# Patient Record
Sex: MALE | Race: BLACK OR AFRICAN AMERICAN | NOT HISPANIC OR LATINO | Employment: FULL TIME | URBAN - METROPOLITAN AREA
[De-identification: names, ages, dates, MRNs, and addresses within clinical notes are randomized per-mention and may not be internally consistent; named-entity substitution may affect disease eponyms.]

---

## 2017-01-04 DIAGNOSIS — Z94.0 KIDNEY REPLACED BY TRANSPLANT: Primary | ICD-10-CM

## 2017-01-04 RX ORDER — PREDNISONE 5 MG/1
5 TABLET ORAL DAILY
Qty: 30 TABLET | Refills: 0 | Status: SHIPPED
Start: 2017-01-04 | End: 2017-02-15

## 2017-01-04 NOTE — TELEPHONE ENCOUNTER
Drug Name: prednisone 5mg  Last Fill Date:11/29/16  Quantity: 30    Danielle Alexis   [facility]  [phone]

## 2017-02-15 ENCOUNTER — OFFICE VISIT (OUTPATIENT)
Dept: FAMILY MEDICINE | Facility: CLINIC | Age: 42
End: 2017-02-15
Payer: COMMERCIAL

## 2017-02-15 ENCOUNTER — TELEPHONE (OUTPATIENT)
Dept: TRANSPLANT | Facility: CLINIC | Age: 42
End: 2017-02-15

## 2017-02-15 VITALS
OXYGEN SATURATION: 100 % | HEART RATE: 83 BPM | TEMPERATURE: 97.8 F | SYSTOLIC BLOOD PRESSURE: 142 MMHG | DIASTOLIC BLOOD PRESSURE: 88 MMHG

## 2017-02-15 DIAGNOSIS — Z94.0 KIDNEY REPLACED BY TRANSPLANT: Primary | ICD-10-CM

## 2017-02-15 DIAGNOSIS — Z94.0 KIDNEY REPLACED BY TRANSPLANT: ICD-10-CM

## 2017-02-15 DIAGNOSIS — M10.9 ACUTE GOUTY ARTHRITIS: Primary | ICD-10-CM

## 2017-02-15 DIAGNOSIS — Z48.298 AFTERCARE FOLLOWING ORGAN TRANSPLANT: ICD-10-CM

## 2017-02-15 DIAGNOSIS — Z79.899 ENCOUNTER FOR LONG-TERM CURRENT USE OF MEDICATION: ICD-10-CM

## 2017-02-15 LAB
ANION GAP SERPL CALCULATED.3IONS-SCNC: 8 MMOL/L (ref 3–14)
BUN SERPL-MCNC: 27 MG/DL (ref 7–30)
CALCIUM SERPL-MCNC: 9 MG/DL (ref 8.5–10.1)
CHLORIDE SERPL-SCNC: 114 MMOL/L (ref 94–109)
CO2 SERPL-SCNC: 19 MMOL/L (ref 20–32)
CREAT SERPL-MCNC: 3.72 MG/DL (ref 0.66–1.25)
ERYTHROCYTE [DISTWIDTH] IN BLOOD BY AUTOMATED COUNT: 15.1 % (ref 10–15)
GFR SERPL CREATININE-BSD FRML MDRD: 18 ML/MIN/1.7M2
GLUCOSE SERPL-MCNC: 117 MG/DL (ref 70–99)
HCT VFR BLD AUTO: 29.1 % (ref 40–53)
HGB BLD-MCNC: 9 G/DL (ref 13.3–17.7)
MCH RBC QN AUTO: 26.4 PG (ref 26.5–33)
MCHC RBC AUTO-ENTMCNC: 30.9 G/DL (ref 31.5–36.5)
MCV RBC AUTO: 85 FL (ref 78–100)
PLATELET # BLD AUTO: 180 10E9/L (ref 150–450)
POTASSIUM SERPL-SCNC: 4.8 MMOL/L (ref 3.4–5.3)
RBC # BLD AUTO: 3.41 10E12/L (ref 4.4–5.9)
SODIUM SERPL-SCNC: 141 MMOL/L (ref 133–144)
TACROLIMUS BLD-MCNC: 5 UG/L (ref 5–15)
TME LAST DOSE: NORMAL H
WBC # BLD AUTO: 9 10E9/L (ref 4–11)

## 2017-02-15 PROCEDURE — 99213 OFFICE O/P EST LOW 20 MIN: CPT | Performed by: FAMILY MEDICINE

## 2017-02-15 PROCEDURE — 80197 ASSAY OF TACROLIMUS: CPT | Performed by: INTERNAL MEDICINE

## 2017-02-15 PROCEDURE — 36415 COLL VENOUS BLD VENIPUNCTURE: CPT | Performed by: INTERNAL MEDICINE

## 2017-02-15 PROCEDURE — 85027 COMPLETE CBC AUTOMATED: CPT | Performed by: INTERNAL MEDICINE

## 2017-02-15 PROCEDURE — 80048 BASIC METABOLIC PNL TOTAL CA: CPT | Performed by: INTERNAL MEDICINE

## 2017-02-15 RX ORDER — OXYCODONE HYDROCHLORIDE 5 MG/1
5 TABLET ORAL EVERY 4 HOURS PRN
Qty: 10 TABLET | Refills: 0 | Status: SHIPPED | OUTPATIENT
Start: 2017-02-15 | End: 2017-03-14

## 2017-02-15 RX ORDER — PREDNISONE 20 MG/1
60 TABLET ORAL DAILY
Qty: 15 TABLET | Refills: 0 | Status: SHIPPED | OUTPATIENT
Start: 2017-02-15 | End: 2017-03-14

## 2017-02-15 RX ORDER — PREDNISONE 5 MG/1
5 TABLET ORAL DAILY
Qty: 30 TABLET | Refills: 11 | Status: SHIPPED | OUTPATIENT
Start: 2017-02-15 | End: 2018-02-22

## 2017-02-15 NOTE — MR AVS SNAPSHOT
After Visit Summary   2/15/2017    Rashad Ortiz    MRN: [id number]           Patient Information     Date Of Birth          1975        Visit Information        Provider Department      2/15/2017 9:00 AM Mariel Garcia MD [facility]        Today's Diagnoses     Acute gouty arthritis    -  1    Status post kidney transplant          Care Instructions    Based on your medical history and these are the current health maintenance or preventive care services that you are due for (some may have been done at this visit)  Health Maintenance Due   Topic Date Due     INFLUENZA VACCINE (SYSTEM ASSIGNED)  09/01/2016         At [facility], we strive to deliver an exceptional experience to you, every time we see you.    If you receive a survey in the mail, please send us back your thoughts. We really do value your feedback.    Your care team's suggested websites for health information:  Www.WeAre.Us : Up to date and easily searchable information on multiple topics.  Www.medlineplus.gov : medication info, interactive tutorials, watch real surgeries online  Www.familydoctor.org : good info from the Academy of Family Physicians  Www.cdc.gov : public health info, travel advisories, epidemics (H1N1)  Www.aap.org : children's health info, normal development, vaccinations  Www.health.[facility].[place].us : [place] dept of health, public health issues in [place], N1N1    How to contact your care team:   Kateryna Nieves/Zach [phone]         Pharmacy [phone]    Dr. Vu, Becky Wright PA-C, Dr. Parsons, Leanna Peguero APRN CNP, Karime Shin PA-C, Dr. Garcia, and ISABEL Brown CNP    Team RNs: Shyanne & Radha      Clinic hours  M-Th 7 am-7 pm   Fri 7 am-5 pm.   Urgent care M-F 11 am-9 pm,   Sat/Sun 9 am-5 pm.  Pharmacy M-Th 8 am-8 pm Fri 8 am-6 pm  Sat/Sun 9 am-5 pm.     All password changes, disabled accounts, or ID changes in [employer]  "will be done by our Access Services Department.    If you need help with your account or password, call: [phone]. Clinic staff no longer has the ability to change passwords.           Follow-ups after your visit        Follow-up notes from your care team     Return in about 3 days (around 2017), or if symptoms worsen or fail to improve.      Who to contact     If you have questions or need follow up information about today's clinic visit or your schedule please contact [facility] directly at [phone].  Normal or non-critical lab and imaging results will be communicated to you by Glooplehart, letter or phone within 4 business days after the clinic has received the results. If you do not hear from us within 7 days, please contact the clinic through [employer] or phone. If you have a critical or abnormal lab result, we will notify you by phone as soon as possible.  Submit refill requests through [employer] or call your pharmacy and they will forward the refill request to us. Please allow 3 business days for your refill to be completed.          Additional Information About Your Visit        MyChart Information     [employer] lets you send messages to your doctor, view your test results, renew your prescriptions, schedule appointments and more. To sign up, go to [email or web address] . Click on \"Log in\" on the left side of the screen, which will take you to the Welcome page. Then click on \"Sign up Now\" on the right side of the page.     You will be asked to enter the access code listed below, as well as some personal information. Please follow the directions to create your username and password.     Your access code is: [email or web address]  Expires: 2017  9:30 AM     Your access code will  in 90 days. If you need help or a new code, please call your [facility] or [phone].        Care EveryWhere ID     This is your Care EveryWhere ID. This could be used by other organizations to " access your [place] medical records  [id number]        Your Vitals Were     Pulse Temperature Pulse Oximetry             83 97.8  F (36.6  C) (Oral) 100%          Blood Pressure from Last 3 Encounters:   02/15/17 142/88   09/12/16 134/83   08/16/16 114/75    Weight from Last 3 Encounters:   09/12/16 182 lb 3.2 oz (82.6 kg)   08/16/16 171 lb (77.6 kg)   03/18/16 172 lb (78 kg)              Today, you had the following     No orders found for display         Today's Medication Changes          These changes are accurate as of: 2/15/17  9:30 AM.  If you have any questions, ask your nurse or doctor.               Start taking these medicines.        Dose/Directions    oxyCODONE 5 MG IR tablet   Commonly known as:  ROXICODONE   Used for:  Status post kidney transplant   Started by:  Mariel Garcia MD        Dose:  5 mg   Take 1 tablet (5 mg) by mouth every 4 hours as needed for breakthrough pain or moderate to severe pain   Quantity:  10 tablet   Refills:  0         These medicines have changed or have updated prescriptions.        Dose/Directions    * predniSONE 5 MG tablet   Commonly known as:  DELTASONE   This may have changed:  Another medication with the same name was changed. Make sure you understand how and when to take each.   Used for:  Kidney replaced by transplant   Changed by:  Francie Barraza PA-C        Dose:  5 mg   Take 1 tablet (5 mg) by mouth daily   Quantity:  30 tablet   Refills:  11       * predniSONE 5 MG tablet   Commonly known as:  DELTASONE   This may have changed:  Another medication with the same name was changed. Make sure you understand how and when to take each.   Used for:  Kidney replaced by transplant   Changed by:  Alexandria Fields MD        Dose:  5 mg   Take 1 tablet (5 mg) by mouth daily   Quantity:  30 tablet   Refills:  0       * predniSONE 20 MG tablet   Commonly known as:  DELTASONE   This may have changed:    - how much to take  - how to take  this  - when to take this  - additional instructions   Used for:  Acute gouty arthritis   Changed by:  Mariel Garcia MD        Dose:  60 mg   Take 3 tablets (60 mg) by mouth daily for 5 days   Quantity:  15 tablet   Refills:  0       * Notice:  This list has 3 medication(s) that are the same as other medications prescribed for you. Read the directions carefully, and ask your doctor or other care provider to review them with you.         Where to get your medicines      These medications were sent to [facility] - [place], [place] - [address]  [address], [facility] [address]     Phone:  [phone]     predniSONE 20 MG tablet         Some of these will need a paper prescription and others can be bought over the counter.  Ask your nurse if you have questions.     Bring a paper prescription for each of these medications     oxyCODONE 5 MG IR tablet                Primary Care Provider Office Phone # Fax #    Francie Barraza PA-C [phone]       [facility] [address]  [facility] [address]        Thank you!     Thank you for choosing [facility]  for your care. Our goal is always to provide you with excellent care. Hearing back from our patients is one way we can continue to improve our services. Please take a few minutes to complete the written survey that you may receive in the mail after your visit with us. Thank you!             Your Updated Medication List - Protect others around you: Learn how to safely use, store and throw away your medicines at www.disposemymeds.org.          This list is accurate as of: 2/15/17  9:30 AM.  Always use your most recent med list.                   Brand Name Dispense Instructions for use    carvedilol 25 MG tablet    COREG    180 tablet    TAKE ONE TABLET BY MOUTH TWICE A DAY       * furosemide 20 MG tablet    LASIX    180 tablet    TAKE ONE TABLET BY MOUTH TWICE A  DAY       * furosemide 20 MG tablet    LASIX    180 tablet    TAKE ONE TABLET BY MOUTH TWICE A DAY       * furosemide 20 MG tablet    LASIX    180 tablet    TAKE ONE TABLET BY MOUTH TWICE A DAY       losartan 100 MG tablet    COZAAR    90 tablet    Take 1 tablet (100 mg) by mouth daily       mycophenolate 250 MG capsule    CELLCEPT - GENERIC EQUIVALENT    240 capsule    Take 4 capsules (1,000 mg) by mouth 2 times daily       omeprazole 20 MG CR capsule    priLOSEC    90 capsule    Take 1 capsule (20 mg) by mouth daily       oxyCODONE 5 MG IR tablet    ROXICODONE    10 tablet    Take 1 tablet (5 mg) by mouth every 4 hours as needed for breakthrough pain or moderate to severe pain       * predniSONE 5 MG tablet    DELTASONE    30 tablet    Take 1 tablet (5 mg) by mouth daily       * predniSONE 5 MG tablet    DELTASONE    30 tablet    Take 1 tablet (5 mg) by mouth daily       * predniSONE 20 MG tablet    DELTASONE    15 tablet    Take 3 tablets (60 mg) by mouth daily for 5 days       sulfamethoxazole-trimethoprim 400-80 MG per tablet    BACTRIM/SEPTRA    45 tablet    Take 1 tablet by mouth every other day       tacrolimus 1 MG capsule    PROGRAF - GENERIC EQUIVALENT    180 capsule    Take 3 capsules (3 mg) by mouth 2 times daily       * Notice:  This list has 6 medication(s) that are the same as other medications prescribed for you. Read the directions carefully, and ask your doctor or other care provider to review them with you.

## 2017-02-15 NOTE — NURSING NOTE
"Chief Complaint   Patient presents with     Arthritis     right leg/foot       Initial BP (!) 151/96 (BP Location: Left arm, Patient Position: Chair, Cuff Size: Adult Regular)  Pulse 83  Temp 97.8  F (36.6  C) (Oral)  SpO2 100% Estimated body mass index is 27.7 kg/(m^2) as calculated from the following:    Height as of 9/12/16: 5' 8\" (1.727 m).    Weight as of 9/12/16: 182 lb 3.2 oz (82.6 kg).  Medication Reconciliation: complete     Was not able to stand to get Wt & HT    S. TAY Cramer        "

## 2017-02-15 NOTE — PROGRESS NOTES
SUBJECTIVE:                                                    Rashad Ortiz is a 41 year old male who presents to clinic today for the following health issues:      Gout/ single inflamed joint      Onset: 3 days    Description:   Location: foot - right  Joint Swelling: YES  Redness: YES  Pain: YES    Intensity: severe    Progression of Symptoms:  worsening    Accompanying Signs & Symptoms:  Fevers: no    History:   Trauma to the area: no   Previous history of gout: YES   Recent illness:  no     Precipitating factors:   Diet-rich in purine: no  Alcohol use: no   Diuretic use: YES    Alleviating factors:  n/a     Therapies Tried and outcome: none      Problem list and histories reviewed & adjusted, as indicated.  Additional history: as documented    Problem list, Medication list, Allergies, and Medical/Social/Surgical histories reviewed in EPIC and updated as appropriate.    ROS:  Constitutional, HEENT, cardiovascular, pulmonary, gi and gu systems are negative, except as otherwise noted.    OBJECTIVE:                                                    /88  Pulse 83  Temp 97.8  F (36.6  C) (Oral)  SpO2 100%  There is no height or weight on file to calculate BMI.  GENERAL: healthy, alert and no distress  NECK: no adenopathy, no asymmetry, masses, or scars and thyroid normal to palpation  RESP: lungs clear to auscultation - no rales, rhonchi or wheezes  CV: regular rate and rhythm, normal S1 S2, no S3 or S4, no murmur, click or rub, no peripheral edema and peripheral pulses strong  ABDOMEN: soft, nontender, no hepatosplenomegaly, no masses and bowel sounds normal  MS: severe tenderness to palpation of right foot, bilateral lower leg edema noted and stable per patient.    Diagnostic Test Results:  none      ASSESSMENT/PLAN:                                                    1. Acute gouty arthritis  Rx's as below and avoid trigger foods.  - predniSONE (DELTASONE) 20 MG tablet; Take 3 tablets (60 mg) by mouth  daily for 5 days  Dispense: 15 tablet; Refill: 0  - oxyCODONE (ROXICODONE) 5 MG IR tablet; Take 1 tablet (5 mg) by mouth every 4 hours as needed for breakthrough pain or moderate to severe pain  Dispense: 10 tablet; Refill: 0    The uses and side effects, including black box warnings as appropriate, were discussed in detail.  All patient questions were answered.  The patient was instructed to call immediately if any side effects developed.     FUTURE APPOINTMENTS:       - Follow-up visit in 3 days if not improved.    Mariel Mares MD  [facility]

## 2017-02-15 NOTE — PATIENT INSTRUCTIONS
Based on your medical history and these are the current health maintenance or preventive care services that you are due for (some may have been done at this visit)  Health Maintenance Due   Topic Date Due     INFLUENZA VACCINE (SYSTEM ASSIGNED)  09/01/2016         At [facility], we strive to deliver an exceptional experience to you, every time we see you.    If you receive a survey in the mail, please send us back your thoughts. We really do value your feedback.    Your care team's suggested websites for health information:  Www.[employer].org : Up to date and easily searchable information on multiple topics.  Www.medlineplus.gov : medication info, interactive tutorials, watch real surgeries online  Www.familydoctor.org : good info from the Academy of Family Physicians  Www.cdc.gov : public health info, travel advisories, epidemics (H1N1)  Www.aap.org : children's health info, normal development, vaccinations  Www.health.[facility].mn.us : [place] dept of health, public health issues in [place], N1N1    How to contact your care team:   Team Lizbeth/Spirit [phone]         Pharmacy [phone]    Dr. Vu, Becky Wright PA-C, Dr. Parsons, Leanna HALL CNP, Karime Shin PA-C, Dr. Garcia, and ISABEL Brown CNP    Team RNs: Shyanne & Radha      Clinic hours  M-Th 7 am-7 pm   Fri 7 am-5 pm.   Urgent care M-F 11 am-9 pm,   Sat/Sun 9 am-5 pm.  Pharmacy M-Th 8 am-8 pm Fri 8 am-6 pm  Sat/Sun 9 am-5 pm.     All password changes, disabled accounts, or ID changes in [employer] ([employer]MyHealth will be done by our Access Services Department.    If you need help with your account or password, call: [phone]. Clinic staff no longer has the ability to change passwords.

## 2017-02-16 ENCOUNTER — TELEPHONE (OUTPATIENT)
Dept: FAMILY MEDICINE | Facility: CLINIC | Age: 42
End: 2017-02-16

## 2017-02-16 NOTE — TELEPHONE ENCOUNTER
Call patient and get update re: insurance status, medications, preferred lab, pharmacy.    Called [phone] (M).  Does not accept incoming calls.    Left VM with sister's number. Requesting for another contact number for patient.

## 2017-02-16 NOTE — TELEPHONE ENCOUNTER
Reason for call: Other     Patient called regarding (reason for call): work note    Additional comments: pt needs a work note for yesterday and today 2/15 and 2/16  Please leave at the  and call when it is ready for     Phone Number Pt can be reached at: [phone]  Best Time: any  Can we leave a detailed message on this number? YES

## 2017-02-16 NOTE — TELEPHONE ENCOUNTER
Called patient and he states that he needs this today. I gave him [employer] Monahan's   Address and phone # to call when this will be ready there. Routing back to   provider to advise for [employer].  Amanda Branham MA/  For Teams Spirit and Lizbeth

## 2017-02-16 NOTE — TELEPHONE ENCOUNTER
Letter completed, please have signed or I can sign tomorrow.  I can sign and we can fax from [employer] today if needed.

## 2017-02-16 NOTE — LETTER
[address] [address]  Phone: [phone]    February 16, 2017        Rashad Ortiz  [address]  [facility] [address]          To whom it may concern:    RE: Rashad Ortiz    Patient was seen and treated today at our clinic and missed work 2/15/16 - 2/17/17.  Patient may return to work 2/18/17 with the following:  No working or lifting restrictions    Please contact me for questions or concerns.      Sincerely,        Mariel Mares MD

## 2017-02-17 NOTE — TELEPHONE ENCOUNTER
Received letter. Bringing to the  by 1:00 pm. Called  And left a voicemail message that the letter is at the [place]  Location for  anytime after 1:00 pm today. Also left address  To [place].  Amanda Branham MA/  For Teams Spirit and Lizbeth

## 2017-02-17 NOTE — TELEPHONE ENCOUNTER
..Reason for Call:   Checking status of note requested     Detailed comments: needs today    Phone Number Patient can be reached at: Home number on file [phone] (home)    Best Time: anytime    Can we leave a detailed message on this number? YES    Call taken on 2/17/2017 at 7:26 AM by Ting Bain

## 2017-02-21 NOTE — TELEPHONE ENCOUNTER
Retrieved letter and faxed to [employer], [phone], right fax confirmed at 1:13 pm today. Letter  To GAYATHRI Branham MA/  For Teams Spirit and Lizbeth

## 2017-02-21 NOTE — TELEPHONE ENCOUNTER
Please see previous note.    Patient needs letter faxed to employer Fax:  [phone], [place].    Please call patient at [phone] if there are any questions.  Ok to leave message.    Thank you,    Andria Fuentes

## 2017-02-22 ENCOUNTER — TELEPHONE (OUTPATIENT)
Dept: TRANSPLANT | Facility: CLINIC | Age: 42
End: 2017-02-22

## 2017-02-22 NOTE — TELEPHONE ENCOUNTER
ISSUE: creatinine = 3.72  His creatinine has been elevated.  Dr. Barrios recommended a biopsy last year but had insurance issues.  Have been trying to speak with patient.   Left a message today to call back.

## 2017-02-23 ENCOUNTER — TELEPHONE (OUTPATIENT)
Dept: TRANSPLANT | Facility: CLINIC | Age: 42
End: 2017-02-23

## 2017-02-23 DIAGNOSIS — Z94.0 KIDNEY TRANSPLANTED: Primary | ICD-10-CM

## 2017-02-24 NOTE — TELEPHONE ENCOUNTER
Message  Received: Today       Gee Barrios MD Ututalum, Teresa, RN                     Increased serum creatinine, although this is after changing antihypertensive medication and being started on high dose of losartan.  Recommend patient be seen in clinic and evaluated.  Would consider a kidney transplant biopsy.         Call patient and discuss plan to biopsy.    Spoke to patient about the biopsy.   Had already discussed it with him December last year but had insurance issues.  Plan to Biopsy on Tuesday 2/28/17.  --- LPN tasked.

## 2017-02-27 DIAGNOSIS — Z94.0 KIDNEY TRANSPLANTED: Primary | ICD-10-CM

## 2017-02-28 ENCOUNTER — RESULTS ONLY (OUTPATIENT)
Dept: OTHER | Facility: CLINIC | Age: 42
End: 2017-02-28

## 2017-02-28 ENCOUNTER — HOSPITAL ENCOUNTER (OUTPATIENT)
Facility: AMBULATORY SURGERY CENTER | Age: 42
End: 2017-02-28
Attending: INTERNAL MEDICINE

## 2017-02-28 VITALS
RESPIRATION RATE: 12 BRPM | HEIGHT: 68 IN | OXYGEN SATURATION: 100 % | TEMPERATURE: 98.4 F | HEART RATE: 66 BPM | SYSTOLIC BLOOD PRESSURE: 155 MMHG | WEIGHT: 170 LBS | DIASTOLIC BLOOD PRESSURE: 109 MMHG | BODY MASS INDEX: 25.76 KG/M2

## 2017-02-28 DIAGNOSIS — Z94.0 KIDNEY REPLACED BY TRANSPLANT: Primary | ICD-10-CM

## 2017-02-28 DIAGNOSIS — Z94.0 KIDNEY TRANSPLANTED: ICD-10-CM

## 2017-02-28 LAB
ABO + RH BLD: NORMAL
ABO + RH BLD: NORMAL
ALBUMIN UR-MCNC: NEGATIVE MG/DL
ANION GAP SERPL CALCULATED.3IONS-SCNC: 9 MMOL/L (ref 3–14)
APPEARANCE UR: CLEAR
BASOPHILS # BLD AUTO: 0 10E9/L (ref 0–0.2)
BASOPHILS NFR BLD AUTO: 0.1 %
BILIRUB UR QL STRIP: NEGATIVE
BLD GP AB SCN SERPL QL: NORMAL
BLOOD BANK CMNT PATIENT-IMP: NORMAL
BUN SERPL-MCNC: 55 MG/DL (ref 7–30)
CALCIUM SERPL-MCNC: 8.7 MG/DL (ref 8.5–10.1)
CHLORIDE SERPL-SCNC: 117 MMOL/L (ref 94–109)
CO2 SERPL-SCNC: 19 MMOL/L (ref 20–32)
COLOR UR AUTO: ABNORMAL
CREAT SERPL-MCNC: 3.61 MG/DL (ref 0.66–1.25)
CREAT UR-MCNC: 97 MG/DL
DIFFERENTIAL METHOD BLD: ABNORMAL
EOSINOPHIL # BLD AUTO: 0 10E9/L (ref 0–0.7)
EOSINOPHIL NFR BLD AUTO: 0.4 %
ERYTHROCYTE [DISTWIDTH] IN BLOOD BY AUTOMATED COUNT: 15.9 % (ref 10–15)
GFR SERPL CREATININE-BSD FRML MDRD: 19 ML/MIN/1.7M2
GLUCOSE SERPL-MCNC: 96 MG/DL (ref 70–99)
GLUCOSE UR STRIP-MCNC: NEGATIVE MG/DL
HCT VFR BLD AUTO: 30.4 % (ref 40–53)
HGB BLD-MCNC: 8.6 G/DL (ref 13.3–17.7)
HGB BLD-MCNC: 9 G/DL (ref 13.3–17.7)
HGB BLD-MCNC: 9.1 G/DL (ref 13.3–17.7)
HGB UR QL STRIP: NEGATIVE
IMM GRANULOCYTES # BLD: 0.1 10E9/L (ref 0–0.4)
IMM GRANULOCYTES NFR BLD: 0.8 %
INR PPP: 1.08 (ref 0.86–1.14)
KETONES UR STRIP-MCNC: NEGATIVE MG/DL
LEUKOCYTE ESTERASE UR QL STRIP: NEGATIVE
LYMPHOCYTES # BLD AUTO: 1 10E9/L (ref 0.8–5.3)
LYMPHOCYTES NFR BLD AUTO: 10.2 %
MCH RBC QN AUTO: 26 PG (ref 26.5–33)
MCHC RBC AUTO-ENTMCNC: 29.9 G/DL (ref 31.5–36.5)
MCV RBC AUTO: 87 FL (ref 78–100)
MICROALBUMIN UR-MCNC: 113 MG/L
MICROALBUMIN/CREAT UR: 116.38 MG/G CR (ref 0–17)
MONOCYTES # BLD AUTO: 0.9 10E9/L (ref 0–1.3)
MONOCYTES NFR BLD AUTO: 9.4 %
MUCOUS THREADS #/AREA URNS LPF: PRESENT /LPF
NEUTROPHILS # BLD AUTO: 7.4 10E9/L (ref 1.6–8.3)
NEUTROPHILS NFR BLD AUTO: 79.1 %
NITRATE UR QL: NEGATIVE
NRBC # BLD AUTO: 0 10*3/UL
NRBC BLD AUTO-RTO: 0 /100
PH UR STRIP: 6 PH (ref 5–7)
PLATELET # BLD AUTO: 193 10E9/L (ref 150–450)
POTASSIUM SERPL-SCNC: 4.7 MMOL/L (ref 3.4–5.3)
PROT UR-MCNC: 0.28 G/L
PROT/CREAT 24H UR: 0.29 G/G CR (ref 0–0.2)
RBC # BLD AUTO: 3.5 10E12/L (ref 4.4–5.9)
RBC #/AREA URNS AUTO: <1 /HPF (ref 0–2)
SODIUM SERPL-SCNC: 145 MMOL/L (ref 133–144)
SP GR UR STRIP: 1.01 (ref 1–1.03)
SPECIMEN EXP DATE BLD: NORMAL
TACROLIMUS BLD-MCNC: 4.5 UG/L (ref 5–15)
TME LAST DOSE: ABNORMAL H
URN SPEC COLLECT METH UR: ABNORMAL
UROBILINOGEN UR STRIP-MCNC: 0 MG/DL (ref 0–2)
WBC # BLD AUTO: 9.3 10E9/L (ref 4–11)
WBC #/AREA URNS AUTO: <1 /HPF (ref 0–2)

## 2017-02-28 RX ADMIN — Medication 12 ML: at 09:17

## 2017-02-28 NOTE — IP AVS SNAPSHOT
[facility]    [address] [place] [address]    Phone:  [phone]    Fax:  [phone]                                       After Visit Summary   2/28/2017    Rashad Ortiz    MRN: [id number]           After Visit Summary Signature Page     I have received my discharge instructions, and my questions have been answered. I have discussed any challenges I see with this plan with the nurse or doctor.    ..........................................................................................................................................  Patient/Patient Representative Signature      ..........................................................................................................................................  Patient Representative Print Name and Relationship to Patient    ..................................................               ................................................  Date                                            Time    ..........................................................................................................................................  Reviewed by Signature/Title    ...................................................              ..............................................  Date                                                            Time

## 2017-02-28 NOTE — PROCEDURES
Kidney Transplant Biopsy Procedure Note    Indication/Diagnosis:  Kidney transplant with elevated creatinine    Procedure:  The indications and risks of the kidney biopsy, including bleeding severe enough to require hospitalization, transfusion, surgery, or even loss of the kidney or death, were explained, understood and agreed.  Consent was signed.  The kidney, located in the right lower quadrant, was visualized under ultrasound and biopsy site marked.  Patient was prepped and draped in the usual sterile manner.  Biopsy site was anesthetized with 1% lidocaine.  Biopsy consisted of 4 passes with a 18 ga needle under direct ultrasound guidance were made and tissue was sent to pathology.  There were no immediate complications.  Pressure was held over biopsy site and patient will be observed for signs of bleeding or other complications.  Patient remained hemodynamically stable during and early post procedure.

## 2017-02-28 NOTE — IP AVS SNAPSHOT
MRN:[id number]                      After Visit Summary   2/28/2017    Rashad Ortiz    MRN: [id number]           Thank you!     Thank you for choosing [facility] for your care. Our goal is always to provide you with excellent care. Hearing back from our patients is one way we can continue to improve our services. Please take a few minutes to complete the written survey that you may receive in the mail after you visit with us. Thank you!        Patient Information     Date Of Birth          1975        About your hospital stay     You were admitted on:  February 28, 2017 [facility]    You were discharged on:  February 28, 2017       Who to Call     For medical emergencies, please call 911.  For non-urgent questions about your medical care, please call your primary care provider or clinic, [phone]  For questions related to your surgery, please call your surgery clinic        Attending Provider     Provider Gee Alfonso MD Nephrology       Primary Care Provider Office Phone # Fax #    Francie Barraza PA-C [phone]       [address] [address]        After Care Instructions     Discharge Instructions       ACTIVITY:  NO heavy lifting (weight greater than 10 pounds) for 1 week. NO strenuous activity for 24 hours; relax and take it easy.     DIET:  Resume your regular diet and drink plenty of fluids UNLESS you are fluid restricted.    DRAINAGE: There should be minimal drainage from the biopsy site. If bleeding soaks the dressing, you should lie down and apply pressure to the site for a minimum of 10 minutes.  If the bleeding persists, call the emergency phone numbers below.  Whether bleeding persists or not, you should report the bleeding to one of the numbers below.     DRESSING: Keep the dressing dry and in place for 24 hours to prevent the site  "from reopening and bleeding.    SEDATION: If you received sedation medications, DO NOT drive or operate heavy machinery, DO NOT drink alcoholic beverages, or DO NOT make important legal decisions.    NOTIFY PROVIDER for the following signs/symptoms:  Excessive bleeding or drainage.  Excessive swelling, redness, or tenderness at the site.  Fever above 101.5* F  Severe Pain.  Drainage that is green, yellow,  thick white or has a bad odor.  Passage of bloody urine or clots after you are discharged.    EMERGENCY CONTACT NUMBERS:  Emergency Department: [phone]  Transplant Center: [phone]  7:00 AM-6:30 PM                  Pending Results     Date and Time Order Name Status Description    2/28/2017 0940 Surgical pathology exam In process     2/28/2017 0915 SEND OUTS MISC TEST In process     2/28/2017 0731 TACROLIMUS LEVEL In process     2/28/2017 0731 BK VIRUS PCR QUANTITATIVE In process     2/28/2017 0731 CMV DNA QUANTIFICATION In process     2/28/2017 0731 PRA DONOR SPECIFIC ANTIBODY In process             Admission Information     Date & Time Provider Department Dept. Phone    2/28/2017 Gee Barrios MD [facility] [phone]      Your Vitals Were     Blood Pressure Pulse Temperature Respirations Height Weight    134/99 66 98.6  F (37  C) (Oral) 12 1.727 m (5' 8\") 77.1 kg (170 lb)    Pulse Oximetry BMI (Body Mass Index)                100% 25.85 kg/m2          [employer] Information     [employer] is an electronic gateway that provides easy, online access to your medical records. With Equ[employer], you can request a clinic appointment, read your test results, renew a prescription or communicate with your care team.     To sign up for Equ[employer] visit the website at [email or web address]   You will be asked to enter the access code listed below, as well as some personal information. Please follow the directions to create your username and password.     Your access code is: " [email or web address]  Expires: 2017  9:30 AM     Your access code will  in 90 days. If you need help or a new code, please contact your [facility] or call [phone] for assistance.        Care EveryWhere ID     This is your Care EveryWhere ID. This could be used by other organizations to access your [facility] medical records  [id number]           Review of your medicines      CONTINUE these medicines which have NOT CHANGED        Dose / Directions    carvedilol 25 MG tablet   Commonly known as:  COREG   Used for:  Essential hypertension, malignant        TAKE ONE TABLET BY MOUTH TWICE A DAY   Quantity:  180 tablet   Refills:  3       furosemide 20 MG tablet   Commonly known as:  LASIX   Used for:  Benign hypertensive kidney disease with chronic kidney disease, stage 1-4 or unspecified chronic kidney disease        TAKE ONE TABLET BY MOUTH TWICE A DAY   Quantity:  180 tablet   Refills:  0       losartan 100 MG tablet   Commonly known as:  COZAAR   Used for:  Renal hypertension, stage 1-4 or unspecified chronic kidney disease        Dose:  100 mg   Take 1 tablet (100 mg) by mouth daily   Quantity:  90 tablet   Refills:  3       mycophenolate 250 MG capsule   Commonly known as:  CELLCEPT - GENERIC EQUIVALENT   Used for:  Kidney transplanted        Dose:  1000 mg   Take 4 capsules (1,000 mg) by mouth 2 times daily   Quantity:  240 capsule   Refills:  11       omeprazole 20 MG CR capsule   Commonly known as:  priLOSEC   Used for:  Kidney replaced by transplant        Dose:  20 mg   Take 1 capsule (20 mg) by mouth daily   Quantity:  90 capsule   Refills:  1       oxyCODONE 5 MG IR tablet   Commonly known as:  ROXICODONE   Used for:  Acute gouty arthritis        Dose:  5 mg   Take 1 tablet (5 mg) by mouth every 4 hours as needed for breakthrough pain or moderate to severe pain   Quantity:  10 tablet   Refills:  0       * predniSONE 5 MG tablet   Commonly known as:  DELTASONE   Used for:   Kidney replaced by transplant        Dose:  5 mg   Take 1 tablet (5 mg) by mouth daily   Quantity:  30 tablet   Refills:  11       * predniSONE 5 MG tablet   Commonly known as:  DELTASONE   Used for:  Kidney replaced by transplant        Dose:  5 mg   Take 1 tablet (5 mg) by mouth daily   Quantity:  30 tablet   Refills:  11       sulfamethoxazole-trimethoprim 400-80 MG per tablet   Commonly known as:  BACTRIM/SEPTRA   Used for:  Kidney transplanted        Dose:  1 tablet   Take 1 tablet by mouth every other day   Quantity:  45 tablet   Refills:  3       tacrolimus 1 MG capsule   Commonly known as:  PROGRAF - GENERIC EQUIVALENT   Used for:  Kidney replaced by transplant        Dose:  3 mg   Take 3 capsules (3 mg) by mouth 2 times daily   Quantity:  180 capsule   Refills:  6       * Notice:  This list has 2 medication(s) that are the same as other medications prescribed for you. Read the directions carefully, and ask your doctor or other care provider to review them with you.             Protect others around you: Learn how to safely use, store and throw away your medicines at www.disposemymeds.org.             Medication List: This is a list of all your medications and when to take them. Check marks below indicate your daily home schedule. Keep this list as a reference.      Medications           Morning Afternoon Evening Bedtime As Needed    carvedilol 25 MG tablet   Commonly known as:  COREG   TAKE ONE TABLET BY MOUTH TWICE A DAY                                furosemide 20 MG tablet   Commonly known as:  LASIX   TAKE ONE TABLET BY MOUTH TWICE A DAY                                losartan 100 MG tablet   Commonly known as:  COZAAR   Take 1 tablet (100 mg) by mouth daily                                mycophenolate 250 MG capsule   Commonly known as:  CELLCEPT - GENERIC EQUIVALENT   Take 4 capsules (1,000 mg) by mouth 2 times daily                                omeprazole 20 MG CR capsule   Commonly known as:   priLOSEC   Take 1 capsule (20 mg) by mouth daily                                oxyCODONE 5 MG IR tablet   Commonly known as:  ROXICODONE   Take 1 tablet (5 mg) by mouth every 4 hours as needed for breakthrough pain or moderate to severe pain                                * predniSONE 5 MG tablet   Commonly known as:  DELTASONE   Take 1 tablet (5 mg) by mouth daily                                * predniSONE 5 MG tablet   Commonly known as:  DELTASONE   Take 1 tablet (5 mg) by mouth daily                                sulfamethoxazole-trimethoprim 400-80 MG per tablet   Commonly known as:  BACTRIM/SEPTRA   Take 1 tablet by mouth every other day                                tacrolimus 1 MG capsule   Commonly known as:  PROGRAF - GENERIC EQUIVALENT   Take 3 capsules (3 mg) by mouth 2 times daily                                * Notice:  This list has 2 medication(s) that are the same as other medications prescribed for you. Read the directions carefully, and ask your doctor or other care provider to review them with you.

## 2017-02-28 NOTE — PROGRESS NOTES
Bedrest continued until 1520 per Dr Barrios. Hgb re-check came back at 8.6. Urine is still pink/red with no clots, but lighter in color. Dr. Barrios notified. Pt okay to DC per Dr. Barrios pending he gets a hgb checked tomorrow. Pt agreed to do that. Denies lightheadedness, VSS. Pt educated to go to the ED if he experiences lightheadedness, increased blood/clots in urine, uncontrolled pain or bleeding. Pt DC to home.

## 2017-02-28 NOTE — OR NURSING
Pt used urinal while laying flat. Pt has gross hematuria with clots. Vitals stable, dime size amount of drainage on dressing, pt denies lightheadedness.  notified. Cont to monitor vitals, incision and urine and keep pt on bedrest till urine goes clear.

## 2017-03-01 ENCOUNTER — HOSPITAL ENCOUNTER (EMERGENCY)
Facility: CLINIC | Age: 42
Discharge: HOME | End: 2017-03-01
Attending: EMERGENCY MEDICINE | Admitting: EMERGENCY MEDICINE
Payer: COMMERCIAL

## 2017-03-01 ENCOUNTER — TELEPHONE (OUTPATIENT)
Dept: NURSING | Facility: CLINIC | Age: 42
End: 2017-03-01

## 2017-03-01 ENCOUNTER — TELEPHONE (OUTPATIENT)
Dept: TRANSPLANT | Facility: CLINIC | Age: 42
End: 2017-03-01

## 2017-03-01 VITALS
DIASTOLIC BLOOD PRESSURE: 105 MMHG | BODY MASS INDEX: 25.76 KG/M2 | RESPIRATION RATE: 14 BRPM | SYSTOLIC BLOOD PRESSURE: 133 MMHG | WEIGHT: 170 LBS | OXYGEN SATURATION: 100 % | HEIGHT: 68 IN | TEMPERATURE: 98.3 F | HEART RATE: 65 BPM

## 2017-03-01 DIAGNOSIS — N18.6 END STAGE RENAL DISEASE (H): ICD-10-CM

## 2017-03-01 DIAGNOSIS — R31.9 HEMATURIA: ICD-10-CM

## 2017-03-01 DIAGNOSIS — Z79.899 HIGH RISK MEDICATIONS (NOT ANTICOAGULANTS) LONG-TERM USE: ICD-10-CM

## 2017-03-01 DIAGNOSIS — I12.0 BENIGN HYPERTENSION WITH END-STAGE RENAL DISEASE (H): ICD-10-CM

## 2017-03-01 DIAGNOSIS — Z94.0 KIDNEY TRANSPLANTED: Primary | ICD-10-CM

## 2017-03-01 DIAGNOSIS — T86.10 COMPLICATIONS, KIDNEY TRANSPLANT: ICD-10-CM

## 2017-03-01 DIAGNOSIS — Z94.0 KIDNEY REPLACED BY TRANSPLANT: ICD-10-CM

## 2017-03-01 DIAGNOSIS — Z99.2 ENCOUNTER FOR EXTRACORPOREAL DIALYSIS (H): ICD-10-CM

## 2017-03-01 DIAGNOSIS — N18.6 BENIGN HYPERTENSION WITH END-STAGE RENAL DISEASE (H): ICD-10-CM

## 2017-03-01 LAB
ALBUMIN UR-MCNC: NEGATIVE MG/DL
ANION GAP SERPL CALCULATED.3IONS-SCNC: 8 MMOL/L (ref 3–14)
APPEARANCE UR: CLEAR
BASOPHILS # BLD AUTO: 0 10E9/L (ref 0–0.2)
BASOPHILS NFR BLD AUTO: 0.1 %
BILIRUB UR QL STRIP: NEGATIVE
BKV DNA # SPEC NAA+PROBE: NORMAL COPIES/ML
BKV DNA SPEC NAA+PROBE-LOG#: NORMAL LOG COPIES/ML
BUN SERPL-MCNC: 54 MG/DL (ref 7–30)
CALCIUM SERPL-MCNC: 8.1 MG/DL (ref 8.5–10.1)
CHLORIDE SERPL-SCNC: 110 MMOL/L (ref 94–109)
CMV DNA SPEC NAA+PROBE-ACNC: NORMAL [IU]/ML
CMV DNA SPEC NAA+PROBE-LOG#: NORMAL {LOG_IU}/ML
CO2 SERPL-SCNC: 18 MMOL/L (ref 20–32)
COLOR UR AUTO: ABNORMAL
CREAT SERPL-MCNC: 3.15 MG/DL (ref 0.66–1.25)
DIFFERENTIAL METHOD BLD: ABNORMAL
EOSINOPHIL # BLD AUTO: 0 10E9/L (ref 0–0.7)
EOSINOPHIL NFR BLD AUTO: 0.3 %
ERYTHROCYTE [DISTWIDTH] IN BLOOD BY AUTOMATED COUNT: 15.7 % (ref 10–15)
GFR SERPL CREATININE-BSD FRML MDRD: 22 ML/MIN/1.7M2
GLUCOSE SERPL-MCNC: 112 MG/DL (ref 70–99)
GLUCOSE UR STRIP-MCNC: NEGATIVE MG/DL
HCT VFR BLD AUTO: 28 % (ref 40–53)
HGB BLD-MCNC: 8.9 G/DL (ref 13.3–17.7)
HGB UR QL STRIP: ABNORMAL
IMM GRANULOCYTES # BLD: 0 10E9/L (ref 0–0.4)
IMM GRANULOCYTES NFR BLD: 0.4 %
INR PPP: 1.05 (ref 0.86–1.14)
KETONES UR STRIP-MCNC: NEGATIVE MG/DL
LEUKOCYTE ESTERASE UR QL STRIP: ABNORMAL
LYMPHOCYTES # BLD AUTO: 0.8 10E9/L (ref 0.8–5.3)
LYMPHOCYTES NFR BLD AUTO: 7.3 %
MCH RBC QN AUTO: 26.3 PG (ref 26.5–33)
MCHC RBC AUTO-ENTMCNC: 31.8 G/DL (ref 31.5–36.5)
MCV RBC AUTO: 83 FL (ref 78–100)
MONOCYTES # BLD AUTO: 0.7 10E9/L (ref 0–1.3)
MONOCYTES NFR BLD AUTO: 6.4 %
NEUTROPHILS # BLD AUTO: 9.6 10E9/L (ref 1.6–8.3)
NEUTROPHILS NFR BLD AUTO: 85.5 %
NITRATE UR QL: NEGATIVE
NRBC # BLD AUTO: 0 10*3/UL
NRBC BLD AUTO-RTO: 0 /100
PH UR STRIP: 5 PH (ref 5–7)
PLATELET # BLD AUTO: 178 10E9/L (ref 150–450)
POTASSIUM SERPL-SCNC: 5.1 MMOL/L (ref 3.4–5.3)
PRA DONOR SPECIFIC ABY: NORMAL
RBC # BLD AUTO: 3.39 10E12/L (ref 4.4–5.9)
RBC #/AREA URNS AUTO: >182 /HPF (ref 0–2)
SODIUM SERPL-SCNC: 136 MMOL/L (ref 133–144)
SP GR UR STRIP: 1.01 (ref 1–1.03)
SPECIMEN SOURCE: NORMAL
SPECIMEN SOURCE: NORMAL
URN SPEC COLLECT METH UR: ABNORMAL
UROBILINOGEN UR STRIP-MCNC: NORMAL MG/DL (ref 0–2)
WBC # BLD AUTO: 11.2 10E9/L (ref 4–11)
WBC #/AREA URNS AUTO: 40 /HPF (ref 0–2)

## 2017-03-01 PROCEDURE — 81001 URINALYSIS AUTO W/SCOPE: CPT | Performed by: EMERGENCY MEDICINE

## 2017-03-01 PROCEDURE — 36415 COLL VENOUS BLD VENIPUNCTURE: CPT | Performed by: EMERGENCY MEDICINE

## 2017-03-01 PROCEDURE — 80048 BASIC METABOLIC PNL TOTAL CA: CPT | Performed by: EMERGENCY MEDICINE

## 2017-03-01 PROCEDURE — 99284 EMERGENCY DEPT VISIT MOD MDM: CPT | Mod: Z6 | Performed by: EMERGENCY MEDICINE

## 2017-03-01 PROCEDURE — 99284 EMERGENCY DEPT VISIT MOD MDM: CPT | Performed by: EMERGENCY MEDICINE

## 2017-03-01 PROCEDURE — 87086 URINE CULTURE/COLONY COUNT: CPT | Performed by: EMERGENCY MEDICINE

## 2017-03-01 PROCEDURE — 51798 US URINE CAPACITY MEASURE: CPT | Performed by: EMERGENCY MEDICINE

## 2017-03-01 PROCEDURE — 85610 PROTHROMBIN TIME: CPT | Performed by: EMERGENCY MEDICINE

## 2017-03-01 PROCEDURE — 85025 COMPLETE CBC W/AUTO DIFF WBC: CPT | Performed by: EMERGENCY MEDICINE

## 2017-03-01 ASSESSMENT — ENCOUNTER SYMPTOMS
DIARRHEA: 0
DIAPHORESIS: 0
HEADACHES: 0
ARTHRALGIAS: 0
FLANK PAIN: 0
HEMATURIA: 1
PALPITATIONS: 0
SHORTNESS OF BREATH: 0
WEAKNESS: 0
COUGH: 0
DIFFICULTY URINATING: 0
CHEST TIGHTNESS: 0
CHILLS: 0
DYSURIA: 0
DIZZINESS: 0
BLOOD IN STOOL: 0
FEVER: 0
NAUSEA: 0
APPETITE CHANGE: 0
BACK PAIN: 0
CONSTITUTIONAL NEGATIVE: 1
ABDOMINAL PAIN: 0
FREQUENCY: 0
VOMITING: 0
MYALGIAS: 0
RHINORRHEA: 0
NECK PAIN: 0
LIGHT-HEADEDNESS: 0
BRUISES/BLEEDS EASILY: 0
FATIGUE: 0

## 2017-03-01 NOTE — TELEPHONE ENCOUNTER
Call Type: Triage Call    Presenting Problem: Pt discharged from hospital today after having  kidney biopsy and he is wondering about his discharge instructions.  He is having some blood in his urine.  Gave him instructions on how  to reach the on call urologist on call.  Triage Note:  Guideline Title: No Guideline - Advice Per Reference (Adult)  Recommended Disposition: Call Provider Immediately  Original Inclination: Would have called clinic  Override Disposition:  Intended Action: Follow advice given  Physician Contacted: No  CALL PROVIDER IMMEDIATELY ?  YES  SEE ED IMMEDIATELY ? NO  CALL POISON CENTER IMMEDIATELY ? NO  CALL LOCAL AGENCY IMMEDIATELY ? NO  ACTIVATE  ? NO  Physician Instructions:  Care Advice:

## 2017-03-01 NOTE — ED PROVIDER NOTES
History     Chief Complaint   Patient presents with     Hematuria     HPI  Rashad Ortiz is a 41 year old male with a history of hypertension and ESRD s/p kidney transplant who presents for evaluation of hematuria. The patient underwent biopsy of his transplanted kidney yesterday. He states that since then he has had hematuria with what appears to be blood clots. He states that he is able to urinate; no retention. No pain on urination. The patient also endorses mild pain at the biopsy site. He denies any fevers, dizziness, or lightheadedness. His blood pressure is noted to be 140/103 in triage here today, and the patient reports that his blood pressure has run around these numbers recently.    I have reviewed the Medications, Allergies, Past Medical and Surgical History, and Social History in the Epic system.    No current facility-administered medications for this encounter.      Current Outpatient Prescriptions   Medication     losartan (COZAAR) 100 MG tablet     mycophenolate (CELLCEPT - GENERIC EQUIVALENT) 250 MG capsule     carvedilol (COREG) 25 MG tablet     predniSONE (DELTASONE) 5 MG tablet     sulfamethoxazole-trimethoprim (BACTRIM,SEPTRA) 400-80 MG per tablet     tacrolimus (PROGRAF - GENERIC EQUIVALENT) 1 MG capsule     furosemide (LASIX) 20 MG tablet     omeprazole (PRILOSEC) 20 MG capsule     oxyCODONE (ROXICODONE) 5 MG IR tablet     predniSONE (DELTASONE) 5 MG tablet     Past Medical History   Diagnosis Date     Gastro-oesophageal reflux disease      Gout      Hypertension      Kidney dialysis      Renal disease      Steroid long-term use        Past Surgical History   Procedure Laterality Date     Transplant  01/13/2011     Kidney transpplant      Av fistula or graft arterial       [place] fistula arteriovenous upper extremity  12/20/2011     Procedure:LIGATE FISTULA ARTERIOVENOUS UPPER EXTREMITY; Excision of Right Forearm Arteriovenous Fistula.; Surgeon:LINDY AMAYA; Location:[facility] OR  "      Family History   Problem Relation Age of Onset     Hypertension Mother        Social History   Substance Use Topics     Smoking status: Former Smoker     Smokeless tobacco: Never Used      Comment: Patient states that he is an 'social'  smoker      Alcohol use 2.5 oz/week     5 Standard drinks or equivalent per week        Allergies   Allergen Reactions     Nkda [No Known Drug Allergies]        Review of Systems   Constitutional: Negative.  Negative for appetite change, chills, diaphoresis, fatigue and fever.   HENT: Negative for congestion, nosebleeds and rhinorrhea.    Respiratory: Negative for cough, chest tightness and shortness of breath.    Cardiovascular: Negative for chest pain, palpitations and leg swelling.   Gastrointestinal: Negative for abdominal pain, blood in stool, diarrhea, nausea and vomiting.   Genitourinary: Positive for hematuria (with clots s/p transplanted kidney biopsy). Negative for difficulty urinating, dysuria, flank pain and frequency.   Musculoskeletal: Negative for arthralgias, back pain, myalgias and neck pain.   Skin: Negative for rash.   Allergic/Immunologic: Positive for immunocompromised state.   Neurological: Negative for dizziness, syncope, weakness, light-headedness and headaches.   Hematological: Does not bruise/bleed easily.       Physical Exam   BP: (!) 140/103  Pulse: 74  Temp: 98.3  F (36.8  C)  Resp: 18  Height: 172.7 cm (5' 8\")  Weight: 77.1 kg (170 lb)  SpO2: 100 %  Physical Exam   Constitutional: He appears well-developed and well-nourished. No distress.   HENT:   Head: Normocephalic and atraumatic.   Mouth/Throat: Oropharynx is clear and moist.   Eyes: Conjunctivae and EOM are normal.   Neck: Normal range of motion. Neck supple.   Cardiovascular: Normal rate, regular rhythm, normal heart sounds and intact distal pulses.    Pulmonary/Chest: Effort normal and breath sounds normal. No respiratory distress.   Abdominal: Soft. Bowel sounds are normal. He exhibits no " distension. There is no tenderness. There is no CVA tenderness.   Musculoskeletal: He exhibits no edema or tenderness.   Neurological: He is alert.   Skin: Skin is warm and dry.   Biopsy site looks good. No bleeding, no significant tenderness. No erythema.    Psychiatric: He has a normal mood and affect. His behavior is normal.   Nursing note and vitals reviewed.      ED Course     ED Course     Procedures             Critical Care time:  none               Labs Ordered and Resulted from Time of ED Arrival Up to the Time of Departure from the ED   ROUTINE UA WITH MICROSCOPIC - Abnormal; Notable for the following:        Result Value    Blood Urine Large (*)     Leukocyte Esterase Urine Small (*)     WBC Urine 40 (*)     RBC Urine >182 (*)     All other components within normal limits   BASIC METABOLIC PANEL - Abnormal; Notable for the following:     Chloride 110 (*)     Carbon Dioxide 18 (*)     Glucose 112 (*)     Urea Nitrogen 54 (*)     Creatinine 3.15 (*)     GFR Estimate 22 (*)     GFR Estimate If Black 27 (*)     Calcium 8.1 (*)     All other components within normal limits   CBC WITH PLATELETS DIFFERENTIAL - Abnormal; Notable for the following:     WBC 11.2 (*)     RBC Count 3.39 (*)     Hemoglobin 8.9 (*)     Hematocrit 28.0 (*)     MCH 26.3 (*)     RDW 15.7 (*)     Absolute Neutrophil 9.6 (*)     All other components within normal limits   INR   BLADDER SCAN   URINE CULTURE AEROBIC BACTERIAL       Assessments & Plan (with Medical Decision Making)   41 year old male with hematuria following renal biopsy yesterday. Kidney transplant in 2011 for hypertension related CKD. Currently on Cellcept, Prograf, and prednisone. Mild pain at site of biopsy. No fever or chills. No urinary retention. Discussed with Dr. Barrios. Will have patient observe and follow up in clinic this week. Blood pressure elevated but patient states this is common for him. No evidence of hypertension emergency. Needs follow up on this as  well.    I have reviewed the nursing notes.    I have reviewed the findings, diagnosis, plan and need for follow up with the patient.    New Prescriptions    No medications on file       Final diagnoses:   Hematuria     I, Endy Cornejo, am serving as a trained medical scribe to document services personally performed by Maximo Chaudhary MD, based on the provider's statements to me.      Maximo CHASE MD, was physically present and have reviewed and verified the accuracy of this note documented by Endy Cornejo.    3/1/2017   [facility], [place], EMERGENCY DEPARTMENT       Maximo Chaudhary MD  03/01/17 0239

## 2017-03-01 NOTE — ED NOTES
Pt arrived in triage c/o blood clots in urine since kidney biopsy done yesterday. Hx of kidney transplant. Pain located in abdomen. A/o. /103.

## 2017-03-01 NOTE — DISCHARGE INSTRUCTIONS
Follow up in clinic.  Return if persistent symptoms, especially if increased bleeding or difficulty urinating or fever.

## 2017-03-01 NOTE — ED AVS SNAPSHOT
[facility], Emergency Department    [address] [address]    Phone:  [phone]                                       Rashad Ortiz   MRN: [id number]    Department:  [facility], Emergency Department   Date of Visit:  3/1/2017           Patient Information     Date Of Birth          1975        Your diagnoses for this visit were:     Hematuria        You were seen by Maximo Chaudhary MD.      Follow-up Information     Follow up with Gee Barrios MD.    Specialty:  Nephrology    Contact information:     [facility]  [address]  [facility] [id number] [phone]          Discharge Instructions       Follow up in clinic.  Return if persistent symptoms, especially if increased bleeding or difficulty urinating or fever.    24 Hour Appointment Hotline       To make an appointment at any [facility] clinic, call [phone] ([phone]). If you don't have a family doctor or clinic, we will help you find one. [facility] clinics are conveniently located to serve the needs of you and your family.             Review of your medicines      Our records show that you are taking the medicines listed below. If these are incorrect, please call your family doctor or clinic.        Dose / Directions Last dose taken    carvedilol 25 MG tablet   Commonly known as:  COREG   Quantity:  180 tablet        TAKE ONE TABLET BY MOUTH TWICE A DAY   Refills:  3        furosemide 20 MG tablet   Commonly known as:  LASIX   Quantity:  180 tablet        TAKE ONE TABLET BY MOUTH TWICE A DAY   Refills:  0        losartan 100 MG tablet   Commonly known as:  COZAAR   Dose:  100 mg   Quantity:  90 tablet        Take 1 tablet (100 mg) by mouth daily   Refills:  3        mycophenolate 250 MG capsule   Commonly known as:  CELLCEPT - GENERIC EQUIVALENT   Dose:  1000 mg   Quantity:  240 capsule        Take 4 capsules (1,000 mg) by mouth 2 times daily   Refills:  11        omeprazole 20 MG CR capsule    Commonly known as:  priLOSEC   Dose:  20 mg   Quantity:  90 capsule        Take 1 capsule (20 mg) by mouth daily   Refills:  1        oxyCODONE 5 MG IR tablet   Commonly known as:  ROXICODONE   Dose:  5 mg   Quantity:  10 tablet        Take 1 tablet (5 mg) by mouth every 4 hours as needed for breakthrough pain or moderate to severe pain   Refills:  0        * predniSONE 5 MG tablet   Commonly known as:  DELTASONE   Dose:  5 mg   Quantity:  30 tablet        Take 1 tablet (5 mg) by mouth daily   Refills:  11        * predniSONE 5 MG tablet   Commonly known as:  DELTASONE   Dose:  5 mg   Quantity:  30 tablet        Take 1 tablet (5 mg) by mouth daily   Refills:  11        sulfamethoxazole-trimethoprim 400-80 MG per tablet   Commonly known as:  BACTRIM/SEPTRA   Dose:  1 tablet   Quantity:  45 tablet        Take 1 tablet by mouth every other day   Refills:  3        tacrolimus 1 MG capsule   Commonly known as:  PROGRAF - GENERIC EQUIVALENT   Dose:  3 mg   Quantity:  180 capsule        Take 3 capsules (3 mg) by mouth 2 times daily   Refills:  6        * Notice:  This list has 2 medication(s) that are the same as other medications prescribed for you. Read the directions carefully, and ask your doctor or other care provider to review them with you.            Procedures and tests performed during your visit     Basic metabolic panel    Bladder scan    CBC with platelets differential    INR    Routine UA with microscopic    Urine Culture Aerobic Bacterial      Orders Needing Specimen Collection     None      Pending Results     Date and Time Order Name Status Description    3/1/2017 0114 Urine Culture Aerobic Bacterial Preliminary     2/28/2017 0940 Surgical pathology exam In process     2/28/2017 0915 SEND OUTS MISC TEST In process     2/28/2017 0731 BK VIRUS PCR QUANTITATIVE In process     2/28/2017 0731 CMV DNA QUANTIFICATION In process     2/28/2017 0731 PRA DONOR SPECIFIC ANTIBODY In process             Pending  "Culture Results     Date and Time Order Name Status Description    3/1/2017 0114 Urine Culture Aerobic Bacterial Preliminary     2017 0940 Surgical pathology exam In process             Thank you for choosing [facility]       Thank you for choosing [facility] for your care. Our goal is always to provide you with excellent care. Hearing back from our patients is one way we can continue to improve our services. Please take a few minutes to complete the written survey that you may receive in the mail after you visit with us. Thank you!        [employer] Information     [employer] lets you send messages to your doctor, view your test results, renew your prescriptions, schedule appointments and more. To sign up, go to [email or web address] . Click on \"Log in\" on the left side of the screen, which will take you to the Welcome page. Then click on \"Sign up Now\" on the right side of the page.     You will be asked to enter the access code listed below, as well as some personal information. Please follow the directions to create your username and password.     Your access code is: [email or web address]  Expires: 2017  9:30 AM     Your access code will  in 90 days. If you need help or a new code, please call your [facility] clinic or [phone].        Care EveryWhere ID     This is your Care EveryWhere ID. This could be used by other organizations to access your [facility] medical records  [id number]        After Visit Summary       This is your record. Keep this with you and show to your community pharmacist(s) and doctor(s) at your next visit.                  "

## 2017-03-01 NOTE — TELEPHONE ENCOUNTER
Message  Received: Today       Gee Barrios MD Ututalum, Teresa, RN                   Did kidney transplant biopsy yesterday.  Some hematuria following biopsy, but stable Hgb.  Called me late last night (I was on call) for continued gross hematuria.  I sent him in to ED.  In ED, his labs were stable, including stable Hgb.  Patient was doing fine so we sent him home.     I tried to reach him today, but only got voice mail.  Can you check in to see if he is doing okay and that his hematuria is resolving.  Should probably get repeat labs tomorrow.     His biopsy showed moderate chronic changes, but no acute rejection.  ED provider told patient the results, but you could remind him.  He just needs to take his medications.     Thanks.         Called patient. No answer. Left VM and recommended rechecking labs tomorrow 3/2/17.  Instructed to call transplant center and speak with transplant coordinator. Lab orders placed.

## 2017-03-01 NOTE — ED AVS SNAPSHOT
[facility], [place], Emergency Department    [address] [place] [id number]    Phone:  [phone]                                       Rashad Ortiz   MRN: [id number]    Department:  [facility], Emergency Department   Date of Visit:  3/1/2017           After Visit Summary Signature Page     I have received my discharge instructions, and my questions have been answered. I have discussed any challenges I see with this plan with the nurse or doctor.    ..........................................................................................................................................  Patient/Patient Representative Signature      ..........................................................................................................................................  Patient Representative Print Name and Relationship to Patient    ..................................................               ................................................  Date                                            Time    ..........................................................................................................................................  Reviewed by Signature/Title    ...................................................              ..............................................  Date                                                            Time

## 2017-03-01 NOTE — H&P
Nephrology Procedure H&P  03/01/2017     Assessment & Recommendations:   1. LDKT - baseline creatinine ~ 2s, which has significantly increased. Minimal proteinuria. Will plan on kidney transplant biopsy to evaluate for etiology of elevated creatinine.  Possible causes include, but not limited to, acute or chronic rejection. Will make no changes in immunosuppression.    Transplant History:  Transplant: 1/13/2011 (Kidney)        Donor Class:   Crossmatch at time of Transplant:    DSA at time of Transplant:  No  Present Maintenance Immunosuppression:  Tacrolimus, Mycophenolate mofetil and Prednisone  Baseline creatinine:  2s  Latest DSA lab date:  PRA:  Class I:   SA1 Comments   Date Value Ref Range Status   06/05/2015   Final    Test performed by modified procedure. Serum heat inactivated. No donor   specific antibody to 1/13/11 kidney donor. High-risk, mfi >3,000.   Mod-risk, mfi 500-3,000. Predictive PRA derived from local donor pool.          Class II:    SA2 Comments   Date Value Ref Range Status   06/05/2015   Final    Test performed by modified procedure. Serum heat inactivated. Donor   specific antibody to 1/13/11 kidney donor: DR11 mfi 682, DR14 mfi 913,   DR52 mfi 5093. High-risk, mfi >3,000. Mod-risk, mfi 500-3,000. Predictive   PRA derived from local donor pool.        Biopsy:  Yes  Rejection History:  Yes  Significant Complications: None  Transplant Coordinator: So Souza   Transplant Office Phone Number:  [phone]     2. Hypertension - fair control, near target of less than 140/90. No changes at this time depending on biopsy results.  3. Medical noncompliance - discussed importance of taking medications and getting as much time out of this kidney as possible.    Reason for Visit:  Mr. Ortiz is here for elevated creatinine and potential kidney transplant biopsy.    History of Present Illness:  Rashad Ortiz is a 41 year old male with ESKD from presumed HTN and is status post LDKT on  1/13/11.    Mr. Ortiz reports feeling good overall with some medical complaints.  He has ESKD secondary to presumed HTN versus unknown etiology, status post LDKT 1/13/11 with initial baseline Cr ~ 1.3-1.5.  His creatinine trended up in 2012 to 1.9-2.2 range and a kidney transplant biopsy 5/2012 was unremarkable.  Patient then had some noncompliance with his medications in 2015 and had increased creatinine to ~ 6.  Kidney transplant biopsy 6/2015 showed acute cellular-mediated rejection, Banff 1B with plasma cell rich infiltrate and also acute antibody-mediated rejection.  Biopsy also showed severe ATN with focal necrosis and severe interstitial fibrosis and tubular atrophy.  Patient was treated with Thymoglobulin, PLEX and IVIg.  His creatinine improved to as low as 2.3 following treatment, but he has had sparse labs since that time.  Patient also admits to missing his medications for about 6 weeks last fall because of financial and insurance issues.  Plan kidney transplant biopsy today to rule out rejection.    His energy level is okay, but it comes and goes without a significant recent change.  He is active and does get some exercise.  Denies any chest pain or shortness of breath with exertion.  Appetite is good with no nausea, vomiting or diarrhea.  No fever, sweats or chills.  Some leg swelling at times.    Pain Over Kidney Tx:  No Pain or Burning with Urination:  No  Gross Hematuria:  No  Taking NSAIDs:  No    Home BP: 140/80-90s.    Review of Systems:  A comprehensive review of systems was obtained and negative, except as noted in the History of Present Illness or Active Medical Problems.    Active Medical Problems:  Patient Active Problem List    Diagnosis     Antibody mediated rejection of kidney transplant     Creatinine elevation     GERD (gastroesophageal reflux disease)     CARDIOVASCULAR SCREENING; LDL GOAL LESS THAN 160     Gout     Kidney replaced by transplant     High risk medications (not  "anticoagulants) long-term use     Hypertension goal BP (blood pressure) < 140/90     Current Medications:  Current Outpatient Prescriptions   Medication Sig Dispense Refill     oxyCODONE (ROXICODONE) 5 MG IR tablet Take 1 tablet (5 mg) by mouth every 4 hours as needed for breakthrough pain or moderate to severe pain 10 tablet 0     predniSONE (DELTASONE) 5 MG tablet Take 1 tablet (5 mg) by mouth daily 30 tablet 11     losartan (COZAAR) 100 MG tablet Take 1 tablet (100 mg) by mouth daily 90 tablet 3     mycophenolate (CELLCEPT - GENERIC EQUIVALENT) 250 MG capsule Take 4 capsules (1,000 mg) by mouth 2 times daily 240 capsule 11     carvedilol (COREG) 25 MG tablet TAKE ONE TABLET BY MOUTH TWICE A  tablet 3     predniSONE (DELTASONE) 5 MG tablet Take 1 tablet (5 mg) by mouth daily 30 tablet 11     sulfamethoxazole-trimethoprim (BACTRIM,SEPTRA) 400-80 MG per tablet Take 1 tablet by mouth every other day 45 tablet 3     tacrolimus (PROGRAF - GENERIC EQUIVALENT) 1 MG capsule Take 3 capsules (3 mg) by mouth 2 times daily 180 capsule 6     furosemide (LASIX) 20 MG tablet TAKE ONE TABLET BY MOUTH TWICE A  tablet 0     omeprazole (PRILOSEC) 20 MG capsule Take 1 capsule (20 mg) by mouth daily 90 capsule 1     Vitals:  BP (!) 155/109  Pulse 66  Temp 98.4  F (36.9  C) (Temporal)  Resp 12  Ht 1.727 m (5' 8\")  Wt 77.1 kg (170 lb)  SpO2 100%  BMI 25.85 kg/m2    Physical Exam:   GENERAL APPEARANCE: alert and no distress  HENT: mouth without ulcers or lesions  PULM: lungs clear to auscultation, equal air movement  CV: regular rhythm, normal rate     - trace to 1+ LE edema bilaterally  GI: soft, nontender, bowel sounds are normal  MS: no evidence of inflammation in joints, no muscle tenderness  TX KIDNEY: nontender    Labs:   All labs reviewed by me  Recent Results (from the past 100 hour(s))   Basic metabolic panel    Collection Time: 02/28/17  7:53 AM   Result Value Ref Range    Sodium 145 (H) 133 - 144 mmol/L    " Potassium 4.7 3.4 - 5.3 mmol/L    Chloride 117 (H) 94 - 109 mmol/L    Carbon Dioxide 19 (L) 20 - 32 mmol/L    Anion Gap 9 3 - 14 mmol/L    Glucose 96 70 - 99 mg/dL    Urea Nitrogen 55 (H) 7 - 30 mg/dL    Creatinine 3.61 (H) 0.66 - 1.25 mg/dL    GFR Estimate 19 (L) >60 mL/min/1.7m2    GFR Estimate If Black 23 (L) >60 mL/min/1.7m2    Calcium 8.7 8.5 - 10.1 mg/dL   CBC with platelets differential    Collection Time: 02/28/17  7:53 AM   Result Value Ref Range    WBC 9.3 4.0 - 11.0 10e9/L    RBC Count 3.50 (L) 4.4 - 5.9 10e12/L    Hemoglobin 9.1 (L) 13.3 - 17.7 g/dL    Hematocrit 30.4 (L) 40.0 - 53.0 %    MCV 87 78 - 100 fl    MCH 26.0 (L) 26.5 - 33.0 pg    MCHC 29.9 (L) 31.5 - 36.5 g/dL    RDW 15.9 (H) 10.0 - 15.0 %    Platelet Count 193 150 - 450 10e9/L    Diff Method Automated Method     % Neutrophils 79.1 %    % Lymphocytes 10.2 %    % Monocytes 9.4 %    % Eosinophils 0.4 %    % Basophils 0.1 %    % Immature Granulocytes 0.8 %    Nucleated RBCs 0 0 /100    Absolute Neutrophil 7.4 1.6 - 8.3 10e9/L    Absolute Lymphocytes 1.0 0.8 - 5.3 10e9/L    Absolute Monocytes 0.9 0.0 - 1.3 10e9/L    Absolute Eosinophils 0.0 0.0 - 0.7 10e9/L    Absolute Basophils 0.0 0.0 - 0.2 10e9/L    Abs Immature Granulocytes 0.1 0 - 0.4 10e9/L    Absolute Nucleated RBC 0.0    INR    Collection Time: 02/28/17  7:53 AM   Result Value Ref Range    INR 1.08 0.86 - 1.14   PRA Donor Specific Antibody    Collection Time: 02/28/17  7:53 AM   Result Value Ref Range    PRA Donor Specific Kalee       Specimen received - Immunology report to follow upon completion.   CMV DNA quantification    Collection Time: 02/28/17  7:53 AM   Result Value Ref Range    CMV DNA Quantitation Specimen Plasma, EDTA anticoagulant     CMV Quant IU/mL  CMVND [IU]/mL     CMV DNA Not Detected   The AG[employer] AmpliPrep/AGUSTÍN TaqMan CMV Test is an FDA-approved in vitro nucleic   acid amplification test for the quantitation of cytomegalovirus DNA in human   plasma (EDTA plasma) using the  [employer] AmpliPrep Instrument for automated viral   nucleic acid extraction and the AG[employer] TaqMan Analyzer or AGUSTÍN TaqMan for   automated Real Time amplification and detection of the viral nucleic acid   target.   Titer results are reported in International Units/mL (IU/mL using 1st WHO   International standard for Human Cytomegalovirus for Nucleic Acid Amplification   based assays. The conversion factor between CMV DNA copis/mL (as defined by the   Roche AGUSTÍN TaqMan CMV test) and International Units is the CMV DNA   concentration in IU/mL x 1.1 copies/IU = CMV DNA in copies/mL.   This assay has received FDA approval for the testing of human plasma only. The   [facility] at the [facility], has validated the pe rformance characteristics of the Roche   CMV assay for plasma, bronchial alveolar lavage/wash and urine.      Log IU/mL of CMVQNT Not Calculated <2.1 [Log_IU]/mL   ABO/Rh type and screen    Collection Time: 02/28/17  7:54 AM   Result Value Ref Range    ABO A     RH(D)  Pos     Antibody Screen Neg     Test Valid Only At       [facility]    Specimen Expires 03/03/2017    BK virus PCR quantitative    Collection Time: 02/28/17  7:54 AM   Result Value Ref Range    BK Virus Specimen Plasma     BK Virus Result BK Virus DNA Not Detected BKNEG copies/mL    BK Virus Log  <2.7 Log copies/mL     Not Calculated   The Real-Time quantitative BK Virus assay was developed and its performance   characteristics determined by the Infectious Diseases Diagnostic Laboratory at   the [facility] in [place], [place]. The   primers and probes for each analyte are Analyte Specific Reagents (ASRs)   manufactured by [employer].   ASRs are used in many laboratory tests necessary for standard medical care and   generally do not require U.S. Food and Drug Administration approval. The FDA   has  determined that such clearance or approval is not necessary.   This test is used for clinical purposes. It should not be regarded as   investigational or for research. This laboratory is certified under the   Clinical Laboratory Improvement Amendments of 1988 (CLIA-88) as qualified to   perform high complexity clinical laboratory testing.     Tacrolimus level    Collection Time: 02/28/17  7:54 AM   Result Value Ref Range    Tacrolimus Last Dose 1830 2/27/17     Tacrolimus Level 4.5 (L) 5.0 - 15.0 ug/L   Routine UA with microscopic    Collection Time: 02/28/17  8:09 AM   Result Value Ref Range    Color Urine Straw     Appearance Urine Clear     Glucose Urine Negative NEG mg/dL    Bilirubin Urine Negative NEG    Ketones Urine Negative NEG mg/dL    Specific Gravity Urine 1.012 1.003 - 1.035    Blood Urine Negative NEG    pH Urine 6.0 5.0 - 7.0 pH    Protein Albumin Urine Negative NEG mg/dL    Urobilinogen mg/dL 0.0 0.0 - 2.0 mg/dL    Nitrite Urine Negative NEG    Leukocyte Esterase Urine Negative NEG    Source Midstream Urine     WBC Urine <1 0 - 2 /HPF    RBC Urine <1 0 - 2 /HPF    Mucous Urine Present (A) NEG /LPF   Albumin Random Urine Quantitative    Collection Time: 02/28/17  8:09 AM   Result Value Ref Range    Creatinine Urine 97 mg/dL    Albumin Urine mg/L 113 mg/L    Albumin Urine mg/g Cr 116.38 (H) 0 - 17 mg/g Cr   Protein  random urine    Collection Time: 02/28/17  8:09 AM   Result Value Ref Range    Protein Random Urine 0.28 g/L    Protein Total Urine g/gr Creatinine 0.29 (H) 0 - 0.2 g/g Cr   Surgical pathology exam    Collection Time: 02/28/17  9:15 AM   Result Value Ref Range    Copath Report       Patient Name: GISELLE GRAY  MR#: [id number]  Specimen #: [id number]  Collected: 2/28/2017  Received: 2/28/2017  Reported: 3/3/2017 14:40  Ordering Phy(s): CHARITY HARDY    For improved result formatting, select 'View Enhanced Report Format'  under Linked Documents section.    SPECIMEN(S):  Kidney  "biopsy, transplant with Immunofluorescence    FINAL DIAGNOSIS:  Kidney, allograft; percutaneous needle biopsy:  -Moderate chronic tubulo-interstitial changes  -No diagnostic evidence of acute rejection seen    Rejection summary:  -Banff component scores:  g0; t0; i0; v0; ptc0; cg0; mm0; ct2; ci2; cv1;  ah0.  -Acute T cell-mediated rejection:  Tubulointerstitial type:  Not seen  Vascular type:  Not seen  -Antibody-mediated rejection:  Not seen        Peritubular capillary C4d staining:  Negative (C4d0)  -Interstitial fibrosis and tubular atrophy (IFTA):  Moderate  -Arterial intimal fibrosis:  Mild  -Arteriolar hyaline:  Not seen    Other:  -Ischemic allograft injury:  Not seen  -Recur rent renal disease:  Not seen  -Polyomavirus cytopathic changes:  Not seen    COMMENT:  Note: Results of LM and IF findings were reported by Dr. Mireles to Dr. Barrios  at 6 pm on 2/28/2017.  I have personally reviewed all specimens and or slides, including the  listed special stains, and used them with my medical judgement to  determine the final diagnosis.    Electronically signed out by:    Krystin Mireles M.D., [facility]    CLINICAL HISTORY:  41 year old male status post LDKT on 1/13/2011 for presumed HTN vs  unknown etiology, complicated by ACR and AMR in 2015.  Serum creatinine  posttreatment is up to 2.5 mg/dL.  The patient has donor-specific  antibodies.    GROSS:  A:  Transplant kidney biopsy, immediate assessment:  \"Kidney tissue confirmed  Tissue submitted for light microscopy, immunofluorescence, and electron  microscopy.\" (Shreya VENEGAS [facility])    A:  The specimen is received in formalin with proper patient  identification, labeled \"Tx Kidney Bx\".  The specimen consists of two  pal e tan needle core biopsies, measuring 0.8 cm and 1.1 cm in length and  each 0.1 cm in diameter.  The specimen is wrapped and is entirely  submitted in cassette 1 for [facility] processing.    A separate specimen is received consisting of a single pale tan " core  biopsy measuring 0.8 cm in length and 0.1 cm in diameter which is  processed for immunofluorescence studies.    A separate specimen is received and processed for electron microscopic  studies.  Tissue is confirmed within the specimen container. (Dictated  by: Shreya Rodriguez 2/28/2017 10:04 AM)    MICROSCOPIC:  Light Microscopy:  Paraffin sections stained with H&E, PAS, Fink Silver and Trichrome  stains show two needle cores of renal cortico-medullary tissue with  attached capsule (mainly cortex).  On serial sections, 24 glomeruli are  present, six of which are globally sclerotic.  The remaining glomeruli  are normocellular.  Glomerular basement membrane is single-contoured on  PAS and silver stain.  No segmental sclerosis or crescents  are seen.  Peritubular capillaries are not prominent; there is no significant  margination of leukocytes or endothelial cell swelling.  There is no  evident tubulitis.  Focally tubular epithelial cells show attenuation of  cell cytoplasm.  There is moderate tubular atrophy and interstitial  fibrosis in the cortex and overall cortical tubular loss is estimated to  be at approximately 30% on the trichrome stain.  There some foci of mild  interstitial inflammation in areas of scarring, comprised mainly of  lymphocytes with admixed plasma cells.  Arteries are not present.  Arterioles are largely unremarkable.    Immunofluorescence:  Direct immunofluorescence studies are performed on frozen sections, with  appropriate controls, stained with fluoresceinated antisera to human  IgG, IgA, IgM, C1q, C3, albumin, fibrin and kappa and lambda  immunoglobulin light chains and are graded on a scale of 0-4+.  In  addition, indirect immunofluorescence for C4d and CD31 are performed  (with appropriate c ontrols).  Each frozen section consists of renal  cortico-medullary tissue with up to four glomeruli, one of which is  globally sclerotic.  Two arteries present, one arcuate artery, show  mild  intimal fibrosis and thickening.  By immunofluorescence, there is  diffuse, pseudolinear capillary wall staining for IgG (trace), albumin  (trace), and fibrin (1+).  There is trace, segmental, granular to  globular mesangial staining for C4d.  There is no detectable staining  for C4d in peritubular capillaries.  A control section stained for CD31  showed normal distribution of interstitial capillaries without  dilatation of lumens.  No tubular basement membrane staining is noted.  Remaining immune reactants are negative.    Diagnostic Electron Microscopy:  Microscopic examination of semi-thin  sections stained with  methylene blue-katherine II reveals renal cortex with attached capsule,  containing four glomeruli, none which are globally sclerotic.  The  glomeruli are normocellular without segme ntal lesions.  There is  significant tubular atrophy and interstitial fibrosis.    A total of 44 digital electron micrographic images were received from  [facility].  Electron micrographs reveal glomerular  basement membrane of normal trilaminar structure and mean thickness.  There are patchy effacement of podocyte foot processes (less than 50%).  No evident duplication of glomerular basement membrane is noted.  No  active immune complex deposits are seen in glomeruli or anywhere else.  Peritubular capillary basement membranes are 1-2-layered.    CPT Codes:  A: 41514-NNT2, RUSH, DANIELLE, SOH, 65480-FJFYQ, 16328-TSQF, 84962-BNBT,  39473-LBXH, 34339-IVBW, 25740-VIAE, 47220-ZIOI, 28934-TTRO, 26553-OQTO,  59867-FYEI, 08914-QYZU, 59884-EKRL, 52227-BICW, 29056-VPZK, SOH,  18009-SW, DANIELLE    TESTING LAB LOCATION:  [facility], [address]   [address]  [phone]    COLLECTION SITE:  Client: [facility]  Location: BHARGAV (B)     Hemoglobin    Collection Time: 02/28/17  1:06 PM   Result Value Ref  Range    Hemoglobin 9.0 (L) 13.3 - 17.7 g/dL   Hemoglobin    Collection Time: 02/28/17  3:20 PM   Result Value Ref Range    Hemoglobin 8.6 (L) 13.3 - 17.7 g/dL   Routine UA with microscopic    Collection Time: 03/01/17  1:18 AM   Result Value Ref Range    Color Urine Light Yellow     Appearance Urine Clear     Glucose Urine Negative NEG mg/dL    Bilirubin Urine Negative NEG    Ketones Urine Negative NEG mg/dL    Specific Gravity Urine 1.006 1.003 - 1.035    Blood Urine Large (A) NEG    pH Urine 5.0 5.0 - 7.0 pH    Protein Albumin Urine Negative NEG mg/dL    Urobilinogen mg/dL Normal 0.0 - 2.0 mg/dL    Nitrite Urine Negative NEG    Leukocyte Esterase Urine Small (A) NEG    Source Clean catch urine     WBC Urine 40 (H) 0 - 2 /HPF    RBC Urine >182 (H) 0 - 2 /HPF   Urine Culture Aerobic Bacterial    Collection Time: 03/01/17  1:18 AM   Result Value Ref Range    Specimen Description Midstream Urine     Special Requests Specimen received in preservative     Culture Micro No growth     Micro Report Status FINAL 03/02/2017    Basic metabolic panel    Collection Time: 03/01/17  1:35 AM   Result Value Ref Range    Sodium 136 133 - 144 mmol/L    Potassium 5.1 3.4 - 5.3 mmol/L    Chloride 110 (H) 94 - 109 mmol/L    Carbon Dioxide 18 (L) 20 - 32 mmol/L    Anion Gap 8 3 - 14 mmol/L    Glucose 112 (H) 70 - 99 mg/dL    Urea Nitrogen 54 (H) 7 - 30 mg/dL    Creatinine 3.15 (H) 0.66 - 1.25 mg/dL    GFR Estimate 22 (L) >60 mL/min/1.7m2    GFR Estimate If Black 27 (L) >60 mL/min/1.7m2    Calcium 8.1 (L) 8.5 - 10.1 mg/dL   CBC with platelets differential    Collection Time: 03/01/17  1:35 AM   Result Value Ref Range    WBC 11.2 (H) 4.0 - 11.0 10e9/L    RBC Count 3.39 (L) 4.4 - 5.9 10e12/L    Hemoglobin 8.9 (L) 13.3 - 17.7 g/dL    Hematocrit 28.0 (L) 40.0 - 53.0 %    MCV 83 78 - 100 fl    MCH 26.3 (L) 26.5 - 33.0 pg    MCHC 31.8 31.5 - 36.5 g/dL    RDW 15.7 (H) 10.0 - 15.0 %    Platelet Count 178 150 - 450 10e9/L    Diff Method Automated  Method     % Neutrophils 85.5 %    % Lymphocytes 7.3 %    % Monocytes 6.4 %    % Eosinophils 0.3 %    % Basophils 0.1 %    % Immature Granulocytes 0.4 %    Nucleated RBCs 0 0 /100    Absolute Neutrophil 9.6 (H) 1.6 - 8.3 10e9/L    Absolute Lymphocytes 0.8 0.8 - 5.3 10e9/L    Absolute Monocytes 0.7 0.0 - 1.3 10e9/L    Absolute Eosinophils 0.0 0.0 - 0.7 10e9/L    Absolute Basophils 0.0 0.0 - 0.2 10e9/L    Abs Immature Granulocytes 0.0 0 - 0.4 10e9/L    Absolute Nucleated RBC 0.0    INR    Collection Time: 03/01/17  1:35 AM   Result Value Ref Range    INR 1.05 0.86 - 1.14   CBC with platelets    Collection Time: 03/04/17 10:00 AM   Result Value Ref Range    WBC 5.5 4.0 - 11.0 10e9/L    RBC Count 3.34 (L) 4.4 - 5.9 10e12/L    Hemoglobin 8.8 (L) 13.3 - 17.7 g/dL    Hematocrit 28.1 (L) 40.0 - 53.0 %    MCV 84 78 - 100 fl    MCH 26.3 (L) 26.5 - 33.0 pg    MCHC 31.3 (L) 31.5 - 36.5 g/dL    RDW 16.1 (H) 10.0 - 15.0 %    Platelet Count 181 150 - 450 10e9/L        Gee Barrios MD

## 2017-03-02 LAB
BACTERIA SPEC CULT: NO GROWTH
Lab: NORMAL
MICRO REPORT STATUS: NORMAL
SPECIMEN SOURCE: NORMAL

## 2017-03-03 ENCOUNTER — TELEPHONE (OUTPATIENT)
Dept: TRANSPLANT | Facility: CLINIC | Age: 42
End: 2017-03-03

## 2017-03-03 LAB — COPATH REPORT: NORMAL

## 2017-03-03 NOTE — LETTER
March 8, 2017      Dear Rashad Ortiz,       We have recently tried to reach you via telephone.  Please call our office and update us with any new phone or address.    For the best outcome for your transplant and in order for us to refill your prescriptions, we encourage you to have a yearly clinic appointment with a physician and to have current labs. If you are unable to return to our transplant center there are several alternatives. Please call your coordinator to discuss them. .  To make an appointment with a physician here at [facility], please call:  The Transplant Office at [phone] or [phone]    The Transplant Staff at [facility]

## 2017-03-04 DIAGNOSIS — Z94.0 KIDNEY REPLACED BY TRANSPLANT: ICD-10-CM

## 2017-03-04 LAB
ERYTHROCYTE [DISTWIDTH] IN BLOOD BY AUTOMATED COUNT: 16.1 % (ref 10–15)
HCT VFR BLD AUTO: 28.1 % (ref 40–53)
HGB BLD-MCNC: 8.8 G/DL (ref 13.3–17.7)
MCH RBC QN AUTO: 26.3 PG (ref 26.5–33)
MCHC RBC AUTO-ENTMCNC: 31.3 G/DL (ref 31.5–36.5)
MCV RBC AUTO: 84 FL (ref 78–100)
PLATELET # BLD AUTO: 181 10E9/L (ref 150–450)
RBC # BLD AUTO: 3.34 10E12/L (ref 4.4–5.9)
WBC # BLD AUTO: 5.5 10E9/L (ref 4–11)

## 2017-03-04 PROCEDURE — 85027 COMPLETE CBC AUTOMATED: CPT | Performed by: INTERNAL MEDICINE

## 2017-03-04 PROCEDURE — 36415 COLL VENOUS BLD VENIPUNCTURE: CPT | Performed by: INTERNAL MEDICINE

## 2017-03-04 PROCEDURE — 80048 BASIC METABOLIC PNL TOTAL CA: CPT | Performed by: INTERNAL MEDICINE

## 2017-03-04 NOTE — TELEPHONE ENCOUNTER
Still did not call back.  LPN tasked:  Call patient and check if continues to have hematuria.  Recommend getting labs drawn. Labs ordered.

## 2017-03-06 ENCOUNTER — TELEPHONE (OUTPATIENT)
Dept: TRANSPLANT | Facility: CLINIC | Age: 42
End: 2017-03-06

## 2017-03-06 DIAGNOSIS — Z48.298 AFTERCARE FOLLOWING ORGAN TRANSPLANT: ICD-10-CM

## 2017-03-06 DIAGNOSIS — Z79.899 ENCOUNTER FOR LONG-TERM CURRENT USE OF MEDICATION: ICD-10-CM

## 2017-03-06 DIAGNOSIS — Z94.0 KIDNEY REPLACED BY TRANSPLANT: Primary | ICD-10-CM

## 2017-03-06 LAB
ANION GAP SERPL CALCULATED.3IONS-SCNC: 9 MMOL/L (ref 3–14)
BUN SERPL-MCNC: 37 MG/DL (ref 7–30)
CALCIUM SERPL-MCNC: 8.9 MG/DL (ref 8.5–10.1)
CHLORIDE SERPL-SCNC: 118 MMOL/L (ref 94–109)
CO2 SERPL-SCNC: 20 MMOL/L (ref 20–32)
CREAT SERPL-MCNC: 3.34 MG/DL (ref 0.66–1.25)
DONOR IDENTIFICATION: NORMAL
DSA COMMENTS: NORMAL
DSA PRESENT: NO
DSA TEST METHOD: NORMAL
GFR SERPL CREATININE-BSD FRML MDRD: 20 ML/MIN/1.7M2
GLUCOSE SERPL-MCNC: 85 MG/DL (ref 70–99)
ORGAN: NORMAL
POTASSIUM SERPL-SCNC: 4.8 MMOL/L (ref 3.4–5.3)
SA1 CELL: NORMAL
SA1 COMMENTS: NORMAL
SA1 HI RISK ABY: NORMAL
SA1 MOD RISK ABY: NORMAL
SA1 TEST METHOD: NORMAL
SA2 CELL: NORMAL
SA2 COMMENTS: NORMAL
SA2 HI RISK ABY UA: NORMAL
SA2 MOD RISK ABY: NORMAL
SA2 TEST METHOD: NORMAL
SODIUM SERPL-SCNC: 147 MMOL/L (ref 133–144)

## 2017-03-06 NOTE — TELEPHONE ENCOUNTER
"Call placed to patient: Writer received a message stating \" At the subscribers request this phone doesn't not accept incoming calls\". Will try back later  "

## 2017-03-07 NOTE — TELEPHONE ENCOUNTER
Call patient and ask how he is doing.  He had hematuria post biopsy and Dr. Barrios wanted to recheck labs.  If unable to contact with phone number in Epic please send a letter.

## 2017-03-07 NOTE — TELEPHONE ENCOUNTER
Attempted to call patients home phone, ph. Number is no longer in service.  Letter sent regarding f/u with transplant center.

## 2017-03-14 ENCOUNTER — OFFICE VISIT (OUTPATIENT)
Dept: FAMILY MEDICINE | Facility: CLINIC | Age: 42
End: 2017-03-14
Payer: COMMERCIAL

## 2017-03-14 VITALS
OXYGEN SATURATION: 100 % | SYSTOLIC BLOOD PRESSURE: 122 MMHG | HEART RATE: 59 BPM | WEIGHT: 174.3 LBS | BODY MASS INDEX: 26.42 KG/M2 | DIASTOLIC BLOOD PRESSURE: 86 MMHG | HEIGHT: 68 IN | TEMPERATURE: 98.3 F

## 2017-03-14 DIAGNOSIS — M10.9 ACUTE GOUTY ARTHRITIS: ICD-10-CM

## 2017-03-14 PROCEDURE — 99214 OFFICE O/P EST MOD 30 MIN: CPT | Performed by: FAMILY MEDICINE

## 2017-03-14 RX ORDER — PREDNISONE 20 MG/1
60 TABLET ORAL DAILY
Qty: 15 TABLET | Refills: 5 | Status: SHIPPED | OUTPATIENT
Start: 2017-03-14 | End: 2018-02-21

## 2017-03-14 RX ORDER — OXYCODONE HYDROCHLORIDE 5 MG/1
5 TABLET ORAL EVERY 4 HOURS PRN
Qty: 10 TABLET | Refills: 0 | Status: SHIPPED | OUTPATIENT
Start: 2017-03-14 | End: 2017-08-01

## 2017-03-14 NOTE — MR AVS SNAPSHOT
"              After Visit Summary   3/14/2017    Rashad Ortiz    MRN: [id number]           Patient Information     Date Of Birth          1975        Visit Information        Provider Department      3/14/2017 4:00 PM Ania Osullivan MD [facility]        Today's Diagnoses     Acute gouty arthritis           Follow-ups after your visit        Follow-up notes from your care team     Return if symptoms worsen or fail to improve.      Who to contact     If you have questions or need follow up information about today's clinic visit or your schedule please contact [facility] directly at [phone].  Normal or non-critical lab and imaging results will be communicated to you by MyChart, letter or phone within 4 business days after the clinic has received the results. If you do not hear from us within 7 days, please contact the clinic through [employer] or phone. If you have a critical or abnormal lab result, we will notify you by phone as soon as possible.  Submit refill requests through [employer] or call your pharmacy and they will forward the refill request to us. Please allow 3 business days for your refill to be completed.          Additional Information About Your Visit        MyChart Information     [employer] lets you send messages to your doctor, view your test results, renew your prescriptions, schedule appointments and more. To sign up, go to [email or web address] . Click on \"Log in\" on the left side of the screen, which will take you to the Welcome page. Then click on \"Sign up Now\" on the right side of the page.     You will be asked to enter the access code listed below, as well as some personal information. Please follow the directions to create your username and password.     Your access code is: [email or web address]  Expires: 2017 10:30 AM     Your access code will  in 90 days. If you need help or a new code, please call your [facility] or [phone].   " "     Care EveryWhere ID     This is your Care EveryWhere ID. This could be used by other organizations to access your [place] medical records  [id number]        Your Vitals Were     Pulse Temperature Height Pulse Oximetry BMI (Body Mass Index)       59 98.3  F (36.8  C) (Oral) 1.72 m (5' 7.72\") 100% 26.72 kg/m2        Blood Pressure from Last 3 Encounters:   03/14/17 122/86   03/01/17 (!) 133/105   02/28/17 (!) 155/109    Weight from Last 3 Encounters:   03/14/17 79.1 kg (174 lb 4.8 oz)   03/01/17 77.1 kg (170 lb)   02/28/17 77.1 kg (170 lb)              Today, you had the following     No orders found for display         Today's Medication Changes          These changes are accurate as of: 3/14/17  4:34 PM.  If you have any questions, ask your nurse or doctor.               These medicines have changed or have updated prescriptions.        Dose/Directions    * predniSONE 5 MG tablet   Commonly known as:  DELTASONE   This may have changed:  Another medication with the same name was changed. Make sure you understand how and when to take each.   Used for:  Kidney replaced by transplant   Changed by:  Gee Barrios MD        Dose:  5 mg   Take 1 tablet (5 mg) by mouth daily   Quantity:  30 tablet   Refills:  11       * predniSONE 20 MG tablet   Commonly known as:  DELTASONE   This may have changed:    - medication strength  - how much to take   Used for:  Acute gouty arthritis   Changed by:  Ania Osullivan MD        Dose:  60 mg   Take 3 tablets (60 mg) by mouth daily   Quantity:  15 tablet   Refills:  5       * Notice:  This list has 2 medication(s) that are the same as other medications prescribed for you. Read the directions carefully, and ask your doctor or other care provider to review them with you.         Where to get your medicines      These medications were sent to [id number] - [facility] [place] - [address] AT [facility] [address], [facility] " [place] [address]     Phone:  [phone]     predniSONE 20 MG tablet         Some of these will need a paper prescription and others can be bought over the counter.  Ask your nurse if you have questions.     Bring a paper prescription for each of these medications     oxyCODONE 5 MG IR tablet                Primary Care Provider Office Phone # Fax #    Francie Barraza PA-C [phone]       [facility] [address]  [facility] [address]        Thank you!     Thank you for choosing [facility]  for your care. Our goal is always to provide you with excellent care. Hearing back from our patients is one way we can continue to improve our services. Please take a few minutes to complete the written survey that you may receive in the mail after your visit with us. Thank you!             Your Updated Medication List - Protect others around you: Learn how to safely use, store and throw away your medicines at www.disposemymeds.org.          This list is accurate as of: 3/14/17  4:34 PM.  Always use your most recent med list.                   Brand Name Dispense Instructions for use    carvedilol 25 MG tablet    COREG    180 tablet    TAKE ONE TABLET BY MOUTH TWICE A DAY       furosemide 20 MG tablet    LASIX    180 tablet    TAKE ONE TABLET BY MOUTH TWICE A DAY       losartan 100 MG tablet    COZAAR    90 tablet    Take 1 tablet (100 mg) by mouth daily       mycophenolate 250 MG capsule    CELLCEPT - GENERIC EQUIVALENT    240 capsule    Take 4 capsules (1,000 mg) by mouth 2 times daily       omeprazole 20 MG CR capsule    priLOSEC    90 capsule    Take 1 capsule (20 mg) by mouth daily       oxyCODONE 5 MG IR tablet    ROXICODONE    10 tablet    Take 1 tablet (5 mg) by mouth every 4 hours as needed for breakthrough pain or moderate to severe pain       * predniSONE 5 MG tablet    DELTASONE    30 tablet    Take 1 tablet (5 mg) by mouth daily       * predniSONE 20 MG tablet     DELTASONE    15 tablet    Take 3 tablets (60 mg) by mouth daily       sulfamethoxazole-trimethoprim 400-80 MG per tablet    BACTRIM/SEPTRA    45 tablet    Take 1 tablet by mouth every other day       tacrolimus 1 MG capsule    PROGRAF - GENERIC EQUIVALENT    180 capsule    Take 3 capsules (3 mg) by mouth 2 times daily       * Notice:  This list has 2 medication(s) that are the same as other medications prescribed for you. Read the directions carefully, and ask your doctor or other care provider to review them with you.

## 2017-03-14 NOTE — NURSING NOTE
"Chief Complaint   Patient presents with     Arthritis       Initial BP (!) 130/94 (BP Location: Right arm, Patient Position: Chair, Cuff Size: Adult Regular)  Pulse 59  Temp 98.3  F (36.8  C) (Oral)  Ht 1.72 m (5' 7.72\")  Wt 79.1 kg (174 lb 4.8 oz)  SpO2 100%  BMI 26.72 kg/m2 Estimated body mass index is 26.72 kg/(m^2) as calculated from the following:    Height as of this encounter: 1.72 m (5' 7.72\").    Weight as of this encounter: 79.1 kg (174 lb 4.8 oz).  Medication Reconciliation: complete     JESUS Cramer MA      "

## 2017-03-14 NOTE — PROGRESS NOTES
"  SUBJECTIVE:                                                    Rashad Ortiz is a 41 year old male who presents to clinic today for the following health issues:      Gout/ single inflamed joint      Onset: 2 days    Description:   Location: foot - left and right  Joint Swelling: YES  Redness: no   Pain: YES    Intensity: moderate, severe    Progression of Symptoms:  worsening    Accompanying Signs & Symptoms:  Fevers: no    History:   Trauma to the area: no   Previous history of gout: YES   Recent illness:  no     Precipitating factors:   Diet-rich in purine: no  Alcohol use: YES- 1 beer  Diuretic use: no     Alleviating factors:  none     Therapies Tried and outcome: none    SUBJECTIVE:  Here today with a flareup of gout in his L>R foot. This is an issue that arises handful of times per year and likely is related to decreased renal function, status post renal transplant. Typically responds to prednisone fairly quickly. He is on a low-dose of daily prednisone and is used increased bursts for such flares. The last one was about a month ago. He did have 1 beer a couple of days ago that may have been the inciting factor. Otherwise he tries to keep an eye on his diet. No fevers or chills or other associated symptoms.    Review of systems otherwise negative.  Past medical, family, and social history reviewed and updated in chart.    OBJECTIVE:  /86 (BP Location: Right arm)  Pulse 59  Temp 98.3  F (36.8  C) (Oral)  Ht 1.72 m (5' 7.72\")  Wt 79.1 kg (174 lb 4.8 oz)  SpO2 100%  BMI 26.72 kg/m2  Alert, pleasant, upbeat, and in no apparent discomfort.  S1 and S2 normal, no murmurs, clicks, gallops or rubs. Regular rate and rhythm. Chest is clear; no wheezes or rales. No edema or JVD.  Left foot - lateral aspect warm, red, tender  Past labs reviewed with the patient.     ASSESSMENT / PLAN:  (M10.9) Acute gouty arthritis  Comment: We will treat with a burst of prednisone and pain relief if needed. I provided some " refills of the prednisone so that he may start on this as soon as he starts to feel a gout flare. This may help prevent it from setting and in the first place  Plan: predniSONE (DELTASONE) 20 MG tablet, oxyCODONE         (ROXICODONE) 5 MG IR tablet            Follow up as needed   SGilberto Osullivan MD    (Chart documentation completed in part with Dragon voice-recognition software.  Even though reviewed some grammatical, spelling, and word errors may remain.)

## 2017-03-17 DIAGNOSIS — I10 HYPERTENSION GOAL BP (BLOOD PRESSURE) < 140/90: Primary | ICD-10-CM

## 2017-03-17 RX ORDER — FUROSEMIDE 20 MG
TABLET ORAL
Qty: 180 TABLET | Refills: 0 | Status: SHIPPED | OUTPATIENT
Start: 2017-03-17 | End: 2017-08-01

## 2017-03-17 NOTE — TELEPHONE ENCOUNTER
Routing refill request to provider for review/approval because:  Labs out of range:  Creatinine  A break in medication    Fadumo Morrell RN

## 2017-03-17 NOTE — TELEPHONE ENCOUNTER
furosemide (LASIX) 20 MG tablet      Last Written Prescription Date: 12/24/15  Last Fill Quantity: 180, # refills: 0  Last Office Visit with G, P or [facility] prescribing provider: 03/14/17       Potassium   Date Value Ref Range Status   03/04/2017 4.8 3.4 - 5.3 mmol/L Final     Creatinine   Date Value Ref Range Status   03/04/2017 3.34 (H) 0.66 - 1.25 mg/dL Final     BP Readings from Last 3 Encounters:   03/14/17 122/86   03/01/17 (!) 133/105   02/28/17 (!) 155/109         Marina Wade  [facility]

## 2017-04-10 ENCOUNTER — OFFICE VISIT (OUTPATIENT)
Dept: FAMILY MEDICINE | Facility: CLINIC | Age: 42
End: 2017-04-10
Payer: COMMERCIAL

## 2017-04-10 VITALS
DIASTOLIC BLOOD PRESSURE: 78 MMHG | OXYGEN SATURATION: 100 % | RESPIRATION RATE: 16 BRPM | HEART RATE: 70 BPM | TEMPERATURE: 97.6 F | HEIGHT: 68 IN | WEIGHT: 159 LBS | BODY MASS INDEX: 24.1 KG/M2 | SYSTOLIC BLOOD PRESSURE: 129 MMHG

## 2017-04-10 DIAGNOSIS — Z94.0 KIDNEY REPLACED BY TRANSPLANT: ICD-10-CM

## 2017-04-10 DIAGNOSIS — R11.10 VOMITING AND DIARRHEA: ICD-10-CM

## 2017-04-10 DIAGNOSIS — R19.7 VOMITING AND DIARRHEA: Primary | ICD-10-CM

## 2017-04-10 DIAGNOSIS — R11.10 VOMITING AND DIARRHEA: Primary | ICD-10-CM

## 2017-04-10 DIAGNOSIS — R19.7 VOMITING AND DIARRHEA: ICD-10-CM

## 2017-04-10 LAB
ALBUMIN SERPL-MCNC: 3.5 G/DL (ref 3.4–5)
ALP SERPL-CCNC: 75 U/L (ref 40–150)
ALT SERPL W P-5'-P-CCNC: 17 U/L (ref 0–70)
AMYLASE SERPL-CCNC: 153 U/L (ref 30–110)
ANION GAP SERPL CALCULATED.3IONS-SCNC: 14 MMOL/L (ref 3–14)
AST SERPL W P-5'-P-CCNC: 9 U/L (ref 0–45)
BASOPHILS # BLD AUTO: 0 10E9/L (ref 0–0.2)
BASOPHILS NFR BLD AUTO: 0 %
BILIRUB SERPL-MCNC: 0.2 MG/DL (ref 0.2–1.3)
BUN SERPL-MCNC: 49 MG/DL (ref 7–30)
CALCIUM SERPL-MCNC: 8.3 MG/DL (ref 8.5–10.1)
CHLORIDE SERPL-SCNC: 118 MMOL/L (ref 94–109)
CO2 SERPL-SCNC: 11 MMOL/L (ref 20–32)
CREAT SERPL-MCNC: 4.39 MG/DL (ref 0.66–1.25)
DIFFERENTIAL METHOD BLD: ABNORMAL
EOSINOPHIL # BLD AUTO: 0.1 10E9/L (ref 0–0.7)
EOSINOPHIL NFR BLD AUTO: 1.2 %
ERYTHROCYTE [DISTWIDTH] IN BLOOD BY AUTOMATED COUNT: 15.4 % (ref 10–15)
GFR SERPL CREATININE-BSD FRML MDRD: 15 ML/MIN/1.7M2
GLUCOSE SERPL-MCNC: 111 MG/DL (ref 70–99)
HCT VFR BLD AUTO: 28.2 % (ref 40–53)
HGB BLD-MCNC: 9 G/DL (ref 13.3–17.7)
LIPASE SERPL-CCNC: 387 U/L (ref 73–393)
LYMPHOCYTES # BLD AUTO: 0.7 10E9/L (ref 0.8–5.3)
LYMPHOCYTES NFR BLD AUTO: 12.9 %
MCH RBC QN AUTO: 26.4 PG (ref 26.5–33)
MCHC RBC AUTO-ENTMCNC: 31.9 G/DL (ref 31.5–36.5)
MCV RBC AUTO: 83 FL (ref 78–100)
MONOCYTES # BLD AUTO: 0.5 10E9/L (ref 0–1.3)
MONOCYTES NFR BLD AUTO: 9 %
NEUTROPHILS # BLD AUTO: 4.4 10E9/L (ref 1.6–8.3)
NEUTROPHILS NFR BLD AUTO: 76.9 %
PLATELET # BLD AUTO: 175 10E9/L (ref 150–450)
POTASSIUM SERPL-SCNC: 4.1 MMOL/L (ref 3.4–5.3)
PROT SERPL-MCNC: 6.6 G/DL (ref 6.8–8.8)
RBC # BLD AUTO: 3.41 10E12/L (ref 4.4–5.9)
SODIUM SERPL-SCNC: 143 MMOL/L (ref 133–144)
TSH SERPL DL<=0.005 MIU/L-ACNC: 0.67 MU/L (ref 0.4–4)
WBC # BLD AUTO: 5.7 10E9/L (ref 4–11)

## 2017-04-10 PROCEDURE — 83690 ASSAY OF LIPASE: CPT | Performed by: PHYSICIAN ASSISTANT

## 2017-04-10 PROCEDURE — 89055 LEUKOCYTE ASSESSMENT FECAL: CPT | Performed by: PHYSICIAN ASSISTANT

## 2017-04-10 PROCEDURE — 87506 IADNA-DNA/RNA PROBE TQ 6-11: CPT | Performed by: PHYSICIAN ASSISTANT

## 2017-04-10 PROCEDURE — 87209 SMEAR COMPLEX STAIN: CPT | Performed by: PHYSICIAN ASSISTANT

## 2017-04-10 PROCEDURE — 99214 OFFICE O/P EST MOD 30 MIN: CPT | Performed by: PHYSICIAN ASSISTANT

## 2017-04-10 PROCEDURE — 36415 COLL VENOUS BLD VENIPUNCTURE: CPT | Performed by: PHYSICIAN ASSISTANT

## 2017-04-10 PROCEDURE — 80050 GENERAL HEALTH PANEL: CPT | Performed by: PHYSICIAN ASSISTANT

## 2017-04-10 PROCEDURE — 87493 C DIFF AMPLIFIED PROBE: CPT | Performed by: PHYSICIAN ASSISTANT

## 2017-04-10 PROCEDURE — 82150 ASSAY OF AMYLASE: CPT | Performed by: PHYSICIAN ASSISTANT

## 2017-04-10 PROCEDURE — 87177 OVA AND PARASITES SMEARS: CPT | Performed by: PHYSICIAN ASSISTANT

## 2017-04-10 ASSESSMENT — PAIN SCALES - GENERAL: PAINLEVEL: SEVERE PAIN (6)

## 2017-04-10 NOTE — PROGRESS NOTES
SUBJECTIVE:                                                    Rashad Ortiz is a 41 year old male who presents to clinic today for the following health issues:      Diarrhea     Onset: 3 wks     Description:   Consistency of stool: watery and runny  Blood in stool: no   Number of loose stools in past 24 hours:10    Progression of Symptoms:  same and constant    Accompanying Signs & Symptoms:  Fever: no   Nausea or vomiting; YES- 4/09/2017  Abdominal pain: YES  Episodes of constipation: no   Weight loss: YES- 15lb    History:   Ill contacts: no   Recent use of antibiotics: no    Recent travels: no          Recent medication-new or changes(Rx or OTC): no     Precipitating factors:   no    Alleviating factors:   Apple helped stomach        Therapies Tried and outcome:  None    His kids had stomach flu about 1 month ago, lasted a day or two.  He developed symptoms 2 weeks later.   No new foods.   No new meds and no recent med changes.     He continues to take Bactrim for PCP prophylaxis every other day.         Problem list and histories reviewed & adjusted, as indicated.  Additional history: as documented    Patient Active Problem List   Diagnosis     Kidney replaced by transplant     High risk medications (not anticoagulants) long-term use     Hypertension goal BP (blood pressure) < 140/90     GERD (gastroesophageal reflux disease)     CARDIOVASCULAR SCREENING; LDL GOAL LESS THAN 160     Gout     Creatinine elevation     Antibody mediated rejection of kidney transplant     Past Surgical History:   Procedure Laterality Date     AV FISTULA OR GRAFT ARTERIAL       LIGATE FISTULA ARTERIOVENOUS UPPER EXTREMITY  12/20/2011    Procedure:LIGATE FISTULA ARTERIOVENOUS UPPER EXTREMITY; Excision of Right Forearm Arteriovenous Fistula.; Surgeon:LINDY AMAYA; Location:[facility] OR     PERCUTANEOUS BIOPSY KIDNEY Right 2/28/2017    Procedure: PERCUTANEOUS BIOPSY KIDNEY;  Surgeon: Gee Barrios MD;  Location:  OR      TRANSPLANT  01/13/2011    Kidney transpplant        Social History   Substance Use Topics     Smoking status: Former Smoker     Smokeless tobacco: Never Used      Comment: Patient states that he is an 'social'  smoker      Alcohol use 2.5 oz/week     5 Standard drinks or equivalent per week     Family History   Problem Relation Age of Onset     Hypertension Mother          Current Outpatient Prescriptions   Medication Sig Dispense Refill     furosemide (LASIX) 20 MG tablet TAKE ONE TABLET BY MOUTH TWICE A  tablet 0     oxyCODONE (ROXICODONE) 5 MG IR tablet Take 1 tablet (5 mg) by mouth every 4 hours as needed for breakthrough pain or moderate to severe pain 10 tablet 0     predniSONE (DELTASONE) 5 MG tablet Take 1 tablet (5 mg) by mouth daily 30 tablet 11     losartan (COZAAR) 100 MG tablet Take 1 tablet (100 mg) by mouth daily 90 tablet 3     mycophenolate (CELLCEPT - GENERIC EQUIVALENT) 250 MG capsule Take 4 capsules (1,000 mg) by mouth 2 times daily 240 capsule 11     carvedilol (COREG) 25 MG tablet TAKE ONE TABLET BY MOUTH TWICE A  tablet 3     sulfamethoxazole-trimethoprim (BACTRIM,SEPTRA) 400-80 MG per tablet Take 1 tablet by mouth every other day 45 tablet 3     tacrolimus (PROGRAF - GENERIC EQUIVALENT) 1 MG capsule Take 3 capsules (3 mg) by mouth 2 times daily 180 capsule 6     furosemide (LASIX) 20 MG tablet TAKE ONE TABLET BY MOUTH TWICE A  tablet 0     omeprazole (PRILOSEC) 20 MG capsule Take 1 capsule (20 mg) by mouth daily 90 capsule 1     predniSONE (DELTASONE) 20 MG tablet Take 3 tablets (60 mg) by mouth daily 15 tablet 5     BP Readings from Last 3 Encounters:   04/10/17 129/78   03/14/17 122/86   03/01/17 (!) 133/105    Wt Readings from Last 3 Encounters:   04/10/17 159 lb (72.1 kg)   03/14/17 174 lb 4.8 oz (79.1 kg)   03/01/17 170 lb (77.1 kg)                    Reviewed and updated as needed this visit by clinical staff  Allergies  Meds  Problems       Reviewed and updated  "as needed this visit by Provider  Allergies  Meds  Problems         ROS:  Constitutional, HEENT, cardiovascular, pulmonary, GI, , musculoskeletal, neuro, skin, endocrine and psych systems are negative, except as otherwise noted.    OBJECTIVE:                                                    /78 (BP Location: Left arm, Patient Position: Chair, Cuff Size: Adult Regular)  Pulse 70  Temp 97.6  F (36.4  C) (Oral)  Resp 16  Ht 5' 7.75\" (1.721 m)  Wt 159 lb (72.1 kg)  SpO2 100%  BMI 24.35 kg/m2  Body mass index is 24.35 kg/(m^2).  GENERAL: healthy, alert and no distress  ABDOMEN: soft, mild generalized tenderness throughout, no hepatosplenomegaly, no masses and bowel sounds normal    Diagnostic Test Results:  none      ASSESSMENT/PLAN:                                                        1. Vomiting and diarrhea  Symptoms have persisted longer than expected if it were an infection or food poisoning, however he does also have a compromised immune system due to the kidney transplant meds.  He does take bactrim qod, therefore c diff is very possible, will check for that today.  Flagyl is processed through the liver so should be ok to take as long as levels are stable.  I am very concerned about dehydration given the amount of fluids he is losing for the past 3 weeks, will check labs to ensure things are stable and I recommend he schedule an appt with his urologist ASAP.    - Comprehensive metabolic panel  - CBC with platelets differential  - TSH with free T4 reflex  - Amylase  - Lipase  - Fecal leukocyte; Future  - Ova and Parasite Exam Routine; Future  - Enteric Bacteria and Virus Panel by [facility]WYER Stool; Future  - Clostridium difficile toxin B PCR; Future    2. Kidney replaced by transplant  F/u with urologist for a recheck.   - Comprehensive metabolic panel  - CBC with platelets differential  - TSH with free T4 reflex  - Amylase  - Lipase  - Fecal leukocyte; Future  - Ova and Parasite Exam Routine; " Future  - Enteric Bacteria and Virus Panel by [facility] Stool; Future  - Clostridium difficile toxin B PCR; Future    CONSULTATION/REFERRAL to urology.    Karime Shin PA-C  [facility]

## 2017-04-10 NOTE — MR AVS SNAPSHOT
After Visit Summary   4/10/2017    Rashad Ortiz    MRN: [id number]           Patient Information     Date Of Birth          1975        Visit Information        Provider Department      4/10/2017 11:00 AM Karime Shin PA-C [facility]        Today's Diagnoses     Vomiting and diarrhea    -  1    Kidney replaced by transplant          Care Instructions    Make sure you are drinking plenty of water to avoid dehydration.  You could try an Ensure shake for a meal replacement if you can't keep food down.    Please schedule an appt ASAP with your kidney specialist.           Follow-ups after your visit        Future tests that were ordered for you today     Open Future Orders        Priority Expected Expires Ordered    Enteric Bacteria and Virus Panel by [facility] Stool Routine  4/10/2018 4/10/2017    Clostridium difficile toxin B PCR Routine 4/11/2017 4/10/2018 4/10/2017    Fecal leukocyte Routine  7/9/2017 4/10/2017    Ova and Parasite Exam Routine Routine  4/10/2018 4/10/2017            Who to contact     If you have questions or need follow up information about today's clinic visit or your schedule please contact [facility] directly at [phone].  Normal or non-critical lab and imaging results will be communicated to you by MyChart, letter or phone within 4 business days after the clinic has received the results. If you do not hear from us within 7 days, please contact the clinic through MyChart or phone. If you have a critical or abnormal lab result, we will notify you by phone as soon as possible.  Submit refill requests through [employer] or call your pharmacy and they will forward the refill request to us. Please allow 3 business days for your refill to be completed.          Additional Information About Your Visit        MyChart Information     [employer] lets you send messages to your doctor, view your test results, renew your prescriptions, schedule  "appointments and more. To sign up, go to [email or web address] . Click on \"Log in\" on the left side of the screen, which will take you to the Welcome page. Then click on \"Sign up Now\" on the right side of the page.     You will be asked to enter the access code listed below, as well as some personal information. Please follow the directions to create your username and password.     Your access code is: [email or web address]  Expires: 2017 10:30 AM     Your access code will  in 90 days. If you need help or a new code, please call your [facility] or [phone].        Care EveryWhere ID     This is your Care EveryWhere ID. This could be used by other organizations to access your [place] medical records  [id number]        Your Vitals Were     Pulse Temperature Respirations Height Pulse Oximetry BMI (Body Mass Index)    70 97.6  F (36.4  C) (Oral) 16 5' 7.75\" (1.721 m) 100% 24.35 kg/m2       Blood Pressure from Last 3 Encounters:   04/10/17 129/78   17 122/86   17 (!) 133/105    Weight from Last 3 Encounters:   04/10/17 159 lb (72.1 kg)   17 174 lb 4.8 oz (79.1 kg)   17 170 lb (77.1 kg)              We Performed the Following     Amylase     CBC with platelets differential     Comprehensive metabolic panel     Lipase     TSH with free T4 reflex        Primary Care Provider Office Phone # Fax #    Francie Barraza PA-C [phone]       [facility] [address]  [facility] [address]        Thank you!     Thank you for choosing [facility]  for your care. Our goal is always to provide you with excellent care. Hearing back from our patients is one way we can continue to improve our services. Please take a few minutes to complete the written survey that you may receive in the mail after your visit with us. Thank you!             Your Updated Medication List - Protect others around you: Learn how to safely use, store and throw away " your medicines at www.disposemymeds.org.          This list is accurate as of: 4/10/17 11:28 AM.  Always use your most recent med list.                   Brand Name Dispense Instructions for use    carvedilol 25 MG tablet    COREG    180 tablet    TAKE ONE TABLET BY MOUTH TWICE A DAY       * furosemide 20 MG tablet    LASIX    180 tablet    TAKE ONE TABLET BY MOUTH TWICE A DAY       * furosemide 20 MG tablet    LASIX    180 tablet    TAKE ONE TABLET BY MOUTH TWICE A DAY       losartan 100 MG tablet    COZAAR    90 tablet    Take 1 tablet (100 mg) by mouth daily       mycophenolate 250 MG capsule    CELLCEPT - GENERIC EQUIVALENT    240 capsule    Take 4 capsules (1,000 mg) by mouth 2 times daily       omeprazole 20 MG CR capsule    priLOSEC    90 capsule    Take 1 capsule (20 mg) by mouth daily       oxyCODONE 5 MG IR tablet    ROXICODONE    10 tablet    Take 1 tablet (5 mg) by mouth every 4 hours as needed for breakthrough pain or moderate to severe pain       * predniSONE 5 MG tablet    DELTASONE    30 tablet    Take 1 tablet (5 mg) by mouth daily       * predniSONE 20 MG tablet    DELTASONE    15 tablet    Take 3 tablets (60 mg) by mouth daily       sulfamethoxazole-trimethoprim 400-80 MG per tablet    BACTRIM/SEPTRA    45 tablet    Take 1 tablet by mouth every other day       tacrolimus 1 MG capsule    PROGRAF - GENERIC EQUIVALENT    180 capsule    Take 3 capsules (3 mg) by mouth 2 times daily       * Notice:  This list has 4 medication(s) that are the same as other medications prescribed for you. Read the directions carefully, and ask your doctor or other care provider to review them with you.

## 2017-04-10 NOTE — PATIENT INSTRUCTIONS
Make sure you are drinking plenty of water to avoid dehydration.  You could try an Ensure shake for a meal replacement if you can't keep food down.    Please schedule an appt ASAP with your kidney specialist.

## 2017-04-10 NOTE — LETTER
[address] [address]  Phone: [phone]    April 10, 2017        Rashad GARCIAS Angel  [address]  [facility] [address]          To whom it may concern:    RE: Rashad Samin    Patient was seen and treated today at our clinic and missed work.    Please contact me for questions or concerns.      Sincerely,        Karime Shin PA-C

## 2017-04-10 NOTE — NURSING NOTE
"Chief Complaint   Patient presents with     Diarrhea     3 wks and vomoting 4/09/2017       Initial /78 (BP Location: Left arm, Patient Position: Chair, Cuff Size: Adult Regular)  Pulse 70  Temp 97.6  F (36.4  C) (Oral)  Resp 16  Ht 5' 7.75\" (1.721 m)  Wt 159 lb (72.1 kg)  SpO2 100%  BMI 24.35 kg/m2 Estimated body mass index is 24.35 kg/(m^2) as calculated from the following:    Height as of this encounter: 5' 7.75\" (1.721 m).    Weight as of this encounter: 159 lb (72.1 kg).  Medication Reconciliation: complete     Triny Magaña CMA      "

## 2017-04-11 ENCOUNTER — TELEPHONE (OUTPATIENT)
Dept: TRANSPLANT | Facility: CLINIC | Age: 42
End: 2017-04-11

## 2017-04-11 DIAGNOSIS — M10.9 ACUTE GOUTY ARTHRITIS: ICD-10-CM

## 2017-04-11 LAB
C DIFF TOX B STL QL: NORMAL
CAMPYLOBACTER GROUP BY NAT: NOT DETECTED
ENTERIC PATHOGEN COMMENT: NORMAL
MICRO REPORT STATUS: NORMAL
NOROVIRUS I AND II BY NAT: NOT DETECTED
ROTAVIRUS A BY NAT: NOT DETECTED
SALMONELLA SPECIES BY NAT: NOT DETECTED
SHIGA TOXIN 1 GENE BY NAT: NOT DETECTED
SHIGA TOXIN 2 GENE BY NAT: NOT DETECTED
SHIGELLA SP+EIEC IPAH STL QL NAA+PROBE: NOT DETECTED
SPECIMEN SOURCE: NORMAL
SPECIMEN SOURCE: NORMAL
VIBRIO GROUP BY NAT: NOT DETECTED
WBC STL QL MICRO: NORMAL
YERSINIA ENTEROCOLITICA BY NAT: NOT DETECTED

## 2017-04-11 NOTE — TELEPHONE ENCOUNTER
Per Rashad he was just seen by PCP yesterday re: diarrhea x 3 weeks now.  Stool exam x 4, in process. Reports nobody in the house having the same symptoms.  Instructed to have labs redrawn once diarrhea improved.  Currently on Cellcept 1000 mg bid.  Tacrolimus 3 mg BID (Goal: 4-6)  Will make Dr. Barrios aware.

## 2017-04-11 NOTE — TELEPHONE ENCOUNTER
ISSUE: creatinine = 4.39, above baseline (2.3 - 3.7)    PLAN:  Check if patient feeling sick?  Having diarrhea?  Dehydration?  Instruct to hydrate and recheck labs soon.

## 2017-04-12 DIAGNOSIS — Z94.0 KIDNEY REPLACED BY TRANSPLANT: Primary | ICD-10-CM

## 2017-04-12 LAB
MICRO REPORT STATUS: NORMAL
O+P STL MICRO: NORMAL
SPECIMEN SOURCE: NORMAL

## 2017-04-12 RX ORDER — TACROLIMUS 1 MG/1
3 CAPSULE ORAL 2 TIMES DAILY
Qty: 180 CAPSULE | Refills: 11 | Status: SHIPPED | OUTPATIENT
Start: 2017-04-12 | End: 2017-08-01

## 2017-04-12 NOTE — TELEPHONE ENCOUNTER
Drug: Tacrolimus  Last Fill Date: 2/20/2017  Quantity: 180    Jaja Hernandez CPhT  [facility]  Phone: [phone]

## 2017-04-13 DIAGNOSIS — Z79.899 ENCOUNTER FOR LONG-TERM (CURRENT) USE OF HIGH-RISK MEDICATION: ICD-10-CM

## 2017-04-13 DIAGNOSIS — T86.10 COMPLICATIONS, KIDNEY TRANSPLANT: ICD-10-CM

## 2017-04-13 DIAGNOSIS — Z94.0 KIDNEY TRANSPLANTED: ICD-10-CM

## 2017-04-13 DIAGNOSIS — D84.9 IMMUNOSUPPRESSED STATUS (H): ICD-10-CM

## 2017-04-13 DIAGNOSIS — Z48.298 AFTERCARE FOLLOWING ORGAN TRANSPLANT: Primary | ICD-10-CM

## 2017-04-13 RX ORDER — MYCOPHENOLATE MOFETIL 250 MG/1
750 CAPSULE ORAL 2 TIMES DAILY
Qty: 180 CAPSULE | Refills: 11 | Status: SHIPPED | OUTPATIENT
Start: 2017-04-13 | End: 2017-04-25

## 2017-04-13 RX ORDER — SODIUM BICARBONATE 650 MG/1
1300 TABLET ORAL 2 TIMES DAILY
Qty: 120 TABLET | Refills: 3 | Status: SHIPPED | OUTPATIENT
Start: 2017-04-13 | End: 2017-08-09

## 2017-04-13 NOTE — TELEPHONE ENCOUNTER
RE: diarrhea x 3 wks, on cellcept 1000 mg bid  Received: Yesterday       Gee Barrios MD Ututalum, Teresa, RN                   Okay to reduce it to 750 mg bid.  Would get SAWYER testing done to rule out viral causes (we have seen a lot of norovirus).  Also CMV and c. Diff.     With his diarrhea, he needs to be started on sodium bicarbonate 1300 mg bid.  Repeat labs on Monday.     Would consider change to Myfortic if infectious work up is negative.            Previous Messages       ----- Message -----      From: So Souza, RN      Sent: 4/11/2017   9:51 AM        To: Gee Barrios MD   Subject: diarrhea x 3 wks, on cellcept 1000 mg bid         Dr. Barrios,     Seen by PCP yesterday (4/10/17) re: diarrhea x 3 weeks now.   Stool exam pending. Reports nobody in the house having the same symptoms.   Currently on Cellcept 1000 mg bid.   Tacrolimus 3 mg BID (Goal: 4-6)   Pred 5 daily.     --- Decrease Cellcept to 750 bid?          PLAN:  Meds:  Start on Sodium bicarbonate 1300 BID.  Decrease mycophenolate to 750 mg BID.    Labs: --- Orders placed.  SAWYER and CMV in stool  Transplant labs on Monday 4/17/17 + mycophenolate level.  C.diff negative from 4/10/17.

## 2017-04-13 NOTE — TELEPHONE ENCOUNTER
Call placed to patient: Patient verbalize understanding to decrease mycophenolate to 750 mg twice a day and begin taking sodium bicarb 1300 mg twice a day also to have labs completed Monday for transplant labs and CMV and SAWYER on stool sample. Patient request rx for sodium bicarb be sent to [facility].

## 2017-04-17 ENCOUNTER — TELEPHONE (OUTPATIENT)
Dept: FAMILY MEDICINE | Facility: CLINIC | Age: 42
End: 2017-04-17

## 2017-04-17 NOTE — TELEPHONE ENCOUNTER
Reason for Call:  Other returning call    Detailed comments: plz try back prev message might be labs recall    Phone Number Patient can be reached at: Cell number on file:    Telephone Information:   Mobile [phone]       Best Time: any    Can we leave a detailed message on this number? YES    Call taken on 4/17/2017 at 9:04 AM by Reena Garcia

## 2017-04-17 NOTE — TELEPHONE ENCOUNTER
Notes Recorded by Karime Shin PA-C on 4/14/2017 at 2:52 PM  Please call Rashad,   His stool tests have all come back normal, I do not know what the cause of this diarrhea is, however it certainly is not normal to have this for 3 weeks.  I suggest he schedule a f/u visit with a gastroenterologist   Preferred Location: [facility]: [phone] and [facility] [phone]  Karime Shin PA-C      Patient notified of the message above.  Fadumo Morrell RN

## 2017-04-24 DIAGNOSIS — T86.10 COMPLICATIONS, KIDNEY TRANSPLANT: ICD-10-CM

## 2017-04-24 DIAGNOSIS — Z48.298 AFTERCARE FOLLOWING ORGAN TRANSPLANT: ICD-10-CM

## 2017-04-24 DIAGNOSIS — Z94.0 KIDNEY TRANSPLANTED: ICD-10-CM

## 2017-04-24 DIAGNOSIS — Z79.899 ENCOUNTER FOR LONG-TERM (CURRENT) USE OF HIGH-RISK MEDICATION: ICD-10-CM

## 2017-04-24 DIAGNOSIS — D84.9 IMMUNOSUPPRESSED STATUS (H): ICD-10-CM

## 2017-04-24 LAB
ANION GAP SERPL CALCULATED.3IONS-SCNC: 11 MMOL/L (ref 3–14)
BUN SERPL-MCNC: 46 MG/DL (ref 7–30)
CALCIUM SERPL-MCNC: 8.1 MG/DL (ref 8.5–10.1)
CAMPYLOBACTER GROUP BY NAT: NOT DETECTED
CHLORIDE SERPL-SCNC: 121 MMOL/L (ref 94–109)
CO2 SERPL-SCNC: 15 MMOL/L (ref 20–32)
CREAT SERPL-MCNC: 3.94 MG/DL (ref 0.66–1.25)
ENTERIC PATHOGEN COMMENT: ABNORMAL
ERYTHROCYTE [DISTWIDTH] IN BLOOD BY AUTOMATED COUNT: 15.8 % (ref 10–15)
GFR SERPL CREATININE-BSD FRML MDRD: 17 ML/MIN/1.7M2
GLUCOSE SERPL-MCNC: 116 MG/DL (ref 70–99)
HCT VFR BLD AUTO: 27.4 % (ref 40–53)
HGB BLD-MCNC: 8.8 G/DL (ref 13.3–17.7)
MCH RBC QN AUTO: 26.4 PG (ref 26.5–33)
MCHC RBC AUTO-ENTMCNC: 32.1 G/DL (ref 31.5–36.5)
MCV RBC AUTO: 82 FL (ref 78–100)
NOROVIRUS I AND II BY NAT: ABNORMAL
PLATELET # BLD AUTO: 185 10E9/L (ref 150–450)
POTASSIUM SERPL-SCNC: 3.7 MMOL/L (ref 3.4–5.3)
RBC # BLD AUTO: 3.33 10E12/L (ref 4.4–5.9)
ROTAVIRUS A BY NAT: NOT DETECTED
SALMONELLA SPECIES BY NAT: NOT DETECTED
SHIGA TOXIN 1 GENE BY NAT: NOT DETECTED
SHIGA TOXIN 2 GENE BY NAT: NOT DETECTED
SHIGELLA SP+EIEC IPAH STL QL NAA+PROBE: NOT DETECTED
SODIUM SERPL-SCNC: 147 MMOL/L (ref 133–144)
TACROLIMUS BLD-MCNC: 5.8 UG/L (ref 5–15)
TME LAST DOSE: 2200 H
VIBRIO GROUP BY NAT: NOT DETECTED
WBC # BLD AUTO: 6 10E9/L (ref 4–11)
YERSINIA ENTEROCOLITICA BY NAT: NOT DETECTED

## 2017-04-24 PROCEDURE — 36415 COLL VENOUS BLD VENIPUNCTURE: CPT | Performed by: INTERNAL MEDICINE

## 2017-04-24 PROCEDURE — 80048 BASIC METABOLIC PNL TOTAL CA: CPT | Performed by: INTERNAL MEDICINE

## 2017-04-24 PROCEDURE — 80180 DRUG SCRN QUAN MYCOPHENOLATE: CPT | Performed by: INTERNAL MEDICINE

## 2017-04-24 PROCEDURE — 85027 COMPLETE CBC AUTOMATED: CPT | Performed by: INTERNAL MEDICINE

## 2017-04-24 PROCEDURE — 80197 ASSAY OF TACROLIMUS: CPT | Performed by: INTERNAL MEDICINE

## 2017-04-24 PROCEDURE — 87506 IADNA-DNA/RNA PROBE TQ 6-11: CPT | Performed by: INTERNAL MEDICINE

## 2017-04-25 ENCOUNTER — TELEPHONE (OUTPATIENT)
Dept: TRANSPLANT | Facility: CLINIC | Age: 42
End: 2017-04-25

## 2017-04-25 DIAGNOSIS — Z94.0 KIDNEY TRANSPLANTED: Primary | ICD-10-CM

## 2017-04-25 DIAGNOSIS — Z94.0 KIDNEY TRANSPLANTED: ICD-10-CM

## 2017-04-25 DIAGNOSIS — Z48.298 AFTERCARE FOLLOWING ORGAN TRANSPLANT: ICD-10-CM

## 2017-04-25 DIAGNOSIS — D84.9 IMMUNOSUPPRESSED STATUS (H): ICD-10-CM

## 2017-04-25 DIAGNOSIS — T86.10 COMPLICATIONS, KIDNEY TRANSPLANT: ICD-10-CM

## 2017-04-25 LAB
CMV DNA SPEC NAA+PROBE-ACNC: NORMAL [IU]/ML
CMV DNA SPEC NAA+PROBE-LOG#: NORMAL {LOG_IU}/ML
SPECIMEN SOURCE: NORMAL

## 2017-04-25 RX ORDER — MYCOPHENOLATE MOFETIL 250 MG/1
500 CAPSULE ORAL 2 TIMES DAILY
Qty: 120 CAPSULE | Refills: 11 | Status: SHIPPED | OUTPATIENT
Start: 2017-04-25 | End: 2018-02-22

## 2017-04-25 RX ORDER — NITAZOXANIDE 500 MG/1
500 TABLET ORAL 2 TIMES DAILY WITH MEALS
Qty: 14 TABLET | Refills: 0 | Status: SHIPPED | OUTPATIENT
Start: 2017-04-25 | End: 2017-05-02

## 2017-04-25 NOTE — TELEPHONE ENCOUNTER
RE: norovirus just like you suspected.  Received: Today       Gee Barrios MD Ututalum, Teresa, RN                   Let's decrease mycophenolate mofetil further to 500 mg bid.  If his diarrhea has now persisted longer than 3-5 days, would treat with nitazoxanide 500 mg bid x 7 days.     Thanks.       PLAN:  Decrease mycophenolate to 500 mg bid. Per patient he decreased  Hit this week on his own hoping it would help with the diarrhea.  Reports he has been having diarrhea for about 6 weeks now.  Prescription for Nitazoxanide 500 mg BID x 7 days, sent to   [facility] [id number] [place] [address] - [address] AT [address] [phone] (Phone)  [phone] (Fax)

## 2017-04-26 ENCOUNTER — TELEPHONE (OUTPATIENT)
Dept: TRANSPLANT | Facility: CLINIC | Age: 42
End: 2017-04-26

## 2017-04-26 LAB
MYCOPHENOLATE SERPL LC/MS/MS-MCNC: 2.16 MG/L (ref 1–3.5)
MYCOPHENOLATE-G SERPL LC/MS/MS-MCNC: 172.3 MG/L (ref 30–95)
TME LAST DOSE: 2200 H

## 2017-04-26 NOTE — TELEPHONE ENCOUNTER
Prior Authorization Specialty Medication Request    Medication/Dose: nitazoxanide (ALINIA) 500 MG tablet, Take 1 tablet (500 mg) by mouth 2 times daily (with meals) for 7 days  Diagnosis and ICD: Norovirus K52.9  New/Renewal/Insurance Change PA: New    Important Lab Values:     Previously Tried and Failed Therapies:     Rationale:     Would you like to include any research articles?    If yes please include the hyperlink(s) below or fax @ [phone].    (Include Name and MRN)    If you received a fax notification from an outside Pharmacy;  Pharmacy Name:[facility]  Pharmacy #:[phone]  Pharmacy Fax:[phone]

## 2017-04-26 NOTE — LETTER
[facility] [place] [employer]  [address] [address]  [phone]  Dept: [phone]      4/26/2017    Re: Rashad Ortiz      TO WHOM IT MAY CONCERN:    Rashad Ortiz  was seen on ***.  Please excuse him  until *** due to {RW WORK EXCUSE:932794}.    Nurys Souza RN  [facility] [employer]

## 2017-04-26 NOTE — TELEPHONE ENCOUNTER
Please call Rashad forrest # [phone]  He went to work and has been Dx with the norovirus  And they are wanting to know if he should go home.    Waiting for Prior Auth for Nitazoxanide.  Wants a excuse from work letter. --- sent to N  Will need it faxed to   [facility]   Attn: Tegan  T: [phone]  F: [phone]

## 2017-04-26 NOTE — LETTER
PHYSICIAN ORDERS      DATE & TIME ISSUED: 2017 12:47 PM  PATIENT NAME: Rashad Ortiz   : 1975     [facility] MR#  [id number]     DIAGNOSIS:  Kidney Tranplant  ICD-10 CODE: Z94.0        Please excuse patient from work for the next 5-10 days due to illness    Any questions please call: [phone]    Please fax these results to [phone].    .

## 2017-04-26 NOTE — TELEPHONE ENCOUNTER
Work excuse letter has been faxed to patient employer. Patient verbalize understanding that he may return to work after 3 days of Abx use.

## 2017-04-26 NOTE — TELEPHONE ENCOUNTER
Please call Rashad forrest # [phone]  He went to work and has been Dx with the norovirus  And they are wanting to know if he should go home.

## 2017-04-28 DIAGNOSIS — Z94.0 KIDNEY TRANSPLANTED: ICD-10-CM

## 2017-04-28 DIAGNOSIS — D84.9 IMMUNOSUPPRESSED STATUS (H): ICD-10-CM

## 2017-04-28 DIAGNOSIS — Z48.298 AFTERCARE FOLLOWING ORGAN TRANSPLANT: ICD-10-CM

## 2017-04-28 DIAGNOSIS — T86.10 COMPLICATIONS, KIDNEY TRANSPLANT: ICD-10-CM

## 2017-04-28 RX ORDER — NITAZOXANIDE 500 MG/1
500 TABLET ORAL 2 TIMES DAILY WITH MEALS
Qty: 14 TABLET | Refills: 0 | Status: CANCELLED | OUTPATIENT
Start: 2017-04-28

## 2017-05-02 NOTE — TELEPHONE ENCOUNTER
[facility] Prior Authorization Team   Phone: [phone]  Fax: [phone]    Prior Authorization Not Needed per Insurance    Medication: ALINIA 500 MG tablet   Insurance Company:  [employer]  Expected CoPay:  unknown    Pharmacy Filling the Rx:  [facility]  Pharmacy Notified:  yes  Patient Notified:  yes

## 2017-05-03 ENCOUNTER — TELEPHONE (OUTPATIENT)
Dept: NURSING | Facility: CLINIC | Age: 42
End: 2017-05-03

## 2017-05-03 NOTE — TELEPHONE ENCOUNTER
"Call Type: Triage Call    Presenting Problem: Female caller: \"I want to know if nurses and  doctors have on-line FMLA forms to complete available?\" I explained  that FMLA forms need to be initiated through her employer and I\"m  certain her PCP's clinic would be more than happy to assist her with  completing them. If she has the forms already she could contact her  clinic and they could assist her in completing. They can send them  back to her via mail or [employer]. She responded \"Well, that's just  Financial Fairy [employer]...[employer] does it all on line!\" and she hung up on  me.  Triage Note:  Guideline Title: No Guideline - Advice Per Reference (Adult)  Recommended Disposition: Provide Home/Self Care  Original Inclination: Wanted to speak with a nurse  Override Disposition:  Intended Action: Follow Selfcare / Homecare  Physician Contacted: No  PROVIDE HOME/SELF CARE ?  YES  SEE ED IMMEDIATELY ? NO  CALL POISON CENTER IMMEDIATELY ? NO  CALL LOCAL AGENCY IMMEDIATELY ? NO  SEE DENTIST WITHIN 4 HOURS ? NO  SEE DENTIST WITHIN 24 HOURS ? NO  CALL LOCAL AGENCY WITHIN 24 HOURS ? NO  SEE DENTIST WITHIN 72 HOURS ? NO  ACTIVATE  ? NO  SEE PROVIDER WITHIN 4 HOURS ? NO  SEE PROVIDER WITHIN 24 HOURS ? NO  CALL PROVIDER WITHIN 24 HOURS ? NO  SEE PROVIDER WITHIN 72 HOURS ? NO  SEE PROVIDER WITHIN 2 WEEKS ? NO  CALL PROVIDER WITHIN 72 HOURS ? NO  SEE DENTIST WITHIN 2 WEEKS ? NO  CALL PROVIDER IMMEDIATELY ? NO  Physician Instructions:  Care Advice:  "

## 2017-05-11 ENCOUNTER — TELEPHONE (OUTPATIENT)
Dept: TRANSPLANT | Facility: CLINIC | Age: 42
End: 2017-05-11

## 2017-05-15 DIAGNOSIS — Z94.0 KIDNEY TRANSPLANTED: Primary | ICD-10-CM

## 2017-05-15 RX ORDER — SULFAMETHOXAZOLE AND TRIMETHOPRIM 400; 80 MG/1; MG/1
1 TABLET ORAL EVERY OTHER DAY
Qty: 45 TABLET | Refills: 3 | Status: SHIPPED | OUTPATIENT
Start: 2017-05-15 | End: 2018-06-05

## 2017-06-08 DIAGNOSIS — I10 HYPERTENSION GOAL BP (BLOOD PRESSURE) < 140/90: Primary | ICD-10-CM

## 2017-06-08 NOTE — TELEPHONE ENCOUNTER
furosemide (LASIX) 20 MG tablet      Last Written Prescription Date: 3/17/17  Last Fill Quantity: 180, # refills: 0  Last Office Visit with [facility] or [facility] prescribing provider: 4/10/17       Potassium   Date Value Ref Range Status   04/24/2017 3.7 3.4 - 5.3 mmol/L Final     Creatinine   Date Value Ref Range Status   04/24/2017 3.94 (H) 0.66 - 1.25 mg/dL Final     BP Readings from Last 3 Encounters:   04/10/17 129/78   03/14/17 122/86   03/01/17 (!) 133/105         Lindsey Martin  [facility]

## 2017-06-09 RX ORDER — FUROSEMIDE 20 MG
20 TABLET ORAL 2 TIMES DAILY
Qty: 180 TABLET | Refills: 0 | Status: SHIPPED | OUTPATIENT
Start: 2017-06-09 | End: 2018-02-04

## 2017-06-09 NOTE — TELEPHONE ENCOUNTER
Routing refill request to provider for review/approval because:  Labs out of range:  Creatinine  Fadumo Morrell RN

## 2017-06-22 DIAGNOSIS — I10 ESSENTIAL HYPERTENSION, MALIGNANT: ICD-10-CM

## 2017-06-22 NOTE — TELEPHONE ENCOUNTER
carvedilol (COREG) 25 MG tablet      Last Written Prescription Date: 05/20/16  Last Fill Quantity: 180, # refills: 3    Last Office Visit with [facility] or [facility] prescribing provider:  04/10/17   Future Office Visit:        BP Readings from Last 3 Encounters:   04/10/17 129/78   03/14/17 122/86   03/01/17 (!) 133/105         [facility]

## 2017-06-26 RX ORDER — CARVEDILOL 25 MG/1
TABLET ORAL
Qty: 180 TABLET | Refills: 3 | Status: SHIPPED | OUTPATIENT
Start: 2017-06-26 | End: 2017-08-01

## 2017-06-26 NOTE — TELEPHONE ENCOUNTER
Prescription approved per [facility] Refill Protocol.    Radha Damon RN, [facility]

## 2017-07-31 DIAGNOSIS — R31.9 HEMATURIA: ICD-10-CM

## 2017-07-31 DIAGNOSIS — Z94.0 KIDNEY TRANSPLANTED: ICD-10-CM

## 2017-07-31 DIAGNOSIS — T86.10 COMPLICATIONS, KIDNEY TRANSPLANT: ICD-10-CM

## 2017-07-31 LAB
ANION GAP SERPL CALCULATED.3IONS-SCNC: 9 MMOL/L (ref 3–14)
BUN SERPL-MCNC: 63 MG/DL (ref 7–30)
CALCIUM SERPL-MCNC: 8.8 MG/DL (ref 8.5–10.1)
CHLORIDE SERPL-SCNC: 122 MMOL/L (ref 94–109)
CO2 SERPL-SCNC: 14 MMOL/L (ref 20–32)
CREAT SERPL-MCNC: 4.07 MG/DL (ref 0.66–1.25)
ERYTHROCYTE [DISTWIDTH] IN BLOOD BY AUTOMATED COUNT: 16.2 % (ref 10–15)
GFR SERPL CREATININE-BSD FRML MDRD: 16 ML/MIN/1.7M2
GLUCOSE SERPL-MCNC: 101 MG/DL (ref 70–99)
HCT VFR BLD AUTO: 29 % (ref 40–53)
HGB BLD-MCNC: 9.3 G/DL (ref 13.3–17.7)
MCH RBC QN AUTO: 27.1 PG (ref 26.5–33)
MCHC RBC AUTO-ENTMCNC: 32.1 G/DL (ref 31.5–36.5)
MCV RBC AUTO: 85 FL (ref 78–100)
PLATELET # BLD AUTO: 159 10E9/L (ref 150–450)
POTASSIUM SERPL-SCNC: 4.6 MMOL/L (ref 3.4–5.3)
RBC # BLD AUTO: 3.43 10E12/L (ref 4.4–5.9)
SODIUM SERPL-SCNC: 145 MMOL/L (ref 133–144)
WBC # BLD AUTO: 5.5 10E9/L (ref 4–11)

## 2017-07-31 PROCEDURE — 85027 COMPLETE CBC AUTOMATED: CPT | Performed by: INTERNAL MEDICINE

## 2017-07-31 PROCEDURE — 80048 BASIC METABOLIC PNL TOTAL CA: CPT | Performed by: INTERNAL MEDICINE

## 2017-07-31 PROCEDURE — 36415 COLL VENOUS BLD VENIPUNCTURE: CPT | Performed by: INTERNAL MEDICINE

## 2017-07-31 PROCEDURE — 80197 ASSAY OF TACROLIMUS: CPT | Performed by: INTERNAL MEDICINE

## 2017-08-01 ENCOUNTER — OFFICE VISIT (OUTPATIENT)
Dept: FAMILY MEDICINE | Facility: CLINIC | Age: 42
End: 2017-08-01
Payer: COMMERCIAL

## 2017-08-01 ENCOUNTER — TELEPHONE (OUTPATIENT)
Dept: TRANSPLANT | Facility: CLINIC | Age: 42
End: 2017-08-01

## 2017-08-01 VITALS
TEMPERATURE: 97.4 F | SYSTOLIC BLOOD PRESSURE: 130 MMHG | WEIGHT: 159 LBS | HEIGHT: 68 IN | OXYGEN SATURATION: 100 % | DIASTOLIC BLOOD PRESSURE: 96 MMHG | BODY MASS INDEX: 24.1 KG/M2 | HEART RATE: 62 BPM

## 2017-08-01 DIAGNOSIS — Z94.0 KIDNEY TRANSPLANT RECIPIENT: ICD-10-CM

## 2017-08-01 DIAGNOSIS — L73.9 FOLLICULITIS: Primary | ICD-10-CM

## 2017-08-01 DIAGNOSIS — Z94.0 KIDNEY REPLACED BY TRANSPLANT: ICD-10-CM

## 2017-08-01 DIAGNOSIS — N18.4 CHRONIC KIDNEY DISEASE, STAGE 4, SEVERELY DECREASED GFR (H): Primary | ICD-10-CM

## 2017-08-01 DIAGNOSIS — E87.20 METABOLIC ACIDOSIS: ICD-10-CM

## 2017-08-01 DIAGNOSIS — I10 HYPERTENSION GOAL BP (BLOOD PRESSURE) < 140/90: ICD-10-CM

## 2017-08-01 LAB
TACROLIMUS BLD-MCNC: 6.1 UG/L (ref 5–15)
TME LAST DOSE: 2230 H

## 2017-08-01 PROCEDURE — 99214 OFFICE O/P EST MOD 30 MIN: CPT | Performed by: INTERNAL MEDICINE

## 2017-08-01 RX ORDER — CARVEDILOL 25 MG/1
25 TABLET ORAL 2 TIMES DAILY
Qty: 180 TABLET | Refills: 3 | Status: SHIPPED | OUTPATIENT
Start: 2017-08-01 | End: 2018-08-06

## 2017-08-01 RX ORDER — LACTOBACILLUS RHAMNOSUS GG 10B CELL
1 CAPSULE ORAL 2 TIMES DAILY
Qty: 30 CAPSULE | Refills: 0 | Status: SHIPPED | OUTPATIENT
Start: 2017-08-01 | End: 2017-09-25

## 2017-08-01 RX ORDER — TACROLIMUS 1 MG/1
CAPSULE ORAL
COMMUNITY
Start: 2017-07-17 | End: 2017-08-15

## 2017-08-01 RX ORDER — CEPHALEXIN 250 MG/1
1 TABLET ORAL 2 TIMES DAILY
Qty: 14 TABLET | Refills: 2 | Status: SHIPPED | OUTPATIENT
Start: 2017-08-01 | End: 2017-09-25

## 2017-08-01 NOTE — MR AVS SNAPSHOT
After Visit Summary   8/1/2017    Rashad Ortiz    MRN: [id number]           Patient Information     Date Of Birth          1975        Visit Information        Provider Department      8/1/2017 5:00 PM Juanito Castro MD [facility]        Today's Diagnoses     Folliculitis    -  1    Kidney replaced by transplant        Essential hypertension, malignant          Care Instructions    This summary includes the important diagnoses, test, medications and other important parts of your medical history.  Below are a few good we sites you can use to learn more about these.     Www.Actimize.org : Up to date and easily searchable information on multiple topics.  Www.Actimize.[employer]/Pharmacy/c_539084.asp : [facility] $4.99 medications  Www.[employer].gov : medication info, interactive tutorials, watch real surgeries online  Www.familydoctor.org : good info from the Academy of Family Physicians  Www.Guarnic.[employer] : good info from the [facility]  Www.cdc.gov : public health info, travel advisories, epidemics (H1N1)  Www.aap.org : children's health info, normal development, vaccinations  Www.health.[facility].[place].us : [place] dept of heatlh, public health issues in [place], N1N1    Based on your medical history and these are the current health maintenance or preventive care services that you are due for (some may have been done at this visit:)  There are no preventive care reminders to display for this patient.  =================================================================================  Normal Values   Blood pressure  <140/90 for most adults    <130/80 for some chronic diseases (ask your care team about yours)    BMI (body mass index)  18.5-25 kg/m2 (based on height and weight)     Thank you for visiting [facility]    Normal or non-critical lab and imaging results will be communicated to you by MyChart, letter or phone within 7 days.  If you do not hear from us  within 10 days, please call the clinic. If you have a critical or abnormal lab result, we will notify you by phone as soon as possible.     If you have any questions regarding your visit please contact:     Team Comfort:   Clinic Hours Telephone Number   Dr. Johann Barraza  [facility]y [facility]   7am-5pm  Monday - Friday [phone]  Goyo RODRIGUEZ   Pharmacy 8am-8pm Monday-Thursday      8am-6pm Friday  9am-5pm Saturday-Sunday [phone]   Urgent Care 11am-8pm Monday-Friday        9am-5pm Saturday-Sunday [phone]     After hours, weekend or if you need to make an appointment with your primary provider please call [phone].   After Hours nurse advise: call [facility]: [phone]    Medication Refills:  Call your pharmacy and they will forward the refill to us. Please allow 3 business days for your refills to be completed.    Use Scope 5 (secure email communication and access to your chart) to send your primary care provider a message or make an appointment. Ask someone on your Team how to sign up for Scope 5. To log on to GenieTown or for more information in [employer]pto please visit the website at [email or web address].  As of October 8, 2013, all password changes, disabled accounts, or ID changes in Scope 5/MyHealth will be done by our Access Services Department.   If you need help with your account or password, call: [phone]. Clinic staff no longer has the ability to change passwords.     Folliculitis  Folliculitis is an inflammation of a hair follicle. A hair follicle is the little pocket where a hair grows out of the skin. Bacteria normally live on the skin. But sometimes bacteria can get trapped in a follicle and cause infection. This causes a bumpy rash. The area over the follicles is red and raised. It may itch or be painful. The bumps may have fluid (pus) inside. The pus may leak and then form crusts. Sores can spread to other  areas of the body. Once it goes away, folliculitis can come back at any time. Severe cases may cause permanent hair loss and scarring.  Folliculitis can happen anywhere on the body where hair grows. It can be caused by rubbing from tight clothing. Ingrown hairs can cause it. Soaking in a hot tub or swimming pool that has bacteria in the water can cause it. It may also occur if a hair follicle is blocked by a bandage.  Sores often go away in a few days with no treatment. In some cases, medicine may be given. A small piece of skin or pus may be taken to find the type of bacteria causing the infection.  Home care  The healthcare provider may prescribe an antibiotic cream or ointment.  Oral antibiotics may also be prescribed. Or you may be told to use an over-the-counter antibiotic cream. Follow all instructions when using any of these medicines.  General care:    Apply warm, moist compresses to the sores for 20 minutes up to 3 times a day. You can make a compress by soaking a cloth in warm water. Squeeze out excess water.    Don t cut, poke, or squeeze the sores. This can be painful and spread infection.    Don t scratch the affected area. Scratching can delay healing.    Don t shave the areas affected by folliculitis.    If the sores leak fluid, cover the area with a nonstick gauze bandage. Use as little tape as possible. Carefully discard all soiled bandages.    Dress in loose cotton clothing.    Change sheets and blankets if they are soiled by pus. Wash all clothes, towels, sheets, and cloth diapers in soap and hot water. Do not share clothes, towels, or sheets with other family members.    Do not soak the sores in bath water. This can spread infection. Instead, keep the area clean by gently washing sores with soap and warm water.    Wash your hands or use antibacterial gels often to prevent spreading the bacteria.  Follow-up care  Follow up with your healthcare provider, or as advised.  When to seek medical  advice  Call your healthcare provider right away if any of these occur:    Fever of 100.4 F (38 C) or higher    Spreading of the rash    Rash does not get better with treatment    Redness or swelling that gets worse    Rash becomes more painful    Foul-smelling fluid leaking from the skin    Rash improves, but then comes back   Date Last Reviewed: 11/1/2016 2000-2017 [employer]. [address] [place] [address], [place] [address]. All rights reserved. This information is not intended as a substitute for professional medical care. Always follow your healthcare professional's instructions.        Understanding Folliculitis  Folliculitis is when hair follicles become inflamed. Follicles are the tiny holes from which hair grows out of your skin. This skin condition can occur any place on the body where hair grows. But it s often found on the neck, face, and scalp.  How to say it  [id number]   What causes folliculitis?  An infection or irritation can cause this skin condition. It may be from bacteria or a fungus. The condition can also happen from a wound or irritation of the skin. Shaving is a common cause. Some cases may come from taking certain medicines, such as those that treat acne.  Symptoms of folliculitis  This skin condition tends to develop quickly. It looks like little pimples on a base of a red, inflamed hair follicles. These bumps may ooze pus. They may also be:    Itchy    Painful    Red    Swollen  Treatment for folliculitis  Treatment depends on the cause of the inflammation. In some cases, this skin condition will go away on its own in a few days. But it may return. Treatment options include:    Warm compress. Putting a warm, wet washcloth on the inflamed skin may help.    Medicine. Many skin (topical) and oral medicines are available. Antibiotics are used for bacterial infections. Antifungal medicines are best for fungal infections.    Good hygiene. Keeping the skin clean can help. Use  a clean razor when shaving. Prevent ingrown hairs after shaving. This can reduce folliculitis in the beard area. Avoid any substances that bother your skin.  When to call your healthcare provider  Call your healthcare provider right away if you have any of these:    Fever of 100.4 F (38 C) or higher, or as directed    Pain that gets worse    Symptoms that don t get better, or get worse    New symptoms   Date Last Reviewed: 5/1/2016 2000-2017 [employer]. [address] [place] [address]. All rights reserved. This information is not intended as a substitute for professional medical care. Always follow your healthcare professional's instructions.                Follow-ups after your visit        Additional Services     DERMATOLOGY REFERRAL       Your provider has referred you to: [facility]: Dermatology Clinic [facility] [phone]   [email or web address]    Please be aware that coverage of these services is subject to the terms and limitations of your health insurance plan.  Call member services at your health plan with any benefit or coverage questions.      Please bring the following with you to your appointment:    (1) Any X-Rays, CTs or MRIs which have been performed.  Contact the facility where they were done to arrange for  prior to your scheduled appointment.    (2) List of current medications  (3) This referral request   (4) Any documents/labs given to you for this referral                  Future tests that were ordered for you today     Open Future Orders        Priority Expected Expires Ordered    Basic metabolic panel  (Ca, Cl, CO2, Creat, Gluc, K, Na, BUN) Routine  8/1/2018 8/1/2017    DERMATOLOGY REFERRAL Routine  8/1/2018 8/1/2017    Renal panel Routine 8/1/2017 8/15/2017 8/1/2017            Who to contact     If you have questions or need follow up information about today's clinic visit or your schedule please contact [facility]  "[facility] directly at [phone].  Normal or non-critical lab and imaging results will be communicated to you by Silver Pushhart, letter or phone within 4 business days after the clinic has received the results. If you do not hear from us within 7 days, please contact the clinic through Silver Pushhart or phone. If you have a critical or abnormal lab result, we will notify you by phone as soon as possible.  Submit refill requests through babbel or call your pharmacy and they will forward the refill request to us. Please allow 3 business days for your refill to be completed.          Additional Information About Your Visit        babbel Information     babbel lets you send messages to your doctor, view your test results, renew your prescriptions, schedule appointments and more. To sign up, go to [email or web address] . Click on \"Log in\" on the left side of the screen, which will take you to the Welcome page. Then click on \"Sign up Now\" on the right side of the page.     You will be asked to enter the access code listed below, as well as some personal information. Please follow the directions to create your username and password.     Your access code is: [email or web address]  Expires: 10/30/2017  5:20 PM     Your access code will  in 90 days. If you need help or a new code, please call your [facility] clinic or [phone].        Care EveryWhere ID     This is your Care EveryWhere ID. This could be used by other organizations to access your [facility] medical records  [id number]        Your Vitals Were     Pulse Temperature Height Pulse Oximetry BMI (Body Mass Index)       62 97.4  F (36.3  C) (Oral) 5' 7.75\" (1.721 m) 100% 24.35 kg/m2        Blood Pressure from Last 3 Encounters:   17 (!) 130/96   04/10/17 129/78   17 122/86    Weight from Last 3 Encounters:   17 159 lb (72.1 kg)   04/10/17 159 lb (72.1 kg)   17 174 lb 4.8 oz (79.1 kg)                 Today's Medication Changes          These changes " are accurate as of: 8/1/17  5:22 PM.  If you have any questions, ask your nurse or doctor.               Start taking these medicines.        Dose/Directions    cephalexin 250 MG Tabs   Used for:  Folliculitis   Started by:  Juanito Castro MD        Dose:  1 tablet   Take 1 tablet by mouth 2 times daily   Quantity:  14 tablet   Refills:  2       CULTUREE DIGESTIVE HEALTH Caps   Used for:  Folliculitis   Started by:  Juanito Castro MD        Dose:  1 capsule   Take 1 capsule by mouth 2 times daily Take 30 minutes after each dose of Cephalexin.   Quantity:  30 capsule   Refills:  0         These medicines have changed or have updated prescriptions.        Dose/Directions    carvedilol 25 MG tablet   Commonly known as:  COREG   This may have changed:  See the new instructions.   Used for:  Essential hypertension, malignant   Changed by:  Juanito Castro MD        Dose:  25 mg   Take 1 tablet (25 mg) by mouth 2 times daily   Quantity:  180 tablet   Refills:  3            Where to get your medicines      These medications were sent to [facility] - [place], [place] - [address]  [address], [facility] [address]     Phone:  [phone]     carvedilol 25 MG tablet    cephalexin 250 MG Tabs    CULTUREE DIGESTIVE HEALTH Caps                Primary Care Provider Office Phone # Fax #    Francie Barraza PA-C [phone]       [facility] [address]  [facility] [address]        Equal Access to Services     LUISANA GUTIERREZ AH: Hadii aad ku hadasho Sohumbertoali, waaxda luqadaha, qaybta kaalmada adeegyada, ronaldo donahue. So [facility] [phone].    ATENCIÓN: Si habla español, tiene a ward disposición servicios gratuitos de asistencia lingüística. Llame al [phone].    We comply with applicable federal civil rights laws and [place] laws. We do not discriminate on the basis of race, color, national origin, age,  disability sex, sexual orientation or gender identity.            Thank you!     Thank you for choosing [facility]  for your care. Our goal is always to provide you with excellent care. Hearing back from our patients is one way we can continue to improve our services. Please take a few minutes to complete the written survey that you may receive in the mail after your visit with us. Thank you!             Your Updated Medication List - Protect others around you: Learn how to safely use, store and throw away your medicines at www.disposemymeds.org.          This list is accurate as of: 8/1/17  5:22 PM.  Always use your most recent med list.                   Brand Name Dispense Instructions for use Diagnosis    carvedilol 25 MG tablet    COREG    180 tablet    Take 1 tablet (25 mg) by mouth 2 times daily    Essential hypertension, malignant       cephalexin 250 MG Tabs     14 tablet    Take 1 tablet by mouth 2 times daily    Folliculitis       CULTURELLE DIGESTIVE HEALTH Caps     30 capsule    Take 1 capsule by mouth 2 times daily Take 30 minutes after each dose of Cephalexin.    Folliculitis       furosemide 20 MG tablet    LASIX    180 tablet    Take 1 tablet (20 mg) by mouth 2 times daily Please follow up in clinic for next refill.    Hypertension goal BP (blood pressure) < 140/90       losartan 100 MG tablet    COZAAR    90 tablet    Take 1 tablet (100 mg) by mouth daily    Renal hypertension, stage 1-4 or unspecified chronic kidney disease       mycophenolate 250 MG capsule    CELLCEPT - GENERIC EQUIVALENT    120 capsule    Take 2 capsules (500 mg) by mouth 2 times daily    Kidney transplanted       omeprazole 20 MG CR capsule    priLOSEC    90 capsule    Take 1 capsule (20 mg) by mouth daily    Kidney replaced by transplant       * predniSONE 5 MG tablet    DELTASONE    30 tablet    Take 1 tablet (5 mg) by mouth daily    Kidney replaced by transplant       * predniSONE 20 MG tablet     DELTASONE    15 tablet    Take 3 tablets (60 mg) by mouth daily    Acute gouty arthritis       sodium bicarbonate 650 MG tablet     120 tablet    Take 2 tablets (1,300 mg) by mouth 2 times daily    Kidney transplanted, Complications, kidney transplant, Aftercare following organ transplant, Immunosuppressed status (H), Encounter for long-term (current) use of high-risk medication       sulfamethoxazole-trimethoprim 400-80 MG per tablet    BACTRIM/SEPTRA    45 tablet    Take 1 tablet by mouth every other day    Kidney transplanted       tacrolimus 1 MG capsule    PROGRAF - GENERIC EQUIVALENT          * Notice:  This list has 2 medication(s) that are the same as other medications prescribed for you. Read the directions carefully, and ask your doctor or other care provider to review them with you.

## 2017-08-01 NOTE — PATIENT INSTRUCTIONS
This summary includes the important diagnoses, test, medications and other important parts of your medical history.  Below are a few good we sites you can use to learn more about these.     [email or web address] : Up to date and easily searchable information on multiple topics.  [email or web address] : [facility] $4.99 medications  Www.medlineplus.gov : medication info, interactive tutorials, watch real surgeries online  Www.familydoctor.org : good info from the Academy of Family Physicians  Www.[facility].com : good info from the [facility]  Www.cdc.gov : public health info, travel advisories, epidemics (H1N1)  Www.aap.org : children's health info, normal development, vaccinations  Www.health.[facility].[place].us : [place] dept of heat, public health issues in [place], N1N1    Based on your medical history and these are the current health maintenance or preventive care services that you are due for (some may have been done at this visit:)  There are no preventive care reminders to display for this patient.  =================================================================================  Normal Values   Blood pressure  <140/90 for most adults    <130/80 for some chronic diseases (ask your care team about yours)    BMI (body mass index)  18.5-25 kg/m2 (based on height and weight)     Thank you for visiting [facility]    Normal or non-critical lab and imaging results will be communicated to you by MyChart, letter or phone within 7 days.  If you do not hear from us within 10 days, please call the clinic. If you have a critical or abnormal lab result, we will notify you by phone as soon as possible.     If you have any questions regarding your visit please contact:     Team Comfort:   Clinic Hours Telephone Number   Dr. Johann Wright   7am-5pm  Monday - Friday [phone]  [facility]   Pharmacy 8am-8pm  Monday-Thursday      8am-6pm Friday  9am-5pm Saturday-Sunday [phone]   Urgent Care 11am-8pm Monday-Friday        9am-5pm Saturday-Sunday [phone]     After hours, weekend or if you need to make an appointment with your primary provider please call [phone].   After Hours nurse advise: call [facility]: [phone]    Medication Refills:  Call your pharmacy and they will forward the refill to us. Please allow 3 business days for your refills to be completed.    Use Investview (secure email communication and access to your chart) to send your primary care provider a message or make an appointment. Ask someone on your Team how to sign up for Investview. To log on to [employer] Search or for more information in [employer] please visit the website at [email or web address].  As of October 8, 2013, all password changes, disabled accounts, or ID changes in Investview/MyHealth will be done by our Access Services Department.   If you need help with your account or password, call: [phone]. Clinic staff no longer has the ability to change passwords.     Folliculitis  Folliculitis is an inflammation of a hair follicle. A hair follicle is the little pocket where a hair grows out of the skin. Bacteria normally live on the skin. But sometimes bacteria can get trapped in a follicle and cause infection. This causes a bumpy rash. The area over the follicles is red and raised. It may itch or be painful. The bumps may have fluid (pus) inside. The pus may leak and then form crusts. Sores can spread to other areas of the body. Once it goes away, folliculitis can come back at any time. Severe cases may cause permanent hair loss and scarring.  Folliculitis can happen anywhere on the body where hair grows. It can be caused by rubbing from tight clothing. Ingrown hairs can cause it. Soaking in a hot tub or swimming pool that has bacteria in the water can cause it. It may also occur if a hair follicle is blocked by a  bandage.  Sores often go away in a few days with no treatment. In some cases, medicine may be given. A small piece of skin or pus may be taken to find the type of bacteria causing the infection.  Home care  The healthcare provider may prescribe an antibiotic cream or ointment.  Oral antibiotics may also be prescribed. Or you may be told to use an over-the-counter antibiotic cream. Follow all instructions when using any of these medicines.  General care:    Apply warm, moist compresses to the sores for 20 minutes up to 3 times a day. You can make a compress by soaking a cloth in warm water. Squeeze out excess water.    Don t cut, poke, or squeeze the sores. This can be painful and spread infection.    Don t scratch the affected area. Scratching can delay healing.    Don t shave the areas affected by folliculitis.    If the sores leak fluid, cover the area with a nonstick gauze bandage. Use as little tape as possible. Carefully discard all soiled bandages.    Dress in loose cotton clothing.    Change sheets and blankets if they are soiled by pus. Wash all clothes, towels, sheets, and cloth diapers in soap and hot water. Do not share clothes, towels, or sheets with other family members.    Do not soak the sores in bath water. This can spread infection. Instead, keep the area clean by gently washing sores with soap and warm water.    Wash your hands or use antibacterial gels often to prevent spreading the bacteria.  Follow-up care  Follow up with your healthcare provider, or as advised.  When to seek medical advice  Call your healthcare provider right away if any of these occur:    Fever of 100.4 F (38 C) or higher    Spreading of the rash    Rash does not get better with treatment    Redness or swelling that gets worse    Rash becomes more painful    Foul-smelling fluid leaking from the skin    Rash improves, but then comes back   Date Last Reviewed: 11/1/2016 2000-2017 [employer]. [address] [place] [address]  Road, [place] [address]. All rights reserved. This information is not intended as a substitute for professional medical care. Always follow your healthcare professional's instructions.        Understanding Folliculitis  Folliculitis is when hair follicles become inflamed. Follicles are the tiny holes from which hair grows out of your skin. This skin condition can occur any place on the body where hair grows. But it s often found on the neck, face, and scalp.  How to say it  [id number]   What causes folliculitis?  An infection or irritation can cause this skin condition. It may be from bacteria or a fungus. The condition can also happen from a wound or irritation of the skin. Shaving is a common cause. Some cases may come from taking certain medicines, such as those that treat acne.  Symptoms of folliculitis  This skin condition tends to develop quickly. It looks like little pimples on a base of a red, inflamed hair follicles. These bumps may ooze pus. They may also be:    Itchy    Painful    Red    Swollen  Treatment for folliculitis  Treatment depends on the cause of the inflammation. In some cases, this skin condition will go away on its own in a few days. But it may return. Treatment options include:    Warm compress. Putting a warm, wet washcloth on the inflamed skin may help.    Medicine. Many skin (topical) and oral medicines are available. Antibiotics are used for bacterial infections. Antifungal medicines are best for fungal infections.    Good hygiene. Keeping the skin clean can help. Use a clean razor when shaving. Prevent ingrown hairs after shaving. This can reduce folliculitis in the beard area. Avoid any substances that bother your skin.  When to call your healthcare provider  Call your healthcare provider right away if you have any of these:    Fever of 100.4 F (38 C) or higher, or as directed    Pain that gets worse    Symptoms that don t get better, or get worse    New symptoms   Date Last  Reviewed: 5/1/2016 2000-2017 [employer], [employer] [address] [place] [address], [place] [address]. All rights reserved. This information is not intended as a substitute for professional medical care. Always follow your healthcare professional's instructions.

## 2017-08-01 NOTE — TELEPHONE ENCOUNTER
ISSUE:  - CO2 14. Creatinine 4.07.  - patient stopped taking sodium bicarb in April    PLAN: per Dr. Barrios  - start taking sodium bicarb 1300 mg twice a day  - check renal panel in one week  - schedule first-available nephrology appointment    PHONE CALL:  - called patient with medication and lab instructions  - staff message sent to  for nephrology appointment

## 2017-08-01 NOTE — PROGRESS NOTES
SUBJECTIVE:                                                    Rashad Ortiz is a 42 year old male who presents to clinic today for the following health issues:    Rash      Duration/timing: one week    Description  Location:face  Itching: no  Pain: no  Discharge: no  Redness: no  Scaling: no    Severity:  mild    Accompanying signs and symptoms:            Fever/chills: no           Abdominal pain: no           Morning stiffness: no           Weakness: no    History (similar episodes/previous evaluation):             Trauma: no            Surgery: no            Allergic reaction:no            Skin cancer: no    Precipitating or alleviating factors:  New exposures:  no  Recent travel: no    Therapies tried and outcome:            Topical preparations: no           Antihistamines: no           Steroids:no           UV light treatment: no                        Problem list and histories reviewed & adjusted, as indicated.  Additional history: as documented    Patient Active Problem List   Diagnosis     Kidney replaced by transplant     High risk medications (not anticoagulants) long-term use     Hypertension goal BP (blood pressure) < 140/90     GERD (gastroesophageal reflux disease)     CARDIOVASCULAR SCREENING; LDL GOAL LESS THAN 160     Gout     Creatinine elevation     Antibody mediated rejection of kidney transplant     Past Surgical History:   Procedure Laterality Date     AV FISTULA OR GRAFT ARTERIAL       LIGATE FISTULA ARTERIOVENOUS UPPER EXTREMITY  12/20/2011    Procedure:LIGATE FISTULA ARTERIOVENOUS UPPER EXTREMITY; Excision of Right Forearm Arteriovenous Fistula.; Surgeon:LINDY AMAYA; Location:[facility] OR     PERCUTANEOUS BIOPSY KIDNEY Right 2/28/2017    Procedure: PERCUTANEOUS BIOPSY KIDNEY;  Surgeon: Gee Barrios MD;  Location: [facility] OR     TRANSPLANT  01/13/2011    Kidney transpplant        Social History   Substance Use Topics     Smoking status: Former Smoker     Smokeless tobacco: Never  Used      Comment: Patient states that he is an 'social'  smoker      Alcohol use 2.5 oz/week     5 Standard drinks or equivalent per week     Family History   Problem Relation Age of Onset     Hypertension Mother          Allergies   Allergen Reactions     Nkda [No Known Drug Allergies]      Recent Labs   Lab Test  08/04/17   1832  07/31/17   1128   04/10/17   1131   06/17/15   2334  06/17/15   2115   06/06/15   0552   03/16/15   0818   12/05/11   1046   07/08/11   0915   A1C   --    --    --    --    --    --    --    --   5.4   --    --    --    --    --    --    LDL   --    --    --    --    --    --    --    --    --    --   60   --   96   --   72   HDL   --    --    --    --    --    --    --    --    --    --   51   --   56   --   66   TRIG   --    --    --    --    --    --    --    --    --    --   218*   --   90   --   81   ALT   --    --    --   17   --   47  51   --    --    < >   --    < >  63   --   23   CR  3.58*  4.07*   < >  4.39*   < >  4.93*  5.02*   < >  5.41*   < >  1.85*   < >  1.33*   < >  1.21   GFRESTIMATED  19*  16*   < >  15*   < >  13*  13*   < >  12*   < >  41*   < >  61   < >  68   GFRESTBLACK  23*  20*   < >  18*   < >  16*  16*   < >  14*   < >  50*   < >  74   < >  83   POTASSIUM  4.3  4.6   < >  4.1   < >  5.8*  7.1*   < >  5.1   < >  3.9   < >  4.0   < >  4.0   TSH   --    --    --   0.67   --    --    --    --    --    --    --    --    --    --    --     < > = values in this interval not displayed.      BP Readings from Last 3 Encounters:   08/07/17 127/81   08/01/17 (!) 130/96   04/10/17 129/78    Wt Readings from Last 3 Encounters:   08/07/17 159 lb (72.1 kg)   08/01/17 159 lb (72.1 kg)   04/10/17 159 lb (72.1 kg)            ROS:  C: NEGATIVE for fever, chills, change in weight  I: NEGATIVE for worrisome rashes, moles or lesions  E: NEGATIVE for vision changes or irritation  E/M: NEGATIVE for ear, mouth and throat problems  R: NEGATIVE for significant cough or SOB  CV: NEGATIVE  "for chest pain, palpitations or peripheral edema  GI: NEGATIVE for nausea, abdominal pain, heartburn, or change in bowel habits  : NEGATIVE for frequency, dysuria, or hematuria  M: NEGATIVE for significant arthralgias or myalgia  N: NEGATIVE for weakness, dizziness or paresthesias  E: NEGATIVE for temperature intolerance, skin/hair changes  H: NEGATIVE for bleeding problems  P: NEGATIVE for changes in mood or affect      OBJECTIVE:     BP (!) 130/96  Pulse 62  Temp 97.4  F (36.3  C) (Oral)  Ht 5' 7.75\" (1.721 m)  Wt 159 lb (72.1 kg)  SpO2 100%  BMI 24.35 kg/m2  Body mass index is 24.35 kg/(m^2).  GENERAL: healthy, alert and no distress  EYES: Eyes grossly normal to inspection, PERRL and conjunctivae and sclerae normal  HENT: ear canals and TM's normal, nose and mouth without ulcers or lesions  NECK: no adenopathy, no asymmetry, masses, or scars and thyroid normal to palpation  RESP: lungs clear to auscultation - no rales, rhonchi or wheezes  CV: regular rate and rhythm, normal S1 S2, no S3 or S4, no murmur, click or rub, no peripheral edema and peripheral pulses strong  ABDOMEN: soft, nontender, no hepatosplenomegaly, no masses and bowel sounds normal  MS: no gross musculoskeletal defects noted, no edema  SKIN: no suspicious lesions or rashes  NEURO: Normal strength and tone, mentation intact and speech normal  PSYCH: mentation appears normal, affect normal/bright    Diagnostic Test Results:  Results for orders placed or performed in visit on 07/31/17   CBC with platelets   Result Value Ref Range    WBC 5.5 4.0 - 11.0 10e9/L    RBC Count 3.43 (L) 4.4 - 5.9 10e12/L    Hemoglobin 9.3 (L) 13.3 - 17.7 g/dL    Hematocrit 29.0 (L) 40.0 - 53.0 %    MCV 85 78 - 100 fl    MCH 27.1 26.5 - 33.0 pg    MCHC 32.1 31.5 - 36.5 g/dL    RDW 16.2 (H) 10.0 - 15.0 %    Platelet Count 159 150 - 450 10e9/L   Basic metabolic panel   Result Value Ref Range    Sodium 145 (H) 133 - 144 mmol/L    Potassium 4.6 3.4 - 5.3 mmol/L    " Chloride 122 (H) 94 - 109 mmol/L    Carbon Dioxide 14 (L) 20 - 32 mmol/L    Anion Gap 9 3 - 14 mmol/L    Glucose 101 (H) 70 - 99 mg/dL    Urea Nitrogen 63 (H) 7 - 30 mg/dL    Creatinine 4.07 (H) 0.66 - 1.25 mg/dL    GFR Estimate 16 (L) >60 mL/min/1.7m2    GFR Estimate If Black 20 (L) >60 mL/min/1.7m2    Calcium 8.8 8.5 - 10.1 mg/dL   Tacrolimus level   Result Value Ref Range    Tacrolimus Last Dose 2230     Tacrolimus Level 6.1 5.0 - 15.0 ug/L       ASSESSMENT/PLAN:             (L73.9) Folliculitis  (primary encounter diagnosis)  Comment:   Plan: cephalexin 250 MG TABS, DERMATOLOGY REFERRAL,         Lactobacillus-Inulin ([facility]) CAPS            (Z94.0) Kidney replaced by transplant  Comment:   Plan: Basic metabolic panel  (Ca, Cl, CO2, Creat,         Gluc, K, Na, BUN)            (I10) Hypertension goal BP (blood pressure) < 140/90  Comment:   Plan:     Follow-up visit if condition worsens.    Juanito Castro MD  [facility]

## 2017-08-01 NOTE — NURSING NOTE
"Chief Complaint   Patient presents with     STD     break out on face concern x March       Initial BP (!) 149/99 (BP Location: Left arm, Patient Position: Chair, Cuff Size: Adult Regular)  Pulse 62  Temp 97.4  F (36.3  C) (Oral)  Ht 5' 7.75\" (1.721 m)  Wt 159 lb (72.1 kg)  SpO2 100%  BMI 24.35 kg/m2 Estimated body mass index is 24.35 kg/(m^2) as calculated from the following:    Height as of this encounter: 5' 7.75\" (1.721 m).    Weight as of this encounter: 159 lb (72.1 kg).  Medication Reconciliation: complete     Geraldo Farris MA      "

## 2017-08-03 ENCOUNTER — TELEPHONE (OUTPATIENT)
Dept: NEPHROLOGY | Facility: CLINIC | Age: 42
End: 2017-08-03

## 2017-08-03 NOTE — TELEPHONE ENCOUNTER
Spoke with patient. Said he had an issue with a rash, but no other recent illness / infection / fevers. Denied questions or concerns for clinic at this time.    Flor Barrett RN

## 2017-08-04 DIAGNOSIS — D84.9 IMMUNOSUPPRESSED STATUS (H): ICD-10-CM

## 2017-08-04 DIAGNOSIS — N18.4 CHRONIC KIDNEY DISEASE, STAGE 4, SEVERELY DECREASED GFR (H): ICD-10-CM

## 2017-08-04 DIAGNOSIS — Z94.0 KIDNEY TRANSPLANTED: ICD-10-CM

## 2017-08-04 DIAGNOSIS — Z94.0 KIDNEY TRANSPLANT RECIPIENT: ICD-10-CM

## 2017-08-04 DIAGNOSIS — T86.10 COMPLICATIONS, KIDNEY TRANSPLANT: ICD-10-CM

## 2017-08-04 DIAGNOSIS — Z79.899 ENCOUNTER FOR LONG-TERM CURRENT USE OF MEDICATION: ICD-10-CM

## 2017-08-04 DIAGNOSIS — E87.20 METABOLIC ACIDOSIS: ICD-10-CM

## 2017-08-04 DIAGNOSIS — Z94.0 KIDNEY REPLACED BY TRANSPLANT: ICD-10-CM

## 2017-08-04 DIAGNOSIS — Z79.899 ENCOUNTER FOR LONG-TERM (CURRENT) USE OF HIGH-RISK MEDICATION: ICD-10-CM

## 2017-08-04 DIAGNOSIS — Z48.298 AFTERCARE FOLLOWING ORGAN TRANSPLANT: ICD-10-CM

## 2017-08-04 LAB
ERYTHROCYTE [DISTWIDTH] IN BLOOD BY AUTOMATED COUNT: 16 % (ref 10–15)
HCT VFR BLD AUTO: 30.4 % (ref 40–53)
HGB BLD-MCNC: 9.6 G/DL (ref 13.3–17.7)
MCH RBC QN AUTO: 26.9 PG (ref 26.5–33)
MCHC RBC AUTO-ENTMCNC: 31.6 G/DL (ref 31.5–36.5)
MCV RBC AUTO: 85 FL (ref 78–100)
PLATELET # BLD AUTO: 185 10E9/L (ref 150–450)
RBC # BLD AUTO: 3.57 10E12/L (ref 4.4–5.9)
WBC # BLD AUTO: 6.2 10E9/L (ref 4–11)

## 2017-08-04 PROCEDURE — 80069 RENAL FUNCTION PANEL: CPT | Performed by: INTERNAL MEDICINE

## 2017-08-04 PROCEDURE — 36415 COLL VENOUS BLD VENIPUNCTURE: CPT | Performed by: INTERNAL MEDICINE

## 2017-08-04 PROCEDURE — 84156 ASSAY OF PROTEIN URINE: CPT | Performed by: INTERNAL MEDICINE

## 2017-08-04 PROCEDURE — 85027 COMPLETE CBC AUTOMATED: CPT | Performed by: INTERNAL MEDICINE

## 2017-08-04 PROCEDURE — 80197 ASSAY OF TACROLIMUS: CPT | Performed by: INTERNAL MEDICINE

## 2017-08-05 LAB
CREAT UR-MCNC: 181 MG/DL
PROT UR-MCNC: 0.3 G/L
PROT/CREAT 24H UR: 0.17 G/G CR (ref 0–0.2)

## 2017-08-06 LAB
TACROLIMUS BLD-MCNC: 5.4 UG/L (ref 5–15)
TME LAST DOSE: 600 H

## 2017-08-07 ENCOUNTER — OFFICE VISIT (OUTPATIENT)
Dept: NEPHROLOGY | Facility: CLINIC | Age: 42
End: 2017-08-07
Attending: INTERNAL MEDICINE
Payer: COMMERCIAL

## 2017-08-07 VITALS
HEIGHT: 68 IN | OXYGEN SATURATION: 98 % | BODY MASS INDEX: 24.1 KG/M2 | SYSTOLIC BLOOD PRESSURE: 127 MMHG | HEART RATE: 62 BPM | DIASTOLIC BLOOD PRESSURE: 81 MMHG | TEMPERATURE: 98.8 F | WEIGHT: 159 LBS

## 2017-08-07 DIAGNOSIS — D84.9 IMMUNOSUPPRESSION (H): ICD-10-CM

## 2017-08-07 DIAGNOSIS — Z48.298 CARE AFTER ORGAN TRANSPLANT: ICD-10-CM

## 2017-08-07 DIAGNOSIS — N18.4 CHRONIC KIDNEY DISEASE, STAGE IV (SEVERE) (H): ICD-10-CM

## 2017-08-07 DIAGNOSIS — I10 ESSENTIAL HYPERTENSION: ICD-10-CM

## 2017-08-07 DIAGNOSIS — E87.20 ACIDOSIS: ICD-10-CM

## 2017-08-07 DIAGNOSIS — Z94.0 KIDNEY REPLACED BY TRANSPLANT: Primary | ICD-10-CM

## 2017-08-07 LAB
ALBUMIN SERPL-MCNC: 3.6 G/DL (ref 3.4–5)
ANION GAP SERPL CALCULATED.3IONS-SCNC: 11 MMOL/L (ref 3–14)
BUN SERPL-MCNC: 43 MG/DL (ref 7–30)
CALCIUM SERPL-MCNC: 8.6 MG/DL (ref 8.5–10.1)
CHLORIDE SERPL-SCNC: 113 MMOL/L (ref 94–109)
CO2 SERPL-SCNC: 16 MMOL/L (ref 20–32)
CREAT SERPL-MCNC: 3.58 MG/DL (ref 0.66–1.25)
GFR SERPL CREATININE-BSD FRML MDRD: 19 ML/MIN/1.7M2
GLUCOSE SERPL-MCNC: 158 MG/DL (ref 70–99)
PHOSPHATE SERPL-MCNC: 2.4 MG/DL (ref 2.5–4.5)
POTASSIUM SERPL-SCNC: 4.3 MMOL/L (ref 3.4–5.3)
SODIUM SERPL-SCNC: 140 MMOL/L (ref 133–144)

## 2017-08-07 PROCEDURE — 99212 OFFICE O/P EST SF 10 MIN: CPT | Mod: ZF

## 2017-08-07 ASSESSMENT — PAIN SCALES - GENERAL: PAINLEVEL: NO PAIN (0)

## 2017-08-07 NOTE — MR AVS SNAPSHOT
"              After Visit Summary   8/7/2017    Rashad Ortiz    MRN: [id number]           Patient Information     Date Of Birth          1975        Visit Information        Provider Department      8/7/2017 4:50 PM Stef Murillo MD [facility]         Follow-ups after your visit        Your next 10 appointments already scheduled     Nov 13, 2017  4:50 PM CST   (Arrive by 4:20 PM)   Return Kidney Transplant with Stef Murillo MD   [facility] ([facility])    [address] [place] [id number] [phone]              Who to contact     If you have questions or need follow up information about today's clinic visit or your schedule please contact [facility] directly at [phone].  Normal or non-critical lab and imaging results will be communicated to you by MyChart, letter or phone within 4 business days after the clinic has received the results. If you do not hear from us within 7 days, please contact the clinic through MyChart or phone. If you have a critical or abnormal lab result, we will notify you by phone as soon as possible.  Submit refill requests through ComCam or call your pharmacy and they will forward the refill request to us. Please allow 3 business days for your refill to be completed.          Additional Information About Your Visit        [employer] Information     ComCam lets you send messages to your doctor, view your test results, renew your prescriptions, schedule appointments and more. To sign up, go to [email or web address] . Click on \"Log in\" on the left side of the screen, which will take you to the Welcome page. Then click on \"Sign up Now\" on the right side of the page.     You will be asked to enter the access code listed below, as well as some personal information. Please follow the directions to create your username and password.     Your access code is: [email or web address]  Expires: 10/30/2017  " "5:20 PM     Your access code will  in 90 days. If you need help or a new code, please call your [facility] or [phone].        Care EveryWhere ID     This is your Care EveryWhere ID. This could be used by other organizations to access your [facility] medical records  [id number]        Your Vitals Were     Pulse Temperature Height Pulse Oximetry BMI (Body Mass Index)       62 98.8  F (37.1  C) (Oral) 1.721 m (5' 7.75\") 98% 24.35 kg/m2        Blood Pressure from Last 3 Encounters:   17 127/81   17 (!) 130/96   04/10/17 129/78    Weight from Last 3 Encounters:   17 72.1 kg (159 lb)   17 72.1 kg (159 lb)   04/10/17 72.1 kg (159 lb)              Today, you had the following     No orders found for display       Primary Care Provider Office Phone # Fax #    Francie Barraza PA-C [phone]       [facility] [address]  [place] [address]        Equal Access to Services     GLENN GUTIERREZ : Hadii aad ku hadasho Soomaali, waaxda luqadaha, qaybta kaalmada adeegyada, waxay sulyin haywilbern claudio spencer . So [facility] [phone].    ATENCIÓN: Si habla español, tiene a ward disposición servicios gratuitos de asistencia lingüística. Llame al [phone].    We comply with applicable federal civil rights laws and [place] laws. We do not discriminate on the basis of race, color, national origin, age, disability sex, sexual orientation or gender identity.            Thank you!     Thank you for choosing [facility]  for your care. Our goal is always to provide you with excellent care. Hearing back from our patients is one way we can continue to improve our services. Please take a few minutes to complete the written survey that you may receive in the mail after your visit with us. Thank you!             Your Updated Medication List - Protect others around you: Learn how to safely use, store and throw away your medicines at " www.disposemymeds.org.          This list is accurate as of: 8/7/17  5:24 PM.  Always use your most recent med list.                   Brand Name Dispense Instructions for use Diagnosis    carvedilol 25 MG tablet    COREG    180 tablet    Take 1 tablet (25 mg) by mouth 2 times daily    Essential hypertension, malignant       * cephalexin 250 MG Tabs     14 tablet    Take 1 tablet by mouth 2 times daily    Folliculitis       * cephalexin 250 MG capsule    KEFLEX          CULTUREE DIGESTIVE HEALTH Caps     30 capsule    Take 1 capsule by mouth 2 times daily Take 30 minutes after each dose of Cephalexin.    Folliculitis       furosemide 20 MG tablet    LASIX    180 tablet    Take 1 tablet (20 mg) by mouth 2 times daily Please follow up in clinic for next refill.    Hypertension goal BP (blood pressure) < 140/90       losartan 100 MG tablet    COZAAR    90 tablet    Take 1 tablet (100 mg) by mouth daily    Renal hypertension, stage 1-4 or unspecified chronic kidney disease       mycophenolate 250 MG capsule    CELLCEPT - GENERIC EQUIVALENT    120 capsule    Take 2 capsules (500 mg) by mouth 2 times daily    Kidney transplanted       omeprazole 20 MG CR capsule    priLOSEC    90 capsule    Take 1 capsule (20 mg) by mouth daily    Kidney replaced by transplant       * predniSONE 5 MG tablet    DELTASONE    30 tablet    Take 1 tablet (5 mg) by mouth daily    Kidney replaced by transplant       * predniSONE 20 MG tablet    DELTASONE    15 tablet    Take 3 tablets (60 mg) by mouth daily    Acute gouty arthritis       sodium bicarbonate 650 MG tablet     120 tablet    Take 2 tablets (1,300 mg) by mouth 2 times daily    Kidney transplanted, Complications, kidney transplant, Aftercare following organ transplant, Immunosuppressed status (H), Encounter for long-term (current) use of high-risk medication       sulfamethoxazole-trimethoprim 400-80 MG per tablet    BACTRIM/SEPTRA    45 tablet    Take 1 tablet by mouth every other  day    Kidney transplanted       tacrolimus 1 MG capsule    PROGRAF - GENERIC EQUIVALENT          * Notice:  This list has 4 medication(s) that are the same as other medications prescribed for you. Read the directions carefully, and ask your doctor or other care provider to review them with you.

## 2017-08-07 NOTE — LETTER
8/7/2017      RE: Rashad Ortiz  [address]  [place] [address]       Assessment and Plan:  1. 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 His creatinine baseline now is around 3.5-4.5 mg / dl after non-compliance resulting in mixed rejection treated with thymo / solumedrol / TPE / IVIG. His immunosuppression is currently with:    Tac 1 mg po bid  Mmf 500 mg po bid  pred 5 mg daily.    His eGFR is low enough that I would like to refer him for both re-transplant as he has now been compliant with labs and visits to accrue time not-yet-on-dialysis.  He likely will progress to ESRD over the next year and as such will need AVF creation as well. He will continue on his immunosuppression as below and continue with monthly labs.     2. Immunosuppression:    Tac 1 mg po bid  Mmf 500 mg po bid  pred 5 mg daily.    Last tac trough acceptable at 5.4 with goal 4-6.    3. HTN: BP at goal today. bp at goal on carvedilol / furosemide / losartan.     4. PCP prophylaxis: continue bactrim MWF.    5. Met acidosis: continue oral bicarbonate.     Assessment and plan was discussed with patient and he voiced his understanding and agreement.    Reason for Visit:  Mr. Ortiz is here for routine follow up and kidney transplant.    HPI:   Rashad Ortiz is a 42 year old male with ESKD from Hypertension and is status post LDKT on 1/13/2011.    Rashad has a kidney transplant course complicated by mixed ab and cellular rejection due to medication non-adherence in the past. Biopsy was performed last in February showing no active rejection.      He has now been adherent to medication, labs and visits and I would like to re-refer for transplant evaluation today.     He denies cp, sob, no n/v/d, no f/s/c.    Home BP: at goal.      ROS:   A comprehensive review of systems was obtained and negative, except as noted in the HPI or PMH.    Active Medical  Problems:  Patient Active Problem List   Diagnosis     Kidney replaced by transplant     High risk medications (not anticoagulants) long-term use     Hypertension goal BP (blood pressure) < 140/90     GERD (gastroesophageal reflux disease)     CARDIOVASCULAR SCREENING; LDL GOAL LESS THAN 160     Gout     Creatinine elevation     Antibody mediated rejection of kidney transplant       Personal Hx:  Social History     Social History     Marital status:      Spouse name: N/A     Number of children: N/A     Years of education: N/A     Occupational History     Not on file.     Social History Main Topics     Smoking status: Former Smoker     Smokeless tobacco: Never Used      Comment: Patient states that he is an 'social'  smoker      Alcohol use 2.5 oz/week     5 Standard drinks or equivalent per week     Drug use: No     Sexual activity: No     Other Topics Concern     Not on file     Social History Narrative       Allergies:  Allergies   Allergen Reactions     Nkda [No Known Drug Allergies]        Medications:  Prior to Admission medications    Medication Sig Start Date End Date Taking? Authorizing Provider   cephalexin (KEFLEX) 250 MG capsule  8/1/17  Yes Reported, Patient   tacrolimus (PROGRAF - GENERIC EQUIVALENT) 1 MG capsule  7/17/17  Yes Reported, Patient   cephalexin 250 MG TABS Take 1 tablet by mouth 2 times daily 8/1/17  Yes Juanito Castro MD   carvedilol (COREG) 25 MG tablet Take 1 tablet (25 mg) by mouth 2 times daily 8/1/17  Yes Juanito Castro MD   Lactobacillus-Inulin ([facility] DIGESTIVE [facility]) CAPS Take 1 capsule by mouth 2 times daily Take 30 minutes after each dose of Cephalexin. 8/1/17  Yes Juanito Castro MD   furosemide (LASIX) 20 MG tablet Take 1 tablet (20 mg) by mouth 2 times daily Please follow up in clinic for next refill. 6/9/17  Yes Srinivas Harrington MD   sulfamethoxazole-trimethoprim (BACTRIM/SEPTRA) 400-80 MG per tablet Take 1 tablet by mouth every other day 5/15/17   "Yes Gee Barrios MD   mycophenolate (CELLCEPT - GENERIC EQUIVALENT) 250 MG capsule Take 2 capsules (500 mg) by mouth 2 times daily 4/25/17  Yes Gee Barrios MD   sodium bicarbonate 650 MG tablet Take 2 tablets (1,300 mg) by mouth 2 times daily 4/13/17  Yes Gee Barrios MD   predniSONE (DELTASONE) 20 MG tablet Take 3 tablets (60 mg) by mouth daily 3/14/17  Yes Ania Osullivan MD   predniSONE (DELTASONE) 5 MG tablet Take 1 tablet (5 mg) by mouth daily 2/15/17  Yes Gee Barrios MD   losartan (COZAAR) 100 MG tablet Take 1 tablet (100 mg) by mouth daily 9/12/16  Yes Celestino Andrews MD   omeprazole (PRILOSEC) 20 MG capsule Take 1 capsule (20 mg) by mouth daily 6/10/14  Yes Lani Quick MD       Vitals:  /81  Pulse 62  Temp 98.8  F (37.1  C) (Oral)  Ht 1.721 m (5' 7.75\")  Wt 72.1 kg (159 lb)  SpO2 98%  BMI 24.35 kg/m2    Exam:   GENERAL APPEARANCE: alert and no distress  HENT: mouth without ulcers or lesions  LYMPHATICS: no cervical or supraclavicular nodes  RESP: lungs clear to auscultation - no rales, rhonchi or wheezes  CV: regular rhythm, normal rate, no rub, no murmur  EDEMA: no LE edema bilaterally  ABDOMEN: soft, nondistended, nontender, bowel sounds normal  MS: extremities normal - no gross deformities noted, no evidence of inflammation in joints, no muscle tenderness  SKIN: no rash    Results:   Reviewed with the patient.      Stef Murillo MD      "

## 2017-08-07 NOTE — PROGRESS NOTES
Assessment and Plan:  1. 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 His creatinine baseline now is around 3.5-4.5 mg / dl after non-compliance resulting in mixed rejection treated with thymo / solumedrol / TPE / IVIG. His immunosuppression is currently with:    Tac 1 mg po bid  Mmf 500 mg po bid  pred 5 mg daily.    His eGFR is low enough that I would like to refer him for both re-transplant as he has now been compliant with labs and visits to accrue time not-yet-on-dialysis.  He likely will progress to ESRD over the next year and as such will need AVF creation as well. He will continue on his immunosuppression as below and continue with monthly labs.     2. Immunosuppression:    Tac 1 mg po bid  Mmf 500 mg po bid  pred 5 mg daily.    Last tac trough acceptable at 5.4 with goal 4-6.    3. HTN: BP at goal today. bp at goal on carvedilol / furosemide / losartan.     4. PCP prophylaxis: continue bactrim MWF.    5. Met acidosis: continue oral bicarbonate.     Assessment and plan was discussed with patient and he voiced his understanding and agreement.    Reason for Visit:  Mr. Ortiz is here for routine follow up and kidney transplant.    HPI:   Rashad Ortiz is a 42 year old male with ESKD from Hypertension and is status post LDKT on 1/13/2011.    Rashad has a kidney transplant course complicated by mixed ab and cellular rejection due to medication non-adherence in the past. Biopsy was performed last in February showing no active rejection.      He has now been adherent to medication, labs and visits and I would like to re-refer for transplant evaluation today.     He denies cp, sob, no n/v/d, no f/s/c.    Home BP: at goal.      ROS:   A comprehensive review of systems was obtained and negative, except as noted in the HPI or PMH.    Active Medical Problems:  Patient Active Problem List   Diagnosis     Kidney replaced by transplant     High  risk medications (not anticoagulants) long-term use     Hypertension goal BP (blood pressure) < 140/90     GERD (gastroesophageal reflux disease)     CARDIOVASCULAR SCREENING; LDL GOAL LESS THAN 160     Gout     Creatinine elevation     Antibody mediated rejection of kidney transplant       Personal Hx:  Social History     Social History     Marital status:      Spouse name: N/A     Number of children: N/A     Years of education: N/A     Occupational History     Not on file.     Social History Main Topics     Smoking status: Former Smoker     Smokeless tobacco: Never Used      Comment: Patient states that he is an 'social'  smoker      Alcohol use 2.5 oz/week     5 Standard drinks or equivalent per week     Drug use: No     Sexual activity: No     Other Topics Concern     Not on file     Social History Narrative       Allergies:  Allergies   Allergen Reactions     Nkda [No Known Drug Allergies]        Medications:  Prior to Admission medications    Medication Sig Start Date End Date Taking? Authorizing Provider   cephalexin (KEFLEX) 250 MG capsule  8/1/17  Yes Reported, Patient   tacrolimus (PROGRAF - GENERIC EQUIVALENT) 1 MG capsule  7/17/17  Yes Reported, Patient   cephalexin 250 MG TABS Take 1 tablet by mouth 2 times daily 8/1/17  Yes Juanito Castro MD   carvedilol (COREG) 25 MG tablet Take 1 tablet (25 mg) by mouth 2 times daily 8/1/17  Yes Juanito Castro MD   Lactobacillus-Inulin ([facility] DIGESTIVE [facility]) CAPS Take 1 capsule by mouth 2 times daily Take 30 minutes after each dose of Cephalexin. 8/1/17  Yes Juanito Castro MD   furosemide (LASIX) 20 MG tablet Take 1 tablet (20 mg) by mouth 2 times daily Please follow up in clinic for next refill. 6/9/17  Yes Srinivas Harrington MD   sulfamethoxazole-trimethoprim (BACTRIM/SEPTRA) 400-80 MG per tablet Take 1 tablet by mouth every other day 5/15/17  Yes Gee Barrios MD   mycophenolate (CELLCEPT - GENERIC EQUIVALENT) 250 MG capsule  "Take 2 capsules (500 mg) by mouth 2 times daily 4/25/17  Yes Gee Barrios MD   sodium bicarbonate 650 MG tablet Take 2 tablets (1,300 mg) by mouth 2 times daily 4/13/17  Yes Gee Barrios MD   predniSONE (DELTASONE) 20 MG tablet Take 3 tablets (60 mg) by mouth daily 3/14/17  Yes Ania Osullivan MD   predniSONE (DELTASONE) 5 MG tablet Take 1 tablet (5 mg) by mouth daily 2/15/17  Yes Gee Barrios MD   losartan (COZAAR) 100 MG tablet Take 1 tablet (100 mg) by mouth daily 9/12/16  Yes Celestino Andrews MD   omeprazole (PRILOSEC) 20 MG capsule Take 1 capsule (20 mg) by mouth daily 6/10/14  Yes Lani Quick MD       Vitals:  /81  Pulse 62  Temp 98.8  F (37.1  C) (Oral)  Ht 1.721 m (5' 7.75\")  Wt 72.1 kg (159 lb)  SpO2 98%  BMI 24.35 kg/m2    Exam:   GENERAL APPEARANCE: alert and no distress  HENT: mouth without ulcers or lesions  LYMPHATICS: no cervical or supraclavicular nodes  RESP: lungs clear to auscultation - no rales, rhonchi or wheezes  CV: regular rhythm, normal rate, no rub, no murmur  EDEMA: no LE edema bilaterally  ABDOMEN: soft, nondistended, nontender, bowel sounds normal  MS: extremities normal - no gross deformities noted, no evidence of inflammation in joints, no muscle tenderness  SKIN: no rash    Results:   Reviewed with the patient.    "

## 2017-08-07 NOTE — NURSING NOTE
"Chief Complaint   Patient presents with     RECHECK     Kidney follow up       Initial /81  Pulse 62  Temp 98.8  F (37.1  C) (Oral)  Ht 1.721 m (5' 7.75\")  Wt 72.1 kg (159 lb)  SpO2 98%  BMI 24.35 kg/m2 Estimated body mass index is 24.35 kg/(m^2) as calculated from the following:    Height as of this encounter: 1.721 m (5' 7.75\").    Weight as of this encounter: 72.1 kg (159 lb).  Medication Reconciliation: complete   KARTHIK KING CMA      "

## 2017-08-09 DIAGNOSIS — T86.10 COMPLICATIONS, KIDNEY TRANSPLANT: ICD-10-CM

## 2017-08-09 DIAGNOSIS — Z48.298 AFTERCARE FOLLOWING ORGAN TRANSPLANT: ICD-10-CM

## 2017-08-09 DIAGNOSIS — Z79.899 ENCOUNTER FOR LONG-TERM (CURRENT) USE OF HIGH-RISK MEDICATION: ICD-10-CM

## 2017-08-09 DIAGNOSIS — D84.9 IMMUNOSUPPRESSED STATUS (H): ICD-10-CM

## 2017-08-09 DIAGNOSIS — Z94.0 KIDNEY TRANSPLANTED: ICD-10-CM

## 2017-08-09 RX ORDER — SODIUM BICARBONATE 650 MG/1
1300 TABLET ORAL 3 TIMES DAILY
Qty: 120 TABLET | Refills: 11 | Status: SHIPPED | OUTPATIENT
Start: 2017-08-09 | End: 2018-05-03

## 2017-08-09 RX ORDER — SODIUM BICARBONATE 650 MG/1
1300 TABLET ORAL 3 TIMES DAILY
Qty: 120 TABLET | Refills: 3 | Status: CANCELLED | OUTPATIENT
Start: 2017-08-09

## 2017-08-09 NOTE — PROGRESS NOTES
Call placed to patient: No answer. Detailed voice message left with Dr. Barrios recommendations. Rx sent

## 2017-08-14 ENCOUNTER — TELEPHONE (OUTPATIENT)
Dept: TRANSPLANT | Facility: CLINIC | Age: 42
End: 2017-08-14

## 2017-08-14 DIAGNOSIS — Z91.199 PERSONAL HISTORY OF NONCOMPLIANCE WITH MEDICAL TREATMENT, PRESENTING HAZARDS TO HEALTH: ICD-10-CM

## 2017-08-14 DIAGNOSIS — Z87.891 HISTORY OF TOBACCO USE: ICD-10-CM

## 2017-08-14 DIAGNOSIS — N18.4 CHRONIC RENAL FAILURE, STAGE 4 (SEVERE) (H): ICD-10-CM

## 2017-08-14 DIAGNOSIS — T86.12 KIDNEY TRANSPLANT FAILURE: ICD-10-CM

## 2017-08-14 DIAGNOSIS — Z76.82 ORGAN TRANSPLANT CANDIDATE: ICD-10-CM

## 2017-08-14 DIAGNOSIS — I10 ESSENTIAL HYPERTENSION: ICD-10-CM

## 2017-08-14 DIAGNOSIS — T86.91 TRANSPLANT FAILURE DUE TO REJECTION: ICD-10-CM

## 2017-08-14 NOTE — LETTER
08/17/17    Rashad Ortiz  [address]  [place] [address]          Dear Rashad,    Thank you for your interest in the Transplant Center at [facility], [facility]. We look forward to being a part your care team and assisting you through the transplant process.    As we discussed, your transplant coordinator is Donna Patel.  You may call your coordinator at any time with questions or concerns. Your first scheduled call will be on 09/05/2017 between 01:00pm-05:00pm.  If this needs to change, call [phone].    Please complete the following.    1. Sign up for:    [employer], your electronic medical record    [employer], the Transplant Center's website (see enclosed booklet)    You can use these tools to learn more about your transplant, communicate with your care team, and track your medical details    2. Fill out and return the enclosed forms    Authorization for Electronic Communication    Authorization to Discuss Protected Health Information    [employer] Release of Information       Best Wishes,      Solid Organ Transplant Intake  [facility], [facility]    cc: MD Francie Shaw,PCP

## 2017-08-14 NOTE — LETTER
September 8, 2017    Giselle Ortiz  [address]  [place] [facility] [address]      Dear Mr. Ortiz,    Attached is your Pre Kidney Evaluation schedule on September 25, 2017 and returning January 10, 2018. Please feel free to contact me at [phone], if you have any questions.      Sincerely,   Francie MOTT    CC:Pamela CUEVA                                                  [facility]  [address] [place]  [address]    EVALUATION SCHEDULE: KIDNEY TRANSPLANT SLOT 1 EVAL    Patient:   GISELLE ORTIZ   MR#:    [id number]  Coordinator:   Pamela CUEVA     [phone]  :   Francie MOTT     [phone]  Location:   Transplant Center Clinic 3A  Date:    September 25, 2017 & January 10, 2018    This is your evaluation schedule, please follow dates and times.  You   will receive reminder phone calls for other tests, but please follow this  schedule only!  If you have any questions about dates and times,  please call us on number listed above.  Thank you, Transplant Clinic.     NO FOOD or DRINK AFTER MIDNIGHT  (You may only have small amounts of water)    Day/Date:   Monday, September 25, 2017  Time Location Activity   6:30a.m. [facility]  Imaging and Lab Testing  1st floor Blood tests (fasting, PLEASE)   7:15a.m. BREAKFAST    7:30a.m. [facility]  Transplant Services  3rd floor; Clinic 3A Check in & go thru evaluation schedule for the day, get vitals & medication records   7:50 -   9:00a.m. [facility]  Transplant Services  3rd floor; Clinic 3A Pre-transplant class   9:00a.m. [facility]  Transplant Services  3rd floor; Clinic 3A Appointment with Dr. Gaspar,  Transplant Surgeon   9:45a.m. [facility]  Transplant Services  3rd floor; Clinic 3B Appointment with Dr. Cunha,   Transplant Nephrologist   10:30a.m. [place]  [facility]  Transplant Services  3rd floor; Clinic 3A; Consult Room Appointment with either Radha or Anya  Transplant    11:15a.m. [facility]  Transplant Services  3rd floor; Clinic 3A Meet with Pamela CUEVA,  Transplant Coordinator   11:30a.m. [facility]  Transplant Services  3rd floor; Clinic 3A; Consult Room Appointment with Grace Soliman  Registered Dietitian   12:00Noon-12:15p.m. [facility]  Transplant Services  3rd floor; Clinic 3A; Consult Room Research    12:15p.m.-  1:15p.m. LUNCH    1:30p.m. [facility]  Imaging and Lab Testing  1st floor 2nd ABO blood draw - confirmation of 1st draw   2:00p.m. [facility]  Imaging and Lab Testing  1st floor Chest X-ray    2:20p.m. [facility]  Imaging and Lab Testing  1st floor EKG   3:00p.m. [facility]   Cardiology/Heart Care Clinic  3rd floor; Clinic 3L Echocardiogram       Day/Date:   Wednesday, January 10, 2018  Time Location Activity   1:30p.m. [facility]  Pulmonary Function Lab  3rd floor Pulmonary Function Tests   3:00p.m. [facility]  Cardiology/Heart Care Clinic  3rd floor; Clinic 3L Appointment with Dr. Jimenez,   Cardiology       REMINDER: PLEASE CALL [phone] TO SCHEDULE:      DERMATOLOGY CONSULT

## 2017-08-15 DIAGNOSIS — Z94.0 KIDNEY TRANSPLANT RECIPIENT: Primary | ICD-10-CM

## 2017-08-15 DIAGNOSIS — Z79.60 LONG-TERM USE OF IMMUNOSUPPRESSANT MEDICATION: ICD-10-CM

## 2017-08-15 RX ORDER — TACROLIMUS 1 MG/1
2 CAPSULE ORAL 2 TIMES DAILY
Qty: 120 CAPSULE | Refills: 11 | Status: SHIPPED | OUTPATIENT
Start: 2017-08-15 | End: 2018-02-22

## 2017-08-15 NOTE — TELEPHONE ENCOUNTER
Patient states new dose is 2mg bid, please confirm dose and send new rx    Danielle Alexis   [facility]  [phone]

## 2017-08-17 NOTE — TELEPHONE ENCOUNTER
Intake Progress Note  Nurse Call: 09/05/2017  Save the Date: 09/25/2017    Organ:  kidney    Referral Came Via (Fax from/phone call from):  [facility]       Assigned Coordinator:  Donna Patel    Reported Diagnosis that caused the kidney failure ( not CKD)  High blood pressure    Best time patient can be reached:  afternoons    How would you like to be communicated with, through MyChart, phone, or mail? phone      Records:  [facility] requested from: all [facility]       Insurance information:  Current in epic      History of diabetes:  No    Do you have an endocrinologist?: no         On insulin or oral medication:  No                     History of a kidney biopsy:  Yes         If Yes,when and where:  04/2017 [facility]    Past Medical and Surgical History (updated in Epic medical / surgical):             History of  cancer personally:  No                          History of cardiac events:  No     History abdominal surgeries other than previous transplant: No                          History of previous transplant: yes             If Yes where and estimated date:  01/13/2011 [facility]             Listed or in eval at another transplant center?  No             History of hospitalization in last 12 months:  No                              History of blood transfusion:  No               Smoking history:  Yes     Current smoker:  no     Quit Date:  03/2017    On dialysis: No       If NYOD, estimated GFR:  20  Height:  5'8  Weight:  160  BMI:  24.3    Health Maintenance:  Colon:  never  PSA:  never  Dental: not up todate  Vaccines up todate  Special Needs (ie wheelchair, assistance, guardian, interpretor):  No     As for the next steps, as long as we are able to get financial approval and receive your medical records, you should be expecting a call from your Transplant Coordinator in about 2 weeks. Your Transplant Coordinator will go into more detail about the evaluation process, but we can put a hold on a tentative date for your transplant  evaluation now. Kidney (K/P) evaluations are scheduled for Monday, Wednesday, or Thursdays, and can start as early as 6:30 am and end as late as 4:30pm. Would one of these days work better for you? Great, I am going to put a hold on Day/Month for you. Again, this appointment day and time is subject to change and is dependent on financial approval from your insurance company and our receiving your medical records so we are able to meet your individual needs.    I also want to schedule your first call with the coordinator. (Offer a couple choices and schedule patient's preferred date and time.)      Inform patient on the need to arrange age appropriate cancer screening, vaccines up to date and dental clearance    Reviewed evaluation process and reminded patient to complete questionnaire, complete medical records release, and review packet prior to evaluation visit     Informed patient that coordinator will review their chart and insurance coverage and if no concerns they will receive a call from a  to schedule evaluation

## 2017-08-28 DIAGNOSIS — T86.12 FAILED KIDNEY TRANSPLANT: Primary | ICD-10-CM

## 2017-08-28 DIAGNOSIS — N18.4 CHRONIC KIDNEY DISEASE, STAGE 4, SEVERELY DECREASED GFR (H): ICD-10-CM

## 2017-08-28 DIAGNOSIS — Z45.2 ADJUSTMENT AND MANAGEMENT OF VASCULAR ACCESS DEVICE: ICD-10-CM

## 2017-09-01 NOTE — TELEPHONE ENCOUNTER
Contacted patient and introduced myself as their Transplant Coordinator, also introduced the role of the Transplant Coordinator in the transplant process.  Explained the purpose of this call including reviewing next steps and answering questions.    Confirmed Referring Provider, Dialysis Center, and Primary Care Physician. Notified patient of the importance of continued communication with referring providers and primary care physicians.    Reviewed components of transplant evaluation process including necessary appointments, tests, and procedures.    Answered questions for patient regarding evaluation, provided my name and contact information and requested they call with any additional questions.    Determined that patient would like additional information regarding transplant by:     Drop Down choices: Mail, Email, MyChart, Phone Call   Encourage MyChart   Notified patients that they will hear from a Transplant  to schedule evaluation.       Reviewed medical records in EPIC to date and interviewed patient. CKD with GFR of 18 on 4/10/2017 with labs done here. Underlying kidney disease is believed to be from uncontrolled HTN. Received a LDKT on 1/3/2011 here and has had antibody and cellular rejection confirmed by biopsy on 6/2015. Has been treated with thymoglobulin, PLEX and IVIG. Remains on prednisone 5 mg daily and may take 20 mg burst with gout flare. Also, has history of GERD, gout, and medical non compliance. During his first kidney transplant evaluation it was noted that he has pulmonary nodules and he was cleared by Dr Clemente in 2010. He is due to up date his dental. He has never followed with a Dermatologist. Has never received blood. Quit smoking 3/2017. States he only drinks etoh seldom now. He may have a potential living donor . BMI 24.3. He is aware his next contact will be from our  Francie and was given her phone number. He was also given my contact information.     We talked about  the 2 day evaluation process. First day he will arrive fasting and may brush his teeth that morning, take his meds and drink water. Once his labs are drawn he will be able to eat. Encouraged him to bring breakfast or money to purchase. We talked about the online group presentation of [facility] and he was encouraged to bring someone with him. We reviewed the list of providers he will see and their roles. We reviewed the overall evaluation and approval process. He has no further questions at this time.    Smart set orders placed in EQAL and routed to scheduling.

## 2017-09-11 ENCOUNTER — OFFICE VISIT (OUTPATIENT)
Dept: TRANSPLANT | Facility: CLINIC | Age: 42
End: 2017-09-11
Attending: SURGERY
Payer: COMMERCIAL

## 2017-09-11 DIAGNOSIS — N18.4 CHRONIC RENAL FAILURE, STAGE 4 (SEVERE) (H): Primary | ICD-10-CM

## 2017-09-11 NOTE — MR AVS SNAPSHOT
After Visit Summary   9/11/2017    Rashad Ortiz    MRN: [id number]           Patient Information     Date Of Birth          1975        Visit Information        Provider Department      9/11/2017 2:45 PM Julia Irwin MD [facility] Solid Organ Transplant        Today's Diagnoses     Chronic renal failure, stage 4 (severe) (H)    -  1       Follow-ups after your visit        Your next 10 appointments already scheduled     Nov 13, 2017  4:50 PM CST   (Arrive by 4:20 PM)   Return Kidney Transplant with Stef Murillo MD   [facility] Nephrology ([facility])    [address] [place] [address] [id number] [phone]            Td 10, 2018  1:30 PM CST   FULL PULMONARY FUNCTION with  PFL B   [facility] Pulmonary Function Testing ([facility])    [address] [place] [address] [id number] [phone]            Td 10, 2018  3:00 PM CST   (Arrive by 2:45 PM)   NEW PANCREAS/KIDNEY TRANSPLANT WORK-UP with Gelacio Jimenez MD   [facility] Heart Care ([facility])    [address] [place] [address] [phone] [phone]            Td 10, 2018  3:45 PM CST   (Arrive by 3:30 PM)   New Patient Visit with KAILA Kaufman MD   [facility] Dermatology ([facility])    [address] [place] [address] [phone]              Who to contact     If you have questions or need follow up information about today's clinic visit or your schedule please contact [facility] SOLID ORGAN TRANSPLANT directly at [phone].  Normal or non-critical lab and imaging results will be communicated to you by MyChart, letter or phone within 4 business days after the clinic has received the results. If you do not hear from us within 7 days, please contact the clinic through MyChart or phone. If you have a critical or abnormal lab result, we will  "notify you by phone as soon as possible.  Submit refill requests through [employer] or call your pharmacy and they will forward the refill request to us. Please allow 3 business days for your refill to be completed.          Additional Information About Your Visit        [employer] Information     [employer] lets you send messages to your doctor, view your test results, renew your prescriptions, schedule appointments and more. To sign up, go to [email or web address] . Click on \"Log in\" on the left side of the screen, which will take you to the Welcome page. Then click on \"Sign up Now\" on the right side of the page.     You will be asked to enter the access code listed below, as well as some personal information. Please follow the directions to create your username and password.     Your access code is: [email or web address]  Expires: 10/30/2017  5:20 PM     Your access code will  in 90 days. If you need help or a new code, please call your [place] clinic or [phone].        Care EveryWhere ID     This is your Care EveryWhere ID. This could be used by other organizations to access your [place] medical records  [id number]         Blood Pressure from Last 3 Encounters:   17 (!) 144/92   17 127/81   17 (!) 130/96    Weight from Last 3 Encounters:   17 71.6 kg (157 lb 12.8 oz)   17 72.1 kg (159 lb)   17 72.1 kg (159 lb)              Today, you had the following     No orders found for display       Primary Care Provider Office Phone # Fax #    Francie Barraza PA-C [phone]       [address]  [facility] [address]        Equal Access to Services     LUISANA GUTIERREZ : Pino Ramos, wadiannada luqadaha, qaybta kaalmada claudioyada, ronaldo donahue. So [facility] [phone].    ATENCIÓN: Si habla español, tiene a ward disposición servicios gratuitos de asistencia lingüística. Joshua al [phone].    We comply with applicable federal " civil rights laws and [place] laws. We do not discriminate on the basis of race, color, national origin, age, disability, sex, sexual orientation, or gender identity.            Thank you!     Thank you for choosing [facility] SOLID ORGAN TRANSPLANT  for your care. Our goal is always to provide you with excellent care. Hearing back from our patients is one way we can continue to improve our services. Please take a few minutes to complete the written survey that you may receive in the mail after your visit with us. Thank you!             Your Updated Medication List - Protect others around you: Learn how to safely use, store and throw away your medicines at www.disposemymeds.org.          This list is accurate as of: 9/11/17 11:59 PM.  Always use your most recent med list.                   Brand Name Dispense Instructions for use Diagnosis    carvedilol 25 MG tablet    COREG    180 tablet    Take 1 tablet (25 mg) by mouth 2 times daily        * cephalexin 250 MG Tabs     14 tablet    Take 1 tablet by mouth 2 times daily    Folliculitis       * cephalexin 250 MG capsule    KEFLEX          C[employer] Caps     30 capsule    Take 1 capsule by mouth 2 times daily Take 30 minutes after each dose of Cephalexin.    Folliculitis       furosemide 20 MG tablet    LASIX    180 tablet    Take 1 tablet (20 mg) by mouth 2 times daily Please follow up in clinic for next refill.    Hypertension goal BP (blood pressure) < 140/90       mycophenolate 250 MG capsule    GENERIC EQUIVALENT    120 capsule    Take 2 capsules (500 mg) by mouth 2 times daily    Kidney transplanted       omeprazole 20 MG CR capsule    priLOSEC    90 capsule    Take 1 capsule (20 mg) by mouth daily    Kidney replaced by transplant       * predniSONE 5 MG tablet    DELTASONE    30 tablet    Take 1 tablet (5 mg) by mouth daily    Kidney replaced by transplant       * predniSONE 20 MG tablet    DELTASONE    15 tablet    Take 3 tablets (60 mg) by  mouth daily    Acute gouty arthritis       sodium bicarbonate 650 MG tablet     120 tablet    Take 2 tablets (1,300 mg) by mouth 3 times daily    Kidney transplanted, Complications, kidney transplant, Aftercare following organ transplant, Immunosuppressed status (H), Encounter for long-term (current) use of high-risk medication       sulfamethoxazole-trimethoprim 400-80 MG per tablet    BACTRIM/SEPTRA    45 tablet    Take 1 tablet by mouth every other day    Kidney transplanted       tacrolimus 1 MG capsule    GENERIC EQUIVALENT    120 capsule    Take 2 capsules (2 mg) by mouth 2 times daily    Kidney transplant recipient, Long-term use of immunosuppressant medication       * Notice:  This list has 4 medication(s) that are the same as other medications prescribed for you. Read the directions carefully, and ask your doctor or other care provider to review them with you.

## 2017-09-11 NOTE — PROGRESS NOTES
Dialysis Access Service  Consult Note    Referred by Dr. Murillo for creation of permanent dialysis access.    HPI: Mr. Ortiz is being seen today for placement of permanent dialysis access due to Chronic renal failure from failing kidney transplant.  He is left handed. Mr. Ortiz is not dialyzing at (Not currently on dialysis).      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       Risk factors for vascular access are:     Hx of CVC    []    Comment:   Hx of PICC line         []    Comment:   Hx of Pacemaker    []    Comment:   History of failed access:  [x]      Thrombosed fistula ligated post-transplant     SVC syndrome   []       Heart Failure    []    EF:    Periph arterial disease  []      Prior Fracture/Surgery  []    Location:   DVT    []    Location:  Diabetes   []         Neuropathy   []    Comment:   Anticoagulation:   []    Agent:      Anticoagulation contraindication: []    Details:                   Pediatric    []                           Hx of transplant   [x]   Comment:    Renal transplant now with mixed rejection  Current immunosuppression [x]   Comment:                                  PHYSICAL EXAM:     healthy, alert and cooperative  EXTREMITY EXAM:   Vein Exam: Obvious suitable veins?    Left arm: YES   []           NO  [x]       Comment:    Right arm: YES   [x]           NO  []       Comment: palpable cephalic vein in antecubital fossa and above  Arterial Exam:   Radial   L: 3+ R: 3+   Ulnar   L: 3+;R: 3+  Patent Palmar arch  L:  YES   [x]  NO   []       R: YES   [x]   NO   []        Capillary refill:  L: <2sec, R:<2sec    Sensory exam:   Left hand: Normal   [x]       Abnormal   []     Comment:    Right hand: Normal   [x]       Abnormal   []     Comment:     Motor exam normal:   Left hand: Normal   [x]       Abnormal   []     Comment:     Right hand: Normal   [x]       Abnormal   []     Comment:                       Mapping Reviewed:  Target vein:  4.2mm right cephalic vein  Target artery brachial artery at the antecubital fossa    Assessment & Plan: Mr. Ortiz is a excellent candidate for placement of permanent dialysis access. I would recommend right cephalic vein to brachial artery AV fistula/ possible AV graft creation under General.  Patient however is not approaching need for dialysis and his graft function although declining is still present. He will contact us after discussion with his nephrologist regarding timing of his surgery.    The surgical risks and benefits were reviewed and questions were answered. We discussed the day of surgery plan, anesthesia, postop care, risk of infection, numbness, injury to surrounding structures, bleeding, thrombosis, steal syndrome, possible need for future angioplasty or surgical revision, as well as nonmaturation or need for site abandonment. This was contrasted with morbidity and mortality risk of long-term catheter based hemodialysis access. The patient does wish to proceed with surgery for permanent access creation in the near future. The patient was counselled to contact our nurse coordinator, ISABEL Branham CNS (Sum) at [phone] with any questions or concerns.  Thank you for the opportunity to participate in Mr. Ortiz's care.        Madelin Rick MD  Fellow,  Division of Transplantation  [id number]    I have reviewed history, examined patient and discussed plan with the fellow/resident/MARIA ESTHER.  I concur with the findings in this note.    Risks of the surgical procedure including but not  limited to the rare risk of mortality discussed in detail. Patient verbalized good understanding and had several pertinent questions which were answered satisfactorily.     Total time: 45 min  Counseling time: 25 min                   .

## 2017-09-11 NOTE — LETTER
9/11/2017       RE: Rashad Ortiz  [address]  [place] [address]     Dear Colleague,    Thank you for referring your patient, Rashad Ortiz, to the [facility] SOLID ORGAN TRANSPLANT at [facility]. Please see a copy of my visit note below.    Dialysis Access Service  Consult Note    Referred by Dr. Murillo for creation of permanent dialysis access.    HPI: Mr. Ortiz is being seen today for placement of permanent dialysis access due to Chronic renal failure from failing kidney transplant.  He is left handed. Mr. Ortiz is not dialyzing at (Not currently on dialysis).      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       Risk factors for vascular access are:     Hx of CVC    []    Comment:   Hx of PICC line         []    Comment:   Hx of Pacemaker    []    Comment:   History of failed access:  [x]      Thrombosed fistula ligated post-transplant     SVC syndrome   []       Heart Failure    []    EF:    Periph arterial disease  []      Prior Fracture/Surgery  []    Location:   DVT    []    Location:  Diabetes   []         Neuropathy   []    Comment:   Anticoagulation:   []    Agent:      Anticoagulation contraindication: []    Details:                   Pediatric    []                           Hx of transplant   [x]   Comment:    Renal transplant now with mixed rejection  Current immunosuppression [x]   Comment:                                  PHYSICAL EXAM:     healthy, alert and  cooperative  EXTREMITY EXAM:   Vein Exam: Obvious suitable veins?    Left arm: YES   []           NO  [x]       Comment:    Right arm: YES   [x]           NO  []       Comment: palpable cephalic vein in antecubital fossa and above  Arterial Exam:   Radial   L: 3+ R: 3+   Ulnar   L: 3+;R: 3+  Patent Palmar arch  L: YES   [x]  NO   []       R: YES   [x]   NO   []        Capillary refill:  L: <2sec, R:<2sec    Sensory exam:   Left hand: Normal   [x]       Abnormal   []     Comment:    Right hand: Normal   [x]       Abnormal   []     Comment:     Motor exam normal:   Left hand: Normal   [x]       Abnormal   []     Comment:     Right hand: Normal   [x]       Abnormal   []     Comment:                       Mapping Reviewed:  Target vein:  4.2mm right cephalic vein  Target artery brachial artery at the antecubital fossa    Assessment & Plan: Mr. Ortiz is a excellent candidate for placement of permanent dialysis access. I would recommend right cephalic vein to brachial artery AV fistula/ possible AV graft creation under General.  Patient however is not approaching need for dialysis and his graft function although declining is still present. He will contact us after discussion with his nephrologist regarding timing of his surgery.    The surgical risks and benefits were reviewed and questions were answered. We discussed the day of surgery plan, anesthesia, postop care, risk of infection, numbness, injury to surrounding structures, bleeding, thrombosis, steal syndrome, possible need for future angioplasty or surgical revision, as well as nonmaturation or need for site abandonment. This was contrasted with morbidity and mortality risk of long-term catheter based hemodialysis access. The patient does wish to proceed with surgery for permanent access creation in the near future. The patient was counselled to contact our nurse coordinator, ISABEL Branham (Sum), CNS at [phone] with any questions or concerns.  Thank you  for the opportunity to participate in Mr. Ortiz's care.        Madelin Rick MD  Fellow,  Division of Transplantation  [id number]    I have reviewed history, examined patient and discussed plan with the fellow/resident/MARIA ESTHER.  I concur with the findings in this note.    Risks of the surgical procedure including but not limited to the rare risk of mortality discussed in detail. Patient verbalized good understanding and had several pertinent questions which were answered satisfactorily.     Total time: 45 min  Counseling time: 25 min         Again, thank you for allowing me to participate in the care of your patient.      Sincerely,    Julia Irwin MD

## 2017-09-12 ENCOUNTER — TELEPHONE (OUTPATIENT)
Dept: TRANSPLANT | Facility: CLINIC | Age: 42
End: 2017-09-12

## 2017-09-12 NOTE — TELEPHONE ENCOUNTER
Rashad called me to ask if I could fill out his FMLA paperwork. I asked him why he needs FMLA. He said his employer wants him to fill it out preemptively before transplant. I asked him if he is unable to work now and he said no.I asked him to bring it to his evaluation on 9/25/2017 and I can take a look at it then.

## 2017-09-25 ENCOUNTER — RESULTS ONLY (OUTPATIENT)
Dept: OTHER | Facility: CLINIC | Age: 42
End: 2017-09-25

## 2017-09-25 ENCOUNTER — ALLIED HEALTH/NURSE VISIT (OUTPATIENT)
Dept: TRANSPLANT | Facility: CLINIC | Age: 42
End: 2017-09-25
Attending: INTERNAL MEDICINE
Payer: COMMERCIAL

## 2017-09-25 ENCOUNTER — OFFICE VISIT (OUTPATIENT)
Dept: TRANSPLANT | Facility: CLINIC | Age: 42
End: 2017-09-25
Attending: TRANSPLANT SURGERY
Payer: COMMERCIAL

## 2017-09-25 ENCOUNTER — RADIANT APPOINTMENT (OUTPATIENT)
Dept: CARDIOLOGY | Facility: CLINIC | Age: 42
End: 2017-09-25
Attending: PHYSICIAN ASSISTANT

## 2017-09-25 VITALS
SYSTOLIC BLOOD PRESSURE: 144 MMHG | BODY MASS INDEX: 24.17 KG/M2 | WEIGHT: 157.8 LBS | TEMPERATURE: 98.3 F | OXYGEN SATURATION: 99 % | DIASTOLIC BLOOD PRESSURE: 92 MMHG | HEART RATE: 71 BPM | RESPIRATION RATE: 16 BRPM

## 2017-09-25 DIAGNOSIS — T86.12 KIDNEY TRANSPLANT FAILURE: ICD-10-CM

## 2017-09-25 DIAGNOSIS — N18.9 CHRONIC KIDNEY DISEASE: Primary | ICD-10-CM

## 2017-09-25 DIAGNOSIS — Z76.82 ORGAN TRANSPLANT CANDIDATE: Primary | ICD-10-CM

## 2017-09-25 DIAGNOSIS — T86.12 FAILED KIDNEY TRANSPLANT: Primary | ICD-10-CM

## 2017-09-25 DIAGNOSIS — N18.4 CHRONIC RENAL FAILURE, STAGE 4 (SEVERE) (H): ICD-10-CM

## 2017-09-25 DIAGNOSIS — Z76.82 ORGAN TRANSPLANT CANDIDATE: ICD-10-CM

## 2017-09-25 DIAGNOSIS — Z87.891 HISTORY OF TOBACCO USE: ICD-10-CM

## 2017-09-25 DIAGNOSIS — I10 ESSENTIAL HYPERTENSION: ICD-10-CM

## 2017-09-25 DIAGNOSIS — T86.91 TRANSPLANT FAILURE DUE TO REJECTION: ICD-10-CM

## 2017-09-25 DIAGNOSIS — I10 HYPERTENSION GOAL BP (BLOOD PRESSURE) < 140/90: ICD-10-CM

## 2017-09-25 DIAGNOSIS — N18.4 CHRONIC KIDNEY DISEASE, STAGE 4, SEVERELY DECREASED GFR (H): ICD-10-CM

## 2017-09-25 DIAGNOSIS — Z91.199 PERSONAL HISTORY OF NONCOMPLIANCE WITH MEDICAL TREATMENT, PRESENTING HAZARDS TO HEALTH: ICD-10-CM

## 2017-09-25 DIAGNOSIS — R60.0 LOCALIZED EDEMA: Primary | ICD-10-CM

## 2017-09-25 DIAGNOSIS — T86.11 ANTIBODY MEDIATED REJECTION OF KIDNEY TRANSPLANT: ICD-10-CM

## 2017-09-25 LAB
ABO + RH BLD: NORMAL
ALBUMIN SERPL-MCNC: 3.3 G/DL (ref 3.4–5)
ALBUMIN UR-MCNC: NEGATIVE MG/DL
ALP SERPL-CCNC: 98 U/L (ref 40–150)
ALT SERPL W P-5'-P-CCNC: 17 U/L (ref 0–70)
ANION GAP SERPL CALCULATED.3IONS-SCNC: 8 MMOL/L (ref 3–14)
APPEARANCE UR: CLEAR
APTT PPP: 28 SEC (ref 22–37)
AST SERPL W P-5'-P-CCNC: 11 U/L (ref 0–45)
BASOPHILS # BLD AUTO: 0 10E9/L (ref 0–0.2)
BASOPHILS NFR BLD AUTO: 0.2 %
BILIRUB SERPL-MCNC: 0.4 MG/DL (ref 0.2–1.3)
BILIRUB UR QL STRIP: NEGATIVE
BLD GP AB SCN SERPL QL: NORMAL
BLD GP AB SCN TITR SERPL: NORMAL {TITER}
BLD GP AB SCN TITR SERPL: NORMAL {TITER}
BLOOD BANK CMNT PATIENT-IMP: NORMAL
BLOOD BANK CMNT PATIENT-IMP: NORMAL
BUN SERPL-MCNC: 59 MG/DL (ref 7–30)
CALCIUM SERPL-MCNC: 8.3 MG/DL (ref 8.5–10.1)
CARDIOLIPIN ANTIBODY IGG: <1.6 GPL-U/ML (ref 0–19.9)
CARDIOLIPIN ANTIBODY IGM: 4.1 MPL-U/ML (ref 0–19.9)
CHLORIDE SERPL-SCNC: 115 MMOL/L (ref 94–109)
CHOLEST SERPL-MCNC: 102 MG/DL
CMV IGG SERPL QL IA: >8 AI (ref 0–0.8)
CO2 SERPL-SCNC: 19 MMOL/L (ref 20–32)
COLOR UR AUTO: NORMAL
CREAT SERPL-MCNC: 4.48 MG/DL (ref 0.66–1.25)
DIFFERENTIAL METHOD BLD: ABNORMAL
EBV VCA IGG SER QL IA: >8 AI (ref 0–0.8)
EOSINOPHIL # BLD AUTO: 0.1 10E9/L (ref 0–0.7)
EOSINOPHIL NFR BLD AUTO: 2 %
ERYTHROCYTE [DISTWIDTH] IN BLOOD BY AUTOMATED COUNT: 14.9 % (ref 10–15)
GFR SERPL CREATININE-BSD FRML MDRD: 15 ML/MIN/1.7M2
GLUCOSE SERPL-MCNC: 95 MG/DL (ref 70–99)
GLUCOSE UR STRIP-MCNC: NEGATIVE MG/DL
HBV CORE AB SERPL QL IA: NONREACTIVE
HBV SURFACE AB SERPL IA-ACNC: 0.21 M[IU]/ML
HBV SURFACE AG SERPL QL IA: NONREACTIVE
HCT VFR BLD AUTO: 30.7 % (ref 40–53)
HCV AB SERPL QL IA: NONREACTIVE
HDLC SERPL-MCNC: 60 MG/DL
HGB BLD-MCNC: 9.6 G/DL (ref 13.3–17.7)
HGB UR QL STRIP: NEGATIVE
HIV 1+2 AB+HIV1 P24 AG SERPL QL IA: NONREACTIVE
HYALINE CASTS #/AREA URNS LPF: 1 /LPF (ref 0–2)
IMM GRANULOCYTES # BLD: 0 10E9/L (ref 0–0.4)
IMM GRANULOCYTES NFR BLD: 0.5 %
INR PPP: 1.1 (ref 0.86–1.14)
KETONES UR STRIP-MCNC: NEGATIVE MG/DL
LDLC SERPL CALC-MCNC: 27 MG/DL
LEUKOCYTE ESTERASE UR QL STRIP: NEGATIVE
LYMPHOCYTES # BLD AUTO: 0.9 10E9/L (ref 0.8–5.3)
LYMPHOCYTES NFR BLD AUTO: 22.4 %
MCH RBC QN AUTO: 26.4 PG (ref 26.5–33)
MCHC RBC AUTO-ENTMCNC: 31.3 G/DL (ref 31.5–36.5)
MCV RBC AUTO: 85 FL (ref 78–100)
MONOCYTES # BLD AUTO: 0.5 10E9/L (ref 0–1.3)
MONOCYTES NFR BLD AUTO: 11.9 %
NEUTROPHILS # BLD AUTO: 2.5 10E9/L (ref 1.6–8.3)
NEUTROPHILS NFR BLD AUTO: 63 %
NITRATE UR QL: NEGATIVE
NONHDLC SERPL-MCNC: 42 MG/DL
NRBC # BLD AUTO: 0 10*3/UL
NRBC BLD AUTO-RTO: 0 /100
PH UR STRIP: 6 PH (ref 5–7)
PHOSPHATE SERPL-MCNC: 3.8 MG/DL (ref 2.5–4.5)
PLATELET # BLD AUTO: 163 10E9/L (ref 150–450)
POTASSIUM SERPL-SCNC: 4.6 MMOL/L (ref 3.4–5.3)
PROT SERPL-MCNC: 6.6 G/DL (ref 6.8–8.8)
PSA SERPL-ACNC: 2.2 UG/L (ref 0–4)
RBC # BLD AUTO: 3.63 10E12/L (ref 4.4–5.9)
RBC #/AREA URNS AUTO: <1 /HPF (ref 0–2)
SODIUM SERPL-SCNC: 142 MMOL/L (ref 133–144)
SOURCE: NORMAL
SP GR UR STRIP: 1.01 (ref 1–1.03)
SPECIMEN EXP DATE BLD: NORMAL
SPECIMEN EXP DATE BLD: NORMAL
T PALLIDUM IGG+IGM SER QL: NEGATIVE
THROMBIN TIME: 16.9 SEC (ref 13–19)
TRIGL SERPL-MCNC: 75 MG/DL
UROBILINOGEN UR STRIP-MCNC: 0 MG/DL (ref 0–2)
VZV IGG SER QL IA: >8 AI (ref 0–0.8)
WBC # BLD AUTO: 4 10E9/L (ref 4–11)
WBC #/AREA URNS AUTO: <1 /HPF (ref 0–2)

## 2017-09-25 PROCEDURE — 86803 HEPATITIS C AB TEST: CPT | Performed by: PHYSICIAN ASSISTANT

## 2017-09-25 PROCEDURE — 86850 RBC ANTIBODY SCREEN: CPT | Performed by: PHYSICIAN ASSISTANT

## 2017-09-25 PROCEDURE — 86886 COOMBS TEST INDIRECT TITER: CPT | Performed by: PHYSICIAN ASSISTANT

## 2017-09-25 PROCEDURE — 87340 HEPATITIS B SURFACE AG IA: CPT | Performed by: PHYSICIAN ASSISTANT

## 2017-09-25 PROCEDURE — 85610 PROTHROMBIN TIME: CPT | Performed by: PHYSICIAN ASSISTANT

## 2017-09-25 PROCEDURE — 84100 ASSAY OF PHOSPHORUS: CPT | Performed by: PHYSICIAN ASSISTANT

## 2017-09-25 PROCEDURE — 85730 THROMBOPLASTIN TIME PARTIAL: CPT | Mod: 91 | Performed by: PHYSICIAN ASSISTANT

## 2017-09-25 PROCEDURE — 85670 THROMBIN TIME PLASMA: CPT | Performed by: PHYSICIAN ASSISTANT

## 2017-09-25 PROCEDURE — 86665 EPSTEIN-BARR CAPSID VCA: CPT | Performed by: PHYSICIAN ASSISTANT

## 2017-09-25 PROCEDURE — 86147 CARDIOLIPIN ANTIBODY EA IG: CPT | Performed by: PHYSICIAN ASSISTANT

## 2017-09-25 PROCEDURE — 86704 HEP B CORE ANTIBODY TOTAL: CPT | Performed by: PHYSICIAN ASSISTANT

## 2017-09-25 PROCEDURE — 86787 VARICELLA-ZOSTER ANTIBODY: CPT | Performed by: PHYSICIAN ASSISTANT

## 2017-09-25 PROCEDURE — 80053 COMPREHEN METABOLIC PANEL: CPT | Performed by: PHYSICIAN ASSISTANT

## 2017-09-25 PROCEDURE — 86905 BLOOD TYPING RBC ANTIGENS: CPT | Performed by: PHYSICIAN ASSISTANT

## 2017-09-25 PROCEDURE — 86780 TREPONEMA PALLIDUM: CPT | Performed by: PHYSICIAN ASSISTANT

## 2017-09-25 PROCEDURE — 86901 BLOOD TYPING SEROLOGIC RH(D): CPT | Performed by: PHYSICIAN ASSISTANT

## 2017-09-25 PROCEDURE — 87799 DETECT AGENT NOS DNA QUANT: CPT | Performed by: PHYSICIAN ASSISTANT

## 2017-09-25 PROCEDURE — 85730 THROMBOPLASTIN TIME PARTIAL: CPT | Performed by: PHYSICIAN ASSISTANT

## 2017-09-25 PROCEDURE — 80061 LIPID PANEL: CPT | Performed by: PHYSICIAN ASSISTANT

## 2017-09-25 PROCEDURE — G0103 PSA SCREENING: HCPCS | Performed by: PHYSICIAN ASSISTANT

## 2017-09-25 PROCEDURE — 85025 COMPLETE CBC W/AUTO DIFF WBC: CPT | Performed by: PHYSICIAN ASSISTANT

## 2017-09-25 PROCEDURE — 85613 RUSSELL VIPER VENOM DILUTED: CPT | Performed by: PHYSICIAN ASSISTANT

## 2017-09-25 PROCEDURE — 40000866 ZZHCL STATISTIC HIV 1/2 ANTIGEN/ANTIBODY PRETRANSPLANT ONLY: Performed by: PHYSICIAN ASSISTANT

## 2017-09-25 PROCEDURE — 86706 HEP B SURFACE ANTIBODY: CPT | Performed by: PHYSICIAN ASSISTANT

## 2017-09-25 PROCEDURE — 86644 CMV ANTIBODY: CPT | Performed by: PHYSICIAN ASSISTANT

## 2017-09-25 PROCEDURE — 86900 BLOOD TYPING SEROLOGIC ABO: CPT | Performed by: PHYSICIAN ASSISTANT

## 2017-09-25 PROCEDURE — 86480 TB TEST CELL IMMUN MEASURE: CPT | Performed by: PHYSICIAN ASSISTANT

## 2017-09-25 PROCEDURE — 81001 URINALYSIS AUTO W/SCOPE: CPT | Performed by: PHYSICIAN ASSISTANT

## 2017-09-25 NOTE — MR AVS SNAPSHOT
After Visit Summary   9/25/2017    Rashad Ortiz    MRN: [id number]           Patient Information     Date Of Birth          1975        Visit Information        Provider Department      9/25/2017 9:00 AM  PKE PATIENT 1 [facility] Solid Organ Transplant        Today's Diagnoses     Failed kidney transplant    -  1       Follow-ups after your visit        Your next 10 appointments already scheduled     Sep 25, 2017  3:00 PM CDT   Ech Complete with [id number]   [facility] Echo ([facility])    [address] [place] [id number] [phone]           1. Please bring or wear a comfortable two-piece outfit. 2. You may eat, drink and take your normal medicines. 3. For any questions that cannot be answered, please contact the ordering physician            Nov 13, 2017  4:50 PM CST   (Arrive by 4:20 PM)   Return Kidney Transplant with Stef Murillo MD   [facility] Nephrology ([facility])    [address] [place] [address] [id number] [phone]            Td 10, 2018  1:30 PM CST   FULL PULMONARY FUNCTION with  PFL B   [facility] Pulmonary Function Testing ([facility])    [address] [place] [id number] [phone]            Td 10, 2018  3:00 PM CST   (Arrive by 2:45 PM)   NEW PANCREAS/KIDNEY TRANSPLANT WORK-UP with Gelacio Jimenez MD   [facility] Heart Care ([facility])    [address] [place] [id number] [phone]              Who to contact     If you have questions or need follow up information about today's clinic visit or your schedule please contact [facility] SOLID ORGAN TRANSPLANT directly at [phone].  Normal or non-critical lab and imaging results will be communicated to you by MyChart, letter or phone within 4 business days after the clinic has received the results. If you do not  "hear from us within 7 days, please contact the clinic through [email or web address] or phone. If you have a critical or abnormal lab result, we will notify you by phone as soon as possible.  Submit refill requests through [email or web address] or call your pharmacy and they will forward the refill request to us. Please allow 3 business days for your refill to be completed.          Additional Information About Your Visit        [employer] Information     [email or web address] lets you send messages to your doctor, view your test results, renew your prescriptions, schedule appointments and more. To sign up, go to [email or web address] . Click on \"Log in\" on the left side of the screen, which will take you to the Welcome page. Then click on \"Sign up Now\" on the right side of the page.     You will be asked to enter the access code listed below, as well as some personal information. Please follow the directions to create your username and password.     Your access code is: [email or web address]  Expires: 10/30/2017  5:20 PM     Your access code will  in 90 days. If you need help or a new code, please call your [place] clinic or [phone].        Care EveryWhere ID     This is your Care EveryWhere ID. This could be used by other organizations to access your [place] medical records  [id number]         Blood Pressure from Last 3 Encounters:   17 (!) 144/92   17 127/81   17 (!) 130/96    Weight from Last 3 Encounters:   17 71.6 kg (157 lb 12.8 oz)   17 72.1 kg (159 lb)   17 72.1 kg (159 lb)              Today, you had the following     No orders found for display         Today's Medication Changes          These changes are accurate as of: 17  2:29 PM.  If you have any questions, ask your nurse or doctor.               Stop taking these medicines if you haven't already. Please contact your care team if you have questions.     cephalexin 250 MG capsule   Commonly known as:  KEFLEX           cephalexin 250 MG Tabs        "    [facility]                    Primary Care Provider Office Phone # Fax #    Francie Clementina Barraza PA-C [phone] [address]  [place] [facility] [address]        Equal Access to Services     LUISANA GUTIERREZ : Hadii aad ku haddanno Soomaali, waaxda luqadaha, qaybta kaalmada adeegyada, waxdavid idiin hayaan adeosvaldo aguilar laSilkedeanna donahue. So [facility] [phone].    ATENCIÓN: Si habla español, tiene a ward disposición servicios gratuitos de asistencia lingüística. Llame al [phone].    We comply with applicable federal civil rights laws and [place] laws. We do not discriminate on the basis of race, color, national origin, age, disability sex, sexual orientation or gender identity.            Thank you!     Thank you for choosing [facility] SOLID ORGAN TRANSPLANT  for your care. Our goal is always to provide you with excellent care. Hearing back from our patients is one way we can continue to improve our services. Please take a few minutes to complete the written survey that you may receive in the mail after your visit with us. Thank you!             Your Updated Medication List - Protect others around you: Learn how to safely use, store and throw away your medicines at www.disposemymeds.org.          This list is accurate as of: 9/25/17  2:29 PM.  Always use your most recent med list.                   Brand Name Dispense Instructions for use Diagnosis    carvedilol 25 MG tablet    COREG    180 tablet    Take 1 tablet (25 mg) by mouth 2 times daily        furosemide 20 MG tablet    LASIX    180 tablet    Take 1 tablet (20 mg) by mouth 2 times daily Please follow up in clinic for next refill.    Hypertension goal BP (blood pressure) < 140/90       losartan 100 MG tablet    COZAAR    90 tablet    Take 1 tablet (100 mg) by mouth daily    Renal hypertension, stage 1-4 or unspecified chronic kidney disease       mycophenolate 250 MG capsule    GENERIC EQUIVALENT    120 capsule    Take 2  capsules (500 mg) by mouth 2 times daily    Kidney transplanted       omeprazole 20 MG CR capsule    priLOSEC    90 capsule    Take 1 capsule (20 mg) by mouth daily    Kidney replaced by transplant       * predniSONE 5 MG tablet    DELTASONE    30 tablet    Take 1 tablet (5 mg) by mouth daily    Kidney replaced by transplant       * predniSONE 20 MG tablet    DELTASONE    15 tablet    Take 3 tablets (60 mg) by mouth daily    Acute gouty arthritis       sodium bicarbonate 650 MG tablet     120 tablet    Take 2 tablets (1,300 mg) by mouth 3 times daily    Kidney transplanted, Complications, kidney transplant, Aftercare following organ transplant, Immunosuppressed status (H), Encounter for long-term (current) use of high-risk medication       sulfamethoxazole-trimethoprim 400-80 MG per tablet    BACTRIM/SEPTRA    45 tablet    Take 1 tablet by mouth every other day    Kidney transplanted       tacrolimus 1 MG capsule    GENERIC EQUIVALENT    120 capsule    Take 2 capsules (2 mg) by mouth 2 times daily    Kidney transplant recipient, Long-term use of immunosuppressant medication       * Notice:  This list has 2 medication(s) that are the same as other medications prescribed for you. Read the directions carefully, and ask your doctor or other care provider to review them with you.

## 2017-09-25 NOTE — PROGRESS NOTES
RE: Rashad Ortiz    [facility]# [id number]        I saw your patient, Rashad Ortiz, in consultation in our pretransplant clinic.  He was at clinic to discuss care for his end-stage renal disease.  He was at clinic with his wife.  Prior to clinic, they attended our pretransplant class.       Of note, he has a transplant from his sister in , but he had some rejection beginning in 2016. We discussed this; he said that he changed jobs and he had problems paying for his medication. We discussed his plan for making sure this does not happen again. He says he now understands that there is help available if a similar situation were to occur.      We talked about the pros and cons of transplantation vs. dialysis.  We discussed the fact that it was important that he think about the pros and cons of each treatment option and make an active decision.  We also discussed the fact that the two were interconnected and he may need to go on dialysis before transplant (if he chose to have a transplant) and that if the transplant failed, he might need dialysis before another transplant.       We also discussed the fact that if he chose to have a transplant, he would need to decide between going on the wait list for a  donor transplant vs. having a living donor transplant.  We talked about the pros and cons of each option.  Although I didn't express an opinion regarding transplantation or dialysis, I suggested that if he chose to have a transplant, a living donor transplant would be preferable in that the surgery is the same, the immunosuppressive drugs and the risks are the same, but the transplant could be done sooner and the results are better.  I told him that the wait for  donor kidney was approximately five years for patients who are newly put on the waiting list.  In addition, we talked about the fact that the disadvantage of a living donor transplant was the risk to the donor.       Because he was  interested in living donation, we spent some time discussing donor risks, including the risks of mortality, morbidity, and long-term risks with a single kidney. I also provided him with our donor video to view and share at his leisure.      I attempted to answer any remaining questions.  I also told him that should he have any questions, he should feel free to contact us.  We would be glad to answer any questions either over the phone or at another clinic visit.  His transplant coordinator is Pamela Patel and may be reached at [phone].  Thank you for the opportunity to see him.     I spent 35 minutes with this patient.  Over 90% of that time was spent in counseling and coordination of care.             Yours truly,               Sumit Gaspar MD         Professor of Surgery         ([phone])    DEWE[place]/

## 2017-09-25 NOTE — LETTER
2017       RE: Rashad Ortiz  [address]  [place] [address]     Dear Colleague,    Thank you for referring your patient, Rashad Ortiz, to the [facility] SOLID ORGAN TRANSPLANT at [facility]. Please see a copy of my visit note below.    RE: Rashad Ortiz    [facility]# [id number]        I saw your patient, Rashad Ortiz, in consultation in our pretransplant clinic.  He was at clinic to discuss care for his end-stage renal disease.  He was at clinic with his wife.  Prior to clinic, they attended our pretransplant class.       Of note, he has a transplant from his sister in , but he had some rejection beginning in 2016. We discussed this; he said that he changed jobs and he had problems paying for his medication. We discussed his plan for making sure this does not happen again. He says he now understands that there is help available if a similar situation were to occur.      We talked about the pros and cons of transplantation vs. dialysis.  We discussed the fact that it was important that he think about the pros and cons of each treatment option and make an active decision.  We also discussed the fact that the two were interconnected and he may need to go on dialysis before transplant (if he chose to have a transplant) and that if the transplant failed, he might need dialysis before another transplant.       We also discussed the fact that if he chose to have a transplant, he would need to decide between going on the wait list for a  donor transplant vs. having a living donor transplant.  We talked about the pros and cons of each option.  Although I didn't express an opinion regarding transplantation or dialysis, I suggested that if he chose to have a transplant, a living donor transplant would be preferable in that the surgery is the same, the immunosuppressive drugs and the risks are the same, but the transplant could be done sooner and the  results are better.  I told him that the wait for  donor kidney was approximately five years for patients who are newly put on the waiting list.  In addition, we talked about the fact that the disadvantage of a living donor transplant was the risk to the donor.       Because he was interested in living donation, we spent some time discussing donor risks, including the risks of mortality, morbidity, and long-term risks with a single kidney. I also provided him with our donor video to view and share at his leisure.      I attempted to answer any remaining questions.  I also told him that should he have any questions, he should feel free to contact us.  We would be glad to answer any questions either over the phone or at another clinic visit.  His transplant coordinator is Pamela Patel and may be reached at [phone].  Thank you for the opportunity to see him.     I spent 35 minutes with this patient.  Over 90% of that time was spent in counseling and coordination of care.             Yours truly,               Sumit Gaspar MD         Professor of Surgery         ([phone])    DEWEY/

## 2017-09-25 NOTE — LETTER
9/25/2017       RE: Rashad Ortiz  [address]  [place] [address]     Dear Colleague,    Thank you for referring your patient, Rashad Ortiz, to the [facility] SOLID ORGAN TRANSPLANT at [facility]. Please see a copy of my visit note below.      Assessment and Plan:  1. Kidney transplant evaluation - patient is a good candidate overall. Benefits of a living donor transplant were discussed.  2. CKD from hypertension vs unknown etiology s/p LDKT (2011) - has had a slow decline of kidney function since 2012. History of non-compliance in 2015 related to insurance problems and was without medications for 6 weeks. He was treated at that time for acute cellular and antibody mediated rejection with Thymoglobulin, PLEX and IVIG. His kidney function recovered to a Cr 2.3, however, over the past few months his kidney function has worsened. Current serum creatinine is 4.48 mg/dl with an eGFR of 18 ml/min. Most recent biopsy in 2/2017 showed chronic changes. His immunosuppression includes tacrolimus 1 mg BID, Cellcept 500 BID, and prednisone 5 mg daily. His uremic symptoms are minimal. He does not have dialysis access and does not have any potential donors. When ready, he would likely benefit from a kidney transplant.   3. Hypertension - controlled. Encouraged patient to check blood pressures at home.   4. Cardiac Risk - no history of cardiac disease or events. Last ECHO today showed a normal ventricular function with an EF of 60-65%. Given cardiac risk factors, he will need a cardiac risk assessment.   5. Gout - not controlled. Will need to check uric acid when he is not having a  flare. If uric acid is > 6 mg/dl, Dr. Murillo plans to start allopurinol with a 5 day course of prednisone 20 mg daily.       6. Lower extremity edema (L>R) - since 2011. Lower extremity dopplers completed today. No evidence of DVT.   7. Current smoker - has smoked less than a pack per month since age  19. Discussed smoking cessation for overall health and to improve post operative outcomes. Will need PFTs.   8. Non compliance - would appreciate social work input. Will discuss compliance contract with transplant committee.   9. Health maintenance - due for a skin check. He also has had facial folliculitis for the past 4 months, treated twice with abx with no improvement. This should also be addressed at derm appt. In addition, he will need dental check.    Discussed the risks and benefits of a transplant, including the risk of surgery and immunosuppression medications.  Patients overall evaluation will be discussed in the Transplant Program's regular meeting with a final recommendation on the patients suitability for transplant to be made at that time.  Patient was seen in conjunction with Dr. Gee Barrios as part of a shared visit.    Patient was seen by myself, Dr. Gee Barrios, in conjunction with ISABEL Jacobson, CNP as part of a shared visit.    I personally reviewed past medical and surgical history, vital signs, medications and labs.  Present and past medical history, along with significant physical exam findings were all reviewed with MARIA ESTHER.    My felix findings:  Rashad Ortiz is a 42 year old year old male with ESKD from unknown etiology, s/p LDKT, who presents for kidney retransplant evaluation.  Patient reports feeling okay overall with some medical complaints.    Key management decisions made by me and discussed with MARIA ESTHER:  1. Kidney retransplant evaluation - patient is a good candidate overall. Benefits of a living donor transplant were discussed.  2. ESKD from unknown etiology, possibly HTN, s/p LDKT - patient has had slow decline in kidney transplant function and would benefit from another kidney transplant.  Preferably, a preemptive, living donor kidney transplant.  3. Cardiac risk - patient will require Cardiology evaluation prior to transplant.  4. Gout - not well controlled and patient  to follow up with PCP and primary nephrologist to manage.  5. Tobacco abuse - strongly encouraged patient to undergo smoking cessation.  6. Medical noncompliance - discussed importance of compliance with medications, labs and medical appointments.  Appreciate  input, but would likely recommend compliance contract.    Evaluation:  Rashad Ortiz was seen in consultation at the request of Dr. Sumit Gaspar for evaluation as a potential Kidney transplant recipient.    Reason for Visit:  Rashad Ortiz is a 42 year old male with CKD from Hypertension, who presents for Kidney transplant evaluation.    HPI:  Mr. Ortiz is a 42-year-old that presents today with CKD secondary to hypertension versus unknown etiology s/p LDKT (1/13/11). He has had a slow decline of kidney function since 2012 with some non compliance issues in 2015 (creatinine up to 6). He reports this was related to insurance issues and he went without his medication for 6 weeks. Kidney transplant biopsy (6/2015) showed acute cellular-mediated rejection, [place] 1B with plasma cell rich infiltrate and also acute antibody-mediated rejection. Biopsy also showed severe ATN with focal necrosis and severe interstitial fibrosis and tubular atrophy. Patient was treated with Thymoglobulin, PLEX and IVIg. His creatinine did improve to as low as 2.3, however, following treatment his lab follow-up was poor. He has another biopsy on 2/2017 that showed moderate chronic tubulo-interstitial changes and no evidence of acute rejection. His current serum creatinine is 4.48 mg/dl with an eGFR of 18 ml/min. His immunosuppression now includes tacrolimus 1 mg BID, Cellcept 500 BID, and prednisone 5 mg daily. His uremic symptoms are minimal. He does not have dialysis access and does not have any potential donors.     He has no history of cardiac disease or events. Last ECHO today showed a normal ventricular function with an EF of 60-65%. Other PMH includes GERD  (omeprazole) and gout since 2011, recently more poorly controlled. He usually has 1-2 flares per year, however, he has already had 3 flares this year. All resolved with one time high dose prednisone. Not on allopurinol. Overall, he is doing fairly well. He is working in customer support and goes for long walks (1+ miles) a few times per week without chest pain, shortness of breath or claudication symptoms. He is making large amounts of urine and denies dysuria, hematuria or trouble emptying his bladder or starting his stream.  As far as health maintenance, he is due for a skin check. He has had facial folliculitis since may, treated twice with abx with no improvement, that should also be addressed at this appt. Will also need dental check.                 Kidney Disease Hx:        Kidney Disease Dx: HTN        Biopsy Proven: Yes         On Dialysis: No       Primary Nephrologist: Dr. Murillo          Medical Hx:       h/o HTN: Yes         h/o DM:  No       h/o Protein in Urine: Yes        h/o Blood in Urine:  Yes        h/o Kidney Stones:  No       h/o UTI: No       h/o Chronic NSAID Use: No         Previous Transplant Hx:        Yes; LDKT 2011         Transplant Sensitization Hx:       Previous Tx: Yes       Blood Transfusion: Yes             Uremic Symptoms:       Fatigue: No; Nausea: No; Poor Appetite: No;         Cardiovascular Hx:       h/o Cardiac Issues: No       Exercise Tolerance: no chest pain or shortness of breath with exertion.         Health Maintenance:       Dermatology: Not up to date and Dental: Not up to date         Potential Donor(s): No    ROS:  A comprehensive review of systems was obtained and negative, except as noted in the HPI or PMH.    PMH:   Medical record was reviewed and PMH was discussed with patient and noted below.  Past Medical History:   Diagnosis Date     Gastro-oesophageal reflux disease      Gout      Hypertension      Medical non-compliance      Pulmonary nodules      Renal  disease      Steroid long-term use        PSH:   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     Personal or family history of bleeding or anesthesia problems: No    Family Hx:  Family History   Problem Relation Age of Onset     Hypertension Mother        Personal Hx:   Social History     Social History     Marital status:      Spouse name: N/A     Number of children: N/A     Years of education: N/A     Occupational History     Not on file.     Social History Main Topics     Smoking status: Former Smoker     Types: Cigarettes     Quit date: 03/2017     Smokeless tobacco: Never Used      Comment: Patient states that he is an 'social'  smoker      Alcohol use 2.5 oz/week     5 Standard drinks or equivalent per week     Drug use: No     Sexual activity: No     Other Topics Concern     Not on file     Social History Narrative       Allergies:  Allergies   Allergen Reactions     Nkda [No Known Drug Allergies]        Medications:  Prior to Admission medications    Medication Sig Start Date End Date Taking? Authorizing Provider   tacrolimus (GENERIC EQUIVALENT) 1 MG capsule Take 2 capsules (2 mg) by mouth 2 times daily 8/15/17   Gee Barrios MD   sodium bicarbonate 650 MG tablet Take 2 tablets (1,300 mg) by mouth 3 times daily 8/9/17   Gee Barrios MD   cephalexin (KEFLEX) 250 MG capsule  8/1/17   Reported, Patient   cephalexin 250 MG TABS Take 1 tablet by mouth 2 times daily 8/1/17   Juanito Castro MD   carvedilol (COREG) 25  MG tablet Take 1 tablet (25 mg) by mouth 2 times daily 8/1/17   Juanito Castro MD   Lactobacillus-Inulin ([facility]) CAPS Take 1 capsule by mouth 2 times daily Take 30 minutes after each dose of Cephalexin. 8/1/17   Juanito aCstro MD   furosemide (LASIX) 20 MG tablet Take 1 tablet (20 mg) by mouth 2 times daily Please follow up in clinic for next refill. 6/9/17   Srinivas Harrington MD   sulfamethoxazole-trimethoprim (BACTRIM/SEPTRA) 400-80 MG per tablet Take 1 tablet by mouth every other day 5/15/17   Gee Barrios MD   mycophenolate (CELLCEPT - GENERIC EQUIVALENT) 250 MG capsule Take 2 capsules (500 mg) by mouth 2 times daily 4/25/17   Gee Barrios MD   predniSONE (DELTASONE) 20 MG tablet Take 3 tablets (60 mg) by mouth daily 3/14/17   Ania Osullivan MD   predniSONE (DELTASONE) 5 MG tablet Take 1 tablet (5 mg) by mouth daily 2/15/17   Gee Barrios MD   losartan (COZAAR) 100 MG tablet Take 1 tablet (100 mg) by mouth daily 9/12/16   Celestino Andrews MD   omeprazole (PRILOSEC) 20 MG capsule Take 1 capsule (20 mg) by mouth daily 6/10/14   Lani Quick MD       Vitals:  There were no vitals taken for this visit.    Exam:  GENERAL APPEARANCE: alert and no distress  HENT: mouth without ulcers or lesions. Good dentition.   LYMPHATICS: no cervical or supraclavicular nodes  RESP: lungs clear to auscultation - no rales, rhonchi or wheezes  CV: regular rhythm, normal rate, no rub, no murmur. No carotid bruit bilaterally.   FEMORAL PULSES: +2 equal bilaterally   EDEMA: 4+ right side, 2+ left side   ABDOMEN: soft, nondistended, nontender, bowel sounds normal, itching over graft.   MS: extremities normal - no gross deformities noted, no evidence of inflammation in joints, no muscle tenderness  SKIN: no rash    Results:   Recent Results (from the past 336 hour(s))   INR [[id number]]    Collection Time: 09/25/17  7:20 AM   Result Value Ref Range    INR 1.10 0.86 -  1.14   Partial thromboplastin time [LAB56]    Collection Time: 09/25/17  7:20 AM   Result Value Ref Range    PTT 28 22 - 37 sec   CBC with platelets differential [NYZ252]    Collection Time: 09/25/17  7:20 AM   Result Value Ref Range    WBC 4.0 4.0 - 11.0 10e9/L    RBC Count 3.63 (L) 4.4 - 5.9 10e12/L    Hemoglobin 9.6 (L) 13.3 - 17.7 g/dL    Hematocrit 30.7 (L) 40.0 - 53.0 %    MCV 85 78 - 100 fl    MCH 26.4 (L) 26.5 - 33.0 pg    MCHC 31.3 (L) 31.5 - 36.5 g/dL    RDW 14.9 10.0 - 15.0 %    Platelet Count 163 150 - 450 10e9/L    Diff Method Automated Method     % Neutrophils 63.0 %    % Lymphocytes 22.4 %    % Monocytes 11.9 %    % Eosinophils 2.0 %    % Basophils 0.2 %    % Immature Granulocytes 0.5 %    Nucleated RBCs 0 0 /100    Absolute Neutrophil 2.5 1.6 - 8.3 10e9/L    Absolute Lymphocytes 0.9 0.8 - 5.3 10e9/L    Absolute Monocytes 0.5 0.0 - 1.3 10e9/L    Absolute Eosinophils 0.1 0.0 - 0.7 10e9/L    Absolute Basophils 0.0 0.0 - 0.2 10e9/L    Abs Immature Granulocytes 0.0 0 - 0.4 10e9/L    Absolute Nucleated RBC 0.0    Routine UA with microscopic [[id number]]    Collection Time: 09/25/17  7:29 AM   Result Value Ref Range    Color Urine Straw     Appearance Urine Clear     Glucose Urine Negative NEG^Negative mg/dL    Bilirubin Urine Negative NEG^Negative    Ketones Urine Negative NEG^Negative mg/dL    Specific Gravity Urine 1.010 1.003 - 1.035    Blood Urine Negative NEG^Negative    pH Urine 6.0 5.0 - 7.0 pH    Protein Albumin Urine Negative NEG^Negative mg/dL    Urobilinogen mg/dL 0.0 0.0 - 2.0 mg/dL    Nitrite Urine Negative NEG^Negative    Leukocyte Esterase Urine Negative NEG^Negative    Source Midstream Urine     WBC Urine <1 0 - 2 /HPF    RBC Urine <1 0 - 2 /HPF    Hyaline Casts 1 0 - 2 /LPF           Again, thank you for allowing me to participate in the care of your patient.      Sincerely,    Gee Barrios MD

## 2017-09-25 NOTE — MR AVS SNAPSHOT
After Visit Summary   9/25/2017    Rashad Ortiz    MRN: [id number]           Patient Information     Date Of Birth          1975        Visit Information        Provider Department      9/25/2017 9:45 AM UC PKE PATIENT 1 [facility] Solid Organ Transplant        Today's Diagnoses     Localized edema    -  1    Chronic kidney disease, stage 4, severely decreased GFR (H)        Hypertension goal BP (blood pressure) < 140/90        Antibody mediated rejection of kidney transplant           Follow-ups after your visit        Your next 10 appointments already scheduled     Nov 08, 2017   Procedure with Julia Irwin MD   [facility] [place], Same Day Surgery (--)    [address] [facility] [address]   [phone]            Nov 13, 2017  4:50 PM CST   (Arrive by 4:20 PM)   Return Kidney Transplant with Stef Murillo MD   [facility] Nephrology ([facility])    [address] [place] [id number]   [phone]            Nov 22, 2017  1:00 PM CST   (Arrive by 12:45 PM)   Post-Op with ISABEL Walker [facility] Solid Organ Transplant ([facility])    [address] [place] [address]   [phone]            Td 10, 2018  1:30 PM CST   FULL PULMONARY FUNCTION with  PFL B   [facility] Pulmonary Function Testing ([facility])    [address] [place] [address]   [phone]            Td 10, 2018  3:00 PM CST   (Arrive by 2:45 PM)   NEW PANCREAS/KIDNEY TRANSPLANT WORK-UP with Gelacio Jimenez MD   [facility] Heart Care ([facility])    [address] [place] [address]   [phone]            Td 10, 2018  3:45 PM CST   (Arrive by 3:30 PM)   New Patient Visit with KAILA Kaufman MD   [facility] Dermatology ([facility])    [address] [place] [address]  "[id number] [phone]              Who to contact     If you have questions or need follow up information about today's clinic visit or your schedule please contact [facility] SOLID ORGAN TRANSPLANT directly at [phone].  Normal or non-critical lab and imaging results will be communicated to you by SecureLinkhart, letter or phone within 4 business days after the clinic has received the results. If you do not hear from us within 7 days, please contact the clinic through SecureLinkhart or phone. If you have a critical or abnormal lab result, we will notify you by phone as soon as possible.  Submit refill requests through [employer] or call your pharmacy and they will forward the refill request to us. Please allow 3 business days for your refill to be completed.          Additional Information About Your Visit        [employer] [place] Information     [employer] lets you send messages to your doctor, view your test results, renew your prescriptions, schedule appointments and more. To sign up, go to [email or web address] . Click on \"Log in\" on the left side of the screen, which will take you to the Welcome page. Then click on \"Sign up Now\" on the right side of the page.     You will be asked to enter the access code listed below, as well as some personal information. Please follow the directions to create your username and password.     Your access code is: [email or web address]  Expires: 10/30/2017  5:20 PM     Your access code will  in 90 days. If you need help or a new code, please call your [place] clinic or [phone].        Care EveryWhere ID     This is your Care EveryWhere ID. This could be used by other organizations to access your [place] medical records  [id number]        Your Vitals Were     Pulse Temperature Respirations Pulse Oximetry BMI (Body Mass Index)       71 98.3  F (36.8  C) (Oral) 16 99% 24.17 kg/m2        Blood Pressure from Last 3 Encounters:   17 (!) 144/92   17 127/81   17 (!) 130/96    Weight " from Last 3 Encounters:   09/25/17 71.6 kg (157 lb 12.8 oz)   08/07/17 72.1 kg (159 lb)   08/01/17 72.1 kg (159 lb)                 Today's Medication Changes          These changes are accurate as of: 9/25/17 11:59 PM.  If you have any questions, ask your nurse or doctor.               Stop taking these medicines if you haven't already. Please contact your care team if you have questions.     cephalexin 250 MG capsule   Commonly known as:  KEFLEX           cephalexin 250 MG Tabs           [facility]                    Primary Care Provider Office Phone # Fax #    Francie Barraza PA-C [phone] [address] [place]  [place] [facility] [address]        Equal Access to Services     LUISANA GUTIERREZ : Pino fryeo Sojose, waaxda luqadaha, qaybta kaalmada adeegyada, ronaldo spencer . So [facility] [phone].    ATENCIÓN: Si habla español, tiene a ward disposición servicios gratuitos de asistencia lingüística. Llame al [phone].    We comply with applicable federal civil rights laws and [place] laws. We do not discriminate on the basis of race, color, national origin, age, disability, sex, sexual orientation, or gender identity.            Thank you!     [facility] you [facility]ANS[facility]  for your care. Our goal is always to provide you with excellent care. Hearing back from our patients is one way we can continue to improve our services. Please take a few minutes to complete the written survey that you may receive in the mail after your visit with us. Thank you!             Your Updated Medication List - Protect others around you: Learn how to safely use, store and throw away your medicines at www.disposemymeds.org.          This list is accurate as of: 9/25/17 11:59 PM.  Always use your most recent med list.                   Brand Name Dispense Instructions for use Diagnosis    carvedilol 25 MG tablet    COREG    180 tablet     Take 1 tablet (25 mg) by mouth 2 times daily        furosemide 20 MG tablet    LASIX    180 tablet    Take 1 tablet (20 mg) by mouth 2 times daily Please follow up in clinic for next refill.    Hypertension goal BP (blood pressure) < 140/90       mycophenolate 250 MG capsule    GENERIC EQUIVALENT    120 capsule    Take 2 capsules (500 mg) by mouth 2 times daily    Kidney transplanted       omeprazole 20 MG CR capsule    priLOSEC    90 capsule    Take 1 capsule (20 mg) by mouth daily    Kidney replaced by transplant       * predniSONE 5 MG tablet    DELTASONE    30 tablet    Take 1 tablet (5 mg) by mouth daily    Kidney replaced by transplant       * predniSONE 20 MG tablet    DELTASONE    15 tablet    Take 3 tablets (60 mg) by mouth daily    Acute gouty arthritis       sodium bicarbonate 650 MG tablet     120 tablet    Take 2 tablets (1,300 mg) by mouth 3 times daily    Kidney transplanted, Complications, kidney transplant, Aftercare following organ transplant, Immunosuppressed status (H), Encounter for long-term (current) use of high-risk medication       sulfamethoxazole-trimethoprim 400-80 MG per tablet    BACTRIM/SEPTRA    45 tablet    Take 1 tablet by mouth every other day    Kidney transplanted       tacrolimus 1 MG capsule    GENERIC EQUIVALENT    120 capsule    Take 2 capsules (2 mg) by mouth 2 times daily    Kidney transplant recipient, Long-term use of immunosuppressant medication       * Notice:  This list has 2 medication(s) that are the same as other medications prescribed for you. Read the directions carefully, and ask your doctor or other care provider to review them with you.

## 2017-09-25 NOTE — MR AVS SNAPSHOT
After Visit Summary   9/25/2017    Rashad Ortiz    MRN: [id number]           Patient Information     Date Of Birth          1975        Visit Information        Provider Department      9/25/2017 10:30 AM Anya Guo, MSRONY [facility] Solid Organ Transplant        Today's Diagnoses     Organ transplant candidate    -  1       Follow-ups after your visit        Your next 10 appointments already scheduled     Sep 25, 2017 12:00 PM CDT   US LOWER EXTREMITY VENOUS DUPLEX BILATERAL with UCUSV1   [facility] US ([facility])    [address] [place] [address] [phone]           Please bring a list of your medicines (including vitamins, minerals and over-the-counter drugs). Also, tell your doctor about any allergies you may have. Wear comfortable clothes and leave your valuables at home.  You do not need to do anything special to prepare for your exam.  Please call the Imaging Department at your exam site with any questions.            Sep 25, 2017  1:30 PM CDT   Lab with  LAB   [facility] Lab ([facility])    [address] [place] [address] [phone]            Sep 25, 2017  2:00 PM CDT   (Arrive by 1:45 PM)   XR CHEST 2 VIEWS with UCXR1   [facility] Xray ([facility])    [address] [place] [id number] [phone]           Please bring a list of your current medicines to your exam. (Include vitamins, minerals and over-thecounter medicines.) Leave your valuables at home.  Tell your doctor if there is a chance you may be pregnant.  You do not need to do anything special for this exam.            Sep 25, 2017  2:20 PM CDT   (Arrive by 2:05 PM)   ecg with  CV EKG   [facility] Xray ([facility])    [address] [place] [address] [phone]             Sep 25, 2017  3:00 PM CDT   Ech Complete with UCECHCR2   [facility] Echo ([facility])    [address] [phone] [phone]           1. Please bring or wear a comfortable two-piece outfit. 2. You may eat, drink and take your normal medicines. 3. For any questions that cannot be answered, please contact the ordering physician            Nov 13, 2017  4:50 PM CST   (Arrive by 4:20 PM)   Return Kidney Transplant with Stef Murillo MD   [facility] Nephrology ([facility])    [address] [place] [id number] [phone]            Td 10, 2018  1:30 PM CST   FULL PULMONARY FUNCTION with  PFL B   [facility] Pulmonary Function Testing ([facility])    [address] [place] [id number] [phone]            Td 10, 2018  3:00 PM CST   (Arrive by 2:45 PM)   NEW PANCREAS/KIDNEY TRANSPLANT WORK-UP with Gelacio Jimenez MD   [facility] Heart Care ([facility])    [address] [place] [id number] [phone]              Future tests that were ordered for you today     Open Future Orders        Priority Expected Expires Ordered    US Lower Extremity Venous Duplex Bilateral Routine  12/25/2017 9/25/2017            Who to contact     If you have questions or need follow up information about today's clinic visit or your schedule please contact [facility] SOLID ORGAN TRANSPLANT directly at [phone].  Normal or non-critical lab and imaging results will be communicated to you by MyChart, letter or phone within 4 business days after the clinic has received the results. If you do not hear from us within 7 days, please contact the clinic through [employer] or phone. If you have a critical or abnormal lab result, we will notify you by phone as soon as possible.  Submit refill requests through [employer] or call  "your pharmacy and they will forward the refill request to us. Please allow 3 business days for your refill to be completed.          Additional Information About Your Visit        MyChart Information     [employer] lets you send messages to your doctor, view your test results, renew your prescriptions, schedule appointments and more. To sign up, go to [email or web address] . Click on \"Log in\" on the left side of the screen, which will take you to the Welcome page. Then click on \"Sign up Now\" on the right side of the page.     You will be asked to enter the access code listed below, as well as some personal information. Please follow the directions to create your username and password.     Your access code is: [email or web address]  Expires: 10/30/2017  5:20 PM     Your access code will  in 90 days. If you need help or a new code, please call your [place] clinic or [phone].        Care EveryWhere ID     This is your Care EveryWhere ID. This could be used by other organizations to access your [facility] medical records  [id number]         Blood Pressure from Last 3 Encounters:   17 (!) 144/92   17 127/81   17 (!) 130/96    Weight from Last 3 Encounters:   17 71.6 kg (157 lb 12.8 oz)   17 72.1 kg (159 lb)   17 72.1 kg (159 lb)              Today, you had the following     No orders found for display       Primary Care Provider Office Phone # Fax #    Francie Barraza PA-C [phone]       [address] [place]  [facility] [address]        Equal Access to Services     [facility] : Hadii song michelle Sojose, waaxda luqadaha, qaybta kaalmada shukri, ronaldo spencer . So [facility] [phone].    ATENCIÓN: Si habla español, tiene a ward disposición servicios gratuitos de asistencia lingüística. Llame al [phone].    We comply with applicable federal civil rights laws and [place] laws. We do not discriminate on the basis of race, color, " national origin, age, disability sex, sexual orientation or gender identity.            Thank you!     Thank you for choosing [facility] SOLID ORGAN TRANSPLANT  for your care. Our goal is always to provide you with excellent care. Hearing back from our patients is one way we can continue to improve our services. Please take a few minutes to complete the written survey that you may receive in the mail after your visit with us. Thank you!             Your Updated Medication List - Protect others around you: Learn how to safely use, store and throw away your medicines at www.disposemymeds.org.          This list is accurate as of: 9/25/17 11:50 AM.  Always use your most recent med list.                   Brand Name Dispense Instructions for use Diagnosis    carvedilol 25 MG tablet    COREG    180 tablet    Take 1 tablet (25 mg) by mouth 2 times daily        * cephalexin 250 MG Tabs     14 tablet    Take 1 tablet by mouth 2 times daily    Folliculitis       * cephalexin 250 MG capsule    KEFLEX          [facility] Caps     30 capsule    Take 1 capsule by mouth 2 times daily Take 30 minutes after each dose of Cephalexin.    Folliculitis       furosemide 20 MG tablet    LASIX    180 tablet    Take 1 tablet (20 mg) by mouth 2 times daily Please follow up in clinic for next refill.    Hypertension goal BP (blood pressure) < 140/90       losartan 100 MG tablet    COZAAR    90 tablet    Take 1 tablet (100 mg) by mouth daily    Renal hypertension, stage 1-4 or unspecified chronic kidney disease       mycophenolate 250 MG capsule    GENERIC EQUIVALENT    120 capsule    Take 2 capsules (500 mg) by mouth 2 times daily    Kidney transplanted       omeprazole 20 MG CR capsule    priLOSEC    90 capsule    Take 1 capsule (20 mg) by mouth daily    Kidney replaced by transplant       * predniSONE 5 MG tablet    DELTASONE    30 tablet    Take 1 tablet (5 mg) by mouth daily    Kidney replaced by transplant       *  predniSONE 20 MG tablet    DELTASONE    15 tablet    Take 3 tablets (60 mg) by mouth daily    Acute gouty arthritis       sodium bicarbonate 650 MG tablet     120 tablet    Take 2 tablets (1,300 mg) by mouth 3 times daily    Kidney transplanted, Complications, kidney transplant, Aftercare following organ transplant, Immunosuppressed status (H), Encounter for long-term (current) use of high-risk medication       sulfamethoxazole-trimethoprim 400-80 MG per tablet    BACTRIM/SEPTRA    45 tablet    Take 1 tablet by mouth every other day    Kidney transplanted       tacrolimus 1 MG capsule    GENERIC EQUIVALENT    120 capsule    Take 2 capsules (2 mg) by mouth 2 times daily    Kidney transplant recipient, Long-term use of immunosuppressant medication       * Notice:  This list has 4 medication(s) that are the same as other medications prescribed for you. Read the directions carefully, and ask your doctor or other care provider to review them with you.

## 2017-09-25 NOTE — NURSING NOTE
Pt attended the Pre Kidney Transplant Patient Teaching Class today along with his wife. Viewed [facility] - both verbal and written instructions were provided. In addition to the MTP content, I also reviewed pictures of kidney recip/donor surgery, general components of the post kidney transplant routine, need for frequent appts at [facility] [place] [facility] discharge, option of live kidney donation, Paired Exchange, KDPI scoring system. Both pt and his wife were very engaged during the Class and asked a few good questions which I answered to the best of my ability.

## 2017-09-25 NOTE — PROGRESS NOTES
NUTRITION KIDNEY TRANSPLANT EVALUATION  Medical Nutrition Therapy    Weight and BMI:  Current BMI: 24.17  BMI is within criteria of <35 for kidney transplant    Additional Concerns: None       Visit Type: Intake Assessment    Rashad Ortiz referred by Dr. Gaspar for MNT related to kidney transplant evaluation    Patient accompanied by spouse    H/o previous txp: Pt s/p LDKT in 2011 - now failing transplant 2/2 chronic rejection    Nutrition Assessment:  Anthropometrics  Height: 68 in   BMI: 24.17       Weight Status:Normal BMI   Weight: 158 lbs            IBW (lb): 142 lbs  % IBW: 111%    Wt Hx:   Wt Readings from Last 6 Encounters:   09/25/17 71.6 kg (157 lb 12.8 oz)   08/07/17 72.1 kg (159 lb)   08/01/17 72.1 kg (159 lb)   04/10/17 72.1 kg (159 lb)   03/14/17 79.1 kg (174 lb 4.8 oz)   03/01/17 77.1 kg (170 lb)   12 lb wt loss in last 6 months (7.1%)           Recent Labs   Lab Test  09/25/17   0720  03/16/15   0818  12/05/11   1046   CHOL  102  155  170   HDL  60  51  56   LDL  27  60  96   TRIG  75  218*  90   CHOLHDLRATIO   --   3.0  3.0     Lab Results   Component Value Date    A1C 5.4 06/06/2015     Potassium   Date Value Ref Range Status   09/25/2017 4.6 3.4 - 5.3 mmol/L Final     Phosphorus: 3.8      Vitamins, Supplements, Pertinent Meds: Tacrolimus, Lasix, Cellcept, Prednisone  Herbal Medicines/Supplements: None    Dialysis Modality: Not on Dialysis at this time      Nutrition History  Pt-reported special diets/eating habits: Pt reports being on regular diet. Pt reports liking high sodium foods. Per wife, adds salt to boxed Macaroni and Cheese 2/2 pt feeling it tasting bland.  Per wife pt likes high sodium and high protein foods. Pt enjoys cooking. Wife and pt state his portion size for protein are big.   Dining out/food not made at home: not often, possibly twice a month take out pizza  Appetite: average    Recall:  B: Eggs or toast or cereal or granola bar or skips breakfast and just has coffee  L:  Central or Fast food (double cheese burger) with chips  D: Pasta or fried chicken or fish with rice or sausage (Brats) or meat and vegetables  Sn: Pt reports he is not a snacker  Beverages: Water (2 x 52 fl oz), OJ, 1-2 cups of coffee, 2% milk in cereal possibly every other day  ETOH (1 drink = 12 oz beer, 5 oz wine, 1.5 oz liquor): 1 serving of beer a day    Current adherence to recommended diet: Fair/    Physical Activity: 1 + mile walks few times a week      MALNUTRITION  % Intake:  <75% for > 7 days (non-severe malnutrition)  % Weight Loss:  Weight loss does not meet criteria for malnutrition - 7.1 % in 6 months  Subcutaneous Fat Loss:  Overall mild  Muscle Loss:  Overall mild (unable to assess very accurately since pt was dressed in layers  Fluid Accumulation/Edema:  Mild LE  Malnutrition Diagnosis: Non-Severe malnutrition in the context of chronic illness     Nutrition Prescription using CBW  Energy: 1800 - 2160 kcal/day  (25-30 kcal/kg for maintenance)     Protein: 43 - 58 gm of protein per day  (0.6 - 0.8 g/kg for CKD)           Fluid:  1 ml/kcal or per MD     Nutrition Diagnosis:  Excessive Na+/protein intake r/t food and nutrition related knowledge deficit AEB diet recall reveals high Na+ foods and pt and his wife report in this CKD stage 4 pt.    Food and nutrition related knowledge deficit r/t pre kidney transplant eval AEB pt verbalized needing a brief review of  pre/post transplant diet guidelines.    Nutrition Intervention:  Nutrition education provided:  Discussed sodium intake (low sodium foods and drinks, seasoning food without salt and tips for low sodium diet) and possibly cutting back portion sizes for protein by 1/2 (currently appears to consume 6-8 oz of protein at meals). Discussed the need for increased kcal and protein if pt ends up initiating dialysis due to worsening renal function.      Reviewed post txp diet guidelines in brief (will review in further detail post txp):   (1) Review of  proper food safety measures d/t immunosuppressant therapy post-op and increased risk for food-borne illness (2) Stressed importance of not taking any herbal/Chinese/alternative medicines or supplements post txp (d/t risk for rejection, unknown effects on the organs, potential interactions with immunosuppresants). (3) Med regimen and possible side effects    Patient Understanding: Pt verbalized understanding of education provided.  Expected Compliance: Fair  Follow-Up Plans: PRN     Nutrition Goals:  1. Limit Na+ <2000mg/day  2. ~ 40 - 60 gm protein/day (cut back protein portions by 1/2 until starting dialysis)    Provided pt with contact info.   Time spent with patient: 30 minutes.  Jeniffer Hubbard RD, LD

## 2017-09-25 NOTE — PROGRESS NOTES
Assessment and Plan:  1. Kidney transplant evaluation - patient is a good candidate overall. Benefits of a living donor transplant were discussed.  2. CKD from hypertension vs unknown etiology s/p LDKT (2011) - has had a slow decline of kidney function since 2012. History of non-compliance in 2015 related to insurance problems and was without medications for 6 weeks. He was treated at that time for acute cellular and antibody mediated rejection with Thymoglobulin, PLEX and IVIG. His kidney function recovered to a Cr 2.3, however, over the past few months his kidney function has worsened. Current serum creatinine is 4.48 mg/dl with an eGFR of 18 ml/min. Most recent biopsy in 2/2017 showed chronic changes. His immunosuppression includes tacrolimus 1 mg BID, Cellcept 500 BID, and prednisone 5 mg daily. His uremic symptoms are minimal. He does not have dialysis access and does not have any potential donors. When ready, he would likely benefit from a kidney transplant.   3. Hypertension - controlled. Encouraged patient to check blood pressures at home.   4. Cardiac Risk - no history of cardiac disease or events. Last ECHO today showed a normal ventricular function with an EF of 60-65%. Given cardiac risk factors, he will need a cardiac risk assessment.   5. Gout - not controlled. Will need to check uric acid when he is not having a  flare. If uric acid is > 6 mg/dl, Dr. Murillo plans to start allopurinol with a 5 day course of prednisone 20 mg daily.       6. Lower extremity edema (L>R) - since 2011. Lower extremity dopplers completed today. No evidence of DVT.   7. Current smoker - has smoked less than a pack per month since age 19. Discussed smoking cessation for overall health and to improve post operative outcomes. Will need PFTs.   8. Non compliance - would appreciate social work input. Will discuss compliance contract with transplant committee.   9. Health maintenance - due for a skin check. He also has had facial  folliculitis for the past 4 months, treated twice with abx with no improvement. This should also be addressed at derm appt. In addition, he will need dental check.    Discussed the risks and benefits of a transplant, including the risk of surgery and immunosuppression medications.  Patients overall evaluation will be discussed in the Transplant Program's regular meeting with a final recommendation on the patients suitability for transplant to be made at that time.  Patient was seen in conjunction with Dr. Gee Barrios as part of a shared visit.    Patient was seen by myself, Dr. Gee Barrios, in conjunction with ISABEL Jacobson, CNP as part of a shared visit.    I personally reviewed past medical and surgical history, vital signs, medications and labs.  Present and past medical history, along with significant physical exam findings were all reviewed with MARIA ESTHER.    My felix findings:  Rashad Ortiz is a 42 year old year old male with ESKD from unknown etiology, s/p LDKT, who presents for kidney retransplant evaluation.  Patient reports feeling okay overall with some medical complaints.    Key management decisions made by me and discussed with MARIA ESTHER:  1. Kidney retransplant evaluation - patient is a good candidate overall. Benefits of a living donor transplant were discussed.  2. ESKD from unknown etiology, possibly HTN, s/p LDKT - patient has had slow decline in kidney transplant function and would benefit from another kidney transplant.  Preferably, a preemptive, living donor kidney transplant.  3. Cardiac risk - patient will require Cardiology evaluation prior to transplant.  4. Gout - not well controlled and patient to follow up with PCP and primary nephrologist to manage.  5. Tobacco abuse - strongly encouraged patient to undergo smoking cessation.  6. Medical noncompliance - discussed importance of compliance with medications, labs and medical appointments.  Appreciate  input, but would  likely recommend compliance contract.    Evaluation:  Rashad Ortiz was seen in consultation at the request of Dr. Sumit Gaspar for evaluation as a potential Kidney transplant recipient.    Reason for Visit:  Rashad Ortiz is a 42 year old male with CKD from Hypertension, who presents for Kidney transplant evaluation.    HPI:  Mr. Ortiz is a 42-year-old that presents today with CKD secondary to hypertension versus unknown etiology s/p LDKT (1/13/11). He has had a slow decline of kidney function since 2012 with some non compliance issues in 2015 (creatinine up to 6). He reports this was related to insurance issues and he went without his medication for 6 weeks. Kidney transplant biopsy (6/2015) showed acute cellular-mediated rejection, [place] 1B with plasma cell rich infiltrate and also acute antibody-mediated rejection. Biopsy also showed severe ATN with focal necrosis and severe interstitial fibrosis and tubular atrophy. Patient was treated with Thymoglobulin, PLEX and IVIg. His creatinine did improve to as low as 2.3, however, following treatment his lab follow-up was poor. He has another biopsy on 2/2017 that showed moderate chronic tubulo-interstitial changes and no evidence of acute rejection. His current serum creatinine is 4.48 mg/dl with an eGFR of 18 ml/min. His immunosuppression now includes tacrolimus 1 mg BID, Cellcept 500 BID, and prednisone 5 mg daily. His uremic symptoms are minimal. He does not have dialysis access and does not have any potential donors.     He has no history of cardiac disease or events. Last ECHO today showed a normal ventricular function with an EF of 60-65%. Other PMH includes GERD (omeprazole) and gout since 2011, recently more poorly controlled. He usually has 1-2 flares per year, however, he has already had 3 flares this year. All resolved with one time high dose prednisone. Not on allopurinol. Overall, he is doing fairly well. He is working in customer support and  goes for long walks (1+ miles) a few times per week without chest pain, shortness of breath or claudication symptoms. He is making large amounts of urine and denies dysuria, hematuria or trouble emptying his bladder or starting his stream.  As far as health maintenance, he is due for a skin check. He has had facial folliculitis since may, treated twice with abx with no improvement, that should also be addressed at this appt. Will also need dental check.                 Kidney Disease Hx:        Kidney Disease Dx: HTN        Biopsy Proven: Yes         On Dialysis: No       Primary Nephrologist: Dr. Chidi Mcneil Hx:       h/o HTN: Yes         h/o DM:  No       h/o Protein in Urine: Yes        h/o Blood in Urine:  Yes        h/o Kidney Stones:  No       h/o UTI: No       h/o Chronic NSAID Use: No         Previous Transplant Hx:        Yes; LDKT 2011         Transplant Sensitization Hx:       Previous Tx: Yes       Blood Transfusion: Yes             Uremic Symptoms:       Fatigue: No; Nausea: No; Poor Appetite: No;         Cardiovascular Hx:       h/o Cardiac Issues: No       Exercise Tolerance: no chest pain or shortness of breath with exertion.         Health Maintenance:       Dermatology: Not up to date and Dental: Not up to date         Potential Donor(s): No    ROS:  A comprehensive review of systems was obtained and negative, except as noted in the HPI or PMH.    PMH:   Medical record was reviewed and PMH was discussed with patient and noted below.  Past Medical History:   Diagnosis Date     Gastro-oesophageal reflux disease      Gout      Hypertension      Medical non-compliance      Pulmonary nodules      Renal disease      Steroid long-term use        PSH:   Past Surgical History:   Procedure Laterality Date     AV FISTULA OR GRAFT ARTERIAL       LIGATE FISTULA ARTERIOVENOUS UPPER EXTREMITY  12/20/2011    Procedure:LIGATE FISTULA ARTERIOVENOUS UPPER EXTREMITY; Excision of Right Forearm Arteriovenous  Fistula.; Surgeon:LINDY AMAYA; Location:[facility] OR     PERCUTANEOUS BIOPSY KIDNEY Right 2/28/2017    Procedure: PERCUTANEOUS BIOPSY KIDNEY;  Surgeon: Gee Barrios MD;  Location: [facility] OR     TRANSPLANT  01/13/2011    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     Personal or family history of bleeding or anesthesia problems: No    Family Hx:  Family History   Problem Relation Age of Onset     Hypertension Mother        Personal Hx:   Social History     Social History     Marital status:      Spouse name: N/A     Number of children: N/A     Years of education: N/A     Occupational History     Not on file.     Social History Main Topics     Smoking status: Former Smoker     Types: Cigarettes     Quit date: 03/2017     Smokeless tobacco: Never Used      Comment: Patient states that he is an 'social'  smoker      Alcohol use 2.5 oz/week     5 Standard drinks or equivalent per week     Drug use: No     Sexual activity: No     Other Topics Concern     Not on file     Social History Narrative       Allergies:  Allergies   Allergen Reactions     Nkda [No Known Drug Allergies]        Medications:  Prior to Admission medications    Medication Sig Start Date End Date Taking? Authorizing Provider   tacrolimus (GENERIC EQUIVALENT) 1 MG capsule Take 2 capsules (2 mg) by mouth 2 times daily 8/15/17   Gee Barrios MD   sodium bicarbonate 650 MG tablet Take 2 tablets (1,300 mg) by mouth 3 times daily 8/9/17   Gee Barrios MD   cephalexin (KEFLEX) 250 MG capsule  8/1/17   Reported, Patient   cephalexin 250 MG TABS Take 1 tablet by mouth 2 times daily 8/1/17   Juanito Castro MD   carvedilol (COREG) 25 MG tablet Take 1 tablet (25 mg) by mouth 2 times daily 8/1/17   Juanito Castro MD   Lactobacillus-Inulin ([facility]) CAPS Take 1 capsule by mouth 2 times daily Take 30 minutes after each dose of Cephalexin. 8/1/17   Juanito Castro MD   furosemide (LASIX) 20 MG  tablet Take 1 tablet (20 mg) by mouth 2 times daily Please follow up in clinic for next refill. 6/9/17   Srinivas Harrington MD   sulfamethoxazole-trimethoprim (BACTRIM/SEPTRA) 400-80 MG per tablet Take 1 tablet by mouth every other day 5/15/17   Gee Barrios MD   mycophenolate (CELLCEPT - GENERIC EQUIVALENT) 250 MG capsule Take 2 capsules (500 mg) by mouth 2 times daily 4/25/17   Gee Barrios MD   predniSONE (DELTASONE) 20 MG tablet Take 3 tablets (60 mg) by mouth daily 3/14/17   Ania Osullivan MD   predniSONE (DELTASONE) 5 MG tablet Take 1 tablet (5 mg) by mouth daily 2/15/17   Gee Barrios MD   losartan (COZAAR) 100 MG tablet Take 1 tablet (100 mg) by mouth daily 9/12/16   Celestino Andrews MD   omeprazole (PRILOSEC) 20 MG capsule Take 1 capsule (20 mg) by mouth daily 6/10/14   Lani Quick MD       Vitals:  There were no vitals taken for this visit.    Exam:  GENERAL APPEARANCE: alert and no distress  HENT: mouth without ulcers or lesions. Good dentition.   LYMPHATICS: no cervical or supraclavicular nodes  RESP: lungs clear to auscultation - no rales, rhonchi or wheezes  CV: regular rhythm, normal rate, no rub, no murmur. No carotid bruit bilaterally.   FEMORAL PULSES: +2 equal bilaterally   EDEMA: 4+ right side, 2+ left side   ABDOMEN: soft, nondistended, nontender, bowel sounds normal, itching over graft.   MS: extremities normal - no gross deformities noted, no evidence of inflammation in joints, no muscle tenderness  SKIN: no rash    Results:   Recent Results (from the past 336 hour(s))   INR [UTT0490]    Collection Time: 09/25/17  7:20 AM   Result Value Ref Range    INR 1.10 0.86 - 1.14   Partial thromboplastin time [LAB56]    Collection Time: 09/25/17  7:20 AM   Result Value Ref Range    PTT 28 22 - 37 sec   CBC with platelets differential [[id number]]    Collection Time: 09/25/17  7:20 AM   Result Value Ref Range    WBC 4.0 4.0 - 11.0 10e9/L    RBC Count 3.63 (L) 4.4 -  5.9 10e12/L    Hemoglobin 9.6 (L) 13.3 - 17.7 g/dL    Hematocrit 30.7 (L) 40.0 - 53.0 %    MCV 85 78 - 100 fl    MCH 26.4 (L) 26.5 - 33.0 pg    MCHC 31.3 (L) 31.5 - 36.5 g/dL    RDW 14.9 10.0 - 15.0 %    Platelet Count 163 150 - 450 10e9/L    Diff Method Automated Method     % Neutrophils 63.0 %    % Lymphocytes 22.4 %    % Monocytes 11.9 %    % Eosinophils 2.0 %    % Basophils 0.2 %    % Immature Granulocytes 0.5 %    Nucleated RBCs 0 0 /100    Absolute Neutrophil 2.5 1.6 - 8.3 10e9/L    Absolute Lymphocytes 0.9 0.8 - 5.3 10e9/L    Absolute Monocytes 0.5 0.0 - 1.3 10e9/L    Absolute Eosinophils 0.1 0.0 - 0.7 10e9/L    Absolute Basophils 0.0 0.0 - 0.2 10e9/L    Abs Immature Granulocytes 0.0 0 - 0.4 10e9/L    Absolute Nucleated RBC 0.0    Routine UA with microscopic [[id number]]    Collection Time: 09/25/17  7:29 AM   Result Value Ref Range    Color Urine Straw     Appearance Urine Clear     Glucose Urine Negative NEG^Negative mg/dL    Bilirubin Urine Negative NEG^Negative    Ketones Urine Negative NEG^Negative mg/dL    Specific Gravity Urine 1.010 1.003 - 1.035    Blood Urine Negative NEG^Negative    pH Urine 6.0 5.0 - 7.0 pH    Protein Albumin Urine Negative NEG^Negative mg/dL    Urobilinogen mg/dL 0.0 0.0 - 2.0 mg/dL    Nitrite Urine Negative NEG^Negative    Leukocyte Esterase Urine Negative NEG^Negative    Source Midstream Urine     WBC Urine <1 0 - 2 /HPF    RBC Urine <1 0 - 2 /HPF    Hyaline Casts 1 0 - 2 /LPF

## 2017-09-25 NOTE — MR AVS SNAPSHOT
After Visit Summary   9/25/2017    Rashad Ortiz    MRN: [id number]           Patient Information     Date Of Birth          1975        Visit Information        Provider Department      9/25/2017 11:30 AM Reyna Soliman RD [facility] Solid Organ Transplant        Today's Diagnoses     Organ transplant candidate    -  1       Follow-ups after your visit        Your next 10 appointments already scheduled     Nov 13, 2017  4:50 PM CST   (Arrive by 4:20 PM)   Return Kidney Transplant with Stef Murillo MD   [facility] Nephrology ([facility])    [address] [place] [id number] [phone]            Td 10, 2018  1:30 PM CST   FULL PULMONARY FUNCTION with  PFL B   [facility] Pulmonary Function Testing ([facility])    [address] [place] [address] [id number] [phone]            Td 10, 2018  3:00 PM CST   (Arrive by 2:45 PM)   NEW PANCREAS/KIDNEY TRANSPLANT WORK-UP with Gelacio Jimenez MD   [facility] Heart Care ([facility])    [address] [place] [id number] [phone]              Who to contact     If you have questions or need follow up information about today's clinic visit or your schedule please contact [facility] SOLID ORGAN TRANSPLANT directly at [phone].  Normal or non-critical lab and imaging results will be communicated to you by MyChart, letter or phone within 4 business days after the clinic has received the results. If you do not hear from us within 7 days, please contact the clinic through MyChart or phone. If you have a critical or abnormal lab result, we will notify you by phone as soon as possible.  Submit refill requests through [employer] or call your pharmacy and they will forward the refill request to us. Please allow 3 business days for your refill to be completed.          Additional Information About Your  "Visit        [employer] Information     [employer] lets you send messages to your doctor, view your test results, renew your prescriptions, schedule appointments and more. To sign up, go to [email or web address] . Click on \"Log in\" on the left side of the screen, which will take you to the Welcome page. Then click on \"Sign up Now\" on the right side of the page.     You will be asked to enter the access code listed below, as well as some personal information. Please follow the directions to create your username and password.     Your access code is: [email or web address]  Expires: 10/30/2017  5:20 PM     Your access code will  in 90 days. If you need help or a new code, please call your [place] clinic or [phone].        Care EveryWhere ID     This is your Care EveryWhere ID. This could be used by other organizations to access your [place] medical records  [id number]         Blood Pressure from Last 3 Encounters:   17 (!) 144/92   17 127/81   17 (!) 130/96    Weight from Last 3 Encounters:   17 71.6 kg (157 lb 12.8 oz)   17 72.1 kg (159 lb)   17 72.1 kg (159 lb)              Today, you had the following     No orders found for display         Today's Medication Changes          These changes are accurate as of: 17  5:51 PM.  If you have any questions, ask your nurse or doctor.               Stop taking these medicines if you haven't already. Please contact your care team if you have questions.     cephalexin 250 MG capsule   Commonly known as:  KEFLEX           cephalexin 250 MG Tabs           [facility]                    Primary Care Provider Office Phone # Fax #    Francie Barraza PA-C [phone]       [address]  [place] [address]        Equal Access to Services     GLENN GUTIERREZ AH: Hadii aad ku hadasho Soomaali, waaxda luqadaha, qaybta kaalmada adeegyamalia, ronaldo donahue. So [facility] " [phone].    ATENCIÓN: Si archie saab, tiene a ward disposición servicios gratuitos de asistencia lingüística. Joshua maria [phone].    We comply with applicable federal civil rights laws and [place] laws. We do not discriminate on the basis of race, color, national origin, age, disability sex, sexual orientation or gender identity.            Thank you!     Thank you for choosing [facility] SOLID ORGAN TRANSPLANT  for your care. Our goal is always to provide you with excellent care. Hearing back from our patients is one way we can continue to improve our services. Please take a few minutes to complete the written survey that you may receive in the mail after your visit with us. Thank you!             Your Updated Medication List - Protect others around you: Learn how to safely use, store and throw away your medicines at www.disposemymeds.org.          This list is accurate as of: 9/25/17  5:51 PM.  Always use your most recent med list.                   Brand Name Dispense Instructions for use Diagnosis    carvedilol 25 MG tablet    COREG    180 tablet    Take 1 tablet (25 mg) by mouth 2 times daily        furosemide 20 MG tablet    LASIX    180 tablet    Take 1 tablet (20 mg) by mouth 2 times daily Please follow up in clinic for next refill.    Hypertension goal BP (blood pressure) < 140/90       losartan 100 MG tablet    COZAAR    90 tablet    Take 1 tablet (100 mg) by mouth daily    Renal hypertension, stage 1-4 or unspecified chronic kidney disease       mycophenolate 250 MG capsule    GENERIC EQUIVALENT    120 capsule    Take 2 capsules (500 mg) by mouth 2 times daily    Kidney transplanted       omeprazole 20 MG CR capsule    priLOSEC    90 capsule    Take 1 capsule (20 mg) by mouth daily    Kidney replaced by transplant       * predniSONE 5 MG tablet    DELTASONE    30 tablet    Take 1 tablet (5 mg) by mouth daily    Kidney replaced by transplant       * predniSONE 20 MG tablet    DELTASONE    15 tablet     Take 3 tablets (60 mg) by mouth daily    Acute gouty arthritis       sodium bicarbonate 650 MG tablet     120 tablet    Take 2 tablets (1,300 mg) by mouth 3 times daily    Kidney transplanted, Complications, kidney transplant, Aftercare following organ transplant, Immunosuppressed status (H), Encounter for long-term (current) use of high-risk medication       sulfamethoxazole-trimethoprim 400-80 MG per tablet    BACTRIM/SEPTRA    45 tablet    Take 1 tablet by mouth every other day    Kidney transplanted       tacrolimus 1 MG capsule    GENERIC EQUIVALENT    120 capsule    Take 2 capsules (2 mg) by mouth 2 times daily    Kidney transplant recipient, Long-term use of immunosuppressant medication       * Notice:  This list has 2 medication(s) that are the same as other medications prescribed for you. Read the directions carefully, and ask your doctor or other care provider to review them with you.

## 2017-09-25 NOTE — MR AVS SNAPSHOT
After Visit Summary   9/25/2017    Rashad Ortiz    MRN: [id number]           Patient Information     Date Of Birth          1975        Visit Information        Provider Department      9/25/2017 8:00 AM UC TRANSPLANT CLASS [facility] Solid Organ Transplant        Today's Diagnoses     Chronic kidney disease    -  1       Follow-ups after your visit        Your next 10 appointments already scheduled     Nov 13, 2017  4:50 PM CST   (Arrive by 4:20 PM)   Return Kidney Transplant with Stef Murillo MD   [facility] Nephrology ([facility])    [address] [place] [id number] [phone]            Td 10, 2018  1:30 PM CST   FULL PULMONARY FUNCTION with  PFL B   [facility] Pulmonary Function Testing ([facility])    [address] [place] [id number] [phone]            Td 10, 2018  3:00 PM CST   (Arrive by 2:45 PM)   NEW PANCREAS/KIDNEY TRANSPLANT WORK-UP with Gelacio Jimenez MD   [facility] Heart Care ([facility])    [address] [place] [address] [id number] [phone]            Td 10, 2018  3:45 PM CST   (Arrive by 3:30 PM)   New Patient Visit with KAILA Kaufman MD   [facility] Dermatology ([facility])    [address] [place] [id number] [phone]              Who to contact     If you have questions or need follow up information about today's clinic visit or your schedule please contact [facility] SOLID ORGAN TRANSPLANT directly at [phone].  Normal or non-critical lab and imaging results will be communicated to you by MyChart, letter or phone within 4 business days after the clinic has received the results. If you do not hear from us within 7 days, please contact the clinic through MyChart or phone. If you have a critical or abnormal lab result, we will notify you by phone as  "soon as possible.  Submit refill requests through Newstag or call your pharmacy and they will forward the refill request to us. Please allow 3 business days for your refill to be completed.          Additional Information About Your Visit        NexampharWebChalet Information     Newstag lets you send messages to your doctor, view your test results, renew your prescriptions, schedule appointments and more. To sign up, go to [email or web address] . Click on \"Log in\" on the left side of the screen, which will take you to the Welcome page. Then click on \"Sign up Now\" on the right side of the page.     You will be asked to enter the access code listed below, as well as some personal information. Please follow the directions to create your username and password.     Your access code is: [email or web address]  Expires: 10/30/2017  5:20 PM     Your access code will  in 90 days. If you need help or a new code, please call your [facility] clinic or [phone].        Care EveryWhere ID     This is your Care EveryWhere ID. This could be used by other organizations to access your [facility] medical records  [id number]         Blood Pressure from Last 3 Encounters:   17 (!) 144/92   17 127/81   17 (!) 130/96    Weight from Last 3 Encounters:   17 71.6 kg (157 lb 12.8 oz)   17 72.1 kg (159 lb)   17 72.1 kg (159 lb)              Today, you had the following     No orders found for display         Today's Medication Changes          These changes are accurate as of: 17 11:59 PM.  If you have any questions, ask your nurse or doctor.               Stop taking these medicines if you haven't already. Please contact your care team if you have questions.     cephalexin 250 MG capsule   Commonly known as:  KEFLEX           cephalexin 250 MG Tabs           [place] [facility]                    Primary Care Provider Office Phone # Fax #    Francie Barraza PA-C [phone]       " [address]  [place] [facility] [address]        Equal Access to Services     LONAGLENN MATT : Hadii song ku haddanno Sohumbertoali, waaxda luqadaha, qaybta kaalmada shukri, ronaldo galindovijayfernando spencer . So [facility] [phone].    ATENCIÓN: Si habla español, tiene a ward disposición servicios gratuitos de asistencia lingüística. Llame al [phone].    We comply with applicable federal civil rights laws and [place] laws. We do not discriminate on the basis of race, color, national origin, age, disability, sex, sexual orientation, or gender identity.            Thank you!     Thank you for choosing [facility] SOLID ORGAN TRANSPLANT  for your care. Our goal is always to provide you with excellent care. Hearing back from our patients is one way we can continue to improve our services. Please take a few minutes to complete the written survey that you may receive in the mail after your visit with us. Thank you!             Your Updated Medication List - Protect others around you: Learn how to safely use, store and throw away your medicines at www.disposemymeds.org.          This list is accurate as of: 9/25/17 11:59 PM.  Always use your most recent med list.                   Brand Name Dispense Instructions for use Diagnosis    carvedilol 25 MG tablet    COREG    180 tablet    Take 1 tablet (25 mg) by mouth 2 times daily        furosemide 20 MG tablet    LASIX    180 tablet    Take 1 tablet (20 mg) by mouth 2 times daily Please follow up in clinic for next refill.    Hypertension goal BP (blood pressure) < 140/90       mycophenolate 250 MG capsule    GENERIC EQUIVALENT    120 capsule    Take 2 capsules (500 mg) by mouth 2 times daily    Kidney transplanted       omeprazole 20 MG CR capsule    priLOSEC    90 capsule    Take 1 capsule (20 mg) by mouth daily    Kidney replaced by transplant       * predniSONE 5 MG tablet    DELTASONE    30 tablet    Take 1 tablet (5 mg) by mouth daily    Kidney replaced by transplant        * predniSONE 20 MG tablet    DELTASONE    15 tablet    Take 3 tablets (60 mg) by mouth daily    Acute gouty arthritis       sodium bicarbonate 650 MG tablet     120 tablet    Take 2 tablets (1,300 mg) by mouth 3 times daily    Kidney transplanted, Complications, kidney transplant, Aftercare following organ transplant, Immunosuppressed status (H), Encounter for long-term (current) use of high-risk medication       sulfamethoxazole-trimethoprim 400-80 MG per tablet    BACTRIM/SEPTRA    45 tablet    Take 1 tablet by mouth every other day    Kidney transplanted       tacrolimus 1 MG capsule    GENERIC EQUIVALENT    120 capsule    Take 2 capsules (2 mg) by mouth 2 times daily    Kidney transplant recipient, Long-term use of immunosuppressant medication       * Notice:  This list has 2 medication(s) that are the same as other medications prescribed for you. Read the directions carefully, and ask your doctor or other care provider to review them with you.

## 2017-09-25 NOTE — PROGRESS NOTES
Psychosocial Assessment  Patient Name/ Age: Rashad Ortiz 42 year old   Medical Record #: [id number]  Duration of Interview: 30 min  Process:   Face-to-Face Interview                (counseling < 50%)   Present at Appointment: Rashad and wife Raúl        : MIGUE Tejeda Date:  2017        Type of transplant: Kidney    Donor type: Rashad reported he does not have any potential donors at this time.      Cadaver   Prior Transplants:    Yes  Status of Transplant: Chronic rejection, not yet on dialysis       Current Living Situation    Location:   [address] [place] [address]  With Whom: lives with their family- three children and spouse       Family/ Social Support:    Rashad reported he has three children, Rashad (20), Richard (16) and Lala (4). Rashad reported he has a brother in [place] and a sister in [place]. Rashad reported his mother also lives in [place] and his father is .  available, helpful   Committed relationship: Rashad and Raúl have been  for a few months.     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 Rashad's spouse is also employed full time as a nanny.    Income   salary/wages and spouse's income   Insurance      At this time, patient can afford medication costs:  Yes - patient is currently using co-pay cards for his anti-rejection medications private insurance- [employer] through employer       Medical Status    Current mode of treatment for ESRD kidney transplant   Complications none       Behavioral    Tobacco Use Yes  Chemical Dependency No   Rashad reported he smokes 1-2 cigarettes most days of the week  "(not daily). Rashad reported he is planning on quitting.  Rashad reported he drinks one beer a day. Rashad reported he has a history of marijuana use but does not use it regularly. Rashad denied any other substance use and denied any history of chemical dependency treatment.   Psychiatric Impairment No  Rashad reported he sometimes becomes depressed about his health but over all feels he is doing well. Rashad denied any anxiety or other mental health concerns.     Reading ability Good  Education level: College Recent Legal History No      Coping Style/Strategies: Talking to wife, keeping active       Ability to Adhere to Complex Medical Regime: Yes     Adherence History: Rashad reported he follows his physician's recommendations, takes his medications as prescribed, and attends his appointments as scheduled. Rashad reported in 2015 he had insurance issues and was not able to take his medications due to not being able to afford them without insurance. Rashad reported he also was not able to get labs then but now stays \"on top of it all\". Rashad reported when he lost insurance initially that he was not aware of the programs/assistance he could get but now knows and would inform the transplant center right away if he has issues.         Education  _X_ Medicare  _X_ Rehabilitation  _X_ Donor issues  _X_ Community resources  _X_ Post discharge housing  _X_ Financial resources  _X_ Medical insurance options  _X_ Psych adjustment  _X_ Family adjustment  _X_ Health Care Directive Yes but wanted to update his current one so provided a blank copy   Psychosocial Risks of Transplant Reviewed and Discussed:  _X_ Increased stress related to emotional,            family, social, employment or financial           situation  _X_ Affect on work and/or disability benefits  _X_ Affect on future health and life           insurance  _X_ Transplant outcome expectations may           not be met  _X_ Mental Health Risks: anxiety,           " "depression, PTSD, guilt, grief and           chronic fatigue     Notable Items:   Rashad reported he follows his physician's recommendations, takes his medications as prescribed, and attends his appointments as scheduled. Rashad reported in 2015 he had insurance issues and was not able to take his medications due to not being able to afford them without insurance. Rashad reported he also was not able to get labs then but now stays \"on top of it all\". Rashad reported when he lost insurance initially that he was not aware of the programs/assistance he could get but now knows and would inform the transplant center right away if he has issues.       Final Evaluation/Assessment   Patient seemed to process information well. Appeared well informed, motivated and able to follow post transplant requirements. Behavior was appropriate during interview. Has adequate income and insurance coverage. Adequate social support. No major contraindications noted for transplant.  At this time patient appears to understand the risks and benefits of transplant.      Recommendation  Acceptable    Selection Criteria Met:  Plan for support Yes   Chemical Dependence Yes   Smoking Yes   Mental Health Yes   Adequate Finances Yes    Signature: MIGUE Tejeda   Title: Clinical      "

## 2017-09-26 ENCOUNTER — TELEPHONE (OUTPATIENT)
Dept: FAMILY MEDICINE | Facility: CLINIC | Age: 42
End: 2017-09-26

## 2017-09-26 LAB
HLA TYPING COMPLETE SOT RECIPIENT: NORMAL
INTERPRETATION ECG - MUSE: NORMAL
LA PPP-IMP: NEGATIVE
M TB TUBERC IFN-G BLD QL: NEGATIVE
M TB TUBERC IFN-G/MITOGEN IGNF BLD: 0 IU/ML
PRA SINGLE ANTIGEN IGG ANTIBODY: NORMAL

## 2017-09-26 NOTE — TELEPHONE ENCOUNTER
Panel Management Review      BP Readings from Last 1 Encounters:   09/25/17 (!) 144/92        Fail List measure: blood pressure      Patient is due/failing the following:   BP CHECK    Action needed:   Follow up if it is worsen.    Type of outreach:    Phone, spoke to patient.  He states that his blood pressure is good.    Questions for provider review:    None                                                                                                                                    Capri Haines      Chart routed to Care Team .

## 2017-09-27 LAB
BKV DNA # SPEC NAA+PROBE: NORMAL COPIES/ML
BKV DNA SPEC NAA+PROBE-LOG#: NORMAL LOG COPIES/ML
DPA1* NMDP: NORMAL
DPA1*: NORMAL
DPA1*LOCUS: NORMAL
DPB1* LOCUS NMDP: NORMAL
DPB1* NMDP: NORMAL
DPB1*: NORMAL
DPB1*LOCUS: NORMAL
DQA1*: NORMAL
DQA1*LOCUS NMDP: NORMAL
DQA1*LOCUS: NORMAL
DQB1* LOCUS NMDP: NORMAL
DQB1* LOCUS: NORMAL
DQB1* NMDP: NORMAL
DQB1*: NORMAL
DRB1* LOCUS NMDP: NORMAL
DRB1* LOCUS: NORMAL
DRB1*: NORMAL
DRB4* LOCUS NMDP: NORMAL
DRB4* LOCUS: NORMAL
DRB5* NMDP: NORMAL
DRB5*: NORMAL
DRSSO TEST METHOD: NORMAL
SPECIMEN SOURCE: NORMAL

## 2017-09-29 LAB — COPATH REPORT: NORMAL

## 2017-10-03 DIAGNOSIS — I12.9 RENAL HYPERTENSION, STAGE 1-4 OR UNSPECIFIED CHRONIC KIDNEY DISEASE: ICD-10-CM

## 2017-10-03 DIAGNOSIS — M10.9 ACUTE GOUTY ARTHRITIS: Primary | ICD-10-CM

## 2017-10-04 ENCOUNTER — DOCUMENTATION ONLY (OUTPATIENT)
Dept: TRANSPLANT | Facility: CLINIC | Age: 42
End: 2017-10-04

## 2017-10-04 ENCOUNTER — COMMITTEE REVIEW (OUTPATIENT)
Dept: TRANSPLANT | Facility: CLINIC | Age: 42
End: 2017-10-04

## 2017-10-04 ENCOUNTER — TELEPHONE (OUTPATIENT)
Dept: TRANSPLANT | Facility: CLINIC | Age: 42
End: 2017-10-04

## 2017-10-04 DIAGNOSIS — Z76.82 AWAITING ORGAN TRANSPLANT: Primary | ICD-10-CM

## 2017-10-04 RX ORDER — LOSARTAN POTASSIUM 100 MG/1
TABLET ORAL
Qty: 90 TABLET | Refills: 3 | Status: SHIPPED | OUTPATIENT
Start: 2017-10-04 | End: 2018-08-23

## 2017-10-04 NOTE — TELEPHONE ENCOUNTER
Last Office Visit with Nephrologist:  8/7/17.  Medication refilled per Nephrology Clinic protocol.     Flor Barrett RN

## 2017-10-04 NOTE — TELEPHONE ENCOUNTER
Called Rashad to let him know his case was discussed today at our Multidisciplinary Selection Committee and he was approved as a kidney transplant candidate pending the successful completion of his evaluation and a compliance contract. He was very happy to hear this. He is added to the [facility]  donor kidney wait list as INACTIVE today. He has a qualifying GFR of 18 on 2017. We are asking him for a 6 month compliance contract to attend all medical appointments, take all prescribed medications, and attend all dialysis sessions should he progress to dialysis. He is in agreement. He has an appointment to see cardiology and PFT's on 1/10/2018. He needs to see Dermatology and will try to schedule the same day as the cardiology visit. He will need a uric acid blood test when his gout is not flaring. He is aware the order in in EPIC and he can have that done at any point the gout is quiet. He can wait until January and have the blood test when he is here for other appointments. He will make a dental appointment and have any dental work done and call me when complete. He will need a pneumovax and Hepatitis B series and he plans on doing that in [place], I will also send him the [place] paperwork that Dr Murillo filled out for him. He may have accidentally taken his KDPI form with him from evaluation. He will call me tomorrow to let me know if he has it or I will mail him another one.Transplant letter and listing letter will be generated and routed to administrative staff for mailing.

## 2017-10-04 NOTE — COMMITTEE REVIEW
Abdominal Committee Review Note     Evaluation Date: 2017  Committee Review Date: 10/4/2017    Organ being evaluated for: Kidney    Transplant Phase: Evaluation  Transplant Status: Active    Transplant Coordinator: Donna French  Transplant Surgeon:       Referring Physician: Stef Murillo    Primary Diagnosis: Hypertensive Nephrosclerosis  Secondary Diagnosis:     Committee Review Members:  Dietitian, Registered Madina Simmons, RD   Nephrology Stef Murillo MD, Agustin Fisher, APRN CNP, Abran Cunha MD   Pharmacist Cici Abbott, [facility]    - Clinical Anya Guo, [facility], Elma Patrick, [facility]   Transplant Fadumo Cannon PA-C, Sarah Garcia LPN, Chelsy Zurita, JENNIFER, Suni Olivo, RN, Janice Ferro, JENNIFER, Janice Arthur, RN, Gee aBrrios MD, Donna Patel, JENNIFER, Benigno Mendez MD       Transplant Eligibility: Irreversible chronic kidney disease treated w/dialysis or expected need for dialysis    Committee Review Decision: Approved    Relative Contraindications: Other, compliance contract    Absolute Contraindications: None    Committee Chair Benigno Mendez MD verbally attested to the committee's decision.    Committee Discussion Details: Reviewed medical records and evaluation results to date. Committee has approved him as a kidney transplant candidate pending the successful completion of a compliance contract and the remainder of his evaluation.He will be added to the [facility]  donor kidney transplant list as inactive. He is not on dialysis but does have a qualifying GFR of 18 on 2017. History of medical non compliance and loss of previous graft. He needs to see cardiology and pft's on 1-. He needs a dermatology visit. He needs dental. He also needs a uric acid once his gout settles down to determine treatment. Patient will be called and transplant summary letter and listing letter will be sent.

## 2017-10-04 NOTE — PROGRESS NOTES
Patient was added to the [employer]  donor kidney transplant wait list as INACTIVE today 10/4/2017. He has a qualifying GFR of 18 on 2017.He will need to complete his evaluation and a compliance contract prior to active listing. Listing letter generated and routed to administrative staff for mailing.

## 2017-10-04 NOTE — LETTER
2017    Rashad Ortiz  [address]  [place] [address]   1975    Dear Rashad,    This letter is sent to confirm that you have nearly completed your transplant work-up and you are a candidate in the kidney transplant program at the [facility].  You were placed on the kidney inactive waitlist on 10/4/2017..  This means you will accumulate waiting time but not receive  donor calls.       Items we will need from you:      We have received approval from you insurance company for the transplant procedure.  It is critical that you notify us if there is any change in your insurance.  It is also important that you familiarize yourself with the details of your specific insurance policy.  Our patient  is available to assist you if you should have any questions regarding your coverage.      An ALA or PRA blood sample will need to be sent here every 3 months to match you with  donors or any potential living donors.  If you need this testing, special mailing boxes (called mailers) will be sent to you directly from the Outreach Department.  You should take the physician order form and the  to your home laboratory when it is time to for this testing to be done.  Additional mailers can be obtained by calling the Transplant Office and asking to speak to a kidney . I will let you know when you need to start having these labs drawn.      During this waiting period, we may request additional periodic laboratory tests with your primary physician.  It will be your responsibility to remind your physician to forward your results to the Transplant Office.      We need to be kept informed of any changes in your medical condition such as:    o changes in your medications,   o significant changes in your health  o significant infections (such as pneumonia or abscesses)  o blood transfusions  o any condition which  requires hospitalization  o any surgery  o If you start dialysis      Remember to complete any routine cancer screening tests required before your transplant.  This includes colonoscopy; prostrate screening for men, and mammogram and gynecologic testing for women, as well as dental work.  Your primary care clinic can assist you with arranging for these exams.  Remind your caregivers to forward copies of the records and final reports.    We want you to know that our program has physician and surgeon coverage 24 hours a day, 365 days a year. If this coverage changes or there are substantial program changes, you will be notified in writing by letter.     Attached is a letter from the [employer] ([employer]). It describes the services and information offered to patients by [employer] and the Organ Procurement and Transplantation Network.    We appreciate having had the opportunity to participate in your care.  If you have questions, please feel free to call the Transplant Office at [phone] or [phone].      Sincerely,       Kidney Transplant Program    Enclosures: Telephone Contact List, Travel Resources, [facility] Letter, Waitlist Information Update and While You Are Waiting  CC:   Francie Barraza PA-C

## 2017-10-04 NOTE — LETTER
2017    Rashad Ortiz  [address]  [place] [address]   1975    Dear Rashad,    It was a pleasure to see you here recently for consideration of a kidney transplant. Your pre-kidney transplant evaluation began on . Your evaluation results were discussed at our Multidisciplinary Selection Committee on . The Committee has approved you as a kidney transplant candidate pending the successful completion of the following items:    1. You have an appointment here on 1/10/2018 at 1:30 pm for PFT's (pulmonary function tests) to assess your breathing capacity and any restrictions.  2. You have an appointment here on 1/10/2017 at 3:00 pm to see Dr Gelacio Jimenez of cardiology to assess your heart function and response to the challenge of surgery.  3. You have an appointment here on 1/10/2018 at 3:45 pm to see Dr Kaufman of dermatology to do a skin assessment since you were on immunosuppression medications and to assess and treat your facial folliculitis.  4. We would like you to have a blood uric acid level drawn when you are not having a gout flare. The orders are in the computer. You can have it done on 1/10/2018 when you are here for your other appointments if your gout is quiet at that time.  5. Please make an appointment with your dentist and have any required work done. Please call me when complete.  6. Enclosed is the compliance contract we talked about, Please sign and return to me in the self addressed stamped envelope. This contract is for 6 months,  through 2018.  7. Please sign the KD[employer] form and return in the same envelope as the compliance contract. We discussed this in clinic and on the phone. Please call me if we need to go over this form again.  8. Your lab work for virology shows you are not immune to Hepatitis B. You may have received the immunizations previously but your immunity has faded. Please talk with your nephrologist  or primary care provider to start this immunization series. Please call me when you start.  9. You will also need a pneumococcal vaccination called pneumovax. Please talk with your primary care provider to get this immunization. Please call me when complete.   10. I am enclosing your completed FMLA paperwork. Let me know if there is anything else you need.    You have already been added onto the kidney transplant wait list on 10/4/2017 but are on INACTIVE status die to needing to successfully complete the above items. A separate letter regarding your listing has been mailed. You will be notified by our office as to when your status can be changed to ACTIVE. Your waiting time has started with the listing as INACTIVE.    Please have any potential living donors register now online with [employer] program to initiate their evaluation at [email or web address] or call [phone]. You will be notified by our office in the event of an approved living donor.     It is a pleasure working with you. If you have any questions please call me at [phone].      Sincerely,       Pamela Patel, RN, BSN Transplant Coordinator  Solid Organ Transplant [address]  [address] [place] [address]  Phone [phone]  Fax [phone]  [email or web address]    CC:Francie Barraza    Enclosures: compliance contract, FMLA forms, KDPI form

## 2017-10-05 ENCOUNTER — TELEPHONE (OUTPATIENT)
Dept: TRANSPLANT | Facility: CLINIC | Age: 42
End: 2017-10-05

## 2017-10-09 ENCOUNTER — TELEPHONE (OUTPATIENT)
Dept: TRANSPLANT | Facility: CLINIC | Age: 42
End: 2017-10-09

## 2017-10-09 NOTE — TELEPHONE ENCOUNTER
CASSIE AV fistula surgery with Dr. Irwin is scheduled on 11/8/2017  Writer spoke with the patient and he would like to discuss with Dr. Murillo at appt on 11/13 to decide when he is ready to have surgery. He declined fistula surgery at this time.  11/8 fistula surgery has been canceled. Dr. Murillo notified to re-schedule surgery.

## 2017-10-19 LAB
DONOR IDENTIFICATION: NORMAL
DSA COMMENTS: NORMAL
DSA PRESENT: NO
DSA TEST METHOD: NORMAL
ORGAN: NORMAL
SA1 CELL: NORMAL
SA1 COMMENTS: NORMAL
SA1 HI RISK ABY: NORMAL
SA1 MOD RISK ABY: NORMAL
SA1 TEST METHOD: NORMAL
SA2 CELL: NORMAL
SA2 COMMENTS: NORMAL
SA2 HI RISK ABY UA: NORMAL
SA2 MOD RISK ABY: NORMAL
SA2 TEST METHOD: NORMAL

## 2017-11-06 DIAGNOSIS — R79.89 ELEVATED SERUM CREATININE: ICD-10-CM

## 2017-11-06 DIAGNOSIS — Z94.0 KIDNEY REPLACED BY TRANSPLANT: Primary | ICD-10-CM

## 2017-11-10 ENCOUNTER — TELEPHONE (OUTPATIENT)
Dept: NEPHROLOGY | Facility: CLINIC | Age: 42
End: 2017-11-10

## 2017-11-10 NOTE — TELEPHONE ENCOUNTER
SPOKE WITH PT AND HE WILL BE GETTING labs done this weekend before the appointment and will be here for  APPOINTMENT  KARTHIK KING CMA

## 2017-11-21 DIAGNOSIS — I12.9 UNSPECIFIED HYPERTENSIVE KIDNEY DISEASE WITH CHRONIC KIDNEY DISEASE STAGE I THROUGH STAGE IV, OR UNSPECIFIED(403.90): ICD-10-CM

## 2017-11-22 RX ORDER — FUROSEMIDE 20 MG
TABLET ORAL
Qty: 60 TABLET | Refills: 0 | Status: SHIPPED | OUTPATIENT
Start: 2017-11-22 | End: 2018-02-04

## 2017-11-22 NOTE — TELEPHONE ENCOUNTER
Routing refill request to provider for review/approval because:  Labs not current:  Creatinine      Routing to provider to advise.  Shi Novak RN

## 2018-01-06 DIAGNOSIS — E55.9 HYPOVITAMINOSIS D: Primary | ICD-10-CM

## 2018-01-06 DIAGNOSIS — Z94.0 KIDNEY REPLACED BY TRANSPLANT: ICD-10-CM

## 2018-01-06 DIAGNOSIS — M10.9 ACUTE GOUTY ARTHRITIS: ICD-10-CM

## 2018-01-06 DIAGNOSIS — R79.89 ELEVATED SERUM CREATININE: ICD-10-CM

## 2018-01-06 DIAGNOSIS — Z79.899 ENCOUNTER FOR LONG-TERM CURRENT USE OF MEDICATION: ICD-10-CM

## 2018-01-06 DIAGNOSIS — Z48.298 AFTERCARE FOLLOWING ORGAN TRANSPLANT: ICD-10-CM

## 2018-01-06 LAB
ERYTHROCYTE [DISTWIDTH] IN BLOOD BY AUTOMATED COUNT: 15.3 % (ref 10–15)
HCT VFR BLD AUTO: 31.5 % (ref 40–53)
HGB BLD-MCNC: 10.1 G/DL (ref 13.3–17.7)
MCH RBC QN AUTO: 27.6 PG (ref 26.5–33)
MCHC RBC AUTO-ENTMCNC: 32.1 G/DL (ref 31.5–36.5)
MCV RBC AUTO: 86 FL (ref 78–100)
PLATELET # BLD AUTO: 155 10E9/L (ref 150–450)
PTH-INTACT SERPL-MCNC: 621 PG/ML (ref 12–72)
RBC # BLD AUTO: 3.66 10E12/L (ref 4.4–5.9)
WBC # BLD AUTO: 4.8 10E9/L (ref 4–11)

## 2018-01-06 PROCEDURE — 83540 ASSAY OF IRON: CPT | Performed by: INTERNAL MEDICINE

## 2018-01-06 PROCEDURE — 84550 ASSAY OF BLOOD/URIC ACID: CPT | Performed by: INTERNAL MEDICINE

## 2018-01-06 PROCEDURE — 82728 ASSAY OF FERRITIN: CPT | Performed by: INTERNAL MEDICINE

## 2018-01-06 PROCEDURE — 83970 ASSAY OF PARATHORMONE: CPT | Performed by: INTERNAL MEDICINE

## 2018-01-06 PROCEDURE — 82306 VITAMIN D 25 HYDROXY: CPT | Performed by: INTERNAL MEDICINE

## 2018-01-06 PROCEDURE — 36415 COLL VENOUS BLD VENIPUNCTURE: CPT | Performed by: INTERNAL MEDICINE

## 2018-01-06 PROCEDURE — 80069 RENAL FUNCTION PANEL: CPT | Performed by: INTERNAL MEDICINE

## 2018-01-06 PROCEDURE — 85027 COMPLETE CBC AUTOMATED: CPT | Performed by: INTERNAL MEDICINE

## 2018-01-06 PROCEDURE — 83550 IRON BINDING TEST: CPT | Performed by: INTERNAL MEDICINE

## 2018-01-06 PROCEDURE — 80197 ASSAY OF TACROLIMUS: CPT | Performed by: INTERNAL MEDICINE

## 2018-01-07 LAB
TACROLIMUS BLD-MCNC: 4.9 UG/L (ref 5–15)
TME LAST DOSE: 2100 H

## 2018-01-08 LAB
ALBUMIN SERPL-MCNC: 3.5 G/DL (ref 3.4–5)
ANION GAP SERPL CALCULATED.3IONS-SCNC: 9 MMOL/L (ref 3–14)
BUN SERPL-MCNC: 43 MG/DL (ref 7–30)
CALCIUM SERPL-MCNC: 8.9 MG/DL (ref 8.5–10.1)
CHLORIDE SERPL-SCNC: 113 MMOL/L (ref 94–109)
CO2 SERPL-SCNC: 22 MMOL/L (ref 20–32)
CREAT SERPL-MCNC: 3.94 MG/DL (ref 0.66–1.25)
FERRITIN SERPL-MCNC: 256 NG/ML (ref 26–388)
GFR SERPL CREATININE-BSD FRML MDRD: 17 ML/MIN/1.7M2
GLUCOSE SERPL-MCNC: 91 MG/DL (ref 70–99)
IRON SATN MFR SERPL: 28 % (ref 15–46)
IRON SERPL-MCNC: 62 UG/DL (ref 35–180)
PHOSPHATE SERPL-MCNC: 3 MG/DL (ref 2.5–4.5)
POTASSIUM SERPL-SCNC: 4.6 MMOL/L (ref 3.4–5.3)
SODIUM SERPL-SCNC: 144 MMOL/L (ref 133–144)
TIBC SERPL-MCNC: 219 UG/DL (ref 240–430)
URATE SERPL-MCNC: 10.6 MG/DL (ref 3.5–7.2)

## 2018-01-09 ENCOUNTER — PRE VISIT (OUTPATIENT)
Dept: CARDIOLOGY | Facility: CLINIC | Age: 43
End: 2018-01-09

## 2018-01-09 LAB — DEPRECATED CALCIDIOL+CALCIFEROL SERPL-MC: 12 UG/L (ref 20–75)

## 2018-01-10 ENCOUNTER — TELEPHONE (OUTPATIENT)
Dept: TRANSPLANT | Facility: CLINIC | Age: 43
End: 2018-01-10

## 2018-01-10 NOTE — TELEPHONE ENCOUNTER
Pt called lvm that he wouldn't be able to make today's appts on January 10, I have rescheduled these appts to Wed, April 11 (1st available for Derm appt).  I'll call patient yet this morning.

## 2018-01-12 ENCOUNTER — TELEPHONE (OUTPATIENT)
Dept: TRANSPLANT | Facility: CLINIC | Age: 43
End: 2018-01-12
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
## 2018-01-15 RX ORDER — CHOLECALCIFEROL (VITAMIN D3) 50 MCG
2000 TABLET ORAL DAILY
Qty: 90 TABLET | Refills: 3 | Status: SHIPPED | OUTPATIENT
Start: 2018-01-15 | End: 2018-02-04

## 2018-01-17 DIAGNOSIS — Z79.60 LONG-TERM USE OF IMMUNOSUPPRESSANT MEDICATION: ICD-10-CM

## 2018-01-17 DIAGNOSIS — Z94.0 KIDNEY TRANSPLANTED: ICD-10-CM

## 2018-01-17 DIAGNOSIS — Z94.0 KIDNEY TRANSPLANT RECIPIENT: ICD-10-CM

## 2018-01-17 DIAGNOSIS — I12.9 UNSPECIFIED HYPERTENSIVE KIDNEY DISEASE WITH CHRONIC KIDNEY DISEASE STAGE I THROUGH STAGE IV, OR UNSPECIFIED(403.90): ICD-10-CM

## 2018-01-17 RX ORDER — FUROSEMIDE 20 MG
20 TABLET ORAL 2 TIMES DAILY
Qty: 180 TABLET | Refills: 1 | Status: SHIPPED | OUTPATIENT
Start: 2018-01-17 | End: 2018-09-05

## 2018-01-17 NOTE — TELEPHONE ENCOUNTER
"Requested Prescriptions   Pending Prescriptions Disp Refills     furosemide (LASIX) 20 MG tablet [Pharmacy Med Name: FUROSEMIDE 20MG TABS]    Last Written Prescription Date:  12/22/17  Last Fill Quantity: 60,  # refills: 0   Last Office Visit with G, P or [facility] prescribing provider:  8/1/17   Future Office Visit:      60 tablet 0     Sig: TAKE ONE TABLET BY MOUTH TWICE A DAY. PLEASE FOLLOW UP IN CLINIC FOR FURTHER REFILLS    Diuretics (Including Combos) Protocol Failed    1/17/2018 12:39 PM       Failed - Blood pressure under 140/90    BP Readings from Last 3 Encounters:   09/25/17 (!) 144/92   08/07/17 127/81   08/01/17 (!) 130/96                Failed - Normal serum creatinine on file in past 12 months    Recent Labs   Lab Test  01/06/18   0947   CR  3.94*             Passed - Recent or future visit with authorizing provider's specialty    Patient had office visit in the last year or has a visit in the next 30 days with authorizing provider.  See \"Patient Info\" tab in inbasket, or \"Choose Columns\" in Meds & Orders section of the refill encounter.            Passed - Patient is age 18 or older       Passed - Normal serum potassium on file in past 12 months    Recent Labs   Lab Test  01/06/18   0947   POTASSIUM  4.6                   Passed - Normal serum sodium on file in past 12 months    Recent Labs   Lab Test  01/06/18   0947   NA  144                  Karlos Faarax  [facility]    "

## 2018-01-17 NOTE — TELEPHONE ENCOUNTER
RN unable to fill refill request per protocol.    Radha Damon RN, [facility]

## 2018-01-25 LAB
PROTOCOL CUTOFF: NORMAL
UNACCEPTABLE ANTIGEN: NORMAL
UNOS CPRA: 91

## 2018-02-04 ENCOUNTER — HOSPITAL ENCOUNTER (EMERGENCY)
Facility: CLINIC | Age: 43
Discharge: HOME | End: 2018-02-04
Attending: INTERNAL MEDICINE | Admitting: INTERNAL MEDICINE
Payer: COMMERCIAL

## 2018-02-04 ENCOUNTER — APPOINTMENT (OUTPATIENT)
Dept: CT IMAGING | Facility: CLINIC | Age: 43
End: 2018-02-04
Attending: INTERNAL MEDICINE
Payer: COMMERCIAL

## 2018-02-04 ENCOUNTER — APPOINTMENT (OUTPATIENT)
Dept: GENERAL RADIOLOGY | Facility: CLINIC | Age: 43
End: 2018-02-04
Attending: INTERNAL MEDICINE
Payer: COMMERCIAL

## 2018-02-04 VITALS
TEMPERATURE: 98.6 F | OXYGEN SATURATION: 98 % | SYSTOLIC BLOOD PRESSURE: 142 MMHG | RESPIRATION RATE: 18 BRPM | HEIGHT: 68 IN | BODY MASS INDEX: 25.03 KG/M2 | WEIGHT: 165.12 LBS | DIASTOLIC BLOOD PRESSURE: 96 MMHG | HEART RATE: 74 BPM
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
## 2018-02-04 DIAGNOSIS — S16.1XXA STRAIN OF NECK MUSCLE, INITIAL ENCOUNTER: ICD-10-CM

## 2018-02-04 DIAGNOSIS — Z94.0 KIDNEY REPLACED BY TRANSPLANT: ICD-10-CM

## 2018-02-04 LAB
ALBUMIN UR-MCNC: 30 MG/DL
ANION GAP SERPL CALCULATED.3IONS-SCNC: 9 MMOL/L (ref 3–14)
APPEARANCE UR: CLEAR
BASOPHILS # BLD AUTO: 0 10E9/L (ref 0–0.2)
BASOPHILS NFR BLD AUTO: 0.1 %
BILIRUB UR QL STRIP: NEGATIVE
BUN SERPL-MCNC: 58 MG/DL (ref 7–30)
CALCIUM SERPL-MCNC: 8.7 MG/DL (ref 8.5–10.1)
CHLORIDE SERPL-SCNC: 112 MMOL/L (ref 94–109)
CO2 SERPL-SCNC: 21 MMOL/L (ref 20–32)
COLOR UR AUTO: ABNORMAL
CREAT SERPL-MCNC: 3.74 MG/DL (ref 0.66–1.25)
DIFFERENTIAL METHOD BLD: ABNORMAL
EOSINOPHIL # BLD AUTO: 0.1 10E9/L (ref 0–0.7)
EOSINOPHIL NFR BLD AUTO: 0.8 %
ERYTHROCYTE [DISTWIDTH] IN BLOOD BY AUTOMATED COUNT: 15.4 % (ref 10–15)
GFR SERPL CREATININE-BSD FRML MDRD: 18 ML/MIN/1.7M2
GLUCOSE SERPL-MCNC: 102 MG/DL (ref 70–99)
GLUCOSE UR STRIP-MCNC: NEGATIVE MG/DL
HCT VFR BLD AUTO: 35.6 % (ref 40–53)
HGB BLD-MCNC: 11.2 G/DL (ref 13.3–17.7)
HGB UR QL STRIP: NEGATIVE
IMM GRANULOCYTES # BLD: 0.1 10E9/L (ref 0–0.4)
IMM GRANULOCYTES NFR BLD: 0.7 %
KETONES UR STRIP-MCNC: NEGATIVE MG/DL
LEUKOCYTE ESTERASE UR QL STRIP: NEGATIVE
LYMPHOCYTES # BLD AUTO: 0.7 10E9/L (ref 0.8–5.3)
LYMPHOCYTES NFR BLD AUTO: 7.6 %
MCH RBC QN AUTO: 26.3 PG (ref 26.5–33)
MCHC RBC AUTO-ENTMCNC: 31.5 G/DL (ref 31.5–36.5)
MCV RBC AUTO: 84 FL (ref 78–100)
MONOCYTES # BLD AUTO: 0.6 10E9/L (ref 0–1.3)
MONOCYTES NFR BLD AUTO: 6.6 %
NEUTROPHILS # BLD AUTO: 7.6 10E9/L (ref 1.6–8.3)
NEUTROPHILS NFR BLD AUTO: 84.2 %
NITRATE UR QL: NEGATIVE
NRBC # BLD AUTO: 0 10*3/UL
NRBC BLD AUTO-RTO: 0 /100
PH UR STRIP: 6.5 PH (ref 5–7)
PLATELET # BLD AUTO: 187 10E9/L (ref 150–450)
POTASSIUM SERPL-SCNC: 5.4 MMOL/L (ref 3.4–5.3)
RBC # BLD AUTO: 4.26 10E12/L (ref 4.4–5.9)
RBC #/AREA URNS AUTO: 0 /HPF (ref 0–2)
SODIUM SERPL-SCNC: 143 MMOL/L (ref 133–144)
SOURCE: ABNORMAL
SP GR UR STRIP: 1.01 (ref 1–1.03)
SPERM #/AREA URNS HPF: PRESENT /HPF
UROBILINOGEN UR STRIP-MCNC: NORMAL MG/DL (ref 0–2)
WBC # BLD AUTO: 9 10E9/L (ref 4–11)
WBC #/AREA URNS AUTO: 0 /HPF (ref 0–2)

## 2018-02-04 PROCEDURE — 99284 EMERGENCY DEPT VISIT MOD MDM: CPT | Mod: 25 | Performed by: INTERNAL MEDICINE

## 2018-02-04 PROCEDURE — 99283 EMERGENCY DEPT VISIT LOW MDM: CPT | Mod: Z6 | Performed by: INTERNAL MEDICINE

## 2018-02-04 PROCEDURE — 73030 X-RAY EXAM OF SHOULDER: CPT | Mod: LT

## 2018-02-04 PROCEDURE — 80048 BASIC METABOLIC PNL TOTAL CA: CPT | Performed by: INTERNAL MEDICINE

## 2018-02-04 PROCEDURE — 71046 X-RAY EXAM CHEST 2 VIEWS: CPT

## 2018-02-04 PROCEDURE — 72125 CT NECK SPINE W/O DYE: CPT

## 2018-02-04 PROCEDURE — 81001 URINALYSIS AUTO W/SCOPE: CPT | Performed by: INTERNAL MEDICINE

## 2018-02-04 PROCEDURE — 72128 CT CHEST SPINE W/O DYE: CPT

## 2018-02-04 PROCEDURE — 85025 COMPLETE CBC W/AUTO DIFF WBC: CPT | Performed by: INTERNAL MEDICINE

## 2018-02-04 PROCEDURE — 70450 CT HEAD/BRAIN W/O DYE: CPT

## 2018-02-04 RX ORDER — TRAMADOL HYDROCHLORIDE 50 MG/1
50 TABLET ORAL
Qty: 6 TABLET | Refills: 0 | Status: SHIPPED | OUTPATIENT
Start: 2018-02-04 | End: 2018-03-01

## 2018-02-04 RX ORDER — CYCLOBENZAPRINE HCL 5 MG
5 TABLET ORAL 3 TIMES DAILY PRN
Qty: 20 TABLET | Refills: 0 | Status: SHIPPED | OUTPATIENT
Start: 2018-02-04 | End: 2018-02-20

## 2018-02-04 ASSESSMENT — ENCOUNTER SYMPTOMS
BACK PAIN: 0
ABDOMINAL PAIN: 0
HEADACHES: 1
NECK PAIN: 1

## 2018-02-04 NOTE — ED AVS SNAPSHOT
[facility], [place], Emergency Department    [address] [place] [id number]    Phone:  [phone]                                       Rashad Ortiz   MRN: [id number]    Department:  [facility], Emergency Department   Date of Visit:  2/4/2018           After Visit Summary Signature Page     I have received my discharge instructions, and my questions have been answered. I have discussed any challenges I see with this plan with the nurse or doctor.    ..........................................................................................................................................  Patient/Patient Representative Signature      ..........................................................................................................................................  Patient Representative Print Name and Relationship to Patient    ..................................................               ................................................  Date                                            Time    ..........................................................................................................................................  Reviewed by Signature/Title    ...................................................              ..............................................  Date                                                            Time
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
## 2018-02-06 ENCOUNTER — TELEPHONE (OUTPATIENT)
Dept: NEPHROLOGY | Facility: CLINIC | Age: 43
End: 2018-02-06

## 2018-02-06 DIAGNOSIS — E55.9 VITAMIN D DEFICIENCY: Primary | ICD-10-CM

## 2018-02-06 NOTE — TELEPHONE ENCOUNTER
Per Dr. Murillo:    Can we start Mr Ortiz on cholecalciferol 2000 units daily?    Attempted to reach patient. Reached message that patient is unable to take calls at this time, no voicemail. Will try again at a later time.    Flor Barrett RN

## 2018-02-07 NOTE — TELEPHONE ENCOUNTER
Made second attempt to contact patient. Reached message he is unable to take calls. Will try again later.    Flor Barrett RN

## 2018-02-20 ENCOUNTER — OFFICE VISIT (OUTPATIENT)
Dept: NEPHROLOGY | Facility: CLINIC | Age: 43
End: 2018-02-20
Attending: INTERNAL MEDICINE
Payer: COMMERCIAL

## 2018-02-20 VITALS
HEIGHT: 68 IN | WEIGHT: 172.4 LBS | HEART RATE: 88 BPM | OXYGEN SATURATION: 98 % | BODY MASS INDEX: 26.13 KG/M2 | DIASTOLIC BLOOD PRESSURE: 99 MMHG | SYSTOLIC BLOOD PRESSURE: 179 MMHG

## 2018-02-20 DIAGNOSIS — D84.9 IMMUNOSUPPRESSION (H): ICD-10-CM

## 2018-02-20 DIAGNOSIS — Z94.0 STATUS POST KIDNEY TRANSPLANT: Primary | ICD-10-CM

## 2018-02-20 DIAGNOSIS — D64.89 ANEMIA DUE TO OTHER CAUSE, NOT CLASSIFIED: ICD-10-CM

## 2018-02-20 DIAGNOSIS — I10 BENIGN ESSENTIAL HYPERTENSION: ICD-10-CM

## 2018-02-20 DIAGNOSIS — N25.81 SECONDARY RENAL HYPERPARATHYROIDISM (H): ICD-10-CM

## 2018-02-20 DIAGNOSIS — E87.5 HYPERKALEMIA: ICD-10-CM

## 2018-02-20 DIAGNOSIS — E87.20 ACIDOSIS: ICD-10-CM

## 2018-02-20 PROCEDURE — G0463 HOSPITAL OUTPT CLINIC VISIT: HCPCS | Mod: ZF

## 2018-02-20 RX ORDER — AMLODIPINE BESYLATE 5 MG/1
5 TABLET ORAL DAILY
Qty: 90 TABLET | Refills: 3 | Status: ON HOLD | OUTPATIENT
Start: 2018-02-20 | End: 2018-10-28

## 2018-02-20 ASSESSMENT — PAIN SCALES - GENERAL: PAINLEVEL: SEVERE PAIN (7)

## 2018-02-20 NOTE — PROGRESS NOTES
Assessment and Plan:  1. LDKT - ESRD due to HTN. Currently on immunosuppression with:    Tac 2 mg po bid - last tac 4.9 with goal 3-5 ng / ml.  Mmf 500 mg po bid  pred 5 mg po daily    Creatinine last 3.7 mg / dl. He has been reasonably stable for about 1 year.    I would like the patient to increase the frequency of his lab checks to monthly due to his decreased GFR down to 18 mL/min.  If he consistently has a creatinine that is about 4-1/2 mg/dL I have recommended him have placement of dialysis access.    [place] his control of his blood pressure.  I stressed this with the patient repeatedly.      2. Immunosuppression:    3. HTN:  Carvedilol 25 mg po bid  Losartan 100 mg po daily  Start amlodipine 5 mg po daily.  Furosemide 20 mg po bid    Goal blood pressure for this patient is less than 140/90.  Ideal control would be 100-130/60-80.  If his blood pressure is not at goal within 7 days of initiation of amlodipine,  He can increase to 10 mg p.o. nightly.    4. Secondary hyperparathyroidism: PTH markedly elevated, with recent guidelines stating to avoid active vitamin d due to lack of efficacy on patient specific outcomes.  There is trend towards harm due to increased hypercalcemia.  Continue to monitor calcium and phosphorus with transplant labs.  As of now both are acceptable.    5. Met acidosis: sodium bicarbonate 1300 mg po bid.    6. Hyperkalemia: I recommended low potassium diet with goal less than 3000 mg per day.     7. Anemia: minimal. No need for LAWRENCE initiation at this time. Monitor for need with transplant labs monthly.     Assessment and plan was discussed with patient and he voiced his understanding and agreement.    Reason for Visit:  Mr. Ortiz is here for routine follow up and kidney transplant.    HPI:   Rashad Ortiz is a 42 year old male with ESKD from Hypertension and is status post LDKT in 2011.         Transplant Hx:       Tx: LDKT  Date: 2011       Present Maintenance IS: Tacrolimus,  Mycophenolate mofetil and Prednisone       Baseline Creatinine: 4 mg / dl       Recent DSA: No         Biopsy: Yes: last in February 2017:    Kidney, allograft; percutaneous needle biopsy:   -Moderate chronic tubulo-interstitial changes   -No diagnostic evidence of acute rejection seen     Mr Ortiz is here for f/u of his CKD IV s/p kidney tx for ESRD due to HTN. His baseline creatinine in the last year is around 4 mg / dl. eGFR is 18 ml / min. He has been relisted for transplant.    The patient has had remarkable stability of his kidney function over the span of the last year.  I did refer him for AV fistula creation back in October due to an increase in the creatinine to 4.4 mg/dL.  However he has had his creatinine decreased back down to 3.7 mg/dL on the most recent check at the beginning of this month.    We discussed the patient's not ideal blood pressure control.  Today he is 179/99.  We will add amlodipine today and have the patient check his blood pressures at home.  If he is not at goal we did discuss 1/5 agent with hydralazine due to his hyperkalemia preventing addition of spironolactone.    Today, the patient denies any chest pain or shortness of breath.  He has no nausea or vomiting.  He has no fever shakes or chills.  He has no constipation or diarrhea.  He has no uremic symptoms today.    Home BP: at goal.      ROS:   A comprehensive review of systems was obtained and negative, except as noted in the HPI or PMH.    Active Medical Problems:  Patient Active Problem List   Diagnosis     Kidney replaced by transplant     High risk medications (not anticoagulants) long-term use     Hypertension goal BP (blood pressure) < 140/90     GERD (gastroesophageal reflux disease)     CARDIOVASCULAR SCREENING; LDL GOAL LESS THAN 160     Gout     Creatinine elevation     Antibody mediated rejection of kidney transplant     Medical non-compliance     Chronic kidney disease, stage 4, severely decreased GFR (H)      Personal Hx:  Social History     Social History     Marital status:      Spouse name: N/A     Number of children: N/A     Years of education: N/A     Occupational History     Not on file.     Social History Main Topics     Smoking status: Former Smoker     Types: Cigarettes     Quit date: 03/2017     Smokeless tobacco: Never Used      Comment: Patient states that he is an 'social'  smoker      Alcohol use 2.5 oz/week     5 Standard drinks or equivalent per week      Comment: 1-2 drinks/wk     Drug use: No     Sexual activity: No     Other Topics Concern     Not on file     Social History Narrative     Allergies:  Allergies   Allergen Reactions     Nkda [No Known Drug Allergies]      Medications:  Prior to Admission medications    Medication Sig Start Date End Date Taking? Authorizing Provider   cholecalciferol (VITAMIN D3) 1000 UNIT tablet Take 2 tablets (2,000 Units) by mouth daily 2/8/18   Stef Murillo MD   traMADol (ULTRAM) 50 MG tablet Take 1 tablet (50 mg) by mouth nightly as needed for moderate pain 2/4/18   Susie Savage MD   cyclobenzaprine (FLEXERIL) 5 MG tablet Take 1 tablet (5 mg) by mouth 3 times daily as needed 2/4/18   Susie Savage MD   furosemide (LASIX) 20 MG tablet Take 1 tablet (20 mg) by mouth 2 times daily 1/17/18   Juanito Castro MD   losartan (COZAAR) 100 MG tablet TAKE 1 TABLET(100 MG) BY MOUTH DAILY 10/4/17   Stef Murillo MD   tacrolimus (GENERIC EQUIVALENT) 1 MG capsule Take 2 capsules (2 mg) by mouth 2 times daily 8/15/17   Gee Barrios MD   sodium bicarbonate 650 MG tablet Take 2 tablets (1,300 mg) by mouth 3 times daily 8/9/17   Gee Barrios MD   carvedilol (COREG) 25 MG tablet Take 1 tablet (25 mg) by mouth 2 times daily 8/1/17   Juanito Castro MD   sulfamethoxazole-trimethoprim (BACTRIM/SEPTRA) 400-80 MG per tablet Take 1 tablet by mouth every other day 5/15/17   Gee Barrios MD   mycophenolate (CELLCEPT - GENERIC  "EQUIVALENT) 250 MG capsule Take 2 capsules (500 mg) by mouth 2 times daily 4/25/17   Gee Barrios MD   predniSONE (DELTASONE) 20 MG tablet Take 3 tablets (60 mg) by mouth daily  Patient not taking: Reported on 9/25/2017 3/14/17   Ania Osullivan MD   predniSONE (DELTASONE) 5 MG tablet Take 1 tablet (5 mg) by mouth daily 2/15/17   Gee Barrios MD   omeprazole (PRILOSEC) 20 MG capsule Take 1 capsule (20 mg) by mouth daily 6/10/14   Lani Quick MD     Vitals:  BP (!) 179/99  Pulse 88  Ht 1.727 m (5' 8\")  Wt 78.2 kg (172 lb 6.4 oz)  SpO2 98%  BMI 26.21 kg/m2    Exam:   GENERAL APPEARANCE: alert and no distress  HENT: mouth without ulcers or lesions  LYMPHATICS: no cervical or supraclavicular nodes  RESP: lungs clear to auscultation - no rales, rhonchi or wheezes  CV: regular rhythm, normal rate, no rub, no murmur  EDEMA: no LE edema bilaterally  ABDOMEN: soft, nondistended, nontender, bowel sounds normal  MS: extremities normal - no gross deformities noted, no evidence of inflammation in joints, no muscle tenderness  SKIN: no rash    Results:   Labs reviewed with patient.     "

## 2018-02-20 NOTE — NURSING NOTE
"Chief Complaint   Patient presents with     RECHECK     Kidney follow up       Initial BP (!) 179/99  Pulse 88  Ht 1.727 m (5' 8\")  Wt 78.2 kg (172 lb 6.4 oz)  SpO2 98%  BMI 26.21 kg/m2 Estimated body mass index is 26.21 kg/(m^2) as calculated from the following:    Height as of this encounter: 1.727 m (5' 8\").    Weight as of this encounter: 78.2 kg (172 lb 6.4 oz).  Medication Reconciliation: complete   KARTHIK KING CMA      "

## 2018-02-20 NOTE — LETTER
2/20/2018      RE: Rashad Ortiz  [address] [facility] [address]       Assessment and Plan:  1. LDKT - ESRD due to HTN. Currently on immunosuppression with:    Tac 2 mg po bid - last tac 4.9 with goal 3-5 ng / ml.  Mmf 500 mg po bid  pred 5 mg po daily    Creatinine last 3.7 mg / dl. He has been reasonably stable for about 1 year.    I would like the patient to increase the frequency of his lab checks to monthly due to his decreased GFR down to 18 mL/min.  If he consistently has a creatinine that is about 4-1/2 mg/dL I have recommended him have placement of dialysis access.    [place] his control of his blood pressure.  I stressed this with the patient repeatedly.      2. Immunosuppression:    3. HTN:  Carvedilol 25 mg po bid  Losartan 100 mg po daily  Start amlodipine 5 mg po daily.  Furosemide 20 mg po bid    Goal blood pressure for this patient is less than 140/90.  Ideal control would be 100-130/60-80.  If his blood pressure is not at goal within 7 days of initiation of amlodipine,  He can increase to 10 mg p.o. nightly.    4. Secondary hyperparathyroidism: PTH markedly elevated, with recent guidelines stating to avoid active vitamin d due to lack of efficacy on patient specific outcomes.  There is trend towards harm due to increased hypercalcemia.  Continue to monitor calcium and phosphorus with transplant labs.  As of now both are acceptable.    5. Met acidosis: sodium bicarbonate 1300 mg po bid.    6. Hyperkalemia: I recommended low potassium diet with goal less than 3000 mg per day.     7. Anemia: minimal. No need for LAWRENCE initiation at this time. Monitor for need with transplant labs monthly.     Assessment and plan was discussed with patient and he voiced his understanding and agreement.    Reason for Visit:  Mr. Ortiz is here for routine follow up and kidney transplant.    HPI:   Rashad Ortiz is a 42 year old male with ESKD from Hypertension and is status post LDKT in 2011.          Transplant Hx:       Tx: LDKT  Date: 2011       Present Maintenance IS: Tacrolimus, Mycophenolate mofetil and Prednisone       Baseline Creatinine: 4 mg / dl       Recent DSA: No         Biopsy: Yes: last in February 2017:    Kidney, allograft; percutaneous needle biopsy:   -Moderate chronic tubulo-interstitial changes   -No diagnostic evidence of acute rejection seen     Mr Ortiz is here for f/u of his CKD IV s/p kidney tx for ESRD due to HTN. His baseline creatinine in the last year is around 4 mg / dl. eGFR is 18 ml / min. He has been relisted for transplant.    The patient has had remarkable stability of his kidney function over the span of the last year.  I did refer him for AV fistula creation back in October due to an increase in the creatinine to 4.4 mg/dL.  However he has had his creatinine decreased back down to 3.7 mg/dL on the most recent check at the beginning of this month.    We discussed the patient's not ideal blood pressure control.  Today he is 179/99.  We will add amlodipine today and have the patient check his blood pressures at home.  If he is not at goal we did discuss 1/5 agent with hydralazine due to his hyperkalemia preventing addition of spironolactone.    Today, the patient denies any chest pain or shortness of breath.  He has no nausea or vomiting.  He has no fever shakes or chills.  He has no constipation or diarrhea.  He has no uremic symptoms today.    Home BP: at goal.      ROS:   A comprehensive review of systems was obtained and negative, except as noted in the HPI or PMH.    Active Medical Problems:  Patient Active Problem List   Diagnosis     Kidney replaced by transplant     High risk medications (not anticoagulants) long-term use     Hypertension goal BP (blood pressure) < 140/90     GERD (gastroesophageal reflux disease)     CARDIOVASCULAR SCREENING; LDL GOAL LESS THAN 160     Gout     Creatinine elevation     Antibody mediated rejection of kidney transplant     Medical  non-compliance     Chronic kidney disease, stage 4, severely decreased GFR (H)     Personal Hx:  Social History     Social History     Marital status:      Spouse name: N/A     Number of children: N/A     Years of education: N/A     Occupational History     Not on file.     Social History Main Topics     Smoking status: Former Smoker     Types: Cigarettes     Quit date: 03/2017     Smokeless tobacco: Never Used      Comment: Patient states that he is an 'social'  smoker      Alcohol use 2.5 oz/week     5 Standard drinks or equivalent per week      Comment: 1-2 drinks/wk     Drug use: No     Sexual activity: No     Other Topics Concern     Not on file     Social History Narrative     Allergies:  Allergies   Allergen Reactions     Nkda [No Known Drug Allergies]      Medications:  Prior to Admission medications    Medication Sig Start Date End Date Taking? Authorizing Provider   cholecalciferol (VITAMIN D3) 1000 UNIT tablet Take 2 tablets (2,000 Units) by mouth daily 2/8/18   Stef Murillo MD   traMADol (ULTRAM) 50 MG tablet Take 1 tablet (50 mg) by mouth nightly as needed for moderate pain 2/4/18   Susie Savage MD   cyclobenzaprine (FLEXERIL) 5 MG tablet Take 1 tablet (5 mg) by mouth 3 times daily as needed 2/4/18   Susie Savage MD   furosemide (LASIX) 20 MG tablet Take 1 tablet (20 mg) by mouth 2 times daily 1/17/18   Juanito Castro MD   losartan (COZAAR) 100 MG tablet TAKE 1 TABLET(100 MG) BY MOUTH DAILY 10/4/17   Stef Murillo MD   tacrolimus (GENERIC EQUIVALENT) 1 MG capsule Take 2 capsules (2 mg) by mouth 2 times daily 8/15/17   Gee Barrios MD   sodium bicarbonate 650 MG tablet Take 2 tablets (1,300 mg) by mouth 3 times daily 8/9/17   Gee Barrios MD   carvedilol (COREG) 25 MG tablet Take 1 tablet (25 mg) by mouth 2 times daily 8/1/17   Juanito Castro MD   sulfamethoxazole-trimethoprim (BACTRIM/SEPTRA) 400-80 MG per tablet Take 1 tablet by mouth every  "other day 5/15/17   Gee Barrios MD   mycophenolate (CELLCEPT - GENERIC EQUIVALENT) 250 MG capsule Take 2 capsules (500 mg) by mouth 2 times daily 4/25/17   Gee Barrios MD   predniSONE (DELTASONE) 20 MG tablet Take 3 tablets (60 mg) by mouth daily  Patient not taking: Reported on 9/25/2017 3/14/17   Ania Osullivan MD   predniSONE (DELTASONE) 5 MG tablet Take 1 tablet (5 mg) by mouth daily 2/15/17   Gee Barrios MD   omeprazole (PRILOSEC) 20 MG capsule Take 1 capsule (20 mg) by mouth daily 6/10/14   Lani Quick MD     Vitals:  BP (!) 179/99  Pulse 88  Ht 1.727 m (5' 8\")  Wt 78.2 kg (172 lb 6.4 oz)  SpO2 98%  BMI 26.21 kg/m2    Exam:   GENERAL APPEARANCE: alert and no distress  HENT: mouth without ulcers or lesions  LYMPHATICS: no cervical or supraclavicular nodes  RESP: lungs clear to auscultation - no rales, rhonchi or wheezes  CV: regular rhythm, normal rate, no rub, no murmur  EDEMA: no LE edema bilaterally  ABDOMEN: soft, nondistended, nontender, bowel sounds normal  MS: extremities normal - no gross deformities noted, no evidence of inflammation in joints, no muscle tenderness  SKIN: no rash    Results:   Labs reviewed with patient.       Stef Murillo MD      "

## 2018-02-20 NOTE — MR AVS SNAPSHOT
After Visit Summary   2/20/2018    Rashad Ortiz    MRN: [id number]           Patient Information     Date Of Birth          1975        Visit Information        Provider Department      2/20/2018 4:50 PM Stef Murillo MD [facility]        Today's Diagnoses     Benign essential hypertension    -  1    Status post kidney transplant        Immunosuppression (H)        Hyperkalemia          Care Instructions    Goal blood pressure is 100-140/60-90.    START amlodipine 5 mg AT NIGHT>    If bp not at goal after 1 week, increase to 10 mg at night.     Call if any problems.    Follow up in 6 months.     Labs monthly.             Follow-ups after your visit        Follow-up notes from your care team     Return in about 6 months (around 8/20/2018).      Your next 10 appointments already scheduled     Apr 11, 2018  2:00 PM CDT   FULL PULMONARY FUNCTION with  PFL D   [facility] ([facility])    [address] [facility]  3rd [facility] [id number] [phone]            Apr 11, 2018  3:00 PM CDT   (Arrive by 2:45 PM)   NEW PANCREAS/KIDNEY TRANSPLANT WORK-UP with Gelacio Jimenez MD   [facility] ([facility])    [address] [id number] [phone]            Apr 11, 2018  4:00 PM CDT   (Arrive by 3:45 PM)   New Patient Visit with KAILA Kaufman MD   [facility] ([facility])    [address] [id number] [phone]              Who to contact     If you have questions or need follow up information about today's clinic visit or your schedule please contact [facility] directly at [phone].  Normal or non-critical lab and imaging results will be communicated to you by MyChart, letter or phone within 4 business days after the clinic has received the results. If you do not hear from  "us within 7 days, please contact the clinic through [employer] or phone. If you have a critical or abnormal lab result, we will notify you by phone as soon as possible.  Submit refill requests through [employer] or call your pharmacy and they will forward the refill request to us. Please allow 3 business days for your refill to be completed.          Additional Information About Your Visit        [employer] Information     [employer] lets you send messages to your doctor, view your test results, renew your prescriptions, schedule appointments and more. To sign up, go to [email or web address] . Click on \"Log in\" on the left side of the screen, which will take you to the Welcome page. Then click on \"Sign up Now\" on the right side of the page.     You will be asked to enter the access code listed below, as well as some personal information. Please follow the directions to create your username and password.     Your access code is: [email or web address]  Expires: 2018  6:30 AM     Your access code will  in 90 days. If you need help or a new code, please call your [place] clinic or [phone].        Care EveryWhere ID     This is your Care EveryWhere ID. This could be used by other organizations to access your [place] medical records  [id number]        Your Vitals Were     Pulse Height Pulse Oximetry BMI (Body Mass Index)          88 1.727 m (5' 8\") 98% 26.21 kg/m2         Blood Pressure from Last 3 Encounters:   18 (!) 179/99   18 (!) 142/96   17 (!) 144/92    Weight from Last 3 Encounters:   18 78.2 kg (172 lb 6.4 oz)   18 74.9 kg (165 lb 2 oz)   17 71.6 kg (157 lb 12.8 oz)              Today, you had the following     No orders found for display         Today's Medication Changes          These changes are accurate as of 18  5:02 PM.  If you have any questions, ask your nurse or doctor.               Start taking these medicines.        Dose/Directions    amLODIPine 5 MG tablet "   Commonly known as:  NORVASC   Used for:  Benign essential hypertension   Started by:  Stef Murillo MD        Dose:  5 mg   Take 1 tablet (5 mg) by mouth daily   Quantity:  90 tablet   Refills:  3         Stop taking these medicines if you haven't already. Please contact your care team if you have questions.     cholecalciferol 1000 UNIT tablet   Commonly known as:  vitamin D3   Stopped by:  Stef Murillo MD           cyclobenzaprine 5 MG tablet   Commonly known as:  FLEXERIL   Stopped by:  Stef Murillo MD                Where to get your medicines      These medications were sent to [facility] [address] [place], [place] - [address] AT [facility] [address], [facility] [address]     Phone:  [phone]     amLODIPine 5 MG tablet                Primary Care Provider Office Phone # Fax #    [facility] [phone] [address]  [facility] [address]        Equal Access to Services     [facility]: Hadii aad ku hadasho Soomaali, waaxda luqadaha, qaybta kaalmada adeegyada, waxay idiin hayaan claudio galindoarafernando spencer . So [facility] [phone].    ATENCIÓN: Si habla español, tiene a ward disposición servicios gratuitos de asistencia lingüística. Llame al [phone].    We comply with applicable federal civil rights laws and [place] laws. We do not discriminate on the basis of race, color, national origin, age, disability, sex, sexual orientation, or gender identity.            Thank you!     Thank you for choosing [facility]  for your care. Our goal is always to provide you with excellent care. Hearing back from our patients is one way we can continue to improve our services. Please take a few minutes to complete the written survey that you may receive in the mail after your visit with us. Thank you!             Your Updated Medication List - Protect others around you: Learn how to safely use, store and throw  away your medicines at www.disposemymeds.org.          This list is accurate as of 2/20/18  5:02 PM.  Always use your most recent med list.                   Brand Name Dispense Instructions for use Diagnosis    amLODIPine 5 MG tablet    NORVASC    90 tablet    Take 1 tablet (5 mg) by mouth daily    Benign essential hypertension       carvedilol 25 MG tablet    COREG    180 tablet    Take 1 tablet (25 mg) by mouth 2 times daily        furosemide 20 MG tablet    LASIX    180 tablet    Take 1 tablet (20 mg) by mouth 2 times daily    Unspecified hypertensive kidney disease with chronic kidney disease stage I through stage IV, or unspecified(403.90)       losartan 100 MG tablet    COZAAR    90 tablet    TAKE 1 TABLET(100 MG) BY MOUTH DAILY    Renal hypertension, stage 1-4 or unspecified chronic kidney disease       mycophenolate 250 MG capsule    GENERIC EQUIVALENT    120 capsule    Take 2 capsules (500 mg) by mouth 2 times daily    Kidney transplanted       omeprazole 20 MG CR capsule    priLOSEC    90 capsule    Take 1 capsule (20 mg) by mouth daily    Kidney replaced by transplant       * predniSONE 5 MG tablet    DELTASONE    30 tablet    Take 1 tablet (5 mg) by mouth daily    Kidney replaced by transplant       * predniSONE 20 MG tablet    DELTASONE    15 tablet    Take 3 tablets (60 mg) by mouth daily    Acute gouty arthritis       sodium bicarbonate 650 MG tablet     120 tablet    Take 2 tablets (1,300 mg) by mouth 3 times daily    Kidney transplanted, Complications, kidney transplant, Aftercare following organ transplant, Immunosuppressed status (H), Encounter for long-term (current) use of high-risk medication       sulfamethoxazole-trimethoprim 400-80 MG per tablet    BACTRIM/SEPTRA    45 tablet    Take 1 tablet by mouth every other day    Kidney transplanted       tacrolimus 1 MG capsule    GENERIC EQUIVALENT    120 capsule    Take 2 capsules (2 mg) by mouth 2 times daily    Kidney transplant recipient,  Long-term use of immunosuppressant medication       traMADol 50 MG tablet    ULTRAM    6 tablet    Take 1 tablet (50 mg) by mouth nightly as needed for moderate pain        * Notice:  This list has 2 medication(s) that are the same as other medications prescribed for you. Read the directions carefully, and ask your doctor or other care provider to review them with you.

## 2018-02-20 NOTE — PATIENT INSTRUCTIONS
Goal blood pressure is 100-140/60-90.    START amlodipine 5 mg AT NIGHT>    If bp not at goal after 1 week, increase to 10 mg at night.     Call if any problems.    Follow up in 6 months.     Labs monthly.

## 2018-02-22 DIAGNOSIS — Z94.0 KIDNEY REPLACED BY TRANSPLANT: ICD-10-CM

## 2018-02-23 ENCOUNTER — TELEPHONE (OUTPATIENT)
Dept: TRANSPLANT | Facility: CLINIC | Age: 43
End: 2018-02-23

## 2018-02-23 RX ORDER — TACROLIMUS 1 MG/1
2 CAPSULE ORAL 2 TIMES DAILY
Qty: 120 CAPSULE | Refills: 11 | Status: SHIPPED | OUTPATIENT
Start: 2018-02-23 | End: 2018-02-26

## 2018-02-23 RX ORDER — PREDNISONE 5 MG/1
TABLET ORAL
Qty: 30 TABLET | Refills: 11 | Status: SHIPPED | OUTPATIENT
Start: 2018-02-23 | End: 2018-03-05

## 2018-02-23 RX ORDER — MYCOPHENOLATE MOFETIL 250 MG/1
500 CAPSULE ORAL 2 TIMES DAILY
Qty: 120 CAPSULE | Refills: 11 | Status: SHIPPED | OUTPATIENT
Start: 2018-02-23 | End: 2018-02-26

## 2018-02-23 NOTE — TELEPHONE ENCOUNTER
Provider Call: Medication Refill  Pharmacy Name: [facility]  Pharmacy Location: [place]  Name of Medication: Cellcept and Prograf Dose:  They do not have dose or directions  When will the patient be out of this medication?   Callback needed? Yes- [phone]- option-1, then option 6 reference # [id number]  Return Call Needed  Same as documented in contacts section

## 2018-02-23 NOTE — TELEPHONE ENCOUNTER
Provider Call: Medication Refill  Pharmacy Name: [facility]  Name of Medication: Prograf and Cellcept  [facility] left a voicemail message on 2/22/18 at 5:07 pm needing a new script.

## 2018-02-26 DIAGNOSIS — Z79.60 LONG-TERM USE OF IMMUNOSUPPRESSANT MEDICATION: ICD-10-CM

## 2018-02-26 DIAGNOSIS — Z94.0 KIDNEY TRANSPLANTED: ICD-10-CM

## 2018-02-26 DIAGNOSIS — Z94.0 KIDNEY TRANSPLANT RECIPIENT: ICD-10-CM

## 2018-02-26 RX ORDER — MYCOPHENOLATE MOFETIL 250 MG/1
500 CAPSULE ORAL 2 TIMES DAILY
Qty: 120 CAPSULE | Refills: 11 | Status: SHIPPED | OUTPATIENT
Start: 2018-02-26 | End: 2018-10-25

## 2018-02-26 RX ORDER — TACROLIMUS 1 MG/1
2 CAPSULE ORAL 2 TIMES DAILY
Qty: 120 CAPSULE | Refills: 11 | Status: SHIPPED | OUTPATIENT
Start: 2018-02-26 | End: 2018-10-25

## 2018-03-01 ENCOUNTER — OFFICE VISIT (OUTPATIENT)
Dept: FAMILY MEDICINE | Facility: CLINIC | Age: 43
End: 2018-03-01
Payer: COMMERCIAL

## 2018-03-01 ENCOUNTER — DOCUMENTATION ONLY (OUTPATIENT)
Dept: LAB | Facility: CLINIC | Age: 43
End: 2018-03-01

## 2018-03-01 VITALS
OXYGEN SATURATION: 100 % | TEMPERATURE: 98.9 F | BODY MASS INDEX: 26.37 KG/M2 | HEART RATE: 88 BPM | SYSTOLIC BLOOD PRESSURE: 139 MMHG | WEIGHT: 174 LBS | HEIGHT: 68 IN | DIASTOLIC BLOOD PRESSURE: 85 MMHG

## 2018-03-01 DIAGNOSIS — M54.2 CERVICALGIA: Primary | ICD-10-CM

## 2018-03-01 PROCEDURE — 99213 OFFICE O/P EST LOW 20 MIN: CPT | Performed by: FAMILY MEDICINE

## 2018-03-01 RX ORDER — TRAMADOL HYDROCHLORIDE 50 MG/1
50 TABLET ORAL
Qty: 30 TABLET | Refills: 0 | Status: SHIPPED | OUTPATIENT
Start: 2018-03-01 | End: 2018-03-12

## 2018-03-01 ASSESSMENT — PAIN SCALES - GENERAL: PAINLEVEL: SEVERE PAIN (7)

## 2018-03-01 NOTE — PROGRESS NOTES
Patient was in today for a Lab visit at the [facility].  He states that he is to have monthly labs done, however the standing orders are for every 3 months.  Labs were last done on 1/6/2018.  Please review this patient chart, update standing orders, and advise patient of treatment plan.  Thank you.

## 2018-03-01 NOTE — PROGRESS NOTES
SUBJECTIVE:   Rashad Ortiz is a 42 year old male who presents to clinic today for the following health issues:      Headache  Onset: 2/4/18    Description:   Location: bilateral in the frontal area, bilateral in the temporal area, unilateral in the left occipital area   Character: throbbing pain, sharp  Frequency:  Once a day  Duration:  Few hours, some days lasts all day.    Intensity: moderate    Progression of Symptoms:  same    Accompanying Signs & Symptoms:  Stiff neck: YES  Neck or upper back pain: YES  Fever: no  Sinus pressure: no  Nausea or vomiting: no  Dizziness: no  Numbness: no  Weakness: no  Visual changes: no    History:   Head trauma: no  Family history of migraines: no  Previous tests for headaches: no  Neurologist evaluations: no  Able to do daily activities: yes  Wake with a headaches: no  Do headaches wake you up: no  Daily pain medication use: no  Work/school stressors/changes: no    Precipitating factors:   Does light make it worse: YES  Does sound make it worse: no    Alleviating factors:  Does sleep help: no     Therapies Tried and outcome: tramadol    Problem list and histories reviewed & adjusted, as indicated.  Additional history: as documented    Patient Active Problem List   Diagnosis     Kidney replaced by transplant     High risk medications (not anticoagulants) long-term use     Hypertension goal BP (blood pressure) < 140/90     GERD (gastroesophageal reflux disease)     CARDIOVASCULAR SCREENING; LDL GOAL LESS THAN 160     Gout     Creatinine elevation     Antibody mediated rejection of kidney transplant     Medical non-compliance     Chronic kidney disease, stage 4, severely decreased GFR (H)     Past Surgical History:   Procedure Laterality Date     AV FISTULA OR GRAFT ARTERIAL       LIGATE FISTULA ARTERIOVENOUS UPPER EXTREMITY  12/20/2011    Procedure:LIGATE FISTULA ARTERIOVENOUS UPPER EXTREMITY; Excision of Right Forearm Arteriovenous Fistula.; Surgeon:LINDY AMAYA;  "Location:[facility] OR     PERCUTANEOUS BIOPSY KIDNEY Right 2/28/2017    Procedure: PERCUTANEOUS BIOPSY KIDNEY;  Surgeon: Gee Barrios MD;  Location: [facility] OR     TRANSPLANT  01/13/2011    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       Social History   Substance Use Topics     Smoking status: Former Smoker     Types: Cigarettes     Quit date: 03/2017     Smokeless tobacco: Never Used      Comment: Patient states that he is an 'social'  smoker      Alcohol use 2.5 oz/week     5 Standard drinks or equivalent per week      Comment: 1-2 drinks/wk     Family History   Problem Relation Age of Onset     Hypertension Mother            Reviewed and updated as needed this visit by clinical staff  Tobacco  Allergies  Meds  Med Hx  Surg Hx  Fam Hx  Soc Hx      Reviewed and updated as needed this visit by Provider         ROS:  Constitutional, HEENT, cardiovascular, pulmonary, GI, , musculoskeletal, neuro, skin, endocrine and psych systems are negative, except as otherwise noted.    OBJECTIVE:     /85 (BP Location: Left arm, Patient Position: Chair, Cuff Size: Adult Large)  Pulse 88  Temp 98.9  F (37.2  C) (Oral)  Ht 5' 8\" (1.727 m)  Wt 174 lb (78.9 kg)  SpO2 100%  BMI 26.46 kg/m2  Body mass index is 26.46 kg/(m^2).  GENERAL: healthy, alert and no distress  NECK: no adenopathy, no asymmetry, masses, or scars and thyroid normal to palpation  RESP: lungs clear to auscultation - no rales, rhonchi or wheezes  CV: regular rate and rhythm, normal S1 S2, no S3 or S4, no murmur, click or rub, no peripheral edema and peripheral pulses strong  ABDOMEN: soft, nontender, no hepatosplenomegaly, no masses and bowel sounds normal  MS: left paraspinal cervical muscle tenderness  Diagnostic Test Results:  none     ASSESSMENT/PLAN:     1. Cervicalgia  Likely muscular strain. Tramadol refilled to use as needed. Referred to physical therapy for evaluation and treatment. RTC if no improvements.  - traMADol (ULTRAM) 50 MG " tablet; Take 1 tablet (50 mg) by mouth nightly as needed for moderate pain  Dispense: 30 tablet; Refill: 0  - [facility] RE[facility]ERR[facility]      Srinivas Harrington MD, MD  [facility]

## 2018-03-01 NOTE — MR AVS SNAPSHOT
After Visit Summary   3/1/2018    Rashad Ortiz    MRN: [id number]           Patient Information     Date Of Birth          1975        Visit Information        Provider Department      3/1/2018 4:40 PM Srinivas Harrington MD [facility]        Today's Diagnoses     Cervicalgia    -  1      Care Instructions    At [facility], we strive to deliver an exceptional experience to you, every time we see you.  If you receive a survey in the mail, please send us back your thoughts. We really do value your feedback.    Based on your medical history, these are the current health maintenance/preventive care services that you are due for (some may have been done at this visit.)  There are no preventive care reminders to display for this patient.      Suggested websites for health information:  Www.[employer]ON[employer] : Up to date and easily searchable information on multiple topics.  Www.SGN [employer]).gov : medication info, interactive tutorials, watch real surgeries online  Www.familydoctor.org : good info from the Academy of Family Physicians  Www.cdc.gov : public health info, travel advisories, epidemics (H1N1)  Www.aap.org : children's health info, normal development, vaccinations  Www.health.[facility].[place].us : [place] dept of health, public health issues in [place], N1N1    Your care team:                            Family Medicine Internal Medicine   MD Juanito Lynne MD Shantel Branch-Fleming, MD Katya Georgiev PA-C Nam Ho, MD Pediatrics   GRANT Kendall, MD Kelly Matias CNP, MD Deborah Mielke, MD Kim Thein, APRN CNP      Clinic hours: Monday - Thursday 7 am-7 pm; Fridays 7 am-5 pm.   Urgent care: Monday - Friday 11 am-9 pm; Saturday and Sunday 9 am-5 pm.  Pharmacy : Monday -Thursday 8 am-8 pm; Friday 8 am-6 pm; Saturday and Sunday 9 am-5 pm.     Clinic: [phone]   Pharmacy: [phone]            Follow-ups after your visit        Additional Services     [facility], HAND, AND CHIROPRACTIC REFERRAL       **This order will print in the [facility] Scheduling Office**    Physical Therapy, Hand Therapy and Chiropractic Care are available through:    *[facility]  *[facility]  *[facility]    Call one number to schedule at any of the above locations: [phone].    Your provider has referred you to: Physical Therapy at [facility] or [facility]    Indication/Reason for Referral: neck pain  Onset of Illness:   Therapy Orders: Evaluate and Treat  Special Programs: None  Special Request: None    Olivia Baker      Additional Comments for the Therapist or Chiropractor:     Please be aware that coverage of these services is subject to the terms and limitations of your health insurance plan.  Call member services at your health plan with any benefit or coverage questions.      Please bring the following to your appointment:    *Your personal calendar for scheduling future appointments  *Comfortable clothing                  Your next 10 appointments already scheduled     Apr 11, 2018  2:00 PM CDT   FULL PULMONARY FUNCTION with  PFL D   [facility] Pulmonary Function Testing ([facility])    [address] [place] [id number]   [phone]            Apr 11, 2018  3:00 PM CDT   (Arrive by 2:45 PM)   NEW PANCREAS/KIDNEY TRANSPLANT WORK-UP with Gelacio Jimenez MD   [facility] Heart Care ([facility])    [address] [place] [address]  [address] [place] [id number] [phone]            Apr 11, 2018  4:00 PM CDT   (Arrive by 3:45 PM)   New Patient Visit with KAILA Kaufman MD   [facility] Dermatology ([facility])    [address] [place] [id number] [phone]            Aug 20, 2018  4:50 PM CDT   (Arrive by 4:20 PM)   Return Kidney Transplant  "with Stef Murillo MD   [facility] ([facility])    [address]  [address]  [facility] [address] [phone]              Who to contact     If you have questions or need follow up information about today's clinic visit or your schedule please contact [facility] KEERTHI Delafield directly at [phone].  Normal or non-critical lab and imaging results will be communicated to you by MyChart, letter or phone within 4 business days after the clinic has received the results. If you do not hear from us within 7 days, please contact the clinic through MyChart or phone. If you have a critical or abnormal lab result, we will notify you by phone as soon as possible.  Submit refill requests through [employer] or call your pharmacy and they will forward the refill request to us. Please allow 3 business days for your refill to be completed.          Additional Information About Your Visit        [employer] Information     [employer] lets you send messages to your doctor, view your test results, renew your prescriptions, schedule appointments and more. To sign up, go to [email or web address] . Click on \"Log in\" on the left side of the screen, which will take you to the Welcome page. Then click on \"Sign up Now\" on the right side of the page.     You will be asked to enter the access code listed below, as well as some personal information. Please follow the directions to create your username and password.     Your access code is: [email or web address]  Expires: 2018  6:30 AM     Your access code will  in 90 days. If you need help or a new code, please call your [facility] or [phone].        Care EveryWhere ID     This is your Care EveryWhere ID. This could be used by other organizations to access your [facility] medical records  [id number]        Your Vitals Were     Pulse Temperature Height Pulse Oximetry BMI (Body Mass Index)       88 98.9  F (37.2  C) (Oral) 5' 8\" " (1.727 m) 100% 26.46 kg/m2        Blood Pressure from Last 3 Encounters:   03/01/18 139/85   02/20/18 (!) 179/99   02/04/18 (!) 142/96    Weight from Last 3 Encounters:   03/01/18 174 lb (78.9 kg)   02/20/18 172 lb 6.4 oz (78.2 kg)   02/04/18 165 lb 2 oz (74.9 kg)              We Performed the Following     [facility] HAND, AND CHIROPRACTIC REFERRAL          Where to get your medicines      Some of these will need a paper prescription and others can be bought over the counter.  Ask your nurse if you have questions.     Bring a paper prescription for each of these medications     traMADol 50 MG tablet          Primary Care Provider Office Phone # Fax #    [facility] [phone] [address]  [facility] [address]        Equal Access to Services     LUISANA GUTIERREZ : Hadii aad ku hadasho Sojose, waaxda luqadaha, qaybta kaalmada adeegyada, ronaldo spencer . So [facility] [phone].    ATENCIÓN: Si habla español, tiene a ward disposición servicios gratuitos de asistencia lingüística. Llame al [phone].    We comply with applicable federal civil rights laws and [place] laws. We do not discriminate on the basis of race, color, national origin, age, disability, sex, sexual orientation, or gender identity.            Thank you!     Thank you for choosing [facility]  for your care. Our goal is always to provide you with excellent care. Hearing back from our patients is one way we can continue to improve our services. Please take a few minutes to complete the written survey that you may receive in the mail after your visit with us. Thank you!             Your Updated Medication List - Protect others around you: Learn how to safely use, store and throw away your medicines at www.disposemymeds.org.          This list is accurate as of 3/1/18  5:03 PM.  Always use your most recent med list.                   Brand Name Dispense Instructions for  use Diagnosis    amLODIPine 5 MG tablet    NORVASC    90 tablet    Take 1 tablet (5 mg) by mouth daily    Benign essential hypertension       carvedilol 25 MG tablet    COREG    180 tablet    Take 1 tablet (25 mg) by mouth 2 times daily        furosemide 20 MG tablet    LASIX    180 tablet    Take 1 tablet (20 mg) by mouth 2 times daily    Unspecified hypertensive kidney disease with chronic kidney disease stage I through stage IV, or unspecified(403.90)       losartan 100 MG tablet    COZAAR    90 tablet    TAKE 1 TABLET(100 MG) BY MOUTH DAILY    Renal hypertension, stage 1-4 or unspecified chronic kidney disease       mycophenolate 250 MG capsule    GENERIC EQUIVALENT    120 capsule    Take 2 capsules (500 mg) by mouth 2 times daily    Kidney transplanted       omeprazole 20 MG CR capsule    priLOSEC    90 capsule    Take 1 capsule (20 mg) by mouth daily    Kidney replaced by transplant       predniSONE 5 MG tablet    DELTASONE    30 tablet    TAKE ONE TABLET BY MOUTH EVERY DAY    Kidney replaced by transplant       sodium bicarbonate 650 MG tablet     120 tablet    Take 2 tablets (1,300 mg) by mouth 3 times daily    Kidney transplanted, Complications, kidney transplant, Aftercare following organ transplant, Immunosuppressed status (H), Encounter for long-term (current) use of high-risk medication       sulfamethoxazole-trimethoprim 400-80 MG per tablet    BACTRIM/SEPTRA    45 tablet    Take 1 tablet by mouth every other day    Kidney transplanted       tacrolimus 1 MG capsule    GENERIC EQUIVALENT    120 capsule    Take 2 capsules (2 mg) by mouth 2 times daily    Kidney transplant recipient, Long-term use of immunosuppressant medication       traMADol 50 MG tablet    ULTRAM    30 tablet    Take 1 tablet (50 mg) by mouth nightly as needed for moderate pain    Cervicalgia

## 2018-03-05 DIAGNOSIS — Z94.0 KIDNEY REPLACED BY TRANSPLANT: ICD-10-CM

## 2018-03-05 RX ORDER — PREDNISONE 5 MG/1
5 TABLET ORAL DAILY
Qty: 30 TABLET | Refills: 11 | Status: ON HOLD | OUTPATIENT
Start: 2018-03-05 | End: 2018-10-28

## 2018-03-05 NOTE — TELEPHONE ENCOUNTER
Requested Prescriptions   Pending Prescriptions Disp Refills     predniSONE (DELTASONE) 5 MG tablet 30 tablet 11     Sig: Take 1 tablet (5 mg) by mouth daily    There is no refill protocol information for this order        predniSONE (DELTASONE) 5 MG tablet      Last Written Prescription Date:  2/23/18  Last Fill Quantity: 30,   # refills: 11  Last Office Visit: 3/1/18  Future Office visit:       Routing refill request to provider for review/approval because:  Drug not on the [facility] or [facility] refill protocol or controlled substance

## 2018-03-06 ENCOUNTER — TELEPHONE (OUTPATIENT)
Dept: TRANSPLANT | Facility: CLINIC | Age: 43
End: 2018-03-06

## 2018-03-06 DIAGNOSIS — Z94.0 KIDNEY REPLACED BY TRANSPLANT: Primary | ICD-10-CM

## 2018-03-06 NOTE — TELEPHONE ENCOUNTER
ISSUE:  Call from patient wondering about lab schedule.   Per Dr Murillo last clinic note, patient should get monthly labs done to monitor kidney function and need for dialysis.     LPN TASK:   Update lab orders for transplant labs monthly, also include phosphorus level monthly  Call patient with plan.

## 2018-03-06 NOTE — PROGRESS NOTES
Patient was in today for a Lab visit at the [facility].  He states that he is to have monthly labs done, however the standing orders are for every 3 months.  Labs were last done on 1/6/2018.  Please review this patient chart, update standing orders, and advise patient of treatment plan.  Thank you.      From Laura Hamilton CMA

## 2018-03-06 NOTE — TELEPHONE ENCOUNTER
Call placed to patient: No answer. Message left instructing patient to complete transplant labs monthly. Orders updated

## 2018-03-06 NOTE — PROGRESS NOTES
Per Dr Murillo last clinic note, patient should get monthly labs done to monitor kidney function and need for dialysis.     LPN TASK:   Update lab orders for transplant labs monthly, also include phosphorus level monthly  Call patient with plan.

## 2018-03-12 ENCOUNTER — TELEPHONE (OUTPATIENT)
Dept: FAMILY MEDICINE | Facility: CLINIC | Age: 43
End: 2018-03-12

## 2018-03-12 DIAGNOSIS — M10.9 ACUTE GOUT INVOLVING TOE OF LEFT FOOT, UNSPECIFIED CAUSE: Primary | ICD-10-CM

## 2018-03-12 DIAGNOSIS — M54.2 CERVICALGIA: ICD-10-CM

## 2018-03-12 RX ORDER — TRAMADOL HYDROCHLORIDE 50 MG/1
50 TABLET ORAL
Qty: 30 TABLET | Refills: 0 | Status: SHIPPED | OUTPATIENT
Start: 2018-03-12 | End: 2018-05-25

## 2018-03-12 RX ORDER — PREDNISONE 20 MG/1
TABLET ORAL
Qty: 20 TABLET | Refills: 0 | Status: SHIPPED | OUTPATIENT
Start: 2018-03-12 | End: 2018-04-08

## 2018-03-12 NOTE — TELEPHONE ENCOUNTER
Called and spoke with patient. Reporting having a gout attack in his great left toe. Has been building for over 1 week now. Pt reports that when get attack, he will typically wait to see if goes away on it's own as this sometimes happens, but issue is not resolving at this time after 1 week. Pt states he usually gets Prednisone 20 mg and Tramadol for pain. Pt has a few tramadol tablets left from his last OV on 3/1/18 with Dr. Harrington. Pt will use the rest of these and only takes until prednisone has taken effect. Pt does not think he will have enough left to last him through this pain and is asking if can have more.  Last OV was 3/1/18.    Routing to provider to review and advise.    Marilee Ibarra RN  [facility] Triage

## 2018-03-12 NOTE — TELEPHONE ENCOUNTER
Reason for Call:  Other prescription    Detailed comments: Gout flair-up 2nd week wants medications. Please send to [address] and [facility]. Wants Prednisone and something for pain (Tramadol was last medictaion).  Please call and advise.     Phone Number Patient can be reached at: [phone]    Best Time: any    Can we leave a detailed message on this number? YES    Call taken on 3/12/2018 at 11:38 AM by Reena Garcia

## 2018-03-12 NOTE — TELEPHONE ENCOUNTER
Written rx is signed by Dr. Tai and faxed to [facility] pharmacy, the pharmacy will contact patient when medications are ready for pickup.  Edgard Mclean,  [facility]

## 2018-03-12 NOTE — TELEPHONE ENCOUNTER
TC, please check printer for Rx and have covering provider sign for Dr. Harrington. Please notify pt of prescriptions when complete, thank you.    Marilee Ibarra RN  [facility] Triage

## 2018-03-12 NOTE — TELEPHONE ENCOUNTER
Please notify patient higher dose prednisone taper sent to pharmacy. Tramadol should be in printer. Please have someone sign rx for .    Srinivas Harrington MD

## 2018-03-17 DIAGNOSIS — Z94.0 KIDNEY REPLACED BY TRANSPLANT: ICD-10-CM

## 2018-03-17 DIAGNOSIS — Z79.899 ENCOUNTER FOR LONG-TERM CURRENT USE OF MEDICATION: ICD-10-CM

## 2018-03-17 DIAGNOSIS — Z48.298 AFTERCARE FOLLOWING ORGAN TRANSPLANT: ICD-10-CM

## 2018-03-17 LAB
CREAT UR-MCNC: 88 MG/DL
ERYTHROCYTE [DISTWIDTH] IN BLOOD BY AUTOMATED COUNT: 15.8 % (ref 10–15)
HCT VFR BLD AUTO: 34.1 % (ref 40–53)
HGB BLD-MCNC: 10.8 G/DL (ref 13.3–17.7)
MCH RBC QN AUTO: 26.9 PG (ref 26.5–33)
MCHC RBC AUTO-ENTMCNC: 31.7 G/DL (ref 31.5–36.5)
MCV RBC AUTO: 85 FL (ref 78–100)
PLATELET # BLD AUTO: 178 10E9/L (ref 150–450)
PROT UR-MCNC: 0.73 G/L
PROT/CREAT 24H UR: 0.82 G/G CR (ref 0–0.2)
RBC # BLD AUTO: 4.01 10E12/L (ref 4.4–5.9)
WBC # BLD AUTO: 6 10E9/L (ref 4–11)

## 2018-03-17 PROCEDURE — 84156 ASSAY OF PROTEIN URINE: CPT | Performed by: INTERNAL MEDICINE

## 2018-03-17 PROCEDURE — 85027 COMPLETE CBC AUTOMATED: CPT | Performed by: INTERNAL MEDICINE

## 2018-03-17 PROCEDURE — 80197 ASSAY OF TACROLIMUS: CPT | Performed by: INTERNAL MEDICINE

## 2018-03-17 PROCEDURE — 36415 COLL VENOUS BLD VENIPUNCTURE: CPT | Performed by: INTERNAL MEDICINE

## 2018-03-17 PROCEDURE — 80048 BASIC METABOLIC PNL TOTAL CA: CPT | Performed by: INTERNAL MEDICINE

## 2018-03-18 LAB
TACROLIMUS BLD-MCNC: 4.3 UG/L (ref 5–15)
TME LAST DOSE: ABNORMAL H

## 2018-03-19 LAB
ANION GAP SERPL CALCULATED.3IONS-SCNC: 9 MMOL/L (ref 3–14)
BUN SERPL-MCNC: 51 MG/DL (ref 7–30)
CALCIUM SERPL-MCNC: 9 MG/DL (ref 8.5–10.1)
CHLORIDE SERPL-SCNC: 115 MMOL/L (ref 94–109)
CO2 SERPL-SCNC: 21 MMOL/L (ref 20–32)
CREAT SERPL-MCNC: 4.09 MG/DL (ref 0.66–1.25)
GFR SERPL CREATININE-BSD FRML MDRD: 16 ML/MIN/1.7M2
GLUCOSE SERPL-MCNC: 93 MG/DL (ref 70–99)
POTASSIUM SERPL-SCNC: 5 MMOL/L (ref 3.4–5.3)
SODIUM SERPL-SCNC: 145 MMOL/L (ref 133–144)

## 2018-04-08 DIAGNOSIS — M10.9 ACUTE GOUT INVOLVING TOE OF LEFT FOOT, UNSPECIFIED CAUSE: ICD-10-CM

## 2018-04-08 NOTE — TELEPHONE ENCOUNTER
Requested Prescriptions   Pending Prescriptions Disp Refills     predniSONE (DELTASONE) 20 MG tablet [Pharmacy Med Name: PREDNISONE 20MG TABLETS] 20 tablet 0     Sig: TAKE 3 TABLETS BY MOUTH DAILY X3 DAYS, 2 TABLETS DAILY X3 DAYS, 1 TABLET DAILY X3 DAYS, 1/2 TABLET DAILY X3 DAYS    There is no refill protocol information for this order        Last Written Prescription Date:  3/12/18  Last Fill Quantity: 20,  # refills: 0   Last Office Visit with [facility], [facility] or [facility] prescribing provider:  3/1/2018     Future Office Visit:

## 2018-04-10 RX ORDER — PREDNISONE 20 MG/1
TABLET ORAL
Qty: 20 TABLET | Refills: 0 | Status: SHIPPED | OUTPATIENT
Start: 2018-04-10 | End: 2018-04-30

## 2018-04-23 DIAGNOSIS — Z94.0 KIDNEY REPLACED BY TRANSPLANT: ICD-10-CM

## 2018-04-23 PROCEDURE — 84100 ASSAY OF PHOSPHORUS: CPT | Performed by: INTERNAL MEDICINE

## 2018-04-23 PROCEDURE — 80197 ASSAY OF TACROLIMUS: CPT | Performed by: INTERNAL MEDICINE

## 2018-04-23 PROCEDURE — 85025 COMPLETE CBC W/AUTO DIFF WBC: CPT | Performed by: INTERNAL MEDICINE

## 2018-04-23 PROCEDURE — 36415 COLL VENOUS BLD VENIPUNCTURE: CPT | Performed by: INTERNAL MEDICINE

## 2018-04-23 PROCEDURE — 80048 BASIC METABOLIC PNL TOTAL CA: CPT | Performed by: INTERNAL MEDICINE

## 2018-04-24 ENCOUNTER — TELEPHONE (OUTPATIENT)
Dept: TRANSPLANT | Facility: CLINIC | Age: 43
End: 2018-04-24

## 2018-04-24 LAB
ANION GAP SERPL CALCULATED.3IONS-SCNC: 11 MMOL/L (ref 3–14)
ANISOCYTOSIS BLD QL SMEAR: SLIGHT
BUN SERPL-MCNC: 61 MG/DL (ref 7–30)
CALCIUM SERPL-MCNC: 8.3 MG/DL (ref 8.5–10.1)
CHLORIDE SERPL-SCNC: 116 MMOL/L (ref 94–109)
CO2 SERPL-SCNC: 17 MMOL/L (ref 20–32)
CREAT SERPL-MCNC: 3.44 MG/DL (ref 0.66–1.25)
DIFFERENTIAL METHOD BLD: ABNORMAL
EOSINOPHIL # BLD AUTO: 0.1 10E9/L (ref 0–0.7)
EOSINOPHIL NFR BLD AUTO: 1 %
ERYTHROCYTE [DISTWIDTH] IN BLOOD BY AUTOMATED COUNT: 16.8 % (ref 10–15)
GFR SERPL CREATININE-BSD FRML MDRD: 20 ML/MIN/1.7M2
GLUCOSE SERPL-MCNC: 104 MG/DL (ref 70–99)
HCT VFR BLD AUTO: 31.6 % (ref 40–53)
HGB BLD-MCNC: 10.2 G/DL (ref 13.3–17.7)
LYMPHOCYTES # BLD AUTO: 1.4 10E9/L (ref 0.8–5.3)
LYMPHOCYTES NFR BLD AUTO: 13 %
MCH RBC QN AUTO: 26.8 PG (ref 26.5–33)
MCHC RBC AUTO-ENTMCNC: 32.3 G/DL (ref 31.5–36.5)
MCV RBC AUTO: 83 FL (ref 78–100)
MONOCYTES # BLD AUTO: 1.1 10E9/L (ref 0–1.3)
MONOCYTES NFR BLD AUTO: 10 %
NEUTROPHILS # BLD AUTO: 8.3 10E9/L (ref 1.6–8.3)
NEUTROPHILS NFR BLD AUTO: 76 %
PHOSPHATE SERPL-MCNC: 3 MG/DL (ref 2.5–4.5)
PLATELET # BLD AUTO: 190 10E9/L (ref 150–450)
PLATELET # BLD EST: ABNORMAL 10*3/UL
POIKILOCYTOSIS BLD QL SMEAR: SLIGHT
POTASSIUM SERPL-SCNC: 4.3 MMOL/L (ref 3.4–5.3)
RBC # BLD AUTO: 3.8 10E12/L (ref 4.4–5.9)
SODIUM SERPL-SCNC: 144 MMOL/L (ref 133–144)
TACROLIMUS BLD-MCNC: 4.9 UG/L (ref 5–15)
TME LAST DOSE: 600 H
WBC # BLD AUTO: 10.9 10E9/L (ref 4–11)

## 2018-04-24 NOTE — TELEPHONE ENCOUNTER
Call placed to patient. No answer. Voice message left requesting patient return call to discuss current C02 level.

## 2018-04-24 NOTE — TELEPHONE ENCOUNTER
CO2 level low.   Patient currently prescribed sodium bicarbonate 1300mg three times daily.   Please confirm patient is taking this medication as prescribed. If accurate, notify [facility].    Please ensure patient is not having any of the following symptoms:  Nasuea, vomiting, itching, low urine output, or high blood pressure.

## 2018-04-25 NOTE — TELEPHONE ENCOUNTER
Call placed to patient: No answer. Voice message left requesting a return call to discuss C02 level.

## 2018-04-30 DIAGNOSIS — M10.9 ACUTE GOUT INVOLVING TOE OF LEFT FOOT, UNSPECIFIED CAUSE: ICD-10-CM

## 2018-04-30 NOTE — TELEPHONE ENCOUNTER
Routing refill request to provider for review/approval because:  Drug not on the [facility] refill protocol   Anjelica Patiño RN

## 2018-05-01 RX ORDER — PREDNISONE 20 MG/1
TABLET ORAL
Qty: 20 TABLET | Refills: 0 | Status: SHIPPED | OUTPATIENT
Start: 2018-05-01 | End: 2018-05-11

## 2018-05-03 DIAGNOSIS — T86.10 COMPLICATIONS, KIDNEY TRANSPLANT: ICD-10-CM

## 2018-05-03 DIAGNOSIS — Z79.899 ENCOUNTER FOR LONG-TERM (CURRENT) USE OF HIGH-RISK MEDICATION: ICD-10-CM

## 2018-05-03 DIAGNOSIS — Z94.0 KIDNEY TRANSPLANTED: ICD-10-CM

## 2018-05-03 DIAGNOSIS — Z48.298 AFTERCARE FOLLOWING ORGAN TRANSPLANT: ICD-10-CM

## 2018-05-03 DIAGNOSIS — D84.9 IMMUNOSUPPRESSED STATUS (H): ICD-10-CM

## 2018-05-03 RX ORDER — SODIUM BICARBONATE 650 MG/1
1300 TABLET ORAL 2 TIMES DAILY
Qty: 120 TABLET | Refills: 11 | Status: ON HOLD | OUTPATIENT
Start: 2018-05-03 | End: 2018-10-28

## 2018-05-03 RX ORDER — MYCOPHENOLATE MOFETIL 250 MG/1
500 CAPSULE ORAL 2 TIMES DAILY
Qty: 120 CAPSULE | Refills: 11 | Status: SHIPPED | OUTPATIENT
Start: 2018-05-03 | End: 2018-12-11

## 2018-05-11 DIAGNOSIS — M10.9 ACUTE GOUT INVOLVING TOE OF LEFT FOOT, UNSPECIFIED CAUSE: ICD-10-CM

## 2018-05-11 RX ORDER — PREDNISONE 20 MG/1
TABLET ORAL
Qty: 20 TABLET | Refills: 0 | Status: SHIPPED | OUTPATIENT
Start: 2018-05-11 | End: 2018-05-22

## 2018-05-11 NOTE — TELEPHONE ENCOUNTER
Requested Prescriptions   Pending Prescriptions Disp Refills     predniSONE (DELTASONE) 20 MG tablet [Pharmacy Med Name: PREDNISONE 20MG TABLETS]  Last Written Prescription Date:  05/01/18  Last Fill Quantity: 20,  # refills: 0   Last Office Visit with [facility], [facility] or [facility] prescribing provider:  03/01/18   Future Office Visit:    20 tablet 0     Sig: TAKE 3 TABLETS BY MOUTH DAILY X3 DAYS, 2 TABLETS DAILY X3 DAYS, 1 TABLET DAILY X3 DAYS, 1/2 TABLET DAILY X3 DAYS    There is no refill protocol information for this order

## 2018-05-22 DIAGNOSIS — M10.9 ACUTE GOUT INVOLVING TOE OF LEFT FOOT, UNSPECIFIED CAUSE: ICD-10-CM

## 2018-05-22 RX ORDER — PREDNISONE 20 MG/1
TABLET ORAL
Qty: 20 TABLET | Refills: 0 | Status: SHIPPED | OUTPATIENT
Start: 2018-05-22 | End: 2018-06-11

## 2018-05-22 NOTE — TELEPHONE ENCOUNTER
Requested Prescriptions   Pending Prescriptions Disp Refills     predniSONE (DELTASONE) 20 MG tablet [Pharmacy Med Name: PREDNISONE 20MG TABLETS] 20 tablet 0     Sig: TAKE 3 TABLETS BY MOUTH DAILY X3 DAYS, 2 TABLETS DAILY X3 DAYS, 1 TABLET DAILY X3 DAYS, 1/2 TABLET DAILY X3 DAYS    There is no refill protocol information for this order      Routing refill request to provider for review/approval because:  Drug not on the [facility] refill protocol   Anjelica Patiño RN

## 2018-05-25 ENCOUNTER — OFFICE VISIT (OUTPATIENT)
Dept: FAMILY MEDICINE | Facility: CLINIC | Age: 43
End: 2018-05-25
Payer: COMMERCIAL

## 2018-05-25 VITALS
WEIGHT: 184 LBS | BODY MASS INDEX: 27.98 KG/M2 | TEMPERATURE: 99 F | OXYGEN SATURATION: 99 % | SYSTOLIC BLOOD PRESSURE: 150 MMHG | DIASTOLIC BLOOD PRESSURE: 106 MMHG | HEART RATE: 90 BPM

## 2018-05-25 DIAGNOSIS — M54.2 CERVICALGIA: ICD-10-CM

## 2018-05-25 DIAGNOSIS — M79.605 BILATERAL LEG PAIN: Primary | ICD-10-CM

## 2018-05-25 DIAGNOSIS — M79.604 BILATERAL LEG PAIN: Primary | ICD-10-CM

## 2018-05-25 DIAGNOSIS — Z94.0 KIDNEY REPLACED BY TRANSPLANT: ICD-10-CM

## 2018-05-25 LAB
ANION GAP SERPL CALCULATED.3IONS-SCNC: 6 MMOL/L (ref 3–14)
BUN SERPL-MCNC: 51 MG/DL (ref 7–30)
CALCIUM SERPL-MCNC: 9.1 MG/DL (ref 8.5–10.1)
CHLORIDE SERPL-SCNC: 113 MMOL/L (ref 94–109)
CO2 SERPL-SCNC: 23 MMOL/L (ref 20–32)
CREAT SERPL-MCNC: 3.79 MG/DL (ref 0.66–1.25)
DIFFERENTIAL METHOD BLD: ABNORMAL
ERYTHROCYTE [DISTWIDTH] IN BLOOD BY AUTOMATED COUNT: 16.3 % (ref 10–15)
GFR SERPL CREATININE-BSD FRML MDRD: 18 ML/MIN/1.7M2
GLUCOSE SERPL-MCNC: 139 MG/DL (ref 70–99)
HCT VFR BLD AUTO: 33.7 % (ref 40–53)
HGB BLD-MCNC: 10.7 G/DL (ref 13.3–17.7)
LYMPHOCYTES # BLD AUTO: 0.6 10E9/L (ref 0.8–5.3)
LYMPHOCYTES NFR BLD AUTO: 5 %
MAGNESIUM SERPL-MCNC: 1.3 MG/DL (ref 1.6–2.3)
MCH RBC QN AUTO: 26.8 PG (ref 26.5–33)
MCHC RBC AUTO-ENTMCNC: 31.8 G/DL (ref 31.5–36.5)
MCV RBC AUTO: 85 FL (ref 78–100)
MONOCYTES # BLD AUTO: 0.2 10E9/L (ref 0–1.3)
MONOCYTES NFR BLD AUTO: 2 %
NEUTROPHILS # BLD AUTO: 10.5 10E9/L (ref 1.6–8.3)
NEUTROPHILS NFR BLD AUTO: 93 %
PLATELET # BLD AUTO: 184 10E9/L (ref 150–450)
PLATELET # BLD EST: ABNORMAL 10*3/UL
POTASSIUM SERPL-SCNC: 5.5 MMOL/L (ref 3.4–5.3)
RBC # BLD AUTO: 3.99 10E12/L (ref 4.4–5.9)
RBC MORPH BLD: NORMAL
SODIUM SERPL-SCNC: 142 MMOL/L (ref 133–144)
URATE SERPL-MCNC: 11.1 MG/DL (ref 3.5–7.2)
WBC # BLD AUTO: 11.3 10E9/L (ref 4–11)

## 2018-05-25 PROCEDURE — 84550 ASSAY OF BLOOD/URIC ACID: CPT | Performed by: NURSE PRACTITIONER

## 2018-05-25 PROCEDURE — 36415 COLL VENOUS BLD VENIPUNCTURE: CPT | Performed by: NURSE PRACTITIONER

## 2018-05-25 PROCEDURE — 83735 ASSAY OF MAGNESIUM: CPT | Performed by: NURSE PRACTITIONER

## 2018-05-25 PROCEDURE — 85025 COMPLETE CBC W/AUTO DIFF WBC: CPT | Performed by: NURSE PRACTITIONER

## 2018-05-25 PROCEDURE — 99214 OFFICE O/P EST MOD 30 MIN: CPT | Performed by: NURSE PRACTITIONER

## 2018-05-25 PROCEDURE — 80048 BASIC METABOLIC PNL TOTAL CA: CPT | Performed by: NURSE PRACTITIONER

## 2018-05-25 RX ORDER — HYDROCODONE BITARTRATE AND ACETAMINOPHEN 5; 325 MG/1; MG/1
1 TABLET ORAL EVERY 6 HOURS PRN
Qty: 10 TABLET | Refills: 0 | Status: SHIPPED | OUTPATIENT
Start: 2018-05-25 | End: 2018-05-29

## 2018-05-25 RX ORDER — TRAMADOL HYDROCHLORIDE 50 MG/1
50 TABLET ORAL
Qty: 30 TABLET | Refills: 0 | Status: SHIPPED | OUTPATIENT
Start: 2018-05-25 | End: 2018-09-10

## 2018-05-25 ASSESSMENT — PAIN SCALES - GENERAL: PAINLEVEL: WORST PAIN (10)

## 2018-05-25 NOTE — MR AVS SNAPSHOT
After Visit Summary   5/25/2018    Rashad Ortiz    MRN: [id number]           Patient Information     Date Of Birth          1975        Visit Information        Provider Department      5/25/2018 11:20 AM Donita Malcolm APRN CNP [facility]        Today's Diagnoses     Bilateral leg pain    -  1      Care Instructions    Labs pending  Continue prednisone  Start Acetaminophen/hydrocodone (Norco) 1 tab every 6 hours as needed for severe pain  If worsening this weekend, go to urgent care or emergency department  Follow up with primary care provider next week    At [facility], we strive to deliver an exceptional experience to you, every time we see you.  If you receive a survey in the mail, please send us back your thoughts. We really do value your feedback.    Based on your medical history, these are the current health maintenance/preventive care services that you are due for (some may have been done at this visit.)  There are no preventive care reminders to display for this patient.      Suggested websites for health information:  Www.[employer].org : Up to date and easily searchable information on multiple topics.  Www.medlineplus.gov : medication info, interactive tutorials, watch real surgeries online  Www.familydoctor.org : good info from the Academy of Family Physicians  Www.cdc.gov : public health info, travel advisories, epidemics (H1N1)  Www.aap.org : children's health info, normal development, vaccinations  Www.health.state.[place] : [place] dept of health, public health issues in [place], N1N1    Your care team:                            Family Medicine Internal Medicine   MD Juanito Lynne MD Shantel Branch-Fleming, MD Katya Georgiev PA-C Nam Ho, MD Pediatrics   GRANT Kendall CNP Amelia Massimini APRN CNP Shaista Malik, MD Bethany Templen, MD Deborah Mielke, MD Kim Thein, APRN CNP      Clinic hours:  "Monday - Thursday 7 am-7 pm; Fridays 7 am-5 pm.   Urgent care: Monday - Friday 11 am-9 pm; Saturday and Sunday 9 am-5 pm.  Pharmacy : Monday -Thursday 8 am-8 pm; Friday 8 am-6 pm; Saturday and Sunday 9 am-5 pm.     Clinic: [phone]   Pharmacy: [phone]            Follow-ups after your visit        Your next 10 appointments already scheduled     Aug 20, 2018  4:50 PM CDT   (Arrive by 4:20 PM)   Return Kidney Transplant with Uc Kidney/Pancreas Recipient   [facility] ([facility])    [address]  [address]  [facility] [address] [phone]              Who to contact     If you have questions or need follow up information about today's clinic visit or your schedule please contact [facility] directly at [phone].  Normal or non-critical lab and imaging results will be communicated to you by Selligyhart, letter or phone within 4 business days after the clinic has received the results. If you do not hear from us within 7 days, please contact the clinic through Gigyat or phone. If you have a critical or abnormal lab result, we will notify you by phone as soon as possible.  Submit refill requests through DashLuxe or call your pharmacy and they will forward the refill request to us. Please allow 3 business days for your refill to be completed.          Additional Information About Your Visit        DashLuxe Information     DashLuxe lets you send messages to your doctor, view your test results, renew your prescriptions, schedule appointments and more. To sign up, go to [email or web address] . Click on \"Log in\" on the left side of the screen, which will take you to the Welcome page. Then click on \"Sign up Now\" on the right side of the page.     You will be asked to enter the access code listed below, as well as some personal information. Please follow the directions to create your username and password.     Your access code is: " [email or web address]  Expires: 2018 11:52 AM     Your access code will  in 90 days. If you need help or a new code, please call your [place] clinic or [phone].        Care EveryWhere ID     This is your Care EveryWhere ID. This could be used by other organizations to access your [place] medical records  [id number]        Your Vitals Were     Pulse Temperature Pulse Oximetry BMI (Body Mass Index)          90 99  F (37.2  C) (Oral) 99% 27.98 kg/m2         Blood Pressure from Last 3 Encounters:   18 (!) 158/110   18 139/85   18 (!) 179/99    Weight from Last 3 Encounters:   18 184 lb (83.5 kg)   18 174 lb (78.9 kg)   18 172 lb 6.4 oz (78.2 kg)              We Performed the Following     Basic metabolic panel  (Ca, Cl, CO2, Creat, Gluc, K, Na, BUN)     CBC with platelets differential     Magnesium     Uric acid          Today's Medication Changes          These changes are accurate as of 18 11:52 AM.  If you have any questions, ask your nurse or doctor.               Start taking these medicines.        Dose/Directions    HYDROcodone-acetaminophen 5-325 MG per tablet   Commonly known as:  NORCO   Used for:  Bilateral leg pain   Started by:  Donita Malcolm APRN CNP        Dose:  1 tablet   Take 1 tablet by mouth every 6 hours as needed for severe pain   Quantity:  10 tablet   Refills:  0            Where to get your medicines      Some of these will need a paper prescription and others can be bought over the counter.  Ask your nurse if you have questions.     Bring a paper prescription for each of these medications     HYDROcodone-acetaminophen 5-325 MG per tablet               Information about OPIOIDS     PRESCRIPTION OPIOIDS: WHAT YOU NEED TO KNOW   You have a prescription for an opioid (narcotic) pain medicine. Opioids can cause addiction. If you have a history of chemical dependency of any type, you are at a higher risk of becoming addicted to opioids. Only  take this medicine after all other options have been tried. Take it for as short a time and as few doses as possible.     Do not:    Drive. If you drive while taking these medicines, you could be arrested for driving under the influence (DUI).    Operate heavy machinery    Do any other dangerous activities while taking these medicines.     Drink any alcohol while taking these medicines.      Take with any other medicines that contain acetaminophen. Read all labels carefully. Look for the word  acetaminophen  or  Tylenol.  Ask your pharmacist if you have questions or are unsure.    Store your pills in a secure place, locked if possible. We will not replace any lost or stolen medicine. If you don t finish your medicine, please throw away (dispose) as directed by your pharmacist. The [place] Pollution Control Agency has more information about safe disposal: https://www.pca.[facility].[place].us/living-green/managing-unwanted-medications    All opioids tend to cause constipation. Drink plenty of water and eat foods that have a lot of fiber, such as fruits, vegetables, prune juice, apple juice and high-fiber cereal. Take a laxative (Miralax, milk of magnesia, Colace, Senna) if you don t move your bowels at least every other day.          Primary Care Provider Office Phone # Fax #    [facility] [phone]       [address]  [facility] [address]        Equal Access to Services     LUISANA GUTIERREZ AH: Hadii song ku hadasho Soomaali, waaxda luqadaha, qaybta kaalmada adeegyada, ronaldo donahue. So [facility] [phone].    ATENCIÓN: Si habla español, tiene a ward disposición servicios gratuitos de asistencia lingüística. Llame al [phone].    We comply with applicable federal civil rights laws and [place] laws. We do not discriminate on the basis of race, color, national origin, age, disability, sex, sexual orientation, or gender identity.            Thank you!     Thank  you for choosing [facility]  for your care. Our goal is always to provide you with excellent care. Hearing back from our patients is one way we can continue to improve our services. Please take a few minutes to complete the written survey that you may receive in the mail after your visit with us. Thank you!             Your Updated Medication List - Protect others around you: Learn how to safely use, store and throw away your medicines at www.disposemymeds.org.          This list is accurate as of 5/25/18 11:52 AM.  Always use your most recent med list.                   Brand Name Dispense Instructions for use Diagnosis    amLODIPine 5 MG tablet    NORVASC    90 tablet    Take 1 tablet (5 mg) by mouth daily    Benign essential hypertension       carvedilol 25 MG tablet    COREG    180 tablet    Take 1 tablet (25 mg) by mouth 2 times daily        furosemide 20 MG tablet    LASIX    180 tablet    Take 1 tablet (20 mg) by mouth 2 times daily    Unspecified hypertensive kidney disease with chronic kidney disease stage I through stage IV, or unspecified(403.90)       HYDROcodone-acetaminophen 5-325 MG per tablet    NORCO    10 tablet    Take 1 tablet by mouth every 6 hours as needed for severe pain    Bilateral leg pain       losartan 100 MG tablet    COZAAR    90 tablet    TAKE 1 TABLET(100 MG) BY MOUTH DAILY    Renal hypertension, stage 1-4 or unspecified chronic kidney disease       * mycophenolate 250 MG capsule    GENERIC EQUIVALENT    120 capsule    Take 2 capsules (500 mg) by mouth 2 times daily    Kidney transplanted       * mycophenolate 250 MG capsule    GENERIC EQUIVALENT    120 capsule    Take 2 capsules (500 mg) by mouth 2 times daily    Kidney transplanted       omeprazole 20 MG CR capsule    priLOSEC    90 capsule    Take 1 capsule (20 mg) by mouth daily    Kidney replaced by transplant       * predniSONE 5 MG tablet    DELTASONE    30 tablet    Take 1 tablet (5 mg) by mouth daily     Kidney replaced by transplant       * predniSONE 20 MG tablet    DELTASONE    20 tablet    TAKE 3 TABLETS BY MOUTH DAILY X3 DAYS, 2 TABLETS DAILY X3 DAYS, 1 TABLET DAILY X3 DAYS, 1/2 TABLET DAILY X3 DAYS    Acute gout involving toe of left foot, unspecified cause       sodium bicarbonate 650 MG tablet     120 tablet    Take 2 tablets (1,300 mg) by mouth 2 times daily    Kidney transplanted, Complications, kidney transplant, Aftercare following organ transplant, Immunosuppressed status (H), Encounter for long-term (current) use of high-risk medication       sulfamethoxazole-trimethoprim 400-80 MG per tablet    BACTRIM/SEPTRA    45 tablet    Take 1 tablet by mouth every other day    Kidney transplanted       tacrolimus 1 MG capsule    GENERIC EQUIVALENT    120 capsule    Take 2 capsules (2 mg) by mouth 2 times daily    Kidney transplant recipient, Long-term use of immunosuppressant medication       traMADol 50 MG tablet    ULTRAM    30 tablet    Take 1 tablet (50 mg) by mouth nightly as needed for moderate pain    Cervicalgia       * Notice:  This list has 4 medication(s) that are the same as other medications prescribed for you. Read the directions carefully, and ask your doctor or other care provider to review them with you.

## 2018-05-25 NOTE — TELEPHONE ENCOUNTER
Disp Refills Start End      traMADol (ULTRAM) 50 MG tablet 30 tablet 0 3/12/2018     Sig - Route: Take 1 tablet (50 mg) by mouth nightly as needed for moderate pain - Oral    Class: Local Print    Order: [id number]      Routing refill request to provider for review/approval because:  Drug not on the [facility] refill protocol, RN unable to fill    Marilee Ibarra RN  [facility] Triage

## 2018-05-25 NOTE — TELEPHONE ENCOUNTER
..Reason for Call:  Medication or medication refill:    Do you use a [facility]?  Name of the pharmacy and phone number for the current request:  [address] - [phone]    Name of the medication requested: tramadol 50 mg    Other request: please call Patient as he is unable to come in for an appointment; BUT if he has to in order to get this refilled, please inform; originally prescribed by LO Barraza and possibly Dr Harrington, aware he is out of office; Please advise    Can we leave a detailed message on this number? YES    Phone number patient can be reached at: Home number on file [phone] (home)    Best Time: anytime    Call taken on 5/25/2018 at 7:50 AM by Ting Bain

## 2018-05-25 NOTE — PROGRESS NOTES
Please call patient and let them know their lab result was abnormal.  Uric acid level elevated indicating gout. Continue POC as we discussed. If worsening symptoms, go to emergency department as we discussed during visit.

## 2018-05-25 NOTE — PATIENT INSTRUCTIONS
Labs pending  Continue prednisone  Start Acetaminophen/hydrocodone (Norco) 1 tab every 6 hours as needed for severe pain  If worsening this weekend, go to urgent care or emergency department  Follow up with primary care provider next week    At [facility], we strive to deliver an exceptional experience to you, every time we see you.  If you receive a survey in the mail, please send us back your thoughts. We really do value your feedback.    Based on your medical history, these are the current health maintenance/preventive care services that you are due for (some may have been done at this visit.)  There are no preventive care reminders to display for this patient.      Suggested websites for health information:  Www.[employer] : Up to date and easily searchable information on multiple topics.  Www.medlineplus.gov : medication info, interactive tutorials, watch real surgeries online  Www.familydoctor.org : good info from the Academy of Family Physicians  Www.cdc.gov : public health info, travel advisories, epidemics (H1N1)  Www.aap.org : children's health info, normal development, vaccinations  Www.health.[facility].[place] : [place] dept of health, public health issues in [place], N1N1    Your care team:                            Family Medicine Internal Medicine   MD Juanito Lynne MD Shantel Branch-Fleming, MD Katya Georgiev PA-C Nam Ho, MD Pediatrics   GRANT Kendall, ALYSSA Peguero APRMD Kelly Woody CNP, MD Deborah Mielke, MD Kim Thein, APRN [facility]      Clinic hours: Monday - Thursday 7 am-7 pm; Fridays 7 am-5 pm.   Urgent care: Monday - Friday 11 am-9 pm; Saturday and Sunday 9 am-5 pm.  Pharmacy : Monday -Thursday 8 am-8 pm; Friday 8 am-6 pm; Saturday and Sunday 9 am-5 pm.     Clinic: [phone]   Pharmacy: [phone]

## 2018-05-25 NOTE — PROGRESS NOTES
"  SUBJECTIVE:   Rashad Ortiz is a 43 year old male who presents to clinic today for the following health issues:      Joint Pain    Onset: This morning    Description:   Location: left knee and right knee  Character: Sharp, Dull ache and Throbbing pain    Intensity: severe    Progression of Symptoms: same    Accompanying Signs & Symptoms:  Other symptoms: none    History:   Previous similar pain: YES      Precipitating factors:   Trauma or overuse: no     Alleviating factors:  Improved by: nothing    Therapies Tried and outcome: None        Both legs ache. Knees radiating through shins on both sides that started this morning. Pain is \"pulsating and radiating.\" he has had similar pain before in April and he took few prednisone and it was gone. He did not come in for it. Lasted few days. New rx for vit D 1000 mg PO BID otherwise nothing new. No rash. Had gout in foot couple days ago. Got meds but didn't start them until this morning. No swelling. Has had bilateral calf pain. Both LE always swollen from amlodipine. No chest pain or shortness of breath. Given prednisone for gout flare couple days ago. He started it this morning. Here for pain management. Requesting Acetaminophen/oxycodone (Percocet). Hx kidney transplant with creatinine mid 3s.    Problem list and histories reviewed & adjusted, as indicated.  Additional history: as documented    Patient Active Problem List   Diagnosis     Kidney replaced by transplant     High risk medications (not anticoagulants) long-term use     Hypertension goal BP (blood pressure) < 140/90     GERD (gastroesophageal reflux disease)     CARDIOVASCULAR SCREENING; LDL GOAL LESS THAN 160     Gout     Creatinine elevation     Antibody mediated rejection of kidney transplant     Medical non-compliance     Chronic kidney disease, stage 4, severely decreased GFR (H)     Past Surgical History:   Procedure Laterality Date     AV FISTULA OR GRAFT ARTERIAL       LIGATE FISTULA " ARTERIOVENOUS UPPER EXTREMITY  12/20/2011    Procedure:LIGATE FISTULA ARTERIOVENOUS UPPER EXTREMITY; Excision of Right Forearm Arteriovenous Fistula.; Surgeon:LINDY AMAYA; Location:[facility] OR     PERCUTANEOUS BIOPSY KIDNEY Right 2/28/2017    Procedure: PERCUTANEOUS BIOPSY KIDNEY;  Surgeon: Gee Barrios MD;  Location: [facility] OR     TRANSPLANT  01/13/2011    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       Social History   Substance Use Topics     Smoking status: Former Smoker     Types: Cigarettes     Quit date: 03/2017     Smokeless tobacco: Never Used      Comment: Patient states that he is an 'social'  smoker      Alcohol use 2.5 oz/week     5 Standard drinks or equivalent per week      Comment: 1-2 drinks/wk     Family History   Problem Relation Age of Onset     Hypertension Mother          Current Outpatient Prescriptions   Medication Sig Dispense Refill     amLODIPine (NORVASC) 5 MG tablet Take 1 tablet (5 mg) by mouth daily 90 tablet 3     carvedilol (COREG) 25 MG tablet Take 1 tablet (25 mg) by mouth 2 times daily 180 tablet 3     furosemide (LASIX) 20 MG tablet Take 1 tablet (20 mg) by mouth 2 times daily 180 tablet 1     losartan (COZAAR) 100 MG tablet TAKE 1 TABLET(100 MG) BY MOUTH DAILY 90 tablet 3     mycophenolate (GENERIC EQUIVALENT) 250 MG capsule Take 2 capsules (500 mg) by mouth 2 times daily 120 capsule 11     mycophenolate (GENERIC EQUIVALENT) 250 MG capsule Take 2 capsules (500 mg) by mouth 2 times daily 120 capsule 11     omeprazole (PRILOSEC) 20 MG capsule Take 1 capsule (20 mg) by mouth daily 90 capsule 1     predniSONE (DELTASONE) 20 MG tablet TAKE 3 TABLETS BY MOUTH DAILY X3 DAYS, 2 TABLETS DAILY X3 DAYS, 1 TABLET DAILY X3 DAYS, 1/2 TABLET DAILY X3 DAYS 20 tablet 0     predniSONE (DELTASONE) 5 MG tablet Take 1 tablet (5 mg) by mouth daily 30 tablet 11     sodium bicarbonate 650 MG tablet Take 2 tablets (1,300 mg) by mouth 2 times daily 120 tablet 11      sulfamethoxazole-trimethoprim (BACTRIM/SEPTRA) 400-80 MG per tablet Take 1 tablet by mouth every other day 45 tablet 3     tacrolimus (GENERIC EQUIVALENT) 1 MG capsule Take 2 capsules (2 mg) by mouth 2 times daily 120 capsule 11     traMADol (ULTRAM) 50 MG tablet Take 1 tablet (50 mg) by mouth nightly as needed for moderate pain 30 tablet 0     Allergies   Allergen Reactions     Nkda [No Known Drug Allergies]        Reviewed and updated as needed this visit by clinical staff  Tobacco  Allergies  Meds  Problems  Med Hx  Surg Hx  Fam Hx  Soc Hx        Reviewed and updated as needed this visit by Provider  Allergies  Meds  Problems         ROS:  Constitutional, HEENT, cardiovascular, pulmonary, GI, , musculoskeletal, neuro, skin, endocrine and psych systems are negative, except as otherwise noted.    OBJECTIVE:     BP (!) 158/10 (BP Location: Right arm, Patient Position: Chair, Cuff Size: Adult Large)  Pulse 90  Temp 99  F (37.2  C) (Oral)  Wt 184 lb (83.5 kg)  SpO2 99%  BMI 27.98 kg/m2  Body mass index is 27.98 kg/(m^2).  GENERAL: healthy, alert and no distress  RESP: lungs clear to auscultation - no rales, rhonchi or wheezes  CV: regular rate and rhythm, normal S1 S2, no S3 or S4, no murmur, click or rub, no peripheral edema and peripheral pulses strong  MS: no gross musculoskeletal defects noted. Swelling present from knees to feet bilaterally, L>R. Patient reports per his baseline. DP and PD 2+, normal. Gait somewhat altered by leg pain  SKIN: no suspicious lesions or rashes  PSYCH: mentation appears normal, affect normal/bright    Diagnostic Test Results:  No results found for this or any previous visit (from the past 24 hour(s)).  Pending    ASSESSMENT/PLAN:     1. Bilateral leg pain  Labs pending. Unclear etiology. Not likely DVT given bilateral pain that started at same time, same swelling as baseline and no change in skin color or temp. Continue prednisone.  - CBC with platelets  differential  - Basic metabolic panel  (Ca, Cl, CO2, Creat, Gluc, K, Na, BUN)  - Magnesium  - Uric acid  - HYDROcodone-acetaminophen (NORCO) 5-325 MG per tablet; Take 1 tablet by mouth every 6 hours as needed for severe pain  Dispense: 10 tablet; Refill: 0  If worsening or not improving, please seek re-eval in 1-2 days. Explained very low threshold to seek re-eval  If no improvement, would consider US next      See Patient Instructions    The benefits, risks and potential side effects were discussed in detail. Black box warnings discussed as relevant. All patient questions were answered. The patient was instructed to follow up immediately if any adverse reactions develop.    Patient verbalizes understanding and agrees with plan of care. Patient stable for discharge.      ISABEL Myers [facility]

## 2018-05-29 ENCOUNTER — TELEPHONE (OUTPATIENT)
Dept: FAMILY MEDICINE | Facility: CLINIC | Age: 43
End: 2018-05-29

## 2018-05-29 ENCOUNTER — TELEPHONE (OUTPATIENT)
Dept: TRANSPLANT | Facility: CLINIC | Age: 43
End: 2018-05-29

## 2018-05-29 DIAGNOSIS — M79.605 BILATERAL LEG PAIN: ICD-10-CM

## 2018-05-29 DIAGNOSIS — M79.604 BILATERAL LEG PAIN: ICD-10-CM

## 2018-05-29 RX ORDER — HYDROCODONE BITARTRATE AND ACETAMINOPHEN 5; 325 MG/1; MG/1
1 TABLET ORAL EVERY 6 HOURS PRN
Qty: 10 TABLET | Refills: 0 | Status: SHIPPED | OUTPATIENT
Start: 2018-05-29 | End: 2018-07-01

## 2018-05-29 NOTE — TELEPHONE ENCOUNTER
.Reason for Call:  Other prescription    Detailed comments: Patient called and would like for someone to take his script to the pharmacy so it can be filled. Please let patient know once it has been done and patient is picking it up today.     Phone Number Patient can be reached at: Home number on file [phone] (home)    Best Time: any    Can we leave a detailed message on this number? YES    Call taken on 5/29/2018 at 4:17 PM by Sasha Shukla

## 2018-05-29 NOTE — TELEPHONE ENCOUNTER
Will refill x1. If needs more, he should follow up with primary care provider. Please notify patient.

## 2018-05-29 NOTE — TELEPHONE ENCOUNTER
Patients Rx was handed to Pharmacy in [facility].     Terrie Adame, SMA

## 2018-05-29 NOTE — TELEPHONE ENCOUNTER
This writer attempted to contact Rashad on 05/29/18      Reason for call inform message below and left message.      If patient calls back:   Patient contacted by 1st floor [facility] Team (MA/TC). Inform patient that someone from the team will contact them, document that pt called and route to care team.         Yan Vasquez MA

## 2018-05-29 NOTE — TELEPHONE ENCOUNTER
Called patient and informed of results and he is requesting a renewal of the pain medication received at visit.    Radha Damon RN, [facility]

## 2018-06-04 ENCOUNTER — TELEPHONE (OUTPATIENT)
Dept: TRANSPLANT | Facility: CLINIC | Age: 43
End: 2018-06-04

## 2018-06-04 NOTE — TELEPHONE ENCOUNTER
Pt called asking to reschedule his NOS appts from April 11; he confirmed for Wed, Aug 22 for PFT's, SIh appt in [facility] & Derm appt, mailing new schedule to pt

## 2018-06-04 NOTE — TELEPHONE ENCOUNTER
Coordinator spoke with pt. Pt states he saw his dentist in April and had his uric acid level drawn on 5/25/18 (level was elevated). Pt transferred to our  to re-schedule the following items: derm, cards, and PFTs.

## 2018-06-05 DIAGNOSIS — Z94.0 KIDNEY TRANSPLANTED: Primary | ICD-10-CM

## 2018-06-05 RX ORDER — SULFAMETHOXAZOLE AND TRIMETHOPRIM 400; 80 MG/1; MG/1
1 TABLET ORAL EVERY OTHER DAY
Qty: 45 TABLET | Refills: 3 | Status: ON HOLD | OUTPATIENT
Start: 2018-06-05 | End: 2018-10-28

## 2018-06-10 DIAGNOSIS — M10.9 ACUTE GOUT INVOLVING TOE OF LEFT FOOT, UNSPECIFIED CAUSE: ICD-10-CM

## 2018-06-10 RX ORDER — PREDNISONE 20 MG/1
TABLET ORAL
Qty: 20 TABLET | Refills: 0 | Status: CANCELLED | OUTPATIENT
Start: 2018-06-10

## 2018-06-10 NOTE — TELEPHONE ENCOUNTER
Requested Prescriptions   Pending Prescriptions Disp Refills     predniSONE (DELTASONE) 20 MG tablet [Pharmacy Med Name: PREDNISONE 20MG TABLETS]    Last Written Prescription Date:  5/22/18  Last Fill Quantity: 20,  # refills: 0   Last Office Visit with [facility], [facility] or [facility] prescribing provider:  5/25/18   Future Office Visit:      20 tablet 0     Sig: TAKE 3 TABLETS BY MOUTH DAILY X3 DAYS, 2 TABLETS DAILY X3 DAYS, 1 TABLET DAILY X3 DAYS, 1/2 TABLET DAILY X3 DAYS    There is no refill protocol information for this order              Karlos Faarax  [facility]

## 2018-06-11 ENCOUNTER — OFFICE VISIT (OUTPATIENT)
Dept: URGENT CARE | Facility: URGENT CARE | Age: 43
End: 2018-06-11
Payer: COMMERCIAL

## 2018-06-11 VITALS
TEMPERATURE: 97.9 F | HEART RATE: 73 BPM | DIASTOLIC BLOOD PRESSURE: 97 MMHG | SYSTOLIC BLOOD PRESSURE: 151 MMHG | WEIGHT: 184.2 LBS | BODY MASS INDEX: 28.01 KG/M2 | OXYGEN SATURATION: 98 %

## 2018-06-11 DIAGNOSIS — Z20.2 EXPOSURE TO GENITAL HERPES: ICD-10-CM

## 2018-06-11 DIAGNOSIS — N48.9 LESION OF PENIS: Primary | ICD-10-CM

## 2018-06-11 DIAGNOSIS — M10.9 ACUTE GOUT INVOLVING TOE OF LEFT FOOT, UNSPECIFIED CAUSE: ICD-10-CM

## 2018-06-11 PROCEDURE — 87529 HSV DNA AMP PROBE: CPT | Performed by: PHYSICIAN ASSISTANT

## 2018-06-11 PROCEDURE — 99213 OFFICE O/P EST LOW 20 MIN: CPT | Performed by: PHYSICIAN ASSISTANT

## 2018-06-11 PROCEDURE — 87529 HSV DNA AMP PROBE: CPT | Mod: 59 | Performed by: PHYSICIAN ASSISTANT

## 2018-06-11 RX ORDER — VALACYCLOVIR HYDROCHLORIDE 1 G/1
1000 TABLET, FILM COATED ORAL 2 TIMES DAILY
Qty: 20 TABLET | Refills: 0 | Status: ON HOLD | OUTPATIENT
Start: 2018-06-11 | End: 2018-10-28

## 2018-06-11 RX ORDER — PREDNISONE 20 MG/1
TABLET ORAL
Qty: 20 TABLET | Refills: 0 | Status: SHIPPED | OUTPATIENT
Start: 2018-06-11 | End: 2018-07-01

## 2018-06-11 ASSESSMENT — ENCOUNTER SYMPTOMS
PALPITATIONS: 0
DYSURIA: 0
ABDOMINAL PAIN: 0
EYE REDNESS: 0
SORE THROAT: 0
CONSTITUTIONAL NEGATIVE: 1
MYALGIAS: 0
RHINORRHEA: 0
VOMITING: 0
FREQUENCY: 0
WEAKNESS: 0
GASTROINTESTINAL NEGATIVE: 1
DIZZINESS: 0
EYE ITCHING: 0
DIAPHORESIS: 0
EYES NEGATIVE: 1
CARDIOVASCULAR NEGATIVE: 1
NEUROLOGICAL NEGATIVE: 1
DIARRHEA: 0
LIGHT-HEADEDNESS: 0
HEADACHES: 0
EYE DISCHARGE: 0
CHEST TIGHTNESS: 0
HEMATURIA: 0
POLYDIPSIA: 0
WHEEZING: 0
SHORTNESS OF BREATH: 0
MUSCULOSKELETAL NEGATIVE: 1
RESPIRATORY NEGATIVE: 1
ADENOPATHY: 0
CHILLS: 0
ENDOCRINE NEGATIVE: 1
NAUSEA: 0
COUGH: 0
FEVER: 0

## 2018-06-11 NOTE — MR AVS SNAPSHOT
After Visit Summary   6/11/2018    Rashad Ortiz    MRN: [id number]           Patient Information     Date Of Birth          1975        Visit Information        Provider Department      6/11/2018 12:10 PM Carlos Mosqueda PA-C [facility]        Today's Diagnoses     Lesion of penis    -  1    Exposure to genital herpes        Acute gout involving toe of left foot, unspecified cause           Follow-ups after your visit        Additional Services     FAMILY PRACTICE REFERRAL       Your provider has referred you to: [facility]: Family Practice - [facility] locations [phone] [email or web address]    Please be aware that coverage of these services is subject to the terms and limitations of your health insurance plan.  Call member services at your health plan with any benefit or coverage questions.      Please bring the following with you to your appointment:    (1) Any X-Rays, CTs or MRIs which have been performed.  Contact the facility where they were done to arrange for  prior to your scheduled appointment.    (2) List of current medications   (3) This referral request   (4) Any documents/labs given to you for this referral                  Your next 10 appointments already scheduled     Aug 20, 2018  4:50 PM CDT   (Arrive by 4:20 PM)   Return Kidney Transplant with  Kidney/Pancreas Recipient   [facility] ([facility])    [address]  [address]  [place] [address] [phone]            Aug 22, 2018  2:00 PM CDT   FULL PULMONARY FUNCTION with  PFL B   [facility] Function [facility] ([facility])    [address]  3rd Floor  [place] [address] [phone]            Aug 22, 2018  3:00 PM CDT   (Arrive by 2:45 PM)   NEW PANCREAS/KIDNEY TRANSPLANT WORK-UP with Gelacio Jimenez MD   [facility] ([facility])    [address]  [address]  [place] [id number] [phone]            Aug 22, 2018  4:00 PM CDT   (Arrive by 3:45 PM)   New Patient Visit with KAILA Kaufman MD   [facility] ([facility])    [address]  3rd Floor  [place] [id number] [phone]              Who to contact     If you have questions or need follow up information about today's clinic visit or your schedule please contact [facility] directly at [phone].  Normal or non-critical lab and imaging results will be communicated to you by Augusthart, letter or phone within 4 business days after the clinic has received the results. If you do not hear from us within 7 days, please contact the clinic through Augusthart or phone. If you have a critical or abnormal lab result, we will notify you by phone as soon as possible.  Submit refill requests through Bumble Beez or call your pharmacy and they will forward the refill request to us. Please allow 3 business days for your refill to be completed.          Additional Information About Your Visit        MyChart Information     Bumble Beez lets you send messages to your doctor, view your test results, renew your prescriptions, schedule appointments and more. To sign up, go to [email or web address] . Click on \"Log in\" on the left side of the screen, which will take you to the Welcome page. Then click on \"Sign up Now\" on the right side of the page.     You will be asked to enter the access code listed below, as well as some personal information. Please follow the directions to create your username and password.     Your access code is: [email or web address]  Expires: 2018 11:52 AM     Your access code will  in 90 days. If you need help or a new code, please call your [facility] or [phone].        Care EveryWhere ID     This is your Care EveryWhere ID. This could be used by other organizations to access your " [facility] medical records  [id number]        Your Vitals Were     Pulse Temperature Pulse Oximetry BMI (Body Mass Index)          73 97.9  F (36.6  C) (Oral) 98% 28.01 kg/m2         Blood Pressure from Last 3 Encounters:   06/11/18 (!) 151/97   05/25/18 (!) 150/106   03/01/18 139/85    Weight from Last 3 Encounters:   06/11/18 184 lb 3.2 oz (83.6 kg)   05/25/18 184 lb (83.5 kg)   03/01/18 174 lb (78.9 kg)              We Performed the Following     FAMILY PRACTICE REFERRAL     HSV 1 and 2 DNA by PCR          Today's Medication Changes          These changes are accurate as of 6/11/18  1:00 PM.  If you have any questions, ask your nurse or doctor.               Start taking these medicines.        Dose/Directions    valACYclovir 1000 mg tablet   Commonly known as:  VALTREX   Used for:  Lesion of penis, Exposure to genital herpes   Started by:  Carlos Mosqueda PA-C        Dose:  1000 mg   Take 1 tablet (1,000 mg) by mouth 2 times daily   Quantity:  20 tablet   Refills:  0            Where to get your medicines      These medications were sent to [facility] - [place], [place] - [address] AT [facility] [address], [place] [address]     Phone:  [phone]     predniSONE 20 MG tablet    valACYclovir 1000 mg tablet                Primary Care Provider Office Phone # Fax #    [facility] [phone]       [address]  [place] [address]        Equal Access to Services     [facility]: Hadii aad ku hadasho Soomaali, waaxda luqadaha, qaybta kaalmada adeegyada, ronaldo donahue. So [facility] [phone].    ATENCIÓN: Si habla español, tiene a ward disposición servicios gratuitos de asistencia lingüística. Llame al [phone].    We comply with applicable federal civil rights laws and [place] laws. We do not discriminate on the basis of race, color, national origin, age, disability, sex, sexual  orientation, or gender identity.            Thank you!     Thank you for choosing [facility]  for your care. Our goal is always to provide you with excellent care. Hearing back from our patients is one way we can continue to improve our services. Please take a few minutes to complete the written survey that you may receive in the mail after your visit with us. Thank you!             Your Updated Medication List - Protect others around you: Learn how to safely use, store and throw away your medicines at www.disposemymeds.org.          This list is accurate as of 6/11/18  1:00 PM.  Always use your most recent med list.                   Brand Name Dispense Instructions for use Diagnosis    amLODIPine 5 MG tablet    NORVASC    90 tablet    Take 1 tablet (5 mg) by mouth daily    Benign essential hypertension       carvedilol 25 MG tablet    COREG    180 tablet    Take 1 tablet (25 mg) by mouth 2 times daily        furosemide 20 MG tablet    LASIX    180 tablet    Take 1 tablet (20 mg) by mouth 2 times daily    Unspecified hypertensive kidney disease with chronic kidney disease stage I through stage IV, or unspecified(403.90)       HYDROcodone-acetaminophen 5-325 MG per tablet    NORCO    10 tablet    Take 1 tablet by mouth every 6 hours as needed for severe pain    Bilateral leg pain       losartan 100 MG tablet    COZAAR    90 tablet    TAKE 1 TABLET(100 MG) BY MOUTH DAILY    Renal hypertension, stage 1-4 or unspecified chronic kidney disease       * mycophenolate 250 MG capsule    GENERIC EQUIVALENT    120 capsule    Take 2 capsules (500 mg) by mouth 2 times daily    Kidney transplanted       * mycophenolate 250 MG capsule    GENERIC EQUIVALENT    120 capsule    Take 2 capsules (500 mg) by mouth 2 times daily    Kidney transplanted       omeprazole 20 MG CR capsule    priLOSEC    90 capsule    Take 1 capsule (20 mg) by mouth daily    Kidney replaced by transplant       * predniSONE 5 MG tablet     DELTASONE    30 tablet    Take 1 tablet (5 mg) by mouth daily    Kidney replaced by transplant       * predniSONE 20 MG tablet    DELTASONE    20 tablet    TAKE 3 TABLETS BY MOUTH DAILY X3 DAYS, 2 TABLETS DAILY X3 DAYS, 1 TABLET DAILY X3 DAYS, 1/2 TABLET DAILY X3 DAYS    Acute gout involving toe of left foot, unspecified cause       sodium bicarbonate 650 MG tablet     120 tablet    Take 2 tablets (1,300 mg) by mouth 2 times daily    Kidney transplanted, Complications, kidney transplant, Aftercare following organ transplant, Immunosuppressed status (H), Encounter for long-term (current) use of high-risk medication       sulfamethoxazole-trimethoprim 400-80 MG per tablet    BACTRIM/SEPTRA    45 tablet    Take 1 tablet by mouth every other day    Kidney transplanted       tacrolimus 1 MG capsule    GENERIC EQUIVALENT    120 capsule    Take 2 capsules (2 mg) by mouth 2 times daily    Kidney transplant recipient, Long-term use of immunosuppressant medication       traMADol 50 MG tablet    ULTRAM    30 tablet    Take 1 tablet (50 mg) by mouth nightly as needed for moderate pain    Cervicalgia       valACYclovir 1000 mg tablet    VALTREX    20 tablet    Take 1 tablet (1,000 mg) by mouth 2 times daily    Lesion of penis, Exposure to genital herpes       * Notice:  This list has 4 medication(s) that are the same as other medications prescribed for you. Read the directions carefully, and ask your doctor or other care provider to review them with you.

## 2018-06-11 NOTE — PROGRESS NOTES
Chief Complaint:    Chief Complaint   Patient presents with     STD     Patient is here for std screening       HPI:  Rashad Ortiz is a 43 year old male who has symptoms of lesion on his penis.  He noticed this 2 days ago.  It started out as a red bump, and has had some clear liquid now.  It is painful and burns.  He is here with his partner who states that she was just Dx with Herpes and also is having an outbreak.      ROS:      Review of Systems   Constitutional: Negative.  Negative for chills, diaphoresis and fever.   HENT: Negative.  Negative for congestion, ear pain, rhinorrhea and sore throat.    Eyes: Negative.  Negative for discharge, redness and itching.   Respiratory: Negative.  Negative for cough, chest tightness, shortness of breath and wheezing.    Cardiovascular: Negative.  Negative for chest pain and palpitations.   Gastrointestinal: Negative.  Negative for abdominal pain, diarrhea, nausea and vomiting.   Endocrine: Negative.  Negative for polydipsia and polyuria.   Genitourinary: Positive for penile pain. Negative for discharge, dysuria, frequency, hematuria, penile swelling, scrotal swelling and urgency.   Musculoskeletal: Negative.  Negative for myalgias.   Skin: Negative for rash.   Allergic/Immunologic: Negative for immunocompromised state.   Neurological: Negative.  Negative for dizziness, weakness, light-headedness and headaches.   Hematological: Negative for adenopathy.       Family History   Family History   Problem Relation Age of Onset     Hypertension Mother         Problem history  Patient Active Problem List   Diagnosis     Kidney replaced by transplant     High risk medications (not anticoagulants) long-term use     Hypertension goal BP (blood pressure) < 140/90     GERD (gastroesophageal reflux disease)     CARDIOVASCULAR SCREENING; LDL GOAL LESS THAN 160     Gout     Creatinine elevation     Antibody mediated rejection of kidney transplant     Medical non-compliance     Chronic  kidney disease, stage 4, severely decreased GFR (H)        Allergies  Allergies   Allergen Reactions     Nkda [No Known Drug Allergies]         Social History  Social History     Social History     Marital status:      Spouse name: N/A     Number of children: N/A     Years of education: N/A     Occupational History     Not on file.     Social History Main Topics     Smoking status: Former Smoker     Types: Cigarettes     Quit date: 03/2017     Smokeless tobacco: Never Used      Comment: Patient states that he is an 'social'  smoker      Alcohol use 2.5 oz/week     5 Standard drinks or equivalent per week      Comment: 1-2 drinks/wk     Drug use: No     Sexual activity: No     Other Topics Concern     Not on file     Social History Narrative        Current Meds    Current Outpatient Prescriptions:      amLODIPine (NORVASC) 5 MG tablet, Take 1 tablet (5 mg) by mouth daily, Disp: 90 tablet, Rfl: 3     carvedilol (COREG) 25 MG tablet, Take 1 tablet (25 mg) by mouth 2 times daily, Disp: 180 tablet, Rfl: 3     furosemide (LASIX) 20 MG tablet, Take 1 tablet (20 mg) by mouth 2 times daily, Disp: 180 tablet, Rfl: 1     HYDROcodone-acetaminophen (NORCO) 5-325 MG per tablet, Take 1 tablet by mouth every 6 hours as needed for severe pain, Disp: 10 tablet, Rfl: 0     losartan (COZAAR) 100 MG tablet, TAKE 1 TABLET(100 MG) BY MOUTH DAILY, Disp: 90 tablet, Rfl: 3     mycophenolate (GENERIC EQUIVALENT) 250 MG capsule, Take 2 capsules (500 mg) by mouth 2 times daily, Disp: 120 capsule, Rfl: 11     mycophenolate (GENERIC EQUIVALENT) 250 MG capsule, Take 2 capsules (500 mg) by mouth 2 times daily, Disp: 120 capsule, Rfl: 11     omeprazole (PRILOSEC) 20 MG capsule, Take 1 capsule (20 mg) by mouth daily, Disp: 90 capsule, Rfl: 1     predniSONE (DELTASONE) 20 MG tablet, TAKE 3 TABLETS BY MOUTH DAILY X3 DAYS, 2 TABLETS DAILY X3 DAYS, 1 TABLET DAILY X3 DAYS, 1/2 TABLET DAILY X3 DAYS, Disp: 20 tablet, Rfl: 0     predniSONE (DELTASONE)  5 MG tablet, Take 1 tablet (5 mg) by mouth daily, Disp: 30 tablet, Rfl: 11     sodium bicarbonate 650 MG tablet, Take 2 tablets (1,300 mg) by mouth 2 times daily, Disp: 120 tablet, Rfl: 11     sulfamethoxazole-trimethoprim (BACTRIM/SEPTRA) 400-80 MG per tablet, Take 1 tablet by mouth every other day, Disp: 45 tablet, Rfl: 3     tacrolimus (GENERIC EQUIVALENT) 1 MG capsule, Take 2 capsules (2 mg) by mouth 2 times daily, Disp: 120 capsule, Rfl: 11     traMADol (ULTRAM) 50 MG tablet, Take 1 tablet (50 mg) by mouth nightly as needed for moderate pain, Disp: 30 tablet, Rfl: 0     OBJECTIVE     Vital signs noted and reviewed by Carlos Mosqueda  BP (!) 151/97 (BP Location: Left arm, Patient Position: Chair, Cuff Size: Adult Regular)  Pulse 73  Temp 97.9  F (36.6  C) (Oral)  Wt 184 lb 3.2 oz (83.6 kg)  SpO2 98%  BMI 28.01 kg/m2     PEFR:  General appearance: healthy, alert and no distress  Ears: R TM - normal: no effusions, no erythema, and normal landmarks, L TM - normal: no effusions, no erythema, and normal landmarks  Eyes: R normal, L normal  Nose: normal  Oropharynx: normal  Neck: supple and no adenopathy  Lungs: normal and clear to auscultation  Heart: S1, S2 normal, no murmur, click, rub or gallop, regular rate and rhythm  Abdomen: Abdomen soft, non-tender without masses or organomegaly  Genitals:  1 4-5 mm white ulceration on distal shaft of penis with some clear exudates.  No swelling of the genitals.  No discharge from the penis.         ASSESSMENT     (N48.9) Lesion of penis  (primary encounter diagnosis)  Plan: valACYclovir (VALTREX) 1000 mg tablet, FAMILY       (Z20.2) Exposure to genital herpes  Plan: valACYclovir (VALTREX) 1000 mg tablet, FAMILY         PRACTICE REFERRAL    (M10.9) Acute gout involving toe of left foot, unspecified cause  Plan: predniSONE (DELTASONE) 20 MG tablet         PLAN    Culture swab obtained.  We will call with results when available.    With symptoms and exposure, patient  started on Valtrex today.  Rx for refill of Prednisone for gout flare up.  Order placed for patient to establish care at this location.  Patient will follow up with PCP to establish care in the next several weeks.  Patient verbalized understanding and agreed with this plan.    We will call with culture results if resistant.        Carlos Mosqueda  6/11/2018, 12:31 PM

## 2018-06-12 ENCOUNTER — TELEPHONE (OUTPATIENT)
Dept: URGENT CARE | Facility: URGENT CARE | Age: 43
End: 2018-06-12

## 2018-06-12 LAB
HSV1 DNA SPEC QL NAA+PROBE: POSITIVE
HSV2 DNA SPEC QL NAA+PROBE: NEGATIVE
SPECIMEN SOURCE: ABNORMAL

## 2018-06-12 NOTE — TELEPHONE ENCOUNTER
Lexie Pop MD  [facility]                     Please call pt with results.  Blood test shows that he has, or has had, herpes type one, which can cause cold sores, or genital sores, or sores on other body parts.  He is to complete the valtrex medication he was prescribed at his visit.  He is contagious, especially when he has a sore, or is about to get a sore, but can transmit it any time.  He should f/u with his pcp to discuss having medication available to take if he gets a sore, or going on a daily medication for prevention.       Gave patient message and he is expecting a call to get established with a new PCP. He said that he plans on getting in ASAP to discuss further with PCP.     Mila Luna RN, BSN

## 2018-06-19 ENCOUNTER — OFFICE VISIT (OUTPATIENT)
Dept: FAMILY MEDICINE | Facility: CLINIC | Age: 43
End: 2018-06-19
Payer: COMMERCIAL

## 2018-06-19 VITALS
BODY MASS INDEX: 27.89 KG/M2 | DIASTOLIC BLOOD PRESSURE: 96 MMHG | HEART RATE: 78 BPM | TEMPERATURE: 98.6 F | WEIGHT: 184 LBS | HEIGHT: 68 IN | SYSTOLIC BLOOD PRESSURE: 148 MMHG | RESPIRATION RATE: 16 BRPM | OXYGEN SATURATION: 98 %

## 2018-06-19 DIAGNOSIS — A60.01 HERPES SIMPLEX INFECTION OF PENIS: ICD-10-CM

## 2018-06-19 DIAGNOSIS — N50.89 GENITAL LESION, MALE: Primary | ICD-10-CM

## 2018-06-19 DIAGNOSIS — Z94.0 KIDNEY REPLACED BY TRANSPLANT: ICD-10-CM

## 2018-06-19 LAB
ANION GAP SERPL CALCULATED.3IONS-SCNC: 13 MMOL/L (ref 3–14)
BUN SERPL-MCNC: 53 MG/DL (ref 7–30)
CALCIUM SERPL-MCNC: 9 MG/DL (ref 8.5–10.1)
CHLORIDE SERPL-SCNC: 111 MMOL/L (ref 94–109)
CO2 SERPL-SCNC: 18 MMOL/L (ref 20–32)
CREAT SERPL-MCNC: 3.59 MG/DL (ref 0.66–1.25)
DIFFERENTIAL METHOD BLD: ABNORMAL
ERYTHROCYTE [DISTWIDTH] IN BLOOD BY AUTOMATED COUNT: 15.5 % (ref 10–15)
GFR SERPL CREATININE-BSD FRML MDRD: 19 ML/MIN/1.7M2
GLUCOSE SERPL-MCNC: 104 MG/DL (ref 70–99)
HCT VFR BLD AUTO: 32.5 % (ref 40–53)
HGB BLD-MCNC: 10.4 G/DL (ref 13.3–17.7)
LYMPHOCYTES # BLD AUTO: 1.1 10E9/L (ref 0.8–5.3)
LYMPHOCYTES NFR BLD AUTO: 11 %
MCH RBC QN AUTO: 27.1 PG (ref 26.5–33)
MCHC RBC AUTO-ENTMCNC: 32 G/DL (ref 31.5–36.5)
MCV RBC AUTO: 85 FL (ref 78–100)
MONOCYTES # BLD AUTO: 1 10E9/L (ref 0–1.3)
MONOCYTES NFR BLD AUTO: 10 %
NEUTROPHILS # BLD AUTO: 8.3 10E9/L (ref 1.6–8.3)
NEUTROPHILS NFR BLD AUTO: 79 %
PHOSPHATE SERPL-MCNC: 3.2 MG/DL (ref 2.5–4.5)
PLATELET # BLD AUTO: 234 10E9/L (ref 150–450)
PLATELET # BLD EST: ABNORMAL 10*3/UL
POTASSIUM SERPL-SCNC: 4 MMOL/L (ref 3.4–5.3)
RBC # BLD AUTO: 3.84 10E12/L (ref 4.4–5.9)
RBC MORPH BLD: NORMAL
SODIUM SERPL-SCNC: 142 MMOL/L (ref 133–144)
TACROLIMUS BLD-MCNC: 5.3 UG/L (ref 5–15)
TME LAST DOSE: NORMAL H
WBC # BLD AUTO: 10.4 10E9/L (ref 4–11)

## 2018-06-19 PROCEDURE — 85025 COMPLETE CBC W/AUTO DIFF WBC: CPT | Performed by: INTERNAL MEDICINE

## 2018-06-19 PROCEDURE — 36415 COLL VENOUS BLD VENIPUNCTURE: CPT | Performed by: NURSE PRACTITIONER

## 2018-06-19 PROCEDURE — 80197 ASSAY OF TACROLIMUS: CPT | Performed by: INTERNAL MEDICINE

## 2018-06-19 PROCEDURE — 80048 BASIC METABOLIC PNL TOTAL CA: CPT | Performed by: INTERNAL MEDICINE

## 2018-06-19 PROCEDURE — 86695 HERPES SIMPLEX TYPE 1 TEST: CPT | Performed by: NURSE PRACTITIONER

## 2018-06-19 PROCEDURE — 86694 HERPES SIMPLEX NES ANTBDY: CPT | Performed by: NURSE PRACTITIONER

## 2018-06-19 PROCEDURE — 84100 ASSAY OF PHOSPHORUS: CPT | Performed by: INTERNAL MEDICINE

## 2018-06-19 PROCEDURE — 86696 HERPES SIMPLEX TYPE 2 TEST: CPT | Performed by: NURSE PRACTITIONER

## 2018-06-19 PROCEDURE — 99214 OFFICE O/P EST MOD 30 MIN: CPT | Performed by: NURSE PRACTITIONER

## 2018-06-19 ASSESSMENT — PAIN SCALES - GENERAL: PAINLEVEL: NO PAIN (0)

## 2018-06-19 NOTE — PATIENT INSTRUCTIONS
At [facility], we strive to deliver an exceptional experience to you, every time we see you.  If you receive a survey in the mail, please send us back your thoughts. We really do value your feedback.    Based on your medical history, these are the current health maintenance/preventive care services that you are due for (some may have been done at this visit.)  There are no preventive care reminders to display for this patient.      Suggested websites for health information:  Www.[place].org : Up to date and easily searchable information on multiple topics.  Www.medlineplus.gov : medication info, interactive tutorials, watch real surgeries online  Www.familydoctor.org : good info from the Academy of Family Physicians  Www.cdc.gov : public health info, travel advisories, epidemics (H1N1)  Www.aap.org : children's health info, normal development, vaccinations  Www.health.[facility].[place].us : [place] dept of health, public health issues in [place], N1N1    Your care team:                            Family Medicine Internal Medicine   MD Juanito Lynne MD Shantel Branch-Fleming, MD Katya Georgiev PA-C Nam Ho, MD Pediatrics   GRANT Kendall, ALYSSA Peguero APRCLIFF CNP   MD Kelly Tipton MD Deborah Mielke, MD Kim Thein, APRN CNP      Clinic hours: Monday - Thursday 7 am-7 pm; Fridays 7 am-5 pm.   Urgent care: Monday - Friday 11 am-9 pm; Saturday and Sunday 9 am-5 pm.  Pharmacy : Monday -Thursday 8 am-8 pm; Friday 8 am-6 pm; Saturday and Sunday 9 am-5 pm.     Clinic: [phone]   Pharmacy: [phone]

## 2018-06-19 NOTE — MR AVS SNAPSHOT
After Visit Summary   6/19/2018    Rashad Ortiz    MRN: [id number]           Patient Information     Date Of Birth          1975        Visit Information        Provider Department      6/19/2018 9:20 AM Donita Malcolm APRN CNP [facility]        Today's Diagnoses     Genital lesion, male    -  1      Care Instructions    At [facility], we strive to deliver an exceptional experience to you, every time we see you.  If you receive a survey in the mail, please send us back your thoughts. We really do value your feedback.    Based on your medical history, these are the current health maintenance/preventive care services that you are due for (some may have been done at this visit.)  There are no preventive care reminders to display for this patient.      Suggested websites for health information:  Www.Traansmission.Futurelytics : Up to date and easily searchable information on multiple topics.  Www.YouMail.gov : medication info, interactive tutorials, watch real surgeries online  Www.familydoctor.org : good info from the Academy of Family Physicians  Www.cdc.gov : public health info, travel advisories, epidemics (H1N1)  Www.aap.org : children's health info, normal development, vaccinations  Www.health.state.[place].us : [place] dept of health, public health issues in [place], N1N1    Your care team:                            Family Medicine Internal Medicine   MD Juanito Lynne MD Shantel Branch-Fleming, MD Katya Georgiev PA-C Nam Ho, MD Pediatrics   GRANT Kendall CNP Amelia Massimini APRN CNP Shaista Malik, MD Bethany Templen, MD Deborah Mielke, MD Kim Thein, APRN CNP      Clinic hours: Monday - Thursday 7 am-7 pm; Fridays 7 am-5 pm.   Urgent care: Monday - Friday 11 am-9 pm; Saturday and Sunday 9 am-5 pm.  Pharmacy : Monday -Thursday 8 am-8 pm; Friday 8 am-6 pm; Saturday and Sunday 9 am-5 pm.     Clinic: [phone]    Pharmacy: [phone]            Follow-ups after your visit        Your next 10 appointments already scheduled     Aug 20, 2018  4:50 PM CDT   (Arrive by 4:20 PM)   Return Kidney Transplant with  Kidney/Pancreas Recipient   [facility] ([facility])    [address]  [address]  [place] [facility] [address]   [phone]            Aug 22, 2018  2:00 PM CDT   FULL PULMONARY FUNCTION with  PFL B   [facility] Pulmonary Function Testing ([facility])    [address]  3rd Floor  [place] [facility] [address]   [phone]            Aug 22, 2018  3:00 PM CDT   (Arrive by 2:45 PM)   NEW PANCREAS/KIDNEY TRANSPLANT WORK-UP with Gelacio Jimenez MD   [facility] ([facility])    [address] [place] [address]  [address]  [facility] [address]   [phone]            Aug 22, 2018  4:00 PM CDT   (Arrive by 3:45 PM)   New Patient Visit with KAILA Kaufman MD   [facility] Dermatology ([facility])    [address]  [address]rd [facility] [id number] [phone]              Who to contact     If you have questions or need follow up information about today's clinic visit or your schedule please contact [facility] directly at [phone].  Normal or non-critical lab and imaging results will be communicated to you by [employer], letter or phone within 4 business days after the clinic has received the results. If you do not hear from us within 7 days, please contact the clinic through [employer] or phone. If you have a critical or abnormal lab result, we will notify you by phone as soon as possible.  Submit refill requests through "[employer]" or call your pharmacy and they will forward the refill request to us. Please allow 3 business days for your refill to be completed.          Additional Information About Your Visit        "[employer]" Information     "[employer]" lets you send messages to  "your doctor, view your test results, renew your prescriptions, schedule appointments and more. To sign up, go to [email or web address] . Click on \"Log in\" on the left side of the screen, which will take you to the Welcome page. Then click on \"Sign up Now\" on the right side of the page.     You will be asked to enter the access code listed below, as well as some personal information. Please follow the directions to create your username and password.     Your access code is: [email or web address]  Expires: 2018 11:52 AM     Your access code will  in 90 days. If you need help or a new code, please call your [facility] clinic or [phone].        Care EveryWhere ID     This is your Care EveryWhere ID. This could be used by other organizations to access your [facility] medical records  [id number]        Your Vitals Were     Pulse Temperature Respirations Height Pulse Oximetry BMI (Body Mass Index)    78 98.6  F (37  C) (Oral) 16 5' 8\" (1.727 m) 98% 27.98 kg/m2       Blood Pressure from Last 3 Encounters:   18 (!) 148/96   18 (!) 151/97   18 (!) 150/106    Weight from Last 3 Encounters:   18 184 lb (83.5 kg)   18 184 lb 3.2 oz (83.6 kg)   18 184 lb (83.5 kg)              We Performed the Following     Herpes Simplex Virus 1 and 2 IgG     Herpes: HSV IgM antibody        Primary Care Provider Office Phone # Fax #    [facility] [phone]       [address]  [facility] [address]        Equal Access to Services     GLENN GUTIERREZ : Pino Ramos, lindsay tolentino, thelma kaalmada shukri, ronaldo donahue. So [facility] [phone].    ATENCIÓN: Si habla español, tiene a ward disposición servicios gratuitos de asistencia lingüística. Llame al [phone].    We comply with applicable federal civil rights laws and [place] laws. We do not discriminate on the basis of race, color, national origin, age, disability, " sex, sexual orientation, or gender identity.            Thank you!     Thank you for choosing [facility]  for your care. Our goal is always to provide you with excellent care. Hearing back from our patients is one way we can continue to improve our services. Please take a few minutes to complete the written survey that you may receive in the mail after your visit with us. Thank you!             Your Updated Medication List - Protect others around you: Learn how to safely use, store and throw away your medicines at www.disposemymeds.org.          This list is accurate as of 6/19/18  9:30 AM.  Always use your most recent med list.                   Brand Name Dispense Instructions for use Diagnosis    amLODIPine 5 MG tablet    NORVASC    90 tablet    Take 1 tablet (5 mg) by mouth daily    Benign essential hypertension       carvedilol 25 MG tablet    COREG    180 tablet    Take 1 tablet (25 mg) by mouth 2 times daily        furosemide 20 MG tablet    LASIX    180 tablet    Take 1 tablet (20 mg) by mouth 2 times daily    Unspecified hypertensive kidney disease with chronic kidney disease stage I through stage IV, or unspecified(403.90)       HYDROcodone-acetaminophen 5-325 MG per tablet    NORCO    10 tablet    Take 1 tablet by mouth every 6 hours as needed for severe pain    Bilateral leg pain       losartan 100 MG tablet    COZAAR    90 tablet    TAKE 1 TABLET(100 MG) BY MOUTH DAILY    Renal hypertension, stage 1-4 or unspecified chronic kidney disease       * mycophenolate 250 MG capsule    GENERIC EQUIVALENT    120 capsule    Take 2 capsules (500 mg) by mouth 2 times daily    Kidney transplanted       * mycophenolate 250 MG capsule    GENERIC EQUIVALENT    120 capsule    Take 2 capsules (500 mg) by mouth 2 times daily    Kidney transplanted       omeprazole 20 MG CR capsule    priLOSEC    90 capsule    Take 1 capsule (20 mg) by mouth daily    Kidney replaced by transplant       * predniSONE 5 MG  tablet    DELTASONE    30 tablet    Take 1 tablet (5 mg) by mouth daily    Kidney replaced by transplant       * predniSONE 20 MG tablet    DELTASONE    20 tablet    TAKE 3 TABLETS BY MOUTH DAILY X3 DAYS, 2 TABLETS DAILY X3 DAYS, 1 TABLET DAILY X3 DAYS, 1/2 TABLET DAILY X3 DAYS    Acute gout involving toe of left foot, unspecified cause       sodium bicarbonate 650 MG tablet     120 tablet    Take 2 tablets (1,300 mg) by mouth 2 times daily    Kidney transplanted, Complications, kidney transplant, Aftercare following organ transplant, Immunosuppressed status (H), Encounter for long-term (current) use of high-risk medication       sulfamethoxazole-trimethoprim 400-80 MG per tablet    BACTRIM/SEPTRA    45 tablet    Take 1 tablet by mouth every other day    Kidney transplanted       tacrolimus 1 MG capsule    GENERIC EQUIVALENT    120 capsule    Take 2 capsules (2 mg) by mouth 2 times daily    Kidney transplant recipient, Long-term use of immunosuppressant medication       traMADol 50 MG tablet    ULTRAM    30 tablet    Take 1 tablet (50 mg) by mouth nightly as needed for moderate pain    Cervicalgia       valACYclovir 1000 mg tablet    VALTREX    20 tablet    Take 1 tablet (1,000 mg) by mouth 2 times daily    Lesion of penis, Exposure to genital herpes       * Notice:  This list has 4 medication(s) that are the same as other medications prescribed for you. Read the directions carefully, and ask your doctor or other care provider to review them with you.

## 2018-06-19 NOTE — PROGRESS NOTES
SUBJECTIVE:   Rashad Ortiz is a 43 year old male who presents to clinic today for the following health issues:      ED/UC Followup:    Facility:  [facility]  Date of visit: 06/11/18  Reason for visit: Lesion of penis  Current Status: same       43 year old male presents for lab testing for HSV. He was seen in UC last week for genital lesion. 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 His partner had a concurrent infection and had IgG and IgM drawn by her doctor. Patient reports that his partner was told it was a new infection for her. He reports that they both have a history of cold sores. Neither have a history of genital HSV infections. They have engaged in oral and genital intercourse. They are mutually monogamous. He is requesting HSV IgG and IgM testing today. Lesion has healed now. He has a hx of kidney transplant and is on immunosuppressive therapy.      Problem list and histories reviewed & adjusted, as indicated.  Additional history: as documented    Patient Active Problem List   Diagnosis     Kidney replaced by transplant     High risk medications (not anticoagulants) long-term use     Hypertension goal BP (blood pressure) < 140/90     GERD (gastroesophageal reflux disease)     CARDIOVASCULAR SCREENING; LDL GOAL LESS THAN 160     Gout     Creatinine elevation     Antibody mediated rejection of kidney transplant     Medical non-compliance     Chronic kidney disease, stage 4, severely decreased GFR (H)     Herpes simplex infection of penis     Past Surgical History:   Procedure Laterality Date     AV FISTULA OR GRAFT ARTERIAL       LIGATE FISTULA ARTERIOVENOUS UPPER EXTREMITY  12/20/2011    Procedure:LIGATE FISTULA ARTERIOVENOUS UPPER EXTREMITY; Excision of Right Forearm Arteriovenous Fistula.; Surgeon:LINDY AMAYA; Location: OR     PERCUTANEOUS BIOPSY KIDNEY Right 2/28/2017    Procedure: PERCUTANEOUS BIOPSY KIDNEY;  Surgeon: Gee Barrios MD;  Location:  OR     TRANSPLANT   01/13/2011    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       Social History   Substance Use Topics     Smoking status: Former Smoker     Types: Cigarettes     Quit date: 03/2017     Smokeless tobacco: Never Used      Comment: Patient states that he is an 'social'  smoker      Alcohol use 2.5 oz/week     5 Standard drinks or equivalent per week      Comment: 1-2 drinks/wk     Family History   Problem Relation Age of Onset     Hypertension Mother          Current Outpatient Prescriptions   Medication Sig Dispense Refill     amLODIPine (NORVASC) 5 MG tablet Take 1 tablet (5 mg) by mouth daily 90 tablet 3     carvedilol (COREG) 25 MG tablet Take 1 tablet (25 mg) by mouth 2 times daily 180 tablet 3     furosemide (LASIX) 20 MG tablet Take 1 tablet (20 mg) by mouth 2 times daily 180 tablet 1     HYDROcodone-acetaminophen (NORCO) 5-325 MG per tablet Take 1 tablet by mouth every 6 hours as needed for severe pain 10 tablet 0     losartan (COZAAR) 100 MG tablet TAKE 1 TABLET(100 MG) BY MOUTH DAILY 90 tablet 3     mycophenolate (GENERIC EQUIVALENT) 250 MG capsule Take 2 capsules (500 mg) by mouth 2 times daily 120 capsule 11     mycophenolate (GENERIC EQUIVALENT) 250 MG capsule Take 2 capsules (500 mg) by mouth 2 times daily 120 capsule 11     omeprazole (PRILOSEC) 20 MG capsule Take 1 capsule (20 mg) by mouth daily 90 capsule 1     predniSONE (DELTASONE) 20 MG tablet TAKE 3 TABLETS BY MOUTH DAILY X3 DAYS, 2 TABLETS DAILY X3 DAYS, 1 TABLET DAILY X3 DAYS, 1/2 TABLET DAILY X3 DAYS 20 tablet 0     predniSONE (DELTASONE) 5 MG tablet Take 1 tablet (5 mg) by mouth daily 30 tablet 11     sodium bicarbonate 650 MG tablet Take 2 tablets (1,300 mg) by mouth 2 times daily 120 tablet 11     sulfamethoxazole-trimethoprim (BACTRIM/SEPTRA) 400-80 MG per tablet Take 1 tablet by mouth every other day 45 tablet 3     tacrolimus (GENERIC EQUIVALENT) 1 MG capsule Take 2 capsules (2 mg) by mouth 2 times daily 120 capsule 11     traMADol  "(ULTRAM) 50 MG tablet Take 1 tablet (50 mg) by mouth nightly as needed for moderate pain 30 tablet 0     valACYclovir (VALTREX) 1000 mg tablet Take 1 tablet (1,000 mg) by mouth 2 times daily 20 tablet 0     Allergies   Allergen Reactions     Nkda [No Known Drug Allergies]        Reviewed and updated as needed this visit by clinical staff  Tobacco  Allergies  Meds  Problems  Med Hx  Surg Hx  Fam Hx  Soc Hx        Reviewed and updated as needed this visit by Provider  Allergies  Meds  Problems         ROS:  Constitutional, HEENT, cardiovascular, pulmonary, gi and gu systems are negative, except as otherwise noted.    OBJECTIVE:     BP (!) 148/96  Pulse 78  Temp 98.6  F (37  C) (Oral)  Resp 16  Ht 5' 8\" (1.727 m)  Wt 184 lb (83.5 kg)  SpO2 98%  BMI 27.98 kg/m2  Body mass index is 27.98 kg/(m^2).  GENERAL: healthy, alert and no distress  PSYCH: mentation appears normal, affect normal/bright    Diagnostic Test Results:  none     ASSESSMENT/PLAN:       ICD-10-CM    1. Genital lesion, male N50.89 Herpes Simplex Virus 1 and 2 IgG     Herpes: HSV IgM antibody   2. Herpes simplex infection of penis A60.01     HSV 1 confirmed by PCR of lesion     We had a lengthy discussion regarding HSV, outbreaks, transmission, and lab testing. I explained that we already know he is positive and had a recent outbreak as confirmed by PCR of the lesion therefore his IgM will also be positive. I explained that both tests are not helpful in this situation and do not change the plan. He verbalizes understanding and insists that we draw them today. His lesion has now healed. If he gets a future outbreak, he will let me know and I will prescribe Valtrex 500 mg PO BID x3 days per current guidelines.     See Patient Instructions    Patient verbalizes understanding and agrees with plan of care. Patient stable for discharge.    Greater than 50% of the 25 min visit was spent on counseling and coordination of care for the above issues. "         ISABEL Myers CNP  [facility]

## 2018-06-20 LAB
HSV1 IGG SERPL QL IA: 6.6 AI (ref 0–0.8)
HSV2 IGG SERPL QL IA: <0.2 AI (ref 0–0.8)

## 2018-06-25 NOTE — PROGRESS NOTES
Rashad,  Your IgG shows that you have HSV type 1 in your system which means that you have had HSV type 1 at some point in your life, which makes sense considering you recently were diagnosed with HSV typ 1. Your IgM (current outbreak) is still pending; however, like we discussed, I anticipate that it will be positive given your recent outbreak.  Please let us know if you have any questions.  Thank you for allowing us to participate in your care.  ISABEL Myers CNP

## 2018-06-26 LAB — HSV 1+2 IGM SER-IMP: 0.48 INDEX VALUE (ref 0–0.89)

## 2018-06-29 ENCOUNTER — CARE COORDINATION (OUTPATIENT)
Dept: NEPHROLOGY | Facility: CLINIC | Age: 43
End: 2018-06-29

## 2018-06-29 NOTE — PROGRESS NOTES
Reason for Call    Spoke with patient. States he's doing well overall, no major questions or concerns for nephrology. Not consistently checking BP at home. He started experiencing a gout flare in his right foot 3 days ago, wondering if he should be on a prednisone burst.     Collaboration    Above discussed with Dr. Murillo, who advised patient be seen by PCP. Patient was seen in urgent care over the weekend.    Plan    1. Follow up with PCP for gout treatment  2. Follow up as scheduled on 8/20/18    Patient was given an opportunity to ask questions and have those questions answered to his satisfaction.  Patient verbalized understanding of instructions provided and agreed to plan of care.    Flor Barrett RN

## 2018-07-01 ENCOUNTER — OFFICE VISIT (OUTPATIENT)
Dept: URGENT CARE | Facility: URGENT CARE | Age: 43
End: 2018-07-01
Payer: COMMERCIAL

## 2018-07-01 VITALS
DIASTOLIC BLOOD PRESSURE: 101 MMHG | WEIGHT: 181 LBS | SYSTOLIC BLOOD PRESSURE: 144 MMHG | BODY MASS INDEX: 27.52 KG/M2 | HEART RATE: 67 BPM | TEMPERATURE: 97.4 F | OXYGEN SATURATION: 97 %

## 2018-07-01 VITALS
SYSTOLIC BLOOD PRESSURE: 144 MMHG | HEART RATE: 73 BPM | BODY MASS INDEX: 27.58 KG/M2 | RESPIRATION RATE: 18 BRPM | OXYGEN SATURATION: 99 % | DIASTOLIC BLOOD PRESSURE: 92 MMHG | TEMPERATURE: 98.2 F | WEIGHT: 181.4 LBS

## 2018-07-01 DIAGNOSIS — Z94.0 KIDNEY REPLACED BY TRANSPLANT: ICD-10-CM

## 2018-07-01 DIAGNOSIS — N18.4 CHRONIC KIDNEY DISEASE, STAGE 4, SEVERELY DECREASED GFR (H): ICD-10-CM

## 2018-07-01 DIAGNOSIS — N18.4 CHRONIC KIDNEY DISEASE, STAGE 4, SEVERELY DECREASED GFR (H): Primary | ICD-10-CM

## 2018-07-01 DIAGNOSIS — M10.9 ACUTE GOUTY ARTHROPATHY: ICD-10-CM

## 2018-07-01 DIAGNOSIS — M10.371 ACUTE GOUT DUE TO RENAL IMPAIRMENT INVOLVING RIGHT FOOT: Primary | ICD-10-CM

## 2018-07-01 LAB
BASOPHILS # BLD AUTO: 0 10E9/L (ref 0–0.2)
BASOPHILS NFR BLD AUTO: 0.2 %
DIFFERENTIAL METHOD BLD: ABNORMAL
EOSINOPHIL # BLD AUTO: 0.1 10E9/L (ref 0–0.7)
EOSINOPHIL NFR BLD AUTO: 1.3 %
ERYTHROCYTE [DISTWIDTH] IN BLOOD BY AUTOMATED COUNT: 17.6 % (ref 10–15)
ERYTHROCYTE [SEDIMENTATION RATE] IN BLOOD BY WESTERGREN METHOD: 52 MM/H (ref 0–15)
HCT VFR BLD AUTO: 34.1 % (ref 40–53)
HGB BLD-MCNC: 10.6 G/DL (ref 13.3–17.7)
LYMPHOCYTES # BLD AUTO: 1.1 10E9/L (ref 0.8–5.3)
LYMPHOCYTES NFR BLD AUTO: 17 %
MCH RBC QN AUTO: 27.6 PG (ref 26.5–33)
MCHC RBC AUTO-ENTMCNC: 31.1 G/DL (ref 31.5–36.5)
MCV RBC AUTO: 89 FL (ref 78–100)
MONOCYTES # BLD AUTO: 0.7 10E9/L (ref 0–1.3)
MONOCYTES NFR BLD AUTO: 10.9 %
NEUTROPHILS # BLD AUTO: 4.5 10E9/L (ref 1.6–8.3)
NEUTROPHILS NFR BLD AUTO: 70.6 %
PLATELET # BLD AUTO: 180 10E9/L (ref 150–450)
RBC # BLD AUTO: 3.84 10E12/L (ref 4.4–5.9)
URATE SERPL-MCNC: 11.1 MG/DL (ref 3.5–7.2)
WBC # BLD AUTO: 6.3 10E9/L (ref 4–11)

## 2018-07-01 PROCEDURE — 99207 ZZC NO BILLABLE SERVICE THIS VISIT: CPT | Performed by: PHYSICIAN ASSISTANT

## 2018-07-01 PROCEDURE — 99214 OFFICE O/P EST MOD 30 MIN: CPT | Performed by: PHYSICIAN ASSISTANT

## 2018-07-01 PROCEDURE — 36415 COLL VENOUS BLD VENIPUNCTURE: CPT | Performed by: PHYSICIAN ASSISTANT

## 2018-07-01 PROCEDURE — 84550 ASSAY OF BLOOD/URIC ACID: CPT | Performed by: PHYSICIAN ASSISTANT

## 2018-07-01 PROCEDURE — 85025 COMPLETE CBC W/AUTO DIFF WBC: CPT | Performed by: PHYSICIAN ASSISTANT

## 2018-07-01 PROCEDURE — 85652 RBC SED RATE AUTOMATED: CPT | Performed by: PHYSICIAN ASSISTANT

## 2018-07-01 RX ORDER — PREDNISONE 20 MG/1
20 TABLET ORAL 2 TIMES DAILY
Qty: 14 TABLET | Refills: 0 | Status: SHIPPED | OUTPATIENT
Start: 2018-07-01 | End: 2018-07-01

## 2018-07-01 RX ORDER — HYDROCODONE BITARTRATE AND ACETAMINOPHEN 5; 325 MG/1; MG/1
0.5 TABLET ORAL EVERY 4 HOURS PRN
Qty: 18 TABLET | Refills: 0 | Status: SHIPPED | OUTPATIENT
Start: 2018-07-01 | End: 2018-09-09

## 2018-07-01 RX ORDER — PREDNISONE 20 MG/1
TABLET ORAL
Qty: 20 TABLET | Refills: 0 | Status: SHIPPED | OUTPATIENT
Start: 2018-07-01 | End: 2018-07-13

## 2018-07-01 ASSESSMENT — ENCOUNTER SYMPTOMS
CHILLS: 0
MYALGIAS: 0
DIZZINESS: 0
SORE THROAT: 0
VOMITING: 0
HEMATURIA: 0
RHINORRHEA: 0
DIARRHEA: 0
RESPIRATORY NEGATIVE: 1
CARDIOVASCULAR NEGATIVE: 1
NAUSEA: 0
POLYDIPSIA: 0
ENDOCRINE NEGATIVE: 1
HEADACHES: 0
CARDIOVASCULAR NEGATIVE: 1
CONSTITUTIONAL NEGATIVE: 1
EYES NEGATIVE: 1
HEMOPTYSIS: 0
SHORTNESS OF BREATH: 0
COUGH: 0
CHEST TIGHTNESS: 0
ADENOPATHY: 0
EYE PAIN: 0
CONSTITUTIONAL NEGATIVE: 1
WEAKNESS: 0
ABDOMINAL PAIN: 0
EYE ITCHING: 0
WEIGHT LOSS: 0
PALPITATIONS: 0
PALPITATIONS: 0
DIAPHORESIS: 0
NEUROLOGICAL NEGATIVE: 1
COUGH: 0
LIGHT-HEADEDNESS: 0
EYE DISCHARGE: 0
DYSURIA: 0
FEVER: 0
ARTHRALGIAS: 1
FREQUENCY: 0
GASTROINTESTINAL NEGATIVE: 1
WHEEZING: 0
FEVER: 0
RESPIRATORY NEGATIVE: 1
DIAPHORESIS: 0
EYE REDNESS: 0

## 2018-07-01 NOTE — PROGRESS NOTES
"Chief Complaint:    Chief Complaint   Patient presents with     Arthritis     Patient complains of pain in right foot. x4 days HX of gout       HPI: Rashad Ortiz is an 43 year old male who presents for evaluation and treatment of gout flare up in the R great toe.  Patient has a complicated medical Hx \"see problem list below.\"  Patient was in to this clinic on 6/11/2018 and seen by me for same.         ROS:      Review of Systems   Constitutional: Negative.  Negative for chills, diaphoresis and fever.   HENT: Negative.  Negative for congestion, ear pain, rhinorrhea and sore throat.    Eyes: Negative.  Negative for discharge, redness and itching.   Respiratory: Negative.  Negative for cough, chest tightness, shortness of breath and wheezing.    Cardiovascular: Negative.  Negative for chest pain and palpitations.   Gastrointestinal: Negative.  Negative for abdominal pain, diarrhea, nausea and vomiting.   Endocrine: Negative.  Negative for polydipsia and polyuria.   Genitourinary: Negative for dysuria, frequency, hematuria and urgency.   Musculoskeletal: Positive for arthralgias. Negative for myalgias.   Skin: Negative for rash.   Allergic/Immunologic: Negative for immunocompromised state.   Neurological: Negative.  Negative for dizziness, weakness, light-headedness and headaches.   Hematological: Negative for adenopathy.        Family History   Family History   Problem Relation Age of Onset     Hypertension Mother        Social History  Social History     Social History     Marital status:      Spouse name: N/A     Number of children: N/A     Years of education: N/A     Occupational History     Not on file.     Social History Main Topics     Smoking status: Former Smoker     Types: Cigarettes     Quit date: 03/2017     Smokeless tobacco: Never Used      Comment: Patient states that he is an 'social'  smoker      Alcohol use 2.5 oz/week     5 Standard drinks or equivalent per week      Comment: 1-2 drinks/wk "     Drug use: No     Sexual activity: No     Other Topics Concern     Not on file     Social History Narrative        Surgical History:  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        Problem List:  Patient Active Problem List   Diagnosis     Kidney replaced by transplant     High risk medications (not anticoagulants) long-term use     Hypertension goal BP (blood pressure) < 140/90     GERD (gastroesophageal reflux disease)     CARDIOVASCULAR SCREENING; LDL GOAL LESS THAN 160     Gout     Creatinine elevation     Antibody mediated rejection of kidney transplant     Medical non-compliance     Chronic kidney disease, stage 4, severely decreased GFR (H)     Herpes simplex infection of penis        Allergies:  Allergies   Allergen Reactions     Nkda [No Known Drug Allergies]         Current Meds:    Current Outpatient Prescriptions:      amLODIPine (NORVASC) 5 MG tablet, Take 1 tablet (5 mg) by mouth daily, Disp: 90 tablet, Rfl: 3     carvedilol (COREG) 25 MG tablet, Take 1 tablet (25 mg) by mouth 2 times daily, Disp: 180 tablet, Rfl: 3     furosemide (LASIX) 20 MG tablet, Take 1 tablet (20 mg) by mouth 2 times daily, Disp: 180 tablet, Rfl: 1     losartan (COZAAR) 100 MG tablet, TAKE 1 TABLET(100 MG) BY MOUTH DAILY, Disp: 90 tablet, Rfl: 3     mycophenolate (GENERIC EQUIVALENT) 250 MG capsule, Take 2 capsules (500 mg) by mouth 2 times daily, Disp: 120 capsule, Rfl: 11     omeprazole (PRILOSEC) 20 MG capsule, Take 1 capsule (20 mg) by mouth daily, Disp:  90 capsule, Rfl: 1     predniSONE (DELTASONE) 5 MG tablet, Take 1 tablet (5 mg) by mouth daily, Disp: 30 tablet, Rfl: 11     sodium bicarbonate 650 MG tablet, Take 2 tablets (1,300 mg) by mouth 2 times daily, Disp: 120 tablet, Rfl: 11     sulfamethoxazole-trimethoprim (BACTRIM/SEPTRA) 400-80 MG per tablet, Take 1 tablet by mouth every other day, Disp: 45 tablet, Rfl: 3     tacrolimus (GENERIC EQUIVALENT) 1 MG capsule, Take 2 capsules (2 mg) by mouth 2 times daily, Disp: 120 capsule, Rfl: 11     traMADol (ULTRAM) 50 MG tablet, Take 1 tablet (50 mg) by mouth nightly as needed for moderate pain, Disp: 30 tablet, Rfl: 0     HYDROcodone-acetaminophen (NORCO) 5-325 MG per tablet, Take 1 tablet by mouth every 6 hours as needed for severe pain (Patient not taking: Reported on 7/1/2018), Disp: 10 tablet, Rfl: 0     mycophenolate (GENERIC EQUIVALENT) 250 MG capsule, Take 2 capsules (500 mg) by mouth 2 times daily, Disp: 120 capsule, Rfl: 11     predniSONE (DELTASONE) 20 MG tablet, TAKE 3 TABLETS BY MOUTH DAILY X3 DAYS, 2 TABLETS DAILY X3 DAYS, 1 TABLET DAILY X3 DAYS, 1/2 TABLET DAILY X3 DAYS (Patient not taking: Reported on 7/1/2018), Disp: 20 tablet, Rfl: 0     valACYclovir (VALTREX) 1000 mg tablet, Take 1 tablet (1,000 mg) by mouth 2 times daily (Patient not taking: Reported on 7/1/2018), Disp: 20 tablet, Rfl: 0     PHYSICAL EXAM:     Vital signs noted and reviewed by Carlos Mosqueda  BP (!) 144/92  Pulse 73  Temp 98.2  F (36.8  C) (Oral)  Resp 18  Wt 181 lb 6.4 oz (82.3 kg)  SpO2 99%  BMI 27.58 kg/m2     PEFR:  General appearance: healthy, alert and no distress  Ears: R TM - normal: no effusions, no erythema, and normal landmarks, L TM - normal: no effusions, no erythema, and normal landmarks  Eyes: R normal, L normal  Nose: normal  Oropharynx: normal  Neck: supple and no adenopathy  Lungs: normal and clear to auscultation  Heart: S1, S2 normal, no murmur, click, rub or gallop, regular rate and rhythm  Extremities: R  great toe is erythematous.  No swelling at this time.  Tender to touch.      Medical Decision Making:    Differential Diagnosis:  MS Injury Pain: sprain, fracture, contusion, gout, septic arthritis and osteoarthritis      ASSESSMENT:     1. Chronic kidney disease, stage 4, severely decreased GFR (H)    2. Acute gouty arthropathy    3. Kidney replaced by transplant           PLAN:     Discussed symptoms with patient.  Patient was placed on tapering prednisone on 6/11.  At this time patient advised that with his Hx of kidney transplant, and renal failure, He will need to follow up with his PCP for any further steroid treatment of this condition. Labs done on 6/19 show creatinine of 3.59.   Patient missed his last appointment and states that he did not know he had one.  Patient was given an appointment for Tomorrow at 4:20 at this location.  Patient given a letter for work.  Worrisome symptoms discussed with instructions to go to the ED.  Patient verbalized understanding and agreed with this plan.     Carlos Mosqueda  7/1/2018, 11:11 AM

## 2018-07-01 NOTE — PROGRESS NOTES
SUBJECTIVE:      HPI   Rashad Ortiz is a 43 year old male who presents to clinic today for the following health issues:  Musculoskeletal problem/pain    Duration: 4days    Description  Location: R foot    Intensity:  severe    Accompanying signs and symptoms: No radicular pain, numbness, tingling or weakness.  No bladder or bowel dysfunction.   Swelling but no redness, drainage or fevers.      History  Previous similar problem: YES, has known hx of gout and this feels similar to that.  Uric acid level has been elevated in the past.  Reports recent seafood intake.  Gets gout attacks every month.  Hx of ESRD s/p kidney transplant.  Previous evaluation:  Labs     Precipitating or alleviating factors:  Trauma or overuse: no   Aggravating factors include: palpation, weight bearing and ambulation and relieved with rest.    Therapies tried and outcome: rest/inactivity, ice, immobilization with minimal relief      Reviewed PMH.  Patient Active Problem List   Diagnosis     Kidney replaced by transplant     High risk medications (not anticoagulants) long-term use     Hypertension goal BP (blood pressure) < 140/90     GERD (gastroesophageal reflux disease)     CARDIOVASCULAR SCREENING; LDL GOAL LESS THAN 160     Gout     Creatinine elevation     Antibody mediated rejection of kidney transplant     Medical non-compliance     Chronic kidney disease, stage 4, severely decreased GFR (H)     Herpes simplex infection of penis     Current Outpatient Prescriptions   Medication Sig Dispense Refill     amLODIPine (NORVASC) 5 MG tablet Take 1 tablet (5 mg) by mouth daily 90 tablet 3     carvedilol (COREG) 25 MG tablet Take 1 tablet (25 mg) by mouth 2 times daily 180 tablet 3     furosemide (LASIX) 20 MG tablet Take 1 tablet (20 mg) by mouth 2 times daily 180 tablet 1     HYDROcodone-acetaminophen (NORCO) 5-325 MG per tablet Take 1 tablet by mouth every 6 hours as needed for severe pain 10 tablet 0     losartan (COZAAR) 100 MG  tablet TAKE 1 TABLET(100 MG) BY MOUTH DAILY 90 tablet 3     mycophenolate (GENERIC EQUIVALENT) 250 MG capsule Take 2 capsules (500 mg) by mouth 2 times daily 120 capsule 11     mycophenolate (GENERIC EQUIVALENT) 250 MG capsule Take 2 capsules (500 mg) by mouth 2 times daily 120 capsule 11     omeprazole (PRILOSEC) 20 MG capsule Take 1 capsule (20 mg) by mouth daily 90 capsule 1     predniSONE (DELTASONE) 20 MG tablet TAKE 3 TABLETS BY MOUTH DAILY X3 DAYS, 2 TABLETS DAILY X3 DAYS, 1 TABLET DAILY X3 DAYS, 1/2 TABLET DAILY X3 DAYS 20 tablet 0     predniSONE (DELTASONE) 5 MG tablet Take 1 tablet (5 mg) by mouth daily 30 tablet 11     sodium bicarbonate 650 MG tablet Take 2 tablets (1,300 mg) by mouth 2 times daily 120 tablet 11     sulfamethoxazole-trimethoprim (BACTRIM/SEPTRA) 400-80 MG per tablet Take 1 tablet by mouth every other day 45 tablet 3     tacrolimus (GENERIC EQUIVALENT) 1 MG capsule Take 2 capsules (2 mg) by mouth 2 times daily 120 capsule 11     traMADol (ULTRAM) 50 MG tablet Take 1 tablet (50 mg) by mouth nightly as needed for moderate pain 30 tablet 0     valACYclovir (VALTREX) 1000 mg tablet Take 1 tablet (1,000 mg) by mouth 2 times daily 20 tablet 0     Allergies   Allergen Reactions     Nkda [No Known Drug Allergies]        Review of Systems   Constitutional: Negative.  Negative for diaphoresis, fever and weight loss.   HENT: Negative.    Eyes: Negative for pain.   Respiratory: Negative.  Negative for cough and hemoptysis.    Cardiovascular: Negative.  Negative for chest pain and palpitations.   Musculoskeletal: Positive for joint pain.   Skin: Negative.    All other systems reviewed and are negative.      BP (!) 144/101  Pulse 67  Temp 97.4  F (36.3  C) (Tympanic)  Wt 181 lb (82.1 kg)  SpO2 97%  BMI 27.52 kg/m2  Physical Exam   Constitutional: He is oriented to person, place, and time and well-developed, well-nourished, and in no distress. No distress.   Musculoskeletal:        Right ankle: He  exhibits decreased range of motion and swelling. He exhibits no ecchymosis, no deformity, no laceration and normal pulse. Tenderness. Lateral malleolus and medial malleolus tenderness found. No AITFL, no CF ligament, no posterior TFL, no head of 5th metatarsal and no proximal fibula tenderness found. Achilles tendon normal.        Right foot: There is decreased range of motion, tenderness, bony tenderness and swelling. There is normal capillary refill, no crepitus, no deformity and no laceration.        Left foot: There is normal range of motion, no tenderness, no swelling, normal capillary refill, no crepitus, no deformity and no laceration.   Neurological: He is alert and oriented to person, place, and time. He has normal sensation, normal strength and normal reflexes. Gait abnormal.   Skin: Skin is warm, dry and intact.   Distal pulses are 2+ and symmetric.  No peripheral edema.   Nursing note and vitals reviewed.        Assessment/Plan:  Acute gout due to renal impairment involving right foot:  No injury or trauma. Most likely gout attack.  CBC with diff was stable and ESR was elevated and will send check labs below.  Recommend RICE, low purine diet, and will give tapering prednisone and norco #18 prn pain.  Will avoid NSAIDS due to renal impairment.  Discussed risks and benefits of medication along with side effects, direction for use.  No driving or operating machinery due to sedation.  Recheck in clinic if symptoms worsen or if symptoms do not improve.   Needs to see PCP for gout prophylaxis.    -     HYDROcodone-acetaminophen (NORCO) 5-325 MG per tablet; Take 0.5 tablets by mouth every 4 hours as needed for moderate to severe pain  -     CBC with platelets differential  -     Uric acid  -     Erythrocyte sedimentation rate auto  -     predniSONE (DELTASONE) 20 MG tablet; TAKE 3 TABLETS BY MOUTH DAILY X3 DAYS, 2 TABLETS DAILY X3 DAYS, 1 TABLET DAILY X3 DAYS, 1/2 TABLET DAILY X3 DAYS    Kidney replaced by  transplant    Chronic kidney disease, stage 4, severely decreased GFR (H)        Taylor See GRANT Woods

## 2018-07-01 NOTE — MR AVS SNAPSHOT
After Visit Summary   7/1/2018    Rashad Ortiz    MRN: [id number]           Patient Information     Date Of Birth          1975        Visit Information        Provider Department      7/1/2018 12:05 PM Taylor Woods PA-C [facility]        Today's Diagnoses     Acute gout due to renal impairment involving right foot    -  1       Follow-ups after your visit        Your next 10 appointments already scheduled     Jul 02, 2018  4:20 PM CDT   [facility]EL [facility] C[facility]    [address] [place] [id number] [phone]           Bring a current list of meds and any records pertaining to this visit. For Physicals, please bring immunization records and any forms needing to be filled out. Please arrive 10 minutes early to complete paperwork.            Aug 20, 2018  4:50 PM CDT   (Arrive by 4:20 PM)   Return Kidney Transplant with  Kidney/Pancreas Recipient   [facility] ([place] [facility]    [address]  [address]  [place] [id number] [phone]            Aug 22, 2018  2:00 PM CDT   FULL PULMONARY FUNCTION with  PFL B   [facility] Pul[facility] Function Testing ([place] [facility])    [address]  3rd Floor  [place] [id number] [phone]            Aug 22, 2018  3:00 PM CDT   (Arrive by 2:45 PM)   NEW PANCREAS/KIDNEY TRANSPLANT WORK-UP with Gelacio Jimenez MD   [facility] ([place])    [address]  [address]  [place] [id number] [phone]            Aug 22, 2018  4:00 PM CDT   (Arrive by 3:45 PM)   New Patient Visit with KAILA Kaufman MD   [facility] ([facility])    [address] [place] [address]  3rd Floor  [place] [id number] [phone]              Who to contact     If you have questions or need follow  up information about today's clinic visit or your schedule please contact [facility] directly at [phone].  Normal or non-critical lab and imaging results will be communicated to you by [employer], letter or phone within 4 business days after the clinic has received the results. If you do not hear from us within 7 days, please contact the clinic through [employer] or phone. If you have a critical or abnormal lab result, we will notify you by phone as soon as possible.  Submit refill requests through [employer] or call your pharmacy and they will forward the refill request to us. Please allow 3 business days for your refill to be completed.          Additional Information About Your Visit        MyChart Information     [employer] gives you secure access to your electronic health record. If you see a primary care provider, you can also send messages to your care team and make appointments. If you have questions, please call your primary care clinic.  If you do not have a primary care provider, please call [phone] and they will assist you.        Care EveryWhere ID     This is your Care EveryWhere ID. This could be used by other organizations to access your [facility] medical records  [id number]        Your Vitals Were     Pulse Temperature Pulse Oximetry BMI (Body Mass Index)          67 97.4  F (36.3  C) (Tympanic) 97% 27.52 kg/m2         Blood Pressure from Last 3 Encounters:   07/01/18 (!) 144/101   07/01/18 (!) 144/92   06/19/18 (!) 148/96    Weight from Last 3 Encounters:   07/01/18 181 lb (82.1 kg)   07/01/18 181 lb 6.4 oz (82.3 kg)   06/19/18 184 lb (83.5 kg)              We Performed the Following     CBC with platelets differential     Erythrocyte sedimentation rate auto     Uric acid          Today's Medication Changes          These changes are accurate as of 7/1/18  1:09 PM.  If you have any questions, ask your nurse or doctor.               Start taking these medicines.        Dose/Directions     HYDROcodone-acetaminophen 5-325 MG per tablet   Commonly known as:  NORCO   Used for:  Acute gout due to renal impairment involving right foot   Started by:  Taylor Woods PA-C        Dose:  0.5 tablet   Take 0.5 tablets by mouth every 4 hours as needed for moderate to severe pain   Quantity:  18 tablet   Refills:  0         These medicines have changed or have updated prescriptions.        Dose/Directions    * predniSONE 5 MG tablet   Commonly known as:  DELTASONE   This may have changed:  Another medication with the same name was added. Make sure you understand how and when to take each.   Used for:  Kidney replaced by transplant   Changed by:  Taylor Woods PA-C        Dose:  5 mg   Take 1 tablet (5 mg) by mouth daily   Quantity:  30 tablet   Refills:  11       * predniSONE 20 MG tablet   Commonly known as:  DELTASONE   This may have changed:  You were already taking a medication with the same name, and this prescription was added. Make sure you understand how and when to take each.   Used for:  Acute gout due to renal impairment involving right foot   Changed by:  Taylor Woods PA-C        Dose:  20 mg   Take 1 tablet (20 mg) by mouth 2 times daily for 7 days   Quantity:  14 tablet   Refills:  0       * predniSONE 20 MG tablet   Commonly known as:  DELTASONE   This may have changed:  Another medication with the same name was added. Make sure you understand how and when to take each.   Used for:  Acute gout due to renal impairment involving right foot   Changed by:  Taylor Woods PA-C        TAKE 3 TABLETS BY MOUTH DAILY X3 DAYS, 2 TABLETS DAILY X3 DAYS, 1 TABLET DAILY X3 DAYS, 1/2 TABLET DAILY X3 DAYS   Quantity:  20 tablet   Refills:  0       * Notice:  This list has 3 medication(s) that are the same as other medications prescribed for you. Read the directions carefully, and ask your doctor or other care provider to review them with you.         Where to get your medicines      These medications were  sent to [id number] - [place], [place] - [address] AT [facility] [address], [place] [address]     Phone:  [phone]     predniSONE 20 MG tablet    predniSONE 20 MG tablet         Some of these will need a paper prescription and others can be bought over the counter.  Ask your nurse if you have questions.     Bring a paper prescription for each of these medications     HYDROcodone-acetaminophen 5-325 MG per tablet               Information about OPIOIDS     PRESCRIPTION OPIOIDS: WHAT YOU NEED TO KNOW   We gave you an opioid (narcotic) pain medicine. It is important to manage your pain, but opioids are not always the best choice. You should first try all the other options your care team gave you. Take this medicine for as short a time (and as few doses) as possible.     These medicines have risks:    DO NOT drive when on new or higher doses of pain medicine. These medicines can affect your alertness and reaction times, and you could be arrested for driving under the influence (DUI). If you need to use opioids long-term, talk to your care team about driving.    DO NOT operate heave machinery    DO NOT do any other dangerous activities while taking these medicines.     DO NOT drink any alcohol while taking these medicines.      If the opioid prescribed includes acetaminophen, DO NOT take with any other medicines that contain acetaminophen. Read all labels carefully. Look for the word  acetaminophen  or  Tylenol.  Ask your pharmacist if you have questions or are unsure.    You can get addicted to pain medicines, especially if you have a history of addiction (chemical, alcohol or substance dependence). Talk to your care team about ways to reduce this risk.    Store your pills in a secure place, locked if possible. We will not replace any lost or stolen medicine. If you don t finish your medicine, please throw away (dispose) as directed by your pharmacist. The  [place] Pollution Control Agency has more information about safe disposal: https://www.pca.[facility].[place].us/living-green/managing-unwanted-medications.     All opioids tend to cause constipation. Drink plenty of water and eat foods that have a lot of fiber, such as fruits, vegetables, prune juice, apple juice and high-fiber cereal. Take a laxative (Miralax, milk of magnesia, Colace, Senna) if you don t move your bowels at least every other day.          Primary Care Provider Office Phone # Fax #    [facility] [phone]       [address]  [facility] [address]        Equal Access to Services     LUISANA GUTIERREZ : Hadii aad ku hadasho Sojose, waaxda luqadaha, qaybta kaalmada adeegyada, ronaldo donahue. So [facility] [phone].    ATENCIÓN: Si habla español, tiene a ward disposición servicios gratuitos de asistencia lingüística. Llame al [phone].    We comply with applicable federal civil rights laws and [place] laws. We do not discriminate on the basis of race, color, national origin, age, disability, sex, sexual orientation, or gender identity.            Thank you!     Thank you for choosing [facility]  for your care. Our goal is always to provide you with excellent care. Hearing back from our patients is one way we can continue to improve our services. Please take a few minutes to complete the written survey that you may receive in the mail after your visit with us. Thank you!             Your Updated Medication List - Protect others around you: Learn how to safely use, store and throw away your medicines at www.disposemymeds.org.          This list is accurate as of 7/1/18  1:09 PM.  Always use your most recent med list.                   Brand Name Dispense Instructions for use Diagnosis    amLODIPine 5 MG tablet    NORVASC    90 tablet    Take 1 tablet (5 mg) by mouth daily    Benign essential hypertension       carvedilol 25 MG  tablet    COREG    180 tablet    Take 1 tablet (25 mg) by mouth 2 times daily        furosemide 20 MG tablet    LASIX    180 tablet    Take 1 tablet (20 mg) by mouth 2 times daily    Unspecified hypertensive kidney disease with chronic kidney disease stage I through stage IV, or unspecified(403.90)       HYDROcodone-acetaminophen 5-325 MG per tablet    NORCO    18 tablet    Take 0.5 tablets by mouth every 4 hours as needed for moderate to severe pain    Acute gout due to renal impairment involving right foot       losartan 100 MG tablet    COZAAR    90 tablet    TAKE 1 TABLET(100 MG) BY MOUTH DAILY    Renal hypertension, stage 1-4 or unspecified chronic kidney disease       * mycophenolate 250 MG capsule    GENERIC EQUIVALENT    120 capsule    Take 2 capsules (500 mg) by mouth 2 times daily    Kidney transplanted       * mycophenolate 250 MG capsule    GENERIC EQUIVALENT    120 capsule    Take 2 capsules (500 mg) by mouth 2 times daily    Kidney transplanted       omeprazole 20 MG CR capsule    priLOSEC    90 capsule    Take 1 capsule (20 mg) by mouth daily    Kidney replaced by transplant       * predniSONE 5 MG tablet    DELTASONE    30 tablet    Take 1 tablet (5 mg) by mouth daily    Kidney replaced by transplant       * predniSONE 20 MG tablet    DELTASONE    14 tablet    Take 1 tablet (20 mg) by mouth 2 times daily for 7 days    Acute gout due to renal impairment involving right foot       * predniSONE 20 MG tablet    DELTASONE    20 tablet    TAKE 3 TABLETS BY MOUTH DAILY X3 DAYS, 2 TABLETS DAILY X3 DAYS, 1 TABLET DAILY X3 DAYS, 1/2 TABLET DAILY X3 DAYS    Acute gout due to renal impairment involving right foot       sodium bicarbonate 650 MG tablet     120 tablet    Take 2 tablets (1,300 mg) by mouth 2 times daily    Kidney transplanted, Complications, kidney transplant, Aftercare following organ transplant, Immunosuppressed status (H), Encounter for long-term (current) use of high-risk medication        sulfamethoxazole-trimethoprim 400-80 MG per tablet    BACTRIM/SEPTRA    45 tablet    Take 1 tablet by mouth every other day    Kidney transplanted       tacrolimus 1 MG capsule    GENERIC EQUIVALENT    120 capsule    Take 2 capsules (2 mg) by mouth 2 times daily    Kidney transplant recipient, Long-term use of immunosuppressant medication       traMADol 50 MG tablet    ULTRAM    30 tablet    Take 1 tablet (50 mg) by mouth nightly as needed for moderate pain    Cervicalgia       valACYclovir 1000 mg tablet    VALTREX    20 tablet    Take 1 tablet (1,000 mg) by mouth 2 times daily    Lesion of penis, Exposure to genital herpes       * Notice:  This list has 5 medication(s) that are the same as other medications prescribed for you. Read the directions carefully, and ask your doctor or other care provider to review them with you.

## 2018-07-01 NOTE — LETTER
[facility]  [address]  [facility] [address]          July 1, 2018    RE:  Rashad Ortiz                                                                                                                                                       [address]  [facility] [address]            To whom it may concern:    Rashad Ortiz is under my professional care for foot pain.  Patient may need to be off of work until pain subsides.  This may take from 1-3 days.        Sincerely,        Carlos Mosqueda PA-C

## 2018-07-01 NOTE — MR AVS SNAPSHOT
After Visit Summary   7/1/2018    Rashad Ortiz    MRN: [id number]           Patient Information     Date Of Birth          1975        Visit Information        Provider Department      7/1/2018 11:10 AM Carlos Mosqueda PA-C [facility]        Today's Diagnoses     Chronic kidney disease, stage 4, severely decreased GFR (H)    -  1    Acute gouty arthropathy        Kidney replaced by transplant           Follow-ups after your visit        Your next 10 appointments already scheduled     Jul 02, 2018  4:20 PM CDT   Office Visit with ISABEL James CNP   [facility] ([facility])    [address] [place] [id number] [phone]           Bring a current list of meds and any records pertaining to this visit. For Physicals, please bring immunization records and any forms needing to be filled out. Please arrive 10 minutes early to complete paperwork.            Aug 20, 2018  4:50 PM CDT   (Arrive by 4:20 PM)   Return Kidney Transplant with  Kidney/Pancreas Recipient   [facility] Nephrology ([facility])    [address] [place] [address]  [address]  [facility] [id number] [phone]            Aug 22, 2018  2:00 PM CDT   FULL PULMONARY FUNCTION with  PFL B   [facility] Pulmonary Function Testing ([facility])    [address] [place] [address]  [address] [facility] [id number] [phone]            Aug 22, 2018  3:00 PM CDT   (Arrive by 2:45 PM)   NEW PANCREAS/KIDNEY TRANSPLANT WORK-UP with Gelacio Jimenez MD   [facility] Heart Care ([facility])    [address] [place] [address]  [address]  [facility] [id number] [phone]            Aug 22, 2018  4:00 PM CDT   (Arrive by 3:45 PM)   New Patient Visit with KAILA Kaufman MD   [facility] Dermatology ([facility])    [address] [place] [address]  [address] [facility] [address]    [phone]              Who to contact     If you have questions or need follow up information about today's clinic visit or your schedule please contact [facility] directly at [phone].  Normal or non-critical lab and imaging results will be communicated to you by MyChart, letter or phone within 4 business days after the clinic has received the results. If you do not hear from us within 7 days, please contact the clinic through MyChart or phone. If you have a critical or abnormal lab result, we will notify you by phone as soon as possible.  Submit refill requests through [employer] or call your pharmacy and they will forward the refill request to us. Please allow 3 business days for your refill to be completed.          Additional Information About Your Visit        Tucker[employer]uckhar[employer]vigen Information     The [employer]through gives you secure access to your electronic health record. If you see a primary care provider, you can also send messages to your care team and make appointments. If you have questions, please call your primary care clinic.  If you do not have a primary care provider, please call [phone] and they will assist you.        Care EveryWhere ID     This is your Care EveryWhere ID. This could be used by other organizations to access your [facility] records  [id number]        Your Vitals Were     Pulse Temperature Respirations Pulse Oximetry BMI (Body Mass Index)       73 98.2  F (36.8  C) (Oral) 18 99% 27.58 kg/m2        Blood Pressure from Last 3 Encounters:   07/01/18 (!) 144/92   06/19/18 (!) 148/96   06/11/18 (!) 151/97    Weight from Last 3 Encounters:   07/01/18 181 lb 6.4 oz (82.3 kg)   06/19/18 184 lb (83.5 kg)   06/11/18 184 lb 3.2 oz (83.6 kg)              Today, you had the following     No orders found for display       Primary Care Provider Office Phone # Fax #    [facility] [phone]       [address]  [facility] [address]        Equal  Access to Services     [facility]: Hadii aad ku haddannvangie Alistairali, wadiannada luqadaha, qaybta kabrandtronaldo sin. So [facility] [phone].    ATENCIÓN: Si habla katiana, tiene a ward disposición servicios gratuitos de asistencia lingüística. Llame al [phone].    We comply with applicable federal civil rights laws and [place] laws. We do not discriminate on the basis of race, color, national origin, age, disability, sex, sexual orientation, or gender identity.            Thank you!     Thank you for choosing [facility]  for your care. Our goal is always to provide you with excellent care. Hearing back from our patients is one way we can continue to improve our services. Please take a few minutes to complete the written survey that you may receive in the mail after your visit with us. Thank you!             Your Updated Medication List - Protect others around you: Learn how to safely use, store and throw away your medicines at www.disposemymeds.org.          This list is accurate as of 7/1/18 11:40 AM.  Always use your most recent med list.                   Brand Name Dispense Instructions for use Diagnosis    amLODIPine 5 MG tablet    NORVASC    90 tablet    Take 1 tablet (5 mg) by mouth daily    Benign essential hypertension       carvedilol 25 MG tablet    COREG    180 tablet    Take 1 tablet (25 mg) by mouth 2 times daily        furosemide 20 MG tablet    LASIX    180 tablet    Take 1 tablet (20 mg) by mouth 2 times daily    Unspecified hypertensive kidney disease with chronic kidney disease stage I through stage IV, or unspecified(403.90)       HYDROcodone-acetaminophen 5-325 MG per tablet    NORCO    10 tablet    Take 1 tablet by mouth every 6 hours as needed for severe pain    Bilateral leg pain       losartan 100 MG tablet    COZAAR    90 tablet    TAKE 1 TABLET(100 MG) BY MOUTH DAILY    Renal hypertension, stage 1-4 or unspecified chronic kidney  disease       * mycophenolate 250 MG capsule    GENERIC EQUIVALENT    120 capsule    Take 2 capsules (500 mg) by mouth 2 times daily    Kidney transplanted       * mycophenolate 250 MG capsule    GENERIC EQUIVALENT    120 capsule    Take 2 capsules (500 mg) by mouth 2 times daily    Kidney transplanted       omeprazole 20 MG CR capsule    priLOSEC    90 capsule    Take 1 capsule (20 mg) by mouth daily    Kidney replaced by transplant       * predniSONE 5 MG tablet    DELTASONE    30 tablet    Take 1 tablet (5 mg) by mouth daily    Kidney replaced by transplant       * predniSONE 20 MG tablet    DELTASONE    20 tablet    TAKE 3 TABLETS BY MOUTH DAILY X3 DAYS, 2 TABLETS DAILY X3 DAYS, 1 TABLET DAILY X3 DAYS, 1/2 TABLET DAILY X3 DAYS    Acute gout involving toe of left foot, unspecified cause       sodium bicarbonate 650 MG tablet     120 tablet    Take 2 tablets (1,300 mg) by mouth 2 times daily    Kidney transplanted, Complications, kidney transplant, Aftercare following organ transplant, Immunosuppressed status (H), Encounter for long-term (current) use of high-risk medication       sulfamethoxazole-trimethoprim 400-80 MG per tablet    BACTRIM/SEPTRA    45 tablet    Take 1 tablet by mouth every other day    Kidney transplanted       tacrolimus 1 MG capsule    GENERIC EQUIVALENT    120 capsule    Take 2 capsules (2 mg) by mouth 2 times daily    Kidney transplant recipient, Long-term use of immunosuppressant medication       traMADol 50 MG tablet    ULTRAM    30 tablet    Take 1 tablet (50 mg) by mouth nightly as needed for moderate pain    Cervicalgia       valACYclovir 1000 mg tablet    VALTREX    20 tablet    Take 1 tablet (1,000 mg) by mouth 2 times daily    Lesion of penis, Exposure to genital herpes       * Notice:  This list has 4 medication(s) that are the same as other medications prescribed for you. Read the directions carefully, and ask your doctor or other care provider to review them with you.

## 2018-07-03 ENCOUNTER — TELEPHONE (OUTPATIENT)
Dept: FAMILY MEDICINE | Facility: CLINIC | Age: 43
End: 2018-07-03

## 2018-07-03 NOTE — TELEPHONE ENCOUNTER
Notes Recorded by Jessica Méndez PA-C on 7/3/2018 at 11:22 AM  Results are abnormal. Your uric acid test for gout was elevated. Finish meds and follow up with your Primary to discuss preventative treatments.    Jessica Méndez PA-C      This writer attempted to contact Rashad  on 07/03/18      Reason for call results follow up with primary provider/clinic  and left message.      If patient calls back:   Patient contacted by a Registered Nurse. Inform patient that someone from the RN group will contact them, document that pt called and route to P DYAD 3 RN POOL [[id number]]        Marilee Ibarra RN

## 2018-07-05 NOTE — TELEPHONE ENCOUNTER
Relayed results to patient regarding abnormal uric acid level. She verbalized understanding.  Anjelica Patiño RN

## 2018-07-11 DIAGNOSIS — M54.2 CERVICALGIA: ICD-10-CM

## 2018-07-11 RX ORDER — TRAMADOL HYDROCHLORIDE 50 MG/1
TABLET ORAL
Qty: 30 TABLET | Refills: 0 | OUTPATIENT
Start: 2018-07-11

## 2018-07-11 NOTE — TELEPHONE ENCOUNTER
Requested Prescriptions   Pending Prescriptions Disp Refills     traMADol (ULTRAM) 50 MG tablet [Pharmacy Med Name: TRAMADOL 50MG TABLETS]        Last Written Prescription Date:  05/25/18  Last Fill Quantity: 30,   # refills: 0  Last Office Visit: 06/19/18  Future Office visit:       Routing refill request to provider for review/approval because:  Drug not on the [facility], P or  Health refill protocol or controlled substance 30 tablet 0     Sig: TAKE 1 TABLET BY MOUTH NIGHTLY AS NEEDED FOR MODERATE PAIN    There is no refill protocol information for this order

## 2018-07-12 DIAGNOSIS — M54.2 CERVICALGIA: ICD-10-CM

## 2018-07-13 ENCOUNTER — MYC REFILL (OUTPATIENT)
Dept: FAMILY MEDICINE | Facility: CLINIC | Age: 43
End: 2018-07-13

## 2018-07-13 DIAGNOSIS — M54.2 CERVICALGIA: ICD-10-CM

## 2018-07-13 DIAGNOSIS — M10.371 ACUTE GOUT DUE TO RENAL IMPAIRMENT INVOLVING RIGHT FOOT: ICD-10-CM

## 2018-07-13 RX ORDER — PREDNISONE 20 MG/1
TABLET ORAL
Qty: 20 TABLET | Refills: 0 | Status: SHIPPED | OUTPATIENT
Start: 2018-07-13 | End: 2018-08-06

## 2018-07-13 RX ORDER — TRAMADOL HYDROCHLORIDE 50 MG/1
TABLET ORAL
Qty: 30 TABLET | Refills: 0 | OUTPATIENT
Start: 2018-07-13

## 2018-07-13 RX ORDER — TRAMADOL HYDROCHLORIDE 50 MG/1
50 TABLET ORAL
Qty: 30 TABLET | Refills: 0
Start: 2018-07-13

## 2018-07-13 NOTE — TELEPHONE ENCOUNTER
Requested Prescriptions   Pending Prescriptions Disp Refills     traMADol (ULTRAM) 50 MG tablet [Pharmacy Med Name: TRAMADOL 50MG TABLETS]        Last Written Prescription Date:  05/25/18  Last Fill Quantity: 30,   # refills: 0  Last Office Visit: 06/19/18  Future Office visit:       Routing refill request to provider for review/approval because:  Drug not on the [facility], P or [facility] refill protocol or controlled substance   30 tablet 0     Sig: TAKE 1 TABLET BY MOUTH NIGHTLY AS NEEDED FOR MODERATE PAIN.    There is no refill protocol information for this order

## 2018-07-13 NOTE — TELEPHONE ENCOUNTER
Routing refill request to provider for review/approval because:  Drug not on the [facility] refill protocol     Mila Luna RN, BSN

## 2018-07-13 NOTE — TELEPHONE ENCOUNTER
Message from [employer]:  Original authorizing provider: Srinivas Harrington MD, MD Rashad Ortiz would like a refill of the following medications:  traMADol (ULTRAM) 50 MG tablet [Srinivas Harrington MD, MD]    Preferred pharmacy: [facility] STORE [id number] [place] [address] - [address] AT [facility] OF [facility] & [address]    Comment:

## 2018-07-13 NOTE — TELEPHONE ENCOUNTER
Routing refill request to provider for review/approval because:  Drug not on the [facility] refill protocol   Gabrielle Ohara BSN, RN

## 2018-07-13 NOTE — TELEPHONE ENCOUNTER
Requested Prescriptions   Pending Prescriptions Disp Refills     traMADol (ULTRAM) 50 MG tablet [Pharmacy Med Name: TRAMADOL 50MG TABLETS]  Last Written Prescription Date:  05/25/18  Last Fill Quantity: 30,  # refills: 0   Last Office Visit with [facility], [facility] or [facility] prescribing provider:  06/19/18   Future Office Visit:    30 tablet 0     Sig: TAKE 1 TABLET BY MOUTH EVERY EVENING AS NEEDED FOR MODERATE PAIN    There is no refill protocol information for this order

## 2018-07-16 ENCOUNTER — MYC REFILL (OUTPATIENT)
Dept: FAMILY MEDICINE | Facility: CLINIC | Age: 43
End: 2018-07-16

## 2018-07-16 DIAGNOSIS — M54.2 CERVICALGIA: ICD-10-CM

## 2018-07-16 NOTE — TELEPHONE ENCOUNTER
Message from [employer]:  Original authorizing provider: Srinivas Harrington MD, MD Rashad Ortiz would like a refill of the following medications:  traMADol (ULTRAM) 50 MG tablet [Srinivas Harrington MD, MD]    Preferred pharmacy: [facility] STORE [id number] [place] [address] - [address] AT [facility] OF [facility] & [address]    Comment:

## 2018-07-16 NOTE — TELEPHONE ENCOUNTER
Requested Prescriptions   Pending Prescriptions Disp Refills     traMADol (ULTRAM) 50 MG tablet        Last Written Prescription Date:  05/25/18  Last Fill Quantity: 30,   # refills: 0  Last Office Visit: 06/19/18  Future Office visit:       Routing refill request to provider for review/approval because:  Drug not on the [facility], [facility] or [facility] refill protocol or controlled substance 30 tablet 0     Sig: Take 1 tablet (50 mg) by mouth nightly as needed for moderate pain    There is no refill protocol information for this order

## 2018-07-17 RX ORDER — TRAMADOL HYDROCHLORIDE 50 MG/1
50 TABLET ORAL
Qty: 30 TABLET | Refills: 0 | OUTPATIENT
Start: 2018-07-17

## 2018-07-19 ENCOUNTER — MYC REFILL (OUTPATIENT)
Dept: FAMILY MEDICINE | Facility: CLINIC | Age: 43
End: 2018-07-19

## 2018-07-19 DIAGNOSIS — M54.2 CERVICALGIA: ICD-10-CM

## 2018-07-19 RX ORDER — TRAMADOL HYDROCHLORIDE 50 MG/1
50 TABLET ORAL
Qty: 30 TABLET | Refills: 0 | OUTPATIENT
Start: 2018-07-19

## 2018-07-19 NOTE — TELEPHONE ENCOUNTER
Message from Infiniohart:  Original authorizing provider: Srinivas Harrington MD, MD Rashad Ortiz would like a refill of the following medications:  traMADol (ULTRAM) 50 MG tablet [Srinivas Harrington MD, MD]    Preferred pharmacy: [facility] DR[facility] STORE [id number] [place] [address] - [address] AT [facility] OF [facility] & [address]    Comment:

## 2018-08-06 ENCOUNTER — MYC REFILL (OUTPATIENT)
Dept: FAMILY MEDICINE | Facility: CLINIC | Age: 43
End: 2018-08-06

## 2018-08-06 DIAGNOSIS — M10.371 ACUTE GOUT DUE TO RENAL IMPAIRMENT INVOLVING RIGHT FOOT: ICD-10-CM

## 2018-08-06 RX ORDER — PREDNISONE 20 MG/1
TABLET ORAL
Qty: 20 TABLET | Refills: 0 | Status: SHIPPED | OUTPATIENT
Start: 2018-08-06 | End: 2018-08-23

## 2018-08-06 RX ORDER — PREDNISONE 20 MG/1
TABLET ORAL
Qty: 20 TABLET | Refills: 0 | Status: CANCELLED | OUTPATIENT
Start: 2018-08-06

## 2018-08-06 NOTE — TELEPHONE ENCOUNTER
Message from MyChart:  Original authorizing provider: Srinivas Harrington MD, MD Rashad Ortiz would like a refill of the following medications:  predniSONE (DELTASONE) 20 MG tablet [Srinivas Harrington MD, MD]    Preferred pharmacy: [facility] DR[facility] STORE [id number] [place] [address] - [address] AT [facility] OF [facility] & [address]    Comment:

## 2018-08-06 NOTE — TELEPHONE ENCOUNTER
Requested Prescriptions   Pending Prescriptions Disp Refills     predniSONE (DELTASONE) 20 MG tablet [Pharmacy Med Name: PREDNISONE 20MG TABLETS]        Last Written Prescription Date:  07/13/18  Last Fill Quantity: 20,   # refills: 0  Last Office Visit: 06/19/18  Future Office visit:       Routing refill request to provider for review/approval because:  Drug not on the [facility] or [facility] refill protocol or controlled substance 20 tablet 0     Sig: TAKE 3 TABLETS DAILY X3 DAYS, 2 TABLETS DAILY X3 DAYS, 1 TABLET DAILY X3 DAYS, 1/2 TABLET DAILY X3 DAYS    There is no refill protocol information for this order

## 2018-08-08 DIAGNOSIS — I12.9 UNSPECIFIED HYPERTENSIVE KIDNEY DISEASE WITH CHRONIC KIDNEY DISEASE STAGE I THROUGH STAGE IV, OR UNSPECIFIED(403.90): ICD-10-CM

## 2018-08-09 RX ORDER — FUROSEMIDE 20 MG
TABLET ORAL
Qty: 180 TABLET | Refills: 1 | OUTPATIENT
Start: 2018-08-09

## 2018-08-09 RX ORDER — CARVEDILOL 25 MG/1
TABLET ORAL
Qty: 180 TABLET | Refills: 2 | OUTPATIENT
Start: 2018-08-09

## 2018-08-23 ENCOUNTER — MYC REFILL (OUTPATIENT)
Dept: NEPHROLOGY | Facility: CLINIC | Age: 43
End: 2018-08-23

## 2018-08-23 ENCOUNTER — MYC REFILL (OUTPATIENT)
Dept: FAMILY MEDICINE | Facility: CLINIC | Age: 43
End: 2018-08-23

## 2018-08-23 DIAGNOSIS — M10.371 ACUTE GOUT DUE TO RENAL IMPAIRMENT INVOLVING RIGHT FOOT: ICD-10-CM

## 2018-08-23 DIAGNOSIS — I12.9 UNSPECIFIED HYPERTENSIVE KIDNEY DISEASE WITH CHRONIC KIDNEY DISEASE STAGE I THROUGH STAGE IV, OR UNSPECIFIED(403.90): ICD-10-CM

## 2018-08-23 DIAGNOSIS — I12.9 RENAL HYPERTENSION, STAGE 1-4 OR UNSPECIFIED CHRONIC KIDNEY DISEASE: ICD-10-CM

## 2018-08-23 RX ORDER — LOSARTAN POTASSIUM 100 MG/1
100 TABLET ORAL DAILY
Qty: 90 TABLET | Refills: 3 | Status: ON HOLD | OUTPATIENT
Start: 2018-08-23 | End: 2018-10-28

## 2018-08-23 NOTE — TELEPHONE ENCOUNTER
Message from [employer]:  Original authorizing provider: MD Rashad Hurd DIETERGilberto Ortiz would like a refill of the following medications:  losartan (COZAAR) 100 MG tablet [Stef Murillo MD]    Preferred pharmacy: [facility] DR[facility] STORE [id number] [place] [address] - [address] AT [facility] OF [facility] & [address]    Comment:      Medication renewals requested in this message routed to other providers:  carvedilol (COREG) 25 MG tablet [ISABEL Myers CNP]  furosemide (LASIX) 20 MG tablet [ISABEL Myers CNP]  predniSONE (DELTASONE) 20 MG tablet [Srinivas Harrington MD, MD]

## 2018-08-23 NOTE — TELEPHONE ENCOUNTER
Requested Prescriptions   Pending Prescriptions Disp Refills     predniSONE (DELTASONE) 20 MG tablet        Last Written Prescription Date:  08/06/18  Last Fill Quantity: 20,   # refills: 0  Last Office Visit: [facility]19/18[facility]  Future Office visit:    Next 5 appointments (look out 90 days)     Aug 29, 2018  5:00 PM CDT   Cori Oropeza with THEO Cardoza   [facility])    [address] [facility] [address]   [phone]                   Routing refill request to provider for review/approval because:  Drug not on the [facility], [facility] or  Health refill protocol or controlled substance 20 tablet 0    There is no refill protocol information for this order

## 2018-08-23 NOTE — TELEPHONE ENCOUNTER
Message from [employer]:  Original authorizing provider: ISABEL Myers CNP would like a refill of the following medications:  carvedilol (COREG) 25 MG tablet [ISABEL Myers CNP]  furosemide (LASIX) 20 MG tablet [ISABEL Myers CNP]    Preferred pharmacy: [facility] DR[facility] STORE [address] [place] [address] - [address] AT [facility] [address]    Comment:      Medication renewals requested in this message routed to other providers:  losartan (COZAAR) 100 MG tablet [Stef Murillo MD]  predniSONE (DELTASONE) 20 MG tablet [Srinivas Harrington MD, MD]

## 2018-08-23 NOTE — TELEPHONE ENCOUNTER
Requested Prescriptions   Pending Prescriptions Disp Refills     predniSONE (DELTASONE) 20 MG tablet        Last Written Prescription Date:  08/06/18  Last Fill Quantity: 20,   # refills: 0  Last Office Visit: 06/19/18  Future Office visit:    Next 5 appointments (look out 90 days)     Aug 30, 2018  6:40 PM CDT   Cori Oropeza with Johann Howard MD   [facility] ([facility])    [address] [place] [address]   [phone]                   Routing refill request to provider for review/approval because:  Drug not on the [facility], [facility] or  Health refill protocol or controlled substance 20 tablet 0    There is no refill protocol information for this order

## 2018-08-23 NOTE — TELEPHONE ENCOUNTER
"Requested Prescriptions   Pending Prescriptions Disp Refills     carvedilol (COREG) 25 MG tablet  Last Written Prescription Date:  08/08/18  Last Fill Quantity: 14,  # refills: 0   Last Office Visit with [facility] or [facility] prescribing provider:  06/19/18[facility]   Future Office Visit:    Next 5 appointments (look out 90 days)     Aug 30, 2018  6:40 PM CDT   Lamart Sandip with Johann Howard MD   [facility] ([facility])    [address] [place] [address]   [phone]                14 tablet 0     Sig: Take 1 tablet (25 mg) by mouth 2 times daily    Beta-Blockers Protocol Failed    8/23/2018 12:59 PM       Failed - Blood pressure under 140/90 in past 12 months    BP Readings from Last 3 Encounters:   07/01/18 (!) 144/101   07/01/18 (!) 144/92   06/19/18 (!) 148/96                Passed - Patient is age 6 or older       Passed - Recent (12 mo) or future (30 days) visit within the authorizing provider's specialty    Patient had office visit in the last 12 months or has a visit in the next 30 days with authorizing provider or within the authorizing provider's specialty.  See \"Patient Info\" tab in inbasket, or \"Choose Columns\" in Meds & Orders section of the refill encounter.            furosemide (LASIX) 20 MG tablet  Last Written Prescription Date:  08/08/18  Last Fill Quantity: 28,  # refills: 0   Last Office Visit with [facility] or [facility] prescribing provider:  6/19/18[facility]   Future Office Visit:    Next 5 appointments (look out 90 days)     Aug 30, 2018  6:40 PM CDT   Cori Oropeza with Johann Howard MD   [facility] ([facility])    [address] [facility] [id number] [phone]                28 tablet 0     Sig: Take 1 tablet (20 mg) by mouth 2 times daily NEEDS OFFICE VISIT    Diuretics (Including Combos) Protocol Failed    8/23/2018 12:59 PM       Failed - Blood pressure under 140/90 in " "past 12 months    BP Readings from Last 3 Encounters:   07/01/18 (!) 144/101   07/01/18 (!) 144/92   06/19/18 (!) 148/96                Failed - Normal serum creatinine on file in past 12 months    Recent Labs   Lab Test  06/19/18   1000   CR  3.59*             Passed - Recent (12 mo) or future (30 days) visit within the authorizing provider's specialty    Patient had office visit in the last 12 months or has a visit in the next 30 days with authorizing provider or within the authorizing provider's specialty.  See \"Patient Info\" tab in inbasket, or \"Choose Columns\" in Meds & Orders section of the refill encounter.           Passed - Patient is age 18 or older       Passed - Normal serum potassium on file in past 12 months    Recent Labs   Lab Test  06/19/18   1000   POTASSIUM  4.0                   Passed - Normal serum sodium on file in past 12 months    Recent Labs   Lab Test  06/19/18   1000   NA  142                "

## 2018-08-23 NOTE — TELEPHONE ENCOUNTER
Message from MyChart:  Original authorizing provider: Srinivas Harrington MD, MD Rashad Ortiz would like a refill of the following medications:  predniSONE (DELTASONE) 20 MG tablet [Srinivas Harrington MD, MD]    Preferred pharmacy: [facility] DR[facility] STORE [id number] [place] [address] - [address] AT [facility] OF [facility] & [address]    Comment:

## 2018-08-23 NOTE — TELEPHONE ENCOUNTER
Message [facility]:  Original authorizing provider: Srinivas Harrington MD, MD Rashad Ortiz would like a refill of the following medications:  predniSONE (DELTASONE) 20 MG tablet [Srinivas Harrington MD, MD]    Preferred pharmacy: [facility] STORE [id number] [place] [address] - [address] AT [facility] OF [facility] & [address]    Comment:      Medication renewals requested in this message routed to other providers:  carvedilol (COREG) 25 MG tablet [ISABEL Myers CNP]  furosemide (LASIX) 20 MG tablet [ISABEL Myers CNP]  losartan (COZAAR) 100 MG tablet [Stef Murillo MD]

## 2018-08-23 NOTE — TELEPHONE ENCOUNTER
Requested Prescriptions   Pending Prescriptions Disp Refills     predniSONE (DELTASONE) 20 MG tablet [Pharmacy Med Name: PREDNISONE 20MG TABLETS]        Last Written Prescription Date:  08/06/18  Last Fill Quantity: 20,   # refills: 0  Last Office Visit: [facility]19/18[facility] Office visit:    Next 5 appointments (look out 90 days)     Aug 30, 2018  6:40 PM CDT   Cori Oropeza with Johann Howard MD   [facility] ([facility])    [address] [place] [id number] [phone]                   Routing refill request to provider for review/approval because:  Drug not on the [facility], P or [facility] refill protocol or controlled substance 20 tablet 0     Sig: TAKE 3 TABLETS DAILY X3 DAYS, 2 TABLETS DAILY X3 DAYS, 1 TABLET DAILY X3 DAYS, 1/2 TABLET DAILY X3 DAYS    There is no refill protocol information for this order

## 2018-08-24 RX ORDER — PREDNISONE 20 MG/1
TABLET ORAL
Qty: 20 TABLET | Refills: 0
Start: 2018-08-24

## 2018-08-24 RX ORDER — FUROSEMIDE 20 MG
20 TABLET ORAL 2 TIMES DAILY
Qty: 28 TABLET | Refills: 0 | Status: SHIPPED | OUTPATIENT
Start: 2018-08-24 | End: 2018-10-22

## 2018-08-24 RX ORDER — PREDNISONE 20 MG/1
TABLET ORAL
Qty: 20 TABLET | Refills: 0 | Status: SHIPPED | OUTPATIENT
Start: 2018-08-24 | End: 2018-09-17

## 2018-08-24 RX ORDER — PREDNISONE 20 MG/1
TABLET ORAL
Qty: 20 TABLET | Refills: 0 | OUTPATIENT
Start: 2018-08-24

## 2018-08-24 RX ORDER — CARVEDILOL 25 MG/1
25 TABLET ORAL 2 TIMES DAILY
Qty: 14 TABLET | Refills: 0 | Status: SHIPPED | OUTPATIENT
Start: 2018-08-24 | End: 2018-09-05

## 2018-08-24 NOTE — TELEPHONE ENCOUNTER
Routing refill request to provider for review/approval because:  Labs out of range:  Blood pressure, serum creatinine  Associated diagnosis is not on [facility] Refill Protocol.     Shantel Saini RN

## 2018-08-24 NOTE — TELEPHONE ENCOUNTER
Needs office visit before any more refills. Please follow up with primary care provider, Dr. Harrington

## 2018-08-27 NOTE — TELEPHONE ENCOUNTER
Sent my chart message to patient to notify patient needs to schedule a follow up appointment.  Edgard Mclean,  [facility]

## 2018-09-05 DIAGNOSIS — I12.9 UNSPECIFIED HYPERTENSIVE KIDNEY DISEASE WITH CHRONIC KIDNEY DISEASE STAGE I THROUGH STAGE IV, OR UNSPECIFIED(403.90): ICD-10-CM

## 2018-09-05 DIAGNOSIS — Z79.60 LONG-TERM USE OF IMMUNOSUPPRESSANT MEDICATION: ICD-10-CM

## 2018-09-05 DIAGNOSIS — Z94.0 KIDNEY TRANSPLANT RECIPIENT: Primary | ICD-10-CM

## 2018-09-05 RX ORDER — CARVEDILOL 25 MG/1
25 TABLET ORAL 2 TIMES DAILY
Qty: 60 TABLET | Refills: 11 | Status: ON HOLD | OUTPATIENT
Start: 2018-09-05 | End: 2018-10-28

## 2018-09-05 RX ORDER — FUROSEMIDE 20 MG
20 TABLET ORAL 2 TIMES DAILY
Qty: 180 TABLET | Refills: 1 | Status: ON HOLD | OUTPATIENT
Start: 2018-09-05 | End: 2018-10-28

## 2018-09-05 RX ORDER — TACROLIMUS 1 MG/1
2 CAPSULE ORAL 2 TIMES DAILY
Qty: 120 CAPSULE | Refills: 11 | Status: SHIPPED | OUTPATIENT
Start: 2018-09-05 | End: 2018-12-11

## 2018-09-05 NOTE — TELEPHONE ENCOUNTER
Carvedilol 25mg   Qty 14  Last Fill 08/09/2018    Furosemide 20MG  Qty 28  Last Fill 08/09/2018    Thank you  Elena Fonseca [facility]

## 2018-09-08 ENCOUNTER — MYC MEDICAL ADVICE (OUTPATIENT)
Dept: FAMILY MEDICINE | Facility: CLINIC | Age: 43
End: 2018-09-08

## 2018-09-08 ENCOUNTER — MYC REFILL (OUTPATIENT)
Dept: FAMILY MEDICINE | Facility: CLINIC | Age: 43
End: 2018-09-08

## 2018-09-08 DIAGNOSIS — M54.2 CERVICALGIA: ICD-10-CM

## 2018-09-08 DIAGNOSIS — M10.371 ACUTE GOUT DUE TO RENAL IMPAIRMENT INVOLVING RIGHT FOOT: ICD-10-CM

## 2018-09-08 RX ORDER — PREDNISONE 20 MG/1
TABLET ORAL
Qty: 20 TABLET | Refills: 0 | Status: CANCELLED | OUTPATIENT
Start: 2018-09-08

## 2018-09-08 NOTE — TELEPHONE ENCOUNTER
Requested Prescriptions   Pending Prescriptions Disp Refills     predniSONE (DELTASONE) 20 MG tablet [Pharmacy Med Name: PREDNISONE 20MG TABLETS] 20 tablet 0     Sig: TAKE 3 TABLETS DAILY X3 DAYS, 2 TABLETS DAILY X3 DAYS, 1 TABLET DAILY X3 DAYS, 1/2 TABLET DAILY X3 DAYS    There is no refill protocol information for this order        Last Written Prescription Date:  8/24/18  Last Fill Quantity: 20,  # refills: 0   Last Office Visit with [facility] or [facility] prescribing provider: 6/19/18     Future Office Visit:    Next 5 appointments (look out 90 days)     Sep 10, 2018  5:00 PM CDT   Cori Oropeza with ISABEL Crouch CNP   [facility]    [address] [place] [address] [phone]

## 2018-09-09 ENCOUNTER — OFFICE VISIT (OUTPATIENT)
Dept: URGENT CARE | Facility: URGENT CARE | Age: 43
End: 2018-09-09
Payer: COMMERCIAL

## 2018-09-09 VITALS
SYSTOLIC BLOOD PRESSURE: 134 MMHG | BODY MASS INDEX: 27.67 KG/M2 | DIASTOLIC BLOOD PRESSURE: 93 MMHG | OXYGEN SATURATION: 98 % | WEIGHT: 182 LBS | HEART RATE: 84 BPM | TEMPERATURE: 98.7 F

## 2018-09-09 DIAGNOSIS — M10.372 GOUT OF LEFT FOOT DUE TO RENAL IMPAIRMENT, UNSPECIFIED CHRONICITY: Primary | ICD-10-CM

## 2018-09-09 PROCEDURE — 99214 OFFICE O/P EST MOD 30 MIN: CPT | Performed by: INTERNAL MEDICINE

## 2018-09-09 RX ORDER — HYDROCODONE BITARTRATE AND ACETAMINOPHEN 5; 325 MG/1; MG/1
.5-1 TABLET ORAL EVERY 6 HOURS PRN
Qty: 15 TABLET | Refills: 0 | Status: SHIPPED | OUTPATIENT
Start: 2018-09-09 | End: 2018-09-17

## 2018-09-09 RX ORDER — PREDNISONE 20 MG/1
TABLET ORAL
Qty: 20 TABLET | Refills: 0 | Status: SHIPPED | OUTPATIENT
Start: 2018-09-09 | End: 2018-10-22

## 2018-09-09 NOTE — MR AVS SNAPSHOT
After Visit Summary   9/9/2018    Rashad Ortiz    MRN: [id number]           Patient Information     Date Of Birth          1975        Visit Information        Provider Department      9/9/2018 9:10 AM Lexie Pop MD [facility]        Today's Diagnoses     Acute gout due to renal impairment involving right foot    -  1      Care Instructions      Gout Diet  Gout is a painful condition caused by an excess of uric acid, a waste product made by the body. Uric acid forms crystals that collect in the joints. The immune response to these crystals brings on symptoms of joint pain and swelling. This is called a gout attack. Often, medications and diet changes are combined to manage gout. Below are some guidelines for changing your diet to help you manage gout and prevent attacks. Your health care provider will help you determine the best eating plan for you.     Eating to manage gout  Weight loss for those who are overweight may help reduce gout attacks.  Eat less of these foods  Eating too many foods containing purines may raise the levels of uric acid in your body. This raises your risk for a gout attack. Try to limit these foods and drinks:    Alcohol, such as beer and red wine. You may be told to avoid alcohol completely.    Soft drinks that contain sugar or high fructose corn syrup    Certain fish, including anchovies, sardines, fish eggs, and herring    Shellfish    Certain meats, such as red meat, hot dogs, luncheon meats, and turkey    Organ meats, such as liver, kidneys, and sweetbreads    Legumes, such as dried beans and peas    Other high fat foods such as gravy, whole milk, and high fat cheeses    Vegetables such as asparagus, cauliflower, spinach, and mushrooms used to be thought to contribute to an increased risk for a gout attack, but recent studies show that high purine vegetables don't increase the risk for a gout attack.  Eat more of these foods  Other  foods may be helpful for people with gout. Add some of these foods to your diet:    Cherries contain chemicals that may lower uric acid.    Omega fatty acids. These are found in some fatty fish such as salmon, certain oils (flax, olive, or nut), and nuts themselves. Omega fatty acids may help prevent inflammation due to gout.    Dairy products that are low-fat or fat-free, such as cheese and yogurt    Complex carbohydrate foods, including whole grains, brown rice, oats, and beans    Coffee, in moderation    Water, approximately 64 ounces per day  Follow-up care  Follow up with your healthcare provider as advised.  When to seek medical advice  Call your healthcare provider right away if any of these occur:    Return of gout symptoms, usually at night:    Severe pain, swelling, and heat in a joint, especially the base of the big toe    Affected joint is hard to move    Skin of the affected joint is purple or red    Fever of 100.4 F (38 C) or higher    Pain that doesn't get better even with prescribed medicine   Date Last Reviewed: 1/12/2016 2000-2017 [employer]. [address] [place] [address]. All rights reserved. This information is not intended as a substitute for professional medical care. Always follow your healthcare professional's instructions.                Follow-ups after your visit        Follow-up notes from your care team     Return in about 2 days (around 9/11/2018) for follow up with primary doctor as scheduled.      Your next 10 appointments already scheduled     Sep 10, 2018  5:00 PM CDT   Cori Oropeza with ISABEL Crouch CNP   [facility]    [address] [place] [id number] [phone]            Sep 25, 2018  2:30 PM CDT   Lab with  LAB    [facility] ([facility])    [address]  1st Floor  [facility] [address] [phone]            Sep  25, 2018  3:35 PM CDT   (Arrive by 3:05 PM)   Return Kidney Transplant with  Kidney/Pancreas Recipient 1   [facility] ([facility])    [address]  [address]  [facility] [address] [phone]            Oct 11, 2018  3:00 PM CDT   (Arrive by 2:45 PM)   New Patient Visit with KAILA Kaufman MD   [facility] ([facility])    [address]  3rd Floor  [facility] [address] [phone]              Who to contact     If you have questions or need follow up information about today's clinic visit or your schedule please contact [facility] directly at [phone].  Normal or non-critical lab and imaging results will be communicated to you by MyChart, letter or phone within 4 business days after the clinic has received the results. If you do not hear from us within 7 days, please contact the clinic through MyChart or phone. If you have a critical or abnormal lab result, we will notify you by phone as soon as possible.  Submit refill requests through [employer] or call your pharmacy and they will forward the refill request to us. Please allow 3 business days for your refill to be completed.          Additional Information About Your Visit        Page2Imageshart Information     [employer] gives you secure access to your electronic health record. If you see a primary care provider, you can also send messages to your care team and make appointments. If you have questions, please call your primary care clinic.  If you do not have a primary care provider, please call [phone] and they will assist you.        Care EveryWhere ID     This is your Care EveryWhere ID. This could be used by other organizations to access your [facility] medical records  [id number]        Your Vitals Were     Pulse Temperature Pulse Oximetry BMI (Body Mass Index)          84 98.7  F (37.1  C) (Oral) 98% 27.67 kg/m2         Blood Pressure from  Last 3 Encounters:   09/09/18 (!) 134/93   07/01/18 (!) 144/101   07/01/18 (!) 144/92    Weight from Last 3 Encounters:   09/09/18 182 lb (82.6 kg)   07/01/18 181 lb (82.1 kg)   07/01/18 181 lb 6.4 oz (82.3 kg)              Today, you had the following     No orders found for display         Today's Medication Changes          These changes are accurate as of 9/9/18  9:38 AM.  If you have any questions, ask your nurse or doctor.               These medicines have changed or have updated prescriptions.        Dose/Directions    HYDROcodone-acetaminophen 5-325 MG per tablet   Commonly known as:  NORCO   This may have changed:    - how much to take  - when to take this   Used for:  Acute gout due to renal impairment involving right foot   Changed by:  Lexie Pop MD        Dose:  0.5-1 tablet   Take 0.5-1 tablets by mouth every 6 hours as needed for moderate to severe pain   Quantity:  15 tablet   Refills:  0       * predniSONE 5 MG tablet   Commonly known as:  DELTASONE   This may have changed:  Another medication with the same name was added. Make sure you understand how and when to take each.   Used for:  Kidney replaced by transplant   Changed by:  Lexie Pop MD        Dose:  5 mg   Take 1 tablet (5 mg) by mouth daily   Quantity:  30 tablet   Refills:  11       * predniSONE 20 MG tablet   Commonly known as:  DELTASONE   This may have changed:  Another medication with the same name was added. Make sure you understand how and when to take each.   Used for:  Acute gout due to renal impairment involving right foot   Changed by:  Lexie Pop MD        TAKE 3 TABLETS DAILY X3 DAYS, 2 TABLETS DAILY X3 DAYS, 1 TABLET DAILY X3 DAYS, 1/2 TABLET DAILY X3 DAYS   Quantity:  20 tablet   Refills:  0       * predniSONE 20 MG tablet   Commonly known as:  DELTASONE   This may have changed:  You were already taking a medication with the same name, and this prescription was added. Make sure you understand how and  when to take each.   Used for:  Acute gout due to renal impairment involving right foot   Changed by:  Lexie Pop MD        Take 3 tabs (60 mg) by mouth daily x 3 days, 2 tabs (40 mg) daily x 3 days, 1 tab (20 mg) daily x 3 days, then 1/2 tab (10 mg) x 3 days.   Quantity:  20 tablet   Refills:  0       * Notice:  This list has 3 medication(s) that are the same as other medications prescribed for you. Read the directions carefully, and ask your doctor or other care provider to review them with you.         Where to get your medicines      These medications were sent to [facility] [place], [place] - [address] AT [facility] [address] [facility] [address]     Phone:  [phone]     predniSONE 20 MG tablet         Some of these will need a paper prescription and others can be bought over the counter.  Ask your nurse if you have questions.     Bring a paper prescription for each of these medications     HYDROcodone-acetaminophen 5-325 MG per tablet               Information about OPIOIDS     PRESCRIPTION OPIOIDS: WHAT YOU NEED TO KNOW   We gave you an opioid (narcotic) pain medicine. It is important to manage your pain, but opioids are not always the best choice. You should first try all the other options your care team gave you. Take this medicine for as short a time (and as few doses) as possible.    Some activities can increase your pain, such as bandage changes or therapy sessions. It may help to take your pain medicine 30 to 60 minutes before these activities. Reduce your stress by getting enough sleep, working on hobbies you enjoy and practicing relaxation or meditation. Talk to your care team about ways to manage your pain beyond prescription opioids.    These medicines have risks:    DO NOT drive when on new or higher doses of pain medicine. These medicines can affect your alertness and reaction times, and you could be arrested for driving under the  influence (DUI). If you need to use opioids long-term, talk to your care team about driving.    DO NOT operate heavy machinery    DO NOT do any other dangerous activities while taking these medicines.    DO NOT drink any alcohol while taking these medicines.     If the opioid prescribed includes acetaminophen, DO NOT take with any other medicines that contain acetaminophen. Read all labels carefully. Look for the word  acetaminophen  or  Tylenol.  Ask your pharmacist if you have questions or are unsure.    You can get addicted to pain medicines, especially if you have a history of addiction (chemical, alcohol or substance dependence). Talk to your care team about ways to reduce this risk.    All opioids tend to cause constipation. Drink plenty of water and eat foods that have a lot of fiber, such as fruits, vegetables, prune juice, apple juice and high-fiber cereal. Take a laxative (Miralax, milk of magnesia, Colace, Senna) if you don t move your bowels at least every other day. Other side effects include upset stomach, sleepiness, dizziness, throwing up, tolerance (needing more of the medicine to have the same effect), physical dependence and slowed breathing.    Store your pills in a secure place, locked if possible. We will not replace any lost or stolen medicine. If you don t finish your medicine, please throw away (dispose) as directed by your pharmacist. The [place] Pollution [employer] has more information about safe disposal: [email or web address]://[email or web address].[email or web address]/living-[email or web address]/[email or web address]-unwanted-medications         Primary Care Provider Office Phone # Fax #    [facility] [phone]       [address]  [facility] [address]        Equal Access to Services     [facility]: Hadii aad ku hadasho Soomaali, waaxda luqadaha, qaybta kaalmada shukri, ronaldo spencer . So [facility] [phone].    ATENCIÓN: Si habla español, tiene a ward disposición servicios  skip de asistencia lingüística. Joshua maria [phone].    We comply with applicable federal civil rights laws and [place] laws. We do not discriminate on the basis of race, color, national origin, age, disability, sex, sexual orientation, or gender identity.            Thank you!     Thank you for choosing [facility]  for your care. Our goal is always to provide you with excellent care. Hearing back from our patients is one way we can continue to improve our services. Please take a few minutes to complete the written survey that you may receive in the mail after your visit with us. Thank you!             Your Updated Medication List - Protect others around you: Learn how to safely use, store and throw away your medicines at www.disposemymeds.org.          This list is accurate as of 9/9/18  9:38 AM.  Always use your most recent med list.                   Brand Name Dispense Instructions for use Diagnosis    amLODIPine 5 MG tablet    NORVASC    90 tablet    Take 1 tablet (5 mg) by mouth daily    Benign essential hypertension       carvedilol 25 MG tablet    COREG    60 tablet    Take 1 tablet (25 mg) by mouth 2 times daily    Unspecified hypertensive kidney disease with chronic kidney disease stage I through stage IV, or unspecified(403.90)       * furosemide 20 MG tablet    LASIX    28 tablet    Take 1 tablet (20 mg) by mouth 2 times daily NEEDS OFFICE VISIT    Unspecified hypertensive kidney disease with chronic kidney disease stage I through stage IV, or unspecified(403.90)       * furosemide 20 MG tablet    LASIX    180 tablet    Take 1 tablet (20 mg) by mouth 2 times daily    Unspecified hypertensive kidney disease with chronic kidney disease stage I through stage IV, or unspecified(403.90)       HYDROcodone-acetaminophen 5-325 MG per tablet    NORCO    15 tablet    Take 0.5-1 tablets by mouth every 6 hours as needed for moderate to severe pain    Acute gout due to renal impairment  involving right foot       losartan 100 MG tablet    COZAAR    90 tablet    Take 1 tablet (100 mg) by mouth daily    Renal hypertension, stage 1-4 or unspecified chronic kidney disease       * mycophenolate 250 MG capsule    GENERIC EQUIVALENT    120 capsule    Take 2 capsules (500 mg) by mouth 2 times daily    Kidney transplanted       * mycophenolate 250 MG capsule    GENERIC EQUIVALENT    120 capsule    Take 2 capsules (500 mg) by mouth 2 times daily    Kidney transplanted       omeprazole 20 MG CR capsule    priLOSEC    90 capsule    Take 1 capsule (20 mg) by mouth daily    Kidney replaced by transplant       * predniSONE 5 MG tablet    DELTASONE    30 tablet    Take 1 tablet (5 mg) by mouth daily    Kidney replaced by transplant       * predniSONE 20 MG tablet    DELTASONE    20 tablet    TAKE 3 TABLETS DAILY X3 DAYS, 2 TABLETS DAILY X3 DAYS, 1 TABLET DAILY X3 DAYS, 1/2 TABLET DAILY X3 DAYS    Acute gout due to renal impairment involving right foot       * predniSONE 20 MG tablet    DELTASONE    20 tablet    Take 3 tabs (60 mg) by mouth daily x 3 days, 2 tabs (40 mg) daily x 3 days, 1 tab (20 mg) daily x 3 days, then 1/2 tab (10 mg) x 3 days.    Acute gout due to renal impairment involving right foot       sodium bicarbonate 650 MG tablet     120 tablet    Take 2 tablets (1,300 mg) by mouth 2 times daily    Kidney transplanted, Complications, kidney transplant, Aftercare following organ transplant, Immunosuppressed status (H), Encounter for long-term (current) use of high-risk medication       sulfamethoxazole-trimethoprim 400-80 MG per tablet    BACTRIM/SEPTRA    45 tablet    Take 1 tablet by mouth every other day    Kidney transplanted       * tacrolimus 1 MG capsule    GENERIC EQUIVALENT    120 capsule    Take 2 capsules (2 mg) by mouth 2 times daily    Kidney transplant recipient, Long-term use of immunosuppressant medication       * tacrolimus 1 MG capsule    GENERIC EQUIVALENT    120 capsule    Take 2  capsules (2 mg) by mouth 2 times daily    Kidney transplant recipient, Long-term use of immunosuppressant medication       traMADol 50 MG tablet    ULTRAM    30 tablet    Take 1 tablet (50 mg) by mouth nightly as needed for moderate pain    Cervicalgia       valACYclovir 1000 mg tablet    VALTREX    20 tablet    Take 1 tablet (1,000 mg) by mouth 2 times daily    Lesion of penis, Exposure to genital herpes       * Notice:  This list has 9 medication(s) that are the same as other medications prescribed for you. Read the directions carefully, and ask your doctor or other care provider to review them with you.

## 2018-09-09 NOTE — PROGRESS NOTES
SUBJECTIVE:  Rashad Ortiz is an 43 year old male who presents for gout.  Has pain in left foot, started two or three days ago.  Getting worse.  Is swollen.  Hurts to touch, has become more sensitive.  No redness or bruising.  No injuries. No fevers, chills, sweats.  In past gout will be in either foot.  This is acting just like his gout does.  Is on prednisone 5 mg daily as part of his meds for kidney transplant.  No recent foot injuries. No n/v/d.  Gets gout flare up about once a month since his kidney transplant.         has a past medical history of Chronic kidney disease, stage 4, severely decreased GFR (H); Gastro-oesophageal reflux disease; Gout; Hypertension; Medical non-compliance; Pulmonary nodules; and Steroid long-term use.  Social History     Social History     Marital status:      Spouse name: N/A     Number of children: N/A     Years of education: N/A     Social History Main Topics     Smoking status: Former Smoker     Types: Cigarettes     Quit date: 03/2017     Smokeless tobacco: Never Used      Comment: Patient states that he is an 'social'  smoker      Alcohol use 2.5 oz/week     5 Standard drinks or equivalent per week      Comment: 1-2 drinks/wk     Drug use: No     Sexual activity: No     Other Topics Concern     None     Social History Narrative     Family History   Problem Relation Age of Onset     Hypertension Mother        ALLERGIES:  Nkda [no known drug allergies]    Current Outpatient Prescriptions   Medication     amLODIPine (NORVASC) 5 MG tablet     carvedilol (COREG) 25 MG tablet     furosemide (LASIX) 20 MG tablet     furosemide (LASIX) 20 MG tablet     HYDROcodone-acetaminophen (NORCO) 5-325 MG per tablet     losartan (COZAAR) 100 MG tablet     mycophenolate (GENERIC EQUIVALENT) 250 MG capsule     mycophenolate (GENERIC EQUIVALENT) 250 MG capsule     omeprazole (PRILOSEC) 20 MG capsule     predniSONE (DELTASONE) 20 MG tablet     predniSONE (DELTASONE) 5 MG tablet     sodium  bicarbonate 650 MG tablet     sulfamethoxazole-trimethoprim (BACTRIM/SEPTRA) 400-80 MG per tablet     tacrolimus (GENERIC EQUIVALENT) 1 MG capsule     tacrolimus (GENERIC EQUIVALENT) 1 MG capsule     traMADol (ULTRAM) 50 MG tablet     valACYclovir (VALTREX) 1000 mg tablet     No current facility-administered medications for this visit.          ROS:  ROS is done and is negative for general/constitutional, eye, ENT, Respiratory, cardiovascular, GI, , Skin, musculoskeletal except as noted elsewhere.  All other review of systems negative except as noted elsewhere.      OBJECTIVE:  BP (!) 134/93 (BP Location: Left arm, Patient Position: Chair, Cuff Size: Adult Regular)  Pulse 84  Temp 98.7  F (37.1  C) (Oral)  Wt 182 lb (82.6 kg)  SpO2 98%  BMI 27.67 kg/m2  GENERAL APPEARANCE: Alert, in no acute distress  EYES: normal  NOSE:normal  OROPHARYNX:normal  NECK:No adenopathy,masses or thyromegaly  RESP: normal and clear to auscultation  CV:regular rate and rhythm and no murmurs, clicks, or gallops  ABDOMEN: Abdomen soft, non-tender. BS normal.   SKIN: no ulcers, lesions or rash  MUSCULOSKELETAL: left foot with diffuse moderate edema and is very tender to any touch, pain with motion of the foot, ambulates with limp, no increased warmth, no erythema.  Right foot normal.      RESULTS  Results for orders placed or performed in visit on 07/01/18   CBC with platelets differential   Result Value Ref Range    WBC 6.3 4.0 - 11.0 10e9/L    RBC Count 3.84 (L) 4.4 - 5.9 10e12/L    Hemoglobin 10.6 (L) 13.3 - 17.7 g/dL    Hematocrit 34.1 (L) 40.0 - 53.0 %    MCV 89 78 - 100 fl    MCH 27.6 26.5 - 33.0 pg    MCHC 31.1 (L) 31.5 - 36.5 g/dL    RDW 17.6 (H) 10.0 - 15.0 %    Platelet Count 180 150 - 450 10e9/L    Diff Method Automated Method     % Neutrophils 70.6 %    % Lymphocytes 17.0 %    % Monocytes 10.9 %    % Eosinophils 1.3 %    % Basophils 0.2 %    Absolute Neutrophil 4.5 1.6 - 8.3 10e9/L    Absolute Lymphocytes 1.1 0.8 - 5.3  10e9/L    Absolute Monocytes 0.7 0.0 - 1.3 10e9/L    Absolute Eosinophils 0.1 0.0 - 0.7 10e9/L    Absolute Basophils 0.0 0.0 - 0.2 10e9/L   Uric acid   Result Value Ref Range    Uric Acid 11.1 (H) 3.5 - 7.2 mg/dL   Erythrocyte sedimentation rate auto   Result Value Ref Range    Sed Rate 52 (H) 0 - 15 mm/h   .  No results found for this or any previous visit (from the past 48 hour(s)).    ASSESSMENT/PLAN:    ASSESSMENT / PLAN:  (M10.372) Gout of left foot due to renal impairment, unspecified chronicity  (primary encounter diagnosis)  Comment: has frequent gout flare ups since his kidney transplant.  Will tx with prednisone and prn norco for pain.  With his renal transplant and elevated Cr levels, not a good candidate for indomethocin.  Plan: predniSONE (DELTASONE) 20 MG tablet,         HYDROcodone-acetaminophen (NORCO) 5-325 MG per         tablet        Reviewed medication instructions and side effects. Follow up if experiences side effects. Pt to f/u with pcp soon, as scheduled, and should discuss long term management of his gout. Consider referral to rheumatology if frequency of episodes not improve.  I reviewed supportive care, expected course, and signs of concern.  Follow up with pcp as scheduled or if worsens in any way.  Reviewed red flag symptoms and is to go to the ER if experiences any of these.  Discussed diet changes for gout as well.      See [facility] for orders, medications, letters, patient instructions    Lexie Pop M.D.

## 2018-09-09 NOTE — PATIENT INSTRUCTIONS
Gout Diet  Gout is a painful condition caused by an excess of uric acid, a waste product made by the body. Uric acid forms crystals that collect in the joints. The immune response to these crystals brings on symptoms of joint pain and swelling. This is called a gout attack. Often, medications and diet changes are combined to manage gout. Below are some guidelines for changing your diet to help you manage gout and prevent attacks. Your health care provider will help you determine the best eating plan for you.     Eating to manage gout  Weight loss for those who are overweight may help reduce gout attacks.  Eat less of these foods  Eating too many foods containing purines may raise the levels of uric acid in your body. This raises your risk for a gout attack. Try to limit these foods and drinks:    Alcohol, such as beer and red wine. You may be told to avoid alcohol completely.    Soft drinks that contain sugar or high fructose corn syrup    Certain fish, including anchovies, sardines, fish eggs, and herring    Shellfish    Certain meats, such as red meat, hot dogs, luncheon meats, and turkey    Organ meats, such as liver, kidneys, and sweetbreads    Legumes, such as dried beans and peas    Other high fat foods such as gravy, whole milk, and high fat cheeses    Vegetables such as asparagus, cauliflower, spinach, and mushrooms used to be thought to contribute to an increased risk for a gout attack, but recent studies show that high purine vegetables don't increase the risk for a gout attack.  Eat more of these foods  Other foods may be helpful for people with gout. Add some of these foods to your diet:    Cherries contain chemicals that may lower uric acid.    Omega fatty acids. These are found in some fatty fish such as salmon, certain oils (flax, olive, or nut), and nuts themselves. Omega fatty acids may help prevent inflammation due to gout.    Dairy products that are low-fat or fat-free, such as cheese and yogurt     Complex carbohydrate foods, including whole grains, brown rice, oats, and beans    Coffee, in moderation    Water, approximately 64 ounces per day  Follow-up care  Follow up with your healthcare provider as advised.  When to seek medical advice  Call your healthcare provider right away if any of these occur:    Return of gout symptoms, usually at night:    Severe pain, swelling, and heat in a joint, especially the base of the big toe    Affected joint is hard to move    Skin of the affected joint is purple or red    Fever of 100.4 F (38 C) or higher    Pain that doesn't get better even with prescribed medicine   Date Last Reviewed: 1/12/2016 2000-2017 [employer]. [address] [place] [address], [place] [address]. All rights reserved. This information is not intended as a substitute for professional medical care. Always follow your healthcare professional's instructions.

## 2018-09-10 RX ORDER — TRAMADOL HYDROCHLORIDE 50 MG/1
50 TABLET ORAL
Qty: 30 TABLET | Refills: 0 | Status: SHIPPED | OUTPATIENT
Start: 2018-09-10 | End: 2018-10-03

## 2018-09-10 NOTE — TELEPHONE ENCOUNTER
Written rx faxed to the pharmacy, Pharmacy will contact pt when medication is ready for pickup.  Edgard Mclean,  [facility]

## 2018-09-10 NOTE — TELEPHONE ENCOUNTER
Message from MyChart:  Original authorizing provider: Srinivas Harrington MD, MD Rashad Ortiz would like a refill of the following medications:  predniSONE (DELTASONE) 20 MG tablet [Srinivas Harrington MD, MD]    Preferred pharmacy: [facility] [id number] [place] [address] - [address] AT [facility] OF [facility] [address]    Comment:

## 2018-09-10 NOTE — TELEPHONE ENCOUNTER
Requested Prescriptions   Pending Prescriptions Disp Refills     traMADol (ULTRAM) 50 MG tablet 30 tablet 0     Sig: Take 1 tablet (50 mg) by mouth nightly as needed for moderate pain    There is no refill protocol information for this order        Prednisone was filled at  on 9/9/18.     Mila Luna RN, BSN

## 2018-09-10 NOTE — TELEPHONE ENCOUNTER
Patient was seen in Urgent Care on 9/9/18 and given treatment for gout attack. Patient has scheduled appointment this evening with Mellissa Balderrama for follow up.    Marilee Ibarra RN  [facility] Triage

## 2018-09-12 ENCOUNTER — TELEPHONE (OUTPATIENT)
Dept: TRANSPLANT | Facility: CLINIC | Age: 43
End: 2018-09-12

## 2018-09-12 DIAGNOSIS — N18.9 CHRONIC RENAL FAILURE: Primary | ICD-10-CM

## 2018-09-12 NOTE — LETTER
2018      Rashad Ortiz  [address]  [place] [address]      To Whom this may Concern,    Rashad Ortiz (: 1975) is currently listed on our kidney transplant wait list. It is pertinent/required he come in for return wait list appointments when requested to maintain his status on our wait list.     If you have any questions, please feel free to contact me at [phone]. In the event I am out of the office, please call my co-worker Sarah Garcia LPN at [phone].    Sincerely,      Mary Cortez RN BSN  Kidney Wait list Transplant Coordinator  Phone [phone]  Fax [phone]

## 2018-09-12 NOTE — TELEPHONE ENCOUNTER
Coordinator spoke with pt. Pt states he is having difficulty getting time off of work for wait list appointments. Coordinator typed a letter for pt to give to his employer.

## 2018-09-12 NOTE — LETTER
September 12, 2018      Rashad Ortiz  [address]  [place] [address]        Dear Rashad DIETER Angel,      This letter is to inform you of your upcoming Return Wait List appointments for transplant. Enclosed you will find the itinerary for your appointments. If you have any questions, concerns or need to reschedule, please call Gracy at [phone].      Thank you,    [facility]  Solid Organ Transplant  [address] [facility]  [address]  [place] [address]                                  Clinics and Surgery Center  [address] [place] [address]    WAITLIST CLINIC APPOINTMENTS    Patient: Rashad Ortiz   MR#:  [id number]    :  Gracy     [phone]    Coordinator:  Mary    [phone]  LPN:    Sarah CUEVA    [phone]  Location:   Transplant Center  Dates:   November 14, 2018    This is your schedule, please follow dates and times.  You will receive reminder phone calls for other tests, but please follow this schedule only!  If you have any questions about dates and times, please call us on number listed above.  Thank you, Transplant Clinic.     Day/Date: Thursday, November 14, 2018  Time Location Activity   2:00pm [facility]  Pulmonary Function Lab  3rd floor Pulmonary Function Tests   3:00pm [facility]  Dermatologic Clinic  3rd floor Clinic 3F Appointment with Dr. Kaufman,  Dermatologist   3:30pm [facility]  Cardiology/Heart Care Clinic  3rd floor; Clinic 3L Appointment with Dr. Jimenez  Cardiology

## 2018-09-12 NOTE — TELEPHONE ENCOUNTER
I spoke to pt regarding rescheduling wait list appointments. He said he missed them because he could not get off work. I explained that if he continues to miss his appointments it will affect his standing on the wait list. His HR department makes it very difficult and he has asked for assistance with getting an intermittent FMLA filled out so that he does not keep having issues with his employer everytime he needs time off for transplant appointments. He has requested that his coordinator contact him either after 4pm on the phone or [facility]. At this point I have him rescheduled for 11/14 and he said he will be there. I have sent an itinerary

## 2018-09-17 ENCOUNTER — OFFICE VISIT (OUTPATIENT)
Dept: FAMILY MEDICINE | Facility: CLINIC | Age: 43
End: 2018-09-17
Payer: COMMERCIAL

## 2018-09-17 VITALS
HEART RATE: 72 BPM | SYSTOLIC BLOOD PRESSURE: 133 MMHG | TEMPERATURE: 97.8 F | RESPIRATION RATE: 20 BRPM | DIASTOLIC BLOOD PRESSURE: 80 MMHG | BODY MASS INDEX: 29.19 KG/M2 | WEIGHT: 192 LBS | OXYGEN SATURATION: 99 %

## 2018-09-17 DIAGNOSIS — Z94.0 KIDNEY REPLACED BY TRANSPLANT: ICD-10-CM

## 2018-09-17 DIAGNOSIS — M10.372 GOUT OF LEFT FOOT DUE TO RENAL IMPAIRMENT, UNSPECIFIED CHRONICITY: ICD-10-CM

## 2018-09-17 DIAGNOSIS — M1A.30X0 CHRONIC GOUT DUE TO RENAL IMPAIRMENT WITHOUT TOPHUS, UNSPECIFIED SITE: Primary | ICD-10-CM

## 2018-09-17 DIAGNOSIS — Z79.899 HIGH RISK MEDICATIONS (NOT ANTICOAGULANTS) LONG-TERM USE: ICD-10-CM

## 2018-09-17 LAB — ERYTHROCYTE [SEDIMENTATION RATE] IN BLOOD BY WESTERGREN METHOD: 32 MM/H (ref 0–15)

## 2018-09-17 PROCEDURE — 99213 OFFICE O/P EST LOW 20 MIN: CPT | Performed by: NURSE PRACTITIONER

## 2018-09-17 PROCEDURE — 80053 COMPREHEN METABOLIC PANEL: CPT | Performed by: NURSE PRACTITIONER

## 2018-09-17 PROCEDURE — 84550 ASSAY OF BLOOD/URIC ACID: CPT | Performed by: NURSE PRACTITIONER

## 2018-09-17 PROCEDURE — 86140 C-REACTIVE PROTEIN: CPT | Performed by: NURSE PRACTITIONER

## 2018-09-17 PROCEDURE — 36415 COLL VENOUS BLD VENIPUNCTURE: CPT | Performed by: NURSE PRACTITIONER

## 2018-09-17 PROCEDURE — 85652 RBC SED RATE AUTOMATED: CPT | Performed by: NURSE PRACTITIONER

## 2018-09-17 RX ORDER — ALLOPURINOL 100 MG/1
100 TABLET ORAL DAILY
Qty: 30 TABLET | Refills: 1 | Status: SHIPPED | OUTPATIENT
Start: 2018-09-17 | End: 2018-11-02

## 2018-09-17 RX ORDER — HYDROCODONE BITARTRATE AND ACETAMINOPHEN 5; 325 MG/1; MG/1
.5-1 TABLET ORAL EVERY 6 HOURS PRN
Qty: 15 TABLET | Refills: 0 | Status: SHIPPED | OUTPATIENT
Start: 2018-09-17 | End: 2018-10-22

## 2018-09-17 ASSESSMENT — PAIN SCALES - GENERAL: PAINLEVEL: NO PAIN (0)

## 2018-09-17 NOTE — MR AVS SNAPSHOT
After Visit Summary   9/17/2018    Rashad Ortiz    MRN: [id number]           Patient Information     Date Of Birth          1975        Visit Information        Provider Department      9/17/2018 5:00 PM Mellissa Balderrama APRN CNP [facility]        Today's Diagnoses     Chronic gout due to renal impairment without tophus, unspecified site    -  1    Kidney replaced by transplant        High risk medications (not anticoagulants) long-term use        Gout of left foot due to renal impairment, unspecified chronicity          Care Instructions    Allopurinol 100 mg daily.  Let's hold off on more prednisone until I see your labs from today.      At [facility], we strive to deliver an exceptional experience to you, every time we see you.  If you receive a survey in the mail, please send us back your thoughts. We really do value your feedback.    Based on your medical history, these are the current health maintenance/preventive care services that you are due for (some may have been done at this visit.)  Health Maintenance Due   Topic Date Due     INFLUENZA VACCINE (1) 09/01/2018       Suggested websites for health information:  Www.FootballScout.Minded : Up to date and easily searchable information on multiple topics.  Www.medlineplus.gov : medication info, interactive tutorials, watch real surgeries online  Www.familydoctor.org : good info from the Academy of Family Physicians  Www.cdc.gov : public health info, travel advisories, epidemics (H1N1)  Www.aap.org : children's health info, normal development, vaccinations  Www.health.[facility].[place].us : [place] dept of health, public health issues in [place], N1N1    Your care team:                            Family Medicine Internal Medicine   MD Juanito Lynne MD Shantel Branch-Fleming, MD Katya Georgiev PA-C Megan Hill, APRN CNP Nam Ho, MD Pediatrics   GRANT Kendall, ALYSSA Johnson  MD Leanna Olivo CNP, MD Bethany Templen, MD Deborah Mielke, MD Kim Thein, APRN CNP      Clinic hours: Monday - Thursday 7 am-7 pm; Fridays 7 am-5 pm.   Urgent care: Monday - Friday 11 am-9 pm; Saturday and Sunday 9 am-5 pm.  Pharmacy : Monday -Thursday 8 am-8 pm; Friday 8 am-6 pm; Saturday and Sunday 9 am-5 pm.     Clinic: [phone]   Pharmacy: [phone]              Follow-ups after your visit        Your next 10 appointments already scheduled     Sep 25, 2018  2:30 PM CDT   Lab with  LAB   [facility] ([facility])    [address] [place] [address]  1st [facility] [address] [phone]            Sep 25, 2018  3:35 PM CDT   (Arrive by 3:05 PM)   Return Kidney Transplant with  Kidney/Pancreas Recipient 2   [facility] Nephrology ([facility])    [address] [place] [address]  [address]  [facility] [address] [phone]            Nov 14, 2018  2:00 PM CST   FULL PULMONARY FUNCTION with  PFL C   [facility] Pulmonary Function Testing ([facility])    [address] [place] [address]  3rd [facility] [id number] [phone]            Nov 14, 2018  3:00 PM CST   (Arrive by 2:45 PM)   New Patient Visit with KAILA Kaufman MD   [facility] Dermatology ([facility])    [address] [place] [address]  3rd Floor  [facility] [id number] [phone]            Nov 14, 2018  3:30 PM CST   (Arrive by 3:15 PM)   New Patient Visit with Gelacio Jimenez MD   [facility] Heart Care ([facility])    [address] [place] [address]  [address]  [facility] [address] [phone]              Who to contact     If you have questions or need follow up information about today's clinic visit or your schedule please contact [facility] [place] directly at [phone].  Normal or non-critical lab and imaging results will be communicated to you by MyChart, letter or  phone within 4 business days after the clinic has received the results. If you do not hear from us within 7 days, please contact the clinic through CoverMyMeds or phone. If you have a critical or abnormal lab result, we will notify you by phone as soon as possible.  Submit refill requests through CoverMyMeds or call your pharmacy and they will forward the refill request to us. Please allow 3 business days for your refill to be completed.          Additional Information About Your Visit        [email or web address] Information     CoverMyMeds gives you secure access to your electronic health record. If you see a primary care provider, you can also send messages to your care team and make appointments. If you have questions, please call your primary care clinic.  If you do not have a primary care provider, please call [phone] and they will assist you.        Care EveryWhere ID     This is your Care EveryWhere ID. This could be used by other organizations to access your [place] medical records  [id number]        Your Vitals Were     Pulse Temperature Respirations Pulse Oximetry BMI (Body Mass Index)       72 97.8  F (36.6  C) (Oral) 20 99% 29.19 kg/m2        Blood Pressure from Last 3 Encounters:   09/17/18 133/80   09/09/18 (!) 134/93   07/01/18 (!) 144/101    Weight from Last 3 Encounters:   09/17/18 192 lb (87.1 kg)   09/09/18 182 lb (82.6 kg)   07/01/18 181 lb (82.1 kg)              We Performed the Following     Comprehensive metabolic panel     CRP, inflammation     ESR: Erythrocyte sedimentation rate     Uric acid          Today's Medication Changes          These changes are accurate as of 9/17/18  5:38 PM.  If you have any questions, ask your nurse or doctor.               Start taking these medicines.        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        Dose/Directions    * predniSONE 5 MG tablet   Commonly known as:  DELTASONE   This may have changed:  Another medication with the same name was removed. Continue taking this medication, and follow the directions you see here.   Used for:  Kidney replaced by transplant   Changed by:  Mellissa Balderrama APRN CNP        Dose:  5 mg   Take 1 tablet (5 mg) by mouth daily   Quantity:  30 tablet   Refills:  11       * predniSONE 20 MG tablet   Commonly known as:  DELTASONE   This may have changed:  Another medication with the same name was removed. Continue taking this medication, and follow the directions you see here.   Used for:  Gout of left foot due to renal impairment, unspecified chronicity   Changed by:  Mellissa Balderrama APRN CNP        Take 3 tabs (60 mg) by mouth daily x 3 days, 2 tabs (40 mg) daily x 3 days, 1 tab (20 mg) daily x 3 days, then 1/2 tab (10 mg) x 3 days.   Quantity:  20 tablet   Refills:  0       * Notice:  This list has 2 medication(s) that are the same as other medications prescribed for you. Read the directions carefully, and ask your doctor or other care provider to review them with you.         Where to get your medicines      These medications were sent to [facility] - [place], [place] - [address] AT [facility] [address], [facility] [address]     Phone:  [phone]     allopurinol 100 MG tablet         Some of these will need a paper prescription and others can be bought over the counter.  Ask your nurse if you have questions.     Bring a paper prescription for each of these medications     HYDROcodone-acetaminophen 5-325 MG per tablet               Information about OPIOIDS     PRESCRIPTION OPIOIDS: WHAT YOU NEED TO KNOW   We gave you an opioid (narcotic) pain  medicine. It is important to manage your pain, but opioids are not always the best choice. You should first try all the other options your care team gave you. Take this medicine for as short a time (and as few doses) as possible.    Some activities can increase your pain, such as bandage changes or therapy sessions. It may help to take your pain medicine 30 to 60 minutes before these activities. Reduce your stress by getting enough sleep, working on hobbies you enjoy and practicing relaxation or meditation. Talk to your care team about ways to manage your pain beyond prescription opioids.    These medicines have risks:    DO NOT drive when on new or higher doses of pain medicine. These medicines can affect your alertness and reaction times, and you could be arrested for driving under the influence (DUI). If you need to use opioids long-term, talk to your care team about driving.    DO NOT operate heavy machinery    DO NOT do any other dangerous activities while taking these medicines.    DO NOT drink any alcohol while taking these medicines.     If the opioid prescribed includes acetaminophen, DO NOT take with any other medicines that contain acetaminophen. Read all labels carefully. Look for the word  acetaminophen  or  Tylenol.  Ask your pharmacist if you have questions or are unsure.    You can get addicted to pain medicines, especially if you have a history of addiction (chemical, alcohol or substance dependence). Talk to your care team about ways to reduce this risk.    All opioids tend to cause constipation. Drink plenty of water and eat foods that have a lot of fiber, such as fruits, vegetables, prune juice, apple juice and high-fiber cereal. Take a laxative (Miralax, milk of magnesia, Colace, Senna) if you don t move your bowels at least every other day. Other side effects include upset stomach, sleepiness, dizziness, throwing up, tolerance (needing more of the medicine to have the same effect), physical  dependence and slowed breathing.    Store your pills in a secure place, locked if possible. We will not replace any lost or stolen medicine. If you don t finish your medicine, please throw away (dispose) as directed by your pharmacist. The [place] Pollution Control Agency has more information about safe disposal: https://www.pca.[facility].mn.us/living-green/managing-unwanted-medications         Primary Care Provider Office Phone # Fax #    [facility] [phone]       [address]  [facility] [address]        Equal Access to Services     [facility]: Hadii aad ku hadasho Soomaali, waaxda luqadaha, qaybta kaalmada adeegyada, waxay idiin hayaan adeosvaldo spencer . So [facility] [phone].    ATENCIÓN: Si habla español, tiene a ward disposición servicios gratuitos de asistencia lingüística. Llame al [phone].    We comply with applicable federal civil rights laws and [place] laws. We do not discriminate on the basis of race, color, national origin, age, disability, sex, sexual orientation, or gender identity.            Thank you!     Thank you for choosing [facility]  for your care. Our goal is always to provide you with excellent care. Hearing back from our patients is one way we can continue to improve our services. Please take a few minutes to complete the written survey that you may receive in the mail after your visit with us. Thank you!             Your Updated Medication List - Protect others around you: Learn how to safely use, store and throw away your medicines at www.disposemymeds.org.          This list is accurate as of 9/17/18  5:38 PM.  Always use your most recent med list.                   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 tophus, unspecified site       amLODIPine 5 MG tablet     NORVASC    90 tablet    Take 1 tablet (5 mg) by mouth daily    Benign essential hypertension       carvedilol 25 MG tablet    COREG    60 tablet    Take 1 tablet (25 mg) by mouth 2 times daily    Unspecified hypertensive kidney disease with chronic kidney disease stage I through stage IV, or unspecified(403.90)       * furosemide 20 MG tablet    LASIX    28 tablet    Take 1 tablet (20 mg) by mouth 2 times daily NEEDS OFFICE VISIT    Unspecified hypertensive kidney disease with chronic kidney disease stage I through stage IV, or unspecified(403.90)       * furosemide 20 MG tablet    LASIX    180 tablet    Take 1 tablet (20 mg) by mouth 2 times daily    Unspecified hypertensive kidney disease with chronic kidney disease stage I through stage IV, or unspecified(403.90)       HYDROcodone-acetaminophen 5-325 MG per tablet    NORCO    15 tablet    Take 0.5-1 tablets by mouth every 6 hours as needed for moderate to severe pain    Gout of left foot due to renal impairment, unspecified chronicity       losartan 100 MG tablet    COZAAR    90 tablet    Take 1 tablet (100 mg) by mouth daily    Renal hypertension, stage 1-4 or unspecified chronic kidney disease       * mycophenolate 250 MG capsule    GENERIC EQUIVALENT    120 capsule    Take 2 capsules (500 mg) by mouth 2 times daily    Kidney transplanted       * mycophenolate 250 MG capsule    GENERIC EQUIVALENT    120 capsule    Take 2 capsules (500 mg) by mouth 2 times daily    Kidney transplanted       omeprazole 20 MG CR capsule    priLOSEC    90 capsule    Take 1 capsule (20 mg) by mouth daily    Kidney replaced by transplant       * predniSONE 5 MG tablet    DELTASONE    30 tablet    Take 1 tablet (5 mg) by mouth daily    Kidney replaced by transplant       * predniSONE 20 MG tablet    DELTASONE    20 tablet    Take 3 tabs (60 mg) by mouth daily x 3 days, 2 tabs (40 mg) daily x 3 days, 1 tab (20 mg) daily x 3 days, then 1/2 tab (10 mg) x 3 days.    Gout of left foot due  to renal impairment, unspecified chronicity       sodium bicarbonate 650 MG tablet     120 tablet    Take 2 tablets (1,300 mg) by mouth 2 times daily    Kidney transplanted, Complications, kidney transplant, Aftercare following organ transplant, Immunosuppressed status (H), Encounter for long-term (current) use of high-risk medication       sulfamethoxazole-trimethoprim 400-80 MG per tablet    BACTRIM/SEPTRA    45 tablet    Take 1 tablet by mouth every other day    Kidney transplanted       * tacrolimus 1 MG capsule    GENERIC EQUIVALENT    120 capsule    Take 2 capsules (2 mg) by mouth 2 times daily    Kidney transplant recipient, Long-term use of immunosuppressant medication       * tacrolimus 1 MG capsule    GENERIC EQUIVALENT    120 capsule    Take 2 capsules (2 mg) by mouth 2 times daily    Kidney transplant recipient, Long-term use of immunosuppressant medication       traMADol 50 MG tablet    ULTRAM    30 tablet    Take 1 tablet (50 mg) by mouth nightly as needed for moderate pain    Cervicalgia       valACYclovir 1000 mg tablet    VALTREX    20 tablet    Take 1 tablet (1,000 mg) by mouth 2 times daily    Lesion of penis, Exposure to genital herpes       * Notice:  This list has 8 medication(s) that are the same as other medications prescribed for you. Read the directions carefully, and ask your doctor or other care provider to review them with you.

## 2018-09-17 NOTE — PROGRESS NOTES
SUBJECTIVE:   Rashad Ortiz is a 43 year old male who presents to clinic today for the following health issues:    ED/UC Followup:    Facility:  [facility]  Date of visit: 09.09.2018  Reason for visit: Gout left foot  Current Status: Improved, but still some pain.  He has one more day of the predinsone taper left.    Wants a long term plan for his gout.  He has modified his diet and reduced red meats.  He used to drink 1-2 beers a night and now only drinks 1-2 a week for the last 2-3 weeks.    He has had around 4 flare ups in the last 5 months.           Problem list and histories reviewed & adjusted, as indicated.  Additional history: as documented    Patient Active Problem List   Diagnosis     Kidney replaced by transplant     High risk medications (not anticoagulants) long-term use     Hypertension goal BP (blood pressure) < 140/90     GERD (gastroesophageal reflux disease)     CARDIOVASCULAR SCREENING; LDL GOAL LESS THAN 160     Gout     Creatinine elevation     Antibody mediated rejection of kidney transplant     Medical non-compliance     Chronic kidney disease, stage 4, severely decreased GFR (H)     Herpes simplex infection of penis     Past Surgical History:   Procedure Laterality Date     AV FISTULA OR GRAFT ARTERIAL       LIGATE FISTULA ARTERIOVENOUS UPPER EXTREMITY  12/20/2011    Procedure:LIGATE FISTULA ARTERIOVENOUS UPPER EXTREMITY; Excision of Right Forearm Arteriovenous Fistula.; Surgeon:LINDY AMAYA; Location: OR     PERCUTANEOUS BIOPSY KIDNEY Right 2/28/2017    Procedure: PERCUTANEOUS BIOPSY KIDNEY;  Surgeon: Gee Barrios MD;  Location:  OR     TRANSPLANT  01/13/2011    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       Social History   Substance Use Topics     Smoking status: Former Smoker     Types: Cigarettes     Quit date: 03/2017     Smokeless tobacco: Never Used      Comment: Patient states that he is an 'social'  smoker      Alcohol use 2.5 oz/week      5 Standard drinks or equivalent per week      Comment: 1-2 drinks/wk     Family History   Problem Relation Age of Onset     Hypertension Mother          Current Outpatient Prescriptions   Medication Sig Dispense Refill     allopurinol (ZYLOPRIM) 100 MG tablet Take 1 tablet (100 mg) by mouth daily 30 tablet 1     amLODIPine (NORVASC) 5 MG tablet Take 1 tablet (5 mg) by mouth daily 90 tablet 3     carvedilol (COREG) 25 MG tablet Take 1 tablet (25 mg) by mouth 2 times daily 60 tablet 11     furosemide (LASIX) 20 MG tablet Take 1 tablet (20 mg) by mouth 2 times daily 180 tablet 1     furosemide (LASIX) 20 MG tablet Take 1 tablet (20 mg) by mouth 2 times daily NEEDS OFFICE VISIT 28 tablet 0     HYDROcodone-acetaminophen (NORCO) 5-325 MG per tablet Take 0.5-1 tablets by mouth every 6 hours as needed for moderate to severe pain 15 tablet 0     losartan (COZAAR) 100 MG tablet Take 1 tablet (100 mg) by mouth daily 90 tablet 3     mycophenolate (GENERIC EQUIVALENT) 250 MG capsule Take 2 capsules (500 mg) by mouth 2 times daily 120 capsule 11     mycophenolate (GENERIC EQUIVALENT) 250 MG capsule Take 2 capsules (500 mg) by mouth 2 times daily 120 capsule 11     omeprazole (PRILOSEC) 20 MG capsule Take 1 capsule (20 mg) by mouth daily 90 capsule 1     predniSONE (DELTASONE) 20 MG tablet Take 3 tabs (60 mg) by mouth daily x 3 days, 2 tabs (40 mg) daily x 3 days, 1 tab (20 mg) daily x 3 days, then 1/2 tab (10 mg) x 3 days. 20 tablet 0     predniSONE (DELTASONE) 5 MG tablet Take 1 tablet (5 mg) by mouth daily 30 tablet 11     sodium bicarbonate 650 MG tablet Take 2 tablets (1,300 mg) by mouth 2 times daily 120 tablet 11     sulfamethoxazole-trimethoprim (BACTRIM/SEPTRA) 400-80 MG per tablet Take 1 tablet by mouth every other day 45 tablet 3     tacrolimus (GENERIC EQUIVALENT) 1 MG capsule Take 2 capsules (2 mg) by mouth 2 times daily 120 capsule 11     tacrolimus (GENERIC EQUIVALENT) 1 MG capsule Take 2 capsules (2 mg) by mouth 2  times daily 120 capsule 11     traMADol (ULTRAM) 50 MG tablet Take 1 tablet (50 mg) by mouth nightly as needed for moderate pain 30 tablet 0     valACYclovir (VALTREX) 1000 mg tablet Take 1 tablet (1,000 mg) by mouth 2 times daily 20 tablet 0     BP Readings from Last 3 Encounters:   09/17/18 133/80   09/09/18 (!) 134/93   07/01/18 (!) 144/101    Wt Readings from Last 3 Encounters:   09/17/18 192 lb (87.1 kg)   09/09/18 182 lb (82.6 kg)   07/01/18 181 lb (82.1 kg)            Reviewed and updated as needed this visit by clinical staff  Tobacco  Allergies  Meds  Med Hx  Surg Hx  Fam Hx  Soc Hx      Reviewed and updated as needed this visit by Provider  Tobacco  Allergies  Meds  Med Hx  Surg Hx  Fam Hx  Soc Hx        ROS:  Constitutional, HEENT, cardiovascular, pulmonary, gi and gu systems are negative, except as otherwise noted.    OBJECTIVE:     /80 (BP Location: Left arm, Patient Position: Chair, Cuff Size: Adult Large)  Pulse 72  Temp 97.8  F (36.6  C) (Oral)  Resp 20  Wt 192 lb (87.1 kg)  SpO2 99%  BMI 29.19 kg/m2  Body mass index is 29.19 kg/(m^2).  GENERAL: healthy, alert and no distress  NECK: no adenopathy, no asymmetry, masses, or scars and thyroid normal to palpation  RESP: lungs clear to auscultation - no rales, rhonchi or wheezes  CV: regular rate and rhythm, normal S1 S2, no S3 or S4, no murmur, click or rub, no peripheral edema and peripheral pulses strong  MS: mildly tender distal aspect great toe on right.  No erythema or swelling noted.  SKIN: no suspicious lesions or rashes    Diagnostic Test Results:  Results for orders placed or performed in visit on 09/17/18 (from the past 24 hour(s))   ESR: Erythrocyte sedimentation rate   Result Value Ref Range    Sed Rate 32 (H) 0 - 15 mm/h       ASSESSMENT/PLAN:     1. Chronic gout due to renal impairment without tophus, unspecified site  Appears to be resolving.  Will refill pain medication x 1 but hold off on further prednisone until  labs are back.  Will start Allopurinol 100 mg daily.  Recheck urine acid levels in 4 weeks to ensure they are declining.    - allopurinol (ZYLOPRIM) 100 MG tablet; Take 1 tablet (100 mg) by mouth daily  Dispense: 30 tablet; Refill: 1  - ESR: Erythrocyte sedimentation rate  - CRP, inflammation  - Uric acid  - HYDROcodone-acetaminophen (NORCO) 5-325 MG per tablet; Take 0.5-1 tablets by mouth every 6 hours as needed for moderate to severe pain  Dispense: 15 tablet; Refill: 0    Patient Instructions     Allopurinol 100 mg daily.  Let's hold off on more prednisone until I see your labs from today.      At [facility], we strive to deliver an exceptional experience to you, every time we see you.  If you receive a survey in the mail, please send us back your thoughts. We really do value your feedback.    Based on your medical history, these are the current health maintenance/preventive care services that you are due for (some may have been done at this visit.)  Health Maintenance Due   Topic Date Due     INFLUENZA VACCINE (1) 09/01/2018       Suggested websites for health information:  Www.[employer] : Up to date and easily searchable information on multiple topics.  Www.medlineplus.gov : medication info, interactive tutorials, watch real surgeries online  Www.familydoctor.org : good info from the Academy of Family Physicians  Www.cdc.gov : public health info, travel advisories, epidemics (H1N1)  Www.aap.org : children's health info, normal development, vaccinations  Www.health.[facility].[place].us : [place] dept of health, public health issues in [place], N1N1    Your care team:                            Family Medicine Internal Medicine   MD Juanito Lynne MD Shantel Branch-Fleming, MD Katya Georgiev PA-C Megan Hill, APRN CNP    Srinivas Harrington MD Pediatrics   Greg Fink, GRANT Balderrama, MD Leanna Hurley APRN CNP   MD Kelly Tipton MD Deborah Mielke, MD     ISABEL rBown CNP      Clinic hours: Monday - Thursday 7 am-7 pm; Fridays 7 am-5 pm.   Urgent care: Monday - Friday 11 am-9 pm; Saturday and Sunday 9 am-5 pm.  Pharmacy : Monday -Thursday 8 am-8 pm; Friday 8 am-6 pm; Saturday and Sunday 9 am-5 pm.     Clinic: [phone]   Pharmacy: [phone]          ISABEL Jacobo CNP  [facility]

## 2018-09-17 NOTE — PATIENT INSTRUCTIONS
Allopurinol 100 mg daily.  Let's hold off on more prednisone until I see your labs from today.      At [facility], we strive to deliver an exceptional experience to you, every time we see you.  If you receive a survey in the mail, please send us back your thoughts. We really do value your feedback.    Based on your medical history, these are the current health maintenance/preventive care services that you are due for (some may have been done at this visit.)  Health Maintenance Due   Topic Date Due     INFLUENZA VACCINE (1) 09/01/2018       Suggested websites for health information:  Www.[employer] : Up to date and easily searchable information on multiple topics.  Www.[employer].gov : medication info, interactive tutorials, watch real surgeries online  Www.familydoctor.org : good info from the Academy of Family Physicians  Www.cdc.gov : public health info, travel advisories, epidemics (H1N1)  Www.aap.org : children's health info, normal development, vaccinations  Www.health.[facility].[place].us : [place] dept of health, public health issues in [place], N1N1    Your care team:                            Family Medicine Internal Medicine   MD Juanito Lynne MD Shantel Branch-Fleming, MD Katya Georgiev PA-C Megan Hill, APRN ALYSSA Harrington MD Pediatrics   Greg Fink PAKARLA Balderrama, MD Leanna Hurley APRN CNP   MD Kelly Tipton MD Deborah Mielke, MD Kim Thein, APRN [facility]      Clinic hours: Monday - Thursday 7 am-7 pm; Fridays 7 am-5 pm.   Urgent care: Monday - Friday 11 am-9 pm; Saturday and Sunday 9 am-5 pm.  Pharmacy : Monday -Thursday 8 am-8 pm; Friday 8 am-6 pm; Saturday and Sunday 9 am-5 pm.     Clinic: [phone]   Pharmacy: [phone]

## 2018-09-18 DIAGNOSIS — M10.371 ACUTE GOUT DUE TO RENAL IMPAIRMENT INVOLVING RIGHT FOOT: Primary | ICD-10-CM

## 2018-09-18 LAB
ALBUMIN SERPL-MCNC: 3 G/DL (ref 3.4–5)
ALP SERPL-CCNC: 45 U/L (ref 40–150)
ALT SERPL W P-5'-P-CCNC: 25 U/L (ref 0–70)
ANION GAP SERPL CALCULATED.3IONS-SCNC: 9 MMOL/L (ref 3–14)
AST SERPL W P-5'-P-CCNC: 10 U/L (ref 0–45)
BILIRUB SERPL-MCNC: 0.2 MG/DL (ref 0.2–1.3)
BUN SERPL-MCNC: 69 MG/DL (ref 7–30)
CALCIUM SERPL-MCNC: 8.8 MG/DL (ref 8.5–10.1)
CHLORIDE SERPL-SCNC: 109 MMOL/L (ref 94–109)
CO2 SERPL-SCNC: 23 MMOL/L (ref 20–32)
CREAT SERPL-MCNC: 3.57 MG/DL (ref 0.66–1.25)
CRP SERPL-MCNC: <2.9 MG/L (ref 0–8)
GFR SERPL CREATININE-BSD FRML MDRD: 19 ML/MIN/1.7M2
GLUCOSE SERPL-MCNC: 110 MG/DL (ref 70–99)
POTASSIUM SERPL-SCNC: 4.3 MMOL/L (ref 3.4–5.3)
PROT SERPL-MCNC: 6.2 G/DL (ref 6.8–8.8)
SODIUM SERPL-SCNC: 141 MMOL/L (ref 133–144)
URATE SERPL-MCNC: 11.5 MG/DL (ref 3.5–7.2)

## 2018-09-21 ENCOUNTER — MYC REFILL (OUTPATIENT)
Dept: FAMILY MEDICINE | Facility: CLINIC | Age: 43
End: 2018-09-21

## 2018-09-21 ENCOUNTER — MYC MEDICAL ADVICE (OUTPATIENT)
Dept: FAMILY MEDICINE | Facility: CLINIC | Age: 43
End: 2018-09-21

## 2018-09-21 ENCOUNTER — DOCUMENTATION ONLY (OUTPATIENT)
Dept: TRANSPLANT | Facility: CLINIC | Age: 43
End: 2018-09-21

## 2018-09-21 DIAGNOSIS — M10.372 GOUT OF LEFT FOOT DUE TO RENAL IMPAIRMENT, UNSPECIFIED CHRONICITY: ICD-10-CM

## 2018-09-21 DIAGNOSIS — T86.10 COMPLICATIONS, KIDNEY TRANSPLANT: ICD-10-CM

## 2018-09-21 DIAGNOSIS — Z48.298 AFTERCARE FOLLOWING ORGAN TRANSPLANT: ICD-10-CM

## 2018-09-21 DIAGNOSIS — Z94.0 KIDNEY TRANSPLANTED: Primary | ICD-10-CM

## 2018-09-21 DIAGNOSIS — M10.9 ACUTE GOUTY ARTHRITIS: Primary | ICD-10-CM

## 2018-09-21 RX ORDER — PREDNISONE 20 MG/1
TABLET ORAL
Qty: 20 TABLET | Refills: 0 | Status: CANCELLED | OUTPATIENT
Start: 2018-09-21

## 2018-09-21 RX ORDER — PREDNISONE 20 MG/1
TABLET ORAL
Qty: 20 TABLET | Refills: 0 | Status: SHIPPED | OUTPATIENT
Start: 2018-09-21 | End: 2018-10-22

## 2018-09-21 NOTE — LETTER
PHYSICIAN ORDERS    DATE & TIME ISSUED: 2018 3:22 PM  PATIENT NAME: Rashad Ortiz   : 1975     [facility] MR# [if applicable]: [id number]     DIAGNOSIS / ICD - 10 CODES    Kidney Transplanted (Z94.0)    After Care Following Organ Transplant (Z48.298)    Long Term Use of Medication (Z79.899)    Complications Kidney Transplant (T86.10)    Please complete the following labs:    Every 3 Months  Basic Metabolic panel  Complete Blood Count  Tacrolimus level    Every 6 months  Protein Random Urine with creatinine ratio      Patient should release information to the [facility].   Please fax to the Transplant Center at [phone].  Any questions please call [phone].      .

## 2018-09-21 NOTE — TELEPHONE ENCOUNTER
Message from [employer]:  Original authorizing provider: MD Rashad Butler would like a refill of the following medications:  predniSONE (DELTASONE) 20 MG tablet [Lexie Pop MD]    Preferred pharmacy: [facility] [id number] [place] [address] - [address] AT [facility] OF [facility] [address]    Comment:

## 2018-09-21 NOTE — PROGRESS NOTES
Chart Prep    Clinic Visit on: 9/25/18    Last lab completed: 6/19/18     Lab letter updated: 9/21/18    Lab:  [facility]    Lab orders up to date in Epic.

## 2018-10-02 ENCOUNTER — CARE COORDINATION (OUTPATIENT)
Dept: TRANSPLANT | Facility: CLINIC | Age: 43
End: 2018-10-02

## 2018-10-03 ENCOUNTER — MYC REFILL (OUTPATIENT)
Dept: FAMILY MEDICINE | Facility: CLINIC | Age: 43
End: 2018-10-03

## 2018-10-03 ENCOUNTER — MYC MEDICAL ADVICE (OUTPATIENT)
Dept: FAMILY MEDICINE | Facility: CLINIC | Age: 43
End: 2018-10-03

## 2018-10-03 DIAGNOSIS — M54.2 CERVICALGIA: ICD-10-CM

## 2018-10-03 NOTE — TELEPHONE ENCOUNTER
Message from [employer]:  Original authorizing provider: MD Rashad Mays would like a refill of the following medications:  traMADol (ULTRAM) 50 MG tablet [Juanito Castro MD]    Preferred pharmacy: [facility] [id number] [place] [address] - [address] AT [facility] OF [facility] [address]    Comment:

## 2018-10-04 RX ORDER — TRAMADOL HYDROCHLORIDE 50 MG/1
50 TABLET ORAL
Qty: 30 TABLET | Refills: 0 | Status: SHIPPED | OUTPATIENT
Start: 2018-10-04 | End: 2018-10-25

## 2018-10-08 ENCOUNTER — MYC MEDICAL ADVICE (OUTPATIENT)
Dept: FAMILY MEDICINE | Facility: CLINIC | Age: 43
End: 2018-10-08

## 2018-10-08 ENCOUNTER — MYC REFILL (OUTPATIENT)
Dept: FAMILY MEDICINE | Facility: CLINIC | Age: 43
End: 2018-10-08

## 2018-10-08 DIAGNOSIS — M10.379 ACUTE GOUT DUE TO RENAL IMPAIRMENT INVOLVING TOE, UNSPECIFIED LATERALITY: Primary | ICD-10-CM

## 2018-10-08 DIAGNOSIS — M10.9 ACUTE GOUTY ARTHRITIS: ICD-10-CM

## 2018-10-08 RX ORDER — METHYLPREDNISOLONE 4 MG
TABLET, DOSE PACK ORAL
Qty: 21 TABLET | Refills: 0 | Status: SHIPPED | OUTPATIENT
Start: 2018-10-08 | End: 2018-10-22

## 2018-10-08 NOTE — TELEPHONE ENCOUNTER
Message from MyChart:  Original authorizing provider: ISABEL Jacobo CNP would like a refill of the following medications:  predniSONE (DELTASONE) 20 MG tablet [ISABEL Jacobo CNP]    Preferred pharmacy: [facility] [address] [place], [place] - [address] AT [facility] OF [facility] & [address]    Comment:

## 2018-10-08 NOTE — TELEPHONE ENCOUNTER
Requested Prescriptions   Pending Prescriptions Disp Refills     predniSONE (DELTASONE) 20 MG tablet  Last Written Prescription Date:  09/21/18  Last Fill Quantity: 20,  # refills: 0   Last Office Visit with FMDANIELE, CYNDIE or [facility] prescribing provider: 09/17/18-Tacos   Future Office Visit:    20 tablet 0     Sig: Take 3 tabs (60 mg) by mouth daily x 3 days, 2 tabs (40 mg) daily x 3 days, 1 tab (20 mg) daily x 3 days, then 1/2 tab (10 mg) x 3 days.    There is no refill protocol information for this order

## 2018-10-08 NOTE — TELEPHONE ENCOUNTER
Routing to provider to review and advise. See also MyChart refill requests sent in to clinic today for Prednisone and Norco. Patient does not have scheduled OV with you or any other provider today, nor did he cancel any appointments. Unclear if he attempted to schedule and something happened? Would you prefer an E-visit for this?     Marilee Ibarra RN  [facility] Triage

## 2018-10-09 ENCOUNTER — DOCUMENTATION ONLY (OUTPATIENT)
Dept: TRANSPLANT | Facility: CLINIC | Age: 43
End: 2018-10-09

## 2018-10-10 RX ORDER — PREDNISONE 20 MG/1
TABLET ORAL
Qty: 20 TABLET | Refills: 0 | OUTPATIENT
Start: 2018-10-10

## 2018-10-10 NOTE — PROGRESS NOTES
Chart Prep    Clinic Visit on: 11/1/18    Last lab completed:  9/17/18    Lab letter updated: 9/21/18    Lab orders up to date in Epic.

## 2018-10-18 DIAGNOSIS — M10.372 GOUT OF LEFT FOOT DUE TO RENAL IMPAIRMENT, UNSPECIFIED CHRONICITY: ICD-10-CM

## 2018-10-18 NOTE — TELEPHONE ENCOUNTER
Requested Prescriptions   Pending Prescriptions Disp Refills     predniSONE (DELTASONE) 20 MG tablet 20 tablet 0     Sig: Take 3 tabs (60 mg) by mouth daily x 3 days, 2 tabs (40 mg) daily x 3 days, 1 tab (20 mg) daily x 3 days, then 1/2 tab (10 mg) x 3 days.    There is no refill protocol information for this order        Last Written Prescription Date:  9/21/18  Last Fill Quantity: 20,  # refills: 0   Last office visit: 9/17/2018 with prescribing provider:  Tacos   Future Office Visit:   Next 5 appointments (look out 90 days)     Oct 24, 2018  6:20 PM CDT   Cori Oropeza with Alexandra Parsons MD   [facility] ([facility]    [address] [place] [id number] [phone]

## 2018-10-19 RX ORDER — PREDNISONE 20 MG/1
TABLET ORAL
Qty: 20 TABLET | Refills: 0 | OUTPATIENT
Start: 2018-10-19

## 2018-10-19 NOTE — TELEPHONE ENCOUNTER
Routing refill request to provider for review/approval because:  Drug not on the [facility] refill protocol   Chapis Ruiz RN

## 2018-10-19 NOTE — TELEPHONE ENCOUNTER
See previous mychart messages.  Patient did not follow up as advised with ortho or rheumatology.  Decline to refill.  ISABEL Jacobo CNP

## 2018-10-22 ENCOUNTER — OFFICE VISIT (OUTPATIENT)
Dept: FAMILY MEDICINE | Facility: CLINIC | Age: 43
End: 2018-10-22
Payer: COMMERCIAL

## 2018-10-22 VITALS
BODY MASS INDEX: 24.65 KG/M2 | SYSTOLIC BLOOD PRESSURE: 115 MMHG | HEIGHT: 73 IN | DIASTOLIC BLOOD PRESSURE: 80 MMHG | HEART RATE: 75 BPM | TEMPERATURE: 98.3 F | OXYGEN SATURATION: 99 % | RESPIRATION RATE: 16 BRPM | WEIGHT: 186 LBS

## 2018-10-22 DIAGNOSIS — M10.372 GOUT OF LEFT FOOT DUE TO RENAL IMPAIRMENT, UNSPECIFIED CHRONICITY: ICD-10-CM

## 2018-10-22 DIAGNOSIS — M79.672 LEFT FOOT PAIN: Primary | ICD-10-CM

## 2018-10-22 PROCEDURE — 99213 OFFICE O/P EST LOW 20 MIN: CPT | Performed by: FAMILY MEDICINE

## 2018-10-22 RX ORDER — HYDROCODONE BITARTRATE AND ACETAMINOPHEN 5; 325 MG/1; MG/1
.5-1 TABLET ORAL EVERY 6 HOURS PRN
Qty: 12 TABLET | Refills: 0 | Status: SHIPPED | OUTPATIENT
Start: 2018-10-22 | End: 2018-10-25

## 2018-10-22 ASSESSMENT — PAIN SCALES - GENERAL: PAINLEVEL: WORST PAIN (10)

## 2018-10-22 NOTE — LETTER
October 22, 2018      Rashad Ortiz  [address] [facility] [address]        To Whom It May Concern:    Rashad Ortiz  was seen on 10/22/18.  Please excuse him until 10/25/18 due to illness.        Sincerely,        Mariel Mares MD

## 2018-10-22 NOTE — PATIENT INSTRUCTIONS
At [facility], we strive to deliver an exceptional experience to you, every time we see you.  If you receive a survey in the mail, please send us back your thoughts. We really do value your feedback.    Your care team:                            Family Medicine Internal Medicine   MD Juanito Lynne MD Shantel Branch-Fleming, MD Katya Georgiev PA-C Megan Hill, APRN ALYSSA Harrington MD Pediatrics   Greg Fink, GRANT Balderrmaa, MD Leanna Hurley APRN CNP   MD Kelly Tipton MD Deborah Mielke, MD Sarah Schmitz, APRN [facility]      Clinic hours: Monday - Thursday 7 am-7 pm; Fridays 7 am-5 pm.   Urgent care: Monday - Friday 11 am-9 pm; Saturday and Sunday 9 am-5 pm.  Pharmacy : Monday -Thursday 8 am-8 pm; Friday 8 am-6 pm; Saturday and Sunday 9 am-5 pm.     Clinic: [phone]   Pharmacy: [phone]

## 2018-10-22 NOTE — MR AVS SNAPSHOT
After Visit Summary   10/22/2018    Rashad Ortiz    MRN: [id number]           Patient Information     Date Of Birth          1975        Visit Information        Provider Department      10/22/2018 6:00 PM Mariel Garcia MD [facility]        Today's Diagnoses     Left foot pain    -  1    Gout of left foot due to renal impairment, unspecified chronicity          Care Instructions    At [facility], we strive to deliver an exceptional experience to you, every time we see you.  If you receive a survey in the mail, please send us back your thoughts. We really do value your feedback.    Your care team:                            Family Medicine Internal Medicine   MD Juanito Lynne MD Shantel Branch-Fleming, MD Katya Georgiev PA-C Megan Hill, APRCLIFF Harrington MD Pediatrics   Greg Fink, GRANT Balderrama, MD Leanna Hurley APRN CNP   MD Kelly Tipton MD Deborah Mielke, MD Kim Thein, APRN [facility]      Clinic hours: Monday - Thursday 7 am-7 pm; Fridays 7 am-5 pm.   Urgent care: Monday - Friday 11 am-9 pm; Saturday and Sunday 9 am-5 pm.  Pharmacy : Monday -Thursday 8 am-8 pm; Friday 8 am-6 pm; Saturday and Sunday 9 am-5 pm.     Clinic: [phone]   Pharmacy: [phone]                Follow-ups after your visit        Follow-up notes from your care team     Return in about 3 days (around 10/25/2018) for specialist.      Your next 10 appointments already scheduled     Oct 25, 2018  6:40 PM CDT   PROCEDURE with Glenroy Uriarte MD   [facility] ([facility])    [address]  [place] [address]   [phone]            Nov 01, 2018  2:30 PM CDT   Lab with  LAB   [facility] ([facility])    [address] [address] [phone]             Nov 01, 2018  3:35 PM CDT   (Arrive by 3:05 PM)   Return Kidney Transplant with  Kidney/Pancreas Recipient 2   [facility] ([facility])    [address]  [address]  [facility] [address]   [phone]            Nov 08, 2018  2:30 PM CST   (Arrive by 2:15 PM)   New Patient Visit with KAILA Kaufman MD   [facility] ([facility])    [address]  3rd Floor  [place] [facility] [id number] [phone]            Nov 14, 2018  2:00 PM CST   FULL PULMONARY FUNCTION with  PFL C   [facility] ([facility])    [address]  3rd Floor  [place] [facility] [id number] [phone]            Nov 14, 2018  3:30 PM CST   (Arrive by 3:15 PM)   New Patient Visit with Gelacio Jimenez MD   [facility] ([facility])    [address]  [address]  [place] [id number] [phone]              Who to contact     If you have questions or need follow up information about today's clinic visit or your schedule please contact [facility] directly at [phone].  Normal or non-critical lab and imaging results will be communicated to you by [email or web address], letter or phone within 4 business days after the clinic has received the results. If you do not hear from us within 7 days, please contact the clinic through [email or web address] or phone. If you have a critical or abnormal lab result, we will notify you by phone as soon as possible.  Submit refill requests through [facility] or call your pharmacy and they will forward the refill request to us. Please allow 3 business days for your refill to be completed.          Additional Information About Your Visit        [facility] Information     [facility] gives you secure access to your electronic health record. If you see a primary care provider, you can also send messages to your care team and make appointments.  "If you have questions, please call your primary care clinic.  If you do not have a primary care provider, please call [phone] and they will assist you.        Care EveryWhere ID     This is your Care EveryWhere ID. This could be used by other organizations to access your [facility] medical records  [id number]        Your Vitals Were     Pulse Temperature Respirations Height Pulse Oximetry BMI (Body Mass Index)    75 98.3  F (36.8  C) (Oral) 16 6' 1.23\" (1.86 m) 99% 24.39 kg/m2       Blood Pressure from Last 3 Encounters:   10/22/18 115/80   09/17/18 133/80   09/09/18 (!) 134/93    Weight from Last 3 Encounters:   10/22/18 186 lb (84.4 kg)   09/17/18 192 lb (87.1 kg)   09/09/18 182 lb (82.6 kg)              Today, you had the following     No orders found for display         Where to get your medicines      Some of these will need a paper prescription and others can be bought over the counter.  Ask your nurse if you have questions.     Bring a paper prescription for each of these medications     HYDROcodone-acetaminophen 5-325 MG per tablet         Information about OPIOIDS     PRESCRIPTION OPIOIDS: WHAT YOU NEED TO KNOW   We gave you an opioid (narcotic) pain medicine. It is important to manage your pain, but opioids are not always the best choice. You should first try all the other options your care team gave you. Take this medicine for as short a time (and as few doses) as possible.    Some activities can increase your pain, such as bandage changes or therapy sessions. It may help to take your pain medicine 30 to 60 minutes before these activities. Reduce your stress by getting enough sleep, working on hobbies you enjoy and practicing relaxation or meditation. Talk to your care team about ways to manage your pain beyond prescription opioids.    These medicines have risks:    DO NOT drive when on new or higher doses of pain medicine. These medicines can affect your alertness and reaction times, and you could " be arrested for driving under the influence (DUI). If you need to use opioids long-term, talk to your care team about driving.    DO NOT operate heavy machinery    DO NOT do any other dangerous activities while taking these medicines.    DO NOT drink any alcohol while taking these medicines.     If the opioid prescribed includes acetaminophen, DO NOT take with any other medicines that contain acetaminophen. Read all labels carefully. Look for the word  acetaminophen  or  Tylenol.  Ask your pharmacist if you have questions or are unsure.    You can get addicted to pain medicines, especially if you have a history of addiction (chemical, alcohol or substance dependence). Talk to your care team about ways to reduce this risk.    All opioids tend to cause constipation. Drink plenty of water and eat foods that have a lot of fiber, such as fruits, vegetables, prune juice, apple juice and high-fiber cereal. Take a laxative (Miralax, milk of magnesia, Colace, Senna) if you don t move your bowels at least every other day. Other side effects include upset stomach, sleepiness, dizziness, throwing up, tolerance (needing more of the medicine to have the same effect), physical dependence and slowed breathing.    Store your pills in a secure place, locked if possible. We will not replace any lost or stolen medicine. If you don t finish your medicine, please throw away (dispose) as directed by your pharmacist. The [place] [employer]ution [employer] has more information about safe disposal: https://www.pca.[email or web address].[place].us/living-green/managing-unwanted-medications         Primary Care Provider Office Phone # Fax #    [facility] [phone]       [address] [place]  [facility] [address]        Equal Access to Services     [facility]: Hadii aad leslie hadasho Sohumbertoali, waaxda luqadaha, qaybta kaalronaldo robledo. [facility] [phone].    ATENCIÓN: Andi saab,  tiene a ward disposición servicios gratuitos de asistencia lingüística. Joshua maria [phone].    We comply with applicable federal civil rights laws and [place] laws. We do not discriminate on the basis of race, color, national origin, age, disability, sex, sexual orientation, or gender identity.            Thank you!     Thank you for choosing [facility]  for your care. Our goal is always to provide you with excellent care. Hearing back from our patients is one way we can continue to improve our services. Please take a few minutes to complete the written survey that you may receive in the mail after your visit with us. Thank you!             Your Updated Medication List - Protect others around you: Learn how to safely use, store and throw away your medicines at www.disposemymeds.org.          This list is accurate as of 10/22/18  6:18 PM.  Always use your most recent med list.                   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 tophus, unspecified site       amLODIPine 5 MG tablet    NORVASC    90 tablet    Take 1 tablet (5 mg) by mouth daily    Benign essential hypertension       carvedilol 25 MG tablet    COREG    60 tablet    Take 1 tablet (25 mg) by mouth 2 times daily    Unspecified hypertensive kidney disease with chronic kidney disease stage I through stage IV, or unspecified(403.90)       furosemide 20 MG tablet    LASIX    180 tablet    Take 1 tablet (20 mg) by mouth 2 times daily    Unspecified hypertensive kidney disease with chronic kidney disease stage I through stage IV, or unspecified(403.90)       HYDROcodone-acetaminophen 5-325 MG per tablet    NORCO    12 tablet    Take 0.5-1 tablets by mouth every 6 hours as needed for moderate to severe pain    Gout of left foot due to renal impairment, unspecified chronicity        losartan 100 MG tablet    COZAAR    90 tablet    Take 1 tablet (100 mg) by mouth daily    Renal hypertension, stage 1-4 or unspecified chronic kidney disease       * mycophenolate 250 MG capsule    GENERIC EQUIVALENT    120 capsule    Take 2 capsules (500 mg) by mouth 2 times daily    Kidney transplanted       * mycophenolate 250 MG capsule    GENERIC EQUIVALENT    120 capsule    Take 2 capsules (500 mg) by mouth 2 times daily    Kidney transplanted       omeprazole 20 MG CR capsule    priLOSEC    90 capsule    Take 1 capsule (20 mg) by mouth daily    Kidney replaced by transplant       predniSONE 5 MG tablet    DELTASONE    30 tablet    Take 1 tablet (5 mg) by mouth daily    Kidney replaced by transplant       sodium bicarbonate 650 MG tablet     120 tablet    Take 2 tablets (1,300 mg) by mouth 2 times daily    Kidney transplanted, Complications, kidney transplant, Aftercare following organ transplant, Immunosuppressed status (H), Encounter for long-term (current) use of high-risk medication       sulfamethoxazole-trimethoprim 400-80 MG per tablet    BACTRIM/SEPTRA    45 tablet    Take 1 tablet by mouth every other day    Kidney transplanted       * tacrolimus 1 MG capsule    GENERIC EQUIVALENT    120 capsule    Take 2 capsules (2 mg) by mouth 2 times daily    Kidney transplant recipient, Long-term use of immunosuppressant medication       * tacrolimus 1 MG capsule    GENERIC EQUIVALENT    120 capsule    Take 2 capsules (2 mg) by mouth 2 times daily    Kidney transplant recipient, Long-term use of immunosuppressant medication       traMADol 50 MG tablet    ULTRAM    30 tablet    Take 1 tablet (50 mg) by mouth nightly as needed for moderate pain    Cervicalgia       valACYclovir 1000 mg tablet    VALTREX    20 tablet    Take 1 tablet (1,000 mg) by mouth 2 times daily    Lesion of penis, Exposure to genital herpes       * Notice:  This list has 4 medication(s) that are the same as other medications prescribed for  you. Read the directions carefully, and ask your doctor or other care provider to review them with you.

## 2018-10-22 NOTE — PROGRESS NOTES
SUBJECTIVE:   Rashad Ortiz is a 43 year old male who presents to clinic today for the following health issues:  Musculoskeletal problem/pain      Duration: 1 week    Description  Location: left foot pain    Intensity:  10/10    Accompanying signs and symptoms: numbness, tingling, swelling, redness, discoloration of foot and sharp pain    History  Previous similar problem: YES  Previous evaluation:  none    Precipitating or alleviating factors:  Trauma or overuse: no   Aggravating factors include: standing, walking, climbing stairs and overuse    Therapies tried and outcome: heat, massage and acetaminophen, received medrol a few weeks ago      Problem list and histories reviewed & adjusted, as indicated.  Additional history: as documented    Patient Active Problem List   Diagnosis     Kidney replaced by transplant     High risk medications (not anticoagulants) long-term use     Hypertension goal BP (blood pressure) < 140/90     GERD (gastroesophageal reflux disease)     CARDIOVASCULAR SCREENING; LDL GOAL LESS THAN 160     Gout     Creatinine elevation     Antibody mediated rejection of kidney transplant     Medical non-compliance     Chronic kidney disease, stage 4, severely decreased GFR (H)     Herpes simplex infection of penis     Past Surgical History:   Procedure Laterality Date     AV FISTULA OR GRAFT ARTERIAL       LIGATE FISTULA ARTERIOVENOUS UPPER EXTREMITY  12/20/2011    Procedure:LIGATE FISTULA ARTERIOVENOUS UPPER EXTREMITY; Excision of Right Forearm Arteriovenous Fistula.; Surgeon:LINDY AMAYA; Location:[facility] OR     PERCUTANEOUS BIOPSY KIDNEY Right 2/28/2017    Procedure: PERCUTANEOUS BIOPSY KIDNEY;  Surgeon: Gee Barrios MD;  Location:  OR     TRANSPLANT  01/13/2011    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       Social History   Substance Use Topics     Smoking status: Former Smoker     Types: Cigarettes     Quit date: 03/2017     Smokeless tobacco: Never Used      Comment:  "Patient states that he is an 'social'  smoker      Alcohol use 2.5 oz/week     5 Standard drinks or equivalent per week      Comment: 1-2 drinks/wk     Family History   Problem Relation Age of Onset     Hypertension Mother            Reviewed and updated as needed this visit by clinical staff  Tobacco  Allergies  Meds  Problems       Reviewed and updated as needed this visit by Provider  Allergies  Meds  Problems         ROS:  Constitutional, HEENT, cardiovascular, pulmonary, gi and gu systems are negative, except as otherwise noted.    OBJECTIVE:     /80 (BP Location: Left arm, Patient Position: Chair, Cuff Size: Adult Regular)  Pulse 75  Temp 98.3  F (36.8  C) (Oral)  Resp 16  Ht 6' 1.23\" (1.86 m)  Wt 186 lb (84.4 kg)  SpO2 99%  BMI 24.39 kg/m2  Body mass index is 24.39 kg/(m^2).  GENERAL: healthy, alert and no distress  NECK: no adenopathy, no asymmetry, masses, or scars and thyroid normal to palpation  RESP: lungs clear to auscultation - no rales, rhonchi or wheezes  CV: regular rate and rhythm, normal S1 S2, no S3 or S4, no murmur, click or rub, no peripheral edema and peripheral pulses strong  ABDOMEN: soft, nontender, no hepatosplenomegaly, no masses and bowel sounds normal  MS: swelling bilateral feet and ankles (stable per patient) erythema left dorsal foot  PSYCH: mentation appears normal, affect normal/bright    Diagnostic Test Results:  none     ASSESSMENT/PLAN:     1. Left foot pain  Refill norco and recommend calling to get in with sports medicine tomorrow if able. No more steroids given renal function.    2. Gout of left foot due to renal impairment, unspecified chronicity  As above.  - HYDROcodone-acetaminophen (NORCO) 5-325 MG per tablet; Take 0.5-1 tablets by mouth every 6 hours as needed for moderate to severe pain  Dispense: 12 tablet; Refill: 0    The uses and side effects, including black box warnings as appropriate, were discussed in detail.  All patient questions were " answered.  The patient was instructed to call immediately if any side effects developed.     FUTURE APPOINTMENTS:       - Follow-up visit in 1 week if not improve or no other plan from sport med.    Mariel Mares MD  [facility]

## 2018-10-25 ENCOUNTER — APPOINTMENT (OUTPATIENT)
Dept: CT IMAGING | Facility: CLINIC | Age: 43
DRG: 698 | End: 2018-10-25
Attending: EMERGENCY MEDICINE
Payer: COMMERCIAL

## 2018-10-25 ENCOUNTER — OFFICE VISIT (OUTPATIENT)
Dept: URGENT CARE | Facility: URGENT CARE | Age: 43
End: 2018-10-25
Payer: COMMERCIAL

## 2018-10-25 ENCOUNTER — HOSPITAL ENCOUNTER (INPATIENT)
Facility: CLINIC | Age: 43
LOS: 3 days | Discharge: HOME | DRG: 698 | End: 2018-10-28
Attending: EMERGENCY MEDICINE | Admitting: INTERNAL MEDICINE
Payer: COMMERCIAL

## 2018-10-25 ENCOUNTER — APPOINTMENT (OUTPATIENT)
Dept: ULTRASOUND IMAGING | Facility: CLINIC | Age: 43
DRG: 698 | End: 2018-10-25
Attending: EMERGENCY MEDICINE
Payer: COMMERCIAL

## 2018-10-25 VITALS
SYSTOLIC BLOOD PRESSURE: 96 MMHG | BODY MASS INDEX: 23.02 KG/M2 | WEIGHT: 175.6 LBS | TEMPERATURE: 101.6 F | DIASTOLIC BLOOD PRESSURE: 57 MMHG | HEART RATE: 101 BPM | OXYGEN SATURATION: 99 %

## 2018-10-25 DIAGNOSIS — A41.51: ICD-10-CM

## 2018-10-25 DIAGNOSIS — N30.00 ACUTE CYSTITIS WITHOUT HEMATURIA: ICD-10-CM

## 2018-10-25 DIAGNOSIS — M10.372 ACUTE GOUT DUE TO RENAL IMPAIRMENT INVOLVING LEFT FOOT: Primary | ICD-10-CM

## 2018-10-25 DIAGNOSIS — Z94.0 KIDNEY TRANSPLANTED: ICD-10-CM

## 2018-10-25 DIAGNOSIS — Z94.0 KIDNEY REPLACED BY TRANSPLANT: ICD-10-CM

## 2018-10-25 DIAGNOSIS — D84.9 IMMUNOSUPPRESSED STATUS (H): ICD-10-CM

## 2018-10-25 DIAGNOSIS — I10 BENIGN ESSENTIAL HYPERTENSION: ICD-10-CM

## 2018-10-25 DIAGNOSIS — N17.9 AKI (ACUTE KIDNEY INJURY) (H): ICD-10-CM

## 2018-10-25 DIAGNOSIS — R50.9 FEVER AND CHILLS: ICD-10-CM

## 2018-10-25 DIAGNOSIS — Z48.298 AFTERCARE FOLLOWING ORGAN TRANSPLANT: ICD-10-CM

## 2018-10-25 DIAGNOSIS — Z79.899 ENCOUNTER FOR LONG-TERM (CURRENT) USE OF HIGH-RISK MEDICATION: ICD-10-CM

## 2018-10-25 DIAGNOSIS — N17.9 ACUTE KIDNEY INJURY (H): ICD-10-CM

## 2018-10-25 DIAGNOSIS — A08.11 GASTROENTERITIS DUE TO NOROVIRUS: Primary | ICD-10-CM

## 2018-10-25 DIAGNOSIS — I12.9 RENAL HYPERTENSION, STAGE 1-4 OR UNSPECIFIED CHRONIC KIDNEY DISEASE: ICD-10-CM

## 2018-10-25 DIAGNOSIS — R82.90 NONSPECIFIC FINDING ON EXAMINATION OF URINE: ICD-10-CM

## 2018-10-25 DIAGNOSIS — T86.10 COMPLICATIONS, KIDNEY TRANSPLANT: ICD-10-CM

## 2018-10-25 LAB
ALBUMIN SERPL-MCNC: 2.7 G/DL (ref 3.4–5)
ALBUMIN SERPL-MCNC: 2.8 G/DL (ref 3.4–5)
ALBUMIN UR-MCNC: >=300 MG/DL
ALP SERPL-CCNC: 63 U/L (ref 40–150)
ALP SERPL-CCNC: 64 U/L (ref 40–150)
ALT SERPL W P-5'-P-CCNC: 14 U/L (ref 0–70)
ALT SERPL W P-5'-P-CCNC: 16 U/L (ref 0–70)
ANION GAP SERPL CALCULATED.3IONS-SCNC: 10 MMOL/L (ref 3–14)
ANION GAP SERPL CALCULATED.3IONS-SCNC: 11 MMOL/L (ref 3–14)
ANION GAP SERPL CALCULATED.3IONS-SCNC: 14 MMOL/L (ref 3–14)
ANISOCYTOSIS BLD QL SMEAR: SLIGHT
APPEARANCE UR: ABNORMAL
AST SERPL W P-5'-P-CCNC: 12 U/L (ref 0–45)
AST SERPL W P-5'-P-CCNC: 13 U/L (ref 0–45)
BACTERIA #/AREA URNS HPF: ABNORMAL /HPF
BASE DEFICIT BLDV-SCNC: 5.6 MMOL/L
BASOPHILS # BLD AUTO: 0 10E9/L (ref 0–0.2)
BASOPHILS NFR BLD AUTO: 0 %
BILIRUB SERPL-MCNC: 0.4 MG/DL (ref 0.2–1.3)
BILIRUB SERPL-MCNC: 0.4 MG/DL (ref 0.2–1.3)
BILIRUB UR QL STRIP: NEGATIVE
BUN SERPL-MCNC: 54 MG/DL (ref 7–30)
BUN SERPL-MCNC: 56 MG/DL (ref 7–30)
BUN SERPL-MCNC: 61 MG/DL (ref 7–30)
C DIFF TOX B STL QL: NEGATIVE
CALCIUM SERPL-MCNC: 7.3 MG/DL (ref 8.5–10.1)
CALCIUM SERPL-MCNC: 8.7 MG/DL (ref 8.5–10.1)
CALCIUM SERPL-MCNC: 8.9 MG/DL (ref 8.5–10.1)
CHLORIDE SERPL-SCNC: 104 MMOL/L (ref 94–109)
CHLORIDE SERPL-SCNC: 104 MMOL/L (ref 94–109)
CHLORIDE SERPL-SCNC: 109 MMOL/L (ref 94–109)
CK SERPL-CCNC: 58 U/L (ref 30–300)
CO2 SERPL-SCNC: 15 MMOL/L (ref 20–32)
CO2 SERPL-SCNC: 19 MMOL/L (ref 20–32)
CO2 SERPL-SCNC: 22 MMOL/L (ref 20–32)
COLOR UR AUTO: YELLOW
CREAT SERPL-MCNC: 7.47 MG/DL (ref 0.66–1.25)
CREAT SERPL-MCNC: 8.7 MG/DL (ref 0.66–1.25)
CREAT SERPL-MCNC: 8.87 MG/DL (ref 0.66–1.25)
CREAT UR-MCNC: 242 MG/DL
CRP SERPL-MCNC: 370 MG/L (ref 0–8)
DIFFERENTIAL METHOD BLD: ABNORMAL
DIFFERENTIAL METHOD BLD: ABNORMAL
DOHLE BOD BLD QL SMEAR: PRESENT
ELLIPTOCYTES BLD QL SMEAR: SLIGHT
EOSINOPHIL # BLD AUTO: 0 10E9/L (ref 0–0.7)
EOSINOPHIL NFR BLD AUTO: 0 %
ERYTHROCYTE [DISTWIDTH] IN BLOOD BY AUTOMATED COUNT: 16.4 % (ref 10–15)
FLUAV+FLUBV AG SPEC QL: NEGATIVE
FLUAV+FLUBV AG SPEC QL: NEGATIVE
FRACT EXCRET NA UR+SERPL-RTO: 1.4 %
GFR SERPL CREATININE-BSD FRML MDRD: 7 ML/MIN/1.7M2
GFR SERPL CREATININE-BSD FRML MDRD: 7 ML/MIN/1.7M2
GFR SERPL CREATININE-BSD FRML MDRD: 8 ML/MIN/1.7M2
GLUCOSE SERPL-MCNC: 105 MG/DL (ref 70–99)
GLUCOSE SERPL-MCNC: 108 MG/DL (ref 70–99)
GLUCOSE SERPL-MCNC: 124 MG/DL (ref 70–99)
GLUCOSE UR STRIP-MCNC: NEGATIVE MG/DL
HCO3 BLDV-SCNC: 20 MMOL/L (ref 21–28)
HCT VFR BLD AUTO: 25.5 % (ref 40–53)
HCT VFR BLD AUTO: 30.6 % (ref 40–53)
HCT VFR BLD AUTO: 31.1 % (ref 40–53)
HGB BLD-MCNC: 7.9 G/DL (ref 13.3–17.7)
HGB BLD-MCNC: 9.7 G/DL (ref 13.3–17.7)
HGB BLD-MCNC: 9.8 G/DL (ref 13.3–17.7)
HGB UR QL STRIP: ABNORMAL
IMM GRANULOCYTES # BLD: 0.3 10E9/L (ref 0–0.4)
IMM GRANULOCYTES NFR BLD: 1.2 %
KETONES UR STRIP-MCNC: NEGATIVE MG/DL
LACTATE BLD-SCNC: 1.4 MMOL/L (ref 0.7–2)
LACTATE BLD-SCNC: 1.5 MMOL/L (ref 0.7–2)
LACTATE BLD-SCNC: 1.9 MMOL/L (ref 0.7–2)
LEUKOCYTE ESTERASE UR QL STRIP: ABNORMAL
LYMPHOCYTES # BLD AUTO: 0.2 10E9/L (ref 0.8–5.3)
LYMPHOCYTES # BLD AUTO: 0.6 10E9/L (ref 0.8–5.3)
LYMPHOCYTES NFR BLD AUTO: 1 %
LYMPHOCYTES NFR BLD AUTO: 2.3 %
MAGNESIUM SERPL-MCNC: 0.9 MG/DL (ref 1.6–2.3)
MAGNESIUM SERPL-MCNC: 1 MG/DL (ref 1.6–2.3)
MAGNESIUM SERPL-MCNC: 1.7 MG/DL (ref 1.6–2.3)
MCH RBC QN AUTO: 26.7 PG (ref 26.5–33)
MCH RBC QN AUTO: 27.2 PG (ref 26.5–33)
MCH RBC QN AUTO: 27.8 PG (ref 26.5–33)
MCHC RBC AUTO-ENTMCNC: 31 G/DL (ref 31.5–36.5)
MCHC RBC AUTO-ENTMCNC: 31.2 G/DL (ref 31.5–36.5)
MCHC RBC AUTO-ENTMCNC: 32 G/DL (ref 31.5–36.5)
MCV RBC AUTO: 86 FL (ref 78–100)
MCV RBC AUTO: 87 FL (ref 78–100)
MCV RBC AUTO: 87 FL (ref 78–100)
MONOCYTES # BLD AUTO: 1.5 10E9/L (ref 0–1.3)
MONOCYTES # BLD AUTO: 2.1 10E9/L (ref 0–1.3)
MONOCYTES NFR BLD AUTO: 7 %
MONOCYTES NFR BLD AUTO: 8.5 %
MYELOCYTES # BLD: 0.2 10E9/L
MYELOCYTES NFR BLD MANUAL: 1 %
NEUTROPHILS # BLD AUTO: 19.9 10E9/L (ref 1.6–8.3)
NEUTROPHILS # BLD AUTO: 21.5 10E9/L (ref 1.6–8.3)
NEUTROPHILS NFR BLD AUTO: 88 %
NEUTROPHILS NFR BLD AUTO: 91 %
NITRATE UR QL: NEGATIVE
NON-SQ EPI CELLS #/AREA URNS LPF: ABNORMAL /LPF
NRBC # BLD AUTO: 0 10*3/UL
NRBC BLD AUTO-RTO: 0 /100
O2/TOTAL GAS SETTING VFR VENT: 21 %
PCO2 BLDV: 36 MM HG (ref 40–50)
PH BLDV: 7.35 PH (ref 7.32–7.43)
PH UR STRIP: 6.5 PH (ref 5–7)
PHOSPHATE SERPL-MCNC: 5.2 MG/DL (ref 2.5–4.5)
PLATELET # BLD AUTO: 106 10E9/L (ref 150–450)
PLATELET # BLD AUTO: 128 10E9/L (ref 150–450)
PLATELET # BLD AUTO: 88 10E9/L (ref 150–450)
PO2 BLDV: 31 MM HG (ref 25–47)
POTASSIUM SERPL-SCNC: 4.4 MMOL/L (ref 3.4–5.3)
POTASSIUM SERPL-SCNC: 4.6 MMOL/L (ref 3.4–5.3)
POTASSIUM SERPL-SCNC: 5.1 MMOL/L (ref 3.4–5.3)
PROCALCITONIN SERPL-MCNC: >200 NG/ML
PROT SERPL-MCNC: 6.8 G/DL (ref 6.8–8.8)
PROT SERPL-MCNC: 6.8 G/DL (ref 6.8–8.8)
RBC # BLD AUTO: 2.96 10E12/L (ref 4.4–5.9)
RBC # BLD AUTO: 3.52 10E12/L (ref 4.4–5.9)
RBC # BLD AUTO: 3.56 10E12/L (ref 4.4–5.9)
RBC #/AREA URNS AUTO: ABNORMAL /HPF
SODIUM SERPL-SCNC: 134 MMOL/L (ref 133–144)
SODIUM SERPL-SCNC: 136 MMOL/L (ref 133–144)
SODIUM SERPL-SCNC: 138 MMOL/L (ref 133–144)
SODIUM UR-SCNC: 51 MMOL/L
SOURCE: ABNORMAL
SP GR UR STRIP: 1.02 (ref 1–1.03)
SPECIMEN SOURCE: NORMAL
SPECIMEN SOURCE: NORMAL
UROBILINOGEN UR STRIP-ACNC: 0.2 EU/DL (ref 0.2–1)
WBC # BLD AUTO: 21.8 10E9/L (ref 4–11)
WBC # BLD AUTO: 24.5 10E9/L (ref 4–11)
WBC # BLD AUTO: 6.8 10E9/L (ref 4–11)
WBC #/AREA URNS AUTO: >100 /HPF

## 2018-10-25 PROCEDURE — 36415 COLL VENOUS BLD VENIPUNCTURE: CPT | Performed by: NURSE PRACTITIONER

## 2018-10-25 PROCEDURE — 96365 THER/PROPH/DIAG IV INF INIT: CPT | Performed by: EMERGENCY MEDICINE

## 2018-10-25 PROCEDURE — 99214 OFFICE O/P EST MOD 30 MIN: CPT | Performed by: NURSE PRACTITIONER

## 2018-10-25 PROCEDURE — 85025 COMPLETE CBC W/AUTO DIFF WBC: CPT | Performed by: NURSE PRACTITIONER

## 2018-10-25 PROCEDURE — 87088 URINE BACTERIA CULTURE: CPT | Performed by: NURSE PRACTITIONER

## 2018-10-25 PROCEDURE — 87077 CULTURE AEROBIC IDENTIFY: CPT | Performed by: PHYSICIAN ASSISTANT

## 2018-10-25 PROCEDURE — 96366 THER/PROPH/DIAG IV INF ADDON: CPT | Performed by: EMERGENCY MEDICINE

## 2018-10-25 PROCEDURE — 25000128 H RX IP 250 OP 636: Performed by: EMERGENCY MEDICINE

## 2018-10-25 PROCEDURE — 82803 BLOOD GASES ANY COMBINATION: CPT | Performed by: PHYSICIAN ASSISTANT

## 2018-10-25 PROCEDURE — 99285 EMERGENCY DEPT VISIT HI MDM: CPT | Mod: 25 | Performed by: EMERGENCY MEDICINE

## 2018-10-25 PROCEDURE — 25000125 ZZHC RX 250: Performed by: PHYSICIAN ASSISTANT

## 2018-10-25 PROCEDURE — 76776 US EXAM K TRANSPL W/DOPPLER: CPT

## 2018-10-25 PROCEDURE — 96376 TX/PRO/DX INJ SAME DRUG ADON: CPT | Performed by: EMERGENCY MEDICINE

## 2018-10-25 PROCEDURE — 83735 ASSAY OF MAGNESIUM: CPT | Performed by: INTERNAL MEDICINE

## 2018-10-25 PROCEDURE — 82550 ASSAY OF CK (CPK): CPT | Performed by: EMERGENCY MEDICINE

## 2018-10-25 PROCEDURE — 87633 RESP VIRUS 12-25 TARGETS: CPT | Performed by: PHYSICIAN ASSISTANT

## 2018-10-25 PROCEDURE — 83735 ASSAY OF MAGNESIUM: CPT | Performed by: EMERGENCY MEDICINE

## 2018-10-25 PROCEDURE — 81001 URINALYSIS AUTO W/SCOPE: CPT | Performed by: NURSE PRACTITIONER

## 2018-10-25 PROCEDURE — 87186 SC STD MICRODIL/AGAR DIL: CPT | Performed by: EMERGENCY MEDICINE

## 2018-10-25 PROCEDURE — 74176 CT ABD & PELVIS W/O CONTRAST: CPT

## 2018-10-25 PROCEDURE — 80053 COMPREHEN METABOLIC PANEL: CPT | Performed by: EMERGENCY MEDICINE

## 2018-10-25 PROCEDURE — 85027 COMPLETE CBC AUTOMATED: CPT | Performed by: PHYSICIAN ASSISTANT

## 2018-10-25 PROCEDURE — 87040 BLOOD CULTURE FOR BACTERIA: CPT | Performed by: EMERGENCY MEDICINE

## 2018-10-25 PROCEDURE — 84145 PROCALCITONIN (PCT): CPT | Performed by: EMERGENCY MEDICINE

## 2018-10-25 PROCEDURE — 96367 TX/PROPH/DG ADDL SEQ IV INF: CPT | Performed by: EMERGENCY MEDICINE

## 2018-10-25 PROCEDURE — 84300 ASSAY OF URINE SODIUM: CPT | Performed by: PHYSICIAN ASSISTANT

## 2018-10-25 PROCEDURE — 12000006 ZZH R&B IMCU INTERMEDIATE UMMC

## 2018-10-25 PROCEDURE — 96375 TX/PRO/DX INJ NEW DRUG ADDON: CPT | Performed by: EMERGENCY MEDICINE

## 2018-10-25 PROCEDURE — 25000132 ZZH RX MED GY IP 250 OP 250 PS 637: Performed by: PHYSICIAN ASSISTANT

## 2018-10-25 PROCEDURE — 99207 ZZC APP CREDIT; MD BILLING SHARED VISIT: CPT | Performed by: PHYSICIAN ASSISTANT

## 2018-10-25 PROCEDURE — 85730 THROMBOPLASTIN TIME PARTIAL: CPT | Performed by: PHYSICIAN ASSISTANT

## 2018-10-25 PROCEDURE — 82570 ASSAY OF URINE CREATININE: CPT | Performed by: PHYSICIAN ASSISTANT

## 2018-10-25 PROCEDURE — 83735 ASSAY OF MAGNESIUM: CPT | Performed by: PHYSICIAN ASSISTANT

## 2018-10-25 PROCEDURE — 85025 COMPLETE CBC W/AUTO DIFF WBC: CPT | Performed by: EMERGENCY MEDICINE

## 2018-10-25 PROCEDURE — 96361 HYDRATE IV INFUSION ADD-ON: CPT | Performed by: EMERGENCY MEDICINE

## 2018-10-25 PROCEDURE — 87086 URINE CULTURE/COLONY COUNT: CPT | Performed by: NURSE PRACTITIONER

## 2018-10-25 PROCEDURE — 87804 INFLUENZA ASSAY W/OPTIC: CPT | Performed by: NURSE PRACTITIONER

## 2018-10-25 PROCEDURE — 87077 CULTURE AEROBIC IDENTIFY: CPT | Performed by: EMERGENCY MEDICINE

## 2018-10-25 PROCEDURE — 87800 DETECT AGNT MULT DNA DIREC: CPT | Performed by: EMERGENCY MEDICINE

## 2018-10-25 PROCEDURE — 25000128 H RX IP 250 OP 636: Performed by: PHYSICIAN ASSISTANT

## 2018-10-25 PROCEDURE — 84100 ASSAY OF PHOSPHORUS: CPT | Performed by: EMERGENCY MEDICINE

## 2018-10-25 PROCEDURE — 80048 BASIC METABOLIC PNL TOTAL CA: CPT | Performed by: PHYSICIAN ASSISTANT

## 2018-10-25 PROCEDURE — 83605 ASSAY OF LACTIC ACID: CPT | Performed by: EMERGENCY MEDICINE

## 2018-10-25 PROCEDURE — 99223 1ST HOSP IP/OBS HIGH 75: CPT | Mod: AI | Performed by: INTERNAL MEDICINE

## 2018-10-25 PROCEDURE — 86140 C-REACTIVE PROTEIN: CPT | Performed by: EMERGENCY MEDICINE

## 2018-10-25 PROCEDURE — 83605 ASSAY OF LACTIC ACID: CPT | Performed by: PHYSICIAN ASSISTANT

## 2018-10-25 PROCEDURE — 80053 COMPREHEN METABOLIC PANEL: CPT | Performed by: NURSE PRACTITIONER

## 2018-10-25 PROCEDURE — 87506 IADNA-DNA/RNA PROBE TQ 6-11: CPT | Performed by: EMERGENCY MEDICINE

## 2018-10-25 PROCEDURE — 87040 BLOOD CULTURE FOR BACTERIA: CPT | Performed by: PHYSICIAN ASSISTANT

## 2018-10-25 PROCEDURE — 87493 C DIFF AMPLIFIED PROBE: CPT | Performed by: EMERGENCY MEDICINE

## 2018-10-25 PROCEDURE — 87186 SC STD MICRODIL/AGAR DIL: CPT | Performed by: NURSE PRACTITIONER

## 2018-10-25 PROCEDURE — 85610 PROTHROMBIN TIME: CPT | Performed by: PHYSICIAN ASSISTANT

## 2018-10-25 RX ORDER — ACETAMINOPHEN 500 MG
1000 TABLET ORAL ONCE
Status: COMPLETED | OUTPATIENT
Start: 2018-10-25 | End: 2018-10-25

## 2018-10-25 RX ORDER — MAGNESIUM SULFATE 1 G/100ML
2 INJECTION INTRAVENOUS ONCE
Status: DISCONTINUED | OUTPATIENT
Start: 2018-10-25 | End: 2018-10-25

## 2018-10-25 RX ORDER — PIPERACILLIN SODIUM, TAZOBACTAM SODIUM 2; .25 G/10ML; G/10ML
2.25 INJECTION, POWDER, LYOPHILIZED, FOR SOLUTION INTRAVENOUS ONCE
Status: COMPLETED | OUTPATIENT
Start: 2018-10-25 | End: 2018-10-25

## 2018-10-25 RX ORDER — HYDROMORPHONE HYDROCHLORIDE 1 MG/ML
0.5 INJECTION, SOLUTION INTRAMUSCULAR; INTRAVENOUS; SUBCUTANEOUS
Status: COMPLETED | OUTPATIENT
Start: 2018-10-25 | End: 2018-10-25

## 2018-10-25 RX ORDER — SODIUM CHLORIDE 9 MG/ML
1000 INJECTION, SOLUTION INTRAVENOUS CONTINUOUS
Status: DISCONTINUED | OUTPATIENT
Start: 2018-10-25 | End: 2018-10-26

## 2018-10-25 RX ORDER — CEFEPIME HYDROCHLORIDE 1 G/1
1 INJECTION, POWDER, FOR SOLUTION INTRAMUSCULAR; INTRAVENOUS EVERY 24 HOURS
Status: DISCONTINUED | OUTPATIENT
Start: 2018-10-26 | End: 2018-10-28

## 2018-10-25 RX ADMIN — SODIUM CHLORIDE 1000 ML: 9 INJECTION, SOLUTION INTRAVENOUS at 19:26

## 2018-10-25 RX ADMIN — ACETAMINOPHEN 1000 MG: 500 TABLET, FILM COATED ORAL at 22:04

## 2018-10-25 RX ADMIN — Medication 0.5 MG: at 22:03

## 2018-10-25 RX ADMIN — PIPERACILLIN SODIUM AND TAZOBACTAM SODIUM 2.25 G: 2; .25 INJECTION, POWDER, LYOPHILIZED, FOR SOLUTION INTRAVENOUS at 19:26

## 2018-10-25 RX ADMIN — SODIUM CHLORIDE 1000 ML: 9 INJECTION, SOLUTION INTRAVENOUS at 23:23

## 2018-10-25 RX ADMIN — SODIUM CHLORIDE 1000 ML: 9 INJECTION, SOLUTION INTRAVENOUS at 21:37

## 2018-10-25 RX ADMIN — Medication 2 G: at 22:17

## 2018-10-25 RX ADMIN — SODIUM CHLORIDE 1000 ML: 9 INJECTION, SOLUTION INTRAVENOUS at 22:04

## 2018-10-25 RX ADMIN — Medication 0.5 MG: at 19:26

## 2018-10-25 ASSESSMENT — ENCOUNTER SYMPTOMS
DIZZINESS: 1
DIZZINESS: 1
CHILLS: 1
COUGH: 1
SORE THROAT: 0
ABDOMINAL PAIN: 1
SHORTNESS OF BREATH: 0
FEVER: 1
DIARRHEA: 1
RHINORRHEA: 0
DYSURIA: 0
FLANK PAIN: 0
NAUSEA: 0
FREQUENCY: 0
FEVER: 1
FATIGUE: 1
RHINORRHEA: 0
VOMITING: 0
HEADACHES: 1
WEAKNESS: 1
CHILLS: 0
NAUSEA: 0
VOMITING: 0
COUGH: 0
SHORTNESS OF BREATH: 0
DIARRHEA: 1
APPETITE CHANGE: 1
HEADACHES: 1
SORE THROAT: 0
DIAPHORESIS: 0

## 2018-10-25 NOTE — IP AVS SNAPSHOT
[address] [address]    Phone:  [phone]                                       After Visit Summary   10/25/2018    Rashad Ortiz    MRN: [id number]           After Visit Summary Signature Page     I have received my discharge instructions, and my questions have been answered. I have discussed any challenges I see with this plan with the nurse or doctor.    ..........................................................................................................................................  Patient/Patient Representative Signature      ..........................................................................................................................................  Patient Representative Print Name and Relationship to Patient    ..................................................               ................................................  Date                                   Time    ..........................................................................................................................................  Reviewed by Signature/Title    ...................................................              ..............................................  Date                                               Time          22EPIC Rev 08/18

## 2018-10-25 NOTE — MR AVS SNAPSHOT
After Visit Summary   10/25/2018    Rashad Ortiz    MRN: [id number]           Patient Information     Date Of Birth          1975        Visit Information        Provider Department      10/25/2018 11:00 AM Nurys Méndez NP [facility]        Today's Diagnoses     Acute gout due to renal impairment involving left foot    -  1    Fever and chills           Follow-ups after your visit        Your next 10 appointments already scheduled     Oct 25, 2018  6:40 PM CDT   PROCEDURE with Glenroy Uriarte MD   [facility] ([facility])    [address] [facility] [address]  [place] [address]   [phone]            Nov 01, 2018  2:30 PM CDT   Lab with  LAB   [facility] ([facility])    [address] [place] [address]  1st [facility] [id number] [phone]            Nov 01, 2018  3:35 PM CDT   (Arrive by 3:05 PM)   Return Kidney Transplant with  Kidney/Pancreas Recipient 2   [facility] ([facility])    [address] [place] [address]  [address]  [facility] [id number] [phone]            Nov 08, 2018  2:30 PM CST   (Arrive by 2:15 PM)   New Patient Visit with KAILA Kaufman MD   [facility] ([facility])    [address] [place] [address]  3rd [facility] [id number] [phone]            Nov 14, 2018  2:00 PM CST   FULL PULMONARY FUNCTION with  PFL C   [facility] Function [facility] ([facility])    [address] [place] [address]  3rd [facility] [id number] [phone]            Nov 14, 2018  3:30 PM CST   (Arrive by 3:15 PM)   New Patient Visit with Gelacio Jimenez MD   [facility] ([facility])    [address] [place] [address]  [address]  [facility] [id number] [phone]              Who to contact     If you have questions or need follow up information about  today's clinic visit or your schedule please contact [facility] directly at [phone].  Normal or non-critical lab and imaging results will be communicated to you by [employer], letter or phone within 4 business days after the clinic has received the results. If you do not hear from us within 7 days, please contact the clinic through [employer] or phone. If you have a critical or abnormal lab result, we will notify you by phone as soon as possible.  Submit refill requests through [employer] or call your pharmacy and they will forward the refill request to us. Please allow 3 business days for your refill to be completed.          Additional Information About Your Visit        [employer] Information     [employer] gives you secure access to your electronic health record. If you see a primary care provider, you can also send messages to your care team and make appointments. If you have questions, please call your primary care clinic.  If you do not have a primary care provider, please call [phone] and they will assist you.        Care EveryWhere ID     This is your Care EveryWhere ID. This could be used by other organizations to access your [facility] medical records  [id number]        Your Vitals Were     Pulse Temperature Pulse Oximetry BMI (Body Mass Index)          101 101.6  F (38.7  C) (Oral) 99% 23.02 kg/m2         Blood Pressure from Last 3 Encounters:   10/25/18 96/57   10/22/18 115/80   09/17/18 133/80    Weight from Last 3 Encounters:   10/25/18 175 lb 9.6 oz (79.7 kg)   10/22/18 186 lb (84.4 kg)   09/17/18 192 lb (87.1 kg)              We Performed the Following     *UA reflex to Microscopic and Culture ([facility] (except [place] and [place])     CBC with platelets and differential     Comprehensive metabolic panel (BMP + Alb, Alk Phos, ALT, AST, Total. Bili, TP)     Influenza A/B antigen     Urine Microscopic        Primary Care Provider Office Phone # Fax #    [email or web address]  Delaney Mares [phone]       [address]  [facility] [address]        Equal Access to Services     LUISANA GUTIERREZ : Hadamelia song nelson udayo Sohumbertoali, waaxda luqadaha, qaybta kaalmada adeearleneda, ronaldo sulyin hayaan williamsosvaldo aguilar laSilkedeanna donahue. So [facility] [phone].    ATENCIÓN: Si habla español, tiene a ward disposición servicios gratuitos de asistencia lingüística. Llame al [phone].    We comply with applicable federal civil rights laws and [place] laws. We do not discriminate on the basis of race, color, national origin, age, disability, sex, sexual orientation, or gender identity.            Thank you!     Thank you for choosing [facility]  for your care. Our goal is always to provide you with excellent care. Hearing back from our patients is one way we can continue to improve our services. Please take a few minutes to complete the written survey that you may receive in the mail after your visit with us. Thank you!             Your Updated Medication List - Protect others around you: Learn how to safely use, store and throw away your medicines at www.disposemymeds.org.          This list is accurate as of 10/25/18  1:12 PM.  Always use your most recent med list.                   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 tophus, unspecified site       amLODIPine 5 MG tablet    NORVASC    90 tablet    Take 1 tablet (5 mg) by mouth daily    Benign essential hypertension       carvedilol 25 MG tablet    COREG    60 tablet    Take 1 tablet (25 mg) by mouth 2 times daily    Unspecified hypertensive kidney disease with chronic kidney disease stage I through stage IV, or unspecified(403.90)       furosemide 20 MG tablet    LASIX    180 tablet    Take 1 tablet (20 mg) by mouth 2 times daily    Unspecified hypertensive  kidney disease with chronic kidney disease stage I through stage IV, or unspecified(403.90)       HYDROcodone-acetaminophen 5-325 MG per tablet    NORCO    12 tablet    Take 0.5-1 tablets by mouth every 6 hours as needed for moderate to severe pain    Gout of left foot due to renal impairment, unspecified chronicity       losartan 100 MG tablet    COZAAR    90 tablet    Take 1 tablet (100 mg) by mouth daily    Renal hypertension, stage 1-4 or unspecified chronic kidney disease       * mycophenolate 250 MG capsule    GENERIC EQUIVALENT    120 capsule    Take 2 capsules (500 mg) by mouth 2 times daily    Kidney transplanted       * mycophenolate 250 MG capsule    GENERIC EQUIVALENT    120 capsule    Take 2 capsules (500 mg) by mouth 2 times daily    Kidney transplanted       omeprazole 20 MG CR capsule    priLOSEC    90 capsule    Take 1 capsule (20 mg) by mouth daily    Kidney replaced by transplant       predniSONE 5 MG tablet    DELTASONE    30 tablet    Take 1 tablet (5 mg) by mouth daily    Kidney replaced by transplant       sodium bicarbonate 650 MG tablet     120 tablet    Take 2 tablets (1,300 mg) by mouth 2 times daily    Kidney transplanted, Complications, kidney transplant, Aftercare following organ transplant, Immunosuppressed status (H), Encounter for long-term (current) use of high-risk medication       sulfamethoxazole-trimethoprim 400-80 MG per tablet    BACTRIM/SEPTRA    45 tablet    Take 1 tablet by mouth every other day    Kidney transplanted       * tacrolimus 1 MG capsule    GENERIC EQUIVALENT    120 capsule    Take 2 capsules (2 mg) by mouth 2 times daily    Kidney transplant recipient, Long-term use of immunosuppressant medication       * tacrolimus 1 MG capsule    GENERIC EQUIVALENT    120 capsule    Take 2 capsules (2 mg) by mouth 2 times daily    Kidney transplant recipient, Long-term use of immunosuppressant medication       traMADol 50 MG tablet    ULTRAM    30 tablet    Take 1 tablet (50  mg) by mouth nightly as needed for moderate pain    Cervicalgia       valACYclovir 1000 mg tablet    VALTREX    20 tablet    Take 1 tablet (1,000 mg) by mouth 2 times daily    Lesion of penis, Exposure to genital herpes       * Notice:  This list has 4 medication(s) that are the same as other medications prescribed for you. Read the directions carefully, and ask your doctor or other care provider to review them with you.

## 2018-10-25 NOTE — PROGRESS NOTES
SUBJECTIVE:   Rashad Ortiz is a 43 year old male presenting with a chief complaint of   Chief Complaint   Patient presents with     Fever     Patient complains of fatigue, dizziness, headache, diarrhea, and chills        He is an established patient of [facility].    Fatigue, dizziness, headache, diarrhea    Onset of symptoms was 5 day(s) ago.  Course of illness is worsening.    Severity moderate  Current and Associated symptoms: fever, cough - non-productive, headache, fatigue and dizziness  Treatment measures tried include None tried.  Predisposing factors include .None  History of gout with renal involvement        Review of Systems   Constitutional: Positive for chills, fatigue and fever. Negative for diaphoresis.   HENT: Negative for congestion, ear pain, rhinorrhea and sore throat.    Respiratory: Positive for cough. Negative for shortness of breath.    Gastrointestinal: Positive for diarrhea. Negative for nausea and vomiting.   Neurological: Positive for dizziness and headaches.   All other systems reviewed and are negative.      Past Medical History:   Diagnosis Date     Chronic kidney disease, stage 4, severely decreased GFR (H)      Gastro-oesophageal reflux disease      Gout      Hypertension      Medical non-compliance      Pulmonary nodules      Steroid long-term use      Family History   Problem Relation Age of Onset     Hypertension Mother      Current Outpatient Prescriptions   Medication Sig Dispense Refill     allopurinol (ZYLOPRIM) 100 MG tablet Take 1 tablet (100 mg) by mouth daily 30 tablet 1     amLODIPine (NORVASC) 5 MG tablet Take 1 tablet (5 mg) by mouth daily 90 tablet 3     carvedilol (COREG) 25 MG tablet Take 1 tablet (25 mg) by mouth 2 times daily 60 tablet 11     furosemide (LASIX) 20 MG tablet Take 1 tablet (20 mg) by mouth 2 times daily 180 tablet 1     HYDROcodone-acetaminophen (NORCO) 5-325 MG per tablet Take 0.5-1 tablets by mouth every 6 hours as needed for moderate to severe  pain 12 tablet 0     losartan (COZAAR) 100 MG tablet Take 1 tablet (100 mg) by mouth daily 90 tablet 3     mycophenolate (GENERIC EQUIVALENT) 250 MG capsule Take 2 capsules (500 mg) by mouth 2 times daily 120 capsule 11     mycophenolate (GENERIC EQUIVALENT) 250 MG capsule Take 2 capsules (500 mg) by mouth 2 times daily 120 capsule 11     omeprazole (PRILOSEC) 20 MG capsule Take 1 capsule (20 mg) by mouth daily 90 capsule 1     predniSONE (DELTASONE) 5 MG tablet Take 1 tablet (5 mg) by mouth daily 30 tablet 11     sodium bicarbonate 650 MG tablet Take 2 tablets (1,300 mg) by mouth 2 times daily 120 tablet 11     sulfamethoxazole-trimethoprim (BACTRIM/SEPTRA) 400-80 MG per tablet Take 1 tablet by mouth every other day 45 tablet 3     tacrolimus (GENERIC EQUIVALENT) 1 MG capsule Take 2 capsules (2 mg) by mouth 2 times daily 120 capsule 11     tacrolimus (GENERIC EQUIVALENT) 1 MG capsule Take 2 capsules (2 mg) by mouth 2 times daily 120 capsule 11     traMADol (ULTRAM) 50 MG tablet Take 1 tablet (50 mg) by mouth nightly as needed for moderate pain 30 tablet 0     valACYclovir (VALTREX) 1000 mg tablet Take 1 tablet (1,000 mg) by mouth 2 times daily 20 tablet 0     Social History   Substance Use Topics     Smoking status: Former Smoker     Types: Cigarettes     Quit date: 03/2017     Smokeless tobacco: Never Used      Comment: Patient states that he is an 'social'  smoker      Alcohol use 2.5 oz/week     5 Standard drinks or equivalent per week      Comment: 1-2 drinks/wk       OBJECTIVE  BP 96/57 (BP Location: Left arm, Patient Position: Chair, Cuff Size: Adult Regular)  Pulse 101  Temp 101.6  F (38.7  C) (Oral)  Wt 175 lb 9.6 oz (79.7 kg)  SpO2 99%  BMI 23.02 kg/m2    Physical Exam   Constitutional: He appears lethargic. He appears ill. No distress.   HENT:   Head: Normocephalic and atraumatic.   Right Ear: Tympanic membrane and external ear normal.   Left Ear: Tympanic membrane and external ear normal.    Mouth/Throat: Oropharynx is clear and moist.   Eyes: Conjunctivae are normal.   Neck: Normal range of motion.   Cardiovascular: Normal rate, S1 normal, S2 normal and normal heart sounds.    Pulmonary/Chest: Effort normal and breath sounds normal. No respiratory distress.   Neurological: He appears lethargic.   Skin: Skin is warm and dry.   Psychiatric: He has a normal mood and affect.   Nursing note and vitals reviewed.      Labs:  Results for orders placed or performed in visit on 10/25/18 (from the past 24 hour(s))   Influenza A/B antigen   Result Value Ref Range    Influenza A/B Agn Specimen Nasal     Influenza A Negative NEG^Negative    Influenza B Negative NEG^Negative   CBC with platelets and differential   Result Value Ref Range    WBC 21.8 (H) 4.0 - 11.0 10e9/L    RBC Count 3.52 (L) 4.4 - 5.9 10e12/L    Hemoglobin 9.8 (L) 13.3 - 17.7 g/dL    Hematocrit 30.6 (L) 40.0 - 53.0 %    MCV 87 78 - 100 fl    MCH 27.8 26.5 - 33.0 pg    MCHC 32.0 31.5 - 36.5 g/dL    RDW 16.4 (H) 10.0 - 15.0 %    Platelet Count 106 (L) 150 - 450 10e9/L    Diff Method PENDING    *UA reflex to Microscopic and Culture ([facility] (except [place] and [place])   Result Value Ref Range    Color Urine Yellow     Appearance Urine Cloudy     Glucose Urine Negative NEG^Negative mg/dL    Bilirubin Urine Negative NEG^Negative    Ketones Urine Negative NEG^Negative mg/dL    Specific Gravity Urine 1.020 1.003 - 1.035    Blood Urine Large (A) NEG^Negative    pH Urine 6.5 5.0 - 7.0 pH    Protein Albumin Urine >=300 (A) NEG^Negative mg/dL    Urobilinogen Urine 0.2 0.2 - 1.0 EU/dL    Nitrite Urine Negative NEG^Negative    Leukocyte Esterase Urine Moderate (A) NEG^Negative    Source Midstream Urine    Urine Microscopic   Result Value Ref Range    WBC Urine >100 (A) OTO5^0 - 5 /HPF    RBC Urine 25-50 (A) OTO2^O - 2 /HPF    Squamous Epithelial /LPF Urine Few FEW^Few /LPF    Bacteria Urine Many (A) NEG^Negative /HPF        ASSESSMENT:      ICD-10-CM    1. Fatigue R53.83 Influenza A/B antigen     Urine Microscopic   2. Fever and chills R50.9 *UA reflex to Microscopic and Culture ([facility] (except [place] and [place])     CBC with platelets and differential     Comprehensive metabolic panel (BMP + Alb, Alk Phos, ALT, AST, Total. Bili, TP)   3. Acute gout due to renal impairment involving left foot M10.372         Differential Diagnosis:  Renal failure, septicemia,     Serious Comorbid Conditions:  Adult:  None    PLAN:  I discussed critical lab results with patient.  A decision is made to send patient to ER for evaluation. This has been discussed with patient and girlfriend.  And patient is in agreement with treatment plan  A suggestion to use Ambulance is advised, patient has declined

## 2018-10-25 NOTE — IP AVS SNAPSHOT
MRN:[id number]                      After Visit Summary   10/25/2018    Rashad Ortiz    MRN: [id number]           Thank you!     Thank you for choosing [facility] for your care. Our goal is always to provide you with excellent care. Hearing back from our patients is one way we can continue to improve our services. Please take a few minutes to complete the written survey that you may receive in the mail after you visit with us. Thank you!        Patient Information     Date Of Birth          1975        Designated Caregiver       Most Recent Value    Caregiver    Will someone help with your care after discharge? no      About your hospital stay     You were admitted on:  October 25, 2018 You last received care in the:  [address] [facility]    You were discharged on:  October 28, 2018        Reason for your hospital stay       E. Coli Sepsis, Urinary source, Norovirus gastroenteritis. Acute kidney injury on CKD (hx of kidney transplant). You are being discharged on Antibiotics for and Antiviral for almost 2 weeks. Prednisone taper per transplant team. You were seen by infectious disease and transplant nephrology. Your BP medications are held. Resume slowly as tolerated. Talk to your primary care provider with any concern.   Follow up with transplant team with any concern.                  Who to Call     For medical emergencies, please call 911.  For non-urgent questions about your medical care, please call your primary care provider or clinic, [phone]          Attending Provider     Provider Specialty    Stephenie Bryant MD Emergency Medicine    [facility], Galindo De La Garza MD Internal Medicine    [facility], MD Jeferson Internal Medicine       Primary Care Provider Office Phone # Fax #    Mariel Delaney Mares [phone]      After Care Instructions     Activity       Your activity upon discharge: activity as tolerated            Diet       Follow this diet upon  discharge: Orders Placed This Encounter      Advance Diet as Tolerated: Regular Diet Adult            Monitor and record       blood pressure daily                  Follow-up Appointments     Adult [facility] Follow-up and recommended labs and tests       Follow up with primary care provider, Mariel Mares, within 3-4 days for hospital follow- up.  The following labs/tests are recommended: 10. 30.18: CBC, BMP, Tacrolimus Level. Further per Transplant team.     Follow up with Transplant Nephrology as scheduled and as needed. Preferably within a week.     Appointments on [place] and/or [facility] (with [facility] or [facility] provider or service). Call [phone] if you haven't heard regarding these appointments within 7 days of discharge.                  Your next 10 appointments already scheduled     Nov 01, 2018  2:30 PM CDT   Lab with  LAB   [facility] ([facility])    [address] [place] [address]  [address] [facility] [address]   [phone]            Nov 01, 2018  3:35 PM CDT   (Arrive by 3:05 PM)   Return Kidney Transplant with  Kidney/Pancreas Recipient 2   [facility] Nephrology ([facility])    [address] [place] [address]  [address]  [facility] [address]   [phone]            Nov 08, 2018  2:30 PM CST   (Arrive by 2:15 PM)   New Patient Visit with KAILA Kaufman MD   [facility] Dermatology ([facility])    [address] [place] [address]  [address]rd [facility] [address]   [phone]            Nov 14, 2018  2:00 PM CST   FULL PULMONARY FUNCTION with  PFL C   [facility] Pulmonary Function Testing ([facility])    [address] [place] [address]  3rd Floor  [facility] [address]   [phone]            Nov 14, 2018  3:30 PM CST   (Arrive by 3:15 PM)   New Patient Visit with Gelacio Jimenez MD   [facility] ([facility])    [address] [place] [address]  [address]  [facility]  [address] [phone]              Further instructions from your care team         CMP  Recent Labs  Lab 10/28/18  0534 10/27/18  0444 10/26/18  1626 10/26/18  0545 10/25/18  2324 10/25/18  2225 10/25/18  1707 10/25/18  1155    141 141 139 138  --  134 136   POTASSIUM 3.5 3.6 4.2 4.0 4.4  --  5.1 4.6   CHLORIDE 110* 112* 114* 110* 109  --  104 104   CO2 18* 15* 14* 14* 15*  --  19* 22   ANIONGAP 13 14 14 15* 14  --  11 10   * 135* 150* 108* 108*  --  124* 105*   BUN 67* 61* 58* 58* 61*  --  56* 54*   CR 6.72* 8.12* 8.64* 9.27* 8.87*  --  8.70* 7.47*   GFRESTIMATED 9* 7* 7* 6* 7*  --  7* 8*   GFRESTBLACK 11* 9* 8* 8* 8*  --  8* 10*   ASHELY 8.4* 7.7* 7.4* 7.5* 7.3*  --  8.7 8.9   MAG  --   --   --   --  1.7 0.9* 1.0*  --    PHOS  --   --   --   --   --   --  5.2*  --    PROTTOTAL  --   --   --  5.5*  --   --  6.8 6.8   ALBUMIN  --   --   --  2.0*  --   --  2.7* 2.8*   BILITOTAL  --   --   --  0.4  --   --  0.4 0.4   ALKPHOS  --   --   --  63  --   --  64 63   AST  --   --   --  11  --   --  13 12   ALT  --   --   --  10  --   --  14 16     CBC  Recent Labs  Lab 10/28/18  0534 10/27/18  0444 10/26/18  1626 10/26/18  0545   WBC 11.8* 13.1* 15.9* 17.7*   RBC 3.07* 3.14* 3.04* 2.99*   HGB 8.2* 8.5* 8.2* 8.0*   HCT 24.9* 26.0* 26.0* 25.8*   MCV 81 83 86 86   MCH 26.7 27.1 27.0 26.8   MCHC 32.9 32.7 31.5 31.0*   RDW 15.9* 16.1* 16.4* 16.5*   PLT 78* 77* 77* 78*     INR  Recent Labs  Lab 10/26/18  0545 10/25/18  2324   INR 1.58* 1.57*     Arterial Blood Gas  Recent Labs  Lab 10/26/18  0836 10/26/18  0018 10/25/18  2136   O2PER 21.0 0 21       Pending Results     Date and Time Order Name Status Description    10/28/2018 0929 Blood culture Preliminary     10/26/2018 2200 Mycophenolic acid In process     10/26/2018 2104 EKG 12-lead, tracing only Preliminary     10/26/2018 1610 Blood Morphology Pathologist Review In process     10/26/2018 0806 Blood culture Preliminary     10/26/2018 0806 Blood culture  Preliminary             Statement of Approval     Ordered          10/28/18 6309  I have reviewed and agree with all the recommendations and orders detailed in this document.  EFFECTIVE NOW     Approved and electronically signed by:  Jeferson Nelson MD             Admission Information     Date & Time Provider Department Dept. Phone    10/25/2018 Jeferson Nelson MD [address] [facility] [phone]      Your Vitals Were     Blood Pressure Pulse Temperature Respirations Weight Pulse Oximetry    129/95 (BP Location: Left arm) 100 98.1  F (36.7  C) (Oral) 16 82.1 kg (181 lb) 96%    BMI (Body Mass Index)                   23.73 kg/m2           MyChart Information     [employer] gives you secure access to your electronic health record. If you see a primary care provider, you can also send messages to your care team and make appointments. If you have questions, please call your primary care clinic.  If you do not have a primary care provider, please call [phone] and they will assist you.        Care EveryWhere ID     This is your Care EveryWhere ID. This could be used by other organizations to access your [facility] medical records  [id number]        Equal Access to Services     [facility] : Hadamelia Ramos, lindsay tolentino, ronaldo spears. So [facility] [place] [phone].    ATENCIÓN: Si habla español, tiene a ward disposición servicios gratuitos de asistencia lingüística. Joshua al [phone].    We comply with applicable federal civil rights laws and [place] laws. We do not discriminate on the basis of race, color, national origin, age, disability, sex, sexual orientation, or gender identity.               Review of your medicines      START taking        Dose / Directions    levofloxacin 500 MG tablet   Commonly known as:  LEVAQUIN        Dose:  500 mg   Start taking on:  10/30/2018   Take 1 tablet (500 mg) by mouth every other day   Quantity:  7 tablet    Refills:  0       nitazoxanide 500 MG tablet   Commonly known as:  ALINIA   Indication:  diarrhea   Used for:  Gastroenteritis due to norovirus        Dose:  500 mg   Start taking on:  10/29/2018   Take 1 tablet (500 mg) by mouth every 12 hours for 12 days   Quantity:  24 tablet   Refills:  0         CONTINUE these medicines which may have CHANGED, or have new prescriptions. If we are uncertain of the size of tablets/capsules you have at home, strength may be listed as something that might have changed.        Dose / Directions    carvedilol 25 MG tablet   Commonly known as:  COREG   This may have changed:  how much to take   Used for:  DEIDRA (acute kidney injury) (H)        Dose:  12.5 mg   Start taking on:  10/29/2018   Take 0.5 tablets (12.5 mg) by mouth 2 times daily   Quantity:  60 tablet   Refills:  11       furosemide 20 MG tablet   Commonly known as:  LASIX   This may have changed:  These instructions start on 10/31/2018. If you are unsure what to do until then, ask your doctor or other care provider.   Used for:  DEIDRA (acute kidney injury) (H)        Dose:  20 mg   Start taking on:  10/31/2018   Take 1 tablet (20 mg) by mouth 2 times daily   Quantity:  180 tablet   Refills:  1       losartan 100 MG tablet   Commonly known as:  COZAAR   This may have changed:  These instructions start on 10/31/2018. If you are unsure what to do until then, ask your doctor or other care provider.   Used for:  Renal hypertension, stage 1-4 or unspecified chronic kidney disease        Dose:  100 mg   Start taking on:  10/31/2018   Take 1 tablet (100 mg) by mouth daily   Quantity:  90 tablet   Refills:  3       predniSONE 10 MG tablet   Commonly known as:  DELTASONE   This may have changed:    - medication strength  - how much to take  - how to take this  - when to take this  - additional instructions   Used for:  Kidney replaced by transplant        Start taking on:  10/29/2018   Take 4 tabs (40 mg) by mouth daily x 3 days, 2  tabs (20 mg) daily x 3 days, 1 tab (10 mg) daily x 3 days, then 1/2 tab (5 mg) -- continue.   Quantity:  30 tablet   Refills:  0         CONTINUE these medicines which have NOT CHANGED        Dose / Directions    allopurinol 100 MG tablet   Commonly known as:  ZYLOPRIM   Used for:  Chronic gout due to renal impairment without tophus, unspecified site        Dose:  100 mg   Take 1 tablet (100 mg) by mouth daily   Quantity:  30 tablet   Refills:  1       amLODIPine 5 MG tablet   Commonly known as:  NORVASC   Used for:  Benign essential hypertension        Dose:  5 mg   Start taking on:  10/29/2018   Take 1 tablet (5 mg) by mouth daily   Quantity:  90 tablet   Refills:  3       mycophenolate 250 MG capsule   Commonly known as:  GENERIC EQUIVALENT   Used for:  Kidney transplanted        Dose:  500 mg   Take 2 capsules (500 mg) by mouth 2 times daily   Quantity:  120 capsule   Refills:  11       omeprazole 20 MG CR capsule   Commonly known as:  priLOSEC   Used for:  Kidney replaced by transplant        Dose:  20 mg   Take 1 capsule (20 mg) by mouth daily   Quantity:  90 capsule   Refills:  1       sodium bicarbonate 650 MG tablet   Used for:  Encounter for long-term (current) use of high-risk medication, Kidney replaced by transplant        Dose:  1300 mg   Take 2 tablets (1,300 mg) by mouth 2 times daily   Quantity:  120 tablet   Refills:  0       sulfamethoxazole-trimethoprim 400-80 MG per tablet   Commonly known as:  BACTRIM/SEPTRA   Used for:  Kidney transplanted        Dose:  1 tablet   Start taking on:  10/29/2018   Take 1 tablet by mouth every other day   Quantity:  45 tablet   Refills:  3       tacrolimus 1 MG capsule   Commonly known as:  GENERIC EQUIVALENT   Used for:  Kidney transplant recipient, Long-term use of immunosuppressant medication        Dose:  2 mg   Take 2 capsules (2 mg) by mouth 2 times daily   Quantity:  120 capsule   Refills:  11         STOP taking     valACYclovir 1000 mg tablet   Commonly  known as:  VALTREX                Where to get your medicines      These medications were sent to [facility] - [place], [place] - [address]  [address], [facility] [address]     Phone:  [phone]     levofloxacin 500 MG tablet    nitazoxanide 500 MG tablet    predniSONE 10 MG tablet    sodium bicarbonate 650 MG tablet                Protect others around you: Learn how to safely use, store and throw away your medicines at www.disposemymeds.org.        ANTIBIOTIC INSTRUCTION     You've Been Prescribed an Antibiotic - Now What?  Your healthcare team thinks that you or your loved one might have an infection. Some infections can be treated with antibiotics, which are powerful, life-saving drugs. Like all medications, antibiotics have side effects and should only be used when necessary. There are some important things you should know about your antibiotic treatment.      Your healthcare team may run tests before you start taking an antibiotic.    Your team may take samples (e.g., from your blood, urine or other areas) to run tests to look for bacteria. These test can be important to determine if you need an antibiotic at all and, if you do, which antibiotic will work best.      Within a few days, your healthcare team might change or even stop your antibiotic.    Your team may start you on an antibiotic while they are working to find out what is making you sick.    Your team might change your antibiotic because test results show that a different antibiotic would be better to treat your infection.    In some cases, once your team has more information, they learn that you do not need an antibiotic at all. They may find out that you don't have an infection, or that the antibiotic you're taking won't work against your infection. For example, an infection caused by a virus can't be treated with antibiotics. Staying on an antibiotic when you don't need it is more likely to be harmful than  helpful.      You may experience side effects from your antibiotic.    Like all medications, antibiotics have side effects. Some of these can be serious.    Let you healthcare team know if you have any known allergies when you are admitted to the hospital.    One significant side effect of nearly all antibiotics is the risk of severe and sometimes deadly diarrhea caused by Clostridium difficile (C. Difficile). This occurs when a person takes antibiotics because some good germs are destroyed. Antibiotic use allows C. diificile to take over, putting patients at high risk for this serious infection.    As a patient or caregiver, it is important to understand your or your loved one's antibiotic treatment. It is especially important for caregivers to speak up when patients can't speak for themselves. Here are some important questions to ask your healthcare team.    What infection is this antibiotic treating and how do you know I have that infection?    What side effects might occur from this antibiotic?    How long will I need to take this antibiotic?    Is it safe to take this antibiotic with other medications or supplements (e.g., vitamins) that I am taking?     Are there any special directions I need to know about taking this antibiotic? For example, should I take it with food?    How will I be monitored to know whether my infection is responding to the antibiotic?    What tests may help to make sure the right antibiotic is prescribed for me?      Information provided by:  www.cdc.gov/getsmart  U.S. Department of Health and Human Services  Centers for disease Control and Prevention  National Center for Emerging and Zoonotic Infectious Diseases  Division of Healthcare Quality Promotion             Medication List: This is a list of all your medications and when to take them. Check marks below indicate your daily home schedule. Keep this list as a reference.      Medications           Morning Afternoon Evening Bedtime  As Needed    allopurinol 100 MG tablet   Commonly known as:  ZYLOPRIM   Take 1 tablet (100 mg) by mouth daily   Last time this was given:  100 mg on 10/28/2018  8:22 AM                                amLODIPine 5 MG tablet   Commonly known as:  NORVASC   Take 1 tablet (5 mg) by mouth daily   Start taking on:  10/29/2018                                carvedilol 25 MG tablet   Commonly known as:  COREG   Take 0.5 tablets (12.5 mg) by mouth 2 times daily   Start taking on:  10/29/2018   Last time this was given:  6.25 mg on 10/28/2018  8:22 AM                                furosemide 20 MG tablet   Commonly known as:  LASIX   Take 1 tablet (20 mg) by mouth 2 times daily   Start taking on:  10/31/2018                                levofloxacin 500 MG tablet   Commonly known as:  LEVAQUIN   Take 1 tablet (500 mg) by mouth every other day   Start taking on:  10/30/2018                                losartan 100 MG tablet   Commonly known as:  COZAAR   Take 1 tablet (100 mg) by mouth daily   Start taking on:  10/31/2018                                mycophenolate 250 MG capsule   Commonly known as:  GENERIC EQUIVALENT   Take 2 capsules (500 mg) by mouth 2 times daily   Last time this was given:  500 mg on 10/28/2018 11:48 AM                                nitazoxanide 500 MG tablet   Commonly known as:  ALINIA   Take 1 tablet (500 mg) by mouth every 12 hours for 12 days   Start taking on:  10/29/2018   Last time this was given:  500 mg on 10/28/2018 11:48 AM                                omeprazole 20 MG CR capsule   Commonly known as:  priLOSEC   Take 1 capsule (20 mg) by mouth daily                                predniSONE 10 MG tablet   Commonly known as:  DELTASONE   Take 4 tabs (40 mg) by mouth daily x 3 days, 2 tabs (20 mg) daily x 3 days, 1 tab (10 mg) daily x 3 days, then 1/2 tab (5 mg) -- continue.   Start taking on:  10/29/2018   Last time this was given:  5 mg on 10/28/2018  8:22 AM                                 sodium bicarbonate 650 MG tablet   Take 2 tablets (1,300 mg) by mouth 2 times daily   Last time this was given:  1,300 mg on 10/28/2018 11:48 AM                                sulfamethoxazole-trimethoprim 400-80 MG per tablet   Commonly known as:  BACTRIM/SEPTRA   Take 1 tablet by mouth every other day   Start taking on:  10/29/2018                                tacrolimus 1 MG capsule   Commonly known as:  GENERIC EQUIVALENT   Take 2 capsules (2 mg) by mouth 2 times daily   Last time this was given:  2 mg on 10/26/2018  8:02 AM                                          More Information        Understanding Norovirus  Norovirus is a virus that can infect the stomach and intestines. It is the most common cause of diarrhea and vomiting. The virus spreads easily in areas of close human contact, such as schools and cruise ships.  What causes norovirus infection?  You can be infected with norovirus by coming into contact with a person who has the virus or by touching a contaminated surface. Norovirus is very contagious. Washing your hands well can lower your risk of getting the virus.  You can also get it by consuming food and water contaminated with the virus. Foods most likely to become tainted include:    Shellfish    Ready-to-eat salads and sandwiches    Produce such as celery, melons, and leafy vegetables  What are the symptoms of norovirus?  Some people may have no symptoms. In people who do, symptoms show up suddenly,  typically  within a day of being exposed. The illness lasts 1 to 3 days. Symptoms include:    Fever    Diarrhea    Nausea    Vomiting    Headache    Achiness    Stomach cramping  More severe cases are often seen in infants, older adults, and people with other health problems. Symptoms may last longer and be more severe in these groups.  How is  norovirus treated?  There is no medicine to cure norovirus. Treatment includes:    Rest. You may feel better faster if you get plenty of  rest.    Fluids. Drinking lots of fluids will help you stay hydrated. Don t drink alcohol or beverages with caffeine. They can make your symptoms worse.    Medicine. Over-the-counter medicines for diarrhea may ease symptoms. These should be used only by adults. Pain relievers, such as acetaminophen or ibuprofen, can help with headaches and body aches.  What are the possible complications of norovirus?  Dehydrationis the main concern with norovirus infection. Severe dehydration may need to be treated in the hospital. You may need to get fluids through an IV (directly into a vein).  When should I call my  healthcare provider?  Call your healthcare provider right away if you have any of these:    Fever of 100.4 F (38 C) or higher, or as directed    Belly (abdominal) pain that gets worse    Severe dizziness, especially when getting up from bed    Vomiting so severe that you can t keep fluids down   Date Last Reviewed: 3/28/2016    5451-2658 [employer]. [address] [place] [address]. All rights reserved. This information is not intended as a substitute for professional medical care. Always follow your healthcare professional's instructions.                Understanding Sepsis  Sepsis is a severe response the body has to an infection. It is most often caused by bacteria. It is also known as septicemia, or systemic inflammatory response syndrome (SIRS). Sepsis is a medical emergency. It needs to be treated right away.  What is sepsis?  Sepsis is when the body reacts to an infection with a severe inflammatory response. It can be caused by bacteria, fungus, or a virus. Sepsis can cause many kinds of problems around the body. It can lead severe low blood pressure (shock) and organ failure. This can lead to death if not treated.  Sepsis is most common in:    Infants and older adults    People with an infection such as pneumonia, meningitis, or a urinary tract infection    People who have an illness  such as cancer, AIDS, or diabetes    People being treated with chemotherapy medications or radiation    People who have had a transplant  Symptoms of sepsis  Symptoms of sepsis can include:    Chills and shaking    Rapid heartbeat    Rapid breathing    Shortness of breath    Severe nausea or uncontrolled vomiting    Confusion    Dizziness    Decreased urination    Severe pain, including in the back or joints   Diagnosing sepsis  If your health care provider thinks you may have sepsis, you will be given tests. You may have blood and urine tests. These are done to look for bacteria, viruses, or fungus. You may also have X-rays or other imaging tests. These may be done to look at your organs to locate the source of infection.  Treating sepsis  If you have sepsis, your health care provider will give you antibiotics through a thin, flexible tube put into a vein in your arm (IV). You will also be given fluids through the IV. You may also be given nutrition or other medications through your IV. Your health care provider will talk with you about other treatments you may need. These may include using an oxygen mask or a ventilator to help with breathing. Treatment may last at least 7 to 10 days. Sepsis must be treated in the hospital.  Date Last Reviewed: 7/15/2015    5658-9785 [employer]. [address] [place] [address], [place] [address]. All rights reserved. This information is not intended as a substitute for professional medical care. Always follow your healthcare professional's instructions.

## 2018-10-25 NOTE — ED TRIAGE NOTES
Pt presents ambulatory to triage from urgent care. Pt states beginning yesterday started having fatigue, nausea, diarrhea, body aches,chills and fevers. Denies cough. Has not received flu shot. Pt seen at urgent care and found to have high WBCs. Pt has hx kidney txp in 2011.

## 2018-10-25 NOTE — ED PROVIDER NOTES
"  History     Chief Complaint   Patient presents with     Fatigue     HPI  Rashad Ortiz is a 43 year old male with history of HTN, GERD, CKD s/p R Kidney transplant 01/2011 who presents from urgent care with abnormal labs and complaints of fatigue, dizziness, headache, abdominal pain, and diarrhea onset yesterday morning. He also reports localized RLQ pain and denies right kidney/flank pain. Nothing makes his pain worse or better. He states his headache starts in the occipital region and \"resonates\" to frontal, he notes headaches are not typical for him. He reports loose watery diarrhea 10x last night. He notes lower extremity weakness. The patient thought he had the flu but his flu test came back normal. He states his last creatine levels were at baseline, ~3. He reports decreased appetite. The patient denies dysuria, frequency, nausea/vomitng. He denies cough, cold, chest pain or SOB. Denies recent antibiotics. He states he was on a type of prednisone for gout but has not taken it for a week. He denies recent hospitalizations. He denies major medical problems. He denies allergies. He states he still has his appendix. He would like something for pain.     I have reviewed the Medications, Allergies, Past Medical and Surgical History, and Social History in the [employer] system.  Past Medical History:   Diagnosis Date     Chronic kidney disease, stage 4, severely decreased GFR (H)      Gastro-oesophageal reflux disease      Gout      Hypertension      Medical non-compliance      Pulmonary nodules      Steroid long-term use        Past Surgical History:   Procedure Laterality Date     AV FISTULA OR GRAFT ARTERIAL       LIGATE FISTULA ARTERIOVENOUS UPPER EXTREMITY  12/20/2011    Procedure:LIGATE FISTULA ARTERIOVENOUS UPPER EXTREMITY; Excision of Right Forearm Arteriovenous Fistula.; Surgeon:LINDY AMAYA; Location:[facility] OR     PERCUTANEOUS BIOPSY KIDNEY Right 2/28/2017    Procedure: PERCUTANEOUS BIOPSY KIDNEY;  " Surgeon: Gee Barrios MD;  Location: [facility] OR     TRANSPLANT  01/13/2011    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       Family History   Problem Relation Age of Onset     Hypertension Mother        Social History   Substance Use Topics     Smoking status: Former Smoker     Types: Cigarettes     Quit date: 03/2017     Smokeless tobacco: Never Used      Comment: Patient states that he is an 'social'  smoker      Alcohol use 2.5 oz/week     5 Standard drinks or equivalent per week      Comment: 1-2 drinks/wk       Current Facility-Administered Medications   Medication     0.9% sodium chloride BOLUS    Followed by     sodium chloride 0.9% infusion     Current Outpatient Prescriptions   Medication     allopurinol (ZYLOPRIM) 100 MG tablet     amLODIPine (NORVASC) 5 MG tablet     carvedilol (COREG) 25 MG tablet     furosemide (LASIX) 20 MG tablet     losartan (COZAAR) 100 MG tablet     mycophenolate (GENERIC EQUIVALENT) 250 MG capsule     omeprazole (PRILOSEC) 20 MG capsule     sodium bicarbonate 650 MG tablet     sulfamethoxazole-trimethoprim (BACTRIM/SEPTRA) 400-80 MG per tablet     tacrolimus (GENERIC EQUIVALENT) 1 MG capsule     HYDROcodone-acetaminophen (NORCO) 5-325 MG per tablet     mycophenolate (GENERIC EQUIVALENT) 250 MG capsule     predniSONE (DELTASONE) 5 MG tablet     tacrolimus (GENERIC EQUIVALENT) 1 MG capsule     traMADol (ULTRAM) 50 MG tablet     valACYclovir (VALTREX) 1000 mg tablet        Allergies   Allergen Reactions     Nkda [No Known Drug Allergies]        Review of Systems   Constitutional: Positive for appetite change (decreased) and fever. Negative for chills.   HENT: Negative for rhinorrhea and sore throat.    Respiratory: Negative for cough and shortness of breath.    Cardiovascular: Negative for chest pain.   Gastrointestinal: Positive for abdominal pain (RLQ) and diarrhea. Negative for nausea and vomiting.   Genitourinary: Negative for dysuria, flank pain and frequency.    Neurological: Positive for dizziness, weakness (LE) and headaches.   All other systems reviewed and are negative.      Physical Exam   BP: 117/61  Pulse: 100  Temp: 100.1  F (37.8  C)  Resp: 16  Weight: 81.1 kg (178 lb 12.7 oz)  SpO2: 96 %      Physical Exam  Physical Exam   Constitutional:   well nourished, well developed, resting comfortably   HENT:   Head: Normocephalic and atraumatic.   Eyes: Conjunctivae are normal. Pupils are equal, round, and reactive to light.   TMS are clear, pharynx has no erythema or exudate, mucous membranes are DRY  Neck:   no adenopathy, no bony tenderness  Cardiovascular: regular rate and rhythm without murmurs or gallops  Pulmonary/Chest: Clear to auscultation bilaterally, with no wheezes or retractions. No respiratory distress.  GI: Soft with good bowel sounds.  +TTP RLQ, healed kidney transplant scar, minimal TTP over transplant, non-distended, with no guarding, no rebound, no peritoneal signs.   Back:  No bony or CVA tenderness   Musculoskeletal:  no edema or clubbing   Skin: Skin is warm and dry. No rash noted.   Neurological: alert and oriented to person, place, and time. Nonfocal exam  Psychiatric:  normal mood and affect.    ED Course     ED Course     Procedures               .  Results for orders placed or performed during the hospital encounter of 10/25/18   Abd/pelvis CT no contrast - [facility] Protocol    Narrative    EXAMINATION: CT ABDOMEN PELVIS W/O CONTRAST, 10/25/2018 6:36 PM    TECHNIQUE:  Helical CT images from the lung bases through the  symphysis pubis were obtained without IV contrast. Contrast: None     COMPARISON: Transplant ultrasound 6/17/2015, 6/4/2015, CT abdomen  1/21/2012    HISTORY: fever, abdominal pain, RLQ pain, wbc 24.5;     FINDINGS:    Abdomen and pelvis:   Postoperative changes of right lower quadrant kidney transplant. No  significant perinephric fluid. Mild nonspecific stranding about the  right lower quadrant kidney, similar to 1/21/2012. No  significant  hydronephrosis. Unchanged appearance of the transplant ureter.     Nondilated fluid-filled small bowel. Moderate colonic stool. The  appendix is unremarkable.    Noncontrast appearance of the liver, spleen, adrenal glands, pancreas,  and gallbladder are unremarkable. Symmetric renal parenchymal  thinning. Stable 1.3 cm renal cyst in the left upper pole, not  significantly changed in size from ultrasound 12/5/2008 prior CT  comparisons. Nonobstructing renal calculi measuring up to 4 mm in the  right lower pole, 2 mm in the right midpole. No hydronephrosis or  hydroureter of the native kidneys. The urinary bladder is  decompressed.    Abdominal aorta is within normal limits. No pathologically enlarged  retroperitoneal, pelvic or abdominal lymphadenopathy. Slightly  prominent scattered mesenteric lymph nodes.    Lung bases: Partially calcified 4 mm granuloma of the right lung base,  unchanged from 2012. No significant pleural effusion. No pneumothorax.  Trace pericardial fluid. No evidence of acute airspace disease. No  significant bronchial wall thickening or bronchiectasis.    Bones and soft tissues: No aggressive osseous lesion.      Impression    IMPRESSION:  1. Minimal nonspecific stranding around the right lower quadrant  transplant is unchanged from prior comparisons. No hydronephrosis. No  peritransplant fluid collection.  2. Nondilated fluid-filled small bowel, nonspecific, can be seen with  enteritis.  3. Atrophic native kidneys. Nonobstructing right renal calculi.   CBC with platelets differential   Result Value Ref Range    WBC 24.5 (H) 4.0 - 11.0 10e9/L    RBC Count 3.56 (L) 4.4 - 5.9 10e12/L    Hemoglobin 9.7 (L) 13.3 - 17.7 g/dL    Hematocrit 31.1 (L) 40.0 - 53.0 %    MCV 87 78 - 100 fl    MCH 27.2 26.5 - 33.0 pg    MCHC 31.2 (L) 31.5 - 36.5 g/dL    RDW 16.4 (H) 10.0 - 15.0 %    Platelet Count 128 (L) 150 - 450 10e9/L    Diff Method Automated Method     % Neutrophils 88.0 %    % Lymphocytes  2.3 %    % Monocytes 8.5 %    % Eosinophils 0.0 %    % Basophils 0.0 %    % Immature Granulocytes 1.2 %    Nucleated RBCs 0 0 /100    Absolute Neutrophil 21.5 (H) 1.6 - 8.3 10e9/L    Absolute Lymphocytes 0.6 (L) 0.8 - 5.3 10e9/L    Absolute Monocytes 2.1 (H) 0.0 - 1.3 10e9/L    Absolute Eosinophils 0.0 0.0 - 0.7 10e9/L    Absolute Basophils 0.0 0.0 - 0.2 10e9/L    Abs Immature Granulocytes 0.3 0 - 0.4 10e9/L    Absolute Nucleated RBC 0.0    Comprehensive metabolic panel   Result Value Ref Range    Sodium 134 133 - 144 mmol/L    Potassium 5.1 3.4 - 5.3 mmol/L    Chloride 104 94 - 109 mmol/L    Carbon Dioxide 19 (L) 20 - 32 mmol/L    Anion Gap 11 3 - 14 mmol/L    Glucose 124 (H) 70 - 99 mg/dL    Urea Nitrogen 56 (H) 7 - 30 mg/dL    Creatinine 8.70 (H) 0.66 - 1.25 mg/dL    GFR Estimate 7 (L) >60 mL/min/1.7m2    GFR Estimate If Black 8 (L) >60 mL/min/1.7m2    Calcium 8.7 8.5 - 10.1 mg/dL    Bilirubin Total 0.4 0.2 - 1.3 mg/dL    Albumin 2.7 (L) 3.4 - 5.0 g/dL    Protein Total 6.8 6.8 - 8.8 g/dL    Alkaline Phosphatase 64 40 - 150 U/L    ALT 14 0 - 70 U/L    AST 13 0 - 45 U/L   Lactic acid whole blood   Result Value Ref Range    Lactic Acid 1.5 0.7 - 2.0 mmol/L   Blood Culture ONE site   Result Value Ref Range    Specimen Description Blood Left Arm     Special Requests Received in aerobic bottle only     Culture Micro PENDING          Assessments & Plan (with Medical Decision Making)   Rashad Ortiz is a 43 year old male with history of HTN, GERD, CKD s/p R Kidney transplant 01/2011 who presents from urgent care with abnormal labs and complaints of fatigue, dizziness, headache, abdominal pain, and diarrhea onset yesterday morning.      Upon arrival patient is well-appearing, afebrile, no distress.  On exam patient with dry mucous membranes, abdomen soft, nontender, positive tenderness palpation in the right lower quadrant and mild tenderness to palpation over the kidney transplant, no rebound, no guarding, no  peritoneal signs.  Patient was seen in urgent care prior to arrival and had leukocytosis of 21.8, negative influenza a/B, and had urinalysis which is remarkable for large amount of blood, moderate leukoesterase, greater than 100 white blood cells.  Differential diagnosis includes but is not limited to viral illness versus urinary tract infection versus pyelonephritis versus appendicitis versus enterocolitis.  At this time will hydrate with 2 L normal saline IV fluid bolus, will recheck labs, and obtain CT scan of abdomen pelvis to rule out appendicitis versus pyelonephritis versus intra-abdominal pathology. Will also obtain stool culture to r/o infectious etiology.  Will give patient Zosyn for urinary tract infection, leukocytosis, possible intra-abdominal infection.  I reviewed competence of labs which are remarkable for white blood cell count of 24.5, absolute neutrophils 21.5, hemoglobin 9.7, lactic acid 1.5.  Patient  with acute kidney injury with creatinine 8.70 (baseline 3-3.5; CR on 9/17/18 was 3.57); potassium 5.1.  Reviewed CT scan of abdomen pelvis which demonstrates minimal nonspecific stranding on the right lower quadrant transplant but unchanged from prior, no evidence of hydronephrosis, no peritransplant fluid collection, nondilated fluid filled small bowel nonspecific which can be seen to enteritis.  The overall clinical presentation is consistent with enteritis, urinary tract infection, and acute kidney injury.  Patient received Zofran.  Will obtain renal ultrasound and plan on admission to hospitalist. Discussed with transplant who will consult. Patient understands and agrees to the plan.      I have reviewed the nursing notes.    I have reviewed the findings, diagnosis, plan and need for follow up with the patient.    New Prescriptions    No medications on file       Final diagnoses:   Acute kidney injury (H)   Acute cystitis without hematuria   IDanna, am serving as a trained medical  scribe to document services personally performed by Stephenie Bryant MD, based on the provider's statements to me.   I, Stephenie Bryant MD, was physically present and have reviewed and verified the accuracy of this note documented by Danna Haywood.      10/25/2018   [facility], [place], EMERGENCY DEPARTMENT     Stephenie Bryant MD  10/25/18 2004

## 2018-10-26 ENCOUNTER — APPOINTMENT (OUTPATIENT)
Dept: GENERAL RADIOLOGY | Facility: CLINIC | Age: 43
DRG: 698 | End: 2018-10-26
Attending: INTERNAL MEDICINE
Payer: COMMERCIAL

## 2018-10-26 PROBLEM — R19.7 DIARRHEA: Status: ACTIVE | Noted: 2018-10-26

## 2018-10-26 PROBLEM — A41.51: Status: ACTIVE | Noted: 2018-10-26

## 2018-10-26 PROBLEM — A08.11 NOROVIRUS: Status: ACTIVE | Noted: 2018-10-26

## 2018-10-26 PROBLEM — T86.19 PYELONEPHRITIS OF TRANSPLANTED KIDNEY: Status: ACTIVE | Noted: 2018-10-26

## 2018-10-26 PROBLEM — N12 PYELONEPHRITIS OF TRANSPLANTED KIDNEY: Status: ACTIVE | Noted: 2018-10-26

## 2018-10-26 LAB
ALBUMIN SERPL-MCNC: 2 G/DL (ref 3.4–5)
ALP SERPL-CCNC: 63 U/L (ref 40–150)
ALT SERPL W P-5'-P-CCNC: 10 U/L (ref 0–70)
ANION GAP SERPL CALCULATED.3IONS-SCNC: 14 MMOL/L (ref 3–14)
ANION GAP SERPL CALCULATED.3IONS-SCNC: 15 MMOL/L (ref 3–14)
ANISOCYTOSIS BLD QL SMEAR: SLIGHT
APTT PPP: 39 SEC (ref 22–37)
AST SERPL W P-5'-P-CCNC: 11 U/L (ref 0–45)
BASE DEFICIT BLDV-SCNC: 10.5 MMOL/L
BASE DEFICIT BLDV-SCNC: 9.2 MMOL/L
BASOPHILS # BLD AUTO: 0 10E9/L (ref 0–0.2)
BASOPHILS # BLD AUTO: 0.1 10E9/L (ref 0–0.2)
BASOPHILS NFR BLD AUTO: 0.1 %
BASOPHILS NFR BLD AUTO: 0.8 %
BILIRUB SERPL-MCNC: 0.4 MG/DL (ref 0.2–1.3)
BUN SERPL-MCNC: 58 MG/DL (ref 7–30)
BUN SERPL-MCNC: 58 MG/DL (ref 7–30)
BURR CELLS BLD QL SMEAR: ABNORMAL
C COLI+JEJUNI+LARI FUSA STL QL NAA+PROBE: NOT DETECTED
CALCIUM SERPL-MCNC: 7.4 MG/DL (ref 8.5–10.1)
CALCIUM SERPL-MCNC: 7.5 MG/DL (ref 8.5–10.1)
CHLORIDE SERPL-SCNC: 110 MMOL/L (ref 94–109)
CHLORIDE SERPL-SCNC: 114 MMOL/L (ref 94–109)
CO2 SERPL-SCNC: 14 MMOL/L (ref 20–32)
CO2 SERPL-SCNC: 14 MMOL/L (ref 20–32)
CREAT SERPL-MCNC: 8.64 MG/DL (ref 0.66–1.25)
CREAT SERPL-MCNC: 9.27 MG/DL (ref 0.66–1.25)
CRP SERPL-MCNC: 350 MG/L (ref 0–8)
DIFFERENTIAL METHOD BLD: ABNORMAL
DIFFERENTIAL METHOD BLD: ABNORMAL
EC STX1 GENE STL QL NAA+PROBE: NOT DETECTED
EC STX2 GENE STL QL NAA+PROBE: NOT DETECTED
ENTERIC PATHOGEN COMMENT: ABNORMAL
EOSINOPHIL # BLD AUTO: 0 10E9/L (ref 0–0.7)
EOSINOPHIL # BLD AUTO: 0 10E9/L (ref 0–0.7)
EOSINOPHIL NFR BLD AUTO: 0 %
EOSINOPHIL NFR BLD AUTO: 0 %
ERYTHROCYTE [DISTWIDTH] IN BLOOD BY AUTOMATED COUNT: 16.3 % (ref 10–15)
ERYTHROCYTE [DISTWIDTH] IN BLOOD BY AUTOMATED COUNT: 16.4 % (ref 10–15)
ERYTHROCYTE [DISTWIDTH] IN BLOOD BY AUTOMATED COUNT: 16.5 % (ref 10–15)
FLUAV H1 2009 PAND RNA SPEC QL NAA+PROBE: NEGATIVE
FLUAV H1 RNA SPEC QL NAA+PROBE: NEGATIVE
FLUAV H3 RNA SPEC QL NAA+PROBE: NEGATIVE
FLUAV RNA SPEC QL NAA+PROBE: NEGATIVE
FLUBV RNA SPEC QL NAA+PROBE: NEGATIVE
GFR SERPL CREATININE-BSD FRML MDRD: 6 ML/MIN/1.7M2
GFR SERPL CREATININE-BSD FRML MDRD: 7 ML/MIN/1.7M2
GLUCOSE SERPL-MCNC: 108 MG/DL (ref 70–99)
GLUCOSE SERPL-MCNC: 150 MG/DL (ref 70–99)
HADV DNA SPEC QL NAA+PROBE: NEGATIVE
HADV DNA SPEC QL NAA+PROBE: NEGATIVE
HCO3 BLDV-SCNC: 14 MMOL/L (ref 21–28)
HCO3 BLDV-SCNC: 15 MMOL/L (ref 21–28)
HCT VFR BLD AUTO: 23.7 % (ref 40–53)
HCT VFR BLD AUTO: 25.8 % (ref 40–53)
HCT VFR BLD AUTO: 26 % (ref 40–53)
HGB BLD-MCNC: 7.5 G/DL (ref 13.3–17.7)
HGB BLD-MCNC: 8 G/DL (ref 13.3–17.7)
HGB BLD-MCNC: 8.2 G/DL (ref 13.3–17.7)
HMPV RNA SPEC QL NAA+PROBE: NEGATIVE
HPIV1 RNA SPEC QL NAA+PROBE: NEGATIVE
HPIV2 RNA SPEC QL NAA+PROBE: NEGATIVE
HPIV3 RNA SPEC QL NAA+PROBE: NEGATIVE
IMM GRANULOCYTES # BLD: 0.1 10E9/L (ref 0–0.4)
IMM GRANULOCYTES NFR BLD: 0.4 %
INR PPP: 1.57 (ref 0.86–1.14)
INR PPP: 1.58 (ref 0.86–1.14)
INTERPRETATION ECG - MUSE: NORMAL
LACTATE BLD-SCNC: 1.5 MMOL/L (ref 0.7–2)
LYMPHOCYTES # BLD AUTO: 0.2 10E9/L (ref 0.8–5.3)
LYMPHOCYTES # BLD AUTO: 0.2 10E9/L (ref 0.8–5.3)
LYMPHOCYTES NFR BLD AUTO: 1.4 %
LYMPHOCYTES NFR BLD AUTO: 2.6 %
MCH RBC QN AUTO: 26.8 PG (ref 26.5–33)
MCH RBC QN AUTO: 26.9 PG (ref 26.5–33)
MCH RBC QN AUTO: 27 PG (ref 26.5–33)
MCHC RBC AUTO-ENTMCNC: 31 G/DL (ref 31.5–36.5)
MCHC RBC AUTO-ENTMCNC: 31.5 G/DL (ref 31.5–36.5)
MCHC RBC AUTO-ENTMCNC: 31.6 G/DL (ref 31.5–36.5)
MCV RBC AUTO: 85 FL (ref 78–100)
MCV RBC AUTO: 86 FL (ref 78–100)
MCV RBC AUTO: 86 FL (ref 78–100)
METAMYELOCYTES # BLD: 0.1 10E9/L
METAMYELOCYTES NFR BLD MANUAL: 0.9 %
MICROBIOLOGIST REVIEW: NORMAL
MONOCYTES # BLD AUTO: 0.1 10E9/L (ref 0–1.3)
MONOCYTES # BLD AUTO: 0.3 10E9/L (ref 0–1.3)
MONOCYTES NFR BLD AUTO: 1.7 %
MONOCYTES NFR BLD AUTO: 1.8 %
NEUTROPHILS # BLD AUTO: 15.3 10E9/L (ref 1.6–8.3)
NEUTROPHILS # BLD AUTO: 7.9 10E9/L (ref 1.6–8.3)
NEUTROPHILS NFR BLD AUTO: 94 %
NEUTROPHILS NFR BLD AUTO: 96.3 %
NOROV GI+II ORF1-ORF2 JNC STL QL NAA+PR: ABNORMAL
NRBC # BLD AUTO: 0 10*3/UL
NRBC BLD AUTO-RTO: 0 /100
O2/TOTAL GAS SETTING VFR VENT: 0 %
O2/TOTAL GAS SETTING VFR VENT: 21 %
OXYHGB MFR BLDV: 86 %
PCO2 BLDV: 25 MM HG (ref 40–50)
PCO2 BLDV: 28 MM HG (ref 40–50)
PH BLDV: 7.35 PH (ref 7.32–7.43)
PH BLDV: 7.35 PH (ref 7.32–7.43)
PLATELET # BLD AUTO: 76 10E9/L (ref 150–450)
PLATELET # BLD AUTO: 77 10E9/L (ref 150–450)
PLATELET # BLD AUTO: 78 10E9/L (ref 150–450)
PLATELET # BLD EST: ABNORMAL 10*3/UL
PLATELET # BLD EST: ABNORMAL 10*3/UL
PO2 BLDV: 54 MM HG (ref 25–47)
PO2 BLDV: 59 MM HG (ref 25–47)
POIKILOCYTOSIS BLD QL SMEAR: ABNORMAL
POTASSIUM SERPL-SCNC: 4 MMOL/L (ref 3.4–5.3)
POTASSIUM SERPL-SCNC: 4.2 MMOL/L (ref 3.4–5.3)
PROCALCITONIN SERPL-MCNC: >200 NG/ML
PROT SERPL-MCNC: 5.5 G/DL (ref 6.8–8.8)
RBC # BLD AUTO: 2.79 10E12/L (ref 4.4–5.9)
RBC # BLD AUTO: 2.99 10E12/L (ref 4.4–5.9)
RBC # BLD AUTO: 3.04 10E12/L (ref 4.4–5.9)
RETICS # AUTO: 8.5 10E9/L (ref 25–95)
RETICS/RBC NFR AUTO: 0.3 % (ref 0.5–2)
RHINOVIRUS RNA SPEC QL NAA+PROBE: NEGATIVE
RSV RNA SPEC QL NAA+PROBE: NEGATIVE
RSV RNA SPEC QL NAA+PROBE: NEGATIVE
RVA NSP5 STL QL NAA+PROBE: NOT DETECTED
SALMONELLA SP RPOD STL QL NAA+PROBE: NOT DETECTED
SHIGELLA SP+EIEC IPAH STL QL NAA+PROBE: NOT DETECTED
SODIUM SERPL-SCNC: 139 MMOL/L (ref 133–144)
SODIUM SERPL-SCNC: 141 MMOL/L (ref 133–144)
SPECIMEN SOURCE: NORMAL
V CHOL+PARA RFBL+TRKH+TNAA STL QL NAA+PR: NOT DETECTED
WBC # BLD AUTO: 15.9 10E9/L (ref 4–11)
WBC # BLD AUTO: 17.7 10E9/L (ref 4–11)
WBC # BLD AUTO: 8.4 10E9/L (ref 4–11)
Y ENTERO RECN STL QL NAA+PROBE: NOT DETECTED

## 2018-10-26 PROCEDURE — 40000611 ZZHCL STATISTIC MORPHOLOGY W/INTERP HEMEPATH TC 85060: Performed by: PHYSICIAN ASSISTANT

## 2018-10-26 PROCEDURE — 83605 ASSAY OF LACTIC ACID: CPT | Performed by: INTERNAL MEDICINE

## 2018-10-26 PROCEDURE — 25000128 H RX IP 250 OP 636: Performed by: INTERNAL MEDICINE

## 2018-10-26 PROCEDURE — 84145 PROCALCITONIN (PCT): CPT | Performed by: PHYSICIAN ASSISTANT

## 2018-10-26 PROCEDURE — 80048 BASIC METABOLIC PNL TOTAL CA: CPT | Performed by: INTERNAL MEDICINE

## 2018-10-26 PROCEDURE — 93005 ELECTROCARDIOGRAM TRACING: CPT

## 2018-10-26 PROCEDURE — 86140 C-REACTIVE PROTEIN: CPT | Performed by: PHYSICIAN ASSISTANT

## 2018-10-26 PROCEDURE — 25000131 ZZH RX MED GY IP 250 OP 636 PS 637: Performed by: PHYSICIAN ASSISTANT

## 2018-10-26 PROCEDURE — 36415 COLL VENOUS BLD VENIPUNCTURE: CPT | Performed by: PHYSICIAN ASSISTANT

## 2018-10-26 PROCEDURE — 25000132 ZZH RX MED GY IP 250 OP 250 PS 637: Performed by: INTERNAL MEDICINE

## 2018-10-26 PROCEDURE — 25000125 ZZHC RX 250: Performed by: PHYSICIAN ASSISTANT

## 2018-10-26 PROCEDURE — 82803 BLOOD GASES ANY COMBINATION: CPT | Performed by: PHYSICIAN ASSISTANT

## 2018-10-26 PROCEDURE — 25000125 ZZHC RX 250: Performed by: INTERNAL MEDICINE

## 2018-10-26 PROCEDURE — 80053 COMPREHEN METABOLIC PANEL: CPT | Performed by: PHYSICIAN ASSISTANT

## 2018-10-26 PROCEDURE — 25000128 H RX IP 250 OP 636: Performed by: EMERGENCY MEDICINE

## 2018-10-26 PROCEDURE — 99233 SBSQ HOSP IP/OBS HIGH 50: CPT | Performed by: INTERNAL MEDICINE

## 2018-10-26 PROCEDURE — 25000128 H RX IP 250 OP 636: Performed by: PHYSICIAN ASSISTANT

## 2018-10-26 PROCEDURE — 82810 BLOOD GASES O2 SAT ONLY: CPT | Performed by: INTERNAL MEDICINE

## 2018-10-26 PROCEDURE — 27210995 ZZH RX 272: Performed by: INTERNAL MEDICINE

## 2018-10-26 PROCEDURE — 85045 AUTOMATED RETICULOCYTE COUNT: CPT | Performed by: PHYSICIAN ASSISTANT

## 2018-10-26 PROCEDURE — 36415 COLL VENOUS BLD VENIPUNCTURE: CPT | Performed by: INTERNAL MEDICINE

## 2018-10-26 PROCEDURE — 87040 BLOOD CULTURE FOR BACTERIA: CPT | Performed by: INTERNAL MEDICINE

## 2018-10-26 PROCEDURE — 93010 ELECTROCARDIOGRAM REPORT: CPT | Performed by: INTERNAL MEDICINE

## 2018-10-26 PROCEDURE — 85025 COMPLETE CBC W/AUTO DIFF WBC: CPT | Performed by: PHYSICIAN ASSISTANT

## 2018-10-26 PROCEDURE — 82803 BLOOD GASES ANY COMBINATION: CPT | Performed by: INTERNAL MEDICINE

## 2018-10-26 PROCEDURE — 85027 COMPLETE CBC AUTOMATED: CPT | Performed by: PHYSICIAN ASSISTANT

## 2018-10-26 PROCEDURE — 12000006 ZZH R&B IMCU INTERMEDIATE UMMC

## 2018-10-26 PROCEDURE — 71045 X-RAY EXAM CHEST 1 VIEW: CPT

## 2018-10-26 PROCEDURE — 85610 PROTHROMBIN TIME: CPT | Performed by: PHYSICIAN ASSISTANT

## 2018-10-26 PROCEDURE — 96367 TX/PROPH/DG ADDL SEQ IV INF: CPT | Performed by: EMERGENCY MEDICINE

## 2018-10-26 PROCEDURE — 25000132 ZZH RX MED GY IP 250 OP 250 PS 637: Performed by: PHYSICIAN ASSISTANT

## 2018-10-26 RX ORDER — LIDOCAINE 40 MG/G
CREAM TOPICAL
Status: DISCONTINUED | OUTPATIENT
Start: 2018-10-26 | End: 2018-10-28 | Stop reason: HOSPADM

## 2018-10-26 RX ORDER — ONDANSETRON 4 MG/1
4 TABLET, ORALLY DISINTEGRATING ORAL EVERY 6 HOURS PRN
Status: DISCONTINUED | OUTPATIENT
Start: 2018-10-26 | End: 2018-10-28 | Stop reason: HOSPADM

## 2018-10-26 RX ORDER — FUROSEMIDE 10 MG/ML
20 INJECTION INTRAMUSCULAR; INTRAVENOUS ONCE
Status: COMPLETED | OUTPATIENT
Start: 2018-10-26 | End: 2018-10-26

## 2018-10-26 RX ORDER — ACETAMINOPHEN 325 MG/1
650 TABLET ORAL EVERY 6 HOURS PRN
Status: DISCONTINUED | OUTPATIENT
Start: 2018-10-26 | End: 2018-10-28 | Stop reason: HOSPADM

## 2018-10-26 RX ORDER — MYCOPHENOLATE MOFETIL 250 MG/1
500 CAPSULE ORAL 2 TIMES DAILY
Status: DISCONTINUED | OUTPATIENT
Start: 2018-10-26 | End: 2018-10-26

## 2018-10-26 RX ORDER — PREDNISONE 5 MG/1
5 TABLET ORAL DAILY
Status: DISCONTINUED | OUTPATIENT
Start: 2018-10-28 | End: 2018-10-28

## 2018-10-26 RX ORDER — NALOXONE HYDROCHLORIDE 0.4 MG/ML
.1-.4 INJECTION, SOLUTION INTRAMUSCULAR; INTRAVENOUS; SUBCUTANEOUS
Status: DISCONTINUED | OUTPATIENT
Start: 2018-10-26 | End: 2018-10-28 | Stop reason: HOSPADM

## 2018-10-26 RX ORDER — LEVOFLOXACIN 5 MG/ML
500 INJECTION, SOLUTION INTRAVENOUS
Status: DISCONTINUED | OUTPATIENT
Start: 2018-10-28 | End: 2018-10-28

## 2018-10-26 RX ORDER — METHYLPREDNISOLONE SODIUM SUCCINATE 125 MG/2ML
125 INJECTION, POWDER, LYOPHILIZED, FOR SOLUTION INTRAMUSCULAR; INTRAVENOUS EVERY 24 HOURS
Status: COMPLETED | OUTPATIENT
Start: 2018-10-26 | End: 2018-10-27

## 2018-10-26 RX ORDER — NITAZOXANIDE 500 MG/1
500 TABLET ORAL EVERY 12 HOURS SCHEDULED
Status: DISCONTINUED | OUTPATIENT
Start: 2018-10-26 | End: 2018-10-28 | Stop reason: HOSPADM

## 2018-10-26 RX ORDER — SODIUM CHLORIDE 9 MG/ML
1000 INJECTION, SOLUTION INTRAVENOUS CONTINUOUS
Status: DISCONTINUED | OUTPATIENT
Start: 2018-10-26 | End: 2018-10-26

## 2018-10-26 RX ORDER — SODIUM BICARBONATE 650 MG/1
1300 TABLET ORAL 2 TIMES DAILY
Status: DISCONTINUED | OUTPATIENT
Start: 2018-10-26 | End: 2018-10-26

## 2018-10-26 RX ORDER — ONDANSETRON 2 MG/ML
4 INJECTION INTRAMUSCULAR; INTRAVENOUS EVERY 6 HOURS PRN
Status: DISCONTINUED | OUTPATIENT
Start: 2018-10-26 | End: 2018-10-28 | Stop reason: HOSPADM

## 2018-10-26 RX ORDER — HYDROMORPHONE HYDROCHLORIDE 1 MG/ML
.3-.5 INJECTION, SOLUTION INTRAMUSCULAR; INTRAVENOUS; SUBCUTANEOUS
Status: DISCONTINUED | OUTPATIENT
Start: 2018-10-26 | End: 2018-10-28 | Stop reason: HOSPADM

## 2018-10-26 RX ORDER — TACROLIMUS 1 MG/1
2 CAPSULE ORAL 2 TIMES DAILY
Status: DISCONTINUED | OUTPATIENT
Start: 2018-10-26 | End: 2018-10-26

## 2018-10-26 RX ORDER — LEVOFLOXACIN 5 MG/ML
500 INJECTION, SOLUTION INTRAVENOUS ONCE
Status: COMPLETED | OUTPATIENT
Start: 2018-10-26 | End: 2018-10-26

## 2018-10-26 RX ORDER — PREDNISONE 5 MG/1
5 TABLET ORAL DAILY
Status: DISCONTINUED | OUTPATIENT
Start: 2018-10-26 | End: 2018-10-26

## 2018-10-26 RX ADMIN — SODIUM BICARBONATE 650 MG TABLET 1300 MG: at 08:02

## 2018-10-26 RX ADMIN — LEVOFLOXACIN 500 MG: 5 INJECTION, SOLUTION INTRAVENOUS at 09:32

## 2018-10-26 RX ADMIN — SODIUM CHLORIDE 1000 ML: 9 INJECTION, SOLUTION INTRAVENOUS at 09:33

## 2018-10-26 RX ADMIN — PREDNISONE 5 MG: 5 TABLET ORAL at 08:02

## 2018-10-26 RX ADMIN — SODIUM BICARBONATE: 84 INJECTION, SOLUTION INTRAVENOUS at 22:04

## 2018-10-26 RX ADMIN — ACETAMINOPHEN 650 MG: 325 TABLET, FILM COATED ORAL at 16:47

## 2018-10-26 RX ADMIN — METHYLPREDNISOLONE SODIUM SUCCINATE 125 MG: 125 INJECTION, POWDER, LYOPHILIZED, FOR SOLUTION INTRAMUSCULAR; INTRAVENOUS at 13:12

## 2018-10-26 RX ADMIN — ACETAMINOPHEN 650 MG: 325 TABLET, FILM COATED ORAL at 09:31

## 2018-10-26 RX ADMIN — Medication 0.3 MG: at 23:27

## 2018-10-26 RX ADMIN — MYCOPHENOLATE MOFETIL 500 MG: 250 CAPSULE ORAL at 02:15

## 2018-10-26 RX ADMIN — VANCOMYCIN HYDROCHLORIDE 1500 MG: 10 INJECTION, POWDER, LYOPHILIZED, FOR SOLUTION INTRAVENOUS at 02:15

## 2018-10-26 RX ADMIN — NITAZOXANIDE 500 MG: 500 TABLET ORAL at 13:12

## 2018-10-26 RX ADMIN — MYCOPHENOLATE MOFETIL 500 MG: 250 CAPSULE ORAL at 08:02

## 2018-10-26 RX ADMIN — CEFEPIME HYDROCHLORIDE 1 G: 1 INJECTION, POWDER, FOR SOLUTION INTRAMUSCULAR; INTRAVENOUS at 00:35

## 2018-10-26 RX ADMIN — SODIUM BICARBONATE: 84 INJECTION, SOLUTION INTRAVENOUS at 13:12

## 2018-10-26 RX ADMIN — TACROLIMUS 2 MG: 1 CAPSULE ORAL at 02:16

## 2018-10-26 RX ADMIN — SODIUM CHLORIDE 1000 ML: 9 INJECTION, SOLUTION INTRAVENOUS at 09:32

## 2018-10-26 RX ADMIN — FUROSEMIDE 20 MG: 10 INJECTION, SOLUTION INTRAVENOUS at 16:37

## 2018-10-26 RX ADMIN — TACROLIMUS 2 MG: 1 CAPSULE ORAL at 08:02

## 2018-10-26 ASSESSMENT — ACTIVITIES OF DAILY LIVING (ADL)
BATHING: 0-->INDEPENDENT
DRESS: 0-->INDEPENDENT
TRANSFERRING: 0-->INDEPENDENT
ADLS_ACUITY_SCORE: 11
ADLS_ACUITY_SCORE: 12
TOILETING: 0-->INDEPENDENT
FALL_HISTORY_WITHIN_LAST_SIX_MONTHS: NO
ADLS_ACUITY_SCORE: 11
ADLS_ACUITY_SCORE: 11
RETIRED_COMMUNICATION: 0-->UNDERSTANDS/COMMUNICATES WITHOUT DIFFICULTY
ADLS_ACUITY_SCORE: 11
SWALLOWING: 0-->SWALLOWS FOODS/LIQUIDS WITHOUT DIFFICULTY
RETIRED_EATING: 0-->INDEPENDENT
COGNITION: 0 - NO COGNITION ISSUES REPORTED
AMBULATION: 0-->INDEPENDENT

## 2018-10-26 ASSESSMENT — PAIN DESCRIPTION - DESCRIPTORS
DESCRIPTORS: PATIENT UNABLE TO DESCRIBE
DESCRIPTORS: ACHING
DESCRIPTORS: ACHING
DESCRIPTORS: HEADACHE

## 2018-10-26 NOTE — PHARMACY-VANCOMYCIN DOSING SERVICE
Pharmacy Vancomycin Initial Note  Date of Service 2018  Patient's  1975  43 year old, male    Indication: Sepsis, UTI    Current estimated CrCl = Estimated Creatinine Clearance: 12.7 mL/min (based on Cr of 8.7).    Creatinine for last 3 days  10/25/2018: 11:55 AM Creatinine 7.47 mg/dL;  5:07 PM Creatinine 8.70 mg/dL    Recent Vancomycin Level(s) for last 3 days  No results found for requested labs within last 72 hours.      Vancomycin IV Administrations (past 72 hours)      No vancomycin orders with administrations in past 72 hours.                Nephrotoxins and other renal medications (Future)    Start     Dose/Rate Route Frequency Ordered Stop    10/25/18 2300  vancomycin (VANCOCIN) 1,500 mg in sodium chloride 0.9 % 250 mL intermittent infusion      1,500 mg  over 90 Minutes Intravenous ONCE 10/25/18 2232            Contrast Orders - past 72 hours     None                Plan:  1.  Start vancomycin  1500 mg IV once, then intermittently based on levels  2.  Goal Trough Level: 15-20 mg/L   3.  Pharmacy will check trough levels as appropriate in 1-3 Days.    4. Serum creatinine levels will be ordered daily for the first week of therapy and at least twice weekly for subsequent weeks.    5. [place] method utilized to dose vancomycin therapy: Method 2    Vijaya Kay, GalaD

## 2018-10-26 NOTE — H&P
[facility]    Internal Medicine History and Physical - Gold Service       Date of Admission:  10/25/2018    Assessment & Plan   Rashad Ortiz is a 43 year old male  admitted on 10/25/2018. He has PMH of HTN, ESRD s/p LDKT in 2011 c/b antibody mediated rejection, Secondary hyperparathyroidism, Gout, and genital herpes who presents to the ED for evaluation of fatigue, nausea, diarrhea, body aches, chills, and fever. Patient admitted to Medicine for further evaluation and care.            # Sepsis 2/2 suspected Urinary source: Presented with ~ 24 hour hx of fever, chills, fatigue, non-blood diarrhea, and poor PO intake. WBC 24.5 w/ left shift, , Tachycardic to 130, Tmax 103.0. BP stable. LA 1.5, HGB 9.7, platelets 128, UA w/ moderate LE, large blood, > 100 WBC, many bacteria.  Cr 7.47 --> 8.70, BUN 56, Bicarb 19. LFT's wnl. Received IVF bolus and maintenance, Zosyn in ED.   - C.diff PCR, SSCE  - Enteric precautions   - RVP  - Droplet precautions   - O2 PRN  - CK  - Cefepime 1g IV q24h  - Pharmacy to dose vancomycin   - IVF  - BCx2  - [facility]  - Procal  - Tylenol PRN for fever  - Repeat LA, VBG, BMP, CBC  - Trend fever and inflammatory markers   - CBC, CMP, CRP in am      # DEIDRA on CKD: ESRD s/p LDKT 2011. BL Cr 3.70-4.0. Follows with [facility] w/ clinic visit 2/2018 plan for BP control and continuance of current immunosuppression: Tacrolimus, MMF, and prednisone. Tac level 5.3 (6/2018). Prophylaxis: Bactrim Renal US: Mildly elevated resistive indices of rhe mid-arteries- nonspecific in setting of infection; elevated anastomotic velocities, with normal appearing spectral waveforms, likely hyperdynamic concerning for infection; f/u exam following resolution of acute sx to evaluate for stenosis; no renal abscess. CT A/P w/o contrast w/o significant perinephric fluid; mild non-specific stranding about the RLQ kidney- unchanged from prvious; no significant hydronephrosis;  Thickening of the bladder wall- concerning for cystitis; non-obstructing right renal calculi.   - Add Mg, Phos  - Daily weights    - Continue PTA  Immunosuppression  - Hold bactrim. Patient not taking valtrex   - Tacrolimus level in am   - Transplant Nephrology consult place. Please discuss with in am     - Abx as above  - Renally dose medications  - BMP, Mg, Phos in am     # Hypomagnesemia: Mg 1.0.   - Give 2g IV and repeat   - Monitor closely  - Tele   - Ekg     # HTN: BP stable on admission. PTA lasix, losartan, Norvasc, coreg.  - Hold in setting of sepsis and DEIDRA    # Gout: No current sx. PTA allopurinol  - Hold tonight    Diet:  CLD  Fluids: NS @ 125 ml/hr  Hunter Catheter: not present    DVT Prophylaxis: Pneumatic Compression Devices  Code Status: Prior    Expected discharge: 2 - 3 days, recommended to prior living arrangement once SIRS/Sepsis treated.    The patient's care was discussed with the Attending Physician, Dr. Villegas.    Venecia Steiner PA-C  Internal Medicine Hospitalist Service  [facility]  Pager: [phone]    Please see sticky note for cross cover information  ______________________________________________________________________    Chief Complaint   Fever, chills, diarrhea    History is obtained from the patient and EMR  History of Present Illness   Rashad Ortiz is a 43 year old male with complex PMH as outlined above who presented to the ED from Urgent Care for evaluation of fever, chills, diarrhea, fatigue.      Patient reports that he was in BL state of health until ~ 24 hours ago when he developed acute onset fever, chills, non-bloody diarrhea, and extreme fatigue. Also reports of poor PO intake and decreased UOP. Attempted to alleviate sx with tylenol unsuccessful, prompting Urgent Care visit. Patient instructed to seek ED evaluation based on DEIDRA, leukocytosis, fever.     Currently, patient with shaking chills, fever, RLQ abdominal pain, and body aches. We  discussed plan as outlined above and patient agreeable.    Patient does not endorse: headaches, changes in vision, chest pain, palpitations, upper respiratory symptoms of rhinorrhea or congestion, shortness of breath, cough, sputum production, wheezing, anausea, emesis, constipation, dysuria, edema, rashes, weakness, focal neurologic deficits, recent travel.                 Review of Systems   The 10 point Review of Systems is negative other than noted in the HPI or here.     Past Medical History    I have reviewed this patient's medical history and updated it with pertinent information if needed.   Past Medical History:   Diagnosis Date     Chronic kidney disease, stage 4, severely decreased GFR (H)      Gastro-oesophageal reflux disease      Gout      Hypertension      Medical non-compliance      Pulmonary nodules      Steroid long-term use         Past Surgical History   I have reviewed this patient's surgical history and updated it with pertinent information if needed.  Past Surgical History:   Procedure Laterality Date     AV FISTULA OR GRAFT ARTERIAL       LIGATE FISTULA ARTERIOVENOUS UPPER EXTREMITY  12/20/2011    Procedure:LIGATE FISTULA ARTERIOVENOUS UPPER EXTREMITY; Excision of Right Forearm Arteriovenous Fistula.; Surgeon:LINDY AMAYA; Location:[facility] OR     PERCUTANEOUS BIOPSY KIDNEY Right 2/28/2017    Procedure: PERCUTANEOUS BIOPSY KIDNEY;  Surgeon: Gee Barrios MD;  Location:  OR     TRANSPLANT  01/13/2011    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       Social History   Social History   Substance Use Topics     Smoking status: Former Smoker     Types: Cigarettes     Quit date: 03/2017     Smokeless tobacco: Never Used      Comment: Patient states that he is an 'social'  smoker      Alcohol use 2.5 oz/week     5 Standard drinks or equivalent per week      Comment: 1-2 drinks/wk       Family History   I have reviewed this patient's family history and updated it with pertinent  information if needed.   Family History   Problem Relation Age of Onset     Hypertension Mother        Prior to Admission Medications   Prior to Admission Medications   Prescriptions Last Dose Informant Patient Reported? Taking?   HYDROcodone-acetaminophen (NORCO) 5-325 MG per tablet Unknown at Unknown time  No No   Sig: Take 0.5-1 tablets by mouth every 6 hours as needed for moderate to severe pain   allopurinol (ZYLOPRIM) 100 MG tablet 10/25/2018 at Unknown time  No Yes   Sig: Take 1 tablet (100 mg) by mouth daily   amLODIPine (NORVASC) 5 MG tablet 10/25/2018 at Unknown time  No Yes   Sig: Take 1 tablet (5 mg) by mouth daily   carvedilol (COREG) 25 MG tablet 10/25/2018 at Unknown time  No Yes   Sig: Take 1 tablet (25 mg) by mouth 2 times daily   furosemide (LASIX) 20 MG tablet 10/25/2018 at Unknown time  No Yes   Sig: Take 1 tablet (20 mg) by mouth 2 times daily   losartan (COZAAR) 100 MG tablet 10/25/2018 at Unknown time  No Yes   Sig: Take 1 tablet (100 mg) by mouth daily   mycophenolate (GENERIC EQUIVALENT) 250 MG capsule 10/25/2018 at Unknown time  No Yes   Sig: Take 2 capsules (500 mg) by mouth 2 times daily   mycophenolate (GENERIC EQUIVALENT) 250 MG capsule   No No   Sig: Take 2 capsules (500 mg) by mouth 2 times daily   omeprazole (PRILOSEC) 20 MG capsule 10/25/2018 at Unknown time  No Yes   Sig: Take 1 capsule (20 mg) by mouth daily   predniSONE (DELTASONE) 5 MG tablet Unknown at Unknown time  No No   Sig: Take 1 tablet (5 mg) by mouth daily   sodium bicarbonate 650 MG tablet 10/25/2018 at Unknown time  No Yes   Sig: Take 2 tablets (1,300 mg) by mouth 2 times daily   sulfamethoxazole-trimethoprim (BACTRIM/SEPTRA) 400-80 MG per tablet 10/25/2018 at Unknown time  No Yes   Sig: Take 1 tablet by mouth every other day   tacrolimus (GENERIC EQUIVALENT) 1 MG capsule 10/25/2018 at Unknown time  No Yes   Sig: Take 2 capsules (2 mg) by mouth 2 times daily   tacrolimus (GENERIC EQUIVALENT) 1 MG capsule   No No    Sig: Take 2 capsules (2 mg) by mouth 2 times daily   traMADol (ULTRAM) 50 MG tablet Unknown at Unknown time  No No   Sig: Take 1 tablet (50 mg) by mouth nightly as needed for moderate pain   valACYclovir (VALTREX) 1000 mg tablet Unknown at Unknown time  No No   Sig: Take 1 tablet (1,000 mg) by mouth 2 times daily      Facility-Administered Medications: None     Allergies   Allergies   Allergen Reactions     Nkda [No Known Drug Allergies]        Physical Exam   Vital Signs: Temp: 100.1  F (37.8  C) Temp src: Oral BP: 118/63 Pulse: 100   Resp: 16 SpO2: 97 % O2 Device: None (Room air)    Weight: 178 lbs 12.69 oz      Physical Exam   Constitutional: middle- aged male lying in ED bed. Shaking chills. Conversant. Mild distress.       HEENT:   Head: Normocephalic and atraumatic.   Eyes: Conjunctivae are normal. Pupils are equal, round, and reactive to light.  Pharynx has no erythema or exudate, mucous membranes are dry  Neck:   No adenopathy, no bony tenderness  Cardiovascular: Tachycardic, though no murmurs or gallops  Pulmonary/Chest: Clear to auscultation bilaterally, with no wheezes or retractions. No respiratory distress.  GI: Soft with good bowel sounds. RLQ moderately tender to palpation no guarding, no rebound, no peritoneal signs.   Back:  No bony or CVA tenderness   Musculoskeletal:  No edema or clubbing   Skin: Skin is warm and dry. No rash noted to exposed skin areas.   Neurological: Somnolent, though arouses to voice and conversant.  oriented to person, place, and time. Nonfocal exam  Psychiatric:  Somnolent      Data   Data reviewed today: I reviewed all medications, new labs and imaging results over the last 24 hours. I personally reviewed recent imaging, progress notes, daily labs.        Recent Labs  Lab 10/25/18  1707 10/25/18  1155   WBC 24.5* 21.8*   HGB 9.7* 9.8*   MCV 87 87   * 106*    136   POTASSIUM 5.1 4.6   CHLORIDE 104 104   CO2 19* 22   BUN 56* 54*   CR 8.70* 7.47*   ANIONGAP 11  10   ASHELY 8.7 8.9   * 105*   ALBUMIN 2.7* 2.8*   PROTTOTAL 6.8 6.8   BILITOTAL 0.4 0.4   ALKPHOS 64 63   ALT 14 16   AST 13 12

## 2018-10-26 NOTE — PROGRESS NOTES
[facility], [place]   Hospitalist Daily Progress Note                                                 Date of Admission:10/25/2018  ___________________________________  INTERVAL HISTORY (24 Hrs)/SUBJECTIVE:   Last 24 hr events, care team notes reviewed.   HPI reviewed.     Overall feels slightly better  Pain RLQ   Ongoing loose stools  Reports frequent Urination.   Fever better. No chills at current  Headache  No cough or sore throat or cp or sob  No new rash.   Gen weakness  Orthostatic dizziness      ROS: 4 point ROS neg other than the symptoms noted above in the interval history.    OBJECTIVE :   VITALS:    Temp:  [99.1  F (37.3  C)-103  F (39.4  C)] 99.4  F (37.4  C)  Heart Rate:  [] 97  Resp:  [16-35] 20  BP: ()/(45-88) 127/88  SpO2:  [91 %-99 %] 99 %  Temp (24hrs), Av.7  F (38.2  C), Min:99.1  F (37.3  C), Max:103  F (39.4  C)    Wt Readings from Last 5 Encounters:   10/26/18 82.1 kg (181 lb)   10/25/18 79.7 kg (175 lb 9.6 oz)   10/22/18 84.4 kg (186 lb)   18 87.1 kg (192 lb)   18 82.6 kg (182 lb)        Intake/Output Summary (Last 24 hours) at 10/26/18 1923  Last data filed at 10/26/18 1600   Gross per 24 hour   Intake          2830.83 ml   Output             1675 ml   Net          1155.83 ml     PHYSICAL EXAM:  General: alert, interactive, NAD  HEENT: AT/NC, anicteric, PERRL, dry MM  Neck: Supple,  Lymphadenopathy  Respi/Chest: Non labored. Clear BL  CVS/Heart: S1S2 regular,  no pedal edema  Gi/Abd: Soft, mild TTP RLQ, non distended, BS +  MSK/Ext: Distal pulses 2+.     Neuro: AO x 4, Grossly intact.     Medications:   I have reviewed this patient's current medications.      Data:   All laboratory and imaging data in the past 24 hours reviewed:    LABS:  CMP    Recent Labs  Lab 10/26/18  1626 10/26/18  0545 10/25/18  2324 10/25/18  2225 10/25/18  1707 10/25/18  1155    139 138  --  134 136   POTASSIUM 4.2 4.0 4.4  --  5.1 4.6   CHLORIDE 114*  110* 109  --  104 104   CO2 14* 14* 15*  --  19* 22   ANIONGAP 14 15* 14  --  11 10   * 108* 108*  --  124* 105*   BUN 58* 58* 61*  --  56* 54*   CR 8.64* 9.27* 8.87*  --  8.70* 7.47*   GFRESTIMATED 7* 6* 7*  --  7* 8*   GFRESTBLACK 8* 8* 8*  --  8* 10*   ASHELY 7.4* 7.5* 7.3*  --  8.7 8.9   MAG  --   --  1.7 0.9* 1.0*  --    PHOS  --   --   --   --  5.2*  --    PROTTOTAL  --  5.5*  --   --  6.8 6.8   ALBUMIN  --  2.0*  --   --  2.7* 2.8*   BILITOTAL  --  0.4  --   --  0.4 0.4   ALKPHOS  --  63  --   --  64 63   AST  --  11  --   --  13 12   ALT  --  10  --   --  14 16     CBC    Recent Labs  Lab 10/26/18  1626 10/26/18  0545 10/26/18  0018 10/25/18  2324   WBC 15.9* 17.7* 8.4 6.8   RBC 3.04* 2.99* 2.79* 2.96*   HGB 8.2* 8.0* 7.5* 7.9*   HCT 26.0* 25.8* 23.7* 25.5*   MCV 86 86 85 86   MCH 27.0 26.8 26.9 26.7   MCHC 31.5 31.0* 31.6 31.0*   RDW 16.4* 16.5* 16.3* 16.4*   PLT 77* 78* 76* 88*     Ca. Corrected: 9.0    INR    Recent Labs  Lab 10/26/18  0545 10/25/18  2324   INR 1.58* 1.57*     Unresulted Labs Ordered in the Past 30 Days of this Admission     Date and Time Order Name Status Description    10/26/2018 1626 Reticulocyte count In process     10/26/2018 1626 CBC with platelets differential In process     10/26/2018 1610 Blood Morphology Pathologist Review In process     10/26/2018 0806 Blood culture Preliminary     10/26/2018 0806 Blood culture Preliminary     10/25/2018 2138 Blood culture Preliminary     10/25/2018 1701 Blood Culture ONE site Preliminary     10/25/2018 1315 URINE CULTURE AEROBIC BACTERIAL Preliminary           Recent Results (from the past 24 hour(s))   Abd/pelvis CT no contrast - Stone Protocol    Narrative    EXAMINATION: CT ABDOMEN PELVIS W/O CONTRAST, 10/25/2018 6:36 PM      IMPRESSION:  1. Minimal nonspecific stranding around the right lower quadrant  transplant is unchanged from prior comparisons. No hydronephrosis. No  peritransplant fluid collection.  2. Thickening of the bladder  wall, concerning for cystitis.  3. Atrophic native kidneys. Nonobstructing right renal calculi.     US Renal Transplant    Narrative    EXAMINATION: US RENAL TRANSPLANT,  10/25/2018 8:46 PM     COMPARISON: CT abdomen 10/25/2018      IMPRESSION:   1. Elevated mildly elevated resistive indices of the mid arteries.  Nonspecific in the setting of infection.  2. Elevated anastomotic velocities (330 cm/sec) , with normal  appearing spectral waveforms, likely hyperdynamic concerning for  infection. Follow-up exam following resolution of acute symptoms be  considered to evaluate for stenosis.  3. No anatomic changes to indicate a renal abscess.         Venous Blood Gas    Recent Labs  Lab 10/26/18  0836 10/26/18  0018 10/25/18  2136   PHV 7.35 7.35 7.35   PCO2V 25* 28* 36*   PO2V 54* 59* 31   HCO3V 14* 15* 20*   O2PER 21.0 0 21     Stool Studies: C.diff neg. Norovirus+    Lactic acid: 1.5.   Crp: 350  Procal: >200    BC: GNR.   RVP neg.     ASSESSMENT & PLAN :    Rashad Ortiz is a 43 year old male admitted on 10/25/2018. He has PMH of HTN, ESRD s/p LDKT in 2011 c/b antibody mediated rejection, not on HD,  Secondary hyperparathyroidism, Gout, and genital herpes who presented to ED with fever, chills, fatigue, malaise, diarrhea, gen weakness. Found to have GNR sepsis.              #  Sepsis with GNR bacteremia 2/2 likely from transplant kidney pyelonephritis:   Presented with ~ 24 hour hx of fever, chills, fatigue, non-blood diarrhea, and poor PO intake. WBC 24.5 w/ left shift, , procal>200,Tachycardic to 130, Tmax 103.0. BP stable. LA 1.5, HGB 9.7, platelets 128, UTI+. DEIDRA: Cr 7.47 --> 8.70, BUN 56, Bicarb 19. LFT's wnl. Received IVF bolus and maintenance, Zosyn in ED.   BC, prelim: GNR.   Stool: Norovirus+    Hemodynamics remains stable. Alert. mentating well. On RA, sating fine. Urinating frequently. Lactic acid: wnl.     - Continue empiric iv Cefepime, iv Levaquin (renal dosing). Given dose of iv Vancomycin-  discontinue  -  Nitazoxanide for Norovirus.   - IVF 1 L NSS bolus -> 150/hr- later pre renal switched to 1/2nss plus bicarb  - follow-up BC, daily BC  - transplant ID, Nephrology consult  - IMC, Tele. Monitor vitals, I/o. Pulse ox.   - follow-up cbc w diff.   - Enteric precautions   - O2 PRN  - trend procal  - Tylenol PRN for fever, headache  - iv hydromorphone prn for pain       # DEIDRA on CKD: ESRD s/p LDKT 2011. BL Cr 3.70-4.0. Follows with [facility]rology w/ clinic visit 2/2018 plan for BP control and continuance of current immunosuppression: Tacrolimus, MMF, and prednisone. Tac level 5.3 (6/2018). Prophylaxis: Bactrim Renal US: Mildly elevated resistive indices of rhe mid-arteries- nonspecific in setting of infection; elevated anastomotic velocities, with normal appearing spectral waveforms, likely hyperdynamic concerning for infection; f/u exam following resolution of acute sx to evaluate for stenosis; no renal abscess. CT A/P w/o contrast w/o significant perinephric fluid; mild non-specific stranding about the RLQ kidney- unchanged from prvious; no significant hydronephrosis; Thickening of the bladder wall- concerning for cystitis; non-obstructing right renal calculi.     Non oliguric at current.   - Transplant Nephrology consult. D/w Dr Cunha.   - Further IS management per Tx Nephrology.   - Strict I/o, follow-up bmp bid, renal dosing, avoid nephrotoxics.   - follow-up lytes.  - iv diuretics prn for e/o vol overload  - hold bactrim.     * No indication for HD at current.   Call Nephrology prn for any e/o worsening renal function. Vol overload.        # HTN: BP stable on admission. PTA lasix, losartan, Norvasc, coreg.  - Hold in setting of sepsis and DEIDRA     # Gout: No current sx. PTA allopurinol  - Hold tonight     Diet:    Active Diet Order      Advance Diet as Tolerated: Regular Diet Adult     Fluids: IVF  Hunter Catheter: not present  DVT Prophylaxis: Pneumatic Compression Devices  Code Status:  FULL         Dispo: Disposition Plan   Expected discharge in 3-5 days to prior living arrangement once medically stable.     Entered: Jeferson Nelson 10/26/2018, 7:23 PM         Plan discussed with patient, bedside RN and MELANIE ARZATE during Care Team Rounds.  D/w Tx Nephrology.       Jeferson Nelson MD   Hospitalist (Internal Medicine)  Pager: [phone].

## 2018-10-26 NOTE — ED NOTES
[facility], [place]   ED Nurse to Floor Handoff     Rashad Ortiz is a 43 year old male who speaks English and lives with family members,  in a home  They arrived in the ED by car from clinic    ED Chief Complaint: Fatigue    ED Dx;   Final diagnoses:   Acute kidney injury (H)   Acute cystitis without hematuria         Needed?: No    Allergies:   Allergies   Allergen Reactions     Nkda [No Known Drug Allergies]    .  Past Medical Hx:   Past Medical History:   Diagnosis Date     Chronic kidney disease, stage 4, severely decreased GFR (H)      Gastro-oesophageal reflux disease      Gout      Hypertension      Medical non-compliance      Pulmonary nodules      Steroid long-term use       Baseline Mental status: WDL  Current Mental Status changes: at basesline    Infection present or suspected this encounter: yes other cultures pending  Sepsis suspected: No  Isolation type: Droplet     Activity level - Baseline/Home:  Independent  Activity Level - Current:   Independent    Bariatric equipment needed?: No    In the ED these meds were given:   Medications   0.9% sodium chloride BOLUS (1,000 mLs Intravenous New Bag 10/25/18 1926)     Followed by   sodium chloride 0.9% infusion (not administered)   HYDROmorphone (PF) (DILAUDID) injection 0.5 mg (0.5 mg Intravenous Given 10/25/18 1926)   piperacillin-tazobactam (ZOSYN) 2.25 g vial to attach to  ml bag (2.25 g Intravenous New Bag 10/25/18 1926)       Drips running?  Yes  See MAR  Home pump  No    Current LDAs  Peripheral IV 10/25/18 Left Lower forearm (Active)   Site Assessment WDL 10/25/2018  5:08 PM   Line Status Saline locked 10/25/2018  5:08 PM   Phlebitis Scale 0-->no symptoms 10/25/2018  5:08 PM   Dressing Intervention New dressing  10/25/2018  5:08 PM   Number of days:0       Incision/Surgical Site 12/20/11 Right Arm (Active)   Number of days:2501       Incision/Surgical Site 02/28/17 Right;Lower Abdomen (Active)    Number of days:604       Labs results:   Labs Ordered and Resulted from Time of ED Arrival Up to the Time of Departure from the ED   CBC WITH PLATELETS DIFFERENTIAL - Abnormal; Notable for the following:        Result Value    WBC 24.5 (*)     RBC Count 3.56 (*)     Hemoglobin 9.7 (*)     Hematocrit 31.1 (*)     MCHC 31.2 (*)     RDW 16.4 (*)     Platelet Count 128 (*)     Absolute Neutrophil 21.5 (*)     Absolute Lymphocytes 0.6 (*)     Absolute Monocytes 2.1 (*)     All other components within normal limits   COMPREHENSIVE METABOLIC PANEL - Abnormal; Notable for the following:     Carbon Dioxide 19 (*)     Glucose 124 (*)     Urea Nitrogen 56 (*)     Creatinine 8.70 (*)     GFR Estimate 7 (*)     GFR Estimate If Black 8 (*)     Albumin 2.7 (*)     All other components within normal limits   LACTIC ACID WHOLE BLOOD   BLOOD CULTURE   CLOSTRIDIUM DIFFICILE TOXIN B   ENTERIC BACTERIA AND VIRUS PANEL BY SAWYER STOOL       Imaging Studies:   Recent Results (from the past 24 hour(s))   Abd/pelvis CT no contrast - Stone Protocol    Narrative    EXAMINATION: CT ABDOMEN PELVIS W/O CONTRAST, 10/25/2018 6:36 PM    TECHNIQUE:  Helical CT images from the lung bases through the  symphysis pubis were obtained without IV contrast. Contrast: None     COMPARISON: Transplant ultrasound 6/17/2015, 6/4/2015, CT abdomen  1/21/2012    HISTORY: fever, abdominal pain, RLQ pain, wbc 24.5;     FINDINGS:    Abdomen and pelvis:   Postoperative changes of right lower quadrant kidney transplant. No  significant perinephric fluid. Mild nonspecific stranding about the  right lower quadrant kidney, similar to 1/21/2012. No significant  hydronephrosis. Unchanged appearance of the transplant ureter.     Nondilated fluid-filled small bowel. Moderate colonic stool. The  appendix is unremarkable.    Noncontrast appearance of the liver, spleen, adrenal glands, pancreas,  and gallbladder are unremarkable. Symmetric renal parenchymal  thinning. Stable 1.3  cm renal cyst in the left upper pole, not  significantly changed in size from ultrasound 12/5/2008 prior CT  comparisons. Nonobstructing renal calculi measuring up to 4 mm in the  right lower pole, 2 mm in the right midpole. No hydronephrosis or  hydroureter of the native kidneys. The urinary bladder is  decompressed.    Abdominal aorta is within normal limits. No pathologically enlarged  retroperitoneal, pelvic or abdominal lymphadenopathy. Slightly  prominent scattered mesenteric lymph nodes.    Lung bases: Partially calcified 4 mm granuloma of the right lung base,  unchanged from 2012. No significant pleural effusion. No pneumothorax.  Trace pericardial fluid. No evidence of acute airspace disease. No  significant bronchial wall thickening or bronchiectasis.    Bones and soft tissues: No aggressive osseous lesion.      Impression    IMPRESSION:  1. Minimal nonspecific stranding around the right lower quadrant  transplant is unchanged from prior comparisons. No hydronephrosis. No  peritransplant fluid collection.  2. Nondilated fluid-filled small bowel, nonspecific, can be seen with  enteritis.  3. Atrophic native kidneys. Nonobstructing right renal calculi.       Recent vital signs:   /63  Pulse 100  Temp 100.1  F (37.8  C) (Oral)  Resp 16  Wt 81.1 kg (178 lb 12.7 oz)  SpO2 98%  BMI 23.44 kg/m2    Cardiac Rhythm: Other  Pt needs tele? No  Skin/wound Issues: None    Code Status: Full Code    Pain control: pt had none    Nausea control: good    Abnormal labs/tests/findings requiring intervention: DEIDRA    Family present during ED course? Yes   Family Comments/Social Situation comments: @ bedside    Tasks needing completion: None    [phone] [facility]

## 2018-10-26 NOTE — PROGRESS NOTES
Shift: 0700 - 1930  VS: Temp: 99.4  F (37.4  C) Temp src: Oral BP: 127/88   Heart Rate: 97 Resp: 20 SpO2: 99 % O2 Device: None (Room air)       Pain: Reports headache, Tylenol given x2 this shift.   Neuro: A&Ox4, Pleasant and cooperative with care.   Cardiac:   SR to S.Tach. Dependent edema, L > R. Received Lasix 20mg IVP x1.   Respiratory: Lung sounds clear on RA.  GI/Diet/Appetite: Clear liquid diet, no appetite. Bowel sounds hyperactive, liquid stools, positive for Norovirus. Currently receiving Levaquin and Alinia. Abdomen somewhat distended.   :  Cr. Elevated at 9.27 this AM, at 1626 Cr. Recheck is 8.64. Voiding spontaneously. Bolus 1L of NS given x1. Currently infusing 1/2NS w/bicarb at 125ml/hr.   LDA's: PIV LUE, infusing.   Skin: Scars, warm, dry, intact.   Activity: Independent.   Tests/Procedures:   Pertinent Labs/Lab Collection: +Norovirus, +E.coli from LUE BC. Lactic Acid 1.5.      Plan: Continue w/POC.

## 2018-10-26 NOTE — PLAN OF CARE
Problem: Patient Care Overview  Goal: Plan of Care/Patient Progress Review  Outcome: No Change  D Tmax 99.4 with b/p soft but stable. Heart regular/tachy and lungs clear. Voiced no c/o pain or nausea this night. Up to bathroom with SBA x 2 for bouts of diarrhea and voiding. See Lab results for +blood cultures and Norovirus in stool. Slept well between cares. Remains a/o x 4.   I Vital's, assessment and med's per order.   A Resting in bed with call light in reach.   P Continue to monitor and update MD with changes.

## 2018-10-26 NOTE — PHARMACY-ADMISSION MEDICATION HISTORY
Admission medication history interview status for the 10/25/2018 admission is complete. See Epic admission navigator for allergy information, pharmacy, prior to admission medications and immunization status.     Medication history interview sources:  Patient, Surescripts      Changes made to PTA medication list (reason)    Added: n/a    Deleted:   -Norco  -tramadol    Changed: n/a      Additional medication history information (including reliability of information, actions taken by pharmacist):  -Patient knew his medications well and was a reliable historian.   -Patient confirmed that he is taking the generic formulation of tacrolimus, and not brand.  -Patient reported that he thinks he is taking [employer] brand mycophenolate instead of generic, but was unable to verify this.         Prior to Admission medications    Medication Sig Last Dose Taking? Auth Provider   allopurinol (ZYLOPRIM) 100 MG tablet Take 1 tablet (100 mg) by mouth daily 10/25/2018 at AM Yes Mellissa Balderrama APRN CNP   amLODIPine (NORVASC) 5 MG tablet Take 1 tablet (5 mg) by mouth daily 10/24/2018 at Unknown time Yes Stef Murillo MD   carvedilol (COREG) 25 MG tablet Take 1 tablet (25 mg) by mouth 2 times daily 10/25/2018 at AM Yes Stef Murillo MD   furosemide (LASIX) 20 MG tablet Take 1 tablet (20 mg) by mouth 2 times daily 10/25/2018 at AM Yes Stef Murillo MD   losartan (COZAAR) 100 MG tablet Take 1 tablet (100 mg) by mouth daily 10/24/2018 at PM Yes Stef Murillo MD   mycophenolate (GENERIC EQUIVALENT) 250 MG capsule Take 2 capsules (500 mg) by mouth 2 times daily 10/25/2018 at AM Yes Gee Barrios MD   omeprazole (PRILOSEC) 20 MG capsule Take 1 capsule (20 mg) by mouth daily 10/24/2018 at Unknown time Yes Lani Quick MD   predniSONE (DELTASONE) 5 MG tablet Take 1 tablet (5 mg) by mouth daily 10/24/2018 at Unknown time Yes Srinivas Harrington MD   sodium bicarbonate 650 MG tablet Take 2 tablets (1,300  mg) by mouth 2 times daily 10/25/2018 at AM Yes Gee Barrios MD   sulfamethoxazole-trimethoprim (BACTRIM/SEPTRA) 400-80 MG per tablet Take 1 tablet by mouth every other day 10/24/2018 at AM Yes Gee Barrios MD   tacrolimus (GENERIC EQUIVALENT) 1 MG capsule Take 2 capsules (2 mg) by mouth 2 times daily 10/25/2018 at AM Yes Gee Barrios MD   valACYclovir (VALTREX) 1000 mg tablet Take 1 tablet (1,000 mg) by mouth 2 times daily More than a month at Unknown time  Carlos Mosqueda PA-C         Medication history completed by: Karime Harris, Pharmacy student

## 2018-10-26 NOTE — CONSULTS
[facility]  Transplant Infectious Disease Consult Note - New Patient     Patient:  Rashad Ortiz, Date of birth 1975, Medical record number [id number]  Date of Visit:  10/26/2018  Consult requested by Dr. Jeferson Nelson for evaluation of gram negative bacterial sepsis         Assessment and Recommendations:   Recommendations:  - I am in favor of discontinuing vancomycin IV.   - Continue cefepime and levaquin while awaiting blood cultures.   - Would consider starting norovirus therapy with nitazoxanide, 500 mg BID x 14 days.   - Would consider obtaining authorization for Care Everywhere, to update his immunization list, check to see if he has had post-transplant dermatology followup, and to see if he has had a check of gamma globulin status.   - Duration of droplet isolation will depend on results of 10/25/2018 resp virus testing.     Thank you very much for this consultation. Transplant Infectious Disease will continue to follow with you. There is a new team starting next week who will not know him, so please call if new questions or issues arise, either over the weekend or next week.   Coverage this weekend will be:   Staff: Dr. Alex Arrington, pager [phone]  Coverage next week, starting 10/29/2018, will be:   Staff:  Dr. Sebastian Quintanilla, pager [phone]   Resident: Dr. Nina Fournier, pager [phone]    Assessment:  Rashad Ortiz is a 43 year old male s/p LDKT in 2011. He is listed for a second kidney transplant, but is not on dialysis.  Infectious Disease issues include:  - E coli Gram negative jameel sepsis, likely originating from transplant kidney pyelonephritis. He has received doses of zosyn and vanco, followed by cefepime and levaquin. Source is likely urine, as he has pain directly over his transplanted kidney, with pyuria on UA. Of note, he has runny stools with recovery of norovirus, so the norovirus infection could have contributed to a little bit looser stools for a week, which  then led to the UTI followed by transplant kidney pyelonephritis. Would continue cefepime and levaquin while awaiting blood cultures.   - Norovirus. Recovery from stool on 4/24/2017 and 10/25/2018. Rx'd a 7-day course of nitazoxanide 4/25/2017. Would consider starting therapy with nitazoxanide, 500 mg BID x 14 days.   - Hx of genital herpes.   - Partially calcified 4 mm granuloma of the right lung base, unchanged from 2012.  - QTc interval: 422 ms on 10/25/2018 EKG.  - Viral serostatus: HSV1+, HSV2 neg, VZV+, EBV+, CMV+  - Immunization status: he is due for an immunization update, but too sick right now.   - Gamma globulin status: unknown.   - Isolation status: Good hand hygiene. He is in enteric precautions due to norovirus diarrhea. He is in droplet isolation while a resp virus panel is pending.     Claudia Novak MD   Pager [phone]         History of Infectious Disease Illness:   Rashad Ortiz is a 43 year old male s/p LDKT in 2011. He is listed for a second kidney transplant, but is not on dialysis. Other medical issues include secondary hyperparathyroidism, gout, and genital herpes. He has had norovirus shedding documented in 4/2017. He was on a type of prednisone for gout but has not taken it for a week. He was in his usual state of health with perhaps some mildly looser stools until he awoke on 10/24/2018 with a headache, feeling achy. He checked his temp and it was 104.7K. He rested, stools were runny, and the following day he still did not feel better, checked temp again and it was 102+F, so he went to urgent care. Stools remained runny. Lab testing at urgent care was called back to him, and he was asked to report to the ER. Other symptoms include fatigue, dizziness, headache, and RLQ abdominal pain that he thinks is not his transplanted kidney, but in fact the pain is directly over his transplanted kidney. Nothing makes his pain worse or better. His last creatine levels were at his baseline of ~3,  now elevated above that. No one is ill at home. In the ER he had a CT with stone protocol, transplant kidney not enlarged. He has received doses of zosyn and vanco, followed by cefepime and levaquin. BCx with GNR, and stool with norovirus. UA with pyuria.       Transplants:  1/13/2011 (Kidney), Postoperative day:  2843.  Coordinator Reyna Del Toro    Review of Systems:  CONSTITUTIONAL:  + fevers, + chills, + sweats  EYES: negative for icterus or acute vision changes  ENT:  negative for acute hearing loss, tinnitus, sore throat  RESPIRATORY:  negative for cough, sputum or dyspnea  CARDIOVASCULAR:  negative for chest pain, palpitations  GASTROINTESTINAL:  negative for nausea, vomiting, but has diarrhea.   GENITOURINARY:  + pain directly over transplanted kidney  HEME:  No easy bruising or bleeding  INTEGUMENT:  negative for rash or pruritus  NEURO:  + headache, no tremor but he is shivering    Past Medical History:   Diagnosis Date     Chronic kidney disease, stage 4, severely decreased GFR (H)      Gastro-oesophageal reflux disease      Gout      Hypertension      Medical non-compliance      Pulmonary nodules      Steroid long-term use        Past Surgical History:   Procedure Laterality Date     AV FISTULA OR GRAFT ARTERIAL       LIGATE FISTULA ARTERIOVENOUS UPPER EXTREMITY  12/20/2011    Procedure:LIGATE FISTULA ARTERIOVENOUS UPPER EXTREMITY; Excision of Right Forearm Arteriovenous Fistula.; Surgeon:LINDY AMAYA; Location:[facility] OR     PERCUTANEOUS BIOPSY KIDNEY Right 2/28/2017    Procedure: PERCUTANEOUS BIOPSY KIDNEY;  Surgeon: Gee Barrios MD;  Location:  OR     TRANSPLANT  01/13/2011    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 There are 2 declawed cats. 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      Social History   Substance Use Topics     Smoking status: Former Smoker     Types: Cigarettes     Quit date: 03/2017     Smokeless tobacco: Never Used      Comment: Patient states that he is an 'social'  smoker      Alcohol use 2.5 oz/week     5 Standard drinks or equivalent per week      Comment: 1-2 drinks/wk       Immunization History   Administered Date(s) Administered     Historical DTP/aP 04/02/1982     TD (ADULT, 7+) 04/25/2006     TDAP Vaccine (Adacel) 08/16/2016       Patient Active Problem List   Diagnosis     Kidney replaced by transplant     High risk medications (not anticoagulants) long-term use     Hypertension goal BP (blood pressure) < 140/90     GERD (gastroesophageal reflux disease)     CARDIOVASCULAR SCREENING; LDL GOAL LESS THAN 160     Gout     Creatinine elevation     Antibody mediated rejection of kidney transplant     Medical non-compliance     Chronic kidney disease, stage 4, severely decreased GFR (H)     Herpes simplex infection of penis     Diarrhea            Current Medications & Allergies:       sodium chloride 0.9%  1,000 mL Intravenous Once     ceFEPIme (MAXIPIME) IV  1 g Intravenous Q24H     [START ON 10/27/2018] influenza vaccine adult (product based on age)  0.5 mL Intramuscular Prior to discharge     levofloxacin  500 mg Intravenous Once     mycophenolate  500 mg Oral BID     predniSONE  5 mg Oral Daily     sodium bicarbonate  1,300 mg Oral BID     sodium chloride (PF)  3 mL Intracatheter Q8H     tacrolimus  2 mg Oral BID     vancomycin place sykes - receiving intermittent dosing  1 each Intravenous See Admin Instructions       Infusions/Drips:    sodium chloride         Allergies   Allergen Reactions     Nkda [No Known Drug Allergies]             Physical Exam:   Vitals were reviewed.  All vitals stable.  Patient Vitals for the past 24 hrs:   BP Temp Temp src Pulse Resp SpO2 Weight   10/26/18 0733 109/80 99.8  F (37.7  C) Oral - 20 96 % -   10/26/18 0400 - 99.4   F (37.4  C) Oral - 20 96 % -   10/26/18 0144 90/53 99.1  F (37.3  C) - - 20 96 % 82.1 kg (181 lb)   10/26/18 0058 103/48 - - - 18 97 % -   10/26/18 0030 93/45 - - - 22 91 % -   10/26/18 0020 - 100.2  F (37.9  C) Oral - - - -   10/26/18 0015 106/53 - - - - 98 % -   10/26/18 0014 - - - - 16 - -   10/26/18 0000 99/51 - - - - 97 % -   10/25/18 2353 100/52 - - - 26 97 % -   10/25/18 2345 108/50 - - - 26 97 % -   10/25/18 2332 - - - - (!) 31 96 % -   10/25/18 2331 94/47 - - - - - -   10/25/18 2326 91/46 - - - (!) 35 95 % -   10/25/18 2325 - 102.4  F (39.1  C) Oral - - - -   10/25/18 2321 92/52 - - - - 97 % -   10/25/18 2320 - - - - - 97 % -   10/25/18 2319 (!) 72/50 - - - - 96 % -   10/25/18 2315 - - - - - 96 % -   10/25/18 2301 - - - - 21 95 % -   10/25/18 2300 - - - - - 96 % -   10/25/18 2248 - - - - (!) 31 95 % -   10/25/18 2247 99/54 - - - 26 94 % -   10/25/18 2140 - 103  F (39.4  C) Oral - - - -   10/25/18 2131 - - - - - 96 % -   10/25/18 2020 - - - - - 99 % -   10/25/18 1952 - - - - - 98 % -   10/25/18 1929 - - - - - 97 % -   10/25/18 1928 118/63 - - - - - -   10/25/18 1655 117/61 100.1  F (37.8  C) Oral 100 16 96 % 81.1 kg (178 lb 12.7 oz)     Ranges for vital signs:  Temp:  [99.1  F (37.3  C)-103  F (39.4  C)] 99.8  F (37.7  C)  Pulse:  [100-101] 100  Heart Rate:  [] 102  Resp:  [16-35] 20  BP: ()/(45-80) 109/80  SpO2:  [91 %-99 %] 96 %  Vitals:    10/25/18 1655 10/26/18 0144   Weight: 81.1 kg (178 lb 12.7 oz) 82.1 kg (181 lb)       Physical Examination:  GENERAL:  well-developed, well-nourished man, shivering in bed in no acute resp distress.  HEAD:  Head is normocephalic, atraumatic   EYES:  Eyes have anicteric sclerae   ENT:  Oropharynx is moist without exudates or ulcers. Tongue is midline  NECK:  Supple.   LUNGS:  Clear to auscultation bilateral.   CARDIOVASCULAR:  Regular rate and rhythm with no murmur  ABDOMEN:  Normal bowel sounds, quite tender over transplanted kidney in RLQ.  SKIN:  No acute  rashes. PIV in place without any surrounding erythema or exudate. Has various skin tags & lesions associated with long term immunosuppression.   NEUROLOGIC:  Grossly nonfocal. Active x4 extremities         Laboratory Data:     Inflammatory Markers  Recent Labs   Lab Test  10/26/18   0545  10/25/18   1707  09/17/18   1741  07/01/18   1250   SED   --    --   32*  52*   CRP  350.0*  370.0*  <2.9   --        Metabolic Studies     Recent Labs   Lab Test  10/26/18   0836  10/26/18   0545  10/26/18   0152  10/25/18   2324   10/25/18   1707   06/06/15   0552   NA   --   139   --   138   --   134   < >  137   POTASSIUM   --   4.0   --   4.4   --   5.1   < >  5.1   CHLORIDE   --   110*   --   109   --   104   < >  110*   CO2   --   14*   --   15*   --   19*   < >  14*   ANIONGAP   --   15*   --   14   --   11   < >  13   BUN   --   58*   --   61*   --   56*   < >  53*   CR   --   9.27*   --   8.87*   --   8.70*   < >  5.41*   GFRESTIMATED   --   6*   --   7*   --   7*   < >  12*   GLC   --   108*   --   108*   --   124*   < >  138*   A1C   --    --    --    --    --    --    --   5.4   ASHELY   --   7.5*   --   7.3*   --   8.7   < >  8.8   PHOS   --    --    --    --    --   5.2*   < >  4.6*   MAG   --    --    --   1.7   < >  1.0*   < >  2.0   LACT  1.5   --    --   1.4   < >  1.5   --    --    PCAL   --    --   >200.00*   --    --   >200.00*   < >   --    CKT   --    --    --    --    --   58   --    --     < > = values in this interval not displayed.       Hepatic Studies  Recent Labs   Lab Test  10/26/18   0545  10/25/18   1707  10/25/18   1155   12/05/11   1046   BILITOTAL  0.4  0.4  0.4   < >  0.4   BILIDELTA   --    --    --    --   0.1   BILICONJ   --    --    --    --   0.0   ALKPHOS  63  64  63   < >  79   PROTTOTAL  5.5*  6.8  6.8   < >  8.0   ALBUMIN  2.0*  2.7*  2.8*   < >  4.2   AST  11  13  12   < >  39   ALT  10  14  16   < >  63    < > = values in this interval not displayed.       Pancreatitis testing  Recent  Labs   Lab Test  09/25/17   0720  04/10/17   1131  06/17/15   2115   01/21/12   0035   07/15/11   1537   AMYLASE   --   153*   --    --    --    --   172*   LIPASE   --   387  179   --   161   --   168   TRIG  75   --    --    < >   --    < >   --     < > = values in this interval not displayed.       Gout Labs      Recent Labs   Lab Test  09/17/18   1741  07/01/18   1250  05/25/18   1135  01/06/18   0947  02/28/15   1112   URIC  11.5*  11.1*  11.1*  10.6*  11.5*       Hematology Studies    Recent Labs   Lab Test  10/26/18   0545  10/26/18   0018  10/25/18   2324  10/25/18   1707  10/25/18   1155  07/01/18   1250   WBC  17.7*  8.4  6.8  24.5*  21.8*  6.3   ANEU   --   7.9   --   21.5*  19.9*  4.5   ALYM   --   0.2*   --   0.6*  0.2*  1.1   TAMIKA   --   0.1   --   2.1*  1.5*  0.7   AEOS   --   0.0   --   0.0   --   0.1   HGB  8.0*  7.5*  7.9*  9.7*  9.8*  10.6*   HCT  25.8*  23.7*  25.5*  31.1*  30.6*  34.1*   PLT  78*  76*  88*  128*  106*  180       Clotting Studies    Recent Labs   Lab Test  10/26/18   0545  10/25/18   2324  09/25/17   0720  03/01/17   0135   INR  1.58*  1.57*  1.10  1.05   PTT   --   39*  28   --        Iron Testing  Recent Labs   Lab Test  10/26/18   0545   01/06/18   0947   IRON   --    --   62   FEB   --    --   219*   IRONSAT   --    --   28   ORALIA   --    --   256   MCV  86   < >  86    < > = values in this interval not displayed.       Arterial Blood Gas Testing  Recent Labs   Lab Test  10/26/18   0836  10/26/18   0018  10/25/18   2136   O2PER  21.0  0  21        Thyroid Studies     Recent Labs   Lab Test  04/10/17   1131   TSH  0.67       Urine Studies   Recent Labs   Lab Test  10/25/18   1210  02/04/18   1423  09/25/17   0729  03/01/17   0118  02/28/17   0809   URINEPH  6.5  6.5  6.0  5.0  6.0   NITRITE  Negative  Negative  Negative  Negative  Negative   LEUKEST  Moderate*  Negative  Negative  Small*  Negative   WBCU  >100*  0  <1  40*  <1       Medication levels  Recent Labs   Lab Test   06/19/18   1000   04/24/17   0849   02/04/11   0845   TOBRA   --    --    --    --   <0.6   TACROL  5.3   < >  5.8   < >   --    MPACID   --    --   2.16   < >   --    MPAG   --    --   172.3*   < >   --     < > = values in this interval not displayed.       Microbiology:  Infection Studies to assess Diarrhea   Recent Labs   Lab Test  10/25/18   1929  04/24/17   0900  04/10/17   1835   POPRT   --    --   Routine parasitology exam negative  Cryptosporidium, Cyclospora, and Microsporidia are not readily detected by this   method. A single negative specimen does not rule out parasitic infection.     EPCAMP  Not Detected  Not Detected  Not Detected   EPSALM  Not Detected  Not Detected  Not Detected   EPSHGL  Not Detected  Not Detected  Not Detected   EPVIB  Not Detected  Not Detected  Not Detected   EPROTA  Not Detected  Not Detected  Not Detected   EPNORO  Detected, Abnormal Result*  Detected, Abnormal Result*  Not Detected   EPYER  Not Detected  Not Detected  Not Detected       Last Culture results with specimen source  Culture Micro   Date Value Ref Range Status   10/25/2018 No growth after 8 hours  Preliminary   10/25/2018 (A)  Preliminary    Cultured on the 1st day of incubation:  Gram negative rods     10/25/2018   Preliminary    Critical Value/Significant Value, preliminary result only, called to and read back by  Cisco Mcdaniels RN at 6B at 0620 on [email or web address]     10/25/2018   Preliminary    (Note)  POSITIVE for E.COLI by Verigene multiplex nucleic acid test. Final  identification and antimicrobial susceptibility testing will be  verified by standard methods. Verigene test will not distinguish  E.coli from Shigella species including S.dysenteriae, S.flexneri,  S.boydii, and S.sonnei. Specimens containing Shigella species or  E.coli will be reported as Positive for E.coli.    Specimen tested with Verigene multiplex, gram-negative blood culture  nucleic acid test for the following targets: Acinetobacter  sp.,  Citrobacter sp., Enterobacter sp., Proteus sp., E. coli, K.  pneumoniae/oxytoca, P. aeruginosa, and the following resistance  markers: CTXM, KPC, NDM, VIM, IMP and OXA.    Critical Value/Significant Value called to and read back by Denisha Payton RN, @ 0832 10.26.2018 BL     03/01/2017 No growth  Final   06/04/2015 No growth  Final   01/24/2012 No growth  Final   01/22/2012   Final    No Salmonella, Shigella, Campylobacter, E. coli O157, Aeromonas, or Plesiomonas   isolated.   12/28/2011 No growth  Final   01/12/2011 No yeast isolated  Final   01/12/2011   Final    <10,000 colonies/mL Alpha hemolytic Streptococcus No further identification   02/24/2010 No growth  Final    Specimen Description   Date Value Ref Range Status   10/25/2018 Blood Left Arm  Final   10/25/2018 Feces  Final   10/25/2018 Blood Left Arm  Final   04/10/2017 Feces  Final   04/10/2017 Feces  Final   04/10/2017 Feces  Final   03/01/2017 Midstream Urine  Final   06/04/2015 Midstream Urine  Final   10/31/2014 Urine  Final   01/24/2012 Midstream Urine  Final   01/22/2012 Feces  Final   01/22/2012 Feces  Final   12/28/2011 Midstream Urine  Final   01/12/2011 Midstream Urine  Final   01/12/2011 Midstream Urine  Final   01/12/2011 Midstream Urine  Final   02/24/2010 Midstream Urine  Final        Last check of C difficile  C Diff Toxin B PCR   Date Value Ref Range Status   10/25/2018 Negative NEG^Negative Final     Comment:     Negative: Clostridium difficile target DNA sequences NOT detected, presumed   negative for Clostridium difficile toxin B or the number of bacteria present   may be below the limit of detection for the test.  FDA approved assay performed using [employer] GeneXpert real-time PCR.  A negative result does not exclude actual disease due to Clostridium difficile   and may be due to improper collection, handling and storage of the specimen   or the number of organisms in the specimen is below the detection limit of the   assay.          Syphilis Testing    Treponema pallidum Antibody   Date Value Ref Range Status   09/25/2017 Negative NEG^Negative Final   10/31/2014 Negative NEG Final   01/27/2009 Negative NEG Final       Quantiferon testing   Recent Labs   Lab Test  09/25/17   0720   TBRSLT  Negative   TBAGN  0.00       Virology:  Log IU/mL of CMVQNT   Date Value Ref Range Status   04/24/2017 Not Calculated <2.1 [Log_IU]/mL Final   02/28/2017 Not Calculated <2.1 [Log_IU]/mL Final       BK Virus Testing   BK Virus Result   Date Value Ref Range Status   09/25/2017 BK Virus DNA Not Detected BKNEG^BK Virus DNA Not Detected copies/mL Final   02/28/2017 BK Virus DNA Not Detected BKNEG copies/mL Final   10/22/2013 <1000 <1000 copies/mL Final   05/21/2012 <1000 <1000 copies/mL Final   05/12/2012 <1000 <1000 copies/mL Final   12/05/2011 <1000 <1000 copies/mL Final   07/08/2011 <1000 <1000 copies/mL Final   04/08/2011 <1000 <1000 copies/mL Final   02/14/2011 <1000 <1000 copies/mL Final       Hepatitis B Testing   Recent Labs   Lab Test  09/25/17   0720  10/31/14   1247  07/08/11   0915  01/12/11   0837   AUSAB  0.21  0.52   --    --    HBSAB   --    --   0.0  0.3   HBCAB  Nonreactive   --   Negative  Negative   HEPBANG  Nonreactive  Negative   --    --         Hepatitis C Antibody   Date Value Ref Range Status   09/25/2017 Nonreactive NR^Nonreactive Final     Comment:     Assay performance characteristics have not been established for newborns,   infants, and children     10/31/2014 Negative NEG Final       CMV Antibody IgG   Date Value Ref Range Status   09/25/2017 >8.0 (H) 0.0 - 0.8 AI Final     Comment:     Positive  Antibody index (AI) values reflect qualitative changes in antibody   concentration that cannot be directly associated with clinical condition or   disease state.       CMV IgG Antibody   Date Value Ref Range Status   01/12/2011 >160.0 EU/mL Final     Comment:     Positive for anti-CMV IgG     CMV IgM Antibody   Date Value Ref Range  Status   01/12/2011 <0.90 <0.90 Final     Comment:     No detectable antibody.     Herpes Simplex Virus IgM Antibody   Date Value Ref Range Status   06/19/2018 0.48 0.00 - 0.89 Index Value Final     Comment:     No detectable antibody.  A negative test result does not rule out a primary or reactivated infection.       Varicella Zoster Virus Antibody IgG   Date Value Ref Range Status   09/25/2017 >8.0 (H) 0.0 - 0.8 AI Final     Comment:     Positive, suggests prev. exposure and probable immunity  Antibody index (AI) values reflect qualitative changes in antibody   concentration that cannot be directly associated with clinical condition or   disease state.       EBV Capsid Antibody IgG   Date Value Ref Range Status   09/25/2017 >8.0 (H) 0.0 - 0.8 AI Final     Comment:     Positive, suggests recent or past exposure  Antibody index (AI) values reflect qualitative changes in antibody   concentration that cannot be directly associated with clinical condition or   disease state.       EBV IgG Antibody Interpretation   Date Value Ref Range Status   01/12/2011 Positive, suggests immunologic exposure.  Final   01/27/2009 Positive, suggests immunologic exposure.  Final     Herpes Simplex Virus Type 1 IgG   Date Value Ref Range Status   06/19/2018 6.6 (H) 0.0 - 0.8 AI Final     Comment:     Positive.  IgG antibody to HSV-1 detected.  Antibody index (AI) values reflect qualitative changes in antibody   concentration that cannot be directly associated with clinical condition or   disease state.       Herpes Simplex Virus Type 2 IgG   Date Value Ref Range Status   06/19/2018 <0.2 0.0 - 0.8 AI Final     Comment:     No HSV-2 IgG antibodies detected.  Antibody index (AI) values reflect qualitative changes in antibody   concentration that cannot be directly associated with clinical condition or   disease state.         Imaging:  Recent Results (from the past 48 hour(s))   Abd/pelvis CT no contrast - Stone Protocol    Narrative     EXAMINATION: CT ABDOMEN PELVIS W/O CONTRAST, 10/25/2018 6:36 PM    TECHNIQUE:  Helical CT images from the lung bases through the  symphysis pubis were obtained without IV contrast. Contrast: None     COMPARISON: Transplant ultrasound 6/17/2015, 6/4/2015, CT abdomen  1/21/2012    HISTORY: fever, abdominal pain, RLQ pain, wbc 24.5;     FINDINGS:    Abdomen and pelvis:   Postoperative changes of right lower quadrant kidney transplant. No  significant perinephric fluid. Mild nonspecific stranding about the  right lower quadrant kidney, similar to 1/21/2012. No significant  hydronephrosis. Unchanged appearance of the transplant ureter.     Nondilated fluid-filled small bowel. Moderate colonic stool. The  appendix is unremarkable.    Noncontrast appearance of the liver, spleen, adrenal glands, pancreas,  and gallbladder are unremarkable. Symmetric renal parenchymal  thinning. Stable 1.3 cm renal cyst in the left upper pole, not  significantly changed in size from ultrasound 12/5/2008 prior CT  comparisons. Nonobstructing renal calculi measuring up to 4 mm in the  right lower pole, 2 mm in the right midpole. No hydronephrosis or  hydroureter of the native kidneys. The urinary bladder wall is  circumferentially thickened.    Abdominal aorta is within normal limits. No pathologically enlarged  retroperitoneal, pelvic or abdominal lymphadenopathy. Slightly  prominent scattered mesenteric lymph nodes.    Lung bases: Partially calcified 4 mm granuloma of the right lung base,  unchanged from 2012. No significant pleural effusion. No pneumothorax.  Trace pericardial fluid. No evidence of acute airspace disease. No  significant bronchial wall thickening or bronchiectasis.    Bones and soft tissues: No aggressive osseous lesion.      Impression    IMPRESSION:  1. Minimal nonspecific stranding around the right lower quadrant  transplant is unchanged from prior comparisons. No hydronephrosis. No  peritransplant fluid collection.  2.  Thickening of the bladder wall, concerning for cystitis.  3. Atrophic native kidneys. Nonobstructing right renal calculi.    I have personally reviewed the examination and initial interpretation  and I agree with the findings.    YAW GARCIA MD   US Renal Transplant    Narrative    EXAMINATION: US RENAL TRANSPLANT,  10/25/2018 8:46 PM     COMPARISON: CT abdomen 10/25/2018    HISTORY: DEIDRA, UTI;     TECHNIQUE:  Grey-scale, color Doppler and spectral flow analysis.    FINDINGS:  The transplant kidney is located right lower quadrant, and measures  1.7. Parenchyma is of normal thickness and echogenicity. No focal  lesions. No hydronephrosis. No perinephric fluid collection.    Renal artery flow:   135 cm/sec peak systolic at hilum. Normal low-resistance arterial  waveform. No spectral broadening.  330 cm/sec peak systolic at anastomosis. Normal low-resistance  arterial flow was slow systolic upstroke.  Arcuate artery resistive indices (upper to lower): 0.76, 0.93, 0.67    Renal Vein Flow:  108 cm/sec at hilum.   77 cm/sec at anastomosis.    Iliac artery flow:  210 cm/sec peak systolic above anastomosis.  175 cm/sec peak systolic below anastomosis.    Iliac vein flow:  Patent above and below the anastomosis.      Impression    IMPRESSION:   1. Elevated mildly elevated resistive indices of the mid arteries.  Nonspecific in the setting of infection.  2. Elevated anastomotic velocities (330 cm/sec) , with normal  appearing spectral waveforms, likely hyperdynamic concerning for  infection. Follow-up exam following resolution of acute symptoms be  considered to evaluate for stenosis.  3. No anatomic changes to indicate a renal abscess.    I have personally reviewed the examination and initial interpretation  and I agree with the findings.    YAW GARCIA MD

## 2018-10-26 NOTE — PROGRESS NOTES
D Patient arrived from ED via liter accompanied by his wife and is being admitted for possible sepsis. A/o x 4 and cooperative with assessment/admission questions. Heart regular and lungs clear. Voices no c/o pain or nausea at this time. Double skin check revealed intact skin with no rashes or bruises but did note possible oral thrush which was reported to MD. Patient reports he had an old right shunt but it was removed. Up to bathroom shortly after arriving to unit for a bout of diarrhea and he reports he did void. Hat placed in toilet to collect urine.   I Vital's, assessment and med's per order.   A Resting in bed with call light in reach.   P Continue to monitor and update MD with changes.

## 2018-10-26 NOTE — PROVIDER NOTIFICATION
DATE:  10/26/2018   TIME OF RECEIPT FROM LAB:  0841  LAB TEST: peripheral blood culture  LAB VALUE:  Gram negative rods  RESULTS GIVEN WITH READ-BACK TO (PROVIDER):  Dr. Nelson  TIME LAB VALUE REPORTED TO PROVIDER:  0998

## 2018-10-26 NOTE — CONSULTS
[facility], [place]  Transplant Nephrology Consult  Date of Admission:  10/25/2018  Requesting physician: Galindo Villegas MD    Assessment & Plan      # Bacteremia in the settings of urosepsis and allograft pyelonephritis: on cefepime agree with stopping Vancomycin will need to check daily cultures until negative. Would continue IV abx for total 14 days after cultures are negative      # allograft pyelonephritis: IV abx, fluid resuscitation and solumderol 125 mg, I am afraid that we may need to start dialysis this admission. Luckily to immediate need for dialysis. Would monitor closely for dialysis need. Would hold off on tunneled catheter until the bacteremia is cleared. Best case scenario will be if Rashad recovers some kidney functions to enable him to get a fistula placed after he has recovered this episode.      # LDKT: baseline Cr ~ 3-4 mg/dL; Now with DEIDRA in the settings severe pyelonephritis with bacteremia    - Kidney Tx Biopsy: Yes, history of IB rejection in the settings if INS lapse     # Immunosuppression: Tacrolimus immediate release (goal  3-5), Mycophenolate mofetil (goal  1-3.5) and Prednisone (dose  5 mg daily)   - Changes: Yes - Hold MMF due to severe sepsis and on going diarrhea due to norovirus. Will also hold the prograf in the settings of severe renal injury. will give solumedrol for the ongoing allograft tenderness 125 mg IV daily x 2 then we can start cellcept 250 mg po bid and if renal functions allows it would start small dose tac.     # Hypertension/Volume Status: was hypotensive hold all BP meds for now     # Severe acidosis in the settings of sepsis, will change IVF to 1/2 strength isotonic bicarb     # Diarrhea in the settings of Norovirus will start Nitazoxanide 500 mg po bid ordered         Recommendations were communicated to the primary team via this note.    Abran Cunha MD  Pager: [phone]    REASON FOR CONSULT   Immunosuppression management      History of Present Illness   Rashad Ortiz is a 43 year old male with ESRD s/p LDKT 7 years ago. He had an episode of IB rejection a few years ago in the settings if INS lapse. His allograft functions declined since and currently with baseline creatinine of mid 3s. He feels that this illness started 2 days ago on Wednesday when he noted fevers and chills. His condition continued to worsen over the last couple of days and he resented to the ED for further attention. He noted some pain over the allograft. His intake has been minimal. He continues to have diarrhea since he was diagnosed with the norovirus a while back. He reports that he is compliant with his medications and denies any recent interruptions. He is on triple isx regimen.   He was resuscitated with fluid upon admission. His presenting BP was below 100 mmHg. He recived Vanco/Levaquin and cefepime. His Bcx grew gram negative rods.   Feels slightly better but weak. He noted legs swelling bilateral worse than baseline.       Review of Systems    The 10 point Review of Systems is negative other than noted in the HPI or here.     Past Medical History    I have reviewed this patient's medical history and updated it with pertinent information if needed.   Past Medical History:   Diagnosis Date     Chronic kidney disease, stage 4, severely decreased GFR (H)      Gastro-oesophageal reflux disease      Gout      Hypertension      Medical non-compliance      Pulmonary nodules      Steroid long-term use        Past Surgical History   I have reviewed this patient's surgical history and updated it with pertinent information if needed.  Past Surgical History:   Procedure Laterality Date     AV FISTULA OR GRAFT ARTERIAL       LIGATE FISTULA ARTERIOVENOUS UPPER EXTREMITY  12/20/2011    Procedure:LIGATE FISTULA ARTERIOVENOUS UPPER EXTREMITY; Excision of Right Forearm Arteriovenous Fistula.; Surgeon:LINDY AMAYA; Location:[facility] OR     PERCUTANEOUS BIOPSY KIDNEY  Right 2/28/2017    Procedure: PERCUTANEOUS BIOPSY KIDNEY;  Surgeon: Gee Barrios MD;  Location: [facility] OR     TRANSPLANT  01/13/2011    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 Rashad Ortiz  reports that he quit smoking about 19 months ago. His smoking use included Cigarettes. He has never used smokeless tobacco. He reports that he drinks about 2.5 oz of alcohol per week  He reports that he does not use illicit drugs.    Family History   I have reviewed this patient's family history and updated it with pertinent information if needed.   Family History   Problem Relation Age of Onset     Hypertension Mother        Prior to Admission Medications   Prior to Admission Medications   Prescriptions Last Dose Informant Patient Reported? Taking?   allopurinol (ZYLOPRIM) 100 MG tablet 10/25/2018 at AM  No Yes   Sig: Take 1 tablet (100 mg) by mouth daily   amLODIPine (NORVASC) 5 MG tablet 10/25/2018 at AM  No Yes   Sig: Take 1 tablet (5 mg) by mouth daily   carvedilol (COREG) 25 MG tablet 10/25/2018 at AM  No Yes   Sig: Take 1 tablet (25 mg) by mouth 2 times daily   furosemide (LASIX) 20 MG tablet 10/25/2018 at AM  No Yes   Sig: Take 1 tablet (20 mg) by mouth 2 times daily   losartan (COZAAR) 100 MG tablet 10/25/2018 at PM  No Yes   Sig: Take 1 tablet (100 mg) by mouth daily   mycophenolate (GENERIC EQUIVALENT) 250 MG capsule 10/25/2018 at AM  No Yes   Sig: Take 2 capsules (500 mg) by mouth 2 times daily   omeprazole (PRILOSEC) 20 MG capsule 10/24/2018 at Unknown time  No Yes   Sig: Take 1 capsule (20 mg) by mouth daily   predniSONE (DELTASONE) 5 MG tablet 10/24/2018 at Unknown time  No Yes   Sig: Take 1 tablet (5 mg) by mouth daily   sodium bicarbonate 650 MG tablet 10/25/2018 at AM  No Yes   Sig: Take 2 tablets (1,300 mg) by mouth 2 times daily   sulfamethoxazole-trimethoprim  (BACTRIM/SEPTRA) 400-80 MG per tablet 10/24/2018 at AM  No Yes   Sig: Take 1 tablet by mouth every other day   tacrolimus (GENERIC EQUIVALENT) 1 MG capsule 10/25/2018 at AM  No Yes   Sig: Take 2 capsules (2 mg) by mouth 2 times daily   valACYclovir (VALTREX) 1000 mg tablet More than a month at Unknown time  No No   Sig: Take 1 tablet (1,000 mg) by mouth 2 times daily      Facility-Administered Medications: None     Allergies   Allergies   Allergen Reactions     Nkda [No Known Drug Allergies]        Physical Exam   Temp  Av.7  F (38.2  C)  Min: 99.1  F (37.3  C)  Max: 103  F (39.4  C)      Pulse  Av.5  Min: 100  Max: 101 Resp  Av.4  Min: 16  Max: 35  SpO2  Av.3 %  Min: 91 %  Max: 99 %     /80  Pulse 100  Temp 99.8  F (37.7  C) (Oral)  Resp 20  Wt 82.1 kg (181 lb)  SpO2 96%  BMI 23.73 kg/m2     Admit Weight: 81.1 kg (178 lb 12.7 oz)     GENERAL APPEARANCE: alert and no distress  HENT: mouth without ulcers or lesions  LYMPHATICS: no cervical or supraclavicular nodes  RESP: lungs clear to auscultation - no rales, rhonchi or wheezes  CV: regular rhythm, normal rate, no rub, no murmur  EDEMA: +1 LE edema bilaterally  ABDOMEN: soft, nondistended, moderate tenderness over the allograft, bowel sounds normal  MS: extremities normal - no gross deformities noted, no evidence of inflammation in joints, no muscle tenderness  SKIN: no rash  Neuro: non focal   Psych: appropriate mood       Data   CMP  Recent Labs  Lab 10/26/18  0545 10/25/18  2324 10/25/18  2225 10/25/18  1707 10/25/18  1155    138  --  134 136   POTASSIUM 4.0 4.4  --  5.1 4.6   CHLORIDE 110* 109  --  104 104   CO2 14* 15*  --  19* 22   ANIONGAP 15* 14  --  11 10   * 108*  --  124* 105*   BUN 58* 61*  --  56* 54*   CR 9.27* 8.87*  --  8.70* 7.47*   GFRESTIMATED 6* 7*  --  7* 8*   GFRESTBLACK 8* 8*  --  8* 10*   ASHELY 7.5* 7.3*  --  8.7 8.9   MAG  --  1.7 0.9* 1.0*  --    PHOS  --   --   --  5.2*  --    PROTTOTAL 5.5*  --    --  6.8 6.8   ALBUMIN 2.0*  --   --  2.7* 2.8*   BILITOTAL 0.4  --   --  0.4 0.4   ALKPHOS 63  --   --  64 63   AST 11  --   --  13 12   ALT 10  --   --  14 16     CBC  Recent Labs  Lab 10/26/18  0545 10/26/18  0018 10/25/18  2324 10/25/18  1707   HGB 8.0* 7.5* 7.9* 9.7*   WBC 17.7* 8.4 6.8 24.5*   RBC 2.99* 2.79* 2.96* 3.56*   HCT 25.8* 23.7* 25.5* 31.1*   MCV 86 85 86 87   MCH 26.8 26.9 26.7 27.2   MCHC 31.0* 31.6 31.0* 31.2*   RDW 16.5* 16.3* 16.4* 16.4*   PLT 78* 76* 88* 128*     INR  Recent Labs  Lab 10/26/18  0545 10/25/18  2324   INR 1.58* 1.57*   PTT  --  39*     ABG  Recent Labs  Lab 10/26/18  0018 10/25/18  2136   O2PER 0 21      Urine Studies  Recent Labs   Lab Test  10/25/18   1210  02/04/18   1423  09/25/17   0729  03/01/17   0118   01/14/15   2154  10/31/14   1258   COLOR  Yellow  Light Yellow  Straw  Light Yellow   < >  Yellow  Yellow   APPEARANCE  Cloudy  Clear  Clear  Clear   < >  Clear  Clear   URINEGLC  Negative  Negative  Negative  Negative   < >  Negative  Negative   URINEBILI  Negative  Negative  Negative  Negative   < >  Negative  Negative   URINEKETONE  Negative  Negative  Negative  Negative   < >  Negative  Negative   SG  1.020  1.007  1.010  1.006   < >  1.020  1.020   UBLD  Large*  Negative  Negative  Large*   < >  Trace*  Trace*   URINEPH  6.5  6.5  6.0  5.0   < >  5.5  5.5   PROTEIN  >=300*  30*  Negative  Negative   < >  Trace*  Trace*   UROBILINOGEN  0.2   --    --    --    --   0.2  0.2   NITRITE  Negative  Negative  Negative  Negative   < >  Negative  Negative   LEUKEST  Moderate*  Negative  Negative  Small*   < >  Negative  Negative   RBCU  25-50*  0  <1  >182*   < >  O - 2  O - 2   WBCU  >100*  0  <1  40*   < >  O - 2  O - 2    < > = values in this interval not displayed.     Recent Labs   Lab Test  03/17/18   1035  08/04/17   1839  02/28/17   0809  09/17/16   1050  09/12/16   1617  06/12/15   0803  05/27/14   1509  05/21/12   0805  05/12/12   1044  12/05/11   1150  07/22/11    0730  04/11/11   1620   UTPG  0.82*  0.17  0.29*  0.35*  0.44*  2.09*  0.05  0.05  0.05  0.07  0.11  0.05     PTH  Recent Labs   Lab Test  01/06/18   0947  02/13/16   0930   PTHI  621*  503*     Iron Studies  Recent Labs   Lab Test  01/06/18   0947   IRON  62   FEB  219*   IRONSAT  28   ORALIA  256       IMAGING:  All imaging studies reviewed by me. Allograft stranding was noted on CT scan. No hydronephrosis.

## 2018-10-27 LAB
ANION GAP SERPL CALCULATED.3IONS-SCNC: 14 MMOL/L (ref 3–14)
BACTERIA SPEC CULT: ABNORMAL
BASOPHILS # BLD AUTO: 0 10E9/L (ref 0–0.2)
BASOPHILS NFR BLD AUTO: 0 %
BUN SERPL-MCNC: 61 MG/DL (ref 7–30)
CALCIUM SERPL-MCNC: 7.7 MG/DL (ref 8.5–10.1)
CHLORIDE SERPL-SCNC: 112 MMOL/L (ref 94–109)
CO2 SERPL-SCNC: 15 MMOL/L (ref 20–32)
CREAT SERPL-MCNC: 8.12 MG/DL (ref 0.66–1.25)
CRP SERPL-MCNC: 360 MG/L (ref 0–8)
DIFFERENTIAL METHOD BLD: ABNORMAL
EOSINOPHIL # BLD AUTO: 0 10E9/L (ref 0–0.7)
EOSINOPHIL NFR BLD AUTO: 0 %
ERYTHROCYTE [DISTWIDTH] IN BLOOD BY AUTOMATED COUNT: 16.1 % (ref 10–15)
GFR SERPL CREATININE-BSD FRML MDRD: 7 ML/MIN/1.7M2
GLUCOSE SERPL-MCNC: 135 MG/DL (ref 70–99)
HCT VFR BLD AUTO: 26 % (ref 40–53)
HGB BLD-MCNC: 8.5 G/DL (ref 13.3–17.7)
IMM GRANULOCYTES # BLD: 0 10E9/L (ref 0–0.4)
IMM GRANULOCYTES NFR BLD: 0.3 %
LYMPHOCYTES # BLD AUTO: 0.3 10E9/L (ref 0.8–5.3)
LYMPHOCYTES NFR BLD AUTO: 2.5 %
MCH RBC QN AUTO: 27.1 PG (ref 26.5–33)
MCHC RBC AUTO-ENTMCNC: 32.7 G/DL (ref 31.5–36.5)
MCV RBC AUTO: 83 FL (ref 78–100)
MONOCYTES # BLD AUTO: 0.4 10E9/L (ref 0–1.3)
MONOCYTES NFR BLD AUTO: 2.9 %
NEUTROPHILS # BLD AUTO: 12.4 10E9/L (ref 1.6–8.3)
NEUTROPHILS NFR BLD AUTO: 94.3 %
NRBC # BLD AUTO: 0 10*3/UL
NRBC BLD AUTO-RTO: 0 /100
PLATELET # BLD AUTO: 77 10E9/L (ref 150–450)
POTASSIUM SERPL-SCNC: 3.6 MMOL/L (ref 3.4–5.3)
PROCALCITONIN SERPL-MCNC: >200 NG/ML
RBC # BLD AUTO: 3.14 10E12/L (ref 4.4–5.9)
SODIUM SERPL-SCNC: 141 MMOL/L (ref 133–144)
SPECIMEN SOURCE: ABNORMAL
TACROLIMUS BLD-MCNC: 6.9 UG/L (ref 5–15)
TME LAST DOSE: NORMAL H
WBC # BLD AUTO: 13.1 10E9/L (ref 4–11)

## 2018-10-27 PROCEDURE — 80048 BASIC METABOLIC PNL TOTAL CA: CPT | Performed by: INTERNAL MEDICINE

## 2018-10-27 PROCEDURE — 85025 COMPLETE CBC W/AUTO DIFF WBC: CPT | Performed by: INTERNAL MEDICINE

## 2018-10-27 PROCEDURE — 25000128 H RX IP 250 OP 636: Performed by: INTERNAL MEDICINE

## 2018-10-27 PROCEDURE — 84145 PROCALCITONIN (PCT): CPT | Performed by: INTERNAL MEDICINE

## 2018-10-27 PROCEDURE — 99232 SBSQ HOSP IP/OBS MODERATE 35: CPT | Performed by: INTERNAL MEDICINE

## 2018-10-27 PROCEDURE — 27210995 ZZH RX 272: Performed by: INTERNAL MEDICINE

## 2018-10-27 PROCEDURE — 25000132 ZZH RX MED GY IP 250 OP 250 PS 637: Performed by: INTERNAL MEDICINE

## 2018-10-27 PROCEDURE — 40000141 ZZH STATISTIC PERIPHERAL IV START W/O US GUIDANCE

## 2018-10-27 PROCEDURE — 80180 DRUG SCRN QUAN MYCOPHENOLATE: CPT | Performed by: INTERNAL MEDICINE

## 2018-10-27 PROCEDURE — 86140 C-REACTIVE PROTEIN: CPT | Performed by: INTERNAL MEDICINE

## 2018-10-27 PROCEDURE — 40000802 ZZH SITE CHECK

## 2018-10-27 PROCEDURE — 25000125 ZZHC RX 250: Performed by: INTERNAL MEDICINE

## 2018-10-27 PROCEDURE — 36415 COLL VENOUS BLD VENIPUNCTURE: CPT | Performed by: INTERNAL MEDICINE

## 2018-10-27 PROCEDURE — 12000006 ZZH R&B IMCU INTERMEDIATE UMMC

## 2018-10-27 PROCEDURE — 80197 ASSAY OF TACROLIMUS: CPT | Performed by: INTERNAL MEDICINE

## 2018-10-27 PROCEDURE — 25000128 H RX IP 250 OP 636: Performed by: PHYSICIAN ASSISTANT

## 2018-10-27 RX ORDER — CARVEDILOL 6.25 MG/1
6.25 TABLET ORAL 2 TIMES DAILY
Status: DISCONTINUED | OUTPATIENT
Start: 2018-10-27 | End: 2018-10-28 | Stop reason: HOSPADM

## 2018-10-27 RX ORDER — ALLOPURINOL 100 MG/1
100 TABLET ORAL DAILY
Status: DISCONTINUED | OUTPATIENT
Start: 2018-10-27 | End: 2018-10-28 | Stop reason: HOSPADM

## 2018-10-27 RX ADMIN — METHYLPREDNISOLONE SODIUM SUCCINATE 125 MG: 125 INJECTION, POWDER, LYOPHILIZED, FOR SOLUTION INTRAMUSCULAR; INTRAVENOUS at 12:26

## 2018-10-27 RX ADMIN — NITAZOXANIDE 500 MG: 500 TABLET ORAL at 12:26

## 2018-10-27 RX ADMIN — CARVEDILOL 6.25 MG: 6.25 TABLET, FILM COATED ORAL at 19:44

## 2018-10-27 RX ADMIN — CEFEPIME HYDROCHLORIDE 1 G: 1 INJECTION, POWDER, FOR SOLUTION INTRAMUSCULAR; INTRAVENOUS at 00:23

## 2018-10-27 RX ADMIN — NITAZOXANIDE 500 MG: 500 TABLET ORAL at 00:26

## 2018-10-27 RX ADMIN — SODIUM BICARBONATE: 84 INJECTION, SOLUTION INTRAVENOUS at 08:00

## 2018-10-27 RX ADMIN — NITAZOXANIDE 500 MG: 500 TABLET ORAL at 23:56

## 2018-10-27 RX ADMIN — CEFEPIME HYDROCHLORIDE 1 G: 1 INJECTION, POWDER, FOR SOLUTION INTRAMUSCULAR; INTRAVENOUS at 23:56

## 2018-10-27 RX ADMIN — Medication 0.3 MG: at 23:56

## 2018-10-27 RX ADMIN — ALLOPURINOL 100 MG: 100 TABLET ORAL at 15:08

## 2018-10-27 RX ADMIN — SODIUM BICARBONATE: 84 INJECTION, SOLUTION INTRAVENOUS at 19:44

## 2018-10-27 ASSESSMENT — PAIN DESCRIPTION - DESCRIPTORS
DESCRIPTORS: HEADACHE
DESCRIPTORS: HEADACHE

## 2018-10-27 ASSESSMENT — ACTIVITIES OF DAILY LIVING (ADL)
ADLS_ACUITY_SCORE: 11

## 2018-10-27 NOTE — PLAN OF CARE
Problem: Patient Care Overview  Goal: Plan of Care/Patient Progress Review  Outcome: No Change  Neuro: A&Ox4.   Cardiac: Afebrile. SR/ST with HR 80-100s. VSS. Chest pain x1 overnight (see provider notification note), now resolved   Respiratory: Sating >95% on RA.  GI/: Adequate urine output. Multiple loose BMs.  Diet/appetite: Regular diet with poor appetite.  Activity:  Up independently.   Pain: Given dilaudid x1 for headache unrelieved by tylenol, pt found to be more effective.   Skin: No new deficits noted.  LDA's: L) PIV x1 with 1/2NS+bicarb infusing at 125 mL/hr.     Plan: Continue with POC. Notify primary team with changes.

## 2018-10-27 NOTE — PROGRESS NOTES
[facility], [place]   Transplant Nephrology Progress Note  Date of Admission:  10/25/2018    Assessment & Plan      #Transplant pyelonephritis with e coli bacteremia: sensitive to every abx other than bactrim. Can change to narrow spectrum.     # DEIDRA on LDKT: baseline Cr ~ 3-4 mg / dl; admitted with creatinine of 8-9 mg / dl in the setting of severe septic shock from transplant pyelonephritis.     Continue with solumedrol 125 mg IV daily day 2/3 today.     The patient has poor allograft function at baseline.  In the setting of the acute kidney injury due to the transplant pyelonephritis, it is likely that the patient will need to initiate renal replacement therapy soon.  There is no urgent need for dialysis based off of urine output and labs today.  Patient is asymptomatic from uremic symptoms today.    I discussed at length the options regarding renal replacement therapy with dialytic modalities.  The patient is contemplative towards initiating peritoneal dialysis or home hemodialysis.  I will speak with him tomorrow after he has had some time to think about this.  If he chooses peritoneal dialysis I would recommend a placement of a PD catheter in the coming week and initiate him as an outpatient.  I will speak with Dr. Winters regarding acceptance into the home dialysis program.    The urine output for this patient is excellent.  As such I would recommend reinitiation of tacrolimus and CellCept to prevent loss of residual renal function.  Goal tacrolimus level for this patient is 3-5 ng/mL.  Once his urine output decreases to less than 500 mL/day he can have his immunosuppression tapered due to not having a living donor for kidney retransplantation.  He is unlikely to be transplanted from the  donor in the next year.    # Immunosuppression: solumedrol 125 mg IV daily x 3 doses (day 2/3 today)   - Changes: Yes - restart tacrolimus and mmf.     Tac 2 mg po bid  Mmf 500 mg po  bid    # Hypertension/Volume Status: Controlled; Goal BP: < 140/90   - Changes: No    # Anemia in chronic renal disease: Hgb: Stable; 8.5 g / dl can start LAWRENCE with aranesp 40 mcg q week.    # Mineral Bone Disorder:    - Secondary renal hyperparathyroidism; PTH level is: Significantly elevated - will start active vit d once started on dialysis    Acidosis: continue with sodium bicarbonate 1300 mg po bid.     # Transplant History:  Etiology of kidney failure: hypertension  Tx: LDKT  Transplant: 2011 (Kidney)  Donor Type: Living Donor Class:     Recommendations were communicated to the primary team via this note.    Stef Murillo MD    Interval History   No cp, sob, no n/v, no f/s/c. No constipation. + diarrhea. Tmax: 99.4. I/O: 3680/3275    Review of Systems   4 point ROS was obtained and negative except as noted in the interval history    Physical Exam   Temp  Av.4  F (36.9  C)  Min: 96.2  F (35.7  C)  Max: 103  F (39.4  C)      Pulse  Av.3  Min: 54  Max: 101 Resp  Av.9  Min: 11  Max: 35  SpO2  Av.2 %  Min: 75 %  Max: 100 %     BP (!) 132/97 (BP Location: Right arm)  Pulse 100  Temp 98.9  F (37.2  C) (Oral)  Resp 16  Wt 82.1 kg (181 lb)  SpO2 100%  BMI 23.73 kg/m2   Date 10/27/18 0700 - 10/28/18 0659   Shift 0348-9075 3644-3929 1732-1908 24 Hour Total   I  N  T  A  K  E   I.V. 1000   1000    Shift Total  (mL/kg) 1000  (12.18)   1000  (12.18)   O  U  T  P  U  T   Shift Total  (mL/kg)       Weight (kg) 82.1 82.1 82.1 82.1     Admit Weight: 81.1 kg (178 lb 12.7 oz)   GENERAL APPEARANCE: alert and no distress  HENT: mouth without ulcers or lesions  LYMPHATICS: no cervical or supraclavicular nodes  RESP: lungs clear to auscultation - no rales, rhonchi or wheezes  CV: regular rhythm, normal rate, no rub, no murmur  EDEMA: no LE edema bilaterally  ABDOMEN: soft, nondistended, nontender, bowel sounds normal  MS: extremities normal - no gross deformities noted, no evidence of inflammation in  joints, no muscle tenderness  SKIN: no rash    Data   All labs reviewed by me.  CMP  Recent Labs  Lab 10/27/18  0444 10/26/18  1626 10/26/18  0545 10/25/18  2324 10/25/18  2225 10/25/18  1707 10/25/18  1155    141 139 138  --  134 136   POTASSIUM 3.6 4.2 4.0 4.4  --  5.1 4.6   CHLORIDE 112* 114* 110* 109  --  104 104   CO2 15* 14* 14* 15*  --  19* 22   ANIONGAP 14 14 15* 14  --  11 10   * 150* 108* 108*  --  124* 105*   BUN 61* 58* 58* 61*  --  56* 54*   CR 8.12* 8.64* 9.27* 8.87*  --  8.70* 7.47*   GFRESTIMATED 7* 7* 6* 7*  --  7* 8*   GFRESTBLACK 9* 8* 8* 8*  --  8* 10*   ASHELY 7.7* 7.4* 7.5* 7.3*  --  8.7 8.9   MAG  --   --   --  1.7 0.9* 1.0*  --    PHOS  --   --   --   --   --  5.2*  --    PROTTOTAL  --   --  5.5*  --   --  6.8 6.8   ALBUMIN  --   --  2.0*  --   --  2.7* 2.8*   BILITOTAL  --   --  0.4  --   --  0.4 0.4   ALKPHOS  --   --  63  --   --  64 63   AST  --   --  11  --   --  13 12   ALT  --   --  10  --   --  14 16     CBC  Recent Labs  Lab 10/27/18  0444 10/26/18  1626 10/26/18  0545 10/26/18  0018   HGB 8.5* 8.2* 8.0* 7.5*   WBC 13.1* 15.9* 17.7* 8.4   RBC 3.14* 3.04* 2.99* 2.79*   HCT 26.0* 26.0* 25.8* 23.7*   MCV 83 86 86 85   MCH 27.1 27.0 26.8 26.9   MCHC 32.7 31.5 31.0* 31.6   RDW 16.1* 16.4* 16.5* 16.3*   PLT 77* 77* 78* 76*     INR  Recent Labs  Lab 10/26/18  0545 10/25/18  2324   INR 1.58* 1.57*   PTT  --  39*     ABG  Recent Labs  Lab 10/26/18  0836 10/26/18  0018 10/25/18  2136   O2PER 21.0 0 21      Urine Studies  Recent Labs   Lab Test  10/25/18   1210  02/04/18   1423  09/25/17   0729  03/01/17   0118   01/14/15   2154  10/31/14   1258   COLOR  Yellow  Light Yellow  Straw  Light Yellow   < >  Yellow  Yellow   APPEARANCE  Cloudy  Clear  Clear  Clear   < >  Clear  Clear   URINEGLC  Negative  Negative  Negative  Negative   < >  Negative  Negative   URINEBILI  Negative  Negative  Negative  Negative   < >  Negative  Negative   URINEKETONE  Negative  Negative  Negative  Negative    < >  Negative  Negative   SG  1.020  1.007  1.010  1.006   < >  1.020  1.020   UBLD  Large*  Negative  Negative  Large*   < >  Trace*  Trace*   URINEPH  6.5  6.5  6.0  5.0   < >  5.5  5.5   PROTEIN  >=300*  30*  Negative  Negative   < >  Trace*  Trace*   UROBILINOGEN  0.2   --    --    --    --   0.2  0.2   NITRITE  Negative  Negative  Negative  Negative   < >  Negative  Negative   LEUKEST  Moderate*  Negative  Negative  Small*   < >  Negative  Negative   RBCU  25-50*  0  <1  >182*   < >  O - 2  O - 2   WBCU  >100*  0  <1  40*   < >  O - 2  O - 2    < > = values in this interval not displayed.     Recent Labs   Lab Test  03/17/18   1035  08/04/17   1839  02/28/17   0809  09/17/16   1050  09/12/16   1617  06/12/15   0803  05/27/14   1509  05/21/12   0805  05/12/12   1044  12/05/11   1150  07/22/11   0730  04/11/11   1620   UTPG  0.82*  0.17  0.29*  0.35*  0.44*  2.09*  0.05  0.05  0.05  0.07  0.11  0.05     PTH  Recent Labs   Lab Test  01/06/18   0947  02/13/16   0930   PTHI  621*  503*     Iron Studies  Recent Labs   Lab Test  01/06/18   0947   IRON  62   FEB  219*   IRONSAT  28   ORALIA  256     MEDICATIONS:  Current Facility-Administered Medications   Medication     acetaminophen (TYLENOL) tablet 650 mg     ceFEPIme (MAXIPIME) 1g vial to attach to  ml bag for ADULTS or NS 50 ml bag for PEDS     HYDROmorphone (PF) (DILAUDID) injection 0.3-0.5 mg     influenza quadrivalent (PF) vacc (FLUZONE or Flulaval or FLUARIX) injection 0.5 mL     [START ON 10/28/2018] levofloxacin (LEVAQUIN) infusion 500 mg     lidocaine (LMX4) cream     lidocaine 1 % 1 mL     melatonin tablet 1 mg     NaCl 0.45 % 1,000 mL with sodium bicarbonate 75 mEq/L infusion     naloxone (NARCAN) injection 0.1-0.4 mg     nitazoxanide (ALINIA) tablet 500 mg     ondansetron (ZOFRAN-ODT) ODT tab 4 mg    Or     ondansetron (ZOFRAN) injection 4 mg     [START ON 10/28/2018] predniSONE (DELTASONE) tablet 5 mg     sodium chloride (PF) 0.9% PF flush 3 mL      sodium chloride (PF) 0.9% PF flush 3 mL

## 2018-10-27 NOTE — PROVIDER NOTIFICATION
-------------------CRITICAL LAB VALUE-------------------    Lab Value: Procal >200  Time of notification: 5:41 AM  MD notified: [employer] Cover  Patient status: Previous procal >200 as well. VSS. ID following and pt receiving scheduled antibiotics.  Temp:  [98.5  F (36.9  C)-99.8  F (37.7  C)] 98.7  F (37.1  C)  Heart Rate:  [] 84  Resp:  [16-20] 16  BP: (109-140)/(80-91) 135/91  SpO2:  [92 %-99 %] 97 %  Orders received: None.

## 2018-10-27 NOTE — PROVIDER NOTIFICATION
"Time of notification: 9:00 PM  Provider notified: Leopoldo Syed NP  Patient status: Notified that upon during pain assessment pt mentioned new onset chest pain that started within the previous hour. Pain located in mid/left chest, pt unable to describe stating \"it's just present\" and rating it 3/10. VSS and pt in no visible distress.  Temp:  [98.5  F (36.9  C)-103  F (39.4  C)] 98.5  F (36.9  C)  Heart Rate:  [] 100  Resp:  [16-35] 20  BP: ()/(45-90) 140/90  SpO2:  [91 %-99 %] 92 %  Orders received: 12 lead EKG STAT.     "

## 2018-10-27 NOTE — PLAN OF CARE
Problem: Patient Care Overview  Goal: Plan of Care/Patient Progress Review  Neuro: Patient alert and oriented x4.   Cardiac: NSR with HR 's. BP's stable. Denying chest pain. Afebrile.        Respiratory: Sating >95% on RA.  GI/: Above adequate urine output. Multiple loose BMs.  Diet/appetite: Regular diet with poor appetite. Drinking well.   Activity: Up independently. Steady on feet.   Pain: Has denied pain throughout the shift.   Skin: No new deficits noted.  LDA's: L) PIV x1 with 1/2NS+bicarb infusing at 125 mL/hr. MD's want to continue through tonight.      Plan: Continue with antibiotics. Family here throughout the day and supportive. Will continue to monitor.

## 2018-10-28 VITALS
WEIGHT: 181 LBS | BODY MASS INDEX: 23.73 KG/M2 | DIASTOLIC BLOOD PRESSURE: 95 MMHG | TEMPERATURE: 98.1 F | SYSTOLIC BLOOD PRESSURE: 129 MMHG | HEART RATE: 100 BPM | OXYGEN SATURATION: 96 % | RESPIRATION RATE: 16 BRPM

## 2018-10-28 LAB
ANION GAP SERPL CALCULATED.3IONS-SCNC: 13 MMOL/L (ref 3–14)
BACTERIA SPEC CULT: ABNORMAL
BASOPHILS # BLD AUTO: 0 10E9/L (ref 0–0.2)
BASOPHILS NFR BLD AUTO: 0 %
BUN SERPL-MCNC: 67 MG/DL (ref 7–30)
CALCIUM SERPL-MCNC: 8.4 MG/DL (ref 8.5–10.1)
CHLORIDE SERPL-SCNC: 110 MMOL/L (ref 94–109)
CO2 SERPL-SCNC: 18 MMOL/L (ref 20–32)
CREAT SERPL-MCNC: 6.72 MG/DL (ref 0.66–1.25)
CRP SERPL-MCNC: 170 MG/L (ref 0–8)
DIFFERENTIAL METHOD BLD: ABNORMAL
EOSINOPHIL # BLD AUTO: 0 10E9/L (ref 0–0.7)
EOSINOPHIL NFR BLD AUTO: 0 %
ERYTHROCYTE [DISTWIDTH] IN BLOOD BY AUTOMATED COUNT: 15.9 % (ref 10–15)
GFR SERPL CREATININE-BSD FRML MDRD: 9 ML/MIN/1.7M2
GLUCOSE SERPL-MCNC: 137 MG/DL (ref 70–99)
HCT VFR BLD AUTO: 24.9 % (ref 40–53)
HGB BLD-MCNC: 8.2 G/DL (ref 13.3–17.7)
IMM GRANULOCYTES # BLD: 0 10E9/L (ref 0–0.4)
IMM GRANULOCYTES NFR BLD: 0.3 %
LYMPHOCYTES # BLD AUTO: 0.6 10E9/L (ref 0.8–5.3)
LYMPHOCYTES NFR BLD AUTO: 5.2 %
Lab: ABNORMAL
Lab: ABNORMAL
MCH RBC QN AUTO: 26.7 PG (ref 26.5–33)
MCHC RBC AUTO-ENTMCNC: 32.9 G/DL (ref 31.5–36.5)
MCV RBC AUTO: 81 FL (ref 78–100)
MONOCYTES # BLD AUTO: 0.3 10E9/L (ref 0–1.3)
MONOCYTES NFR BLD AUTO: 2.4 %
NEUTROPHILS # BLD AUTO: 10.9 10E9/L (ref 1.6–8.3)
NEUTROPHILS NFR BLD AUTO: 92.1 %
NRBC # BLD AUTO: 0 10*3/UL
NRBC BLD AUTO-RTO: 0 /100
PLATELET # BLD AUTO: 78 10E9/L (ref 150–450)
POTASSIUM SERPL-SCNC: 3.5 MMOL/L (ref 3.4–5.3)
PROCALCITONIN SERPL-MCNC: >200 NG/ML
RBC # BLD AUTO: 3.07 10E12/L (ref 4.4–5.9)
SODIUM SERPL-SCNC: 140 MMOL/L (ref 133–144)
SPECIMEN SOURCE: ABNORMAL
SPECIMEN SOURCE: ABNORMAL
WBC # BLD AUTO: 11.8 10E9/L (ref 4–11)

## 2018-10-28 PROCEDURE — 87040 BLOOD CULTURE FOR BACTERIA: CPT | Performed by: INTERNAL MEDICINE

## 2018-10-28 PROCEDURE — 27210995 ZZH RX 272: Performed by: INTERNAL MEDICINE

## 2018-10-28 PROCEDURE — 36415 COLL VENOUS BLD VENIPUNCTURE: CPT | Performed by: INTERNAL MEDICINE

## 2018-10-28 PROCEDURE — 84145 PROCALCITONIN (PCT): CPT | Performed by: INTERNAL MEDICINE

## 2018-10-28 PROCEDURE — 80048 BASIC METABOLIC PNL TOTAL CA: CPT | Performed by: INTERNAL MEDICINE

## 2018-10-28 PROCEDURE — 85025 COMPLETE CBC W/AUTO DIFF WBC: CPT | Performed by: INTERNAL MEDICINE

## 2018-10-28 PROCEDURE — 25000125 ZZHC RX 250: Performed by: INTERNAL MEDICINE

## 2018-10-28 PROCEDURE — 25000132 ZZH RX MED GY IP 250 OP 250 PS 637: Performed by: INTERNAL MEDICINE

## 2018-10-28 PROCEDURE — 25000131 ZZH RX MED GY IP 250 OP 636 PS 637: Performed by: INTERNAL MEDICINE

## 2018-10-28 PROCEDURE — 25000128 H RX IP 250 OP 636: Performed by: INTERNAL MEDICINE

## 2018-10-28 PROCEDURE — 99239 HOSP IP/OBS DSCHRG MGMT >30: CPT | Performed by: INTERNAL MEDICINE

## 2018-10-28 PROCEDURE — 86140 C-REACTIVE PROTEIN: CPT | Performed by: INTERNAL MEDICINE

## 2018-10-28 RX ORDER — SODIUM BICARBONATE 650 MG/1
1300 TABLET ORAL 2 TIMES DAILY
Qty: 120 TABLET | Refills: 0 | Status: SHIPPED | OUTPATIENT
Start: 2018-10-28 | End: 2018-11-02

## 2018-10-28 RX ORDER — LOSARTAN POTASSIUM 100 MG/1
100 TABLET ORAL DAILY
Qty: 90 TABLET | Refills: 3 | COMMUNITY
Start: 2018-10-31 | End: 2018-10-28

## 2018-10-28 RX ORDER — PREDNISONE 10 MG/1
TABLET ORAL
Qty: 30 TABLET | Refills: 0 | Status: SHIPPED | OUTPATIENT
Start: 2018-10-29 | End: 2018-12-03

## 2018-10-28 RX ORDER — SODIUM BICARBONATE 650 MG/1
1300 TABLET ORAL 2 TIMES DAILY
Status: DISCONTINUED | OUTPATIENT
Start: 2018-10-28 | End: 2018-10-28 | Stop reason: HOSPADM

## 2018-10-28 RX ORDER — FUROSEMIDE 20 MG
20 TABLET ORAL 2 TIMES DAILY
Qty: 180 TABLET | Refills: 1 | COMMUNITY
Start: 2018-10-31 | End: 2019-02-05

## 2018-10-28 RX ORDER — PREDNISONE 20 MG/1
40 TABLET ORAL DAILY
Status: DISCONTINUED | OUTPATIENT
Start: 2018-10-29 | End: 2018-10-28 | Stop reason: HOSPADM

## 2018-10-28 RX ORDER — SULFAMETHOXAZOLE AND TRIMETHOPRIM 400; 80 MG/1; MG/1
1 TABLET ORAL EVERY OTHER DAY
Qty: 45 TABLET | Refills: 3 | COMMUNITY
Start: 2018-10-29 | End: 2019-04-15

## 2018-10-28 RX ORDER — AMLODIPINE BESYLATE 5 MG/1
5 TABLET ORAL DAILY
Qty: 90 TABLET | Refills: 3 | COMMUNITY
Start: 2018-10-29 | End: 2019-05-07

## 2018-10-28 RX ORDER — NITAZOXANIDE 500 MG/1
500 TABLET ORAL EVERY 12 HOURS
Qty: 24 TABLET | Refills: 0 | Status: SHIPPED | OUTPATIENT
Start: 2018-10-29 | End: 2018-11-10

## 2018-10-28 RX ORDER — LEVOFLOXACIN 500 MG/1
500 TABLET, FILM COATED ORAL EVERY OTHER DAY
Qty: 7 TABLET | Refills: 0 | Status: SHIPPED | OUTPATIENT
Start: 2018-10-29 | End: 2018-10-28

## 2018-10-28 RX ORDER — CARVEDILOL 25 MG/1
12.5 TABLET ORAL 2 TIMES DAILY
Qty: 60 TABLET | Refills: 11 | COMMUNITY
Start: 2018-10-29 | End: 2018-10-28

## 2018-10-28 RX ORDER — FUROSEMIDE 20 MG
20 TABLET ORAL 2 TIMES DAILY
Qty: 180 TABLET | Refills: 1 | COMMUNITY
Start: 2018-10-30 | End: 2018-10-28

## 2018-10-28 RX ORDER — LOSARTAN POTASSIUM 100 MG/1
100 TABLET ORAL DAILY
Qty: 90 TABLET | Refills: 3 | COMMUNITY
Start: 2018-10-31 | End: 2018-10-29

## 2018-10-28 RX ORDER — TACROLIMUS 1 MG/1
2 CAPSULE, GELATIN COATED ORAL EVERY 12 HOURS SCHEDULED
Status: DISCONTINUED | OUTPATIENT
Start: 2018-10-28 | End: 2018-10-28 | Stop reason: HOSPADM

## 2018-10-28 RX ORDER — METHYLPREDNISOLONE SODIUM SUCCINATE 125 MG/2ML
125 INJECTION, POWDER, LYOPHILIZED, FOR SOLUTION INTRAMUSCULAR; INTRAVENOUS ONCE
Status: COMPLETED | OUTPATIENT
Start: 2018-10-28 | End: 2018-10-28

## 2018-10-28 RX ORDER — LEVOFLOXACIN 500 MG/1
500 TABLET, FILM COATED ORAL EVERY OTHER DAY
Qty: 7 TABLET | Refills: 0 | Status: SHIPPED | OUTPATIENT
Start: 2018-10-30 | End: 2018-11-28

## 2018-10-28 RX ORDER — CARVEDILOL 25 MG/1
12.5 TABLET ORAL 2 TIMES DAILY
Qty: 60 TABLET | Refills: 11 | COMMUNITY
Start: 2018-10-29 | End: 2018-12-06

## 2018-10-28 RX ORDER — LEVOFLOXACIN 5 MG/ML
500 INJECTION, SOLUTION INTRAVENOUS
Status: DISCONTINUED | OUTPATIENT
Start: 2018-10-29 | End: 2018-10-28 | Stop reason: HOSPADM

## 2018-10-28 RX ORDER — MYCOPHENOLATE MOFETIL 250 MG/1
500 CAPSULE ORAL 2 TIMES DAILY
Status: DISCONTINUED | OUTPATIENT
Start: 2018-10-28 | End: 2018-10-28 | Stop reason: HOSPADM

## 2018-10-28 RX ORDER — TACROLIMUS 1 MG/1
2 CAPSULE, GELATIN COATED ORAL EVERY 12 HOURS SCHEDULED
Status: DISCONTINUED | OUTPATIENT
Start: 2018-10-28 | End: 2018-10-28

## 2018-10-28 RX ORDER — PREDNISONE 5 MG/1
5 TABLET ORAL
Status: DISCONTINUED | OUTPATIENT
Start: 2018-10-28 | End: 2018-10-28 | Stop reason: HOSPADM

## 2018-10-28 RX ADMIN — MYCOPHENOLATE MOFETIL 500 MG: 250 CAPSULE ORAL at 11:48

## 2018-10-28 RX ADMIN — LEVOFLOXACIN 500 MG: 5 INJECTION, SOLUTION INTRAVENOUS at 08:26

## 2018-10-28 RX ADMIN — PREDNISONE 5 MG: 5 TABLET ORAL at 08:22

## 2018-10-28 RX ADMIN — NITAZOXANIDE 500 MG: 500 TABLET ORAL at 11:48

## 2018-10-28 RX ADMIN — CARVEDILOL 6.25 MG: 6.25 TABLET, FILM COATED ORAL at 08:22

## 2018-10-28 RX ADMIN — SODIUM BICARBONATE 650 MG TABLET 1300 MG: at 11:48

## 2018-10-28 RX ADMIN — SODIUM BICARBONATE: 84 INJECTION, SOLUTION INTRAVENOUS at 06:09

## 2018-10-28 RX ADMIN — METHYLPREDNISOLONE SODIUM SUCCINATE 125 MG: 125 INJECTION, POWDER, LYOPHILIZED, FOR SOLUTION INTRAMUSCULAR; INTRAVENOUS at 11:44

## 2018-10-28 RX ADMIN — Medication 0.3 MG: at 13:38

## 2018-10-28 RX ADMIN — ALLOPURINOL 100 MG: 100 TABLET ORAL at 08:22

## 2018-10-28 ASSESSMENT — ACTIVITIES OF DAILY LIVING (ADL)
ADLS_ACUITY_SCORE: 11

## 2018-10-28 ASSESSMENT — PAIN DESCRIPTION - DESCRIPTORS: DESCRIPTORS: HEADACHE

## 2018-10-28 NOTE — PROGRESS NOTES
[facility], [place]   Transplant Nephrology Progress Note  Date of Admission:  10/25/2018    Assessment & Plan      #Transplant pyelonephritis with e coli bacteremia: sensitive to every abx other than bactrim. Can change to narrow spectrum. On cefepime currently. Treat for 21 days please.     # DEIDRA on LDKT: baseline Cr ~ 3-4 mg / dl; admitted with creatinine of 8-9 mg / dl in the setting of severe septic shock from transplant pyelonephritis. Creatinine slightly better, but GFR still poor (9 yesterday, up to 11 ml / min today)    Continue with solumedrol 125 mg IV daily day 3/3 today.     The patient has poor allograft function at baseline.  In the setting of the acute kidney injury due to the transplant pyelonephritis, it is likely that the patient will need to initiate renal replacement therapy soon (as op).  There is no urgent need for dialysis based off of urine output and labs today.  Patient is asymptomatic from uremic symptoms today.    I discussed at length the options regarding renal replacement therapy with dialytic modalities.  The patient is contemplative towards initiating peritoneal dialysis or home hemodialysis.     He still is discussing with his wife.     The urine output for this patient is excellent.  As such I would recommend reinitiation of tacrolimus and CellCept to prevent loss of residual renal function.  Goal tacrolimus level for this patient is 3-5 ng/mL.  Once his urine output decreases to less than 500 mL/day he can have his immunosuppression tapered due to not having a living donor for kidney retransplantation.  He is unlikely to be transplanted from the  donor in the next year.    # Immunosuppression: solumedrol 125 mg IV daily x 3 doses (day 3/3 today)   - Changes: Yes - restart tacrolimus and mmf.     Tac 2 mg po bid  Mmf 500 mg po bid  Pred tomorrow on taper of 40 / 20 / 10 / 5 every 3 days; leave on 5 mg daily.     # Hypertension/Volume Status:  Controlled; Goal BP: < 140/90   - Changes: No     Coreg 6.25 mg po bid.     # Anemia in chronic renal disease: Hgb: Stable; 8.2 g / dl can start LAWRENCE with aranesp 40 mcg q week as OP.    # Mineral Bone Disorder:    - Secondary renal hyperparathyroidism; PTH level is: Significantly elevated - will start active vit d once started on dialysis    # Acidosis: Continue with sodium bicarbonate 1300 mg po bid.     # Transplant History:  Etiology of kidney failure: hypertension  Tx: LDKT  Transplant: 2011 (Kidney)  Donor Type: Living Donor Class:     Recommendations were communicated to the primary team via this note.    Stef Murillo MD    Interval History   No cp, sob, no n/v, no f/s/c. No constipation.  L great toe pain. Tmax: 98.9. I/O: 4350/2700    Review of Systems   4 point ROS was obtained and negative except as noted in the interval history    Physical Exam   Temp  Av.4  F (36.9  C)  Min: 96.2  F (35.7  C)  Max: 103  F (39.4  C)      Pulse  Av.3  Min: 54  Max: 101 Resp  Av.9  Min: 11  Max: 35  SpO2  Av.2 %  Min: 75 %  Max: 100 %     BP (!) 125/91 (BP Location: Left arm)  Pulse 100  Temp 98.5  F (36.9  C) (Oral)  Resp 16  Wt 82.1 kg (181 lb)  SpO2 93%  BMI 23.73 kg/m2   Date 10/27/18 07 - 10/28/18 0659   Shift 1107-0499 6512-1617 0017-2497 24 Hour Total   I  N  T  A  K  E   I.V. 1000   1000    Shift Total  (mL/kg) 1000  (12.18)   1000  (12.18)   O  U  T  P  U  T   Shift Total  (mL/kg)       Weight (kg) 82.1 82.1 82.1 82.1     Admit Weight: 81.1 kg (178 lb 12.7 oz)   GENERAL APPEARANCE: alert and no distress  HENT: mouth without ulcers or lesions  LYMPHATICS: no cervical or supraclavicular nodes  RESP: lungs clear to auscultation - no rales, rhonchi or wheezes  CV: regular rhythm, normal rate, no rub, no murmur  EDEMA: no LE edema bilaterally  ABDOMEN: soft, nondistended, nontender, bowel sounds normal  MS: extremities normal - no gross deformities noted, no evidence of inflammation  in joints, no muscle tenderness  SKIN: no rash    Data   All labs reviewed by me.  CMP    Recent Labs  Lab 10/28/18  0534 10/27/18  0444 10/26/18  1626 10/26/18  0545 10/25/18  2324 10/25/18  2225 10/25/18  1707 10/25/18  1155    141 141 139 138  --  134 136   POTASSIUM 3.5 3.6 4.2 4.0 4.4  --  5.1 4.6   CHLORIDE 110* 112* 114* 110* 109  --  104 104   CO2 18* 15* 14* 14* 15*  --  19* 22   ANIONGAP 13 14 14 15* 14  --  11 10   * 135* 150* 108* 108*  --  124* 105*   BUN 67* 61* 58* 58* 61*  --  56* 54*   CR 6.72* 8.12* 8.64* 9.27* 8.87*  --  8.70* 7.47*   GFRESTIMATED 9* 7* 7* 6* 7*  --  7* 8*   GFRESTBLACK 11* 9* 8* 8* 8*  --  8* 10*   ASHELY 8.4* 7.7* 7.4* 7.5* 7.3*  --  8.7 8.9   MAG  --   --   --   --  1.7 0.9* 1.0*  --    PHOS  --   --   --   --   --   --  5.2*  --    PROTTOTAL  --   --   --  5.5*  --   --  6.8 6.8   ALBUMIN  --   --   --  2.0*  --   --  2.7* 2.8*   BILITOTAL  --   --   --  0.4  --   --  0.4 0.4   ALKPHOS  --   --   --  63  --   --  64 63   AST  --   --   --  11  --   --  13 12   ALT  --   --   --  10  --   --  14 16     CBC    Recent Labs  Lab 10/28/18  0534 10/27/18  0444 10/26/18  1626 10/26/18  0545   HGB 8.2* 8.5* 8.2* 8.0*   WBC 11.8* 13.1* 15.9* 17.7*   RBC 3.07* 3.14* 3.04* 2.99*   HCT 24.9* 26.0* 26.0* 25.8*   MCV 81 83 86 86   MCH 26.7 27.1 27.0 26.8   MCHC 32.9 32.7 31.5 31.0*   RDW 15.9* 16.1* 16.4* 16.5*   PLT 78* 77* 77* 78*     INR    Recent Labs  Lab 10/26/18  0545 10/25/18  2324   INR 1.58* 1.57*   PTT  --  39*     ABG    Recent Labs  Lab 10/26/18  0836 10/26/18  0018 10/25/18  2136   O2PER 21.0 0 21      Urine Studies  Recent Labs   Lab Test  10/25/18   1210  02/04/18   1423  09/25/17   0729  03/01/17   0118   01/14/15   2154  10/31/14   1258   COLOR  Yellow  Light Yellow  Straw  Light Yellow   < >  Yellow  Yellow   APPEARANCE  Cloudy  Clear  Clear  Clear   < >  Clear  Clear   URINEGLC  Negative  Negative  Negative  Negative   < >  Negative  Negative   URINEBILI   Negative  Negative  Negative  Negative   < >  Negative  Negative   URINEKETONE  Negative  Negative  Negative  Negative   < >  Negative  Negative   SG  1.020  1.007  1.010  1.006   < >  1.020  1.020   UBLD  Large*  Negative  Negative  Large*   < >  Trace*  Trace*   URINEPH  6.5  6.5  6.0  5.0   < >  5.5  5.5   PROTEIN  >=300*  30*  Negative  Negative   < >  Trace*  Trace*   UROBILINOGEN  0.2   --    --    --    --   0.2  0.2   NITRITE  Negative  Negative  Negative  Negative   < >  Negative  Negative   LEUKEST  Moderate*  Negative  Negative  Small*   < >  Negative  Negative   RBCU  25-50*  0  <1  >182*   < >  O - 2  O - 2   WBCU  >100*  0  <1  40*   < >  O - 2  O - 2    < > = values in this interval not displayed.     Recent Labs   Lab Test  03/17/18   1035  08/04/17   1839  02/28/17   0809  09/17/16   1050  09/12/16   1617  06/12/15   0803  05/27/14   1509  05/21/12   0805  05/12/12   1044  12/05/11   1150  07/22/11   0730  04/11/11   1620   UTPG  0.82*  0.17  0.29*  0.35*  0.44*  2.09*  0.05  0.05  0.05  0.07  0.11  0.05     PTH  Recent Labs   Lab Test  01/06/18   0947  02/13/16   0930   PTHI  621*  503*     Iron Studies  Recent Labs   Lab Test  01/06/18   0947   IRON  62   FEB  219*   IRONSAT  28   ORALIA  256     MEDICATIONS:  Current Facility-Administered Medications   Medication     acetaminophen (TYLENOL) tablet 650 mg     allopurinol (ZYLOPRIM) tablet 100 mg     carvedilol (COREG) tablet 6.25 mg     ceFEPIme (MAXIPIME) 1g vial to attach to  ml bag for ADULTS or NS 50 ml bag for PEDS     HYDROmorphone (PF) (DILAUDID) injection 0.3-0.5 mg     influenza quadrivalent (PF) vacc (FLUZONE or Flulaval or FLUARIX) injection 0.5 mL     lidocaine (LMX4) cream     lidocaine 1 % 1 mL     melatonin tablet 1 mg     NaCl 0.45 % 1,000 mL with sodium bicarbonate 75 mEq/L infusion     naloxone (NARCAN) injection 0.1-0.4 mg     nitazoxanide (ALINIA) tablet 500 mg     ondansetron (ZOFRAN-ODT) ODT tab 4 mg    Or     ondansetron  (ZOFRAN) injection 4 mg     predniSONE (DELTASONE) tablet 5 mg     sodium chloride (PF) 0.9% PF flush 3 mL     sodium chloride (PF) 0.9% PF flush 3 mL

## 2018-10-28 NOTE — PROGRESS NOTES
DISCHARGE                         10/28/2018  5:57 PM  ----------------------------------------------------------------------------  Discharged to: Home  Via: private transportation - wife   Accompanied by: Family  Discharge Instructions: diet, activity, medications, follow up appointments, when to call the MD, aftercare instructions.  Prescriptions: To be filled by [facility] discharge pharmacy; medication list reviewed & sent with pt. Meds picked up before patient left.   Follow Up Appointments: arranged; information given.   Belongings: All sent with pt.   IV: d/c'd  Telemetry: d/c'd  Pt exhibits understanding of above discharge instructions; all questions answered.    Discharge Paperwork: Signed, copied, and sent home with patient.

## 2018-10-28 NOTE — DISCHARGE INSTRUCTIONS
CMP  Recent Labs  Lab 10/28/18  0534 10/27/18  0444 10/26/18  1626 10/26/18  0545 10/25/18  2324 10/25/18  2225 10/25/18  1707 10/25/18  1155    141 141 139 138  --  134 136   POTASSIUM 3.5 3.6 4.2 4.0 4.4  --  5.1 4.6   CHLORIDE 110* 112* 114* 110* 109  --  104 104   CO2 18* 15* 14* 14* 15*  --  19* 22   ANIONGAP 13 14 14 15* 14  --  11 10   * 135* 150* 108* 108*  --  124* 105*   BUN 67* 61* 58* 58* 61*  --  56* 54*   CR 6.72* 8.12* 8.64* 9.27* 8.87*  --  8.70* 7.47*   GFRESTIMATED 9* 7* 7* 6* 7*  --  7* 8*   GFRESTBLACK 11* 9* 8* 8* 8*  --  8* 10*   ASHELY 8.4* 7.7* 7.4* 7.5* 7.3*  --  8.7 8.9   MAG  --   --   --   --  1.7 0.9* 1.0*  --    PHOS  --   --   --   --   --   --  5.2*  --    PROTTOTAL  --   --   --  5.5*  --   --  6.8 6.8   ALBUMIN  --   --   --  2.0*  --   --  2.7* 2.8*   BILITOTAL  --   --   --  0.4  --   --  0.4 0.4   ALKPHOS  --   --   --  63  --   --  64 63   AST  --   --   --  11  --   --  13 12   ALT  --   --   --  10  --   --  14 16     CBC  Recent Labs  Lab 10/28/18  0534 10/27/18  0444 10/26/18  1626 10/26/18  0545   WBC 11.8* 13.1* 15.9* 17.7*   RBC 3.07* 3.14* 3.04* 2.99*   HGB 8.2* 8.5* 8.2* 8.0*   HCT 24.9* 26.0* 26.0* 25.8*   MCV 81 83 86 86   MCH 26.7 27.1 27.0 26.8   MCHC 32.9 32.7 31.5 31.0*   RDW 15.9* 16.1* 16.4* 16.5*   PLT 78* 77* 77* 78*     INR  Recent Labs  Lab 10/26/18  0545 10/25/18  2324   INR 1.58* 1.57*     Arterial Blood Gas  Recent Labs  Lab 10/26/18  0836 10/26/18  0018 10/25/18  2136   O2PER 21.0 0 21

## 2018-10-28 NOTE — DISCHARGE SUMMARY
Discharge Summary    Rashad Ortiz MRN# [id number]   YOB: 1975 Age: 43 year old     Date of Admission:  10/25/2018  Date of Discharge:  10/28/2018  5:57 PM  Admitting Physician:  Galindo Villegas MD  Discharge Physician:  Jeferson Nelson MD   Discharging Service:  Internal Medicine     Primary Provider: Mariel Garcia           Discharge Diagnosis:      Acute kidney injury on LDKT  E coli Sepsis  Pyelonephritis of transplanted kidney  Acute Gastroenteritis due to norovirus    Kidney replaced by transplant  essential hypertension  Immunosuppressed status (H)               Procedures:   No procedures performed during this admission  Abd/pelvis CT no contrast - Stone Protocol     Narrative     EXAMINATION: CT ABDOMEN PELVIS W/O CONTRAST, 10/25/2018 6:36 PM     IMPRESSION:  1. Minimal nonspecific stranding around the right lower quadrant  transplant is unchanged from prior comparisons. No hydronephrosis. No  peritransplant fluid collection.  2. Thickening of the bladder wall, concerning for cystitis.  3. Atrophic native kidneys. Nonobstructing right renal calculi.   US Renal Transplant     Narrative     EXAMINATION: US RENAL TRANSPLANT,  10/25/2018 8:46 PM      IMPRESSION:   1. Elevated mildly elevated resistive indices of the mid arteries.  Nonspecific in the setting of infection.  2. Elevated anastomotic velocities (330 cm/sec) , with normal  appearing spectral waveforms, likely hyperdynamic concerning for  infection. Follow-up exam following resolution of acute symptoms be  considered to evaluate for stenosis.  3. No anatomic changes to indicate a renal abscess.                Consultations:   Consultation during this admission received from infectious disease, nephrology Tx.               Brief History of Illness:   For details please refer to H and P dated 10/25/2018  Briefly,     Rashad Ortiz is a 43 year old male admitted on 10/25/2018. He has PMH of HTN, ESRD s/p LDKT in  2011 c/b antibody mediated rejection, not on HD,  Secondary hyperparathyroidism, Gout, and genital herpes who presented to ED with fever, chills, fatigue, malaise, diarrhea, gen weakness.              Hospital Course:   Issues:     # # E. Coli  Sepsis  2/2 likely from transplant kidney pyelonephritis:   Presented with ~ 24 hour hx of fever, chills, fatigue, non-blood diarrhea, and poor PO intake. WBC 24.5 w/ left shift, , procal>200,Tachycardic to 130, Tmax 103.0. BP stable. LA 1.5, HGB 9.7, platelets 128, UTI+. DEIDRA: Cr 7.47 --> 8.70, BUN 56, Bicarb 19. LFT's wnl. Received IVF bolus and maintenance, Zosyn in ED.   BC, E. Coli. Pan sensitive except bactrim  Stool: Norovirus+    Patient initially fluid resuscitation. Cultures sent. Started on broad abx iv, empirically. [place] [facility] [place]  Patient recovered quickly.  Hemodynamics remained mostly stable. Remained alert throughout. On RA, sating fine. Lactic acid: wnl. Non oliguric.      - Started empirically on iv Cefepime, iv Levaquin (renal dosing). Given dose of iv Vancomycin 10.26 one dose. Transplant ID consulted.     On discharge day, afebrile w stable vitals. w good UOP. Loose stools improving. yin po. Ambulatory.   D.w Transplant ID: discharging on oral levofloxacin 500 mg every 48 hours x 14 days. Follow-up with PCP/transplant Nephrology.     - Nitazoxanide for Norovirus x 14 days.     -  IVF switched to 1/2nss plus bicarb by Nephrology after initial resuscitation. Discharged on oral sod bicarb 1300 bid.   - Tylenol PRN for fever, headache  - iv hydromorphone prn for pain         # DEIDRA on CKD: ESRD s/p LDKT 2011. BL Cr 3.70-4.0. Follows with [facility] Nephrology w/ clinic visit 2/2018 plan for BP control and continuance of current immunosuppression: Tacrolimus, MMF, and prednisone. Tac level 5.3 (6/2018). Prophylaxis: Bactrim Renal US: Mildly elevated resistive indices of rhe mid-arteries- nonspecific in setting of infection; elevated anastomotic  velocities, with normal appearing spectral waveforms, likely hyperdynamic concerning for infection; f/u exam following resolution of acute sx to evaluate for stenosis; no renal abscess. CT A/P w/o contrast w/o significant perinephric fluid; mild non-specific stranding about the RLQ kidney- unchanged from prvious; no significant hydronephrosis; Thickening of the bladder wall- concerning for cystitis; non-obstructing right renal calculi.      Remained non oliguric.   - Transplant Nephrology consulted. Helped with IS management and DEIDRA on CKD.   - solumedrol 125 mg IV daily x 3 days.   - at discharge :    Tac 2 mg po bid  Mmf 500 mg po bid  Pred tomorrow on taper of 40 / 20 / 10 / 5 every 3 days; leave on 5 mg daily.     - Strict I/o, follow-up bmp bid, renal dosing, avoid nephrotoxics.   - follow-up lytes: stable.   - held bactrim. Resumed at discharge.      * No indication for HD at current.   Outpatient Nephrology Follow-up.          # HTN: BP stable on admission. PTA lasix, losartan, Norvasc, coreg.  - Holding in setting of sepsis and DEIDRA  - 10/27/2018: bp better. Started coreg at lower dose. Titrate as yin.   - resume home bp meds in next few days as tolerated.       # Gout: No current sx. PTA allopurinol  - resume.       Diet:    Active Diet Order      Advance Diet as Tolerated: Regular Diet Adult      Fluids: IVF  Hunter Catheter: not present  DVT Prophylaxis: Pneumatic Compression Devices. Ambulatory.   Code Status: FULL    # Discharge Pain Plan:   - Patient currently has NO PAIN and is not being prescribed pain medications on discharge.         Discharge Day Exam:    Interval/Subjective:   Overall continues to feel better.   Pain RLQ  - better.   Loose stools: better.   Good UOP  Afebrile.   Headache: none.   No cough or sore throat or cp or sob  No new rash.   Ambulatory    Blood pressure (!) 129/95, pulse 100, temperature 98.1  F (36.7  C), temperature source Oral, resp. rate 16, weight 82.1 kg (181 lb), SpO2  96 %.    Wt Readings from Last 5 Encounters:   10/26/18 82.1 kg (181 lb)   10/25/18 79.7 kg (175 lb 9.6 oz)   10/22/18 84.4 kg (186 lb)   18 87.1 kg (192 lb)   18 82.6 kg (182 lb)       Temp (24hrs), Av.3  F (36.8  C), Min:97.7  F (36.5  C), Max:98.6  F (37  C)    . General: alert, interactive, NAD  HEENT: AT/NC, anicteric, PERRL, Moist MM  Neck: Supple,  Lymphadenopathy  Respi/Chest: Non labored. Clear BL  CVS/Heart: S1S2 regular,   Gi/Abd: Soft, NT, non distended, BS +  MSK/Ext: Distal pulses 2+.   Neuro: AO x 4, Grossly intact.                  Significant Results Obtained During Hospitalization:   All relevant data  Reviewed.    None  Lab Results   Component Value Date    WBC 11.8 (H) 10/28/2018    HGB 8.2 (L) 10/28/2018    HCT 24.9 (L) 10/28/2018    PLT 78 (L) 10/28/2018     10/28/2018    POTASSIUM 3.5 10/28/2018    CHLORIDE 110 (H) 10/28/2018    CO2 18 (L) 10/28/2018    BUN 67 (H) 10/28/2018    CR 6.72 (H) 10/28/2018     (H) 10/28/2018    SED 32 (H) 2018    AST 11 10/26/2018    ALT 10 10/26/2018    GGT 32 2012    ALKPHOS 63 10/26/2018    BILITOTAL 0.4 10/26/2018    INR 1.58 (H) 10/26/2018      No results found for this or any previous visit (from the past 48 hour(s)).                  Pending Results:     Unresulted Labs Ordered in the Past 30 Days of this Admission     Date and Time Order Name Status Description    10/28/2018 0929 Blood culture Preliminary     10/26/2018 2200 Mycophenolic acid In process     10/26/2018 0806 Blood culture Preliminary     10/26/2018 0806 Blood culture Preliminary                Condition on Discharge:   Discharge condition: Stable   Discharge vitals: Blood pressure (!) 129/95, pulse 100, temperature 98.1  F (36.7  C), temperature source Oral, resp. rate 16, weight 82.1 kg (181 lb), SpO2 96 %.     Code status on discharge: Full Code            Discharge Medications:     Discharge Medication List as of 10/28/2018  5:01 PM      START taking  these medications    Details   nitazoxanide (ALINIA) 500 MG tablet Take 1 tablet (500 mg) by mouth every 12 hours for 12 days, Disp-24 tablet, R-0, E-Prescribe         CONTINUE these medications which have CHANGED    Details   amLODIPine (NORVASC) 5 MG tablet Take 1 tablet (5 mg) by mouth daily, Disp-90 tablet, R-3, Historical      carvedilol (COREG) 25 MG tablet Take 0.5 tablets (12.5 mg) by mouth 2 times daily, Disp-60 tablet, R-11, HistoricalCan titrate up as tolerated to 12.5 and then 25 mg twice daily in next 2-3 days. Hold for sbp<100, HR<55      furosemide (LASIX) 20 MG tablet Take 1 tablet (20 mg) by mouth 2 times daily, Disp-180 tablet, R-1, HistoricalHold until seen by Primary care provider or Nephrology      levofloxacin (LEVAQUIN) 500 MG tablet Take 1 tablet (500 mg) by mouth every other day, Disp-7 tablet, R-0, E-Prescribe      predniSONE (DELTASONE) 10 MG tablet Take 4 tabs (40 mg) by mouth daily x 3 days, 2 tabs (20 mg) daily x 3 days, 1 tab (10 mg) daily x 3 days, then 1/2 tab (5 mg) -- continue., Disp-30 tablet, R-0, E-Prescribe      sodium bicarbonate 650 MG tablet Take 2 tablets (1,300 mg) by mouth 2 times daily, Disp-120 tablet, R-0, E-Prescribe      sulfamethoxazole-trimethoprim (BACTRIM/SEPTRA) 400-80 MG per tablet Take 1 tablet by mouth every other day, Disp-45 tablet, R-3, Historical      losartan (COZAAR) 100 MG tablet Take 1 tablet (100 mg) by mouth daily, Disp-90 tablet, R-3, HistoricalHold until seen by Primary care or Nephrology.   May resume at lower dose 25 mg daily and titrate up as tolerated for high bp in next 2-3 days, preferably talk to your doctor first.         CONTINUE these medications which have NOT CHANGED    Details   allopurinol (ZYLOPRIM) 100 MG tablet Take 1 tablet (100 mg) by mouth daily, Disp-30 tablet, R-1, E-Prescribe      mycophenolate (GENERIC EQUIVALENT) 250 MG capsule Take 2 capsules (500 mg) by mouth 2 times daily, Disp-120 capsule, R-11, E-Prescribe       omeprazole (PRILOSEC) 20 MG capsule Take 1 capsule (20 mg) by mouth daily, Disp-90 capsule, R-1, E-Prescribe      tacrolimus (GENERIC EQUIVALENT) 1 MG capsule Take 2 capsules (2 mg) by mouth 2 times daily, Disp-120 capsule, R-11, E-Prescribe         STOP taking these medications       valACYclovir (VALTREX) 1000 mg tablet Comments:   Reason for Stopping:                      Discharge Disposition:   Discharged to home             Discharge Instructions:     Discharge Procedure Orders  Reason for your hospital stay   Order Comments: E. Coli Sepsis, Urinary source, Norovirus gastroenteritis. Acute kidney injury on CKD (hx of kidney transplant). You are being discharged on Antibiotics for and Antiviral for almost 2 weeks. Prednisone taper per transplant team. You were seen by infectious disease and transplant nephrology. Your BP medications are held. Resume slowly as tolerated. Talk to your primary care provider with any concern.   Follow up with transplant team with any concern.     Adult [facility] Follow-up and recommended labs and tests   Order Comments: Follow up with primary care provider, Mariel Mares, within 3-4 days for hospital follow- up.  The following labs/tests are recommended: 10. 30.18: CBC, BMP, Tacrolimus Level. Further per Transplant team.     Follow up with Transplant Nephrology as scheduled and as needed. Preferably within a week.     Appointments on [place] and/or [facility] (with [facility] or [facility] provider or service). Call [phone] if you haven't heard regarding these appointments within 7 days of discharge.     Activity   Order Comments: Your activity upon discharge: activity as tolerated   Order Specific Question Answer Comments   Is discharge order? Yes      Monitor and record   Order Comments: blood pressure daily     Diet   Order Comments: Follow this diet upon discharge: Orders Placed This Encounter     Advance Diet as Tolerated: Regular Diet Adult   Order Specific  Question Answer Comments   Is discharge order? Yes             Thank you for the opportunity to participate in the care of your patient.  Please do not hesitate to contact our service with questions or concerns.    Total time spent was greater than 30 minutes; Greater than 50% of the time was in counseling and care coordination. Care coordination included discussion of medication changes, lifestyle changes and reviewing instructions to the patient and wife at bedside.     D/W RN, HUEY Nelson MD   Hospitalist (Internal Medicine)  Pager: [phone].     DOS : 10.28.18

## 2018-10-28 NOTE — PLAN OF CARE
Problem: Patient Care Overview  Goal: Plan of Care/Patient Progress Review  Outcome: No Change  Neuro: A&Ox4.   Cardiac: Afebrile. SR with HR 60-80s. VSS.   Respiratory: Sating >95% on RA.  GI/: Adequate urine output. BM x1.  Diet/appetite: Tolerating regular diet, appetite improving.  Activity:  Up independently.  Pain: Given dilaudid x1 for headache, effective.  Skin: No new deficits noted.  LDA's: R) PIV with 1/2NS+bicarb at 125 mL/hr.     Plan: Continue with POC. Notify primary team with changes.

## 2018-10-29 ENCOUNTER — TELEPHONE (OUTPATIENT)
Dept: FAMILY MEDICINE | Facility: CLINIC | Age: 43
End: 2018-10-29

## 2018-10-29 ENCOUNTER — PATIENT OUTREACH (OUTPATIENT)
Dept: CARE COORDINATION | Facility: CLINIC | Age: 43
End: 2018-10-29

## 2018-10-29 ENCOUNTER — TELEPHONE (OUTPATIENT)
Dept: TRANSPLANT | Facility: CLINIC | Age: 43
End: 2018-10-29

## 2018-10-29 DIAGNOSIS — Z94.0 KIDNEY TRANSPLANTED: Primary | ICD-10-CM

## 2018-10-29 LAB
COPATH REPORT: NORMAL
INTERPRETATION ECG - MUSE: NORMAL

## 2018-10-29 NOTE — LETTER
October 29, 2018      Rashad Ortiz  [address] [id number]        To Whom It May Concern:    Rashad Ortiz was ill. He may return to work without restrictions on 10/31/18.      Sincerely,        Mariel Vu M.D.

## 2018-10-29 NOTE — TELEPHONE ENCOUNTER
"Provider, patient is scheduled with you for a hospital follow-up visit on Thursday 11/1/18.   Patient is requesting a Return to Work letter for Wednesday 10/31/18. Writer asked if patient requested this from hospital care team and he states that he did ask repeatedly but no one generated and printed a letter for him. Would you be willing to write letter for patient or where should request be directed to?      Follow-up Appointments           Adult [facility] Follow-up and recommended labs and tests         Follow up with primary care provider, Mariel Mares, within 3-4 days for hospital follow- up.  The following labs/tests are recommended: 10. 30.18: CBC, BMP, Tacrolimus Level. Further per Transplant team.      Follow up with Transplant Nephrology as scheduled and as needed. Preferably within a week.      Appointments on [facility] and/or [facility] (with [facility] or [facility] provider or service). Call [phone] if you haven't heard regarding these appointments within 7 days of discharge.              Hospital/TCU/ED for chronic condition Discharge Protocol    \"Hi, my name is JORGE DE LA CRUZ, a registered nurse, and I am calling from [facility].  I am calling to follow up and see how things are going for you after your recent emergency visit/hospital/TCU stay.\"    Tell me how you are doing now that you are home?\" Doing well.       Discharge Instructions    \"Let's review your discharge instructions.  What is/are the follow-up recommendations?  Pt. Response: Follow up with PCP and transplant nephrology.    \"Has an appointment with your primary care provider been scheduled?\"   No (schedule appointment)    \"When you see the provider, I would recommend that you bring your medications with you.\"    Medications    \"Tell me what changed about your medicines when you discharged?\"    Changes to chronic meds?    0-1    \"What questions do you have about your medications?\"    None     New diagnoses of heart " "failure, COPD, diabetes, or MI?    No              Medication reconciliation completed? No, due to patient request. Denies medication questions, will bring up any questions with hospital follow-up with provider.   Was MTM referral placed (*Make sure to put transitions as reason for referral)?   No    Call Summary    \"What questions or concerns do you have about your recent visit and your follow-up care?\"     Patient asking for a Return to Work letter. Advice given: Writer stated request would be sent to PCP. Patient states that he kept asking for this letter when in patient but that no one generated and printed the letter for patient    \"If you have questions or things don't continue to improve, we encourage you contact us through the main clinic number (give number).  Even if the clinic is not open, triage nurses are available 24/7 to help you.     We would like you to know that our clinic has extended hours (provide information).  We also have urgent care (provide details on closest location and hours/contact info)\"      \"Thank you for your time and take care!\"    Shantel Saini RN         "

## 2018-10-29 NOTE — TELEPHONE ENCOUNTER
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     PLAN:   Adult [facility] Follow-up and recommended labs and tests       Comments:     Follow up with primary care provider, Mariel Mares, within 3-4 days for hospital follow- up.  The following labs/tests are recommended: 10. 30.18: CBC, BMP, Tacrolimus Level. Further per Transplant team.     Follow up with Transplant Nephrology as scheduled and as needed. Preferably within a week.    Per Rivera's note:   # Immunosuppression: solumedrol 125 mg IV daily x 3 doses (day 3/3 today)                        - Changes: Yes - restart tacrolimus and mmf.      Tac 2 mg po bid  Mmf 500 mg po bid  Pred tomorrow on taper of 40 / 20 / 10 / 5 every 3 days; leave on 5 mg daily      OUTCOME:   Call placed to pt following hospital discharge.  Pt denies fever, swelling or pain. Pt confirms he has an appointment scheduled with nephrology 11/1/18.  Pt aware we will want weekly transplant labs d/t recent hospitalization and restarting of tacrolimus and MMF.  Pt confirmed current IS doses and also verbalized he understands the prednisone taper. Pt aware if he becomes febrile or increased swelling, pt to call and let us know.   Lab orders placed.     Pt questions answered, pt v/u.     Per Rounds with Dr Cunha, pt to discontinue losartin 100 mg d/t elevated creatinine.  Per Dr Cunha, if pt has swelling, pt to stop pred taper and resume 5 mg daily. Call placed to pt again.  He v/u of plan and will start holding Losartin.

## 2018-10-29 NOTE — TELEPHONE ENCOUNTER
Called and spoke to the patient he states that he would like to  this letter at our . Bringing letter to the  by 5:00 pm today, patient understands.  Amanda Branham MA/  For Teams Melissa

## 2018-10-29 NOTE — TELEPHONE ENCOUNTER
Patient discharged from [facility] IP  ( Inpatient or ER).    Discharge location: [facility]  Discharge date: 10/28/18  Diagnosis: ACUTE KIDNEY INJURY (H), GASTROENTERITIS DUE TO NOROVIRUS  Patient has been in the ER/IP 0/1 times.  Care Coord:  NA  Please follow up as appropriate. If no follow up required, please close encounter.

## 2018-10-30 LAB
MYCOPHENOLATE SERPL LC/MS/MS-MCNC: <0.25 MG/L (ref 1–3.5)
MYCOPHENOLATE-G SERPL LC/MS/MS-MCNC: 78.3 MG/L (ref 30–95)
TME LAST DOSE: ABNORMAL H

## 2018-10-30 NOTE — PROGRESS NOTES
[facility] [place]  [address] [facility] [address] [place] [address]  [phone]      10/30/2018    Re: Rashad Ortiz      TO WHOM IT MAY CONCERN:    Rashad Ortiz  was admitted at our hospital on 10/25/2018.  Rashad Ortiz is being discharged today 10/28/2018    Please excuse him from work until Nov 02, 2018 due to illness. He needs time for his recovery.     Please feel free to contact me should you have any further question.     Thank you.            Jeferson Nelson MD  Hospitalist.   Department of Internal Medicine.

## 2018-10-31 ENCOUNTER — TELEPHONE (OUTPATIENT)
Dept: DERMATOLOGY | Facility: CLINIC | Age: 43
End: 2018-10-31

## 2018-10-31 NOTE — TELEPHONE ENCOUNTER
I called Rashad Ortiz and reminded them of their upcoming appointment. I informed Rashad Ortiz to arrive at least 15 minutes early and bring any outside records.     DELGADO Schreiber

## 2018-11-01 ENCOUNTER — OFFICE VISIT (OUTPATIENT)
Dept: NEPHROLOGY | Facility: CLINIC | Age: 43
End: 2018-11-01
Attending: INTERNAL MEDICINE
Payer: COMMERCIAL

## 2018-11-01 VITALS
SYSTOLIC BLOOD PRESSURE: 129 MMHG | HEART RATE: 85 BPM | DIASTOLIC BLOOD PRESSURE: 88 MMHG | BODY MASS INDEX: 23.02 KG/M2 | OXYGEN SATURATION: 99 % | WEIGHT: 175.6 LBS | TEMPERATURE: 98.3 F

## 2018-11-01 DIAGNOSIS — Z94.0 STATUS POST KIDNEY TRANSPLANT: Primary | ICD-10-CM

## 2018-11-01 DIAGNOSIS — T86.19 PYELONEPHRITIS OF TRANSPLANTED KIDNEY: ICD-10-CM

## 2018-11-01 DIAGNOSIS — D84.9 IMMUNOSUPPRESSION (H): ICD-10-CM

## 2018-11-01 DIAGNOSIS — A08.11 NOROVIRUS: ICD-10-CM

## 2018-11-01 DIAGNOSIS — M1A.30X0 CHRONIC GOUT DUE TO RENAL IMPAIRMENT WITHOUT TOPHUS, UNSPECIFIED SITE: ICD-10-CM

## 2018-11-01 DIAGNOSIS — D63.1 ANEMIA DUE TO STAGE 5 CHRONIC KIDNEY DISEASE, NOT ON CHRONIC DIALYSIS (H): ICD-10-CM

## 2018-11-01 DIAGNOSIS — T86.10 COMPLICATIONS, KIDNEY TRANSPLANT: ICD-10-CM

## 2018-11-01 DIAGNOSIS — N12 PYELONEPHRITIS OF TRANSPLANTED KIDNEY: ICD-10-CM

## 2018-11-01 DIAGNOSIS — N18.5 CHRONIC KIDNEY DISEASE, STAGE V (H): ICD-10-CM

## 2018-11-01 DIAGNOSIS — E87.20 METABOLIC ACIDOSIS: ICD-10-CM

## 2018-11-01 DIAGNOSIS — I15.1 HTN, KIDNEY TRANSPLANT RELATED: ICD-10-CM

## 2018-11-01 DIAGNOSIS — M10.371 ACUTE GOUT DUE TO RENAL IMPAIRMENT INVOLVING RIGHT FOOT: ICD-10-CM

## 2018-11-01 DIAGNOSIS — Z94.0 KIDNEY REPLACED BY TRANSPLANT: ICD-10-CM

## 2018-11-01 DIAGNOSIS — M10.9 GOUT, UNSPECIFIED CAUSE, UNSPECIFIED CHRONICITY, UNSPECIFIED SITE: ICD-10-CM

## 2018-11-01 DIAGNOSIS — Z48.298 AFTERCARE FOLLOWING ORGAN TRANSPLANT: ICD-10-CM

## 2018-11-01 DIAGNOSIS — Z94.0 HTN, KIDNEY TRANSPLANT RELATED: ICD-10-CM

## 2018-11-01 DIAGNOSIS — Z79.899 ENCOUNTER FOR LONG-TERM (CURRENT) USE OF HIGH-RISK MEDICATION: ICD-10-CM

## 2018-11-01 DIAGNOSIS — Z94.0 KIDNEY TRANSPLANTED: ICD-10-CM

## 2018-11-01 DIAGNOSIS — N18.5 ANEMIA DUE TO STAGE 5 CHRONIC KIDNEY DISEASE, NOT ON CHRONIC DIALYSIS (H): ICD-10-CM

## 2018-11-01 LAB
ANION GAP SERPL CALCULATED.3IONS-SCNC: 12 MMOL/L (ref 3–14)
BACTERIA SPEC CULT: NO GROWTH
BACTERIA SPEC CULT: NO GROWTH
BASOPHILS # BLD AUTO: 0 10E9/L (ref 0–0.2)
BASOPHILS NFR BLD AUTO: 0 %
BUN SERPL-MCNC: 84 MG/DL (ref 7–30)
CALCIUM SERPL-MCNC: 8.6 MG/DL (ref 8.5–10.1)
CHLORIDE SERPL-SCNC: 106 MMOL/L (ref 94–109)
CO2 SERPL-SCNC: 19 MMOL/L (ref 20–32)
CREAT SERPL-MCNC: 5.6 MG/DL (ref 0.66–1.25)
CREAT UR-MCNC: 45 MG/DL
DIFFERENTIAL METHOD BLD: ABNORMAL
EOSINOPHIL # BLD AUTO: 0 10E9/L (ref 0–0.7)
EOSINOPHIL NFR BLD AUTO: 0.1 %
ERYTHROCYTE [DISTWIDTH] IN BLOOD BY AUTOMATED COUNT: 16 % (ref 10–15)
GFR SERPL CREATININE-BSD FRML MDRD: 11 ML/MIN/1.7M2
GLUCOSE SERPL-MCNC: 207 MG/DL (ref 70–99)
HCT VFR BLD AUTO: 26.7 % (ref 40–53)
HGB BLD-MCNC: 8.8 G/DL (ref 13.3–17.7)
IMM GRANULOCYTES # BLD: 0.2 10E9/L (ref 0–0.4)
IMM GRANULOCYTES NFR BLD: 2.1 %
LYMPHOCYTES # BLD AUTO: 0.4 10E9/L (ref 0.8–5.3)
LYMPHOCYTES NFR BLD AUTO: 5.1 %
Lab: NORMAL
Lab: NORMAL
MCH RBC QN AUTO: 27.3 PG (ref 26.5–33)
MCHC RBC AUTO-ENTMCNC: 33 G/DL (ref 31.5–36.5)
MCV RBC AUTO: 83 FL (ref 78–100)
MONOCYTES # BLD AUTO: 0.5 10E9/L (ref 0–1.3)
MONOCYTES NFR BLD AUTO: 6.4 %
NEUTROPHILS # BLD AUTO: 6.7 10E9/L (ref 1.6–8.3)
NEUTROPHILS NFR BLD AUTO: 86.3 %
NRBC # BLD AUTO: 0 10*3/UL
NRBC BLD AUTO-RTO: 0 /100
PHOSPHATE SERPL-MCNC: 3.3 MG/DL (ref 2.5–4.5)
PLATELET # BLD AUTO: 128 10E9/L (ref 150–450)
POTASSIUM SERPL-SCNC: 4.2 MMOL/L (ref 3.4–5.3)
PROT UR-MCNC: 0.39 G/L
PROT/CREAT 24H UR: 0.85 G/G CR (ref 0–0.2)
RBC # BLD AUTO: 3.22 10E12/L (ref 4.4–5.9)
SODIUM SERPL-SCNC: 137 MMOL/L (ref 133–144)
SPECIMEN SOURCE: NORMAL
SPECIMEN SOURCE: NORMAL
TACROLIMUS BLD-MCNC: 6 UG/L (ref 5–15)
TME LAST DOSE: NORMAL H
URATE SERPL-MCNC: 7.4 MG/DL (ref 3.5–7.2)
WBC # BLD AUTO: 7.7 10E9/L (ref 4–11)

## 2018-11-01 PROCEDURE — 85025 COMPLETE CBC W/AUTO DIFF WBC: CPT | Performed by: INTERNAL MEDICINE

## 2018-11-01 PROCEDURE — G0463 HOSPITAL OUTPT CLINIC VISIT: HCPCS | Mod: ZF

## 2018-11-01 PROCEDURE — 84550 ASSAY OF BLOOD/URIC ACID: CPT | Performed by: INTERNAL MEDICINE

## 2018-11-01 PROCEDURE — 36415 COLL VENOUS BLD VENIPUNCTURE: CPT | Performed by: INTERNAL MEDICINE

## 2018-11-01 PROCEDURE — 80048 BASIC METABOLIC PNL TOTAL CA: CPT | Performed by: INTERNAL MEDICINE

## 2018-11-01 PROCEDURE — 80197 ASSAY OF TACROLIMUS: CPT | Performed by: INTERNAL MEDICINE

## 2018-11-01 PROCEDURE — 84156 ASSAY OF PROTEIN URINE: CPT | Performed by: INTERNAL MEDICINE

## 2018-11-01 PROCEDURE — 84100 ASSAY OF PHOSPHORUS: CPT | Performed by: INTERNAL MEDICINE

## 2018-11-01 ASSESSMENT — PAIN SCALES - GENERAL: PAINLEVEL: SEVERE PAIN (7)

## 2018-11-01 NOTE — PATIENT INSTRUCTIONS
INCREASE sodium bicarbonate to 1300 mg twice a day  INCREASE allopurinol to 200 mg DAILY for the uric acid.    We will get the information on peritoneal dialysis vs. Home hemodialysis for you.     Have donors call [phone] or logon to [email or web address].     Complete antibiotics.     Once complete will have you get the AVF OR PD catheter placed to initiate dialysis in the coming months.

## 2018-11-01 NOTE — MR AVS SNAPSHOT
After Visit Summary   11/1/2018    Rashad Ortiz    MRN: [id number]           Patient Information     Date Of Birth          1975        Visit Information        Provider Department      11/1/2018 3:35 PM 2,  Kidney/Pancreas Recipient [facility] Nephrology        Care Instructions    INCREASE sodium bicarbonate to 1300 mg twice a day  INCREASE allopurinol to 200 mg DAILY for the uric acid.    We will get the information on peritoneal dialysis vs. Home hemodialysis for you.     Have donors call [phone] or logon to [email or web address].     Complete antibiotics.     Once complete will have you get the AVF OR PD catheter placed to initiate dialysis in the coming months.               Follow-ups after your visit        Follow-up notes from your care team     Return in about 3 months (around 2/1/2019).      Your next 10 appointments already scheduled     Nov 01, 2018  4:40 PM CDT   [facility] Visit with [facility]iel [facility] MD   [facility]    [address] [place] [id number] [phone]           Bring a current list of meds and any records pertaining to this visit. For Physicals, please bring immunization records and any forms needing to be filled out. Please arrive 10 minutes early to complete paperwork.            Nov 08, 2018  2:30 PM CST   (Arrive by 2:15 PM)   New Patient Visit with KAILA Kaufman MD   [facility] Dermatology ([facility] [place] [facility])    [address] [place] [id number]   [phone]            Nov 14, 2018  2:00 PM CST   FULL PULMONARY FUNCTION with  PFL C   [facility] Pulmonary Function Testing ([facility])    [address] [place] [id number]   [phone]            Nov 14, 2018  3:30 PM CST   (Arrive by 3:15 PM)   New Patient Visit with Gelacio Jimenez MD   [facility] Heart Care ([facility])    [address] [facility]  [address] [place] [address] [phone]              Who to contact     If you have questions or need follow up information about today's clinic visit or your schedule please contact [facility] NEPHROLOGY directly at [phone].  Normal or non-critical lab and imaging results will be communicated to you by MyChart, letter or phone within 4 business days after the clinic has received the results. If you do not hear from us within 7 days, please contact the clinic through [employer] or phone. If you have a critical or abnormal lab result, we will notify you by phone as soon as possible.  Submit refill requests through [email or web address] or call your pharmacy and they will forward the refill request to us. Please allow 3 business days for your refill to be completed.          Additional Information About Your Visit        MyChart Information     [email or web address] gives you secure access to your electronic health record. If you see a primary care provider, you can also send messages to your care team and make appointments. If you have questions, please call your primary care clinic.  If you do not have a primary care provider, please call [phone] and they will assist you.        Care EveryWhere ID     This is your Care EveryWhere ID. This could be used by other organizations to access your [facility] medical records  [id number]        Your Vitals Were     Pulse Temperature Pulse Oximetry BMI (Body Mass Index)          85 98.3  F (36.8  C) (Oral) 99% 23.02 kg/m2         Blood Pressure from Last 3 Encounters:   11/01/18 129/88   10/28/18 (!) 129/95   10/25/18 96/57    Weight from Last 3 Encounters:   11/01/18 79.7 kg (175 lb 9.6 oz)   10/26/18 82.1 kg (181 lb)   10/25/18 79.7 kg (175 lb 9.6 oz)              Today, you had the following     No orders found for display       Primary Care Provider Office Phone # Fax #    Mariel Mares [phone]  [place] [facility] [address]        Equal Access to Services     LONAGLENN MATT : Hadii song nelson haddanno Soomaali, waaxda luqadaha, qaybta kaalmada claudiomeirmalia, ronaldo ambrosio jalilcliff galindovijayfernando spencer . So [facility] [phone].    ATENCIÓN: Si habla español, tiene a ward disposición servicios gratuitos de asistencia lingüística. Llame al [phone].    We comply with applicable federal civil rights laws and [place] laws. We do not discriminate on the basis of race, color, national origin, age, disability, sex, sexual orientation, or gender identity.            Thank you!     Thank you for choosing [facility] NEPHROLOGY  for your care. Our goal is always to provide you with excellent care. Hearing back from our patients is one way we can continue to improve our services. Please take a few minutes to complete the written survey that you may receive in the mail after your visit with us. Thank you!             Your Updated Medication List - Protect others around you: Learn how to safely use, store and throw away your medicines at www.disposemymeds.org.          This list is accurate as of 11/1/18  3:45 PM.  Always use your most recent med list.                   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 tophus, unspecified site       amLODIPine 5 MG tablet    NORVASC    90 tablet    Take 1 tablet (5 mg) by mouth daily    Benign essential hypertension       carvedilol 25 MG tablet    COREG    60 tablet    Take 0.5 tablets (12.5 mg) by mouth 2 times daily    DEIDRA (acute kidney injury) (H)       furosemide 20 MG tablet    LASIX    180 tablet    Take 1 tablet (20 mg) by mouth 2 times daily    DEIDRA (acute kidney injury) (H)       levofloxacin 500 MG tablet    LEVAQUIN    7 tablet    Take 1 tablet (500 mg) by mouth every other day    Escherichia coli septicemia (H)        mycophenolate 250 MG capsule    GENERIC EQUIVALENT    120 capsule    Take 2 capsules (500 mg) by mouth 2 times daily    Kidney transplanted       nitazoxanide 500 MG tablet    ALINIA    24 tablet    Take 1 tablet (500 mg) by mouth every 12 hours for 12 days    Gastroenteritis due to norovirus       omeprazole 20 MG CR capsule    priLOSEC    90 capsule    Take 1 capsule (20 mg) by mouth daily    Kidney replaced by transplant       predniSONE 10 MG tablet    DELTASONE    30 tablet    Take 4 tabs (40 mg) by mouth daily x 3 days, 2 tabs (20 mg) daily x 3 days, 1 tab (10 mg) daily x 3 days, then 1/2 tab (5 mg) -- continue.    Kidney replaced by transplant       sodium bicarbonate 650 MG tablet     120 tablet    Take 2 tablets (1,300 mg) by mouth 2 times daily    Encounter for long-term (current) use of high-risk medication, Kidney replaced by transplant       sulfamethoxazole-trimethoprim 400-80 MG per tablet    BACTRIM/SEPTRA    45 tablet    Take 1 tablet by mouth every other day    Kidney transplanted       tacrolimus 1 MG capsule    GENERIC EQUIVALENT    120 capsule    Take 2 capsules (2 mg) by mouth 2 times daily    Kidney transplant recipient, Long-term use of immunosuppressant medication

## 2018-11-01 NOTE — PROGRESS NOTES
[facility]  Nephrology Clinic  Kidney/Pancreas Recipient  2018     Name: Rashad Ortiz  MRN: [id number]   Age: 43 year old  : 1975  Referring provider: Francie Barraza     CHRONIC TRANSPLANT NEPHROLOGY VISIT    Assessment and Plan:   # LDKT: baseline Cr ~ 4; Increased   - Proteinuria: Mild   - Date of DSA last checked: 17 Latest DSA: No   - BK Viremia: No   - Kidney Tx Biopsy: Yes 3/3/17    Kidney, allograft; percutaneous needle biopsy:   -Moderate chronic tubulo-interstitial changes   -No diagnostic evidence of acute rejection seen     The patient had a baseline creatinine of between 3 and 4 mg/dL.  However, with his recent transplant pyelonephritis, his new baseline is yet to be determined but likely in the range that he will need to be evaluated for dialytic access.  We will wait for him to make his decision on hemodialysis versus peritoneal dialysis and finish his treatment for transplant pyelonephritis for 3 weeks total antibiotic course.  Once this is completed he can have either his peritoneal dialysis catheter or AV fistula creation done.    # Immunosuppression: Tacrolimus immediate release (goal  4-6), Mycophenolate mofetil (goal  no TDM needed) and Prednisone (dose  5 mg daily)   - Changes: No    # Hypertension: Controlled; Goal BP: < 140/90   - Changes: No    # Anemia in chronic renal disease: Hgb: Stable   - Iron studies: Replete; referred to anemia management.     # Mineral Bone Disorder:    - Secondary renal hyperparathyroidism; PTH level is: Significantly elevated; will need active vitamin d analog when started on HD.    # acidosis: increase the sodium bicarbonate to 1300 mg po tid.     # Skin Cancer Risk:    - Discussed sun protection and recommend regular follow up with Dermatology.    # Medical Compliance: He has had difficulty with labs due to his job.     #Gout: increase allopurinol to 200 mg po daily. On pred taper.     #transplant pyelonephritis: continue 3 weeks of abx  with levofloxacin, renally dosed.     #norovirus: alinia for 14 days.     Follow-up: 3 months.     # Transplant History:  Tx: LDKT                                    Date: 2011  Present Maintenance IS: Tacrolimus, Mycophenolate mofetil and Prednisone  Baseline Creatinine: 4 mg / dl  Recent DSA: No                                      Biopsy: Yes: last in February 2017:    Transplant Office Phone Number: [phone]    Assessment and plan was discussed with the patient and they voiced understanding and agreement.    Chief Complaint   Follow-up.    History of Present Illness:  Rashad Ortiz is a 43 year old male with a history of ESKD secondary to hypertension, status-post LDKT completed in 2011 who presents for follow-up. Patient reports he is keeping up with his antibiotics and is feeling well overall today following his discharge from the hospital on 10/28/18. He would like further information (the kidney smart class) on hemodialysis versus peritoneal dialysis options available to him. States his urine output is still adequate. He is currently on allopurinol and prednisone to treat for gout.      Currently the patient feels well.  He specifically denies any chest pain or breathing difficulties.  He has no nausea or vomiting.  He has no fever shakes or chills.  He is moving his bowels appropriately.    At this time he is unsure which way he would like to go in terms of renal replacement therapy.  He will work on finding a living donor to call in on his behalf for a second kidney transplant.  However, given the rise in his creatinine he will likely need a dialytic modality as a bridge to the next transplant.  As such I have contacted him regarding the kidney smart class.  He is considering home hemodialysis versus peritoneal dialysis.     Recent Hospitalizations:  [] No [x] Yes E coli sepsis, pyelonephritis of transplanted kidney, acute gastroenteritis due to norovirus. Discharged on 10/28/18   [facility]  Issues: [] No [x] Yes    Decreased energy: [x] No [] Yes    Chest pain or SOB with exertion:  [x] No [] Yes    Appetite change or weight change: [x] No [] Yes    Nausea, vomiting or diarrhea:  [x] No [] Yes    Fever, sweats or chills: [x] No [] Yes    Leg swelling: [x] No [] Yes      Other medical issues: E coli sepsis, pyelonephritis of transplanted kidney, acute gastroenteritis due to norovirus.      Review of Systems:   A comprehensive review of systems was obtained and negative, except as noted in the HPI or past medical history.     Active Medications:   Current Outpatient Prescriptions:      allopurinol (ZYLOPRIM) 100 MG tablet, Take 1 tablet (100 mg) by mouth daily, Disp: 30 tablet, Rfl: 1     amLODIPine (NORVASC) 5 MG tablet, Take 1 tablet (5 mg) by mouth daily, Disp: 90 tablet, Rfl: 3     carvedilol (COREG) 25 MG tablet, Take 0.5 tablets (12.5 mg) by mouth 2 times daily, Disp: 60 tablet, Rfl: 11     furosemide (LASIX) 20 MG tablet, Take 1 tablet (20 mg) by mouth 2 times daily, Disp: 180 tablet, Rfl: 1     levofloxacin (LEVAQUIN) 500 MG tablet, Take 1 tablet (500 mg) by mouth every other day, Disp: 7 tablet, Rfl: 0     mycophenolate (GENERIC EQUIVALENT) 250 MG capsule, Take 2 capsules (500 mg) by mouth 2 times daily, Disp: 120 capsule, Rfl: 11     nitazoxanide (ALINIA) 500 MG tablet, Take 1 tablet (500 mg) by mouth every 12 hours for 12 days, Disp: 24 tablet, Rfl: 0     omeprazole (PRILOSEC) 20 MG capsule, Take 1 capsule (20 mg) by mouth daily, Disp: 90 capsule, Rfl: 1     predniSONE (DELTASONE) 10 MG tablet, Take 4 tabs (40 mg) by mouth daily x 3 days, 2 tabs (20 mg) daily x 3 days, 1 tab (10 mg) daily x 3 days, then 1/2 tab (5 mg) -- continue., Disp: 30 tablet, Rfl: 0     sodium bicarbonate 650 MG tablet, Take 2 tablets (1,300 mg) by mouth 2 times daily, Disp: 120 tablet, Rfl: 0     sulfamethoxazole-trimethoprim (BACTRIM/SEPTRA) 400-80 MG per tablet, Take 1 tablet by mouth every other day, Disp: 45 tablet,  Rfl: 3     tacrolimus (GENERIC EQUIVALENT) 1 MG capsule, Take 2 capsules (2 mg) by mouth 2 times daily, Disp: 120 capsule, Rfl: 11      Allergies:   No known drug allergies       Past Medical History:  Chronic kidney disease   GERD  Gout  Hypertension  Steroid long-term use  Creatinine elevation  Norovirus  Escherichia coli septicemia     Family History:   Hypertension       Social History:   Former smoker. Quit on 03/2017  Consumes five drinks of alcohol per week.     Physical Exam:   There were no vitals taken for this visit.   Wt Readings from Last 4 Encounters:   10/26/18 82.1 kg (181 lb)   10/25/18 79.7 kg (175 lb 9.6 oz)   10/22/18 84.4 kg (186 lb)   09/17/18 87.1 kg (192 lb)     GENERAL APPEARANCE: alert and no distress  HENT: mouth without ulcers or lesions  LYMPHATICS: no cervical or supraclavicular nodes  RESP: lungs clear to auscultation - no rales, rhonchi or wheezes  CV: regular rhythm, normal rate, no rub, no murmur  EDEMA: no LE edema bilaterally  ABDOMEN: soft, nondistended, nontender, bowel sounds normal  MS: extremities normal - no gross deformities noted, no evidence of inflammation in joints, no muscle tenderness  SKIN: no rash    Data:   DSA Present   Date Value Ref Range Status   09/25/2017 NO  Final     SA1 Comments   Date Value Ref Range Status   09/25/2017   Final    Test performed by modified procedure. Serum heat inactivated and tested by   a modified (Heaters) protocol including fetal calf serum addition.   High-risk, mfi >3,000. Mod-risk, mfi 500-3,000.        SA2 Comments   Date Value Ref Range Status   09/25/2017   Final    Test performed by modified procedure. Serum heat inactivated and tested by   a modified (Heaters) protocol including fetal calf serum addition.   High-risk, mfi >3,000. Mod-risk, mfi 500-3,000.        BK Virus Log   Date Value Ref Range Status   09/25/2017 Not Calculated <2.7 Log copies/mL Final     Comment:     The Real-Time quantitative BK Virus assay was developed  and its performance   characteristics determined by the [facility] at   the [facility] in [place], [place]. The   primers and probes for each analyte are Analyte Specific Reagents (ASRs)   manufactured by [employer].  ASRs are used in many laboratory tests necessary for standard medical care and   generally do not require U.S. Food and Drug Administration approval. The FDA   has determined that such clearance or approval is not necessary.  This test is used for clinical purposes. It should not be regarded as   investigational or for research. This laboratory is certified under the   Clinical Laboratory Improvement Amendments of 1988 (CLIA-88) as qualified to   perform high complexity clinical laboratory testing.       Log IU/mL of CMVQNT   Date Value Ref Range Status   04/24/2017 Not Calculated <2.1 [Log_IU]/mL Final     Creatinine   Date Value Ref Range Status   10/28/2018 6.72 (H) 0.66 - 1.25 mg/dL Final     Protein Total Urine g/gr Creatinine   Date Value Ref Range Status   03/17/2018 0.82 (H) 0 - 0.2 g/g Cr Final     Tacrolimus Level   Date Value Ref Range Status   10/27/2018 6.9 5.0 - 15.0 ug/L Final     Comment:     Tacrolimus Reference Range  Kidney Transplant  Pediatric                      ug/L    0-3 months post transplant   10-12    3-6 months post transplant   8-10    6-12 months post transplant  6-8    >12 months post transplant   4-7  Adult    0-6 months post transplant   8-10    6-12 months post transplant  6-8    >12 months post transplant   4-6    >5 years post transplant     3-5  Heart Transplant  Pediatric    0-12 months post transplant  10-15    >12 months post transplant   5-10  Adult    0-3 months post transplant   10-15    3-6 months post transplant   8-12    6-12 months post transplant  6-12    >12 months post transplant   6-10  Lung Transplant    0-12 months post transplant  10-15    >12 months post transplant   8-12  Liver  Transplant  Pediatric    0-3 months post transplant   10-15    3-6 months post transplant   8-10    >6 months post transplant    6-8  Adult    0-3 months post transplant   10-12    3-6 months post transplant   8-10    >6 months post transplant    6-8  Pancreas Transplant    0-6 months post transplant   8-10    >6 months post transplant    5-8  This test was developed and its performance characteristics determined by the   [facility],  Special Chemistry Laboratory. It has   not been cleared or approved by the FDA. The laboratory is regulated under   CLIA as qualified to perform high-complexity testing. This test is used for   clinical purposes. It should not be regarded as investigational or for   research.       Mycophenolic Acid Mg/L   Date Value Ref Range Status   10/27/2018 <0.25 (L) 1.00 - 3.50 mg/L Final     Glucose   Date Value Ref Range Status   10/28/2018 137 (H) 70 - 99 mg/dL Final     Hemoglobin A1C   Date Value Ref Range Status   06/06/2015 5.4 4.3 - 6.0 % Final     Amylase   Date Value Ref Range Status   04/10/2017 153 (H) 30 - 110 U/L Final     Lipase   Date Value Ref Range Status   04/10/2017 387 73 - 393 U/L Final     Hemoglobin   Date Value Ref Range Status   10/28/2018 8.2 (L) 13.3 - 17.7 g/dL Final     Ferritin   Date Value Ref Range Status   01/06/2018 256 26 - 388 ng/mL Final     Iron Saturation Index   Date Value Ref Range Status   01/06/2018 28 15 - 46 % Final     Parathyroid Hormone Intact   Date Value Ref Range Status   01/06/2018 621 (H) 12 - 72 pg/mL Final     Vitamin D Deficiency screening   Date Value Ref Range Status   01/06/2018 12 (L) 20 - 75 ug/L Final     Comment:     Season, race, dietary intake, and treatment affect the concentration of   25-hydroxy-Vitamin D. Values may decrease during winter months and increase   during summer months. Values 20-29 ug/L may indicate Vitamin D insufficiency   and values <20 ug/L may indicate Vitamin D  deficiency.  Vitamin D determination is routinely performed by an immunoassay specific for   25 hydroxyvitamin D3.  If an individual is on vitamin D2 (ergocalciferol)   supplementation, please specify 25 OH vitamin D2 and D3 level determination by   LCMSMS test [id number].       Calcium   Date Value Ref Range Status   10/28/2018 8.4 (L) 8.5 - 10.1 mg/dL Final     Phosphorus   Date Value Ref Range Status   10/25/2018 5.2 (H) 2.5 - 4.5 mg/dL Final     Magnesium   Date Value Ref Range Status   10/25/2018 1.7 1.6 - 2.3 mg/dL Final     Potassium   Date Value Ref Range Status   10/28/2018 3.5 3.4 - 5.3 mmol/L Final     CMV Antibody IgG   Date Value Ref Range Status   09/25/2017 >8.0 (H) 0.0 - 0.8 AI Final     Comment:     Positive  Antibody index (AI) values reflect qualitative changes in antibody   concentration that cannot be directly associated with clinical condition or   disease state.       CMV IgG Antibody   Date Value Ref Range Status   01/12/2011 >160.0 EU/mL Final     Comment:     Positive for anti-CMV IgG     EBV Capsid Antibody IgG   Date Value Ref Range Status   09/25/2017 >8.0 (H) 0.0 - 0.8 AI Final     Comment:     Positive, suggests recent or past exposure  Antibody index (AI) values reflect qualitative changes in antibody   concentration that cannot be directly associated with clinical condition or   disease state.       EBV IgG Antibody Interpretation   Date Value Ref Range Status   01/12/2011 Positive, suggests immunologic exposure.  Final     Scribe Disclosure:   I, Tadeo Harris, am serving as a scribe to document services personally performed by Stef Murillo at this visit, based upon the provider's statements to me. All documentation has been reviewed by the aforementioned provider prior to being entered into the official medical record.     Portions of this medical record were completed by a scribe. UPON MY REVIEW AND AUTHENTICATION BY ELECTRONIC SIGNATURE, this confirms (a) I performed the applicable  clinical services, and (b) the record is accurate.

## 2018-11-01 NOTE — NURSING NOTE
Chief Complaint   Patient presents with     RECHECK     Follow Up Kidney TX     /88 (BP Location: Right arm)  Pulse 85  Temp 98.3  F (36.8  C) (Oral)  Wt 79.7 kg (175 lb 9.6 oz)  SpO2 99%  BMI 23.02 kg/m2   Rosi Galan

## 2018-11-01 NOTE — LETTER
2018      RE: Rashad Ortiz  [address]  [place] [address]       [facility]  Nephrology Clinic  Kidney/Pancreas Recipient  2018     Name: Rashad Ortiz  MRN: [id number]   Age: 43 year old  : 1975  Referring provider: Francie Barraza     CHRONIC TRANSPLANT NEPHROLOGY VISIT    Assessment and Plan:   # LDKT: baseline Cr ~ 4; Increased   - Proteinuria: Mild   - Date of DSA last checked: 17 Latest DSA: No   - BK Viremia: No   - Kidney Tx Biopsy: Yes 3/3/17    Kidney, allograft; percutaneous needle biopsy:   -Moderate chronic tubulo-interstitial changes   -No diagnostic evidence of acute rejection seen     The patient had a baseline creatinine of between 3 and 4 mg/dL.  However, with his recent transplant pyelonephritis, his new baseline is yet to be determined but likely in the range that he will need to be evaluated for dialytic access.  We will wait for him to make his decision on hemodialysis versus peritoneal dialysis and finish his treatment for transplant pyelonephritis for 3 weeks total antibiotic course.  Once this is completed he can have either his peritoneal dialysis catheter or AV fistula creation done.    # Immunosuppression: Tacrolimus immediate release (goal  4-6), Mycophenolate mofetil (goal  no TDM needed) and Prednisone (dose  5 mg daily)   - Changes: No    # Hypertension: Controlled; Goal BP: < 140/90   - Changes: No    # Anemia in chronic renal disease: Hgb: Stable   - Iron studies: Replete; referred to anemia management.     # Mineral Bone Disorder:    - Secondary renal hyperparathyroidism; PTH level is: Significantly elevated; will need active vitamin d analog when started on HD.    # acidosis: increase the sodium bicarbonate to 1300 mg po tid.     # Skin Cancer Risk:    - Discussed sun protection and recommend regular follow up with Dermatology.    # Medical Compliance: He has had difficulty with labs due to his job.     #Gout: increase allopurinol  to 200 mg po daily. On pred taper.     #transplant pyelonephritis: continue 3 weeks of abx with levofloxacin, renally dosed.     #norovirus: alinia for 14 days.     Follow-up: 3 months.     # Transplant History:  Tx: LDKT                                    Date: 2011  Present Maintenance IS: Tacrolimus, Mycophenolate mofetil and Prednisone  Baseline Creatinine: 4 mg / dl  Recent DSA: No                                      Biopsy: Yes: last in February 2017:    Transplant Office Phone Number: [phone]    Assessment and plan was discussed with the patient and they voiced understanding and agreement.    Chief Complaint   Follow-up.    History of Present Illness:  Rashad Ortiz is a 43 year old male with a history of ESKD secondary to hypertension, status-post LDKT completed in 2011 who presents for follow-up. Patient reports he is keeping up with his antibiotics and is feeling well overall today following his discharge from the hospital on 10/28/18. He would like further information (the kidney smart class) on hemodialysis versus peritoneal dialysis options available to him. States his urine output is still adequate. He is currently on allopurinol and prednisone to treat for gout.      Currently the patient feels well.  He specifically denies any chest pain or breathing difficulties.  He has no nausea or vomiting.  He has no fever shakes or chills.  He is moving his bowels appropriately.    At this time he is unsure which way he would like to go in terms of renal replacement therapy.  He will work on finding a living donor to call in on his behalf for a second kidney transplant.  However, given the rise in his creatinine he will likely need a dialytic modality as a bridge to the next transplant.  As such I have contacted him regarding the kidney smart class.  He is considering home hemodialysis versus peritoneal dialysis.     Recent Hospitalizations:  [] No [x] Yes E coli sepsis, pyelonephritis of transplanted  kidney, acute gastroenteritis due to norovirus. Discharged on 10/28/18   New Medical Issues: [] No [x] Yes    Decreased energy: [x] No [] Yes    Chest pain or SOB with exertion:  [x] No [] Yes    Appetite change or weight change: [x] No [] Yes    Nausea, vomiting or diarrhea:  [x] No [] Yes    Fever, sweats or chills: [x] No [] Yes    Leg swelling: [x] No [] Yes      Other medical issues: E coli sepsis, pyelonephritis of transplanted kidney, acute gastroenteritis due to norovirus.      Review of Systems:   A comprehensive review of systems was obtained and negative, except as noted in the HPI or past medical history.     Active Medications:   Current Outpatient Prescriptions:      allopurinol (ZYLOPRIM) 100 MG tablet, Take 1 tablet (100 mg) by mouth daily, Disp: 30 tablet, Rfl: 1     amLODIPine (NORVASC) 5 MG tablet, Take 1 tablet (5 mg) by mouth daily, Disp: 90 tablet, Rfl: 3     carvedilol (COREG) 25 MG tablet, Take 0.5 tablets (12.5 mg) by mouth 2 times daily, Disp: 60 tablet, Rfl: 11     furosemide (LASIX) 20 MG tablet, Take 1 tablet (20 mg) by mouth 2 times daily, Disp: 180 tablet, Rfl: 1     levofloxacin (LEVAQUIN) 500 MG tablet, Take 1 tablet (500 mg) by mouth every other day, Disp: 7 tablet, Rfl: 0     mycophenolate (GENERIC EQUIVALENT) 250 MG capsule, Take 2 capsules (500 mg) by mouth 2 times daily, Disp: 120 capsule, Rfl: 11     nitazoxanide (ALINIA) 500 MG tablet, Take 1 tablet (500 mg) by mouth every 12 hours for 12 days, Disp: 24 tablet, Rfl: 0     omeprazole (PRILOSEC) 20 MG capsule, Take 1 capsule (20 mg) by mouth daily, Disp: 90 capsule, Rfl: 1     predniSONE (DELTASONE) 10 MG tablet, Take 4 tabs (40 mg) by mouth daily x 3 days, 2 tabs (20 mg) daily x 3 days, 1 tab (10 mg) daily x 3 days, then 1/2 tab (5 mg) -- continue., Disp: 30 tablet, Rfl: 0     sodium bicarbonate 650 MG tablet, Take 2 tablets (1,300 mg) by mouth 2 times daily, Disp: 120 tablet, Rfl: 0     sulfamethoxazole-trimethoprim  (BACTRIM/SEPTRA) 400-80 MG per tablet, Take 1 tablet by mouth every other day, Disp: 45 tablet, Rfl: 3     tacrolimus (GENERIC EQUIVALENT) 1 MG capsule, Take 2 capsules (2 mg) by mouth 2 times daily, Disp: 120 capsule, Rfl: 11      Allergies:   No known drug allergies       Past Medical History:  Chronic kidney disease   GERD  Gout  Hypertension  Steroid long-term use  Creatinine elevation  Norovirus  Escherichia coli septicemia     Family History:   Hypertension       Social History:   Former smoker. Quit on 03/2017  Consumes five drinks of alcohol per week.     Physical Exam:   There were no vitals taken for this visit.   Wt Readings from Last 4 Encounters:   10/26/18 82.1 kg (181 lb)   10/25/18 79.7 kg (175 lb 9.6 oz)   10/22/18 84.4 kg (186 lb)   09/17/18 87.1 kg (192 lb)     GENERAL APPEARANCE: alert and no distress  HENT: mouth without ulcers or lesions  LYMPHATICS: no cervical or supraclavicular nodes  RESP: lungs clear to auscultation - no rales, rhonchi or wheezes  CV: regular rhythm, normal rate, no rub, no murmur  EDEMA: no LE edema bilaterally  ABDOMEN: soft, nondistended, nontender, bowel sounds normal  MS: extremities normal - no gross deformities noted, no evidence of inflammation in joints, no muscle tenderness  SKIN: no rash    Data:   DSA Present   Date Value Ref Range Status   09/25/2017 NO  Final     SA1 Comments   Date Value Ref Range Status   09/25/2017   Final    Test performed by modified procedure. Serum heat inactivated and tested by   a modified (Angier) protocol including fetal calf serum addition.   High-risk, mfi >3,000. Mod-risk, mfi 500-3,000.        SA2 Comments   Date Value Ref Range Status   09/25/2017   Final    Test performed by modified procedure. Serum heat inactivated and tested by   a modified (Angier) protocol including fetal calf serum addition.   High-risk, mfi >3,000. Mod-risk, mfi 500-3,000.        BK Virus Log   Date Value Ref Range Status   09/25/2017 Not Calculated <2.7  Log copies/mL Final     Comment:     The Real-Time quantitative BK Virus assay was developed and its performance   characteristics determined by the Infectious Diseases Diagnostic Laboratory at   the [facility] in [place], [place]. The   primers and probes for each analyte are Analyte Specific Reagents (ASRs)   manufactured by [employer].  ASRs are used in many laboratory tests necessary for standard medical care and   generally do not require U.S. Food and Drug Administration approval. The FDA   has determined that such clearance or approval is not necessary.  This test is used for clinical purposes. It should not be regarded as   investigational or for research. This laboratory is certified under the   Clinical Laboratory Improvement Amendments of 1988 (CLIA-88) as qualified to   perform high complexity clinical laboratory testing.       Log IU/mL of CMVQNT   Date Value Ref Range Status   04/24/2017 Not Calculated <2.1 [Log_IU]/mL Final     Creatinine   Date Value Ref Range Status   10/28/2018 6.72 (H) 0.66 - 1.25 mg/dL Final     Protein Total Urine g/gr Creatinine   Date Value Ref Range Status   03/17/2018 0.82 (H) 0 - 0.2 g/g Cr Final     Tacrolimus Level   Date Value Ref Range Status   10/27/2018 6.9 5.0 - 15.0 ug/L Final     Comment:     Tacrolimus Reference Range  Kidney Transplant  Pediatric                      ug/L    0-3 months post transplant   10-12    3-6 months post transplant   8-10    6-12 months post transplant  6-8    >12 months post transplant   4-7  Adult    0-6 months post transplant   8-10    6-12 months post transplant  6-8    >12 months post transplant   4-6    >5 years post transplant     3-5  Heart Transplant  Pediatric    0-12 months post transplant  10-15    >12 months post transplant   5-10  Adult    0-3 months post transplant   10-15    3-6 months post transplant   8-12    6-12 months post transplant  6-12    >12 months post transplant   6-10  Lung  Transplant    0-12 months post transplant  10-15    >12 months post transplant   8-12  Liver Transplant  Pediatric    0-3 months post transplant   10-15    3-6 months post transplant   8-10    >6 months post transplant    6-8  Adult    0-3 months post transplant   10-12    3-6 months post transplant   8-10    >6 months post transplant    6-8  Pancreas Transplant    0-6 months post transplant   8-10    >6 months post transplant    5-8  This test was developed and its performance characteristics determined by the   [facility],  Special Chemistry Laboratory. It has   not been cleared or approved by the FDA. The laboratory is regulated under   CLIA as qualified to perform high-complexity testing. This test is used for   clinical purposes. It should not be regarded as investigational or for   research.       Mycophenolic Acid Mg/L   Date Value Ref Range Status   10/27/2018 <0.25 (L) 1.00 - 3.50 mg/L Final     Glucose   Date Value Ref Range Status   10/28/2018 137 (H) 70 - 99 mg/dL Final     Hemoglobin A1C   Date Value Ref Range Status   06/06/2015 5.4 4.3 - 6.0 % Final     Amylase   Date Value Ref Range Status   04/10/2017 153 (H) 30 - 110 U/L Final     Lipase   Date Value Ref Range Status   04/10/2017 387 73 - 393 U/L Final     Hemoglobin   Date Value Ref Range Status   10/28/2018 8.2 (L) 13.3 - 17.7 g/dL Final     Ferritin   Date Value Ref Range Status   01/06/2018 256 26 - 388 ng/mL Final     Iron Saturation Index   Date Value Ref Range Status   01/06/2018 28 15 - 46 % Final     Parathyroid Hormone Intact   Date Value Ref Range Status   01/06/2018 621 (H) 12 - 72 pg/mL Final     Vitamin D Deficiency screening   Date Value Ref Range Status   01/06/2018 12 (L) 20 - 75 ug/L Final     Comment:     Season, race, dietary intake, and treatment affect the concentration of   25-hydroxy-Vitamin D. Values may decrease during winter months and increase   during summer months. Values 20-29 ug/L may  indicate Vitamin D insufficiency   and values <20 ug/L may indicate Vitamin D deficiency.  Vitamin D determination is routinely performed by an immunoassay specific for   25 hydroxyvitamin D3.  If an individual is on vitamin D2 (ergocalciferol)   supplementation, please specify 25 OH vitamin D2 and D3 level determination by   LCMSMS test [id number].       Calcium   Date Value Ref Range Status   10/28/2018 8.4 (L) 8.5 - 10.1 mg/dL Final     Phosphorus   Date Value Ref Range Status   10/25/2018 5.2 (H) 2.5 - 4.5 mg/dL Final     Magnesium   Date Value Ref Range Status   10/25/2018 1.7 1.6 - 2.3 mg/dL Final     Potassium   Date Value Ref Range Status   10/28/2018 3.5 3.4 - 5.3 mmol/L Final     CMV Antibody IgG   Date Value Ref Range Status   09/25/2017 >8.0 (H) 0.0 - 0.8 AI Final     Comment:     Positive  Antibody index (AI) values reflect qualitative changes in antibody   concentration that cannot be directly associated with clinical condition or   disease state.       CMV IgG Antibody   Date Value Ref Range Status   01/12/2011 >160.0 EU/mL Final     Comment:     Positive for anti-CMV IgG     EBV Capsid Antibody IgG   Date Value Ref Range Status   09/25/2017 >8.0 (H) 0.0 - 0.8 AI Final     Comment:     Positive, suggests recent or past exposure  Antibody index (AI) values reflect qualitative changes in antibody   concentration that cannot be directly associated with clinical condition or   disease state.       EBV IgG Antibody Interpretation   Date Value Ref Range Status   01/12/2011 Positive, suggests immunologic exposure.  Final     Scribe Disclosure:   I, Tadeo Harris, am serving as a scribe to document services personally performed by Stef Murillo at this visit, based upon the provider's statements to me. All documentation has been reviewed by the aforementioned provider prior to being entered into the official medical record.     Portions of this medical record were completed by a scribe. UPON MY REVIEW AND  AUTHENTICATION BY ELECTRONIC SIGNATURE, this confirms (a) I performed the applicable clinical services, and (b) the record is accurate.       Kidney/Pancreas Recipient

## 2018-11-02 ENCOUNTER — MYC MEDICAL ADVICE (OUTPATIENT)
Dept: FAMILY MEDICINE | Facility: CLINIC | Age: 43
End: 2018-11-02

## 2018-11-02 PROBLEM — E87.20 METABOLIC ACIDOSIS: Status: ACTIVE | Noted: 2018-11-02

## 2018-11-02 PROBLEM — D84.9 IMMUNOSUPPRESSION (H): Status: ACTIVE | Noted: 2018-11-02

## 2018-11-02 RX ORDER — ALLOPURINOL 100 MG/1
200 TABLET ORAL DAILY
Qty: 60 TABLET | Refills: 11 | Status: SHIPPED | OUTPATIENT
Start: 2018-11-02 | End: 2018-12-11

## 2018-11-02 RX ORDER — SODIUM BICARBONATE 650 MG/1
1300 TABLET ORAL 3 TIMES DAILY
Qty: 180 TABLET | Refills: 11 | Status: SHIPPED | OUTPATIENT
Start: 2018-11-02 | End: 2019-10-03

## 2018-11-03 ENCOUNTER — OFFICE VISIT (OUTPATIENT)
Dept: URGENT CARE | Facility: URGENT CARE | Age: 43
End: 2018-11-03
Payer: COMMERCIAL

## 2018-11-03 VITALS
OXYGEN SATURATION: 99 % | HEART RATE: 88 BPM | TEMPERATURE: 98.4 F | WEIGHT: 178 LBS | RESPIRATION RATE: 18 BRPM | DIASTOLIC BLOOD PRESSURE: 98 MMHG | SYSTOLIC BLOOD PRESSURE: 148 MMHG | BODY MASS INDEX: 23.34 KG/M2

## 2018-11-03 DIAGNOSIS — D84.9 IMMUNOSUPPRESSION (H): ICD-10-CM

## 2018-11-03 DIAGNOSIS — B37.0 THRUSH: Primary | ICD-10-CM

## 2018-11-03 DIAGNOSIS — I15.1 HTN, KIDNEY TRANSPLANT RELATED: ICD-10-CM

## 2018-11-03 DIAGNOSIS — Z94.0 KIDNEY TRANSPLANTED: ICD-10-CM

## 2018-11-03 DIAGNOSIS — Z94.0 HTN, KIDNEY TRANSPLANT RELATED: ICD-10-CM

## 2018-11-03 LAB
BACTERIA SPEC CULT: NO GROWTH
ERYTHROCYTE [DISTWIDTH] IN BLOOD BY AUTOMATED COUNT: 16.3 % (ref 10–15)
HCT VFR BLD AUTO: 24.5 % (ref 40–53)
HGB BLD-MCNC: 8.2 G/DL (ref 13.3–17.7)
Lab: NORMAL
MCH RBC QN AUTO: 26.9 PG (ref 26.5–33)
MCHC RBC AUTO-ENTMCNC: 33.5 G/DL (ref 31.5–36.5)
MCV RBC AUTO: 80 FL (ref 78–100)
PLATELET # BLD AUTO: 237 10E9/L (ref 150–450)
RBC # BLD AUTO: 3.05 10E12/L (ref 4.4–5.9)
SPECIMEN SOURCE: NORMAL
WBC # BLD AUTO: 11 10E9/L (ref 4–11)

## 2018-11-03 PROCEDURE — 80048 BASIC METABOLIC PNL TOTAL CA: CPT | Performed by: INTERNAL MEDICINE

## 2018-11-03 PROCEDURE — 36415 COLL VENOUS BLD VENIPUNCTURE: CPT | Performed by: INTERNAL MEDICINE

## 2018-11-03 PROCEDURE — 99214 OFFICE O/P EST MOD 30 MIN: CPT | Performed by: PHYSICIAN ASSISTANT

## 2018-11-03 PROCEDURE — 85027 COMPLETE CBC AUTOMATED: CPT | Performed by: INTERNAL MEDICINE

## 2018-11-03 PROCEDURE — 80197 ASSAY OF TACROLIMUS: CPT | Performed by: INTERNAL MEDICINE

## 2018-11-03 RX ORDER — NYSTATIN 100000/ML
200000 SUSPENSION, ORAL (FINAL DOSE FORM) ORAL 4 TIMES DAILY
Qty: 80 ML | Refills: 0 | Status: SHIPPED | OUTPATIENT
Start: 2018-11-03 | End: 2018-11-12

## 2018-11-03 ASSESSMENT — ENCOUNTER SYMPTOMS
CONSTITUTIONAL NEGATIVE: 1
SHORTNESS OF BREATH: 0
ABDOMINAL PAIN: 0
DIAPHORESIS: 0
EYE REDNESS: 0
HEADACHES: 0
FEVER: 0
CARDIOVASCULAR NEGATIVE: 1
COUGH: 0
WEAKNESS: 0
NEUROLOGICAL NEGATIVE: 1
VOMITING: 0
LIGHT-HEADEDNESS: 0
MUSCULOSKELETAL NEGATIVE: 1
DIARRHEA: 0
SORE THROAT: 0
HEMATURIA: 0
WHEEZING: 0
EYE ITCHING: 0
PALPITATIONS: 0
GASTROINTESTINAL NEGATIVE: 1
EYES NEGATIVE: 1
ENDOCRINE NEGATIVE: 1
FREQUENCY: 0
NAUSEA: 0
EYE DISCHARGE: 0
POLYDIPSIA: 0
DYSURIA: 0
ADENOPATHY: 0
CHEST TIGHTNESS: 0
CHILLS: 0
RHINORRHEA: 0
MYALGIAS: 0
DIZZINESS: 0
RESPIRATORY NEGATIVE: 1

## 2018-11-03 NOTE — PROGRESS NOTES
Chief Complaint:    Chief Complaint   Patient presents with     Tongue Lesion(S)     previous hospitalized this week for septic       HPI: Rashad Ortiz is an 43 year old male who presents for evaluation and treatment of spots on tongue.  He was recently discharged from the hospital for sepsis.  Yesterday he noticed white spots on his tongue.  This has worsened overnight.  His tongue is also sore.  He left work yesterday, and needs a note for work.      Patient has a complicated medical Hx.      ROS:      Review of Systems   Constitutional: Negative.  Negative for chills, diaphoresis and fever.   HENT: Negative.  Negative for congestion, ear pain, rhinorrhea and sore throat.    Eyes: Negative.  Negative for discharge, redness and itching.   Respiratory: Negative.  Negative for cough, chest tightness, shortness of breath and wheezing.    Cardiovascular: Negative.  Negative for chest pain and palpitations.   Gastrointestinal: Negative.  Negative for abdominal pain, diarrhea, nausea and vomiting.   Endocrine: Negative.  Negative for polydipsia and polyuria.   Genitourinary: Negative for dysuria, frequency, hematuria and urgency.   Musculoskeletal: Negative.  Negative for myalgias.   Skin: Positive for rash.   Allergic/Immunologic: Negative for immunocompromised state.   Neurological: Negative.  Negative for dizziness, weakness, light-headedness and headaches.   Hematological: Negative for adenopathy.        Family History   Family History   Problem Relation Age of Onset     Hypertension Mother        Social History  Social History     Social History     Marital status:      Spouse name: N/A     Number of children: N/A     Years of education: N/A     Occupational History     Not on file.     Social History Main Topics     Smoking status: Former Smoker     Types: Cigarettes     Quit date: 03/2017     Smokeless tobacco: Never Used      Comment: Patient states that he is an 'social'  smoker      Alcohol use 2.5  oz/week     5 Standard drinks or equivalent per week      Comment: 1-2 drinks/wk     Drug use: No     Sexual activity: No     Other Topics Concern     Not on file     Social History Narrative    Rashad lives with his wife and 2 children. There are 2 declawed cats. He works at a computer-based job in customer service.         Surgical History:  Past Surgical History:   Procedure Laterality Date     AV FISTULA OR GRAFT ARTERIAL       LIGATE FISTULA ARTERIOVENOUS UPPER EXTREMITY  12/20/2011    Procedure:LIGATE FISTULA ARTERIOVENOUS UPPER EXTREMITY; Excision of Right Forearm Arteriovenous Fistula.; Surgeon:LINDY AMAYA; Location:[facility] OR     PERCUTANEOUS BIOPSY KIDNEY Right 2/28/2017    Procedure: PERCUTANEOUS BIOPSY KIDNEY;  Surgeon: Gee Barrios MD;  Location:  OR     TRANSPLANT  01/13/2011    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        Problem List:  Patient Active Problem List   Diagnosis     Kidney replaced by transplant     High risk medications (not anticoagulants) long-term use     Hypertension goal BP (blood pressure) < 140/90     GERD (gastroesophageal reflux disease)     CARDIOVASCULAR SCREENING; LDL GOAL LESS THAN 160     Gout     Creatinine elevation     Antibody mediated rejection of kidney transplant     Medical non-compliance     Chronic kidney disease, stage 4, severely decreased GFR (H)     Herpes simplex infection of penis     Norovirus     Escherichia coli septicemia (H)     Pyelonephritis of transplanted kidney     HTN, kidney transplant related     Metabolic acidosis     Chronic kidney disease, stage V (H)     Immunosuppression (H)        Allergies:  Allergies   Allergen Reactions     Nkda [No Known Drug Allergies]         Current Meds:    Current Outpatient Prescriptions:      allopurinol (ZYLOPRIM) 100 MG tablet, Take 2 tablets (200 mg) by mouth daily, Disp: 60 tablet, Rfl: 11     amLODIPine (NORVASC) 5 MG tablet, Take 1 tablet (5 mg) by mouth daily, Disp: 90 tablet, Rfl:  3     carvedilol (COREG) 25 MG tablet, Take 0.5 tablets (12.5 mg) by mouth 2 times daily, Disp: 60 tablet, Rfl: 11     furosemide (LASIX) 20 MG tablet, Take 1 tablet (20 mg) by mouth 2 times daily, Disp: 180 tablet, Rfl: 1     levofloxacin (LEVAQUIN) 500 MG tablet, Take 1 tablet (500 mg) by mouth every other day, Disp: 7 tablet, Rfl: 0     mycophenolate (GENERIC EQUIVALENT) 250 MG capsule, Take 2 capsules (500 mg) by mouth 2 times daily, Disp: 120 capsule, Rfl: 11     nystatin (MYCOSTATIN) 885690 UNIT/ML suspension, Swish and swallow 2 mLs (200,000 Units) in mouth 4 times daily for 10 days, Disp: 80 mL, Rfl: 0     omeprazole (PRILOSEC) 20 MG capsule, Take 1 capsule (20 mg) by mouth daily, Disp: 90 capsule, Rfl: 1     predniSONE (DELTASONE) 10 MG tablet, Take 4 tabs (40 mg) by mouth daily x 3 days, 2 tabs (20 mg) daily x 3 days, 1 tab (10 mg) daily x 3 days, then 1/2 tab (5 mg) -- continue., Disp: 30 tablet, Rfl: 0     sodium bicarbonate 650 MG tablet, Take 2 tablets (1,300 mg) by mouth 3 times daily, Disp: 180 tablet, Rfl: 11     sulfamethoxazole-trimethoprim (BACTRIM/SEPTRA) 400-80 MG per tablet, Take 1 tablet by mouth every other day, Disp: 45 tablet, Rfl: 3     tacrolimus (GENERIC EQUIVALENT) 1 MG capsule, Take 2 capsules (2 mg) by mouth 2 times daily, Disp: 120 capsule, Rfl: 11     nitazoxanide (ALINIA) 500 MG tablet, Take 1 tablet (500 mg) by mouth every 12 hours for 12 days (Patient not taking: Reported on 11/1/2018), Disp: 24 tablet, Rfl: 0     PHYSICAL EXAM:     Vital signs noted and reviewed by Carlos Mosqueda  BP (!) 148/98 (BP Location: Left arm, Patient Position: Chair, Cuff Size: Adult Large)  Pulse 88  Temp 98.4  F (36.9  C) (Oral)  Resp 18  Wt 178 lb (80.7 kg)  SpO2 99%  BMI 23.34 kg/m2     PEFR:    Physical Exam   Constitutional: He appears well-developed and well-nourished. No distress.   HENT:   Head: Normocephalic and atraumatic.   Right Ear: Tympanic membrane and external ear normal.   Left  Ear: Tympanic membrane and external ear normal.   Mouth/Throat: Oral lesions present. Posterior oropharyngeal erythema present. No oropharyngeal exudate, posterior oropharyngeal edema or tonsillar abscesses. Tonsils are 0 on the right. Tonsils are 0 on the left. No tonsillar exudate.   Patient has white plaque on tongue.     Eyes: EOM are normal. Pupils are equal, round, and reactive to light.   Neck: Normal range of motion. Neck supple.   Cardiovascular: Normal rate, regular rhythm, normal heart sounds and intact distal pulses.  Exam reveals no gallop and no friction rub.    No murmur heard.  Pulmonary/Chest: Effort normal and breath sounds normal. No respiratory distress.   Abdominal: Soft. Bowel sounds are normal. He exhibits no distension. There is no tenderness.   Lymphadenopathy:     He has no cervical adenopathy.   Neurological: He is alert. No cranial nerve deficit.   Skin: Skin is warm and dry. No rash noted. He is not diaphoretic.   Psychiatric: He has a normal mood and affect.   Nursing note and vitals reviewed.       ASSESSMENT:     1. Thrush    2. Immunosuppression (H)    3. HTN, kidney transplant related         PLAN:     Patient has recent antibiotic use.  Rx for nystatin for oral thrush.  No doseage adjustment needed for renal transplant.  Worrisome symptoms discussed with instructions to go to the ED.  Patient given letter for work.  Patient verbalized understanding and agreed with this plan.     Carlos Mosqueda  11/3/2018, 10:45 AM

## 2018-11-03 NOTE — LETTER
[facility]  [address] [place]  [place] [address]          November 3, 2018    RE:  Rashad Ortiz                                                                                                                                                       [address]  [facility] [address]            To whom it may concern:    Rashad Ortiz is under my professional care for Thrush He  may return to work with no restrictions his next available shift.    Sincerely,        Carlos Mosqueda PA-C

## 2018-11-04 LAB
TACROLIMUS BLD-MCNC: 3.2 UG/L (ref 5–15)
TME LAST DOSE: ABNORMAL H

## 2018-11-05 ENCOUNTER — TELEPHONE (OUTPATIENT)
Dept: TRANSPLANT | Facility: CLINIC | Age: 43
End: 2018-11-05

## 2018-11-05 LAB
ANION GAP SERPL CALCULATED.3IONS-SCNC: 8 MMOL/L (ref 3–14)
BUN SERPL-MCNC: 80 MG/DL (ref 7–30)
CALCIUM SERPL-MCNC: 8.9 MG/DL (ref 8.5–10.1)
CHLORIDE SERPL-SCNC: 111 MMOL/L (ref 94–109)
CO2 SERPL-SCNC: 24 MMOL/L (ref 20–32)
CREAT SERPL-MCNC: 5.47 MG/DL (ref 0.66–1.25)
GFR SERPL CREATININE-BSD FRML MDRD: 12 ML/MIN/1.7M2
GLUCOSE SERPL-MCNC: 105 MG/DL (ref 70–99)
POTASSIUM SERPL-SCNC: 4.1 MMOL/L (ref 3.4–5.3)
SODIUM SERPL-SCNC: 143 MMOL/L (ref 133–144)

## 2018-11-05 NOTE — TELEPHONE ENCOUNTER
DATE:  11/5/2018   TIME OF RECEIPT FROM LAB:  1:06 AM  LAB TEST:  Creatinine  LAB VALUE:  5.47  RESULTS GIVEN WITH READ-BACK TO (PROVIDER):  Reyna Nguyen RN  TIME LAB VALUE REPORTED TO PROVIDER:   1:10 pm

## 2018-11-05 NOTE — TELEPHONE ENCOUNTER
ISSUE:  Creatinine 5.47 pt currently being treated for pyelonephritis.  Pt also is also getting worked up for dialysis after completion of treatment for pyelonephritis.   Tacrolimus level 3.2  Goal 4-6  Current dose 2 mg BID.     OUTCOME:   Spoke with pt regarding his creatinine level.  Pt denies uremic s/s and verbalizes he is starting to feel better since hospital admission.  Pt will cont hydrating well.  Pt will cont to think about dialysis options peritoneal vs HD as discussed at last f/u appointment with Dr Murillo.   Pt aware his tacrolimus level 3.2.  Pt reports this was a long trough and confirms his current dose.  Pt aware he will repeat this level with his weekly transplant labs this week ensuring good 12 hour trough.  Pt aware he is to cont on same dose and if level remains low, [facility] will increase dose as needed.     Pt questions answered, pt v/u.

## 2018-11-07 ENCOUNTER — TELEPHONE (OUTPATIENT)
Dept: TRANSPLANT | Facility: CLINIC | Age: 43
End: 2018-11-07

## 2018-11-07 NOTE — TELEPHONE ENCOUNTER
Returned pt phone call.  Pt verbalized since hospital discharge he has been feeling fatigue and has edema in his legs.  Pt wondering if we would be able to write a note for his work to have 1 day off/week to rest x 2 months.  Pt aware we will run past neph team.     Pt questions answered, pt v/u.

## 2018-11-07 NOTE — LETTER
PHYSICIAN ORDERS      DATE & TIME ISSUED: 2018 2:05 PM  PATIENT NAME: Rashad Ortiz   : 1975       To whom it may concern,  Please excuse Rashad Ortiz from work up to 1 day/week as needed for 2 months to rest d/t fatigue and leg swelling from elevated creatinine level. Feel free to let us know if you have any questions or concerns.      Any questions please call: [phone]

## 2018-11-07 NOTE — TELEPHONE ENCOUNTER
Patient Call: General    Reason for call: Only stated he has some `General questions' to ask of his care coordinator    Call back needed? Yes    Return Call Needed  Same as documented in contacts section  When to return call?: Greater than one day: Route standard priority

## 2018-11-08 ENCOUNTER — OFFICE VISIT (OUTPATIENT)
Dept: DERMATOLOGY | Facility: CLINIC | Age: 43
End: 2018-11-08
Payer: COMMERCIAL

## 2018-11-08 DIAGNOSIS — B08.1 MOLLUSCUM CONTAGIOSUM: Primary | ICD-10-CM

## 2018-11-08 DIAGNOSIS — D84.9 IMMUNOSUPPRESSED STATUS (H): ICD-10-CM

## 2018-11-08 RX ORDER — UREA 40 %
CREAM (GRAM) TOPICAL
Qty: 85 G | Refills: 0 | Status: SHIPPED | OUTPATIENT
Start: 2018-11-08 | End: 2018-11-12

## 2018-11-08 ASSESSMENT — PAIN SCALES - GENERAL: PAINLEVEL: NO PAIN (0)

## 2018-11-08 NOTE — NURSING NOTE
Chief Complaint   Patient presents with     Skin Check     full body skin exam, primarily concerned about face     Marilee Hall, EMT

## 2018-11-08 NOTE — MR AVS SNAPSHOT
After Visit Summary   11/8/2018    Rashad Ortiz    MRN: [id number]           Patient Information     Date Of Birth          1975        Visit Information        Provider Department      11/8/2018 2:30 PM KAILA Kaufman MD [facility] Dermatology        Today's Diagnoses     Molluscum contagiosum    -  1      Care Instructions    - start to use urea 40% cream to spot on face and groin once nightly.          Follow-ups after your visit        Follow-up notes from your care team     Return in about 4 weeks (around 12/6/2018).      Your next 10 appointments already scheduled     Nov 08, 2018  6:40 PM CST   MyChart Sports Medicine Return with Glenroy Uriarte MD   [facility] [place] ([facility])    [address]  [place] [id number] [phone]            Nov 14, 2018  2:00 PM CST   FULL PULMONARY FUNCTION with  PFL C   [facility] Pulmonary Function Testing ([facility])    [address]  3rd Floor  [facility] [address] [phone]            Nov 14, 2018  3:30 PM CST   (Arrive by 3:15 PM)   New Patient Visit with Gelacio Jimenez MD   [facility] Heart Care ([facility])    [address]  [address]  [facility] [address] [phone]            Dec 17, 2018  2:15 PM CST   (Arrive by 2:00 PM)   Return Visit with KAILA Kaufman MD   [facility] Dermatology ([facility])    [address]  3rd Floor  [facility] [address] [phone]            Feb 07, 2019  4:35 PM CST   (Arrive by 4:05 PM)   Return Kidney Transplant with  Kidney/Pancreas Recipient 2   [facility] Nephrology ([facility])    [address]  [address]  [facility] [address] [phone]              Who to contact     Please call your clinic at [phone] to:    Ask questions about your health    Make or cancel  appointments    Discuss your medicines    Learn about your test results    Speak to your doctor            Additional Information About Your Visit        [employer] Information     [employer] gives you secure access to your electronic health record. If you see a primary care provider, you can also send messages to your care team and make appointments. If you have questions, please call your primary care clinic.  If you do not have a primary care provider, please call [phone] and they will assist you.      [employer] is an electronic gateway that provides easy, online access to your medical records. With [employer], you can request a clinic appointment, read your test results, renew a prescription or communicate with your care team.     To access your existing account, please contact your [facility] or call [phone] for assistance.        Care EveryWhere ID     This is your Care EveryWhere ID. This could be used by other organizations to access your [facility] medical records  [id number]         Blood Pressure from Last 3 Encounters:   11/03/18 (!) 148/98   11/01/18 129/88   10/28/18 (!) 129/95    Weight from Last 3 Encounters:   11/03/18 80.7 kg (178 lb)   11/01/18 79.7 kg (175 lb 9.6 oz)   10/26/18 82.1 kg (181 lb)              Today, you had the following     No orders found for display         Today's Medication Changes          These changes are accurate as of 11/8/18  2:57 PM.  If you have any questions, ask your nurse or doctor.               Start taking these medicines.        Dose/Directions    Urea 40 % Crea   Used for:  Molluscum contagiosum   Started by:  KAILA Kaufman MD        Apply small amount of cream to spots on face and groin.   Quantity:  85 g   Refills:  0            Where to get your medicines      These medications were sent to [id number] - [place], [place] - [address] AT [facility] [address], [facility]  [address]     Phone:  [phone]     Urea 40 % Crea                Primary Care Provider Office Phone # Fax #    Mariel Baltazar Diane Mares [phone] [address] [facility] [address]        Equal Access to Services     [facility]: Hadii aad ku hadasho Soomaali, waaxda luqadaha, qaybta kaalmada adeegyada, waxay sulyin hayaan adeosvaldo alexandrefernando lagenet . So [facility] [phone].    ATENCIÓN: Si habla español, tiene a ward disposición servicios gratuitos de asistencia lingüística. Joshua al [phone].    We comply with applicable federal civil rights laws and [place] laws. We do not discriminate on the basis of race, color, national origin, age, disability, sex, sexual orientation, or gender identity.            Thank you!     Thank you for choosing [facility] DERMATOLOGY  for your care. Our goal is always to provide you with excellent care. Hearing back from our patients is one way we can continue to improve our services. Please take a few minutes to complete the written survey that you may receive in the mail after your visit with us. Thank you!             Your Updated Medication List - Protect others around you: Learn how to safely use, store and throw away your medicines at www.disposemymeds.org.          This list is accurate as of 11/8/18  2:57 PM.  Always use your most recent med list.                   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, unspecified site       amLODIPine 5 MG tablet    NORVASC    90 tablet    Take 1 tablet (5 mg) by mouth daily    Benign essential hypertension       carvedilol 25 MG tablet    COREG    60 tablet    Take 0.5 tablets (12.5 mg) by mouth 2 times daily    DEIDRA (acute kidney injury) (H)       furosemide 20 MG tablet    LASIX    180 tablet    Take 1 tablet (20 mg) by mouth 2 times  daily    DEIDRA (acute kidney injury) (H)       levofloxacin 500 MG tablet    LEVAQUIN    7 tablet    Take 1 tablet (500 mg) by mouth every other day    Escherichia coli septicemia (H)       mycophenolate 250 MG capsule    GENERIC EQUIVALENT    120 capsule    Take 2 capsules (500 mg) by mouth 2 times daily    Kidney transplanted       nitazoxanide 500 MG tablet    ALINIA    24 tablet    Take 1 tablet (500 mg) by mouth every 12 hours for 12 days    Gastroenteritis due to norovirus       nystatin 816009 UNIT/ML suspension    MYCOSTATIN    80 mL    Swish and swallow 2 mLs (200,000 Units) in mouth 4 times daily for 10 days    Thrush       omeprazole 20 MG CR capsule    priLOSEC    90 capsule    Take 1 capsule (20 mg) by mouth daily    Kidney replaced by transplant       predniSONE 10 MG tablet    DELTASONE    30 tablet    Take 4 tabs (40 mg) by mouth daily x 3 days, 2 tabs (20 mg) daily x 3 days, 1 tab (10 mg) daily x 3 days, then 1/2 tab (5 mg) -- continue.    Kidney replaced by transplant       sodium bicarbonate 650 MG tablet     180 tablet    Take 2 tablets (1,300 mg) by mouth 3 times daily    Encounter for long-term (current) use of high-risk medication, Kidney replaced by transplant, Metabolic acidosis       sulfamethoxazole-trimethoprim 400-80 MG per tablet    BACTRIM/SEPTRA    45 tablet    Take 1 tablet by mouth every other day    Kidney transplanted       tacrolimus 1 MG capsule    GENERIC EQUIVALENT    120 capsule    Take 2 capsules (2 mg) by mouth 2 times daily    Kidney transplant recipient, Long-term use of immunosuppressant medication       Urea 40 % Crea     85 g    Apply small amount of cream to spots on face and groin.    Molluscum contagiosum

## 2018-11-08 NOTE — TELEPHONE ENCOUNTER
Pt request for time off of work OK'd by Dr Murillo. Letter sent to pt via [employer]. Call placed to pt, no answer.  Left message letting pt know we sent letter to him for his work via [employer]. Pt encouraged to call with any questions or concerns.

## 2018-11-08 NOTE — PROGRESS NOTES
[facility] Dermatology Note      Dermatology Problem List:  1.Hx of kidney/pancreas transplant in 2011 on immunosuppression with tacrolimus, MMF, prednisone    2. Molluscum contagiosum: numerous on face, groin and penis   - treated 5 spots on 11/8/2018 with LN2,    - urea 40% cream to spots nightly    Encounter Date: Nov 8, 2018    CC:   Chief Complaint   Patient presents with     Skin Check     full body skin exam, primarily concerned about face         History of Present Illness:  Mr. Rashad Ortiz is a 43 year old male who presents as a referral from Dr. Cannon for evaluation of numerous spots on his face, groin, and penis. His PMHx is signficant for kindey/pancreas transplant in 2011 and he is on tacrolimus, MMF, and prednisone. He reports that roughly 6 months ago he developed bumps on his face, groin, and penis at roughly the same time. These spots are not painful or itchy. He has been told that he has folliculitis. He is concerned that he may have a herpes infection. He has never had bumps like this before. He has no personal history of skin cancer and no family history of melanoma.     Past Medical History:   Patient Active Problem List   Diagnosis     Kidney replaced by transplant     High risk medications (not anticoagulants) long-term use     Hypertension goal BP (blood pressure) < 140/90     GERD (gastroesophageal reflux disease)     CARDIOVASCULAR SCREENING; LDL GOAL LESS THAN 160     Gout     Creatinine elevation     Antibody mediated rejection of kidney transplant     Medical non-compliance     Chronic kidney disease, stage 4, severely decreased GFR (H)     Herpes simplex infection of penis     Norovirus     Escherichia coli septicemia (H)     Pyelonephritis of transplanted kidney     HTN, kidney transplant related     Metabolic acidosis     Chronic kidney disease, stage V (H)     Immunosuppression (H)     Past Medical History:   Diagnosis Date     Chronic kidney disease, stage  4, severely decreased GFR (H)      Gastro-oesophageal reflux disease      Gout      Hypertension      Medical non-compliance      Pulmonary nodules      Steroid long-term use      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       Social History:  Patient  reports that he quit smoking about 20 months ago. His smoking use included Cigarettes. He has never used smokeless tobacco. He reports that he drinks about 2.5 oz of alcohol per week  He reports that he does not use illicit drugs.    Family History:  Family History   Problem Relation Age of Onset     Hypertension Mother        Medications:  Current Outpatient Prescriptions   Medication Sig Dispense Refill     allopurinol (ZYLOPRIM) 100 MG tablet Take 2 tablets (200 mg) by mouth daily 60 tablet 11     amLODIPine (NORVASC) 5 MG tablet Take 1 tablet (5 mg) by mouth daily 90 tablet 3     carvedilol (COREG) 25 MG tablet Take 0.5 tablets (12.5 mg) by mouth 2 times daily 60 tablet 11     furosemide (LASIX) 20 MG tablet Take 1 tablet (20 mg) by mouth 2 times daily 180 tablet 1     levofloxacin (LEVAQUIN) 500 MG tablet Take 1 tablet (500 mg) by mouth every other day 7 tablet 0     mycophenolate (GENERIC EQUIVALENT) 250 MG capsule Take 2 capsules (500 mg) by mouth 2 times daily 120 capsule 11     nystatin (MYCOSTATIN) 662636 UNIT/ML suspension Swish and swallow 2 mLs (200,000 Units) in mouth 4 times daily for 10 days 80 mL 0     omeprazole (PRILOSEC) 20 MG capsule Take 1 capsule (20 mg) by mouth daily 90  capsule 1     predniSONE (DELTASONE) 10 MG tablet Take 4 tabs (40 mg) by mouth daily x 3 days, 2 tabs (20 mg) daily x 3 days, 1 tab (10 mg) daily x 3 days, then 1/2 tab (5 mg) -- continue. 30 tablet 0     sodium bicarbonate 650 MG tablet Take 2 tablets (1,300 mg) by mouth 3 times daily 180 tablet 11     sulfamethoxazole-trimethoprim (BACTRIM/SEPTRA) 400-80 MG per tablet Take 1 tablet by mouth every other day 45 tablet 3     tacrolimus (GENERIC EQUIVALENT) 1 MG capsule Take 2 capsules (2 mg) by mouth 2 times daily 120 capsule 11     nitazoxanide (ALINIA) 500 MG tablet Take 1 tablet (500 mg) by mouth every 12 hours for 12 days (Patient not taking: Reported on 11/1/2018) 24 tablet 0        Allergies   Allergen Reactions     Nkda [No Known Drug Allergies]          Review of Systems:  -As per HPI  -Constitutional: The patient denies fatigue, fevers, chills, unintended weight loss, and night sweats.  -HEENT: Patient denies nonhealing oral sores.  -Skin: As above in HPI. No additional skin concerns.    Physical exam:  Vitals: There were no vitals taken for this visit.  GEN: This is a well developed, well-nourished male in no acute distress, in a pleasant mood.    SKIN: Total skin excluding the undergarment areas was performed. The exam included the head/face, neck, both arms, chest, back, abdomen, both legs, digits and/or nails.   Jorge L V skin type  -there are numerous umbilicated papules on the face including the nose, bilateral cheeks, chin, neck as well as on the penis, pubic area and groin  -bilateral pitting edema, L > R  -No other lesions of concern on areas examined.     Impression/Plan:  1. Molluscum contagiosum: explained viral etiology of these papules and that he is at increased risk due to his immunosuppressed status. Discussed different treatment options. Will treat 5 today with LN2 to see how they respond and to see if he develops hypopigmentation due to the LN2, if they respond well without hypopigmentation,  will consider treating more at next visit.     Cryotherapy procedure note: After verbal consent and discussion of risks and benefits including but no limited to dyspigmentation/scar, blister, and pain, 5 on the face was(were) treated with 1-2mm freeze border for 2 cycles with liquid nitrogen. Post cryotherapy instructions were provided.     Urea 40% cream nightly to spots    2. Immunosuppressed status 2/2 kidney/pancreas transplant in 2011 currently on tacrolimus, prednisone, MMF. Discussed increased risk of skin cancers with immunosuppression. No concerning lesions on exam today for skin cancer.     CC Dr. Cannon on close of this encounter.  Follow-up in 1 months, earlier for new or changing lesions.       Dr. Kaufman staffed the patient.    Staff Involved:  Resident(Charlie Martinez)/Staff(as above)    Charlie Martinez MD, PhD  Medicine-Dermatology PGY-3    I talked with and examined Rashad Ortiz and I agree with the assessment and the plan. HAY Kaufman MD.

## 2018-11-08 NOTE — LETTER
[facility] DERMATOLOGY  [address] [address]  Phone: [phone]  Fax: [phone]    November 8, 2018        Rashad Ortiz  [address]  [facility] [address]          To whom it may concern:    RE: Rashad Ortiz    Patient was seen and treated today at our clinic.    Please contact me for questions or concerns.      Sincerely,        Charlie Martinez MD, PhD  Medicine-Dermatology PGY-3

## 2018-11-08 NOTE — LETTER
11/8/2018       RE: Rashad Ortiz  [address]  [place] [address]     Dear Colleague,    Thank you for referring your patient, Rashad Ortiz, to the [facility] DERMATOLOGY at [facility]. Please see a copy of my visit note below.    [facility] Dermatology Note      Dermatology Problem List:  1.Hx of kidney/pancreas transplant in 2011 on immunosuppression with tacrolimus, MMF, prednisone    2. Molluscum contagiosum: numerous on face, groin and penis   - treated 5 spots on 11/8/2018 with LN2,    - urea 40% cream to spots nightly    Encounter Date: Nov 8, 2018    CC:   Chief Complaint   Patient presents with     Skin Check     full body skin exam, primarily concerned about face         History of Present Illness:  Mr. Rashad Ortiz is a 43 year old male who presents as a referral from Dr. Cannon for evaluation of numerous spots on his face, groin, and penis. His PMHx is signficant for kindey/pancreas transplant in 2011 and he is on tacrolimus, MMF, and prednisone. He reports that roughly 6 months ago he developed bumps on his face, groin, and penis at roughly the same time. These spots are not painful or itchy. He has been told that he has folliculitis. He is concerned that he may have a herpes infection. He has never had bumps like this before. He has no personal history of skin cancer and no family history of melanoma.     Past Medical History:   Patient Active Problem List   Diagnosis     Kidney replaced by transplant     High risk medications (not anticoagulants) long-term use     Hypertension goal BP (blood pressure) < 140/90     GERD (gastroesophageal reflux disease)     CARDIOVASCULAR SCREENING; LDL GOAL LESS THAN 160     Gout     Creatinine elevation     Antibody mediated rejection of kidney transplant     Medical non-compliance     Chronic kidney disease, stage 4, severely decreased GFR (H)     Herpes simplex infection of penis      Norovirus     Escherichia coli septicemia (H)     Pyelonephritis of transplanted kidney     HTN, kidney transplant related     Metabolic acidosis     Chronic kidney disease, stage V (H)     Immunosuppression (H)     Past Medical History:   Diagnosis Date     Chronic kidney disease, stage 4, severely decreased GFR (H)      Gastro-oesophageal reflux disease      Gout      Hypertension      Medical non-compliance      Pulmonary nodules      Steroid long-term use      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       Social History:  Patient  reports that he quit smoking about 20 months ago. His smoking use included Cigarettes. He has never used smokeless tobacco. He reports that he drinks about 2.5 oz of alcohol per week  He reports that he does not use illicit drugs.    Family History:  Family History   Problem Relation Age of Onset     Hypertension Mother        Medications:  Current Outpatient Prescriptions   Medication Sig Dispense Refill     allopurinol (ZYLOPRIM) 100 MG tablet Take 2 tablets (200 mg) by mouth daily 60 tablet 11     amLODIPine (NORVASC) 5 MG tablet Take 1 tablet (5 mg) by mouth daily 90 tablet 3     carvedilol (COREG) 25 MG tablet Take 0.5 tablets (12.5 mg) by mouth 2 times daily 60 tablet 11     furosemide (LASIX) 20 MG tablet Take 1 tablet (20 mg) by mouth 2 times daily 180 tablet 1     levofloxacin (LEVAQUIN) 500 MG tablet Take 1 tablet (500 mg) by mouth every other day 7 tablet 0     mycophenolate (GENERIC EQUIVALENT) 250 MG  capsule Take 2 capsules (500 mg) by mouth 2 times daily 120 capsule 11     nystatin (MYCOSTATIN) 745553 UNIT/ML suspension Swish and swallow 2 mLs (200,000 Units) in mouth 4 times daily for 10 days 80 mL 0     omeprazole (PRILOSEC) 20 MG capsule Take 1 capsule (20 mg) by mouth daily 90 capsule 1     predniSONE (DELTASONE) 10 MG tablet Take 4 tabs (40 mg) by mouth daily x 3 days, 2 tabs (20 mg) daily x 3 days, 1 tab (10 mg) daily x 3 days, then 1/2 tab (5 mg) -- continue. 30 tablet 0     sodium bicarbonate 650 MG tablet Take 2 tablets (1,300 mg) by mouth 3 times daily 180 tablet 11     sulfamethoxazole-trimethoprim (BACTRIM/SEPTRA) 400-80 MG per tablet Take 1 tablet by mouth every other day 45 tablet 3     tacrolimus (GENERIC EQUIVALENT) 1 MG capsule Take 2 capsules (2 mg) by mouth 2 times daily 120 capsule 11     nitazoxanide (ALINIA) 500 MG tablet Take 1 tablet (500 mg) by mouth every 12 hours for 12 days (Patient not taking: Reported on 11/1/2018) 24 tablet 0        Allergies   Allergen Reactions     Nkda [No Known Drug Allergies]          Review of Systems:  -As per HPI  -Constitutional: The patient denies fatigue, fevers, chills, unintended weight loss, and night sweats.  -HEENT: Patient denies nonhealing oral sores.  -Skin: As above in HPI. No additional skin concerns.    Physical exam:  Vitals: There were no vitals taken for this visit.  GEN: This is a well developed, well-nourished male in no acute distress, in a pleasant mood.    SKIN: Total skin excluding the undergarment areas was performed. The exam included the head/face, neck, both arms, chest, back, abdomen, both legs, digits and/or nails.   Jorge L V skin type  -there are numerous umbilicated papules on the face including the nose, bilateral cheeks, chin, neck as well as on the penis, pubic area and groin  -bilateral pitting edema, L > R  -No other lesions of concern on areas examined.     Impression/Plan:  1. Molluscum contagiosum: explained viral  etiology of these papules and that he is at increased risk due to his immunosuppressed status. Discussed different treatment options. Will treat 5 today with LN2 to see how they respond and to see if he develops hypopigmentation due to the LN2, if they respond well without hypopigmentation, will consider treating more at next visit.     Cryotherapy procedure note: After verbal consent and discussion of risks and benefits including but no limited to dyspigmentation/scar, blister, and pain, 5 on the face was(were) treated with 1-2mm freeze border for 2 cycles with liquid nitrogen. Post cryotherapy instructions were provided.     Urea 40% cream nightly to spots    2. Immunosuppressed status 2/2 kidney/pancreas transplant in 2011 currently on tacrolimus, prednisone, MMF. Discussed increased risk of skin cancers with immunosuppression. No concerning lesions on exam today for skin cancer.     CC Dr. Cannon on close of this encounter.  Follow-up in 1 months, earlier for new or changing lesions.       Dr. Kaufman staffed the patient.    Staff Involved:  Resident(Charlie Martinez)/Staff(as above)    Charlie Martinez MD, PhD  Medicine-Dermatology PGY-3    I talked with and examined Rashad Ortiz and I agree with the assessment and the plan.       KAILA Kaufman MD

## 2018-11-10 DIAGNOSIS — Z94.0 KIDNEY TRANSPLANTED: ICD-10-CM

## 2018-11-10 LAB
ERYTHROCYTE [DISTWIDTH] IN BLOOD BY AUTOMATED COUNT: 18.1 % (ref 10–15)
HCT VFR BLD AUTO: 24.6 % (ref 40–53)
HGB BLD-MCNC: 7.9 G/DL (ref 13.3–17.7)
MCH RBC QN AUTO: 27 PG (ref 26.5–33)
MCHC RBC AUTO-ENTMCNC: 32.1 G/DL (ref 31.5–36.5)
MCV RBC AUTO: 84 FL (ref 78–100)
PLATELET # BLD AUTO: 318 10E9/L (ref 150–450)
RBC # BLD AUTO: 2.93 10E12/L (ref 4.4–5.9)
WBC # BLD AUTO: 10.9 10E9/L (ref 4–11)

## 2018-11-10 PROCEDURE — 80197 ASSAY OF TACROLIMUS: CPT | Performed by: INTERNAL MEDICINE

## 2018-11-10 PROCEDURE — 36415 COLL VENOUS BLD VENIPUNCTURE: CPT | Performed by: INTERNAL MEDICINE

## 2018-11-10 PROCEDURE — 80048 BASIC METABOLIC PNL TOTAL CA: CPT | Performed by: INTERNAL MEDICINE

## 2018-11-10 PROCEDURE — 85027 COMPLETE CBC AUTOMATED: CPT | Performed by: INTERNAL MEDICINE

## 2018-11-11 LAB
TACROLIMUS BLD-MCNC: 4.7 UG/L (ref 5–15)
TME LAST DOSE: ABNORMAL H

## 2018-11-12 ENCOUNTER — RADIANT APPOINTMENT (OUTPATIENT)
Dept: GENERAL RADIOLOGY | Facility: CLINIC | Age: 43
End: 2018-11-12
Attending: FAMILY MEDICINE
Payer: COMMERCIAL

## 2018-11-12 ENCOUNTER — MYC REFILL (OUTPATIENT)
Dept: DERMATOLOGY | Facility: CLINIC | Age: 43
End: 2018-11-12

## 2018-11-12 ENCOUNTER — OFFICE VISIT (OUTPATIENT)
Dept: FAMILY MEDICINE | Facility: CLINIC | Age: 43
End: 2018-11-12
Payer: COMMERCIAL

## 2018-11-12 VITALS
WEIGHT: 176 LBS | BODY MASS INDEX: 27.62 KG/M2 | TEMPERATURE: 98.2 F | HEIGHT: 67 IN | HEART RATE: 83 BPM | SYSTOLIC BLOOD PRESSURE: 132 MMHG | RESPIRATION RATE: 16 BRPM | DIASTOLIC BLOOD PRESSURE: 88 MMHG | OXYGEN SATURATION: 100 %

## 2018-11-12 DIAGNOSIS — D84.9 IMMUNOSUPPRESSION (H): ICD-10-CM

## 2018-11-12 DIAGNOSIS — B37.0 THRUSH: Primary | ICD-10-CM

## 2018-11-12 DIAGNOSIS — B08.1 MOLLUSCUM CONTAGIOSUM: ICD-10-CM

## 2018-11-12 DIAGNOSIS — M79.671 RIGHT FOOT PAIN: ICD-10-CM

## 2018-11-12 LAB
ANION GAP SERPL CALCULATED.3IONS-SCNC: 9 MMOL/L (ref 3–14)
BUN SERPL-MCNC: 37 MG/DL (ref 7–30)
CALCIUM SERPL-MCNC: 8.4 MG/DL (ref 8.5–10.1)
CHLORIDE SERPL-SCNC: 112 MMOL/L (ref 94–109)
CO2 SERPL-SCNC: 24 MMOL/L (ref 20–32)
CREAT SERPL-MCNC: 5.09 MG/DL (ref 0.66–1.25)
GFR SERPL CREATININE-BSD FRML MDRD: 13 ML/MIN/1.7M2
GLUCOSE SERPL-MCNC: 81 MG/DL (ref 70–99)
POTASSIUM SERPL-SCNC: 4.2 MMOL/L (ref 3.4–5.3)
SODIUM SERPL-SCNC: 145 MMOL/L (ref 133–144)

## 2018-11-12 PROCEDURE — 73630 X-RAY EXAM OF FOOT: CPT | Mod: RT

## 2018-11-12 PROCEDURE — 99214 OFFICE O/P EST MOD 30 MIN: CPT | Performed by: FAMILY MEDICINE

## 2018-11-12 RX ORDER — NYSTATIN 100000/ML
400000 SUSPENSION, ORAL (FINAL DOSE FORM) ORAL 4 TIMES DAILY
Qty: 300 ML | Refills: 0 | Status: SHIPPED | OUTPATIENT
Start: 2018-11-12 | End: 2018-11-22

## 2018-11-12 NOTE — MR AVS SNAPSHOT
After Visit Summary   11/12/2018    Rashad Ortiz    MRN: [id number]           Patient Information     Date Of Birth          1975        Visit Information        Provider Department      11/12/2018 6:20 PM Mariel Garcia MD [facility]        Today's Diagnoses     Thrush    -  1    Right foot pain        Immunosuppression (H)          Care Instructions    At [facility], we strive to deliver an exceptional experience to you, every time we see you.  If you receive a survey in the mail, please send us back your thoughts. We really do value your feedback.    Your care team:                            Family Medicine Internal Medicine   MD Juanito Lynne MD Shantel Branch-Fleming, MD Katya Georgiev PA-C Megan Hill, APRN ALYSSA Harrington MD Pediatrics   Greg Fink, GRANT Balderrama, MD Leanna Hurley APRN CNP   MD Kelly Tipton MD Deborah Mielke, MD Kim Thein, [facility]      Clinic hours: Monday - Thursday 7 am-7 pm; Fridays 7 am-5 pm.   Urgent care: Monday - Friday 11 am-9 pm; Saturday and Sunday 9 am-5 pm.  Pharmacy : Monday -Thursday 8 am-8 pm; Friday 8 am-6 pm; Saturday and Sunday 9 am-5 pm.     Clinic: [phone]   Pharmacy: [phone]               Follow-ups after your visit        Follow-up notes from your care team     Return in about 2 weeks (around 11/26/2018).      Your next 10 appointments already scheduled     Nov 14, 2018  2:00 PM CST   FULL PULMONARY FUNCTION with  PFL [facility] Pulmonary Function Testing ([facility])    [address]  3rd Floor  [place] [address] [phone]            Nov 14, 2018  3:30 PM CST   (Arrive by 3:15 PM)   New Patient Visit with Gelacio Jimenez MD   [facility] ([facility])    [address]  [address]  [place] [address]    [phone]            Dec 17, 2018  2:15 PM CST   (Arrive by 2:00 PM)   Return Visit with KAILA Kaufman MD   [facility] Dermatology ([facility])    [address]  3rd Floor  [place] [facility] [place] [id number] [phone]            Feb 07, 2019  4:35 PM CST   (Arrive by 4:05 PM)   Return Kidney Transplant with  Kidney/Pancreas Recipient 2   [facility] Nephrology ([facility])    [address]  [address]  [place] [id number] [phone]              Future tests that were ordered for you today     Open Future Orders        Priority Expected Expires Ordered    XR Foot Right G/E 3 Views Routine 11/12/2018 11/12/2019 11/12/2018            Who to contact     If you have questions or need follow up information about today's clinic visit or your schedule please contact [facility] directly at [phone].  Normal or non-critical lab and imaging results will be communicated to you by [employer], letter or phone within 4 business days after the clinic has received the results. If you do not hear from us within 7 days, please contact the clinic through [employer] or phone. If you have a critical or abnormal lab result, we will notify you by phone as soon as possible.  Submit refill requests through Quench or call your pharmacy and they will forward the refill request to us. Please allow 3 business days for your refill to be completed.          Additional Information About Your Visit        [employer] Information     Quench gives you secure access to your electronic health record. If you see a primary care provider, you can also send messages to your care team and make appointments. If you have questions, please call your primary care clinic.  If you do not have a primary care provider, please call [phone] and they will assist you.        Care EveryWhere ID     This is your Care EveryWhere ID. This could be used by other organizations to  "access your [place] medical records  [id number]        Your Vitals Were     Pulse Temperature Respirations Height Pulse Oximetry BMI (Body Mass Index)    83 98.2  F (36.8  C) (Oral) 16 5' 6.75\" (1.695 m) 100% 27.77 kg/m2       Blood Pressure from Last 3 Encounters:   11/12/18 132/88   11/03/18 (!) 148/98   11/01/18 129/88    Weight from Last 3 Encounters:   11/12/18 176 lb (79.8 kg)   11/03/18 178 lb (80.7 kg)   11/01/18 175 lb 9.6 oz (79.7 kg)                 Today's Medication Changes          These changes are accurate as of 11/12/18  6:49 PM.  If you have any questions, ask your nurse or doctor.               These medicines have changed or have updated prescriptions.        Dose/Directions    nystatin 809420 UNIT/ML suspension   Commonly known as:  MYCOSTATIN   This may have changed:  how much to take   Used for:  Thrush   Changed by:  Mariel Garcia MD        Dose:  [id number] Units   Swish and swallow 4 mLs (400,000 Units) in mouth 4 times daily for 10 days   Quantity:  300 mL   Refills:  0            Where to get your medicines      These medications were sent to [facility] - [place], [place] - [address] AT [facility] [address], [place] [facility] [address]     Phone:  [phone]     nystatin [id number] UNIT/ML suspension                Primary Care Provider Office Phone # Fax #    Mariel Mares [phone]       [address]  [place] [facility] [address]        Equal Access to Services     [facility] MATT AH: Hadii song michelle Sojose, waaxda luqadaha, qaybta kaalmada adeegyada, ronaldo donahue. So [facility] [phone].    ATENCIÓN: Si habla español, tiene a ward disposición servicios gratuitos de asistencia lingüística. Llame al [phone].    We comply with applicable federal civil rights laws and [place] laws. We do not discriminate on the basis of race, color, national origin, age, " disability, sex, sexual orientation, or gender identity.            Thank you!     Thank you for choosing [facility]  for your care. Our goal is always to provide you with excellent care. Hearing back from our patients is one way we can continue to improve our services. Please take a few minutes to complete the written survey that you may receive in the mail after your visit with us. Thank you!             Your Updated Medication List - Protect others around you: Learn how to safely use, store and throw away your medicines at www.disposemymeds.org.          This list is accurate as of 11/12/18  6:49 PM.  Always use your most recent med list.                   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, unspecified site       amLODIPine 5 MG tablet    NORVASC    90 tablet    Take 1 tablet (5 mg) by mouth daily    Benign essential hypertension       carvedilol 25 MG tablet    COREG    60 tablet    Take 0.5 tablets (12.5 mg) by mouth 2 times daily    DEIDRA (acute kidney injury) (H)       furosemide 20 MG tablet    LASIX    180 tablet    Take 1 tablet (20 mg) by mouth 2 times daily    DEIDRA (acute kidney injury) (H)       levofloxacin 500 MG tablet    LEVAQUIN    7 tablet    Take 1 tablet (500 mg) by mouth every other day    Escherichia coli septicemia (H)       mycophenolate 250 MG capsule    GENERIC EQUIVALENT    120 capsule    Take 2 capsules (500 mg) by mouth 2 times daily    Kidney transplanted       nystatin [id number] UNIT/ML suspension    MYCOSTATIN    300 mL    Swish and swallow 4 mLs (400,000 Units) in mouth 4 times daily for 10 days    Thrush       omeprazole 20 MG CR capsule    priLOSEC    90 capsule    Take 1 capsule (20 mg) by mouth daily    Kidney replaced by transplant       predniSONE 10 MG tablet    DELTASONE    30 tablet    Take 4  tabs (40 mg) by mouth daily x 3 days, 2 tabs (20 mg) daily x 3 days, 1 tab (10 mg) daily x 3 days, then 1/2 tab (5 mg) -- continue.    Kidney replaced by transplant       sodium bicarbonate 650 MG tablet     180 tablet    Take 2 tablets (1,300 mg) by mouth 3 times daily    Encounter for long-term (current) use of high-risk medication, Kidney replaced by transplant, Metabolic acidosis       sulfamethoxazole-trimethoprim 400-80 MG per tablet    BACTRIM/SEPTRA    45 tablet    Take 1 tablet by mouth every other day    Kidney transplanted       tacrolimus 1 MG capsule    GENERIC EQUIVALENT    120 capsule    Take 2 capsules (2 mg) by mouth 2 times daily    Kidney transplant recipient, Long-term use of immunosuppressant medication       Urea 40 % Crea     85 g    Apply small amount of cream to spots on face and groin.    Molluscum contagiosum

## 2018-11-12 NOTE — TELEPHONE ENCOUNTER
FUTURE VISIT INFORMATION:    Date: 11/14/18    Time: 1530    Location:  Cardiology  REFERRAL INFORMATION:    Referring provider:  Transplant    Referring providers clinic:      Reason for visit/diagnosis:    Pre Kidney Transplant      All records in epic.

## 2018-11-13 ENCOUNTER — OFFICE VISIT (OUTPATIENT)
Dept: ORTHOPEDICS | Facility: CLINIC | Age: 43
End: 2018-11-13
Payer: COMMERCIAL

## 2018-11-13 VITALS
WEIGHT: 176 LBS | BODY MASS INDEX: 27.62 KG/M2 | SYSTOLIC BLOOD PRESSURE: 132 MMHG | HEIGHT: 67 IN | DIASTOLIC BLOOD PRESSURE: 88 MMHG

## 2018-11-13 DIAGNOSIS — Z94.0 KIDNEY REPLACED BY TRANSPLANT: ICD-10-CM

## 2018-11-13 DIAGNOSIS — M10.9 ACUTE GOUT INVOLVING TOE OF RIGHT FOOT, UNSPECIFIED CAUSE: Primary | ICD-10-CM

## 2018-11-13 RX ORDER — HYDROCODONE BITARTRATE AND ACETAMINOPHEN 5; 325 MG/1; MG/1
1-2 TABLET ORAL EVERY 6 HOURS PRN
Qty: 40 TABLET | Refills: 0 | Status: SHIPPED | OUTPATIENT
Start: 2018-11-13 | End: 2018-12-12

## 2018-11-13 RX ORDER — UREA 40 %
CREAM (GRAM) TOPICAL
Qty: 85 G | Refills: 0 | Status: SHIPPED | OUTPATIENT
Start: 2018-11-13 | End: 2019-06-13

## 2018-11-13 NOTE — PROGRESS NOTES
Mr. rOtiz,    Your xray did show arthritis of the first toe.    Please contact the clinic if you have additional questions.  Thank you.    Sincerely,    Mariel Mares

## 2018-11-13 NOTE — LETTER
11/13/2018      RE: Rashad Ortiz  [address]  [place] [address]        Subjective:   Rashad Ortiz is a 43 year old male who complains of right foot pain, with no injury, that has been bothering him for the last 3 days. He states that it is a constant, throbbing pain.  He states there is been no trauma to the area.  He has had no fevers, chills, or other constitutional symptoms.  He has had no history of overuse.  He states pain is at the first MTP joint and can radiate up along the first ray.  He has some discomfort in the ankle but no swelling or erythema.  He needs to continue ambulating to and from work.  He is interested in the possibility of a first MTP joint corticosteroid injection.  He states that he has significant pretibial and dorsal foot edema is status quo since he has had his renal transplant.    Background:   Date of injury: None   Duration of symptoms: 3 days  Mechanism of Injury: Acute; Unknown   Aggravating factors: Constant, throbbing pain  Relieving Factors: Nothing  Prior Evaluation: Prior Physician Evalutation and X-rays    PAST MEDICAL, SOCIAL, SURGICAL AND FAMILY HISTORY: He  has a past medical history of Chronic kidney disease, stage 4, severely decreased GFR (H); Gastro-oesophageal reflux disease; Gout; Hypertension; Medical non-compliance; Pulmonary nodules; and Steroid long-term use. He also has no past medical history of PONV (postoperative nausea and vomiting).  He  has a past surgical history that includes transplant (01/13/2011); av fistula or graft arterial; [place] fistula arteriovenous upper extremity (12/20/2011); and Percutaneous biopsy kidney (Right, 2/28/2017).  His family history includes Hypertension in his mother.  He reports that he quit smoking about 20 months ago. His smoking use included Cigarettes. He has never used smokeless tobacco. He reports that he drinks about 2.5 oz of alcohol per week  He reports that he does not use illicit  "drugs.    ALLERGIES: He is allergic to nkda [no known drug allergies].    CURRENT MEDICATIONS: He has a current medication list which includes the following prescription(s): allopurinol, amlodipine, carvedilol, furosemide, levofloxacin, mycophenolate, nystatin, omeprazole, prednisone, sodium bicarbonate, sulfamethoxazole-trimethoprim, tacrolimus, and urea.     REVIEW OF SYSTEMS: 10 point review of systems is negative except as noted above.     Exam:   /88  Ht 5' 6.75\" (1.695 m)  Wt 176 lb (79.8 kg)  BMI 27.77 kg/m2      CONSTITUTIONAL: healthy, alert and no distress  SKIN: no suspicious lesions or rashes  GAIT: antalgic  NEUROLOGIC: Non-focal  PSYCHIATRIC: mentation appears normal.    MUSCULOSKELETAL:   RIGHT ANKLE/FOOT: 3+ pretibial and dorsal foot pitting edema. Marked TTP over the 1st MTPJ and will not allow manipulation of the MTPJ. No discrete bony tenderness per se along the 1st metatarsal. Ankle without effusion. No warmth or erythema of the 1st MTPJ or the ankle joint.        Assessment/Plan:   (M10.9) Acute gout involving toe of right foot, unspecified cause  (primary encounter diagnosis)  Comment: Discussed relative rest. Placed in a boot in order to generally immobilize the great toe. [place] for pain and patient is without history of prior substance abuse. Will arrange for image guided CSI into R 1st MTPJ due to overall tenderness and pain today. He states that his LE swelling, 3+ pitting edema, is unchanged over the past several months and this is not a factor in his acute change in pain.  Plan: HYDROcodone-acetaminophen (NORCO) 5-325 MG per         tablet          (Z94.0) Kidney replaced by transplant              Herrera Fair MD    "

## 2018-11-13 NOTE — PATIENT INSTRUCTIONS
At [facility], we strive to deliver an exceptional experience to you, every time we see you.  If you receive a survey in the mail, please send us back your thoughts. We really do value your feedback.    Your care team:                            Family Medicine Internal Medicine   MD Juanito Lynne MD Shantel Branch-Fleming, MD Katya Georgiev PA-C Megan Hill, APRN ALYSSA Harrington MD Pediatrics   Greg Fink, GRANT Balderrama, MD Leanna Hurley APRN CNP   MD Kelly Tipton MD Deborah Mielke, MD Sarah Schmitz, APRN [facility]      Clinic hours: Monday - Thursday 7 am-7 pm; Fridays 7 am-5 pm.   Urgent care: Monday - Friday 11 am-9 pm; Saturday and Sunday 9 am-5 pm.  Pharmacy : Monday -Thursday 8 am-8 pm; Friday 8 am-6 pm; Saturday and Sunday 9 am-5 pm.     Clinic: [phone]   Pharmacy: [phone]

## 2018-11-13 NOTE — MR AVS SNAPSHOT
After Visit Summary   11/13/2018    Rashad Ortiz    MRN: [id number]           Patient Information     Date Of Birth          1975        Visit Information        Provider Department      11/13/2018 1:00 PM Herrera Fair MD [facility]        Today's Diagnoses     Acute gout involving toe of right foot, unspecified cause    -  1    Kidney replaced by transplant           Follow-ups after your visit        Your next 10 appointments already scheduled     Nov 14, 2018  2:00 PM CST   FULL PULMONARY FUNCTION with  PFL [facility]monary Function Testing ([facility])    [address] [place] [address]  3rd Floor  [facility] [id number] [phone]            Nov 14, 2018  3:30 PM CST   (Arrive by 3:15 PM)   New Patient Visit with Gelacio Jimenez MD   [facility] ([facility])    [address] [place] [address]  [address]  [facility] [id number] [phone]            Nov 15, 2018  8:30 AM CST   (Arrive by 8:15 AM)   Procedure with Carlos Arias MD   [facility] ([facility])    [address] [place] [address]  5th Floor  [facility] [id number] [phone]            Dec 17, 2018  2:15 PM CST   (Arrive by 2:00 PM)   Return Visit with KAILA Kaufman MD   [facility] ([facility])    [address] [place] [address]  3rd [facility] [id number] [phone]            Feb 07, 2019  4:35 PM CST   (Arrive by 4:05 PM)   Return Kidney Transplant with  Kidney/Pancreas Recipient 2   [facility] ([facility])    [address] [place] [address]  [address]  [facility] [id number] [phone]              Who to contact     Please call your clinic at [phone] to:    Ask questions about your health    Make or cancel appointments    Discuss your medicines    Learn about your test results    Speak to your doctor            Additional  "Information About Your Visit        Netscapehart Information     Recorded Future gives you secure access to your electronic health record. If you see a primary care provider, you can also send messages to your care team and make appointments. If you have questions, please call your primary care clinic.  If you do not have a primary care provider, please call [phone] and they will assist you.      [employer] Future is an electronic gateway that provides easy, online access to your medical records. With [employer], you can request a clinic appointment, read your test results, renew a prescription or communicate with your care team.     To access your existing account, please contact your [facility] or call [phone] for assistance.        Care EveryWhere ID     This is your Care EveryWhere ID. This could be used by other organizations to access your [facility] medical records  [id number]        Your Vitals Were     Height BMI (Body Mass Index)                5' 6.75\" (1.695 m) 27.77 kg/m2           Blood Pressure from Last 3 Encounters:   11/13/18 132/88   11/12/18 132/88   11/03/18 (!) 148/98    Weight from Last 3 Encounters:   11/13/18 176 lb (79.8 kg)   11/12/18 176 lb (79.8 kg)   11/03/18 178 lb (80.7 kg)              Today, you had the following     No orders found for display         Today's Medication Changes          These changes are accurate as of 11/13/18  1:59 PM.  If you have any questions, ask your nurse or doctor.               Start taking these medicines.        Dose/Directions    HYDROcodone-acetaminophen 5-325 MG per tablet   Commonly known as:  NORCO   Used for:  Acute gout involving toe of right foot, unspecified cause   Started by:  Herrera Fair MD        Dose:  1-2 tablet   Take 1-2 tablets by mouth every 6 hours as needed for pain   Quantity:  40 tablet   Refills:  0            Where to get your medicines      Some of these will need a paper prescription and others can be " bought over the counter.  Ask your nurse if you have questions.     Bring a paper prescription for each of these medications     HYDROcodone-acetaminophen 5-325 MG per tablet               Information about OPIOIDS     PRESCRIPTION OPIOIDS: WHAT YOU NEED TO KNOW   We gave you an opioid (narcotic) pain medicine. It is important to manage your pain, but opioids are not always the best choice. You should first try all the other options your care team gave you. Take this medicine for as short a time (and as few doses) as possible.    Some activities can increase your pain, such as bandage changes or therapy sessions. It may help to take your pain medicine 30 to 60 minutes before these activities. Reduce your stress by getting enough sleep, working on hobbies you enjoy and practicing relaxation or meditation. Talk to your care team about ways to manage your pain beyond prescription opioids.    These medicines have risks:    DO NOT drive when on new or higher doses of pain medicine. These medicines can affect your alertness and reaction times, and you could be arrested for driving under the influence (DUI). If you need to use opioids long-term, talk to your care team about driving.    DO NOT operate heavy machinery    DO NOT do any other dangerous activities while taking these medicines.    DO NOT drink any alcohol while taking these medicines.     If the opioid prescribed includes acetaminophen, DO NOT take with any other medicines that contain acetaminophen. Read all labels carefully. Look for the word  acetaminophen  or  Tylenol.  Ask your pharmacist if you have questions or are unsure.    You can get addicted to pain medicines, especially if you have a history of addiction (chemical, alcohol or substance dependence). Talk to your care team about ways to reduce this risk.    All opioids tend to cause constipation. Drink plenty of water and eat foods that have a lot of fiber, such as fruits, vegetables, prune juice,  apple juice and high-fiber cereal. Take a laxative (Miralax, milk of magnesia, Colace, Senna) if you don t move your bowels at least every other day. Other side effects include upset stomach, sleepiness, dizziness, throwing up, tolerance (needing more of the medicine to have the same effect), physical dependence and slowed breathing.    Store your pills in a secure place, locked if possible. We will not replace any lost or stolen medicine. If you don t finish your medicine, please throw away (dispose) as directed by your pharmacist. The [place] Pollution Control Agency has more information about safe disposal: [email or web address]://[email or web address].[email or web address]/living-green/managing-unwanted-medications         Primary Care Provider Office Phone # Fax #    Mariel Delaney Mares [phone]       [address]  [facility] [address]        Equal Access to Services     [facility] : Hadii song nelson hadasho Soomaali, waaxda luqadaha, qaybta kaalmada adeegyada, ronaldo spencer . So [facility] [phone].    ATENCIÓN: Si habla español, tiene a ward disposición servicios gratuitos de asistencia lingüística. Joshua al [phone].    We comply with applicable federal civil rights laws and [place] laws. We do not discriminate on the basis of race, color, national origin, age, disability, sex, sexual orientation, or gender identity.            Thank you!     Thank you for choosing [facility]  for your care. Our goal is always to provide you with excellent care. Hearing back from our patients is one way we can continue to improve our services. Please take a few minutes to complete the written survey that you may receive in the mail after your visit with us. Thank you!             Your Updated Medication List - Protect others around you: Learn how to safely use, store and throw away your medicines at www.disposemymeds.org.          This list is accurate as of 11/13/18  1:59 PM.   Always use your most recent med list.                   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, unspecified site       amLODIPine 5 MG tablet    NORVASC    90 tablet    Take 1 tablet (5 mg) by mouth daily    Benign essential hypertension       carvedilol 25 MG tablet    COREG    60 tablet    Take 0.5 tablets (12.5 mg) by mouth 2 times daily    DEIDRA (acute kidney injury) (H)       furosemide 20 MG tablet    LASIX    180 tablet    Take 1 tablet (20 mg) by mouth 2 times daily    DEIDRA (acute kidney injury) (H)       HYDROcodone-acetaminophen 5-325 MG per tablet    NORCO    40 tablet    Take 1-2 tablets by mouth every 6 hours as needed for pain    Acute gout involving toe of right foot, unspecified cause       levofloxacin 500 MG tablet    LEVAQUIN    7 tablet    Take 1 tablet (500 mg) by mouth every other day    Escherichia coli septicemia (H)       mycophenolate 250 MG capsule    GENERIC EQUIVALENT    120 capsule    Take 2 capsules (500 mg) by mouth 2 times daily    Kidney transplanted       nystatin 769737 UNIT/ML suspension    MYCOSTATIN    300 mL    Swish and swallow 4 mLs (400,000 Units) in mouth 4 times daily for 10 days    Thrush       omeprazole 20 MG CR capsule    priLOSEC    90 capsule    Take 1 capsule (20 mg) by mouth daily    Kidney replaced by transplant       predniSONE 10 MG tablet    DELTASONE    30 tablet    Take 4 tabs (40 mg) by mouth daily x 3 days, 2 tabs (20 mg) daily x 3 days, 1 tab (10 mg) daily x 3 days, then 1/2 tab (5 mg) -- continue.    Kidney replaced by transplant       sodium bicarbonate 650 MG tablet     180 tablet    Take 2 tablets (1,300 mg) by mouth 3 times daily    Encounter for long-term (current) use of high-risk medication, Kidney replaced by transplant, Metabolic acidosis       sulfamethoxazole-trimethoprim 400-80 MG per  tablet    BACTRIM/SEPTRA    45 tablet    Take 1 tablet by mouth every other day    Kidney transplanted       tacrolimus 1 MG capsule    GENERIC EQUIVALENT    120 capsule    Take 2 capsules (2 mg) by mouth 2 times daily    Kidney transplant recipient, Long-term use of immunosuppressant medication       Urea 40 % Crea     85 g    Apply small amount of cream to spots on face and groin NIGHTLY    Molluscum contagiosum

## 2018-11-13 NOTE — LETTER
Return to Work  2018     Seen today: yes    Patient:  Rashad Ortiz  :   1975  MRN:     [id number]  Physician: GREER REY      To whom it may concern;       Please excuse Rashad Ortiz from work today , for evaluation of his right foot. Please also excuse him from work on , for a procedure on his right foot. Please call my office for any questions or concerns.         Electronically signed by Greer Rey MD & Tata Piper ATC

## 2018-11-13 NOTE — PROGRESS NOTES
Subjective:   Rashad Ortiz is a 43 year old male who complains of right foot pain, with no injury, that has been bothering him for the last 3 days. He states that it is a constant, throbbing pain.  He states there is been no trauma to the area.  He has had no fevers, chills, or other constitutional symptoms.  He has had no history of overuse.  He states pain is at the first MTP joint and can radiate up along the first ray.  He has some discomfort in the ankle but no swelling or erythema.  He needs to continue ambulating to and from work.  He is interested in the possibility of a first MTP joint corticosteroid injection.  He states that he has significant pretibial and dorsal foot edema is status quo since he has had his renal transplant.    Background:   Date of injury: None   Duration of symptoms: 3 days  Mechanism of Injury: Acute; Unknown   Aggravating factors: Constant, throbbing pain  Relieving Factors: Nothing  Prior Evaluation: Prior Physician Evalutation and X-rays    PAST MEDICAL, SOCIAL, SURGICAL AND FAMILY HISTORY: He  has a past medical history of Chronic kidney disease, stage 4, severely decreased GFR (H); Gastro-oesophageal reflux disease; Gout; Hypertension; Medical non-compliance; Pulmonary nodules; and Steroid long-term use. He also has no past medical history of PONV (postoperative nausea and vomiting).  He  has a past surgical history that includes transplant (01/13/2011); av fistula or graft arterial; Ligate fistula arteriovenous upper extremity (12/20/2011); and Percutaneous biopsy kidney (Right, 2/28/2017).  His family history includes Hypertension in his mother.  He reports that he quit smoking about 20 months ago. His smoking use included Cigarettes. He has never used smokeless tobacco. He reports that he drinks about 2.5 oz of alcohol per week  He reports that he does not use illicit drugs.    ALLERGIES: He is allergic to nkda [no known drug allergies].    CURRENT MEDICATIONS: He has a  "current medication list which includes the following prescription(s): allopurinol, amlodipine, carvedilol, furosemide, levofloxacin, mycophenolate, nystatin, omeprazole, prednisone, sodium bicarbonate, sulfamethoxazole-trimethoprim, tacrolimus, and urea.     REVIEW OF SYSTEMS: 10 point review of systems is negative except as noted above.     Exam:   /88  Ht 5' 6.75\" (1.695 m)  Wt 176 lb (79.8 kg)  BMI 27.77 kg/m2      CONSTITUTIONAL: healthy, alert and no distress  SKIN: no suspicious lesions or rashes  GAIT: antalgic  NEUROLOGIC: Non-focal  PSYCHIATRIC: mentation appears normal.    MUSCULOSKELETAL:   RIGHT ANKLE/FOOT: 3+ pretibial and dorsal foot pitting edema. Marked TTP over the 1st MTPJ and will not allow manipulation of the MTPJ. No discrete bony tenderness per se along the 1st metatarsal. Ankle without effusion. No warmth or erythema of the 1st MTPJ or the ankle joint.        Assessment/Plan:   (M10.9) Acute gout involving toe of right foot, unspecified cause  (primary encounter diagnosis)  Comment: Discussed relative rest. Placed in a boot in order to generally immobilize the great toe. [place] for pain and patient is without history of prior substance abuse. Will arrange for image guided CSI into R 1st MTPJ due to overall tenderness and pain today. He states that his LE swelling, 3+ pitting edema, is unchanged over the past several months and this is not a factor in his acute change in pain.  Plan: HYDROcodone-acetaminophen (NORCO) 5-325 MG per         tablet          (Z94.0) Kidney replaced by transplant            "

## 2018-11-13 NOTE — PROGRESS NOTES
SUBJECTIVE:   Rashad Ortiz is a 43 year old male who presents to clinic today for the following health issues:      Joint Pain    Onset: ongoing     Description:   Location: Right foot   Character: Sharp    Intensity: severe    Progression of Symptoms: same and constant     Accompanying Signs & Symptoms:  Other symptoms: none    History:   Previous similar pain: YES, gout     Precipitating factors:   Trauma or overuse: YES- walking     Alleviating factors:  Improved by: nothing    Therapies Tried and outcome: None/ (Allopurinol in the past for gout).      Right foot pain for 2 days.  Dorsal foot pain with radiation to great toe.  Feels different from gout in Left great toe.      Problem list and histories reviewed & adjusted, as indicated.  Additional history: as documented    Patient Active Problem List   Diagnosis     Kidney replaced by transplant     High risk medications (not anticoagulants) long-term use     Hypertension goal BP (blood pressure) < 140/90     GERD (gastroesophageal reflux disease)     CARDIOVASCULAR SCREENING; LDL GOAL LESS THAN 160     Gout     Creatinine elevation     Antibody mediated rejection of kidney transplant     Medical non-compliance     Chronic kidney disease, stage 4, severely decreased GFR (H)     Herpes simplex infection of penis     Norovirus     Escherichia coli septicemia (H)     Pyelonephritis of transplanted kidney     HTN, kidney transplant related     Metabolic acidosis     Chronic kidney disease, stage V (H)     Immunosuppression (H)     Past Surgical History:   Procedure Laterality Date     AV FISTULA OR GRAFT ARTERIAL       LIGATE FISTULA ARTERIOVENOUS UPPER EXTREMITY  12/20/2011    Procedure:LIGATE FISTULA ARTERIOVENOUS UPPER EXTREMITY; Excision of Right Forearm Arteriovenous Fistula.; Surgeon:LINDY AMAYA; Location:[facility]     PERCUTANEOUS BIOPSY KIDNEY Right 2/28/2017    Procedure: PERCUTANEOUS BIOPSY KIDNEY;  Surgeon: Gee Barrios MD;  Location:  "[facility] OR     TRANSPLANT  01/13/2011    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       Social History   Substance Use Topics     Smoking status: Former Smoker     Types: Cigarettes     Quit date: 03/2017     Smokeless tobacco: Never Used      Comment: Patient states that he is an 'social'  smoker      Alcohol use 2.5 oz/week     5 Standard drinks or equivalent per week      Comment: 1-2 drinks/wk     Family History   Problem Relation Age of Onset     Hypertension Mother            Reviewed and updated as needed this visit by clinical staff  Tobacco  Allergies  Meds  Problems  Med Hx  Surg Hx  Fam Hx  Soc Hx        Reviewed and updated as needed this visit by Provider  Allergies  Meds  Problems         ROS:  Constitutional, HEENT, cardiovascular, pulmonary, gi and gu systems are negative, except as otherwise noted.    OBJECTIVE:     /88  Pulse 83  Temp 98.2  F (36.8  C) (Oral)  Resp 16  Ht 5' 6.75\" (1.695 m)  Wt 176 lb (79.8 kg)  SpO2 100%  BMI 27.77 kg/m2  Body mass index is 27.77 kg/(m^2).  GENERAL: healthy, alert and no distress  HENT: white adherent linear areas on mucosal surface.  NECK: no adenopathy, no asymmetry, masses, or scars and thyroid normal to palpation  RESP: lungs clear to auscultation - no rales, rhonchi or wheezes  CV: regular rate and rhythm, normal S1 S2, no S3 or S4, no murmur, click or rub, no peripheral edema and peripheral pulses strong  ABDOMEN: soft, nontender, no hepatosplenomegaly, no masses and bowel sounds normal  MS: swelling bilateral feet/ankles.  No erythema noted  SKIN: pustules on face    Diagnostic Test Results:  none     ASSESSMENT/PLAN:     1. Thrush  Increase dose  - nystatin (MYCOSTATIN) [id number] UNIT/ML suspension; Swish and swallow 4 mLs (400,000 Units) in mouth 4 times daily for 10 days  Dispense: 300 mL; Refill: 0    2. Right foot pain  Xray and see sports med  - XR Foot Right G/E 3 Views; Future    3. Immunosuppression (H)  Complicating " condition.    The uses and side effects, including black box warnings as appropriate, were discussed in detail.  All patient questions were answered.  The patient was instructed to call immediately if any side effects developed.       FUTURE APPOINTMENTS:       - Follow-up visit in 2 weeks.    Mariel Mares MD  [facility]

## 2018-11-14 ENCOUNTER — TELEPHONE (OUTPATIENT)
Dept: NEPHROLOGY | Facility: CLINIC | Age: 43
End: 2018-11-14

## 2018-11-14 ENCOUNTER — PRE VISIT (OUTPATIENT)
Dept: CARDIOLOGY | Facility: CLINIC | Age: 43
End: 2018-11-14

## 2018-11-14 ENCOUNTER — TELEPHONE (OUTPATIENT)
Dept: TRANSPLANT | Facility: CLINIC | Age: 43
End: 2018-11-14

## 2018-11-14 DIAGNOSIS — M54.2 CERVICALGIA: ICD-10-CM

## 2018-11-14 NOTE — TELEPHONE ENCOUNTER
Call to patient regarding Kidney Smart. Referred a couple of weeks ago (Flor FERREIRA RNCC). Patient reports that he did receive a phone call and is scheduled to attend Dec 5th class here at [facility].  Ida Carroll LPN  Nephrology  [phone]

## 2018-11-14 NOTE — TELEPHONE ENCOUNTER
traMADol (ULTRAM) 50 MG tablet (Discontinued)     Last Written Prescription Date:  10/04/18  Last Fill Quantity: 30,   # refills: 0  Last Office Visit: 11/12/18-Diane Mares  Future Office visit:       Routing refill request to provider for review/approval because:  Drug not on the [facility], [facility] or [facility] refill protocol or controlled substance

## 2018-11-14 NOTE — LETTER
PHYSICIAN ORDERS      DATE & TIME ISSUED: 2018 2:05 PM  PATIENT NAME: Rashad Ortiz   : 1975       To whom it may concern,  Please excuse Rashad Ortiz from work up to 1 day/week as needed for 2 months to rest d/t fatigue and leg swelling from elevated creatinine level. Feel free to let us know if you have any questions or concerns.      Any questions please call: [phone]

## 2018-11-14 NOTE — TELEPHONE ENCOUNTER
"Patient called stating he has \"a question\" for coordinator. Patient requests call back to discuss.   "

## 2018-11-15 ENCOUNTER — OFFICE VISIT (OUTPATIENT)
Dept: ORTHOPEDICS | Facility: CLINIC | Age: 43
End: 2018-11-15
Payer: COMMERCIAL

## 2018-11-15 VITALS — WEIGHT: 176 LBS | HEIGHT: 67 IN | RESPIRATION RATE: 16 BRPM | BODY MASS INDEX: 27.62 KG/M2

## 2018-11-15 DIAGNOSIS — M19.071 OSTEOARTHRITIS OF FIRST METATARSOPHALANGEAL (MTP) JOINT OF RIGHT FOOT: Primary | ICD-10-CM

## 2018-11-15 RX ORDER — TRIAMCINOLONE ACETONIDE 40 MG/ML
20 INJECTION, SUSPENSION INTRA-ARTICULAR; INTRAMUSCULAR
Status: DISCONTINUED | OUTPATIENT
Start: 2018-11-15 | End: 2019-04-08

## 2018-11-15 RX ORDER — LIDOCAINE HYDROCHLORIDE 10 MG/ML
0.5 INJECTION, SOLUTION EPIDURAL; INFILTRATION; INTRACAUDAL; PERINEURAL
Status: DISCONTINUED | OUTPATIENT
Start: 2018-11-15 | End: 2019-07-24

## 2018-11-15 RX ADMIN — LIDOCAINE HYDROCHLORIDE 0.5 ML: 10 INJECTION, SOLUTION EPIDURAL; INFILTRATION; INTRACAUDAL; PERINEURAL at 09:14

## 2018-11-15 RX ADMIN — TRIAMCINOLONE ACETONIDE 20 MG: 40 INJECTION, SUSPENSION INTRA-ARTICULAR; INTRAMUSCULAR at 09:14

## 2018-11-15 NOTE — TELEPHONE ENCOUNTER
Returned pt call.  Pt requesting work note to be mailed to him since he cannot access it through [employer]. Pt home address confirmed with what we have on file.  Letter mailed to pt.     Pt questions answered, pt v/u.

## 2018-11-15 NOTE — LETTER
11/15/2018      RE: Rashad Ortiz  [address]  [place] [address]       PROBLEM: Right great toe MTP OA    SUBJECTIVE: Pt referred by Dr. Fair for US-guided corticosteroid injection for right 1st MTP OA. No prior injections.  Pt is 7 years s/p kidney transplant.    OBJECTIVE: X-rays reviewed.  US used to evaluate joint    ASSESSMENT: Right 1st MTP osteoarthritis    PLAN: US-guided corticosteroid injection right 1st MTP    PROCEDURE: Procedure, risks, benefits discussed. With pt seated, hips flexed at 45 degrees, knees at 90 degrees, foot flat on exam table, hockey stick US used to identify the 1st MTP joint to the medial side of midline.  After sterile prep, sterile technique employed using hockey stick transducer.  0.5 mL Kenalog and 0.5 mL ropivicaine injected using 25-gauge needle.  US used to verify needle placement and position. Images and video were recorded.  Pt tolerated the procedure well.  F/U prn  Pt left clinic in good condition.  Activity as tolerated.                 Small Joint Injection/Arthrocentesis  Date/Time: 11/15/2018 9:14 AM  Performed by: CARLOS KISER  Authorized by: CARLOS KISER     Indications:  Pain  Needle Size:  25 G  Guidance: ultrasound    Location:  Great toe  Site:  R great MTP  Medications:  0.5 mL lidocaine (PF) 1 %; 20 mg triamcinolone acetonide 40 MG/ML  Procedure discussed: discussed risks, benefits, and alternatives    Consent Given by:  Patient  Timeout: timeout called immediately prior to procedure    Prep: patient was prepped and draped in usual sterile fashion     0.5ml kenalog and 19.5ml of 1%lidocaine was wasted per MD Carlos Kiser MD

## 2018-11-15 NOTE — PROGRESS NOTES
PROBLEM: Right great toe MTP OA    SUBJECTIVE: Pt referred by Dr. Fair for US-guided corticosteroid injection for right 1st MTP OA. No prior injections.  Pt is 7 years s/p kidney transplant.    OBJECTIVE: X-rays reviewed.  US used to evaluate joint    ASSESSMENT: Right 1st MTP osteoarthritis    PLAN: US-guided corticosteroid injection right 1st MTP    PROCEDURE: Procedure, risks, benefits discussed. With pt seated, hips flexed at 45 degrees, knees at 90 degrees, foot flat on exam table, hockey stick US used to identify the 1st MTP joint to the medial side of midline.  After sterile prep, sterile technique employed using hockey stick transducer.  0.5 mL Kenalog and 0.5 mL ropivicaine injected using 25-gauge needle.  US used to verify needle placement and position. Images and video were recorded.  Pt tolerated the procedure well.  F/U prn  Pt left clinic in good condition.  Activity as tolerated.                 Small Joint Injection/Arthrocentesis  Date/Time: 11/15/2018 9:14 AM  Performed by: NGA KISER  Authorized by: NGA KISER     Indications:  Pain  Needle Size:  25 G  Guidance: ultrasound    Location:  Great toe  Site:  R great MTP  Medications:  0.5 mL lidocaine (PF) 1 %; 20 mg triamcinolone acetonide 40 MG/ML  Procedure discussed: discussed risks, benefits, and alternatives    Consent Given by:  Patient  Timeout: timeout called immediately prior to procedure    Prep: patient was prepped and draped in usual sterile fashion     0.5ml kenalog and 19.5ml of 1%lidocaine was wasted per MD

## 2018-11-15 NOTE — MR AVS SNAPSHOT
After Visit Summary   11/15/2018    Rashad Ortiz    MRN: [id number]           Patient Information     Date Of Birth          1975        Visit Information        Provider Department      11/15/2018 8:30 AM Carlos Arias MD [facility] Sports Medicine        Today's Diagnoses     Osteoarthritis of first metatarsophalangeal (MTP) joint of right foot    -  1       Follow-ups after your visit        Follow-up notes from your care team     Return if symptoms worsen or fail to improve.      Your next 10 appointments already scheduled     Dec 17, 2018  2:15 PM CST   (Arrive by 2:00 PM)   Return Visit with KAILA Kaufman MD   [facility] Dermatology ([facility])    [address]  3rd Floor  [facility] [address] [phone]            Feb 07, 2019  4:35 PM CST   (Arrive by 4:05 PM)   Return Kidney Transplant with  Kidney/Pancreas Recipient 2   [facility] Nephrology ([facility])    [address]  [address]  [facility] [address] [phone]              Who to contact     Please call your clinic at [phone] to:    Ask questions about your health    Make or cancel appointments    Discuss your medicines    Learn about your test results    Speak to your doctor            Additional Information About Your Visit        [employer] Information     [employer] gives you secure access to your electronic health record. If you see a primary care provider, you can also send messages to your care team and make appointments. If you have questions, please call your primary care clinic.  If you do not have a primary care provider, please call [phone] and they will assist you.      iWe[employer]o is an electronic gateway that provides easy, online access to your medical records. With [employer]We[employer], you can request a clinic appointment, read your test results, renew a prescription or communicate with your care team.     To access your existing  "account, please contact your [facility] or call [phone] for assistance.        Care EveryWhere ID     This is your Care EveryWhere ID. This could be used by other organizations to access your [place] medical records  [id number]        Your Vitals Were     Respirations Height BMI (Body Mass Index)             16 5' 6.75\" (1.695 m) 27.77 kg/m2          Blood Pressure from Last 3 Encounters:   11/13/18 132/88   11/12/18 132/88   11/03/18 (!) 148/98    Weight from Last 3 Encounters:   11/15/18 176 lb (79.8 kg)   11/13/18 176 lb (79.8 kg)   11/12/18 176 lb (79.8 kg)              We Performed the Following     Small Joint Injection/Arthrocentesis        Primary Care Provider Office Phone # Fax #    Mariel Baltazar Diane Mares [phone] [address]  [facility] [address]        Equal Access to Services     [facility] : Hadii aad ku hadasho Soomaali, waaxda luqadaha, qaybta kaalmada adeegyada, waxay idiin hayaan claudio kharash tj . So [facility] [phone].    ATENCIÓN: Si habla español, tiene a ward disposición servicios gratuitos de asistencia lingüística. Llame al [phone].    We comply with applicable federal civil rights laws and [place] laws. We do not discriminate on the basis of race, color, national origin, age, disability, sex, sexual orientation, or gender identity.            Thank you!     Thank you for choosing [facility]  for your care. Our goal is always to provide you with excellent care. Hearing back from our patients is one way we can continue to improve our services. Please take a few minutes to complete the written survey that you may receive in the mail after your visit with us. Thank you!             Your Updated Medication List - Protect others around you: Learn how to safely use, store and throw away your medicines at www.disposemymeds.org.          This list is accurate as of 11/15/18  9:24 AM.  Always " use your most recent med list.                   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, unspecified site       amLODIPine 5 MG tablet    NORVASC    90 tablet    Take 1 tablet (5 mg) by mouth daily    Benign essential hypertension       carvedilol 25 MG tablet    COREG    60 tablet    Take 0.5 tablets (12.5 mg) by mouth 2 times daily    DEIDRA (acute kidney injury) (H)       furosemide 20 MG tablet    LASIX    180 tablet    Take 1 tablet (20 mg) by mouth 2 times daily    DEIDRA (acute kidney injury) (H)       HYDROcodone-acetaminophen 5-325 MG per tablet    NORCO    40 tablet    Take 1-2 tablets by mouth every 6 hours as needed for pain    Acute gout involving toe of right foot, unspecified cause       levofloxacin 500 MG tablet    LEVAQUIN    7 tablet    Take 1 tablet (500 mg) by mouth every other day    Escherichia coli septicemia (H)       mycophenolate 250 MG capsule    GENERIC EQUIVALENT    120 capsule    Take 2 capsules (500 mg) by mouth 2 times daily    Kidney transplanted       nystatin [id number] UNIT/ML suspension    MYCOSTATIN    300 mL    Swish and swallow 4 mLs (400,000 Units) in mouth 4 times daily for 10 days    Thrush       omeprazole 20 MG CR capsule    priLOSEC    90 capsule    Take 1 capsule (20 mg) by mouth daily    Kidney replaced by transplant       predniSONE 10 MG tablet    DELTASONE    30 tablet    Take 4 tabs (40 mg) by mouth daily x 3 days, 2 tabs (20 mg) daily x 3 days, 1 tab (10 mg) daily x 3 days, then 1/2 tab (5 mg) -- continue.    Kidney replaced by transplant       sodium bicarbonate 650 MG tablet     180 tablet    Take 2 tablets (1,300 mg) by mouth 3 times daily    Encounter for long-term (current) use of high-risk medication, Kidney replaced by transplant, Metabolic acidosis       sulfamethoxazole-trimethoprim 400-80 MG per tablet     BACTRIM/SEPTRA    45 tablet    Take 1 tablet by mouth every other day    Kidney transplanted       tacrolimus 1 MG capsule    GENERIC EQUIVALENT    120 capsule    Take 2 capsules (2 mg) by mouth 2 times daily    Kidney transplant recipient, Long-term use of immunosuppressant medication       Urea 40 % Crea     85 g    Apply small amount of cream to spots on face and groin NIGHTLY    Molluscum contagiosum

## 2018-11-17 DIAGNOSIS — Z94.0 KIDNEY TRANSPLANTED: ICD-10-CM

## 2018-11-17 LAB
ERYTHROCYTE [DISTWIDTH] IN BLOOD BY AUTOMATED COUNT: 17.9 % (ref 10–15)
HCT VFR BLD AUTO: 24.8 % (ref 40–53)
HGB BLD-MCNC: 8 G/DL (ref 13.3–17.7)
MCH RBC QN AUTO: 27.3 PG (ref 26.5–33)
MCHC RBC AUTO-ENTMCNC: 32.3 G/DL (ref 31.5–36.5)
MCV RBC AUTO: 85 FL (ref 78–100)
PLATELET # BLD AUTO: 174 10E9/L (ref 150–450)
RBC # BLD AUTO: 2.93 10E12/L (ref 4.4–5.9)
WBC # BLD AUTO: 7.1 10E9/L (ref 4–11)

## 2018-11-17 PROCEDURE — 80048 BASIC METABOLIC PNL TOTAL CA: CPT | Performed by: INTERNAL MEDICINE

## 2018-11-17 PROCEDURE — 36415 COLL VENOUS BLD VENIPUNCTURE: CPT | Performed by: INTERNAL MEDICINE

## 2018-11-17 PROCEDURE — 85027 COMPLETE CBC AUTOMATED: CPT | Performed by: INTERNAL MEDICINE

## 2018-11-17 PROCEDURE — 80197 ASSAY OF TACROLIMUS: CPT | Performed by: INTERNAL MEDICINE

## 2018-11-18 LAB
TACROLIMUS BLD-MCNC: 4.9 UG/L (ref 5–15)
TME LAST DOSE: ABNORMAL H

## 2018-11-19 ENCOUNTER — TELEPHONE (OUTPATIENT)
Dept: TRANSPLANT | Facility: CLINIC | Age: 43
End: 2018-11-19

## 2018-11-19 LAB
ANION GAP SERPL CALCULATED.3IONS-SCNC: 6 MMOL/L (ref 3–14)
BUN SERPL-MCNC: 51 MG/DL (ref 7–30)
CALCIUM SERPL-MCNC: 9.3 MG/DL (ref 8.5–10.1)
CHLORIDE SERPL-SCNC: 113 MMOL/L (ref 94–109)
CO2 SERPL-SCNC: 25 MMOL/L (ref 20–32)
CREAT SERPL-MCNC: 5.74 MG/DL (ref 0.66–1.25)
GFR SERPL CREATININE-BSD FRML MDRD: 11 ML/MIN/1.7M2
GLUCOSE SERPL-MCNC: 90 MG/DL (ref 70–99)
POTASSIUM SERPL-SCNC: 4.6 MMOL/L (ref 3.4–5.3)
SODIUM SERPL-SCNC: 144 MMOL/L (ref 133–144)

## 2018-11-19 RX ORDER — TRAMADOL HYDROCHLORIDE 50 MG/1
50 TABLET ORAL
Qty: 30 TABLET | Refills: 0 | OUTPATIENT
Start: 2018-11-19

## 2018-11-19 NOTE — TELEPHONE ENCOUNTER
ISSUE:  Creatinine 5.74.  Pt recently hospitalized for pyelonephritis and was given 3 week abx treatment.  Per Dr Murillo last f/u appointment 11/2018 with pt:  He will need to be evaluated for dialytic access.  We will wait for him to make his decision on hemodialysis versus peritoneal dialysis and finish his treatment for transplant pyelonephritis for 3 weeks total antibiotic course.  Once this is completed he can have either his peritoneal dialysis catheter or AV fistula creation done.    OUTCOME:   Call placed to pt to review lab results. Pt reports he is feeling fine and no worsening in uremic s/s and cont to have good UOP.  Pt verbalized he completed abx last Saturday.  Pt verbalized he is attending a dialysis class next month to get more info on what dialysis option might be best for him.  Pt aware he should call us if uremic s/s worsen.     Pt questions answered, pt v/u.

## 2018-11-19 NOTE — TELEPHONE ENCOUNTER
DATE:  11/19/2018   TIME OF RECEIPT FROM LAB:  132  LAB TEST:  Creatnine  LAB VALUE:  5.74  RESULTS GIVEN WITH READ-BACK TO (PROVIDER):  Grace Nguyen  TIME LAB VALUE REPORTED TO PROVIDER:   [id number]

## 2018-11-20 ENCOUNTER — DOCUMENTATION ONLY (OUTPATIENT)
Dept: TRANSPLANT | Facility: CLINIC | Age: 43
End: 2018-11-20

## 2018-11-20 ENCOUNTER — MYC MEDICAL ADVICE (OUTPATIENT)
Dept: FAMILY MEDICINE | Facility: CLINIC | Age: 43
End: 2018-11-20

## 2018-11-24 DIAGNOSIS — Z94.0 KIDNEY TRANSPLANTED: ICD-10-CM

## 2018-11-24 LAB
ERYTHROCYTE [DISTWIDTH] IN BLOOD BY AUTOMATED COUNT: 19.1 % (ref 10–15)
HCT VFR BLD AUTO: 25.6 % (ref 40–53)
HGB BLD-MCNC: 8.1 G/DL (ref 13.3–17.7)
MCH RBC QN AUTO: 27.3 PG (ref 26.5–33)
MCHC RBC AUTO-ENTMCNC: 31.6 G/DL (ref 31.5–36.5)
MCV RBC AUTO: 86 FL (ref 78–100)
PLATELET # BLD AUTO: 270 10E9/L (ref 150–450)
RBC # BLD AUTO: 2.97 10E12/L (ref 4.4–5.9)
WBC # BLD AUTO: 6.8 10E9/L (ref 4–11)

## 2018-11-24 PROCEDURE — 85027 COMPLETE CBC AUTOMATED: CPT | Performed by: INTERNAL MEDICINE

## 2018-11-24 PROCEDURE — 36415 COLL VENOUS BLD VENIPUNCTURE: CPT | Performed by: INTERNAL MEDICINE

## 2018-11-24 PROCEDURE — 80048 BASIC METABOLIC PNL TOTAL CA: CPT | Performed by: INTERNAL MEDICINE

## 2018-11-24 PROCEDURE — 80197 ASSAY OF TACROLIMUS: CPT | Performed by: INTERNAL MEDICINE

## 2018-11-25 LAB
TACROLIMUS BLD-MCNC: 4.4 UG/L (ref 5–15)
TME LAST DOSE: 2230 H

## 2018-11-26 ENCOUNTER — TELEPHONE (OUTPATIENT)
Dept: TRANSPLANT | Facility: CLINIC | Age: 43
End: 2018-11-26

## 2018-11-26 LAB
ANION GAP SERPL CALCULATED.3IONS-SCNC: 10 MMOL/L (ref 3–14)
BUN SERPL-MCNC: 55 MG/DL (ref 7–30)
CALCIUM SERPL-MCNC: 9.2 MG/DL (ref 8.5–10.1)
CHLORIDE SERPL-SCNC: 112 MMOL/L (ref 94–109)
CO2 SERPL-SCNC: 21 MMOL/L (ref 20–32)
CREAT SERPL-MCNC: 5.48 MG/DL (ref 0.66–1.25)
GFR SERPL CREATININE-BSD FRML MDRD: 11 ML/MIN/1.7M2
GLUCOSE SERPL-MCNC: 108 MG/DL (ref 70–99)
POTASSIUM SERPL-SCNC: 4.7 MMOL/L (ref 3.4–5.3)
SODIUM SERPL-SCNC: 143 MMOL/L (ref 133–144)

## 2018-11-26 NOTE — TELEPHONE ENCOUNTER
DATE:  11/26/2018   TIME OF RECEIPT FROM LAB:  1235  LAB TEST:  Creatinine  LAB VALUE:  5.48  RESULTS GIVEN WITH READ-BACK TO (PROVIDER):  Brianna GARCIAS RN  TIME LAB VALUE REPORTED TO PROVIDER:   9386

## 2018-11-26 NOTE — PROGRESS NOTES
I talked with and examined Rashad Ortiz and I agree with the assessment and the plan. I was present for the cryotherapy  procedure. HAY Kaufman MD.

## 2018-11-28 ENCOUNTER — OFFICE VISIT (OUTPATIENT)
Dept: FAMILY MEDICINE | Facility: CLINIC | Age: 43
End: 2018-11-28
Payer: COMMERCIAL

## 2018-11-28 VITALS
SYSTOLIC BLOOD PRESSURE: 146 MMHG | BODY MASS INDEX: 28.91 KG/M2 | OXYGEN SATURATION: 97 % | DIASTOLIC BLOOD PRESSURE: 83 MMHG | TEMPERATURE: 98.5 F | WEIGHT: 183.2 LBS | HEART RATE: 81 BPM

## 2018-11-28 DIAGNOSIS — A60.01 HERPES SIMPLEX INFECTION OF PENIS: Primary | ICD-10-CM

## 2018-11-28 DIAGNOSIS — M10.9 ACUTE GOUT INVOLVING TOE OF RIGHT FOOT, UNSPECIFIED CAUSE: ICD-10-CM

## 2018-11-28 PROCEDURE — 99213 OFFICE O/P EST LOW 20 MIN: CPT | Performed by: FAMILY MEDICINE

## 2018-11-28 RX ORDER — VALACYCLOVIR HYDROCHLORIDE 1 G/1
1000 TABLET, FILM COATED ORAL DAILY
Qty: 7 TABLET | Refills: 0 | Status: SHIPPED | OUTPATIENT
Start: 2018-11-28 | End: 2019-06-13

## 2018-11-28 NOTE — LETTER
November 28, 2018      Rashad Ortiz  [address] [facility] [address]        To Whom It May Concern:    Rashad Ortiz  was seen on 11/18/18.  Please excuse him  until 11/29/18 due to illness.        Sincerely,        Mariel Mares MD

## 2018-11-28 NOTE — PROGRESS NOTES
SUBJECTIVE:   Rashad Ortiz is a 43 year old male who presents to clinic today for the following health issues:  Medication Followup of Nystatin    Taking Medication as prescribed: yes 11/12/18    Side Effects: a lot of gas    Medication Helping Symptoms:  Not sure- would like to discuss if has to continue     Concern: Need FMLA form signed from when he was in the hospital 10/25/18- 10/28/18 for his employer.    Genital herpes outbreak 2 days ago.  Previously treated with valtrex.  This is the second outbreak with last one in July.    Problem list and histories reviewed & adjusted, as indicated.  Additional history: as documented    Patient Active Problem List   Diagnosis     Kidney replaced by transplant     High risk medications (not anticoagulants) long-term use     Hypertension goal BP (blood pressure) < 140/90     GERD (gastroesophageal reflux disease)     CARDIOVASCULAR SCREENING; LDL GOAL LESS THAN 160     Gout     Creatinine elevation     Antibody mediated rejection of kidney transplant     Medical non-compliance     Chronic kidney disease, stage 4, severely decreased GFR (H)     Herpes simplex infection of penis     Norovirus     Escherichia coli septicemia (H)     Pyelonephritis of transplanted kidney     HTN, kidney transplant related     Metabolic acidosis     Chronic kidney disease, stage V (H)     Immunosuppression (H)     Past Surgical History:   Procedure Laterality Date     AV FISTULA OR GRAFT ARTERIAL       LIGATE FISTULA ARTERIOVENOUS UPPER EXTREMITY  12/20/2011    Procedure:LIGATE FISTULA ARTERIOVENOUS UPPER EXTREMITY; Excision of Right Forearm Arteriovenous Fistula.; Surgeon:LINDY AMAYA; Location:[facility]     PERCUTANEOUS BIOPSY KIDNEY Right 2/28/2017    Procedure: PERCUTANEOUS BIOPSY KIDNEY;  Surgeon: Gee Barrios MD;  Location:  OR     TRANSPLANT  01/13/2011    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       Social History   Substance Use Topics     Smoking status:  Former Smoker     Types: Cigarettes     Quit date: 03/2017     Smokeless tobacco: Never Used      Comment: Patient states that he is an 'social'  smoker      Alcohol use 2.5 oz/week     5 Standard drinks or equivalent per week      Comment: 1-2 drinks/wk     Family History   Problem Relation Age of Onset     Hypertension Mother            Reviewed and updated as needed this visit by clinical staff  Tobacco  Allergies  Meds  Med Hx  Surg Hx  Fam Hx  Soc Hx      Reviewed and updated as needed this visit by Provider         ROS:  Constitutional, HEENT, cardiovascular, pulmonary, gi and gu systems are negative, except as otherwise noted.    OBJECTIVE:     /83 (BP Location: Right arm, Patient Position: Chair, Cuff Size: Adult Regular)  Pulse 81  Temp 98.5  F (36.9  C) (Oral)  Wt 183 lb 3.2 oz (83.1 kg)  SpO2 97%  BMI 28.91 kg/m2  Body mass index is 28.91 kg/(m^2).  GENERAL: healthy, alert and no distress  NECK: no adenopathy, no asymmetry, masses, or scars and thyroid normal to palpation  RESP: lungs clear to auscultation - no rales, rhonchi or wheezes  CV: regular rate and rhythm, normal S1 S2, no S3 or S4, no murmur, click or rub, no peripheral edema and peripheral pulses strong  ABDOMEN: soft, nontender, no hepatosplenomegaly, no masses and bowel sounds normal  MS: no gross musculoskeletal defects noted, no edema    Diagnostic Test Results:  none     ASSESSMENT/PLAN:     1. Herpes simplex infection of penis  Oral treatment  - valACYclovir (VALTREX) 1000 mg tablet; Take 1 tablet (1,000 mg) by mouth daily for 7 days  Dispense: 7 tablet; Refill: 0    2. Acute gout involving toe of right foot, unspecified cause  FMLA forms completed.    The uses and side effects, including black box warnings as appropriate, were discussed in detail.  All patient questions were answered.  The patient was instructed to call immediately if any side effects developed.       FUTURE APPOINTMENTS:       - Follow-up visit in 3  days if not improved or 3 months.    Mariel Mares MD  [facility]

## 2018-11-28 NOTE — PROGRESS NOTES
Faxed completed/signed FMLA forms to [employer], Atten: Janice Mcdaniels, [phone], right fax confirmed at 4:16 pm today. Copy to TC and abstracting.  Amanda Branham MA/  For [employer]beth

## 2018-11-28 NOTE — PATIENT INSTRUCTIONS
At [facility], we strive to deliver an exceptional experience to you, every time we see you.  If you receive a survey in the mail, please send us back your thoughts. We really do value your feedback.    Your care team:                            Family Medicine Internal Medicine   MD Juanito Lynne MD Shantel Branch-Fleming, MD Katya Georgiev PA-C Megan Hill, APRN ALYSSA Harrington MD Pediatrics   Greg Fink, GRANT Balderrama, MD Leanna Hurley APRN CNP   MD Kelly Tipton MD Deborah Mielke, MD Sarah Schmitz, APRN [facility]      Clinic hours: Monday - Thursday 7 am-7 pm; Fridays 7 am-5 pm.   Urgent care: Monday - Friday 11 am-9 pm; Saturday and Sunday 9 am-5 pm.  Pharmacy : Monday -Thursday 8 am-8 pm; Friday 8 am-6 pm; Saturday and Sunday 9 am-5 pm.     Clinic: [phone]   Pharmacy: [phone]

## 2018-11-28 NOTE — MR AVS SNAPSHOT
After Visit Summary   11/28/2018    Rashad Ortiz    MRN: [id number]           Patient Information     Date Of Birth          1975        Visit Information        Provider Department      11/28/2018 2:40 PM Mariel Garcia MD [facility]        Today's Diagnoses     Herpes simplex infection of penis    -  1    Acute gout involving toe of right foot, unspecified cause          Care Instructions    At [facility], we strive to deliver an exceptional experience to you, every time we see you.  If you receive a survey in the mail, please send us back your thoughts. We really do value your feedback.    Your care team:                            Family Medicine Internal Medicine   MD Juanito Lynne MD Shantel Branch-Fleming, MD Katya Georgiev PA-C Megan Hill, APRCLIFF Harrington MD Pediatrics   Greg Fink, GRANT Balderrama, MD Leanna Hurley APRN CNP   MD Kelly Tipton MD Deborah Mielke, MD Kim Thein, APRN [facility]      Clinic hours: Monday - Thursday 7 am-7 pm; Fridays 7 am-5 pm.   Urgent care: Monday - Friday 11 am-9 pm; Saturday and Sunday 9 am-5 pm.  Pharmacy : Monday -Thursday 8 am-8 pm; Friday 8 am-6 pm; Saturday and Sunday 9 am-5 pm.     Clinic: [phone]   Pharmacy: [phone]               Follow-ups after your visit        Follow-up notes from your care team     Return in about 3 months (around 2/28/2019).      Your next 10 appointments already scheduled     Feb 07, 2019  4:35 PM CST   (Arrive by 4:05 PM)   Return Kidney Transplant with  Kidney/Pancreas Recipient [address] [place] [address] Nephrology ([facility] [place])    [address] [place] [address]  [address] [place] [address] [phone]              Who to contact     If you have questions or need follow up information about today's clinic visit or your schedule please contact [facility]  [place] directly at [phone].  Normal or non-critical lab and imaging results will be communicated to you by MyChart, letter or phone within 4 business days after the clinic has received the results. If you do not hear from us within 7 days, please contact the clinic through [employer] or phone. If you have a critical or abnormal lab result, we will notify you by phone as soon as possible.  Submit refill requests through [employer] or call your pharmacy and they will forward the refill request to us. Please allow 3 business days for your refill to be completed.          Additional Information About Your Visit        [employer] Information     [employer] gives you secure access to your electronic health record. If you see a primary care provider, you can also send messages to your care team and make appointments. If you have questions, please call your primary care clinic.  If you do not have a primary care provider, please call [phone] and they will assist you.        Care EveryWhere ID     This is your Care EveryWhere ID. This could be used by other organizations to access your [place] medical records  [id number]        Your Vitals Were     Pulse Temperature Pulse Oximetry BMI (Body Mass Index)          81 98.5  F (36.9  C) (Oral) 97% 28.91 kg/m2         Blood Pressure from Last 3 Encounters:   11/28/18 146/83   11/13/18 132/88   11/12/18 132/88    Weight from Last 3 Encounters:   11/28/18 183 lb 3.2 oz (83.1 kg)   11/15/18 176 lb (79.8 kg)   11/13/18 176 lb (79.8 kg)              Today, you had the following     No orders found for display         Today's Medication Changes          These changes are accurate as of 11/28/18  3:30 PM.  If you have any questions, ask your nurse or doctor.               Start taking these medicines.        Dose/Directions    valACYclovir 1000 mg tablet   Commonly known as:  VALTREX   Used for:  Herpes simplex infection of penis   Started by:  Mariel Garcia MD         Dose:  1000 mg   Take 1 tablet (1,000 mg) by mouth daily for 7 days   Quantity:  7 tablet   Refills:  0            Where to get your medicines      These medications were sent to [id number] - [facility], [place] - [address] AT [facility] [address], [place] [address]     Phone:  [phone]     valACYclovir 1000 mg tablet                Primary Care Provider Office Phone # Fax #    Mariel Austinstoney Mares [phone]       [address]  [facility] [place] [address]        Equal Access to Services     LUISANA GUTIERREZ : Hadii song nelson hadasho Soomaali, waaxda luqadaha, qaybta kaalmada adeegyada, ronaldo spencer . So [facility] [phone].    ATENCIÓN: Si habla español, tiene a ward disposición servicios gratuitos de asistencia lingüística. Llame al [phone].    We comply with applicable federal civil rights laws and [place] laws. We do not discriminate on the basis of race, color, national origin, age, disability, sex, sexual orientation, or gender identity.            Thank you!     Thank you for choosing [facility]  for your care. Our goal is always to provide you with excellent care. Hearing back from our patients is one way we can continue to improve our services. Please take a few minutes to complete the written survey that you may receive in the mail after your visit with us. Thank you!             Your Updated Medication List - Protect others around you: Learn how to safely use, store and throw away your medicines at www.disposemymeds.org.          This list is accurate as of 11/28/18  3:30 PM.  Always use your most recent med list.                   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, unspecified site       amLODIPine 5 MG  tablet    NORVASC    90 tablet    Take 1 tablet (5 mg) by mouth daily    Benign essential hypertension       carvedilol 25 MG tablet    COREG    60 tablet    Take 0.5 tablets (12.5 mg) by mouth 2 times daily    DEIRDA (acute kidney injury) (H)       furosemide 20 MG tablet    LASIX    180 tablet    Take 1 tablet (20 mg) by mouth 2 times daily    DEIDRA (acute kidney injury) (H)       HYDROcodone-acetaminophen 5-325 MG tablet    NORCO    40 tablet    Take 1-2 tablets by mouth every 6 hours as needed for pain    Acute gout involving toe of right foot, unspecified cause       mycophenolate 250 MG capsule    GENERIC EQUIVALENT    120 capsule    Take 2 capsules (500 mg) by mouth 2 times daily    Kidney transplanted       omeprazole 20 MG DR capsule    priLOSEC    90 capsule    Take 1 capsule (20 mg) by mouth daily    Kidney replaced by transplant       predniSONE 10 MG tablet    DELTASONE    30 tablet    Take 4 tabs (40 mg) by mouth daily x 3 days, 2 tabs (20 mg) daily x 3 days, 1 tab (10 mg) daily x 3 days, then 1/2 tab (5 mg) -- continue.    Kidney replaced by transplant       sodium bicarbonate 650 MG tablet     180 tablet    Take 2 tablets (1,300 mg) by mouth 3 times daily    Encounter for long-term (current) use of high-risk medication, Kidney replaced by transplant, Metabolic acidosis       sulfamethoxazole-trimethoprim 400-80 MG tablet    BACTRIM/SEPTRA    45 tablet    Take 1 tablet by mouth every other day    Kidney transplanted       tacrolimus 1 MG capsule    GENERIC EQUIVALENT    120 capsule    Take 2 capsules (2 mg) by mouth 2 times daily    Kidney transplant recipient, Long-term use of immunosuppressant medication       Urea 40 % Crea     85 g    Apply small amount of cream to spots on face and groin NIGHTLY    Molluscum contagiosum       valACYclovir 1000 mg tablet    VALTREX    7 tablet    Take 1 tablet (1,000 mg) by mouth daily for 7 days    Herpes simplex infection of penis

## 2018-11-30 ENCOUNTER — MYC MEDICAL ADVICE (OUTPATIENT)
Dept: FAMILY MEDICINE | Facility: CLINIC | Age: 43
End: 2018-11-30

## 2018-11-30 DIAGNOSIS — Z94.0 KIDNEY REPLACED BY TRANSPLANT: ICD-10-CM

## 2018-12-02 ENCOUNTER — OFFICE VISIT (OUTPATIENT)
Dept: URGENT CARE | Facility: URGENT CARE | Age: 43
End: 2018-12-02
Payer: COMMERCIAL

## 2018-12-02 VITALS
OXYGEN SATURATION: 98 % | SYSTOLIC BLOOD PRESSURE: 119 MMHG | TEMPERATURE: 98.3 F | HEART RATE: 84 BPM | WEIGHT: 180 LBS | BODY MASS INDEX: 27.28 KG/M2 | DIASTOLIC BLOOD PRESSURE: 76 MMHG | HEIGHT: 68 IN

## 2018-12-02 DIAGNOSIS — Z94.0 KIDNEY REPLACED BY TRANSPLANT: Primary | ICD-10-CM

## 2018-12-02 DIAGNOSIS — R42 LIGHTHEADEDNESS: ICD-10-CM

## 2018-12-02 DIAGNOSIS — M79.89 LEFT ARM SWELLING: ICD-10-CM

## 2018-12-02 DIAGNOSIS — N18.4 CHRONIC KIDNEY DISEASE, STAGE 4, SEVERELY DECREASED GFR (H): ICD-10-CM

## 2018-12-02 DIAGNOSIS — M10.9 ACUTE GOUT INVOLVING TOE OF RIGHT FOOT, UNSPECIFIED CAUSE: ICD-10-CM

## 2018-12-02 PROCEDURE — 99215 OFFICE O/P EST HI 40 MIN: CPT | Performed by: FAMILY MEDICINE

## 2018-12-02 RX ORDER — PREDNISONE 10 MG/1
TABLET ORAL
Qty: 18 TABLET | Refills: 0 | Status: SHIPPED | OUTPATIENT
Start: 2018-12-02 | End: 2018-12-12 | Stop reason: ALTCHOICE

## 2018-12-02 NOTE — MR AVS SNAPSHOT
After Visit Summary   12/2/2018    Rashad Ortiz    MRN: [id number]           Patient Information     Date Of Birth          1975        Visit Information        Provider Department      12/2/2018 11:20 AM Lashanda Thomas MD [facility]        Today's Diagnoses     Kidney replaced by transplant    -  1    Acute gout involving toe of right foot, unspecified cause        Chronic kidney disease, stage 4, severely decreased GFR (H)        Lightheadedness        Left arm swelling          Care Instructions    Recurrent gout on the right leg past month. Swollen right foot and ankle   Patient got lightheaded here in urgent care  Also left arm pain and swelling  Patient with chronic kidney disease and is being set up for hemodialysis  I am concerned patient got lightheaded and vagal concern about uremia.  Recommend ER evaluation to check for DVT (left arm, right leg) and to check for uremia.           Follow-ups after your visit        Your next 10 appointments already scheduled     Feb 07, 2019  4:35 PM CST   (Arrive by 4:05 PM)   Return Kidney Transplant with  Kidney/Pancreas Recipient [address] [place] [address] Nephrology ([facility])    [address] [place] [address]  [address] [place] [address] [phone]              Who to contact     If you have questions or need follow up information about today's clinic visit or your schedule please contact [facility] directly at [phone].  Normal or non-critical lab and imaging results will be communicated to you by MyChart, letter or phone within 4 business days after the clinic has received the results. If you do not hear from us within 7 days, please contact the clinic through MyChart or phone. If you have a critical or abnormal lab result, we will notify you by phone as soon as possible.  Submit refill requests through [employer] or call your pharmacy and they will forward the refill request to us.  "Please allow 3 business days for your refill to be completed.          Additional Information About Your Visit        [facility] Information     [employer] gives you secure access to your electronic health record. If you see a primary care provider, you can also send messages to your care team and make appointments. If you have questions, please call your primary care clinic.  If you do not have a primary care provider, please call [phone] and they will assist you.        Care EveryWhere ID     This is your Care EveryWhere ID. This could be used by other organizations to access your [facility] medical records  [id number]        Your Vitals Were     Pulse Temperature Height Pulse Oximetry BMI (Body Mass Index)       84 98.3  F (36.8  C) (Oral) 5' 8\" (1.727 m) 98% 27.37 kg/m2        Blood Pressure from Last 3 Encounters:   12/02/18 119/76   11/28/18 146/83   11/13/18 132/88    Weight from Last 3 Encounters:   12/02/18 180 lb (81.6 kg)   11/28/18 183 lb 3.2 oz (83.1 kg)   11/15/18 176 lb (79.8 kg)              Today, you had the following     No orders found for display         Today's Medication Changes          These changes are accurate as of 12/2/18 11:59 AM.  If you have any questions, ask your nurse or doctor.               These medicines have changed or have updated prescriptions.        Dose/Directions    * predniSONE 10 MG tablet   Commonly known as:  DELTASONE   This may have changed:  Another medication with the same name was added. Make sure you understand how and when to take each.   Used for:  Kidney replaced by transplant   Changed by:  Lashanda Thomas MD        Take 4 tabs (40 mg) by mouth daily x 3 days, 2 tabs (20 mg) daily x 3 days, 1 tab (10 mg) daily x 3 days, then 1/2 tab (5 mg) -- continue.   Quantity:  30 tablet   Refills:  0       * predniSONE 10 MG tablet   Commonly known as:  DELTASONE   This may have changed:  You were already taking a medication with the same name, and this " prescription was added. Make sure you understand how and when to take each.   Used for:  Acute gout involving toe of right foot, unspecified cause   Changed by:  Lashanda Thomas MD        10mg/ tablet: 3 tablets daily for the first 3 days then 2 tablets daily for the next 3 days then 1 tablet daily for last 3 days   Quantity:  18 tablet   Refills:  0       * Notice:  This list has 2 medication(s) that are the same as other medications prescribed for you. Read the directions carefully, and ask your doctor or other care provider to review them with you.         Where to get your medicines      Some of these will need a paper prescription and others can be bought over the counter.  Ask your nurse if you have questions.     Bring a paper prescription for each of these medications     predniSONE 10 MG tablet                Primary Care Provider Office Phone # Fax #    Mariel Delaney Mares [phone]       [address]  [facility] [address]        Equal Access to Services     [facility] AH: Hadii song ku hadasho Soomaali, waaxda luqadaha, qaybta kaalmada adeegyada, waxay sulyin haydeanna spencer . So [facility] [phone].    ATENCIÓN: Si habla español, tiene a ward disposición servicios gratuitos de asistencia lingüística. Llame al [phone].    We comply with applicable federal civil rights laws and [place] laws. We do not discriminate on the basis of race, color, national origin, age, disability, sex, sexual orientation, or gender identity.            Thank you!     Thank you for choosing [facility]  for your care. Our goal is always to provide you with excellent care. Hearing back from our patients is one way we can continue to improve our services. Please take a few minutes to complete the written survey that you may receive in the mail after your visit with us. Thank you!             Your Updated Medication List - Protect others around you: Learn  how to safely use, store and throw away your medicines at www.disposemymeds.org.          This list is accurate as of 12/2/18 11:59 AM.  Always use your most recent med list.                   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, unspecified site       amLODIPine 5 MG tablet    NORVASC    90 tablet    Take 1 tablet (5 mg) by mouth daily    Benign essential hypertension       carvedilol 25 MG tablet    COREG    60 tablet    Take 0.5 tablets (12.5 mg) by mouth 2 times daily    DEIDRA (acute kidney injury) (H)       furosemide 20 MG tablet    LASIX    180 tablet    Take 1 tablet (20 mg) by mouth 2 times daily    DEIDRA (acute kidney injury) (H)       HYDROcodone-acetaminophen 5-325 MG tablet    NORCO    40 tablet    Take 1-2 tablets by mouth every 6 hours as needed for pain    Acute gout involving toe of right foot, unspecified cause       mycophenolate 250 MG capsule    GENERIC EQUIVALENT    120 capsule    Take 2 capsules (500 mg) by mouth 2 times daily    Kidney transplanted       omeprazole 20 MG DR capsule    priLOSEC    90 capsule    Take 1 capsule (20 mg) by mouth daily    Kidney replaced by transplant       * predniSONE 10 MG tablet    DELTASONE    30 tablet    Take 4 tabs (40 mg) by mouth daily x 3 days, 2 tabs (20 mg) daily x 3 days, 1 tab (10 mg) daily x 3 days, then 1/2 tab (5 mg) -- continue.    Kidney replaced by transplant       * predniSONE 10 MG tablet    DELTASONE    18 tablet    10mg/ tablet: 3 tablets daily for the first 3 days then 2 tablets daily for the next 3 days then 1 tablet daily for last 3 days    Acute gout involving toe of right foot, unspecified cause       sodium bicarbonate 650 MG tablet     180 tablet    Take 2 tablets (1,300 mg) by mouth 3 times daily    Encounter for long-term (current) use of high-risk medication, Kidney  replaced by transplant, Metabolic acidosis       sulfamethoxazole-trimethoprim 400-80 MG tablet    BACTRIM/SEPTRA    45 tablet    Take 1 tablet by mouth every other day    Kidney transplanted       tacrolimus 1 MG capsule    GENERIC EQUIVALENT    120 capsule    Take 2 capsules (2 mg) by mouth 2 times daily    Kidney transplant recipient, Long-term use of immunosuppressant medication       Urea 40 % Crea     85 g    Apply small amount of cream to spots on face and groin NIGHTLY    Molluscum contagiosum       valACYclovir 1000 mg tablet    VALTREX    7 tablet    Take 1 tablet (1,000 mg) by mouth daily for 7 days    Herpes simplex infection of penis       * Notice:  This list has 2 medication(s) that are the same as other medications prescribed for you. Read the directions carefully, and ask your doctor or other care provider to review them with you.

## 2018-12-02 NOTE — PROGRESS NOTES
"Chief complaint: right foot pain    History of kidney transplant   Known history of gout off and on for the past year   Has had 7 bouts of gout this past year     4 days ago started having pain in the right ankle   Hasn't taken tylenol - per patient \"not strong enough\"  No numbness or tingling  No fevers or chills chest pain or shortness of breath     Has seen orthopedics 2 weeks ago and had steroid injection  Some relief initially per patient but now flared up again.     No fevers or chills chest pain or shortness of breath     Allergies   Allergen Reactions     Nkda [No Known Drug Allergies]        Past Medical History:   Diagnosis Date     Chronic kidney disease, stage 4, severely decreased GFR (H)      Gastro-oesophageal reflux disease      Gout      Hypertension      Medical non-compliance      Pulmonary nodules      Steroid long-term use          Current Outpatient Prescriptions on File Prior to Visit:  allopurinol (ZYLOPRIM) 100 MG tablet Take 2 tablets (200 mg) by mouth daily   amLODIPine (NORVASC) 5 MG tablet Take 1 tablet (5 mg) by mouth daily   carvedilol (COREG) 25 MG tablet Take 0.5 tablets (12.5 mg) by mouth 2 times daily   furosemide (LASIX) 20 MG tablet Take 1 tablet (20 mg) by mouth 2 times daily   mycophenolate (GENERIC EQUIVALENT) 250 MG capsule Take 2 capsules (500 mg) by mouth 2 times daily   omeprazole (PRILOSEC) 20 MG capsule Take 1 capsule (20 mg) by mouth daily   predniSONE (DELTASONE) 10 MG tablet Take 4 tabs (40 mg) by mouth daily x 3 days, 2 tabs (20 mg) daily x 3 days, 1 tab (10 mg) daily x 3 days, then 1/2 tab (5 mg) -- continue.   sodium bicarbonate 650 MG tablet Take 2 tablets (1,300 mg) by mouth 3 times daily   sulfamethoxazole-trimethoprim (BACTRIM/SEPTRA) 400-80 MG per tablet Take 1 tablet by mouth every other day   tacrolimus (GENERIC EQUIVALENT) 1 MG capsule Take 2 capsules (2 mg) by mouth 2 times daily   Urea 40 % CREA Apply small amount of cream to spots on face and groin " "NIGHTLY   valACYclovir (VALTREX) 1000 mg tablet Take 1 tablet (1,000 mg) by mouth daily for 7 days   HYDROcodone-acetaminophen (NORCO) 5-325 MG per tablet Take 1-2 tablets by mouth every 6 hours as needed for pain (Patient not taking: Reported on 11/28/2018)     Current Facility-Administered Medications on File Prior to Visit:  lidocaine (PF) (XYLOCAINE) 1 % injection 0.5 mL   triamcinolone acetonide (KENALOG-40) injection 20 mg       Social History   Substance Use Topics     Smoking status: Former Smoker     Types: Cigarettes     Quit date: 03/2017     Smokeless tobacco: Never Used      Comment: Patient states that he is an 'social'  smoker      Alcohol use 2.5 oz/week     5 Standard drinks or equivalent per week      Comment: 1-2 drinks/wk       ROS:  10 point review of systems negative except for noted above.   No thoughts of harming self or others.     OBJECTIVE:  /76 (Patient Position: Supine)  Pulse 84  Temp 98.3  F (36.8  C) (Oral)  Ht 5' 8\" (1.727 m)  Wt 180 lb (81.6 kg)  SpO2 98%  BMI 27.37 kg/m2   General:   awake, alert, and cooperative.  NAD.   Head: Normocephalic, atraumatic.  Eyes: Conjunctiva clear,   Heart: Regular rate and rhythm. No murmur.  Lungs: Chest is clear; no wheezes or rales.   Abdomen: soft non-tender.  Neuro: Alert and oriented - normal speech.  MS: Using extremities freely   Swollen right foot. Tender on the forefoot where it is also somewhat red  Swelling extending to ankle but no tenderness in the ankle no calf tenderness   Left upper extremity swelling on the vein and slight tenderness   PSYCH:  Normal affect, normal speech  SKIN: no obvious rashes    ASSESSMENT:    ICD-10-CM    1. Kidney replaced by transplant Z94.0    2. Acute gout involving toe of right foot, unspecified cause M10.9 predniSONE (DELTASONE) 10 MG tablet   3. Chronic kidney disease, stage 4, severely decreased GFR (H) N18.4    4. Lightheadedness R42    5. Left arm swelling M79.89        PLAN:   Urgent " "care course   Recurrent gout on the right leg past month. Swollen right foot and ankle   Also left arm pain and swelling on the area of the vein ?superficial DVT    While here in clinic got very lightheaded and diaphoretic. We had to lay him down and patient improved.     Patient with chronic kidney disease and is being set up for hemodialysis  I am concerned patient got lightheaded and  concern about uremia.  Patient with multiple comorbidities  Recommended EKG he declined   Recommend ER evaluation to check for DVT (left arm, right leg) and to check for uremia.   He states \"I will go\" but declined to discuss further     Prescribed with prednisone but I did not given any narcotics. Recent narcotics on mnpmp and lightheadedness concern for sedation.     I advised and recommended ambulance transfer. Patient refused ambulance transfer. Warned about risks to patient and to others on the road and this was discussed in detail however patient still refused.   Advised about symptoms which might herald more serious problems.        Lashanda Thomas MD                "

## 2018-12-02 NOTE — PATIENT INSTRUCTIONS
Recurrent gout on the right leg past month. Swollen right foot and ankle   Patient got lightheaded here in urgent care  Also left arm pain and swelling  Patient with chronic kidney disease and is being set up for hemodialysis  I am concerned patient got lightheaded and vagal concern about uremia.  Recommend ER evaluation to check for DVT (left arm, right leg) and to check for uremia.

## 2018-12-03 ENCOUNTER — MYC MEDICAL ADVICE (OUTPATIENT)
Dept: FAMILY MEDICINE | Facility: CLINIC | Age: 43
End: 2018-12-03

## 2018-12-04 ENCOUNTER — MYC MEDICAL ADVICE (OUTPATIENT)
Dept: FAMILY MEDICINE | Facility: CLINIC | Age: 43
End: 2018-12-04

## 2018-12-06 ENCOUNTER — TELEPHONE (OUTPATIENT)
Dept: TRANSPLANT | Facility: CLINIC | Age: 43
End: 2018-12-06

## 2018-12-06 DIAGNOSIS — N17.9 AKI (ACUTE KIDNEY INJURY) (H): ICD-10-CM

## 2018-12-06 RX ORDER — CARVEDILOL 25 MG/1
12.5 TABLET ORAL 2 TIMES DAILY
Qty: 60 TABLET | Refills: 11 | Status: SHIPPED | OUTPATIENT
Start: 2018-12-06 | End: 2019-06-13

## 2018-12-06 NOTE — TELEPHONE ENCOUNTER
Pt states current dose of carvedilol is 12.5mg BID, please send new rx  Thank you very kindly!  Leonora Pratt CPhT  [facility]

## 2018-12-07 ENCOUNTER — TELEPHONE (OUTPATIENT)
Dept: NEPHROLOGY | Facility: CLINIC | Age: 43
End: 2018-12-07

## 2018-12-07 NOTE — TELEPHONE ENCOUNTER
Call to patient regarding scheduling appointment to discuss with Dr. Murillo dialysis access after kidney smart. Per Dr. Murillo, ok to schedule 1-3 weeks if patient wants to discuss. Patient reports he is no rush, is in pain, right foot, patient thinks may be gout or arthritis, he has bruises is on allopurinol and prednisone 2 mg daily. Patient willing to come in but not ready to start dialysis next week.  Sent message to Dr. Murillo.  Ida Carroll LPN  Nephrology  [phone]

## 2018-12-09 ENCOUNTER — OFFICE VISIT (OUTPATIENT)
Dept: URGENT CARE | Facility: URGENT CARE | Age: 43
End: 2018-12-09
Payer: COMMERCIAL

## 2018-12-09 VITALS
HEART RATE: 93 BPM | DIASTOLIC BLOOD PRESSURE: 96 MMHG | TEMPERATURE: 98.5 F | RESPIRATION RATE: 16 BRPM | OXYGEN SATURATION: 100 % | SYSTOLIC BLOOD PRESSURE: 134 MMHG | WEIGHT: 175.25 LBS | BODY MASS INDEX: 26.65 KG/M2

## 2018-12-09 DIAGNOSIS — M1A.0710 IDIOPATHIC CHRONIC GOUT OF RIGHT FOOT WITHOUT TOPHUS: Primary | ICD-10-CM

## 2018-12-09 PROCEDURE — 99213 OFFICE O/P EST LOW 20 MIN: CPT | Performed by: STUDENT IN AN ORGANIZED HEALTH CARE EDUCATION/TRAINING PROGRAM

## 2018-12-09 RX ORDER — PREDNISONE 5 MG/1
TABLET ORAL
COMMUNITY
Start: 2018-12-06 | End: 2018-12-11

## 2018-12-09 RX ORDER — PREDNISONE 10 MG/1
TABLET ORAL
Qty: 30 TABLET | Refills: 0 | Status: SHIPPED | OUTPATIENT
Start: 2018-12-09 | End: 2018-12-11

## 2018-12-09 NOTE — PROGRESS NOTES
Subjective:   Rashad Ortiz is a 43 year old male who presents for   Chief Complaint   Patient presents with     Arthritis     right side, pain level at a 10 per patient     Refill Request     Prednisone and Pain Medications per patient request, patient renewed his daily low dose 5 mg Prednisone this past Friday but wants higher dose      Patient presenting today with continued gout flare.  He continues to report pain in his right ankle, right foot and right big toe.  He was seen in urgent care on 12/2/2018 and was prescribed a prednisone taper (30 mg for 3 days, 20 mg for 3 days, 10 mg for 3 days).  He reports some improvement in his pain while he was on this, but as soon as he went back to his normal 5 mg of chronic prednisone his pain worsened.  He denies any redness at any specific joint in his right lower extremity.  No fevers or chills.  He continues to have the same baseline lower extremity swelling that is unchanged.  No calf tenderness, warmth or pain.  He is able to move his ankle foot and toes and it is painful.    He has not seen his nephrologist or primary provider in some time.  He tells me he has an appointment with his primary provider on December 12.  He is working with his nephrologist in regards to being started on dialysis.  He had a kidney transplant in 2011.  He is wondering if he can be prescribed a stronger pain medication as Tylenol does not do anything for him.  He is specifically asking for hydrocodone or Percocet.    PMHX/PSHX/MEDS/ALLERGIES/SHX/FHX reviewed and updated in Epic.    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 Provider to review       Kidney replaced by transplant 05/21/2012     Priority: Medium     High risk medications (not anticoagulants) long-term use 05/21/2012     Priority: Medium     Hypertension goal BP (blood pressure) < 140/90 05/21/2012     Priority: Medium     Current Outpatient Medications   Medication     allopurinol (ZYLOPRIM) 100 MG tablet     amLODIPine (NORVASC) 5 MG tablet     carvedilol (COREG) 25 MG tablet     furosemide (LASIX) 20 MG tablet     mycophenolate (GENERIC EQUIVALENT) 250 MG capsule     omeprazole (PRILOSEC) 20 MG capsule     predniSONE (DELTASONE) 10 MG tablet     predniSONE (DELTASONE) 5 MG tablet     sodium bicarbonate 650 MG tablet     sulfamethoxazole-trimethoprim (BACTRIM/SEPTRA) 400-80 MG per tablet     tacrolimus (GENERIC EQUIVALENT) 1 MG capsule     HYDROcodone-acetaminophen (NORCO) 5-325 MG per tablet     predniSONE (DELTASONE) 10 MG tablet     Urea 40 % CREA     valACYclovir (VALTREX) 1000 mg tablet     Current Facility-Administered Medications   Medication     lidocaine (PF) (XYLOCAINE) 1 % injection 0.5 mL     triamcinolone acetonide (KENALOG-40) injection 20 mg     ROS:  As above per HPI    Objective:   BP (!) 134/96   Pulse 93   Temp 98.5  F (36.9  C) (Oral)   Resp  16   Wt 79.5 kg (175 lb 4 oz)   SpO2 100%   BMI 26.65 kg/m  , Body mass index is 26.65 kg/m .  Gen:  NAD, well-nourished, sitting in chair comfortably  HEENT: EOMI, PERRL sclera anicteric, Head normocephalic, PERRL; nares patent; oropharynx pink and moist   CV:  RRR  - no murmurs, rubs, or gallups  Pulm:  CTAB, no wheezes/rales/rhonchi, no increased work of breathing   Extrem: 2-3+ pitting edema bilaterally up to his knees.  He has tenderness diffusely over his right ankle and MTP joint of his great toe.  No significant redness of his skin over his ankle, any foot joint or over his big toe.  Range of motion is within normal limits, but decreased in all directions secondary to his swelling.  Sensation to light touch is intact.  No calf tenderness to palpation.  Skin: no obvious rashes or abnormalities  Psych: Euthymic, linear thoughts, normal rate of speech    Assessment & Plan:   Rashad DIETER Ortiz, 43 year old male who presents with:  Rashad was seen today for arthritis and refill request.    Diagnoses and all orders for this visit:    Idiopathic chronic gout of right foot without tophus  -     predniSONE (DELTASONE) 10 MG tablet; Take 30 mg by mouth daily for 5 days, THEN 20 mg daily for 5 days, THEN 10 mg daily for 5 days.    Patient presents with continued gout pain, but no exam findings that are suggestive of acute gout flare of any joint.  I discussed with him that given this is the first time meeting him I would not prescribe him narcotic pain medications.  I also discussed the potential side effects with his end-stage renal disease and prescribing narcotic pain medications.  Discussed that the main treatment for a gout flare is prednisone, especially because he is unable to tolerate NSAIDs given his renal disease.  I discussed with him the importance of following up with both his primary provider as well as his nephrologist in regards to getting his gout under better control.  He was instructed on warning  signs and symptoms to return for more urgent evaluation or go to the emergency department.  All his questions were answered.    His blood pressure was elevated in clinic today, but he did not take his medications.  I discussed that it is very important for him to be compliant with his medications to control his blood pressure.  He verbalized understanding, and told me he would take his medications when he got home.    Xenia Wiley, DO PGY3   [facility]    Options for treatment and/or follow-up care were reviewed with the patient. Rashad Ortiz and/or legal guardian was engaged and actively involved in the decision making process. Patient/guardian verbalized understanding of the options discussed and was satisfied with the final plan.

## 2018-12-09 NOTE — PATIENT INSTRUCTIONS
Tylenol 1000 mg three times a day (No more than 3000 mg in 24 hours.)        Idiopathic chronic gout of right foot without tophus  -     predniSONE (DELTASONE) 10 MG tablet; Take 30 mg by mouth daily for 5 days, THEN 20 mg daily for 5 days, THEN 10 mg daily for 5 days.

## 2018-12-10 ENCOUNTER — APPOINTMENT (OUTPATIENT)
Dept: URGENT CARE | Facility: URGENT CARE | Age: 43
End: 2018-12-10
Payer: COMMERCIAL

## 2018-12-10 ENCOUNTER — TELEPHONE (OUTPATIENT)
Dept: FAMILY MEDICINE | Facility: CLINIC | Age: 43
End: 2018-12-10

## 2018-12-10 ENCOUNTER — TELEPHONE (OUTPATIENT)
Dept: URGENT CARE | Facility: URGENT CARE | Age: 43
End: 2018-12-10

## 2018-12-10 NOTE — TELEPHONE ENCOUNTER
Reason for Call:  Other Letter    Detailed comments: Pt calling for she is requesting for a letter to be written to her employer today to excuse her from work due to gout and she would like a call back when letter is ready for  at .    Phone Number Patient can be reached at: Home number on file [phone] (home)    Best Time: anytime    Can we leave a detailed message on this number? YES    Call taken on 12/10/2018 at 10:11 AM by Herrera Carey

## 2018-12-10 NOTE — LETTER
[facility]  [address] [facility] [address]          December 10, 2018    RE:  Rashad Ortiz                                                                                                                                                       [address]  [facility] [address]            To whom it may concern:    Rashad Ortiz was seen at this clinic on 10/9/2018 for acute gout flare up.  She may return to work on 12/11/2018.    Sincerely,        Carlos Mosqueda PA-C

## 2018-12-11 ENCOUNTER — OFFICE VISIT (OUTPATIENT)
Dept: NEPHROLOGY | Facility: CLINIC | Age: 43
End: 2018-12-11
Attending: INTERNAL MEDICINE
Payer: COMMERCIAL

## 2018-12-11 VITALS
OXYGEN SATURATION: 99 % | BODY MASS INDEX: 27.29 KG/M2 | HEART RATE: 85 BPM | WEIGHT: 180.1 LBS | TEMPERATURE: 98.1 F | SYSTOLIC BLOOD PRESSURE: 159 MMHG | DIASTOLIC BLOOD PRESSURE: 100 MMHG | HEIGHT: 68 IN

## 2018-12-11 DIAGNOSIS — N18.5 ANEMIA DUE TO STAGE 5 CHRONIC KIDNEY DISEASE, NOT ON CHRONIC DIALYSIS (H): ICD-10-CM

## 2018-12-11 DIAGNOSIS — D63.1 ANEMIA DUE TO STAGE 5 CHRONIC KIDNEY DISEASE, NOT ON CHRONIC DIALYSIS (H): ICD-10-CM

## 2018-12-11 DIAGNOSIS — Z94.0 KIDNEY TRANSPLANT RECIPIENT: ICD-10-CM

## 2018-12-11 DIAGNOSIS — I15.1 HTN, KIDNEY TRANSPLANT RELATED: ICD-10-CM

## 2018-12-11 DIAGNOSIS — Z94.0 KIDNEY TRANSPLANTED: Primary | ICD-10-CM

## 2018-12-11 DIAGNOSIS — M1A.0710 IDIOPATHIC CHRONIC GOUT OF RIGHT FOOT WITHOUT TOPHUS: ICD-10-CM

## 2018-12-11 DIAGNOSIS — Z94.0 HTN, KIDNEY TRANSPLANT RELATED: ICD-10-CM

## 2018-12-11 DIAGNOSIS — Z79.60 LONG-TERM USE OF IMMUNOSUPPRESSANT MEDICATION: ICD-10-CM

## 2018-12-11 DIAGNOSIS — N18.5 CHRONIC KIDNEY DISEASE, STAGE V (H): ICD-10-CM

## 2018-12-11 DIAGNOSIS — M10.9 GOUT, UNSPECIFIED CAUSE, UNSPECIFIED CHRONICITY, UNSPECIFIED SITE: ICD-10-CM

## 2018-12-11 DIAGNOSIS — N12 PYELONEPHRITIS OF TRANSPLANTED KIDNEY: ICD-10-CM

## 2018-12-11 DIAGNOSIS — T86.19 PYELONEPHRITIS OF TRANSPLANTED KIDNEY: ICD-10-CM

## 2018-12-11 DIAGNOSIS — D84.9 IMMUNOSUPPRESSION (H): ICD-10-CM

## 2018-12-11 DIAGNOSIS — Z94.0 KIDNEY REPLACED BY TRANSPLANT: Primary | ICD-10-CM

## 2018-12-11 DIAGNOSIS — N25.81 SECONDARY RENAL HYPERPARATHYROIDISM (H): ICD-10-CM

## 2018-12-11 LAB
ALBUMIN UR-MCNC: 100 MG/DL
APPEARANCE UR: CLEAR
BILIRUB UR QL STRIP: NEGATIVE
COLOR UR AUTO: ABNORMAL
GLUCOSE UR STRIP-MCNC: 50 MG/DL
HGB UR QL STRIP: ABNORMAL
KETONES UR STRIP-MCNC: NEGATIVE MG/DL
LEUKOCYTE ESTERASE UR QL STRIP: NEGATIVE
MUCOUS THREADS #/AREA URNS LPF: PRESENT /LPF
NITRATE UR QL: NEGATIVE
PH UR STRIP: 6 PH (ref 5–7)
RBC #/AREA URNS AUTO: 1 /HPF (ref 0–2)
SOURCE: ABNORMAL
SP GR UR STRIP: 1.01 (ref 1–1.03)
SPERM #/AREA URNS HPF: PRESENT /HPF
UROBILINOGEN UR STRIP-MCNC: 0 MG/DL (ref 0–2)
WBC #/AREA URNS AUTO: 1 /HPF (ref 0–5)

## 2018-12-11 PROCEDURE — G0463 HOSPITAL OUTPT CLINIC VISIT: HCPCS | Mod: ZF

## 2018-12-11 PROCEDURE — 81001 URINALYSIS AUTO W/SCOPE: CPT | Performed by: INTERNAL MEDICINE

## 2018-12-11 PROCEDURE — 87086 URINE CULTURE/COLONY COUNT: CPT | Performed by: INTERNAL MEDICINE

## 2018-12-11 RX ORDER — PREDNISONE 5 MG/1
5 TABLET ORAL DAILY
Qty: 30 TABLET | Refills: 3 | Status: SHIPPED | OUTPATIENT
Start: 2018-12-11 | End: 2019-08-14

## 2018-12-11 RX ORDER — TACROLIMUS 1 MG/1
2 CAPSULE ORAL 2 TIMES DAILY
Qty: 120 CAPSULE | Refills: 11 | Status: SHIPPED | OUTPATIENT
Start: 2018-12-11 | End: 2020-01-06

## 2018-12-11 RX ORDER — PREDNISONE 10 MG/1
TABLET ORAL
Qty: 120 TABLET | Refills: 0 | Status: SHIPPED | OUTPATIENT
Start: 2018-12-11 | End: 2019-02-01

## 2018-12-11 RX ORDER — FEBUXOSTAT 40 MG/1
40 TABLET, FILM COATED ORAL DAILY
Qty: 90 TABLET | Refills: 3 | Status: SHIPPED | OUTPATIENT
Start: 2018-12-11 | End: 2020-01-21

## 2018-12-11 RX ORDER — MYCOPHENOLATE MOFETIL 250 MG/1
500 CAPSULE ORAL 2 TIMES DAILY
Qty: 120 CAPSULE | Refills: 11 | Status: SHIPPED | OUTPATIENT
Start: 2018-12-11 | End: 2020-01-06

## 2018-12-11 ASSESSMENT — PAIN SCALES - GENERAL: PAINLEVEL: MODERATE PAIN (4)

## 2018-12-11 ASSESSMENT — MIFFLIN-ST. JEOR: SCORE: 1691.43

## 2018-12-11 NOTE — PROGRESS NOTES
CHRONIC TRANSPLANT NEPHROLOGY VISIT    Assessment & Plan   # LDKT: baseline Cr ~ 5; Stable   - Proteinuria: Mild   - Date of DSA last checked: 9/25/17 Latest DSA: No   - BK Viremia: No   - Kidney Tx Biopsy: Yes    FINAL DIAGNOSIS:   Kidney, allograft; percutaneous needle biopsy:   -Moderate chronic tubulo-interstitial changes   -No diagnostic evidence of acute rejection seen     We will refer him for an AV fistula creation today.    The patient has decided that he would like to pursue home hemodialysis for a renal replacement modality while he waits for a second transplant.      Continue with biweekly labs.    Hx of pyelonephritis: check ua and micro with urine culture today.     # Immunosuppression: Tacrolimus immediate release (goal  4-6), Mycophenolate mofetil (goal  1-3.5) and Prednisone (dose  5 mg daily)   - Changes: No    # Hypertension: Controlled; Goal BP: < 130/80   - Changes: No    # Anemia in chronic renal disease: Hgb: Stable   - Iron studies: Replete    # Mineral Bone Disorder:    - Secondary renal hyperparathyroidism; PTH level is: Significantly elevated  - Vitamin D; level is: Low vitamin d 2000 units daily  - Calcium; level is: Normal  - Phosphorus; level is: Normal     # Skin Cancer Risk:    - Discussed sun protection and recommend regular follow up with Dermatology.    # Medical Compliance: Yes     #Gout: pred 40 mg x 5 days, 20 mg x 5 days, 10 mg thereafter. Stop allopurinol. Start febuxostat 40 mg daily.  I anticipate that when the patient is started on renal replacement therapy that he will have improvement in his uric acid control.    Return visit: 3 months.     # Transplant History:  Etiology of kidney failure: hypertension  Tx: LDKT  Transplant: 1/13/2011 (Kidney)  Donor Type: Living Donor Class:     Transplant Office Phone Number: [phone]    Assessment and plan was discussed with the patient and he voiced his understanding and agreement.    Stef Murillo MD    Chief Complaint     Angel is a 43 year old here for routine follow up and kidney transplant.    History of Present Illness      The patient is a 43-year-old male with history of ESRD due to hypertension status post kidney transplant in 2011 who has had interstitial fibrosis and tubular atrophy on most recent biopsy.  The patient was recently admitted with transplant pyelonephritis and acute kidney injury.  Baseline creatinine now has settled at around 5 mg/dL.  He is relisted for kidney transplant.    He continues on chronic immunosuppression therapy with tacrolimus, CellCept, and prednisone.    He continues to have frequent gout flares with the decrease in his kidney function.  He is on allopurinol 200 mg daily which is the highest I feel comfortable going with this patient given his reduced kidney function.  Next    He continues to have bilateral foot pain.  Right greater than left.  He had intra-articular steroid injection to his great toe with partial relief.  He notes his right foot pain at a 4 out of 10 today.    He denies any chest pain or shortness of breath.  He has no nausea or vomiting.  He has no fever shakes or chills.  Denies any constipation or diarrhea.    Recent Hospitalizations:  [] No [x] Yes Transplant pyelo   New Medical Issues: [] No [x] Yes Worsened gout   Decreased energy: [x] No [] Yes    Chest pain or SOB with exertion:  [x] No [] Yes    Appetite change or weight change: [x] No [] Yes    Nausea, vomiting or diarrhea:  [x] No [] Yes    Fever, sweats or chills: [x] No [] Yes    Leg swelling: [x] No [] Yes      Other medical issues:  No    Home BP: at goal    Review of Systems   A comprehensive review of systems was obtained and negative, except as noted in the HPI or PMH.    Problem List   Patient Active Problem List   Diagnosis     Kidney replaced by transplant     High risk medications (not anticoagulants) long-term use     Hypertension goal BP (blood pressure) < 140/90     GERD (gastroesophageal reflux  disease)     CARDIOVASCULAR SCREENING; LDL GOAL LESS THAN 160     Gout     Creatinine elevation     Antibody mediated rejection of kidney transplant     Medical non-compliance     Chronic kidney disease, stage 4, severely decreased GFR (H)     Herpes simplex infection of penis     Norovirus     Escherichia coli septicemia (H)     Pyelonephritis of transplanted kidney     HTN, kidney transplant related     Metabolic acidosis     Chronic kidney disease, stage V (H)     Immunosuppression (H)       Social History   Social History     Tobacco Use     Smoking status: Former Smoker     Types: Cigarettes     Last attempt to quit: 2017     Years since quittin.7     Smokeless tobacco: Never Used     Tobacco comment: Patient states that he is an 'social'  smoker    Substance Use Topics     Alcohol use: Yes     Alcohol/week: 2.5 oz     Types: 5 Standard drinks or equivalent per week     Comment: 1-2 drinks/wk     Drug use: No       Allergies   Allergies   Allergen Reactions     Nkda [No Known Drug Allergies]        Medications   Current Outpatient Medications   Medication Sig     allopurinol (ZYLOPRIM) 100 MG tablet Take 2 tablets (200 mg) by mouth daily     amLODIPine (NORVASC) 5 MG tablet Take 1 tablet (5 mg) by mouth daily     carvedilol (COREG) 25 MG tablet Take 0.5 tablets (12.5 mg) by mouth 2 times daily     furosemide (LASIX) 20 MG tablet Take 1 tablet (20 mg) by mouth 2 times daily     mycophenolate (GENERIC EQUIVALENT) 250 MG capsule Take 2 capsules (500 mg) by mouth 2 times daily     omeprazole (PRILOSEC) 20 MG capsule Take 1 capsule (20 mg) by mouth daily     predniSONE (DELTASONE) 10 MG tablet Take 30 mg by mouth daily for 5 days, THEN 20 mg daily for 5 days, THEN 10 mg daily for 5 days.     predniSONE (DELTASONE) 10 MG tablet 10mg/ tablet: 3 tablets daily for the first 3 days then 2 tablets daily for the next 3 days then 1 tablet daily for last 3 days     predniSONE (DELTASONE) 5 MG tablet      sodium  "bicarbonate 650 MG tablet Take 2 tablets (1,300 mg) by mouth 3 times daily     sulfamethoxazole-trimethoprim (BACTRIM/SEPTRA) 400-80 MG per tablet Take 1 tablet by mouth every other day     tacrolimus (GENERIC EQUIVALENT) 1 MG capsule Take 2 capsules (2 mg) by mouth 2 times daily     HYDROcodone-acetaminophen (NORCO) 5-325 MG per tablet Take 1-2 tablets by mouth every 6 hours as needed for pain (Patient not taking: Reported on 11/28/2018)     Urea 40 % CREA Apply small amount of cream to spots on face and groin NIGHTLY (Patient not taking: Reported on 12/9/2018)     valACYclovir (VALTREX) 1000 mg tablet Take 1 tablet (1,000 mg) by mouth daily for 7 days     Current Facility-Administered Medications   Medication     lidocaine (PF) (XYLOCAINE) 1 % injection 0.5 mL     triamcinolone acetonide (KENALOG-40) injection 20 mg     There are no discontinued medications.    Physical Exam   Vital Signs: BP (!) 159/100   Pulse 85   Temp 98.1  F (36.7  C) (Oral)   Ht 1.727 m (5' 8\")   Wt 81.7 kg (180 lb 1.6 oz)   SpO2 99%   BMI 27.38 kg/m      GENERAL APPEARANCE: alert and no distress  HENT: mouth without ulcers or lesions  LYMPHATICS: no cervical or supraclavicular nodes  RESP: lungs clear to auscultation - no rales, rhonchi or wheezes  CV: regular rhythm, normal rate, no rub, no murmur  EDEMA: no LE edema bilaterally  ABDOMEN: soft, nondistended, nontender, bowel sounds normal  MS: extremities normal - no gross deformities noted, no evidence of inflammation in joints, no muscle tenderness  SKIN: no rash      Data     Renal Latest Ref Rng & Units 11/24/2018 11/17/2018 11/10/2018   Na 133 - 144 mmol/L 143 144 145(H)   K 3.4 - 5.3 mmol/L 4.7 4.6 4.2   Cl 94 - 109 mmol/L 112(H) 113(H) 112(H)   CO2 20 - 32 mmol/L 21 25 24   BUN 7 - 30 mg/dL 55(H) 51(H) 37(H)   Cr 0.66 - 1.25 mg/dL 5.48(H) 5.74(H) 5.09(H)   Glucose 70 - 99 mg/dL 108(H) 90 81   Ca  8.5 - 10.1 mg/dL 9.2 9.3 8.4(L)   Mg 1.6 - 2.3 mg/dL - - -     Bone Health Latest " Ref Rng & Units 11/1/2018 10/25/2018 6/19/2018   Phos 2.5 - 4.5 mg/dL 3.3 5.2(H) 3.2   PTHi 12 - 72 pg/mL - - -   Vit D Def 20 - 75 ug/L - - -     Heme Latest Ref Rng & Units 11/24/2018 11/17/2018 11/10/2018   WBC 4.0 - 11.0 10e9/L 6.8 7.1 10.9   Hgb 13.3 - 17.7 g/dL 8.1(L) 8.0(L) 7.9(LL)   Plt 150 - 450 10e9/L 270 174 318     Liver Latest Ref Rng & Units 10/26/2018 10/25/2018 10/25/2018   AP 40 - 150 U/L 63 64 63   TBili 0.2 - 1.3 mg/dL 0.4 0.4 0.4   ALT 0 - 70 U/L 10 14 16   AST 0 - 45 U/L 11 13 12   Tot Protein 6.8 - 8.8 g/dL 5.5(L) 6.8 6.8   Albumin 3.4 - 5.0 g/dL 2.0(L) 2.7(L) 2.8(L)     Pancreas Latest Ref Rng & Units 4/10/2017 6/17/2015 6/6/2015   A1C 4.3 - 6.0 % - - 5.4   Amylase 30 - 110 U/L 153(H) - -   Lipase 73 - 393 U/L 387 179 -     Iron studies Latest Ref Rng & Units 1/6/2018 1/12/2010 4/21/2009   Iron 35 - 180 ug/dL 62 198(H) 59   Iron sat 15 - 46 % 28 104(H) -   Ferritin 26 - 388 ng/mL 256 431(H) 68     [facility] Txp Virology Latest Ref Rng & Units 9/25/2017 4/24/2017 2/28/2017   CMV IgG EU/mL - - -   CMV IgM <0.90 - - -   CMV IgM Interp <0.90 - - -   CVM DNA Quant - - Plasma Plasma, EDTA anticoagulant   CMV Quant <100 Copies/mL - - -   CMV QT Log <2.0 Log copies/mL - - -   BK Spec - Plasma - Plasma   BK Res BKNEG:BK Virus DNA Not Detected copies/mL BK Virus DNA Not Detected - BK Virus DNA Not Detected   BK Log <2.7 Log copies/mL Not Calculated - Not Calculated   The Real-Time quantitative BK Virus assay was developed and its performance   characteristics determined by the Infectious Diseases Diagnostic Laboratory at   the [facility] in [place], [place]. The   primers and probes for each analyte are Analyte Specific Reagents (ASRs)   manufactured by [employer].   ASRs are used in many laboratory tests necessary for standard medical care and   generally do not require U.S. Food and Drug Administration approval. The FDA   has determined that such clearance or approval is not  necessary.   This test is used for clinical purposes. It should not be regarded as   investigational or for research. This laboratory is certified under the   Clinical Laboratory Improvement Amendments of 1988 (CLIA-88) as qualified to   perform high complexity clinical laboratory testing.     EBV IgG - - - -   Hep B Core NR:Nonreactive Nonreactive - -   Hep B Surf - - - -   HIV 1&2 NEG - - -        Recent Labs   Lab Test 11/10/18  1051 11/17/18  0906 11/24/18  1146   DOSTAC 11/09/2018 2200 11/16/18 2100 2,230   TACROL 4.7* 4.9* 4.4*     Recent Labs   Lab Test 06/06/15  0552 04/24/17  0849 10/27/18  0441   DOSMPA Not Provided 2200 Not Provided   MPACID 1.94 2.16 <0.25*   MPAG 135.0* 172.3* 78.3

## 2018-12-11 NOTE — NURSING NOTE
"Chief Complaint   Patient presents with     RECHECK     kidney tx      Vital signs:  Temp: 98.1  F (36.7  C) Temp src: Oral BP: (!) 159/100 Pulse: 85     SpO2: 99 %     Height: 172.7 cm (5' 8\") Weight: 81.7 kg (180 lb 1.6 oz)  Estimated body mass index is 27.38 kg/m  as calculated from the following:    Height as of this encounter: 1.727 m (5' 8\").    Weight as of this encounter: 81.7 kg (180 lb 1.6 oz).      Luís Hernandez, MANJINDER    "

## 2018-12-11 NOTE — LETTER
12/11/2018      RE: Rashad Ortiz  [address]  [place] [address]       CHRONIC TRANSPLANT NEPHROLOGY VISIT    Assessment & Plan   # LDKT: baseline Cr ~ 5; Stable   - Proteinuria: Mild   - Date of DSA last checked: 9/25/17 Latest DSA: No   - BK Viremia: No   - Kidney Tx Biopsy: Yes    FINAL DIAGNOSIS:   Kidney, allograft; percutaneous needle biopsy:   -Moderate chronic tubulo-interstitial changes   -No diagnostic evidence of acute rejection seen     We will refer him for an AV fistula creation today.    The patient has decided that he would like to pursue home hemodialysis for a renal replacement modality while he waits for a second transplant.      Continue with biweekly labs.    Hx of pyelonephritis: check ua and micro with urine culture today.     # Immunosuppression: Tacrolimus immediate release (goal  4-6), Mycophenolate mofetil (goal  1-3.5) and Prednisone (dose  5 mg daily)   - Changes: No    # Hypertension: Controlled; Goal BP: < 130/80   - Changes: No    # Anemia in chronic renal disease: Hgb: Stable   - Iron studies: Replete    # Mineral Bone Disorder:    - Secondary renal hyperparathyroidism; PTH level is: Significantly elevated  - Vitamin D; level is: Low vitamin d 2000 units daily  - Calcium; level is: Normal  - Phosphorus; level is: Normal     # Skin Cancer Risk:    - Discussed sun protection and recommend regular follow up with Dermatology.    # Medical Compliance: Yes     #Gout: pred 40 mg x 5 days, 20 mg x 5 days, 10 mg thereafter. Stop allopurinol. Start febuxostat 40 mg daily.  I anticipate that when the patient is started on renal replacement therapy that he will have improvement in his uric acid control.    Return visit: 3 months.     # Transplant History:  Etiology of kidney failure: hypertension  Tx: LDKT  Transplant: 1/13/2011 (Kidney)  Donor Type: Living Donor Class:     Transplant Office Phone Number: [phone]    Assessment and plan was discussed with the patient and  he voiced his understanding and agreement.    Stef Murillo MD    Chief Complaint   Mr. Ortiz is a 43 year old here for routine follow up and kidney transplant.    History of Present Illness      The patient is a 43-year-old male with history of ESRD due to hypertension status post kidney transplant in 2011 who has had interstitial fibrosis and tubular atrophy on most recent biopsy.  The patient was recently admitted with transplant pyelonephritis and acute kidney injury.  Baseline creatinine now has settled at around 5 mg/dL.  He is relisted for kidney transplant.    He continues on chronic immunosuppression therapy with tacrolimus, CellCept, and prednisone.    He continues to have frequent gout flares with the decrease in his kidney function.  He is on allopurinol 200 mg daily which is the highest I feel comfortable going with this patient given his reduced kidney function.  Next    He continues to have bilateral foot pain.  Right greater than left.  He had intra-articular steroid injection to his great toe with partial relief.  He notes his right foot pain at a 4 out of 10 today.    He denies any chest pain or shortness of breath.  He has no nausea or vomiting.  He has no fever shakes or chills.  Denies any constipation or diarrhea.    Recent Hospitalizations:  [] No [x] Yes Transplant pyelo   New Medical Issues: [] No [x] Yes Worsened gout   Decreased energy: [x] No [] Yes    Chest pain or SOB with exertion:  [x] No [] Yes    Appetite change or weight change: [x] No [] Yes    Nausea, vomiting or diarrhea:  [x] No [] Yes    Fever, sweats or chills: [x] No [] Yes    Leg swelling: [x] No [] Yes      Other medical issues:  No    Home BP: at goal    Review of Systems   A comprehensive review of systems was obtained and negative, except as noted in the HPI or PMH.    Problem List   Patient Active Problem List   Diagnosis     Kidney replaced by transplant     High risk medications (not anticoagulants) long-term use      Hypertension goal BP (blood pressure) < 140/90     GERD (gastroesophageal reflux disease)     CARDIOVASCULAR SCREENING; LDL GOAL LESS THAN 160     Gout     Creatinine elevation     Antibody mediated rejection of kidney transplant     Medical non-compliance     Chronic kidney disease, stage 4, severely decreased GFR (H)     Herpes simplex infection of penis     Norovirus     Escherichia coli septicemia (H)     Pyelonephritis of transplanted kidney     HTN, kidney transplant related     Metabolic acidosis     Chronic kidney disease, stage V (H)     Immunosuppression (H)       Social History   Social History     Tobacco Use     Smoking status: Former Smoker     Types: Cigarettes     Last attempt to quit: 2017     Years since quittin.7     Smokeless tobacco: Never Used     Tobacco comment: Patient states that he is an 'social'  smoker    Substance Use Topics     Alcohol use: Yes     Alcohol/week: 2.5 oz     Types: 5 Standard drinks or equivalent per week     Comment: 1-2 drinks/wk     Drug use: No       Allergies   Allergies   Allergen Reactions     Nkda [No Known Drug Allergies]        Medications   Current Outpatient Medications   Medication Sig     allopurinol (ZYLOPRIM) 100 MG tablet Take 2 tablets (200 mg) by mouth daily     amLODIPine (NORVASC) 5 MG tablet Take 1 tablet (5 mg) by mouth daily     carvedilol (COREG) 25 MG tablet Take 0.5 tablets (12.5 mg) by mouth 2 times daily     furosemide (LASIX) 20 MG tablet Take 1 tablet (20 mg) by mouth 2 times daily     mycophenolate (GENERIC EQUIVALENT) 250 MG capsule Take 2 capsules (500 mg) by mouth 2 times daily     omeprazole (PRILOSEC) 20 MG capsule Take 1 capsule (20 mg) by mouth daily     predniSONE (DELTASONE) 10 MG tablet Take 30 mg by mouth daily for 5 days, THEN 20 mg daily for 5 days, THEN 10 mg daily for 5 days.     predniSONE (DELTASONE) 10 MG tablet 10mg/ tablet: 3 tablets daily for the first 3 days then 2 tablets daily for the next 3 days then 1  "tablet daily for last 3 days     predniSONE (DELTASONE) 5 MG tablet      sodium bicarbonate 650 MG tablet Take 2 tablets (1,300 mg) by mouth 3 times daily     sulfamethoxazole-trimethoprim (BACTRIM/SEPTRA) 400-80 MG per tablet Take 1 tablet by mouth every other day     tacrolimus (GENERIC EQUIVALENT) 1 MG capsule Take 2 capsules (2 mg) by mouth 2 times daily     HYDROcodone-acetaminophen (NORCO) 5-325 MG per tablet Take 1-2 tablets by mouth every 6 hours as needed for pain (Patient not taking: Reported on 11/28/2018)     Urea 40 % CREA Apply small amount of cream to spots on face and groin NIGHTLY (Patient not taking: Reported on 12/9/2018)     valACYclovir (VALTREX) 1000 mg tablet Take 1 tablet (1,000 mg) by mouth daily for 7 days     Current Facility-Administered Medications   Medication     lidocaine (PF) (XYLOCAINE) 1 % injection 0.5 mL     triamcinolone acetonide (KENALOG-40) injection 20 mg     There are no discontinued medications.    Physical Exam   Vital Signs: BP (!) 159/100   Pulse 85   Temp 98.1  F (36.7  C) (Oral)   Ht 1.727 m (5' 8\")   Wt 81.7 kg (180 lb 1.6 oz)   SpO2 99%   BMI 27.38 kg/m       GENERAL APPEARANCE: alert and no distress  HENT: mouth without ulcers or lesions  LYMPHATICS: no cervical or supraclavicular nodes  RESP: lungs clear to auscultation - no rales, rhonchi or wheezes  CV: regular rhythm, normal rate, no rub, no murmur  EDEMA: no LE edema bilaterally  ABDOMEN: soft, nondistended, nontender, bowel sounds normal  MS: extremities normal - no gross deformities noted, no evidence of inflammation in joints, no muscle tenderness  SKIN: no rash      Data     Renal Latest Ref Rng & Units 11/24/2018 11/17/2018 11/10/2018   Na 133 - 144 mmol/L 143 144 145(H)   K 3.4 - 5.3 mmol/L 4.7 4.6 4.2   Cl 94 - 109 mmol/L 112(H) 113(H) 112(H)   CO2 20 - 32 mmol/L 21 25 24   BUN 7 - 30 mg/dL 55(H) 51(H) 37(H)   Cr 0.66 - 1.25 mg/dL 5.48(H) 5.74(H) 5.09(H)   Glucose 70 - 99 mg/dL 108(H) 90 81   Ca  " 8.5 - 10.1 mg/dL 9.2 9.3 8.4(L)   Mg 1.6 - 2.3 mg/dL - - -     Bone Health Latest Ref Rng & Units 11/1/2018 10/25/2018 6/19/2018   Phos 2.5 - 4.5 mg/dL 3.3 5.2(H) 3.2   PTHi 12 - 72 pg/mL - - -   Vit D Def 20 - 75 ug/L - - -     Heme Latest Ref Rng & Units 11/24/2018 11/17/2018 11/10/2018   WBC 4.0 - 11.0 10e9/L 6.8 7.1 10.9   Hgb 13.3 - 17.7 g/dL 8.1(L) 8.0(L) 7.9(LL)   Plt 150 - 450 10e9/L 270 174 318     Liver Latest Ref Rng & Units 10/26/2018 10/25/2018 10/25/2018   AP 40 - 150 U/L 63 64 63   TBili 0.2 - 1.3 mg/dL 0.4 0.4 0.4   ALT 0 - 70 U/L 10 14 16   AST 0 - 45 U/L 11 13 12   Tot Protein 6.8 - 8.8 g/dL 5.5(L) 6.8 6.8   Albumin 3.4 - 5.0 g/dL 2.0(L) 2.7(L) 2.8(L)     Pancreas Latest Ref Rng & Units 4/10/2017 6/17/2015 6/6/2015   A1C 4.3 - 6.0 % - - 5.4   Amylase 30 - 110 U/L 153(H) - -   Lipase 73 - 393 U/L 387 179 -     Iron studies Latest Ref Rng & Units 1/6/2018 1/12/2010 4/21/2009   Iron 35 - 180 ug/dL 62 198(H) 59   Iron sat 15 - 46 % 28 104(H) -   Ferritin 26 - 388 ng/mL 256 431(H) 68     [facility] Txp Virology Latest Ref Rng & Units 9/25/2017 4/24/2017 2/28/2017   CMV IgG EU/mL - - -   CMV IgM <0.90 - - -   CMV IgM Interp <0.90 - - -   CVM DNA Quant - - Plasma Plasma, EDTA anticoagulant   CMV Quant <100 Copies/mL - - -   CMV QT Log <2.0 Log copies/mL - - -   BK Spec - Plasma - Plasma   BK Res BKNEG:BK Virus DNA Not Detected copies/mL BK Virus DNA Not Detected - BK Virus DNA Not Detected   BK Log <2.7 Log copies/mL Not Calculated - Not Calculated   The Real-Time quantitative BK Virus assay was developed and its performance   characteristics determined by the Infectious Diseases Diagnostic Laboratory at   the [facility] [place] [facility] in [place], [place]. The   primers and probes for each analyte are Analyte Specific Reagents (ASRs)   manufactured by [employer].   ASRs are used in many laboratory tests necessary for standard medical care and   generally do not require U.S. Food and Drug  Administration approval. The FDA   has determined that such clearance or approval is not necessary.   This test is used for clinical purposes. It should not be regarded as   investigational or for research. This laboratory is certified under the   Clinical Laboratory Improvement Amendments of 1988 (CLIA-88) as qualified to   perform high complexity clinical laboratory testing.     EBV IgG - - - -   Hep B Core NR:Nonreactive Nonreactive - -   Hep B Surf - - - -   HIV 1&2 NEG - - -        Recent Labs   Lab Test 11/10/18  1051 11/17/18  0906 11/24/18  1146   DOSTAC 11/09/2018 2200 11/16/18 2100 2,230   TACROL 4.7* 4.9* 4.4*     Recent Labs   Lab Test 06/06/15  0552 04/24/17  0849 10/27/18  0441   DOSMPA Not Provided 2200 Not Provided   MPACID 1.94 2.16 <0.25*   MPAG 135.0* 172.3* 78.3       Early Post Transplant

## 2018-12-12 ENCOUNTER — OFFICE VISIT (OUTPATIENT)
Dept: FAMILY MEDICINE | Facility: CLINIC | Age: 43
End: 2018-12-12
Payer: COMMERCIAL

## 2018-12-12 ENCOUNTER — HOSPITAL ENCOUNTER (OUTPATIENT)
Facility: CLINIC | Age: 43
End: 2018-12-12
Attending: SURGERY | Admitting: SURGERY
Payer: COMMERCIAL

## 2018-12-12 VITALS
RESPIRATION RATE: 24 BRPM | HEART RATE: 91 BPM | OXYGEN SATURATION: 98 % | HEIGHT: 68 IN | WEIGHT: 181 LBS | SYSTOLIC BLOOD PRESSURE: 141 MMHG | TEMPERATURE: 98.1 F | BODY MASS INDEX: 27.43 KG/M2 | DIASTOLIC BLOOD PRESSURE: 94 MMHG

## 2018-12-12 DIAGNOSIS — M10.9 ACUTE GOUT INVOLVING TOE OF RIGHT FOOT, UNSPECIFIED CAUSE: ICD-10-CM

## 2018-12-12 DIAGNOSIS — N18.4 CHRONIC KIDNEY DISEASE, STAGE 4, SEVERELY DECREASED GFR (H): Primary | ICD-10-CM

## 2018-12-12 DIAGNOSIS — N18.5 CHRONIC KIDNEY DISEASE, STAGE V (H): ICD-10-CM

## 2018-12-12 DIAGNOSIS — Z94.0 KIDNEY TRANSPLANTED: ICD-10-CM

## 2018-12-12 DIAGNOSIS — Z94.0 KIDNEY REPLACED BY TRANSPLANT: ICD-10-CM

## 2018-12-12 LAB
ANION GAP SERPL CALCULATED.3IONS-SCNC: 13 MMOL/L (ref 3–14)
BACTERIA SPEC CULT: NORMAL
BUN SERPL-MCNC: 44 MG/DL (ref 7–30)
CALCIUM SERPL-MCNC: 9 MG/DL (ref 8.5–10.1)
CHLORIDE SERPL-SCNC: 108 MMOL/L (ref 94–109)
CO2 SERPL-SCNC: 17 MMOL/L (ref 20–32)
CREAT SERPL-MCNC: 3.77 MG/DL (ref 0.66–1.25)
ERYTHROCYTE [DISTWIDTH] IN BLOOD BY AUTOMATED COUNT: 19 % (ref 10–15)
GFR SERPL CREATININE-BSD FRML MDRD: 18 ML/MIN/1.7M2
GLUCOSE SERPL-MCNC: 159 MG/DL (ref 70–99)
HCT VFR BLD AUTO: 27.3 % (ref 40–53)
HGB BLD-MCNC: 8.7 G/DL (ref 13.3–17.7)
Lab: NORMAL
MCH RBC QN AUTO: 27.3 PG (ref 26.5–33)
MCHC RBC AUTO-ENTMCNC: 31.9 G/DL (ref 31.5–36.5)
MCV RBC AUTO: 86 FL (ref 78–100)
PLATELET # BLD AUTO: 254 10E9/L (ref 150–450)
POTASSIUM SERPL-SCNC: 4.1 MMOL/L (ref 3.4–5.3)
RBC # BLD AUTO: 3.19 10E12/L (ref 4.4–5.9)
SODIUM SERPL-SCNC: 138 MMOL/L (ref 133–144)
SPECIMEN SOURCE: NORMAL
WBC # BLD AUTO: 10.9 10E9/L (ref 4–11)

## 2018-12-12 PROCEDURE — 36415 COLL VENOUS BLD VENIPUNCTURE: CPT | Performed by: INTERNAL MEDICINE

## 2018-12-12 PROCEDURE — 85027 COMPLETE CBC AUTOMATED: CPT | Performed by: INTERNAL MEDICINE

## 2018-12-12 PROCEDURE — 99214 OFFICE O/P EST MOD 30 MIN: CPT | Performed by: FAMILY MEDICINE

## 2018-12-12 PROCEDURE — 80048 BASIC METABOLIC PNL TOTAL CA: CPT | Performed by: INTERNAL MEDICINE

## 2018-12-12 RX ORDER — HYDROCODONE BITARTRATE AND ACETAMINOPHEN 5; 325 MG/1; MG/1
1-2 TABLET ORAL EVERY 6 HOURS PRN
Qty: 40 TABLET | Refills: 0 | Status: SHIPPED | OUTPATIENT
Start: 2018-12-12 | End: 2019-06-13

## 2018-12-12 ASSESSMENT — MIFFLIN-ST. JEOR: SCORE: 1695.51

## 2018-12-12 ASSESSMENT — PAIN SCALES - GENERAL: PAINLEVEL: MODERATE PAIN (5)

## 2018-12-12 NOTE — PATIENT INSTRUCTIONS
At [facility], we strive to deliver an exceptional experience to you, every time we see you.  If you receive a survey in the mail, please send us back your thoughts. We really do value your feedback.    Based on your medical history, these are the current health maintenance/preventive care services that you are due for (some may have been done at this visit.)  Health Maintenance Due   Topic Date Due     DTAP/TDAP/TD IMMUNIZATION (1 - Tdap) 04/13/1983     PNEUMOVAX IMMUNIZATION 19-64 HIGHEST RISK (1 of 3 - PCV13) 04/13/1995     INFLUENZA VACCINE (1) 09/01/2018         Suggested websites for health information:  Www.[email or web address]org : Up to date and easily searchable information on multiple topics.  Www.medlineplus.gov : medication info, interactive tutorials, watch real surgeries online  Www.familydoctor.org : good info from the Academy of Family Physicians  Www.cdc.gov : public health info, travel advisories, epidemics (H1N1)  Www.aap.org : children's health info, normal development, vaccinations  Www.health.[facility].us : [place] dept of health, public health issues in [place], N1N1    Your care team:                            Family Medicine Internal Medicine   MD Juanito Lynne MD Shantel Branch-Fleming, MD Katya Georgiev PA-C Nam Ho, MD Pediatrics   GRANT Kendall, MD Kelly Matias CNP, MD Deborah Mielke, MD Kim Thein, APRN CNP      Clinic hours: Monday - Thursday 7 am-7 pm; Fridays 7 am-5 pm.   Urgent care: Monday - Friday 11 am-9 pm; Saturday and Sunday 9 am-5 pm.  Pharmacy : Monday -Thursday 8 am-8 pm; Friday 8 am-6 pm; Saturday and Sunday 9 am-5 pm.     Clinic: [phone]   Pharmacy: [phone]

## 2018-12-12 NOTE — PROGRESS NOTES
SUBJECTIVE:   Rashad Ortiz is a 43 year old male who presents to clinic today for the following health issues:    Foot Pain    Onset: 1month - Discuss pain medication treatment. Seen in urgent care 12/9/18 then nephrologist yesterday    Description:   Location: right foot- top of foot and outer side  Character: Sharp and throbbing    Intensity: 5/10    Progression of Symptoms: better    Accompanying Signs & Symptoms:  Other symptoms: radiation of pain to bottom of ankle    History:   Previous similar pain: YES      Precipitating factors:   Trauma or overuse: no     Alleviating factors:  Improved by: Tylenol    Therapies Tried and outcome: Tylenol      Seen by nephrology and prednisone dose changed. Has appt coming up for AV fistula placement pending dialysis as well.  Was stared on Uloric but has not picked it up yet.      Problem list and histories reviewed & adjusted, as indicated.  Additional history: as documented    Patient Active Problem List   Diagnosis     Kidney replaced by transplant     High risk medications (not anticoagulants) long-term use     Hypertension goal BP (blood pressure) < 140/90     GERD (gastroesophageal reflux disease)     CARDIOVASCULAR SCREENING; LDL GOAL LESS THAN 160     Gout     Creatinine elevation     Antibody mediated rejection of kidney transplant     Medical non-compliance     Chronic kidney disease, stage 4, severely decreased GFR (H)     Herpes simplex infection of penis     Norovirus     Escherichia coli septicemia (H)     Pyelonephritis of transplanted kidney     HTN, kidney transplant related     Metabolic acidosis     Chronic kidney disease, stage V (H)     Immunosuppression (H)     Past Surgical History:   Procedure Laterality Date     AV FISTULA OR GRAFT ARTERIAL       LIGATE FISTULA ARTERIOVENOUS UPPER EXTREMITY  12/20/2011    Procedure:LIGATE FISTULA ARTERIOVENOUS UPPER EXTREMITY; Excision of Right Forearm Arteriovenous Fistula.; Surgeon:LINDY AMAYA;  "Location:[facility] OR     PERCUTANEOUS BIOPSY KIDNEY Right 2017    Procedure: PERCUTANEOUS BIOPSY KIDNEY;  Surgeon: Gee Barrios MD;  Location: [facility] OR     TRANSPLANT  2011    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       Social History     Tobacco Use     Smoking status: Former Smoker     Types: Cigarettes     Last attempt to quit: 2017     Years since quittin.7     Smokeless tobacco: Never Used     Tobacco comment: Patient states that he is an 'social'  smoker    Substance Use Topics     Alcohol use: Yes     Alcohol/week: 2.5 oz     Types: 5 Standard drinks or equivalent per week     Comment: 1-2 drinks/wk     Family History   Problem Relation Age of Onset     Hypertension Mother            Reviewed and updated as needed this visit by clinical staff  Tobacco  Allergies  Meds  Problems  Med Hx  Surg Hx  Fam Hx  Soc Hx        Reviewed and updated as needed this visit by Provider  Tobacco  Allergies  Meds  Problems  Med Hx  Surg Hx  Fam Hx         ROS:  Constitutional, HEENT, cardiovascular, pulmonary, gi and gu systems are negative, except as otherwise noted.    OBJECTIVE:     BP (!) 141/94 (BP Location: Left arm, Patient Position: Chair, Cuff Size: Adult Large)   Pulse 91   Temp 98.1  F (36.7  C) (Oral)   Resp 24   Ht 1.727 m (5' 8\")   Wt 82.1 kg (181 lb)   SpO2 98%   BMI 27.52 kg/m    Body mass index is 27.52 kg/m .  GENERAL: healthy, alert and no distress  NECK: no adenopathy, no asymmetry, masses, or scars and thyroid normal to palpation  RESP: lungs clear to auscultation - no rales, rhonchi or wheezes  CV: regular rate and rhythm, normal S1 S2, no S3 or S4, no murmur, click or rub, no peripheral edema and peripheral pulses strong  ABDOMEN: soft, nontender, no hepatosplenomegaly, no masses and bowel sounds normal  MS: right foot pain  PSYCH: mentation appears normal, affect normal/bright    Diagnostic Test Results:  none     ASSESSMENT/PLAN:     1. Acute gout " involving toe of right foot, unspecified cause  Continued gout pain - start norco for pain pending response to new medications and eventual dialysis.  - HYDROcodone-acetaminophen (NORCO) 5-325 MG tablet; Take 1-2 tablets by mouth every 6 hours as needed for pain  Dispense: 40 tablet; Refill: 0    The uses and side effects, including black box warnings as appropriate, were discussed in detail.  All patient questions were answered.  The patient was instructed to call immediately if any side effects developed.     FUTURE APPOINTMENTS:       - Follow-up visit in 2 weeks if refill is needed.    Mariel Mares MD  [facility]

## 2018-12-12 NOTE — LETTER
December 12, 2018      Rashad Ortiz  [address] [facility] [place] [address]        To Whom It May Concern:    Rashad Ortiz  was seen on 12/12/18.  Please excuse him  until 12/13/18 due to illness.        Sincerely,        Mariel Mares MD

## 2018-12-18 ENCOUNTER — DOCUMENTATION ONLY (OUTPATIENT)
Dept: TRANSPLANT | Facility: CLINIC | Age: 43
End: 2018-12-18

## 2018-12-18 ENCOUNTER — TELEPHONE (OUTPATIENT)
Dept: TRANSPLANT | Facility: CLINIC | Age: 43
End: 2018-12-18

## 2018-12-18 NOTE — TELEPHONE ENCOUNTER
Maureen Archibald Yeewan S, APRN CNS      I spoke with Rashad today. He knows he is scheduled for surgery on 2/1/19 and a post op on 2/15/19. He also knows to see his PCP for pre-op H&P in January. He is requesting a call from someone to discuss the fistula further. He still has some more questions. I don't know if you want to call him or have one of the neph nurses.      Writer called patient regarding questions about upcoming AV fistula surgery. Patient questions about kidney function related to dialysis, which  affects creatinine level or stop making urine. He thinks he does not need dialysis any time soon, would like to know why he needs a AV fistula. Writer explained AV fistula needs 6-8 weeks to become mature enough to do dialysis, and he will continue makes urine at the beginning of dialysis. Writer highly recommend patient to discuss with his nephrology team for initiation of dialysis plan. Writer provided contact phone number and instructed patient to call writer with any questions or concerns. Patient verbalized acceptance and understanding of plan and will contact Nephrology RN CC.    Teo (Kaylah HALL, JONAH  Dialysis access program   [facility]  Pager # [phone]

## 2018-12-26 ENCOUNTER — DOCUMENTATION ONLY (OUTPATIENT)
Dept: TRANSPLANT | Facility: CLINIC | Age: 43
End: 2018-12-26

## 2018-12-26 NOTE — PROGRESS NOTES
Chart Prep    Clinic Visit on:  2/7/19    Last lab completed: 12/12/18 (no tac level)    Lab letter updated:  9/21/18    Lab orders up to date in Epic.

## 2019-01-17 ENCOUNTER — TELEPHONE (OUTPATIENT)
Dept: TRANSPLANT | Facility: CLINIC | Age: 44
End: 2019-01-17

## 2019-01-17 NOTE — PROGRESS NOTES
Flor Barrett, RN  Edgard, Teo SALINAS, ISABEL CNS             Hi Sum,     Dr. Murillo would like this patient to get a fistula for home hemodialysis. GFR is 14. Can you get him scheduled for surgery?     Thanks,   Flor

## 2019-01-17 NOTE — TELEPHONE ENCOUNTER
Patient is scheduled right upper arm AV fistula surgery on 2/1/2019.  Recent labs:    Ref. Range 12/12/2018 17:30   Creatinine Latest Ref Range: 0.66 - 1.25 mg/dL 3.77 (H)   GFR Estimate Latest Ref Range: >60 mL/min/1.7m2 18 (L)   GFR Estimate If Black Latest Ref Range: >60 mL/min/1.7m2 21 (L)   Writer called and discussed with patient regarding recent labs results of kidney function. He reports he feels well without uremic symptoms  and does not feel he needs to initiate dialysis any time soon. He would like to postpone AV fistula surgery on 2/1/2019 after discussion with [place] nephrology provider at clinic visit on 2/7/2019. Writer will send task to  to cancel 2/1/2019 surgery and post op appts. Writer will F/U nephrology team for timing of future dialysis access surgery. Patient verbalized acceptance and understanding of plan.

## 2019-02-01 DIAGNOSIS — M10.9 ACUTE GOUT INVOLVING TOE OF RIGHT FOOT, UNSPECIFIED CAUSE: ICD-10-CM

## 2019-02-04 RX ORDER — HYDROCODONE BITARTRATE AND ACETAMINOPHEN 5; 325 MG/1; MG/1
1-2 TABLET ORAL EVERY 6 HOURS PRN
Qty: 40 TABLET | Refills: 0 | OUTPATIENT
Start: 2019-02-04

## 2019-02-05 DIAGNOSIS — N17.9 AKI (ACUTE KIDNEY INJURY) (H): ICD-10-CM

## 2019-02-05 RX ORDER — FUROSEMIDE 20 MG
20 TABLET ORAL 2 TIMES DAILY
Qty: 180 TABLET | Refills: 1 | Status: SHIPPED | OUTPATIENT
Start: 2019-02-05 | End: 2019-10-03

## 2019-02-27 ENCOUNTER — TEAM CONFERENCE (OUTPATIENT)
Dept: TRANSPLANT | Facility: CLINIC | Age: 44
End: 2019-02-27

## 2019-02-27 NOTE — TELEPHONE ENCOUNTER
TEAM CONFERENCE:     ATTENDEES: Sophie Irwin Riad, Schumaker, Schenk, Coordinators, Social Work, Pharmacy, Finance, and Dietary    DISCUSSION:  Team reviewed pt has not been compliant even though he is on a compliance contract. Pt did not complete testing (PFTs, cardiology) that was requested.    OUTCOME: Team decided to de-list pt from the kidney transplant waitlist. Pt can be re-referred by his nephrologist once he has six months of compliance and a letter of support.      Coordinator called pt and left msg with above information. Contact number provided if pt has questions.    Pt removed from [facility], immunology & PFR notified, change in status letter routed to admin team to send out.

## 2019-02-27 NOTE — LETTER
February 27, 2019    Rashad Ortiz  [address] [facility] [address]      Dear Mr. Ortiz,    The purpose of this letter is to let you know the [facility] Multi-Disciplinary Selection Team made the decision to remove you from the kidney transplant list.  This is because of non-compliance. You are welcome to come back as a referral once you have at least six months of compliance and have a letter of support from your nephrologist.  Important things you should know:    If you would like to discuss the decision, or if your medical status changes, you may call [phone] and ask to speak to your transplant coordinator.    We recommend that you continue to follow up with your primary care and referring physicians in order to manage your health concerns.  Enclosed is a letter from [facility] which describes the services offered to patients by [facility] and the [facility].  Thank you for allowing us to participate in your care.  We wish you well.    Sincerely,    Kidney Transplant Team  Enclosure:  [facility] Letter     CC:    Stef Murillo MD

## 2019-03-04 ENCOUNTER — TELEPHONE (OUTPATIENT)
Dept: TRANSPLANT | Facility: CLINIC | Age: 44
End: 2019-03-04

## 2019-03-04 NOTE — TELEPHONE ENCOUNTER
Coordinator returned pt's call. Reviewed committee's decision from 2/27/19 (See note for details). Pt states he would like to be re-referred in 6 months and will discuss with nephrologist.

## 2019-03-06 ENCOUNTER — DOCUMENTATION ONLY (OUTPATIENT)
Dept: TRANSPLANT | Facility: CLINIC | Age: 44
End: 2019-03-06

## 2019-03-06 DIAGNOSIS — Z48.298 AFTERCARE FOLLOWING ORGAN TRANSPLANT: ICD-10-CM

## 2019-03-06 DIAGNOSIS — Z94.0 KIDNEY TRANSPLANTED: Primary | ICD-10-CM

## 2019-03-06 NOTE — PROGRESS NOTES
Chart Prep    Clinic Visit on: 4/23/19    Last lab completed:  12/12/18, due this month    Lab letter updated: 9/21/18    Lab orders up to date in Epic.

## 2019-03-19 DIAGNOSIS — Z94.0 KIDNEY TRANSPLANTED: ICD-10-CM

## 2019-03-19 DIAGNOSIS — Z48.298 AFTERCARE FOLLOWING ORGAN TRANSPLANT: ICD-10-CM

## 2019-03-19 LAB
ANION GAP SERPL CALCULATED.3IONS-SCNC: 13 MMOL/L (ref 3–14)
BUN SERPL-MCNC: 50 MG/DL (ref 7–30)
CALCIUM SERPL-MCNC: 8.3 MG/DL (ref 8.5–10.1)
CHLORIDE SERPL-SCNC: 111 MMOL/L (ref 94–109)
CO2 SERPL-SCNC: 18 MMOL/L (ref 20–32)
CREAT SERPL-MCNC: 5.02 MG/DL (ref 0.66–1.25)
CREAT UR-MCNC: 73 MG/DL
ERYTHROCYTE [DISTWIDTH] IN BLOOD BY AUTOMATED COUNT: 16.7 % (ref 10–15)
GFR SERPL CREATININE-BSD FRML MDRD: 13 ML/MIN/{1.73_M2}
GLUCOSE SERPL-MCNC: 156 MG/DL (ref 70–99)
HCT VFR BLD AUTO: 32.2 % (ref 40–53)
HGB BLD-MCNC: 10.6 G/DL (ref 13.3–17.7)
MCH RBC QN AUTO: 27.9 PG (ref 26.5–33)
MCHC RBC AUTO-ENTMCNC: 32.9 G/DL (ref 31.5–36.5)
MCV RBC AUTO: 85 FL (ref 78–100)
PLATELET # BLD AUTO: 198 10E9/L (ref 150–450)
POTASSIUM SERPL-SCNC: 4.1 MMOL/L (ref 3.4–5.3)
PROT UR-MCNC: 2.13 G/L
PROT/CREAT 24H UR: 2.92 G/G CR (ref 0–0.2)
RBC # BLD AUTO: 3.8 10E12/L (ref 4.4–5.9)
SODIUM SERPL-SCNC: 142 MMOL/L (ref 133–144)
WBC # BLD AUTO: 7.2 10E9/L (ref 4–11)

## 2019-03-19 PROCEDURE — 84156 ASSAY OF PROTEIN URINE: CPT | Performed by: INTERNAL MEDICINE

## 2019-03-19 PROCEDURE — 80048 BASIC METABOLIC PNL TOTAL CA: CPT | Performed by: INTERNAL MEDICINE

## 2019-03-19 PROCEDURE — 80197 ASSAY OF TACROLIMUS: CPT | Performed by: INTERNAL MEDICINE

## 2019-03-19 PROCEDURE — 36415 COLL VENOUS BLD VENIPUNCTURE: CPT | Performed by: INTERNAL MEDICINE

## 2019-03-19 PROCEDURE — 85027 COMPLETE CBC AUTOMATED: CPT | Performed by: INTERNAL MEDICINE

## 2019-03-20 LAB
TACROLIMUS BLD-MCNC: 8.2 UG/L (ref 5–15)
TME LAST DOSE: NORMAL H

## 2019-03-20 NOTE — RESULT ENCOUNTER NOTE
UPC elevated (2.98 from 0.85).   Cr 5, within threshold.   De-listed d/t non compliance.  Will send to Provider to review.

## 2019-03-21 ENCOUNTER — TELEPHONE (OUTPATIENT)
Dept: TRANSPLANT | Facility: CLINIC | Age: 44
End: 2019-03-21

## 2019-03-21 DIAGNOSIS — Z94.0 KIDNEY TRANSPLANTED: Primary | ICD-10-CM

## 2019-03-21 DIAGNOSIS — Z94.0 IMMUNOSUPPRESSIVE MANAGEMENT ENCOUNTER FOLLOWING KIDNEY TRANSPLANT: ICD-10-CM

## 2019-03-21 DIAGNOSIS — Z79.899 IMMUNOSUPPRESSIVE MANAGEMENT ENCOUNTER FOLLOWING KIDNEY TRANSPLANT: ICD-10-CM

## 2019-03-21 NOTE — TELEPHONE ENCOUNTER
ISSUE: tacrolimus = 8.2  Goal 4-6    Looks like last dose was 7 AM on 3/19 and level drawn at 3 PM on 3/19.    No dose change at this time. Please have  Tac level rechecked and discuss importance of good trough.    Tac order placed, in EPIC.  If levels remain elevated, will plan to decrease dose.

## 2019-03-22 ENCOUNTER — TELEPHONE (OUTPATIENT)
Dept: TRANSPLANT | Facility: CLINIC | Age: 44
End: 2019-03-22

## 2019-03-22 NOTE — TELEPHONE ENCOUNTER
Notes recorded by Gee Barrios MD on 3/20/2019 at 10:17 AM CDT  Worsening kidney function and nearing need for renal replacement therapy.    Increased urine protein likely due to failing kidney allograft.    Continued low serum bicarbonate and would ensure patient is taking sodium bicarbonate supplement.  ------    Notes recorded by So Souza RN on 3/20/2019 at 9:33 AM CDT  UPC elevated (2.98 from 0.85).   Cr 5, within threshold.   De-listed d/t non compliance.  Will send to Provider to review.    PLAN:  Call Rashad Ortiz and confirm he continues to take his sodium bicarbonate supplement.  Current dose Sodium bicarbonate 1300 mg three times a day.

## 2019-03-22 NOTE — TELEPHONE ENCOUNTER
Call placed to patient. Patient notes that he's been taking sodium bicarb 1300 mg bid and inaccurate tacrolimus trough level. Patient v\u to increase sodium bicarb frequency to 3 time per day and repeat tacrolimus level ensuring an accurate trough level.

## 2019-03-22 NOTE — TELEPHONE ENCOUNTER
Call placed to patient. No answer. Voice message left instructing patient to repeat tacrolimus level, ensuring an accurate trough level.

## 2019-03-23 DIAGNOSIS — Z94.0 KIDNEY TRANSPLANTED: ICD-10-CM

## 2019-03-23 DIAGNOSIS — N18.5 CKD (CHRONIC KIDNEY DISEASE) STAGE 5, GFR LESS THAN 15 ML/MIN (H): ICD-10-CM

## 2019-03-23 DIAGNOSIS — Z94.0 IMMUNOSUPPRESSIVE MANAGEMENT ENCOUNTER FOLLOWING KIDNEY TRANSPLANT: ICD-10-CM

## 2019-03-23 DIAGNOSIS — Z79.899 IMMUNOSUPPRESSIVE MANAGEMENT ENCOUNTER FOLLOWING KIDNEY TRANSPLANT: ICD-10-CM

## 2019-03-23 PROCEDURE — 80069 RENAL FUNCTION PANEL: CPT | Performed by: INTERNAL MEDICINE

## 2019-03-23 PROCEDURE — 36415 COLL VENOUS BLD VENIPUNCTURE: CPT | Performed by: INTERNAL MEDICINE

## 2019-03-23 PROCEDURE — 80197 ASSAY OF TACROLIMUS: CPT | Performed by: INTERNAL MEDICINE

## 2019-03-24 LAB
TACROLIMUS BLD-MCNC: 4.2 UG/L (ref 5–15)
TME LAST DOSE: ABNORMAL H

## 2019-03-25 LAB
ALBUMIN SERPL-MCNC: 3 G/DL (ref 3.4–5)
ANION GAP SERPL CALCULATED.3IONS-SCNC: 10 MMOL/L (ref 3–14)
BUN SERPL-MCNC: 47 MG/DL (ref 7–30)
CALCIUM SERPL-MCNC: 8.2 MG/DL (ref 8.5–10.1)
CHLORIDE SERPL-SCNC: 113 MMOL/L (ref 94–109)
CO2 SERPL-SCNC: 20 MMOL/L (ref 20–32)
CREAT SERPL-MCNC: 4.91 MG/DL (ref 0.66–1.25)
GFR SERPL CREATININE-BSD FRML MDRD: 13 ML/MIN/{1.73_M2}
GLUCOSE SERPL-MCNC: 87 MG/DL (ref 70–99)
PHOSPHATE SERPL-MCNC: 3.8 MG/DL (ref 2.5–4.5)
POTASSIUM SERPL-SCNC: 3.5 MMOL/L (ref 3.4–5.3)
SODIUM SERPL-SCNC: 143 MMOL/L (ref 133–144)

## 2019-04-08 ENCOUNTER — OFFICE VISIT (OUTPATIENT)
Dept: FAMILY MEDICINE | Facility: CLINIC | Age: 44
End: 2019-04-08
Payer: COMMERCIAL

## 2019-04-08 VITALS
SYSTOLIC BLOOD PRESSURE: 152 MMHG | WEIGHT: 176 LBS | HEIGHT: 68 IN | OXYGEN SATURATION: 99 % | HEART RATE: 84 BPM | BODY MASS INDEX: 26.67 KG/M2 | DIASTOLIC BLOOD PRESSURE: 102 MMHG | TEMPERATURE: 98.6 F

## 2019-04-08 DIAGNOSIS — M10.9 ACUTE GOUT INVOLVING TOE OF RIGHT FOOT, UNSPECIFIED CAUSE: ICD-10-CM

## 2019-04-08 DIAGNOSIS — R23.4 ESCHAR OF LOWER LEG: Primary | ICD-10-CM

## 2019-04-08 DIAGNOSIS — N18.4 CHRONIC KIDNEY DISEASE, STAGE 4, SEVERELY DECREASED GFR (H): ICD-10-CM

## 2019-04-08 PROCEDURE — 84550 ASSAY OF BLOOD/URIC ACID: CPT | Performed by: FAMILY MEDICINE

## 2019-04-08 PROCEDURE — 99214 OFFICE O/P EST MOD 30 MIN: CPT | Performed by: FAMILY MEDICINE

## 2019-04-08 PROCEDURE — 80048 BASIC METABOLIC PNL TOTAL CA: CPT | Performed by: FAMILY MEDICINE

## 2019-04-08 PROCEDURE — 36415 COLL VENOUS BLD VENIPUNCTURE: CPT | Performed by: FAMILY MEDICINE

## 2019-04-08 RX ORDER — MUPIROCIN 20 MG/G
OINTMENT TOPICAL 3 TIMES DAILY
Qty: 22 G | Refills: 1 | Status: SHIPPED | OUTPATIENT
Start: 2019-04-08 | End: 2019-06-13

## 2019-04-08 ASSESSMENT — PAIN SCALES - GENERAL: PAINLEVEL: NO PAIN (0)

## 2019-04-08 ASSESSMENT — MIFFLIN-ST. JEOR: SCORE: 1672.83

## 2019-04-08 NOTE — PATIENT INSTRUCTIONS
"    ================================================================================  Normal Values   Blood pressure  <140/90 for most adults    <130/80 for some chronic diseases (ask your care team about yours)    BMI (body mass index)  18.5-25 kg/m2 (based on height and weight)     Thank you for visiting [facility]    Normal or non-critical lab and imaging results will be communicated to you by MyChart, letter or phone within 7 days.  If you do not hear from us within 10 days, please call the clinic. If you have a critical or abnormal lab result, we will notify you by phone as soon as possible.     If you have any questions regarding your visit please contact:     Team Comfort:   Clinic Hours Telephone Number   Dr. Johann [address]rington [address] [place] 7am-5pm  Monday - Friday [phone]  Oneil RN  Chapis Thorne RN   Pharmacy 8:00am-8pm Monday-Friday    9am-5pm Saturday-Sunday [phone]   Urgent Care 11am-9pm Monday-Friday        9am-5pm Saturday-Sunday [phone]     After hours, weekend or if you need to make an appointment with your primary provider please call [phone].   After Hours nurse advise: call [place] [facility]: [phone]    Medication Refills:  Call your pharmacy and they will forward the refill to us. Please allow 3 business days for your refills to be completed.          Patient Education   Protein and Kidney Disease  Your body needs the right amount of protein to build muscles, repair tissues and fight infections.   Protein breaks down into waste products, which must be removed by the kidneys. If you have kidney disease, eating too much protein could put stress on your kidneys. You may be able to prevent further damage to your kidneys by limiting the amount of protein you eat.   Try to eat more animal proteins than plant proteins. Animal proteins are \"complete proteins.\" They include meat, poultry, fish, seafood, eggs and " milk products. At least half or your daily protein should come from these foods.  Limit your animal proteins to ________ ounces every day.  Examples  1 ounce of protein:     Egg    Chicken wing      cup cottage cheese or a slice of cheese about the size of a sveta (limit to one serving of cheese or cottage cheese each day)  2 ounces of protein:    Chicken leg or thigh    1/4 cup meat, fish or poultry (such as chicken or turkey)    Thin pork chop  3 ounces of protein:    Medium hamburger (a quarter pounder)    Thick pork chop    Medium fish fillet  Tips  1 ounce of meat is about 7 grams of protein.  A 3- to 4-ounce piece of meat is about the size of a deck of playing cards.     1 ounce of cheese is about the size of a sveta.     Calories and Kidney Disease  The energy you get from food is measured in calories. Your body needs calories to function and to stay at a healthy weight.   Since you may be eating less protein, you may also be eating fewer calories. Not eating enough calories may cause your body to break down its own muscles for energy. This also puts stress on the kidneys.   Eating more fat can be an easy way to take in extra calories without feeling full. The following fats contain about 50 calories per servin teaspoon margarine or butter    1 teaspoon vegetable oil    1 tablespoon salad dressing    2 tablespoons mayonnaise    1/4 cup whipped topping (non-dairy)  These high-calorie foods are low in protein, phosphorus and sodium. Each contains about 100 calories per servin ounces Marques-Aid or lemonade    8 ounces non-cola soda pop    8 ounces apple cider    2 tablespoons jam or jelly    20 jellybeans    2 tablespoons honey or syrup  For informational purposes only. Not to replace the advice of your health care provider. Copyright   2004 [facility]. All rights reserved. [place] [id number] - REV        Patient Education     Diet for Chronic Kidney Disease  Following a special  diet when you have kidney disease can help you stay as healthy as possible. Your healthcare provider or dietitian should make a special diet plan just for you.    Eating right  Here are some good eating rules to follow:    Protein. Eating protein is important for your body. But too much protein can put a strain on your kidneys. Eating less protein may slow the progression of chronic kidney disease. Foods high in protein include meat, fish, eggs, cheese, and other dairy products. A registered dietitian can help you plan a diet that has the right amount of protein for you.    Sodium. Having too much salt in your diet can make your body hold onto (retain) water. Ask your provider or dietitian how much sodium per day you are allowed. This will help you avoid fluid buildup in your body (fluid retention). It can also help control high blood pressure. Learn to read food labels to know how much sodium is in one serving. Foods high in salt include processed meats, canned and boxed foods, sauces, salted chips and snacks, pickled foods, frozen dinners, and restaurant and fast food.    Fluids. If you have advanced kidney disease, you will need to limit the water and fluids you drink. If you don t, then too much water will build up in your body. The exact amount of fluid you can drink depends on how well your kidneys are working. Ask your provider how much water you can safely drink each day.    Potassium. In advanced kidney disease, your potassium level can go dangerously high. This affects your heart. It can cause an irregular heartbeat (arrhythmia). Ask your provider or dietitian if you should limit potassium in your diet. Foods high in potassium include dairy products (milk, yogurt, cheese), dried beans, bananas, oranges, potatoes, tomatoes, spinach, cantaloupe, honeydew melon, dried fruits, and nuts.     Calcium. Calcium is important to build strong bones. But foods high in calcium are also high in phosphorus, which can  take calcium from your bones. Limiting foods high in phosphorus will help keep calcium in your bones. Ask your provider how much calcium you should get each day.    Phosphorus. In advanced kidney disease, your phosphorus level can go dangerously high. This affects many systems in the body and can damage your heart. Limit your intake of phosphorus-rich foods. These include dried beans and peas, nuts, peanut butter, cocoa, beer, cola drinks, and dairy products.  Date Last Reviewed: 8/1/2016 2000-2018 [employer]. [address] [place] [address]. All rights reserved. This information is not intended as a substitute for professional medical care. Always follow your healthcare professional's instructions.           Patient Education     Gout Diet  Gout is a painful condition caused by an excess of uric acid, a waste product made by the body. Uric acid forms crystals that collect in the joints. The immune response to these crystals brings on symptoms of joint pain and swelling. This is called a gout attack. Often, medications and diet changes are combined to manage gout. Below are some guidelines for changing your diet to help you manage gout and prevent attacks. Your healthcare provider will help you determine the best eating plan for you.  Eating to manage gout  Weight loss for those who are overweight may help reduce gout attacks.  Eat less of these foods  Eating too many foods containing purines may raise the levels of uric acid in your body. This raises your risk for a gout attack. Try to limit these foods and drinks:    Alcohol, such as beer and red wine. You may be told to avoid alcohol completely.    Soft drinks that contain sugar or high fructose corn syrup    Certain fish, including anchovies, sardines, fish eggs, and herring    Shellfish    Certain meats, such as red meat, hot dogs, luncheon meats, and turkey    Organ meats, such as liver, kidneys, and sweetbreads    Legumes, such as  dried beans and peas    Other high fat foods such as gravy, whole milk, and high fat cheeses    Vegetables such as asparagus, cauliflower, spinach, and mushrooms used to be thought to contribute to an increased risk for a gout attack, but recent studies show that high purine vegetables don't increase the risk for a gout attack.  Eat more of these foods  Other foods may be helpful for people with gout. Add some of these foods to your diet:    Cherries contain chemicals that may lower uric acid.    Omega fatty acids. These are found in some fatty fish such as salmon, certain oils (flax, olive, or nut), and nuts themselves. Omega fatty acids may help prevent inflammation due to gout.    Dairy products that are low-fat or fat-free, such as cheese and yogurt    Complex carbohydrate foods, including whole grains, brown rice, oats, and beans    Coffee, in moderation    Water, approximately 64 ounces per day  Follow-up care  Follow up with your healthcare provider as advised.  When to seek medical advice  Call your healthcare provider right away if any of these occur:    Return of gout symptoms, usually at night:    Severe pain, swelling, and heat in a joint, especially the base of the big toe    Affected joint is hard to move    Skin of the affected joint is purple or red    Fever of 100.4 F (38 C) or higher    Pain that doesn't get better even with prescribed medicine   Date Last Reviewed: 6/1/2018 2000-2018 [employer]. [address] [place] [address], [place] [address]. All rights reserved. This information is not intended as a substitute for professional medical care. Always follow your healthcare professional's instructions.

## 2019-04-08 NOTE — PROGRESS NOTES
SUBJECTIVE:                                                    Rashad Ortiz is a 43 year old male who presents to clinic today for the following health issues:        Concern - Sore on leg      When did it start?: about 1 month    Any strain/injury, other illness, or event to trigger this problem?   YES- started out as a bump    Previous history of similar problem:   no      Location:           Left Lower leg    Describe it:   Scab with a little pain     Severity: moderate      Progression of symptoms from onset until now:  worsening      Accompanying Signs & Symptoms:  Scab    What have you noticed that tends to make this worse?     None      What have you noticed that tends to make this better?     None      What have you done (this time) to try to treat this problem yourself?     None      Did it help?     no             CKD 4 - would like a recheck and CR has decreased from over 5.    Gout - eating mainly tofu and fish but no pain now.      Problem list and histories reviewed & adjusted, as indicated.  Additional history: as documented    Patient Active Problem List   Diagnosis     Kidney replaced by transplant     High risk medications (not anticoagulants) long-term use     Hypertension goal BP (blood pressure) < 140/90     GERD (gastroesophageal reflux disease)     CARDIOVASCULAR SCREENING; LDL GOAL LESS THAN 160     Gout     Creatinine elevation     Antibody mediated rejection of kidney transplant     Medical non-compliance     Chronic kidney disease, stage 4, severely decreased GFR (H)     Herpes simplex infection of penis     Norovirus     Escherichia coli septicemia (H)     Pyelonephritis of transplanted kidney     HTN, kidney transplant related     Metabolic acidosis     Chronic kidney disease, stage V (H)     Immunosuppression (H)     Past Surgical History:   Procedure Laterality Date     AV FISTULA OR GRAFT ARTERIAL       LIGATE FISTULA ARTERIOVENOUS UPPER EXTREMITY  12/20/2011    Procedure:LIGATE  "FISTULA ARTERIOVENOUS UPPER EXTREMITY; Excision of Right Forearm Arteriovenous Fistula.; Surgeon:LINDY AMAYA; Location:[facility] OR     PERCUTANEOUS BIOPSY KIDNEY Right 2017    Procedure: PERCUTANEOUS BIOPSY KIDNEY;  Surgeon: Gee Barrios MD;  Location: [facility] OR     TRANSPLANT  2011    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       Social History     Tobacco Use     Smoking status: Former Smoker     Types: Cigarettes     Last attempt to quit: 2017     Years since quittin.1     Smokeless tobacco: Never Used     Tobacco comment: Patient states that he is an 'social'  smoker    Substance Use Topics     Alcohol use: Yes     Alcohol/week: 2.5 oz     Types: 5 Standard drinks or equivalent per week     Comment: 1-2 drinks/wk     Family History   Problem Relation Age of Onset     Hypertension Mother            ROS:  Constitutional, HEENT, cardiovascular, pulmonary, gi and gu systems are negative, except as otherwise noted.    OBJECTIVE:     BP (!) 152/102   Pulse 84   Temp 98.6  F (37  C) (Oral)   Ht 1.727 m (5' 8\")   Wt 79.8 kg (176 lb)   SpO2 99%   BMI 26.76 kg/m    Body mass index is 26.76 kg/m .  GENERAL: healthy, alert and no distress  NECK: no adenopathy, no asymmetry, masses, or scars and thyroid normal to palpation  RESP: lungs clear to auscultation - no rales, rhonchi or wheezes  CV: regular rate and rhythm, normal S1 S2, no S3 or S4, no murmur, click or rub, no peripheral edema and peripheral pulses strong  ABDOMEN: soft, nontender, no hepatosplenomegaly, no masses and bowel sounds normal  MS: stable swelling lower extremities  SKIN: superficial eschar on left lower leg    Diagnostic Test Results:  Reviewed March labs.    ASSESSMENT/PLAN:     1. Eschar of lower leg  Discussed gentle debridement and topical abx  - mupirocin (BACTROBAN) 2 % external ointment; Apply topically 3 times daily  Dispense: 22 g; Refill: 1    2. Chronic kidney disease, stage 4, severely decreased GFR " (H)  Recheck today and due to see nephrology in the next few weeks.  - Basic metabolic panel    3. Acute gout involving toe of right foot, unspecified cause  Recheck and discussed labs again.  - Uric acid    FUTURE APPOINTMENTS:       - Follow-up visit in 5 - 7 days if not resolved.    Mariel Mares MD  [facility]

## 2019-04-09 LAB
ANION GAP SERPL CALCULATED.3IONS-SCNC: 9 MMOL/L (ref 3–14)
BUN SERPL-MCNC: 46 MG/DL (ref 7–30)
CALCIUM SERPL-MCNC: 8.3 MG/DL (ref 8.5–10.1)
CHLORIDE SERPL-SCNC: 109 MMOL/L (ref 94–109)
CO2 SERPL-SCNC: 23 MMOL/L (ref 20–32)
CREAT SERPL-MCNC: 4.98 MG/DL (ref 0.66–1.25)
GFR SERPL CREATININE-BSD FRML MDRD: 13 ML/MIN/{1.73_M2}
GLUCOSE SERPL-MCNC: 91 MG/DL (ref 70–99)
POTASSIUM SERPL-SCNC: 4 MMOL/L (ref 3.4–5.3)
SODIUM SERPL-SCNC: 141 MMOL/L (ref 133–144)
URATE SERPL-MCNC: 11.8 MG/DL (ref 3.5–7.2)

## 2019-04-09 NOTE — RESULT ENCOUNTER NOTE
Mr. Ortiz,    Your uric acid level has increase.  Be sure to make the diet changes we discussed.  Your kidney function is about the same.    Please contact the clinic if you have additional questions.  Thank you.    Sincerely,    Mariel Mares

## 2019-04-15 DIAGNOSIS — Z94.0 KIDNEY TRANSPLANTED: ICD-10-CM

## 2019-04-15 DIAGNOSIS — E55.9 VITAMIN D DEFICIENCY: ICD-10-CM

## 2019-04-15 RX ORDER — SULFAMETHOXAZOLE AND TRIMETHOPRIM 400; 80 MG/1; MG/1
1 TABLET ORAL EVERY OTHER DAY
Qty: 45 TABLET | Refills: 3 | Status: SHIPPED | OUTPATIENT
Start: 2019-04-15 | End: 2020-05-06

## 2019-05-02 ENCOUNTER — TELEPHONE (OUTPATIENT)
Dept: TRANSPLANT | Facility: CLINIC | Age: 44
End: 2019-05-02

## 2019-05-02 DIAGNOSIS — Z79.899 ENCOUNTER FOR LONG-TERM CURRENT USE OF MEDICATION: ICD-10-CM

## 2019-05-02 DIAGNOSIS — Z94.0 KIDNEY REPLACED BY TRANSPLANT: Primary | ICD-10-CM

## 2019-05-02 DIAGNOSIS — Z48.298 AFTERCARE FOLLOWING ORGAN TRANSPLANT: ICD-10-CM

## 2019-05-02 NOTE — TELEPHONE ENCOUNTER
Patient was messaging with scheduling about upcoming appointment and asked about labs.     Please call him and let him know per Dr. Murillo note from 12/2018 he is to be getting bi-weekly labs and following up every 3 months. Please send him new lab letter and update orders.

## 2019-05-07 DIAGNOSIS — I10 BENIGN ESSENTIAL HYPERTENSION: ICD-10-CM

## 2019-05-07 RX ORDER — AMLODIPINE BESYLATE 5 MG/1
5 TABLET ORAL DAILY
Qty: 90 TABLET | Refills: 3 | Status: SHIPPED | OUTPATIENT
Start: 2019-05-07 | End: 2020-06-05

## 2019-06-13 ENCOUNTER — OFFICE VISIT (OUTPATIENT)
Dept: NEPHROLOGY | Facility: CLINIC | Age: 44
End: 2019-06-13
Attending: INTERNAL MEDICINE
Payer: COMMERCIAL

## 2019-06-13 VITALS
OXYGEN SATURATION: 100 % | RESPIRATION RATE: 16 BRPM | SYSTOLIC BLOOD PRESSURE: 147 MMHG | DIASTOLIC BLOOD PRESSURE: 101 MMHG | WEIGHT: 172.8 LBS | HEIGHT: 68 IN | HEART RATE: 78 BPM | BODY MASS INDEX: 26.19 KG/M2

## 2019-06-13 DIAGNOSIS — E55.9 VITAMIN D DEFICIENCY: ICD-10-CM

## 2019-06-13 DIAGNOSIS — N25.81 SECONDARY RENAL HYPERPARATHYROIDISM (H): ICD-10-CM

## 2019-06-13 DIAGNOSIS — D84.9 IMMUNOSUPPRESSION (H): ICD-10-CM

## 2019-06-13 DIAGNOSIS — Z94.0 HTN, KIDNEY TRANSPLANT RELATED: ICD-10-CM

## 2019-06-13 DIAGNOSIS — Z94.0 KIDNEY REPLACED BY TRANSPLANT: Primary | ICD-10-CM

## 2019-06-13 DIAGNOSIS — D63.1 ANEMIA IN STAGE 4 CHRONIC KIDNEY DISEASE (H): ICD-10-CM

## 2019-06-13 DIAGNOSIS — N18.4 CKD (CHRONIC KIDNEY DISEASE) STAGE 4, GFR 15-29 ML/MIN (H): ICD-10-CM

## 2019-06-13 DIAGNOSIS — I89.0 LYMPHEDEMA: ICD-10-CM

## 2019-06-13 DIAGNOSIS — N18.4 ANEMIA IN STAGE 4 CHRONIC KIDNEY DISEASE (H): ICD-10-CM

## 2019-06-13 DIAGNOSIS — I15.1 HTN, KIDNEY TRANSPLANT RELATED: ICD-10-CM

## 2019-06-13 DIAGNOSIS — Z94.0 KIDNEY REPLACED BY TRANSPLANT: ICD-10-CM

## 2019-06-13 DIAGNOSIS — E87.20 METABOLIC ACIDOSIS: ICD-10-CM

## 2019-06-13 DIAGNOSIS — N17.9 AKI (ACUTE KIDNEY INJURY) (H): ICD-10-CM

## 2019-06-13 DIAGNOSIS — Z48.298 AFTERCARE FOLLOWING ORGAN TRANSPLANT: ICD-10-CM

## 2019-06-13 LAB
ERYTHROCYTE [DISTWIDTH] IN BLOOD BY AUTOMATED COUNT: 16.6 % (ref 10–15)
HCT VFR BLD AUTO: 27.7 % (ref 40–53)
HGB BLD-MCNC: 8.8 G/DL (ref 13.3–17.7)
MCH RBC QN AUTO: 28.1 PG (ref 26.5–33)
MCHC RBC AUTO-ENTMCNC: 31.8 G/DL (ref 31.5–36.5)
MCV RBC AUTO: 89 FL (ref 78–100)
PLATELET # BLD AUTO: 208 10E9/L (ref 150–450)
RBC # BLD AUTO: 3.13 10E12/L (ref 4.4–5.9)
WBC # BLD AUTO: 7 10E9/L (ref 4–11)

## 2019-06-13 PROCEDURE — 82306 VITAMIN D 25 HYDROXY: CPT | Performed by: INTERNAL MEDICINE

## 2019-06-13 PROCEDURE — 85027 COMPLETE CBC AUTOMATED: CPT | Performed by: INTERNAL MEDICINE

## 2019-06-13 PROCEDURE — 82728 ASSAY OF FERRITIN: CPT | Performed by: INTERNAL MEDICINE

## 2019-06-13 PROCEDURE — 36415 COLL VENOUS BLD VENIPUNCTURE: CPT | Performed by: INTERNAL MEDICINE

## 2019-06-13 PROCEDURE — 83970 ASSAY OF PARATHORMONE: CPT | Performed by: INTERNAL MEDICINE

## 2019-06-13 PROCEDURE — 80069 RENAL FUNCTION PANEL: CPT | Performed by: INTERNAL MEDICINE

## 2019-06-13 PROCEDURE — G0463 HOSPITAL OUTPT CLINIC VISIT: HCPCS | Mod: ZF

## 2019-06-13 PROCEDURE — 83550 IRON BINDING TEST: CPT | Performed by: INTERNAL MEDICINE

## 2019-06-13 PROCEDURE — 83540 ASSAY OF IRON: CPT | Performed by: INTERNAL MEDICINE

## 2019-06-13 RX ORDER — CARVEDILOL 25 MG/1
25 TABLET ORAL 2 TIMES DAILY
Qty: 180 TABLET | Refills: 3 | Status: SHIPPED | OUTPATIENT
Start: 2019-06-13 | End: 2020-07-07

## 2019-06-13 ASSESSMENT — PAIN SCALES - GENERAL: PAINLEVEL: EXTREME PAIN (8)

## 2019-06-13 ASSESSMENT — MIFFLIN-ST. JEOR: SCORE: 1653.32

## 2019-06-13 NOTE — NURSING NOTE
"Chief Complaint   Patient presents with     RECHECK     S/P Kidney TX       Vital signs:      BP: (!) 147/101(provider notified) Pulse: 78   Resp: 16 SpO2: 100 %     Height: 172.7 cm (5' 8\") Weight: 78.4 kg (172 lb 12.8 oz)  Estimated body mass index is 26.27 kg/m  as calculated from the following:    Height as of this encounter: 1.727 m (5' 8\").    Weight as of this encounter: 78.4 kg (172 lb 12.8 oz).        Clarisse Fall [facility]  6/13/2019 4:43 PM      "

## 2019-06-13 NOTE — PROGRESS NOTES
CHRONIC TRANSPLANT NEPHROLOGY VISIT    Assessment & Plan   # LDKT: Stable.  Minimal uremic symptoms.  Will refer back for dialysis access.  Patient needs to work on compliance and contact transplant program to look at going through another kidney transplant evaluation.   - Baseline Cr ~ 4-5's   - Proteinuria: Moderate (1-3 grams); 2.92 on 3/19/2019   - Date DSA Last Checked: Sep/2017       Latest DSA: No   - BK Viremia: Not checked recently   - Kidney Tx Biopsy: Yes; moderate chronic tubulointerstitial changes, no diagnostic evidence of acute rejection seen.    # Immunosuppression: Tacrolimus immediate release (goal 4-6), Mycophenolate mofetil (goal 1-3.5) and Prednisone (dose 5 mg daily)   - Changes: No    # Prophylaxis:   - PJP: Sulfa/TMP (Bactrim)    # Hypertension: Inadequate control; 141/101 today; Goal BP: < 130/80   - Changes: Yes: Increase carvedilol to 25 mg bid   - Start checking blood pressure at home. Adjust medication if necessary.     # Mineral Bone Disorder:   - Secondary renal hyperparathyroidism; PTH level: not checked recently and will recheck PTH.  - Vitamin D; level: Not checked recently, but was low last check.  Will recheck vitamin D level.  - Calcium; level: Not checked recently; on supplement  - Phosphorus; level: Not checked recently     # Electrolytes:   - Potassium; level: Not checked recently  - Bicarbonate; level: Not checked recently; on supplement    # Gout: Flares approximately once per month. Takes Febuxostat.   - Will check uric acid.      # Lymphedema: Chronic since transplant.  Will refer patient to Lymphedema Clinic.    # Skin Cancer Risk: New lesions: none   - Discussed sun protection and recommend regular follow up with Dermatology.    # Medical Compliance: Yes    # Transplant History:  Etiology of kidney failure: Hypertension  Tx: LDKT  Transplant: 1/13/2011 (Kidney)  Donor Type: Living Donor Class:   Significant changes in immunosuppression: None  Significant  transplant-related complications: None    Transplant Office Phone Number: [phone]    Assessment and plan was discussed with the patient and he voiced his understanding and agreement.    Return visit: Return in about 3 months (around 9/13/2019).    Chief Complaint   Mr. Ortiz is a 44 year old here for routine follow up.    History of Present Illness   Rashad Ortiz is a 44 year old male with ESKD secondary to hypertension, status-post LDKT on 1/13/2011, who presents for follow-up. The patient was last seen in clinic on 12/11/2018 by Dr. Murillo; please see note for further details. At this time, no significant changes were made to his immunosuppression or other medications.     Since his last visit, the patient has not yet had a fistula placed. The patient reports that he experiences gout flares approximately once a month. The patient also describes lower leg edema as constant since his transplant, and it has not worsened over time. He states that his edema worsens over the course of the day, and is worst at night, but better in the morning.      Today, the patient reports that he feels well overall. He describes his energy level as normal, and his significant other agreed. The patient is agreeable about getting a fistula placed, more so than a catheter, for dialysis. He describes the lower leg edema present during his appointment is usual for him at this time of day (~ 5:00 p.m.).     Recent Hospitalizations:  [x] No [] Yes    New Medical Issues: [x] No [] Yes    Decreased energy: [x] No [] Yes    Chest pain or SOB with exertion:  [x] No [] Yes    Appetite change or weight change: [x] No [] Yes    Nausea, vomiting or diarrhea:  [] No [x] Yes Sporadic diarrhea   Fever, sweats or chills: [x] No [] Yes    Leg swelling: [] No [x] Yes Usual      Home BP: Not checked    Review of Systems   A comprehensive review of systems was obtained and negative, except as noted in the HPI or PMH.    Problem List   Patient  Active Problem List   Diagnosis     Kidney replaced by transplant     High risk medications (not anticoagulants) long-term use     Hypertension goal BP (blood pressure) < 140/90     GERD (gastroesophageal reflux disease)     CARDIOVASCULAR SCREENING; LDL GOAL LESS THAN 160     Gout     Creatinine elevation     Antibody mediated rejection of kidney transplant     Medical non-compliance     Chronic kidney disease, stage 4, severely decreased GFR (H)     Herpes simplex infection of penis     Norovirus     Escherichia coli septicemia (H)     Pyelonephritis of transplanted kidney     HTN, kidney transplant related     Metabolic acidosis     Chronic kidney disease, stage V (H)     Immunosuppression (H)       Social History   Social History     Tobacco Use     Smoking status: Former Smoker     Types: Cigarettes     Last attempt to quit: 2017     Years since quittin.2     Smokeless tobacco: Never Used     Tobacco comment: Patient states that he is an 'social'  smoker    Substance Use Topics     Alcohol use: Yes     Alcohol/week: 2.5 oz     Types: 5 Standard drinks or equivalent per week     Comment: 1-2 drinks/wk     Drug use: No       Allergies   Allergies   Allergen Reactions     Nkda [No Known Drug Allergies]        Medications   Current Outpatient Medications   Medication Sig     amLODIPine (NORVASC) 5 MG tablet Take 1 tablet (5 mg) by mouth daily     carvedilol (COREG) 25 MG tablet Take 0.5 tablets (12.5 mg) by mouth 2 times daily     febuxostat (ULORIC) 40 MG TABS tablet Take 1 tablet (40 mg) by mouth daily     furosemide (LASIX) 20 MG tablet Take 1 tablet (20 mg) by mouth 2 times daily     mycophenolate (GENERIC EQUIVALENT) 250 MG capsule Take 2 capsules (500 mg) by mouth 2 times daily     omeprazole (PRILOSEC) 20 MG capsule Take 1 capsule (20 mg) by mouth daily     predniSONE (DELTASONE) 5 MG tablet Take 5 mg by mouth daily.     sodium bicarbonate 650 MG tablet Take 2 tablets (1,300 mg) by mouth 3 times daily  "    sulfamethoxazole-trimethoprim (BACTRIM/SEPTRA) 400-80 MG tablet Take 1 tablet by mouth every other day     tacrolimus (GENERIC EQUIVALENT) 1 MG capsule Take 2 capsules (2 mg) by mouth 2 times daily     vitamin D3 (CHOLECALCIFEROL) 1000 units (25 mcg) tablet Take 2 tablets (2,000 Units) by mouth daily     Current Facility-Administered Medications   Medication     lidocaine (PF) (XYLOCAINE) 1 % injection 0.5 mL     Medications Discontinued During This Encounter   Medication Reason     HYDROcodone-acetaminophen (NORCO) 5-325 MG tablet Therapy completed     mupirocin (BACTROBAN) 2 % external ointment Therapy completed     Urea 40 % CREA Therapy completed     valACYclovir (VALTREX) 1000 mg tablet Therapy completed       Physical Exam   Vital Signs: BP (!) 147/101 (BP Location: Right arm, Patient Position: Sitting, Cuff Size: Adult Regular)   Pulse 78   Resp 16   Ht 1.727 m (5' 8\")   Wt 78.4 kg (172 lb 12.8 oz)   SpO2 100%   BMI 26.27 kg/m      GENERAL APPEARANCE: alert and no distress  HENT: mouth without ulcers or lesions  LYMPHATICS: no cervical or supraclavicular nodes  RESP: lungs clear to auscultation - no rales, rhonchi or wheezes  CV: regular rhythm, normal rate, no rub, no murmur  EDEMA: 2+ on the right, 2-3+ on the left  ABDOMEN: soft, nondistended, nontender, bowel sounds normal  MS: extremities normal - no gross deformities noted, no evidence of inflammation in joints, no muscle tenderness  SKIN: no rash  TX KIDNEY: normal   DIALYSIS ACCESS: none      Data     Renal Latest Ref Rng & Units 4/8/2019 3/23/2019 3/19/2019   Na 133 - 144 mmol/L 141 143 142   K 3.4 - 5.3 mmol/L 4.0 3.5 4.1   Cl 94 - 109 mmol/L 109 113(H) 111(H)   CO2 20 - 32 mmol/L 23 20 18(L)   BUN 7 - 30 mg/dL 46(H) 47(H) 50(H)   Cr 0.66 - 1.25 mg/dL 4.98(H) 4.91(H) 5.02(H)   Glucose 70 - 99 mg/dL 91 87 156(H)   Ca  8.5 - 10.1 mg/dL 8.3(L) 8.2(L) 8.3(L)   Mg 1.6 - 2.3 mg/dL - - -     Bone Health Latest Ref Rng & Units 3/23/2019 11/1/2018 " 10/25/2018   Phos 2.5 - 4.5 mg/dL 3.8 3.3 5.2(H)   PTHi 12 - 72 pg/mL - - -   Vit D Def 20 - 75 ug/L - - -     Heme Latest Ref Rng & Units 3/19/2019 12/12/2018 11/24/2018   WBC 4.0 - 11.0 10e9/L 7.2 10.9 6.8   Hgb 13.3 - 17.7 g/dL 10.6(L) 8.7(L) 8.1(L)   Plt 150 - 450 10e9/L 198 254 270     Liver Latest Ref Rng & Units 3/23/2019 10/26/2018 10/25/2018   AP 40 - 150 U/L - 63 64   TBili 0.2 - 1.3 mg/dL - 0.4 0.4   ALT 0 - 70 U/L - 10 14   AST 0 - 45 U/L - 11 13   Tot Protein 6.8 - 8.8 g/dL - 5.5(L) 6.8   Albumin 3.4 - 5.0 g/dL 3.0(L) 2.0(L) 2.7(L)     Pancreas Latest Ref Rng & Units 4/10/2017 6/17/2015 6/6/2015   A1C 4.3 - 6.0 % - - 5.4   Amylase 30 - 110 U/L 153(H) - -   Lipase 73 - 393 U/L 387 179 -     Iron studies Latest Ref Rng & Units 1/6/2018 1/12/2010 4/21/2009   Iron 35 - 180 ug/dL 62 198(H) 59   Iron sat 15 - 46 % 28 104(H) -   Ferritin 26 - 388 ng/mL 256 431(H) 68     [facility] Txp Virology Latest Ref Rng & Units 9/25/2017 4/24/2017 2/28/2017   CMV IgG EU/mL - - -   CMV IgM <0.90 - - -   CMV IgM Interp <0.90 - - -   CVM DNA Quant - - Plasma Plasma, EDTA anticoagulant   CMV Quant <100 Copies/mL - - -   CMV QT Log <2.0 Log copies/mL - - -   BK Spec - Plasma - Plasma   BK Res BKNEG:BK Virus DNA Not Detected copies/mL BK Virus DNA Not Detected - BK Virus DNA Not Detected   BK Log <2.7 Log copies/mL Not Calculated - Not Calculated   The Real-Time quantitative BK Virus assay was developed and its performance   characteristics determined by the [facility] at   the [facility] in [place], [place]. The   primers and probes for each analyte are Analyte Specific Reagents (ASRs)   manufactured by [employer].   ASRs are used in many laboratory tests necessary for standard medical care and   generally do not require U.S. Food and Drug Administration approval. The FDA   has determined that such clearance or approval is not necessary.   This test is used for clinical  purposes. It should not be regarded as   investigational or for research. This laboratory is certified under the   Clinical Laboratory Improvement Amendments of 1988 (CLIA-88) as qualified to   perform high complexity clinical laboratory testing.     EBV IgG - - - -   Hep B Core NR:Nonreactive Nonreactive - -   Hep B Surf - - - -   HIV 1&2 NEG - - -        Recent Labs   Lab Test 11/24/18  1146 03/19/19  1509 03/23/19  0959   DOSTAC 2,230  3/19/2019 0700 3/22/2019 1900   TACROL 4.4* 8.2 4.2*     Recent Labs   Lab Test 06/06/15  0552 04/24/17  0849 10/27/18  0441   DOSMPA Not Provided 2200 Not Provided   MPACID 1.94 2.16 <0.25*   MPAG 135.0* 172.3* 78.3     Scribe Disclosure:  I, Dulce Cook, am serving as a scribe to document services personally performed by Gee Barrios M.D.,  at this visit, based upon the provider's statements to me. All documentation has been reviewed by the aforementioned provider prior to being entered into the official medical record.     ITish, a scribe, prepared the chart for today's encounter.

## 2019-06-13 NOTE — LETTER
6/13/2019       RE: Rashad Ortiz  [address] [facility] [address]     Dear Colleague,    Thank you for referring your patient, Rashad Ortiz, to the [facility] NEPHROLOGY at [facility]. Please see a copy of my visit note below.    CHRONIC TRANSPLANT NEPHROLOGY VISIT    Assessment & Plan   # LDKT: Stable.  Minimal uremic symptoms.  Will refer back for dialysis access.  Patient needs to work on compliance and contact transplant program to look at going through another kidney transplant evaluation.   - Baseline Cr ~ 4-5's   - Proteinuria: Moderate (1-3 grams); 2.92 on 3/19/2019   - Date DSA Last Checked: Sep/2017       Latest DSA: No   - BK Viremia: Not checked recently   - Kidney Tx Biopsy: Yes; moderate chronic tubulointerstitial changes, no diagnostic evidence of acute rejection seen.    # Immunosuppression: Tacrolimus immediate release (goal 4-6), Mycophenolate mofetil (goal 1-3.5) and Prednisone (dose 5 mg daily)   - Changes: No    # Prophylaxis:   - PJP: Sulfa/TMP (Bactrim)    # Hypertension: Inadequate control; 141/101 today; Goal BP: < 130/80   - Changes: Yes: Increase carvedilol to 25 mg bid   - Start checking blood pressure at home. Adjust medication if necessary.     # Mineral Bone Disorder:   - Secondary renal hyperparathyroidism; PTH level: not checked recently and will recheck PTH.  - Vitamin D; level: Not checked recently, but was low last check.  Will recheck vitamin D level.  - Calcium; level: Not checked recently; on supplement  - Phosphorus; level: Not checked recently     # Electrolytes:   - Potassium; level: Not checked recently  - Bicarbonate; level: Not checked recently; on supplement    # Gout: Flares approximately once per month. Takes Febuxostat.   - Will check uric acid.      # Lymphedema: Chronic since transplant.  Will refer patient to Lymphedema Clinic.    # Skin Cancer Risk: New lesions: none   - Discussed sun protection and  recommend regular follow up with Dermatology.    # Medical Compliance: Yes    # Transplant History:  Etiology of kidney failure: Hypertension  Tx: LDKT  Transplant: 1/13/2011 (Kidney)  Donor Type: Living Donor Class:   Significant changes in immunosuppression: None  Significant transplant-related complications: None    Transplant Office Phone Number: [phone]    Assessment and plan was discussed with the patient and he voiced his understanding and agreement.    Return visit: Return in about 3 months (around 9/13/2019).    Chief Complaint   Mr. Ortiz is a 44 year old here for routine follow up.    History of Present Illness   Rashad Ortiz is a 44 year old male with ESKD secondary to hypertension, status-post LDKT on 1/13/2011, who presents for follow-up. The patient was last seen in clinic on 12/11/2018 by Dr. Murillo; please see note for further details. At this time, no significant changes were made to his immunosuppression or other medications.     Since his last visit, the patient has not yet had a fistula placed. The patient reports that he experiences gout flares approximately once a month. The patient also describes lower leg edema as constant since his transplant, and it has not worsened over time. He states that his edema worsens over the course of the day, and is worst at night, but better in the morning.      Today, the patient reports that he feels well overall. He describes his energy level as normal, and his significant other agreed. The patient is agreeable about getting a fistula placed, more so than a catheter, for dialysis. He describes the lower leg edema present during his appointment is usual for him at this time of day (~ 5:00 p.m.).     Recent Hospitalizations:  [x] No [] Yes    New Medical Issues: [x] No [] Yes    Decreased energy: [x] No [] Yes    Chest pain or SOB with exertion:  [x] No [] Yes    Appetite change or weight change: [x] No [] Yes    Nausea, vomiting or diarrhea:  [] No  [x] Yes Sporadic diarrhea   Fever, sweats or chills: [x] No [] Yes    Leg swelling: [] No [x] Yes Usual      Home BP: Not checked    Review of Systems   A comprehensive review of systems was obtained and negative, except as noted in the HPI or PMH.    Problem List   Patient Active Problem List   Diagnosis     Kidney replaced by transplant     High risk medications (not anticoagulants) long-term use     Hypertension goal BP (blood pressure) < 140/90     GERD (gastroesophageal reflux disease)     CARDIOVASCULAR SCREENING; LDL GOAL LESS THAN 160     Gout     Creatinine elevation     Antibody mediated rejection of kidney transplant     Medical non-compliance     Chronic kidney disease, stage 4, severely decreased GFR (H)     Herpes simplex infection of penis     Norovirus     Escherichia coli septicemia (H)     Pyelonephritis of transplanted kidney     HTN, kidney transplant related     Metabolic acidosis     Chronic kidney disease, stage V (H)     Immunosuppression (H)       Social History   Social History     Tobacco Use     Smoking status: Former Smoker     Types: Cigarettes     Last attempt to quit: 2017     Years since quittin.2     Smokeless tobacco: Never Used     Tobacco comment: Patient states that he is an 'social'  smoker    Substance Use Topics     Alcohol use: Yes     Alcohol/week: 2.5 oz     Types: 5 Standard drinks or equivalent per week     Comment: 1-2 drinks/wk     Drug use: No       Allergies   Allergies   Allergen Reactions     Nkda [No Known Drug Allergies]        Medications   Current Outpatient Medications   Medication Sig     amLODIPine (NORVASC) 5 MG tablet Take 1 tablet (5 mg) by mouth daily     carvedilol (COREG) 25 MG tablet Take 0.5 tablets (12.5 mg) by mouth 2 times daily     febuxostat (ULORIC) 40 MG TABS tablet Take 1 tablet (40 mg) by mouth daily     furosemide (LASIX) 20 MG tablet Take 1 tablet (20 mg) by mouth 2 times daily     mycophenolate (GENERIC EQUIVALENT) 250 MG capsule  "Take 2 capsules (500 mg) by mouth 2 times daily     omeprazole (PRILOSEC) 20 MG capsule Take 1 capsule (20 mg) by mouth daily     predniSONE (DELTASONE) 5 MG tablet Take 5 mg by mouth daily.     sodium bicarbonate 650 MG tablet Take 2 tablets (1,300 mg) by mouth 3 times daily     sulfamethoxazole-trimethoprim (BACTRIM/SEPTRA) 400-80 MG tablet Take 1 tablet by mouth every other day     tacrolimus (GENERIC EQUIVALENT) 1 MG capsule Take 2 capsules (2 mg) by mouth 2 times daily     vitamin D3 (CHOLECALCIFEROL) 1000 units (25 mcg) tablet Take 2 tablets (2,000 Units) by mouth daily     Current Facility-Administered Medications   Medication     lidocaine (PF) (XYLOCAINE) 1 % injection 0.5 mL     Medications Discontinued During This Encounter   Medication Reason     HYDROcodone-acetaminophen (NORCO) 5-325 MG tablet Therapy completed     mupirocin (BACTROBAN) 2 % external ointment Therapy completed     Urea 40 % CREA Therapy completed     valACYclovir (VALTREX) 1000 mg tablet Therapy completed       Physical Exam   Vital Signs: BP (!) 147/101 (BP Location: Right arm, Patient Position: Sitting, Cuff Size: Adult Regular)   Pulse 78   Resp 16   Ht 1.727 m (5' 8\")   Wt 78.4 kg (172 lb 12.8 oz)   SpO2 100%   BMI 26.27 kg/m       GENERAL APPEARANCE: alert and no distress  HENT: mouth without ulcers or lesions  LYMPHATICS: no cervical or supraclavicular nodes  RESP: lungs clear to auscultation - no rales, rhonchi or wheezes  CV: regular rhythm, normal rate, no rub, no murmur  EDEMA: 2+ on the right, 2-3+ on the left  ABDOMEN: soft, nondistended, nontender, bowel sounds normal  MS: extremities normal - no gross deformities noted, no evidence of inflammation in joints, no muscle tenderness  SKIN: no rash  TX KIDNEY: normal   DIALYSIS ACCESS: none      Data     Renal Latest Ref Rng & Units 4/8/2019 3/23/2019 3/19/2019   Na 133 - 144 mmol/L 141 143 142   K 3.4 - 5.3 mmol/L 4.0 3.5 4.1   Cl 94 - 109 mmol/L 109 113(H) 111(H)   CO2 " 20 - 32 mmol/L 23 20 18(L)   BUN 7 - 30 mg/dL 46(H) 47(H) 50(H)   Cr 0.66 - 1.25 mg/dL 4.98(H) 4.91(H) 5.02(H)   Glucose 70 - 99 mg/dL 91 87 156(H)   Ca  8.5 - 10.1 mg/dL 8.3(L) 8.2(L) 8.3(L)   Mg 1.6 - 2.3 mg/dL - - -     Bone Health Latest Ref Rng & Units 3/23/2019 11/1/2018 10/25/2018   Phos 2.5 - 4.5 mg/dL 3.8 3.3 5.2(H)   PTHi 12 - 72 pg/mL - - -   Vit D Def 20 - 75 ug/L - - -     Heme Latest Ref Rng & Units 3/19/2019 12/12/2018 11/24/2018   WBC 4.0 - 11.0 10e9/L 7.2 10.9 6.8   Hgb 13.3 - 17.7 g/dL 10.6(L) 8.7(L) 8.1(L)   Plt 150 - 450 10e9/L 198 254 270     Liver Latest Ref Rng & Units 3/23/2019 10/26/2018 10/25/2018   AP 40 - 150 U/L - 63 64   TBili 0.2 - 1.3 mg/dL - 0.4 0.4   ALT 0 - 70 U/L - 10 14   AST 0 - 45 U/L - 11 13   Tot Protein 6.8 - 8.8 g/dL - 5.5(L) 6.8   Albumin 3.4 - 5.0 g/dL 3.0(L) 2.0(L) 2.7(L)     Pancreas Latest Ref Rng & Units 4/10/2017 6/17/2015 6/6/2015   A1C 4.3 - 6.0 % - - 5.4   Amylase 30 - 110 U/L 153(H) - -   Lipase 73 - 393 U/L 387 179 -     Iron studies Latest Ref Rng & Units 1/6/2018 1/12/2010 4/21/2009   Iron 35 - 180 ug/dL 62 198(H) 59   Iron sat 15 - 46 % 28 104(H) -   Ferritin 26 - 388 ng/mL 256 431(H) 68     [facility] Txp Virology Latest Ref Rng & Units 9/25/2017 4/24/2017 2/28/2017   CMV IgG EU/mL - - -   CMV IgM <0.90 - - -   CMV IgM Interp <0.90 - - -   CVM DNA Quant - - Plasma Plasma, EDTA anticoagulant   CMV Quant <100 Copies/mL - - -   CMV QT Log <2.0 Log copies/mL - - -   BK Spec - Plasma - Plasma   BK Res BKNEG:BK Virus DNA Not Detected copies/mL BK Virus DNA Not Detected - BK Virus DNA Not Detected   BK Log <2.7 Log copies/mL Not Calculated - Not Calculated   The Real-Time quantitative BK Virus assay was developed and its performance   characteristics determined by the Infectious Diseases Diagnostic Laboratory at   the [facility] in [place], [place]. The   primers and probes for each analyte are Analyte Specific Reagents (ASRs)    manufactured by [employer].   ASRs are used in many laboratory tests necessary for standard medical care and   generally do not require U.S. Food and Drug Administration approval. The FDA   has determined that such clearance or approval is not necessary.   This test is used for clinical purposes. It should not be regarded as   investigational or for research. This laboratory is certified under the   Clinical Laboratory Improvement Amendments of 1988 (CLIA-88) as qualified to   perform high complexity clinical laboratory testing.     EBV IgG - - - -   Hep B Core NR:Nonreactive Nonreactive - -   Hep B Surf - - - -   HIV 1&2 NEG - - -        Recent Labs   Lab Test 11/24/18  1146 03/19/19  1509 03/23/19  0959   DOSTAC 2,230  3/19/2019 0700 3/22/2019 1900   TACROL 4.4* 8.2 4.2*     Recent Labs   Lab Test 06/06/15  0552 04/24/17  0849 10/27/18  0441   DOSMPA Not Provided 2200 Not Provided   MPACID 1.94 2.16 <0.25*   MPAG 135.0* 172.3* 78.3     Scribe Disclosure:  I, Dulce Cook, am serving as a scribe to document services personally performed by Gee Barrios M.D.,  at this visit, based upon the provider's statements to me. All documentation has been reviewed by the aforementioned provider prior to being entered into the official medical record.     I, Tish Sanders, a scribe, prepared the chart for today's encounter.     Again, thank you for allowing me to participate in the care of your patient.      Sincerely,    Kidney/Pancreas Recipient

## 2019-06-13 NOTE — LETTER
6/13/2019      RE: Rashad Ortiz  [address] [facility] [address]       CHRONIC TRANSPLANT NEPHROLOGY VISIT    Assessment & Plan   # LDKT: Stable.  Minimal uremic symptoms.  Will refer back for dialysis access.  Patient needs to work on compliance and contact transplant program to look at going through another kidney transplant evaluation.   - Baseline Cr ~ 4-5's   - Proteinuria: Moderate (1-3 grams); 2.92 on 3/19/2019   - Date DSA Last Checked: Sep/2017       Latest DSA: No   - BK Viremia: Not checked recently   - Kidney Tx Biopsy: Yes; moderate chronic tubulointerstitial changes, no diagnostic evidence of acute rejection seen.    # Immunosuppression: Tacrolimus immediate release (goal 4-6), Mycophenolate mofetil (goal 1-3.5) and Prednisone (dose 5 mg daily)   - Changes: No    # Prophylaxis:   - PJP: Sulfa/TMP (Bactrim)    # Hypertension: Inadequate control; 141/101 today; Goal BP: < 130/80   - Changes: Yes: Increase carvedilol to 25 mg bid   - Start checking blood pressure at home. Adjust medication if necessary.     # Mineral Bone Disorder:   - Secondary renal hyperparathyroidism; PTH level: not checked recently and will recheck PTH.  - Vitamin D; level: Not checked recently, but was low last check.  Will recheck vitamin D level.  - Calcium; level: Not checked recently; on supplement  - Phosphorus; level: Not checked recently     # Electrolytes:   - Potassium; level: Not checked recently  - Bicarbonate; level: Not checked recently; on supplement    # Gout: Flares approximately once per month. Takes Febuxostat.   - Will check uric acid.      # Lymphedema: Chronic since transplant.  Will refer patient to Lymphedema Clinic.    # Skin Cancer Risk: New lesions: none   - Discussed sun protection and recommend regular follow up with Dermatology.    # Medical Compliance: Yes    # Transplant History:  Etiology of kidney failure: Hypertension  Tx: LDKT  Transplant: 1/13/2011 (Kidney)  Donor Type:  Living Donor Class:   Significant changes in immunosuppression: None  Significant transplant-related complications: None    Transplant Office Phone Number: [phone]    Assessment and plan was discussed with the patient and he voiced his understanding and agreement.    Return visit: Return in about 3 months (around 9/13/2019).    Chief Complaint   Mr. Ortiz is a 44 year old here for routine follow up.    History of Present Illness   Rashad Ortiz is a 44 year old male with ESKD secondary to hypertension, status-post LDKT on 1/13/2011, who presents for follow-up. The patient was last seen in clinic on 12/11/2018 by Dr. Murillo; please see note for further details. At this time, no significant changes were made to his immunosuppression or other medications.     Since his last visit, the patient has not yet had a fistula placed. The patient reports that he experiences gout flares approximately once a month. The patient also describes lower leg edema as constant since his transplant, and it has not worsened over time. He states that his edema worsens over the course of the day, and is worst at night, but better in the morning.      Today, the patient reports that he feels well overall. He describes his energy level as normal, and his significant other agreed. The patient is agreeable about getting a fistula placed, more so than a catheter, for dialysis. He describes the lower leg edema present during his appointment is usual for him at this time of day (~ 5:00 p.m.).     Recent Hospitalizations:  [x] No [] Yes    New Medical Issues: [x] No [] Yes    Decreased energy: [x] No [] Yes    Chest pain or SOB with exertion:  [x] No [] Yes    Appetite change or weight change: [x] No [] Yes    Nausea, vomiting or diarrhea:  [] No [x] Yes Sporadic diarrhea   Fever, sweats or chills: [x] No [] Yes    Leg swelling: [] No [x] Yes Usual      Home BP: Not checked    Review of Systems   A comprehensive review of systems was  obtained and negative, except as noted in the HPI or PMH.    Problem List   Patient Active Problem List   Diagnosis     Kidney replaced by transplant     High risk medications (not anticoagulants) long-term use     Hypertension goal BP (blood pressure) < 140/90     GERD (gastroesophageal reflux disease)     CARDIOVASCULAR SCREENING; LDL GOAL LESS THAN 160     Gout     Creatinine elevation     Antibody mediated rejection of kidney transplant     Medical non-compliance     Chronic kidney disease, stage 4, severely decreased GFR (H)     Herpes simplex infection of penis     Norovirus     Escherichia coli septicemia (H)     Pyelonephritis of transplanted kidney     HTN, kidney transplant related     Metabolic acidosis     Chronic kidney disease, stage V (H)     Immunosuppression (H)       Social History   Social History     Tobacco Use     Smoking status: Former Smoker     Types: Cigarettes     Last attempt to quit: 2017     Years since quittin.2     Smokeless tobacco: Never Used     Tobacco comment: Patient states that he is an 'social'  smoker    Substance Use Topics     Alcohol use: Yes     Alcohol/week: 2.5 oz     Types: 5 Standard drinks or equivalent per week     Comment: 1-2 drinks/wk     Drug use: No       Allergies   Allergies   Allergen Reactions     Nkda [No Known Drug Allergies]        Medications   Current Outpatient Medications   Medication Sig     amLODIPine (NORVASC) 5 MG tablet Take 1 tablet (5 mg) by mouth daily     carvedilol (COREG) 25 MG tablet Take 0.5 tablets (12.5 mg) by mouth 2 times daily     febuxostat (ULORIC) 40 MG TABS tablet Take 1 tablet (40 mg) by mouth daily     furosemide (LASIX) 20 MG tablet Take 1 tablet (20 mg) by mouth 2 times daily     mycophenolate (GENERIC EQUIVALENT) 250 MG capsule Take 2 capsules (500 mg) by mouth 2 times daily     omeprazole (PRILOSEC) 20 MG capsule Take 1 capsule (20 mg) by mouth daily     predniSONE (DELTASONE) 5 MG tablet Take 5 mg by mouth daily.  "    sodium bicarbonate 650 MG tablet Take 2 tablets (1,300 mg) by mouth 3 times daily     sulfamethoxazole-trimethoprim (BACTRIM/SEPTRA) 400-80 MG tablet Take 1 tablet by mouth every other day     tacrolimus (GENERIC EQUIVALENT) 1 MG capsule Take 2 capsules (2 mg) by mouth 2 times daily     vitamin D3 (CHOLECALCIFEROL) 1000 units (25 mcg) tablet Take 2 tablets (2,000 Units) by mouth daily     Current Facility-Administered Medications   Medication     lidocaine (PF) (XYLOCAINE) 1 % injection 0.5 mL     Medications Discontinued During This Encounter   Medication Reason     HYDROcodone-acetaminophen (NORCO) 5-325 MG tablet Therapy completed     mupirocin (BACTROBAN) 2 % external ointment Therapy completed     Urea 40 % CREA Therapy completed     valACYclovir (VALTREX) 1000 mg tablet Therapy completed       Physical Exam   Vital Signs: BP (!) 147/101 (BP Location: Right arm, Patient Position: Sitting, Cuff Size: Adult Regular)   Pulse 78   Resp 16   Ht 1.727 m (5' 8\")   Wt 78.4 kg (172 lb 12.8 oz)   SpO2 100%   BMI 26.27 kg/m       GENERAL APPEARANCE: alert and no distress  HENT: mouth without ulcers or lesions  LYMPHATICS: no cervical or supraclavicular nodes  RESP: lungs clear to auscultation - no rales, rhonchi or wheezes  CV: regular rhythm, normal rate, no rub, no murmur  EDEMA: 2+ on the right, 2-3+ on the left  ABDOMEN: soft, nondistended, nontender, bowel sounds normal  MS: extremities normal - no gross deformities noted, no evidence of inflammation in joints, no muscle tenderness  SKIN: no rash  TX KIDNEY: normal   DIALYSIS ACCESS: none      Data     Renal Latest Ref Rng & Units 4/8/2019 3/23/2019 3/19/2019   Na 133 - 144 mmol/L 141 143 142   K 3.4 - 5.3 mmol/L 4.0 3.5 4.1   Cl 94 - 109 mmol/L 109 113(H) 111(H)   CO2 20 - 32 mmol/L 23 20 18(L)   BUN 7 - 30 mg/dL 46(H) 47(H) 50(H)   Cr 0.66 - 1.25 mg/dL 4.98(H) 4.91(H) 5.02(H)   Glucose 70 - 99 mg/dL 91 87 156(H)   Ca  8.5 - 10.1 mg/dL 8.3(L) 8.2(L) 8.3(L) "   Mg 1.6 - 2.3 mg/dL - - -     Bone Health Latest Ref Rng & Units 3/23/2019 11/1/2018 10/25/2018   Phos 2.5 - 4.5 mg/dL 3.8 3.3 5.2(H)   PTHi 12 - 72 pg/mL - - -   Vit D Def 20 - 75 ug/L - - -     Heme Latest Ref Rng & Units 3/19/2019 12/12/2018 11/24/2018   WBC 4.0 - 11.0 10e9/L 7.2 10.9 6.8   Hgb 13.3 - 17.7 g/dL 10.6(L) 8.7(L) 8.1(L)   Plt 150 - 450 10e9/L 198 254 270     Liver Latest Ref Rng & Units 3/23/2019 10/26/2018 10/25/2018   AP 40 - 150 U/L - 63 64   TBili 0.2 - 1.3 mg/dL - 0.4 0.4   ALT 0 - 70 U/L - 10 14   AST 0 - 45 U/L - 11 13   Tot Protein 6.8 - 8.8 g/dL - 5.5(L) 6.8   Albumin 3.4 - 5.0 g/dL 3.0(L) 2.0(L) 2.7(L)     Pancreas Latest Ref Rng & Units 4/10/2017 6/17/2015 6/6/2015   A1C 4.3 - 6.0 % - - 5.4   Amylase 30 - 110 U/L 153(H) - -   Lipase 73 - 393 U/L 387 179 -     Iron studies Latest Ref Rng & Units 1/6/2018 1/12/2010 4/21/2009   Iron 35 - 180 ug/dL 62 198(H) 59   Iron sat 15 - 46 % 28 104(H) -   Ferritin 26 - 388 ng/mL 256 431(H) 68     [facility] Txp Virology Latest Ref Rng & Units 9/25/2017 4/24/2017 2/28/2017   CMV IgG EU/mL - - -   CMV IgM <0.90 - - -   CMV IgM Interp <0.90 - - -   CVM DNA Quant - - Plasma Plasma, EDTA anticoagulant   CMV Quant <100 Copies/mL - - -   CMV QT Log <2.0 Log copies/mL - - -   BK Spec - Plasma - Plasma   BK Res BKNEG:BK Virus DNA Not Detected copies/mL BK Virus DNA Not Detected - BK Virus DNA Not Detected   BK Log <2.7 Log copies/mL Not Calculated - Not Calculated   The Real-Time quantitative BK Virus assay was developed and its performance   characteristics determined by the Infectious Diseases Diagnostic Laboratory at   the [facility] in [place], [place]. The   primers and probes for each analyte are Analyte Specific Reagents (ASRs)   manufactured by [employer].   ASRs are used in many laboratory tests necessary for standard medical care and   generally do not require U.S. Food and Drug Administration approval. The FDA   has determined  that such clearance or approval is not necessary.   This test is used for clinical purposes. It should not be regarded as   investigational or for research. This laboratory is certified under the   Clinical Laboratory Improvement Amendments of 1988 (CLIA-88) as qualified to   perform high complexity clinical laboratory testing.     EBV IgG - - - -   Hep B Core NR:Nonreactive Nonreactive - -   Hep B Surf - - - -   HIV 1&2 NEG - - -        Recent Labs   Lab Test 11/24/18  1146 03/19/19  1509 03/23/19  0959   DOSTAC 2,230  3/19/2019 0700 3/22/2019 1900   TACROL 4.4* 8.2 4.2*     Recent Labs   Lab Test 06/06/15  0552 04/24/17  0849 10/27/18  0441   DOSMPA Not Provided 2200 Not Provided   MPACID 1.94 2.16 <0.25*   MPAG 135.0* 172.3* 78.3     Scribe Disclosure:  I, Dulce Cook, am serving as a scribe to document services personally performed by Gee Barrios M.D.,  at this visit, based upon the provider's statements to me. All documentation has been reviewed by the aforementioned provider prior to being entered into the official medical record.     I, Tish Sanders, a scribe, prepared the chart for today's encounter.     Gee Barrios MD

## 2019-06-14 ENCOUNTER — TELEPHONE (OUTPATIENT)
Dept: TRANSPLANT | Facility: CLINIC | Age: 44
End: 2019-06-14

## 2019-06-14 DIAGNOSIS — D63.1 ANEMIA IN STAGE 4 CHRONIC KIDNEY DISEASE (H): Primary | ICD-10-CM

## 2019-06-14 DIAGNOSIS — N18.4 ANEMIA IN STAGE 4 CHRONIC KIDNEY DISEASE (H): Primary | ICD-10-CM

## 2019-06-14 PROBLEM — A08.11 NOROVIRUS: Status: RESOLVED | Noted: 2018-10-26 | Resolved: 2019-06-14

## 2019-06-14 LAB
ALBUMIN SERPL-MCNC: 3.4 G/DL (ref 3.4–5)
ANION GAP SERPL CALCULATED.3IONS-SCNC: 11 MMOL/L (ref 3–14)
BUN SERPL-MCNC: 45 MG/DL (ref 7–30)
CALCIUM SERPL-MCNC: 8.9 MG/DL (ref 8.5–10.1)
CHLORIDE SERPL-SCNC: 110 MMOL/L (ref 94–109)
CO2 SERPL-SCNC: 19 MMOL/L (ref 20–32)
CREAT SERPL-MCNC: 5.28 MG/DL (ref 0.66–1.25)
DEPRECATED CALCIDIOL+CALCIFEROL SERPL-MC: 44 UG/L (ref 20–75)
FERRITIN SERPL-MCNC: 436 NG/ML (ref 26–388)
GFR SERPL CREATININE-BSD FRML MDRD: 12 ML/MIN/{1.73_M2}
GLUCOSE SERPL-MCNC: 71 MG/DL (ref 70–99)
IRON SATN MFR SERPL: 21 % (ref 15–46)
IRON SERPL-MCNC: 46 UG/DL (ref 35–180)
PHOSPHATE SERPL-MCNC: 2.7 MG/DL (ref 2.5–4.5)
POTASSIUM SERPL-SCNC: 4.4 MMOL/L (ref 3.4–5.3)
PTH-INTACT SERPL-MCNC: 456 PG/ML (ref 18–80)
SODIUM SERPL-SCNC: 140 MMOL/L (ref 133–144)
TIBC SERPL-MCNC: 215 UG/DL (ref 240–430)

## 2019-06-14 NOTE — TELEPHONE ENCOUNTER
No change from prior creatinine - see note from Dr. Barrios: patient to restart dialysis and start process for referral for re-transplant.

## 2019-06-14 NOTE — TELEPHONE ENCOUNTER
DATE:  6/14/2019   TIME OF RECEIPT FROM LAB:  1243  LAB TEST:  Creatinine  LAB VALUE:  5.28  RESULTS GIVEN WITH READ-BACK TO :Anna Salguero  TIME LAB VALUE REPORTED TO PROVIDER:   1240

## 2019-06-17 ENCOUNTER — TELEPHONE (OUTPATIENT)
Dept: PHARMACY | Facility: CLINIC | Age: 44
End: 2019-06-17

## 2019-06-17 DIAGNOSIS — D63.1 ANEMIA OF CHRONIC RENAL FAILURE, STAGE 5 (H): ICD-10-CM

## 2019-06-17 DIAGNOSIS — N18.5 ANEMIA OF CHRONIC RENAL FAILURE, STAGE 5 (H): ICD-10-CM

## 2019-06-17 DIAGNOSIS — N18.5 CHRONIC KIDNEY DISEASE, STAGE V (H): Primary | ICD-10-CM

## 2019-06-17 RX ORDER — FERROUS SULFATE 325(65) MG
325 TABLET ORAL
Qty: 30 TABLET | Refills: 11 | Status: SHIPPED | OUTPATIENT
Start: 2019-06-17 | End: 2020-07-01

## 2019-06-17 NOTE — TELEPHONE ENCOUNTER
Anemia Management Note - Enrollment  SUBJECTIVE/OBJECTIVE:    Referred by Dr. Gee Barrios on 2019  Primary Diagnosis: Anemia in Chronic Kidney Disease (N18.5, D63.1)     Secondary Diagnosis:  Chronic Kidney Disease, Stage 5 (N18.5)  Kidney TX: 2011  Hgb goal range:  9-10  Epo/Darbo: Aranesp  60 mcg  every two weeks for Hgb <10. In clinic  Iron regimen:  19; ferrous sulfate ordered  Ferrous Sulfate 1 tab daily with lunch   Labs : 2020  Recent LAWRENCE use, transfusion, IV iron: NA  RX/TX plans : 2020  No history of stroke, MI and blood clots or cancers    Contact:  No Consent to communicate on File     Anemia Latest Ref Rng & Units 11/3/2018 11/10/2018 2018 2018 2018 3/19/2019 2019   Hemoglobin 13.3 - 17.7 g/dL 8.2(L) 7.9(LL) 8.0(L) 8.1(L) 8.7(L) 10.6(L) 8.8(L)   TSAT 15 - 46 % - - - - - - 21   Ferritin 26 - 388 ng/mL - - - - - - 436(H)       BP Readings from Last 3 Encounters:   19 (!) 147/101   19 (!) 152/102   18 (!) 141/94     Wt Readings from Last 2 Encounters:   19 78.4 kg (172 lb 12.8 oz)   19 79.8 kg (176 lb)     Current Outpatient Medications   Medication Sig Dispense Refill     amLODIPine (NORVASC) 5 MG tablet Take 1 tablet (5 mg) by mouth daily 90 tablet 3     carvedilol (COREG) 25 MG tablet Take 1 tablet (25 mg) by mouth 2 times daily 180 tablet 3     febuxostat (ULORIC) 40 MG TABS tablet Take 1 tablet (40 mg) by mouth daily 90 tablet 3     furosemide (LASIX) 20 MG tablet Take 1 tablet (20 mg) by mouth 2 times daily 180 tablet 1     mycophenolate (GENERIC EQUIVALENT) 250 MG capsule Take 2 capsules (500 mg) by mouth 2 times daily 120 capsule 11     omeprazole (PRILOSEC) 20 MG capsule Take 1 capsule (20 mg) by mouth daily 90 capsule 1     predniSONE (DELTASONE) 5 MG tablet Take 5 mg by mouth daily. 30 tablet 3     sodium bicarbonate 650 MG tablet Take 2 tablets (1,300 mg) by mouth 3 times daily 180 tablet 11      sulfamethoxazole-trimethoprim (BACTRIM/SEPTRA) 400-80 MG tablet Take 1 tablet by mouth every other day 45 tablet 3     tacrolimus (GENERIC EQUIVALENT) 1 MG capsule Take 2 capsules (2 mg) by mouth 2 times daily 120 capsule 11     vitamin D3 (CHOLECALCIFEROL) 1000 units (25 mcg) tablet Take 2 tablets (2,000 Units) by mouth daily 180 tablet 3     ASSESSMENT:  Hgb Not at goal/Initiation of therapy   Ferritin: At goal (>100ng/mL)  TSat: not at goal of >30%  Iron regimen recommended: Ferrous Sulfate 1 tab daily  Recommended LAWRENCE regimen: Aranesp 60mcg every 14 days  Blood Pressure: Hx of HTN, monitor closely.     PLAN:  1. Patient called today for enrollment in Anemia Management Service.  2. Discussed:  anemia overview, monitoring service and goal hemoglobin range and rationale and risks of LAWRENCE blood clots, stroke and increase in blood pressure  3. Dose location: in clinic; [place]  4. Labs: [place]  5. Pharmacy:   6. Sent new patient letter with medication guide to patient.     Patient requested Rx be sent to [facility] in [place] for the ferrous Sulfate.  Phone # to the [facility] was sent to Rashad via [employer] per his request. Labs/Aranesp will be scheduled every 2 weeks. Rashad verbalized understanding of the plan.     Next call date:  06/21/2019  Did he schedule his Aranesp?    Cate Ellis RN   Anemia Clinic  [address] [address]   [email or web address]   Office : [phone]  Fax: [phone]

## 2019-06-17 NOTE — LETTER
June 17, 2019      Rashad Ortiz  [address] [address]        Dear Rashad,   Welcome to the Anemia Clinic!  You have been referred to our clinic by Gee Barrios MD for the monitoring of your anemia therapy.  The Anemia Clinic is a referral clinic staffed by [place] pharmacists and nurses.    Our goals in monitoring your anemia therapy include:       Optimizing your anemia therapy.       Providing you with appropriate education and resources concerning your    anemia therapy.         Monitoring your lab values (Hemoglobin and iron) and adjusting your anemia                therapy as needed.  Your Hemoglobin Goal = 9-10 g/dl      If you use an outside lab to obtain blood draws, it will be your responsibility to report all lab results to the Anemia Clinic. Follow-up attempts will be made for one month, at that time if we have not heard back from you we will inactive your anemia prescriptions and refer your management back to the referring provider.    Please call the Anemia Clinic at [phone] if you have any questions.  Sincerely,    Cate Ellis RN  Phone [phone]

## 2019-06-24 ENCOUNTER — TELEPHONE (OUTPATIENT)
Dept: PHARMACY | Facility: CLINIC | Age: 44
End: 2019-06-24

## 2019-06-24 NOTE — TELEPHONE ENCOUNTER
Follow-up with anemia management service:    Attempted to reach pt at [phone] and service is not available.   Called Antoine at [phone], no answer. Left message for Rashad to call [phone] re his labs.     Rashad needs to call  [facility] to arrange his Aranesp injections.     Anemia Latest Ref Rng & Units 11/3/2018 11/10/2018 2018 2018 2018 3/19/2019 2019   Hemoglobin 13.3 - 17.7 g/dL 8.2(L) 7.9(LL) 8.0(L) 8.1(L) 8.7(L) 10.6(L) 8.8(L)   TSAT 15 - 46 % - - - - - - 21   Ferritin 26 - 388 ng/mL - - - - - - 436(H)       Orders needed to be renewed (for next follow-up date) in EPIC: None   Med order expires: 2020   Lab orders : 2020    Follow-up call date: 2019      Anemia Management Service  Cate Ellis RN  Phone: [phone]  Fax: [phone]

## 2019-06-28 ENCOUNTER — DOCUMENTATION ONLY (OUTPATIENT)
Dept: TRANSPLANT | Facility: CLINIC | Age: 44
End: 2019-06-28

## 2019-07-12 ENCOUNTER — HOSPITAL ENCOUNTER (OUTPATIENT)
Dept: OCCUPATIONAL THERAPY | Facility: CLINIC | Age: 44
Setting detail: THERAPIES SERIES
End: 2019-07-12
Attending: INTERNAL MEDICINE
Payer: COMMERCIAL

## 2019-07-12 PROCEDURE — 97165 OT EVAL LOW COMPLEX 30 MIN: CPT | Mod: GO | Performed by: OCCUPATIONAL THERAPIST

## 2019-07-12 PROCEDURE — 97535 SELF CARE MNGMENT TRAINING: CPT | Mod: GO | Performed by: OCCUPATIONAL THERAPIST

## 2019-07-14 NOTE — PROGRESS NOTES
07/12/19 1359   Rehab Discipline   Discipline OT   Type of Visit   Type of visit Initial Edema Evaluation       present No   General Information   Start of care 07/12/19   Referring physician Gee Barrios MD   Orders Evaluate and treat as indicated   Order date 06/13/19   Medical diagnosis H/o kidney transplant in 2011.  Pt with chronic BLE edema since kidney transplant.   Onset of illness / date of surgery 01/13/11   Edema onset 01/13/11   Affected body parts LLE;RLE   Edema etiology comments Contributing factors include pt report of LND with kidney transplant and only having 1 kidney and kidney not functioning at 100%.   Pertinent history of current problem (PT: include personal factors and/or comorbidities that impact the POC; OT: include additional occupational profile info) Pt does not use compression garments.  Pt reports BLE edema can increase if he ingests more fluids.   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 A higher score indicates greater impairment. 9   Subjective Report   Patient report of symptoms Pt states LLE >RLE, no pain, soft edema   Patient / Family Goals   Patient / family goals statement to reduce size of BLEs   Pain   Patient currently in pain No   Cognitive Status   Orientation Orientation to person, place and time   Level of consciousness Alert   Follows commands and answers questions 100% of the time   Edema Exam / Assessment   Skin condition comments RLE with 1+ pitting at MTP joints to knee crease, soft, no edema of knee and thigh.  LLE with 2-3+ pitting from MTP joints to knee crease, soft, no edema of knee and thigh.    Scar No   Ulceration No   Girth Measurements    Girth Measurements Refer to separate girth measurement flowsheet   Posture   Posture Normal   Activities of Daily Living   Activities of Daily Living IND   Bed Mobility   Bed mobility IND   Transfers   Transfers IND   Gait / Locomotion   Gait / Locomotion IND   Sensory   Sensory perception comments intact per pt report   Planned Edema Interventions   Planned edema interventions Manual lymph drainage;Gradient compression bandaging;Fit for compression garment;Exercises;Precautions to prevent infection / exacerbation;Education;Manual therapy;ADL training;Skin care / precautions;Home management program development   Clinical Impression   Criteria for skilled therapeutic intervention met Yes   Therapy diagnosis BLE stage l/ll lymphedema   Influenced by the following impairments / conditions Stage 1;Stage 2   Assessment of Occupational Performance 1-3 Performance Deficits   Identified Performance Deficits at risk for progression of lymphedema, ill fit of shoes   Clinical Decision Making (Complexity) Low complexity   Treatment Frequency   (2x/wk x5 weeks, 1x/wk x 3wks)   Treatment duration x8 weeks   Patient / family and/or staff in agreement with plan of care Yes   Risks and benefits of therapy have been explained Yes   Clinical impression comments Pt presents with BLE edema: LLE>RLE with risk for progression of lymphedema if left untreated and ill fit of shoes.  Pt will benefit from continued skilled OT intervention to address BLE edema to preserve skin integrity, prevent infection and improve fit of shoes.     Goals   Edema Eval Goals 1;2;3;4   Goal 1   Goal identifier home program   Goal description Pt will demonstrate IND with home program including: GCB to BLEs , self MLD and HEP to reduce edema to improve skin integrity, prevent infection and to be fit for compression garments for edema management at discharge.   Target date 09/06/19   Goal 2   Goal identifier volume   Goal description Pt will demonstrate a 500mL  reduction in RLE volume and a 900mL reduction in LLE volume to preserve skin integrity, prevent infection and to be fit for compression garments for edema management at discharge.    Target date 09/06/19   Goal 3   Goal identifier shoes    Goal description Pt will self-report improved fit of shoes by tying them tighter and reduce shoe size by 1 measurement.     Target date 09/06/19   Goal 4   Goal identifier discharge   Goal description Pt will be IND with donning compression garment(s) and verbalizing wear/wash/replace schedule for edema management at discharge.    Target date 09/06/19   Total Evaluation Time   OT Eval, Low Complexity Minutes (26415) 20

## 2019-07-17 ENCOUNTER — TELEPHONE (OUTPATIENT)
Dept: TRANSPLANT | Facility: CLINIC | Age: 44
End: 2019-07-17

## 2019-07-17 NOTE — TELEPHONE ENCOUNTER
Right AV fistula creation surgery with Dr. Irwin is scheduled on 7/31/2019. The surgery time has been moved up 7:45 am and Dr. Irwin' AA informed patient regarding new surgery time, Patient has a couple of questions would like to be answered.  Writer called and spoke with the patient and he is OK to move up surgery time. However, he stated that he did not receive any calls form  of the initial surgery date/time, he found out surgery appts from [facility]. He also has questions about PD vs Home HD, writer explained both dialysis options in details and he expressed understanding.  He had a couple of questions regarding AV fistula, all questions answered.  He agrees to pursue A V fistula surgery, he also instructed to have pre op H & P with his PCP. Patient verbalized acceptance and understanding of plan.

## 2019-07-17 NOTE — PROGRESS NOTES
Sophie, MD Edgard Tejada Yeewan S, APRN CNS; Flor Barrett, RN             I think we can move forward with access.    Previous Messages      ----- Message -----   From: Teo Rene APRN CNS   Sent: 6/26/2019  10:24 AM   To: Gee Barrios MD, Flor Barrett, RN     Mart Ray,   I did not have chance to discuss with Dr. Irwin whether or not to see him again. We planned to give him RUE AV fistula in 2017, he canceled surgery twice (2017 and 2019) D/T stable kidney functioning.   I don't believe we need to see him again based on vein mapping in 2017 (adequate vein size). Would Dr. Barrios like just go ahead to schedule surgery without coming back see us? If yes, Please let him know a  will call him for surgery and post op appts.   Please let me know.   Thanks   Sum     ----- Message -----   From: Flor Barrett, RN   Sent: 6/13/2019   5:08 PM   To: ISABEL Walker     Hi Dr. Sophie Beauchamp would like to re-refer this patient for a vascular access consult (he was seen in 2017, but his kidney function improved, so no further action was needed). His GFR is 15. He knows to expect a call.     Thanks!   Flor

## 2019-07-22 ENCOUNTER — TELEPHONE (OUTPATIENT)
Dept: PHARMACY | Facility: CLINIC | Age: 44
End: 2019-07-22

## 2019-07-22 NOTE — TELEPHONE ENCOUNTER
Follow-up with anemia management service:    Attempted to reach pt on 6/24/2019 at [phone] and service is not available.   Called Antoine at [phone], no answer. Left message for Rashad to call [phone] re his labs.      Rashad needs to call  Infusion Center to arrange his Aranesp injections.      7/22/2019: Spoke with Rashad, he states he did not receive my letter in the mail (he recently moved), and overlooked the D[employer] message. He will have his labs drawn on 7/24/2019. Will follow up with Rashad on 7/25/19.  Rashad agreed with this plan.     Anemia Latest Ref Rng & Units 11/3/2018 11/10/2018 11/17/2018 11/24/2018 12/12/2018 3/19/2019 6/13/2019   Hemoglobin 13.3 - 17.7 g/dL 8.2(L) 7.9(LL) 8.0(L) 8.1(L) 8.7(L) 10.6(L) 8.8(L)   TSAT 15 - 46 % - - - - - - 21   Ferritin 26 - 388 ng/mL - - - - - - 436(H)           Follow-up call date: 7/25/2019  Were labs drawn?        Anemia Management Service  Cate Ellis RN  Phone: [phone]  Fax: [phone]

## 2019-07-24 ENCOUNTER — OFFICE VISIT (OUTPATIENT)
Dept: FAMILY MEDICINE | Facility: CLINIC | Age: 44
End: 2019-07-24
Payer: COMMERCIAL

## 2019-07-24 VITALS
RESPIRATION RATE: 16 BRPM | HEIGHT: 68 IN | WEIGHT: 173 LBS | HEART RATE: 71 BPM | DIASTOLIC BLOOD PRESSURE: 84 MMHG | TEMPERATURE: 98.6 F | BODY MASS INDEX: 26.22 KG/M2 | OXYGEN SATURATION: 100 % | SYSTOLIC BLOOD PRESSURE: 121 MMHG

## 2019-07-24 DIAGNOSIS — Z48.298 AFTERCARE FOLLOWING ORGAN TRANSPLANT: ICD-10-CM

## 2019-07-24 DIAGNOSIS — N18.5 CHRONIC KIDNEY DISEASE, STAGE V (H): ICD-10-CM

## 2019-07-24 DIAGNOSIS — Z94.0 KIDNEY REPLACED BY TRANSPLANT: ICD-10-CM

## 2019-07-24 DIAGNOSIS — N18.5 ANEMIA OF CHRONIC RENAL FAILURE, STAGE 5 (H): ICD-10-CM

## 2019-07-24 DIAGNOSIS — D63.1 ANEMIA OF CHRONIC RENAL FAILURE, STAGE 5 (H): ICD-10-CM

## 2019-07-24 DIAGNOSIS — Z94.0 HTN, KIDNEY TRANSPLANT RELATED: ICD-10-CM

## 2019-07-24 DIAGNOSIS — Z79.899 ENCOUNTER FOR LONG-TERM CURRENT USE OF MEDICATION: ICD-10-CM

## 2019-07-24 DIAGNOSIS — J22 LRTI (LOWER RESPIRATORY TRACT INFECTION): ICD-10-CM

## 2019-07-24 DIAGNOSIS — I15.1 HTN, KIDNEY TRANSPLANT RELATED: ICD-10-CM

## 2019-07-24 DIAGNOSIS — Z01.818 PREOP GENERAL PHYSICAL EXAM: Primary | ICD-10-CM

## 2019-07-24 LAB
ANION GAP SERPL CALCULATED.3IONS-SCNC: 11 MMOL/L (ref 3–14)
BUN SERPL-MCNC: 62 MG/DL (ref 7–30)
CALCIUM SERPL-MCNC: 8.7 MG/DL (ref 8.5–10.1)
CHLORIDE SERPL-SCNC: 112 MMOL/L (ref 94–109)
CHOLEST SERPL-MCNC: 84 MG/DL
CO2 SERPL-SCNC: 17 MMOL/L (ref 20–32)
CREAT SERPL-MCNC: 6 MG/DL (ref 0.66–1.25)
ERYTHROCYTE [DISTWIDTH] IN BLOOD BY AUTOMATED COUNT: 15 % (ref 10–15)
FERRITIN SERPL-MCNC: 406 NG/ML (ref 26–388)
GFR SERPL CREATININE-BSD FRML MDRD: 11 ML/MIN/{1.73_M2}
GLUCOSE SERPL-MCNC: 90 MG/DL (ref 70–99)
HCT VFR BLD AUTO: 26.3 % (ref 40–53)
HDLC SERPL-MCNC: 47 MG/DL
HGB BLD-MCNC: 8.6 G/DL (ref 13.3–17.7)
IRON SATN MFR SERPL: 75 % (ref 15–46)
IRON SERPL-MCNC: 152 UG/DL (ref 35–180)
LDLC SERPL CALC-MCNC: 23 MG/DL
MCH RBC QN AUTO: 28.3 PG (ref 26.5–33)
MCHC RBC AUTO-ENTMCNC: 32.7 G/DL (ref 31.5–36.5)
MCV RBC AUTO: 87 FL (ref 78–100)
NONHDLC SERPL-MCNC: 37 MG/DL
PLATELET # BLD AUTO: 251 10E9/L (ref 150–450)
POTASSIUM SERPL-SCNC: 4.4 MMOL/L (ref 3.4–5.3)
PROT UR-MCNC: 0.38 G/L
PROT/CREAT 24H UR: 0.26 G/G CR (ref 0–0.2)
RBC # BLD AUTO: 3.04 10E12/L (ref 4.4–5.9)
SODIUM SERPL-SCNC: 140 MMOL/L (ref 133–144)
TIBC SERPL-MCNC: 202 UG/DL (ref 240–430)
TRIGL SERPL-MCNC: 70 MG/DL
WBC # BLD AUTO: 5.4 10E9/L (ref 4–11)

## 2019-07-24 PROCEDURE — 36415 COLL VENOUS BLD VENIPUNCTURE: CPT | Performed by: INTERNAL MEDICINE

## 2019-07-24 PROCEDURE — 80061 LIPID PANEL: CPT | Performed by: INTERNAL MEDICINE

## 2019-07-24 PROCEDURE — 84156 ASSAY OF PROTEIN URINE: CPT | Performed by: INTERNAL MEDICINE

## 2019-07-24 PROCEDURE — 83550 IRON BINDING TEST: CPT | Performed by: INTERNAL MEDICINE

## 2019-07-24 PROCEDURE — 85027 COMPLETE CBC AUTOMATED: CPT | Performed by: INTERNAL MEDICINE

## 2019-07-24 PROCEDURE — 80197 ASSAY OF TACROLIMUS: CPT | Performed by: INTERNAL MEDICINE

## 2019-07-24 PROCEDURE — 83540 ASSAY OF IRON: CPT | Performed by: INTERNAL MEDICINE

## 2019-07-24 PROCEDURE — 80048 BASIC METABOLIC PNL TOTAL CA: CPT | Performed by: INTERNAL MEDICINE

## 2019-07-24 PROCEDURE — 99215 OFFICE O/P EST HI 40 MIN: CPT | Performed by: FAMILY MEDICINE

## 2019-07-24 PROCEDURE — 82728 ASSAY OF FERRITIN: CPT | Performed by: INTERNAL MEDICINE

## 2019-07-24 RX ORDER — DOXYCYCLINE 100 MG/1
100 CAPSULE ORAL 2 TIMES DAILY
Qty: 20 CAPSULE | Refills: 0 | Status: ON HOLD | OUTPATIENT
Start: 2019-07-24 | End: 2019-10-15

## 2019-07-24 ASSESSMENT — PAIN SCALES - GENERAL: PAINLEVEL: NO PAIN (0)

## 2019-07-24 ASSESSMENT — MIFFLIN-ST. JEOR: SCORE: 1654.22

## 2019-07-24 NOTE — PROGRESS NOTES
[address] [address]  [phone]  Dept: [phone]    PRE-OP EVALUATION:  Today's date: 2019    Rashad Ortiz (: 1975) presents for pre-operative evaluation assessment as requested by Dr. Irwin.  He requires evaluation and anesthesia risk assessment prior to undergoing surgery/procedure for treatment of Atriovenous .    Proposed Surgery/ Procedure: Atriovenous  Date of Surgery/ Procedure: 2019  Time of Surgery/ Procedure: 7:45 AM  Hospital/Surgical Facility: [facility]  Fax number for surgical facility:   Primary Physician: Mariel Garcia  Type of Anesthesia Anticipated: General    Patient has a Health Care Directive or Living Will:  YES on file at     1. NO - Do you have a history of heart attack, stroke, stent, bypass or surgery on an artery in the head, neck, heart or legs?  2. NO - Do you ever have any pain or discomfort in your chest?  3. NO - Do you have a history of  Heart Failure?  4. NO - Are you troubled by shortness of breath when: walking on the level, up a slight hill or at night?  5. YES - DO YOU CURRENTLY HAVE A COLD, BRONCHITIS OR OTHER RESPIRATORY INFECTION? Cold  6. YES - DO YOU HAVE A COUGH, SHORTNESS OF BREATH OR WHEEZING? Cough  7. NO - Do you sometimes get pains in the calves of your legs when you walk?  8. NO - Do you or anyone in your family have previous history of blood clots?  9. NO - Do you or does anyone in your family have a serious bleeding problem such as prolonged bleeding following surgeries or cuts?  10. YES - HAVE YOU EVER HAD PROBLEMS WITH ANEMIA OR BEEN TOLD TO TAKE IRON PILLS? Iron pills  11. NO - Have you had any abnormal blood loss such as black, tarry or bloody stools, or abnormal vaginal bleeding?  12. YES - HAVE YOU EVER HAD A BLOOD TRANSFUSION? With kidney transplant  13. NO - Have you or any of your relatives ever had problems with anesthesia?  14. NO - Do you  have sleep apnea, excessive snoring or daytime drowsiness?  15. NO - Do you have any prosthetic heart valves?  16. NO - Do you have prosthetic joints?  17. NO - Is there any chance that you may be pregnant?      HPI:     HPI related to upcoming procedure: Worsening renal status follow renal transplant in 2011.  AV access recommended for possible dialysis.    Cold symptoms - present for 2 weeks.  Productive cough, runny nose.  Denies fevers, chills or sore throat.    See problem list for active medical problems.  Problems all longstanding and stable, except as noted/documented.  See ROS for pertinent symptoms related to these conditions.      MEDICAL HISTORY:     Patient Active Problem List    Diagnosis Date Noted     Anemia of chronic renal failure, stage 5 (H) 06/17/2019     Priority: Medium     HTN, kidney transplant related 11/02/2018     Priority: Medium     Metabolic acidosis 11/02/2018     Priority: Medium     Chronic kidney disease, stage V (H) 11/02/2018     Priority: Medium     Immunosuppression (H) 11/02/2018     Priority: Medium     Escherichia coli septicemia (H) 10/26/2018     Priority: Medium     Pyelonephritis of transplanted kidney 10/26/2018     Priority: Medium     Herpes simplex infection of penis 06/19/2018     Priority: Medium     HSV 1 confirmed by PCR of lesion       Chronic kidney disease, stage 4, severely decreased GFR (H)      Priority: Medium     Medical non-compliance      Priority: Medium     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 Provider to review       Kidney replaced by transplant 05/21/2012     Priority: Medium     Aftercare following  organ transplant 05/21/2012     Priority: Medium      Past Medical History:   Diagnosis Date     Chronic kidney disease, stage 4, severely decreased GFR (H)      Gastro-oesophageal reflux disease      Gout      Hypertension      Medical non-compliance      Pulmonary nodules      Steroid long-term use      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     Current Outpatient Medications   Medication Sig Dispense Refill     amLODIPine (NORVASC) 5 MG tablet Take 1 tablet (5 mg) by mouth daily 90 tablet 3     carvedilol (COREG) 25 MG tablet Take 1 tablet (25 mg) by mouth 2 times daily 180 tablet 3     doxycycline hyclate (VIBRAMYCIN) 100 MG capsule Take 1 capsule (100 mg) by mouth 2 times daily for 10 days 20 capsule 0     febuxostat (ULORIC) 40 MG TABS tablet Take 1 tablet (40 mg) by mouth daily 90 tablet 3     ferrous sulfate (FEROSUL) 325 (65 Fe) MG tablet Take 1 tablet (325 mg) by mouth daily (with breakfast) 30 tablet 11     furosemide (LASIX) 20 MG tablet Take 1 tablet (20 mg) by mouth 2 times daily 180 tablet 1     mycophenolate (GENERIC EQUIVALENT) 250 MG capsule Take 2 capsules (500 mg) by mouth 2 times daily 120 capsule 11     omeprazole (PRILOSEC) 20 MG capsule Take 1 capsule (20 mg) by mouth daily 90 capsule 1     predniSONE (DELTASONE) 5 MG tablet Take 5 mg by mouth daily. 30 tablet 3     sodium bicarbonate 650 MG tablet Take 2 tablets (1,300 mg) by mouth 3 times daily 180 tablet 11     sulfamethoxazole-trimethoprim (BACTRIM/SEPTRA) 400-80 MG  "tablet Take 1 tablet by mouth every other day 45 tablet 3     tacrolimus (GENERIC EQUIVALENT) 1 MG capsule Take 2 capsules (2 mg) by mouth 2 times daily 120 capsule 11     vitamin D3 (CHOLECALCIFEROL) 1000 units (25 mcg) tablet Take 2 tablets (2,000 Units) by mouth daily 180 tablet 3     OTC products: None, except as noted above    No Known Allergies   Latex Allergy: NO    Social History     Tobacco Use     Smoking status: Former Smoker     Types: Cigarettes     Last attempt to quit: 2017     Years since quittin.3     Smokeless tobacco: Never Used     Tobacco comment: Patient states that he is an 'social'  smoker    Substance Use Topics     Alcohol use: Yes     Alcohol/week: 2.5 oz     Types: 5 Standard drinks or equivalent per week     Comment: 1-2 drinks/wk     History   Drug Use No       REVIEW OF SYSTEMS:   Constitutional, neuro, ENT, endocrine, pulmonary, cardiac, gastrointestinal, genitourinary, musculoskeletal, integument and psychiatric systems are negative, except as otherwise noted.    EXAM:   /84 (BP Location: Left arm, Patient Position: Sitting, Cuff Size: Adult Large)   Pulse 71   Temp 98.6  F (37  C) (Oral)   Resp 16   Ht 1.727 m (5' 8\")   Wt 78.5 kg (173 lb)   SpO2 100%   BMI 26.30 kg/m      GENERAL APPEARANCE: healthy, alert and no distress     EYES: EOMI,  PERRL     HENT: ear canals and TM's normal and nose and mouth without ulcers or lesions     NECK: no adenopathy, no asymmetry, masses, or scars and thyroid normal to palpation     RESP: rhonchi R lower posterior     CV: regular rates and rhythm, normal S1 S2, no S3 or S4 and no murmur, click or rub     ABDOMEN:  soft, nontender, no HSM or masses and bowel sounds normal     MS: bilateral edema to the mid lower leg     SKIN: molluscum on face     NEURO: Normal strength and tone, sensory exam grossly normal, mentation intact and speech normal     PSYCH: mentation appears normal. and affect normal/bright     LYMPHATICS: No cervical " adenopathy    DIAGNOSTICS:     Labs Resulted Today:   Results for orders placed or performed in visit on 06/13/19   FERRITIN   Result Value Ref Range    Ferritin 436 (H) 26 - 388 ng/mL   IRON AND IRON BINDING CAPACITY   Result Value Ref Range    Iron 46 35 - 180 ug/dL    Iron Binding Cap 215 (L) 240 - 430 ug/dL    Iron Saturation Index 21 15 - 46 %   Vitamin D Deficiency   Result Value Ref Range    Vitamin D Deficiency screening 44 20 - 75 ug/L   Parathyroid Hormone Intact   Result Value Ref Range    Parathyroid Hormone Intact 456 (H) 18 - 80 pg/mL   CBC with platelets   Result Value Ref Range    WBC 7.0 4.0 - 11.0 10e9/L    RBC Count 3.13 (L) 4.4 - 5.9 10e12/L    Hemoglobin 8.8 (L) 13.3 - 17.7 g/dL    Hematocrit 27.7 (L) 40.0 - 53.0 %    MCV 89 78 - 100 fl    MCH 28.1 26.5 - 33.0 pg    MCHC 31.8 31.5 - 36.5 g/dL    RDW 16.6 (H) 10.0 - 15.0 %    Platelet Count 208 150 - 450 10e9/L   Renal panel   Result Value Ref Range    Sodium 140 133 - 144 mmol/L    Potassium 4.4 3.4 - 5.3 mmol/L    Chloride 110 (H) 94 - 109 mmol/L    Carbon Dioxide 19 (L) 20 - 32 mmol/L    Anion Gap 11 3 - 14 mmol/L    Glucose 71 70 - 99 mg/dL    Urea Nitrogen 45 (H) 7 - 30 mg/dL    Creatinine 5.28 (H) 0.66 - 1.25 mg/dL    GFR Estimate 12 (L) >60 mL/min/[1.73_m2]    GFR Estimate If Black 14 (L) >60 mL/min/[1.73_m2]    Calcium 8.9 8.5 - 10.1 mg/dL    Phosphorus 2.7 2.5 - 4.5 mg/dL    Albumin 3.4 3.4 - 5.0 g/dL     Labs Drawn and in Process:   Unresulted Labs Ordered in the Past 30 Days of this Admission     Date and Time Order Name Status Description    7/24/2019 1643 BASIC METABOLIC PANEL In process     7/24/2019 1643 CBC WITH PLATELETS In process     7/24/2019 1643 LIPID REFLEX TO DIRECT LDL PANEL In process     7/24/2019 1642 TACROLIMUS LEVEL In process     7/24/2019 1642 FERRITIN In process     7/24/2019 1642 IRON AND IRON BINDING CAPACITY In process           Recent Labs   Lab Test 06/13/19  1941 04/08/19  1914  03/19/19  1506   10/26/18  0545  10/25/18  2324  06/06/15  0552   HGB 8.8*  --   --  10.6*   < > 8.0*   < > 7.9*   < > 10.9*     --   --  198   < > 78*   < > 88*   < > 193   INR  --   --   --   --   --  1.58*  --  1.57*   < >  --     141   < > 142   < > 139  --  138   < > 137   POTASSIUM 4.4 4.0   < > 4.1   < > 4.0  --  4.4   < > 5.1   CR 5.28* 4.98*   < > 5.02*   < > 9.27*  --  8.87*   < > 5.41*   A1C  --   --   --   --   --   --   --   --   --  5.4    < > = values in this interval not displayed.        IMPRESSION:   Reason for surgery/procedure: CKD 4/5  Diagnosis/reason for consult: The primary encounter diagnosis was Preop general physical exam. Diagnoses of Chronic kidney disease, stage V (H), Anemia of chronic renal failure, stage 5 (H), Kidney replaced by transplant, HTN, kidney transplant related, Aftercare following organ transplant, Encounter for long-term current use of medication, and LRTI (lower respiratory tract infection) were also pertinent to this visit.     The proposed surgical procedure is considered INTERMEDIATE risk.    REVISED CARDIAC RISK INDEX  The patient has the following serious cardiovascular risks for perioperative complications such as (MI, PE, VFib and 3  AV Block):  Serum Creatinine >2.0 mg/dl  INTERPRETATION: 1 risks: Class II (low risk - 0.9% complication rate)    The patient has the following additional risks for perioperative complications:  No identified additional risks      ICD-10-CM    1. Preop general physical exam Z01.818 CBC with platelets     Basic metabolic panel   2. Chronic kidney disease, stage V (H) N18.5 Lipid panel reflex to direct LDL Non-fasting     Basic metabolic panel     Iron and iron binding capacity     Ferritin   3. Anemia of chronic renal failure, stage 5 (H) N18.5 CBC with platelets    D63.1 Iron and iron binding capacity     Ferritin   4. Kidney replaced by transplant Z94.0 Basic metabolic panel     Protein  random urine with Creat Ratio     Tacrolimus  level   5. HTN, kidney transplant related I15.1 Basic metabolic panel    Z94.0    6. Aftercare following organ transplant Z48.298 Protein  random urine with Creat Ratio     Tacrolimus level   7. Encounter for long-term current use of medication Z79.899 Protein  random urine with Creat Ratio     Tacrolimus level   8. LRTI (lower respiratory tract infection) J22 doxycycline hyclate (VIBRAMYCIN) 100 MG capsule       RECOMMENDATIONS:       Cardiovascular Risk  Performs 4 METs exercise without symptoms (Climb a flight of stairs) .       --Patient is to take all scheduled medications on the day of surgery EXCEPT for modifications listed below.    --Treat LRTI with doxycycline 100 mg BID x 10 days.    The uses and side effects, including black box warnings as appropriate, were discussed in detail.  All patient questions were answered.  The patient was instructed to call immediately if any side effects developed.     APPROVAL GIVEN to proceed with proposed procedure, without further diagnostic evaluation       Signed Electronically by: Mariel Mares MD    Copy of this evaluation report is provided to requesting physician.    [facility] Preop Guidelines    Revised Cardiac Risk Index

## 2019-07-25 DIAGNOSIS — Z99.2 ENCOUNTER REGARDING VASCULAR ACCESS FOR DIALYSIS FOR END-STAGE RENAL DISEASE (H): ICD-10-CM

## 2019-07-25 DIAGNOSIS — N18.5 CHRONIC KIDNEY DISEASE, STAGE 5, KIDNEY FAILURE (H): Primary | ICD-10-CM

## 2019-07-25 DIAGNOSIS — N18.6 ENCOUNTER REGARDING VASCULAR ACCESS FOR DIALYSIS FOR END-STAGE RENAL DISEASE (H): ICD-10-CM

## 2019-07-25 LAB
TACROLIMUS BLD-MCNC: 5.6 UG/L (ref 5–15)
TME LAST DOSE: NORMAL H

## 2019-07-25 RX ORDER — CEFAZOLIN SODIUM 2 G/50ML
2 SOLUTION INTRAVENOUS
Status: CANCELLED | OUTPATIENT
Start: 2019-07-31

## 2019-07-25 RX ORDER — CEFAZOLIN SODIUM 2 G/100ML
2 INJECTION, SOLUTION INTRAVENOUS
Status: CANCELLED | OUTPATIENT
Start: 2019-07-31

## 2019-07-25 NOTE — RESULT ENCOUNTER NOTE
Mr. Ortiz,    Your labs are stable for you.    Please contact the clinic if you have additional questions.  Thank you.    Sincerely,    Mariel Mares

## 2019-07-26 ENCOUNTER — TELEPHONE (OUTPATIENT)
Dept: PHARMACY | Facility: CLINIC | Age: 44
End: 2019-07-26

## 2019-07-26 NOTE — TELEPHONE ENCOUNTER
Anemia Management Note  SUBJECTIVE/OBJECTIVE:  Referred by Dr. Gee Barrios on 2019  Primary Diagnosis: Anemia in Chronic Kidney Disease (N18.5, D63.1)     Secondary Diagnosis:  Chronic Kidney Disease, Stage 5 (N18.5)  Kidney TX: 2011  Hgb goal range:  9-10  Epo/Darbo: Aranesp  60 mcg  every two weeks for Hgb <10. In clinic  Iron regimen:  19; ferrous sulfate ordered  Ferrous Sulfate 1 tab daily with lunch   Labs : 2020  Recent [facility] use, transfusion, IV iron: NA  RX/TX plans : 2020  No history of stroke, MI and blood clots or cancers     Contact:            No Consent to communicate on File     Anemia Latest Ref Rng & Units 11/10/2018 2018 2018 2018 3/19/2019 2019 2019   Hemoglobin 13.3 - 17.7 g/dL 7.9(LL) 8.0(L) 8.1(L) 8.7(L) 10.6(L) 8.8(L) 8.6(L)   TSAT 15 - 46 % - - - - - 21 75(H)   Ferritin 26 - 388 ng/mL - - - - - 436(H) 406(H)     BP Readings from Last 3 Encounters:   19 121/84   19 (!) 147/101   19 (!) 152/102     Wt Readings from Last 2 Encounters:   19 78.5 kg (173 lb)   19 78.4 kg (172 lb 12.8 oz)       Spoke with Rashad, he will call  Infusion center to schedule and appt for Aranesp    ASSESSMENT:  Hgb:Not at goal/Initiation of therapy  TSat: elevated at >50% Ferritin: At goal (>100ng/mL)    PLAN:  Start Aranesp.    Orders needed to be renewed (for next follow-up date) in [facility]: None    Iron labs due:  2019    Plan discussed with:  Rashad  Plan provided by:  chio    NEXT FOLLOW-UP DATE:  2019  Did he schedule Aranesp?  19; Aranesp has not been scheduled. Will f/u again in 1 week.     Anemia Management Service  Cate Ellis RN  Phone: [phone]  Fax: [phone]

## 2019-07-29 ENCOUNTER — MYC MEDICAL ADVICE (OUTPATIENT)
Dept: FAMILY MEDICINE | Facility: CLINIC | Age: 44
End: 2019-07-29

## 2019-07-29 ENCOUNTER — OFFICE VISIT (OUTPATIENT)
Dept: FAMILY MEDICINE | Facility: CLINIC | Age: 44
End: 2019-07-29
Payer: COMMERCIAL

## 2019-07-29 VITALS
WEIGHT: 156 LBS | SYSTOLIC BLOOD PRESSURE: 133 MMHG | OXYGEN SATURATION: 99 % | BODY MASS INDEX: 23.64 KG/M2 | HEART RATE: 78 BPM | TEMPERATURE: 98.6 F | DIASTOLIC BLOOD PRESSURE: 84 MMHG | HEIGHT: 68 IN

## 2019-07-29 DIAGNOSIS — M10.372 ACUTE GOUT DUE TO RENAL IMPAIRMENT INVOLVING LEFT FOOT: Primary | ICD-10-CM

## 2019-07-29 DIAGNOSIS — N18.5 CKD (CHRONIC KIDNEY DISEASE) STAGE 5, GFR LESS THAN 15 ML/MIN (H): ICD-10-CM

## 2019-07-29 PROCEDURE — 99213 OFFICE O/P EST LOW 20 MIN: CPT | Performed by: NURSE PRACTITIONER

## 2019-07-29 RX ORDER — HYDROCODONE BITARTRATE AND ACETAMINOPHEN 5; 325 MG/1; MG/1
1 TABLET ORAL EVERY 6 HOURS PRN
Qty: 10 TABLET | Refills: 0 | Status: SHIPPED | OUTPATIENT
Start: 2019-07-29 | End: 2019-08-05

## 2019-07-29 ASSESSMENT — MIFFLIN-ST. JEOR: SCORE: 1577.11

## 2019-07-29 ASSESSMENT — PAIN SCALES - GENERAL: PAINLEVEL: EXTREME PAIN (8)

## 2019-07-29 NOTE — TELEPHONE ENCOUNTER
Patient seen in clinic today for OV with provider. Closing encounter.    Shantel Ma, BSN, RN, PHN

## 2019-07-29 NOTE — OR NURSING
In basket messaged Teo Handley regarding lower respiratory tract infection diagnosed with pre op H&P on 7/24/19 with Dr Mariel Mares, started on doxycycline.

## 2019-07-29 NOTE — PATIENT INSTRUCTIONS
Elevate your legs  Continue to use your compression wraps  Acetaminophen/hydrocodone (Norco) for severe pain. No driving, operating machinery or drinking alcohol while taking  Let your surgeon know you are having a gout flare  If gout pain not improving, follow up in clinic            Patient Education     Gout    Gout is an inflammation of a joint due to a build-up of gout crystals in the joint fluid. This occurs when there is an excess of uric acid (a normal waste product) in the body. Uric acid builds up in the body when the kidneys are unable to filter enough of it from the blood. This may occur with age. It is also associated with kidney disease. Gout occurs more often in people with obesity, diabetes, high blood pressure, or high levels of fats in the blood. It may run in families. Gout tends to come and go. A flare up of gout is called an attack. Drinking alcohol or eating certain foods (such as shellfish or foods with additives such as high-fructose corn syrup) may increase uric acid levels in the blood and cause a gout attack.  During a gout attack, the affected joint may become a hot, red, swollen and painful. If you have had one attack of gout, you are likely to have another. An attack of gout can be treated with medicine. If these attacks become frequent, a daily medicine may be prescribed to help the kidneys remove uric acid from the body.  Home care  During a gout attack:    Rest painful joints. If gout affects the joints of your foot or leg, you may want to use crutches for the first few days to keep from bearing weight on the affected joint.    When sitting or lying down, raise the painful joint to a level higher than your heart.    Apply an ice pack (ice cubes in a plastic bag wrapped in a thin towel) over the injured area for 20 minutes every 1 to 2 hours the first day for pain relief. Continue this 3 to 4 times a day for swelling and pain.    Avoid alcohol and foods listed below (see Preventing  attacks) during a gout attack. Drink extra fluid to help flush the uric acid through your kidneys.    If you were prescribed a medicine to treat gout, take it as your healthcare provider has instructed. Don't skip doses.    Take anti-inflammatory medicine as directed.     If pain medicines have been prescribed, take them exactly as directed.    Preventing attacks    Minimize or avoid alcohol use. Excess alcohol intake can cause a gout attack.    Limit these foods and beverages:  ? Organ meats, such as kidneys and liver  ? Certain seafoods (anchovies, sardines, shrimp, scallops, herring, mackerel)  ? Wild game, meat extracts and meat gravies  ? Foods and beverages sweetened with high-fructose corn syrup, such as sodas    Eat a healthy diet including low-fat and nonfat dairy, whole grains, and vegetables.    If you are overweight, talk to your healthcare provider about a weight reduction plan. Avoid fasting or extreme low calorie diets (less than 900 calories per day). This will increase uric acid levels in the body.    If you have diabetes or high blood pressure, work with your doctor to manage these conditions.    Protect the joint from injury. Trauma can trigger a gout attack.  Follow-up care  Follow up with your healthcare provider, or as advised.   When to seek medical advice  Call your healthcare provider if you have any of the following:    Fever over 100.4 F (38. C) with worsening joint pain    Increasing redness around the joint    Pain developing in another joint    Repeated vomiting, abdominal pain, or blood in the vomit or stool (black or red color)  Date Last Reviewed: 3/1/2017    6116-6940 [employer]. [address] [place] [address]. All rights reserved. This information is not intended as a substitute for professional medical care. Always follow your healthcare professional's instructions.           Patient Education     Treating Gout Attacks     Raising the joint above the  level of your heart can help reduce gout symptoms.     Gout is a disease that affects the joints. It is caused by excess uric acid in your blood that may lead to crystals forming in your joints. Left untreated, it can lead to painful foot and joint deformities and even kidney problems. But, by treating gout early, you can relieve pain and help prevent future problems. Gout can usually be treated with medicine and proper diet. In severe cases, surgery may be needed.  Gout attacks are painful and often happen more than once. Taking medicines may reduce pain and prevent attacks in the future. There are also some things you can do at home to relieve symptoms.  Medicines for gout  Your healthcare provider may prescribe a daily medicine to reduce levels of uric acid. Reducing your uric acid levels may help prevent gout attacks. Allopurinol is one commonly used medicine taken daily to reduce uric acid levels. Other daily medicines used to reduce uric acid levels include febuxostat, lesinurad, and probencid. Other medicines can help relieve pain and swelling during an acute attack. Medicines such as NSAIDs (nonsteroidal anti-inflammatory medicines), steroids, and colchicine may be prescribed for intermittent use to relieve an acute gout attack. Be sure to take your medicine as directed.  What you can do  Below are some things you can do at home to relieve gout symptoms. Your healthcare provider may have other tips.    Rest the painful joint as much as you can.    Raise the painful joint so it is at a level higher than your heart.    Use ice for 10 minutes every 1 to 2 hours as possible.  How can I prevent gout?  With a little effort, you may be able to prevent gout attacks in the future. Here are some things you can do:    Don't eat foods high in purines  ? Certain meats (red meat, processed meat, turkey)  ? Organ meats (kidney, liver, sweetbread)  ? Shellfish (lobster, crab, shrimp, scallop, mussel)  ? Certain fish  (anchovy, sardine, herring, mackerel)    Take any medicines prescribed by your healthcare provider.    Lose weight if you need to.    Reduce high fructose corn syrup in meals and drinks.    Reduce or cut out alcohol, particularly beer, but also red wine and spirits.    Control blood pressure, diabetes, and cholesterol.    Drink plenty of water to help flush uric acid from your body.  Date Last Reviewed: 4/1/2018 2000-2018 [employer]. [address] [place] [address]. All rights reserved. This information is not intended as a substitute for professional medical care. Always follow your healthcare professional's instructions.           Patient Education     Gout Diet  Gout is a painful condition caused by an excess of uric acid, a waste product made by the body. Uric acid forms crystals that collect in the joints. The immune response to these crystals brings on symptoms of joint pain and swelling. This is called a gout attack. Often, medications and diet changes are combined to manage gout. Below are some guidelines for changing your diet to help you manage gout and prevent attacks. Your healthcare provider will help you determine the best eating plan for you.  Eating to manage gout  Weight loss for those who are overweight may help reduce gout attacks.  Eat less of these foods  Eating too many foods containing purines may raise the levels of uric acid in your body. This raises your risk for a gout attack. Try to limit these foods and drinks:    Alcohol, such as beer and red wine. You may be told to avoid alcohol completely.    Soft drinks that contain sugar or high fructose corn syrup    Certain fish, including anchovies, sardines, fish eggs, and herring    Shellfish    Certain meats, such as red meat, hot dogs, luncheon meats, and turkey    Organ meats, such as liver, kidneys, and sweetbreads    Legumes, such as dried beans and peas    Other high fat foods such as gravy, whole milk, and  high fat cheeses    Vegetables such as asparagus, cauliflower, spinach, and mushrooms used to be thought to contribute to an increased risk for a gout attack, but recent studies show that high purine vegetables don't increase the risk for a gout attack.  Eat more of these foods  Other foods may be helpful for people with gout. Add some of these foods to your diet:    Cherries contain chemicals that may lower uric acid.    Omega fatty acids. These are found in some fatty fish such as salmon, certain oils (flax, olive, or nut), and nuts themselves. Omega fatty acids may help prevent inflammation due to gout.    Dairy products that are low-fat or fat-free, such as cheese and yogurt    Complex carbohydrate foods, including whole grains, brown rice, oats, and beans    Coffee, in moderation    Water, approximately 64 ounces per day  Follow-up care  Follow up with your healthcare provider as advised.  When to seek medical advice  Call your healthcare provider right away if any of these occur:    Return of gout symptoms, usually at night:    Severe pain, swelling, and heat in a joint, especially the base of the big toe    Affected joint is hard to move    Skin of the affected joint is purple or red    Fever of 100.4 F (38 C) or higher    Pain that doesn't get better even with prescribed medicine   Date Last Reviewed: 6/1/2018 2000-2018 [employer]. [address] [place] [address], [place] [address]. All rights reserved. This information is not intended as a substitute for professional medical care. Always follow your healthcare professional's instructions.

## 2019-07-29 NOTE — PROGRESS NOTES
Subjective     Rashad Ortiz is a 44 year old male who presents to clinic today for the following health issues:    HPI   Gout/ single inflamed joint   Onset: Today    Description:   Location: foot - left  Joint Swelling: YES  Redness: YES  Pain: YES    Intensity: 8/10    Progression of Symptoms:  constant    Accompanying Signs & Symptoms:  Fevers: no     History:   Trauma to the area: no   Previous history of gout: YES   Recent illness:  no     Precipitating factors:   Diet-rich in purine: no  Alcohol use: YES  Diuretic use: no     Alleviating factors:  none    Therapies Tried and outcome: Took 1 prednisone      On uloric for gout prevention. Here for pain management. Can't take NSAIDs due to CKD. Has AV fistula placement surgery scheduled for later this week. Feel always swollen due to kidney disease. Left foot hurts today and radiates to shin when walking. No calf pain or tenderness. Usually wears compression stockings but didn't today so we could examine feet.    Patient Active Problem List   Diagnosis     Kidney replaced by transplant     Aftercare following organ transplant     GERD (gastroesophageal reflux disease)     CARDIOVASCULAR SCREENING; LDL GOAL LESS THAN 160     Gout     Creatinine elevation     Antibody mediated rejection of kidney transplant     Medical non-compliance     Chronic kidney disease, stage 4, severely decreased GFR (H)     Herpes simplex infection of penis     Escherichia coli septicemia (H)     Pyelonephritis of transplanted kidney     HTN, kidney transplant related     Metabolic acidosis     CKD (chronic kidney disease) stage 5, GFR less than 15 ml/min (H)     Immunosuppression (H)     Anemia of chronic renal failure, stage 5 (H)     Past Surgical History:   Procedure Laterality Date     AV FISTULA OR GRAFT ARTERIAL       LIGATE FISTULA ARTERIOVENOUS UPPER EXTREMITY  12/20/2011    Procedure:LIGATE FISTULA ARTERIOVENOUS UPPER EXTREMITY; Excision of Right Forearm Arteriovenous  Fistula.; Surgeon:LINDY AMAYA; Location:[facility] OR     PERCUTANEOUS BIOPSY KIDNEY Right 2017    Procedure: PERCUTANEOUS BIOPSY KIDNEY;  Surgeon: Gee Barrios MD;  Location: [facility] OR     TRANSPLANT  2011    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       Social History     Tobacco Use     Smoking status: Former Smoker     Types: Cigarettes     Last attempt to quit: 2017     Years since quittin.4     Smokeless tobacco: Never Used     Tobacco comment: Patient states that he is an 'social'  smoker    Substance Use Topics     Alcohol use: Yes     Alcohol/week: 2.5 oz     Types: 5 Standard drinks or equivalent per week     Comment: 1-2 drinks/wk     Family History   Problem Relation Age of Onset     Hypertension Mother          Current Outpatient Medications   Medication Sig Dispense Refill     amLODIPine (NORVASC) 5 MG tablet Take 1 tablet (5 mg) by mouth daily 90 tablet 3     carvedilol (COREG) 25 MG tablet Take 1 tablet (25 mg) by mouth 2 times daily 180 tablet 3     doxycycline hyclate (VIBRAMYCIN) 100 MG capsule Take 1 capsule (100 mg) by mouth 2 times daily for 10 days 20 capsule 0     febuxostat (ULORIC) 40 MG TABS tablet Take 1 tablet (40 mg) by mouth daily 90 tablet 3     ferrous sulfate (FEROSUL) 325 (65 Fe) MG tablet Take 1 tablet (325 mg) by mouth daily (with breakfast) 30 tablet 11     furosemide (LASIX) 20 MG tablet Take 1 tablet (20 mg) by mouth 2 times daily 180 tablet 1     HYDROcodone-acetaminophen (NORCO) 5-325 MG tablet Take 1 tablet by mouth every 6 hours as needed for severe pain 10 tablet 0     mycophenolate (GENERIC EQUIVALENT) 250 MG capsule Take 2 capsules (500 mg) by mouth 2 times daily 120 capsule 11     omeprazole (PRILOSEC) 20 MG capsule Take 1 capsule (20 mg) by mouth daily 90 capsule 1     predniSONE (DELTASONE) 5 MG tablet Take 5 mg by mouth daily. 30 tablet 3     sodium bicarbonate 650 MG tablet Take 2 tablets (1,300 mg) by mouth 3 times daily 180 tablet 11  "    sulfamethoxazole-trimethoprim (BACTRIM/SEPTRA) 400-80 MG tablet Take 1 tablet by mouth every other day 45 tablet 3     tacrolimus (GENERIC EQUIVALENT) 1 MG capsule Take 2 capsules (2 mg) by mouth 2 times daily 120 capsule 11     vitamin D3 (CHOLECALCIFEROL) 1000 units (25 mcg) tablet Take 2 tablets (2,000 Units) by mouth daily 180 tablet 3     No Known Allergies  BP Readings from Last 3 Encounters:   07/29/19 133/84   07/24/19 121/84   06/13/19 (!) 147/101    Wt Readings from Last 3 Encounters:   07/29/19 70.8 kg (156 lb)   07/24/19 78.5 kg (173 lb)   06/13/19 78.4 kg (172 lb 12.8 oz)                      Reviewed and updated as needed this visit by Provider  Tobacco  Allergies  Meds  Problems  Med Hx  Surg Hx  Fam Hx         Review of Systems   ROS COMP: Constitutional, HEENT, cardiovascular, pulmonary, gi and gu systems are negative, except as otherwise noted.      Objective    /84 (BP Location: Left arm, Patient Position: Chair, Cuff Size: Adult Regular)   Pulse 78   Temp 98.6  F (37  C) (Oral)   Ht 1.727 m (5' 8\")   Wt 70.8 kg (156 lb)   SpO2 99%   BMI 23.72 kg/m    Body mass index is 23.72 kg/m .  Physical Exam   GENERAL: healthy, alert and no distress  MS: BLE feet swelling. Cap refill <2 seconds. DP 2+, normal. Negative homans  SKIN: no suspicious lesions or rashes  PSYCH: mentation appears normal, affect normal/bright    Diagnostic Test Results:  Labs reviewed in Epic        Assessment & Plan       ICD-10-CM    1. Acute gout due to renal impairment involving left foot M10.372 HYDROcodone-acetaminophen (NORCO) 5-325 MG tablet   2. CKD (chronic kidney disease) stage 5, GFR less than 15 ml/min (H) N18.5      Elevate your legs  Continue to use your compression wraps  Acetaminophen/hydrocodone (Norco) for severe pain. No driving, operating machinery or drinking alcohol while taking  Let your surgeon know you are having a gout flare  If gout pain not improving, follow up in clinic       See " Patient Instructions    Return in about 1 week (around 8/5/2019), or if symptoms worsen or fail to improve.    The benefits, risks and potential side effects were discussed in detail. Black box warnings discussed as relevant. All patient questions were answered. The patient was instructed to follow up immediately if any adverse reactions develop.    Return precautions discussed, including when to seek urgent/emergent care.    Patient verbalizes understanding and agrees with plan of care. Patient stable for discharge.      ISABEL Myers [facility]

## 2019-07-30 ENCOUNTER — ANESTHESIA EVENT (OUTPATIENT)
Dept: SURGERY | Facility: CLINIC | Age: 44
End: 2019-07-30
Payer: COMMERCIAL

## 2019-07-31 ENCOUNTER — ANESTHESIA (OUTPATIENT)
Dept: SURGERY | Facility: CLINIC | Age: 44
End: 2019-07-31
Payer: COMMERCIAL

## 2019-07-31 ENCOUNTER — HOSPITAL ENCOUNTER (OUTPATIENT)
Facility: CLINIC | Age: 44
Discharge: HOME | End: 2019-07-31
Attending: SURGERY | Admitting: SURGERY
Payer: COMMERCIAL

## 2019-07-31 VITALS
RESPIRATION RATE: 16 BRPM | HEART RATE: 78 BPM | TEMPERATURE: 98.8 F | DIASTOLIC BLOOD PRESSURE: 85 MMHG | SYSTOLIC BLOOD PRESSURE: 116 MMHG | OXYGEN SATURATION: 100 % | BODY MASS INDEX: 23.7 KG/M2 | WEIGHT: 155.87 LBS

## 2019-07-31 DIAGNOSIS — N18.5 CHRONIC KIDNEY DISEASE, STAGE 5, KIDNEY FAILURE (H): ICD-10-CM

## 2019-07-31 DIAGNOSIS — N18.6 ENCOUNTER REGARDING VASCULAR ACCESS FOR DIALYSIS FOR END-STAGE RENAL DISEASE (H): ICD-10-CM

## 2019-07-31 DIAGNOSIS — Z99.2 ENCOUNTER REGARDING VASCULAR ACCESS FOR DIALYSIS FOR END-STAGE RENAL DISEASE (H): ICD-10-CM

## 2019-07-31 LAB
APTT PPP: 29 SEC (ref 22–37)
BASOPHILS # BLD AUTO: 0 10E9/L (ref 0–0.2)
BASOPHILS NFR BLD AUTO: 0.1 %
CREAT SERPL-MCNC: 5.91 MG/DL (ref 0.66–1.25)
DIFFERENTIAL METHOD BLD: ABNORMAL
EOSINOPHIL # BLD AUTO: 0.1 10E9/L (ref 0–0.7)
EOSINOPHIL NFR BLD AUTO: 0.6 %
ERYTHROCYTE [DISTWIDTH] IN BLOOD BY AUTOMATED COUNT: 15 % (ref 10–15)
GFR SERPL CREATININE-BSD FRML MDRD: 11 ML/MIN/{1.73_M2}
GLUCOSE BLDC GLUCOMTR-MCNC: 106 MG/DL (ref 70–99)
GLUCOSE SERPL-MCNC: 108 MG/DL (ref 70–99)
HCT VFR BLD AUTO: 29 % (ref 40–53)
HGB BLD-MCNC: 8.6 G/DL (ref 13.3–17.7)
IMM GRANULOCYTES # BLD: 0.1 10E9/L (ref 0–0.4)
IMM GRANULOCYTES NFR BLD: 0.7 %
INR PPP: 1.08 (ref 0.86–1.14)
LYMPHOCYTES # BLD AUTO: 0.8 10E9/L (ref 0.8–5.3)
LYMPHOCYTES NFR BLD AUTO: 8.1 %
MCH RBC QN AUTO: 26.7 PG (ref 26.5–33)
MCHC RBC AUTO-ENTMCNC: 29.7 G/DL (ref 31.5–36.5)
MCV RBC AUTO: 90 FL (ref 78–100)
MONOCYTES # BLD AUTO: 1 10E9/L (ref 0–1.3)
MONOCYTES NFR BLD AUTO: 10.7 %
NEUTROPHILS # BLD AUTO: 7.5 10E9/L (ref 1.6–8.3)
NEUTROPHILS NFR BLD AUTO: 79.8 %
NRBC # BLD AUTO: 0 10*3/UL
NRBC BLD AUTO-RTO: 0 /100
PLATELET # BLD AUTO: 191 10E9/L (ref 150–450)
POTASSIUM SERPL-SCNC: 3.8 MMOL/L (ref 3.4–5.3)
RBC # BLD AUTO: 3.22 10E12/L (ref 4.4–5.9)
WBC # BLD AUTO: 9.5 10E9/L (ref 4–11)

## 2019-07-31 PROCEDURE — 25000566 ZZH SEVOFLURANE, EA 15 MIN: Performed by: SURGERY

## 2019-07-31 PROCEDURE — 85610 PROTHROMBIN TIME: CPT | Performed by: CLINICAL NURSE SPECIALIST

## 2019-07-31 PROCEDURE — 36415 COLL VENOUS BLD VENIPUNCTURE: CPT | Performed by: CLINICAL NURSE SPECIALIST

## 2019-07-31 PROCEDURE — 84132 ASSAY OF SERUM POTASSIUM: CPT | Performed by: ANESTHESIOLOGY

## 2019-07-31 PROCEDURE — 25000125 ZZHC RX 250: Performed by: NURSE ANESTHETIST, CERTIFIED REGISTERED

## 2019-07-31 PROCEDURE — 25000125 ZZHC RX 250: Performed by: ANESTHESIOLOGY

## 2019-07-31 PROCEDURE — 25000128 H RX IP 250 OP 636: Performed by: CLINICAL NURSE SPECIALIST

## 2019-07-31 PROCEDURE — 37000009 ZZH ANESTHESIA TECHNICAL FEE, EACH ADDTL 15 MIN: Performed by: SURGERY

## 2019-07-31 PROCEDURE — 25000128 H RX IP 250 OP 636: Performed by: ANESTHESIOLOGY

## 2019-07-31 PROCEDURE — 25000128 H RX IP 250 OP 636: Performed by: NURSE ANESTHETIST, CERTIFIED REGISTERED

## 2019-07-31 PROCEDURE — 37000008 ZZH ANESTHESIA TECHNICAL FEE, 1ST 30 MIN: Performed by: SURGERY

## 2019-07-31 PROCEDURE — 36000059 ZZH SURGERY LEVEL 3 EA 15 ADDTL MIN UMMC: Performed by: SURGERY

## 2019-07-31 PROCEDURE — 71000027 ZZH RECOVERY PHASE 2 EACH 15 MINS: Performed by: SURGERY

## 2019-07-31 PROCEDURE — 82947 ASSAY GLUCOSE BLOOD QUANT: CPT | Performed by: CLINICAL NURSE SPECIALIST

## 2019-07-31 PROCEDURE — 85025 COMPLETE CBC W/AUTO DIFF WBC: CPT | Performed by: CLINICAL NURSE SPECIALIST

## 2019-07-31 PROCEDURE — 25800030 ZZH RX IP 258 OP 636: Performed by: NURSE ANESTHETIST, CERTIFIED REGISTERED

## 2019-07-31 PROCEDURE — 82962 GLUCOSE BLOOD TEST: CPT

## 2019-07-31 PROCEDURE — 36000057 ZZH SURGERY LEVEL 3 1ST 30 MIN - UMMC: Performed by: SURGERY

## 2019-07-31 PROCEDURE — 27210794 ZZH OR GENERAL SUPPLY STERILE: Performed by: SURGERY

## 2019-07-31 PROCEDURE — 25000128 H RX IP 250 OP 636: Performed by: SURGERY

## 2019-07-31 PROCEDURE — 25800030 ZZH RX IP 258 OP 636: Performed by: SURGERY

## 2019-07-31 PROCEDURE — 82565 ASSAY OF CREATININE: CPT | Performed by: CLINICAL NURSE SPECIALIST

## 2019-07-31 PROCEDURE — 40000171 ZZH STATISTIC PRE-PROCEDURE ASSESSMENT III: Performed by: SURGERY

## 2019-07-31 PROCEDURE — 71000012 ZZH RECOVERY PHASE 1 LEVEL 1 FIRST HR: Performed by: SURGERY

## 2019-07-31 PROCEDURE — 85730 THROMBOPLASTIN TIME PARTIAL: CPT | Performed by: CLINICAL NURSE SPECIALIST

## 2019-07-31 PROCEDURE — 84132 ASSAY OF SERUM POTASSIUM: CPT | Performed by: CLINICAL NURSE SPECIALIST

## 2019-07-31 RX ORDER — HYDROMORPHONE HYDROCHLORIDE 1 MG/ML
.3-.5 INJECTION, SOLUTION INTRAMUSCULAR; INTRAVENOUS; SUBCUTANEOUS EVERY 10 MIN PRN
Status: CANCELLED | OUTPATIENT
Start: 2019-07-31

## 2019-07-31 RX ORDER — OXYCODONE HYDROCHLORIDE 5 MG/1
5 TABLET ORAL
Status: CANCELLED | OUTPATIENT
Start: 2019-07-31

## 2019-07-31 RX ORDER — BUPIVACAINE HYDROCHLORIDE 5 MG/ML
INJECTION, SOLUTION EPIDURAL; INTRACAUDAL PRN
Status: DISCONTINUED | OUTPATIENT
Start: 2019-07-31 | End: 2019-07-31

## 2019-07-31 RX ORDER — FENTANYL CITRATE 50 UG/ML
25-50 INJECTION, SOLUTION INTRAMUSCULAR; INTRAVENOUS
Status: DISCONTINUED | OUTPATIENT
Start: 2019-07-31 | End: 2019-07-31 | Stop reason: HOSPADM

## 2019-07-31 RX ORDER — FLUMAZENIL 0.1 MG/ML
0.2 INJECTION, SOLUTION INTRAVENOUS
Status: DISCONTINUED | OUTPATIENT
Start: 2019-07-31 | End: 2019-07-31 | Stop reason: HOSPADM

## 2019-07-31 RX ORDER — OXYCODONE HYDROCHLORIDE 5 MG/1
5 TABLET ORAL EVERY 6 HOURS PRN
Qty: 6 TABLET | Refills: 0 | Status: SHIPPED | OUTPATIENT
Start: 2019-07-31 | End: 2020-01-21

## 2019-07-31 RX ORDER — NALOXONE HYDROCHLORIDE 0.4 MG/ML
.1-.4 INJECTION, SOLUTION INTRAMUSCULAR; INTRAVENOUS; SUBCUTANEOUS
Status: CANCELLED | OUTPATIENT
Start: 2019-07-31 | End: 2019-08-01

## 2019-07-31 RX ORDER — SODIUM CHLORIDE, SODIUM LACTATE, POTASSIUM CHLORIDE, CALCIUM CHLORIDE 600; 310; 30; 20 MG/100ML; MG/100ML; MG/100ML; MG/100ML
INJECTION, SOLUTION INTRAVENOUS CONTINUOUS
Status: CANCELLED | OUTPATIENT
Start: 2019-07-31

## 2019-07-31 RX ORDER — NALOXONE HYDROCHLORIDE 0.4 MG/ML
.1-.4 INJECTION, SOLUTION INTRAMUSCULAR; INTRAVENOUS; SUBCUTANEOUS
Status: DISCONTINUED | OUTPATIENT
Start: 2019-07-31 | End: 2019-07-31 | Stop reason: HOSPADM

## 2019-07-31 RX ORDER — SODIUM CHLORIDE 9 MG/ML
INJECTION, SOLUTION INTRAVENOUS CONTINUOUS PRN
Status: DISCONTINUED | OUTPATIENT
Start: 2019-07-31 | End: 2019-07-31

## 2019-07-31 RX ORDER — ONDANSETRON 4 MG/1
4 TABLET, ORALLY DISINTEGRATING ORAL EVERY 30 MIN PRN
Status: CANCELLED | OUTPATIENT
Start: 2019-07-31

## 2019-07-31 RX ORDER — SODIUM CHLORIDE, SODIUM LACTATE, POTASSIUM CHLORIDE, CALCIUM CHLORIDE 600; 310; 30; 20 MG/100ML; MG/100ML; MG/100ML; MG/100ML
INJECTION, SOLUTION INTRAVENOUS CONTINUOUS PRN
Status: DISCONTINUED | OUTPATIENT
Start: 2019-07-31 | End: 2019-07-31

## 2019-07-31 RX ORDER — ONDANSETRON 2 MG/ML
4 INJECTION INTRAMUSCULAR; INTRAVENOUS EVERY 30 MIN PRN
Status: CANCELLED | OUTPATIENT
Start: 2019-07-31

## 2019-07-31 RX ORDER — PROPOFOL 10 MG/ML
INJECTION, EMULSION INTRAVENOUS PRN
Status: DISCONTINUED | OUTPATIENT
Start: 2019-07-31 | End: 2019-07-31

## 2019-07-31 RX ORDER — OXYCODONE HYDROCHLORIDE 5 MG/1
5 TABLET ORAL EVERY 4 HOURS PRN
Status: CANCELLED | OUTPATIENT
Start: 2019-07-31

## 2019-07-31 RX ORDER — LIDOCAINE HYDROCHLORIDE 20 MG/ML
INJECTION, SOLUTION INFILTRATION; PERINEURAL PRN
Status: DISCONTINUED | OUTPATIENT
Start: 2019-07-31 | End: 2019-07-31

## 2019-07-31 RX ORDER — MEPERIDINE HYDROCHLORIDE 25 MG/ML
12.5 INJECTION INTRAMUSCULAR; INTRAVENOUS; SUBCUTANEOUS
Status: CANCELLED | OUTPATIENT
Start: 2019-07-31

## 2019-07-31 RX ORDER — ONDANSETRON 2 MG/ML
INJECTION INTRAMUSCULAR; INTRAVENOUS PRN
Status: DISCONTINUED | OUTPATIENT
Start: 2019-07-31 | End: 2019-07-31

## 2019-07-31 RX ORDER — CEFAZOLIN SODIUM 2 G/100ML
2 INJECTION, SOLUTION INTRAVENOUS
Status: COMPLETED | OUTPATIENT
Start: 2019-07-31 | End: 2019-07-31

## 2019-07-31 RX ORDER — ACETAMINOPHEN 325 MG/1
975 TABLET ORAL ONCE
Status: CANCELLED | OUTPATIENT
Start: 2019-07-31 | End: 2019-07-31

## 2019-07-31 RX ORDER — ACETAMINOPHEN 325 MG/1
650 TABLET ORAL
Status: CANCELLED | OUTPATIENT
Start: 2019-07-31

## 2019-07-31 RX ORDER — BUPIVACAINE HYDROCHLORIDE AND EPINEPHRINE 5; 5 MG/ML; UG/ML
INJECTION, SOLUTION PERINEURAL PRN
Status: DISCONTINUED | OUTPATIENT
Start: 2019-07-31 | End: 2019-07-31

## 2019-07-31 RX ADMIN — BUPIVACAINE HYDROCHLORIDE AND EPINEPHRINE BITARTRATE 25 ML: 5; .005 INJECTION, SOLUTION EPIDURAL; INTRACAUDAL; PERINEURAL at 07:25

## 2019-07-31 RX ADMIN — FENTANYL CITRATE 50 MCG: 50 INJECTION INTRAMUSCULAR; INTRAVENOUS at 07:23

## 2019-07-31 RX ADMIN — ONDANSETRON 4 MG: 2 INJECTION INTRAMUSCULAR; INTRAVENOUS at 10:52

## 2019-07-31 RX ADMIN — MIDAZOLAM 1 MG: 1 INJECTION INTRAMUSCULAR; INTRAVENOUS at 07:23

## 2019-07-31 RX ADMIN — CEFAZOLIN SODIUM 2 G: 2 INJECTION, SOLUTION INTRAVENOUS at 08:04

## 2019-07-31 RX ADMIN — SODIUM CHLORIDE: 9 INJECTION, SOLUTION INTRAVENOUS at 07:47

## 2019-07-31 RX ADMIN — PROPOFOL 150 MG: 10 INJECTION, EMULSION INTRAVENOUS at 08:00

## 2019-07-31 RX ADMIN — LIDOCAINE HYDROCHLORIDE 40 MG: 20 INJECTION, SOLUTION INFILTRATION; PERINEURAL at 08:00

## 2019-07-31 RX ADMIN — MIDAZOLAM 2 MG: 1 INJECTION INTRAMUSCULAR; INTRAVENOUS at 07:47

## 2019-07-31 ASSESSMENT — PAIN DESCRIPTION - DESCRIPTORS: DESCRIPTORS: SORE

## 2019-07-31 NOTE — OP NOTE
Transplant Surgery  Operative Note  PREOP DIAGNOSIS: End Stage Renal Failure   POSTOP DIAGNOSIS: Same  OPERATION: Arteriovenous Fistula creation, right brachial artery to cephalic vein. Intraoperative ultrasound  FACULTY: Julia Irwin M.D.  FELLOW: CHERYLE Swann MD   ASSISTANT: n/a    ANESTHESIA: Local with MAC  EBL: 20 ml.  SPECIMEN: none    INDICATION: The patient was referred for permanent dialysis access creation due to renal failure, he had previously had a radiocephalic fistula a that site. The indications, benefits, and risks of permanent vascular access creation were discussed, questions answered and informed consent was provided.   FINDINGS:   Good blood flow, palpable thrill at the end of the case and 2+ radial pulse appreciated at the end of the case  COMPLICATIONS: None.    PROCEDURE: The patient was positioned supine, and the right upper extremity was positioned, prepped and draped in the usual sterile fashion. An ultrasound was performed to assess the size, quality, and position of the artery and vein. The patient received preoperative IV antibiotics. An incision was made and extended through the subcutaneous tissues above the and antecubital crease. The brachial artery and cephalic vein were circumferentially dissected. . Arterial clamps were placed and arteriotomy was made.  The distal aspect of the vein was then transected, the proximal vein dilated with heparinized saline and secured with a Heifitz clip. The vein was tailored and anastomosed with 7-0 Prolene. The clamps were removed sequentially. Hemostasis was obtained.  Fistula flow was excellent. The distal radial artery pulse was good and capillary refill good. The wound was closed in layers with absorbable suture and dressed with Dermabond. Counts were correct. Faculty was present for the key portions of the procedure. The patient was then awakened and transferred to PACU in good condition.     I was present during the key portions of the  procedure, and I was immediately available for the entire procedure.

## 2019-07-31 NOTE — BRIEF OP NOTE
[facility], [place]    Brief Operative Note    Pre-operative diagnosis: Encounter For Vascular Access Surgery  Post-operative diagnosis Encounter For Vascular Access Surgery  Procedure: Procedure(s):  Creation Of Atriovenous Fistula Right Upper Arm  Surgeon: Surgeon(s) and Role:     * Julia Irwin MD - Primary     * Melvi Swann MD - Fellow - Assisting     * Lobo Zhu MD - Fellow - Assisting  Anesthesia: Combined General with Block   Estimated blood loss: 20 ml  Drains: None  Specimens: * No specimens in log *  Findings:   None.  Complications: None.  Implants:  * No implants in log *

## 2019-07-31 NOTE — BRIEF OP NOTE
[facility], [place]    Brief Operative Note    Pre-operative diagnosis: Encounter For Vascular Access Surgery  Post-operative diagnosis * No post-op diagnosis entered *  Procedure: Procedure(s):  Creation Of Atriovenous Fistula Right Upper Arm  Surgeon: Surgeon(s) and Role:     * Julia Irwin MD - Primary     * Melvi Swann MD - Fellow - Assisting     * Lobo Zhu MD - Fellow - Assisting  Anesthesia: Combined General with Block   Estimated blood loss: 20cc  Drains: None  Specimens: * No specimens in log *  Findings:   None.  Complications: None.  Implants:  * No implants in log *

## 2019-07-31 NOTE — DISCHARGE INSTRUCTIONS
[facility]  Same-Day Surgery   Adult Discharge Orders & Instructions     For 24 hours after surgery    1. Get plenty of rest.  A responsible adult must stay with you for at least 24 hours after you leave the hospital.   2. Do not drive or use heavy equipment.  If you have weakness or tingling, don't drive or use heavy equipment until this feeling goes away.  3. Do not drink alcohol.  4. Avoid strenuous or risky activities.  Ask for help when climbing stairs.   5. You may feel lightheaded.  IF so, sit for a few minutes before standing.  Have someone help you get up.   6. If you have nausea (feel sick to your stomach): Drink only clear liquids such as apple juice, ginger ale, broth or 7-Up.  Rest may also help.  Be sure to drink enough fluids.  Move to a regular diet as you feel able.  7. You may have a slight fever. Call the doctor if your fever is over 100 F (37.7 C) (taken under the tongue) or lasts longer than 24 hours.  8. You may have a dry mouth, a sore throat, muscle aches or trouble sleeping.  These should go away after 24 hours.  9. Do not make important or legal decisions.   Call your doctor for any of the followin.  Signs of infection (fever, growing tenderness at the surgery site, a large amount of drainage or bleeding, severe pain, foul-smelling drainage, redness, swelling).    2. It has been over 8 to 10 hours since surgery and you are still not able to urinate (pass water).    3.  Headache for over 24 hours.    To contact a doctor, call Dr Irwin's office at [phone]   or:        [phone] and ask for the resident on call for   Transplant surgery (answered 24 hours a day)      Emergency Department:    [facility]: [phone]       (TTY for hearing impaired: [phone])    [facility]: [phone]       (TTY for hearing impaired: [phone])

## 2019-07-31 NOTE — ANESTHESIA POSTPROCEDURE EVALUATION
Anesthesia POST Procedure Evaluation    Patient: Rashad Ortiz   MRN:     [id number] Gender:   male   Age:    44 year old :      1975        Preoperative Diagnosis: Encounter For Vascular Access Surgery   Procedure(s):  Creation Of Atriovenous Fistula Right Upper Arm   Postop Comments: No value filed.       Anesthesia Type:  Not documented  General    Reportable Event: NO     PAIN: Uncomplicated   Sign Out status: Comfortable, Well controlled pain     PONV: No PONV   Sign Out status:  No Nausea or Vomiting     Neuro/Psych: Uneventful perioperative course   Sign Out Status: Preoperative baseline; Age appropriate mentation     Airway/Resp.: Uneventful perioperative course   Sign Out Status: Non labored breathing, age appropriate RR; Resp. Status within EXPECTED Parameters     CV: Uneventful perioperative course   Sign Out status: Appropriate BP and perfusion indices; Appropriate HR/Rhythm     Disposition:   Sign Out in:  PACU  Disposition:  Phase II; Home  Recovery Course: Uneventful  Follow-Up: Not required           Last Anesthesia Record Vitals:  [facility] VITALS  2019 1031 - 2019 1131      2019             EKG:  Sinus rhythm          Last PACU Vitals:  Vitals Value Taken Time   /79 2019 11:45 AM   Temp 36.7  C (98.1  F) 2019 11:30 AM   Pulse 72 2019 11:40 AM   Resp 14 2019 11:45 AM   SpO2 100 % 2019 11:47 AM   Temp src     NIBP 119/86 2019 11:06 AM   Pulse 71 2019 11:06 AM   SpO2 100 % 2019 11:06 AM   Resp     Temp     Ht Rate     Temp 2     Vitals shown include unvalidated device data.      Electronically Signed By: Torsten Kwon MD, 2019, 11:48 AM

## 2019-07-31 NOTE — PROGRESS NOTES
Right brachial plexus block performed without complications.  VSS.  Pt tolerated well.  Will continue to monitor.

## 2019-07-31 NOTE — ANESTHESIA PREPROCEDURE EVALUATION
Anesthesia Pre-Procedure Evaluation    Patient: Rashad Ortiz   MRN:     [id number] Gender:   male   Age:    44 year old :      1975        Preoperative Diagnosis: Encounter For Vascular Access Surgery   Procedure(s):  Creation Of Atriovenous Fistula Right Upper Arm  Possible Atriovenous Fistula Graft     Past Medical History:   Diagnosis Date     Chronic kidney disease, stage 4, severely decreased GFR (H)      Gastro-oesophageal reflux disease      Gout      History of blood transfusion      Hypertension      Medical non-compliance      Pulmonary nodules      Steroid long-term use       Past Surgical History:   Procedure Laterality Date     AV FISTULA OR GRAFT ARTERIAL       LIGATE FISTULA ARTERIOVENOUS UPPER EXTREMITY  2011    Procedure:LIGATE FISTULA ARTERIOVENOUS UPPER EXTREMITY; Excision of Right Forearm Arteriovenous Fistula.; Surgeon:LINDY AMAYA; Location:[facility] OR     PERCUTANEOUS BIOPSY KIDNEY Right 2017    Procedure: PERCUTANEOUS BIOPSY KIDNEY;  Surgeon: Gee Barrios MD;  Location:  OR     TRANSPLANT  2011    Living related kidney transplant from [facility]          Anesthesia Evaluation     .             ROS/MED HX    ENT/Pulmonary:  - neg pulmonary ROS     Neurologic:  - neg neurologic ROS     Cardiovascular:     (+) hypertension----. : . . . :. .       METS/Exercise Tolerance:  >4 METS   Hematologic:     (+) Anemia, -      Musculoskeletal: Comment: Gout        GI/Hepatic:     (+) GERD Asymptomatic on medication,       Renal/Genitourinary:     (+) chronic renal disease, type: ESRD, Pt requires dialysis, type: Hemodialysis, Pt has history of transplant,       Endo:  - neg endo ROS   (+) Chronic steroid usage for Post Transplant Immunosuppression .      Psychiatric:         Infectious Disease:        (-) Recent Fever   Malignancy:         Other:    (+) No chance of pregnancy C-spine cleared: Yes, H/O Chronic Pain,H/O chronic opiod use , no other significant disability                         PHYSICAL EXAM:   Mental Status/Neuro: A/A/O   Airway: Facies: Feasible  Mallampati: I  Mouth/Opening: Full  TM distance: > 6 cm  Neck ROM: Full   Respiratory: Auscultation: CTAB     Resp. Rate: Normal     Resp. Effort: Normal      CV: Rhythm: Regular  Rate: Age appropriate  Heart: Normal Sounds  Edema: None  Pulses: Normal   Comments:      Dental: Normal Dentition                LABS:  CBC:   Lab Results   Component Value Date    WBC 5.4 07/24/2019    WBC 7.0 06/13/2019    HGB 8.6 (L) 07/24/2019    HGB 8.8 (L) 06/13/2019    HCT 26.3 (L) 07/24/2019    HCT 27.7 (L) 06/13/2019     07/24/2019     06/13/2019     BMP:   Lab Results   Component Value Date     07/24/2019     06/13/2019    POTASSIUM 4.4 07/24/2019    POTASSIUM 4.4 06/13/2019    CHLORIDE 112 (H) 07/24/2019    CHLORIDE 110 (H) 06/13/2019    CO2 17 (L) 07/24/2019    CO2 19 (L) 06/13/2019    BUN 62 (H) 07/24/2019    BUN 45 (H) 06/13/2019    CR 6.00 (H) 07/24/2019    CR 5.28 (H) 06/13/2019    GLC 90 07/24/2019    GLC 71 06/13/2019     COAGS:   Lab Results   Component Value Date    PTT 39 (H) 10/25/2018    INR 1.58 (H) 10/26/2018    FIBR 250 06/14/2015     POC:   Lab Results   Component Value Date     (H) 07/31/2019     OTHER:   Lab Results   Component Value Date    LACT 1.5 10/26/2018    A1C 5.4 06/06/2015    ASHELY 8.7 07/24/2019    PHOS 2.7 06/13/2019    MAG 1.7 10/25/2018    ALBUMIN 3.4 06/13/2019    PROTTOTAL 5.5 (L) 10/26/2018    ALT 10 10/26/2018    AST 11 10/26/2018    GGT 32 01/12/2012    ALKPHOS 63 10/26/2018    BILITOTAL 0.4 10/26/2018    LIPASE 387 04/10/2017    AMYLASE 153 (H) 04/10/2017    TSH 0.67 04/10/2017    .0 (H) 10/28/2018    SED 32 (H) 09/17/2018        Preop Vitals    BP Readings from Last 3 Encounters:   07/31/19 121/83   07/29/19 133/84   07/24/19 121/84    Pulse Readings from Last 3 Encounters:   07/29/19 78   07/24/19 71   06/13/19 78      Resp Readings from Last 3 Encounters:  "  07/31/19 16   07/24/19 16   06/13/19 16    SpO2 Readings from Last 3 Encounters:   07/31/19 100%   07/29/19 99%   07/24/19 100%      Temp Readings from Last 1 Encounters:   07/31/19 37.3  C (99.2  F) (Oral)    Ht Readings from Last 1 Encounters:   07/29/19 1.727 m (5' 8\")      Wt Readings from Last 1 Encounters:   07/31/19 70.7 kg (155 lb 13.8 oz)    Estimated body mass index is 23.7 kg/m  as calculated from the following:    Height as of 7/29/19: 1.727 m (5' 8\").    Weight as of this encounter: 70.7 kg (155 lb 13.8 oz).     LDA:        Assessment:   ASA SCORE: 3    H&P: History and physical reviewed and following examination; no interval change.    NPO Status: NPO Appropriate     Plan:   Anes. Type:  General; For Post-op pain in coordination with surgeon; Peripheral Nerve Block     Block Details: Single Shot; Supraclav.   Pre-Medication: Midazolam   Induction:  IV (Standard)   Airway: LMA   Access/Monitoring: PIV   Maintenance: Balanced     Postop Plan:   Postop Pain: Opioids  Postop Sedation/Airway: Not planned  Disposition: Outpatient     PONV Management:   Adult Risk Factors:, Postop Opioids   Prevention: Ondansetron     CONSENT: Direct conversation   Plan and risks discussed with: Patient   Blood Products: Consented (ALL Blood Products)       Comments for Plan/Consent:  44 year old M with PMHx of ESRD s/p renal transplant 2011, now with recurrent kidney failure who presents for RUE fistula creation.  Right supraclavicular block placed preoperatively for postoperative analgesia.                 Torsten Kwon MD  "

## 2019-07-31 NOTE — ANESTHESIA PROCEDURE NOTES
Peripheral Nerve Block Procedure Note    Staff:     Anesthesiologist:  Mehrdad Francois DO    Resident/CRNA:  Aleksey Pizarro MD    Block performed by resident/CRNA in the presence of a teaching physician    Location: Pre-op  Procedure Start/Stop TImes:     patient identified, IV checked, site marked, risks and benefits discussed, informed consent, monitors and equipment checked, pre-op evaluation, at physician/surgeon's request and post-op pain management      Correct Patient: Yes      Correct Position: Yes      Correct Site: Yes      Correct Procedure: Yes      Correct Laterality:  Yes    Site Marked:  Yes  Procedure details:     Procedure:  Supraclavicular    Laterality:  Right    Position:  Supine    Sterile Prep: chloraprep, mask and sterile gloves      Local skin infiltration:  1% lidocaine    amount (mL):  2    Needle:  Short bevel    Needle gauge:  21    Needle length (mm):  110    Ultrasound: Yes      Ultrasound used to identify targeted nerve, plexus, or vascular structure and placed a needle adjacent to it      Permanent Image entered into patiient's record      Abnormal pain on injection: No      Blood Aspirated: No      Paresthesias:  No    Bleeding at site: No      Bolus via:  Needle    Infusion Method:  Single Shot    Complications:  None  Assessment/Narrative:     Injection made incrementally with aspirations every (mL):  5

## 2019-07-31 NOTE — ANESTHESIA CARE TRANSFER NOTE
Patient: Rashad Ortiz    Procedure(s):  Creation Of Atriovenous Fistula Right Upper Arm    Diagnosis: Encounter For Vascular Access Surgery  Diagnosis Additional Information: No value filed.    Anesthesia Type:   General     Note:  Airway :Room Air  Patient transferred to:PACU  Comments: Patient awake and making needs known, report to RN upon arrival to PARHandoff Report: Identifed the Patient, Identified the Reponsible Provider, Reviewed the pertinent medical history, Discussed the surgical course, Reviewed Intra-OP anesthesia mangement and issues during anesthesia, Set expectations for post-procedure period and Allowed opportunity for questions and acknowledgement of understanding      Vitals: (Last set prior to Anesthesia Care Transfer)    CRNA VITALS  7/31/2019 1031 - 7/31/2019 1105      7/31/2019             Resp Rate (observed):  58  (Abnormal)                 Electronically Signed By: Leonora Schulz CRNA, APRN CRNA  July 31, 2019  11:05 AM

## 2019-08-05 ENCOUNTER — MYC MEDICAL ADVICE (OUTPATIENT)
Dept: FAMILY MEDICINE | Facility: CLINIC | Age: 44
End: 2019-08-05

## 2019-08-05 DIAGNOSIS — M10.372 ACUTE GOUT DUE TO RENAL IMPAIRMENT INVOLVING LEFT FOOT: ICD-10-CM

## 2019-08-05 RX ORDER — HYDROCODONE BITARTRATE AND ACETAMINOPHEN 5; 325 MG/1; MG/1
1 TABLET ORAL EVERY 6 HOURS PRN
Qty: 10 TABLET | Refills: 0 | Status: SHIPPED | OUTPATIENT
Start: 2019-08-05 | End: 2020-01-21

## 2019-08-05 NOTE — TELEPHONE ENCOUNTER
Called and informed pt of provider message below. rx has been placed at  for pt to .    Jailene Almaguer MA on 8/5/2019 at 10:48 AM

## 2019-08-05 NOTE — TELEPHONE ENCOUNTER
Routing refill request to provider for review/approval because:  Drug not on the [facility] refill protocol.    Radha Damon RN, [facility], [place]

## 2019-08-14 ENCOUNTER — MYC MEDICAL ADVICE (OUTPATIENT)
Dept: NEPHROLOGY | Facility: CLINIC | Age: 44
End: 2019-08-14

## 2019-08-14 ENCOUNTER — OFFICE VISIT (OUTPATIENT)
Dept: TRANSPLANT | Facility: CLINIC | Age: 44
End: 2019-08-14
Attending: CLINICAL NURSE SPECIALIST
Payer: COMMERCIAL

## 2019-08-14 VITALS
DIASTOLIC BLOOD PRESSURE: 83 MMHG | WEIGHT: 165.3 LBS | RESPIRATION RATE: 16 BRPM | SYSTOLIC BLOOD PRESSURE: 123 MMHG | BODY MASS INDEX: 25.05 KG/M2 | HEIGHT: 68 IN | HEART RATE: 73 BPM

## 2019-08-14 DIAGNOSIS — Z79.60 LONG-TERM USE OF IMMUNOSUPPRESSANT MEDICATION: ICD-10-CM

## 2019-08-14 DIAGNOSIS — Z48.89 ENCOUNTER FOR POST SURGICAL WOUND CHECK: ICD-10-CM

## 2019-08-14 DIAGNOSIS — Z94.0 KIDNEY TRANSPLANTED: Primary | ICD-10-CM

## 2019-08-14 DIAGNOSIS — Z94.0 KIDNEY TRANSPLANT RECIPIENT: ICD-10-CM

## 2019-08-14 DIAGNOSIS — N18.5 CHRONIC KIDNEY DISEASE, STAGE 5, KIDNEY FAILURE (H): Primary | ICD-10-CM

## 2019-08-14 DIAGNOSIS — T82.9XXA COMPLICATION OF VASCULAR ACCESS FOR DIALYSIS, INITIAL ENCOUNTER: ICD-10-CM

## 2019-08-14 DIAGNOSIS — Z09 FOLLOW-UP EXAMINATION AFTER VASCULAR SURGERY: ICD-10-CM

## 2019-08-14 DIAGNOSIS — N18.5 CKD (CHRONIC KIDNEY DISEASE) STAGE 5, GFR LESS THAN 15 ML/MIN (H): Primary | ICD-10-CM

## 2019-08-14 DIAGNOSIS — Z94.0 KIDNEY TRANSPLANTED: ICD-10-CM

## 2019-08-14 PROCEDURE — G0463 HOSPITAL OUTPT CLINIC VISIT: HCPCS | Mod: ZF

## 2019-08-14 RX ORDER — PREDNISONE 5 MG/1
5 TABLET ORAL DAILY
Qty: 30 TABLET | Refills: 3 | Status: SHIPPED | OUTPATIENT
Start: 2019-08-14 | End: 2019-08-29

## 2019-08-14 ASSESSMENT — PAIN SCALES - GENERAL: PAINLEVEL: NO PAIN (0)

## 2019-08-14 ASSESSMENT — MIFFLIN-ST. JEOR: SCORE: 1619.3

## 2019-08-14 NOTE — LETTER
8/14/2019       RE: Rashad Ortiz  [address]  [place] [address]     Dear Colleague,    Thank you for referring your patient, Rashad Ortiz, to the [facility] SOLID ORGAN TRANSPLANT at [facility]. Please see a copy of my visit note below.    Dialysis Access Service   Progress Note    S:  Mr. Ortiz is being seen today for surgical followup of his dialysis access.  He reports no issues with the wound, and  a little steal syndrome of the distal extremity. C/O shooting pain and mild tingling in entire right arm all the way to finger when right arm is in extension, in some occasions when he uses right hand for cooking. Pain and tingling sensation resolved when right elbow is in flexion and adduction position. Denies swelling in right arm or a change color/temperature in right hand and fingers. S/P Arteriovenous Fistula creation, right upper arm  brachial artery to cephalic vein on 7/31/2019.    O:  Pulse:  [73] 73  Resp:  [16] 16  BP: (123)/(83) 123/83  GENERAL: alert, cooperative  Circulation:   Radial pulse 3+  Ulnar pulse  3+   Capillary refill:  capillary refill < 2 sec    Sensory exam:   arm: Normal   []           Abnormal   [x]          Comment: mild tingling in entire right arm all when right arm is in extension,   hand: Normal   []           Abnormal   [x]          Comment: mild tingling in finger when right arm is in extension  Motor exam:   arm: Normal   [x]           Abnormal   []          Comment:    hand: Normal   [x]           Abnormal   []          Comment:    Access: R upper extremity wound(s) healed, non-tender. No venous hypertension, ++thrill and bruit via hand held doppler present. A small fluid collection area on AC fossa incision site. No redness or warm to touch noted. No edema in right arm, without apparent infection    Assessment & Plan: Mr. Ortiz's dialysis access has matured well at this time point.    1. RUE US of AV fistula prior to  next clinic visit  2. May start hammer curl exercise with a squeeze ball in right arm, 15 minutes and 15 minutes off as tolerated  3. Avoid heavy lifting more than 20 lbs from right arm  4. Avoid to extend right arm in a long period of time  5. Follow up with Dr. Irwin in clinic in 4 weeks  6. May take Tylenol for pain as needed     We would like to see the patient back in the clinic in 4 weeks time to assess progress. The patient was counselled to contact our nurse coordinator, ISABEL Branham CNS (Sum) at [phone] with any questions or concerns.  Thank you for the opportunity to participate in Mr. Ortiz's care.    TT: 15 min  CT: 15 min    JONAH Schwab (Sum)  Dialysis Vascular Access/SOT Clinical Nurse Specialist    Solid Organ Transplant Service - [facility]   Phone # [phone]  Pager # [phone]

## 2019-08-14 NOTE — NURSING NOTE
"Chief Complaint   Patient presents with     Surgical Followup     2 wk follow up       Blood pressure 123/83, pulse 73, resp. rate 16, height 1.727 m (5' 8\"), weight 75 kg (165 lb 4.8 oz).    Ravi Ryan CMA on 8/14/2019 at 3:25 PM    "

## 2019-08-14 NOTE — PROGRESS NOTES
Dialysis Access Service   Progress Note    S:  Mr. Ortiz is being seen today for surgical followup of his dialysis access.  He reports no issues with the wound, and  a little steal syndrome of the distal extremity. C/O shooting pain and mild tingling in entire right arm all the way to finger when right arm is in extension, in some occasions when he uses right hand for cooking. Pain and tingling sensation resolved when right elbow is in flexion and adduction position. Denies swelling in right arm or a change color/temperature in right hand and fingers. S/P Arteriovenous Fistula creation, right upper arm  brachial artery to cephalic vein on 7/31/2019.    O:  Pulse:  [73] 73  Resp:  [16] 16  BP: (123)/(83) 123/83  GENERAL: alert, cooperative  Circulation:   Radial pulse 3+  Ulnar pulse  3+   Capillary refill:  capillary refill < 2 sec    Sensory exam:   arm: Normal   []           Abnormal   [x]          Comment: mild tingling in entire right arm all when right arm is in extension,   hand: Normal   []           Abnormal   [x]          Comment: mild tingling in finger when right arm is in extension  Motor exam:   arm: Normal   [x]           Abnormal   []          Comment:    hand: Normal   [x]           Abnormal   []          Comment:    Access: R upper extremity wound(s) healed, non-tender. No venous hypertension, ++thrill and bruit via hand held doppler present. A small fluid collection area on AC fossa incision site. No redness or warm to touch noted. No edema in right arm, without apparent infection    Assessment & Plan: Mr. Ortiz's dialysis access has matured well at this time point.    1. RUE US of AV fistula prior to next clinic visit  2. May start hammer curl exercise with a squeeze ball in right arm, 15 minutes and 15 minutes off as tolerated  3. Avoid heavy lifting more than 20 lbs from right arm  4. Avoid to extend right arm in a long period of time  5. Follow up with Dr. Irwin in clinic in 4  weeks  6. May take Tylenol for pain as needed     We would like to see the patient back in the clinic in 4 weeks time to assess progress. The patient was counselled to contact our nurse coordinator, ISABEL Branham CNS (Sum) at [phone] with any questions or concerns.  Thank you for the opportunity to participate in Mr. Ortiz's care.    TT: 15 min  CT: 15 min    JONAH Schwab (Sum)  Dialysis Vascular Access/SOT Clinical Nurse Specialist    Solid Organ Transplant Service - [facility]   Phone # [phone]  Pager # [phone]

## 2019-08-15 RX ORDER — PREDNISONE 5 MG/1
5 TABLET ORAL DAILY
Qty: 30 TABLET | Refills: 11 | Status: SHIPPED | OUTPATIENT
Start: 2019-08-15 | End: 2019-08-29

## 2019-08-20 ENCOUNTER — TELEPHONE (OUTPATIENT)
Dept: PHARMACY | Facility: CLINIC | Age: 44
End: 2019-08-20

## 2019-08-20 NOTE — TELEPHONE ENCOUNTER
Anemia Management Note  SUBJECTIVE/OBJECTIVE:  Referred by Dr. Gee Barrios on 2019  Primary Diagnosis: Anemia in Chronic Kidney Disease (N18.5, D63.1)     Secondary Diagnosis:  Chronic Kidney Disease, Stage 5 (N18.5)  Kidney TX: 2011  Hgb goal range:  9-10  Epo/Darbo: Aranesp  60 mcg  every two weeks for Hgb <10. In clinic  Iron regimen:  19; ferrous sulfate ordered  Ferrous Sulfate 1 tab daily with lunch   Labs : 2020  Recent LAWRENCE use, transfusion, IV iron: NA  RX/TX plans : 2020  No history of stroke, MI and blood clots or cancers     Contact:            No Consent to communicate on File     Anemia Latest Ref Rng & Units 2018 2018 2018 3/19/2019 2019 2019 2019   Hemoglobin 13.3 - 17.7 g/dL 8.0(L) 8.1(L) 8.7(L) 10.6(L) 8.8(L) 8.6(L) 8.6(L)   TSAT 15 - 46 % - - - - 21 75(H) -   Ferritin 26 - 388 ng/mL - - - - 436(H) 406(H) -     BP Readings from Last 3 Encounters:   19 123/83   19 116/85   19 133/84     Wt Readings from Last 2 Encounters:   19 75 kg (165 lb 4.8 oz)   19 70.7 kg (155 lb 13.8 oz)       After multiple calls and [employer] messages, Rashad has not called to schedule Aranesp injections.       ASSESSMENT:  Hgb:Not at goal/Non-compliance   TSat: elevated at >50% Ferritin: At goal (>100ng/mL)    PLAN:  Rashad needs to schedule Labs and Aranesp. He has been notified via phone call and [employer]Box message    Orders needed to be renewed (for next follow-up date) in EPIC: None    Iron labs due:  2019        NEXT FOLLOW-UP DATE:  2019    Anemia Management Service  Cate Ellis RN  Phone: [phone]  Fax: [phone]

## 2019-08-26 ENCOUNTER — TELEPHONE (OUTPATIENT)
Dept: TRANSPLANT | Facility: CLINIC | Age: 44
End: 2019-08-26

## 2019-08-26 NOTE — TELEPHONE ENCOUNTER
Transplant Social Work Services Phone Call      Data: Received in-basket message that patient has questions as his insurance is ending. Spoke to patient via telephone. Patient reported he is changing jobs and his new employer does not offer insurance. Informed patient he would be eligible for new [place] via [place] [employer] as he would have a life changing event (change of employment). Patient asked what kind of insurance would he have to get. Explained he would apply for private insurance unless financially he feels he would qualify for [place] or [place] [employer].   Intervention: Phone call  Assessment: Patient has a history of losing insurance but not informing transplant team. Patient plans on applying so he does not have a lapse in insurance.   Education provided by SW: [place] [employer]  Plan: Patient to [place] via [place] [employer]. Patient to contact this writer with further concerns.       Anya Guo, [facility]    Kidney/Pancreas/Auto Islet Transplant Programs

## 2019-08-29 RX ORDER — PREDNISONE 5 MG/1
10 TABLET ORAL DAILY
Qty: 60 TABLET | Refills: 11 | Status: SHIPPED | OUTPATIENT
Start: 2019-08-29 | End: 2019-08-30

## 2019-08-29 NOTE — TELEPHONE ENCOUNTER
ISSUE:  Mychart message from pt requesting pred 10 mg RX to be filled instead of his 5 mg RX.     Hi,     Is there a likelihood that I'll be able to get the 10 mg prednisone refilled?     Please advise   Thanks.     OUTCOME:   Call placed to pt.  Pt reports 12/2018 Dr Murillo increased his pred to 10 mg daily d/t gout flare and recent RXs have been lowered to 5 mg daily.  Pt reports 10 mg helps more with his flares than 5 mg.    RNCC reviewed with Dr Cunha.  Per Dr Cunha, pt OK to increase pred dose to 10 mg daily and pt to have transplant labs every 2 weeks.     Call placed to pt.  Pt aware RX placed.  Pt aware to get labs every other week.  Pt reports he hasn't started HD yet.  Pt aware once he starts, he is to let us know so we can wean his IS.        Per Dr Cunha, once pt starts dialysis, IS to be weaned off.  As listed below.   Month 1: Stop tac, remain on  mg BID and pred 10 mg daily. Continue x 3 months  Month 4: Decrease MMF dose to 250 mg BID and remain on pred 10 mg daily.  Cont x 3 months  Month 8: Stop MMF and discuss with MD team re pred taper.

## 2019-08-30 ENCOUNTER — TELEPHONE (OUTPATIENT)
Dept: TRANSPLANT | Facility: CLINIC | Age: 44
End: 2019-08-30

## 2019-08-30 DIAGNOSIS — Z79.60 LONG-TERM USE OF IMMUNOSUPPRESSANT MEDICATION: ICD-10-CM

## 2019-08-30 DIAGNOSIS — Z94.0 KIDNEY TRANSPLANTED: ICD-10-CM

## 2019-08-30 DIAGNOSIS — Z94.0 KIDNEY TRANSPLANT RECIPIENT: ICD-10-CM

## 2019-08-30 RX ORDER — PREDNISONE 5 MG/1
10 TABLET ORAL DAILY
Qty: 60 TABLET | Refills: 11 | Status: SHIPPED | OUTPATIENT
Start: 2019-08-30 | End: 2019-09-18

## 2019-08-30 RX ORDER — PREDNISONE 5 MG/1
10 TABLET ORAL DAILY
Qty: 60 TABLET | Refills: 11 | Status: CANCELLED | OUTPATIENT
Start: 2019-08-30

## 2019-09-16 ENCOUNTER — MYC MEDICAL ADVICE (OUTPATIENT)
Dept: FAMILY MEDICINE | Facility: CLINIC | Age: 44
End: 2019-09-16

## 2019-09-18 DIAGNOSIS — Z79.60 LONG-TERM USE OF IMMUNOSUPPRESSANT MEDICATION: ICD-10-CM

## 2019-09-18 DIAGNOSIS — Z94.0 KIDNEY TRANSPLANT RECIPIENT: ICD-10-CM

## 2019-09-18 RX ORDER — PREDNISONE 5 MG/1
10 TABLET ORAL DAILY
Qty: 60 TABLET | Refills: 11 | Status: SHIPPED | OUTPATIENT
Start: 2019-09-18 | End: 2020-05-14

## 2019-09-27 ENCOUNTER — TELEPHONE (OUTPATIENT)
Dept: TRANSPLANT | Facility: CLINIC | Age: 44
End: 2019-09-27

## 2019-09-27 NOTE — TELEPHONE ENCOUNTER
Received e-mail from Dr. Irwin:  From: Julia Irwin [[email or web address]]   Sent: Thursday, September 26, 2019 9:31 PM  To: Teo Rene  Cc: Sarah Oropeza ([place]); [email or web address]  Subject: Re: Rashad Ortiz [id number]    This and a few other patient communications were forwarded by Chapis Adkins (not sure who that is). Can we reach out to her to let her know that this is not the type of message to leave in a forwarded box    Sent from my iPhone    > On Sep 26, 2019, at 9:28 PM, Julia Irwin <[email or web address]> wrote:  >   > Sum,  > I chanced upon a note from this patient sent 4 weeks ago and sitting in an obscure area in epic accusing us of not responding to his notes and his request for pain meds.   > Please reach out to him and let him know I don t use  my med center advice  which is the section where this message was and see what his concerns are. Sarah, this may need your assistance.   > I m traveling out of the country tomorrow and do not want this to wait.   > RK  > Sent from my iPhone    Writer called patient's cell # which is disconnected, called his spouse phone # and left VM to call back.

## 2019-10-03 DIAGNOSIS — E87.20 METABOLIC ACIDOSIS: ICD-10-CM

## 2019-10-03 DIAGNOSIS — Z79.899 ENCOUNTER FOR LONG-TERM (CURRENT) USE OF HIGH-RISK MEDICATION: ICD-10-CM

## 2019-10-03 DIAGNOSIS — Z94.0 KIDNEY REPLACED BY TRANSPLANT: ICD-10-CM

## 2019-10-03 DIAGNOSIS — N17.9 AKI (ACUTE KIDNEY INJURY) (H): ICD-10-CM

## 2019-10-03 RX ORDER — FUROSEMIDE 20 MG
20 TABLET ORAL 2 TIMES DAILY
Qty: 180 TABLET | Refills: 1 | Status: SHIPPED | OUTPATIENT
Start: 2019-10-03 | End: 2020-05-06

## 2019-10-03 RX ORDER — SODIUM BICARBONATE 650 MG/1
1300 TABLET ORAL 3 TIMES DAILY
Qty: 180 TABLET | Refills: 11 | Status: SHIPPED | OUTPATIENT
Start: 2019-10-03 | End: 2020-12-21

## 2019-10-03 NOTE — TELEPHONE ENCOUNTER
Last Office Visit with Nephrologist:  6/13/19.  Medication refilled per Nephrology Clinic protocol.     Flor Barrett RN

## 2019-10-08 ENCOUNTER — TELEPHONE (OUTPATIENT)
Dept: TRANSPLANT | Facility: CLINIC | Age: 44
End: 2019-10-08

## 2019-10-08 NOTE — TELEPHONE ENCOUNTER
Central Prior Authorization Team   Phone: [phone]    PA Initiation    Medication: febuxostat (ULORIC) 40 MG TABS   Insurance Company: [place] Medicaid ([facility]) - Phone [phone] Fax [phone]  Pharmacy Filling the Rx: [id number] - [place], [place] - [address] AT [address]  Filling Pharmacy Phone: [phone]  Filling Pharmacy Fax:    Start Date: 10/8/2019

## 2019-10-08 NOTE — TELEPHONE ENCOUNTER
Received P/A denial letter. But seems they just want more documentation. Faxing in additional information to see if that helps.

## 2019-10-08 NOTE — TELEPHONE ENCOUNTER
Prior Authorization Specialty Medication Request    Medication/Dose: Febuxostat 40 mg  ICD code (if different than what is on RX):  Z94.0   Previously Tried and Failed:      Important Lab Values:   Rationale:     Insurance Name:   Insurance ID:   Insurance Phone Number:     Pharmacy Information (if different than what is on RX)  Name:  [facility]  Phone:  [phone]

## 2019-10-09 NOTE — TELEPHONE ENCOUNTER
Again received denial letter stating they want pharmacy receipts that Allopurinol was tried. I have faxed this request to the pharmacy asking them to submit to insurance asap, along with my contact info.

## 2019-10-10 ENCOUNTER — DOCUMENTATION ONLY (OUTPATIENT)
Dept: TRANSPLANT | Facility: CLINIC | Age: 44
End: 2019-10-10

## 2019-10-10 DIAGNOSIS — D63.1 ANEMIA OF CHRONIC RENAL FAILURE, STAGE 5 (H): ICD-10-CM

## 2019-10-10 DIAGNOSIS — N18.5 CHRONIC KIDNEY DISEASE, STAGE V (H): ICD-10-CM

## 2019-10-10 DIAGNOSIS — N18.5 ANEMIA OF CHRONIC RENAL FAILURE, STAGE 5 (H): ICD-10-CM

## 2019-10-10 DIAGNOSIS — Z94.0 KIDNEY TRANSPLANTED: ICD-10-CM

## 2019-10-10 LAB
ANION GAP SERPL CALCULATED.3IONS-SCNC: 14 MMOL/L (ref 3–14)
BUN SERPL-MCNC: 81 MG/DL (ref 7–30)
CALCIUM SERPL-MCNC: 8.2 MG/DL (ref 8.5–10.1)
CHLORIDE SERPL-SCNC: 107 MMOL/L (ref 94–109)
CO2 SERPL-SCNC: 18 MMOL/L (ref 20–32)
CREAT SERPL-MCNC: 5.84 MG/DL (ref 0.66–1.25)
ERYTHROCYTE [DISTWIDTH] IN BLOOD BY AUTOMATED COUNT: 17.7 % (ref 10–15)
GFR SERPL CREATININE-BSD FRML MDRD: 11 ML/MIN/{1.73_M2}
GLUCOSE SERPL-MCNC: 93 MG/DL (ref 70–99)
HCT VFR BLD AUTO: 23.8 % (ref 40–53)
HGB BLD-MCNC: 7.5 G/DL (ref 13.3–17.7)
MCH RBC QN AUTO: 27.4 PG (ref 26.5–33)
MCHC RBC AUTO-ENTMCNC: 31.5 G/DL (ref 31.5–36.5)
MCV RBC AUTO: 87 FL (ref 78–100)
PLATELET # BLD AUTO: 165 10E9/L (ref 150–450)
POTASSIUM SERPL-SCNC: 4.2 MMOL/L (ref 3.4–5.3)
RBC # BLD AUTO: 2.74 10E12/L (ref 4.4–5.9)
SODIUM SERPL-SCNC: 139 MMOL/L (ref 133–144)
WBC # BLD AUTO: 5.9 10E9/L (ref 4–11)

## 2019-10-10 PROCEDURE — 80048 BASIC METABOLIC PNL TOTAL CA: CPT | Performed by: INTERNAL MEDICINE

## 2019-10-10 PROCEDURE — 82728 ASSAY OF FERRITIN: CPT | Performed by: INTERNAL MEDICINE

## 2019-10-10 PROCEDURE — 80197 ASSAY OF TACROLIMUS: CPT | Performed by: INTERNAL MEDICINE

## 2019-10-10 PROCEDURE — 36415 COLL VENOUS BLD VENIPUNCTURE: CPT | Performed by: INTERNAL MEDICINE

## 2019-10-10 PROCEDURE — 83550 IRON BINDING TEST: CPT | Performed by: INTERNAL MEDICINE

## 2019-10-10 PROCEDURE — 85027 COMPLETE CBC AUTOMATED: CPT | Performed by: INTERNAL MEDICINE

## 2019-10-10 PROCEDURE — 83540 ASSAY OF IRON: CPT | Performed by: INTERNAL MEDICINE

## 2019-10-11 LAB
FERRITIN SERPL-MCNC: 790 NG/ML (ref 26–388)
IRON SATN MFR SERPL: 23 % (ref 15–46)
IRON SERPL-MCNC: 42 UG/DL (ref 35–180)
TACROLIMUS BLD-MCNC: 4.9 UG/L (ref 5–15)
TIBC SERPL-MCNC: 181 UG/DL (ref 240–430)
TME LAST DOSE: ABNORMAL H

## 2019-10-11 NOTE — PROGRESS NOTES
SCr 5.84, Hgb 7.5.  These are relatively unchanged from patient's baseline, did update Dr. Barrios.  Will have coordinator follow up.

## 2019-10-14 ENCOUNTER — HOSPITAL ENCOUNTER (INPATIENT)
Facility: CLINIC | Age: 44
LOS: 1 days | Discharge: HOME | End: 2019-10-15
Attending: EMERGENCY MEDICINE | Admitting: INTERNAL MEDICINE
Payer: MEDICAID

## 2019-10-14 DIAGNOSIS — F10.929 ALCOHOLIC INTOXICATION WITH COMPLICATION (H): ICD-10-CM

## 2019-10-14 DIAGNOSIS — N18.6 END STAGE RENAL DISEASE (H): ICD-10-CM

## 2019-10-14 DIAGNOSIS — Z94.0 KIDNEY REPLACED BY TRANSPLANT: Primary | ICD-10-CM

## 2019-10-14 LAB
ALBUMIN SERPL-MCNC: 3.1 G/DL (ref 3.4–5)
ALBUMIN SERPL-MCNC: 3.4 G/DL (ref 3.4–5)
ALP SERPL-CCNC: 55 U/L (ref 40–150)
ALP SERPL-CCNC: 63 U/L (ref 40–150)
ALT SERPL W P-5'-P-CCNC: 34 U/L (ref 0–70)
ALT SERPL W P-5'-P-CCNC: 37 U/L (ref 0–70)
ANION GAP SERPL CALCULATED.3IONS-SCNC: 12 MMOL/L (ref 3–14)
ANION GAP SERPL CALCULATED.3IONS-SCNC: 14 MMOL/L (ref 3–14)
AST SERPL W P-5'-P-CCNC: 22 U/L (ref 0–45)
AST SERPL W P-5'-P-CCNC: 28 U/L (ref 0–45)
BASOPHILS # BLD AUTO: 0 10E9/L (ref 0–0.2)
BASOPHILS NFR BLD AUTO: 0.1 %
BILIRUB SERPL-MCNC: 0.3 MG/DL (ref 0.2–1.3)
BILIRUB SERPL-MCNC: 0.3 MG/DL (ref 0.2–1.3)
BUN SERPL-MCNC: 79 MG/DL (ref 7–30)
BUN SERPL-MCNC: 81 MG/DL (ref 7–30)
CALCIUM SERPL-MCNC: 7.6 MG/DL (ref 8.5–10.1)
CALCIUM SERPL-MCNC: 7.9 MG/DL (ref 8.5–10.1)
CHLORIDE SERPL-SCNC: 113 MMOL/L (ref 94–109)
CHLORIDE SERPL-SCNC: 115 MMOL/L (ref 94–109)
CO2 SERPL-SCNC: 16 MMOL/L (ref 20–32)
CO2 SERPL-SCNC: 17 MMOL/L (ref 20–32)
CREAT SERPL-MCNC: 6.36 MG/DL (ref 0.66–1.25)
CREAT SERPL-MCNC: 6.56 MG/DL (ref 0.66–1.25)
DIFFERENTIAL METHOD BLD: ABNORMAL
EOSINOPHIL # BLD AUTO: 0 10E9/L (ref 0–0.7)
EOSINOPHIL NFR BLD AUTO: 0 %
ERYTHROCYTE [DISTWIDTH] IN BLOOD BY AUTOMATED COUNT: 17.8 % (ref 10–15)
ERYTHROCYTE [DISTWIDTH] IN BLOOD BY AUTOMATED COUNT: 17.9 % (ref 10–15)
ETHANOL SERPL-MCNC: 0.51 G/DL
GFR SERPL CREATININE-BSD FRML MDRD: 10 ML/MIN/{1.73_M2}
GFR SERPL CREATININE-BSD FRML MDRD: 9 ML/MIN/{1.73_M2}
GLUCOSE BLDC GLUCOMTR-MCNC: 87 MG/DL (ref 70–99)
GLUCOSE SERPL-MCNC: 118 MG/DL (ref 70–99)
GLUCOSE SERPL-MCNC: 95 MG/DL (ref 70–99)
HCT VFR BLD AUTO: 26.8 % (ref 40–53)
HCT VFR BLD AUTO: 28.7 % (ref 40–53)
HGB BLD-MCNC: 8.1 G/DL (ref 13.3–17.7)
HGB BLD-MCNC: 8.6 G/DL (ref 13.3–17.7)
IMM GRANULOCYTES # BLD: 0 10E9/L (ref 0–0.4)
IMM GRANULOCYTES NFR BLD: 0.4 %
INTERPRETATION ECG - MUSE: NORMAL
LYMPHOCYTES # BLD AUTO: 1.8 10E9/L (ref 0.8–5.3)
LYMPHOCYTES NFR BLD AUTO: 23.1 %
MCH RBC QN AUTO: 26.5 PG (ref 26.5–33)
MCH RBC QN AUTO: 26.9 PG (ref 26.5–33)
MCHC RBC AUTO-ENTMCNC: 30 G/DL (ref 31.5–36.5)
MCHC RBC AUTO-ENTMCNC: 30.2 G/DL (ref 31.5–36.5)
MCV RBC AUTO: 89 FL (ref 78–100)
MCV RBC AUTO: 89 FL (ref 78–100)
MONOCYTES # BLD AUTO: 0.8 10E9/L (ref 0–1.3)
MONOCYTES NFR BLD AUTO: 10.1 %
NEUTROPHILS # BLD AUTO: 5.1 10E9/L (ref 1.6–8.3)
NEUTROPHILS NFR BLD AUTO: 66.3 %
NRBC # BLD AUTO: 0 10*3/UL
NRBC BLD AUTO-RTO: 0 /100
PLATELET # BLD AUTO: 174 10E9/L (ref 150–450)
PLATELET # BLD AUTO: 197 10E9/L (ref 150–450)
POTASSIUM SERPL-SCNC: 4 MMOL/L (ref 3.4–5.3)
POTASSIUM SERPL-SCNC: 4.1 MMOL/L (ref 3.4–5.3)
PROT SERPL-MCNC: 6.2 G/DL (ref 6.8–8.8)
PROT SERPL-MCNC: 6.4 G/DL (ref 6.8–8.8)
RBC # BLD AUTO: 3.01 10E12/L (ref 4.4–5.9)
RBC # BLD AUTO: 3.24 10E12/L (ref 4.4–5.9)
SODIUM SERPL-SCNC: 142 MMOL/L (ref 133–144)
SODIUM SERPL-SCNC: 144 MMOL/L (ref 133–144)
TACROLIMUS BLD-MCNC: 5.6 UG/L (ref 5–15)
TME LAST DOSE: NORMAL H
WBC # BLD AUTO: 6 10E9/L (ref 4–11)
WBC # BLD AUTO: 7.7 10E9/L (ref 4–11)

## 2019-10-14 PROCEDURE — 25000132 ZZH RX MED GY IP 250 OP 250 PS 637: Performed by: STUDENT IN AN ORGANIZED HEALTH CARE EDUCATION/TRAINING PROGRAM

## 2019-10-14 PROCEDURE — 99285 EMERGENCY DEPT VISIT HI MDM: CPT | Mod: 25 | Performed by: EMERGENCY MEDICINE

## 2019-10-14 PROCEDURE — 93005 ELECTROCARDIOGRAM TRACING: CPT | Performed by: EMERGENCY MEDICINE

## 2019-10-14 PROCEDURE — 40000556 ZZH STATISTIC PERIPHERAL IV START W US GUIDANCE

## 2019-10-14 PROCEDURE — 25000131 ZZH RX MED GY IP 250 OP 636 PS 637: Performed by: STUDENT IN AN ORGANIZED HEALTH CARE EDUCATION/TRAINING PROGRAM

## 2019-10-14 PROCEDURE — 80197 ASSAY OF TACROLIMUS: CPT | Performed by: EMERGENCY MEDICINE

## 2019-10-14 PROCEDURE — 12000001 ZZH R&B MED SURG/OB UMMC

## 2019-10-14 PROCEDURE — 25000128 H RX IP 250 OP 636: Performed by: STUDENT IN AN ORGANIZED HEALTH CARE EDUCATION/TRAINING PROGRAM

## 2019-10-14 PROCEDURE — 85027 COMPLETE CBC AUTOMATED: CPT | Performed by: EMERGENCY MEDICINE

## 2019-10-14 PROCEDURE — 25800030 ZZH RX IP 258 OP 636: Performed by: STUDENT IN AN ORGANIZED HEALTH CARE EDUCATION/TRAINING PROGRAM

## 2019-10-14 PROCEDURE — 85025 COMPLETE CBC W/AUTO DIFF WBC: CPT | Performed by: EMERGENCY MEDICINE

## 2019-10-14 PROCEDURE — 96361 HYDRATE IV INFUSION ADD-ON: CPT | Performed by: EMERGENCY MEDICINE

## 2019-10-14 PROCEDURE — 80053 COMPREHEN METABOLIC PANEL: CPT | Performed by: EMERGENCY MEDICINE

## 2019-10-14 PROCEDURE — 93010 ELECTROCARDIOGRAM REPORT: CPT | Mod: Z6 | Performed by: EMERGENCY MEDICINE

## 2019-10-14 PROCEDURE — 00000146 ZZHCL STATISTIC GLUCOSE BY METER IP

## 2019-10-14 PROCEDURE — 96360 HYDRATION IV INFUSION INIT: CPT | Performed by: EMERGENCY MEDICINE

## 2019-10-14 PROCEDURE — 80180 DRUG SCRN QUAN MYCOPHENOLATE: CPT | Performed by: EMERGENCY MEDICINE

## 2019-10-14 PROCEDURE — 99223 1ST HOSP IP/OBS HIGH 75: CPT | Mod: AI | Performed by: INTERNAL MEDICINE

## 2019-10-14 PROCEDURE — 80320 DRUG SCREEN QUANTALCOHOLS: CPT | Performed by: EMERGENCY MEDICINE

## 2019-10-14 PROCEDURE — 25000125 ZZHC RX 250: Performed by: STUDENT IN AN ORGANIZED HEALTH CARE EDUCATION/TRAINING PROGRAM

## 2019-10-14 RX ORDER — SULFAMETHOXAZOLE AND TRIMETHOPRIM 400; 80 MG/1; MG/1
1 TABLET ORAL EVERY OTHER DAY
Status: DISCONTINUED | OUTPATIENT
Start: 2019-10-14 | End: 2019-10-15 | Stop reason: HOSPADM

## 2019-10-14 RX ORDER — LIDOCAINE 40 MG/G
CREAM TOPICAL
Status: DISCONTINUED | OUTPATIENT
Start: 2019-10-14 | End: 2019-10-15 | Stop reason: HOSPADM

## 2019-10-14 RX ORDER — SODIUM CHLORIDE 9 MG/ML
INJECTION, SOLUTION INTRAVENOUS ONCE
Status: DISCONTINUED | OUTPATIENT
Start: 2019-10-14 | End: 2019-10-15 | Stop reason: HOSPADM

## 2019-10-14 RX ORDER — FEBUXOSTAT 40 MG/1
40 TABLET, FILM COATED ORAL DAILY
Status: DISCONTINUED | OUTPATIENT
Start: 2019-10-14 | End: 2019-10-15 | Stop reason: HOSPADM

## 2019-10-14 RX ORDER — FERROUS SULFATE 325(65) MG
325 TABLET ORAL
Status: DISCONTINUED | OUTPATIENT
Start: 2019-10-14 | End: 2019-10-15 | Stop reason: HOSPADM

## 2019-10-14 RX ORDER — ACETAMINOPHEN 325 MG/1
325 TABLET ORAL EVERY 4 HOURS PRN
Status: DISCONTINUED | OUTPATIENT
Start: 2019-10-14 | End: 2019-10-15 | Stop reason: HOSPADM

## 2019-10-14 RX ORDER — OXYCODONE HYDROCHLORIDE 5 MG/1
5 TABLET ORAL EVERY 6 HOURS PRN
Status: CANCELLED | OUTPATIENT
Start: 2019-10-14

## 2019-10-14 RX ORDER — CARVEDILOL 25 MG/1
25 TABLET ORAL 2 TIMES DAILY
Status: CANCELLED | OUTPATIENT
Start: 2019-10-14

## 2019-10-14 RX ORDER — TACROLIMUS 1 MG/1
2 CAPSULE ORAL 2 TIMES DAILY
Status: DISCONTINUED | OUTPATIENT
Start: 2019-10-14 | End: 2019-10-15 | Stop reason: HOSPADM

## 2019-10-14 RX ORDER — SODIUM CHLORIDE, SODIUM LACTATE, POTASSIUM CHLORIDE, CALCIUM CHLORIDE 600; 310; 30; 20 MG/100ML; MG/100ML; MG/100ML; MG/100ML
INJECTION, SOLUTION INTRAVENOUS CONTINUOUS
Status: DISCONTINUED | OUTPATIENT
Start: 2019-10-14 | End: 2019-10-15 | Stop reason: HOSPADM

## 2019-10-14 RX ORDER — FOLIC ACID 1 MG/1
1 TABLET ORAL DAILY
Status: DISCONTINUED | OUTPATIENT
Start: 2019-10-14 | End: 2019-10-15 | Stop reason: HOSPADM

## 2019-10-14 RX ORDER — HYDROCODONE BITARTRATE AND ACETAMINOPHEN 5; 325 MG/1; MG/1
1 TABLET ORAL EVERY 6 HOURS PRN
Status: CANCELLED | OUTPATIENT
Start: 2019-10-14

## 2019-10-14 RX ORDER — MYCOPHENOLATE MOFETIL 250 MG/1
500 CAPSULE ORAL 2 TIMES DAILY
Status: DISCONTINUED | OUTPATIENT
Start: 2019-10-14 | End: 2019-10-15 | Stop reason: HOSPADM

## 2019-10-14 RX ORDER — AMLODIPINE BESYLATE 5 MG/1
5 TABLET ORAL DAILY
Status: CANCELLED | OUTPATIENT
Start: 2019-10-14

## 2019-10-14 RX ORDER — SODIUM CHLORIDE 9 MG/ML
1000 INJECTION, SOLUTION INTRAVENOUS CONTINUOUS
Status: DISCONTINUED | OUTPATIENT
Start: 2019-10-14 | End: 2019-10-14

## 2019-10-14 RX ORDER — LANOLIN ALCOHOL/MO/W.PET/CERES
100 CREAM (GRAM) TOPICAL DAILY
Status: DISCONTINUED | OUTPATIENT
Start: 2019-10-14 | End: 2019-10-15 | Stop reason: HOSPADM

## 2019-10-14 RX ORDER — PREDNISONE 10 MG/1
10 TABLET ORAL DAILY
Status: DISCONTINUED | OUTPATIENT
Start: 2019-10-14 | End: 2019-10-15 | Stop reason: HOSPADM

## 2019-10-14 RX ORDER — SODIUM BICARBONATE 650 MG/1
1300 TABLET ORAL 3 TIMES DAILY
Status: DISCONTINUED | OUTPATIENT
Start: 2019-10-14 | End: 2019-10-15 | Stop reason: HOSPADM

## 2019-10-14 RX ORDER — NALOXONE HYDROCHLORIDE 0.4 MG/ML
.1-.4 INJECTION, SOLUTION INTRAMUSCULAR; INTRAVENOUS; SUBCUTANEOUS
Status: DISCONTINUED | OUTPATIENT
Start: 2019-10-14 | End: 2019-10-15 | Stop reason: HOSPADM

## 2019-10-14 RX ORDER — LORAZEPAM 0.5 MG/1
1-2 TABLET ORAL EVERY 30 MIN PRN
Status: DISCONTINUED | OUTPATIENT
Start: 2019-10-14 | End: 2019-10-15 | Stop reason: HOSPADM

## 2019-10-14 RX ADMIN — TACROLIMUS 2 MG: 1 CAPSULE ORAL at 17:51

## 2019-10-14 RX ADMIN — THIAMINE HCL TAB 100 MG 100 MG: 100 TAB at 10:13

## 2019-10-14 RX ADMIN — FEBUXOSTAT 40 MG: 40 TABLET, COATED ORAL at 13:00

## 2019-10-14 RX ADMIN — SULFAMETHOXAZOLE AND TRIMETHOPRIM 1 TABLET: 400; 80 TABLET ORAL at 10:12

## 2019-10-14 RX ADMIN — MYCOPHENOLATE MOFETIL 500 MG: 250 CAPSULE ORAL at 19:57

## 2019-10-14 RX ADMIN — MELATONIN 2000 UNITS: at 10:10

## 2019-10-14 RX ADMIN — PREDNISONE 10 MG: 10 TABLET ORAL at 10:11

## 2019-10-14 RX ADMIN — SODIUM BICARBONATE 650 MG TABLET 1300 MG: at 10:18

## 2019-10-14 RX ADMIN — ACETAMINOPHEN 325 MG: 325 TABLET, FILM COATED ORAL at 21:59

## 2019-10-14 RX ADMIN — FOLIC ACID: 5 INJECTION, SOLUTION INTRAMUSCULAR; INTRAVENOUS; SUBCUTANEOUS at 05:43

## 2019-10-14 RX ADMIN — TACROLIMUS 2 MG: 1 CAPSULE ORAL at 10:12

## 2019-10-14 RX ADMIN — SODIUM BICARBONATE 650 MG TABLET 1300 MG: at 19:57

## 2019-10-14 RX ADMIN — FERROUS SULFATE TAB 325 MG (65 MG ELEMENTAL FE) 325 MG: 325 (65 FE) TAB at 10:18

## 2019-10-14 RX ADMIN — FOLIC ACID 1 MG: 1 TABLET ORAL at 10:12

## 2019-10-14 RX ADMIN — OMEPRAZOLE 20 MG: 20 CAPSULE, DELAYED RELEASE ORAL at 10:18

## 2019-10-14 RX ADMIN — SODIUM BICARBONATE 650 MG TABLET 1300 MG: at 15:35

## 2019-10-14 RX ADMIN — MYCOPHENOLATE MOFETIL 500 MG: 250 CAPSULE ORAL at 10:11

## 2019-10-14 RX ADMIN — SODIUM CHLORIDE, POTASSIUM CHLORIDE, SODIUM LACTATE AND CALCIUM CHLORIDE: 600; 310; 30; 20 INJECTION, SOLUTION INTRAVENOUS at 18:33

## 2019-10-14 ASSESSMENT — ACTIVITIES OF DAILY LIVING (ADL)
ADLS_ACUITY_SCORE: 14
ADLS_ACUITY_SCORE: 13
ADLS_ACUITY_SCORE: 16

## 2019-10-14 NOTE — ED NOTES
[facility], [place]   ED Nurse to Floor Handoff     Rashad Ortiz is a 44 year old male who speaks English and lives with a spouse,  in a home  They arrived in the ED by ambulance from home    ED Chief Complaint:   ED Dx;   Final diagnoses:   Alcoholic intoxication with complication (H)         Needed?: No    Allergies: No Known Allergies.  Past Medical Hx:   Past Medical History:   Diagnosis Date     Chronic kidney disease, stage 4, severely decreased GFR (H)      Gastro-oesophageal reflux disease      Gout      History of blood transfusion      Hypertension      Medical non-compliance      Pulmonary nodules      Steroid long-term use       Baseline Mental status: WDL  Current Mental Status changes: other intoxicated    Infection present or suspected this encounter: no  Sepsis suspected: No  Isolation type: No active isolations     Activity level - Baseline/Home:  Independent  Activity Level - Current:   Unable to Assess    Bariatric equipment needed?: No    In the ED these meds were given: none  Drips running?  No  Home pump  Yes    Current LDAs  Peripheral IV 10/14/19 Left (Active)   Site Assessment WDL 10/14/2019 12:26 AM   Line Status Saline locked 10/14/2019 12:26 AM   Phlebitis Scale 0-->no symptoms 10/14/2019 12:26 AM   Infiltration Scale 0 10/14/2019 12:26 AM   Infiltration Site Treatment Method  None 10/14/2019 12:26 AM   Extravasation? No 10/14/2019 12:26 AM   Dressing Intervention New dressing  10/14/2019 12:26 AM   Number of days: 0       Incision/Surgical Site 07/31/19 Right Arm (Active)   Number of days: 75       Incision/Surgical Site 07/31/19 Upper Arm (Active)   Number of days: 75     Labs results:   Labs Ordered and Resulted from Time of ED Arrival Up to the Time of Departure from the ED   CBC WITH PLATELETS DIFFERENTIAL - Abnormal; Notable for the following components:       Result Value    RBC Count 3.01 (*)     Hemoglobin 8.1 (*)     Hematocrit 26.8  (*)     MCHC 30.2 (*)     RDW 17.8 (*)     All other components within normal limits   COMPREHENSIVE METABOLIC PANEL - Abnormal; Notable for the following components:    Chloride 113 (*)     Carbon Dioxide 17 (*)     Glucose 118 (*)     Urea Nitrogen 81 (*)     Creatinine 6.36 (*)     GFR Estimate 10 (*)     GFR Estimate If Black 11 (*)     Calcium 7.9 (*)     Protein Total 6.4 (*)     All other components within normal limits   ALCOHOL ETHYL - Abnormal; Notable for the following components:    Ethanol g/dL 0.51 (*)     All other components within normal limits       Imaging Studies: No results found for this or any previous visit (from the past 24 hour(s)).    Recent vital signs:   /79   Pulse 60   Wt 70.9 kg (156 lb 4.8 oz)   SpO2 97%   BMI 23.77 kg/m              Cardiac Rhythm: Normal Sinus  Pt needs tele? No  Skin/wound Issues: None  Code Status: Full Code    Pain control: pt had none    Nausea control: pt had none    Abnormal labs/tests/findings requiring intervention: ALCOHOL LEVEL 0.51    Family present during ED course? No - spouse is at home with grandkids. Brother Gene was here and wanted an update once he goes upstairs. Pt has a fistula in the R arm to prepare for dialysis but has not yet been to dialysis. He has been very lethargic throughout his stay in the ED, responsive to pain.   Family Comments/Social Situation comments: none    Tasks needing completion: None    Nela Leslie RN  [facility] -- [phone]  [phone] [facility] ED

## 2019-10-14 NOTE — PLAN OF CARE
Pt arrived on unit from ED at the beginning of the shift. Since arrival, pt has been extremely lethargic. Disoriented to time. CIWA scored 3, no med given for alcohol withdrawal just yet. Pt woke up briefly for BR use, unstable on ft. Bed alarm on. BG 87 upon arrival, significant drop. Banana bag transfusing. Apple juice given with med. Pt fell asleep right after came back from BR. Continue to monitor.

## 2019-10-14 NOTE — ED NOTES
Unable to obtain admission temperature from oral or axillary route. MD informed  Pt is unable to answer triage questions

## 2019-10-14 NOTE — ED NOTES
Family member Gene wants an update when he goes upstairs or an update on his condition  [phone]

## 2019-10-14 NOTE — PLAN OF CARE
Pt has remained the same. This writer has not been able to finish admission profile. Pt would wake up, rushed to the BR and would fell back asleep. Very lethargic but a&oX4 in the brief period of time when he was awake. Pt had a clear liquid diet and tolerated well. Will advance diet to full. PIV infiltrated. Medicine team to paged to request Midline per vascular team's request since pt lost 2 PIVs within 12 hrs. Medicine team called back and promised to put Midline order in.

## 2019-10-14 NOTE — ED NOTES
Bed: ED02  Expected date:   Expected time:   Means of arrival:   Comments:  papo [phone] intoxicated and in need of dialysis

## 2019-10-14 NOTE — ED PROVIDER NOTES
"      [place] EMERGENCY DEPARTMENT ([facility])  October 14, 2019    History     Chief Complaint   Patient presents with     Alcohol Intoxication     HPI  Rashad Ortiz is a 44 year old male who presents with alcohol intoxication.  Per patient's wife he has been drinking this evening.  States that he occasionally drinks to excess and was working more than usual tonight.  He states he \"passed out\" while drinking.  He did not fall or strike his head.  She states he does not use any other recreational drugs.  No recent illness.  Patient does have end-stage renal disease and has a new dialysis fistula however he has not yet started dialysis.  No other symptoms noted.    I have reviewed the Medications, Allergies, Past Medical and Surgical History, and Social History in the Epic system.    Review of Systems   Unable to perform ROS: Mental status change       Physical Exam   Weight: 70.9 kg (156 lb 4.8 oz)  SpO2: 94 %      Physical Exam  Constitutional:       General: He is not in acute distress.     Appearance: He is well-developed. He is not diaphoretic.      Comments: Appears intoxicated.   HENT:      Head: Normocephalic and atraumatic.      Mouth/Throat:      Pharynx: No oropharyngeal exudate.   Eyes:      General: No scleral icterus.        Right eye: No discharge.         Left eye: No discharge.      Pupils: Pupils are equal, round, and reactive to light.   Neck:      Musculoskeletal: Normal range of motion and neck supple.   Cardiovascular:      Rate and Rhythm: Normal rate and regular rhythm.      Heart sounds: Normal heart sounds. No murmur. No friction rub. No gallop.    Pulmonary:      Effort: Pulmonary effort is normal. No respiratory distress.      Breath sounds: Normal breath sounds. No wheezing.   Chest:      Chest wall: No tenderness.   Abdominal:      General: Bowel sounds are normal. There is no distension.      Palpations: Abdomen is soft.   Musculoskeletal: Normal range of motion.         " General: No tenderness or deformity.      Right lower leg: Edema (1+) present.      Left lower leg: Edema (2+) present.   Skin:     General: Skin is warm and dry.      Coloration: Skin is not pale.      Findings: No erythema or rash.   Neurological:      Mental Status: He is lethargic.      Comments: Withdraws from painful stimuli.         ED Course        Procedures             EKG Interpretation:      Interpreted by Dexter Cloud DO  Time reviewed: 0109  Symptoms at time of EKG: decreased LOC   Rhythm: normal sinus   Rate: normal  Axis: normal  Ectopy: none  Conduction: normal  ST Segments/ T Waves: No ST-T wave changes  Q Waves: none  Comparison to prior: No old EKG available    Clinical Impression: normal EKG          Critical Care time:  none             Labs Ordered and Resulted from Time of ED Arrival Up to the Time of Departure from the ED - No data to display         Assessments & Plan (with Medical Decision Making)   Is a 44-year-old male who presents with alcohol intoxication.  Patient was drinking this evening and history is provided by his wife.  She states he has been drinking the evening.  She states he did not have any falls or injuries.  In the emergency department he is responsive to painful stimuli.  He does have considerable peripheral edema, worse on the left but this is baseline per his wife with lymphedema.  Alcohol level is 0.51.  Creatinine is 6.36 which appears to be at his baseline.  ECG shows no acute abnormalities.  I discussed the case with nephrology who states they do not plan to dialyze since yesterday.  Per his wife he does make urine normally.  Patient was given IV fluids in the emergency department.  He is protecting his airway and his vital signs are normal.  As will take considerable amount of time for patient to be sober we will admit.  Patient does not meet observation criteria as he does have end-stage renal disease. We will admit for further monitoring work-up and  treatment.    I have reviewed the nursing notes.    I have reviewed the findings, diagnosis, plan and need for follow up with the patient.    New Prescriptions    No medications on file       Final diagnoses:   None       10/14/2019   [facility], [facility], EMERGENCY DEPARTMENT     Dexter Cloud,   10/15/19 1749

## 2019-10-14 NOTE — LETTER
[facility]  [address] [place] [address]    10/15/19    Rashad Ortiz  [address]  [place] [address]    :  1975    TO WHOM IT MAY CONCERN:    Rashad was hospitalized from 10/14/2019 through today for medical illness.     Please excuse him from school/work.    Anticipate return to school/work tomorrow.    Please contact me if there are any questions or concerns.      Sincerely,    Manan Bolden MD  Internal Medicine      CC  Patient Care Team:  Mariel Garcia MD as PCP - General (Family Practice)  Stef Murillo MD as Referring Physician (Nephrology)  Flor Barrett, RN as Nurse Coordinator (Nephrology)  Mariel Garcia MD as Assigned PCP  Ida Carroll LPN as LPN (Nephrology)

## 2019-10-14 NOTE — ED NOTES
Dr. Bolden notified of pt status and inability to take PO meds.     Pt unable to confirm wife's name, as she was calling for update.

## 2019-10-14 NOTE — ED TRIAGE NOTES
Per the pt's wife, he took 4 shots tonight and passed out on the floor. He has a dialysis shunt that is new in the R arm, he has not started dialysis yet. He was responsive to painful stimuli. He is lethargic. +3 pitting edema in the legs.

## 2019-10-14 NOTE — H&P
[facility]    History and Physical - [facility] Night Service        Date of Admission:  10/14/2019    Assessment & Plan   Rashad Ortiz is a 44 year old male with PMH of LDKT (1/2011) secondary to HTN with worsening creatinine s/p fistula creation, HTN, Gout, Lymphedema who presents with alcohol intoxication with concern for initiation of HD.     Acute encephalopathy 2/2 EtOH intoxication  According to chart review, patient took 4 shots of EtOH prior to presentation to ED. The patient's blood EtOH level was 0.51. Unable to obtain further details due to intoxication.   - CIWA protocol with prn Ativan   - 1L banana bag  - 100mg Thiamine daily, 1mg Folic Acid daily   - Consider psych consult vs chem dep consult in the AM  - Hold oxycodone and norco due to encephalopathy     LDKT with worsening creatinine  Patient with LDKT in 2011 secondary to HTN. Patient with worsening creatinine prior to admission. Had right fistula created 07/31. Last evaluated by nephrology 08/14/2019. Patient's creatinine 6.36 on admission with normal potassium and serum bicarbonate 17. No emergent/urgent indications for dialysis.   - Consider transplant nephrology consult   - Continue PTA sodium bicarbonate   - Continue mycophenylate, tacrolimus, and prednisone   - MMF (goal 1-3.5) and tacrolimus (goal 4-6) levels in AM   - Continue Bactrim     HTN  Patient hypotensive upon admission.   - Hold PTA Amlodipine, Carvedilol and Lasix     Gout  - Continue Febuxostat      Diet: Advance as tolerated   Fluids: 1L banana bag   DVT Prophylaxis: Mechanical   Hunter Catheter: not present  Code Status: Unable to determine, please formally assess in AM     Disposition Plan   Expected discharge: 2 - 3 days, recommended to prior living arrangement once EtOH withdrawl period completed .    To be staffed in the AM.     Donita Abad MD  Night Float Service  [facility], [place]  Pager:  [phone]  Please see sticky note for cross cover information  ______________________________________________________________________    Chief Complaint   EtOH intoxication     History is obtained from chart review     History of Present Illness   Rashad Ortiz is a 44 year old male with PMH of LDKT (1/2011) secondary to HTN with worsening creatinine s/p fistula creation, HTN, Gout, Lymphedema who presents with alcohol intoxication with concern for initiation of HD.     The patient presented from home on 10/13 after he took 4 shots of EtOH. Unable to obtain further history as the patient is encephalopathic.     Blood EtOH level in ED was 0.51.     Review of Systems    Unable to obtain due to encephalopathy.     Past Medical History    I have reviewed this patient's medical history and updated it with pertinent information if needed.   Past Medical History:   Diagnosis Date     Chronic kidney disease, stage 4, severely decreased GFR (H)      Gastro-oesophageal reflux disease      Gout      History of blood transfusion      Hypertension      Medical non-compliance      Pulmonary nodules      Steroid long-term use        Past Surgical History   I have reviewed this patient's surgical history and updated it with pertinent information if needed.  Past Surgical History:   Procedure Laterality Date     AV FISTULA OR GRAFT ARTERIAL       CREATE FISTULA ARTERIOVENOUS UPPER EXTREMITY Right 7/31/2019    Procedure: Creation Of Atriovenous Fistula Right Upper Arm;  Surgeon: Julia Irwin MD;  Location: [facility] OR     LIGATE FISTULA ARTERIOVENOUS UPPER EXTREMITY  12/20/2011    Procedure:LIGATE FISTULA ARTERIOVENOUS UPPER EXTREMITY; Excision of Right Forearm Arteriovenous Fistula.; Surgeon:LINDY AMAYA; Location:[facility] OR     PERCUTANEOUS BIOPSY KIDNEY Right 2/28/2017    Procedure: PERCUTANEOUS BIOPSY KIDNEY;  Surgeon: Gee Barrios MD;  Location:  OR     TRANSPLANT  01/13/2011    Living related kidney transplant  from sister        Social History   I have reviewed this patient's social history and updated it with pertinent information if needed. Rashad Ortiz  reports that he quit smoking about 2 years ago. His smoking use included cigarettes. He has never used smokeless tobacco. He reports current alcohol use of about 4.2 standard drinks of alcohol per week. He reports that he does not use drugs.   *Unable to obtain updated social history due to encephalopathy     Family History   I have reviewed this patient's family history and updated it with pertinent information if needed.   Family History   Problem Relation Age of Onset     Hypertension Mother        Prior to Admission Medications   Prior to Admission Medications   Prescriptions Last Dose Informant Patient Reported? Taking?   HYDROcodone-acetaminophen (NORCO) 5-325 MG tablet   No No   Sig: Take 1 tablet by mouth every 6 hours as needed for severe pain   amLODIPine (NORVASC) 5 MG tablet   No No   Sig: Take 1 tablet (5 mg) by mouth daily   carvedilol (COREG) 25 MG tablet   No No   Sig: Take 1 tablet (25 mg) by mouth 2 times daily   doxycycline hyclate (VIBRAMYCIN) 100 MG capsule   No No   Sig: Take 1 capsule (100 mg) by mouth 2 times daily for 10 days   febuxostat (ULORIC) 40 MG TABS tablet   No No   Sig: Take 1 tablet (40 mg) by mouth daily   ferrous sulfate (FEROSUL) 325 (65 Fe) MG tablet   No No   Sig: Take 1 tablet (325 mg) by mouth daily (with breakfast)   furosemide (LASIX) 20 MG tablet   No No   Sig: Take 1 tablet (20 mg) by mouth 2 times daily   mycophenolate (GENERIC EQUIVALENT) 250 MG capsule   No No   Sig: Take 2 capsules (500 mg) by mouth 2 times daily   omeprazole (PRILOSEC) 20 MG capsule   No No   Sig: Take 1 capsule (20 mg) by mouth daily   oxyCODONE (ROXICODONE) 5 MG tablet   No No   Sig: Take 1 tablet (5 mg) by mouth every 6 hours as needed for moderate to severe pain   predniSONE (DELTASONE) 5 MG tablet   No No   Sig: Take 2 tablets (10 mg) by mouth  daily   sodium bicarbonate 650 MG tablet   No No   Sig: Take 2 tablets (1,300 mg) by mouth 3 times daily   sulfamethoxazole-trimethoprim (BACTRIM/SEPTRA) 400-80 MG tablet   No No   Sig: Take 1 tablet by mouth every other day   tacrolimus (GENERIC EQUIVALENT) 1 MG capsule   No No   Sig: Take 2 capsules (2 mg) by mouth 2 times daily   vitamin D3 (CHOLECALCIFEROL) 1000 units (25 mcg) tablet   No No   Sig: Take 2 tablets (2,000 Units) by mouth daily      Facility-Administered Medications: None     Allergies   No Known Allergies    Physical Exam   Vital Signs:   Temp src: Oral BP: 111/79 Pulse: 60 Heart Rate: 61   SpO2: 97 % O2 Device: Nasal cannula Oxygen Delivery: 4 LPM  Weight: 156 lbs 4.8 oz    General: Sleeping, snoring loudly, in NAD  HEENT: Normocephalic, atraumatic, PERRL   Heart: RRR no murmur  Lungs: CTAB with audible snoring  Abd: Soft, nondistended  Ext: 2-3+ LE pitting edema bilaterally   Neuro: Sleepy, not arousable     Data   Data reviewed today: I reviewed all medications, new labs and imaging results over the last 24 hours.

## 2019-10-15 ENCOUNTER — PATIENT OUTREACH (OUTPATIENT)
Dept: CARE COORDINATION | Facility: CLINIC | Age: 44
End: 2019-10-15

## 2019-10-15 VITALS
OXYGEN SATURATION: 100 % | HEART RATE: 89 BPM | RESPIRATION RATE: 16 BRPM | TEMPERATURE: 98.5 F | SYSTOLIC BLOOD PRESSURE: 139 MMHG | BODY MASS INDEX: 23.77 KG/M2 | DIASTOLIC BLOOD PRESSURE: 91 MMHG | WEIGHT: 156.3 LBS

## 2019-10-15 LAB
ABO + RH BLD: NORMAL
ABO + RH BLD: NORMAL
ANION GAP SERPL CALCULATED.3IONS-SCNC: 10 MMOL/L (ref 3–14)
BLD GP AB SCN SERPL QL: NORMAL
BLD PROD TYP BPU: NORMAL
BLD PROD TYP BPU: NORMAL
BLD UNIT ID BPU: 0
BLOOD BANK CMNT PATIENT-IMP: NORMAL
BLOOD PRODUCT CODE: NORMAL
BPU ID: NORMAL
BUN SERPL-MCNC: 74 MG/DL (ref 7–30)
CALCIUM SERPL-MCNC: 7.6 MG/DL (ref 8.5–10.1)
CHLORIDE SERPL-SCNC: 118 MMOL/L (ref 94–109)
CO2 SERPL-SCNC: 17 MMOL/L (ref 20–32)
CREAT SERPL-MCNC: 5.58 MG/DL (ref 0.66–1.25)
ERYTHROCYTE [DISTWIDTH] IN BLOOD BY AUTOMATED COUNT: 18 % (ref 10–15)
GFR SERPL CREATININE-BSD FRML MDRD: 11 ML/MIN/{1.73_M2}
GLUCOSE SERPL-MCNC: 68 MG/DL (ref 70–99)
HCT VFR BLD AUTO: 22.2 % (ref 40–53)
HGB BLD-MCNC: 6.8 G/DL (ref 13.3–17.7)
HGB BLD-MCNC: 9 G/DL (ref 13.3–17.7)
MAGNESIUM SERPL-MCNC: 1.3 MG/DL (ref 1.6–2.3)
MAGNESIUM SERPL-MCNC: 2.6 MG/DL (ref 1.6–2.3)
MCH RBC QN AUTO: 27.4 PG (ref 26.5–33)
MCHC RBC AUTO-ENTMCNC: 30.6 G/DL (ref 31.5–36.5)
MCV RBC AUTO: 90 FL (ref 78–100)
MYCOPHENOLATE SERPL LC/MS/MS-MCNC: 4.55 MG/L (ref 1–3.5)
MYCOPHENOLATE-G SERPL LC/MS/MS-MCNC: >200 MG/L (ref 30–95)
NUM BPU REQUESTED: 1
PLATELET # BLD AUTO: 131 10E9/L (ref 150–450)
POTASSIUM SERPL-SCNC: 4.8 MMOL/L (ref 3.4–5.3)
RBC # BLD AUTO: 2.48 10E12/L (ref 4.4–5.9)
SODIUM SERPL-SCNC: 144 MMOL/L (ref 133–144)
SPECIMEN EXP DATE BLD: NORMAL
TME LAST DOSE: ABNORMAL H
TRANSFUSION STATUS PATIENT QL: NORMAL
TRANSFUSION STATUS PATIENT QL: NORMAL
WBC # BLD AUTO: 4.7 10E9/L (ref 4–11)

## 2019-10-15 PROCEDURE — 36415 COLL VENOUS BLD VENIPUNCTURE: CPT | Performed by: STUDENT IN AN ORGANIZED HEALTH CARE EDUCATION/TRAINING PROGRAM

## 2019-10-15 PROCEDURE — 85018 HEMOGLOBIN: CPT | Performed by: INTERNAL MEDICINE

## 2019-10-15 PROCEDURE — 25000132 ZZH RX MED GY IP 250 OP 250 PS 637: Performed by: STUDENT IN AN ORGANIZED HEALTH CARE EDUCATION/TRAINING PROGRAM

## 2019-10-15 PROCEDURE — 80048 BASIC METABOLIC PNL TOTAL CA: CPT | Performed by: STUDENT IN AN ORGANIZED HEALTH CARE EDUCATION/TRAINING PROGRAM

## 2019-10-15 PROCEDURE — 86850 RBC ANTIBODY SCREEN: CPT | Performed by: HOSPITALIST

## 2019-10-15 PROCEDURE — 40000141 ZZH STATISTIC PERIPHERAL IV START W/O US GUIDANCE

## 2019-10-15 PROCEDURE — 86901 BLOOD TYPING SEROLOGIC RH(D): CPT | Performed by: HOSPITALIST

## 2019-10-15 PROCEDURE — 83735 ASSAY OF MAGNESIUM: CPT | Performed by: STUDENT IN AN ORGANIZED HEALTH CARE EDUCATION/TRAINING PROGRAM

## 2019-10-15 PROCEDURE — 99239 HOSP IP/OBS DSCHRG MGMT >30: CPT | Mod: GC | Performed by: INTERNAL MEDICINE

## 2019-10-15 PROCEDURE — 82668 ASSAY OF ERYTHROPOIETIN: CPT | Performed by: STUDENT IN AN ORGANIZED HEALTH CARE EDUCATION/TRAINING PROGRAM

## 2019-10-15 PROCEDURE — 83735 ASSAY OF MAGNESIUM: CPT | Performed by: INTERNAL MEDICINE

## 2019-10-15 PROCEDURE — 25000128 H RX IP 250 OP 636: Performed by: STUDENT IN AN ORGANIZED HEALTH CARE EDUCATION/TRAINING PROGRAM

## 2019-10-15 PROCEDURE — P9016 RBC LEUKOCYTES REDUCED: HCPCS | Performed by: HOSPITALIST

## 2019-10-15 PROCEDURE — 86923 COMPATIBILITY TEST ELECTRIC: CPT | Performed by: HOSPITALIST

## 2019-10-15 PROCEDURE — 85027 COMPLETE CBC AUTOMATED: CPT | Performed by: STUDENT IN AN ORGANIZED HEALTH CARE EDUCATION/TRAINING PROGRAM

## 2019-10-15 PROCEDURE — 36415 COLL VENOUS BLD VENIPUNCTURE: CPT | Performed by: INTERNAL MEDICINE

## 2019-10-15 PROCEDURE — 86900 BLOOD TYPING SEROLOGIC ABO: CPT | Performed by: HOSPITALIST

## 2019-10-15 PROCEDURE — 25000131 ZZH RX MED GY IP 250 OP 636 PS 637: Performed by: STUDENT IN AN ORGANIZED HEALTH CARE EDUCATION/TRAINING PROGRAM

## 2019-10-15 RX ORDER — MAGNESIUM SULFATE HEPTAHYDRATE 40 MG/ML
4 INJECTION, SOLUTION INTRAVENOUS EVERY 4 HOURS PRN
Status: DISCONTINUED | OUTPATIENT
Start: 2019-10-15 | End: 2019-10-15 | Stop reason: HOSPADM

## 2019-10-15 RX ADMIN — FEBUXOSTAT 40 MG: 40 TABLET, COATED ORAL at 08:14

## 2019-10-15 RX ADMIN — MYCOPHENOLATE MOFETIL 500 MG: 250 CAPSULE ORAL at 08:02

## 2019-10-15 RX ADMIN — OMEPRAZOLE 20 MG: 20 CAPSULE, DELAYED RELEASE ORAL at 08:02

## 2019-10-15 RX ADMIN — SODIUM BICARBONATE 650 MG TABLET 1300 MG: at 08:02

## 2019-10-15 RX ADMIN — MELATONIN 2000 UNITS: at 08:03

## 2019-10-15 RX ADMIN — PREDNISONE 10 MG: 10 TABLET ORAL at 08:03

## 2019-10-15 RX ADMIN — FERROUS SULFATE TAB 325 MG (65 MG ELEMENTAL FE) 325 MG: 325 (65 FE) TAB at 08:03

## 2019-10-15 RX ADMIN — SODIUM BICARBONATE 650 MG TABLET 1300 MG: at 16:39

## 2019-10-15 RX ADMIN — FOLIC ACID 1 MG: 1 TABLET ORAL at 08:03

## 2019-10-15 RX ADMIN — MAGNESIUM SULFATE HEPTAHYDRATE 4 G: 40 INJECTION, SOLUTION INTRAVENOUS at 10:17

## 2019-10-15 RX ADMIN — TACROLIMUS 2 MG: 1 CAPSULE ORAL at 17:29

## 2019-10-15 RX ADMIN — THIAMINE HCL TAB 100 MG 100 MG: 100 TAB at 08:03

## 2019-10-15 RX ADMIN — ACETAMINOPHEN 325 MG: 325 TABLET, FILM COATED ORAL at 08:14

## 2019-10-15 RX ADMIN — TACROLIMUS 2 MG: 1 CAPSULE ORAL at 08:02

## 2019-10-15 ASSESSMENT — ACTIVITIES OF DAILY LIVING (ADL)
ADLS_ACUITY_SCORE: 14
ADLS_ACUITY_SCORE: 14
ADLS_ACUITY_SCORE: 13
ADLS_ACUITY_SCORE: 14
ADLS_ACUITY_SCORE: 13

## 2019-10-15 NOTE — DISCHARGE SUMMARY
[facility], [place]  Discharge Summary - Medicine & Pediatrics       Date of Admission:  10/14/2019  Date of Discharge:  10/15/2019  Discharging Provider: Manan Bolden MD  Discharge Service: Javier Mcmillan    Discharge Diagnoses   Acute alcohol intoxication    Follow-ups Needed After Discharge   Follow-up Appointments     Adult [facility] Follow-up and recommended labs and tests      Follow up with your Nephrologist in 2-4 weeks for your annual transplant   kidney check up    Appointments on [place] and/or [facility] (with [facility] or [facility]   provider or service). Call [phone] if you haven't heard regarding   these appointments within 7 days of discharge.             Unresulted Labs Ordered in the Past 30 Days of this Admission     Date and Time Order Name Status Description    10/15/2019 1012 Erythropoietin In process       These results will be followed up by your Transplant Nephrologist.    Discharge Disposition   Discharged to home  Condition at discharge: Good    Hospital Course   Rashad Ortiz was admitted on 10/14/2019 for acute alcohol intoxication.  The following problems were addressed during his hospitalization:    Acute encephalopathy 2/2 EtOH intoxication  According to chart review, patient took 4 shots of EtOH prior to presentation to ED. The patient's blood EtOH level was 0.51. Patient recovered and returned to baseline after IV fluids, vitamin and folate replacement. He was discharged after being cleared by Transplant Nephrology, as labs demonstrated no change from his baseline with the exception of an improving creatinine.    LDKT with worsening creatinine  Patient with LDKT in 2011 secondary to HTN. Patient with worsening creatinine prior to admission. Had right fistula created 07/31. Last evaluated by nephrology 08/14/2019. Patient's creatinine 6.36 on admission with normal potassium and serum bicarbonate 17. No emergent/urgent indications for dialysis per  Transplant Nephrology. Patient will follow up in 2-4 weeks with weekly lab draws prior to his appointment.        Essential Hypertension  Patient hypotensive upon admission that improved with IV fluids. Patient will restart home antihypertensives upon discharge, now that blood pressures are normal. He will follow up with his Transplant Nephrologist in the next 2-4 weeks.     Gout  Patient will continue Febuxostat outpatient.    Consultations This Hospital Stay   VASCULAR ACCESS CARE ADULT IP CONSULT  VASCULAR ACCESS CARE ADULT IP CONSULT  VASCULAR ACCESS CARE ADULT IP CONSULT  VASCULAR ACCESS CARE ADULT IP CONSULT  ADVANCE DIRECTIVE IP CONSULT  VASCULAR ACCESS CARE ADULT IP CONSULT  VASCULAR ACCESS CARE ADULT IP CONSULT    Code Status   Prior     The patient was discussed with Dr. Bozena Bolden MD  [place] [address] Service  [facility] [place]  Pager: [phone]  ______________________________________________________________________    Physical Exam   Vital Signs: Temp: 98.5  F (36.9  C) Temp src: Oral BP: (!) 139/91 Pulse: 89 Heart Rate: 68 Resp: 16 SpO2: 100 % O2 Device: None (Room air)    Weight: 156 lbs 4.8 oz  General Appearance: Sitting upright, well appearing and in no acute distress.  Respiratory: Non labored breathing, CTAB.  Cardiovascular: Normal rate, regular rhythm. No m/r/g.  GI: Soft, non tender and non distended. Healed vertical incision lateral to the umbilicus.  Skin: Warm, dry. No worrisome skin lesions.  Other: No FND. No asterixis. Normal speech, A&Ox4.         Primary Care Physician   Mariel Contreras Jackson    Discharge Orders      Reason for your hospital stay    You were hospitalized for acute alcohol intoxication, for which you received IV fluids and rest. You improved enough to be discharged home on 10/15 with close follow up with your Nephrologist. You were counseled on alcohol abstinence, as this will harm your kidney.     Adult [facility] Follow-up and  recommended labs and tests    Follow up with your Nephrologist in 2-4 weeks for your annual transplant kidney check up    Appointments on [place] and/or [facility] (with [facility] or [facility] provider or service). Call [phone] if you haven't heard regarding these appointments within 7 days of discharge.     Activity    Your activity upon discharge: activity as tolerated     Diet    Follow this diet upon discharge: Orders Placed This Encounter      Regular Diet Adult       Significant Results and Procedures   Most Recent 3 CBC's:  Recent Labs   Lab Test 10/15/19  1734 10/15/19  0628 10/14/19  0752 10/14/19  0027   WBC  --  4.7 6.0 7.7   HGB 9.0* 6.8* 8.6* 8.1*   MCV  --  90 89 89   PLT  --  131* 197 174     Most Recent 3 BMP's:  Recent Labs   Lab Test 10/15/19  0628 10/14/19  0752 10/14/19  0027    144 142   POTASSIUM 4.8 4.0 4.1   CHLORIDE 118* 115* 113*   CO2 17* 16* 17*   BUN 74* 79* 81*   CR 5.58* 6.56* 6.36*   ANIONGAP 10 14 12   ASHELY 7.6* 7.6* 7.9*   GLC 68* 95 118*     Most Recent 2 LFT's:  Recent Labs   Lab Test 10/14/19  0752 10/14/19  0027   AST 22 28   ALT 34 37   ALKPHOS 63 55   BILITOTAL 0.3 0.3          Discharge Medications   Current Discharge Medication List      CONTINUE these medications which have NOT CHANGED    Details   amLODIPine (NORVASC) 5 MG tablet Take 1 tablet (5 mg) by mouth daily  Qty: 90 tablet, Refills: 3    Associated Diagnoses: Benign essential hypertension      carvedilol (COREG) 25 MG tablet Take 1 tablet (25 mg) by mouth 2 times daily  Qty: 180 tablet, Refills: 3    Associated Diagnoses: DEIDRA (acute kidney injury) (H)      febuxostat (ULORIC) 40 MG TABS tablet Take 1 tablet (40 mg) by mouth daily  Qty: 90 tablet, Refills: 3    Associated Diagnoses: Gout, unspecified cause, unspecified chronicity, unspecified site      ferrous sulfate (FEROSUL) 325 (65 Fe) MG tablet Take 1 tablet (325 mg) by mouth daily (with breakfast)  Qty: 30 tablet, Refills: 11    Associated Diagnoses:  Chronic kidney disease, stage V (H); Anemia of chronic renal failure, stage 5 (H)      furosemide (LASIX) 20 MG tablet Take 1 tablet (20 mg) by mouth 2 times daily  Qty: 180 tablet, Refills: 1    Associated Diagnoses: DEIDRA (acute kidney injury) (H)      HYDROcodone-acetaminophen (NORCO) 5-325 MG tablet Take 1 tablet by mouth every 6 hours as needed for severe pain  Qty: 10 tablet, Refills: 0    Associated Diagnoses: Acute gout due to renal impairment involving left foot      mycophenolate (GENERIC EQUIVALENT) 250 MG capsule Take 2 capsules (500 mg) by mouth 2 times daily  Qty: 120 capsule, Refills: 11    Associated Diagnoses: Kidney transplanted; Kidney transplant recipient; Long-term use of immunosuppressant medication      omeprazole (PRILOSEC) 20 MG capsule Take 1 capsule (20 mg) by mouth daily  Qty: 90 capsule, Refills: 1    Associated Diagnoses: Kidney replaced by transplant      oxyCODONE (ROXICODONE) 5 MG tablet Take 1 tablet (5 mg) by mouth every 6 hours as needed for moderate to severe pain  Qty: 6 tablet, Refills: 0    Associated Diagnoses: Encounter regarding vascular access for dialysis for end-stage renal disease (H)      predniSONE (DELTASONE) 5 MG tablet Take 2 tablets (10 mg) by mouth daily  Qty: 60 tablet, Refills: 11    Associated Diagnoses: Kidney transplant recipient; Long-term use of immunosuppressant medication      sodium bicarbonate 650 MG tablet Take 2 tablets (1,300 mg) by mouth 3 times daily  Qty: 180 tablet, Refills: 11    Associated Diagnoses: Encounter for long-term (current) use of high-risk medication; Kidney replaced by transplant; Metabolic acidosis      sulfamethoxazole-trimethoprim (BACTRIM/SEPTRA) 400-80 MG tablet Take 1 tablet by mouth every other day  Qty: 45 tablet, Refills: 3    Associated Diagnoses: Kidney transplanted      tacrolimus (GENERIC EQUIVALENT) 1 MG capsule Take 2 capsules (2 mg) by mouth 2 times daily  Qty: 120 capsule, Refills: 11    Associated Diagnoses: Kidney  transplant recipient; Long-term use of immunosuppressant medication; Kidney transplanted      vitamin D3 (CHOLECALCIFEROL) 1000 units (25 mcg) tablet Take 2 tablets (2,000 Units) by mouth daily  Qty: 180 tablet, Refills: 3    Associated Diagnoses: Vitamin D deficiency         STOP taking these medications       doxycycline hyclate (VIBRAMYCIN) 100 MG capsule Comments:   Reason for Stopping:             Allergies   No Known Allergies

## 2019-10-15 NOTE — PLAN OF CARE
Pt had PIV inserted, c/o pain at IV site while Mag was infusing but hot packs appeared to help. Blood consent form was signed and blood transfusion started at 1420, tolerating well. Pt seemed more alert and stable on his feet today compare to yesterday.

## 2019-10-15 NOTE — CONSULTS
[facility], [place]  Transplant Nephrology Consult  Date of Admission:  10/14/2019  Today's Date: 10/15/2019  Requesting physician: Bety Warren,*    Assessment & Plan      # Alcohol intoxication:Patient is recovering well.  When asked about the reason for the large amount of alcohol consumption he volunteered the information that he was slightly depressed and he was dealing with his depression this way.  He specifically denied any suicidal ideation or homicidal ideation.  I discussed the implication of alcohol intoxication especially when Rashad is looking forward for repeat transplant.  I recommended a formal psychosocial assessment in psychiatric evaluation and is a condition to participate in continue on the list for transplantation.    # LDKT: Baseline mild uremic symptoms but currently with the alcohol intoxication.  My plan is to assess his symptoms tomorrow and if needed will offer dialysis otherwise we will continue to monitor.  Of note creatinine was slightly elevated in the setting of nausea and vomiting and possible dehydration in the setting of alcohol intoxication.              - Baseline Cr ~ 4-5's              - Proteinuria: Moderate (1-3 grams); 2.92 on 3/19/2019              - Date DSA Last Checked: Sep/2017       Latest DSA: No              - BK Viremia: Not checked recently              - Kidney Tx Biopsy: Yes; moderate chronic tubulointerstitial changes, no diagnostic evidence of acute rejection seen.     # Immunosuppression: Tacrolimus immediate release (goal 4-6), Mycophenolate mofetil (goal 1-3.5) and Prednisone (dose 5 mg daily)              - Changes: No     # Prophylaxis:              - PJP: Sulfa/TMP (Bactrim)     # Hypertension:  Relatively controlled on his home regimen no changes.        # Electrolytes:   - Potassium; level:  Stable  - Bicarbonate; level:  Slightly low discussed adding bicarb to his IV fluids or oral supplement.    # Lymphedema:  Chronic since transplant.  Will refer patient to Lymphedema Clinic.     # Medical Compliance: Yes     # Transplant History:  Etiology of kidney failure: Hypertension  Tx: LDKT  Transplant: 1/13/2011 (Kidney)  Donor Type: Living      Donor Class:   Significant changes in immunosuppression: None  Significant transplant-related complications: None                            Abran Cunha MD  Pager: [phone]    REASON FOR CONSULT   Immunosuppression management.    History of Present Illness   Rashad Ortiz is a 44 year old male with end-stage kidney disease status post living donor kidney transplantation now failing.  He had fistula placement for future dialysis access.  He was admitted after admittedly reported alcohol binge.  He reported that he drank 4 large drinks.  He was intoxicated on admission and was started on fluid and vitamins per protocol.  During my encounter with him he was not very clear compared to his baseline from previous encounters.  He had asterixis.  He expressed some remorse and was agreeable to see the psychiatry service.  He was not able to elaborate on the reasoning behind his decision although he mentioned that this was his way of coping with his current depressive mood.  He was not very oriented to time but was oriented to place.    Review of Systems    Unable to obtain in full due to mental status.  Denied any chest pains or shortness of breath abdominal pain or pain over the allograft.    Past Medical History    I have reviewed this patient's medical history and updated it with pertinent information if needed.   Past Medical History:   Diagnosis Date     Chronic kidney disease, stage 4, severely decreased GFR (H)      Gastro-oesophageal reflux disease      Gout      History of blood transfusion      Hypertension      Medical non-compliance      Pulmonary nodules      Steroid long-term use        Past Surgical History   I have reviewed this patient's surgical history and updated it  with pertinent information if needed.  Past Surgical History:   Procedure Laterality Date     AV FISTULA OR GRAFT ARTERIAL       CREATE FISTULA ARTERIOVENOUS UPPER EXTREMITY Right 7/31/2019    Procedure: Creation Of Atriovenous Fistula Right Upper Arm;  Surgeon: Julia Irwin MD;  Location: [facility] OR     LIGATE FISTULA ARTERIOVENOUS UPPER EXTREMITY  12/20/2011    Procedure:LIGATE FISTULA ARTERIOVENOUS UPPER EXTREMITY; Excision of Right Forearm Arteriovenous Fistula.; Surgeon:LINDY AMAYA; Location:[facility] OR     PERCUTANEOUS BIOPSY KIDNEY Right 2/28/2017    Procedure: PERCUTANEOUS BIOPSY KIDNEY;  Surgeon: Gee Barrios MD;  Location: [facility] OR     TRANSPLANT  01/13/2011    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 Rashad Ortiz  reports that he quit smoking about 2 years ago. His smoking use included cigarettes. He has never used smokeless tobacco. He reports current alcohol use of about 4.2 standard drinks of alcohol per week. He reports that he does not use drugs.    Family History   I have reviewed this patient's family history and updated it with pertinent information if needed.   Family History   Problem Relation Age of Onset     Hypertension Mother        Allergies   No Known Allergies  Prior to Admission Medications   Prior to Admission Medications   Prescriptions Last Dose Informant Patient Reported? Taking?   HYDROcodone-acetaminophen (NORCO) 5-325 MG tablet   No No   Sig: Take 1 tablet by mouth every 6 hours as needed for severe pain   amLODIPine (NORVASC) 5 MG tablet   No No   Sig: Take 1 tablet (5 mg) by mouth daily   carvedilol (COREG) 25 MG tablet   No No   Sig: Take 1 tablet (25 mg) by mouth 2 times daily   doxycycline hyclate (VIBRAMYCIN) 100 MG capsule   No No   Sig: Take 1 capsule (100 mg) by mouth 2 times daily for 10 days   febuxostat (ULORIC) 40 MG TABS tablet    No No   Sig: Take 1 tablet (40 mg) by mouth daily   ferrous sulfate (FEROSUL) 325 (65 Fe) MG tablet   No No   Sig: Take 1 tablet (325 mg) by mouth daily (with breakfast)   furosemide (LASIX) 20 MG tablet   No No   Sig: Take 1 tablet (20 mg) by mouth 2 times daily   mycophenolate (GENERIC EQUIVALENT) 250 MG capsule   No No   Sig: Take 2 capsules (500 mg) by mouth 2 times daily   omeprazole (PRILOSEC) 20 MG capsule   No No   Sig: Take 1 capsule (20 mg) by mouth daily   oxyCODONE (ROXICODONE) 5 MG tablet   No No   Sig: Take 1 tablet (5 mg) by mouth every 6 hours as needed for moderate to severe pain   predniSONE (DELTASONE) 5 MG tablet   No No   Sig: Take 2 tablets (10 mg) by mouth daily   sodium bicarbonate 650 MG tablet   No No   Sig: Take 2 tablets (1,300 mg) by mouth 3 times daily   sulfamethoxazole-trimethoprim (BACTRIM/SEPTRA) 400-80 MG tablet   No No   Sig: Take 1 tablet by mouth every other day   tacrolimus (GENERIC EQUIVALENT) 1 MG capsule   No No   Sig: Take 2 capsules (2 mg) by mouth 2 times daily   vitamin D3 (CHOLECALCIFEROL) 1000 units (25 mcg) tablet   No No   Sig: Take 2 tablets (2,000 Units) by mouth daily      Facility-Administered Medications: None       Physical Exam   Temp  Av.3  F (36.8  C)  Min: 95.5  F (35.3  C)  Max: 103  F (39.4  C)      Pulse  Av.5  Min: 54  Max: 100 Resp  Av.7  Min: 9  Max: 35  SpO2  Av.2 %  Min: 75 %  Max: 100 %     /70 (BP Location: Left arm)   Pulse 80   Temp 98.9  F (37.2  C) (Oral)   Resp 16   Wt 70.9 kg (156 lb 4.8 oz)   SpO2 96%   BMI 23.77 kg/m     Date 10/15/19 07 - 10/16/19 0659   Shift 6752-7889 0929-0826 5757-1888 24 Hour Total   INTAKE   Shift Total(mL/kg)       OUTPUT   Urine 100   100   Shift Total(mL/kg) 100(1.41)   100(1.41)   Weight (kg) 70.9 70.9 70.9 70.9      Admit Weight: 70.9 kg (156 lb 4.8 oz)     GENERAL APPEARANCE: alert and no distress  HENT: mouth without ulcers or lesions  LYMPHATICS: no cervical or  supraclavicular nodes  RESP: lungs clear to auscultation - no rales, rhonchi or wheezes  CV: regular rhythm, normal rate, no rub, no murmur  EDEMA: no LE edema bilaterally  ABDOMEN: soft, nondistended, nontender, bowel sounds normal  MS: extremities normal - no gross deformities noted, no evidence of inflammation in joints, no muscle tenderness  SKIN: no rash    Data   CMP  Recent Labs   Lab 10/15/19  0628 10/14/19  0752 10/14/19  0027 10/10/19  1650    144 142 139   POTASSIUM 4.8 4.0 4.1 4.2   CHLORIDE 118* 115* 113* 107   CO2 17* 16* 17* 18*   ANIONGAP 10 14 12 14   GLC 68* 95 118* 93   BUN 74* 79* 81* 81*   CR 5.58* 6.56* 6.36* 5.84*   GFRESTIMATED 11* 9* 10* 11*   GFRESTBLACK 13* 11* 11* 13*   ASHELY 7.6* 7.6* 7.9* 8.2*   MAG 1.3*  --   --   --    PROTTOTAL  --  6.2* 6.4*  --    ALBUMIN  --  3.1* 3.4  --    BILITOTAL  --  0.3 0.3  --    ALKPHOS  --  63 55  --    AST  --  22 28  --    ALT  --  34 37  --      CBC  Recent Labs   Lab 10/15/19  0628 10/14/19  0752 10/14/19  0027 10/10/19  1650   HGB 6.8* 8.6* 8.1* 7.5*   WBC 4.7 6.0 7.7 5.9   RBC 2.48* 3.24* 3.01* 2.74*   HCT 22.2* 28.7* 26.8* 23.8*   MCV 90 89 89 87   MCH 27.4 26.5 26.9 27.4   MCHC 30.6* 30.0* 30.2* 31.5   RDW 18.0* 17.9* 17.8* 17.7*   * 197 174 165     INRNo lab results found in last 7 days.  ABGNo lab results found in last 7 days.   Urine Studies  Recent Labs   Lab Test 12/11/18  1211 10/25/18  1210 02/04/18  1423 09/25/17  0729  01/14/15  2154 10/31/14  1258   COLOR Straw Yellow Light Yellow Straw   < > Yellow Yellow   APPEARANCE Clear Cloudy Clear Clear   < > Clear Clear   URINEGLC 50* Negative Negative Negative   < > Negative Negative   URINEBILI Negative Negative Negative Negative   < > Negative Negative   URINEKETONE Negative Negative Negative Negative   < > Negative Negative   SG 1.012 1.020 1.007 1.010   < > 1.020 1.020   UBLD Small* Large* Negative Negative   < > Trace* Trace*   URINEPH 6.0 6.5 6.5 6.0   < > 5.5 5.5   PROTEIN  100* >=300* 30* Negative   < > Trace* Trace*   UROBILINOGEN  --  0.2  --   --   --  0.2 0.2   NITRITE Negative Negative Negative Negative   < > Negative Negative   LEUKEST Negative Moderate* Negative Negative   < > Negative Negative   RBCU 1 25-50* 0 <1   < > O - 2 O - 2   WBCU 1 >100* 0 <1   < > O - 2 O - 2    < > = values in this interval not displayed.     Recent Labs   Lab Test 07/24/19  1713 03/19/19  1527 11/01/18  1453 03/17/18  1035 08/04/17  1839 02/28/17  0809 09/17/16  1050 09/12/16  1617 06/12/15  0803 05/27/14  1509 05/21/12  0805 05/12/12  1044 12/05/11  1150   UTPG 0.26* 2.92* 0.85* 0.82* 0.17 0.29* 0.35* 0.44* 2.09* 0.05 0.05 0.05 0.07     PTH  Recent Labs   Lab Test 06/13/19  1941 01/06/18  0947 02/13/16  0930   PTHI 456* 621* 503*     Iron Studies  Recent Labs   Lab Test 10/10/19  1650 07/24/19  1702 06/13/19  1941 01/06/18  0947   IRON 42 152 46 62   * 202* 215* 219*   IRONSAT 23 75* 21 28   ORALIA 790* 406* 436* 256       IMAGING:  All imaging studies reviewed by me.

## 2019-10-15 NOTE — PLAN OF CARE
[id number]. VSS on RA. A&Ox3, disoriented to time. No complaints of HA, denies pain. CIWA 4 and 2 for disorientation to time, tremor, and sweating. Pt resting throughout night. Pt A&Ox4 this morning. PIV running  mL/hr. Full liquid diet. Will continue to monitor and follow POC.

## 2019-10-15 NOTE — PLAN OF CARE
Pt appeared to improve significantly today. Alert and oriented X4, steady on ft when ambulated to the BR. CIWA scored 2 this am, no medication needed for alcohol withdrawal. Mag 1.3 and hgb 6.8 this am. Medicine team notified of lab results. PIV infiltrated while Mag is transfusing. Order put in for Vas team to place another PIV and page was sent to medicine team to request a Midline, awaiting response.

## 2019-10-15 NOTE — PLAN OF CARE
VSS on RA. A/O x4. Pt has been increasingly more alert this shift. RN able to finish admission and perform full assessment. Pt c/o headache, PRN Tylenol given. Pt tolerating full liquid diet. Voiding WNL. No BM on shift. CIWA score of 3. PIV running LR at 125 mL/hr. Will continue to monitor and follow POC.

## 2019-10-15 NOTE — PLAN OF CARE
Pt has orders to discharge and is eager to. Reviewed discharge orders including medications and follow up appointments. Pt has no questions.

## 2019-10-15 NOTE — PROGRESS NOTES
[facility], [place]   Transplant Nephrology Progress Note  Date of Admission:  10/14/2019  Today's Date: 10/15/2019    Assessment & Plan   # Alcohol intoxication: Patient appears to have recovered well.  We discussed the need to see the psychiatry service prior to discharge.     # LDKT:  No immediate indication for dialysis creatinine is slightly better today and patient no longer showing asterixis.  He will need to monitor his renal panels on weekly basis for the next couple of weeks and if continues to be stable can be spaced out to every 2 weeks.  Patient was educated on the signs and symptoms of needing dialysis and was instructed to seek immediate attention if he recognize this any of the signs.              - Baseline Cr ~ 4-5's              - Proteinuria: Moderate (1-3 grams); 2.92 on 3/19/2019              - Date DSA Last Checked: Sep/2017       Latest DSA: No              - BK Viremia: Not checked recently              - Kidney Tx Biopsy: Yes; moderate chronic tubulointerstitial changes, no diagnostic evidence of acute rejection seen.     # Immunosuppression: Tacrolimus immediate release (goal 4-6), Mycophenolate mofetil (goal 1-3.5) and Prednisone (dose 5 mg daily)              - Changes: No     # Prophylaxis:              - PJP: Sulfa/TMP (Bactrim)     # Hypertension:  Relatively controlled on his home regimen no changes.        # Electrolytes:   - Potassium; level:  Stable  - Bicarbonate; level:  Slightly low discussed adding bicarb to his IV fluids or oral supplement.     # Lymphedema: Chronic since transplant.  Will refer patient to Lymphedema Clinic.     # Medical Compliance: Yes     # Transplant History:  Etiology of kidney failure: Hypertension  Tx: LDKT  Transplant: 1/13/2011 (Kidney)  Donor Type: Living      Donor Class:   Significant changes in immunosuppression: None  Significant transplant-related complications: None                              Abran Cunha,  MD  Pager: [phone]    Interval History     Overnight stable no immediate acute events.  No chest pains or shortness of breath.  Appears to have sobered up well.  Has not been seen by the psychiatrist yet.    Review of Systems   4 point ROS was obtained and negative except as noted in the Interval History.    MEDICATIONS:  Current Facility-Administered Medications   Medication     acetaminophen (TYLENOL) tablet 325 mg     febuxostat (ULORIC) tablet 40 mg     ferrous sulfate (FEROSUL) tablet 325 mg     folic acid (FOLVITE) tablet 1 mg     lactated ringers infusion     lidocaine (LMX4) cream     lidocaine 1 % 0.1-1 mL     LORazepam (ATIVAN) tablet 1-2 mg     magnesium sulfate 4 g in 100 mL sterile water (premade)     melatonin tablet 1 mg     mycophenolate (GENERIC EQUIVALENT) capsule 500 mg     naloxone (NARCAN) injection 0.1-0.4 mg     omeprazole (priLOSEC) CR capsule 20 mg     predniSONE (DELTASONE) tablet 10 mg     sodium bicarbonate tablet 1,300 mg     sodium chloride (PF) 0.9% PF flush 3 mL     sodium chloride (PF) 0.9% PF flush 3 mL     sodium chloride 0.9% infusion     sulfamethoxazole-trimethoprim (BACTRIM/SEPTRA) 400-80 MG per tablet 1 tablet     tacrolimus (GENERIC EQUIVALENT) capsule 2 mg     vitamin B1 (THIAMINE) tablet 100 mg     vitamin D3 (CHOLECALCIFEROL) 1000 units (25 mcg) tablet 2,000 Units       Physical Exam   Temp  Av.8  F (36.6  C)  Min: 95.5  F (35.3  C)  Max: 99.1  F (37.3  C)      Pulse  Av.6  Min: 57  Max: 89 Resp  Av.3  Min: 16  Max: 18  SpO2  Av.4 %  Min: 94 %  Max: 100 %     BP (!) 139/91   Pulse 89   Temp 98.5  F (36.9  C) (Oral)   Resp 16   Wt 70.9 kg (156 lb 4.8 oz)   SpO2 100%   BMI 23.77 kg/m     Date 10/15/19 0700 - 10/16/19 0659   Shift 5949-7629 7032-6356 4890-1354 24 Hour Total   INTAKE   P.O. 240   240   Shift Total(mL/kg) 240(3.39)   240(3.39)   OUTPUT   Urine 600 500  1100   Shift Total(mL/kg) 600(8.46) 500(7.05)  1100(15.52)   Weight (kg) 70.9 70.9  70.9 70.9      Admit Weight: 70.9 kg (156 lb 4.8 oz)     GENERAL APPEARANCE: alert and no distress  HENT: mouth without ulcers or lesions  LYMPHATICS: no cervical or supraclavicular nodes  RESP: lungs clear to auscultation - no rales, rhonchi or wheezes  CV: regular rhythm, normal rate, no rub, no murmur  EDEMA: no LE edema bilaterally  ABDOMEN: soft, nondistended, nontender, bowel sounds normal  MS: extremities normal - no gross deformities noted, no evidence of inflammation in joints, no muscle tenderness  SKIN: no rash    Data   All labs reviewed by me.  CMP  Recent Labs   Lab 10/15/19  0628 10/14/19  0752 10/14/19  0027 10/10/19  1650    144 142 139   POTASSIUM 4.8 4.0 4.1 4.2   CHLORIDE 118* 115* 113* 107   CO2 17* 16* 17* 18*   ANIONGAP 10 14 12 14   GLC 68* 95 118* 93   BUN 74* 79* 81* 81*   CR 5.58* 6.56* 6.36* 5.84*   GFRESTIMATED 11* 9* 10* 11*   GFRESTBLACK 13* 11* 11* 13*   ASHELY 7.6* 7.6* 7.9* 8.2*   MAG 1.3*  --   --   --    PROTTOTAL  --  6.2* 6.4*  --    ALBUMIN  --  3.1* 3.4  --    BILITOTAL  --  0.3 0.3  --    ALKPHOS  --  63 55  --    AST  --  22 28  --    ALT  --  34 37  --      CBC  Recent Labs   Lab 10/15/19  1734 10/15/19  0628 10/14/19  0752 10/14/19  0027 10/10/19  1650   HGB 9.0* 6.8* 8.6* 8.1* 7.5*   WBC  --  4.7 6.0 7.7 5.9   RBC  --  2.48* 3.24* 3.01* 2.74*   HCT  --  22.2* 28.7* 26.8* 23.8*   MCV  --  90 89 89 87   MCH  --  27.4 26.5 26.9 27.4   MCHC  --  30.6* 30.0* 30.2* 31.5   RDW  --  18.0* 17.9* 17.8* 17.7*   PLT  --  131* 197 174 165     INRNo lab results found in last 7 days.  ABGNo lab results found in last 7 days.   Urine Studies  Recent Labs   Lab Test 12/11/18  1211 10/25/18  1210 02/04/18  1423 09/25/17  0729  01/14/15  2154 10/31/14  1258   COLOR Straw Yellow Light Yellow Straw   < > Yellow Yellow   APPEARANCE Clear Cloudy Clear Clear   < > Clear Clear   URINEGLC 50* Negative Negative Negative   < > Negative Negative   URINEBILI Negative Negative Negative Negative   < >  Negative Negative   URINEKETONE Negative Negative Negative Negative   < > Negative Negative   SG 1.012 1.020 1.007 1.010   < > 1.020 1.020   UBLD Small* Large* Negative Negative   < > Trace* Trace*   URINEPH 6.0 6.5 6.5 6.0   < > 5.5 5.5   PROTEIN 100* >=300* 30* Negative   < > Trace* Trace*   UROBILINOGEN  --  0.2  --   --   --  0.2 0.2   NITRITE Negative Negative Negative Negative   < > Negative Negative   LEUKEST Negative Moderate* Negative Negative   < > Negative Negative   RBCU 1 25-50* 0 <1   < > O - 2 O - 2   WBCU 1 >100* 0 <1   < > O - 2 O - 2    < > = values in this interval not displayed.     Recent Labs   Lab Test 07/24/19  1713 03/19/19  1527 11/01/18  1453 03/17/18  1035 08/04/17  1839 02/28/17  0809 09/17/16  1050 09/12/16  1617 06/12/15  0803 05/27/14  1509 05/21/12  0805 05/12/12  1044 12/05/11  1150   UTPG 0.26* 2.92* 0.85* 0.82* 0.17 0.29* 0.35* 0.44* 2.09* 0.05 0.05 0.05 0.07     PTH  Recent Labs   Lab Test 06/13/19  1941 01/06/18  0947 02/13/16  0930   PTHI 456* 621* 503*     Iron Studies  Recent Labs   Lab Test 10/10/19  1650 07/24/19  1702 06/13/19  1941 01/06/18  0947   IRON 42 152 46 62   * 202* 215* 219*   IRONSAT 23 75* 21 28   ORALIA 790* 406* 436* 256       IMAGING:  All imaging studies reviewed by me.

## 2019-10-16 ENCOUNTER — MYC MEDICAL ADVICE (OUTPATIENT)
Dept: NEPHROLOGY | Facility: CLINIC | Age: 44
End: 2019-10-16

## 2019-10-16 ENCOUNTER — TELEPHONE (OUTPATIENT)
Dept: TRANSPLANT | Facility: CLINIC | Age: 44
End: 2019-10-16

## 2019-10-16 DIAGNOSIS — R19.7 DIARRHEA: ICD-10-CM

## 2019-10-16 DIAGNOSIS — A08.11 NOROVIRUS: ICD-10-CM

## 2019-10-16 DIAGNOSIS — Z94.0 KIDNEY TRANSPLANTED: Primary | ICD-10-CM

## 2019-10-16 DIAGNOSIS — D84.9 IMMUNOSUPPRESSED STATUS (H): ICD-10-CM

## 2019-10-16 LAB — EPO SERPL-ACNC: 11 MU/ML (ref 4–27)

## 2019-10-16 NOTE — TELEPHONE ENCOUNTER
Plan post discharge per Dr. Cunha:    # LDKT:  No immediate indication for dialysis creatinine is slightly better today and patient no longer showing asterixis.  He will need to monitor his renal panels on weekly basis for the next couple of weeks and if continues to be stable can be spaced out to every 2 weeks.  Patient was educated on the signs and symptoms of needing dialysis and was instructed to seek immediate attention if he recognize this any of the signs.              - Baseline Cr ~ 4-5's              - Proteinuria: Moderate (1-3 grams); 2.92 on 3/19/2019              - Date DSA Last Checked: Sep/2017       Latest DSA: No              - BK Viremia: Not checked recently              - Kidney Tx Biopsy: Yes; moderate chronic tubulointerstitial changes, no diagnostic evidence of acute rejection seen.    LPN task:    Please call Rashad Ortiz, he should complete transplant labs weekly x 2. If stable, can complete every other week.  Please send lab order.

## 2019-10-22 ENCOUNTER — TELEPHONE (OUTPATIENT)
Dept: PHARMACY | Facility: CLINIC | Age: 44
End: 2019-10-22

## 2019-10-22 DIAGNOSIS — D63.1 ANEMIA OF CHRONIC RENAL FAILURE, STAGE 5 (H): ICD-10-CM

## 2019-10-22 DIAGNOSIS — Z94.0 KIDNEY REPLACED BY TRANSPLANT: ICD-10-CM

## 2019-10-22 DIAGNOSIS — N18.5 CHRONIC KIDNEY DISEASE, STAGE V (H): ICD-10-CM

## 2019-10-22 DIAGNOSIS — N18.5 ANEMIA OF CHRONIC RENAL FAILURE, STAGE 5 (H): ICD-10-CM

## 2019-10-22 LAB
ANION GAP SERPL CALCULATED.3IONS-SCNC: 6 MMOL/L (ref 3–14)
BUN SERPL-MCNC: 52 MG/DL (ref 7–30)
CALCIUM SERPL-MCNC: 8.8 MG/DL (ref 8.5–10.1)
CHLORIDE SERPL-SCNC: 114 MMOL/L (ref 94–109)
CO2 SERPL-SCNC: 20 MMOL/L (ref 20–32)
CREAT SERPL-MCNC: 6.27 MG/DL (ref 0.66–1.25)
ERYTHROCYTE [DISTWIDTH] IN BLOOD BY AUTOMATED COUNT: 18 % (ref 10–15)
GFR SERPL CREATININE-BSD FRML MDRD: 10 ML/MIN/{1.73_M2}
GLUCOSE SERPL-MCNC: 105 MG/DL (ref 70–99)
HCT VFR BLD AUTO: 25.6 % (ref 40–53)
HGB BLD-MCNC: 8 G/DL (ref 13.3–17.7)
MCH RBC QN AUTO: 28 PG (ref 26.5–33)
MCHC RBC AUTO-ENTMCNC: 31.3 G/DL (ref 31.5–36.5)
MCV RBC AUTO: 90 FL (ref 78–100)
PLATELET # BLD AUTO: 164 10E9/L (ref 150–450)
POTASSIUM SERPL-SCNC: 5.1 MMOL/L (ref 3.4–5.3)
RBC # BLD AUTO: 2.86 10E12/L (ref 4.4–5.9)
SODIUM SERPL-SCNC: 140 MMOL/L (ref 133–144)
WBC # BLD AUTO: 5.8 10E9/L (ref 4–11)

## 2019-10-22 PROCEDURE — 80048 BASIC METABOLIC PNL TOTAL CA: CPT | Performed by: INTERNAL MEDICINE

## 2019-10-22 PROCEDURE — 36415 COLL VENOUS BLD VENIPUNCTURE: CPT | Performed by: STUDENT IN AN ORGANIZED HEALTH CARE EDUCATION/TRAINING PROGRAM

## 2019-10-22 PROCEDURE — 85027 COMPLETE CBC AUTOMATED: CPT | Performed by: STUDENT IN AN ORGANIZED HEALTH CARE EDUCATION/TRAINING PROGRAM

## 2019-10-22 NOTE — TELEPHONE ENCOUNTER
Referred by Dr. Gee Barrios on 06/14/2019    Rashad has not followed up with [facility]. He never started LAWRENCE (as ordered).     Anemia Services will be closed.    Anemia Latest Ref Rng & Units 7/24/2019 7/31/2019 10/10/2019 10/14/2019 10/14/2019 10/15/2019 10/15/2019   Hemoglobin 13.3 - 17.7 g/dL 8.6(L) 8.6(L) 7.5(LL) 8.1(L) 8.6(L) 6.8(LL) 9.0(L)   TSAT 15 - 46 % 75(H) - 23 - - - -   Ferritin 26 - 388 ng/mL 406(H) - 790(H) - - - -       Anemia labs discontinued, therapy plans cancelled, removed from active patient list, and referring provider notified.      Cate Ellis RN   [facility] Services  [address] [address]   [email or web address]   Office : [phone]  Fax: [phone]

## 2019-10-23 NOTE — TELEPHONE ENCOUNTER
Rashad Ortiz reports ongoing loose stools x 2 weeks. He was recently hospitalized for acute encephalopathy from EtOH intoxication. He denies being on lactulose. Will order stool studies. Rashad Ortiz v/u to submit stool cultures and also not to use antidiarrhea meds until we rule out stool infection. Orders placed.

## 2019-10-24 DIAGNOSIS — D84.9 IMMUNOSUPPRESSED STATUS (H): ICD-10-CM

## 2019-10-24 DIAGNOSIS — Z94.0 KIDNEY TRANSPLANTED: ICD-10-CM

## 2019-10-24 DIAGNOSIS — R19.7 DIARRHEA: ICD-10-CM

## 2019-10-24 PROCEDURE — 87506 IADNA-DNA/RNA PROBE TQ 6-11: CPT | Performed by: INTERNAL MEDICINE

## 2019-10-25 LAB
C COLI+JEJUNI+LARI FUSA STL QL NAA+PROBE: NOT DETECTED
EC STX1 GENE STL QL NAA+PROBE: NOT DETECTED
EC STX2 GENE STL QL NAA+PROBE: NOT DETECTED
ENTERIC PATHOGEN COMMENT: ABNORMAL
NOROV GI+II ORF1-ORF2 JNC STL QL NAA+PR: ABNORMAL
RVA NSP5 STL QL NAA+PROBE: NOT DETECTED
SALMONELLA SP RPOD STL QL NAA+PROBE: NOT DETECTED
SHIGELLA SP+EIEC IPAH STL QL NAA+PROBE: NOT DETECTED
V CHOL+PARA RFBL+TRKH+TNAA STL QL NAA+PR: NOT DETECTED
Y ENTERO RECN STL QL NAA+PROBE: NOT DETECTED

## 2019-10-25 RX ORDER — NITAZOXANIDE 500 MG/1
500 TABLET ORAL 2 TIMES DAILY WITH MEALS
Qty: 20 TABLET | Refills: 0 | Status: SHIPPED | OUTPATIENT
Start: 2019-10-25 | End: 2019-11-04

## 2019-10-25 NOTE — TELEPHONE ENCOUNTER
Issue:    Rashad Ortiz positive for Norovirus.  No tac level this week.    Plan:    Reviewed with Dr. Cunha:  Start Nitazoxanide 500 mg bid x 10 days.   Needs repeat labs and drug level next week.    Plan was discussed in detail. No tac decrease on old tac levels. Rashad will repeat all tx labs on Monday.  Prescription sent to local pharmacy.

## 2019-10-28 ENCOUNTER — DOCUMENTATION ONLY (OUTPATIENT)
Dept: NEPHROLOGY | Facility: CLINIC | Age: 44
End: 2019-10-28

## 2019-10-28 DIAGNOSIS — Z94.0 KIDNEY TRANSPLANTED: ICD-10-CM

## 2019-10-28 LAB
ANION GAP SERPL CALCULATED.3IONS-SCNC: 8 MMOL/L (ref 3–14)
BUN SERPL-MCNC: 63 MG/DL (ref 7–30)
CALCIUM SERPL-MCNC: 8.3 MG/DL (ref 8.5–10.1)
CHLORIDE SERPL-SCNC: 116 MMOL/L (ref 94–109)
CO2 SERPL-SCNC: 21 MMOL/L (ref 20–32)
CREAT SERPL-MCNC: 5.36 MG/DL (ref 0.66–1.25)
ERYTHROCYTE [DISTWIDTH] IN BLOOD BY AUTOMATED COUNT: 17.8 % (ref 10–15)
GFR SERPL CREATININE-BSD FRML MDRD: 12 ML/MIN/{1.73_M2}
GLUCOSE SERPL-MCNC: 99 MG/DL (ref 70–99)
HCT VFR BLD AUTO: 23.9 % (ref 40–53)
HGB BLD-MCNC: 7.4 G/DL (ref 13.3–17.7)
MCH RBC QN AUTO: 27.8 PG (ref 26.5–33)
MCHC RBC AUTO-ENTMCNC: 31 G/DL (ref 31.5–36.5)
MCV RBC AUTO: 90 FL (ref 78–100)
PLATELET # BLD AUTO: 220 10E9/L (ref 150–450)
POTASSIUM SERPL-SCNC: 5 MMOL/L (ref 3.4–5.3)
RBC # BLD AUTO: 2.66 10E12/L (ref 4.4–5.9)
SODIUM SERPL-SCNC: 145 MMOL/L (ref 133–144)
WBC # BLD AUTO: 5.8 10E9/L (ref 4–11)

## 2019-10-28 PROCEDURE — 80048 BASIC METABOLIC PNL TOTAL CA: CPT | Performed by: INTERNAL MEDICINE

## 2019-10-28 PROCEDURE — 80197 ASSAY OF TACROLIMUS: CPT | Performed by: INTERNAL MEDICINE

## 2019-10-28 PROCEDURE — 36415 COLL VENOUS BLD VENIPUNCTURE: CPT | Performed by: INTERNAL MEDICINE

## 2019-10-28 PROCEDURE — 85027 COMPLETE CBC AUTOMATED: CPT | Performed by: INTERNAL MEDICINE

## 2019-10-28 NOTE — TELEPHONE ENCOUNTER
Prior Authorization Approval - only until the end of October as patient will be changing to a Prepaid Health Plan as of Nov 1,2019.    Authorization Effective Date: 10/1/2019  Authorization Expiration Date: 10/31/2019  Medication: febuxostat (ULORIC) 40 MG TABS - P/A APPROVED  Approved Dose/Quantity: 30  Reference #: PA# [id number]   Insurance Company: [place] Medicaid ([facility]) - Phone [phone] Fax [phone]  Expected CoPay: $1.00  CoPay Card Available:      Foundation Assistance Needed:    Which Pharmacy is filling the prescription (Not needed for infusion/clinic administered): [id number] - [place], [place] - [address] AT [address] Notified: Yes - spoke to pharmacist  Patient Notified:

## 2019-10-29 ENCOUNTER — CARE COORDINATION (OUTPATIENT)
Dept: NEPHROLOGY | Facility: CLINIC | Age: 44
End: 2019-10-29

## 2019-10-29 LAB
TACROLIMUS BLD-MCNC: 4.6 UG/L (ref 5–15)
TME LAST DOSE: ABNORMAL H

## 2019-10-29 NOTE — PROGRESS NOTES
Was called from lab for critical values  Hgb 7.4 and Cr 5.36  Patient s/p kidney transplant 1/13/2011 with CKD 5  Patient recently admitted for EtOH intoxication at which time Hgb 6.8 transfused to 9.  Called the patient tonight.  He feels well.  Denies SOB, CP, Cough.  Has diarrhea, but no bloody or tarry stools.  No Abd pain.  A/P Chronic anemia due to CKD 5.  Denies receiving EPO.  Patient instructed to report to the ED tonight if he experiences any CP, SOB, ABd pain or bloody stools.  Will inform transplant team and have close follow up to initiate [facility] therapy.      Creatinine   Date Value Ref Range Status   10/28/2019 5.36 (H) 0.66 - 1.25 mg/dL Final     Comment:     Critical Value called to and read back by  DR PATEL AT 2036, 10/28/19 1170     10/22/2019 6.27 (H) 0.66 - 1.25 mg/dL Final   10/15/2019 5.58 (H) 0.66 - 1.25 mg/dL Final   10/14/2019 6.56 (H) 0.66 - 1.25 mg/dL Final   10/14/2019 6.36 (H) 0.66 - 1.25 mg/dL Final   10/10/2019 5.84 (H) 0.66 - 1.25 mg/dL Final     Comment:     Critical Value called to and read back by  HAMILTON SAXENA AT 1922, 10/10/19 1170  Results confirmed by repeat test  Critical Value called to and read back by  AMANDA MCLAIN TRANSPLANT COORD. AT 2035 10.10.19 DS     07/31/2019 5.91 (H) 0.66 - 1.25 mg/dL Final   07/24/2019 6.00 (H) 0.66 - 1.25 mg/dL Final   06/13/2019 5.28 (H) 0.66 - 1.25 mg/dL Final     Comment:     Critical Value called to and read back by  Mary Ellen at [facility] on 6/14/19 at 1238 by KB  Critical Value called to and read back by  RICKEY URIBE LPN AT [facility] ON 06.14.19 AT 1242 BY KV     04/08/2019 4.98 (H) 0.66 - 1.25 mg/dL Final     Hemoglobin   Date Value Ref Range Status   10/22/2019 8.0 (L) 13.3 - 17.7 g/dL Final   10/15/2019 9.0 (L) 13.3 - 17.7 g/dL Final   10/15/2019 6.8 (LL) 13.3 - 17.7 g/dL Final     Comment:     This result has been called to MELINDA FRYE RN 5A by Cinthya Yarbrough on   10 15 2019 at 0652, and has been read back.      10/14/2019 8.6 (L)  13.3 - 17.7 g/dL Final   10/14/2019 8.1 (L) 13.3 - 17.7 g/dL Final   10/10/2019 7.5 (LL) 13.3 - 17.7 g/dL Final     Comment:     Critical Value called to and read back by  AMANDA MCLAIN TRANSPLANT COORDINATOR AT 2035 ON 10.10.19 BY APRIL     07/31/2019 8.6 (L) 13.3 - 17.7 g/dL Final   07/24/2019 8.6 (L) 13.3 - 17.7 g/dL Final   06/13/2019 8.8 (L) 13.3 - 17.7 g/dL Final   03/19/2019 10.6 (L) 13.3 - 17.7 g/dL Final     Fernando Novak MD  Division of Renal Disease and Hypertension  Pager: [phone]  October 28, 2019  8:41 PM

## 2019-10-29 NOTE — PROGRESS NOTES
Reviewed patient's hemoglobin results with Dr. Cunha this morning, as well as Dr. Novak's note. Dr. Cunha advised no transfusion is needed at this time. Left detailed voicemail updating patient. Advised to call if any questions or concerns.    Flor Barrett RN

## 2019-11-03 ENCOUNTER — HEALTH MAINTENANCE LETTER (OUTPATIENT)
Age: 44
End: 2019-11-03

## 2019-11-11 ENCOUNTER — OFFICE VISIT (OUTPATIENT)
Dept: FAMILY MEDICINE | Facility: CLINIC | Age: 44
End: 2019-11-11
Payer: COMMERCIAL

## 2019-11-11 VITALS
WEIGHT: 161.8 LBS | SYSTOLIC BLOOD PRESSURE: 131 MMHG | DIASTOLIC BLOOD PRESSURE: 81 MMHG | HEIGHT: 68 IN | BODY MASS INDEX: 24.52 KG/M2 | HEART RATE: 83 BPM | OXYGEN SATURATION: 100 % | RESPIRATION RATE: 18 BRPM | TEMPERATURE: 99.6 F

## 2019-11-11 DIAGNOSIS — R50.9 FEVER, UNSPECIFIED FEVER CAUSE: Primary | ICD-10-CM

## 2019-11-11 DIAGNOSIS — Z94.0 KIDNEY REPLACED BY TRANSPLANT: ICD-10-CM

## 2019-11-11 LAB
FLUAV+FLUBV AG SPEC QL: NEGATIVE
FLUAV+FLUBV AG SPEC QL: NEGATIVE
SPECIMEN SOURCE: NORMAL

## 2019-11-11 PROCEDURE — 87804 INFLUENZA ASSAY W/OPTIC: CPT | Performed by: PHYSICIAN ASSISTANT

## 2019-11-11 PROCEDURE — 99213 OFFICE O/P EST LOW 20 MIN: CPT | Performed by: PHYSICIAN ASSISTANT

## 2019-11-11 ASSESSMENT — PAIN SCALES - GENERAL: PAINLEVEL: NO PAIN (0)

## 2019-11-11 ASSESSMENT — MIFFLIN-ST. JEOR: SCORE: 1603.42

## 2019-11-11 NOTE — PROGRESS NOTES
Subjective     Rashad Ortiz is a 44 year old male who presents to clinic today for the following health issues:    HPI   Concern - Fever   Onset: This morning     Description:   Patient is here today with body aches and fever. Patient states his temperate was at 101 this morning. No cough, rhinitis, or ST.      Intensity: moderate    Progression of Symptoms:  same    Accompanying Signs & Symptoms:  Patient denies any cold symptoms or sore throat.     Therapies Tried and outcome: None      On immunosuppressant s/p kidney transplant      No abd pain, diraahea ( had resolvied stomach bug a few weeks back- cx show dnorovirus) or dysuria.      No Known Allergies    Patient Active Problem List   Diagnosis     Kidney replaced by transplant     Aftercare following organ transplant     GERD (gastroesophageal reflux disease)     CARDIOVASCULAR SCREENING; LDL GOAL LESS THAN 160     Gout     Creatinine elevation     Antibody mediated rejection of kidney transplant     Medical non-compliance     Chronic kidney disease, stage 4, severely decreased GFR (H)     Herpes simplex infection of penis     Escherichia coli septicemia (H)     Pyelonephritis of transplanted kidney     HTN, kidney transplant related     Metabolic acidosis     CKD (chronic kidney disease) stage 5, GFR less than 15 ml/min (H)     Immunosuppression (H)     Anemia of chronic renal failure, stage 5 (H)     Alcohol intoxication (H)       Past Medical History:   Diagnosis Date     Chronic kidney disease, stage 4, severely decreased GFR (H)      Gastro-oesophageal reflux disease      Gout      History of blood transfusion      Hypertension      Medical non-compliance      Pulmonary nodules      Steroid long-term use        amLODIPine (NORVASC) 5 MG tablet, Take 1 tablet (5 mg) by mouth daily  carvedilol (COREG) 25 MG tablet, Take 1 tablet (25 mg) by mouth 2 times daily  febuxostat (ULORIC) 40 MG TABS tablet, Take 1 tablet (40 mg) by mouth daily  ferrous sulfate  (FEROSUL) 325 (65 Fe) MG tablet, Take 1 tablet (325 mg) by mouth daily (with breakfast)  furosemide (LASIX) 20 MG tablet, Take 1 tablet (20 mg) by mouth 2 times daily  HYDROcodone-acetaminophen (NORCO) 5-325 MG tablet, Take 1 tablet by mouth every 6 hours as needed for severe pain  mycophenolate (GENERIC EQUIVALENT) 250 MG capsule, Take 2 capsules (500 mg) by mouth 2 times daily  omeprazole (PRILOSEC) 20 MG capsule, Take 1 capsule (20 mg) by mouth daily  oxyCODONE (ROXICODONE) 5 MG tablet, Take 1 tablet (5 mg) by mouth every 6 hours as needed for moderate to severe pain  predniSONE (DELTASONE) 5 MG tablet, Take 2 tablets (10 mg) by mouth daily  sodium bicarbonate 650 MG tablet, Take 2 tablets (1,300 mg) by mouth 3 times daily  sulfamethoxazole-trimethoprim (BACTRIM/SEPTRA) 400-80 MG tablet, Take 1 tablet by mouth every other day  tacrolimus (GENERIC EQUIVALENT) 1 MG capsule, Take 2 capsules (2 mg) by mouth 2 times daily  vitamin D3 (CHOLECALCIFEROL) 1000 units (25 mcg) tablet, Take 2 tablets (2,000 Units) by mouth daily  [] nitazoxanide (ALINIA) 500 MG tablet, Take 1 tablet (500 mg) by mouth 2 times daily (with meals) for 10 days    No current facility-administered medications on file prior to visit.       Social History     Tobacco Use     Smoking status: Former Smoker     Types: Cigarettes     Last attempt to quit: 2017     Years since quittin.6     Smokeless tobacco: Never Used     Tobacco comment: Patient states that he is an 'social'  smoker    Substance Use Topics     Alcohol use: Yes     Alcohol/week: 4.2 standard drinks     Types: 5 Standard drinks or equivalent per week     Comment: 1-2 drinks/wk       Family History   Problem Relation Age of Onset     Hypertension Mother        ROS:  Consitutional: As above  ENT: As above  Respiratory: As above    OBJECTIVE:  /81 (BP Location: Left arm, Patient Position: Sitting, Cuff Size: Adult Large)   Pulse 83   Temp 99.6  F (37.6  C) (Oral)    "Resp 18   Ht 1.727 m (5' 8\")   Wt 73.4 kg (161 lb 12.8 oz)   SpO2 100%   BMI 24.60 kg/m    GENERAL APPEARANCE: healthy, alert and no distress  EYES: conjunctiva clear  HENT:   TMs w/o erythema, effusion or bulging.  Nose and mouth without ulcers, erythema or lesions.  NO tonsillar enlargement erythema or exudates.   NECK: supple, nontender, no lymphadenopathy  RESP: lungs clear to auscultation - no rales, rhonchi or wheezes  CV: regular rates and rhythm, normal S1 S2, no murmur noted  NEURO: awake, alert          ASSESSMENT: Well appearing.  At this point is afebrile and essentially asymptomatic      ICD-10-CM    1. Fever, unspecified fever cause R50.9 Influenza A/B antigen   2. Kidney replaced by transplant Z94.0          PLAN:  Lots of rest and fluids.  RTC if any worsening symptoms or if not improving.    Greg Fink PA-C           "

## 2019-11-11 NOTE — PATIENT INSTRUCTIONS
Patient Education     Febrile Illness with Uncertain Cause (Adult)  You have a fever, but the cause is unknown. A fever is a natural reaction of the body to an illness such as infection due to a virus or bacteria. Sometimes other conditions such as cancer or immune diseases can cause fever, especially if the fever has lasted for more than a week or 2. In most cases, the temperature itself is not harmful. It actually helps the body fight infections. A fever does not need to be treated unless you feel very uncomfortable.  Sometimes a fever can be an early sign of a more serious infection, so make sure to follow up if your condition worsens.  Home care  Unless given other instructions by your healthcare provider, follow these guidelines when caring for yourself at home.  General care    If your symptoms are not severe, rest at home for the first 2 to 3 days. When you resume activity, don't let yourself get too tired.    For your overall health, don't smoke. Also avoid being exposed to secondhand smoke.    Your appetite may be poor, so a light diet is fine. Avoid dehydration by drinking 6 to 8 glasses of fluids per day (such as water, soft drinks, sports drinks, juices, tea, or soup). If you have congestion, extra fluids will help loosen secretions in the nose and lungs.  Medicines    You can take acetaminophen or ibuprofen for pain or to lower your temperature, unless you were given a different medicine to use. (Note: If you have chronic liver or kidney disease or have ever had a stomach ulcer or gastrointestinal bleeding, talk with your healthcare provider before using these medicines. Also talk to your provider if you are taking medicine to prevent blood clots.) Aspirin should never be given to anyone younger than 18 years of age who is ill with a viral infection or fever. It may cause severe liver or brain damage.    If you were given antibiotics for an infection, take them until they are used up, or your  healthcare provider tells you to stop. It is important to finish the antibiotics even though you feel better. This is to make sure the infection has cleared. Be aware that antibiotics are not usually given for a viral infection or a fever with an unknown cause.    Over-the-counter medicines will not shorten the duration of the illness. However, they may be helpful for the following symptoms: cough, sore throat, or nasal and sinus congestion. Ask your pharmacist for product suggestions. (Note: Don't use decongestants if you have high blood pressure.)  Follow-up care  Follow up with your healthcare provider, or as advised.    If a culture or other lab tests were done, you will be notified if your treatment needs to be changed. You can call as directed for the results.    If X-rays, a CT, or an ultrasound were done, a specialist will review them. You will be notified of any findings that may affect your care.  Call 911  Call 911 if any of these occur:    Trouble breathing or swallowing, or wheezing    Chest pain    Confusion    Extreme drowsiness or trouble awakening    Fainting or loss of consciousness    Rapid heart rate    Low blood pressure    Vomiting blood, or large amounts of blood in stool    Seizure  When to seek medical advice  Call your healthcare provider right away if any of these occur:    Cough with lots of colored sputum (mucus) or blood in your sputum    Severe headache    Face, neck, throat, or ear pain    Feeling drowsy    Abdominal pain    Repeated vomiting or diarrhea    Joint pain or a new rash    Burning when urinating    Fever of 100.4 F (38 C) or higher, or as directed by your healthcare provider    Feeling weak or dizzy  Date Last Reviewed: 11/1/2017 2000-2018 [employer]. [address] [place] [address], [place] [address]. All rights reserved. This information is not intended as a substitute for professional medical care. Always follow your healthcare professional's  instructions.

## 2019-11-15 ENCOUNTER — CARE COORDINATION (OUTPATIENT)
Dept: TRANSPLANT | Facility: CLINIC | Age: 44
End: 2019-11-15

## 2019-11-16 ENCOUNTER — TELEPHONE (OUTPATIENT)
Dept: FAMILY MEDICINE | Facility: CLINIC | Age: 44
End: 2019-11-16

## 2019-11-16 ENCOUNTER — NURSE TRIAGE (OUTPATIENT)
Dept: NURSING | Facility: CLINIC | Age: 44
End: 2019-11-16

## 2019-11-16 DIAGNOSIS — Z94.0 KIDNEY TRANSPLANTED: ICD-10-CM

## 2019-11-16 DIAGNOSIS — R19.7 DIARRHEA: ICD-10-CM

## 2019-11-16 DIAGNOSIS — D84.9 IMMUNOSUPPRESSED STATUS (H): ICD-10-CM

## 2019-11-16 LAB
ERYTHROCYTE [DISTWIDTH] IN BLOOD BY AUTOMATED COUNT: 16.1 % (ref 10–15)
HCT VFR BLD AUTO: 25.2 % (ref 40–53)
HGB BLD-MCNC: 7.8 G/DL (ref 13.3–17.7)
MCH RBC QN AUTO: 27.1 PG (ref 26.5–33)
MCHC RBC AUTO-ENTMCNC: 31 G/DL (ref 31.5–36.5)
MCV RBC AUTO: 88 FL (ref 78–100)
PLATELET # BLD AUTO: 204 10E9/L (ref 150–450)
RBC # BLD AUTO: 2.88 10E12/L (ref 4.4–5.9)
WBC # BLD AUTO: 6.6 10E9/L (ref 4–11)

## 2019-11-16 PROCEDURE — 80197 ASSAY OF TACROLIMUS: CPT | Performed by: INTERNAL MEDICINE

## 2019-11-16 PROCEDURE — 85027 COMPLETE CBC AUTOMATED: CPT | Performed by: INTERNAL MEDICINE

## 2019-11-16 PROCEDURE — 36415 COLL VENOUS BLD VENIPUNCTURE: CPT | Performed by: INTERNAL MEDICINE

## 2019-11-16 PROCEDURE — 80048 BASIC METABOLIC PNL TOTAL CA: CPT | Performed by: INTERNAL MEDICINE

## 2019-11-16 NOTE — TELEPHONE ENCOUNTER
Received phone call from lab regarding patient hemoglobin which is 7.8.  Patient hemoglobin October, 28 was 7.4.    I did call the patient on his cell number he did not answer I left a message patient was advised to call the clinic back if he has any consent, or go to the ER if he has any symptoms of being dizzy, lightheaded, chest pain or shortness of breath.  Or any signs of bleeding.  Otherwise, he may follow-up with his primary care physician on Monday

## 2019-11-17 LAB
TACROLIMUS BLD-MCNC: 4.9 UG/L (ref 5–15)
TME LAST DOSE: ABNORMAL H

## 2019-11-18 LAB
ANION GAP SERPL CALCULATED.3IONS-SCNC: 10 MMOL/L (ref 3–14)
BUN SERPL-MCNC: 44 MG/DL (ref 7–30)
CALCIUM SERPL-MCNC: 8.4 MG/DL (ref 8.5–10.1)
CHLORIDE SERPL-SCNC: 115 MMOL/L (ref 94–109)
CO2 SERPL-SCNC: 22 MMOL/L (ref 20–32)
CREAT SERPL-MCNC: 4.55 MG/DL (ref 0.66–1.25)
GFR SERPL CREATININE-BSD FRML MDRD: 15 ML/MIN/{1.73_M2}
GLUCOSE SERPL-MCNC: 84 MG/DL (ref 70–99)
POTASSIUM SERPL-SCNC: 4.7 MMOL/L (ref 3.4–5.3)
SODIUM SERPL-SCNC: 147 MMOL/L (ref 133–144)

## 2019-11-20 ENCOUNTER — TELEPHONE (OUTPATIENT)
Dept: TRANSPLANT | Facility: CLINIC | Age: 44
End: 2019-11-20

## 2019-11-20 NOTE — TELEPHONE ENCOUNTER
Admin was told the pt return the VM but was disconnect- attempted to reach the pt again.    Voicemail let for the patient to call the Solid Organ Transplant Office to schedule his annual return appointment.

## 2019-11-20 NOTE — TELEPHONE ENCOUNTER
Voicemail let for the patient to call the Solid Organ Transplant Office to schedule his annual return appointment.

## 2019-12-10 ENCOUNTER — TELEPHONE (OUTPATIENT)
Dept: TRANSPLANT | Facility: CLINIC | Age: 44
End: 2019-12-10

## 2019-12-10 ENCOUNTER — TELEPHONE (OUTPATIENT)
Dept: NEPHROLOGY | Facility: CLINIC | Age: 44
End: 2019-12-10

## 2019-12-10 NOTE — TELEPHONE ENCOUNTER
Central Prior Authorization Team   [phone]    PA Initiation    Medication:   Insurance Company: [employer] - Phone [phone] Fax [phone]  Pharmacy Filling the Rx: [facility] #[id number] - [place] - [address] AT [facility] [address]  Filling Pharmacy Phone: [phone]  Filling Pharmacy Fax: [phone]  Start Date: 12/10/2019

## 2019-12-10 NOTE — TELEPHONE ENCOUNTER
PA Initiation    Medication: Sodium Bicarbonate  Insurance Company: [employer] - Phone [phone] Fax [phone]  Pharmacy Filling the Rx: [facility] - [place], [place] - [facility] [address]  Filling Pharmacy Phone: [phone]  Filling Pharmacy Fax: [phone]  Start Date: 12/10/2019

## 2019-12-10 NOTE — TELEPHONE ENCOUNTER
Prior Authorization Specialty Medication Request    Medication/Dose:   febuxostat (ULORIC) 40 MG TABS tablet Take 1 tablet (40 mg) by mouth daily     ICD code (if different than what is on RX):  Z94.0  Previously Tried and Failed:      Important Lab Values:   Rationale:     Insurance Name:   Insurance ID: [id number]  Insurance Phone Number: [phone]    Pharmacy Information (if different than what is on RX)  Name:  [facility]  Phone:  [phone]

## 2019-12-11 NOTE — TELEPHONE ENCOUNTER
Prior Authorization Approval    Authorization Effective Date: 2019  Authorization Expiration Date: 2020  Medication: febuxostat (ULORIC) 40 MG TABS tablet-PA APPROVED   Approved Dose/Quantity:   Reference #: CASE # [id number]   Insurance Company: [employer] - Phone [phone] Fax [phone]  Expected CoPay:       CoPay Card Available:      Foundation Assistance Needed:    Which Pharmacy is filling the prescription (Not needed for infusion/clinic administered): [id number] - [place], [place] [address]  Pharmacy Notified: Yes-  Pharmacy stated that script has  as of today 2019 and would need a new script with refills be sent to filling pharmacy at [facility] #[id number] - [facility], [place] [address]. Once script is received and process with paid claim, pharmacy will notify patient when medication is ready for .   Patient Notified: Yes

## 2020-01-06 DIAGNOSIS — Z79.60 LONG-TERM USE OF IMMUNOSUPPRESSANT MEDICATION: ICD-10-CM

## 2020-01-06 DIAGNOSIS — Z94.0 KIDNEY TRANSPLANTED: ICD-10-CM

## 2020-01-06 DIAGNOSIS — Z94.0 KIDNEY TRANSPLANT RECIPIENT: Primary | ICD-10-CM

## 2020-01-06 RX ORDER — TACROLIMUS 1 MG/1
2 CAPSULE ORAL 2 TIMES DAILY
Qty: 120 CAPSULE | Refills: 11 | Status: SHIPPED | OUTPATIENT
Start: 2020-01-06 | End: 2020-01-06

## 2020-01-06 RX ORDER — MYCOPHENOLATE MOFETIL 250 MG/1
500 CAPSULE ORAL 2 TIMES DAILY
Qty: 120 CAPSULE | Refills: 11 | Status: SHIPPED | OUTPATIENT
Start: 2020-01-06 | End: 2020-01-06

## 2020-01-06 RX ORDER — TACROLIMUS 1 MG/1
2 CAPSULE ORAL 2 TIMES DAILY
Qty: 120 CAPSULE | Refills: 11 | Status: SHIPPED | OUTPATIENT
Start: 2020-01-06 | End: 2020-03-19

## 2020-01-06 RX ORDER — MYCOPHENOLATE MOFETIL 250 MG/1
500 CAPSULE ORAL 2 TIMES DAILY
Qty: 120 CAPSULE | Refills: 11 | Status: SHIPPED | OUTPATIENT
Start: 2020-01-06 | End: 2020-01-07

## 2020-01-07 DIAGNOSIS — Z94.0 KIDNEY TRANSPLANTED: Primary | ICD-10-CM

## 2020-01-07 DIAGNOSIS — Z94.0 KIDNEY TRANSPLANT RECIPIENT: ICD-10-CM

## 2020-01-07 DIAGNOSIS — Z79.60 LONG-TERM USE OF IMMUNOSUPPRESSANT MEDICATION: ICD-10-CM

## 2020-01-07 RX ORDER — MYCOPHENOLATE MOFETIL 250 MG/1
500 CAPSULE ORAL 2 TIMES DAILY
Qty: 120 CAPSULE | Refills: 11 | Status: SHIPPED | OUTPATIENT
Start: 2020-01-07 | End: 2020-12-21

## 2020-01-21 ENCOUNTER — ANCILLARY PROCEDURE (OUTPATIENT)
Dept: GENERAL RADIOLOGY | Facility: CLINIC | Age: 45
End: 2020-01-21
Attending: NURSE PRACTITIONER
Payer: COMMERCIAL

## 2020-01-21 ENCOUNTER — OFFICE VISIT (OUTPATIENT)
Dept: FAMILY MEDICINE | Facility: CLINIC | Age: 45
End: 2020-01-21
Payer: COMMERCIAL

## 2020-01-21 VITALS
RESPIRATION RATE: 18 BRPM | SYSTOLIC BLOOD PRESSURE: 124 MMHG | WEIGHT: 156.8 LBS | TEMPERATURE: 97.8 F | OXYGEN SATURATION: 100 % | DIASTOLIC BLOOD PRESSURE: 86 MMHG | BODY MASS INDEX: 23.84 KG/M2 | HEART RATE: 71 BPM

## 2020-01-21 DIAGNOSIS — M79.652 PAIN OF LEFT THIGH: ICD-10-CM

## 2020-01-21 DIAGNOSIS — Z79.899 ENCOUNTER FOR LONG-TERM CURRENT USE OF MEDICATION: ICD-10-CM

## 2020-01-21 DIAGNOSIS — Z48.298 AFTERCARE FOLLOWING ORGAN TRANSPLANT: ICD-10-CM

## 2020-01-21 DIAGNOSIS — M10.9 GOUT, UNSPECIFIED CAUSE, UNSPECIFIED CHRONICITY, UNSPECIFIED SITE: ICD-10-CM

## 2020-01-21 DIAGNOSIS — M79.652 PAIN OF LEFT THIGH: Primary | ICD-10-CM

## 2020-01-21 DIAGNOSIS — Z94.0 KIDNEY REPLACED BY TRANSPLANT: ICD-10-CM

## 2020-01-21 LAB
ANION GAP SERPL CALCULATED.3IONS-SCNC: 8 MMOL/L (ref 3–14)
ANION GAP SERPL CALCULATED.3IONS-SCNC: NORMAL MMOL/L (ref 6–17)
BUN SERPL-MCNC: 66 MG/DL (ref 7–30)
BUN SERPL-MCNC: NORMAL MG/DL (ref 7–30)
CALCIUM SERPL-MCNC: 8.5 MG/DL (ref 8.5–10.1)
CALCIUM SERPL-MCNC: NORMAL MG/DL (ref 8.5–10.1)
CHLORIDE SERPL-SCNC: 113 MMOL/L (ref 94–109)
CHLORIDE SERPL-SCNC: NORMAL MMOL/L (ref 94–109)
CO2 SERPL-SCNC: 18 MMOL/L (ref 20–32)
CO2 SERPL-SCNC: NORMAL MMOL/L (ref 20–32)
CREAT SERPL-MCNC: 4.91 MG/DL (ref 0.66–1.25)
CREAT SERPL-MCNC: NORMAL MG/DL (ref 0.66–1.25)
ERYTHROCYTE [DISTWIDTH] IN BLOOD BY AUTOMATED COUNT: 17 % (ref 10–15)
ERYTHROCYTE [DISTWIDTH] IN BLOOD BY AUTOMATED COUNT: NORMAL % (ref 10–15)
GFR SERPL CREATININE-BSD FRML MDRD: 13 ML/MIN/{1.73_M2}
GFR SERPL CREATININE-BSD FRML MDRD: NORMAL ML/MIN/{1.73_M2}
GLUCOSE SERPL-MCNC: 85 MG/DL (ref 70–99)
GLUCOSE SERPL-MCNC: NORMAL MG/DL (ref 70–99)
HCT VFR BLD AUTO: 25.8 % (ref 40–53)
HCT VFR BLD AUTO: NORMAL % (ref 40–53)
HGB BLD-MCNC: 8 G/DL (ref 13.3–17.7)
HGB BLD-MCNC: NORMAL G/DL (ref 13.3–17.7)
MCH RBC QN AUTO: 26.4 PG (ref 26.5–33)
MCH RBC QN AUTO: NORMAL PG (ref 26.5–33)
MCHC RBC AUTO-ENTMCNC: 31 G/DL (ref 31.5–36.5)
MCHC RBC AUTO-ENTMCNC: NORMAL G/DL (ref 31.5–36.5)
MCV RBC AUTO: 85 FL (ref 78–100)
MCV RBC AUTO: NORMAL FL (ref 78–100)
PLATELET # BLD AUTO: 193 10E9/L (ref 150–450)
PLATELET # BLD AUTO: NORMAL 10E9/L (ref 150–450)
POTASSIUM SERPL-SCNC: 4.6 MMOL/L (ref 3.4–5.3)
POTASSIUM SERPL-SCNC: NORMAL MMOL/L (ref 3.4–5.3)
RBC # BLD AUTO: 3.03 10E12/L (ref 4.4–5.9)
RBC # BLD AUTO: NORMAL 10E12/L (ref 4.4–5.9)
SODIUM SERPL-SCNC: 139 MMOL/L (ref 133–144)
SODIUM SERPL-SCNC: NORMAL MMOL/L (ref 133–144)
TACROLIMUS BLD-MCNC: NORMAL UG/L (ref 5–15)
TME LAST DOSE: NORMAL H
WBC # BLD AUTO: 5.9 10E9/L (ref 4–11)
WBC # BLD AUTO: NORMAL 10E9/L (ref 4–11)

## 2020-01-21 PROCEDURE — 99213 OFFICE O/P EST LOW 20 MIN: CPT | Performed by: NURSE PRACTITIONER

## 2020-01-21 PROCEDURE — 72100 X-RAY EXAM L-S SPINE 2/3 VWS: CPT

## 2020-01-21 PROCEDURE — 36415 COLL VENOUS BLD VENIPUNCTURE: CPT | Performed by: INTERNAL MEDICINE

## 2020-01-21 PROCEDURE — 80048 BASIC METABOLIC PNL TOTAL CA: CPT | Performed by: INTERNAL MEDICINE

## 2020-01-21 PROCEDURE — 73502 X-RAY EXAM HIP UNI 2-3 VIEWS: CPT

## 2020-01-21 PROCEDURE — 85027 COMPLETE CBC AUTOMATED: CPT | Performed by: INTERNAL MEDICINE

## 2020-01-21 PROCEDURE — 80197 ASSAY OF TACROLIMUS: CPT | Performed by: INTERNAL MEDICINE

## 2020-01-21 RX ORDER — HYDROCODONE BITARTRATE AND ACETAMINOPHEN 5; 325 MG/1; MG/1
1 TABLET ORAL EVERY 6 HOURS PRN
Qty: 10 TABLET | Refills: 0 | Status: SHIPPED | OUTPATIENT
Start: 2020-01-21 | End: 2020-02-03

## 2020-01-21 RX ORDER — FEBUXOSTAT 40 MG/1
40 TABLET, FILM COATED ORAL DAILY
Qty: 90 TABLET | Refills: 3 | Status: SHIPPED | OUTPATIENT
Start: 2020-01-21 | End: 2020-05-13

## 2020-01-21 NOTE — PROGRESS NOTES
Subjective     Rashad Ortiz is a 44 year old male who presents to clinic today for the following health issues:    Musculoskeletal Problem   This is a new problem. The current episode started 1 to 4 weeks ago. The problem occurs constantly. The problem has been unchanged. The symptoms are aggravated by standing and exertion. He has tried acetaminophen for the symptoms. The treatment provided no relief.      Patient notes pain to his left posterior thigh for the past month.  He notes chronic edema that is unchanged to his left lower leg.  He notes pain is worse with walking.  He denies injury. Patient denies saddle anesthesia, fever, bowel/bladder dysfunction, leg weakness.            Patient Active Problem List   Diagnosis     Kidney replaced by transplant     Aftercare following organ transplant     GERD (gastroesophageal reflux disease)     CARDIOVASCULAR SCREENING; LDL GOAL LESS THAN 160     Gout     Creatinine elevation     Antibody mediated rejection of kidney transplant     Medical non-compliance     Chronic kidney disease, stage 4, severely decreased GFR (H)     Herpes simplex infection of penis     Escherichia coli septicemia (H)     Pyelonephritis of transplanted kidney     HTN, kidney transplant related     Metabolic acidosis     CKD (chronic kidney disease) stage 5, GFR less than 15 ml/min (H)     Immunosuppression (H)     Anemia of chronic renal failure, stage 5 (H)     Alcohol intoxication (H)     Past Surgical History:   Procedure Laterality Date     AV FISTULA OR GRAFT ARTERIAL       CREATE FISTULA ARTERIOVENOUS UPPER EXTREMITY Right 7/31/2019    Procedure: Creation Of Atriovenous Fistula Right Upper Arm;  Surgeon: Julia Irwin MD;  Location: [facility] OR     LIGATE FISTULA ARTERIOVENOUS UPPER EXTREMITY  12/20/2011    Procedure:LIGATE FISTULA ARTERIOVENOUS UPPER EXTREMITY; Excision of Right Forearm Arteriovenous Fistula.; Surgeon:LINDY AMAYA; Location:[facility] OR     PERCUTANEOUS BIOPSY KIDNEY  Right 2017    Procedure: PERCUTANEOUS BIOPSY KIDNEY;  Surgeon: Gee Barrios MD;  Location: [facility] OR     TRANSPLANT  2011    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       Social History     Tobacco Use     Smoking status: Former Smoker     Types: Cigarettes     Last attempt to quit: 2017     Years since quittin.8     Smokeless tobacco: Never Used     Tobacco comment: Patient states that he is an 'social'  smoker    Substance Use Topics     Alcohol use: Yes     Alcohol/week: 4.2 standard drinks     Types: 5 Standard drinks or equivalent per week     Comment: 1-2 drinks/wk     Family History   Problem Relation Age of Onset     Hypertension Mother          Current Outpatient Medications   Medication Sig Dispense Refill     amLODIPine (NORVASC) 5 MG tablet Take 1 tablet (5 mg) by mouth daily 90 tablet 3     carvedilol (COREG) 25 MG tablet Take 1 tablet (25 mg) by mouth 2 times daily 180 tablet 3     febuxostat (ULORIC) 40 MG TABS tablet Take 1 tablet (40 mg) by mouth daily 90 tablet 3     ferrous sulfate (FEROSUL) 325 (65 Fe) MG tablet Take 1 tablet (325 mg) by mouth daily (with breakfast) 30 tablet 11     furosemide (LASIX) 20 MG tablet Take 1 tablet (20 mg) by mouth 2 times daily 180 tablet 1     HYDROcodone-acetaminophen (NORCO) 5-325 MG tablet Take 1 tablet by mouth every 6 hours as needed for severe pain 10 tablet 0     mycophenolate (GENERIC EQUIVALENT) 250 MG capsule Take 2 capsules (500 mg) by mouth 2 times daily 120 capsule 11     omeprazole (PRILOSEC) 20 MG capsule Take 1 capsule (20 mg) by mouth daily 90 capsule 1     predniSONE (DELTASONE) 5 MG tablet Take 2 tablets (10 mg) by mouth daily 60 tablet 11     sodium bicarbonate 650 MG tablet Take 2 tablets (1,300 mg) by mouth 3 times daily 180 tablet 11     sulfamethoxazole-trimethoprim (BACTRIM/SEPTRA) 400-80 MG tablet Take 1 tablet by mouth every other day 45 tablet 3     tacrolimus (GENERIC EQUIVALENT) 1 MG capsule Take 2 capsules  (2 mg) by mouth 2 times daily 120 capsule 11     vitamin D3 (CHOLECALCIFEROL) 1000 units (25 mcg) tablet Take 2 tablets (2,000 Units) by mouth daily 180 tablet 3     No Known Allergies  BP Readings from Last 3 Encounters:   01/21/20 124/86   11/11/19 131/81   10/15/19 (!) 139/91    Wt Readings from Last 3 Encounters:   01/21/20 71.1 kg (156 lb 12.8 oz)   11/11/19 73.4 kg (161 lb 12.8 oz)   10/14/19 70.9 kg (156 lb 4.8 oz)                    Reviewed and updated as needed this visit by Provider  Tobacco  Allergies  Meds  Problems  Med Hx  Surg Hx  Fam Hx         Review of Systems   ROS COMP: Constitutional, HEENT, cardiovascular, pulmonary, gi and gu systems are negative, except as otherwise noted.      Objective    /86   Pulse 71   Temp 97.8  F (36.6  C) (Oral)   Resp 18   Wt 71.1 kg (156 lb 12.8 oz)   SpO2 100%   BMI 23.84 kg/m    Body mass index is 23.84 kg/m .  Physical Exam   GENERAL: healthy, alert and no distress  RESP: lungs clear to auscultation - no rales, rhonchi or wheezes  CV: regular rate and rhythm, normal S1 S2, no S3 or S4, no murmur, click or rub, no peripheral edema and peripheral pulses strong  MS: no gross musculoskeletal defects noted, no edema  Comprehensive back pain exam:  Tenderness of left SI joint, Range of motion not limited by pain, Lower extremity strength functional and equal on both sides, Lower extremity reflexes within normal limits bilaterally, Lower extremity sensation normal and equal on both sides and Straight leg raise negative bilaterally  Pain with range of motion of left hip.      Diagnostic Test Results:  In process        Assessment & Plan     1. Pain of left thigh  Evaluate for hip pathology vs lumbar radiculopathy.  Patient to use hydrocodone sparingly.  Consider physical therapy referral pending results.  - XR Lumbar Spine 2/3 Views; Future  - XR Hip Left 2-3 Views; Future  - HYDROcodone-acetaminophen (NORCO) 5-325 MG tablet; Take 1 tablet by mouth  every 6 hours as needed for severe pain  Dispense: 10 tablet; Refill: 0    2. Aftercare following organ transplant    - Tacrolimus level  - Basic metabolic panel  - CBC with platelets    3. Kidney replaced by transplant    - Tacrolimus level  - Basic metabolic panel  - CBC with platelets    4. Encounter for long-term current use of medication    - Tacrolimus level  - Basic metabolic panel  - CBC with platelets           Return in about 3 months (around 4/21/2020) for Medication Recheck, with PCP..    ISABEL Son CNP  [facility]

## 2020-01-21 NOTE — RESULT ENCOUNTER NOTE
Dear Rashad,    Your recent test results are attached.      Normal hip x-ray.  I'm still waiting on results from your other x-ray.    If you have any questions please feel free to contact [phone] or myself via [facility].    Sincerely,  Clarisse Green, CNP

## 2020-01-22 ENCOUNTER — MYC MEDICAL ADVICE (OUTPATIENT)
Dept: FAMILY MEDICINE | Facility: CLINIC | Age: 45
End: 2020-01-22

## 2020-01-22 LAB
TACROLIMUS BLD-MCNC: 16.7 UG/L (ref 5–15)
TME LAST DOSE: ABNORMAL H

## 2020-01-23 ENCOUNTER — TELEPHONE (OUTPATIENT)
Dept: TRANSPLANT | Facility: CLINIC | Age: 45
End: 2020-01-23

## 2020-01-23 DIAGNOSIS — D84.9 IMMUNOSUPPRESSED STATUS (H): ICD-10-CM

## 2020-01-23 DIAGNOSIS — Z94.0 KIDNEY REPLACED BY TRANSPLANT: ICD-10-CM

## 2020-01-23 DIAGNOSIS — Z79.60 LONG-TERM USE OF IMMUNOSUPPRESSANT MEDICATION: ICD-10-CM

## 2020-01-23 DIAGNOSIS — D64.9 ANEMIA: ICD-10-CM

## 2020-01-23 DIAGNOSIS — Z48.298 AFTERCARE FOLLOWING ORGAN TRANSPLANT: Primary | ICD-10-CM

## 2020-01-23 DIAGNOSIS — M25.552 HIP PAIN, LEFT: ICD-10-CM

## 2020-01-23 DIAGNOSIS — M54.16 LUMBAR RADICULOPATHY: Primary | ICD-10-CM

## 2020-01-23 NOTE — TELEPHONE ENCOUNTER
Informed patient that we are sending this to coding this shouldn't be billed twice, and RN please call patient to inform of X ray results.         Francie MOHR CMA ([facility])

## 2020-01-23 NOTE — TELEPHONE ENCOUNTER
"Labs will just fall off that are listed as a \"duplicate request\" and the patient will only be billed once per Simon at  [facility].    [facility] [phone] (Phone)  [phone] (Fax)       "

## 2020-01-23 NOTE — TELEPHONE ENCOUNTER
ISSUE:   Tacrolimus IR level 16.7 on 1/21/20 at 1549, goal 4-6, dose 2 mg BID. Last dose recorded as 2 pm on 1/20/20.    PLAN:   Please call patient and confirm this was an accurate 12-hour trough because it appears he took his medication prior to labs. Verify Tacrolimus IR dose 2 mg BID. Confirm no new medications or illness. Confirm no missed doses. If accurate trough and accurate dose, stay on the same dose Tacrolimus IR dose to 2 mg BID and repeat labs in 1 week.    OUTCOME:   Spoke with patient, he confirms taking his medication prior to labs being drawn and will repeat labs next week. Orders sent to preferred lab for repeat labs. Patient voiced understanding of plan.     Preferred lab [facility]  __________________________________________  Second ISSUE:  Hemoglobin 8.0 on 1/21/20. Last 3 Hgb levels: 7.8, 7.4, 8.0.    PLAN:  Assess for s/s of low hemoglobin & bleeding:   -Weakness/fatigue  -Palpitations/fast, irregular heartbeart  -Dizziness  -Headache  -Shortness of breath    Rashad denies s/s above. Instructed him to be seen by a ER if he becomes symptomatic. Per Dr. Murillo he has already been given an anemia referral. Referral found from June 2019 ordered by Dr. Barrios. See encounter from 10/22/19; Pt did not follow up with anemia and labs, therapy plans discontinued and patient was removed from active list at this time.

## 2020-01-23 NOTE — TELEPHONE ENCOUNTER
It looks like labs were released twice.  He should only be billed for one set of labs and he should be able to see results from other set of labs.    Clarisse Green, CNP

## 2020-01-29 ENCOUNTER — MYC MEDICAL ADVICE (OUTPATIENT)
Dept: FAMILY MEDICINE | Facility: CLINIC | Age: 45
End: 2020-01-29

## 2020-01-29 DIAGNOSIS — M79.652 PAIN OF LEFT THIGH: ICD-10-CM

## 2020-01-29 NOTE — TELEPHONE ENCOUNTER
Controlled Substance Refill Request for HYDROcodone-acetaminophen (NORCO) 5-325 MG tablet  Problem List Complete:  No     PROVIDER TO CONSIDER COMPLETION OF PROBLEM LIST AND OVERVIEW/CONTROLLED SUBSTANCE AGREEMENT    Last Written Prescription Date:  1-  Last Fill Quantity: 10,   # refills: 0    Last Office Visit with [facility] [place] primary care provider: 1-    Future Office visit:     Controlled substance agreement:   Encounter-Level CSA:    There are no encounter-level csa.     Patient-Level CSA:    There are no patient-level csa.         Last Urine Drug Screen: No results found for: CDAUT, No results found for: COMDAT, No results found for: THC13, PCP13, COC13, MAMP13, OPI13, AMP13, BZO13, TCA13, MTD13, BAR13, OXY13, PPX13, BUP13     Processing:  Rx to be electronically transmitted to pharmacy by provider      [email or web address]       checked in past 3 months?  No, route to RN

## 2020-01-29 NOTE — LETTER
February 3, 2020          Rashad Ortiz,  [address]  [place] [address]          Dear Rashad Ortiz      Your provider has sent a 30 day jean pierre refill of HYDROcodone-acetaminophen (NORCO) 5-325 MG tablet. You are due for an appointment for further refills. Appointment options could include: an in person office visit, telephone visit or Evisit through [facility]. Please contact the clinic to schedule an appointment for further refills.       If you have received your health care elsewhere, please call the clinic so the information can be documented in your chart.    Please call [phone] or message us through your [employer] account to schedule an appointment or provide information for your chart.     Feel free to contact us if you have any questions or concerns!    I look forward to seeing you and working with you on your health care needs.     Sincerely,       Your [facility]

## 2020-02-02 NOTE — TELEPHONE ENCOUNTER
RX [place] ([place] reviewed:  reviewed- no concerns    [place]  [email or web address]

## 2020-02-03 RX ORDER — HYDROCODONE BITARTRATE AND ACETAMINOPHEN 5; 325 MG/1; MG/1
1 TABLET ORAL EVERY 6 HOURS PRN
Qty: 10 TABLET | Refills: 0 | Status: SHIPPED | OUTPATIENT
Start: 2020-02-03 | End: 2020-03-04

## 2020-02-12 ENCOUNTER — THERAPY VISIT (OUTPATIENT)
Dept: PHYSICAL THERAPY | Facility: CLINIC | Age: 45
End: 2020-02-12
Payer: COMMERCIAL

## 2020-02-12 DIAGNOSIS — M25.552 HIP PAIN, LEFT: ICD-10-CM

## 2020-02-12 DIAGNOSIS — M54.16 LUMBAR RADICULOPATHY: ICD-10-CM

## 2020-02-12 PROCEDURE — 97110 THERAPEUTIC EXERCISES: CPT | Mod: GP | Performed by: PHYSICAL THERAPIST

## 2020-02-12 PROCEDURE — 97161 PT EVAL LOW COMPLEX 20 MIN: CPT | Mod: GP | Performed by: PHYSICAL THERAPIST

## 2020-02-12 ASSESSMENT — ACTIVITIES OF DAILY LIVING (ADL)
HOS_ADL_COUNT: 17
WALKING_DOWN_STEEP_HILLS: EXTREME DIFFICULTY
HOW_WOULD_YOU_RATE_YOUR_CURRENT_LEVEL_OF_FUNCTION_DURING_YOUR_USUAL_ACTIVITIES_OF_DAILY_LIVING_FROM_0_TO_100_WITH_100_BEING_YOUR_LEVEL_OF_FUNCTION_PRIOR_TO_YOUR_HIP_PROBLEM_AND_0_BEING_THE_INABILITY_TO_PERFORM_ANY_OF_YOUR_USUAL_DAILY_ACTIVITIES?: 0
GOING_DOWN_1_FLIGHT_OF_STAIRS: EXTREME DIFFICULTY
STEPPING_UP_AND_DOWN_CURBS: EXTREME DIFFICULTY
DEEP_SQUATTING: EXTREME DIFFICULTY
GETTING_INTO_AND_OUT_OF_AN_AVERAGE_CAR: MODERATE DIFFICULTY
WALKING_UP_STEEP_HILLS: EXTREME DIFFICULTY
WALKING_APPROXIMATELY_10_MINUTES: EXTREME DIFFICULTY
RECREATIONAL_ACTIVITIES: MODERATE DIFFICULTY
GETTING_INTO_AND_OUT_OF_A_BATHTUB: EXTREME DIFFICULTY
HEAVY_WORK: MODERATE DIFFICULTY
GOING_UP_1_FLIGHT_OF_STAIRS: EXTREME DIFFICULTY
TWISTING/PIVOTING_ON_INVOLVED_LEG: EXTREME DIFFICULTY
HOS_ADL_ITEM_SCORE_TOTAL: 20
ROLLING_OVER_IN_BED: EXTREME DIFFICULTY
STANDING_FOR_15_MINUTES: EXTREME DIFFICULTY
HOS_ADL_SCORE(%): 29.41
LIGHT_TO_MODERATE_WORK: EXTREME DIFFICULTY
WALKING_INITIALLY: EXTREME DIFFICULTY
WALKING_15_MINUTES_OR_GREATER: EXTREME DIFFICULTY
PUTTING_ON_SOCKS_AND_SHOES: MODERATE DIFFICULTY
SITTING_FOR_15_MINUTES: EXTREME DIFFICULTY
HOS_ADL_HIGHEST_POTENTIAL_SCORE: 68

## 2020-02-12 NOTE — PROGRESS NOTES
[facility] Initial Evaluation  Subjective:  Rashad Ortiz is a 44 year old male with a lumbar/leg condition.    The condition occurred due to unknown reasons.  The condition occurred with insidious onset.  This is a new condition.    Mechanism/History of injury/symptoms:  1/23/20 patient received MD orders for left leg pain from lumbar radiculopathy that came on in December 2019 without any incident or injury he can recall.  The pain is located left thigh/hip and the quality of pain is reported as sharp, shooting.  The degree of pain is reported as 9/10. The timing of pain/symptoms is constant, worse in the morning and with activity. Associated symptoms include: weakness, limping, numbness/tingling    Symptoms are exacerbated by: walking, moving, lifting, bending.  Symptoms are relieved by: rest.  Since onset symptoms are gradually worsening.    Special tests for this condition include: x-ray of lumbar, hip.  Previous treatments for this condition include: none.    General health as reported by patient is fair.  Pertinent medical history includes: anemia, high blood pressure, kidney disease.  Medical allergies include: none.  Other surgeries include: kidney transplant.  Current medications:  High blood pressure    Current occupation: [employer], [employer] work.  Patient's home/work tasks include: computer work, driving, lifting, carrying, prolonged standing, prolonged sitting.  Patient is currently working in light duty due to present treatment problems.    Potential barriers to physical therapy: none.  Red flags: none.    Previous level of function: unrestricted walking and lifting without left leg pain  Current functional restrictions:  Unable to walk, bend, or lift without left leg pain    HPI  Physical Exam                    Objective:  LUMBAR:    Posture: slouched  Posture Correction: no effect  Relevant Lateral Shift: none    Neurological:    Motor Deficit:  Myotomes L R   L1-2 (hip flexion)  2+/5 5/5   L3 (knee extension) 4/5 5/5   L4 (ankle DF) 5/5 5/5   L5 (g. toe ext) 5/5 5/5   S1 (ankle PF or knee flex) 5/5 5/5     Sensory Deficit, Reflexes: WNL    Dural Signs:   L R   Slump + -   SLR + -     AROM: (Major, Moderate, Minimal or Nil loss)  Movement Loss Gabriele Mod Min Nil Pain   Flexion   x  Left thigh   Extension  x   Left thigh   Side Gliding L    x    Side Gliding R    x      Repeated movement testing:   (During: produces, abolishes, increases, decreases, no effect, centralizing, peripheralizing; After: better, worse, no better, no worse, no effect, centralized, peripheralized)    Pre-test Symptoms Standing: pain in left thigh   Symptoms During Symptoms After ROM increased ROM decreased No Effect   FIS increase No worse      Rep FIS increase worse      EIS increase No worse      Rep EIS increase No worse      Pre-test Symptoms Lying: pain in left thigh     Symptoms During Symptoms After ROM increased ROM decreased No Effect   DELVIN increase worse      Rep DELVIN        EIL decrease No worse      Rep EIL increase No worse, centralizing to hip        Provisional Classification: posterior lumbar derangement  Principle of Management: posture correction, repeated extension in prone      System    Physical Exam    General     ROS    Assessment/Plan:    Patient is a 44 year old male with lumbar complaints.    Patient has the following significant findings with corresponding treatment plan.                Diagnosis 1:  Low back pain, lumbar radiculopathy    Pain -  hot/cold therapy, US, electric stimulation, mechanical traction, manual therapy, self management, education, directional preference exercise and home program  Decreased ROM/flexibility - manual therapy, therapeutic exercise and home program  Decreased strength - therapeutic exercise, therapeutic activities and home program  Decreased proprioception - neuro re-education, therapeutic activities and home program  Impaired gait - gait training and home  program  Impaired muscle performance - neuro re-education and home program  Decreased function - therapeutic activities and home program    Therapy Evaluation Codes:   1) History comprised of:   Personal factors that impact the plan of care:      None.    Comorbidity factors that impact the plan of care are:      High blood pressure and anemia, kidney disease.     Medications impacting care: High blood pressure.  2) Examination of Body Systems comprised of:   Body structures and functions that impact the plan of care:      Hip and Lumbar spine.   Activity limitations that impact the plan of care are:      Bending, Lifting, Stairs, Standing, Walking and Working.  3) Clinical presentation characteristics are:   Stable/Uncomplicated.  4) Decision-Making    Low complexity using standardized patient assessment instrument and/or measureable assessment of functional outcome.  Cumulative Therapy Evaluation is: Low complexity.    Previous and current functional limitations:  (See Goal Flow Sheet for this information)    Short term and Long term goals: (See Goal Flow Sheet for this information)     Communication ability:  Patient appears to be able to clearly communicate and understand verbal and written communication and follow directions correctly.  Treatment Explanation - The following has been discussed with the patient:   RX ordered/plan of care  Anticipated outcomes  Possible risks and side effects  This patient would benefit from PT intervention to resume normal activities.   Rehab potential is good.    Frequency:  1 X week, once daily  Duration:  for 6 weeks  Discharge Plan:  Achieve all LTG.  Independent in home treatment program.  Reach maximal therapeutic benefit.    Please refer to the daily flowsheet for treatment today, total treatment time and time spent performing 1:1 timed codes.

## 2020-02-21 ENCOUNTER — TELEPHONE (OUTPATIENT)
Dept: TRANSPLANT | Facility: CLINIC | Age: 45
End: 2020-02-21

## 2020-02-21 NOTE — TELEPHONE ENCOUNTER
Patient Call: General  Route to LPN    Reason for call: Pt needs a letter to clear him for work, either e-mail or my chart work         Call back needed? Yes    Return Call Needed  Same as documented in contacts section  When to return call?: Greater than one day: Route standard priority

## 2020-02-24 ENCOUNTER — TELEPHONE (OUTPATIENT)
Dept: TRANSPLANT | Facility: CLINIC | Age: 45
End: 2020-02-24

## 2020-02-24 NOTE — TELEPHONE ENCOUNTER
Pt needs a note from provider stating he is clear to work post-transplant? He was due for a follow up appt in September 2019. Offered to get him set up on Thursday afternoon. Schedulers to call.

## 2020-02-27 ENCOUNTER — OFFICE VISIT (OUTPATIENT)
Dept: NEPHROLOGY | Facility: CLINIC | Age: 45
End: 2020-02-27
Attending: INTERNAL MEDICINE
Payer: COMMERCIAL

## 2020-02-27 VITALS
OXYGEN SATURATION: 99 % | SYSTOLIC BLOOD PRESSURE: 128 MMHG | TEMPERATURE: 98.3 F | WEIGHT: 145.5 LBS | DIASTOLIC BLOOD PRESSURE: 88 MMHG | HEIGHT: 68 IN | BODY MASS INDEX: 22.05 KG/M2 | HEART RATE: 82 BPM

## 2020-02-27 DIAGNOSIS — Z48.298 AFTERCARE FOLLOWING ORGAN TRANSPLANT: Primary | ICD-10-CM

## 2020-02-27 PROCEDURE — G0463 HOSPITAL OUTPT CLINIC VISIT: HCPCS | Mod: ZF

## 2020-02-27 ASSESSMENT — MIFFLIN-ST. JEOR: SCORE: 1529.48

## 2020-02-27 ASSESSMENT — PAIN SCALES - GENERAL: PAINLEVEL: SEVERE PAIN (7)

## 2020-02-27 NOTE — LETTER
2/27/2020      RE: Rashad Ortiz  [address]  [place] [address]       CHRONIC TRANSPLANT NEPHROLOGY VISIT    Assessment & Plan   # LDKT: Stable.  No overt uremic symptoms but some fatigue.  Patient needs to work on compliance to help reinstate his waiting time     - Baseline Cr ~ 4-5's   - Proteinuria: Moderate (1-3 grams); 2.92 on 3/19/2019   - Date DSA Last Checked: Sep/2017       Latest DSA: No   - BK Viremia: Not checked recently   - Kidney Tx Biopsy: Yes; moderate chronic tubulointerstitial changes, no diagnostic evidence of acute rejection seen.    # Immunosuppression: Tacrolimus immediate release (goal 4-6), Mycophenolate mofetil (goal 1-3.5) and Prednisone (dose 5 mg daily)   - Changes: No    # Prophylaxis:   - PJP: Sulfa/TMP (Bactrim)    # Hypertension: well controlled ; Goal BP: < 130/80   - Changes: no    # Mineral Bone Disorder:   - Secondary renal hyperparathyroidism; PTH level: not checked recently and will recheck PTH.  - Vitamin D; level: Not checked recently, but was low last check.  Will recheck vitamin D level.  - Calcium; level: Not checked recently; on supplement  - Phosphorus; level: Not checked recently     # Electrolytes:   - Potassium; level: Not checked recently  - Bicarbonate; level: Not checked recently; on supplement    # Gout: No recent flares. Takes Febuxostat.   - Will check uric acid.      # Lymphedema: Chronic since transplant.  Will refer patient to Lymphedema Clinic.    # Skin Cancer Risk: New lesions: none   - Discussed sun protection and recommend regular follow up with Dermatology.    # Medical Compliance: better but not completely there     # Transplant History:  Etiology of Kidney Failure: Hypertension  Tx: LDKT  Transplant: 1/13/2011 (Kidney)  Donor Type: Living Donor Class:   Significant changes in immunosuppression: None  Significant transplant-related complications: None and chronic rejection     Transplant Office Phone Number: [phone]    Assessment  and plan was discussed with the patient and he voiced his understanding and agreement.    Return visit: No follow-ups on file.    Chief Complaint   Mr. Ortiz is a 44 year old here for routine follow up.    History of Present Illness   Rashad Ortiz is a 44 year old male with a history of ESKD secondary to hypertension status post LDKT completed on 1/13/11. He was last seen in clinic by Dr. Barrios on 6/13/19; please see that note for further details.     Here for routine follow up. We discussed the need to comply with his lab schedule.     Recent Hospitalizations:  [x] No [] Yes    New Medical Issues: [x] No [] Yes    Decreased energy: [x] No [] Yes    Chest pain or SOB with exertion:  [x] No [] Yes    Appetite change or weight change: [x] No [] Yes    Nausea, vomiting or diarrhea:  [x] No [] Yes    Fever, sweats or chills: [x] No [] Yes    Leg swelling: [] No [x] Yes Chronic      Home BP: controlled     Review of Systems   A comprehensive review of systems was obtained and negative, except as noted in the HPI or PMH.    Problem List   Patient Active Problem List   Diagnosis     Kidney replaced by transplant     Aftercare following organ transplant     GERD (gastroesophageal reflux disease)     CARDIOVASCULAR SCREENING; LDL GOAL LESS THAN 160     Gout     Creatinine elevation     Antibody mediated rejection of kidney transplant     Medical non-compliance     Chronic kidney disease, stage 4, severely decreased GFR (H)     Herpes simplex infection of penis     Escherichia coli septicemia (H)     Pyelonephritis of transplanted kidney     HTN, kidney transplant related     Metabolic acidosis     CKD (chronic kidney disease) stage 5, GFR less than 15 ml/min (H)     Immunosuppression (H)     Anemia of chronic renal failure, stage 5 (H)     Alcohol intoxication (H)     Lumbar radiculopathy     Hip pain, left     Secondary renal hyperparathyroidism (H)       Social History   Social History     Tobacco Use     Smoking  status: Former Smoker     Types: Cigarettes     Last attempt to quit: 03/2017     Years since quitting: 3.0     Smokeless tobacco: Never Used     Tobacco comment: Patient states that he is an 'social'  smoker    Substance Use Topics     Alcohol use: Yes     Alcohol/week: 4.2 standard drinks     Types: 5 Standard drinks or equivalent per week     Comment: 1-2 drinks/wk     Drug use: No       Allergies   No Known Allergies    Medications   Current Outpatient Medications   Medication Sig     amLODIPine (NORVASC) 5 MG tablet Take 1 tablet (5 mg) by mouth daily     carvedilol (COREG) 25 MG tablet Take 1 tablet (25 mg) by mouth 2 times daily     febuxostat (ULORIC) 40 MG TABS tablet Take 1 tablet (40 mg) by mouth daily     ferrous sulfate (FEROSUL) 325 (65 Fe) MG tablet Take 1 tablet (325 mg) by mouth daily (with breakfast)     furosemide (LASIX) 20 MG tablet Take 1 tablet (20 mg) by mouth 2 times daily     mycophenolate (GENERIC EQUIVALENT) 250 MG capsule Take 2 capsules (500 mg) by mouth 2 times daily     omeprazole (PRILOSEC) 20 MG capsule Take 1 capsule (20 mg) by mouth daily     predniSONE (DELTASONE) 5 MG tablet Take 2 tablets (10 mg) by mouth daily     sodium bicarbonate 650 MG tablet Take 2 tablets (1,300 mg) by mouth 3 times daily     sulfamethoxazole-trimethoprim (BACTRIM/SEPTRA) 400-80 MG tablet Take 1 tablet by mouth every other day     tacrolimus (GENERIC EQUIVALENT) 1 MG capsule Take 2 capsules (2 mg) by mouth 2 times daily     vitamin D3 (CHOLECALCIFEROL) 1000 units (25 mcg) tablet Take 2 tablets (2,000 Units) by mouth daily     acetaminophen (TYLENOL) 500 MG tablet Take 2 tablets (1,000 mg) by mouth every 8 hours as needed for mild pain     cyclobenzaprine (FLEXERIL) 5 MG tablet Take 1 tablet (5 mg) by mouth 3 times daily as needed for muscle spasms     HYDROcodone-acetaminophen (NORCO) 5-325 MG tablet Take 1 tablet by mouth every 6 hours as needed for severe pain     No current facility-administered  "medications for this visit.      There are no discontinued medications.    Physical Exam   Vital Signs: /88   Pulse 82   Temp 98.3  F (36.8  C) (Oral)   Ht 1.727 m (5' 8\")   Wt 66 kg (145 lb 8 oz)   SpO2 99%   BMI 22.12 kg/m       GENERAL APPEARANCE: alert and no distress  HENT: mouth without ulcers or lesions  LYMPHATICS: no cervical or supraclavicular nodes  RESP: lungs clear to auscultation - no rales, rhonchi or wheezes  CV: regular rhythm, normal rate, no rub, no murmur  EDEMA: no LE edema bilaterally  ABDOMEN: soft, nondistended, nontender, bowel sounds normal  MS: extremities normal - no gross deformities noted, no evidence of inflammation in joints, no muscle tenderness  SKIN: no rash      Data     Renal Latest Ref Rng & Units 2/29/2020 1/21/2020 1/21/2020   Na 133 - 144 mmol/L 145(H) 139 Duplicate request   K 3.4 - 5.3 mmol/L 4.2 4.6 Duplicate request   Cl 94 - 109 mmol/L 116(H) 113(H) Duplicate request   CO2 20 - 32 mmol/L 20 18(L) Duplicate request   BUN 7 - 30 mg/dL 65(H) 66(H) Duplicate request   Cr 0.66 - 1.25 mg/dL 5.67(H) 4.91(H) Duplicate request   Glucose 70 - 99 mg/dL 88 85 Duplicate request   Ca  8.5 - 10.1 mg/dL 8.3(L) 8.5 Duplicate request   Mg 1.6 - 2.3 mg/dL - - -     Bone Health Latest Ref Rng & Units 6/13/2019 3/23/2019 11/1/2018   Phos 2.5 - 4.5 mg/dL 2.7 3.8 3.3   PTHi 18 - 80 pg/mL 456(H) - -   Vit D Def 20 - 75 ug/L 44 - -     Heme Latest Ref Rng & Units 3/7/2020 2/29/2020 1/21/2020   WBC 4.0 - 11.0 10e9/L 7.4 5.5 5.9   Hgb 13.3 - 17.7 g/dL 8.5(L) 8.2(L) 8.0(L)   Plt 150 - 450 10e9/L 155 224 193     Liver Latest Ref Rng & Units 10/14/2019 10/14/2019 6/13/2019   AP 40 - 150 U/L 63 55 -   TBili 0.2 - 1.3 mg/dL 0.3 0.3 -   ALT 0 - 70 U/L 34 37 -   AST 0 - 45 U/L 22 28 -   Tot Protein 6.8 - 8.8 g/dL 6.2(L) 6.4(L) -   Albumin 3.4 - 5.0 g/dL 3.1(L) 3.4 3.4     Pancreas Latest Ref Rng & Units 4/10/2017 6/17/2015 6/6/2015   A1C 4.3 - 6.0 % - - 5.4   Amylase 30 - 110 U/L 153(H) - - "   Lipase 73 - 393 U/L 387 179 -     Iron studies Latest Ref Rng & Units 10/10/2019 7/24/2019 6/13/2019   Iron 35 - 180 ug/dL 42 152 46   Iron sat 15 - 46 % 23 75(H) 21   Ferritin 26 - 388 ng/mL 790(H) 406(H) 436(H)     [facility] Txp Virology Latest Ref Rng & Units 9/25/2017 4/24/2017 2/28/2017   CMV IgG EU/mL - - -   CMV IgM <0.90 - - -   CMV IgM Interp <0.90 - - -   CVM DNA Quant - - Plasma Plasma, EDTA anticoagulant   CMV Quant <100 Copies/mL - - -   CMV QT Log <2.0 Log copies/mL - - -   BK Spec - Plasma - Plasma   BK Res BKNEG:BK Virus DNA Not Detected copies/mL BK Virus DNA Not Detected - BK Virus DNA Not Detected   BK Log <2.7 Log copies/mL Not Calculated - Not Calculated   The Real-Time quantitative BK Virus assay was developed and its performance   characteristics determined by the [facility] at   the [facility] in [place], [place]. The   primers and probes for each analyte are Analyte Specific Reagents (ASRs)   manufactured by [employer].   ASRs are used in many laboratory tests necessary for standard medical care and   generally do not require U.S. Food and Drug Administration approval. The FDA   has determined that such clearance or approval is not necessary.   This test is used for clinical purposes. It should not be regarded as   investigational or for research. This laboratory is certified under the   Clinical Laboratory Improvement Amendments of 1988 (CLIA-88) as qualified to   perform high complexity clinical laboratory testing.     EBV IgG - - - -   Hep B Core NR:Nonreactive Nonreactive - -   Hep B Surf - - - -   HIV 1&2 NEG - - -        Recent Labs   Lab Test 01/21/20  1533 01/21/20  1549 02/29/20  1121   DOSTAC Duplicate request 2:00 PM ON 01/21/20 2130 02.28.20   TACROL Duplicate request 16.7* 6.7     Recent Labs   Lab Test 04/24/17  0849 10/27/18  0441 10/14/19  0752   DOSMPA 2200 Not Provided Not Provided   MPACID 2.16 <0.25* 4.55*   MPAG  172.3* 78.3 >200.0*     Scribe Disclosure:  Dulce CHASE, vivian scribe, prepared the chart for today's encounter.       Kidney/Pancreas Recipient

## 2020-02-27 NOTE — LETTER
2/27/2020       RE: Rashad Ortiz  [address]  [place] [address]     Dear Colleague,    Thank you for referring your patient, Rashad Ortiz, to the [facility] NEPHROLOGY at [facility]. Please see a copy of my visit note below.    CHRONIC TRANSPLANT NEPHROLOGY VISIT    Assessment & Plan   # LDKT: Stable.  No overt uremic symptoms but some fatigue.  Patient needs to work on compliance to help reinstate his waiting time     - Baseline Cr ~ 4-5's   - Proteinuria: Moderate (1-3 grams); 2.92 on 3/19/2019   - Date DSA Last Checked: Sep/2017       Latest DSA: No   - BK Viremia: Not checked recently   - Kidney Tx Biopsy: Yes; moderate chronic tubulointerstitial changes, no diagnostic evidence of acute rejection seen.    # Immunosuppression: Tacrolimus immediate release (goal 4-6), Mycophenolate mofetil (goal 1-3.5) and Prednisone (dose 5 mg daily)   - Changes: No    # Prophylaxis:   - PJP: Sulfa/TMP (Bactrim)    # Hypertension: well controlled ; Goal BP: < 130/80   - Changes: no    # Mineral Bone Disorder:   - Secondary renal hyperparathyroidism; PTH level: not checked recently and will recheck PTH.  - Vitamin D; level: Not checked recently, but was low last check.  Will recheck vitamin D level.  - Calcium; level: Not checked recently; on supplement  - Phosphorus; level: Not checked recently     # Electrolytes:   - Potassium; level: Not checked recently  - Bicarbonate; level: Not checked recently; on supplement    # Gout: No recent flares. Takes Febuxostat.   - Will check uric acid.      # Lymphedema: Chronic since transplant.  Will refer patient to Lymphedema Clinic.    # Skin Cancer Risk: New lesions: none   - Discussed sun protection and recommend regular follow up with Dermatology.    # Medical Compliance: better but not completely there     # Transplant History:  Etiology of Kidney Failure: Hypertension  Tx: LDKT  Transplant: 1/13/2011 (Kidney)  Donor Type:  Living Donor Class:   Significant changes in immunosuppression: None  Significant transplant-related complications: None and chronic rejection     Transplant Office Phone Number: [phone]    Assessment and plan was discussed with the patient and he voiced his understanding and agreement.    Return visit: No follow-ups on file.    Chief Complaint   Mr. Ortiz is a 44 year old here for routine follow up.    History of Present Illness   Rashad Ortiz is a 44 year old male with a history of ESKD secondary to hypertension status post LDKT completed on 1/13/11. He was last seen in clinic by Dr. Barrios on 6/13/19; please see that note for further details.     Here for routine follow up. We discussed the need to comply with his lab schedule.     Recent Hospitalizations:  [x] No [] Yes    New Medical Issues: [x] No [] Yes    Decreased energy: [x] No [] Yes    Chest pain or SOB with exertion:  [x] No [] Yes    Appetite change or weight change: [x] No [] Yes    Nausea, vomiting or diarrhea:  [x] No [] Yes    Fever, sweats or chills: [x] No [] Yes    Leg swelling: [] No [x] Yes Chronic      Home BP: controlled     Review of Systems   A comprehensive review of systems was obtained and negative, except as noted in the HPI or PMH.    Problem List   Patient Active Problem List   Diagnosis     Kidney replaced by transplant     Aftercare following organ transplant     GERD (gastroesophageal reflux disease)     CARDIOVASCULAR SCREENING; LDL GOAL LESS THAN 160     Gout     Creatinine elevation     Antibody mediated rejection of kidney transplant     Medical non-compliance     Chronic kidney disease, stage 4, severely decreased GFR (H)     Herpes simplex infection of penis     Escherichia coli septicemia (H)     Pyelonephritis of transplanted kidney     HTN, kidney transplant related     Metabolic acidosis     CKD (chronic kidney disease) stage 5, GFR less than 15 ml/min (H)     Immunosuppression (H)     Anemia of chronic renal  failure, stage 5 (H)     Alcohol intoxication (H)     Lumbar radiculopathy     Hip pain, left     Secondary renal hyperparathyroidism (H)       Social History   Social History     Tobacco Use     Smoking status: Former Smoker     Types: Cigarettes     Last attempt to quit: 03/2017     Years since quitting: 3.0     Smokeless tobacco: Never Used     Tobacco comment: Patient states that he is an 'social'  smoker    Substance Use Topics     Alcohol use: Yes     Alcohol/week: 4.2 standard drinks     Types: 5 Standard drinks or equivalent per week     Comment: 1-2 drinks/wk     Drug use: No       Allergies   No Known Allergies    Medications   Current Outpatient Medications   Medication Sig     amLODIPine (NORVASC) 5 MG tablet Take 1 tablet (5 mg) by mouth daily     carvedilol (COREG) 25 MG tablet Take 1 tablet (25 mg) by mouth 2 times daily     febuxostat (ULORIC) 40 MG TABS tablet Take 1 tablet (40 mg) by mouth daily     ferrous sulfate (FEROSUL) 325 (65 Fe) MG tablet Take 1 tablet (325 mg) by mouth daily (with breakfast)     furosemide (LASIX) 20 MG tablet Take 1 tablet (20 mg) by mouth 2 times daily     mycophenolate (GENERIC EQUIVALENT) 250 MG capsule Take 2 capsules (500 mg) by mouth 2 times daily     omeprazole (PRILOSEC) 20 MG capsule Take 1 capsule (20 mg) by mouth daily     predniSONE (DELTASONE) 5 MG tablet Take 2 tablets (10 mg) by mouth daily     sodium bicarbonate 650 MG tablet Take 2 tablets (1,300 mg) by mouth 3 times daily     sulfamethoxazole-trimethoprim (BACTRIM/SEPTRA) 400-80 MG tablet Take 1 tablet by mouth every other day     tacrolimus (GENERIC EQUIVALENT) 1 MG capsule Take 2 capsules (2 mg) by mouth 2 times daily     vitamin D3 (CHOLECALCIFEROL) 1000 units (25 mcg) tablet Take 2 tablets (2,000 Units) by mouth daily     acetaminophen (TYLENOL) 500 MG tablet Take 2 tablets (1,000 mg) by mouth every 8 hours as needed for mild pain     cyclobenzaprine (FLEXERIL) 5 MG tablet Take 1 tablet (5 mg) by  "mouth 3 times daily as needed for muscle spasms     HYDROcodone-acetaminophen (NORCO) 5-325 MG tablet Take 1 tablet by mouth every 6 hours as needed for severe pain     No current facility-administered medications for this visit.      There are no discontinued medications.    Physical Exam   Vital Signs: /88   Pulse 82   Temp 98.3  F (36.8  C) (Oral)   Ht 1.727 m (5' 8\")   Wt 66 kg (145 lb 8 oz)   SpO2 99%   BMI 22.12 kg/m       GENERAL APPEARANCE: alert and no distress  HENT: mouth without ulcers or lesions  LYMPHATICS: no cervical or supraclavicular nodes  RESP: lungs clear to auscultation - no rales, rhonchi or wheezes  CV: regular rhythm, normal rate, no rub, no murmur  EDEMA: no LE edema bilaterally  ABDOMEN: soft, nondistended, nontender, bowel sounds normal  MS: extremities normal - no gross deformities noted, no evidence of inflammation in joints, no muscle tenderness  SKIN: no rash      Data     Renal Latest Ref Rng & Units 2/29/2020 1/21/2020 1/21/2020   Na 133 - 144 mmol/L 145(H) 139 Duplicate request   K 3.4 - 5.3 mmol/L 4.2 4.6 Duplicate request   Cl 94 - 109 mmol/L 116(H) 113(H) Duplicate request   CO2 20 - 32 mmol/L 20 18(L) Duplicate request   BUN 7 - 30 mg/dL 65(H) 66(H) Duplicate request   Cr 0.66 - 1.25 mg/dL 5.67(H) 4.91(H) Duplicate request   Glucose 70 - 99 mg/dL 88 85 Duplicate request   Ca  8.5 - 10.1 mg/dL 8.3(L) 8.5 Duplicate request   Mg 1.6 - 2.3 mg/dL - - -     Bone Health Latest Ref Rng & Units 6/13/2019 3/23/2019 11/1/2018   Phos 2.5 - 4.5 mg/dL 2.7 3.8 3.3   PTHi 18 - 80 pg/mL 456(H) - -   Vit D Def 20 - 75 ug/L 44 - -     Heme Latest Ref Rng & Units 3/7/2020 2/29/2020 1/21/2020   WBC 4.0 - 11.0 10e9/L 7.4 5.5 5.9   Hgb 13.3 - 17.7 g/dL 8.5(L) 8.2(L) 8.0(L)   Plt 150 - 450 10e9/L 155 224 193     Liver Latest Ref Rng & Units 10/14/2019 10/14/2019 6/13/2019   AP 40 - 150 U/L 63 55 -   TBili 0.2 - 1.3 mg/dL 0.3 0.3 -   ALT 0 - 70 U/L 34 37 -   AST 0 - 45 U/L 22 28 -   Tot " Protein 6.8 - 8.8 g/dL 6.2(L) 6.4(L) -   Albumin 3.4 - 5.0 g/dL 3.1(L) 3.4 3.4     Pancreas Latest Ref Rng & Units 4/10/2017 6/17/2015 6/6/2015   A1C 4.3 - 6.0 % - - 5.4   Amylase 30 - 110 U/L 153(H) - -   Lipase 73 - 393 U/L 387 179 -     Iron studies Latest Ref Rng & Units 10/10/2019 7/24/2019 6/13/2019   Iron 35 - 180 ug/dL 42 152 46   Iron sat 15 - 46 % 23 75(H) 21   Ferritin 26 - 388 ng/mL 790(H) 406(H) 436(H)     [facility] Txp Virology Latest Ref Rng & Units 9/25/2017 4/24/2017 2/28/2017   CMV IgG EU/mL - - -   CMV IgM <0.90 - - -   CMV IgM Interp <0.90 - - -   CVM DNA Quant - - Plasma Plasma, EDTA anticoagulant   CMV Quant <100 Copies/mL - - -   CMV QT Log <2.0 Log copies/mL - - -   BK Spec - Plasma - Plasma   BK Res BKNEG:BK Virus DNA Not Detected copies/mL BK Virus DNA Not Detected - BK Virus DNA Not Detected   BK Log <2.7 Log copies/mL Not Calculated - Not Calculated   The Real-Time quantitative BK Virus assay was developed and its performance   characteristics determined by the Infectious Diseases Diagnostic Laboratory at   the [facility] in [place], [place]. The   primers and probes for each analyte are Analyte Specific Reagents (ASRs)   manufactured by [employer].   ASRs are used in many laboratory tests necessary for standard medical care and   generally do not require U.S. Food and Drug Administration approval. The FDA   has determined that such clearance or approval is not necessary.   This test is used for clinical purposes. It should not be regarded as   investigational or for research. This laboratory is certified under the   Clinical Laboratory Improvement Amendments of 1988 (CLIA-88) as qualified to   perform high complexity clinical laboratory testing.     EBV IgG - - - -   Hep B Core NR:Nonreactive Nonreactive - -   Hep B Surf - - - -   HIV 1&2 NEG - - -        Recent Labs   Lab Test 01/21/20  1533 01/21/20  1549 02/29/20  1121   DOSTAC Duplicate request 2:00 PM ON  01/21/20 2130 02.28.20   TACROL Duplicate request 16.7* 6.7     Recent Labs   Lab Test 04/24/17  0849 10/27/18  0441 10/14/19  0752   DOSMPA 2200 Not Provided Not Provided   MPACID 2.16 <0.25* 4.55*   MPAG 172.3* 78.3 >200.0*     Scribe Disclosure:  I, Dulce Cook, vivian scribe, prepared the chart for today's encounter.       Again, thank you for allowing me to participate in the care of your patient.      Sincerely,    Kidney/Pancreas Recipient

## 2020-02-27 NOTE — NURSING NOTE
"Chief Complaint   Patient presents with     RECHECK     kidney tx     /88   Pulse 82   Temp 98.3  F (36.8  C) (Oral)   Ht 1.727 m (5' 8\")   Wt 66 kg (145 lb 8 oz)   SpO2 99%   BMI 22.12 kg/m    Armida Soriano, [facility]  2/27/2020 2:12 PM    "

## 2020-02-27 NOTE — PROGRESS NOTES
CHRONIC TRANSPLANT NEPHROLOGY VISIT    Assessment & Plan   # LDKT: Stable.  No overt uremic symptoms but some fatigue.  Patient needs to work on compliance to help reinstate his waiting time     - Baseline Cr ~ 4-5's   - Proteinuria: Moderate (1-3 grams); 2.92 on 3/19/2019   - Date DSA Last Checked: Sep/2017       Latest DSA: No   - BK Viremia: Not checked recently   - Kidney Tx Biopsy: Yes; moderate chronic tubulointerstitial changes, no diagnostic evidence of acute rejection seen.    # Immunosuppression: Tacrolimus immediate release (goal 4-6), Mycophenolate mofetil (goal 1-3.5) and Prednisone (dose 5 mg daily)   - Changes: No    # Prophylaxis:   - PJP: Sulfa/TMP (Bactrim)    # Hypertension: well controlled ; Goal BP: < 130/80   - Changes: no    # Mineral Bone Disorder:   - Secondary renal hyperparathyroidism; PTH level: not checked recently and will recheck PTH.  - Vitamin D; level: Not checked recently, but was low last check.  Will recheck vitamin D level.  - Calcium; level: Not checked recently; on supplement  - Phosphorus; level: Not checked recently     # Electrolytes:   - Potassium; level: Not checked recently  - Bicarbonate; level: Not checked recently; on supplement    # Gout: No recent flares. Takes Febuxostat.   - Will check uric acid.      # Lymphedema: Chronic since transplant.  Will refer patient to Lymphedema Clinic.    # Skin Cancer Risk: New lesions: none   - Discussed sun protection and recommend regular follow up with Dermatology.    # Medical Compliance: better but not completely there     # Transplant History:  Etiology of Kidney Failure: Hypertension  Tx: LDKT  Transplant: 1/13/2011 (Kidney)  Donor Type: Living Donor Class:   Significant changes in immunosuppression: None  Significant transplant-related complications: None and chronic rejection     Transplant Office Phone Number: [phone]    Assessment and plan was discussed with the patient and he voiced his understanding and  agreement.    Return visit: No follow-ups on file.    Chief Complaint   Mr. Ortiz is a 44 year old here for routine follow up.    History of Present Illness   Rashad Ortiz is a 44 year old male with a history of ESKD secondary to hypertension status post LDKT completed on 1/13/11. He was last seen in clinic by Dr. Barrios on 6/13/19; please see that note for further details.     Here for routine follow up. We discussed the need to comply with his lab schedule.     Recent Hospitalizations:  [x] No [] Yes    New Medical Issues: [x] No [] Yes    Decreased energy: [x] No [] Yes    Chest pain or SOB with exertion:  [x] No [] Yes    Appetite change or weight change: [x] No [] Yes    Nausea, vomiting or diarrhea:  [x] No [] Yes    Fever, sweats or chills: [x] No [] Yes    Leg swelling: [] No [x] Yes Chronic      Home BP: controlled     Review of Systems   A comprehensive review of systems was obtained and negative, except as noted in the HPI or PMH.    Problem List   Patient Active Problem List   Diagnosis     Kidney replaced by transplant     Aftercare following organ transplant     GERD (gastroesophageal reflux disease)     CARDIOVASCULAR SCREENING; LDL GOAL LESS THAN 160     Gout     Creatinine elevation     Antibody mediated rejection of kidney transplant     Medical non-compliance     Chronic kidney disease, stage 4, severely decreased GFR (H)     Herpes simplex infection of penis     Escherichia coli septicemia (H)     Pyelonephritis of transplanted kidney     HTN, kidney transplant related     Metabolic acidosis     CKD (chronic kidney disease) stage 5, GFR less than 15 ml/min (H)     Immunosuppression (H)     Anemia of chronic renal failure, stage 5 (H)     Alcohol intoxication (H)     Lumbar radiculopathy     Hip pain, left     Secondary renal hyperparathyroidism (H)       Social History   Social History     Tobacco Use     Smoking status: Former Smoker     Types: Cigarettes     Last attempt to quit: 03/2017      Years since quitting: 3.0     Smokeless tobacco: Never Used     Tobacco comment: Patient states that he is an 'social'  smoker    Substance Use Topics     Alcohol use: Yes     Alcohol/week: 4.2 standard drinks     Types: 5 Standard drinks or equivalent per week     Comment: 1-2 drinks/wk     Drug use: No       Allergies   No Known Allergies    Medications   Current Outpatient Medications   Medication Sig     amLODIPine (NORVASC) 5 MG tablet Take 1 tablet (5 mg) by mouth daily     carvedilol (COREG) 25 MG tablet Take 1 tablet (25 mg) by mouth 2 times daily     febuxostat (ULORIC) 40 MG TABS tablet Take 1 tablet (40 mg) by mouth daily     ferrous sulfate (FEROSUL) 325 (65 Fe) MG tablet Take 1 tablet (325 mg) by mouth daily (with breakfast)     furosemide (LASIX) 20 MG tablet Take 1 tablet (20 mg) by mouth 2 times daily     mycophenolate (GENERIC EQUIVALENT) 250 MG capsule Take 2 capsules (500 mg) by mouth 2 times daily     omeprazole (PRILOSEC) 20 MG capsule Take 1 capsule (20 mg) by mouth daily     predniSONE (DELTASONE) 5 MG tablet Take 2 tablets (10 mg) by mouth daily     sodium bicarbonate 650 MG tablet Take 2 tablets (1,300 mg) by mouth 3 times daily     sulfamethoxazole-trimethoprim (BACTRIM/SEPTRA) 400-80 MG tablet Take 1 tablet by mouth every other day     tacrolimus (GENERIC EQUIVALENT) 1 MG capsule Take 2 capsules (2 mg) by mouth 2 times daily     vitamin D3 (CHOLECALCIFEROL) 1000 units (25 mcg) tablet Take 2 tablets (2,000 Units) by mouth daily     acetaminophen (TYLENOL) 500 MG tablet Take 2 tablets (1,000 mg) by mouth every 8 hours as needed for mild pain     cyclobenzaprine (FLEXERIL) 5 MG tablet Take 1 tablet (5 mg) by mouth 3 times daily as needed for muscle spasms     HYDROcodone-acetaminophen (NORCO) 5-325 MG tablet Take 1 tablet by mouth every 6 hours as needed for severe pain     No current facility-administered medications for this visit.      There are no discontinued medications.    Physical  "Exam   Vital Signs: /88   Pulse 82   Temp 98.3  F (36.8  C) (Oral)   Ht 1.727 m (5' 8\")   Wt 66 kg (145 lb 8 oz)   SpO2 99%   BMI 22.12 kg/m      GENERAL APPEARANCE: alert and no distress  HENT: mouth without ulcers or lesions  LYMPHATICS: no cervical or supraclavicular nodes  RESP: lungs clear to auscultation - no rales, rhonchi or wheezes  CV: regular rhythm, normal rate, no rub, no murmur  EDEMA: no LE edema bilaterally  ABDOMEN: soft, nondistended, nontender, bowel sounds normal  MS: extremities normal - no gross deformities noted, no evidence of inflammation in joints, no muscle tenderness  SKIN: no rash      Data     Renal Latest Ref Rng & Units 2/29/2020 1/21/2020 1/21/2020   Na 133 - 144 mmol/L 145(H) 139 Duplicate request   K 3.4 - 5.3 mmol/L 4.2 4.6 Duplicate request   Cl 94 - 109 mmol/L 116(H) 113(H) Duplicate request   CO2 20 - 32 mmol/L 20 18(L) Duplicate request   BUN 7 - 30 mg/dL 65(H) 66(H) Duplicate request   Cr 0.66 - 1.25 mg/dL 5.67(H) 4.91(H) Duplicate request   Glucose 70 - 99 mg/dL 88 85 Duplicate request   Ca  8.5 - 10.1 mg/dL 8.3(L) 8.5 Duplicate request   Mg 1.6 - 2.3 mg/dL - - -     Bone Health Latest Ref Rng & Units 6/13/2019 3/23/2019 11/1/2018   Phos 2.5 - 4.5 mg/dL 2.7 3.8 3.3   PTHi 18 - 80 pg/mL 456(H) - -   Vit D Def 20 - 75 ug/L 44 - -     Heme Latest Ref Rng & Units 3/7/2020 2/29/2020 1/21/2020   WBC 4.0 - 11.0 10e9/L 7.4 5.5 5.9   Hgb 13.3 - 17.7 g/dL 8.5(L) 8.2(L) 8.0(L)   Plt 150 - 450 10e9/L 155 224 193     Liver Latest Ref Rng & Units 10/14/2019 10/14/2019 6/13/2019   AP 40 - 150 U/L 63 55 -   TBili 0.2 - 1.3 mg/dL 0.3 0.3 -   ALT 0 - 70 U/L 34 37 -   AST 0 - 45 U/L 22 28 -   Tot Protein 6.8 - 8.8 g/dL 6.2(L) 6.4(L) -   Albumin 3.4 - 5.0 g/dL 3.1(L) 3.4 3.4     Pancreas Latest Ref Rng & Units 4/10/2017 6/17/2015 6/6/2015   A1C 4.3 - 6.0 % - - 5.4   Amylase 30 - 110 U/L 153(H) - -   Lipase 73 - 393 U/L 387 179 -     Iron studies Latest Ref Rng & Units 10/10/2019 " 7/24/2019 6/13/2019   Iron 35 - 180 ug/dL 42 152 46   Iron sat 15 - 46 % 23 75(H) 21   Ferritin 26 - 388 ng/mL 790(H) 406(H) 436(H)     [facility] Txp Virology Latest Ref Rng & Units 9/25/2017 4/24/2017 2/28/2017   CMV IgG EU/mL - - -   CMV IgM <0.90 - - -   CMV IgM Interp <0.90 - - -   CVM DNA Quant - - Plasma Plasma, EDTA anticoagulant   CMV Quant <100 Copies/mL - - -   CMV QT Log <2.0 Log copies/mL - - -   BK Spec - Plasma - Plasma   BK Res BKNEG:BK Virus DNA Not Detected copies/mL BK Virus DNA Not Detected - BK Virus DNA Not Detected   BK Log <2.7 Log copies/mL Not Calculated - Not Calculated   The Real-Time quantitative BK Virus assay was developed and its performance   characteristics determined by the Infectious Diseases Diagnostic Laboratory at   the [facility] in [place], [place]. The   primers and probes for each analyte are Analyte Specific Reagents (ASRs)   manufactured by [employer].   ASRs are used in many laboratory tests necessary for standard medical care and   generally do not require U.S. Food and Drug Administration approval. The FDA   has determined that such clearance or approval is not necessary.   This test is used for clinical purposes. It should not be regarded as   investigational or for research. This laboratory is certified under the   Clinical Laboratory Improvement Amendments of 1988 (CLIA-88) as qualified to   perform high complexity clinical laboratory testing.     EBV IgG - - - -   Hep B Core NR:Nonreactive Nonreactive - -   Hep B Surf - - - -   HIV 1&2 NEG - - -        Recent Labs   Lab Test 01/21/20  1533 01/21/20  1549 02/29/20  1121   DOSTAC Duplicate request 2:00 PM ON 01/21/20 2130 02.28.20   TACROL Duplicate request 16.7* 6.7     Recent Labs   Lab Test 04/24/17  0849 10/27/18  0441 10/14/19  0752   DOSMPA 2200 Not Provided Not Provided   MPACID 2.16 <0.25* 4.55*   MPAG 172.3* 78.3 >200.0*     Scribe Disclosure:  Dulce CHASE a scribe, prepared the  chart for today's encounter.

## 2020-02-29 ENCOUNTER — OFFICE VISIT (OUTPATIENT)
Dept: URGENT CARE | Facility: URGENT CARE | Age: 45
End: 2020-02-29
Payer: COMMERCIAL

## 2020-02-29 VITALS
HEART RATE: 77 BPM | DIASTOLIC BLOOD PRESSURE: 75 MMHG | WEIGHT: 138.5 LBS | BODY MASS INDEX: 21.06 KG/M2 | SYSTOLIC BLOOD PRESSURE: 118 MMHG | RESPIRATION RATE: 16 BRPM | TEMPERATURE: 98 F | OXYGEN SATURATION: 100 %

## 2020-02-29 DIAGNOSIS — Z94.0 KIDNEY REPLACED BY TRANSPLANT: ICD-10-CM

## 2020-02-29 DIAGNOSIS — Z79.899 ENCOUNTER FOR LONG-TERM CURRENT USE OF MEDICATION: ICD-10-CM

## 2020-02-29 DIAGNOSIS — Z48.298 AFTERCARE FOLLOWING ORGAN TRANSPLANT: ICD-10-CM

## 2020-02-29 DIAGNOSIS — M25.552 HIP PAIN, LEFT: Primary | ICD-10-CM

## 2020-02-29 LAB
ERYTHROCYTE [DISTWIDTH] IN BLOOD BY AUTOMATED COUNT: 16.8 % (ref 10–15)
HCT VFR BLD AUTO: 27.3 % (ref 40–53)
HGB BLD-MCNC: 8.2 G/DL (ref 13.3–17.7)
MCH RBC QN AUTO: 26.1 PG (ref 26.5–33)
MCHC RBC AUTO-ENTMCNC: 30 G/DL (ref 31.5–36.5)
MCV RBC AUTO: 87 FL (ref 78–100)
PLATELET # BLD AUTO: 224 10E9/L (ref 150–450)
RBC # BLD AUTO: 3.14 10E12/L (ref 4.4–5.9)
WBC # BLD AUTO: 5.5 10E9/L (ref 4–11)

## 2020-02-29 PROCEDURE — 80197 ASSAY OF TACROLIMUS: CPT | Performed by: INTERNAL MEDICINE

## 2020-02-29 PROCEDURE — 85027 COMPLETE CBC AUTOMATED: CPT | Performed by: INTERNAL MEDICINE

## 2020-02-29 PROCEDURE — 80048 BASIC METABOLIC PNL TOTAL CA: CPT | Performed by: INTERNAL MEDICINE

## 2020-02-29 PROCEDURE — 36415 COLL VENOUS BLD VENIPUNCTURE: CPT | Performed by: INTERNAL MEDICINE

## 2020-02-29 PROCEDURE — 99214 OFFICE O/P EST MOD 30 MIN: CPT | Performed by: FAMILY MEDICINE

## 2020-02-29 RX ORDER — ACETAMINOPHEN 500 MG
1000 TABLET ORAL EVERY 8 HOURS PRN
Qty: 160 TABLET | Refills: 3 | Status: SHIPPED | OUTPATIENT
Start: 2020-02-29 | End: 2020-04-06

## 2020-02-29 RX ORDER — CYCLOBENZAPRINE HCL 5 MG
5 TABLET ORAL 3 TIMES DAILY PRN
Qty: 60 TABLET | Refills: 1 | Status: SHIPPED | OUTPATIENT
Start: 2020-02-29 | End: 2020-05-04

## 2020-02-29 NOTE — PROGRESS NOTES
Subjective     Rashad Ortiz is a 44 year old male who presents to clinic today for the following health issues:    HPI :  Patient now comes in with left hip, left groin pain which he reports been having since the end of December of last year.  Patient did have x-rays to his hip, x-ray shows to his back.  Both were normal.  He reports will start physical therapy end of the week.  He denies low back pain, denies pain with urination, denies frequency or urgency.  Denies constipation.  Has no fever no chills.  He has no previous injury or trauma.  Does not recall any injury or falls.  He comes in today to have a letter to excuse him from work until he started physical therapy, the end of this week.  In addition, to have a refill on his pain medication.  He has been getting hydrocodone to take as needed.  He reports he ran out.    Patient status post renal transplant.  Therefore, cannot take any NSAID.  Been taking Tylenol with no benefit.  Med Hx         Review of Systems   ROS COMP: Constitutional, HEENT, cardiovascular, pulmonary, gi and gu systems are negative, except as otherwise noted.      Objective    /75 (BP Location: Left arm, Patient Position: Chair, Cuff Size: Adult Regular)   Pulse 77   Temp 98  F (36.7  C) (Oral)   Resp 16   Wt 62.8 kg (138 lb 8 oz)   SpO2 100%   BMI 21.06 kg/m    Body mass index is 21.06 kg/m .  Physical Exam   GENERAL: healthy, alert and no distress  MS: Minimal exam to his left hip, patient reports painful.  Full extension.  Pain when he stand or try to walk.    Diagnostic Test Results:  Labs reviewed in Epic  none         Assessment & Plan       ICD-10-CM    1. Hip pain, left M25.552 cyclobenzaprine (FLEXERIL) 5 MG tablet     acetaminophen (TYLENOL) 500 MG tablet     Reviewed previous images.  Patient was granted a letter to be off work until March 5, 2020.    Discussed with the patient I cannot fill his hydrocodone.  Since he has been having a chronic pain problem.  I  did recommend for him to start Flexeril 3 times daily as needed take only when you are home avoid taking if you are driving.  And  continue with acetaminophen 1000 mg every 8 hours as needed.    Advised with heat and massage.    Follow-up with physical therapy and primary care physician.    There are no Patient Instructions on file for this visit.    No follow-ups on file.    Marcela Riley MD  [facility]

## 2020-02-29 NOTE — LETTER
February 29, 2020      Rashad GARCIAS Angel  [address]  [place] [address]        To Whom It May Concern:    Rashad Ortiz  was seen on 02/29/2020.  Please excuse him  until 03/05/2020 due to illness.        Sincerely,        Marcela Riley MD

## 2020-03-01 LAB
TACROLIMUS BLD-MCNC: 6.7 UG/L (ref 5–15)
TME LAST DOSE: NORMAL H

## 2020-03-02 ENCOUNTER — TELEPHONE (OUTPATIENT)
Dept: PHARMACY | Facility: CLINIC | Age: 45
End: 2020-03-02

## 2020-03-02 ENCOUNTER — TELEPHONE (OUTPATIENT)
Dept: TRANSPLANT | Facility: CLINIC | Age: 45
End: 2020-03-02

## 2020-03-02 DIAGNOSIS — N18.5 CHRONIC KIDNEY DISEASE, STAGE V (H): Primary | ICD-10-CM

## 2020-03-02 DIAGNOSIS — N18.5 ANEMIA OF CHRONIC RENAL FAILURE, STAGE 5 (H): ICD-10-CM

## 2020-03-02 DIAGNOSIS — D63.1 ANEMIA OF CHRONIC RENAL FAILURE, STAGE 5 (H): ICD-10-CM

## 2020-03-02 LAB
ANION GAP SERPL CALCULATED.3IONS-SCNC: 9 MMOL/L (ref 3–14)
BUN SERPL-MCNC: 65 MG/DL (ref 7–30)
CALCIUM SERPL-MCNC: 8.3 MG/DL (ref 8.5–10.1)
CHLORIDE SERPL-SCNC: 116 MMOL/L (ref 94–109)
CO2 SERPL-SCNC: 20 MMOL/L (ref 20–32)
CREAT SERPL-MCNC: 5.67 MG/DL (ref 0.66–1.25)
GFR SERPL CREATININE-BSD FRML MDRD: 11 ML/MIN/{1.73_M2}
GLUCOSE SERPL-MCNC: 88 MG/DL (ref 70–99)
POTASSIUM SERPL-SCNC: 4.2 MMOL/L (ref 3.4–5.3)
SODIUM SERPL-SCNC: 145 MMOL/L (ref 133–144)

## 2020-03-02 NOTE — TELEPHONE ENCOUNTER
Anemia Management Note - Enrollment  SUBJECTIVE/OBJECTIVE:    Referred by Dr. Abran Cunha on 2020  Primary Diagnosis: Anemia in Chronic Kidney Disease (N18.5, D63.1)     Secondary Diagnosis:  Chronic Kidney Disease, Stage 5 (N18.5)  Kidney Tx: 2011  Hgb goal range:  9-10  Epo/Darbo: Aranesp  60 mcg  every two weeks for Hgb <10. In clinic  Iron regimen:  Ferrous Sulfate  once daily  Labs : 2021  Recent LAWRENCE use, transfusion, IV iron: NA.  Aranesp was previously ordered, he never received it.   RX/TX plans : 2021  No history of stroke, MI and blood clots or cancers    Contact:  No Consent to communicate on File   Anemia Latest Ref Rng & Units 10/15/2019 10/22/2019 10/28/2019 2019 2020 2020 2020   Hemoglobin 13.3 - 17.7 g/dL 9.0(L) 8.0(L) 7.4(LL) 7.8(LL) Duplicate request 8.0(L) 8.2(L)   TSAT 15 - 46 % - - - - - - -   Ferritin 26 - 388 ng/mL - - - - - - -       BP Readings from Last 3 Encounters:   20 118/75   20 128/88   20 124/86     Wt Readings from Last 2 Encounters:   20 62.8 kg (138 lb 8 oz)   20 66 kg (145 lb 8 oz)     Current Outpatient Medications   Medication Sig Dispense Refill     acetaminophen (TYLENOL) 500 MG tablet Take 2 tablets (1,000 mg) by mouth every 8 hours as needed for mild pain 160 tablet 3     amLODIPine (NORVASC) 5 MG tablet Take 1 tablet (5 mg) by mouth daily 90 tablet 3     carvedilol (COREG) 25 MG tablet Take 1 tablet (25 mg) by mouth 2 times daily 180 tablet 3     cyclobenzaprine (FLEXERIL) 5 MG tablet Take 1 tablet (5 mg) by mouth 3 times daily as needed for muscle spasms 60 tablet 1     febuxostat (ULORIC) 40 MG TABS tablet Take 1 tablet (40 mg) by mouth daily 90 tablet 3     ferrous sulfate (FEROSUL) 325 (65 Fe) MG tablet Take 1 tablet (325 mg) by mouth daily (with breakfast) 30 tablet 11     furosemide (LASIX) 20 MG tablet Take 1 tablet (20 mg) by mouth 2 times daily 180 tablet 1      HYDROcodone-acetaminophen (NORCO) 5-325 MG tablet Take 1 tablet by mouth every 6 hours as needed for severe pain (Patient not taking: Reported on 2/29/2020) 10 tablet 0     mycophenolate (GENERIC EQUIVALENT) 250 MG capsule Take 2 capsules (500 mg) by mouth 2 times daily 120 capsule 11     omeprazole (PRILOSEC) 20 MG capsule Take 1 capsule (20 mg) by mouth daily 90 capsule 1     predniSONE (DELTASONE) 5 MG tablet Take 2 tablets (10 mg) by mouth daily 60 tablet 11     sodium bicarbonate 650 MG tablet Take 2 tablets (1,300 mg) by mouth 3 times daily 180 tablet 11     sulfamethoxazole-trimethoprim (BACTRIM/SEPTRA) 400-80 MG tablet Take 1 tablet by mouth every other day 45 tablet 3     tacrolimus (GENERIC EQUIVALENT) 1 MG capsule Take 2 capsules (2 mg) by mouth 2 times daily 120 capsule 11     vitamin D3 (CHOLECALCIFEROL) 1000 units (25 mcg) tablet Take 2 tablets (2,000 Units) by mouth daily 180 tablet 3     ASSESSMENT:  Hgb Not at goal/Initiation of therapy   Ferritin: Due for labs  TSat: Due for labs  Iron regimen recommended: Currently taking Ferrous Sulfate daily per medication list  Recommended LAWRENCE regimen.  Blood Pressure: Stable    PLAN:  1. Patient called today for enrollment in Anemia Management Service.  2. Discussed:  anemia overview, monitoring service and goal hemoglobin range and rationale and risks of LAWRENCE blood clots, stroke and increase in blood pressure  3. Dose location: in clinic [facility]  4. Labs: [place]  5. Pharmacy: [facility]      Spoke with Rashad.  He will call the [facility] to schedule Labs and Aranesp.  He was given the phone # to call. Patient verbalized understanding of the plan.     Next call date:  3/9/2020     Cate Ellis RN   Anemia Services  [facility]  [address] [place] [id number]   [email or web address]   Office : [phone]  Fax: [phone]

## 2020-03-02 NOTE — TELEPHONE ENCOUNTER
ISSUE:   Tacrolimus IR level 6.7 on 2/29/20 goal 4-6, dose 2 mg BID. Last dose recorded as 2/28/20. Labs drawn at 2/29/20 at 1121.     Creatinine elevated to 5.67 (baseline 4-5's) on 2/29/20; GFR 11. Seen Transplant Nephrology 2/27/20.     PLAN:   Please call patient and confirm this was an accurate 12-hour trough. (Appears as though it was a 14 hr level, assuming he had not taken his morning dose.)    Verify Tacrolimus IR dose 2 mg BID. Confirm no new medications or illness. Confirm no missed doses. If accurate trough and accurate dose, stay on the same dose Tacrolimus IR dose to 2 mg BID and repeat labs in 1 week.

## 2020-03-02 NOTE — TELEPHONE ENCOUNTER
DATE:  3/2/2020   TIME OF RECEIPT FROM LAB:  1:30 pm  LAB TEST:  Cr  LAB VALUE:  5.67  RESULTS GIVEN WITH READ-BACK TO (PROVIDER): Leonora Dwyer  TIME LAB VALUE REPORTED TO PROVIDER:   1:50 pm

## 2020-03-04 ENCOUNTER — OFFICE VISIT (OUTPATIENT)
Dept: FAMILY MEDICINE | Facility: CLINIC | Age: 45
End: 2020-03-04
Payer: COMMERCIAL

## 2020-03-04 VITALS
RESPIRATION RATE: 16 BRPM | DIASTOLIC BLOOD PRESSURE: 80 MMHG | BODY MASS INDEX: 22.04 KG/M2 | SYSTOLIC BLOOD PRESSURE: 127 MMHG | WEIGHT: 145.4 LBS | HEART RATE: 83 BPM | HEIGHT: 68 IN | TEMPERATURE: 98.4 F | OXYGEN SATURATION: 99 %

## 2020-03-04 DIAGNOSIS — D84.9 IMMUNOSUPPRESSION (H): ICD-10-CM

## 2020-03-04 DIAGNOSIS — N25.81 SECONDARY RENAL HYPERPARATHYROIDISM (H): ICD-10-CM

## 2020-03-04 DIAGNOSIS — L98.9 BENIGN SKIN LESION OF THIGH: ICD-10-CM

## 2020-03-04 DIAGNOSIS — M24.552 HIP FLEXOR TENDON TIGHTNESS, LEFT: Primary | ICD-10-CM

## 2020-03-04 DIAGNOSIS — M79.652 PAIN OF LEFT THIGH: ICD-10-CM

## 2020-03-04 PROCEDURE — 99214 OFFICE O/P EST MOD 30 MIN: CPT | Performed by: FAMILY MEDICINE

## 2020-03-04 RX ORDER — HYDROCODONE BITARTRATE AND ACETAMINOPHEN 5; 325 MG/1; MG/1
1 TABLET ORAL EVERY 6 HOURS PRN
Qty: 10 TABLET | Refills: 0 | Status: SHIPPED | OUTPATIENT
Start: 2020-03-04 | End: 2020-03-12

## 2020-03-04 ASSESSMENT — PAIN SCALES - GENERAL: PAINLEVEL: SEVERE PAIN (7)

## 2020-03-04 ASSESSMENT — MIFFLIN-ST. JEOR: SCORE: 1529.03

## 2020-03-04 NOTE — TELEPHONE ENCOUNTER
OUTCOME:   Spoke with patient, they confirm NOT accurate trough level and current dose 2 mg BID. Patient confirmed to stay on same dose and to repeat labs tomorrow. Orders sent to preferred lab for repeat labs. Patient voiced understanding of plan.     Reviewed importance of 12 hr trough level after patient stated he could get labs tomorrow after work. Encouraged him to go for lab draw at 7 am when [facility] opens. And to plan ahead and take his tacrolimus dose tonight at 7pm.     Patient states that at his recent transplant nephrology appointment there was no talk about dialysis.

## 2020-03-04 NOTE — PATIENT INSTRUCTIONS
At [facility], we strive to deliver an exceptional experience to you, every time we see you. If you receive a survey, please complete it as we do value your feedback.  If you have MyChart, you can expect to receive results automatically within 24 hours of their completion.  Your provider will send a note interpreting your results as well.   If you do not have MyChart, you should receive your results in about a week by mail.    Your care team:                            Family Medicine Internal Medicine   MD Juanito Lynne MD Shantel Branch-Fleming, MD Katya Georgiev PA-C Megan Hill, APRCLIFF Harrington, MD Pediatrics   Greg Fink, PAJaredC  Mellissa Balderrama, MD Leanna Hurley APRN CNP   MD Kelly Tipton MD Deborah Mielke, MD Kim Thein, APRN CNP      Clinic hours: Monday - Thursday 7 am-7 pm; Fridays 7 am-5 pm.   Urgent care: Monday - Friday 11 am-9 pm; Saturday and Sunday 9 am-5 pm.  Pharmacy : Monday -Thursday 8 am-8 pm; Friday 8 am-6 pm; Saturday and Sunday 9 am-5 pm.     Clinic: [phone]   Pharmacy: [phone]

## 2020-03-04 NOTE — TELEPHONE ENCOUNTER
Patient's baseline is 4-5s per last transplant nephrology appointment 2/27/20. Lab brought to Dr. Cunha's attention in clinic 3/2/29. Patient will repeat labs on Thursday 3/5/20.

## 2020-03-04 NOTE — PROGRESS NOTES
"Subjective     Rashad Ortiz is a 44 year old male who presents to clinic today for the following health issues:    HPI   Chronic Pain Follow-Up    Where in your body do you have pain? LEFT leg from anterior hip to just above knee.  Has been to PT once and due to go back. for over 6 months.  How has your pain affected your ability to work? Unable to work  Which of these pain treatments have you tried since your last clinic visit? Heat and Physical Therapy  How well are you sleeping? Poor  How has your mood been since your last visit? Slightly worse  Have you had a significant life event? Job Concerns, Financial Concerns and Housing Concerns  Other aggravating factors: prolonged sitting  Taking medication as directed? Yes    No flowsheet data found.  No flowsheet data found.  No flowsheet data found.  Encounter-Level CSA:    There are no encounter-level csa.     Patient-Level CSA:    There are no patient-level csa.         How many servings of fruits and vegetables do you eat daily?  0-1    On average, how many sweetened beverages do you drink each day (Examples: soda, juice, sweet tea, etc.  Do NOT count diet or artificially sweetened beverages)?   1    How many days per week do you exercise enough to make your heart beat faster? 7    How many minutes a day do you exercise enough to make your heart beat faster? 60 or more    How many days per week do you miss taking your medication? 0    Has a sore of this left buttock for over six months and won't fall off.         Reviewed and updated as needed this visit by Provider  Tobacco  Allergies  Meds  Problems  Med Hx  Surg Hx  Fam Hx         Review of Systems   ROS COMP: Constitutional, HEENT, cardiovascular, pulmonary, gi and gu systems are negative, except as otherwise noted.      Objective    /80 (BP Location: Left arm, Patient Position: Chair, Cuff Size: Adult Regular)   Pulse 83   Temp 98.4  F (36.9  C) (Oral)   Resp 16   Ht 1.727 m (5' 8\")   Wt " 66 kg (145 lb 6.4 oz)   SpO2 99%   BMI 22.11 kg/m    Body mass index is 22.11 kg/m .  Physical Exam   GENERAL: healthy, alert and no distress  NECK: no adenopathy, no asymmetry, masses, or scars and thyroid normal to palpation  RESP: lungs clear to auscultation - no rales, rhonchi or wheezes  CV: regular rate and rhythm, normal S1 S2, no S3 or S4, no murmur, click or rub, no peripheral edema and peripheral pulses strong  ABDOMEN: soft, nontender, no hepatosplenomegaly, no masses and bowel sounds normal  MS: pain with left hip flexor against gravity  SKIN: large flat scaled lesion on pedicule located on left upper thigh  PSYCH: mentation appears normal, affect normal/bright    Diagnostic Test Results:  Labs reviewed in Epic        Assessment & Plan     1. Hip flexor tendon tightness, left  Refilled pain medication and referral to sport medicine for further evaluation.  - HYDROcodone-acetaminophen (NORCO) 5-325 MG tablet; Take 1 tablet by mouth every 6 hours as needed for severe pain  Dispense: 10 tablet; Refill: 0  - SPORTS MEDICINE REFERRAL    2. Pain of left thigh  As above  - HYDROcodone-acetaminophen (NORCO) 5-325 MG tablet; Take 1 tablet by mouth every 6 hours as needed for severe pain  Dispense: 10 tablet; Refill: 0  - SPORTS MEDICINE REFERRAL    3. Benign skin lesion of thigh  Discussed possible etiologies and patient to return for removal.    4. Immunosuppression (H)  Continue antirejection drugs    5. Secondary renal hyperparathyroidism (H)  Continue as per nephrology.    The uses and side effects, including black box warnings as appropriate, were discussed in detail.  All patient questions were answered.  The patient was instructed to call immediately if any side effects developed.     Return in about 2 weeks (around 3/18/2020).    Mariel Mares MD  [facility]

## 2020-03-07 ENCOUNTER — OFFICE VISIT (OUTPATIENT)
Dept: ORTHOPEDICS | Facility: CLINIC | Age: 45
End: 2020-03-07
Payer: COMMERCIAL

## 2020-03-07 VITALS
DIASTOLIC BLOOD PRESSURE: 90 MMHG | SYSTOLIC BLOOD PRESSURE: 136 MMHG | WEIGHT: 145 LBS | BODY MASS INDEX: 21.98 KG/M2 | HEIGHT: 68 IN

## 2020-03-07 DIAGNOSIS — N18.5 ANEMIA OF CHRONIC RENAL FAILURE, STAGE 5 (H): ICD-10-CM

## 2020-03-07 DIAGNOSIS — M25.552 LEFT HIP PAIN: Primary | ICD-10-CM

## 2020-03-07 DIAGNOSIS — Z79.899 ENCOUNTER FOR LONG-TERM CURRENT USE OF MEDICATION: ICD-10-CM

## 2020-03-07 DIAGNOSIS — N18.5 CHRONIC KIDNEY DISEASE, STAGE V (H): ICD-10-CM

## 2020-03-07 DIAGNOSIS — Z48.298 AFTERCARE FOLLOWING ORGAN TRANSPLANT: ICD-10-CM

## 2020-03-07 DIAGNOSIS — Z94.0 KIDNEY REPLACED BY TRANSPLANT: ICD-10-CM

## 2020-03-07 DIAGNOSIS — D63.1 ANEMIA OF CHRONIC RENAL FAILURE, STAGE 5 (H): ICD-10-CM

## 2020-03-07 LAB
ERYTHROCYTE [DISTWIDTH] IN BLOOD BY AUTOMATED COUNT: 16.1 % (ref 10–15)
HCT VFR BLD AUTO: 27.7 % (ref 40–53)
HGB BLD-MCNC: 8.5 G/DL (ref 13.3–17.7)
MCH RBC QN AUTO: 26.2 PG (ref 26.5–33)
MCHC RBC AUTO-ENTMCNC: 30.7 G/DL (ref 31.5–36.5)
MCV RBC AUTO: 85 FL (ref 78–100)
PLATELET # BLD AUTO: 155 10E9/L (ref 150–450)
RBC # BLD AUTO: 3.25 10E12/L (ref 4.4–5.9)
WBC # BLD AUTO: 7.4 10E9/L (ref 4–11)

## 2020-03-07 PROCEDURE — 99244 OFF/OP CNSLTJ NEW/EST MOD 40: CPT | Performed by: PEDIATRICS

## 2020-03-07 PROCEDURE — 85027 COMPLETE CBC AUTOMATED: CPT | Performed by: INTERNAL MEDICINE

## 2020-03-07 PROCEDURE — 80197 ASSAY OF TACROLIMUS: CPT | Performed by: INTERNAL MEDICINE

## 2020-03-07 PROCEDURE — 80048 BASIC METABOLIC PNL TOTAL CA: CPT | Performed by: INTERNAL MEDICINE

## 2020-03-07 PROCEDURE — 36415 COLL VENOUS BLD VENIPUNCTURE: CPT | Performed by: INTERNAL MEDICINE

## 2020-03-07 ASSESSMENT — MIFFLIN-ST. JEOR: SCORE: 1527.22

## 2020-03-07 NOTE — PATIENT INSTRUCTIONS
Advanced imaging is done by appointment. Some insurance require a prior authorization to be completed which may delay the time until you are able to schedule your appointment.  Please call [place] [place] [facility] [place]: [phone] to schedule your MRI.  Depending on your availability you can usually schedule within the next 1-2 days.    The clinic will call you with results, if you have not heard from the clinic within 3-4 days following your MRI please contact us at the number listed below.   If you have any further questions for your physician or physician s care team you can call [phone] and use option 3 to leave a voice message. Calls received during business hours will be returned same day.

## 2020-03-07 NOTE — LETTER
3/7/2020         RE: Rashad Ortiz  [address]  [place] [address]        Dear Colleague,    Thank you for referring your patient, Rashad Ortiz, to the [facility] [place]. Please see a copy of my visit note below.    Sports Medicine Clinic Visit    PCP: Mariel Garcia    Rashad Ortiz is a 44 year old male who is seen  in consultation at the request of  Mariel Mares M.D. presenting with left hip and leg pain. Pain has been present for the past 4 months with no known injury, and has progressively gotten worse.  Pain is constant, worse with movement.  Nothing improves the pain.  Has been using some pain medications for short term relief.    He was seen by PT and was given some exercises, but has not had any relief.    Unable to hip flex  Denies any back pain.      Injury: gradual onset   **  Initially mild pain. Gradually increasing. In January, had increased pain, took time off work. The rest did not help.  Feels unable to work now due to pain.  + pain is constant.  Also pain with sitting. Pain more with walking, WB.      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     Review of Systems  Musculoskeletal: as above  Remainder of review of systems is negative including constitutional, CV, pulmonary, GI, Skin and Neurologic except as noted in HPI or medical history.    Past Medical History:   Diagnosis Date     Chronic  kidney disease, stage 4, severely decreased GFR (H)      Gastro-oesophageal reflux disease      Gout      History of blood transfusion      Hypertension      Medical non-compliance      Pulmonary nodules      Steroid long-term use      Past Surgical History:   Procedure Laterality Date     AV FISTULA OR GRAFT ARTERIAL       CREATE FISTULA ARTERIOVENOUS UPPER EXTREMITY Right 7/31/2019    Procedure: Creation Of Atriovenous Fistula Right Upper Arm;  Surgeon: Julia Irwin MD;  Location: [facility] OR     LIGATE FISTULA ARTERIOVENOUS UPPER EXTREMITY  12/20/2011    Procedure:LIGATE FISTULA ARTERIOVENOUS UPPER EXTREMITY; Excision of Right Forearm Arteriovenous Fistula.; Surgeon:LINDY AMAYA; Location:[facility] OR     PERCUTANEOUS BIOPSY KIDNEY Right 2/28/2017    Procedure: PERCUTANEOUS BIOPSY KIDNEY;  Surgeon: Gee Barrios MD;  Location: [facility] OR     TRANSPLANT  01/13/2011    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     Family History   Problem Relation Age of Onset     Hypertension Mother      Social History     Socioeconomic History     Marital status:      Spouse name: Not on file     Number of children: Not on file     Years of education: Not on file     Highest education level: Not on file   Occupational History     Not on file   Social Needs     Financial resource strain: Not on file     Food insecurity:     Worry: Not on file     Inability: Not on file     Transportation needs:     Medical: Not on file     Non-medical: Not on file   Tobacco Use     Smoking status: Former Smoker     Types: Cigarettes     Last attempt to quit: 03/2017     Years since quitting: 3.0     Smokeless tobacco: Never Used     Tobacco comment: Patient states that he is an 'social'  smoker    Substance and Sexual Activity     Alcohol use: Yes     Alcohol/week: 4.2 standard drinks     Types: 5 Standard drinks or equivalent per week     Comment: 1-2 drinks/wk     Drug use: No     Sexual activity: Never     Partners: Female     Birth  "control/protection: None   Lifestyle     Physical activity:     Days per week: Not on file     Minutes per session: Not on file     Stress: Not on file   Relationships     Social connections:     Talks on phone: Not on file     Gets together: Not on file     Attends Zoroastrian service: Not on file     Active member of club or organization: Not on file     Attends meetings of clubs or organizations: Not on file     Relationship status: Not on file     Intimate partner violence:     Fear of current or ex partner: Not on file     Emotionally abused: Not on file     Physically abused: Not on file     Forced sexual activity: Not on file   Other Topics Concern     Parent/sibling w/ CABG, MI or angioplasty before 65F 55M? Not Asked   Social History Narrative    Rashad lives with his wife and 2 children. There are 2 declawed cats. He works at a computer-based job in customer service.        Objective  BP (!) 136/90   Ht 1.727 m (5' 8\")   Wt 65.8 kg (145 lb)   BMI 22.05 kg/m         GENERAL APPEARANCE: alert and no distress   GAIT: antalgic  SKIN: no suspicious lesions or rashes  NEURO: Normal tone, mentation intact and speech normal  PSYCH:  mentation appears normal and affect normal/bright  HEENT: no scleral icterus  CV: distal perfusion intact  RESP: nonlabored breathing      Left hip exam    Inspection:        no edema or ecchymosis in hip area  Left lower leg, ankle, foot edema; patient notes this is his baseline following his transplant    ROM:       Flexion actively minimal from supine, with pain; passive to 90 degrees with knee in flexion, minimal pain      internal rotation ~10-15 deg, pain      external rotation ~45 deg, pain    Strength:        flexion unable to raise straight leg from supine position, with pain       abduction 3/5       adduction 4/5    Tender:        Anterior hip, mild    Non Tender:        remainder of hip area    Sensation:        grossly intact in hip and thigh    Skin:       well " perfused       capillary refill brisk    Special Tests:        positive (+) FADIR       Logroll positive        Low Back:  Inspection: There is no visible deformity in the back.  Range of Motion: Assessment limited with hip area discomfort; no significant change in hip pain with lumbar motion  Strength: See above  Straight Leg Raise: Right neg; Left some hip area pain, otherwise negative      Radiology  Visualized radiographs of left hip 1/21/2020, and reviewed the images with the patient.  Impression: May be minimal change on the left, calcification adjacent to the acetabulum.  Joint space appears fairly uniform throughout.  Patient notes image was taken with him standing.      LEFT HIP TWO VIEWS  1/21/2020 4:24 PM     HISTORY:  Pain.     COMPARISON:  None.     FINDINGS:  No fracture or osseous lesion is seen. The joint spaces are  well preserved. No soft tissue pathology is seen.                                                                      IMPRESSION:  Unremarkable examination.     DEL VILLEGAS MD    ==========================================  XR LUMBAR SPINE 2-3 VIEWS  1/21/2020 4:23 PM      HISTORY: Pain of left thigh     COMPARISON: None available. Correlation is made with CT of the abdomen  and pelvis dated 10/25/2018.                                                                      IMPRESSION: There are 5 nonrib-bearing lumbar-type vertebral bodies.  There appears to be mild dextroconvex curvature centered at the  thoracolumbar junction with otherwise normal alignment. The vertebral  body heights are maintained. The intervertebral disc spaces appear  largely maintained. There is a small area of sclerosis projecting over  the left sacroiliac joint, probably corresponding to a prominent  bridging anterior osteophyte of the left sacroiliac joint, as seen on  prior CT. A small metallic clip projects over the right pelvis.     GUNNER BELL MD    ================================  Reviewed prior  abdomen and pelvis CT 10/25/2018.  Some lateral acetabular spurring present.  Otherwise, no significant left hip joint abnormality identified.      Assessment:  1. Left hip pain        Plan:  Discussed the assessment with the patient.  Appears consistent with hip joint source, given pain with logroll and FADIR.  Concern for joint or bony issue given his use of immunosuppressant medication, including steroid.  Soft tissue pathology such as hip flexor involvement is also a consideration.  No benefit from physical therapy trial.  He is trying to continue with home exercises.  Advised to keep up with comfortable home exercises as able.  Discussed obtaining additional imaging.  Plan MRI of the left hip next.  Discussed use of ambulatory aid.  Offered crutches, declined.  He has a walking stick he plans to use, if needed.  If desiring DME for ambulatory aid, may contact clinic.  Follow up: Call with MRI results.  Future consideration may include left hip joint injection if underlying condition is arthrosis, but await MRI results.  Questions answered. The patient indicates understanding of these issues and agrees with the plan.    Jimmy Kemp DO, CAQ    CC: Mariel Contreras [place]          Patient Instructions        Advanced imaging is done by appointment. Some insurance require a prior authorization to be completed which may delay the time until you are able to schedule your appointment.  Please call [facility]: [phone] to schedule your MRI.  Depending on your availability you can usually schedule within the next 1-2 days.    The clinic will call you with results, if you have not heard from the clinic within 3-4 days following your MRI please contact us at the number listed below.   If you have any further questions for your physician or physician s care team you can call [phone] and use option 3 to leave a voice message. Calls received during business hours will be  returned same day.                Disclaimer: This note consists of symbols derived from keyboarding, dictation and/or voice recognition software. As a result, there may be errors in the script that have gone undetected. Please consider this when interpreting information found in this chart.        Again, thank you for allowing me to participate in the care of your patient.        Sincerely,        Jimmy Kemp, DO

## 2020-03-07 NOTE — PROGRESS NOTES
Sports Medicine Clinic Visit    PCP: Mariel Garcia    Rashad Ortiz is a 44 year old male who is seen  in consultation at the request of  Mariel Mares M.D. presenting with left hip and leg pain. Pain has been present for the past 4 months with no known injury, and has progressively gotten worse.  Pain is constant, worse with movement.  Nothing improves the pain.  Has been using some pain medications for short term relief.    He was seen by PT and was given some exercises, but has not had any relief.    Unable to hip flex  Denies any back pain.      Injury: gradual onset   **  Initially mild pain. Gradually increasing. In January, had increased pain, took time off work. The rest did not help.  Feels unable to work now due to pain.  + pain is constant.  Also pain with sitting. Pain more with walking, WB.      Location of Pain: left hip and thigh   Duration of Pain: 4 month(s)  Rating of Pain at worst: 10/10  Rating of Pain Currently: 10/10  Symptoms are better with: Nothing  Symptoms are worse with: mornings, activities   Additional Features:   Positive: weakness   Negative: swelling, bruising, popping, grinding, catching, locking, instability, paresthesias, numbness, pain in other joints and systemic symptoms  Other evaluation and/or treatments so far consists of: x rays, PT   Prior History of related problems: denies     Social History: works for [employer]     Review of Systems  Musculoskeletal: as above  Remainder of review of systems is negative including constitutional, CV, pulmonary, GI, Skin and Neurologic except as noted in HPI or medical history.    Past Medical History:   Diagnosis Date     Chronic kidney disease, stage 4, severely decreased GFR (H)      Gastro-oesophageal reflux disease      Gout      History of blood transfusion      Hypertension      Medical non-compliance      Pulmonary nodules      Steroid long-term use      Past Surgical History:    Procedure Laterality Date     AV FISTULA OR GRAFT ARTERIAL       CREATE FISTULA ARTERIOVENOUS UPPER EXTREMITY Right 7/31/2019    Procedure: Creation Of Atriovenous Fistula Right Upper Arm;  Surgeon: Julia Irwin MD;  Location: [facility] OR     LIGATE FISTULA ARTERIOVENOUS UPPER EXTREMITY  12/20/2011    Procedure:LIGATE FISTULA ARTERIOVENOUS UPPER EXTREMITY; Excision of Right Forearm Arteriovenous Fistula.; Surgeon:LINDY AMAYA; Location:[facility] OR     PERCUTANEOUS BIOPSY KIDNEY Right 2/28/2017    Procedure: PERCUTANEOUS BIOPSY KIDNEY;  Surgeon: Gee Barrios MD;  Location: [facility] OR     TRANSPLANT  01/13/2011    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     Family History   Problem Relation Age of Onset     Hypertension Mother      Social History     Socioeconomic History     Marital status:      Spouse name: Not on file     Number of children: Not on file     Years of education: Not on file     Highest education level: Not on file   Occupational History     Not on file   Social Needs     Financial resource strain: Not on file     Food insecurity:     Worry: Not on file     Inability: Not on file     Transportation needs:     Medical: Not on file     Non-medical: Not on file   Tobacco Use     Smoking status: Former Smoker     Types: Cigarettes     Last attempt to quit: 03/2017     Years since quitting: 3.0     Smokeless tobacco: Never Used     Tobacco comment: Patient states that he is an 'social'  smoker    Substance and Sexual Activity     Alcohol use: Yes     Alcohol/week: 4.2 standard drinks     Types: 5 Standard drinks or equivalent per week     Comment: 1-2 drinks/wk     Drug use: No     Sexual activity: Never     Partners: Female     Birth control/protection: None   Lifestyle     Physical activity:     Days per week: Not on file     Minutes per session: Not on file     Stress: Not on file   Relationships     Social connections:     Talks on phone: Not on file     Gets together: Not on file      "Attends Confucianist service: Not on file     Active member of club or organization: Not on file     Attends meetings of clubs or organizations: Not on file     Relationship status: Not on file     Intimate partner violence:     Fear of current or ex partner: Not on file     Emotionally abused: Not on file     Physically abused: Not on file     Forced sexual activity: Not on file   Other Topics Concern     Parent/sibling w/ CABG, MI or angioplasty before 65F 55M? Not Asked   Social History Narrative    Rashad lives with his wife and 2 children. There are 2 declawed cats. 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        Objective  BP (!) 136/90   Ht 1.727 m (5' 8\")   Wt 65.8 kg (145 lb)   BMI 22.05 kg/m        GENERAL APPEARANCE: alert and no distress   GAIT: antalgic  SKIN: no suspicious lesions or rashes  NEURO: Normal tone, mentation intact and speech normal  PSYCH:  mentation appears normal and affect normal/bright  HEENT: no scleral icterus  CV: distal perfusion intact  RESP: nonlabored breathing      Left hip exam    Inspection:        no edema or ecchymosis in hip area  Left lower leg, ankle, foot edema; patient notes this is his baseline following his transplant    ROM:       Flexion actively minimal from supine, with pain; passive to 90 degrees with knee in flexion, minimal pain      internal rotation ~10-15 deg, pain      external rotation ~45 deg, pain    Strength:        flexion unable to raise straight leg from supine position, with pain       abduction 3/5       adduction 4/5    Tender:        Anterior hip, mild    Non Tender:        remainder of hip area    Sensation:        grossly intact in hip and thigh    Skin:       well perfused       capillary refill brisk    Special Tests:        positive (+) FADIR       Logroll positive        Low Back:  Inspection: There is no visible deformity in the back.  Range of Motion: Assessment limited with hip area discomfort; no significant change in hip " pain with lumbar motion  Strength: See above  Straight Leg Raise: Right neg; Left some hip area pain, otherwise negative      Radiology  Visualized radiographs of left hip 1/21/2020, and reviewed the images with the patient.  Impression: May be minimal change on the left, calcification adjacent to the acetabulum.  Joint space appears fairly uniform throughout.  Patient notes image was taken with him standing.      LEFT HIP TWO VIEWS  1/21/2020 4:24 PM     HISTORY:  Pain.     COMPARISON:  None.     FINDINGS:  No fracture or osseous lesion is seen. The joint spaces are  well preserved. No soft tissue pathology is seen.                                                                      IMPRESSION:  Unremarkable examination.     DEL VILLEGAS MD    ==========================================  XR LUMBAR SPINE 2-3 VIEWS  1/21/2020 4:23 PM      HISTORY: Pain of left thigh     COMPARISON: None available. Correlation is made with CT of the abdomen  and pelvis dated 10/25/2018.                                                                      IMPRESSION: There are 5 nonrib-bearing lumbar-type vertebral bodies.  There appears to be mild dextroconvex curvature centered at the  thoracolumbar junction with otherwise normal alignment. The vertebral  body heights are maintained. The intervertebral disc spaces appear  largely maintained. There is a small area of sclerosis projecting over  the left sacroiliac joint, probably corresponding to a prominent  bridging anterior osteophyte of the left sacroiliac joint, as seen on  prior CT. A small metallic clip projects over the right pelvis.     GUNNER BELL MD    ================================  Reviewed prior abdomen and pelvis CT 10/25/2018.  Some lateral acetabular spurring present.  Otherwise, no significant left hip joint abnormality identified.      Assessment:  1. Left hip pain        Plan:  Discussed the assessment with the patient.  Appears consistent with hip joint  source, given pain with logroll and FADIR.  Concern for joint or bony issue given his use of immunosuppressant medication, including steroid.  Soft tissue pathology such as hip flexor involvement is also a consideration.  No benefit from physical therapy trial.  He is trying to continue with home exercises.  Advised to keep up with comfortable home exercises as able.  Discussed obtaining additional imaging.  Plan MRI of the left hip next.  Discussed use of ambulatory aid.  Offered crutches, declined.  He has a walking stick he plans to use, if needed.  If desiring DME for ambulatory aid, may contact clinic.  Follow up: Call with MRI results.  Future consideration may include left hip joint injection if underlying condition is arthrosis, but await MRI results.  Questions answered. The patient indicates understanding of these issues and agrees with the plan.    Jimmy Kemp DO, CAQ    CC: Mariel Mares          Patient Instructions        Advanced imaging is done by appointment. Some insurance require a prior authorization to be completed which may delay the time until you are able to schedule your appointment.  [facility] call [facility]: [phone] to schedule your MRI.  Depending on your availability you can usually schedule within the next 1-2 days.    The clinic will call you with results, if you have not heard from the clinic within 3-4 days following your MRI please contact us at the number listed below.   If you have any further questions for your physician or physician s care team you can call [phone] and use option 3 to leave a voice message. Calls received during business hours will be returned same day.                Disclaimer: This note consists of symbols derived from keyboarding, dictation and/or voice recognition software. As a result, there may be errors in the script that have gone undetected. Please consider this when interpreting information  found in this chart.

## 2020-03-08 LAB
TACROLIMUS BLD-MCNC: 6.5 UG/L (ref 5–15)
TME LAST DOSE: NORMAL H

## 2020-03-09 ENCOUNTER — TELEPHONE (OUTPATIENT)
Dept: TRANSPLANT | Facility: CLINIC | Age: 45
End: 2020-03-09

## 2020-03-09 ENCOUNTER — REFERRAL (OUTPATIENT)
Dept: TRANSPLANT | Facility: CLINIC | Age: 45
End: 2020-03-09

## 2020-03-09 ENCOUNTER — ANCILLARY PROCEDURE (OUTPATIENT)
Dept: MRI IMAGING | Facility: CLINIC | Age: 45
End: 2020-03-09
Attending: PEDIATRICS
Payer: COMMERCIAL

## 2020-03-09 DIAGNOSIS — Z87.891 HISTORY OF TOBACCO USE: ICD-10-CM

## 2020-03-09 DIAGNOSIS — M25.552 LEFT HIP PAIN: ICD-10-CM

## 2020-03-09 DIAGNOSIS — Z01.818 PRE-TRANSPLANT EVALUATION FOR KIDNEY TRANSPLANT: ICD-10-CM

## 2020-03-09 DIAGNOSIS — I10 ESSENTIAL HYPERTENSION: ICD-10-CM

## 2020-03-09 DIAGNOSIS — T86.12 KIDNEY TRANSPLANT FAILURE: ICD-10-CM

## 2020-03-09 DIAGNOSIS — M87.00 AVN (AVASCULAR NECROSIS OF BONE) (H): ICD-10-CM

## 2020-03-09 DIAGNOSIS — N12 PYELONEPHRITIS: ICD-10-CM

## 2020-03-09 DIAGNOSIS — N18.6 ESRD (END STAGE RENAL DISEASE) (H): Primary | ICD-10-CM

## 2020-03-09 DIAGNOSIS — N18.9 CHRONIC RENAL FAILURE: ICD-10-CM

## 2020-03-09 DIAGNOSIS — Z76.82 ORGAN TRANSPLANT CANDIDATE: ICD-10-CM

## 2020-03-09 LAB
ANION GAP SERPL CALCULATED.3IONS-SCNC: 11 MMOL/L (ref 3–14)
BUN SERPL-MCNC: 58 MG/DL (ref 7–30)
CALCIUM SERPL-MCNC: 8.3 MG/DL (ref 8.5–10.1)
CHLORIDE SERPL-SCNC: 111 MMOL/L (ref 94–109)
CO2 SERPL-SCNC: 21 MMOL/L (ref 20–32)
CREAT SERPL-MCNC: 5.1 MG/DL (ref 0.66–1.25)
GFR SERPL CREATININE-BSD FRML MDRD: 13 ML/MIN/{1.73_M2}
GLUCOSE SERPL-MCNC: 84 MG/DL (ref 70–99)
POTASSIUM SERPL-SCNC: 4.1 MMOL/L (ref 3.4–5.3)
SODIUM SERPL-SCNC: 143 MMOL/L (ref 133–144)

## 2020-03-09 PROCEDURE — 73721 MRI JNT OF LWR EXTRE W/O DYE: CPT | Mod: TC

## 2020-03-09 NOTE — LETTER
03/23/20    Rashad Ortiz  [address]  [place] [address]    Dear Rashad,    Thank you for your interest in the Transplant Center at [facility], [facility]. We look forward to being a part of your care team and assisting you through the transplant process.    As we discussed, your transplant coordinator is Donna Patel (Kidney).  You may call your coordinator at any time with questions or concerns.  Your first scheduled call will be on 3/31/2020.  If this needs to change, call [phone].    Please complete the following.    1. Fill out and return the enclosed forms    Authorization for Electronic Communication    Authorization to Discuss Protected Health Information    Authorization for Release of Protected Health Information    Authorization for Care Everywhere Release of Information    2. Sign up for:    Member Desk, access to your electronic medical record (see enclosed pamphlet)    [email or web address], a transplant education website    You can use these tools to learn more about your transplant, communicate with your care team, and track your medical details      Sincerely,    Solid Organ Transplant  [facility], [facility]    cc: Mariel Mares MD

## 2020-03-09 NOTE — TELEPHONE ENCOUNTER
Royce is scheduled for 3/11/2020 at 2pm.     Cate Ellis RN   Anemia Services  [address] [address]   [email or web address]   Office : [phone]  Fax: [phone]

## 2020-03-09 NOTE — TELEPHONE ENCOUNTER
ISSUE: Creatinine Elevation 5.10 on 3/7/20; baseline 4-5's.     PLAN/OUTCOME: Notified Dr. Murillo while in clinic. Patient was contacted by transplant referral team yesterday.

## 2020-03-09 NOTE — TELEPHONE ENCOUNTER
DATE:  3/9/2020   TIME OF RECEIPT FROM LAB:  3:31 PM  LAB TEST:  Creatinine  LAB VALUE:  5.10  RESULTS GIVEN WITH READ-BACK TO (PROVIDER):  Leonora Dwyer RN  TIME LAB VALUE REPORTED TO PROVIDER:   2:37 PM

## 2020-03-10 ENCOUNTER — TELEPHONE (OUTPATIENT)
Dept: ORTHOPEDICS | Facility: CLINIC | Age: 45
End: 2020-03-10

## 2020-03-10 DIAGNOSIS — M87.9 OSTEONECROSIS OF LEFT HIP (H): Primary | ICD-10-CM

## 2020-03-10 LAB — RADIOLOGIST FLAGS: NORMAL

## 2020-03-10 NOTE — LETTER
[facility] [place]  [address]  [place] [address]  Phone: [phone]  Fax: [phone]      March 11, 2020      RE: Rashad Ortiz  [address]  [place] [address]        To whom it may concern:    Rashad Ortiz has been seen in our clinic. The employee is UNABLE to return to work until further evaluation by orthopedic surgeon, when is currently 3/18/20.        Sincerely,              Jimmy FISH

## 2020-03-10 NOTE — TELEPHONE ENCOUNTER
Results for orders placed or performed in visit on 03/09/20   MR Hip Left w/o Contrast     Value    Radiologist flags Osteonecrosis of the left hip.    Narrative    Exam: MRI of the left hip dated 3/9/2020.    COMPARISON: Radiographs dated 1/21/2020.    CLINICAL HISTORY: Increased hip pain with history of immunosuppressive  therapy.    TECHNIQUE: Multiplanar, multisequence MR imaging of the left hip was  obtained using standard sequences in 3 orthogonal planes without the  use of intravenous or intra-articular gadolinium contrast.    FINDINGS:    Moderate size left hip joint effusion with synovitis.    Extensive bone marrow edema like signal intensity extending from the  superior aspect of the femoral head through the intratrochanteric  region of the left hip. There is associated extensive osteonecrosis in  the left hip with mild subchondral bone irregularity along the  anterior superior hip without significant collapse. There is mild  periosteal soft tissue edema around the proximal femur.    The articular cartilage is not well evaluated, however there appears  to be at least some thinning of the articular cartilage as well as  degenerative tearing of the anterior superior labrum.    No lymphadenopathy. The muscle bulk is intact without significant  atrophy. No full-thickness tear or tendon retraction.      Impression    IMPRESSION:  1. Moderate-sized left hip joint effusion with synovitis.  2. Extensive bone marrow edema like signal intensity in the left hip,  extending from the superior aspect of the femoral head to the  intratrochanteric region of the proximal femur. There is extensive  osteonecrosis in the left femoral head with mild irregularity along  the subchondral bone without marked collapse. There is surrounding  periosteal soft tissue edema.  3. Cartilage thinning and degenerative tearing of the anterior  superior labrum.  4. No full-thickness tear or tendon retraction.    EXAM:   [Consider Follow Up:  Osteonecrosis of the left hip.]    This report will be copied to the [facility] to ensure a  provider acknowledges the finding.     NICHOLAS LITTLE MD     *Note: Due to a large number of results and/or encounters for the requested time period, some results have not been displayed. A complete set of results can be found in Results Review.

## 2020-03-10 NOTE — LETTER
March 11, 2020      RE: Rashad Ortiz  [address]  [place] [address]        To whom it may concern:    Rashad Ortiz has been seen in our clinic. The employee is UNABLE to return to work until further evaluation by orthopedic surgeon, when is currently 3/18/20.        Sincerely,              Jimmy FISH

## 2020-03-10 NOTE — TELEPHONE ENCOUNTER
"Please advise of MRI findings. Bone marrow edema femoral head and neck. Also with osteonecrosis (abnormal finding in the femoral head, basically the bone has started to \"die off\" in the area), though no apparent significant collapse of the bone.  Would advise nonweightbearing entirely for the left LE. Would offer crutches if needed.  Also advise he see orthopedic surgeon next, given the degree of the change noted, and with history of steroid use.  Thanks.  Jimmy Kemp DO, CAQ    "

## 2020-03-10 NOTE — TELEPHONE ENCOUNTER
LVM for patient to call back to discuss.    He can also MyChart us if this is more convenient.   Tg Nix MS ATC

## 2020-03-11 ENCOUNTER — TELEPHONE (OUTPATIENT)
Dept: TRANSPLANT | Facility: CLINIC | Age: 45
End: 2020-03-11

## 2020-03-11 ENCOUNTER — MYC MEDICAL ADVICE (OUTPATIENT)
Dept: FAMILY MEDICINE | Facility: CLINIC | Age: 45
End: 2020-03-11

## 2020-03-11 ENCOUNTER — MYC REFILL (OUTPATIENT)
Dept: FAMILY MEDICINE | Facility: CLINIC | Age: 45
End: 2020-03-11

## 2020-03-11 DIAGNOSIS — M79.652 PAIN OF LEFT THIGH: ICD-10-CM

## 2020-03-11 DIAGNOSIS — Z94.0 KIDNEY TRANSPLANTED: Primary | ICD-10-CM

## 2020-03-11 DIAGNOSIS — D84.9 IMMUNOSUPPRESSED STATUS (H): ICD-10-CM

## 2020-03-11 DIAGNOSIS — M24.552 HIP FLEXOR TENDON TIGHTNESS, LEFT: ICD-10-CM

## 2020-03-11 RX ORDER — HYDROCODONE BITARTRATE AND ACETAMINOPHEN 5; 325 MG/1; MG/1
1 TABLET ORAL EVERY 6 HOURS PRN
Qty: 10 TABLET | Refills: 0 | Status: CANCELLED | OUTPATIENT
Start: 2020-03-11

## 2020-03-11 NOTE — TELEPHONE ENCOUNTER
There is a refill request pending through his PCP; await response from Dr Vu. I think an additional prescription is reasonable given the nature of the underlying condition, and if he will be nonweightbearing on the left LE, but defer to original prescribing provider first.  Regarding a work note, would offer letter indicating off work until he sees orthopedic surgeon, given nature of the underlying condition. Letter is pended. If he is looking for something else, let me know.  Thanks.  Jimmy Kemp DO, CAQ

## 2020-03-11 NOTE — TELEPHONE ENCOUNTER
This writer attempted to contact patient on 03/11/20      Reason for call triege and left message.      If patient calls back:   Registered Nurse called. Follow Triage Call workflow        Cristina Hancock RN

## 2020-03-11 NOTE — TELEPHONE ENCOUNTER
MyChart message below copied and pasted and attached to refill request for Norco, sent to refill pool earlier today.  Closing this encounter.    Marilee Ibarra RN  [facility]       yes

## 2020-03-11 NOTE — TELEPHONE ENCOUNTER
Patient is not having any sx of influenza, or any sx in general. Just states that his daughter was dx with it.     Patient is aware of proper precautions to aid in prevention of developing influenza.     Rashad has agreed to create a e-visit to see if he is able to get tamiflu as a preventative.     Erlinda Marmolejo RN  [place]

## 2020-03-11 NOTE — TELEPHONE ENCOUNTER
Called and spoke with patient and his wife.  Relayed MRI results and plan to them.  Ortho  number was provided.      He was requesting a refill of of his pain medication and would like a work note.  I explained that I would pass this message along to Dr. Kemp for his recommendations. Patient expressed understanding.     Rosi Cramer ATC

## 2020-03-11 NOTE — TELEPHONE ENCOUNTER
Requested Prescriptions   Pending Prescriptions Disp Refills     HYDROcodone-acetaminophen (NORCO) 5-325 MG tablet 10 tablet 0     Sig: Take 1 tablet by mouth every 6 hours as needed for severe pain       There is no refill protocol information for this order          HYDROcodone-acetaminophen (NORCO) 5-325 MG tablet      Last Written Prescription Date:  3/4/2020  Last Fill Quantity: 10,   # refills: 0  Last Office Visit: 3/4/2020  Future Office visit:    Next 5 appointments (look out 90 days)    Mar 18, 2020 10:40 AM CDT  Office Visit with Mariel Mares MD  [facility] [address] [place] [id number] [phone]           Routing refill request to provider for review/approval because:  Drug not on the [facility], [facility] or [facility] refill protocol or controlled substance

## 2020-03-11 NOTE — TELEPHONE ENCOUNTER
Call returned to Rashad Ortiz, let him know we will prescribe tamiflu dose. Mikayla denies any respiratory symptoms.  Will review with MD.

## 2020-03-11 NOTE — TELEPHONE ENCOUNTER
Called and spoke with the patient.  He has his appointment scheduled for 3/18/20 with the surgeon.    [facility]  Letter sent.  Will contact primary care for pain medicaiton    Tg Nix MS ATC

## 2020-03-11 NOTE — TELEPHONE ENCOUNTER
Per Gely message from patient today:    Rashad Ortiz   to Mariel Garcia MD        3/11/20 2:18 PM   Hello     May I Please get a refill on my pain meds enough to last until I see the surgeon on March 18th.     Please advise  Thanks.

## 2020-03-12 ENCOUNTER — TELEPHONE (OUTPATIENT)
Dept: TRANSPLANT | Facility: CLINIC | Age: 45
End: 2020-03-12

## 2020-03-12 RX ORDER — OSELTAMIVIR PHOSPHATE 30 MG/1
30 CAPSULE ORAL DAILY
Qty: 5 CAPSULE | Refills: 0 | Status: SHIPPED | OUTPATIENT
Start: 2020-03-12 | End: 2020-05-04

## 2020-03-12 NOTE — TELEPHONE ENCOUNTER
Herrera Carey 9 minutes ago (9:26 AM)          Reason for Call:  Other prescription     Detailed comments: Pt calling to follow up on medication request and would like to see if Dr. Diane Mares can send in Rx to Pharmacy as soon as possible.  He would like a call back for an update.      Phone Number Patient can be reached at: Home number on file [phone] (home)     Best Time: anytime     Can we leave a detailed message on this number? YES     Call taken on 3/12/2020 at 9:25 AM by Herrera Carey

## 2020-03-12 NOTE — TELEPHONE ENCOUNTER
Canceled appt for 3/11/20 and rescheduled for 3/18/2020.    Cate Ellis RN   Anemia Services  [address] [address]   [email or web address]   Office : [phone]  Fax: [phone]

## 2020-03-12 NOTE — TELEPHONE ENCOUNTER
Alphonse, MD Simba Beck Meghan M, RN               30 mg daily for 7 days    Previous Messages     ----- Message -----   From: Aixa Marino RN   Sent: 3/11/2020   4:21 PM CDT   To: Abran Cunha MD, *   Subject: Tamiflu                                           Rashad's daughter was diagnosed with influenza A. Please confirm tamiflu dose for prophylaxis and kidney function and I will send in prescription/call him. Thanks!         Rashad Ortiz v/u, prescription sent to local pharmacy.

## 2020-03-12 NOTE — TELEPHONE ENCOUNTER
Question regarding obtaining pain medication from the PCP  Due to his on going pain issue  9 year post transplant      Reviewed that unfortunately that the transplant office can not reach out to PCP  For pain medication  medications   He should contact PCP

## 2020-03-12 NOTE — TELEPHONE ENCOUNTER
Reason for Call:  Other prescription    Detailed comments: Pt calling to follow up on medication request and would like to see if Dr. Diane Mares can send in Rx to Pharmacy as soon as possible.  He would like a call back for an update.     Phone Number Patient can be reached at: Home number on file [phone] (home)    Best Time: anytime    Can we leave a detailed message on this number? YES    Call taken on 3/12/2020 at 9:25 AM by Herrera Carey

## 2020-03-16 ENCOUNTER — TELEPHONE (OUTPATIENT)
Dept: FAMILY MEDICINE | Facility: CLINIC | Age: 45
End: 2020-03-16

## 2020-03-16 NOTE — TELEPHONE ENCOUNTER
Called patient to reschedule appointment from 3/18/2020. Patient states that he is in pain and does not want to wait to be seen. Advised that I would send message to care team to let them know he was in discomfort. Patient stated that he would like to just cancel the appointment and seek care elsewhere.

## 2020-03-18 ENCOUNTER — OFFICE VISIT (OUTPATIENT)
Dept: ORTHOPEDICS | Facility: CLINIC | Age: 45
End: 2020-03-18
Payer: COMMERCIAL

## 2020-03-18 ENCOUNTER — TELEPHONE (OUTPATIENT)
Dept: FAMILY MEDICINE | Facility: CLINIC | Age: 45
End: 2020-03-18

## 2020-03-18 ENCOUNTER — TELEPHONE (OUTPATIENT)
Dept: PHARMACY | Facility: CLINIC | Age: 45
End: 2020-03-18

## 2020-03-18 ENCOUNTER — ANCILLARY PROCEDURE (OUTPATIENT)
Dept: GENERAL RADIOLOGY | Facility: CLINIC | Age: 45
End: 2020-03-18
Attending: ORTHOPAEDIC SURGERY
Payer: COMMERCIAL

## 2020-03-18 ENCOUNTER — MYC REFILL (OUTPATIENT)
Dept: ORTHOPEDICS | Facility: CLINIC | Age: 45
End: 2020-03-18

## 2020-03-18 VITALS
BODY MASS INDEX: 22.58 KG/M2 | DIASTOLIC BLOOD PRESSURE: 80 MMHG | TEMPERATURE: 98.1 F | HEIGHT: 68 IN | HEART RATE: 84 BPM | OXYGEN SATURATION: 99 % | SYSTOLIC BLOOD PRESSURE: 121 MMHG | WEIGHT: 149 LBS

## 2020-03-18 DIAGNOSIS — M87.9 OSTEONECROSIS OF LEFT HIP (H): ICD-10-CM

## 2020-03-18 DIAGNOSIS — Z79.899 ENCOUNTER FOR LONG-TERM CURRENT USE OF MEDICATION: ICD-10-CM

## 2020-03-18 DIAGNOSIS — D63.1 ANEMIA OF CHRONIC RENAL FAILURE, STAGE 5 (H): ICD-10-CM

## 2020-03-18 DIAGNOSIS — M25.552 PAIN OF LEFT HIP JOINT: ICD-10-CM

## 2020-03-18 DIAGNOSIS — M25.559 HIP PAIN: ICD-10-CM

## 2020-03-18 DIAGNOSIS — Z94.0 KIDNEY REPLACED BY TRANSPLANT: ICD-10-CM

## 2020-03-18 DIAGNOSIS — N18.5 CHRONIC KIDNEY DISEASE, STAGE V (H): ICD-10-CM

## 2020-03-18 DIAGNOSIS — Z48.298 AFTERCARE FOLLOWING ORGAN TRANSPLANT: ICD-10-CM

## 2020-03-18 DIAGNOSIS — M87.9 OSTEONECROSIS OF LEFT HIP (H): Primary | ICD-10-CM

## 2020-03-18 DIAGNOSIS — N18.5 ANEMIA OF CHRONIC RENAL FAILURE, STAGE 5 (H): ICD-10-CM

## 2020-03-18 LAB
ANION GAP SERPL CALCULATED.3IONS-SCNC: 8 MMOL/L (ref 3–14)
BUN SERPL-MCNC: 52 MG/DL (ref 7–30)
CALCIUM SERPL-MCNC: 8.5 MG/DL (ref 8.5–10.1)
CHLORIDE SERPL-SCNC: 114 MMOL/L (ref 94–109)
CO2 SERPL-SCNC: 20 MMOL/L (ref 20–32)
CREAT SERPL-MCNC: 4.71 MG/DL (ref 0.66–1.25)
CREAT UR-MCNC: 56 MG/DL
ERYTHROCYTE [DISTWIDTH] IN BLOOD BY AUTOMATED COUNT: 16.9 % (ref 10–15)
FERRITIN SERPL-MCNC: 460 NG/ML (ref 26–388)
GFR SERPL CREATININE-BSD FRML MDRD: 14 ML/MIN/{1.73_M2}
GLUCOSE SERPL-MCNC: 88 MG/DL (ref 70–99)
HCT VFR BLD AUTO: 27.6 % (ref 40–53)
HGB BLD-MCNC: 8.1 G/DL (ref 13.3–17.7)
IRON SATN MFR SERPL: 30 % (ref 15–46)
IRON SERPL-MCNC: 54 UG/DL (ref 35–180)
MCH RBC QN AUTO: 25.9 PG (ref 26.5–33)
MCHC RBC AUTO-ENTMCNC: 29.3 G/DL (ref 31.5–36.5)
MCV RBC AUTO: 88 FL (ref 78–100)
PLATELET # BLD AUTO: 255 10E9/L (ref 150–450)
POTASSIUM SERPL-SCNC: 4.4 MMOL/L (ref 3.4–5.3)
PROT UR-MCNC: 0.36 G/L
PROT/CREAT 24H UR: 0.64 G/G CR (ref 0–0.2)
RBC # BLD AUTO: 3.13 10E12/L (ref 4.4–5.9)
SODIUM SERPL-SCNC: 142 MMOL/L (ref 133–144)
TACROLIMUS BLD-MCNC: 7.1 UG/L (ref 5–15)
TIBC SERPL-MCNC: 179 UG/DL (ref 240–430)
TME LAST DOSE: NORMAL H
WBC # BLD AUTO: 4.7 10E9/L (ref 4–11)

## 2020-03-18 PROCEDURE — 83550 IRON BINDING TEST: CPT | Performed by: INTERNAL MEDICINE

## 2020-03-18 PROCEDURE — 80048 BASIC METABOLIC PNL TOTAL CA: CPT | Performed by: INTERNAL MEDICINE

## 2020-03-18 PROCEDURE — 72170 X-RAY EXAM OF PELVIS: CPT

## 2020-03-18 PROCEDURE — 80197 ASSAY OF TACROLIMUS: CPT | Performed by: INTERNAL MEDICINE

## 2020-03-18 PROCEDURE — 84156 ASSAY OF PROTEIN URINE: CPT | Performed by: INTERNAL MEDICINE

## 2020-03-18 PROCEDURE — 85027 COMPLETE CBC AUTOMATED: CPT | Performed by: INTERNAL MEDICINE

## 2020-03-18 PROCEDURE — 99243 OFF/OP CNSLTJ NEW/EST LOW 30: CPT | Performed by: ORTHOPAEDIC SURGERY

## 2020-03-18 PROCEDURE — 36415 COLL VENOUS BLD VENIPUNCTURE: CPT | Performed by: INTERNAL MEDICINE

## 2020-03-18 PROCEDURE — 82728 ASSAY OF FERRITIN: CPT | Performed by: INTERNAL MEDICINE

## 2020-03-18 PROCEDURE — 83540 ASSAY OF IRON: CPT | Performed by: INTERNAL MEDICINE

## 2020-03-18 RX ORDER — HYDROCODONE BITARTRATE AND ACETAMINOPHEN 5; 325 MG/1; MG/1
1 TABLET ORAL EVERY 6 HOURS PRN
Qty: 10 TABLET | Refills: 0 | Status: CANCELLED | OUTPATIENT
Start: 2020-03-18

## 2020-03-18 ASSESSMENT — MIFFLIN-ST. JEOR: SCORE: 1545.36

## 2020-03-18 ASSESSMENT — PAIN SCALES - GENERAL: PAINLEVEL: SEVERE PAIN (7)

## 2020-03-18 NOTE — TELEPHONE ENCOUNTER
Routing to provider to please advise if telephone apt would be appropriate.      Patient was seen in clinic on 03/04/2020    JENNIFER [place]

## 2020-03-18 NOTE — TELEPHONE ENCOUNTER
Please advise on pain medication refill.  Was seen by Dr. Skinner today for his hip, note incomplete to see if he is scheduling for FRANCA (or when due to scheduling).  Has made a phone call to try and establish care with primary who referred him to see you.    Tg Nix MS ATC

## 2020-03-18 NOTE — PROGRESS NOTES
"Chief Complaint:   Chief Complaint   Patient presents with     Left Hip - Pain     Hip Pain     Left hip pain started 2 months ago. Pt tried PT but did not help. MRI 3/9/20 of L hip showing Moderate size left hip joint effusion with synovitis     Rashad Ortiz is seen today in the [facility] for evaluation of left hip pain at the request of Dr. Jimmy Kemp, DO      HISTORY OF PRESENT ILLNESS    Rashad Ortiz is a 44 year old male seen for evaluation of ongoing left hip pain with no known injury.   Pain has been present for 2 months. Locates pain in the upper thigh/groin area and radiates/shoots down to the knee.. pain is constant, even at rest, worse with movement. Movement causes the \"shooting\" pain to the knee. No numbness and tingling. Denies low back pain. Right hip is fine. Was seen in Sports Medicine and referred for an MRI, then referred to ortho. Kidney transplant, prednisone usage.    History of gout flares with prednisone in the past.    Treatment has been Physical Therapy,     Present symptoms: severe pain.    Pain severity: 7/10  Pain quality: sharp and shooting  Frequency of symptoms: are constant  Exacerbating Factors: weight bearing, movements  Relieving Factors: laying down, pain medications.  Night Pain: Yes  Pain while at rest: Yes   Associated numbness or tingling: No       Other PMH:  has a past medical history of Chronic kidney disease, stage 4, severely decreased GFR (H), Gastro-oesophageal reflux disease, Gout, History of blood transfusion, Hypertension, Medical non-compliance, Pulmonary nodules, and Steroid long-term use. He also has no past medical history of PONV (postoperative nausea and vomiting).  Patient Active Problem List   Diagnosis     Kidney replaced by transplant     Aftercare following organ transplant     GERD (gastroesophageal reflux disease)     CARDIOVASCULAR SCREENING; LDL GOAL LESS THAN 160     Gout     Creatinine elevation     " Antibody mediated rejection of kidney transplant     Medical non-compliance     Chronic kidney disease, stage 4, severely decreased GFR (H)     Herpes simplex infection of penis     Escherichia coli septicemia (H)     Pyelonephritis of transplanted kidney     HTN, kidney transplant related     Metabolic acidosis     CKD (chronic kidney disease) stage 5, GFR less than 15 ml/min (H)     Immunosuppression (H)     Anemia of chronic renal failure, stage 5 (H)     Alcohol intoxication (H)     Lumbar radiculopathy     Hip pain, left     Secondary renal hyperparathyroidism (H)       Surgical Hx:  has a past surgical history that includes transplant (01/13/2011); av fistula or graft arterial; Ligate fistula arteriovenous upper extremity (12/20/2011); Percutaneous biopsy kidney (Right, 2/28/2017); and Create fistula arteriovenous upper extremity (Right, 7/31/2019).    Medications:   Current Outpatient Medications:      amLODIPine (NORVASC) 5 MG tablet, Take 1 tablet (5 mg) by mouth daily, Disp: 90 tablet, Rfl: 3     carvedilol (COREG) 25 MG tablet, Take 1 tablet (25 mg) by mouth 2 times daily, Disp: 180 tablet, Rfl: 3     febuxostat (ULORIC) 40 MG TABS tablet, Take 1 tablet (40 mg) by mouth daily, Disp: 90 tablet, Rfl: 3     ferrous sulfate (FEROSUL) 325 (65 Fe) MG tablet, Take 1 tablet (325 mg) by mouth daily (with breakfast), Disp: 30 tablet, Rfl: 11     furosemide (LASIX) 20 MG tablet, Take 1 tablet (20 mg) by mouth 2 times daily, Disp: 180 tablet, Rfl: 1     mycophenolate (GENERIC EQUIVALENT) 250 MG capsule, Take 2 capsules (500 mg) by mouth 2 times daily, Disp: 120 capsule, Rfl: 11     omeprazole (PRILOSEC) 20 MG capsule, Take 1 capsule (20 mg) by mouth daily, Disp: 90 capsule, Rfl: 1     predniSONE (DELTASONE) 5 MG tablet, Take 2 tablets (10 mg) by mouth daily, Disp: 60 tablet, Rfl: 11     sodium bicarbonate 650 MG tablet, Take 2 tablets (1,300 mg) by mouth 3 times daily, Disp: 180 tablet, Rfl: 11      "sulfamethoxazole-trimethoprim (BACTRIM/SEPTRA) 400-80 MG tablet, Take 1 tablet by mouth every other day, Disp: 45 tablet, Rfl: 3     tacrolimus (GENERIC EQUIVALENT) 1 MG capsule, Take 2 capsules (2 mg) by mouth 2 times daily, Disp: 120 capsule, Rfl: 11     vitamin D3 (CHOLECALCIFEROL) 1000 units (25 mcg) tablet, Take 2 tablets (2,000 Units) by mouth daily, Disp: 180 tablet, Rfl: 3     acetaminophen (TYLENOL) 500 MG tablet, Take 2 tablets (1,000 mg) by mouth every 8 hours as needed for mild pain (Patient not taking: Reported on 3/18/2020), Disp: 160 tablet, Rfl: 3     cyclobenzaprine (FLEXERIL) 5 MG tablet, Take 1 tablet (5 mg) by mouth 3 times daily as needed for muscle spasms (Patient not taking: Reported on 3/18/2020), Disp: 60 tablet, Rfl: 1     HYDROcodone-acetaminophen (NORCO) 5-325 MG tablet, Take 1 tablet by mouth every 6 hours as needed for severe pain (Patient not taking: Reported on 3/18/2020), Disp: 10 tablet, Rfl: 0     order for DME, Equipment being ordered: Crutches (Patient not taking: Reported on 3/18/2020), Disp: 1 Device, Rfl: 0    Allergies: No Known Allergies    Social Hx:  reports that he quit smoking about 3 years ago. His smoking use included cigarettes. He has never used smokeless tobacco. He reports current alcohol use of about 4.2 standard drinks of alcohol per week. He reports that he does not use drugs.    Family Hx: family history includes Hypertension in his mother.    REVIEW OF SYSTEMS:  10 point ROS neg other than the symptoms noted above in the HPI and PAST MEDICAL HISTORY. Notables,  CONSTITUTIONAL:NEGATIVE for fever, chills, change in weight  INTEGUMENTARY/SKIN: NEGATIVE for worrisome rashes, moles or lesions  MUSCULOSKELETAL:See HPI above  NEURO: NEGATIVE for weakness, dizziness or paresthesias    PHYSICAL EXAM:  /80 (BP Location: Left arm, Patient Position: Sitting, Cuff Size: Adult Regular)   Pulse 84   Temp 98.1  F (36.7  C) (Oral)   Ht 1.727 m (5' 8\")   Wt 67.6 kg " (149 lb)   SpO2 99%   BMI 22.66 kg/m     GENERAL APPEARANCE: healthy, alert, no distress  SKIN: no suspicious lesions or rashes  NEURO: Normal strength and tone, mentation intact and speech normal  PSYCH:  mentation appears normal and affect normal  RESPIRATORY: No increased work of breathing.  LYMPH: no palpable inguinal lymph nodes.    BILATERAL LOWER EXTREMITIES:  Gait: antalgic favoring left   varus alignment.  No gross deformities or masses.  Bilateral Quad atrophy, strength weak on the left.  Intact sensation deep peroneal nerve, superficial peroneal nerve, med/lat tibial nerve, sural nerve, saphenous nerve  Intact EHL, EDL, TA, FHL, GS, quadriceps hamstrings and hip flexors  Toes warm and well perfused, brisk capillary refill. Palpable 2+ dp pulses.  Bilateral calf soft and nttp or squeeze.  DTRs: achilles 2+, patella 2+.  Notable swelling left lower extremity.  Leg lengths: grossly symmetric at medial malleolus.      BACK EXAM  Lumbar range of motion: flexion to touch toes, extension adequate, side bend: adequate, all cause left anterior hip pain.  Seated SLR: negative , bilateral.  Supine SLR: negative , bilateral.  Sensation:normal, bilateral.    LEFT HIP EXAM:      Palpation: Tender:   Anterior hip/groin  Strength: 4/5, pain limited.  Special tests:  Irritability (flexion/adduction/internal rotation) positive.  Hip range of motion: Internal rotation: 10, External rotation: 40, Flexion 95, pain with extremes of rotation, limited by discomfort.      RIGHT HIP EXAM:    Palpation: Tender:   None.  Strength:  4+/5  Special tests:  Irritability (flexion/adduction/internal rotation) negative.  Hip range of motion: Internal rotation: 20, External rotation: 50, Flexion 105+, all pain free        X-RAY: AP pelvis 3/18/2020 and AP/Lateral views left hip from 1/21/2020 were reviewed in clinic today. No obvious fractures or dislocations. Irregularity of the superior left femoral head with sclerosis and lucent  boundary. Bilateral pincer deformity with associated lateral ossicles. Possible irregularity superolateral right femoral head.    MRI:  MRI left hip from 3/9/2020 was reviewed in clinic today.  1. Moderate-sized left hip joint effusion with synovitis.  2. Extensive bone marrow edema like signal intensity in the left hip,  extending from the superior aspect of the femoral head to the  intratrochanteric region of the proximal femur. There is extensive  osteonecrosis in the left femoral head with mild irregularity along  the subchondral bone without marked collapse. There is surrounding  periosteal soft tissue edema.  3. Cartilage thinning and degenerative tearing of the anterior  superior labrum.  4. No full-thickness tear or tendon retraction.      Impression: 45yo male with acute left hip pain, avascular necrosis.    Plan:   * images reviewed showing avascular necrosis without significant collapse, likely some mild cartilage thinning  * recommend referral to Dr Morillo, [facility], to discuss possible hip preservation surgery options such as drilling/bone grafting of AVN if possible, or if its too late.  * otherwise discussed injection intra-articular, pain mangement  * otherwise lastly total hip arthroplasty. Given age, would try to put off hip replacement as long as possible but given avascular necrosis might be sooner rather than later.  * over the counter pain control with tylenol  * prescription pain medications per primary care provider.  * return to clinic as needed.    Imtiaz Skinner M.D., M.S.  Dept. of Orthopaedic Surgery  [facility]
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
## 2020-03-18 NOTE — LETTER
"    3/18/2020         RE: Rashad Ortiz  [address] [place] [id number]        Dear Colleague,    Thank you for referring your patient, Rashad Ortiz, to the [facility]. Please see a copy of my visit note below.    Chief Complaint:   Chief Complaint   Patient presents with     Left Hip - Pain     Hip Pain     Left hip pain started 2 months ago. Pt tried PT but did not help. MRI 3/9/20 of L hip showing Moderate size left hip joint effusion with synovitis     Rashad Ortiz is seen today in the [facility] for evaluation of left hip pain at the request of Dr. Jimmy Kemp, DO      HISTORY OF PRESENT ILLNESS    Rashad Ortiz is a 44 year old male seen for evaluation of ongoing left hip pain with no known injury.   Pain has been present for 2 months. Locates pain in the upper thigh/groin area and radiates/shoots down to the knee.. pain is constant, even at rest, worse with movement. Movement causes the \"shooting\" pain to the knee. No numbness and tingling. Denies low back pain. Right hip is fine. Was seen in Sports Medicine and referred for an MRI, then referred to ortho. Kidney transplant, prednisone usage.    History of gout flares with prednisone in the past.    Treatment has been Physical Therapy,     Present symptoms: severe pain.    Pain severity: 7/10  Pain quality: sharp and shooting  Frequency of symptoms: are constant  Exacerbating Factors: weight bearing, movements  Relieving Factors: laying down, pain medications.  Night Pain: Yes  Pain while at rest: Yes   Associated numbness or tingling: No       Other PMH:  has a past medical history of Chronic kidney disease, stage 4, severely decreased GFR (H), Gastro-oesophageal reflux disease, Gout, History of blood transfusion, Hypertension, Medical non-compliance, Pulmonary nodules, and Steroid long-term use. He also has no past medical history of PONV (postoperative nausea and " vomiting).  Patient Active Problem List   Diagnosis     Kidney replaced by transplant     Aftercare following organ transplant     GERD (gastroesophageal reflux disease)     CARDIOVASCULAR SCREENING; LDL GOAL LESS THAN 160     Gout     Creatinine elevation     Antibody mediated rejection of kidney transplant     Medical non-compliance     Chronic kidney disease, stage 4, severely decreased GFR (H)     Herpes simplex infection of penis     Escherichia coli septicemia (H)     Pyelonephritis of transplanted kidney     HTN, kidney transplant related     Metabolic acidosis     CKD (chronic kidney disease) stage 5, GFR less than 15 ml/min (H)     Immunosuppression (H)     Anemia of chronic renal failure, stage 5 (H)     Alcohol intoxication (H)     Lumbar radiculopathy     Hip pain, left     Secondary renal hyperparathyroidism (H)       Surgical Hx:  has a past surgical history that includes transplant (01/13/2011); av fistula or graft arterial; Ligate fistula arteriovenous upper extremity (12/20/2011); Percutaneous biopsy kidney (Right, 2/28/2017); and Create fistula arteriovenous upper extremity (Right, 7/31/2019).    Medications:   Current Outpatient Medications:      amLODIPine (NORVASC) 5 MG tablet, Take 1 tablet (5 mg) by mouth daily, Disp: 90 tablet, Rfl: 3     carvedilol (COREG) 25 MG tablet, Take 1 tablet (25 mg) by mouth 2 times daily, Disp: 180 tablet, Rfl: 3     febuxostat (ULORIC) 40 MG TABS tablet, Take 1 tablet (40 mg) by mouth daily, Disp: 90 tablet, Rfl: 3     ferrous sulfate (FEROSUL) 325 (65 Fe) MG tablet, Take 1 tablet (325 mg) by mouth daily (with breakfast), Disp: 30 tablet, Rfl: 11     furosemide (LASIX) 20 MG tablet, Take 1 tablet (20 mg) by mouth 2 times daily, Disp: 180 tablet, Rfl: 1     mycophenolate (GENERIC EQUIVALENT) 250 MG capsule, Take 2 capsules (500 mg) by mouth 2 times daily, Disp: 120 capsule, Rfl: 11     omeprazole (PRILOSEC) 20 MG capsule, Take 1 capsule (20 mg) by mouth daily,  Disp: 90 capsule, Rfl: 1     predniSONE (DELTASONE) 5 MG tablet, Take 2 tablets (10 mg) by mouth daily, Disp: 60 tablet, Rfl: 11     sodium bicarbonate 650 MG tablet, Take 2 tablets (1,300 mg) by mouth 3 times daily, Disp: 180 tablet, Rfl: 11     sulfamethoxazole-trimethoprim (BACTRIM/SEPTRA) 400-80 MG tablet, Take 1 tablet by mouth every other day, Disp: 45 tablet, Rfl: 3     tacrolimus (GENERIC EQUIVALENT) 1 MG capsule, Take 2 capsules (2 mg) by mouth 2 times daily, Disp: 120 capsule, Rfl: 11     vitamin D3 (CHOLECALCIFEROL) 1000 units (25 mcg) tablet, Take 2 tablets (2,000 Units) by mouth daily, Disp: 180 tablet, Rfl: 3     acetaminophen (TYLENOL) 500 MG tablet, Take 2 tablets (1,000 mg) by mouth every 8 hours as needed for mild pain (Patient not taking: Reported on 3/18/2020), Disp: 160 tablet, Rfl: 3     cyclobenzaprine (FLEXERIL) 5 MG tablet, Take 1 tablet (5 mg) by mouth 3 times daily as needed for muscle spasms (Patient not taking: Reported on 3/18/2020), Disp: 60 tablet, Rfl: 1     HYDROcodone-acetaminophen (NORCO) 5-325 MG tablet, Take 1 tablet by mouth every 6 hours as needed for severe pain (Patient not taking: Reported on 3/18/2020), Disp: 10 tablet, Rfl: 0     order for DME, Equipment being ordered: Crutches (Patient not taking: Reported on 3/18/2020), Disp: 1 Device, Rfl: 0    Allergies: No Known Allergies    Social Hx:  reports that he quit smoking about 3 years ago. His smoking use included cigarettes. He has never used smokeless tobacco. He reports current alcohol use of about 4.2 standard drinks of alcohol per week. He reports that he does not use drugs.    Family Hx: family history includes Hypertension in his mother.    REVIEW OF SYSTEMS:  10 point ROS neg other than the symptoms noted above in the HPI and PAST MEDICAL HISTORY. Notables,  CONSTITUTIONAL:NEGATIVE for fever, chills, change in weight  INTEGUMENTARY/SKIN: NEGATIVE for worrisome rashes, moles or lesions  MUSCULOSKELETAL:See HPI  "above  NEURO: NEGATIVE for weakness, dizziness or paresthesias    PHYSICAL EXAM:  /80 (BP Location: Left arm, Patient Position: Sitting, Cuff Size: Adult Regular)   Pulse 84   Temp 98.1  F (36.7  C) (Oral)   Ht 1.727 m (5' 8\")   Wt 67.6 kg (149 lb)   SpO2 99%   BMI 22.66 kg/m     GENERAL APPEARANCE: healthy, alert, no distress  SKIN: no suspicious lesions or rashes  NEURO: Normal strength and tone, mentation intact and speech normal  PSYCH:  mentation appears normal and affect normal  RESPIRATORY: No increased work of breathing.  LYMPH: no palpable inguinal lymph nodes.    BILATERAL LOWER EXTREMITIES:  Gait: antalgic favoring left   varus alignment.  No gross deformities or masses.  Bilateral Quad atrophy, strength weak on the left.  Intact sensation deep peroneal nerve, superficial peroneal nerve, med/lat tibial nerve, sural nerve, saphenous nerve  Intact EHL, EDL, TA, FHL, GS, quadriceps hamstrings and hip flexors  Toes warm and well perfused, brisk capillary refill. Palpable 2+ dp pulses.  Bilateral calf soft and nttp or squeeze.  DTRs: achilles 2+, patella 2+.  Notable swelling left lower extremity.  Leg lengths: grossly symmetric at medial malleolus.      BACK EXAM  Lumbar range of motion: flexion to touch toes, extension adequate, side bend: adequate, all cause left anterior hip pain.  Seated SLR: negative , bilateral.  Supine SLR: negative , bilateral.  Sensation:normal, bilateral.    LEFT HIP EXAM:      Palpation: Tender:   Anterior hip/groin  Strength: 4/5, pain limited.  Special tests:  Irritability (flexion/adduction/internal rotation) positive.  Hip range of motion: Internal rotation: 10, External rotation: 40, Flexion 95, pain with extremes of rotation, limited by discomfort.      RIGHT HIP EXAM:    Palpation: Tender:   None.  Strength:  4+/5  Special tests:  Irritability (flexion/adduction/internal rotation) negative.  Hip range of motion: Internal rotation: 20, External rotation: 50, " Flexion 105+, all pain free        X-RAY: AP pelvis 3/18/2020 and AP/Lateral views left hip from 1/21/2020 were reviewed in clinic today. No obvious fractures or dislocations. Irregularity of the superior left femoral head with sclerosis and lucent boundary. Bilateral pincer deformity with associated lateral ossicles. Possible irregularity superolateral right femoral head.    MRI:  MRI left hip from 3/9/2020 was reviewed in clinic today.  1. Moderate-sized left hip joint effusion with synovitis.  2. Extensive bone marrow edema like signal intensity in the left hip,  extending from the superior aspect of the femoral head to the  intratrochanteric region of the proximal femur. There is extensive  osteonecrosis in the left femoral head with mild irregularity along  the subchondral bone without marked collapse. There is surrounding  periosteal soft tissue edema.  3. Cartilage thinning and degenerative tearing of the anterior  superior labrum.  4. No full-thickness tear or tendon retraction.      Impression: 45yo male with acute left hip pain, avascular necrosis.    Plan:   * images reviewed showing avascular necrosis without significant collapse, likely some mild cartilage thinning  * recommend referral to Dr Morillo, [facility], to discuss possible hip preservation surgery options such as drilling/bone grafting of AVN if possible, or if its too late.  * otherwise discussed injection intra-articular, pain mangement  * otherwise lastly total hip arthroplasty. Given age, would try to put off hip replacement as long as possible but given avascular necrosis might be sooner rather than later.  * over the counter pain control with tylenol  * prescription pain medications per primary care provider.  * return to clinic as needed.    Imtiaz Skinner M.D., M.S.  Dept. of Orthopaedic Surgery  [facility]            Again, thank you for allowing me to participate in the care of your patient.         Sincerely,        Imtiaz Skinner MD

## 2020-03-18 NOTE — TELEPHONE ENCOUNTER
Called and spoke to the patient and scheduled a Telephone visit for 3/19/2020 at 2:20.  Amanda Branham [facility]  2nd Floor  Primary Care

## 2020-03-18 NOTE — TELEPHONE ENCOUNTER
Reason for Call:  Other appointment    Detailed comments: Patient calling states he needs to establish with a primary doctor to get his pain medication. He called the  [place] office and stated he does not have a primary and the surgeon would not refill his pain medication today. Patient was advised we would send a message to see if he would be able to be seen for this. Please advise. Thank you    Phone Number Patient can be reached at: Cell number on file:    Telephone Information:   Mobile [phone]       Best Time: ASAP    Can we leave a detailed message on this number? YES    Call taken on 3/18/2020 at 10:09 AM by Theresa Anna

## 2020-03-18 NOTE — LETTER
[facility]  [address]  [place] [address]  [phone]          March 18, 2020    RE:  Rashad Ortiz                                                                                                                                                       [address]  [place] [address]            To whom it may concern:    Rashad Ortiz is under my professional care for left hip pain. Please excuse him from work today, 3/18/20. Please contact my office with any questions. Thank you.     Sincerely,      Electronically Signed (as below)  Imtiaz Skinner MD

## 2020-03-18 NOTE — TELEPHONE ENCOUNTER
Virtual visit can be scheduled with me Thursday afternoon or even later today if he would like.    Mariel Vu M.D.

## 2020-03-18 NOTE — TELEPHONE ENCOUNTER
Anemia Management Note  SUBJECTIVE/OBJECTIVE:  Referred by Dr. Abran Cunha on 2020  Primary Diagnosis: Anemia in Chronic Kidney Disease (N18.5, D63.1)     Secondary Diagnosis:  Chronic Kidney Disease, Stage 5 (N18.5)  Kidney Tx: 2011  Hgb goal range:  9-10  Epo/Darbo: Aranesp  60 mcg  every two weeks for Hgb <10. In clinic  Iron regimen:  Ferrous Sulfate  once daily  Labs : 2021  Recent [facility] use, transfusion, IV iron: NA.  Aranesp was previously ordered, he never received it.   RX/TX plans : 2021  No history of stroke, MI and blood clots or cancers     Contact:            No Consent to communicate on File     Anemia Latest Ref Rng & Units 10/28/2019 2019 2020 2020 2020 3/7/2020 3/18/2020   Hemoglobin 13.3 - 17.7 g/dL 7.4(LL) 7.8(LL) Duplicate request 8.0(L) 8.2(L) 8.5(L) 8.1(L)   TSAT 15 - 46 % - - - - - - 30   Ferritin 26 - 388 ng/mL - - - - - - 460(H)     BP Readings from Last 3 Encounters:   20 121/80   20 (!) 136/90   20 127/80     Wt Readings from Last 2 Encounters:   20 67.6 kg (149 lb)   20 65.8 kg (145 lb)       Labs were drawn this morning.  Rashad has not yet scheduled Aranesp.  Will follow up again in 2 weeks.     ASSESSMENT:  Hgb:Not at goal/Known  TSat: at goal >30% Ferritin: At goal (>100ng/mL)    PLAN:  RTC for Hgb in 2 week(s) and Check iron studies in 4 week(s)    Orders needed to be renewed (for next follow-up date) in EPIC: None    Iron labs due:  4/15/2020    Plan discussed with:  RUDDY Solorzano  Plan provided by:  chio    NEXT FOLLOW-UP DATE:  2020    Cate Ellis RN   Anemia Services  [address] [address]   [email or web address]   Office : [phone]  Fax: [phone]

## 2020-03-18 NOTE — TELEPHONE ENCOUNTER
Defer to primary care provider. He previously had a prescription from primary care provider. I had provided a prescription at his visit, and referred on to see orthopedic surgery (saw Dr Skinner today). From there, it appears he was referred to see Dr Morillo for further evaluation; see chart note.  Thanks.  Jimmy Kemp DO, CAQ

## 2020-03-19 ENCOUNTER — VIRTUAL VISIT (OUTPATIENT)
Dept: FAMILY MEDICINE | Facility: CLINIC | Age: 45
End: 2020-03-19
Payer: COMMERCIAL

## 2020-03-19 VITALS — HEIGHT: 68 IN | BODY MASS INDEX: 22.58 KG/M2 | WEIGHT: 149 LBS

## 2020-03-19 DIAGNOSIS — M87.052 AVASCULAR NECROSIS OF BONE OF LEFT HIP (H): Primary | ICD-10-CM

## 2020-03-19 DIAGNOSIS — Z94.0 KIDNEY TRANSPLANTED: ICD-10-CM

## 2020-03-19 DIAGNOSIS — L98.9 BENIGN SKIN LESION OF THIGH: ICD-10-CM

## 2020-03-19 DIAGNOSIS — Z94.0 KIDNEY TRANSPLANT RECIPIENT: Primary | ICD-10-CM

## 2020-03-19 DIAGNOSIS — Z79.60 LONG-TERM USE OF IMMUNOSUPPRESSANT MEDICATION: ICD-10-CM

## 2020-03-19 PROCEDURE — 99441 ZZC PHYSICIAN TELEPHONE EVALUATION 5-10 MIN: CPT | Performed by: FAMILY MEDICINE

## 2020-03-19 RX ORDER — OXYCODONE AND ACETAMINOPHEN 5; 325 MG/1; MG/1
1 TABLET ORAL EVERY 12 HOURS PRN
Qty: 40 TABLET | Refills: 0 | Status: SHIPPED | OUTPATIENT
Start: 2020-03-19 | End: 2020-04-06

## 2020-03-19 ASSESSMENT — MIFFLIN-ST. JEOR: SCORE: 1545.36

## 2020-03-19 NOTE — TELEPHONE ENCOUNTER
PCP: Dr. Mariel Contreras [facility]   Referring Provider: Dr Abran Cunha   Referring Diagnosis: ESRD/LDKT    Is patient under the age of 65? Yes  Is patient diabetic? No  Is patient on insulin? No  Was patient offered a pancreas transplant referral? No    Is patient in a group home/assisted living? No  Does patient have a guardian? No    Referral intake process completed.  Patient is aware that after financial approval is received, medical records will be requested.   Patient confirmed for a callback from transplant coordinator on 3/31/2020. (within 2 weeks)  Tentative evaluation date 4/15/2020. (within 4 weeks)    Confirmed coordinator will discuss evaluation process in more detail at the time of their call.   Patient is aware of the need to arrange age appropriate cancer screening, vaccinations, and dental care.  Reminded patient to complete questionnaire, complete medical records release, and review packet prior to evaluation visit .  Assessed patient for special needs (ie--wheelchair, assistance, guardian, and ):  No  Patient instructed to call [phone] with questions.     TENISHA Courtney, LPN   Solid Organ Transplant

## 2020-03-19 NOTE — PATIENT INSTRUCTIONS
At [facility], we strive to deliver an exceptional experience to you, every time we see you. If you receive a survey, please complete it as we do value your feedback.  If you have MyChart, you can expect to receive results automatically within 24 hours of their completion.  Your provider will send a note interpreting your results as well.   If you do not have MyChart, you should receive your results in about a week by mail.    Your care team:                            Family Medicine Internal Medicine   MD Juanito Lynne MD Shantel Branch-Fleming, MD Katya Georgiev PA-C Megan Hill, APRCLIFF Harrington, MD Pediatrics   Greg Fink, PAKARLA Balderrama, MD Leanna Hurley APRN CNP   MD Kelly Tipton MD Deborah Mielke, MD Kim Thein, APRN [facility]      Clinic hours: Monday - Thursday 7 am-7 pm; Fridays 7 am-5 pm.   Urgent care: Monday - Friday 11 am-9 pm; Saturday and Sunday 9 am-5 pm.    Clinic: [phone]       [facility]: Monday - Thursday 8 am - 7 pm; Friday 8 am - 6 pm  [facility]: [phone]     Use [email or web address] for 24/7 diagnosis and treatment of dozens of conditions.

## 2020-03-19 NOTE — TELEPHONE ENCOUNTER
Call placed to patient. Patient confirms current dose and accurate trough level. Patient denies any recent illness, diarrhea or medication changes. Patient v\u to decrease dose to 2/1 and repeat labs in 2 weeks. Order/rx sent

## 2020-03-19 NOTE — PROGRESS NOTES
"Rashad Ortiz is a 44 year old male who is being evaluated via a billable telephone visit.      The patient has been notified of following:     \"This telephone visit will be conducted via a call between you and your physician/provider. We have found that certain health care needs can be provided without the need for a physical exam.  This service lets us provide the care you need with a short phone conversation.  If a prescription is necessary we can send it directly to your pharmacy.  If lab work is needed we can place an order for that and you can then stop by our lab to have the test done at a later time.    If during the course of the call the physician/provider feels a telephone visit is not appropriate, you will not be charged for this service.\"     Rashad Ortiz complains of    Chief Complaint   Patient presents with     Refill Request     Sore removal     Left Buttock Cheek       I have reviewed and updated the patient's Past Medical History, Social History, Family History and Medication List.    ALLERGIES  Patient has no known allergies.      Left hip pain - seen by ortho and surgical procedure planned for April. Pain medication referred to primary care.  Using Norco plus additional tylenol now for pain twice daily and pain is still 6- 8/10.    Left posterior thigh mass - wondering when can have removed.    Assessment/Plan:  1. Avascular necrosis of bone of left hip (H)  Start percocet until surgical repair  - oxyCODONE-acetaminophen (PERCOCET) 5-325 MG tablet; Take 1 tablet by mouth every 12 hours as needed for pain For condition that requires surgery in April  Dispense: 40 tablet; Refill: 0    2. Benign skin lesion of thigh  Discuss remove with orthopedist during planned surgical procedure or can see me for in person visit and have removed.    The uses and side effects, including black box warnings as appropriate, were discussed in detail.  All patient questions were answered.  The patient was " instructed to call immediately if any side effects developed.    Phone call duration:  6 minutes    Mariel Mares MD

## 2020-03-19 NOTE — TELEPHONE ENCOUNTER
ISSUE:   Tacrolimus IR level 7.1 on 3/18/20, goal 4-6, dose 2 mg BID.    PLAN:   Please call patient and confirm this was an accurate 12-hour trough. Verify Tacrolimus IR dose 2 mg BID. Confirm no new medications or illness. Confirm no missed doses. If accurate trough and accurate dose, decrease Tacrolimus IR dose to 2 mg in the morning and 1 mg in the evening and repeat labs in 2 weeks.

## 2020-03-20 RX ORDER — TACROLIMUS 1 MG/1
1 CAPSULE ORAL 2 TIMES DAILY
Qty: 60 CAPSULE | Refills: 11 | Status: SHIPPED | OUTPATIENT
Start: 2020-03-20 | End: 2020-04-24

## 2020-03-20 RX ORDER — TACROLIMUS 0.5 MG/1
0.5 CAPSULE ORAL EVERY EVENING
Qty: 30 CAPSULE | Refills: 11 | Status: SHIPPED | OUTPATIENT
Start: 2020-03-20 | End: 2020-04-24

## 2020-03-30 NOTE — TELEPHONE ENCOUNTER
Patient was very confused by my phone call. He states that he did have a phone apt with his PCP . Writer does see that patient had phone visit with his PCP on 3/19/20. Patient states that his pain is being managed well as a result of that visit. He states he is not currently in pain and the medications that were prescribed are helping. He states that he does not have any further needs at this time.  Anjelica Patiño RN

## 2020-03-31 NOTE — TELEPHONE ENCOUNTER
Called patient and we talked over his STD on Wed 4/15 and due to the issue with [facility] clinics being canceled and not sure what is really going on.  Patient decided to postpone to Wed, May 13 w/Alphonse Gaspar, he confirmed for this plan.

## 2020-04-01 ENCOUNTER — TELEPHONE (OUTPATIENT)
Dept: PHARMACY | Facility: CLINIC | Age: 45
End: 2020-04-01

## 2020-04-01 NOTE — TELEPHONE ENCOUNTER
Follow-up with anemia management service:    LVM for Rashad to check in with him re Anemia.      Anemia Latest Ref Rng & Units 10/28/2019 11/16/2019 1/21/2020 1/21/2020 2/29/2020 3/7/2020 3/18/2020   Hemoglobin 13.3 - 17.7 g/dL 7.4(LL) 7.8(LL) Duplicate request 8.0(L) 8.2(L) 8.5(L) 8.1(L)   TSAT 15 - 46 % - - - - - - 30   Ferritin 26 - 388 ng/mL - - - - - - 460(H)         Follow-up call date: 4/15/2020    Cate Ellis RN   Anemia Services  [facility]  [address] [place] [id number]   [email or web address]   Office : [phone]  Fax: [phone]

## 2020-04-06 ENCOUNTER — MYC REFILL (OUTPATIENT)
Dept: URGENT CARE | Facility: URGENT CARE | Age: 45
End: 2020-04-06

## 2020-04-06 ENCOUNTER — MYC MEDICAL ADVICE (OUTPATIENT)
Dept: FAMILY MEDICINE | Facility: CLINIC | Age: 45
End: 2020-04-06

## 2020-04-06 ENCOUNTER — MYC REFILL (OUTPATIENT)
Dept: FAMILY MEDICINE | Facility: CLINIC | Age: 45
End: 2020-04-06

## 2020-04-06 DIAGNOSIS — M25.552 HIP PAIN, LEFT: ICD-10-CM

## 2020-04-06 DIAGNOSIS — M87.052 AVASCULAR NECROSIS OF BONE OF LEFT HIP (H): ICD-10-CM

## 2020-04-06 RX ORDER — ACETAMINOPHEN 500 MG
1000 TABLET ORAL EVERY 8 HOURS PRN
Qty: 160 TABLET | Refills: 3 | Status: SHIPPED | OUTPATIENT
Start: 2020-04-06 | End: 2020-12-15

## 2020-04-06 RX ORDER — OXYCODONE AND ACETAMINOPHEN 5; 325 MG/1; MG/1
1 TABLET ORAL EVERY 12 HOURS PRN
Qty: 40 TABLET | Refills: 0 | Status: SHIPPED | OUTPATIENT
Start: 2020-04-06 | End: 2020-05-04

## 2020-04-06 NOTE — TELEPHONE ENCOUNTER
Controlled Substance Refill Request for Oxycodone  Problem List Complete:  No     PROVIDER TO CONSIDER COMPLETION OF PROBLEM LIST AND OVERVIEW/CONTROLLED SUBSTANCE AGREEMENT    Last Written Prescription Date:  3/19//2020  Last Fill Quantity: 40 tablets   # refills: 0    THE MOST RECENT OFFICE VISIT MUST BE WITHIN THE PAST 3 MONTHS. AT LEAST ONE FACE TO FACE VISIT MUST OCCUR EVERY 6 MONTHS. ADDITIONAL VISITS CAN BE VIRTUAL.  (THIS STATEMENT SHOULD BE DELETED.)    Last Office Visit with [facility] primary care provider: 3/19/20 Virtual visit    Future Office visit: None    Controlled substance agreement:   Encounter-Level CSA:    There are no encounter-level csa.     Patient-Level CSA:    There are no patient-level csa.         Last Urine Drug Screen: No results found for: CDAUT, No results found for: COMDAT, No results found for: THC13, PCP13, COC13, MAMP13, OPI13, AMP13, BZO13, TCA13, MTD13, BAR13, OXY13, PPX13, BUP13     Processing:  Rx to be electronically transmitted to pharmacy by provider      [email or web address]       checked in past 3 months?  No-problem list incomplete so  not checked.           Mila Luna RN, BSN, PHN

## 2020-04-06 NOTE — TELEPHONE ENCOUNTER
Requested Prescriptions   Pending Prescriptions Disp Refills     oxyCODONE-acetaminophen (PERCOCET) 5-325 MG tablet        Last Written Prescription Date:  03/19/2020  Last Fill Quantity: 40,   # refills: 0  Last Office Visit: 03/04/2020-Diane bennett  Future Office visit:       Routing refill request to provider for review/approval because:  Drug not on the [facility], [facility] or  Health refill protocol or controlled substance 40 tablet 0     Sig: Take 1 tablet by mouth every 12 hours as needed for pain For condition that requires surgery in April       There is no refill protocol information for this order        No  found

## 2020-04-06 NOTE — TELEPHONE ENCOUNTER
"Requested Prescriptions   Pending Prescriptions Disp Refills     acetaminophen (TYLENOL) 500 MG tablet 160 tablet 3     Sig: Take 2 tablets (1,000 mg) by mouth every 8 hours as needed for mild pain       Analgesics (Non-Narcotic Tylenol and ASA Only) Passed - 4/6/2020  9:52 AM        Passed - Recent (12 mo) or future (30 days) visit within the authorizing provider's specialty     Patient has had an office visit with the authorizing provider or a provider within the authorizing providers department within the previous 12 mos or has a future within next 30 days. See \"Patient Info\" tab in inbasket, or \"Choose Columns\" in Meds & Orders section of the refill encounter.              Passed - Patient is 7 months old or older     If patient is a peds patient of the age 7 mos -12 years, ok to refill using weight-based dosing.     If >3g daily and/or sig is not \"prn\", check for liver enzymes. If normal in the last year, ok to refill.  If not, refer to the provider.          Passed - Medication is active on med list           Prescription approved per [facility] Refill Protocol.    Mila Luna RN, BSN, PHN    "

## 2020-04-09 ENCOUNTER — TELEPHONE (OUTPATIENT)
Dept: PHYSICAL THERAPY | Facility: CLINIC | Age: 45
End: 2020-04-09

## 2020-04-13 ENCOUNTER — TELEPHONE (OUTPATIENT)
Dept: PHYSICAL THERAPY | Facility: CLINIC | Age: 45
End: 2020-04-13

## 2020-04-13 ENCOUNTER — TELEPHONE (OUTPATIENT)
Dept: TRANSPLANT | Facility: CLINIC | Age: 45
End: 2020-04-13

## 2020-04-13 DIAGNOSIS — Z79.60 LONG-TERM USE OF IMMUNOSUPPRESSANT MEDICATION: ICD-10-CM

## 2020-04-13 DIAGNOSIS — Z94.0 KIDNEY REPLACED BY TRANSPLANT: Primary | ICD-10-CM

## 2020-04-13 NOTE — TELEPHONE ENCOUNTER
Spoke with Rashad. He had his tacrolimus dose adjusted 4 weeks ago and was to repeat his labs 2 weeks ago. Tacrolimus order is still good until 4/18/20. Ordered BMP. He has weekly hemoglobin ordered for anemia labs. Rashad will call for lab appointment.

## 2020-04-13 NOTE — TELEPHONE ENCOUNTER
Patient Call: General  Route to LPN    Reason for call: p has updates he wants to review with his coordinator     Call back needed? Yes    Return Call Needed  Same as documented in contacts section  When to return call?: Greater than one day: Route standard priority

## 2020-04-15 PROBLEM — M54.16 LUMBAR RADICULOPATHY: Status: RESOLVED | Noted: 2020-02-12 | Resolved: 2020-04-15

## 2020-04-15 PROBLEM — M25.552 HIP PAIN, LEFT: Status: RESOLVED | Noted: 2020-02-12 | Resolved: 2020-04-15

## 2020-04-15 NOTE — TELEPHONE ENCOUNTER
NO return call and labs have not been drawn. Will try again in 2 weeks.     Cate Ellis RN   Anemia Services  [facility]  [address] [place] [id number]   [email or web address]   Office : [phone]  Fax: [phone]

## 2020-04-23 ENCOUNTER — TELEPHONE (OUTPATIENT)
Dept: TRANSPLANT | Facility: CLINIC | Age: 45
End: 2020-04-23

## 2020-04-23 ENCOUNTER — VIRTUAL VISIT (OUTPATIENT)
Dept: ORTHOPEDICS | Facility: CLINIC | Age: 45
End: 2020-04-23
Attending: ORTHOPAEDIC SURGERY
Payer: COMMERCIAL

## 2020-04-23 ENCOUNTER — PRE VISIT (OUTPATIENT)
Dept: ORTHOPEDICS | Facility: CLINIC | Age: 45
End: 2020-04-23

## 2020-04-23 DIAGNOSIS — Z48.298 AFTERCARE FOLLOWING ORGAN TRANSPLANT: ICD-10-CM

## 2020-04-23 DIAGNOSIS — M25.552 PAIN OF LEFT HIP JOINT: ICD-10-CM

## 2020-04-23 DIAGNOSIS — Z79.899 ENCOUNTER FOR LONG-TERM CURRENT USE OF MEDICATION: ICD-10-CM

## 2020-04-23 DIAGNOSIS — M87.9 OSTEONECROSIS OF LEFT HIP (H): ICD-10-CM

## 2020-04-23 DIAGNOSIS — Z94.0 KIDNEY REPLACED BY TRANSPLANT: ICD-10-CM

## 2020-04-23 LAB
ANION GAP SERPL CALCULATED.3IONS-SCNC: 13 MMOL/L (ref 3–14)
BUN SERPL-MCNC: 86 MG/DL (ref 7–30)
CALCIUM SERPL-MCNC: 8.9 MG/DL (ref 8.5–10.1)
CHLORIDE SERPL-SCNC: 111 MMOL/L (ref 94–109)
CO2 SERPL-SCNC: 18 MMOL/L (ref 20–32)
CREAT SERPL-MCNC: 4.87 MG/DL (ref 0.66–1.25)
ERYTHROCYTE [DISTWIDTH] IN BLOOD BY AUTOMATED COUNT: 18.3 % (ref 10–15)
GFR SERPL CREATININE-BSD FRML MDRD: 13 ML/MIN/{1.73_M2}
GLUCOSE SERPL-MCNC: 83 MG/DL (ref 70–99)
HCT VFR BLD AUTO: 24.4 % (ref 40–53)
HGB BLD-MCNC: 7.6 G/DL (ref 13.3–17.7)
MCH RBC QN AUTO: 26.7 PG (ref 26.5–33)
MCHC RBC AUTO-ENTMCNC: 31.1 G/DL (ref 31.5–36.5)
MCV RBC AUTO: 86 FL (ref 78–100)
PLATELET # BLD AUTO: 195 10E9/L (ref 150–450)
POTASSIUM SERPL-SCNC: 4.5 MMOL/L (ref 3.4–5.3)
RBC # BLD AUTO: 2.85 10E12/L (ref 4.4–5.9)
SODIUM SERPL-SCNC: 142 MMOL/L (ref 133–144)
TACROLIMUS BLD-MCNC: 3.3 UG/L (ref 5–15)
TME LAST DOSE: ABNORMAL H
WBC # BLD AUTO: 6.5 10E9/L (ref 4–11)

## 2020-04-23 PROCEDURE — 80048 BASIC METABOLIC PNL TOTAL CA: CPT | Performed by: INTERNAL MEDICINE

## 2020-04-23 PROCEDURE — 36415 COLL VENOUS BLD VENIPUNCTURE: CPT | Performed by: INTERNAL MEDICINE

## 2020-04-23 PROCEDURE — 85027 COMPLETE CBC AUTOMATED: CPT | Performed by: INTERNAL MEDICINE

## 2020-04-23 PROCEDURE — 80197 ASSAY OF TACROLIMUS: CPT | Performed by: INTERNAL MEDICINE

## 2020-04-23 NOTE — NURSING NOTE
Reason For Visit:   Chief Complaint   Patient presents with     Consult For     Left Hip osteronecrosis        There were no vitals taken for this visit.         Charlie Prieto, ATC

## 2020-04-23 NOTE — TELEPHONE ENCOUNTER
DATE:  4/23/2020   TIME OF RECEIPT FROM LAB:  1214  LAB TEST:  Hgb  LAB VALUE:  7.6  RESULTS GIVEN WITH READ-BACK TO (PROVIDER):  SALENA MANUEL  TIME LAB VALUE REPORTED TO PROVIDER:   1218

## 2020-04-23 NOTE — PROGRESS NOTES
"This was a telephone visit. The patient did not have sufficient bandwidth for video. Ten minutes were spent with the patient on the phone.     Rashad Ortiz is a 45 year old male who is being evaluated via a billable video visit.      The patient has been notified of following:     \"This video visit will be conducted via a call between you and your physician/provider. We have found that certain health care needs can be provided without the need for an in-person physical exam.  This service lets us provide the care you need with a video conversation.  If a prescription is necessary we can send it directly to your pharmacy.  If lab work is needed we can place an order for that and you can then stop by our lab to have the test done at a later time.    Video visits are billed at different rates depending on your insurance coverage.  Please reach out to your insurance provider with any questions.    If during the course of the call the physician/provider feels a video visit is not appropriate, you will not be charged for this service.\"    Patient has given verbal consent for Video visit? Yes    How would you like to obtain your AVS? Cori    Patient would like the video invitation sent by: Send to e-mail at: [email or web address]    Will anyone else be joining your video visit? No      Assessment: This is a 45 year old with large left femoral head osteonecrotic lesion with associated collapse. We discussed the diagnosis and the treatment options. We discussed total hip and typical post-operative course.     Plan:  He is going to [facility] for a physical examination and risks and benefits discussion prior to proceeding with total hip. Appropriate for total hip wait list.         Chief Complaint: Consult For (Left Hip osteronecrosis )      Physician:  Imtiaz Skinner    HPI: Rashad Ortiz is a 45 year old male who presents today for evaluation of his left hip. He has known osteonecrosis and collapse.     Symptom " Profile  Location of symptoms:  Anterior and groin and buttock   Onset: insidious  Trend: getting worse    Duration of symptoms: about 5 months   Quality of symptoms: aching, sharp/stabbing  Severity: severe  Alleviate: activity modification   Exacerbating: activities  Previous Treatments: Previous treatments include activity modification, oral pain medication,    MEDICAL HISTORY:   Past Medical History:   Diagnosis Date     AVN (avascular necrosis of bone) (H)     left hip     Chronic kidney disease, stage 4, severely decreased GFR (H)      Gastro-oesophageal reflux disease      Gout      History of blood transfusion      Hypertension      Medical non-compliance      Pulmonary nodules      Steroid long-term use        Medications:     Current Outpatient Medications:      acetaminophen (TYLENOL) 500 MG tablet, Take 2 tablets (1,000 mg) by mouth every 8 hours as needed for mild pain, Disp: 160 tablet, Rfl: 3     amLODIPine (NORVASC) 5 MG tablet, Take 1 tablet (5 mg) by mouth daily, Disp: 90 tablet, Rfl: 3     carvedilol (COREG) 25 MG tablet, Take 1 tablet (25 mg) by mouth 2 times daily, Disp: 180 tablet, Rfl: 3     febuxostat (ULORIC) 40 MG TABS tablet, Take 1 tablet (40 mg) by mouth daily, Disp: 90 tablet, Rfl: 3     ferrous sulfate (FEROSUL) 325 (65 Fe) MG tablet, Take 1 tablet (325 mg) by mouth daily (with breakfast), Disp: 30 tablet, Rfl: 11     furosemide (LASIX) 20 MG tablet, Take 1 tablet (20 mg) by mouth 2 times daily, Disp: 180 tablet, Rfl: 1     HYDROcodone-acetaminophen (NORCO) 5-325 MG tablet, Take 1 tablet by mouth every 6 hours as needed for severe pain, Disp: 10 tablet, Rfl: 0     mycophenolate (GENERIC EQUIVALENT) 250 MG capsule, Take 2 capsules (500 mg) by mouth 2 times daily, Disp: 120 capsule, Rfl: 11     omeprazole (PRILOSEC) 20 MG capsule, Take 1 capsule (20 mg) by mouth daily, Disp: 90 capsule, Rfl: 1     order for DME, Equipment being ordered: Crutches (Patient not taking: Reported on  3/18/2020), Disp: 1 Device, Rfl: 0     oxyCODONE-acetaminophen (PERCOCET) 5-325 MG tablet, Take 1 tablet by mouth every 12 hours as needed for pain For condition that requires surgery in April, Disp: 40 tablet, Rfl: 0     predniSONE (DELTASONE) 5 MG tablet, Take 2 tablets (10 mg) by mouth daily, Disp: 60 tablet, Rfl: 11     sodium bicarbonate 650 MG tablet, Take 2 tablets (1,300 mg) by mouth 3 times daily, Disp: 180 tablet, Rfl: 11     sulfamethoxazole-trimethoprim (BACTRIM/SEPTRA) 400-80 MG tablet, Take 1 tablet by mouth every other day, Disp: 45 tablet, Rfl: 3     tacrolimus (GENERIC EQUIVALENT) 0.5 MG capsule, Take 1 capsule (0.5 mg) by mouth every evening, Disp: 30 capsule, Rfl: 11     tacrolimus (GENERIC EQUIVALENT) 1 MG capsule, Take 1 capsule (1 mg) by mouth 2 times daily, Disp: 60 capsule, Rfl: 11     vitamin D3 (CHOLECALCIFEROL) 1000 units (25 mcg) tablet, Take 2 tablets (2,000 Units) by mouth daily, Disp: 180 tablet, Rfl: 3    Allergies: Patient has no known allergies.    SURGICAL HISTORY:   Past Surgical History:   Procedure Laterality Date     AV FISTULA OR GRAFT ARTERIAL       CREATE FISTULA ARTERIOVENOUS UPPER EXTREMITY Right 7/31/2019    Procedure: Creation Of Atriovenous Fistula Right Upper Arm;  Surgeon: Julia Irwin MD;  Location: [facility] OR     LIGATE FISTULA ARTERIOVENOUS UPPER EXTREMITY  12/20/2011    Procedure:LIGATE FISTULA ARTERIOVENOUS UPPER EXTREMITY; Excision of Right Forearm Arteriovenous Fistula.; Surgeon:LINDY AMAYA; Location:[facility] OR     PERCUTANEOUS BIOPSY KIDNEY Right 2/28/2017    Procedure: PERCUTANEOUS BIOPSY KIDNEY;  Surgeon: Gee Barrios MD;  Location: [facility] OR     TRANSPLANT  01/13/2011    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     REVIEW OF SYSTEMS:  The comprehensive review of systems from the intake form was reviewed with the patient.  No fever, weight change or fatigue. No dry eyes. No oral ulcers, sore throat or voice change. No palpitations, syncope, angina or edema.  No chest pain, excessive sleepiness, shortness of breath or hemoptysis.   No abdominal pain, nausea, vomiting, diarrhea or heartburn.  No skin rash. No focal weakness or numbness. No bleeding or lymphadenopathy. No rhinitis or hives.     Exam:  On physical examination the patient appears the stated age, is in no acute distress, affectThe is appropriate, and breathing is non-labored.  Vitals are documented in the EMR and have been reviewed:    There were no vitals taken for this visit.  Data Unavailable  There is no height or weight on file to calculate BMI.      X-rays:   Extensive left femoral head osteonecrosis with early collapse

## 2020-04-23 NOTE — TELEPHONE ENCOUNTER
ISSUE: Hemoglobin 7.6 on 4/23/20 at 1050 AM    PLAN / OUTCOME:  Assess for s/s of low hemoglobin & bleeding:   -Weakness/fatigue: a lit tired  -Palpitations/fast, irregular heartbeat:haven't noticed  -Dizziness: no  -Headache: no  -Shortness of breath: no  -Blood in sputum or stool: no    Patient has active therapy plan for Aranesp injection. Anemia services has attempted to call him on several occasions. Patient states that nothing was set up for infusion because coronavirus got out of hand. He is willing to go to [place] [facility] [place] for Aranesp. [facility] contacted for open appointment. They will add him to appointments for tomorrow 4/24/20 11AM. Patient confirmed appt.

## 2020-04-24 ENCOUNTER — TELEPHONE (OUTPATIENT)
Dept: TRANSPLANT | Facility: CLINIC | Age: 45
End: 2020-04-24

## 2020-04-24 ENCOUNTER — INFUSION THERAPY VISIT (OUTPATIENT)
Dept: INFUSION THERAPY | Facility: CLINIC | Age: 45
End: 2020-04-24
Payer: COMMERCIAL

## 2020-04-24 VITALS
HEART RATE: 74 BPM | SYSTOLIC BLOOD PRESSURE: 134 MMHG | BODY MASS INDEX: 23.26 KG/M2 | TEMPERATURE: 98.5 F | WEIGHT: 153 LBS | OXYGEN SATURATION: 99 % | DIASTOLIC BLOOD PRESSURE: 91 MMHG

## 2020-04-24 DIAGNOSIS — D63.1 ANEMIA OF CHRONIC RENAL FAILURE, STAGE 5 (H): Primary | ICD-10-CM

## 2020-04-24 DIAGNOSIS — N18.5 CKD (CHRONIC KIDNEY DISEASE) STAGE 5, GFR LESS THAN 15 ML/MIN (H): ICD-10-CM

## 2020-04-24 DIAGNOSIS — Z94.0 KIDNEY REPLACED BY TRANSPLANT: ICD-10-CM

## 2020-04-24 DIAGNOSIS — N18.5 ANEMIA OF CHRONIC RENAL FAILURE, STAGE 5 (H): Primary | ICD-10-CM

## 2020-04-24 DIAGNOSIS — Z94.0 KIDNEY TRANSPLANT RECIPIENT: Primary | ICD-10-CM

## 2020-04-24 DIAGNOSIS — Z94.0 KIDNEY TRANSPLANTED: ICD-10-CM

## 2020-04-24 DIAGNOSIS — Z79.60 LONG-TERM USE OF IMMUNOSUPPRESSANT MEDICATION: ICD-10-CM

## 2020-04-24 PROCEDURE — 96372 THER/PROPH/DIAG INJ SC/IM: CPT | Performed by: NURSE PRACTITIONER

## 2020-04-24 PROCEDURE — 99207 ZZC NO CHARGE NURSE ONLY: CPT

## 2020-04-24 RX ORDER — TACROLIMUS 0.5 MG/1
0.5 CAPSULE ORAL EVERY EVENING
Qty: 30 CAPSULE | Refills: 11 | Status: SHIPPED | OUTPATIENT
Start: 2020-04-24 | End: 2020-05-11 | Stop reason: ALTCHOICE

## 2020-04-24 RX ORDER — TACROLIMUS 1 MG/1
1 CAPSULE ORAL 2 TIMES DAILY
Qty: 60 CAPSULE | Refills: 11 | Status: SHIPPED | OUTPATIENT
Start: 2020-04-24 | End: 2020-05-11

## 2020-04-24 ASSESSMENT — PAIN SCALES - GENERAL: PAINLEVEL: SEVERE PAIN (7)

## 2020-04-24 NOTE — TELEPHONE ENCOUNTER
Call placed to patient. Patient confirms current dose and accurate trough level. Denies any recent illness, diarrhea or medication changes. Patient v\u to increase dose to 1 mg in the morning and 1.5 mg in the evening and repeat labs in 1 week.

## 2020-04-24 NOTE — TELEPHONE ENCOUNTER
ISSUE:   Tacrolimus IR level 3.3 on 4/23/20 at 10:50 AM, goal 4-6, dose 1 mg BID. Last dose reported as 4/22/20 at 1030 PM.     PLAN:   Please call patient and confirm this was an accurate 12-hour trough. Verify Tacrolimus IR dose 1 mg BID. Confirm no new medications or illness. Confirm no missed doses. If accurate trough and accurate dose, increase Tacrolimus IR dose to 1 mg in the morning and 1.5 mg in the evening and repeat labs in 1 week.    Leonora Dwyer, RN, BSN  Solid Organ Transplant, Post Kidney and Pancreas  Transplant Care Coordinator  [phone] option 5

## 2020-04-24 NOTE — PROGRESS NOTES
Infusion Nursing Note:  Rashad DIETER Samin presents today for Aranesp.    Patient seen by provider today: No   present during visit today: Not Applicable.    Note: Pt denies fatigue, dizziness or SOB.  C/o L hip pain, see flow sheet for assessment.    Intravenous Access:  No Intravenous access/labs at this visit.    Treatment Conditions:  Lab Results   Component Value Date    HGB 7.6 04/23/2020     Lab Results   Component Value Date    WBC 6.5 04/23/2020      Lab Results   Component Value Date    ANEU 5.1 10/14/2019     Lab Results   Component Value Date     04/23/2020      Results reviewed, labs MET treatment parameters, ok to proceed with treatment.      Post Infusion Assessment:  Patient tolerated injection without incident.  Site patent and intact, free from redness, edema or discomfort.       Discharge Plan:   Patient discharged in stable condition accompanied by: self.  Departure Mode: Ambulatory.  Pt will call in to schedule next aranesp in 2 weeks.    Fermin Fish RN

## 2020-04-28 ENCOUNTER — MYC MEDICAL ADVICE (OUTPATIENT)
Dept: FAMILY MEDICINE | Facility: CLINIC | Age: 45
End: 2020-04-28

## 2020-04-28 ENCOUNTER — MYC REFILL (OUTPATIENT)
Dept: FAMILY MEDICINE | Facility: CLINIC | Age: 45
End: 2020-04-28

## 2020-04-28 DIAGNOSIS — M87.052 AVASCULAR NECROSIS OF BONE OF LEFT HIP (H): ICD-10-CM

## 2020-04-28 NOTE — TELEPHONE ENCOUNTER
Per Cori message from today:    Rashad Ortiz   to Mariel Garcia MD      4/28/20 11:51 AM   Hello,     I have submitted a refill for pain meds. Per the doctor I spoke to regarding my hip, I am in need of a replacement and the pain will worsen over time. There is no date for surgery set yet due to the covid 19 shutdown.     Thank you.

## 2020-04-28 NOTE — TELEPHONE ENCOUNTER
[facility] refill request from today already in refill pool and waiting in order to be addressed.  Message from below copied and pasted into refill encounter for additional information.    Marilee Ibarra RN  [facility]

## 2020-04-29 ENCOUNTER — MYC REFILL (OUTPATIENT)
Dept: FAMILY MEDICINE | Facility: CLINIC | Age: 45
End: 2020-04-29

## 2020-04-29 ENCOUNTER — TELEPHONE (OUTPATIENT)
Dept: PHARMACY | Facility: CLINIC | Age: 45
End: 2020-04-29

## 2020-04-29 ENCOUNTER — MYC MEDICAL ADVICE (OUTPATIENT)
Dept: FAMILY MEDICINE | Facility: CLINIC | Age: 45
End: 2020-04-29

## 2020-04-29 DIAGNOSIS — M87.052 AVASCULAR NECROSIS OF BONE OF LEFT HIP (H): ICD-10-CM

## 2020-04-29 RX ORDER — OXYCODONE AND ACETAMINOPHEN 5; 325 MG/1; MG/1
1 TABLET ORAL EVERY 12 HOURS PRN
Qty: 40 TABLET | Refills: 0 | OUTPATIENT
Start: 2020-04-29

## 2020-04-29 NOTE — TELEPHONE ENCOUNTER
Controlled Substance Refill Request for Percocet  Problem List Complete:  No     PROVIDER TO CONSIDER COMPLETION OF PROBLEM LIST AND OVERVIEW/CONTROLLED SUBSTANCE AGREEMENT    Last Written Prescription Date:  4/6/2020  Last Fill Quantity: 40 tablets   # refills: 0    THE MOST RECENT OFFICE VISIT MUST BE WITHIN THE PAST 3 MONTHS. AT LEAST ONE FACE TO FACE VISIT MUST OCCUR EVERY 6 MONTHS. ADDITIONAL VISITS CAN BE VIRTUAL.  (THIS STATEMENT SHOULD BE DELETED.)    Last Office Visit with [facility] primary care provider: 3/19/20 for virtual visit    Future Office visit: None    Controlled substance agreement:   Encounter-Level CSA:    There are no encounter-level csa.     Patient-Level CSA:    There are no patient-level csa.         Last Urine Drug Screen: No results found for: CDAUT, No results found for: COMDAT, No results found for: THC13, PCP13, COC13, MAMP13, OPI13, AMP13, BZO13, TCA13, MTD13, BAR13, OXY13, PPX13, BUP13     Processing:  Rx to be electronically transmitted to pharmacy by provider      [email or web address]       checked in past 3 months?  No-problem list incomplete so  not checked.           Mila Luna RN, BSN, PHN

## 2020-04-29 NOTE — TELEPHONE ENCOUNTER
Anemia Management Note  SUBJECTIVE/OBJECTIVE:  Referred by Dr. Abran Cunha on 2020  Primary Diagnosis: Anemia in Chronic Kidney Disease (N18.5, D63.1)     Secondary Diagnosis:  Chronic Kidney Disease, Stage 5 (N18.5)  Kidney Tx: 2011  Hgb goal range:  9-10  Epo/Darbo: Aranesp  60 mcg  every two weeks for Hgb <10. In clinic  Iron regimen:  Ferrous Sulfate  once daily  Labs : 2021  Recent LAWRENCE use, transfusion, IV iron: NA.  Aranesp was previously ordered, he never received it.   RX/TX plans : 2021  No history of stroke, MI and blood clots or cancers     Contact:            No Consent to communicate on File        Anemia Latest Ref Rng & Units 2020 2020 2020 3/7/2020 3/18/2020 2020 2020   LAWRENCE Dose - - - - - - - 60 mcg   Hemoglobin 13.3 - 17.7 g/dL Duplicate request 8.0(L) 8.2(L) 8.5(L) 8.1(L) 7.6(LL) -   TSAT 15 - 46 % - - - - 30 - -   Ferritin 26 - 388 ng/mL - - - - 460(H) - -     BP Readings from Last 3 Encounters:   20 (!) 134/91   20 121/80   20 (!) 136/90     Wt Readings from Last 2 Encounters:   20 69.4 kg (153 lb)   20 67.6 kg (149 lb)           ASSESSMENT:  Hgb:Not at goal/Initiation of therapy  TSat: at goal >30% Ferritin: At goal (>100ng/mL)    PLAN:  Dose with aranesp and RTC for hgb then aranesp if needed in 2 week(s)    Orders needed to be renewed (for next follow-up date) in EPIC: None    Iron labs due:  2020    Plan discussed with:  No call made.  Appt scheduled for 20  Plan provided by:  chio    NEXT FOLLOW-UP DATE:  20    Cate Ellis RN   Anemia Services  [address] [address]   [email or web address]   Office : [phone]  Fax: [phone]

## 2020-04-30 ENCOUNTER — TELEPHONE (OUTPATIENT)
Dept: FAMILY MEDICINE | Facility: CLINIC | Age: 45
End: 2020-04-30

## 2020-04-30 ENCOUNTER — MYC MEDICAL ADVICE (OUTPATIENT)
Dept: FAMILY MEDICINE | Facility: CLINIC | Age: 45
End: 2020-04-30

## 2020-04-30 NOTE — TELEPHONE ENCOUNTER
Reason for Call:  Other Letter    Detailed comments: Pt states that they have to take off of work for tomorrow due to health condition for which they don't have a follow up appt until Monday for. They are requesting that provider write a note for the pt's work and upload it to [employer]. Thank you.    Phone Number Patient can be reached at: Home number on file [phone] (home)    Best Time: Any    Can we leave a detailed message on this number? YES    Call taken on 4/30/2020 at 3:42 PM by Molly Perez

## 2020-05-01 ENCOUNTER — MYC MEDICAL ADVICE (OUTPATIENT)
Dept: FAMILY MEDICINE | Facility: CLINIC | Age: 45
End: 2020-05-01

## 2020-05-01 NOTE — TELEPHONE ENCOUNTER
Patient has a Video visit on 5/4/2020. Geeta advise regarding letter.  Amanda Branham [facility]  2nd Floor  Primary Care

## 2020-05-01 NOTE — TELEPHONE ENCOUNTER
Video visit with you on 5/4/2020 at 11:00.  Amanda Branham [facility]  2nd Floor  Primary Care

## 2020-05-04 ENCOUNTER — VIRTUAL VISIT (OUTPATIENT)
Dept: FAMILY MEDICINE | Facility: CLINIC | Age: 45
End: 2020-05-04
Payer: COMMERCIAL

## 2020-05-04 DIAGNOSIS — N39.43 URINARY DRIBBLING: ICD-10-CM

## 2020-05-04 DIAGNOSIS — M87.052 AVASCULAR NECROSIS OF BONE OF LEFT HIP (H): Primary | ICD-10-CM

## 2020-05-04 PROCEDURE — 99214 OFFICE O/P EST MOD 30 MIN: CPT | Mod: 95 | Performed by: FAMILY MEDICINE

## 2020-05-04 RX ORDER — OXYCODONE AND ACETAMINOPHEN 5; 325 MG/1; MG/1
1 TABLET ORAL EVERY 12 HOURS PRN
Qty: 40 TABLET | Refills: 0 | Status: SHIPPED | OUTPATIENT
Start: 2020-05-04 | End: 2020-06-02

## 2020-05-04 NOTE — PROGRESS NOTES
"Rashad Ortiz is a 45 year old male who is being evaluated via a billable video visit.      The patient has been notified of following:     \"This video visit will be conducted via a call between you and your physician/provider. We have found that certain health care needs can be provided without the need for an in-person physical exam.  This service lets us provide the care you need with a video conversation.  If a prescription is necessary we can send it directly to your pharmacy.  If lab work is needed we can place an order for that and you can then stop by our lab to have the test done at a later time.    Video visits are billed at different rates depending on your insurance coverage.  Please reach out to your insurance provider with any questions.    If during the course of the call the physician/provider feels a video visit is not appropriate, you will not be charged for this service.\"    Patient has given verbal consent for Video visit? Yes    How would you like to obtain your AVS? [place]    Patient would like the video invitation sent by: Text to cell phone: [phone]    Will anyone else be joining your video visit? No      Subjective     Rashad Ortiz is a 45 year old male who presents to clinic today for the following health issues:    HPI  Chronic Pain Follow-Up    Where in your body do you have pain? Left Hip  How has your pain affected your ability to work? Unable to work  Which of these pain treatments have you tried since your last clinic visit? Other: percosets  How well are you sleeping? Poor  How has your mood been since your last visit? About the same  Have you had a significant life event? No  Other aggravating factors: prolonged sitting, prolonged standing and poor posture  Taking medication as directed? Yes    No flowsheet data found.  No flowsheet data found.  No flowsheet data found.  Encounter-Level CSA:    There are no encounter-level csa.     Patient-Level CSA:    There are no " "patient-level csa.         How many servings of fruits and vegetables do you eat daily?  2-3    On average, how many sweetened beverages do you drink each day (Examples: soda, juice, sweet tea, etc.  Do NOT count diet or artificially sweetened beverages)?   0    How many days per week do you exercise enough to make your heart beat faster? 3 or less    How many minutes a day do you exercise enough to make your heart beat faster? 9 or less    How many days per week do you miss taking your medication? 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  Was told he needed a hip replacement by Dr. TIERA Morillo.    Reviewed and updated as needed this visit by Provider  Tobacco  Allergies  Meds  Problems  Med Hx  Surg Hx  Fam Hx         Review of Systems   ROS COMP: Constitutional, HEENT, cardiovascular, pulmonary, gi and gu systems are negative, except as otherwise noted.      Objective    There were no vitals taken for this visit.  Estimated body mass index is 23.26 kg/m  as calculated from the following:    Height as of 3/19/20: 1.727 m (5' 8\").    Weight as of 4/24/20: 69.4 kg (153 lb).  Physical Exam     GENERAL: healthy, alert and no distress  EYES: Eyes grossly normal to inspection, conjunctivae and sclerae normal  RESP: no audible wheeze, cough, or visible cyanosis.  No visible retractions or increased work of breathing.  Able to speak fully in complete sentences.  NEURO: Cranial nerves grossly intact, mentation intact and speech normal  PSYCH: mentation appears normal, affect normal/bright, judgement and insight intact, normal speech and appearance well-groomed      Diagnostic Test Results:  Labs reviewed in Epic        Assessment & Plan     1. Avascular necrosis of bone of left hip (H)  Awaiting surgical replacement - continue percocet for pain management for now.  - oxyCODONE-acetaminophen (PERCOCET) 5-325 MG tablet; Take 1 tablet by " mouth every 12 hours as needed for pain For condition that requires surgery.  Dispense: 40 tablet; Refill: 0    2. Urinary dribbling  Possible etiologies discussed - decrease fluid, sodium and alcohol to previous levels.  Check PSA at lab draw later this week  - PSA, screen; Future     The uses and side effects, including black box warnings as appropriate, were discussed in detail.  All patient questions were answered.  The patient was instructed to call immediately if any side effects developed.     Return in about 4 weeks (around 6/1/2020), or if symptoms worsen or fail to improve.    Mariel Mares MD  [facility]      Video-Visit Details    Type of service:  Video Visit    Video End Time:11:21 AM    Originating Location (pt. Location): Home    Distant Location (provider location):  [facility]     Platform used for Video Visit: [facility]    Return in about 4 weeks (around 6/1/2020), or if symptoms worsen or fail to improve.       Mariel Mares MD

## 2020-05-04 NOTE — PATIENT INSTRUCTIONS
At [facility], we strive to deliver an exceptional experience to you, every time we see you. If you receive a survey, please complete it as we do value your feedback.  If you have MyChart, you can expect to receive results automatically within 24 hours of their completion.  Your provider will send a note interpreting your results as well.   If you do not have MyChart, you should receive your results in about a week by mail.    Your care team:                            Family Medicine Internal Medicine   MD Juanito Lynne MD Shantel Branch-Fleming, MD Katya Georgiev PA-C Megan Hill, APRN CNP    Srinivas Harrington, MD Pediatrics   Greg Fink, PAKARLA Balderrama, MD Leanna Hurley APRN CNP   MD Kelly Tipton MD Deborah Mielke, MD Kim Thein, APRN [facility]      Clinic hours: Monday - Thursday 7 am-7 pm; Fridays 7 am-5 pm.   Urgent care: Monday - Friday 11 am-9 pm; Saturday and Sunday 9 am-5 pm.    Clinic: [phone]       [facility]: Monday - Thursday 8 am - 7 pm; Friday 8 am - 6 pm  [facility]: [phone]     Use www.[email or web address].org for 24/7 diagnosis and treatment of dozens of conditions.    Patient Education     BPH (Enlarged Prostate)  The prostate is a gland at the base of the bladder. As some men get older, the prostate may get bigger in size. This problem is called benign prostatic hyperplasia (BPH). BPH puts pressure on the urethra. This is the tube that carries urine from the bladder to the penis. It may interfere with the flow of urine. It may also keep the bladder from emptying fully.    Symptoms of BPH include trouble starting urination and feeling as though the bladder isn t emptying all the way. It also includes a weak urine stream, dribbling and leaking of urine, and frequent and urgent urination (especially at night). BPH can increase the risk of urinary infections. It can also block  off urine flow completely. If this occurs, a thin tube (catheter) may be passed into the bladder to help drain urine.  If symptoms are mild, no treatment may be needed right now. If symptoms are more severe, treatment is likely needed. The goal of treatment is to improve urine flow and reduce symptoms. Treatments can include medicine and procedures. Your healthcare provider will discuss treatment options with you as needed.  Home care  The following guidelines will help you care for yourself at home:    Urinate as soon as you feel the urge. Don't try to hold your urine.    Don't limit your fluid intake during the day. Drink 6 to 8 glasses of water or liquids a day. This prevents bacteria from building up in the bladder.    Avoid drinking fluids after dinner to help reduce urination during the night.    Avoid medicines that can worsen your symptoms. These include certain cold and allergy medicines and antidepressants. Diuretics used for high blood pressure can also worsen symptoms. Talk to your doctor about the medicines you take. Other choices may work better for you.  Prostate cancer screening  BPH does not increase the risk of prostate cancer. But because prostate cancer is a common cancer in men, screening is sometimes recommended. This may help detect the cancer in its early stages when treatment is most effective. Factors that can increase the risk of prostate cancer include being -American or having a father or brother who had prostate cancer. A high-fat diet may also increase the risk of prostate cancer. Talk to your healthcare provider to see whether you should be screened for prostate cancer.  Follow-up care  Follow up with your healthcare provider, or as advised  To learn more, go to:    National Kidney & Urologic Diseases Information Clearinghouse  kidney.niddk.nih.gov, 242.811.3215  When to seek medical advice  Call your healthcare provider right away if any of these occur:    Fever of 100.4 F  (38.0 C) or higher, or as advised    Unable to pass urine for 8 hours    Increasing pressure or pain in your bladder (lower abdomen)    Blood in the urine    Increasing low back pain, not related to injury    Symptoms of urinary infection (increased urge to urinate, burning when passing urine, foul-smelling urine)  Date Last Reviewed: 7/1/2016 2000-2019 [employer]. [address] [place] [address]. All rights reserved. This information is not intended as a substitute for professional medical care. Always follow your healthcare professional's instructions.

## 2020-05-04 NOTE — Clinical Note
Hello, will this patient being having surgery soon or are you deferring him to after COVID?  Mariel Vu M.D.

## 2020-05-06 DIAGNOSIS — N17.9 AKI (ACUTE KIDNEY INJURY) (H): ICD-10-CM

## 2020-05-06 DIAGNOSIS — Z94.0 KIDNEY TRANSPLANTED: ICD-10-CM

## 2020-05-06 RX ORDER — FUROSEMIDE 20 MG
20 TABLET ORAL 2 TIMES DAILY
Qty: 180 TABLET | Refills: 1 | Status: SHIPPED | OUTPATIENT
Start: 2020-05-06 | End: 2020-10-07

## 2020-05-06 RX ORDER — SULFAMETHOXAZOLE AND TRIMETHOPRIM 400; 80 MG/1; MG/1
1 TABLET ORAL EVERY OTHER DAY
Qty: 45 TABLET | Refills: 3 | Status: SHIPPED | OUTPATIENT
Start: 2020-05-06 | End: 2021-05-24

## 2020-05-07 ENCOUNTER — TELEPHONE (OUTPATIENT)
Dept: NEPHROLOGY | Facility: CLINIC | Age: 45
End: 2020-05-07

## 2020-05-07 NOTE — TELEPHONE ENCOUNTER
Nephrology Note: Nursing Outreach Encounter    REASON FOR CALL:                                                      REASON FOR CALL: Blood Pressure Follow Up, Care Coordination                                          SITUATION/BACKROUND:                                                    Patient is being treated for CKD Stage 5.        ASSESSMENT:                                                      Blood pressures remain stable at 130s/80. He reports not checking HR. Advised to start tracking them.    Uremic Symptoms: Yes,  Edema: Yes in his legs, improved in the AM; Pruritis: Yes on his arms and hands;    Has frequent urination. Primary care provider is ordering labs for him tomorrow. Denies blood in his urine, urgency, or burning.       PLAN:                                                      Follow Up:   Patient to call/QRcaot message with updates  Follow up in 3-4 weeks     Patient verbalized understanding and will contact the clinic with any further questions or concerns.     Emmie Echeverria RN

## 2020-05-08 ENCOUNTER — TELEPHONE (OUTPATIENT)
Dept: PHARMACY | Facility: CLINIC | Age: 45
End: 2020-05-08

## 2020-05-08 ENCOUNTER — INFUSION THERAPY VISIT (OUTPATIENT)
Dept: INFUSION THERAPY | Facility: CLINIC | Age: 45
End: 2020-05-08
Payer: COMMERCIAL

## 2020-05-08 VITALS
RESPIRATION RATE: 16 BRPM | HEART RATE: 69 BPM | SYSTOLIC BLOOD PRESSURE: 136 MMHG | TEMPERATURE: 98.2 F | DIASTOLIC BLOOD PRESSURE: 72 MMHG | WEIGHT: 156.4 LBS | HEIGHT: 68 IN | OXYGEN SATURATION: 100 % | BODY MASS INDEX: 23.7 KG/M2

## 2020-05-08 DIAGNOSIS — N39.43 URINARY DRIBBLING: ICD-10-CM

## 2020-05-08 DIAGNOSIS — D63.1 ANEMIA OF CHRONIC RENAL FAILURE, STAGE 5 (H): Primary | ICD-10-CM

## 2020-05-08 DIAGNOSIS — Z79.60 LONG-TERM USE OF IMMUNOSUPPRESSANT MEDICATION: ICD-10-CM

## 2020-05-08 DIAGNOSIS — Z94.0 KIDNEY REPLACED BY TRANSPLANT: ICD-10-CM

## 2020-05-08 DIAGNOSIS — N18.5 ANEMIA OF CHRONIC RENAL FAILURE, STAGE 5 (H): Primary | ICD-10-CM

## 2020-05-08 DIAGNOSIS — Z94.0 KIDNEY TRANSPLANTED: ICD-10-CM

## 2020-05-08 DIAGNOSIS — Z94.0 KIDNEY TRANSPLANT RECIPIENT: ICD-10-CM

## 2020-05-08 DIAGNOSIS — N18.5 CKD (CHRONIC KIDNEY DISEASE) STAGE 5, GFR LESS THAN 15 ML/MIN (H): ICD-10-CM

## 2020-05-08 LAB
ANION GAP SERPL CALCULATED.3IONS-SCNC: 5 MMOL/L (ref 3–14)
BUN SERPL-MCNC: 80 MG/DL (ref 7–30)
CALCIUM SERPL-MCNC: 8.5 MG/DL (ref 8.5–10.1)
CHLORIDE SERPL-SCNC: 117 MMOL/L (ref 94–109)
CO2 SERPL-SCNC: 22 MMOL/L (ref 20–32)
CREAT SERPL-MCNC: 5.18 MG/DL (ref 0.66–1.25)
ERYTHROCYTE [DISTWIDTH] IN BLOOD BY AUTOMATED COUNT: 20 % (ref 10–15)
GFR SERPL CREATININE-BSD FRML MDRD: 12 ML/MIN/{1.73_M2}
GLUCOSE SERPL-MCNC: 97 MG/DL (ref 70–99)
HCT VFR BLD AUTO: 26.3 % (ref 40–53)
HGB BLD-MCNC: 7.8 G/DL (ref 13.3–17.7)
MCH RBC QN AUTO: 26.6 PG (ref 26.5–33)
MCHC RBC AUTO-ENTMCNC: 29.7 G/DL (ref 31.5–36.5)
MCV RBC AUTO: 90 FL (ref 78–100)
PLATELET # BLD AUTO: 181 10E9/L (ref 150–450)
POTASSIUM SERPL-SCNC: 4.9 MMOL/L (ref 3.4–5.3)
PSA SERPL-ACNC: 2.07 UG/L (ref 0–4)
RBC # BLD AUTO: 2.93 10E12/L (ref 4.4–5.9)
SODIUM SERPL-SCNC: 144 MMOL/L (ref 133–144)
WBC # BLD AUTO: 6.5 10E9/L (ref 4–11)

## 2020-05-08 PROCEDURE — 99207 ZZC NO CHARGE NURSE ONLY: CPT

## 2020-05-08 PROCEDURE — 96372 THER/PROPH/DIAG INJ SC/IM: CPT | Performed by: PHYSICIAN ASSISTANT

## 2020-05-08 PROCEDURE — 80048 BASIC METABOLIC PNL TOTAL CA: CPT | Performed by: FAMILY MEDICINE

## 2020-05-08 PROCEDURE — 80197 ASSAY OF TACROLIMUS: CPT | Performed by: INTERNAL MEDICINE

## 2020-05-08 PROCEDURE — 36415 COLL VENOUS BLD VENIPUNCTURE: CPT | Performed by: INTERNAL MEDICINE

## 2020-05-08 PROCEDURE — G0103 PSA SCREENING: HCPCS | Performed by: FAMILY MEDICINE

## 2020-05-08 PROCEDURE — 85027 COMPLETE CBC AUTOMATED: CPT | Performed by: FAMILY MEDICINE

## 2020-05-08 ASSESSMENT — MIFFLIN-ST. JEOR: SCORE: 1573.93

## 2020-05-08 ASSESSMENT — PAIN SCALES - GENERAL: PAINLEVEL: NO PAIN (0)

## 2020-05-08 NOTE — TELEPHONE ENCOUNTER
Anemia Management Note  SUBJECTIVE/OBJECTIVE:  Referred by Dr. Abran Cunha on 2020  Primary Diagnosis: Anemia in Chronic Kidney Disease (N18.5, D63.1)     Secondary Diagnosis:  Chronic Kidney Disease, Stage 5 (N18.5)  Kidney Tx: 2011  Hgb goal range:  9-10  Epo/Darbo: Aranesp  60 mcg  every two weeks for Hgb <10. In clinic  Iron regimen:  Ferrous Sulfate  once daily  Labs : 2021  Recent LAWRENCE use, transfusion, IV iron: NA.  Aranesp was previously ordered, he never received it.   RX/TX plans : 2021  No history of stroke, MI and blood clots or cancers     Contact:            No Consent to communicate on File    Anemia Latest Ref Rng & Units 2020 2020 3/7/2020 3/18/2020 2020 2020 2020   LAWRENCE Dose - - - - - - 60 mcg 60 mcg   Hemoglobin 13.3 - 17.7 g/dL 8.0(L) 8.2(L) 8.5(L) 8.1(L) 7.6(LL) - 7.8(LL)   TSAT 15 - 46 % - - - 30 - - -   Ferritin 26 - 388 ng/mL - - - 460(H) - - -     BP Readings from Last 3 Encounters:   20 136/72   20 (!) 134/91   20 121/80     Wt Readings from Last 2 Encounters:   20 70.9 kg (156 lb 6.4 oz)   20 69.4 kg (153 lb)           ASSESSMENT:  Hgb:Not at goal but improving - recommend dose and continue current regimen  TSat: at goal >30% Ferritin: At goal (>100ng/mL)    PLAN:  Dose with aranesp and RTC for hgb then aranesp if needed in 2 week(s)    Orders needed to be renewed (for next follow-up date) in EPIC: None    Iron labs due:  2020    Plan discussed with:  No call made.   Plan provided by:  chio    NEXT FOLLOW-UP DATE:  2020    Cate Ellis RN   Anemia Services  [address] [address]   [email or web address]   Office : [phone]  Fax: [phone]

## 2020-05-08 NOTE — PROGRESS NOTES
Infusion Nursing Note:  Rashad Ortiz presents today for Aranesp injection .    Patient seen by provider today: No   present during visit today: Not Applicable.    Note: Assessment performed by Bea DAVIS RN prior to injection today. Patient denies symptoms/concerns following previous injection 2 weeks ago.    Intravenous Access:  No Intravenous access/labs at this visit.    Treatment Conditions:    Give if Hgb <10; Hold if SPB >180  Lab Results   Component Value Date    HGB 7.8 05/08/2020     Lab Results   Component Value Date    WBC 6.5 05/08/2020      Lab Results   Component Value Date    ANEU 5.1 10/14/2019     Lab Results   Component Value Date     05/08/2020      Results reviewed, labs MET treatment parameters, ok to proceed with treatment.      Post Infusion Assessment:  Patient tolerated injection without incident.  Site patent and intact, free from redness, edema or discomfort.       Discharge Plan:   Patient discharged in stable condition accompanied by: self.  Departure Mode: Ambulatory.  Patient will call to schedule next labs/injection for 2 weeks.    Millie Steh LPN

## 2020-05-08 NOTE — RESULT ENCOUNTER NOTE
Mr. Ortiz,    Your PSA is normal.  This indicates a low likelihood of prostate cancer.  I recommend that this is done yearly for men over 50.    Please contact the clinic if you have additional questions.  Thank you.    Sincerely,    Mariel Mares MD

## 2020-05-08 NOTE — PROGRESS NOTES
Assessment and Plan:  # {[facility] TRANSPLANT LIVING DONOR ORGAN:834360080} Transplant Evaluation: Patient is a {desc.:324829} candidate overall. {Benefits of transplant discussed (Optional):909356}    # {[facility] TRANSPLANT ESKD/CKD/DM:977293364}: ***    {[facility] Transplant ESKD/CKD/DM Optional (Optional):543247}    # Cardiac Risk: ***    # ***: ***    # ***: ***    # ***: ***    # ***: ***    # Health Maintenance: {[facility] TX WOLF MAINT:295478603}    Discussed the risks and benefits of a transplant, including the risk of surgery and immunosuppression medications.  Patient's overall evaluation will be discussed in the Transplant Program's regular meeting with a final recommendation on the patients suitability for transplant to be made at that time.  Patient was seen in conjunction with {[facility] Nephrologist:28064172} as part of a shared visit.    Evaluation:  Rashad Ortiz was seen in consultation at the request of  {Referring Surgeon:07550724} for evaluation as a potential {[facility] Living donor organ:279819} transplant recipient.    Reason for Visit:  Rashad Ortiz is a 45 year old male with {[facility] TRANSPLANT ESKD/CKD/DM:602899238}, who presents for {[facility] Living donor organ:288390} transplant evaluation.    History of Present Illness:  Rashad Oritz is a 45-year-old with history of ESKD secondary to *** hypertension s/p failing LDKT from 2011. 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 In 2/2017 he had a transplant kidney biopsy that showed chronic tubulointerstitial changes, no evidence of rejection. In 10/2018, he suffered DEIDRA secondary to transplant pyelonephritis and has since had a baseline creatinine of 4s-5s with correlating eGFR of 10-13. Immunosuppression includes Tacrolimus, MMF and prednisone 10 mg daily. Dr. Murillo will start 4 month prednisone taper today.      Gout, suboptimally controlled with a monthly flare while using 20 mg  "Uric acid  11.8 in April.  Will increase uloric dose to 80 mg.     Osteonecrosis of left hip, for which he stared Percocet in March and recently changed to Dilaudid in the past week.  Would consider prednisone taper.     Former smoker on/off for 2 years, Pulmonary nodules, no further follow up on 2010.     ETOH? Denies abuse, no  Longer drinks.     LUTS, started with flomax by PCP.     HM: dental and derm not UTD,            Kidney Disease Hx:        ***       Kidney Disease Dx: { :301563}       Biopsy Proven: {YES WITH WILD CARD/NO:07725858}         On Dialysis: {UMP YES NO NEPH:359124039}       Primary Nephrologist: Dr. Barrios        H/o Kidney Stones: {YES WITH WILD CARD/NO:67952155}       H/o Recurrent/Frequent UTI: {Yes/No:18614663::\"No\"}         Diabetic Hx: {FV Renal Tx Diabetes type:644877}          Cardiac/Vascular Disease Risk Factors:        Cardiac Risk Factors: {Cardiac Risk Factors:732455}       Known CAD: {YES WITH WILD CARD/NO:78320169}       Known PAD/Caludication Symptoms: {YES WITH WILD CARD/NO:91628082}       Known Heart Failure: {Cardiac Risk Factors:279591}       Arrhythmia: {YES WITH WILD CARD/NO:91324012}       Pulmonary Hypertension: {YES WITH WILD CARD/NO:15526153}       Valvular Disease: {YES WITH WILD CARD/NO:27721743}       Other: {Cardiac Risk Factors:992206}         Volume Status/Weight:        Volume status: { :361537}       Weight:  {FV RENAL TX Recip Eval Weight:457150}       BMI: There is no height or weight on file to calculate BMI.         Functional Capacity/Frailty:        Prior to his new left hip osteonecrosis, he was walking longer distances and doing the treadmill for up to 7 minutes.       Fatigue/Decreased Energy: [x] No [] Yes    Chest Pain or SOB with Exertion: [x] No [] Yes    Significant Weight Change: [x] No [] Yes    Nausea, Vomiting or Diarrhea: [x] No [] Yes    Fever, Sweats or Chills:  [x] No [] Yes    Leg Swelling [] No [x] Yes         History of Cancer: "   ***    Other Significant Medical Issues: {None/***:139455}    Review of Systems:  {[facility] [place] JOSE:435742387}    Past Medical History:   Medical record was reviewed and PMH was discussed with patient and noted below.  Past Medical History:   Diagnosis Date     AVN (avascular necrosis of bone) (H)     left hip     Chronic kidney disease, stage 4, severely decreased GFR (H)      Gastro-oesophageal reflux disease      Gout      History of blood transfusion      Hypertension      Medical non-compliance      Pulmonary nodules      Steroid long-term use        Past Social History:   Past Surgical History:   Procedure Laterality Date     AV FISTULA OR GRAFT ARTERIAL       CREATE FISTULA ARTERIOVENOUS UPPER EXTREMITY Right 7/31/2019    Procedure: Creation Of Atriovenous Fistula Right Upper Arm;  Surgeon: Julia Irwin MD;  Location: [facility] OR     LIGATE FISTULA ARTERIOVENOUS UPPER EXTREMITY  12/20/2011    Procedure:LIGATE FISTULA ARTERIOVENOUS UPPER EXTREMITY; Excision of Right Forearm Arteriovenous Fistula.; Surgeon:LINDY AMAYA; Location:[facility] OR     PERCUTANEOUS BIOPSY KIDNEY Right 2/28/2017    Procedure: PERCUTANEOUS BIOPSY KIDNEY;  Surgeon: Gee Barrios MD;  Location: [facility] OR     TRANSPLANT  01/13/2011    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     Personal history of bleeding or anesthesia problems: {YES WITH WILD CARD/NO:65519028}    Family History:  Family History   Problem Relation Age of Onset     Hypertension Mother        Personal History:   Social History     Socioeconomic History     Marital status:      Spouse name: Not on file     Number of children: Not on file     Years of education: Not on file     Highest education level: Not on file   Occupational History     Not on file   Social Needs     Financial resource strain: Not on file     Food insecurity     Worry: Not on file     Inability: Not on file     Transportation needs     Medical: Not on file     Non-medical: Not on file   Tobacco  Use     Smoking status: Former Smoker     Types: Cigarettes     Last attempt to quit: 03/2017     Years since quitting: 3.1     Smokeless tobacco: Never Used     Tobacco comment: Patient states that he is an 'social'  smoker    Substance and Sexual Activity     Alcohol use: Yes     Alcohol/week: 4.2 standard drinks     Types: 5 Standard drinks or equivalent per week     Comment: 1-2 drinks/wk     Drug use: No     Sexual activity: Never     Partners: Female     Birth control/protection: None   Lifestyle     Physical activity     Days per week: Not on file     Minutes per session: Not on file     Stress: Not on file   Relationships     Social connections     Talks on phone: Not on file     Gets together: Not on file     Attends [employer] service: Not on file     Active member of club or organization: Not on file     Attends meetings of clubs or organizations: Not on file     Relationship status: Not on file     Intimate partner violence     Fear of current or ex partner: Not on file     Emotionally abused: Not on file     Physically abused: Not on file     Forced sexual activity: Not on file   Other Topics Concern     Parent/sibling w/ CABG, MI or angioplasty before 65F 55M? Not Asked   Social History Narrative    Rashad lives with his wife and 2 children. There are 2 declawed cats. He works at a computer-based job in customer service.        Allergies:  No Known Allergies    Medications:  Current Outpatient Medications   Medication Sig     acetaminophen (TYLENOL) 500 MG tablet Take 2 tablets (1,000 mg) by mouth every 8 hours as needed for mild pain     amLODIPine (NORVASC) 5 MG tablet Take 1 tablet (5 mg) by mouth daily     carvedilol (COREG) 25 MG tablet Take 1 tablet (25 mg) by mouth 2 times daily     febuxostat (ULORIC) 40 MG TABS tablet Take 1 tablet (40 mg) by mouth daily     ferrous sulfate (FEROSUL) 325 (65 Fe) MG tablet Take 1 tablet (325 mg) by mouth daily (with breakfast)     furosemide (LASIX) 20 MG tablet  "Take 1 tablet (20 mg) by mouth 2 times daily     mycophenolate (GENERIC EQUIVALENT) 250 MG capsule Take 2 capsules (500 mg) by mouth 2 times daily     omeprazole (PRILOSEC) 20 MG capsule Take 1 capsule (20 mg) by mouth daily     order for DME Equipment being ordered: Crutches     oxyCODONE-acetaminophen (PERCOCET) 5-325 MG tablet Take 1 tablet by mouth every 12 hours as needed for pain For condition that requires surgery.     predniSONE (DELTASONE) 5 MG tablet Take 2 tablets (10 mg) by mouth daily     sodium bicarbonate 650 MG tablet Take 2 tablets (1,300 mg) by mouth 3 times daily     sulfamethoxazole-trimethoprim (BACTRIM) 400-80 MG tablet Take 1 tablet by mouth every other day     tacrolimus (GENERIC EQUIVALENT) 0.5 MG capsule Take 1 capsule (0.5 mg) by mouth every evening Total dose to 1 mg in the morning and 1.5 mg in the evening     tacrolimus (GENERIC EQUIVALENT) 1 MG capsule Take 1 capsule (1 mg) by mouth 2 times daily Total dose 1 mg in the morning and 1.5 mg in the evening     vitamin D3 (CHOLECALCIFEROL) 1000 units (25 mcg) tablet Take 2 tablets (2,000 Units) by mouth daily     No current facility-administered medications for this visit.        Vitals:  There were no vitals taken for this visit.    Exam:  {EXAM FAST TX:[id number]::\"GENERAL APPEARANCE: alert and no distress\",\"HENT: mouth without ulcers or lesions\",\"LYMPHATICS: no cervical or supraclavicular nodes\",\"RESP: lungs clear to auscultation - no rales, rhonchi or wheezes\",\"CV: regular rhythm, normal rate, no rub, no murmur\",\"EDEMA: no LE edema bilaterally\",\"ABDOMEN: soft, nondistended, nontender, bowel sounds normal\",\"MS: extremities normal - no gross deformities noted, no evidence of inflammation in joints, no muscle tenderness\",\"SKIN: no rash\"}    Results:   No results found for this or any previous visit (from the past 336 hour(s)).      "

## 2020-05-09 LAB
TACROLIMUS BLD-MCNC: 3.6 UG/L (ref 5–15)
TME LAST DOSE: ABNORMAL H

## 2020-05-11 ENCOUNTER — TELEPHONE (OUTPATIENT)
Dept: FAMILY MEDICINE | Facility: CLINIC | Age: 45
End: 2020-05-11

## 2020-05-11 ENCOUNTER — TELEPHONE (OUTPATIENT)
Dept: TRANSPLANT | Facility: CLINIC | Age: 45
End: 2020-05-11

## 2020-05-11 ENCOUNTER — VIRTUAL VISIT (OUTPATIENT)
Dept: FAMILY MEDICINE | Facility: CLINIC | Age: 45
End: 2020-05-11
Payer: COMMERCIAL

## 2020-05-11 DIAGNOSIS — Z79.60 LONG-TERM USE OF IMMUNOSUPPRESSANT MEDICATION: ICD-10-CM

## 2020-05-11 DIAGNOSIS — M87.052 AVASCULAR NECROSIS OF BONE OF LEFT HIP (H): ICD-10-CM

## 2020-05-11 DIAGNOSIS — N39.43 URINARY DRIBBLING: Primary | ICD-10-CM

## 2020-05-11 DIAGNOSIS — Z94.0 KIDNEY TRANSPLANTED: ICD-10-CM

## 2020-05-11 DIAGNOSIS — Z94.0 KIDNEY TRANSPLANT RECIPIENT: ICD-10-CM

## 2020-05-11 PROCEDURE — 99214 OFFICE O/P EST MOD 30 MIN: CPT | Mod: 95 | Performed by: FAMILY MEDICINE

## 2020-05-11 RX ORDER — TAMSULOSIN HYDROCHLORIDE 0.4 MG/1
0.4 CAPSULE ORAL DAILY
Qty: 30 CAPSULE | Refills: 1 | Status: SHIPPED | OUTPATIENT
Start: 2020-05-11 | End: 2020-07-03

## 2020-05-11 RX ORDER — HYDROMORPHONE HYDROCHLORIDE 2 MG/1
2 TABLET ORAL EVERY 6 HOURS PRN
Qty: 26 TABLET | Refills: 0 | Status: SHIPPED | OUTPATIENT
Start: 2020-05-11 | End: 2020-07-09

## 2020-05-11 RX ORDER — TACROLIMUS 1 MG/1
2 CAPSULE ORAL 2 TIMES DAILY
Qty: 120 CAPSULE | Refills: 11 | Status: SHIPPED | OUTPATIENT
Start: 2020-05-11 | End: 2020-06-29

## 2020-05-11 ASSESSMENT — PAIN SCALES - GENERAL: PAINLEVEL: WORST PAIN (10)

## 2020-05-11 NOTE — TELEPHONE ENCOUNTER
Reason for Call:  Other prescription    Detailed comments: Pharmacy has questions regarding if the Hydromorphone is replacing the Percocet that was just recently prescribed to Pt .     Phone Number Pharmacy  can be reached at: Other phone number:  [phone]    Best Time: anytime    Can we leave a detailed message on this number? YES    Call taken on 5/11/2020 at 12:52 PM by Herrera Carey

## 2020-05-11 NOTE — PATIENT INSTRUCTIONS
At [facility], we strive to deliver an exceptional experience to you, every time we see you. If you receive a survey, please complete it as we do value your feedback.  If you have MyChart, you can expect to receive results automatically within 24 hours of their completion.  Your provider will send a note interpreting your results as well.   If you do not have MyChart, you should receive your results in about a week by mail.    Your care team:                            Family Medicine Internal Medicine   MD Juanito Lynne MD Shantel Branch-Fleming, MD Katya Georgiev PA-C Megan Hill, APRCLIFF Harrington, MD Pediatrics   Greg Fink, PAKARLA Balderrama, MD Leanna Hurley APRN CNP   MD Kelly Tipton MD Deborah Mielke, MD Kim Thein, APRN [facility]      Clinic hours: Monday - Thursday 7 am-7 pm; Fridays 7 am-5 pm.   Urgent care: Monday - Friday 11 am-9 pm; Saturday and Sunday 9 am-5 pm.    Clinic: [phone]       [facility]: Monday - Thursday 8 am - 7 pm; Friday 8 am - 6 pm  [facility]: [phone]     Use [email or web address] for 24/7 diagnosis and treatment of dozens of conditions.

## 2020-05-11 NOTE — PROGRESS NOTES
"Rashad Ortiz is a 45 year old male who is being evaluated via a billable telephone visit.      The patient has been notified of following:     \"This telephone visit will be conducted via a call between you and your physician/provider. We have found that certain health care needs can be provided without the need for a physical exam.  This service lets us provide the care you need with a short phone conversation.  If a prescription is necessary we can send it directly to your pharmacy.  If lab work is needed we can place an order for that and you can then stop by our lab to have the test done at a later time.    Telephone visits are billed at different rates depending on your insurance coverage. During this emergency period, for some insurers they may be billed the same as an in-person visit.  Please reach out to your insurance provider with any questions.    If during the course of the call the physician/provider feels a telephone visit is not appropriate, you will not be charged for this service.\"    Patient has given verbal consent for Telephone visit?  Yes    What phone number would you like to be contacted at? [phone]    How would you like to obtain your AVS? MyChart    Subjective     Rashad Ortiz is a 45 year old male who presents to clinic today for the following health issues:    HPI     Left hip pain has significantly worsened over the last 3 days.  Now bed ridden and not able to move without crutches. Percocet is not helping pain at all. Has not heard from surgeon's office yet.    Urine dribbling continues. Saw that prostate test is normal.  Working with nephrology on renal status.     Reviewed and updated as needed this visit by Provider  Tobacco  Allergies  Meds  Problems  Med Hx  Surg Hx  Fam Hx         Review of Systems   Constitutional, HEENT, cardiovascular, pulmonary, gi and gu systems are negative, except as otherwise noted.       Objective   Reported vitals:  There were no vitals " taken for this visit.   healthy, alert and no distress  PSYCH: Alert and oriented times 3; coherent speech, normal   rate and volume, able to articulate logical thoughts, able   to abstract reason, no tangential thoughts, no hallucinations   or delusions  His affect is normal  RESP: No cough, no audible wheezing, able to talk in full sentences  Remainder of exam unable to be completed due to telephone visits    Diagnostic Test Results:  Labs reviewed in Epic        Assessment/Plan:  1. Avascular necrosis of bone of left hip (H)  Concern for joint collapse - Change percocet to dilaudid given renal status. Patient will call surgeon's office to see if more evaluation is needed.  - HYDROmorphone (DILAUDID) 2 MG tablet; Take 1 tablet (2 mg) by mouth every 6 hours as needed for pain  Dispense: 26 tablet; Refill: 0    2. Urinary dribbling  Trial of flomax.  - tamsulosin (FLOMAX) 0.4 MG capsule; Take 1 capsule (0.4 mg) by mouth daily  Dispense: 30 capsule; Refill: 1    The uses and side effects, including black box warnings as appropriate, were discussed in detail.  All patient questions were answered.  The patient was instructed to call immediately if any side effects developed.     Return in about 2 weeks (around 5/25/2020).      Phone call duration:  15 minutes    Mariel Mares MD

## 2020-05-11 NOTE — TELEPHONE ENCOUNTER
ISSUE:   Tacrolimus IR level 3.6 on 5/8/20, goal 4-6, dose 1 mg in the morning and 1.5 mg in the evening.     PLAN:   Please call patient and confirm this was an accurate 12-hour trough. Verify Tacrolimus IR dose 1 mg in the morning and 1.5 mg in the evening. Confirm no new medications or illness. Confirm no missed doses. If accurate trough and accurate dose, increase Tacrolimus IR dose to 1.5 mg BID and repeat labs in 1 week.    OUTCOME:   Spoke with patient, they confirm accurate trough level but states he does not have 0.5 mg capsules yet so he has been taking differently: 2 mg in the morning and 1 mg in the evening. His level is still low with higher dose. Patient confirmed dose change to 2 mg BID and to repeat labs end of this week. Orders sent to preferred pharmacy for dose change and lab for repeat labs. Patient voiced understanding of plan.

## 2020-05-11 NOTE — LETTER
May 11, 2020      Rashad Ortiz  [address]  [place] [facility] [place] [place] [address]        To Whom It May Concern:    Rashad E Angel had a visit on 5/11/20. He will not be able to return to work until 5/18/20.      Sincerely,    Mariel Mares MD

## 2020-05-11 NOTE — TELEPHONE ENCOUNTER
Called [facility] pharmacy and spoke to   And gave them Dr Diane Mares's message. Pharmacy understands and states someone else has called this in.  Amanda Branham MA  [facility]  2nd Floor  Primary Care

## 2020-05-13 ENCOUNTER — VIRTUAL VISIT (OUTPATIENT)
Dept: TRANSPLANT | Facility: CLINIC | Age: 45
End: 2020-05-13
Attending: PHYSICIAN ASSISTANT
Payer: COMMERCIAL

## 2020-05-13 ENCOUNTER — DOCUMENTATION ONLY (OUTPATIENT)
Dept: TRANSPLANT | Facility: CLINIC | Age: 45
End: 2020-05-13

## 2020-05-13 VITALS
SYSTOLIC BLOOD PRESSURE: 156 MMHG | BODY MASS INDEX: 23.22 KG/M2 | DIASTOLIC BLOOD PRESSURE: 98 MMHG | HEIGHT: 68 IN | HEART RATE: 69 BPM | WEIGHT: 153.2 LBS

## 2020-05-13 DIAGNOSIS — M10.9 GOUT, UNSPECIFIED CAUSE, UNSPECIFIED CHRONICITY, UNSPECIFIED SITE: ICD-10-CM

## 2020-05-13 DIAGNOSIS — Z76.82 ORGAN TRANSPLANT CANDIDATE: ICD-10-CM

## 2020-05-13 DIAGNOSIS — Z87.891 HISTORY OF TOBACCO USE: ICD-10-CM

## 2020-05-13 DIAGNOSIS — Z76.82 ORGAN TRANSPLANT CANDIDATE: Primary | ICD-10-CM

## 2020-05-13 DIAGNOSIS — Z94.0 HTN, KIDNEY TRANSPLANT RELATED: ICD-10-CM

## 2020-05-13 DIAGNOSIS — I10 ESSENTIAL HYPERTENSION: ICD-10-CM

## 2020-05-13 DIAGNOSIS — T86.11 ANTIBODY MEDIATED REJECTION OF KIDNEY TRANSPLANT: ICD-10-CM

## 2020-05-13 DIAGNOSIS — E55.9 VITAMIN D DEFICIENCY: ICD-10-CM

## 2020-05-13 DIAGNOSIS — Z01.818 PRE-TRANSPLANT EVALUATION FOR KIDNEY TRANSPLANT: ICD-10-CM

## 2020-05-13 DIAGNOSIS — T86.12 KIDNEY TRANSPLANT FAILURE: ICD-10-CM

## 2020-05-13 DIAGNOSIS — N18.4 CHRONIC KIDNEY DISEASE, STAGE 4, SEVERELY DECREASED GFR (H): Primary | ICD-10-CM

## 2020-05-13 DIAGNOSIS — N12 PYELONEPHRITIS: ICD-10-CM

## 2020-05-13 DIAGNOSIS — N18.6 ESRD (END STAGE RENAL DISEASE) (H): ICD-10-CM

## 2020-05-13 DIAGNOSIS — I15.1 HTN, KIDNEY TRANSPLANT RELATED: ICD-10-CM

## 2020-05-13 PROBLEM — F10.929 ALCOHOL INTOXICATION (H): Status: RESOLVED | Noted: 2019-10-14 | Resolved: 2020-05-13

## 2020-05-13 RX ORDER — FEBUXOSTAT 80 MG/1
80 TABLET, FILM COATED ORAL DAILY
Qty: 90 TABLET | Refills: 3 | Status: ON HOLD | OUTPATIENT
Start: 2020-05-13 | End: 2021-10-14

## 2020-05-13 ASSESSMENT — MIFFLIN-ST. JEOR: SCORE: 1559.41

## 2020-05-13 NOTE — PROGRESS NOTES
Psychosocial Assessment  Patient Name/ Age: Rashad Ortiz 45 year old   Medical Record #: [id number]  Duration of Interview:     30  min  Process:   Telephone Interview                (counseling < 50%)   Present at Appointment: Rashad        :BETTY Swenson, [facility] Date:  May 13, 2020        Type of transplant: Kidney    Donor type:   Rashad indicated he does not know of any potential donors at this time.   Cadaver   Prior Transplants:    Yes    2011 LRT Kidney Status of Transplant:  Rashad indicated within the past year but he was assessed for another transplant in 2017       Current Living Situation    Location:   [address] [place] [address] [address]  [facility] [place] [place] [facility] [id number]  With Whom: Wife Raúl and children Lala (7) and Christian (1)       Family/ Social Support:    Rashad has three adult children who live in [place], [place] - Ana (23), Rashad Clement (21) and [facility] (19).  Rashad's mother lives in [place], [place] and he has one brother in [place], [place].   Available, helpful   Committed relationship:  Rashad and Raúl are .  Rashad is unsure how long.  In the 2017 assessment he indicated they have been  for a few months. This writer did tell Rashad this but he said he thought they were  in Feb 2018.   Stable/supportive   Other supports:   Friends Available, helpful       Activities/ Functional Ability    Current level: Ambulatory with a cane or crutches, visually impaired and independent with ADL's     Transportation Drives self       Vocational/Employment/Financial     Employment  [employer]   Full time   Job Description  [employer]      Income  Raúl is currently unemployed due to pandemic    Salary/wages   Insurance  [employer] through employer and [employer] [place]    At this time, patient can afford medication costs:  Yes  Private Insurance and [place]       Medical Status    Current mode of treatment for ESRD Kidney Transplant currently not on dialysis.   Complications - Non diabetic         Behavioral    Tobacco Use No Chemical Dependency No   Rashad indicated he quit smoking November 2019. Rashad indicated he does not drink alcohol or any drugs.     Psychiatric Impairment No    Reading ability Good  Education Level: Bachelors Degree Recent Legal History No    Coping Style/Strategies: Rashad indicated when under stress he will listen to music.     Ability to Adhere to Complex Medical Regime: Yes     Adherence History:  Rashad indicated he is following all of his physician's recommendations.          Education  _X_ Medicare  _X_ Rehabilitation  _X_ Donor issues  _X_ Community resources  _X_ Post discharge housing  _X_ Financial resources  _X_ Medical insurance options  _X_ Psych adjustment  _X_ Family adjustment  _X_ Health Care Directive - Provided Education and Declined Completing at this time.  Rashad indicated he is in agreement with his wife making his medical decisions for him if he is unable. Psychosocial Risks of Transplant Reviewed and Discussed:  _X_ Increased stress related to emotional,            family, social, employment or financial           situation  _X_ Affect on work and/or disability benefits  _X_ Affect on future life insurance  _X_ Transplant outcome expectations may           not be met  _X_ Mental Health Risks: anxiety,           depression, PTSD, guilt, grief and           chronic fatigue     Notable Items:   Concerning to this writer is Rashad lack of knowledge regarding his life and transplant in general.  Rashad was assessed in 2017.  During that assistance he indicated he had three children during this assessment four (an older one, not recent birth). He did not know how long he had been  to his wife Raúl but in 2017 he indicated they had been  for a few months.  He indicated he now uses a cane or crutches due to a health issue which he needs surgery for.  He indicated he was not working, needs to apply for SSDI but then indicated he is off for the week  due to health issues and is still actually informed full time.  Unsure of compliance with appointments, medications or labs.  Writer would want to meet with Rashad in person prior to giving acceptance. Also recommend neuro psych testing to ensure Rashad is understanding.      Final Evaluation/Assessment   Unsure if patient is processing information well. Did not appear well informed but indicated he is motivated and able to follow post transplant requirements. Behavior was appropriate during interview. Has adequate income and insurance coverage. Adequate social support. Unsure if patient understands the risks and benefits of transplant.      Recommendation  Conditional   Selection Criteria Met:  Plan for support Yes   Chemical Dependence Yes   Smoking Yes   Mental Health Yes   Adequate Finances Yes    Signature: BETTY Swenson, LICSW   Title: Clinical

## 2020-05-13 NOTE — PROGRESS NOTES
"Rashad Ortiz is a 45 year old male who is being evaluated via a billable video visit.      The patient has been notified of following:     \"This video visit will be conducted via a call between you and your physician/provider. We have found that certain health care needs can be provided without the need for an in-person physical exam.  This service lets us provide the care you need with a video conversation.  If a prescription is necessary we can send it directly to your pharmacy.  If lab work is needed we can place an order for that and you can then stop by our lab to have the test done at a later time.    Video visits are billed at different rates depending on your insurance coverage.  Please reach out to your insurance provider with any questions.    If during the course of the call the physician/provider feels a video visit is not appropriate, you will not be charged for this service.\"    Patient has given verbal consent for Video visit? Yes    How would you like to obtain your AVS? ChiquisLinkwood    Patient would like the video invitation sent by: Send to e-mail at: [email or web address]    Will anyone else be joining your video visit? No      Video-Visit Details    Type of service:  Video Visit    Video Start Time: 0920  Video End Time: 10:19 AM    Originating Location (pt. Location): Home    Distant Location (provider location):  [facility]     Platform used for Video Visit: Rubens Murillo MD    Patient was seen by myself, Dr. Stef Murillo, in conjunction with Dawna Fisher, as part of a shared visit.    I personally reviewed past medical and surgical history, vital signs, medications and labs.  Present and past medical history, along with significant physical exam findings were all reviewed with MARIA ESTHER.    My felix findings:  Rashad Ortiz is a 45 year old year old male with CKD from hypertension, who presents for Kidney transplant evaluation.    Patient with L kid in 2011 " now with GFR around 13-15 ml / min. Had non adherence due to his job that is now better.     He needs colonoscopy and PSA.    No exertional symptoms. Needs stress testing.     Has osteonecrosis of the hip, now on dilaudid.     No cp, sob, no n/v/d, no f/s/c.     No living donors.     Key management decisions made by me and discussed with MARIA ESTHER:  1. Kidney transplant evaluation - patient is a good candidate overall. Benefits of a living donor transplant were discussed. No living donors. List inactive until workup completed as below.   2. CKD from hypertension: follow up every 3 months  3. HTN: continue goal < 130/80  4. Immunosuppression: continue tac and mmf will try to get off prednisone due to ON of the hip  5. CAD risk: needs updated cardiology eval given risk factors  6. Ca Screens: needs colonoscopy and PSA  7. Osteonecrosis of the hip: will try to get off prednisone to help with this.  8. Gout: on uloric. Last Uric acid was 11, will increase to 80 mg daily.    The combined time for this visit between the advanced practice provider and myself was 60 minutes of which > 50% of time was spent counseling regarding the options for replacing kidney function. All questions were answered.

## 2020-05-13 NOTE — PROGRESS NOTES
"Rashad Ortiz is a 45 year old male who is being evaluated via a billable telephone visit.      The patient has been notified of following:     \"This telephone visit will be conducted via a call between you and your physician/provider. We have found that certain health care needs can be provided without the need for a physical exam.  This service lets us provide the care you need with a short phone conversation.  If a prescription is necessary we can send it directly to your pharmacy.  If lab work is needed we can place an order for that and you can then stop by our lab to have the test done at a later time.    Telephone visits are billed at different rates depending on your insurance coverage. During this emergency period, for some insurers they may be billed the same as an in-person visit.  Please reach out to your insurance provider with any questions.    If during the course of the call the physician/provider feels a telephone visit is not appropriate, you will not be charged for this service.\"    Patient has given verbal consent for Telephone visit? yes    Phone call duration: 15 minutes    Outpatient MNT: Kidney Transplant Evaluation    Current BMI: 23.8 (HT 68 in,  lbs/71 kg)- data from 5/8   BMI is within recommendation of <35 for kidney transplant     Time Spent: 15 minutes  Visit Type: Initial  Referring Physician: Hubert   Pt accompanied by: self    Medical dx associated with RD referral  - CKD V    History of previous txp: kidney 2011  Dialysis: no     Nutrition Assessment  Pt and wife cook at home; he follows a lower sodium diet, but is not aware of the daily recommended intake for CKD.     Appetite: good/baseline     Vitamins, Supplements, Pertinent Meds: iron, vit D  Herbal Medicines/Supplements: none     Diet Recall  Breakfast Oats with toast and an egg or breakfast s/w from deli 1-2x/week    Lunch Baked chicken or other leftovers or gets a hoagie s/w out    Dinner May bring food to work " (chicken, green beans, potato) or may get Chinese take out    Snacks Cookies, peanuts, fruit    Beverages Water, occasional juice or soda, 4 cups of milk per week    Alcohol None    Dining out A few times/week      Physical Activity  None      Anthropometrics  Height:   68 in   BMI:    23.8    Weight Status:Normal BMI   Weight:  156 lbs (5/8)            IBW (lb): 154  % IBW: 101    Wt Hx: Pt reports he has edema. Weight overall stable.     Adj/dosing BW: 156 lbs/71 kg       Labs  Lab Results   Component Value Date    A1C 5.4 06/06/2015     Potassium   Date Value Ref Range Status   05/08/2020 4.9 3.4 - 5.3 mmol/L Final     PHOSPHORUS: no recent level on file     Malnutrition  % Intake: No decreased intake noted  % Weight Loss: None noted  Subcutaneous Fat Loss: None  Muscle Loss: None  Fluid Accumulation/Edema: yes, per pt report (unknown degree of edema)  Malnutrition Diagnosis: Patient does not meet two of the above criteria necessary for diagnosing malnutrition     Estimated Nutrition Needs  Energy  1204-4265     (25-30 kcal/kg for maintenance)     Protein  43-52    (0.6-0.8 g/kg for CKD)           Fluid  1 ml/kcal or per MD   Micronutrient   Na+: <2000 mg/day  K+: 6976-8635 mg/day  Phos: 800-1000 mg/day            Nutrition Diagnosis  Excessive Na+ intake r/t food and nutrition related knowledge deficit AEB diet recall reveals high Na+ foods.    Food and nutrition related knowledge deficit r/t pre kidney transplant eval AEB pt verbalized not hearing pre/post transplant diet guidelines.    Nutrition Intervention  Nutrition education provided:  Discussed sodium intake (low sodium foods and drinks, seasoning food without salt and tips for low sodium diet). Encouraged pt to bring food from home more often than dining out as frequent. Could also look at nutrition info online to help guide decision making, with reminder that total sodium intake per day should be at or below 2000 mg.     Reviewed post txp diet  guidelines in brief (will review in further detail post txp):  (1) Review of proper food safety measures d/t immunosuppressant therapy post-op and increased risk for food-borne illness    (2) Avoid the following post txp d/t risk for rejection, unknown effects on the organs, and/or potential interactions with immunosuppressants:  - Herbal, Chinese, holistic, chiropractic, natural, alternative medicines and supplements  - Detoxes and cleanses  - Weight loss pills  - Protein powders or other products with extracts or herbs (ie green tea extract)    (3) Med regimen and possible side effects    Patient Understanding: Pt verbalized understanding of education provided.  Expected Compliance: Good   Follow-Up Plans: PRN     Nutrition Goals  1. Limit Na+ <2000mg/day  2. Pt to verbalize understanding of 3 aspects of post txp education provided    Provided pt with contact info.   Brianna Soliman RD, LD  [facility] [phone]

## 2020-05-13 NOTE — PROGRESS NOTES
Summary    Team s concerns/comments: Patient is a good candidate overall    Cardiology,colonoscopy, dental check and derm    I called pt today at the end of his appts. Pt stating he had a good experience today and does not have any questions. I reviewed that Pamela will call him then after the Selection Committee with the outcome of that.     Candidacy category: YELLOW     Action/Plan: Continue eval     Expected Selection Meeting Discussion: 05/20/2020

## 2020-05-13 NOTE — PROGRESS NOTES
Assessment and Plan:  # Kidney Transplant Evaluation: Patient is a good candidate overall. Benefits of a living donor transplant were discussed.    # ESKD secondary to presumed hypertension s/p failing LDKT from 2011: with baseline creatinine of 4s-5s and correlating eGFR levels of 14-16. He was listed in 2017 but  eventually de-listed due to noncompliance issues and not completing his evaluation.  Immunosuppression includes Tacrolimus, MMF and prednisone 10 mg daily. When ready, he would likely benefit from another kidney transplant.    # Cardiac Risk: no history of cardiac disease or events.  Normal EF on 2017 ECHO. Given cardiac risk factors, he will need an updated cardiac risk assessment.    # Noncompliance: much improved with consistent labs and medical follow up. He has a different job now that allows more flexibility. Would appreciate social work input.     # Gout: suboptimally controlled with a monthly flare while using Urloric 20 mg daily. Last uric acid was 11.8. Will increase Uloric dose to 80 mg daily.     # Osteonecrosis of left hip: for which he was recently started on Dilaudid by PCP. Will start 4 month prednisone taper today.     # Former smoker with minimal smoking history, pulmonary nodules:  last CT chest was in 2010. No further follow up needed.       # LUTS: recently started on Tamsulosin by PCP.     # Health Maintenance: dental and derm not UTD.  Recent PSA was normal.      Discussed the risks and benefits of a transplant, including the risk of surgery and immunosuppression medications.  Patient's overall evaluation will be discussed in the Transplant Program's regular meeting with a final recommendation on the patients suitability for transplant to be made at that time.  Patient was seen in conjunction with Dr. Stef Murillo as part of a shared visit.    Evaluation:  Rashad Ortiz was seen in consultation at the request of Dr. Sumit Gaspar for evaluation as a potential kidney transplant  recipient.    Reason for Visit:  Rashad Ortiz is a 45 year old male with ESKD from hypertension, who presents for kidney transplant evaluation.    History of Present Illness:  Rashad Ortiz is a 45-year-old AA gentleman with history of ESKD secondary to presumed hypertension s/p failing LDKT from 2011. 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 In 2/2017 he had a transplant kidney biopsy that showed chronic tubulointerstitial changes, no evidence of rejection. In 10/2018, he suffered DEIDRA secondary to transplant pyelonephritis and has since had a baseline creatinine of 4s-5s with correlating eGFR of 14-16. He was listed in 2017 put delisted due to noncompliance issues and not completing his evaluation.  Immunosuppression includes Tacrolimus, MMF and prednisone 10 mg daily.               Kidney Disease Hx:        Kidney Disease Dx: Hypertension       Biopsy Proven: No         On Dialysis: No       Primary Nephrologist: Dr. Barrios        H/o Kidney Stones: No       H/o Recurrent/Frequent UTI: No         Diabetic Hx: None           Cardiac/Vascular Disease Risk Factors:        Cardiac Risk Factors: Hypertension, CKD and Smoking       Known CAD: No       Known PAD/Caludication Symptoms: No       Known Heart Failure: No       Arrhythmia: No       Pulmonary Hypertension: No       Valvular Disease: No       Other: None         Volume Status/Weight:        Volume status: Not assessed       Weight:  Acceptable BMI       BMI: Body mass index is 23.29 kg/m .         Functional Capacity/Frailty:        Prior to his new left hip osteonecrosis, he was walking longer distances and doing the treadmill for up to 7 minutes.       Fatigue/Decreased Energy: [x] No [] Yes    Chest Pain or SOB with Exertion: [x] No [] Yes    Significant Weight Change: [x] No [] Yes    Nausea, Vomiting or Diarrhea: [x] No [] Yes    Fever, Sweats or Chills:  [x] No  [] Yes    Leg Swelling [] No [x] Yes         History of Cancer:   None     Other Significant Medical Issues:   - Gout, suboptimally controlled with a monthly flare while using 20 mg urloric.  Uric acid 11.8 in April.     - Osteonecrosis of left hip, for which he was recently started on Dilaudid by PCP.   - Former smoker with minimal smoking history, pulmonary nodules:  Last CT chest was in 2010. No further follow up recommended.    - LUTS, recently started with flomax by PCP.     Review of Systems:  A comprehensive review of systems was obtained and negative, except as noted in the HPI or PMH.    Past Medical History:   Medical record was reviewed and PMH was discussed with patient and noted below.  Past Medical History:   Diagnosis Date     AVN (avascular necrosis of bone) (H)     left hip     BPH (benign prostatic hyperplasia)      Chronic kidney disease, stage 4, severely decreased GFR (H)      Gastro-oesophageal reflux disease      Gout      History of blood transfusion      Hypertension      Medical non-compliance      Pulmonary nodules      Steroid long-term use      Vitamin D deficiency        Past Social History:   Past Surgical History:   Procedure Laterality Date     AV FISTULA OR GRAFT ARTERIAL       CREATE FISTULA ARTERIOVENOUS UPPER EXTREMITY Right 7/31/2019    Procedure: Creation Of Atriovenous Fistula Right Upper Arm;  Surgeon: Julia Irwin MD;  Location: [facility] OR     LIGATE FISTULA ARTERIOVENOUS UPPER EXTREMITY  12/20/2011    Procedure:LIGATE FISTULA ARTERIOVENOUS UPPER EXTREMITY; Excision of Right Forearm Arteriovenous Fistula.; Surgeon:LINDY AMAYA; Location:[facility] OR     PERCUTANEOUS BIOPSY KIDNEY Right 2/28/2017    Procedure: PERCUTANEOUS BIOPSY KIDNEY;  Surgeon: Gee Barrios MD;  Location: [facility] OR     TRANSPLANT  01/13/2011    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     Personal history of bleeding or anesthesia problems: No    Family History:  Family History   Problem Relation  Age of Onset     Hypertension Mother      Colon Cancer Mother 66     Colon Cancer Brother 51       Personal History:   Social History     Socioeconomic History     Marital status:      Spouse name: Not on file     Number of children: Not on file     Years of education: Not on file     Highest education level: Not on file   Occupational History     Not on file   Social Needs     Financial resource strain: Not on file     Food insecurity     Worry: Not on file     Inability: Not on file     Transportation needs     Medical: Not on file     Non-medical: Not on file   Tobacco Use     Smoking status: Former Smoker     Types: Cigarettes     Last attempt to quit: 03/2017     Years since quitting: 3.2     Smokeless tobacco: Never Used     Tobacco comment: Patient states that he is an 'social'  smoker    Substance and Sexual Activity     Alcohol use: Yes     Alcohol/week: 4.2 standard drinks     Types: 5 Standard drinks or equivalent per week     Comment: 1-2 drinks/wk     Drug use: No     Sexual activity: Never     Partners: Female     Birth control/protection: None   Lifestyle     Physical activity     Days per week: Not on file     Minutes per session: Not on file     Stress: Not on file   Relationships     Social connections     Talks on phone: Not on file     Gets together: Not on file     Attends Faith service: Not on file     Active member of club or organization: Not on file     Attends meetings of clubs or organizations: Not on file     Relationship status: Not on file     Intimate partner violence     Fear of current or ex partner: Not on file     Emotionally abused: Not on file     Physically abused: Not on file     Forced sexual activity: Not on file   Other Topics Concern     Parent/sibling w/ CABG, MI or angioplasty before 65F 55M? Not Asked   Social History Narrative    Rashad lives with his wife and 2 children. There are 2 declawed cats. He works at a computer-based job in customer service.   "      Allergies:  No Known Allergies    Medications:  Current Outpatient Medications   Medication Sig     acetaminophen (TYLENOL) 500 MG tablet Take 2 tablets (1,000 mg) by mouth every 8 hours as needed for mild pain     amLODIPine (NORVASC) 5 MG tablet Take 1 tablet (5 mg) by mouth daily     carvedilol (COREG) 25 MG tablet Take 1 tablet (25 mg) by mouth 2 times daily     febuxostat (ULORIC) 80 MG TABS tablet Take 1 tablet (80 mg) by mouth daily     ferrous sulfate (FEROSUL) 325 (65 Fe) MG tablet Take 1 tablet (325 mg) by mouth daily (with breakfast)     furosemide (LASIX) 20 MG tablet Take 1 tablet (20 mg) by mouth 2 times daily     HYDROmorphone (DILAUDID) 2 MG tablet Take 1 tablet (2 mg) by mouth every 6 hours as needed for pain     mycophenolate (GENERIC EQUIVALENT) 250 MG capsule Take 2 capsules (500 mg) by mouth 2 times daily     omeprazole (PRILOSEC) 20 MG capsule Take 1 capsule (20 mg) by mouth daily     oxyCODONE-acetaminophen (PERCOCET) 5-325 MG tablet Take 1 tablet by mouth every 12 hours as needed for pain For condition that requires surgery.     predniSONE (DELTASONE) 5 MG tablet Take 2 tablets (10 mg) by mouth daily     sodium bicarbonate 650 MG tablet Take 2 tablets (1,300 mg) by mouth 3 times daily     sulfamethoxazole-trimethoprim (BACTRIM) 400-80 MG tablet Take 1 tablet by mouth every other day     tacrolimus (GENERIC EQUIVALENT) 1 MG capsule Take 2 capsules (2 mg) by mouth 2 times daily     tamsulosin (FLOMAX) 0.4 MG capsule Take 1 capsule (0.4 mg) by mouth daily     vitamin D3 (CHOLECALCIFEROL) 1000 units (25 mcg) tablet Take 2 tablets (2,000 Units) by mouth daily     order for DME Equipment being ordered: Crutches (Patient not taking: Reported on 5/13/2020)     No current facility-administered medications for this visit.        Vitals:  BP (!) 156/98   Pulse 69   Ht 1.727 m (5' 8\")   Wt 69.5 kg (153 lb 3.2 oz)   BMI 23.29 kg/m      Exam:  GENERAL: Healthy, alert and no distress  EYES: Eyes " grossly normal to inspection.  No discharge or erythema, or obvious scleral/conjunctival abnormalities.  RESP: No audible wheeze, cough, or visible cyanosis.  No visible retractions or increased work of breathing.    SKIN: Visible skin clear. No significant rash, abnormal pigmentation or lesions.  NEURO: Cranial nerves grossly intact.  Mentation and speech appropriate for age.  PSYCH: Mentation appears normal, affect normal/bright, judgement and insight intact, normal speech and appearance well-groomed.  Results:   Recent Results (from the past 336 hour(s))   Tacrolimus level    Collection Time: 05/08/20 11:10 AM   Result Value Ref Range    Tacrolimus Last Dose 05/07/2020 2230     Tacrolimus Level 3.6 (L) 5.0 - 15.0 ug/L   PSA, screen    Collection Time: 05/08/20 11:10 AM   Result Value Ref Range    PSA 2.07 0 - 4 ug/L   CBC with platelets    Collection Time: 05/08/20 11:10 AM   Result Value Ref Range    WBC 6.5 4.0 - 11.0 10e9/L    RBC Count 2.93 (L) 4.4 - 5.9 10e12/L    Hemoglobin 7.8 (LL) 13.3 - 17.7 g/dL    Hematocrit 26.3 (L) 40.0 - 53.0 %    MCV 90 78 - 100 fl    MCH 26.6 26.5 - 33.0 pg    MCHC 29.7 (L) 31.5 - 36.5 g/dL    RDW 20.0 (H) 10.0 - 15.0 %    Platelet Count 181 150 - 450 10e9/L   Basic metabolic panel    Collection Time: 05/08/20 11:10 AM   Result Value Ref Range    Sodium 144 133 - 144 mmol/L    Potassium 4.9 3.4 - 5.3 mmol/L    Chloride 117 (H) 94 - 109 mmol/L    Carbon Dioxide 22 20 - 32 mmol/L    Anion Gap 5 3 - 14 mmol/L    Glucose 97 70 - 99 mg/dL    Urea Nitrogen 80 (H) 7 - 30 mg/dL    Creatinine 5.18 (H) 0.66 - 1.25 mg/dL    GFR Estimate 12 (L) >60 mL/min/[1.73_m2]    GFR Estimate If Black 14 (L) >60 mL/min/[1.73_m2]    Calcium 8.5 8.5 - 10.1 mg/dL

## 2020-05-13 NOTE — PROGRESS NOTES
"Rashad Ortiz is a 45 year old male who is being evaluated via a billable video visit.      The patient has been notified of following:     \"This video visit will be conducted via a call between you and your physician/provider. We have found that certain health care needs can be provided without the need for an in-person physical exam.  This service lets us provide the care you need with a video conversation.  If a prescription is necessary we can send it directly to your pharmacy.  If lab work is needed we can place an order for that and you can then stop by our lab to have the test done at a later time.    Video visits are billed at different rates depending on your insurance coverage.  Please reach out to your insurance provider with any questions.    If during the course of the call the physician/provider feels a video visit is not appropriate, you will not be charged for this service.\"    Patient has given verbal consent for Video visit? Yes    How would you like to obtain your AVS? Cori    Patient would like the video invitation sent by: Send to e-mail at: [email or web address]    Will anyone else be joining your video visit? No  {If patient encounters technical issues they should call [phone] :[id number]}      Video-Visit Details    Type of service:  Video Visit    Video Start Time: {video visit start/end time:152948}  Video End Time: {video visit start/end time:152948}    Originating Location (pt. Location): {video visit patient location:925668::\"Home\"}    Distant Location (provider location):   [facility]     Platform used for Video Visit: {Virtual Visit Platforms:915000::\"[employer]\"}    {signature options:623745}      "

## 2020-05-13 NOTE — PROGRESS NOTES
"Rashad Ortiz is a 45 year old male who is being evaluated via a billable video visit.      The patient has been notified of following:     \"This video visit will be conducted via a call between you and your physician/provider. We have found that certain health care needs can be provided without the need for an in-person physical exam.  This service lets us provide the care you need with a video conversation.  If a prescription is necessary we can send it directly to your pharmacy.  If lab work is needed we can place an order for that and you can then stop by our lab to have the test done at a later time.    Video visits are billed at different rates depending on your insurance coverage.  Please reach out to your insurance provider with any questions.    If during the course of the call the physician/provider feels a video visit is not appropriate, you will not be charged for this service.\"    Patient has given verbal consent for Video visit? Yes    How would you like to obtain your AVS? E-Mail (inform patient AVS not encrypted)    Patient would like the video invitation sent by: Send to e-mail at: [email or web address]    Will anyone else be joining your video visit? No        "

## 2020-05-14 ENCOUNTER — MYC MEDICAL ADVICE (OUTPATIENT)
Dept: TRANSPLANT | Facility: CLINIC | Age: 45
End: 2020-05-14

## 2020-05-14 DIAGNOSIS — Z79.60 LONG-TERM USE OF IMMUNOSUPPRESSANT MEDICATION: ICD-10-CM

## 2020-05-14 DIAGNOSIS — Z94.0 KIDNEY TRANSPLANT RECIPIENT: ICD-10-CM

## 2020-05-14 RX ORDER — PREDNISONE 5 MG/1
TABLET ORAL
Qty: 52 TABLET | Refills: 0 | Status: SHIPPED | OUTPATIENT
Start: 2020-05-14 | End: 2020-08-12

## 2020-05-14 NOTE — LETTER
Rashad Ortiz  [address]  [place] [address]      May 14, 2020      To Whom It May Concern:      Mr. Ortiz underwent a kidney transplant on January 13, 2011 and is required to take immunosuppressive medication.  As such he currently fits into a  high risk  category for COVID-19.      For patients that work outside the home, we recommend that all reasonable accommodations be made to allow working remotely or modifying work responsibilities to protect their health.      If you have any questions or concerns about the above request, please contact the Solid Organ Transplant Office at [phone], option [phone].       Thank you for your partnership.    Sincerely,   [facility]   Solid Organ Transplant Services

## 2020-05-14 NOTE — TELEPHONE ENCOUNTER
OUTCOME: Sent requested COVID-19 letter via [employer] to patient and let Rashad know. Followed up with him regarding the prednisone change. Patient confirms Dr. Murillo made that change with him at his appointment yesterday; updated the prescription with prednisone taper.    Message   Received: Yesterday   Message Contents   Chidi, MD Yuliya Ravi, Leonora Painter, JENNIFER Lea:     I increased the uloric to 80 mg daily for the gout. We are trying to wean off prednisone with:     5 mg daily for the next month   2.5 mg daily for month 2   2.5 mg every other day for month 3,     Then off.     As well, can you send the stock letter for patients for their job to accommodate due to increased potential risk due to covid-19.     Thanks,     d

## 2020-05-14 NOTE — PROGRESS NOTES
"Rashad Ortiz is a 45 year old male who is being evaluated via a billable telephone visit.      The patient has been notified of following:     \"This telephone visit will be conducted via a call between you and your physician/provider. We have found that certain health care needs can be provided without the need for a physical exam.  This service lets us provide the care you need with a short phone conversation.  If a prescription is necessary we can send it directly to your pharmacy.  If lab work is needed we can place an order for that and you can then stop by our lab to have the test done at a later time.    Telephone visits are billed at different rates depending on your insurance coverage. During this emergency period, for some insurers they may be billed the same as an in-person visit.  Please reach out to your insurance provider with any questions.    If during the course of the call the physician/provider feels a telephone visit is not appropriate, you will not be charged for this service.\"    Patient has given verbal consent for Telephone visit?  yes    What phone number would you like to be contacted at? [phone]     How would you like to obtain your AVS? email    Phone call duration: 20 minutes      RE: Rashad Ortiz    [facility]# [id number]        I saw your patient, Rashad Ortiz, in consultation in our pretransplant clinic.  He presented via phone call to discuss care for his end-stage renal disease.      We talked about the pros and cons of transplantation vs. dialysis.  We discussed the fact that it was important that he think about the pros and cons of each treatment option and make an active decision.  We also discussed the fact that the two were interconnected and he may need to go on dialysis before transplant (if he chose to have a transplant) and that if the transplant failed, he might need dialysis before another transplant.       We also discussed the fact that if he chose to have a " "transplant, he would need to decide between going on the wait list for a  donor transplant vs. having a living donor transplant.  We talked about the pros and cons of each option.  Although I didn't express an opinion regarding transplantation or dialysis, I suggested that if he chose to have a transplant, a living donor transplant would be preferable in that the surgery is the same, the immunosuppressive drugs and the risks are the same, but the transplant could be done sooner and the results are better.  I told him that the wait for  donor kidney was approximately five years for patients who are newly put on the waiting list.  In addition, we talked about the fact that the disadvantage of a living donor transplant was the risk to the donor.       We discussed the surgery and immunosuppression for a second transplant; that the new kidneys would go on the other side, and he would then have 4 kidneys.  He has no living donors at this time.  Finally, we discussed the new ( donor) kidney (KDPI) scoring system with him.  We discussed the advantages and disadvantages of accepting an \"expanded criteria\" donor kidney, and the latest data as to who is potentially benefited by receiving an expanded criteria donor kidney versus waiting longer for a standard criteria donor. He is at a borderline age for accepting a high KDPI kidney and so I left the decision to him     I attempted to answer any remaining questions.  I also told him that should he have any questions, he should feel free to contact us.  We would be glad to answer any questions either over the phone or at another clinic visit.  His transplant coordinator is Pamela Patel and may be reached at [phone].  Thank you for the opportunity to see him.     I spent 20 minutes with this patient.  Over 90% of that time was spent in counseling and coordination of care.             Yours truly,               Sumit Gaspar MD         Professor of " Surgery         ([phone])    AJM/st

## 2020-05-20 ENCOUNTER — COMMITTEE REVIEW (OUTPATIENT)
Dept: TRANSPLANT | Facility: CLINIC | Age: 45
End: 2020-05-20

## 2020-05-20 ENCOUNTER — TELEPHONE (OUTPATIENT)
Dept: PHARMACY | Facility: CLINIC | Age: 45
End: 2020-05-20

## 2020-05-20 NOTE — COMMITTEE REVIEW
Abdominal Committee Review Note     Evaluation Date: 5/13/2020  Committee Review Date: 5/20/2020    Organ being evaluated for: Kidney    Transplant Phase: Evaluation  Transplant Status: Active    Transplant Coordinator: Donna Belcher  Transplant Surgeon:       Referring Physician: Abran Cunha    Primary Diagnosis: Hypertensive Nephrosclerosis  Secondary Diagnosis:     Committee Review Members:  Nephrology Stef Murillo MD, Agustin Fisher, APRN CNP   Nutrition Brianna Sloiman,    Pharmacy Saad Hamilton [facility]    - Clinical Anya Guo, [facility], Elma Patrick, [facility]   Transplant Fadumo Cannon PA-C, Anabel Restrepo, RN, Mary Cortez, RN, Olamide Dey, RN, Chelsy Zurita, RN, Suni Olivo, RN, Donna Patel, RN, Sulma Jimenez MD, MD       Transplant Eligibility: Irreversible chronic kidney disease treated w/dialysis or expected need for dialysis    Committee Review Decision: Needs Re-presentation    Relative Contraindications: Other, not engaged in transplant and low jake literacy    Absolute Contraindications: None    Committee Chair Sulma Jimenez MD verbally attested to the committee's decision.    Committee Discussion Details: Reviewed medical records and evaluation results to date. Committee is deferring a decision on candidacy for now. Per social work concerns he seems to have very low health literacy and is not well engaged in the transplant process. He is a previous transplant patient and lost his graft due to non compliance and insurance issues. He told social work conflicting stories during his evaluation and the recommendation is neuropsychological testing before anything else is done. Dr Cunha was also messaged to get his thoughts on Rashad's candidacy. If he is able to proceed he will need cardiology risk and ideally a stress test. He will need dental, dermatology,evaluation labs and imaging and  colonoscopy for family history of early colon cancers. He is not on dialysis and Committee recommends not listing until the neuropsych is resulted. Patient will be called and transplant summary letter will be sent.

## 2020-05-20 NOTE — TELEPHONE ENCOUNTER
Follow-up with anemia management service:    Spoke with Rashad to remind him that he is due for Aranesp.  He was in his car and said he will call  Infusion Center later today to schedule an appointment.     Anemia Latest Ref Rng & Units 1/21/2020 2/29/2020 3/7/2020 3/18/2020 4/23/2020 4/24/2020 5/8/2020   LAWRENCE Dose - - - - - - 60 mcg 60 mcg   Hemoglobin 13.3 - 17.7 g/dL 8.0(L) 8.2(L) 8.5(L) 8.1(L) 7.6(LL) - 7.8(LL)   TSAT 15 - 46 % - - - 30 - - -   Ferritin 26 - 388 ng/mL - - - 460(H) - - -           Follow-up call date: 5/28/2020    Cate Ellis RN   Anemia Services  [address] [address]   [email or web address]   Office : [phone]  Fax: [phone]

## 2020-05-21 ENCOUNTER — TELEPHONE (OUTPATIENT)
Dept: TRANSPLANT | Facility: CLINIC | Age: 45
End: 2020-05-21

## 2020-05-21 DIAGNOSIS — Z76.82 AWAITING ORGAN TRANSPLANT: Primary | ICD-10-CM

## 2020-05-21 NOTE — LETTER
May 21, 2020      Rashad Ortiz  [address]  [place] [address]      Dear Rashad,    It was a pleasure to start working with you toward the goal of a kidney transplant. Your pre-transplant evaluation began as a virtual visit on May 13, 2020 due to the pandemic. Your evaluation results were discussed at our Multidisciplinary Selection Committee on May 20, 2020. The Committee has deferred a decision on your candidacy for the time being. Social work had concerns about some inconsistency in your answers and concern about your understanding of the transplant process and our expectations for you. The recommendation is we have you see one of our neuropsychologists and have neuropsychological testing done. You did not remember being on a compliance contract prior to being de-listed in February 2019. The goal of neurospsych testing is to identify any barriers to your learning and to help us use the best way to teach you about transplant.We want you to be successful. We are asking you complete this before going further. Once we have the results and recommendations we will proceed. We will ask you to complete the following items:    1. We will have you see a cardiologist in the future to make sure your heart is strong enough to undergo another transplant. We will call you with that appointment.  2. You will need a dermatology appointment. You saw Dr Fadi Kaufman here in the past. Call [phone] to schedule.  3. Please talk with your primary care provider about ordering a screening colonoscopy for you. You are on immunosuppression and have a family history of early colon cancers. Call me when complete so I can obtain the records.  4. Schedule an appointment with your dentist and have any recommended work done. Call me when complete.  5. We will ask you to complete another compliance contract. I will let you know how long this will be in effect once I speak with Dr Cunha. Basically, a compliance contract is an  agreement between you and the transplant center. You agree to attend all medical appointments, take your medications as prescribed, schedule the dental, dermatology and colonoscopy appointments, if you start dialysis you will attend each session and not cut any runs short and follow your diet recommendations. A compliance contract shows us you are serious about a transplant.     Once I have the neuropsychology recommendations I will call you on how we will proceed.In the meantime please get the above items scheduled.    If you have any questions please call me at [phone].      Sincerely,       Pamela Patel RN, BSN Transplant Coordinator  Solid [facility] Transplant [address] [address]  [address] [place] [address], [place] [address]  Phone [phone]  Fax [phone]  [email or web address]    CC:Mariel Mares

## 2020-05-21 NOTE — TELEPHONE ENCOUNTER
Called Rashad to discuss the outcome of the Selection Committee from yesterday 5/20/2020. The Committee has deferred a decision for the time being. There was some inconsistency in his answers during his social work assessment and concern about his actual understanding of the transplant process and our expectations. Neuropsych testing was recommended before proceeding. Dr Cunha also mentioned a compliance contract is a separate email to me. I asked Rashad if he knew what that was and he said he did not remember. ( he was on a previous compliance contract and failed as was de-listed last year). He is agreeable to the above. I also talked with him about the other items he will eventually need: cardiology with stress test, dental, dermatology and screening colonoscopy (early family history of colon cancer). I told him I would be sending him a letter in the mail and also to his PCP so they both can see what is being asked of him. He is aware he will be called by scheduling for a neuropsych appointment. Transplant summary letter will be sent.

## 2020-05-22 ENCOUNTER — TELEPHONE (OUTPATIENT)
Dept: TRANSPLANT | Facility: CLINIC | Age: 45
End: 2020-05-22

## 2020-05-22 NOTE — TELEPHONE ENCOUNTER
Spoke with Rashad and he is now aware he will have a compliance contract to fulfill. We reviewed what that means. The compliance contract is an agreement between Rashad and the transplant center to follow all prescribed medical care from appointments, to medications to lab work. It also means if he should start dialysis he will need to attend all sessions and not miss nor cut runs short. I did tell him part of my job is to follow along and make sure he stays on track and would be checking on his progress. The contract is for 6 months starting today May 22,2020 through November 22,2020. He is aware he will receive the contract in the mail and will need to sign and return to me.

## 2020-05-27 ENCOUNTER — TELEPHONE (OUTPATIENT)
Dept: TRANSPLANT | Facility: CLINIC | Age: 45
End: 2020-05-27

## 2020-05-27 ENCOUNTER — INFUSION THERAPY VISIT (OUTPATIENT)
Dept: INFUSION THERAPY | Facility: CLINIC | Age: 45
End: 2020-05-27
Payer: COMMERCIAL

## 2020-05-27 VITALS
HEIGHT: 68 IN | DIASTOLIC BLOOD PRESSURE: 91 MMHG | HEART RATE: 71 BPM | BODY MASS INDEX: 22.17 KG/M2 | WEIGHT: 146.3 LBS | OXYGEN SATURATION: 100 % | TEMPERATURE: 97.8 F | SYSTOLIC BLOOD PRESSURE: 131 MMHG | RESPIRATION RATE: 16 BRPM

## 2020-05-27 DIAGNOSIS — Z94.0 KIDNEY REPLACED BY TRANSPLANT: ICD-10-CM

## 2020-05-27 DIAGNOSIS — N18.5 CKD (CHRONIC KIDNEY DISEASE) STAGE 5, GFR LESS THAN 15 ML/MIN (H): ICD-10-CM

## 2020-05-27 DIAGNOSIS — Z48.298 AFTERCARE FOLLOWING ORGAN TRANSPLANT: ICD-10-CM

## 2020-05-27 DIAGNOSIS — D63.1 ANEMIA OF CHRONIC RENAL FAILURE, STAGE 5 (H): Primary | ICD-10-CM

## 2020-05-27 DIAGNOSIS — N18.5 ANEMIA OF CHRONIC RENAL FAILURE, STAGE 5 (H): Primary | ICD-10-CM

## 2020-05-27 DIAGNOSIS — Z94.0 KIDNEY TRANSPLANT RECIPIENT: Primary | ICD-10-CM

## 2020-05-27 DIAGNOSIS — Z79.899 ENCOUNTER FOR LONG-TERM CURRENT USE OF MEDICATION: ICD-10-CM

## 2020-05-27 DIAGNOSIS — Z79.60 LONG-TERM USE OF IMMUNOSUPPRESSANT MEDICATION: ICD-10-CM

## 2020-05-27 LAB
ANION GAP SERPL CALCULATED.3IONS-SCNC: 8 MMOL/L (ref 3–14)
BUN SERPL-MCNC: 85 MG/DL (ref 7–30)
CALCIUM SERPL-MCNC: 8.2 MG/DL (ref 8.5–10.1)
CHLORIDE SERPL-SCNC: 113 MMOL/L (ref 94–109)
CO2 SERPL-SCNC: 20 MMOL/L (ref 20–32)
CREAT SERPL-MCNC: 6.08 MG/DL (ref 0.66–1.25)
ERYTHROCYTE [DISTWIDTH] IN BLOOD BY AUTOMATED COUNT: 17.7 % (ref 10–15)
GFR SERPL CREATININE-BSD FRML MDRD: 10 ML/MIN/{1.73_M2}
GLUCOSE SERPL-MCNC: 98 MG/DL (ref 70–99)
HCT VFR BLD AUTO: 29.8 % (ref 40–53)
HGB BLD-MCNC: 8.8 G/DL (ref 13.3–17.7)
MCH RBC QN AUTO: 26.8 PG (ref 26.5–33)
MCHC RBC AUTO-ENTMCNC: 29.5 G/DL (ref 31.5–36.5)
MCV RBC AUTO: 91 FL (ref 78–100)
PLATELET # BLD AUTO: 205 10E9/L (ref 150–450)
POTASSIUM SERPL-SCNC: 5 MMOL/L (ref 3.4–5.3)
RBC # BLD AUTO: 3.28 10E12/L (ref 4.4–5.9)
SODIUM SERPL-SCNC: 141 MMOL/L (ref 133–144)
TACROLIMUS BLD-MCNC: 9 UG/L (ref 5–15)
TME LAST DOSE: NORMAL H
WBC # BLD AUTO: 5.6 10E9/L (ref 4–11)

## 2020-05-27 PROCEDURE — 99207 ZZC NO CHARGE NURSE ONLY: CPT

## 2020-05-27 PROCEDURE — 96372 THER/PROPH/DIAG INJ SC/IM: CPT | Performed by: INTERNAL MEDICINE

## 2020-05-27 PROCEDURE — 36415 COLL VENOUS BLD VENIPUNCTURE: CPT | Performed by: INTERNAL MEDICINE

## 2020-05-27 PROCEDURE — 80197 ASSAY OF TACROLIMUS: CPT | Performed by: INTERNAL MEDICINE

## 2020-05-27 PROCEDURE — 80048 BASIC METABOLIC PNL TOTAL CA: CPT | Performed by: INTERNAL MEDICINE

## 2020-05-27 PROCEDURE — 85027 COMPLETE CBC AUTOMATED: CPT | Performed by: INTERNAL MEDICINE

## 2020-05-27 ASSESSMENT — MIFFLIN-ST. JEOR: SCORE: 1527.98

## 2020-05-27 ASSESSMENT — PAIN SCALES - GENERAL: PAINLEVEL: SEVERE PAIN (7)

## 2020-05-27 NOTE — TELEPHONE ENCOUNTER
DATE:  5/27/2020   TIME OF RECEIPT FROM LAB:  1:00  LAB TEST:  Creatinine  LAB VALUE:  6.08  RESULTS GIVEN WITH READ-BACK TO (PROVIDER):  Brandon Dwyer  TIME LAB VALUE REPORTED TO PROVIDER:   1:10

## 2020-05-27 NOTE — PROGRESS NOTES
Infusion Nursing Note:  Rashad Ortiz presents today for Aranesp injection.    Patient seen by provider today: No   present during visit today: Not Applicable.    Note: Assessment performed by Bea DAVIS RN prior to injection today. Patient denies symptoms/concerns following previous injection.      Intravenous Access:  No Intravenous access/labs at this visit.    Treatment Conditions:    If Hgb <10 gm/dL, give dose. Hold dose if systolic blood pressure >180 mm Hg.     Lab Results   Component Value Date    HGB 8.8 05/27/2020     Lab Results   Component Value Date    WBC 5.6 05/27/2020      Lab Results   Component Value Date    ANEU 5.1 10/14/2019     Lab Results   Component Value Date     05/27/2020      Results reviewed, labs MET treatment parameters, ok to proceed with treatment.      Post Infusion Assessment:  Patient tolerated injection without incident.  Site patent and intact, free from redness, edema or discomfort.       Discharge Plan:   Patient discharged in stable condition accompanied by: self.  Departure Mode: Ambulatory.    Millie Seth LPN

## 2020-05-27 NOTE — PROGRESS NOTES
Meets parameters:Give if Hgb <10. Hgb 8.8 today  Hold dose if systolic blood pressure >180 mm Hg. /91  Do NOT give if patient is receiving dialysis. Do NOT give if patient is receiving intravenous iron in the same day.-Confirms he is not.    Bea Woodruff RN

## 2020-05-27 NOTE — TELEPHONE ENCOUNTER
ISSUE: Creatinine 6.08 on 5/27/20; baseline 4-5s.    PLAN/OUTCOME:  Call placed to Rashad. He reports no mental confusion or brain fogginess. He is not fatigued any more than usual. He has swelling in lower extremities at baseline and take lasix 20 mg BID. He does not wear compression stockings because they hurt. He was at infusion center today for Aranesp. Vitals as below. He does not remember taking Kidney Smart class but states it sounds familiar. He has an old fistula but it has not been evaluated for use. Rashad believes his creatinine is elevated because he has not been drinking well this week. Plan to repeat labs on Monday; patient already has bi-weekly labs. Result note sent to Dr. Murillo.

## 2020-05-28 ENCOUNTER — TELEPHONE (OUTPATIENT)
Dept: FAMILY MEDICINE | Facility: CLINIC | Age: 45
End: 2020-05-28

## 2020-05-28 ENCOUNTER — TELEPHONE (OUTPATIENT)
Dept: PHARMACY | Facility: CLINIC | Age: 45
End: 2020-05-28

## 2020-05-28 ENCOUNTER — TELEPHONE (OUTPATIENT)
Dept: TRANSPLANT | Facility: CLINIC | Age: 45
End: 2020-05-28

## 2020-05-28 ENCOUNTER — CARE COORDINATION (OUTPATIENT)
Dept: NEPHROLOGY | Facility: CLINIC | Age: 45
End: 2020-05-28

## 2020-05-28 NOTE — TELEPHONE ENCOUNTER
ISSUE:   Tacrolimus IR level 9.0 on 5/27/20, goal 4-6, dose 2 mg BID.    PLAN:   Please call patient and confirm this was an accurate 12-hour trough. Verify Tacrolimus IR dose 2 mg BID. Confirm no new medications or illness. Confirm no missed doses. If accurate trough and accurate dose, decrease Tacrolimus IR dose back to 2 mg in the morning and 1 mg in the evennig and repeat labs Monday June 1st, 2020. Labs are already ordered.     LPN: Please send new script when patient confirms. Thanks.    Leonora Dwyer, RN, BSN  Solid Organ Transplant, Post Kidney and Pancreas  Transplant Care Coordinator  [phone] option [phone]

## 2020-05-28 NOTE — PROGRESS NOTES
Left message for patient to return call.    Wanting to notify him of Kidney Smart and vascular referral request from transplant. Referred to both today.

## 2020-05-28 NOTE — TELEPHONE ENCOUNTER
Call placed to patient. Patient denies accurate trough level and current dose. Patient v\u to repeat level on Monday. Denies any recent illness, diarrhea or medication changes. Order sent

## 2020-05-28 NOTE — TELEPHONE ENCOUNTER
Message sent to nephrology nurses for assistance in getting kidney smart, vascular consult, dialysis set up if Rashad is agreeable.  _________________________________________  Message   Received: Today   Message Contents   Stef Murillo MD [place], Leonora Painter, RN               Yes, you can get him going on this if he will allow it.     d    Previous Messages           Associated Results     Result Notes for Basic metabolic panel     Notes recorded by Leonora Dwyer, RN on 5/27/2020 at 2:34 PM CDT   See phone encounter 5/27/20   Dr. Murillo, Need the okay for vascular consult, kidney smart. Dialysis?   Besides lower edema swelling, and diastolic hypertension, nothing has changed in way of symptoms.

## 2020-05-28 NOTE — TELEPHONE ENCOUNTER
Anemia Management Note  SUBJECTIVE/OBJECTIVE:  Referred by Dr. Abran Cunha on 2020  Primary Diagnosis: Anemia in Chronic Kidney Disease (N18.5, D63.1)     Secondary Diagnosis:  Chronic Kidney Disease, Stage 5 (N18.5)  Kidney Tx: 2011  Hgb goal range:  9-10  Epo/Darbo: Aranesp  60 mcg  every two weeks for Hgb <10. In clinic  Iron regimen:  Ferrous Sulfate  once daily  Labs : 2021  Recent LAWRENCE use, transfusion, IV iron: NA.  Aranesp was previously ordered, he never received it.   RX/TX plans : 2021  No history of stroke, MI and blood clots or cancers     Contact:            No Consent to communicate on File    Anemia Latest Ref Rng & Units 2020 3/7/2020 3/18/2020 2020 2020 2020 2020   LAWRENCE Dose - - - - - 60 mcg 60 mcg 60 mcg   Hemoglobin 13.3 - 17.7 g/dL 8.2(L) 8.5(L) 8.1(L) 7.6(LL) - 7.8(LL) 8.8(L)   TSAT 15 - 46 % - - 30 - - - -   Ferritin 26 - 388 ng/mL - - 460(H) - - - -     BP Readings from Last 3 Encounters:   20 (!) 131/91   20 (!) 156/98   20 136/72     Wt Readings from Last 2 Encounters:   20 66.4 kg (146 lb 4.8 oz)   20 69.5 kg (153 lb 3.2 oz)           ASSESSMENT:  Hgb:Not at goal but improving - recommend dose and continue current regimen  TSat: at goal >30% Ferritin: At goal (>100ng/mL)    PLAN:  Dose with aranesp and RTC for hgb then aranesp if needed in 2 week(s)    Orders needed to be renewed (for next follow-up date) in EPIC: None    Iron labs due:  20    Plan discussed with:  No call made. Documented Aranesp dose. Will follow up in 2 weeks.   Plan provided by:  chio    NEXT FOLLOW-UP DATE:  6/10/20    Cate Ellis RN   [address] [address]   [email or web address]   Office : [phone]  Fax: [phone]

## 2020-05-28 NOTE — TELEPHONE ENCOUNTER
What type of form? Unemployment   What day did you drop off your forms? 5/28/2020  Is there a due date? ASAP (7-10 business day to compete forms)   How would you like to receive these forms? Patient will  at the clinic when completed    What is the best number to contact you? Home [phone]  What time works best to contact you with in 4 hrs? anytime  Is it okay to leave a message? Yes    Placed on dumb   Kerri Kathleen

## 2020-05-28 NOTE — TELEPHONE ENCOUNTER
Received form and faxed to Provider's [employer] fax, right fax confirmed at 1:28 pm today, 5/28/2020.  Amanda Branham [facility]  2nd Floor  Primary Care

## 2020-05-28 NOTE — PROGRESS NOTES
Patient had left [facility] a voice message this AM.    Nephrology Note: Nursing Outreach Encounter    REASON FOR CALL:                                                      REASON FOR CALL: Care Coordination                                          SITUATION/BACKROUND:                                                    Patient is being treated for CKD Stage 5.    Was asked by transplant to refer to Kidney Smart and Vascular access.      ASSESSMENT:                                                      Notified Rashad that we would like for him to start thinking about dialysis more so now that his kidney function is low.    He used to be on hemodialysis with an AVF, and prefers that modality again. He wants to use [facility], but is in the process of moving, so will update us at a later time of where he'd like to go. He does not want additional education, but informed him it was optional as he was on it in the past. He will await call from vascular.    Blood pressures are on average 130/90, . He denies SOB, N/V or weight loss. He also denies cramping, itching, or a metallic taste in his mouth. He reports consistent swelling--nothing new or worse than usual.  Uremic Symptoms: Yes,  Edema: Yes;       PLAN:                                                      Follow Up:   Labs are every other week. Patient was asked to contact [facility] with new symptoms or high blood pressures.     Patient verbalized understanding and will contact the clinic with any further questions or concerns.     Emmie Echeverria RN

## 2020-05-29 ENCOUNTER — MYC MEDICAL ADVICE (OUTPATIENT)
Dept: FAMILY MEDICINE | Facility: CLINIC | Age: 45
End: 2020-05-29

## 2020-05-29 ENCOUNTER — VIRTUAL VISIT (OUTPATIENT)
Dept: NEUROPSYCHOLOGY | Facility: CLINIC | Age: 45
End: 2020-05-29
Attending: NURSE PRACTITIONER
Payer: COMMERCIAL

## 2020-05-29 DIAGNOSIS — N18.5 CHRONIC KIDNEY DISEASE, STAGE V (H): Primary | ICD-10-CM

## 2020-05-29 DIAGNOSIS — F09 MENTAL DISORDER DUE TO GENERAL MEDICAL CONDITION: ICD-10-CM

## 2020-05-29 NOTE — PROGRESS NOTES
"Rashad Ortiz is a 45 year old male who is being evaluated via a billable video visit.      The patient has been notified of following:     \"This video visit will be conducted via a call between you and your provider. We have found that certain health care needs can be provided without the need for an in-person physical exam.  This service lets us provide the care you need with a video conversation, and remote testing. If additional testing is required, you will be scheduled for a face-to-face visit in the clinic at a future time.    Video visits are billed at different rates depending on your insurance coverage.  Please reach out to your insurance provider with any questions.    If during the course of the call the provider feels a video visit is not appropriate, you will not be charged for this service.\"    Patient has given verbal consent for Video visit? Yes    How would you like to obtain your AVS? Cori    Patient would like the video invitation sent by: Send to e-mail at: [email or web address]    Will anyone else be joining your video visit? No    NEUROPSYCHOLOGICAL EVALUATION    RELEVANT HISTORY AND REASON FOR REFERRAL    Rashad Ortiz is a 45 year old, left-handed, former  and  with 14 years of formal education.  Information was obtained via video interview with the patient and review of his medical records.  Records indicate a history of end-stage kidney disease secondary to presumed hypertension.  He is status post failing living donor kidney transplant from 2011.  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  In February 2017 he had a transplant kidney biopsy that showed chronic tubulointerstitial changes with no evidence of rejection.  He was listed in 2017 but delisted due to noncompliance issues and not completing his evaluation.  His compliance is reportedly improved with consistent labs and " medical follow-up, and he was noted to have a different job that allowed more flexibility. His case was reviewed by the selection committee on 5/20/2020, with a decision to defer.  There were inconsistencies in his answers during his social work assessment and concerns about his comprehension of the transplant process and the expectations.  There were some concerns regarding memory.  He was therefore referred for neuropsychological evaluation by ISABEL Jacobson, CNP, for further characterization of any cognitive difficulties and to evaluate mood and personality.    CLINICAL INTERVIEW FINDINGS    Upon interview, Mr. Ortiz stated that he initially had a transplant in January 2011.  He stated that he understands he needs a new transplant.  He was initially on the list but missed appointments due to his job, and was taken off of the list.  He stated that he understands that the transplant team is concerned that there may be people who would be more appreciative of a transplant and follow through with recommendations.  He believes that things would be different now because he has his wife's support and will go to his appointments.  He stated that his support system includes his wife of 2 years, and his children.  He understands that there are risks of death and rejection, and he recalls having edema after his first transplant.    Mr. Ortiz has not noticed any changes in cognition, including memory, word finding, attention or concentration, decision making, or organization.  He stated that the transplant team has had some concerns about his memory because he missed a couple questions during the phone interview.  He lives with his wife and 2 children, ages 2 and 7.  He and his wife share the management of their finances, and he denied any difficulty in this regard.  He manages his own medications, and denied difficulty remembering to take them.  He noted that with his last transplant he had insurance issues with  his medications, and ultimately contacted his  who helped him get enough medications until the problem was resolved, but he went a few weeks without taking them first.  He stated that he now knows who to contact if something like this were to happen again.  He drives only when he needs to, both because of COVID and because of pain in his leg recently.  He used to cook regularly without difficulty, but within the last 2 weeks he cannot stand as well so he has not been cooking.  He handles his personal cares independently.    Mr. Ortiz denied any history of psychiatric illness and stated that he has not worked with a psychologist.  When asked to describe his mood, he stated that it is pretty neutral. He stated that he is a homebody, so being at home due to COVID-19 does not affect him. He feels like he is looking through a window. When asked about depression, he stated that life causes depression but he does not feel depressed overall. He has not felt anxious. He became tearful recently when thinking about his sister, who passed away some time ago. He has been no more irritable than normal. He has been sleeping fairly well, although he wakes up three times at night to use the bathroom. He sleeps 6-8 hours a night.  Twice a week, he may nap for an hour or two.  His energy level is low, and he stated that the osteonecrosis has been affecting him.  He has not been able to get any exercise for this reason.  His appetite is fair.  He has lost some weight as he is not eating regularly and misses some meals.  His interest level is fairly good.  He denied suicidal ideation or any history of attempts to commit suicide.  He denied visual or auditory hallucinations.    Until early April, Mr. Ortiz was consuming a couple of beers or a beer and a glass of [place] or other liquor each day, for about 6 months.  He stated that since early April, he has been focusing more on his health, and he wants to be around for  "his daughters.  He discussed his desire to quit with his wife, and ultimately decided that he should stop.  He had what he described as a relapse at the beginning of May, when he had two shots.  His score on the CAGE questionnaire was 1, in that he has thought in the past about cutting down on drinking.  He endorsed marijuana use until March.  He stated that he would have one puff on the weekends, but that he was not into it heavily so it was nothing to stop.  He quit smoking cigarettes in August 2019.  Before that he had been smoking 1 or 2 cigarettes a day for about a year.  He denied use of e-cigarettes.  He has been arrested in the past, most recently for 2013 for a \"traffic situation.\"  He has no pending legal issues.    In school, Mr. Ortiz struggled with reading but was not diagnosed with a learning disability.  He was never held back any grades.  He earned B's and C's in school.  Ultimately, he earned an Associate's degree in Accounting.  He has held various jobs in accounting, [employer], as a , and working outside.  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  He left that job because the pay did not match the work that he was doing, and he found a job for higher pay.  Most recently, from March until May 8 he had been working for [employer] as a .  He stopped working on May 8 due to pain, and he subsequently applied for disability.  He has been  twice, both times after 5 years of marriage.  He has been  to his current wife for 2 years, and he has 5 children.  The youngest two, ages 2 and 7, live with him.  The other 3 are adults.    Mr. Ortiz denied any history of seizure, stroke, or head injury resulting in loss of consciousness.  He described his balance as fine.  He denied unilateral weakness or numbness.  He has not had tremor.  He is not bothered by headaches.  He has pain in his leg reportedly related to osteonecrosis.  He " also has had episodes of gout.    PAST MEDICAL HISTORY: Medical records indicate a history of vitamin D deficiency, benign prostatic hyperplasia, avascular necrosis of the bone, secondary renal hyperparathyroidism, chronic renal failure stage V, hypertension, metabolic acidosis, gastroesophageal reflux disease, gout.    CURRENT MEDICATIONS:  Include acetaminophen, amlodipine, carvedilol, febuxostat, ferrous sulfate, furosemide, mycophenolate, omeprazole, oxycodone-acetaminophen, prednisone, sodium bicarbonate, sulfamethoxazole-trimethoprim, tacrolimus, tamsulosin, vitamin D3.    FAMILY MEDICAL HISTORY:  Significant for uncles with kidney disease, and some family members with alcohol and marijuana abuse.    BEHAVIORAL OBSERVATIONS    This evaluation was undertaken remotely with the use of video technology within the electronic medical record system.  Behavioral observations are somewhat limited for this reason. During the interview, he was focused and on task. During the testing, however, he was distracted by his youngest daughter in the room, with the television on in the background. He was asked several times about finding a room by himself but he declined or was unable to do so. There were obvious distractions which likely affected the validity of the testing. He was alert and oriented to person, place, month, and year, but not to date. Mood was euthymic. Speech was fluent, with normal articulation, volume, and rate. Spontaneous conversation was present and appropriate. Judgment on a safety questionnaire was fair. Internal performance validity measures fell within normal limits. Results are interpreted with caution given the deviation from standard protocols and the distractions present in the environment, and may reflect an underestimate of his true level of functioning.    MEASURES ADMINISTERED    The following measures were administered by a trained psychometrist, remotely via video technology, under the direct  supervision of a licensed psychologist:    Orientation; Subtests of the Wechsler Adult Intelligence Scale - 4; Reading subtest of the Wide Range Achievement Test - 4; Story Memory of the Repeatable Battery for the Assessment of Neuropsychological Status (RBANS); Burdick Verbal Learning Test - Revised, Form 1; Stirling Naming Test, 15 Item; Controlled Oral Word Association Test; Semantic Fluency; Clock Drawing; Oral Trail Making Test; Test of Sustained Attention and Tracking; BDAE Complex Ideational Material; ILS Health and Safety Questions; [place] Multiphasic Personality Inventory - 2 - Restructured Form (MMPI-2-RF).    RESULTS AND INTERPRETATION    Overall intellectual functioning was estimated to fall in the upper end of the average range, consistent with premorbid estimates based on single word reading abilities.    Confrontation naming was average.  Expressive vocabulary was high average for his age.  Verbal abstract reasoning was average.  Letter fluency was average, and generative naming to category was mildly impaired.  Performance on this fluency task was notable for a paucity of responses rather than errors.  Comprehension of complex ideational material was impaired, and seemed to be affected by attention.    Attention span was low average for his age.  Divided attention was average.  Performance on a measure of sustained attention and tracking was mildly impaired; during this measure he had some distractions.    Basic visual perception, including matching lines and angles, was high average.  Construction of a clock fell within normal limits.  Nonverbal deductive reasoning was average.    Immediate recall of verbal narrative material was average, with average recall following a brief delay.  On a multiple trial list learning task, immediate recall was moderately impaired, with moderately impaired retention (38%) of the learned material following a 25-minute delay.  It was noted that he was distracted by  his daughter in another part of the room during this measure.  Recognition memory on this task was mildly impaired, and included no false positive errors.      Judgment was fair on questions related to health and safety situations.     The MMPI-2-RF, a self-report measure of mood and personality, was administered remotely using  [employer] and video proctoring.  He responded to the items in a consistent manner, but had a significant tendency to deny even the most common human faults, and the profile is interpreted with caution.  His level of reported emotional distress was low.  He reported not enjoying social events and avoiding social situations.  He denied significant psychopathology, including depressed mood or ideation, anxiety, or psychosis.    IMPRESSIONS AND RECOMMENDATIONS    Current results are interpreted with caution. Due to concerns regarding COVID-19, the evaluation was administered remotely using a video platform. Moreover, his young daughter was in the room with him during the testing, and he was unable to move to a quiet room. At times, this was distracting for him. Every attempt was made to administer measures in a standardized manner; however, differences in administration from the standardization samples may affect interpretation, and the results are likely to underestimate his true level of functioning.    Results indicate variable attention, ranging from mildly impaired to average. Similarly, memory was variable, ranging from moderately impaired to average. Performance improved with cues, and was clearly affected by distractions in the room. Basic language and visual processing fell within normal limits. On a safety questionnaire, judgment was fair. Personality assessment was notable for a marked tendency to underreport psychiatric symptoms, and he denied significant depressed mood or ideation, anxiety, or psychosis.    While interpretation of these findings is complicated, it is notable that  despite significant distractions in the environment, on some measures of attention and memory, Mr. Ortiz's performance fell within normal limits. As such, the variability noted in his performance most likely reflects the distractions present rather than underlying neurologic dysfunction. That being said, similar variability in attention and processing was noted at a virtual visit psychosocial assessment on 5/13/2020; it seems possible that he had similar distractions in his environment at that time, but it is difficult to be sure. Intellectual functioning is estimated to fall in the upper end of the average range. He does appear to be capable of comprehending medical information, and he has an adequate understanding of the transplant procedure and the risks involved.    There are some behavioral concerns present which will need to be addressed to ensure that he is a suitable candidate for transplant. He endorsed alcohol and marijuana use until recently. Until early April, he reported that he was consuming two alcoholic beverages a day. He indicated that he decided to stop on his own, although he had a self-described relapse at the beginning of May, when he had two shots of alcohol. He also endorsed weekly marijuana use until March, and stated that he has not had any since then. At a psychosocial assessment on 5/13/2020, he indicated that he does not drink alcohol or use any drugs, so there seem to be some discrepancies in his report. Moreover, there have been concerns about his adherence to treatment recommendations. Based on the information available in his records, this seems to have improved recently. He believes that he will be able to adhere to treatment recommendations more consistently because his wife is able to assist him. It seems reasonable to address the behavioral concerns including discrepancies in his report about alcohol and drug use, and to ensure that he continues to follow through with all  treatment recommendations including attending all scheduled appointments and taking his medications regularly.    If Mr. Ortiz is unable to meet these requirements, then it may be helpful to have him seen in clinic once face-to-face evaluations are being scheduled, to allow for more comprehensive assessment of cognitive functioning without the distractions present when testing remotely.     Mandy Butler, Ph.D., ABPP  Licensed Psychologist, LP [id number]  Board Certified in Clinical Neuropsychology      Time spent: 65 minutes professional time (CPT 82444). 60 minutes (1 unit) neuropsychological testing evaluation by licensed and board-certified neuropsychologist, including integration of patient data, interpretation of standardized test results and clinical data, clinical decision-making, treatment planning, report, and interactive feedback to the patient, first hour (CPT 85252). 55 minutes (1 units) of neuropsychological testing evaluation by licensed and board-certified neuropsychologist, including integration of patient data, interpretation of standardized test results and clinical data, clinical decision-making, treatment planning, report, and interactive feedback to the patient, subsequent hours (CPT 16840).  30 minutes of neuropsychological test administration and scoring by technician, first 30 minutes (CPT 53552). 113 additional minutes (4 units) neuropsychological test administration and scoring by technician, subsequent 30 minutes (CPT 28164). ICD-10 Diagnoses: N18.5; F06.8.    Due to circumstances that prevent in-person clinical visits, this assessment was conducted using telehealth methods (including remote audiovisual presentation of test instructions and test stimuli). The standard administration of these procedures involves in-person, face-to-face methods. The impact of applying non-standard administration methods has been evaluated only in part by scientific research. While every effort was made to  simulate standard assessment practices, the diagnostic conclusions and recommendations for treatment provided in this report are being advanced with these reservations.    Psychometrist time  Prep  Rooming & Test Administration    Scoring/Monitoring       Start: 8:10 Start: 9:08 Start: 0:00 Start: 0:00 Start: 10:44 Start: 0:00 Start: 0:00   Stop: 8:15 Stop: 10:44 Stop: 0:00 Stop: 0:00 Stop: 11:26 Stop: 0:00 Stop: 0:00   Total: 5m Total: 96m Total: 0m Total: 0m Total: 42m Total: 0m Total: 0m   Total Administration Time 96m  Total Scoring/Monitor Time 42m  Total Time Overall 143m        Video-Visit Details    Type of service:  Video Visit    Video Start Time (interview): 8:05 a.m.  Video End Time (interview): 8:52 a.m.    Originating Location (pt. Location): Home    Distant Location (provider location):  [facility] NEUROPSYCHOLOGY     Platform used for Video Visit: Rubens Butler, PhD LP

## 2020-05-29 NOTE — LETTER
"5/29/2020       RE: Rashad Ortiz  [address]  [place] [address]     Dear Colleague,    Thank you for referring your patient, Rashad Ortiz, to the [facility] NEUROPSYCHOLOGY at [facility]. Please see a copy of my visit note below.    Rashad Ortiz is a 45 year old male who is being evaluated via a billable video visit.      The patient has been notified of following:     \"This video visit will be conducted via a call between you and your provider. We have found that certain health care needs can be provided without the need for an in-person physical exam.  This service lets us provide the care you need with a video conversation, and remote testing. If additional testing is required, you will be scheduled for a face-to-face visit in the clinic at a future time.    Video visits are billed at different rates depending on your insurance coverage.  Please reach out to your insurance provider with any questions.    If during the course of the call the provider feels a video visit is not appropriate, you will not be charged for this service.\"    Patient has given verbal consent for Video visit? Yes    How would you like to obtain your AVS? MyChart    Patient would like the video invitation sent by: Send to e-mail at: [email or web address]    Will anyone else be joining your video visit? No    NEUROPSYCHOLOGICAL EVALUATION    RELEVANT HISTORY AND REASON FOR REFERRAL    Rashad Ortiz is a 45 year old, left-handed, former  and  with 14 years of formal education.  Information was obtained via video interview with the patient and review of his medical records.  Records indicate a history of end-stage kidney disease secondary to presumed hypertension.  He is status post failing living donor kidney transplant from 2011.  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  In " February 2017 he had a transplant kidney biopsy that showed chronic tubulointerstitial changes with no evidence of rejection.  He was listed in 2017 but delisted due to noncompliance issues and not completing his evaluation.  His compliance is reportedly improved with consistent labs and medical follow-up, and he was noted to have a different job that allowed more flexibility. His case was reviewed by the selection committee on 5/20/2020, with a decision to defer.  There were inconsistencies in his answers during his social work assessment and concerns about his comprehension of the transplant process and the expectations.  There were some concerns regarding memory.  He was therefore referred for neuropsychological evaluation by ISABEL Jacobson, CNP, for further characterization of any cognitive difficulties and to evaluate mood and personality.    CLINICAL INTERVIEW FINDINGS    Upon interview, Mr. Ortiz stated that he initially had a transplant in January 2011.  He stated that he understands he needs a new transplant.  He was initially on the list but missed appointments due to his job, and was taken off of the list.  He stated that he understands that the transplant team is concerned that there may be people who would be more appreciative of a transplant and follow through with recommendations.  He believes that things would be different now because he has his wife's support and will go to his appointments.  He stated that his support system includes his wife of 2 years, and his children.  He understands that there are risks of death and rejection, and he recalls having edema after his first transplant.    Mr. Ortiz has not noticed any changes in cognition, including memory, word finding, attention or concentration, decision making, or organization.  He stated that the transplant team has had some concerns about his memory because he missed a couple questions during the phone interview.  He lives with  his wife and 2 children, ages 2 and 7.  He and his wife share the management of their finances, and he denied any difficulty in this regard.  He manages his own medications, and denied difficulty remembering to take them.  He noted that with his last transplant he had insurance issues with his medications, and ultimately contacted his  who helped him get enough medications until the problem was resolved, but he went a few weeks without taking them first.  He stated that he now knows who to contact if something like this were to happen again.  He drives only when he needs to, both because of COVID and because of pain in his leg recently.  He used to cook regularly without difficulty, but within the last 2 weeks he cannot stand as well so he has not been cooking.  He handles his personal cares independently.    Mr. Ortiz denied any history of psychiatric illness and stated that he has not worked with a psychologist.  When asked to describe his mood, he stated that it is pretty neutral. He stated that he is a homebody, so being at home due to COVID-19 does not affect him. He feels like he is looking through a window. When asked about depression, he stated that life causes depression but he does not feel depressed overall. He has not felt anxious. He became tearful recently when thinking about his sister, who passed away some time ago. He has been no more irritable than normal. He has been sleeping fairly well, although he wakes up three times at night to use the bathroom. He sleeps 6-8 hours a night.  Twice a week, he may nap for an hour or two.  His energy level is low, and he stated that the osteonecrosis has been affecting him.  He has not been able to get any exercise for this reason.  His appetite is fair.  He has lost some weight as he is not eating regularly and misses some meals.  His interest level is fairly good.  He denied suicidal ideation or any history of attempts to commit suicide.  He  "denied visual or auditory hallucinations.    Until early April, Mr. Ortiz was consuming a couple of beers or a beer and a glass of [place]quer[place] or other liquor each day, for about 6 months.  He stated that since early April, he has been focusing more on his health, and he wants to be around for his daughters.  He discussed his desire to quit with his wife, and ultimately decided that he should stop.  He had what he described as a relapse at the beginning of May, when he had two shots.  His score on the CAGE questionnaire was 1, in that he has thought in the past about cutting down on drinking.  He endorsed marijuana use until March.  He stated that he would have one puff on the weekends, but that he was not into it heavily so it was nothing to stop.  He quit smoking cigarettes in August 2019.  Before that he had been smoking 1 or 2 cigarettes a day for about a year.  He denied use of e-cigarettes.  He has been arrested in the past, most recently for 2013 for a \"traffic situation.\"  He has no pending legal issues.    In school, Mr. Ortiz struggled with reading but was not diagnosed with a learning disability.  He was never held back any grades.  He earned B's and C's in school.  Ultimately, he earned an Associate's degree in Accounting.  He has held various jobs in accounting, warehouses, as a , and working outside.  His longest held job was for 5 years at [employer] doing [employer].  He left that job because the pay did not match the work that he was doing, and he found a job for higher pay.  Most recently, from March until May 8 he had been working for [employer] as a .  He stopped working on May 8 due to pain, and he subsequently applied for disability.  He has been  twice, both times after 5 years of marriage.  He has been  to his current wife for 2 years, and he has 5 children.  The youngest two, ages 2 and 7, live with him.  The other 3 are adults.    Mr." Angel denied any history of seizure, stroke, or head injury resulting in loss of consciousness.  He described his balance as fine.  He denied unilateral weakness or numbness.  He has not had tremor.  He is not bothered by headaches.  He has pain in his leg reportedly related to osteonecrosis.  He also has had episodes of gout.    PAST MEDICAL HISTORY: Medical records indicate a history of vitamin D deficiency, benign prostatic hyperplasia, avascular necrosis of the bone, secondary renal hyperparathyroidism, chronic renal failure stage V, hypertension, metabolic acidosis, gastroesophageal reflux disease, gout.    CURRENT MEDICATIONS:  Include acetaminophen, amlodipine, carvedilol, febuxostat, ferrous sulfate, furosemide, mycophenolate, omeprazole, oxycodone-acetaminophen, prednisone, sodium bicarbonate, sulfamethoxazole-trimethoprim, tacrolimus, tamsulosin, vitamin D3.    FAMILY MEDICAL HISTORY:  Significant for uncles with kidney disease, and some family members with alcohol and marijuana abuse.    BEHAVIORAL OBSERVATIONS    This evaluation was undertaken remotely with the use of video technology within the electronic medical record system.  Behavioral observations are somewhat limited for this reason. During the interview, he was focused and on task. During the testing, however, he was distracted by his youngest daughter in the room, with the television on in the background. He was asked several times about finding a room by himself but he declined or was unable to do so. There were obvious distractions which likely affected the validity of the testing. He was alert and oriented to person, place, month, and year, but not to date. Mood was euthymic. Speech was fluent, with normal articulation, volume, and rate. Spontaneous conversation was present and appropriate. Judgment on a safety questionnaire was fair. Internal performance validity measures fell within normal limits. Results are interpreted with caution  given the deviation from standard protocols and the distractions present in the environment, and may reflect an underestimate of his true level of functioning.    MEASURES ADMINISTERED    The following measures were administered by a trained psychometrist, remotely via video technology, under the direct supervision of a licensed psychologist:    Orientation; Subtests of the Wechsler Adult Intelligence Scale - 4; Reading subtest of the Wide Range Achievement Test - 4; Story Memory of the Repeatable Battery for the Assessment of Neuropsychological Status (RBANS); Burdick Verbal Learning Test - Revised, Form 1; [place] Naming Test, 15 Item; Controlled Oral Word Association Test; Semantic Fluency; Clock Drawing; Oral Trail Making Test; Test of Sustained Attention and Tracking; BDAE Complex Ideational Material; ILS Health and Safety Questions; [place] Multiphasic Personality Inventory - 2 - Restructured Form (MMPI-2-RF).    RESULTS AND INTERPRETATION    Overall intellectual functioning was estimated to fall in the upper end of the average range, consistent with premorbid estimates based on single word reading abilities.    Confrontation naming was average.  Expressive vocabulary was high average for his age.  Verbal abstract reasoning was average.  Letter fluency was average, and generative naming to category was mildly impaired.  Performance on this fluency task was notable for a paucity of responses rather than errors.  Comprehension of complex ideational material was impaired, and seemed to be affected by attention.    Attention span was low average for his age.  Divided attention was average.  Performance on a measure of sustained attention and tracking was mildly impaired; during this measure he had some distractions.    Basic visual perception, including matching lines and angles, was high average.  Construction of a clock fell within normal limits.  Nonverbal deductive reasoning was average.    Immediate recall  of verbal narrative material was average, with average recall following a brief delay.  On a multiple trial list learning task, immediate recall was moderately impaired, with moderately impaired retention (38%) of the learned material following a 25-minute delay.  It was noted that he was distracted by his daughter in another part of the room during this measure.  Recognition memory on this task was mildly impaired, and included no false positive errors.      Judgment was fair on questions related to health and safety situations.     The MMPI-2-RF, a self-report measure of mood and personality, was administered remotely using  [employer] and video proctoring.  He responded to the items in a consistent manner, but had a significant tendency to deny even the most common human faults, and the profile is interpreted with caution.  His level of reported emotional distress was low.  He reported not enjoying social events and avoiding social situations.  He denied significant psychopathology, including depressed mood or ideation, anxiety, or psychosis.    IMPRESSIONS AND RECOMMENDATIONS    Current results are interpreted with caution. Due to concerns regarding COVID-19, the evaluation was administered remotely using a video platform. Moreover, his young daughter was in the room with him during the testing, and he was unable to move to a quiet room. At times, this was distracting for him. Every attempt was made to administer measures in a standardized manner; however, differences in administration from the standardization samples may affect interpretation, and the results are likely to underestimate his true level of functioning.    Results indicate variable attention, ranging from mildly impaired to average. Similarly, memory was variable, ranging from moderately impaired to average. Performance improved with cues, and was clearly affected by distractions in the room. Basic language and visual processing fell within normal  limits. On a safety questionnaire, judgment was fair. Personality assessment was notable for a marked tendency to underreport psychiatric symptoms, and he denied significant depressed mood or ideation, anxiety, or psychosis.    While interpretation of these findings is complicated, it is notable that despite significant distractions in the environment, on some measures of attention and memory, Mr. Ortiz's performance fell within normal limits. As such, the variability noted in his performance most likely reflects the distractions present rather than underlying neurologic dysfunction. That being said, similar variability in attention and processing was noted at a virtual visit psychosocial assessment on 5/13/2020; it seems possible that he had similar distractions in his environment at that time, but it is difficult to be sure. Intellectual functioning is estimated to fall in the upper end of the average range. He does appear to be capable of comprehending medical information, and he has an adequate understanding of the transplant procedure and the risks involved.    There are some behavioral concerns present which will need to be addressed to ensure that he is a suitable candidate for transplant. He endorsed alcohol and marijuana use until recently. Until early April, he reported that he was consuming two alcoholic beverages a day. He indicated that he decided to stop on his own, although he had a self-described relapse at the beginning of May, when he had two shots of alcohol. He also endorsed weekly marijuana use until March, and stated that he has not had any since then. At a psychosocial assessment on 5/13/2020, he indicated that he does not drink alcohol or use any drugs, so there seem to be some discrepancies in his report. Moreover, there have been concerns about his adherence to treatment recommendations. Based on the information available in his records, this seems to have improved recently. He believes  that he will be able to adhere to treatment recommendations more consistently because his wife is able to assist him. It seems reasonable to address the behavioral concerns including discrepancies in his report about alcohol and drug use, and to ensure that he continues to follow through with all treatment recommendations including attending all scheduled appointments and taking his medications regularly.    If Mr. Ortiz is unable to meet these requirements, then it may be helpful to have him seen in clinic once face-to-face evaluations are being scheduled, to allow for more comprehensive assessment of cognitive functioning without the distractions present when testing remotely.     Mandy Butler, Ph.D., ABPP  Licensed Psychologist, LP [id number]  Board Certified in Clinical Neuropsychology      Time spent: 65 minutes professional time (CPT 85622). 60 minutes (1 unit) neuropsychological testing evaluation by licensed and board-certified neuropsychologist, including integration of patient data, interpretation of standardized test results and clinical data, clinical decision-making, treatment planning, report, and interactive feedback to the patient, first hour (CPT 58582). 55 minutes (1 units) of neuropsychological testing evaluation by licensed and board-certified neuropsychologist, including integration of patient data, interpretation of standardized test results and clinical data, clinical decision-making, treatment planning, report, and interactive feedback to the patient, subsequent hours (CPT 03197).  30 minutes of neuropsychological test administration and scoring by technician, first 30 minutes (CPT 78478). 113 additional minutes (4 units) neuropsychological test administration and scoring by technician, subsequent 30 minutes (CPT 38735). ICD-10 Diagnoses: N18.5; F06.8.    Due to circumstances that prevent in-person clinical visits, this assessment was conducted using telehealth methods (including remote  audiovisual presentation of test instructions and test stimuli). The standard administration of these procedures involves in-person, face-to-face methods. The impact of applying non-standard administration methods has been evaluated only in part by scientific research. While every effort was made to simulate standard assessment practices, the diagnostic conclusions and recommendations for treatment provided in this report are being advanced with these reservations.    Psychometrist time  Prep  Rooming & Test Administration    Scoring/Monitoring       Start: 8:10 Start: 9:08 Start: 0:00 Start: 0:00 Start: 10:44 Start: 0:00 Start: 0:00   Stop: 8:15 Stop: 10:44 Stop: 0:00 Stop: 0:00 Stop: 11:26 Stop: 0:00 Stop: 0:00   Total: 5m Total: 96m Total: 0m Total: 0m Total: 42m Total: 0m Total: 0m   Total Administration Time 96m  Total Scoring/Monitor Time 42m  Total Time Overall 143m        Video-Visit Details    Type of service:  Video Visit    Video Start Time (interview): 8:05 a.m.  Video End Time (interview): 8:52 a.m.    Originating Location (pt. Location): Home    Distant Location (provider location):  [facility] NEUROPSYCHOLOGY     Platform used for Video Visit: [facility]    Mandy Butler, PhD REJI          The patient was seen for neuropsychological evaluation via telehealth at the request of Agustin Fisher MD for the purposes of diagnostic clarification and treatment planning.  143 minutes of video test administration and scoring were provided by this writer.  Please see Dr. Mandy Butler's report for a full interpretation of the findings.    Video Start Time 1: 7:54AM  Video End Time 1: 7:56 AM    Video Start Time 2: 9:06 AM  Video End Time 2: 11:26AM    Rachel Rosenthal  Psychometrist      Again, thank you for allowing me to participate in the care of your patient.      Sincerely,    Mandy Butler, PhD LP

## 2020-05-29 NOTE — PROGRESS NOTES
The patient was seen for neuropsychological evaluation via telehealth at the request of Agustin Fisher MD for the purposes of diagnostic clarification and treatment planning.  143 minutes of video test administration and scoring were provided by this writer.  Please see Dr. Mandy Butler's report for a full interpretation of the findings.    Video Start Time 1: 7:54AM  Video End Time 1: 7:56 AM    Video Start Time 2: 9:06 AM  Video End Time 2: 11:26AM    Rachel Rosenthal  Psychometrist

## 2020-06-02 ENCOUNTER — MYC REFILL (OUTPATIENT)
Dept: FAMILY MEDICINE | Facility: CLINIC | Age: 45
End: 2020-06-02

## 2020-06-02 DIAGNOSIS — M87.052 AVASCULAR NECROSIS OF BONE OF LEFT HIP (H): ICD-10-CM

## 2020-06-03 ENCOUNTER — MYC MEDICAL ADVICE (OUTPATIENT)
Dept: FAMILY MEDICINE | Facility: CLINIC | Age: 45
End: 2020-06-03

## 2020-06-04 ENCOUNTER — MYC REFILL (OUTPATIENT)
Dept: FAMILY MEDICINE | Facility: CLINIC | Age: 45
End: 2020-06-04

## 2020-06-04 DIAGNOSIS — M87.052 AVASCULAR NECROSIS OF BONE OF LEFT HIP (H): ICD-10-CM

## 2020-06-04 RX ORDER — OXYCODONE AND ACETAMINOPHEN 5; 325 MG/1; MG/1
1 TABLET ORAL EVERY 12 HOURS PRN
Qty: 40 TABLET | Refills: 0 | Status: SHIPPED | OUTPATIENT
Start: 2020-06-04 | End: 2020-06-08

## 2020-06-04 RX ORDER — OXYCODONE AND ACETAMINOPHEN 5; 325 MG/1; MG/1
1 TABLET ORAL EVERY 12 HOURS PRN
Qty: 40 TABLET | Refills: 0 | Status: CANCELLED | OUTPATIENT
Start: 2020-06-04

## 2020-06-04 NOTE — TELEPHONE ENCOUNTER
Controlled Substance Refill Request for Percocet  Problem List Complete:  No     PROVIDER TO CONSIDER COMPLETION OF PROBLEM LIST AND OVERVIEW/CONTROLLED SUBSTANCE AGREEMENT    Last Written Prescription Date:  5/4/20  Last Fill Quantity: 40 tablets   # refills: 0    THE MOST RECENT OFFICE VISIT MUST BE WITHIN THE PAST 3 MONTHS. AT LEAST ONE FACE TO FACE VISIT MUST OCCUR EVERY 6 MONTHS. ADDITIONAL VISITS CAN BE VIRTUAL.  (THIS STATEMENT SHOULD BE DELETED.)    Last Office Visit with [facility] primary care provider: 5/11/20 patient had virtual visit    Future Office visit: None    Controlled substance agreement:   Encounter-Level CSA:    There are no encounter-level csa.     Patient-Level CSA:    There are no patient-level csa.         Last Urine Drug Screen: No results found for: CDAUT, No results found for: COMDAT, No results found for: THC13, PCP13, COC13, MAMP13, OPI13, AMP13, BZO13, TCA13, MTD13, BAR13, OXY13, PPX13, BUP13     Processing:  Rx to be electronically transmitted to pharmacy by provider      [email or web address]       checked in past 3 months?  No-problem list incomplete so  not checked.         Mila Luna RN, BSN, PHN

## 2020-06-05 DIAGNOSIS — I10 BENIGN ESSENTIAL HYPERTENSION: ICD-10-CM

## 2020-06-05 RX ORDER — AMLODIPINE BESYLATE 5 MG/1
5 TABLET ORAL DAILY
Qty: 90 TABLET | Refills: 3 | Status: SHIPPED | OUTPATIENT
Start: 2020-06-05 | End: 2020-10-07

## 2020-06-08 ENCOUNTER — VIRTUAL VISIT (OUTPATIENT)
Dept: FAMILY MEDICINE | Facility: CLINIC | Age: 45
End: 2020-06-08
Payer: COMMERCIAL

## 2020-06-08 ENCOUNTER — DOCUMENTATION ONLY (OUTPATIENT)
Dept: TRANSPLANT | Facility: CLINIC | Age: 45
End: 2020-06-08

## 2020-06-08 DIAGNOSIS — N18.5 CKD (CHRONIC KIDNEY DISEASE) STAGE 5, GFR LESS THAN 15 ML/MIN (H): ICD-10-CM

## 2020-06-08 DIAGNOSIS — M87.052 AVASCULAR NECROSIS OF BONE OF LEFT HIP (H): Primary | ICD-10-CM

## 2020-06-08 PROCEDURE — 99214 OFFICE O/P EST MOD 30 MIN: CPT | Mod: 95 | Performed by: FAMILY MEDICINE

## 2020-06-08 RX ORDER — HYDROMORPHONE HYDROCHLORIDE 4 MG/1
4-8 TABLET ORAL EVERY 6 HOURS PRN
Qty: 20 TABLET | Refills: 0 | Status: SHIPPED | OUTPATIENT
Start: 2020-06-08 | End: 2020-06-11

## 2020-06-08 NOTE — PROGRESS NOTES
"Rashad Ortiz is a 45 year old male who is being evaluated via a billable video visit.      The patient has been notified of following:     \"This video visit will be conducted via a call between you and your physician/provider. We have found that certain health care needs can be provided without the need for an in-person physical exam.  This service lets us provide the care you need with a video conversation.  If a prescription is necessary we can send it directly to your pharmacy.  If lab work is needed we can place an order for that and you can then stop by our lab to have the test done at a later time.    Video visits are billed at different rates depending on your insurance coverage.  Please reach out to your insurance provider with any questions.    If during the course of the call the physician/provider feels a video visit is not appropriate, you will not be charged for this service.\"    Patient has given verbal consent for Video visit? Yes    How would you like to obtain your AVS? Cori    Patient would like the video invitation sent by: Send to e-mail at: [email or web address]    Will anyone else be joining your video visit? No    Subjective     Rashad Ortiz is a 45 year old male who presents today via video visit for the following health issues:    HPI     Medication Followup of Oxycodone    Taking Medication as prescribed: yes    Side Effects:  None    Medication Helping Symptoms:  NO- wants something stronger. Not helping with pain        Video Start Time: 4:12 PM  No improvement with dilaudid.  Tried two and one plus tylenol.  Having trouble moving around at all due to pain.    Previous ESRD and renal transplant and current failure again.  This is complicating available pain medications.  Reviewed and updated as needed this visit by Provider  Tobacco  Allergies  Meds  Problems  Med Hx  Surg Hx  Fam Hx         Review of Systems   Constitutional, HEENT, cardiovascular, pulmonary, gi and " "gu systems are negative, except as otherwise noted.      Objective    There were no vitals taken for this visit.  Estimated body mass index is 22.25 kg/m  as calculated from the following:    Height as of 5/27/20: 1.727 m (5' 7.99\").    Weight as of 5/27/20: 66.4 kg (146 lb 4.8 oz).  Physical Exam     GENERAL: Healthy, alert and no distress  EYES: Eyes grossly normal to inspection.  No discharge or erythema, or obvious scleral/conjunctival abnormalities.  RESP: No audible wheeze, cough, or visible cyanosis.  No visible retractions or increased work of breathing.    SKIN: Visible skin clear. No significant rash, abnormal pigmentation or lesions.  NEURO: Cranial nerves grossly intact.  Mentation and speech appropriate for age.  PSYCH: Mentation appears normal, affect normal/bright, judgement and insight intact, normal speech and appearance well-groomed.      Diagnostic Test Results:  Labs reviewed in Epic        Assessment & Plan     1. Avascular necrosis of bone of left hip (H) and CKD stage 5  In need of surgical fix but not currently scheduled due to COVID back log per patient.  Trial of dilaudid instead of percocet for pain given renal function and follow up in 2 - 3 days to let me know how he is doing.  - HYDROmorphone (DILAUDID) 4 MG tablet; Take 1-2 tablets (4-8 mg) by mouth every 6 hours as needed for severe pain Related to pain that requires surgery.  Dispense: 20 tablet; Refill: 0       The uses and side effects, including black box warnings as appropriate, were discussed in detail.  All patient questions were answered.  The patient was instructed to call immediately if any side effects developed.     Return in about 4 weeks (around 7/6/2020).    Mariel Mares MD  [facility]      Video-Visit Details    Type of service:  Video Visit    Video End Time:4:26 PM    Originating Location (pt. Location): Home    Distant Location (provider location):  [facility] "     Platform used for Video Visit: AmWell    Return in about 4 weeks (around 7/6/2020).       Mariel Mares MD

## 2020-06-08 NOTE — PROGRESS NOTES
"[facility] Agreement for Transplant Candidates    2020  Patient: Rashad Ortiz      MRN: [id number]     Because you have had a problem in the past following a treatment plan, we ask that you read and sign this agreement. Here are the specific reasons we are asking this:    1.  Not always taking your medications. If you are having insurance or financial issues paying for your medications please contact your coordinator.  2. Not always attending scheduled appointments. Please do not \"no show\" for appointments. If you need to re-schedule please do so.    To have a successful transplant, you must follow your treatment plan, both before and after your transplant. We ask that you agree to the followin.  I will keep all my medical appointments, including dialysis sessions (if applicable), as required by my health care team.    2.  I will get all blood work or other tests as required by my health care team.    3.  I will take all medicines prescribed for me by my health care team, and I will take them as prescribed. I will tell my health care team promptly if I have trouble getting my medicines, paying for them, or dealing with side effects, or if I have any other problems or concerns.    4.  I understand that my status on the transplant waitlist will change to  inactive  if I do not follow my treatment plan. (To follow my treatment plan I must keep all my appointments, take all my medicines, get all my tests, and follow all my health care team s recommendations.) While my status is inactive, I will not be called for a transplant, and I cannot schedule a living donor transplant. I will not be able to have a transplant if I do not follow my treatment plan.    5.  If my status changes to inactive because I do not follow my treatment plan, I understand that it will stay inactive until I have followed my treatment plan for a certain period of time (6 to 12 months), to be set by my health care " team. Before my status can change back to active or a living donor transplant can be scheduled, I must have a letter from my doctor confirming that I have followed my treatment plan for this period of time.      6.  While my status is active, I will follow all recommendations from my health care team. To stay active, I must give my health care team whatever information it asks for.  It is my responsibility to make sure my health care team gets this information.    7. If I disagree with a recommendation, I will discuss this with my health care team. My health care team has the final say about whether I am following my treatment plan and what my status should be.    8. This contract is in effect from May 22, 2020 through November 22, 2020      I have read this agreement. I understand that my status on the transplant waitlist will not be active unless I follow my treatment plan.  I understand that I will be given a copy of this agreement, as will the members of my health care team. Please sign and return to me. You may email it back to [email or web address] or mail to          Name___________________________________  Date_________    Transplant Coordinator__Pamela Patel RN ____________________ Kxtm____2-3-1985_____    Transplant Surgeon_________________________ Date_________

## 2020-06-08 NOTE — PATIENT INSTRUCTIONS
At [facility], we strive to deliver an exceptional experience to you, every time we see you. If you receive a survey, please complete it as we do value your feedback.  If you have MyChart, you can expect to receive results automatically within 24 hours of their completion.  Your provider will send a note interpreting your results as well.   If you do not have MyChart, you should receive your results in about a week by mail.    Your care team:                            Family Medicine Internal Medicine   MD Juanito Lynne MD Shantel Branch-Fleming, MD Katya Georgiev PA-C Megan Hill, APRCLIFF Harrington, MD Pediatrics   Greg Fink, PAKARLA Balderrama, MD Leanna Hurley APRN CNP   MD Kelly Tipton MD Deborah Mielke, MD Kim Thein, APRN [facility]      Clinic hours: Monday - Thursday 7 am-7 pm; Fridays 7 am-5 pm.   Urgent care: Monday - Friday 11 am-9 pm; Saturday and Sunday 9 am-5 pm.    Clinic: [phone]       [facility]: Monday - Thursday 8 am - 7 pm; Friday 8 am - 6 pm  [facility]: [phone]     Use [email or web address] for 24/7 diagnosis and treatment of dozens of conditions.

## 2020-06-09 DIAGNOSIS — E55.9 VITAMIN D DEFICIENCY: ICD-10-CM

## 2020-06-10 ENCOUNTER — TELEPHONE (OUTPATIENT)
Dept: PHARMACY | Facility: CLINIC | Age: 45
End: 2020-06-10

## 2020-06-10 ENCOUNTER — INFUSION THERAPY VISIT (OUTPATIENT)
Dept: INFUSION THERAPY | Facility: CLINIC | Age: 45
End: 2020-06-10
Payer: COMMERCIAL

## 2020-06-10 VITALS
WEIGHT: 152 LBS | SYSTOLIC BLOOD PRESSURE: 138 MMHG | HEIGHT: 68 IN | RESPIRATION RATE: 16 BRPM | OXYGEN SATURATION: 99 % | BODY MASS INDEX: 23.04 KG/M2 | DIASTOLIC BLOOD PRESSURE: 101 MMHG | HEART RATE: 76 BPM | TEMPERATURE: 96.8 F

## 2020-06-10 DIAGNOSIS — D63.1 ANEMIA OF CHRONIC RENAL FAILURE, STAGE 5 (H): Primary | ICD-10-CM

## 2020-06-10 DIAGNOSIS — N18.5 CKD (CHRONIC KIDNEY DISEASE) STAGE 5, GFR LESS THAN 15 ML/MIN (H): ICD-10-CM

## 2020-06-10 DIAGNOSIS — N18.5 ANEMIA OF CHRONIC RENAL FAILURE, STAGE 5 (H): Primary | ICD-10-CM

## 2020-06-10 DIAGNOSIS — Z94.0 KIDNEY REPLACED BY TRANSPLANT: ICD-10-CM

## 2020-06-10 LAB
HCT VFR BLD AUTO: 30.9 % (ref 40–53)
HGB BLD-MCNC: 9.2 G/DL (ref 13.3–17.7)

## 2020-06-10 PROCEDURE — 85018 HEMOGLOBIN: CPT | Performed by: INTERNAL MEDICINE

## 2020-06-10 PROCEDURE — 96372 THER/PROPH/DIAG INJ SC/IM: CPT | Performed by: NURSE PRACTITIONER

## 2020-06-10 PROCEDURE — 85014 HEMATOCRIT: CPT | Performed by: INTERNAL MEDICINE

## 2020-06-10 PROCEDURE — 36415 COLL VENOUS BLD VENIPUNCTURE: CPT | Performed by: INTERNAL MEDICINE

## 2020-06-10 PROCEDURE — 99207 ZZC NO CHARGE NURSE ONLY: CPT

## 2020-06-10 ASSESSMENT — MIFFLIN-ST. JEOR: SCORE: 1553.84

## 2020-06-10 NOTE — PROGRESS NOTES
Infusion Nursing Note:  Rashad Ortiz presents today for Aranesp injection .    Patient seen by provider today: No   present during visit today: Not Applicable.    Note: Assessment performed by Nikki BRIGHT RN prior to injection today. Patient did not take his BP Rx this morning. He will take Rx when arrives home and will monitor blood pressure for elevation.     Intravenous Access:  No Intravenous access/labs at this visit.    Treatment Conditions:    If Hgb <10 gm/dL, give dose. Hold dose if systolic blood pressure >180 mm Hg.     Lab Results   Component Value Date    HGB 9.2 06/10/2020     Lab Results   Component Value Date    WBC 5.6 05/27/2020      Lab Results   Component Value Date    ANEU 5.1 10/14/2019     Lab Results   Component Value Date     05/27/2020      Results reviewed, labs MET treatment parameters, ok to proceed with treatment.      Post Infusion Assessment:  Patient tolerated injection without incident.  Site patent and intact, free from redness, edema or discomfort.       Discharge Plan:   Patient discharged in stable condition accompanied by: self.  Departure Mode: Ambulatory with crutches.    Millie Seth LPN

## 2020-06-10 NOTE — TELEPHONE ENCOUNTER
Anemia Management Note  SUBJECTIVE/OBJECTIVE:  Referred by Dr. Abran Cunha on 2020  Primary Diagnosis: Anemia in Chronic Kidney Disease (N18.5, D63.1)     Secondary Diagnosis:  Chronic Kidney Disease, Stage 5 (N18.5)  Kidney Tx: 2011  Hgb goal range:  9-10  Epo/Darbo: Aranesp  60 mcg  every two weeks for Hgb <10. In clinic  Iron regimen:  Ferrous Sulfate  once daily  Labs : 2021  Recent LAWRENCE use, transfusion, IV iron: NA.  Aranesp was previously ordered, he never received it.   RX/TX plans : 2021  No history of stroke, MI and blood clots or cancers     Contact:            No Consent to communicate on File    Anemia Latest Ref Rng & Units 3/7/2020 3/18/2020 2020 2020 2020 2020 6/10/2020   LAWRENCE Dose - - - - 60 mcg 60 mcg 60 mcg 60 mcg   Hemoglobin 13.3 - 17.7 g/dL 8.5(L) 8.1(L) 7.6(LL) - 7.8(LL) 8.8(L) 9.2(L)   TSAT 15 - 46 % - 30 - - - - -   Ferritin 26 - 388 ng/mL - 460(H) - - - - -     BP Readings from Last 3 Encounters:   06/10/20 (!) 138/101   20 (!) 131/91   20 (!) 156/98     Wt Readings from Last 2 Encounters:   06/10/20 68.9 kg (152 lb)   20 66.4 kg (146 lb 4.8 oz)           ASSESSMENT:  Hgb:At goal - recommend dose  TSat: at goal >30% Ferritin: At goal (>100ng/mL)    PLAN:  Dose with aranesp and RTC for hgb then aranesp if needed in 2 week(s)    Orders needed to be renewed (for next follow-up date) in EPIC: None    Iron labs due:  20    Plan discussed with:  No call made.   Plan provided by:  chio    NEXT FOLLOW-UP DATE:  20    Cate Ellis RN   Anemia Services  [address] [address]   [email or web address]   Office : [phone]  Fax: [phone]

## 2020-06-11 ENCOUNTER — OFFICE VISIT (OUTPATIENT)
Dept: TRANSPLANT | Facility: CLINIC | Age: 45
End: 2020-06-11
Attending: CLINICAL NURSE SPECIALIST
Payer: COMMERCIAL

## 2020-06-11 ENCOUNTER — ANCILLARY PROCEDURE (OUTPATIENT)
Dept: ULTRASOUND IMAGING | Facility: CLINIC | Age: 45
End: 2020-06-11
Attending: CLINICAL NURSE SPECIALIST
Payer: COMMERCIAL

## 2020-06-11 ENCOUNTER — MYC MEDICAL ADVICE (OUTPATIENT)
Dept: FAMILY MEDICINE | Facility: CLINIC | Age: 45
End: 2020-06-11

## 2020-06-11 VITALS
BODY MASS INDEX: 22.25 KG/M2 | OXYGEN SATURATION: 97 % | HEART RATE: 89 BPM | TEMPERATURE: 98.4 F | WEIGHT: 146.3 LBS | DIASTOLIC BLOOD PRESSURE: 102 MMHG | SYSTOLIC BLOOD PRESSURE: 149 MMHG

## 2020-06-11 DIAGNOSIS — N18.5 CHRONIC KIDNEY DISEASE, STAGE 5, KIDNEY FAILURE (H): ICD-10-CM

## 2020-06-11 DIAGNOSIS — N18.5 CKD (CHRONIC KIDNEY DISEASE) STAGE 5, GFR LESS THAN 15 ML/MIN (H): Primary | ICD-10-CM

## 2020-06-11 DIAGNOSIS — M87.052 AVASCULAR NECROSIS OF BONE OF LEFT HIP (H): ICD-10-CM

## 2020-06-11 DIAGNOSIS — T82.9XXA COMPLICATION OF VASCULAR ACCESS FOR DIALYSIS, INITIAL ENCOUNTER: ICD-10-CM

## 2020-06-11 DIAGNOSIS — Z09 FOLLOW-UP EXAMINATION AFTER VASCULAR SURGERY: ICD-10-CM

## 2020-06-11 RX ORDER — HYDROMORPHONE HYDROCHLORIDE 4 MG/1
4-8 TABLET ORAL EVERY 6 HOURS PRN
Qty: 48 TABLET | Refills: 0 | Status: SHIPPED | OUTPATIENT
Start: 2020-06-11 | End: 2020-07-06

## 2020-06-11 ASSESSMENT — PAIN SCALES - GENERAL: PAINLEVEL: SEVERE PAIN (7)

## 2020-06-11 NOTE — PROGRESS NOTES
Dialysis Access Service   Progress Note    S:  Mr. Ortiz is being seen today for surgical followup of his dialysis access.  He reports no issues with the wound, and  no steal syndrome of the distal extremity. C/O intermittent mild numbness/tingling in right fingers. Denies swelling in right arm or a change color/temperature in right hand and fingers. He is not yet on dialysis. S/P Arteriovenous Fistula creation, right upper arm  brachial artery to cephalic vein on 7/31/2019.      O:  Temp:  [98.4  F (36.9  C)] 98.4  F (36.9  C)  Pulse:  [89] 89  BP: (149)/(102) 149/102  SpO2:  [97 %] 97 %  GENERAL: alert, cooperative  Circulation:   Radial pulse 3+  Ulnar pulse  3+   Capillary refill:  capillary refill < 2 sec    Sensory exam:   arm: Normal   [x]           Abnormal   []          Comment:    hand: Normal   []           Abnormal   [x]          Comment:  intermittent mild numbness/tingling in right fingers.  Motor exam:   arm: Normal   [x]           Abnormal   []          Comment:    hand: Normal   [x]           Abnormal   []          Comment:    Access: R upper extremity wound(s) healed, non-tender. No venous hypertension,  ++thrill and bruit via hand held doppler present. edema in right arm, without apparent infection   RUE US of AV fistula today  1. Antegrade flow in the right brachial artery distal to the arteriovenous anastomosis. If clinically desired, evaluation for physiologic steal could be performed with finger brachial indices with and without compression of the fistula.  2. Right brachial-cephalic arteriovenous anastomosis widely patent.  3. Greater than 600 mL/min flow in the cephalic outflow vein in the distal arm.  4. Cephalic outflow vein narrowed in the mid and proximal arm. Less than 6 mm diameter. 569/364 cm/s.  5. No central venous stenosis demonstrated.    Assessment & Plan: Mr. Ortiz's dialysis access has matured very well at this time point.    1. RUE US of AV fistula today  2. OK to use RUE  AV fistula for dialysis when as needed  3. Check thrill from AV fistula twice a day  4. Continue right arm exercise with a squeeze ball  5. Follow up in clinic a needed    We would like to see the patient back in the clinic as needed to assess progress. The patient was counselled to contact our nurse coordinator, ISABEL Branham CNS (Sum) at [phone] with any questions or concerns.  Thank you for the opportunity to participate in Mr. Ortiz's care.    TT: 20 min  CT: 15 min    JONAH Schwab (Sum)  Dialysis Vascular Access/SOT Clinical Nurse Specialist    Solid Organ Transplant Service - [facility]   Phone # [phone]  Pager # [phone]

## 2020-06-11 NOTE — PROGRESS NOTES
Patient:  Rashad Ortiz MRN:  [id number] :  77 WARNER:  20   Education: 14 Handedness:   Provider  Psychometrist:  NN   Orientation  WRAT-4  WAIS-IV Raw SS RDS   Personal Info 4 Raw 63 Digit Span 25 8 8   Place 2  Vocabulary 44 12    Time -1 %ile 53 Matrix Reasoning 18 10    Presidents 6 Grade Equivalence 12.9 Similarities 29 11    BNT-15  COWAT - FAS  Animal Fluency      Raw 13 Raw 52 Raw 12     z -0.05 z 0.65 z -1.83     Total Stim Correct 0         Total Phonemic Correct 1         Complex Ideational Material  Clock Drawing  RBANS Line Orientation      Raw 7 Command 3 Raw 19     SS 2   z 1.20     T 10         Oral Trails    TSAT      Trails A 6 Z 0.40 Total time 85 Z 0.29   Trails B - see error 26 Z -0.30 Total Errors 6 Z -1.48   HVLT           Trial 1 3   T-Score   T-Score   Trial 2 8 Total Recall 16 <20 True Positives 9    Trial 3 5 Delayed Recall 3 <20 False Positives 0    Learning 3 Percent Retention 38% <20 Discrim. Index 9 33   RBANS Story           Total Immediate 17 z Score 0.03       Total Delay 9 z Score 0.06       ILS Health and Safety Questionnaire         Total 33 Classification Moderate

## 2020-06-11 NOTE — LETTER
6/11/2020         RE: Rashad Ortiz  [address]  [place] [address]        Dear Colleague,    Thank you for referring your patient, Rashad Ortiz, to the [facility]. Please see a copy of my visit note below.    Dialysis Access Service   Progress Note    S:  Mr. Ortiz is being seen today for surgical followup of his dialysis access.  He reports no issues with the wound, and  no steal syndrome of the distal extremity. C/O intermittent mild numbness/tingling in right fingers. Denies swelling in right arm or a change color/temperature in right hand and fingers. He is not yet on dialysis. S/P Arteriovenous Fistula creation, right upper arm  brachial artery to cephalic vein on 7/31/2019.      O:  Temp:  [98.4  F (36.9  C)] 98.4  F (36.9  C)  Pulse:  [89] 89  BP: (149)/(102) 149/102  SpO2:  [97 %] 97 %  GENERAL: alert, cooperative  Circulation:   Radial pulse 3+  Ulnar pulse  3+   Capillary refill:  capillary refill < 2 sec    Sensory exam:   arm: Normal   [x]           Abnormal   []          Comment:    hand: Normal   []           Abnormal   [x]          Comment:  intermittent mild numbness/tingling in right fingers.  Motor exam:   arm: Normal   [x]           Abnormal   []          Comment:    hand: Normal   [x]           Abnormal   []          Comment:    Access: R upper extremity wound(s) healed, non-tender. No venous hypertension,  ++thrill and bruit via hand held doppler present. edema in right arm, without apparent infection   RUE US of AV fistula today  1. Antegrade flow in the right brachial artery distal to the arteriovenous anastomosis. If clinically desired, evaluation for physiologic steal could be performed with finger brachial indices with and without compression of the fistula.  2. Right brachial-cephalic arteriovenous anastomosis widely patent.  3. Greater than 600 mL/min flow in the cephalic outflow vein in the distal arm.  4. Cephalic outflow vein narrowed in  the mid and proximal arm. Less than 6 mm diameter. 569/364 cm/s.  5. No central venous stenosis demonstrated.    Assessment & Plan: Mr. Ortiz's dialysis access has matured very well at this time point.    1. RUE US of AV fistula today  2. OK to use RUE AV fistula for dialysis when as needed  3. Check thrill from AV fistula twice a day  4. Continue right arm exercise with a squeeze ball  5. Follow up in clinic a needed    We would like to see the patient back in the clinic as needed to assess progress. The patient was counselled to contact our nurse coordinator, ISABEL Branham CNS (Sum) at [phone] with any questions or concerns.  Thank you for the opportunity to participate in Mr. Ortiz's care.    TT: 20 min  CT: 15 min    JONAH Schwab (Sum)  Dialysis Vascular Access/SOT Clinical Nurse Specialist    Solid Organ Transplant Service - [facility]   Phone # [phone]  Pager # [phone]    Again, thank you for allowing me to participate in the care of your patient.        Sincerely,        ISABEL Chiu

## 2020-06-13 ENCOUNTER — RESULTS ONLY (OUTPATIENT)
Dept: OTHER | Facility: CLINIC | Age: 45
End: 2020-06-13

## 2020-06-13 DIAGNOSIS — Z94.0 HTN, KIDNEY TRANSPLANT RELATED: ICD-10-CM

## 2020-06-13 DIAGNOSIS — Z76.82 ORGAN TRANSPLANT CANDIDATE: ICD-10-CM

## 2020-06-13 DIAGNOSIS — N18.4 CHRONIC KIDNEY DISEASE, STAGE 4, SEVERELY DECREASED GFR (H): ICD-10-CM

## 2020-06-13 DIAGNOSIS — I15.1 HTN, KIDNEY TRANSPLANT RELATED: ICD-10-CM

## 2020-06-13 LAB
ALBUMIN UR-MCNC: 30 MG/DL
APPEARANCE UR: CLEAR
BILIRUB UR QL STRIP: NEGATIVE
COLOR UR AUTO: YELLOW
GLUCOSE UR STRIP-MCNC: NEGATIVE MG/DL
HGB UR QL STRIP: ABNORMAL
KETONES UR STRIP-MCNC: NEGATIVE MG/DL
LEUKOCYTE ESTERASE UR QL STRIP: NEGATIVE
NITRATE UR QL: NEGATIVE
PH UR STRIP: 6.5 PH (ref 5–7)
RBC #/AREA URNS AUTO: NORMAL /HPF
SOURCE: ABNORMAL
SP GR UR STRIP: 1.01 (ref 1–1.03)
UROBILINOGEN UR STRIP-ACNC: 0.2 EU/DL (ref 0.2–1)
WBC #/AREA URNS AUTO: NORMAL /HPF

## 2020-06-13 PROCEDURE — 86832 HLA CLASS I HIGH DEFIN QUAL: CPT | Performed by: NURSE PRACTITIONER

## 2020-06-13 PROCEDURE — 36415 COLL VENOUS BLD VENIPUNCTURE: CPT | Performed by: NURSE PRACTITIONER

## 2020-06-13 PROCEDURE — 86481 TB AG RESPONSE T-CELL SUSP: CPT | Performed by: NURSE PRACTITIONER

## 2020-06-13 PROCEDURE — 86833 HLA CLASS II HIGH DEFIN QUAL: CPT | Performed by: NURSE PRACTITIONER

## 2020-06-13 PROCEDURE — 86803 HEPATITIS C AB TEST: CPT | Performed by: NURSE PRACTITIONER

## 2020-06-13 PROCEDURE — 40000866 ZZHCL STATISTIC HIV 1/2 ANTIGEN/ANTIBODY PRETRANSPLANT ONLY: Performed by: NURSE PRACTITIONER

## 2020-06-13 PROCEDURE — 86780 TREPONEMA PALLIDUM: CPT | Performed by: NURSE PRACTITIONER

## 2020-06-13 PROCEDURE — 86706 HEP B SURFACE ANTIBODY: CPT | Performed by: NURSE PRACTITIONER

## 2020-06-13 PROCEDURE — 81001 URINALYSIS AUTO W/SCOPE: CPT | Performed by: NURSE PRACTITIONER

## 2020-06-13 PROCEDURE — 86787 VARICELLA-ZOSTER ANTIBODY: CPT | Performed by: NURSE PRACTITIONER

## 2020-06-13 PROCEDURE — 86644 CMV ANTIBODY: CPT | Performed by: NURSE PRACTITIONER

## 2020-06-13 PROCEDURE — 86704 HEP B CORE ANTIBODY TOTAL: CPT | Performed by: NURSE PRACTITIONER

## 2020-06-13 PROCEDURE — G0499 HEPB SCREEN HIGH RISK INDIV: HCPCS | Performed by: NURSE PRACTITIONER

## 2020-06-13 PROCEDURE — 86665 EPSTEIN-BARR CAPSID VCA: CPT | Performed by: NURSE PRACTITIONER

## 2020-06-14 LAB
HBV CORE AB SERPL QL IA: NONREACTIVE
HBV SURFACE AB SERPL IA-ACNC: 0.44 M[IU]/ML
HBV SURFACE AG SERPL QL IA: NONREACTIVE
HCV AB SERPL QL IA: NONREACTIVE
HIV 1+2 AB+HIV1 P24 AG SERPL QL IA: NONREACTIVE
T PALLIDUM AB SER QL: NONREACTIVE

## 2020-06-15 ENCOUNTER — CARE COORDINATION (OUTPATIENT)
Dept: NEPHROLOGY | Facility: CLINIC | Age: 45
End: 2020-06-15

## 2020-06-15 LAB
CMV IGG SERPL QL IA: >8 AI (ref 0–0.8)
DONOR IDENTIFICATION: NORMAL
DSA COMMENTS: NORMAL
DSA PRESENT: NO
DSA TEST METHOD: NORMAL
EBV VCA IGG SER QL IA: >8 AI (ref 0–0.8)
GAMMA INTERFERON BACKGROUND BLD IA-ACNC: 0.05 IU/ML
M TB IFN-G BLD-IMP: NEGATIVE
M TB IFN-G CD4+ BCKGRND COR BLD-ACNC: 7.18 IU/ML
MITOGEN IGNF BCKGRD COR BLD-ACNC: 0 IU/ML
MITOGEN IGNF BCKGRD COR BLD-ACNC: 0 IU/ML
ORGAN: NORMAL
SA1 CELL: NORMAL
SA1 COMMENTS: NORMAL
SA1 HI RISK ABY: NORMAL
SA1 MOD RISK ABY: NORMAL
SA1 TEST METHOD: NORMAL
SA2 CELL: NORMAL
SA2 COMMENTS: NORMAL
SA2 HI RISK ABY UA: NORMAL
SA2 MOD RISK ABY: NORMAL
SA2 TEST METHOD: NORMAL
UNACCEPTABLE ANTIGEN: NORMAL
UNOS CPRA: 91
VZV IGG SER QL IA: >8 AI (ref 0–0.8)

## 2020-06-15 NOTE — PROGRESS NOTES
Nephrology Note: Nursing Outreach Encounter    REASON FOR CALL:                                                      REASON FOR CALL: Blood Pressure Follow Up, Care Coordination                                          SITUATION/BACKROUND:                                                    Patient is being treated for CKD Stage 5, HTN.      ASSESSMENT:                                                      Rashad is reporting blood pressures in 130s/80s. Does not check HR, but will start to pay attention to that.    Has swelling in his LE, but no worse than usual. Denies N/V, weight loss, shortness of breath, fatigue, or other uremic symptoms.    Uremic Symptoms: No     Still is not decided on where he wants to go for dialysis due to moving.      PLAN:                                                      Follow Up:   Follow up call in 3-4 weeks     Patient verbalized understanding and will contact the clinic with any further questions or concerns.     Emmie Echeverria RN

## 2020-06-16 ENCOUNTER — TELEPHONE (OUTPATIENT)
Dept: TRANSPLANT | Facility: CLINIC | Age: 45
End: 2020-06-16

## 2020-06-16 NOTE — TELEPHONE ENCOUNTER
Dr. Murillo plan:   We are trying to wean off prednisone with:   5 mg daily for the next month (until ~6/15/20)   2.5 mg daily for month 2 (until ~ 7/15/20)   2.5 mg every other day for month 3 (until ~8/15/20, then off)   Then off.     OUTCOME: Called Rashad to ensure he had decreased his prednisone dose 2.5 mg daily. He had not but verbalized he would reduce today 6/16/20.

## 2020-06-18 ENCOUNTER — OFFICE VISIT (OUTPATIENT)
Dept: ORTHOPEDICS | Facility: CLINIC | Age: 45
End: 2020-06-18
Payer: COMMERCIAL

## 2020-06-18 VITALS — BODY MASS INDEX: 22.13 KG/M2 | WEIGHT: 146 LBS | HEIGHT: 68 IN

## 2020-06-18 DIAGNOSIS — M87.9 OSTEONECROSIS (H): Primary | ICD-10-CM

## 2020-06-18 ASSESSMENT — MIFFLIN-ST. JEOR: SCORE: 1526.75

## 2020-06-18 ASSESSMENT — ACTIVITIES OF DAILY LIVING (ADL)
ADL_SUBSCALE_SCORE: 32.35
ADL_SUM: 46
ADL_MEAN: 2.7

## 2020-06-18 ASSESSMENT — HOOS S4: HOW SEVERE IS YOUR HIP JOINT STIFFNESS AFTER FIRST WAKENING IN THE MORNING?: EXTREME

## 2020-06-18 NOTE — LETTER
6/18/2020      RE: Rashad Ortiz  [address]  [place] [address]      Dear Colleague,    Thank you for referring your patient, Rashad Ortiz, to the [facility]. Please see a copy of my visit note below.    Chief Complaint: RECHECK (follow up for physical exam and discuss surgery )       HPI: Rashad Ortiz returns today in follow-up for his left hip. He has known osteonecrosis with massive involvement of the head and early collapse. There is associated osteoarthritis in the joint. He is here to day to discuss total hip.      Current Status:  Results of the patient s Hip Disability and Osteoarthritis Outcome Score (HOOS)  are as follows (0-100 scales with 100 being the theoretical best):  Pain: 35  Symptoms: 30   ADLs: 32.35   Sports/Recreation: 0  Quality of Life: 12.5   (http://koos.nu/)    Medications and allergies are documented in the EMR and have been reviewed.    Current Outpatient Medications:      acetaminophen (TYLENOL) 500 MG tablet, Take 2 tablets (1,000 mg) by mouth every 8 hours as needed for mild pain, Disp: 160 tablet, Rfl: 3     amLODIPine (NORVASC) 5 MG tablet, Take 1 tablet (5 mg) by mouth daily, Disp: 90 tablet, Rfl: 3     carvedilol (COREG) 25 MG tablet, Take 1 tablet (25 mg) by mouth 2 times daily, Disp: 180 tablet, Rfl: 3     cholecalciferol 25 MCG (1000 UT) TABS, Take 2,000 Units by mouth daily, Disp: 90 tablet, Rfl: 3     febuxostat (ULORIC) 80 MG TABS tablet, Take 1 tablet (80 mg) by mouth daily, Disp: 90 tablet, Rfl: 3     ferrous sulfate (FEROSUL) 325 (65 Fe) MG tablet, Take 1 tablet (325 mg) by mouth daily (with breakfast), Disp: 30 tablet, Rfl: 11     furosemide (LASIX) 20 MG tablet, Take 1 tablet (20 mg) by mouth 2 times daily, Disp: 180 tablet, Rfl: 1     HYDROmorphone (DILAUDID) 4 MG tablet, Take 1-2 tablets (4-8 mg) by mouth every 6 hours as needed for severe pain Related to pain that requires surgery., Disp: 48 tablet, Rfl: 0     mycophenolate  "(GENERIC EQUIVALENT) 250 MG capsule, Take 2 capsules (500 mg) by mouth 2 times daily, Disp: 120 capsule, Rfl: 11     omeprazole (PRILOSEC) 20 MG capsule, Take 1 capsule (20 mg) by mouth daily, Disp: 90 capsule, Rfl: 1     order for DME, Equipment being ordered: Crutches, Disp: 1 Device, Rfl: 0     predniSONE (DELTASONE) 5 MG tablet, Take 1 tablet (5 mg) by mouth daily for 30 days, THEN 0.5 tablets (2.5 mg) daily for 30 days, THEN 0.5 tablets (2.5 mg) every other day., Disp: 52 tablet, Rfl: 0     sodium bicarbonate 650 MG tablet, Take 2 tablets (1,300 mg) by mouth 3 times daily, Disp: 180 tablet, Rfl: 11     sulfamethoxazole-trimethoprim (BACTRIM) 400-80 MG tablet, Take 1 tablet by mouth every other day, Disp: 45 tablet, Rfl: 3     tacrolimus (GENERIC EQUIVALENT) 1 MG capsule, Take 2 capsules (2 mg) by mouth 2 times daily, Disp: 120 capsule, Rfl: 11     tamsulosin (FLOMAX) 0.4 MG capsule, Take 1 capsule (0.4 mg) by mouth daily, Disp: 30 capsule, Rfl: 1  Allergies: Patient has no known allergies.    Physical Exam:  On physical examination the patient appears the stated age, is in no acute distress, affect is appropriate, and breathing is non-labored.  Ht 1.727 m (5' 8\")   Wt 66.2 kg (146 lb)   BMI 22.20 kg/m    Body mass index is 22.2 kg/m .  Gait:   Incision:  ROM:  Distally, the circulatory, motor, and sensation exam is intact with 5/5 EHL, gastroc-soleus, and tibialis anterior.  Sensation to light touch is intact.  Dorsalis pedis and posterior tibialis pulses are palpable.  There are no sores on the feet, no bruising, and no lymphedema.    X-rays:    I reviewed the x-rays dated today.  Previous films reviewed.    Findings:  Normal progression for a total hip arthroplasty without evidence of loosening or subsidence.    Assessment: left hip ON with early collapse and severe pain. Using high dose narcotics and crutches. Physical therapy was not helpful. We discussed living with it and toatl hip. We spent twenty " minutes discussing total hip arthroplasty.  We discussed the implants, the procedure, the risks and benefits, and the post-operative course.  We discussed blood clots, blood clots to the lungs, injury to blood vessels and nerves, dislocation, infection, and leg length difference.  All the patients questions were answered to the best of my ability.    Plan: left total hip. PT for prehab.     Imtiaz Skinner      Again, thank you for allowing me to participate in the care of your patient.      Sincerely,      Fernando Morillo MD

## 2020-06-18 NOTE — NURSING NOTE
"Reason For Visit:   Chief Complaint   Patient presents with     RECHECK     follow up for physical exam and discuss surgery        Ht 1.727 m (5' 8\")   Wt 66.2 kg (146 lb)   BMI 22.20 kg/m           Charlie Prieto ATC  "

## 2020-06-18 NOTE — PROGRESS NOTES
Chief Complaint: RECHECK (follow up for physical exam and discuss surgery )       HPI: Rashad Ortiz returns today in follow-up for his left hip. He has known osteonecrosis with massive involvement of the head and early collapse. There is associated osteoarthritis in the joint. He is here to day to discuss total hip.      Current Status:  Results of the patient s Hip Disability and Osteoarthritis Outcome Score (HOOS)  are as follows (0-100 scales with 100 being the theoretical best):  Pain: 35  Symptoms: 30   ADLs: 32.35   Sports/Recreation: 0  Quality of Life: 12.5   (http://koos.nu/)    Medications and allergies are documented in the EMR and have been reviewed.    Current Outpatient Medications:      acetaminophen (TYLENOL) 500 MG tablet, Take 2 tablets (1,000 mg) by mouth every 8 hours as needed for mild pain, Disp: 160 tablet, Rfl: 3     amLODIPine (NORVASC) 5 MG tablet, Take 1 tablet (5 mg) by mouth daily, Disp: 90 tablet, Rfl: 3     carvedilol (COREG) 25 MG tablet, Take 1 tablet (25 mg) by mouth 2 times daily, Disp: 180 tablet, Rfl: 3     cholecalciferol 25 MCG (1000 UT) TABS, Take 2,000 Units by mouth daily, Disp: 90 tablet, Rfl: 3     febuxostat (ULORIC) 80 MG TABS tablet, Take 1 tablet (80 mg) by mouth daily, Disp: 90 tablet, Rfl: 3     ferrous sulfate (FEROSUL) 325 (65 Fe) MG tablet, Take 1 tablet (325 mg) by mouth daily (with breakfast), Disp: 30 tablet, Rfl: 11     furosemide (LASIX) 20 MG tablet, Take 1 tablet (20 mg) by mouth 2 times daily, Disp: 180 tablet, Rfl: 1     HYDROmorphone (DILAUDID) 4 MG tablet, Take 1-2 tablets (4-8 mg) by mouth every 6 hours as needed for severe pain Related to pain that requires surgery., Disp: 48 tablet, Rfl: 0     mycophenolate (GENERIC EQUIVALENT) 250 MG capsule, Take 2 capsules (500 mg) by mouth 2 times daily, Disp: 120 capsule, Rfl: 11     omeprazole (PRILOSEC) 20 MG capsule, Take 1 capsule (20 mg) by mouth daily, Disp: 90 capsule, Rfl: 1     order for DME,  "Equipment being ordered: Crutches, Disp: 1 Device, Rfl: 0     predniSONE (DELTASONE) 5 MG tablet, Take 1 tablet (5 mg) by mouth daily for 30 days, THEN 0.5 tablets (2.5 mg) daily for 30 days, THEN 0.5 tablets (2.5 mg) every other day., Disp: 52 tablet, Rfl: 0     sodium bicarbonate 650 MG tablet, Take 2 tablets (1,300 mg) by mouth 3 times daily, Disp: 180 tablet, Rfl: 11     sulfamethoxazole-trimethoprim (BACTRIM) 400-80 MG tablet, Take 1 tablet by mouth every other day, Disp: 45 tablet, Rfl: 3     tacrolimus (GENERIC EQUIVALENT) 1 MG capsule, Take 2 capsules (2 mg) by mouth 2 times daily, Disp: 120 capsule, Rfl: 11     tamsulosin (FLOMAX) 0.4 MG capsule, Take 1 capsule (0.4 mg) by mouth daily, Disp: 30 capsule, Rfl: 1  Allergies: Patient has no known allergies.    Physical Exam:  On physical examination the patient appears the stated age, is in no acute distress, affect is appropriate, and breathing is non-labored.  Ht 1.727 m (5' 8\")   Wt 66.2 kg (146 lb)   BMI 22.20 kg/m    Body mass index is 22.2 kg/m .    Assessment: left hip ON with early collapse and severe pain. Using high dose narcotics and crutches. Physical therapy was not helpful. We discussed living with it and toatl hip. We spent twenty minutes discussing total hip arthroplasty.  We discussed the implants, the procedure, the risks and benefits, and the post-operative course.  We discussed blood clots, blood clots to the lungs, injury to blood vessels and nerves, dislocation, infection, and leg length difference.  All the patients questions were answered to the best of my ability.    Plan: left total hip. PT for prehab.     Imtiaz Skinner"

## 2020-06-20 DIAGNOSIS — N18.5 CHRONIC KIDNEY DISEASE, STAGE V (H): ICD-10-CM

## 2020-06-20 DIAGNOSIS — D63.1 ANEMIA OF CHRONIC RENAL FAILURE, STAGE 5 (H): ICD-10-CM

## 2020-06-20 DIAGNOSIS — N18.5 ANEMIA OF CHRONIC RENAL FAILURE, STAGE 5 (H): ICD-10-CM

## 2020-06-20 DIAGNOSIS — Z94.0 KIDNEY TRANSPLANTED: ICD-10-CM

## 2020-06-20 LAB
ERYTHROCYTE [DISTWIDTH] IN BLOOD BY AUTOMATED COUNT: 15.8 % (ref 10–15)
HCT VFR BLD AUTO: 29.3 % (ref 40–53)
HGB BLD-MCNC: 9 G/DL (ref 13.3–17.7)
MCH RBC QN AUTO: 27 PG (ref 26.5–33)
MCHC RBC AUTO-ENTMCNC: 30.7 G/DL (ref 31.5–36.5)
MCV RBC AUTO: 88 FL (ref 78–100)
PLATELET # BLD AUTO: 268 10E9/L (ref 150–450)
RBC # BLD AUTO: 3.33 10E12/L (ref 4.4–5.9)
WBC # BLD AUTO: 5.8 10E9/L (ref 4–11)

## 2020-06-20 PROCEDURE — 80048 BASIC METABOLIC PNL TOTAL CA: CPT | Performed by: INTERNAL MEDICINE

## 2020-06-20 PROCEDURE — 83540 ASSAY OF IRON: CPT | Performed by: INTERNAL MEDICINE

## 2020-06-20 PROCEDURE — 36415 COLL VENOUS BLD VENIPUNCTURE: CPT | Performed by: INTERNAL MEDICINE

## 2020-06-20 PROCEDURE — 85027 COMPLETE CBC AUTOMATED: CPT | Performed by: INTERNAL MEDICINE

## 2020-06-20 PROCEDURE — 83550 IRON BINDING TEST: CPT | Performed by: INTERNAL MEDICINE

## 2020-06-20 PROCEDURE — 82728 ASSAY OF FERRITIN: CPT | Performed by: INTERNAL MEDICINE

## 2020-06-20 PROCEDURE — 80197 ASSAY OF TACROLIMUS: CPT | Performed by: INTERNAL MEDICINE

## 2020-06-21 LAB
TACROLIMUS BLD-MCNC: 5.1 UG/L (ref 5–15)
TME LAST DOSE: 1030 H

## 2020-06-22 ENCOUNTER — TELEPHONE (OUTPATIENT)
Dept: TRANSPLANT | Facility: CLINIC | Age: 45
End: 2020-06-22

## 2020-06-22 LAB
ANION GAP SERPL CALCULATED.3IONS-SCNC: 8 MMOL/L (ref 3–14)
BUN SERPL-MCNC: 61 MG/DL (ref 7–30)
CALCIUM SERPL-MCNC: 8.2 MG/DL (ref 8.5–10.1)
CHLORIDE SERPL-SCNC: 109 MMOL/L (ref 94–109)
CO2 SERPL-SCNC: 21 MMOL/L (ref 20–32)
CREAT SERPL-MCNC: 6.65 MG/DL (ref 0.66–1.25)
FERRITIN SERPL-MCNC: 310 NG/ML (ref 26–388)
GFR SERPL CREATININE-BSD FRML MDRD: 9 ML/MIN/{1.73_M2}
GLUCOSE SERPL-MCNC: 93 MG/DL (ref 70–99)
IRON SATN MFR SERPL: 9 % (ref 15–46)
IRON SERPL-MCNC: 15 UG/DL (ref 35–180)
POTASSIUM SERPL-SCNC: 4.3 MMOL/L (ref 3.4–5.3)
SODIUM SERPL-SCNC: 138 MMOL/L (ref 133–144)
TIBC SERPL-MCNC: 162 UG/DL (ref 240–430)

## 2020-06-22 NOTE — TELEPHONE ENCOUNTER
DATE:  6/22/2020   TIME OF RECEIPT FROM LAB:  12:13 PM  LAB TEST:  Creatinine  LAB VALUE:  6.65  RESULTS GIVEN WITH READ-BACK TO (PROVIDER):  Leonora Dwyer RN  TIME LAB VALUE REPORTED TO PROVIDER:   12:16 PM

## 2020-06-22 NOTE — TELEPHONE ENCOUNTER
"Creatinine elevated 6.65 on 6/20/20. Call placed to Rashad to see how he is feeling and assess for uremic symptoms. Rashad is surprised his creatinine is this value.    Assessment for uremic symptoms:   -Fatigue? More sleepier  -Nausea? Not really  -Loss of Appetite? Not really  -Metallic taste in the mouth? No  -Mental confusion/\"brain fog\"? No  -Itching? No  -Swelling? No more than usual    Nephrology nurse spoke with Rashad a week ago and it appears that because patient had just moved, he had not made a decision on where he would like to go for dialysis. Rashad had his fistula checked out on 6/11/20. Today, Rashad states that yes he \"is in the process of moving\" and \"dialysis has not come up in conversation.\" This writer wanted to get a feeling on Rashad's thoughts in regard to dialysis. He states:  \"If I am at the stage where dialysis is advised then fine but if how I feel is a factor then I don't feel like I am at that point yet.\"     Let Rashad know I would update provider on his creatinine and how he was feeling. Rashad is interested in hearing the transplant nephrologists thoughts. He seen Dr. Murillo on 5/13/20 and Dr. Gaspar. Dr. Gaspar went into detail of pros vs cons of transplantation vs dialysis. See encounter for further details. Message sent to Dr. Murillo regarding whether kidney has failed and patient should start dialysis.     "

## 2020-06-23 ENCOUNTER — TELEPHONE (OUTPATIENT)
Dept: PHARMACY | Facility: CLINIC | Age: 45
End: 2020-06-23

## 2020-06-23 NOTE — TELEPHONE ENCOUNTER
Follow-up with anemia management service:    Spoke with Rashad to remind him to schedule and Aranesp injection. Offered to transfer his call to  Infusion Center and he stated he will call later.     Anemia Latest Ref Rng & Units 3/18/2020 4/23/2020 4/24/2020 5/8/2020 5/27/2020 6/10/2020 6/20/2020   LAWRENCE Dose - - - 60 mcg 60 mcg 60 mcg 60 mcg -   Hemoglobin 13.3 - 17.7 g/dL 8.1(L) 7.6(LL) - 7.8(LL) 8.8(L) 9.2(L) 9.0(L)   TSAT 15 - 46 % 30 - - - - - 9(L)   Ferritin 26 - 388 ng/mL 460(H) - - - - - 310           Follow-up call date: 6/26/20  Did he get Aranesp?    Cate Ellis RN   Anemia Services  [address] [address]   [email or web address]   Office : [phone]  Fax: [phone]

## 2020-06-26 NOTE — TELEPHONE ENCOUNTER
Per Dr. Murillo continue to follow. Check for uremic symptoms from time to time or Rashad can let us know as things develop. Labs monthly.

## 2020-06-29 ENCOUNTER — TELEPHONE (OUTPATIENT)
Dept: TRANSPLANT | Facility: CLINIC | Age: 45
End: 2020-06-29

## 2020-06-29 DIAGNOSIS — Z79.60 LONG-TERM USE OF IMMUNOSUPPRESSANT MEDICATION: ICD-10-CM

## 2020-06-29 DIAGNOSIS — Z94.0 KIDNEY TRANSPLANT RECIPIENT: ICD-10-CM

## 2020-06-29 DIAGNOSIS — Z94.0 KIDNEY TRANSPLANTED: Primary | ICD-10-CM

## 2020-06-29 DIAGNOSIS — Z94.0 KIDNEY TRANSPLANTED: ICD-10-CM

## 2020-06-29 RX ORDER — TACROLIMUS 1 MG/1
2 CAPSULE ORAL 2 TIMES DAILY
Qty: 120 CAPSULE | Refills: 11 | Status: SHIPPED | OUTPATIENT
Start: 2020-06-29 | End: 2020-06-29

## 2020-06-29 RX ORDER — TACROLIMUS 0.5 MG/1
0.5 CAPSULE ORAL EVERY EVENING
Qty: 30 CAPSULE | Refills: 11 | Status: SHIPPED | OUTPATIENT
Start: 2020-06-29 | End: 2021-08-24

## 2020-06-29 RX ORDER — TACROLIMUS 1 MG/1
1 CAPSULE ORAL 2 TIMES DAILY
Qty: 60 CAPSULE | Refills: 11 | Status: SHIPPED | OUTPATIENT
Start: 2020-06-29 | End: 2021-08-24

## 2020-06-29 NOTE — TELEPHONE ENCOUNTER
PT stating he's taking 1cap (0.5) every eveing (total: 1mg in am and 1.5 in pm)    Thank you,  Sarah Moralez  PTSR III   Team

## 2020-06-29 NOTE — TELEPHONE ENCOUNTER
Medication/Refill approved per CPA:    [facility]LL[facility]IVE AGREEMENT FOR  IMMUNOSUPPRESSENT PRESCRIPTION MODIFICATION.      Routing encounter to Transplant as an FYI.    Thanks,  Lexie Friedman, PharmD  Specialty Pharmacist/Transplant  [facility]  [phone]

## 2020-06-30 ENCOUNTER — INFUSION THERAPY VISIT (OUTPATIENT)
Dept: INFUSION THERAPY | Facility: CLINIC | Age: 45
End: 2020-06-30
Payer: COMMERCIAL

## 2020-06-30 VITALS
OXYGEN SATURATION: 100 % | TEMPERATURE: 98.3 F | DIASTOLIC BLOOD PRESSURE: 83 MMHG | WEIGHT: 141.9 LBS | RESPIRATION RATE: 16 BRPM | BODY MASS INDEX: 21.58 KG/M2 | SYSTOLIC BLOOD PRESSURE: 128 MMHG | HEART RATE: 73 BPM

## 2020-06-30 DIAGNOSIS — D63.1 ANEMIA OF CHRONIC RENAL FAILURE, STAGE 5 (H): Primary | ICD-10-CM

## 2020-06-30 DIAGNOSIS — N18.5 CKD (CHRONIC KIDNEY DISEASE) STAGE 5, GFR LESS THAN 15 ML/MIN (H): ICD-10-CM

## 2020-06-30 DIAGNOSIS — N18.5 CHRONIC KIDNEY DISEASE, STAGE V (H): ICD-10-CM

## 2020-06-30 DIAGNOSIS — N18.5 ANEMIA OF CHRONIC RENAL FAILURE, STAGE 5 (H): Primary | ICD-10-CM

## 2020-06-30 DIAGNOSIS — Z94.0 KIDNEY REPLACED BY TRANSPLANT: ICD-10-CM

## 2020-06-30 LAB
HCT VFR BLD AUTO: 30.3 % (ref 40–53)
HGB BLD-MCNC: 9.1 G/DL (ref 13.3–17.7)

## 2020-06-30 PROCEDURE — 96372 THER/PROPH/DIAG INJ SC/IM: CPT | Performed by: NURSE PRACTITIONER

## 2020-06-30 PROCEDURE — 85014 HEMATOCRIT: CPT | Performed by: INTERNAL MEDICINE

## 2020-06-30 PROCEDURE — 99207 ZZC NO CHARGE NURSE ONLY: CPT

## 2020-06-30 PROCEDURE — 85018 HEMOGLOBIN: CPT | Performed by: INTERNAL MEDICINE

## 2020-06-30 ASSESSMENT — PAIN SCALES - GENERAL: PAINLEVEL: SEVERE PAIN (6)

## 2020-06-30 NOTE — PROGRESS NOTES
Infusion Nursing Note:  Rashad Samin presents today for Aranesp.    Patient seen by provider today: No   present during visit today: Not Applicable.    Note: N/A.    Intravenous Access:  No Intravenous access at this visit.    Treatment Conditions:  Lab Results   Component Value Date    HGB 9.1 06/30/2020     Lab Results   Component Value Date    WBC 5.8 06/20/2020      Lab Results   Component Value Date    ANEU 5.1 10/14/2019     Lab Results   Component Value Date     06/20/2020      Results reviewed, labs MET treatment parameters, ok to proceed with treatment.    Post Infusion Assessment:  Patient tolerated injection without incident.  Site patent and intact, free from redness, edema or discomfort.     Discharge Plan:   Patient discharged in stable condition accompanied by: self.  Departure Mode: Ambulatory.    Madelin Boyd RN

## 2020-07-01 ENCOUNTER — CARE COORDINATION (OUTPATIENT)
Dept: NEPHROLOGY | Facility: CLINIC | Age: 45
End: 2020-07-01

## 2020-07-01 DIAGNOSIS — N18.5 CHRONIC KIDNEY DISEASE, STAGE V (H): ICD-10-CM

## 2020-07-01 DIAGNOSIS — D63.1 ANEMIA OF CHRONIC RENAL FAILURE, STAGE 5 (H): ICD-10-CM

## 2020-07-01 DIAGNOSIS — N18.5 CKD (CHRONIC KIDNEY DISEASE) STAGE 5, GFR LESS THAN 15 ML/MIN (H): Primary | ICD-10-CM

## 2020-07-01 DIAGNOSIS — N18.5 ANEMIA OF CHRONIC RENAL FAILURE, STAGE 5 (H): ICD-10-CM

## 2020-07-01 DIAGNOSIS — N39.43 URINARY DRIBBLING: ICD-10-CM

## 2020-07-01 RX ORDER — FERROUS SULFATE 325(65) MG
325 TABLET ORAL
Qty: 30 TABLET | Refills: 11 | Status: SHIPPED | OUTPATIENT
Start: 2020-07-01 | End: 2020-10-07

## 2020-07-01 NOTE — PROGRESS NOTES
Nephrology Note: Nursing Outreach Encounter    REASON FOR CALL:                                                      REASON FOR CALL: Care Coordination                                          SITUATION/BACKROUND:                                                    Patient is being treated for CKD Stage 5.    Dr. Cunha wants patient to have labs every other week (order already in place, and patient appears to adhere to this).      ASSESSMENT:                                                      Discussed Dr. Cunha's concern for needing dialysis in the near future. Rashad has a functioning AVF. He has now moved to [place], and would like [facility] in [place] for his upcoming dialysis center. Prefers TThSa AM shift of any hour.     Uremic Symptoms: Yes,  Edema: Yes in his L ankle, though not worse than normal. Also reports cramping in his right hand.    Blood pressure is running around 130/80, .       PLAN:                                                      Follow Up:   Route to provider to inform.  Will fax in [facility]'s paperwork to [facility] and order Quant gold to be done to r/o TB in the near future (hepatitis labs already checked 6/13).     Patient verbalized understanding and will contact the clinic with any further questions or concerns.     Emmie Echeverria RN

## 2020-07-03 RX ORDER — TAMSULOSIN HYDROCHLORIDE 0.4 MG/1
CAPSULE ORAL
Qty: 30 CAPSULE | Refills: 8 | Status: SHIPPED | OUTPATIENT
Start: 2020-07-03 | End: 2020-10-28

## 2020-07-03 NOTE — PROGRESS NOTES
In response to Leonora's staff message to provider and this writer, Dr. Murillo responds:    Previous Messages     ----- Message -----   From: Stef Murillo MD   Sent: 6/25/2020   9:49 PM CDT   To: Emmie Echeverria RN   Subject: RE: Elevated Creatinine                           I spoke with Leonora about him today.  I would continue with monthly labs and assess for uremic symptoms, but if he is feeling OK, I wouldn't force him to start dialysis     d

## 2020-07-03 NOTE — TELEPHONE ENCOUNTER
"  Requested Prescriptions   Pending Prescriptions Disp Refills     tamsulosin (FLOMAX) 0.4 MG capsule [Pharmacy Med Name: TAMSULOSIN 0.4MG CAPSULES] 30 capsule 1     Sig: TAKE 1 CAPSULE BY MOUTH DAILY   Last Written Prescription Date:  5/11/2020  Last Fill Quantity: 30,  # refills: 1   Last office visit: 6/82020 with prescribing provider:     Future Office Visit:        Alpha Blockers Passed - 7/1/2020  6:22 AM        Passed - Blood pressure under 140/90 in past 12 months     BP Readings from Last 3 Encounters:   06/30/20 128/83   06/11/20 (!) 149/102   06/10/20 (!) 138/101           Passed - Recent (12 mo) or future (30 days) visit within the authorizing provider's specialty     Patient has had an office visit with the authorizing provider or a provider within the authorizing providers department within the previous 12 mos or has a future within next 30 days. See \"Patient Info\" tab in inbasket, or \"Choose Columns\" in Meds & Orders section of the refill encounter.              Passed - Patient does not have Tadalafil, Vardenafil, or Sildenafil on their medication list        Passed - Medication is active on med list        Passed - Patient is 18 years of age or older         Prescription approved per [facility] according to the [facility] Refill Protocol.  Apple Weller RN      "

## 2020-07-06 ENCOUNTER — MYC REFILL (OUTPATIENT)
Dept: FAMILY MEDICINE | Facility: CLINIC | Age: 45
End: 2020-07-06

## 2020-07-06 DIAGNOSIS — M87.052 AVASCULAR NECROSIS OF BONE OF LEFT HIP (H): ICD-10-CM

## 2020-07-07 ENCOUNTER — MYC REFILL (OUTPATIENT)
Dept: FAMILY MEDICINE | Facility: CLINIC | Age: 45
End: 2020-07-07

## 2020-07-07 ENCOUNTER — TELEPHONE (OUTPATIENT)
Dept: FAMILY MEDICINE | Facility: CLINIC | Age: 45
End: 2020-07-07

## 2020-07-07 DIAGNOSIS — M87.052 AVASCULAR NECROSIS OF BONE OF LEFT HIP (H): ICD-10-CM

## 2020-07-07 DIAGNOSIS — N17.9 AKI (ACUTE KIDNEY INJURY) (H): ICD-10-CM

## 2020-07-07 RX ORDER — CARVEDILOL 25 MG/1
25 TABLET ORAL 2 TIMES DAILY WITH MEALS
Qty: 180 TABLET | Refills: 3 | Status: SHIPPED | OUTPATIENT
Start: 2020-07-07 | End: 2020-10-06

## 2020-07-07 NOTE — TELEPHONE ENCOUNTER
Patient is requesting refill for cyclobenzaprine (FLEXERIL) 5 MG tablet (Discontinued) .          Karlos Faarax  [facility]

## 2020-07-07 NOTE — TELEPHONE ENCOUNTER
Routing refill request to provider for review/approval because:  Drug not active on patient's medication list  Chapis Ruiz RN  [facility]

## 2020-07-08 ENCOUNTER — TELEPHONE (OUTPATIENT)
Dept: FAMILY MEDICINE | Facility: CLINIC | Age: 45
End: 2020-07-08

## 2020-07-08 DIAGNOSIS — N18.5 CKD (CHRONIC KIDNEY DISEASE) STAGE 5, GFR LESS THAN 15 ML/MIN (H): ICD-10-CM

## 2020-07-08 DIAGNOSIS — Z94.0 KIDNEY REPLACED BY TRANSPLANT: ICD-10-CM

## 2020-07-08 DIAGNOSIS — Z79.899 ENCOUNTER FOR LONG-TERM CURRENT USE OF MEDICATION: ICD-10-CM

## 2020-07-08 DIAGNOSIS — Z48.298 AFTERCARE FOLLOWING ORGAN TRANSPLANT: ICD-10-CM

## 2020-07-08 LAB
ANION GAP SERPL CALCULATED.3IONS-SCNC: 12 MMOL/L (ref 3–14)
BUN SERPL-MCNC: 62 MG/DL (ref 7–30)
CALCIUM SERPL-MCNC: 9.1 MG/DL (ref 8.5–10.1)
CHLORIDE SERPL-SCNC: 110 MMOL/L (ref 94–109)
CO2 SERPL-SCNC: 19 MMOL/L (ref 20–32)
CREAT SERPL-MCNC: 6.2 MG/DL (ref 0.66–1.25)
ERYTHROCYTE [DISTWIDTH] IN BLOOD BY AUTOMATED COUNT: 15.4 % (ref 10–15)
GFR SERPL CREATININE-BSD FRML MDRD: 10 ML/MIN/{1.73_M2}
GLUCOSE SERPL-MCNC: 80 MG/DL (ref 70–99)
HCT VFR BLD AUTO: 32.6 % (ref 40–53)
HGB BLD-MCNC: 9.8 G/DL (ref 13.3–17.7)
MCH RBC QN AUTO: 26.4 PG (ref 26.5–33)
MCHC RBC AUTO-ENTMCNC: 30.1 G/DL (ref 31.5–36.5)
MCV RBC AUTO: 88 FL (ref 78–100)
PLATELET # BLD AUTO: 270 10E9/L (ref 150–450)
POTASSIUM SERPL-SCNC: 4.7 MMOL/L (ref 3.4–5.3)
RBC # BLD AUTO: 3.71 10E12/L (ref 4.4–5.9)
SODIUM SERPL-SCNC: 141 MMOL/L (ref 133–144)
WBC # BLD AUTO: 5.6 10E9/L (ref 4–11)

## 2020-07-08 PROCEDURE — 80048 BASIC METABOLIC PNL TOTAL CA: CPT | Performed by: INTERNAL MEDICINE

## 2020-07-08 PROCEDURE — 80197 ASSAY OF TACROLIMUS: CPT | Performed by: INTERNAL MEDICINE

## 2020-07-08 PROCEDURE — 85027 COMPLETE CBC AUTOMATED: CPT | Performed by: INTERNAL MEDICINE

## 2020-07-08 PROCEDURE — 36415 COLL VENOUS BLD VENIPUNCTURE: CPT | Performed by: INTERNAL MEDICINE

## 2020-07-08 PROCEDURE — 86481 TB AG RESPONSE T-CELL SUSP: CPT | Performed by: INTERNAL MEDICINE

## 2020-07-08 RX ORDER — HYDROMORPHONE HYDROCHLORIDE 4 MG/1
4-8 TABLET ORAL EVERY 6 HOURS PRN
Qty: 48 TABLET | Refills: 0 | Status: SHIPPED | OUTPATIENT
Start: 2020-07-08 | End: 2020-07-21

## 2020-07-08 NOTE — TELEPHONE ENCOUNTER
Controlled Substance Refill Request for Hydromorphone 4 mg  Problem List Complete:  No     PROVIDER TO CONSIDER COMPLETION OF PROBLEM LIST AND OVERVIEW/CONTROLLED SUBSTANCE AGREEMENT    Last Written Prescription Date:  6/11/2020  Last Fill Quantity: 48,   # refills: 0    THE MOST RECENT OFFICE VISIT MUST BE WITHIN THE PAST 3 MONTHS. AT LEAST ONE FACE TO FACE VISIT MUST OCCUR EVERY 6 MONTHS. ADDITIONAL VISITS CAN BE VIRTUAL.  (THIS STATEMENT SHOULD BE DELETED.)    [place] [facility] [place]: 6/8/2020    Future Office visit:     Controlled substance agreement:   Encounter-Level CSA:    There are no encounter-level csa.     Patient-Level CSA:    There are no patient-level csa.         Last Urine Drug Screen: No results found for: CDAUT, No results found for: COMDAT, No results found for: THC13, PCP13, COC13, MAMP13, OPI13, AMP13, BZO13, TCA13, MTD13, BAR13, OXY13, PPX13, BUP13     Processing:  Rx to be electronically transmitted to pharmacy by provider      [email or web address]       checked in past 3 months?  Non noted    Radha Damon RN, [facility] Triage

## 2020-07-08 NOTE — TELEPHONE ENCOUNTER
Patient needs visit as this was ordered by another provider and discontinued in May.    Mariel Vu M.D.

## 2020-07-08 NOTE — TELEPHONE ENCOUNTER
.Reason for Call:  Form, our goal is to have forms completed with 72 hours, however, some forms may require a visit or additional information.    Type of letter, form or note:  medical    Who is the form from?: Patient    Where did the form come from: Patient or family brought in       What clinic location was the form placed at?: [place]    Where the form was placed: [place]    What number is listed as a contact on the form?: [phone]       Additional comments: Patient would like the forms fax and if the fax # is not on the forms, patient would like to pick it up.    Call taken on 7/8/2020 at 11:16 AM by Sasha Shukla

## 2020-07-08 NOTE — TELEPHONE ENCOUNTER
Received signed forms. Faxed to [employer], [phone], right fax confirmed at 3:41 pm today, 7/8/2020. Copy to TC and abstracting. Called and spoke to the patient and explained that forms were faxed this pm. Patient understands.  Amanda Branham MA  [facility]  2nd Floor  Primary Care

## 2020-07-08 NOTE — TELEPHONE ENCOUNTER
Faxed form to provider's [employer] fax, right fax confirmed at 2:01 pm today, 7/8/2020.  Amanda Branham [facility]  2nd Floor  Primary Care

## 2020-07-08 NOTE — TELEPHONE ENCOUNTER
Spoke with pt and he says that this form needs to be completed today and sent over tomorrow due to there being a cut off of 7-9-20.   Informed pt not sure when provider is in clinic but another provider may be able to complete.    Cassidy Hernandez MA

## 2020-07-09 ENCOUNTER — VIRTUAL VISIT (OUTPATIENT)
Dept: FAMILY MEDICINE | Facility: CLINIC | Age: 45
End: 2020-07-09
Payer: COMMERCIAL

## 2020-07-09 ENCOUNTER — TELEPHONE (OUTPATIENT)
Dept: PHARMACY | Facility: CLINIC | Age: 45
End: 2020-07-09

## 2020-07-09 DIAGNOSIS — Z12.11 SCREEN FOR COLON CANCER: ICD-10-CM

## 2020-07-09 DIAGNOSIS — Z76.82 AWAITING ORGAN TRANSPLANT STATUS: ICD-10-CM

## 2020-07-09 DIAGNOSIS — F51.02 ADJUSTMENT INSOMNIA: Primary | ICD-10-CM

## 2020-07-09 PROCEDURE — 99213 OFFICE O/P EST LOW 20 MIN: CPT | Mod: 95 | Performed by: FAMILY MEDICINE

## 2020-07-09 RX ORDER — HEPARIN SODIUM,PORCINE 10 UNIT/ML
5 VIAL (ML) INTRAVENOUS
Status: CANCELLED | OUTPATIENT
Start: 2020-07-09

## 2020-07-09 RX ORDER — HYDROXYZINE HYDROCHLORIDE 50 MG/1
25-50 TABLET, FILM COATED ORAL
Qty: 30 TABLET | Refills: 3 | Status: SHIPPED | OUTPATIENT
Start: 2020-07-09 | End: 2020-09-24

## 2020-07-09 RX ORDER — HYDROMORPHONE HYDROCHLORIDE 4 MG/1
4-8 TABLET ORAL EVERY 6 HOURS PRN
Qty: 48 TABLET | Refills: 0 | OUTPATIENT
Start: 2020-07-09

## 2020-07-09 RX ORDER — HEPARIN SODIUM (PORCINE) LOCK FLUSH IV SOLN 100 UNIT/ML 100 UNIT/ML
5 SOLUTION INTRAVENOUS
Status: CANCELLED | OUTPATIENT
Start: 2020-07-09

## 2020-07-09 ASSESSMENT — PAIN SCALES - GENERAL: PAINLEVEL: SEVERE PAIN (7)

## 2020-07-09 NOTE — TELEPHONE ENCOUNTER
Anemia Management Note  SUBJECTIVE/OBJECTIVE:  Referred by Dr. Abran Cunha on 2020  Primary Diagnosis: Anemia in Chronic Kidney Disease (N18.5, D63.1)     Secondary Diagnosis:  Chronic Kidney Disease, Stage 5 (N18.5)  Kidney Tx: 2011  Hgb goal range:  9-10  Epo/Darbo: Aranesp  60 mcg  every two weeks for Hgb <10. In clinic  Iron regimen:  Ferrous Sulfate  once daily  Labs : 2021  Recent LAWRENCE use, transfusion, IV iron: NA.  Aranesp was previously ordered, he never received it.   RX/TX plans : 2021  No history of stroke, MI and blood clots or cancers     Contact:            No Consent to communicate on File    Anemia Latest Ref Rng & Units 2020 2020 2020 6/10/2020 2020 2020 2020   ALWRENCE Dose - 60 mcg 60 mcg 60 mcg 60 mcg - 60 mcg -   Hemoglobin 13.3 - 17.7 g/dL - 7.8(LL) 8.8(L) 9.2(L) 9.0(L) 9.1(L) 9.8(L)   TSAT 15 - 46 % - - - - 9(L) - -   Ferritin 26 - 388 ng/mL - - - - 310 - -     BP Readings from Last 3 Encounters:   20 128/83   20 (!) 149/102   06/10/20 (!) 138/101     Wt Readings from Last 2 Encounters:   20 64.4 kg (141 lb 14.4 oz)   20 66.2 kg (146 lb)           ASSESSMENT:  Hgb:At goal - recommend dose  TSat: not at goal of >30% Ferritin: At goal (>100ng/mL)    PLAN:  Dose with aranesp and RTC for hgb then aranesp if needed in 2 week(s)    Orders needed to be renewed (for next follow-up date) in EPIC: None    Iron labs due:  2020    Plan discussed with:  No call today, Will follow up .  Needs IV Iron?  Plan provided by:  chio    NEXT FOLLOW-UP DATE:  20    Cate Ellis RN   Anemia Services  [address] [place] [address]   [email or web address]   Office : [phone]  Fax: [phone]

## 2020-07-09 NOTE — PATIENT INSTRUCTIONS
At [facility], we strive to deliver an exceptional experience to you, every time we see you. If you receive a survey, please complete it as we do value your feedback.  If you have MyChart, you can expect to receive results automatically within 24 hours of their completion.  Your provider will send a note interpreting your results as well.   If you do not have MyChart, you should receive your results in about a week by mail.    Your care team:                            Family Medicine Internal Medicine   MD Juanito Lynne MD Shantel Branch-Fleming, MD Katya Georgiev PA-C Megan Hill, APRCLIFF Harrington, MD Pediatrics   Greg Fink, PAKARLA Balderrama, MD Leanna Hurley APRN CNP   MD Kelly Tipton MD Deborah Mielke, MD Kim Thein, APRN [facility]      Clinic hours: Monday - Thursday 7 am-7 pm; Fridays 7 am-5 pm.   Urgent care: Monday - Friday 11 am-9 pm; Saturday and Sunday 9 am-5 pm.    Clinic: [phone]       [facility]: Monday - Thursday 8 am - 7 pm; Friday 8 am - 6 pm  [facility]: [phone]     Use [email or web address] for 24/7 diagnosis and treatment of dozens of conditions.

## 2020-07-10 LAB
GAMMA INTERFERON BACKGROUND BLD IA-ACNC: 0.03 IU/ML
M TB IFN-G BLD-IMP: NEGATIVE
M TB IFN-G CD4+ BCKGRND COR BLD-ACNC: 2.05 IU/ML
MITOGEN IGNF BCKGRD COR BLD-ACNC: 0.01 IU/ML
MITOGEN IGNF BCKGRD COR BLD-ACNC: 0.02 IU/ML
TACROLIMUS BLD-MCNC: 6.3 UG/L (ref 5–15)
TME LAST DOSE: NORMAL H

## 2020-07-14 ENCOUNTER — TELEPHONE (OUTPATIENT)
Dept: PHARMACY | Facility: CLINIC | Age: 45
End: 2020-07-14

## 2020-07-14 NOTE — TELEPHONE ENCOUNTER
Follow-up with anemia management service:    Spoke with Rashad, he would like to increase his oral Iron from 1 tb a day to 2 tabs a day and recheck labs in 4 weeks before starting on IV Iron.  He will take 1 tab in the AM with breakfast and 1 tab with dinner.     Transferred his call to  Infusion Center to schedule Aranesp injection.     Anemia Latest Ref Rng & Units 4/24/2020 5/8/2020 5/27/2020 6/10/2020 6/20/2020 6/30/2020 7/8/2020   LAWRENCE Dose - 60 mcg 60 mcg 60 mcg 60 mcg - 60 mcg -   Hemoglobin 13.3 - 17.7 g/dL - 7.8(LL) 8.8(L) 9.2(L) 9.0(L) 9.1(L) 9.8(L)   TSAT 15 - 46 % - - - - 9(L) - -   Ferritin 26 - 388 ng/mL - - - - 310 - -         Follow-up call date: 7/16/20     Cate Ellis RN   Anemia Services  [address] [address]   [email or web address]   Office : [phone]  Fax: [phone]

## 2020-07-15 ENCOUNTER — TELEPHONE (OUTPATIENT)
Dept: TRANSPLANT | Facility: CLINIC | Age: 45
End: 2020-07-15

## 2020-07-15 DIAGNOSIS — Z76.82 AWAITING ORGAN TRANSPLANT: Primary | ICD-10-CM

## 2020-07-16 NOTE — TELEPHONE ENCOUNTER
Aranesp scheduled for 7/23/20.    Cate Ellis RN   Anemia Services  [address] [address]   [email or web address]   Office : [phone]  Fax: [phone]

## 2020-07-16 NOTE — TELEPHONE ENCOUNTER
Returned call to Rashad and he was wondering about his compliance contract. He received it by email but I have not gotten a signed copy returned. He will look for it. He completed his neuropsych eval and will need an appointment with Radha face to face before continuing. Message sent to scheduling. Encouraged him to bring his wife.

## 2020-07-21 ENCOUNTER — MYC REFILL (OUTPATIENT)
Dept: FAMILY MEDICINE | Facility: CLINIC | Age: 45
End: 2020-07-21

## 2020-07-21 DIAGNOSIS — M87.052 AVASCULAR NECROSIS OF BONE OF LEFT HIP (H): ICD-10-CM

## 2020-07-23 ENCOUNTER — MYC MEDICAL ADVICE (OUTPATIENT)
Dept: FAMILY MEDICINE | Facility: CLINIC | Age: 45
End: 2020-07-23

## 2020-07-23 RX ORDER — HYDROMORPHONE HYDROCHLORIDE 4 MG/1
4-8 TABLET ORAL EVERY 6 HOURS PRN
Qty: 48 TABLET | Refills: 0 | Status: SHIPPED | OUTPATIENT
Start: 2020-07-23 | End: 2020-08-07

## 2020-07-23 NOTE — TELEPHONE ENCOUNTER
Controlled Substance Refill Request for hydromorphone  Problem List Complete:  No     PROVIDER TO CONSIDER COMPLETION OF PROBLEM LIST AND OVERVIEW/CONTROLLED SUBSTANCE AGREEMENT    Last Written Prescription Date:  7/8/2020  Last Fill Quantity: 48,   # refills: 0    THE MOST RECENT OFFICE VISIT MUST BE WITHIN THE PAST 3 MONTHS. AT LEAST ONE FACE TO FACE VISIT MUST OCCUR EVERY 6 MONTHS. ADDITIONAL VISITS CAN BE VIRTUAL.  (THIS STATEMENT SHOULD BE DELETED.)    Last Office Visit with [facility] primary care provider: 7/9/2020    Future Office visit:     Controlled substance agreement:   Encounter-Level CSA:    There are no encounter-level csa.     Patient-Level CSA:    There are no patient-level csa.         Last Urine Drug Screen: No results found for: CDAUT, No results found for: COMDAT, No results found for: THC13, PCP13, COC13, MAMP13, OPI13, AMP13, BZO13, TCA13, MTD13, BAR13, OXY13, PPX13, BUP13     Processing:  Rx to be electronically transmitted to pharmacy by provider      [email or web address]      No chronic pain on problem list.    Radha Damon RN, [facility] Triage

## 2020-07-24 NOTE — TELEPHONE ENCOUNTER
Dilaudid refilled yesterday, by PCP,  See MyChart refill from 7/22/20.    Marilee Ibarra RN  [facility]/ [facility]

## 2020-07-27 ENCOUNTER — TELEPHONE (OUTPATIENT)
Dept: TRANSPLANT | Facility: CLINIC | Age: 45
End: 2020-07-27

## 2020-07-27 NOTE — TELEPHONE ENCOUNTER
Patient Call: Voicemail  Date/Time: 7/27/2020 @ 11:13am  Reason for call: Patient left voicemail requesting call back from RNMELANIE Santiago. No further information given.

## 2020-07-28 ENCOUNTER — TELEPHONE (OUTPATIENT)
Dept: ORTHOPEDICS | Facility: CLINIC | Age: 45
End: 2020-07-28

## 2020-07-28 ENCOUNTER — ALLIED HEALTH/NURSE VISIT (OUTPATIENT)
Dept: TRANSPLANT | Facility: CLINIC | Age: 45
End: 2020-07-28
Attending: TRANSPLANT SURGERY
Payer: COMMERCIAL

## 2020-07-28 DIAGNOSIS — D63.1 ANEMIA OF CHRONIC RENAL FAILURE, STAGE 5 (H): ICD-10-CM

## 2020-07-28 DIAGNOSIS — N18.5 CHRONIC KIDNEY DISEASE, STAGE V (H): ICD-10-CM

## 2020-07-28 DIAGNOSIS — N18.5 ANEMIA OF CHRONIC RENAL FAILURE, STAGE 5 (H): ICD-10-CM

## 2020-07-28 DIAGNOSIS — Z76.82 ORGAN TRANSPLANT CANDIDATE: Primary | ICD-10-CM

## 2020-07-28 DIAGNOSIS — Z94.0 KIDNEY TRANSPLANTED: ICD-10-CM

## 2020-07-28 LAB
ANION GAP SERPL CALCULATED.3IONS-SCNC: 8 MMOL/L (ref 3–14)
BUN SERPL-MCNC: 52 MG/DL (ref 7–30)
CALCIUM SERPL-MCNC: 8.2 MG/DL (ref 8.5–10.1)
CHLORIDE SERPL-SCNC: 106 MMOL/L (ref 94–109)
CO2 SERPL-SCNC: 24 MMOL/L (ref 20–32)
CREAT SERPL-MCNC: 6.74 MG/DL (ref 0.66–1.25)
ERYTHROCYTE [DISTWIDTH] IN BLOOD BY AUTOMATED COUNT: 14.9 % (ref 10–15)
GFR SERPL CREATININE-BSD FRML MDRD: 9 ML/MIN/{1.73_M2}
GLUCOSE SERPL-MCNC: 104 MG/DL (ref 70–99)
HCT VFR BLD AUTO: 30.1 % (ref 40–53)
HGB BLD-MCNC: 9.1 G/DL (ref 13.3–17.7)
MCH RBC QN AUTO: 26.3 PG (ref 26.5–33)
MCHC RBC AUTO-ENTMCNC: 30.2 G/DL (ref 31.5–36.5)
MCV RBC AUTO: 87 FL (ref 78–100)
PLATELET # BLD AUTO: 214 10E9/L (ref 150–450)
POTASSIUM SERPL-SCNC: 4.6 MMOL/L (ref 3.4–5.3)
RBC # BLD AUTO: 3.46 10E12/L (ref 4.4–5.9)
SODIUM SERPL-SCNC: 138 MMOL/L (ref 133–144)
TACROLIMUS BLD-MCNC: 5.3 UG/L (ref 5–15)
TME LAST DOSE: NORMAL H
WBC # BLD AUTO: 6.7 10E9/L (ref 4–11)

## 2020-07-28 PROCEDURE — 80048 BASIC METABOLIC PNL TOTAL CA: CPT | Performed by: INTERNAL MEDICINE

## 2020-07-28 PROCEDURE — 80197 ASSAY OF TACROLIMUS: CPT | Performed by: INTERNAL MEDICINE

## 2020-07-28 PROCEDURE — 85027 COMPLETE CBC AUTOMATED: CPT | Performed by: INTERNAL MEDICINE

## 2020-07-28 PROCEDURE — 36415 COLL VENOUS BLD VENIPUNCTURE: CPT | Performed by: INTERNAL MEDICINE

## 2020-07-28 NOTE — TELEPHONE ENCOUNTER
Called and left voicemail for patient about scheduling surgery with Dr. Morillo. Gave [phone] as call back number.

## 2020-07-28 NOTE — PROGRESS NOTES
"Transplant Social Work Services Progress Note      Date of Initial Social Work Evaluation: 5/13/2020  Collaborated with: Rashad and his wife Raúl    Data: Met in-person with Rashad to follow up with initial assessment.  Intervention:  There were a few changes since initial assessment.  The family now live in a house in [place], [place].  Rashad indicated his three adult children know live in [place], [place].  He and Raúl are both employed part time.  Rashad indicated he applied for SSDI in May 2020 due to his osteonecrosis.  He continues to have [employer] insurance through his employer and [employer].    Rashad indicated to this writer (5/13/2020 assessment) he had quit smoking in November 2019.  He also indicated he did not drink alcohol or use any drugs.  During his neuropsych assessment (5/29/2020) he indicated he was consuming \"a couple of beers or a bear and a glass of [place]quer[place] or orther liquor each day, for about six months.\"  He indicated he quit drinking in Aprilr 2020 and had what \"he described as a relapse at the beginning of May\".  He indicated he used marijuana until March and quit smoking cigarettes in August 2019.  At this assessment Rashad indicated to this writer he was now smoking 1-2 cigarettes a day.  He had drank alcohol and used marijuana last Saturday.  Both Rashad and Raúl indicated it had been \"months\" since he drank or smoked marijuana.      Assessment: Rashad indicated he is having issues with depression when asked.  He indicated he is having issues with not being motivated, not eating and hard time getting out of bed due to the pain he is in from his osteonecrosis.  Discussed symptoms of depression vs need for dialysis vs pain.  Rashad indicated he is not having any thoughts of suicide or hurting himself/others.  He indicated he continues to find lora.  Discussed options of speaking to his PCP to learn if anti-depression medication would assist or continue to monitor his " symptoms.  Education provided by SW: Discussed post transplant requirements with Rashad and Raúl.  Rashad indicated he does not have have compliance contract and wanted to have another copy mailed to him.  Both Rashad and Raúl understand the need for a compliance contract.  Encouraged both to contact social work for any further questions or concerns.  Both indicated understanding.  Plan:  SW will continue to follow to assist as indicated.

## 2020-07-29 DIAGNOSIS — Z11.59 ENCOUNTER FOR SCREENING FOR OTHER VIRAL DISEASES: Primary | ICD-10-CM

## 2020-07-29 PROBLEM — M87.9 OSTEONECROSIS (H): Status: ACTIVE | Noted: 2020-07-29

## 2020-07-29 NOTE — TELEPHONE ENCOUNTER
Returned Rashad's call and he was asking me to resend the email for the compliance contract. I resent the compliance contract and told him if he does not get it he should call me and I will send it in the mail.

## 2020-07-29 NOTE — TELEPHONE ENCOUNTER
Patient is scheduled for surgery with Dr. Morillo      Spoke or left message with: Rashad     Date of Surgery: 9/9/2020    Location: [place] OR    Informed patient they will need an adult  yes    Pre-op with surgeon (if applicable): n/a    H&P: Patient to schedule with pcp at [facility]    POP: 2 week &6 week scheduled     Additional imaging/appointments: n/a    Surgery packet: Given in clinic     Additional comments: Patient aware he will need COVID test prior to surgery and  will reach out closer to DOS to schedule appointment

## 2020-07-31 ENCOUNTER — TELEPHONE (OUTPATIENT)
Dept: NEPHROLOGY | Facility: CLINIC | Age: 45
End: 2020-07-31

## 2020-07-31 NOTE — TELEPHONE ENCOUNTER
Nephrology Note: Nursing Outreach Encounter    REASON FOR CALL:                                                      REASON FOR CALL: Care Coordination, Symptom Follow Up                                          SITUATION/BACKROUND:                                                    Patient is being treated for CKD Stage 5.    GFR 11      ASSESSMENT:                                                      Rashad reports feeling well--he gets tired occasionally, but not daily. He has swelling of his legs/feet, but no worse than his usual. He has not been checking his blood pressures, RNCC advised him to start checking them again. Denies nausea/vomiting, shortness of breath or confusion. Does have a few pound weight loss due to not cooking as much due to his hip pain. Denies chills or fevers.    Uremic Symptoms: Yes,  Edema: Yes;       PLAN:                                                      Follow Up:   Route to provider to further advise     Patient verbalized understanding and will contact the clinic with any further questions or concerns.     Emmie Echeverria RN

## 2020-08-03 DIAGNOSIS — M87.9 OSTEONECROSIS OF LEFT HIP (H): Primary | ICD-10-CM

## 2020-08-04 ENCOUNTER — TELEPHONE (OUTPATIENT)
Dept: GASTROENTEROLOGY | Facility: CLINIC | Age: 45
End: 2020-08-04

## 2020-08-04 DIAGNOSIS — Z11.59 ENCOUNTER FOR SCREENING FOR OTHER VIRAL DISEASES: Primary | ICD-10-CM

## 2020-08-04 NOTE — TELEPHONE ENCOUNTER
Patient is scheduled for colonoscopy with Dr. Ramos    Spoke with: patient    Date of Procedure: 8-    Location: [facility]    Sedation Type CS    Informed patient they will need an adult  - yes    Informed Patient of COVID Test Requirement - yes    Preferred Pharmacy for Pre Prescription - see chart    Confirmed Nurse will call to complete assessment - yes    Additional comments: none

## 2020-08-07 ENCOUNTER — MYC REFILL (OUTPATIENT)
Dept: FAMILY MEDICINE | Facility: CLINIC | Age: 45
End: 2020-08-07

## 2020-08-07 DIAGNOSIS — M87.052 AVASCULAR NECROSIS OF BONE OF LEFT HIP (H): ICD-10-CM

## 2020-08-10 ENCOUNTER — MYC REFILL (OUTPATIENT)
Dept: FAMILY MEDICINE | Facility: CLINIC | Age: 45
End: 2020-08-10

## 2020-08-10 ENCOUNTER — INFUSION THERAPY VISIT (OUTPATIENT)
Dept: INFUSION THERAPY | Facility: CLINIC | Age: 45
End: 2020-08-10
Payer: COMMERCIAL

## 2020-08-10 ENCOUNTER — TELEPHONE (OUTPATIENT)
Dept: PHARMACY | Facility: CLINIC | Age: 45
End: 2020-08-10

## 2020-08-10 VITALS — HEART RATE: 82 BPM | SYSTOLIC BLOOD PRESSURE: 109 MMHG | DIASTOLIC BLOOD PRESSURE: 70 MMHG | OXYGEN SATURATION: 99 %

## 2020-08-10 DIAGNOSIS — Z94.0 KIDNEY REPLACED BY TRANSPLANT: ICD-10-CM

## 2020-08-10 DIAGNOSIS — M87.052 AVASCULAR NECROSIS OF BONE OF LEFT HIP (H): ICD-10-CM

## 2020-08-10 DIAGNOSIS — D63.1 ANEMIA OF CHRONIC RENAL FAILURE, STAGE 5 (H): Primary | ICD-10-CM

## 2020-08-10 DIAGNOSIS — N18.5 CKD (CHRONIC KIDNEY DISEASE) STAGE 5, GFR LESS THAN 15 ML/MIN (H): ICD-10-CM

## 2020-08-10 DIAGNOSIS — N18.5 ANEMIA OF CHRONIC RENAL FAILURE, STAGE 5 (H): Primary | ICD-10-CM

## 2020-08-10 LAB
HCT VFR BLD AUTO: 29.9 % (ref 40–53)
HGB BLD-MCNC: 9.2 G/DL (ref 13.3–17.7)

## 2020-08-10 PROCEDURE — 85014 HEMATOCRIT: CPT | Performed by: INTERNAL MEDICINE

## 2020-08-10 PROCEDURE — 85018 HEMOGLOBIN: CPT | Performed by: INTERNAL MEDICINE

## 2020-08-10 PROCEDURE — 36415 COLL VENOUS BLD VENIPUNCTURE: CPT | Performed by: INTERNAL MEDICINE

## 2020-08-10 PROCEDURE — 96372 THER/PROPH/DIAG INJ SC/IM: CPT | Performed by: NURSE PRACTITIONER

## 2020-08-10 PROCEDURE — 99207 ZZC NO CHARGE NURSE ONLY: CPT

## 2020-08-10 RX ORDER — HYDROMORPHONE HYDROCHLORIDE 4 MG/1
4-8 TABLET ORAL EVERY 6 HOURS PRN
Qty: 48 TABLET | Refills: 0 | Status: CANCELLED | OUTPATIENT
Start: 2020-08-10

## 2020-08-10 NOTE — PROGRESS NOTES
Infusion Nursing Note:  Rashad Ortiz presents today for   Chief Complaint   Patient presents with     Allied Health Visit     Royce     .    Patient seen by provider today: No   present during visit today: Not Applicable.    Note: Patient assessed by Madelin Boyd RN prior to injection.    Intravenous Access:  No Intravenous access/labs at this visit.    Treatment Conditions:  Lab Results   Component Value Date    HGB 9.2 08/10/2020     Lab Results   Component Value Date    WBC 6.7 07/28/2020      Lab Results   Component Value Date    ANEU 5.1 10/14/2019     Lab Results   Component Value Date     07/28/2020      Results reviewed, labs MET treatment parameters, ok to proceed with treatment.      Post Infusion Assessment:  Patient tolerated injection without incident.  Site patent and intact, free from redness, edema or discomfort.       Discharge Plan:   Patient will schedule today for next appointment.   Patient discharged in stable condition accompanied by: srinivasan Barrera CMA

## 2020-08-10 NOTE — TELEPHONE ENCOUNTER
Controlled Substance Refill Request for Hydromorphpne  Problem List Complete:  No     PROVIDER TO CONSIDER COMPLETION OF PROBLEM LIST AND OVERVIEW/CONTROLLED SUBSTANCE AGREEMENT    Last Written Prescription Date:  7/23/20  Last Fill Quantity: 48 tablets   # refills: 0    THE MOST RECENT OFFICE VISIT MUST BE WITHIN THE PAST 3 MONTHS. AT LEAST ONE FACE TO FACE VISIT MUST OCCUR EVERY 6 MONTHS. ADDITIONAL VISITS CAN BE VIRTUAL.  (THIS STATEMENT SHOULD BE DELETED.)    Last Office Visit with [facility] primary care provider: 7/9/20    Future Office visit:   Next 5 appointments (look out 90 days)    Sep 01, 2020  8:00 AM CDT  Pre-Op physical with ISABEL Crouch CNP  [facility] [address] [place] [id number] [phone]          Controlled substance agreement:   Encounter-Level CSA:    There are no encounter-level csa.     Patient-Level CSA:    There are no patient-level csa.         Last Urine Drug Screen: No results found for: CDAUT, No results found for: COMDAT, No results found for: THC13, PCP13, COC13, MAMP13, OPI13, AMP13, BZO13, TCA13, MTD13, BAR13, OXY13, PPX13, BUP13     Processing:  Rx to be electronically transmitted to pharmacy by provider      [email or web address]       checked in past 3 months?  No-problem list incomplete so  not checked.           Mila Luna RN, BSN, PHN

## 2020-08-10 NOTE — TELEPHONE ENCOUNTER
Anemia Management Note  SUBJECTIVE/OBJECTIVE:  Referred by Dr. Abran Cunha on 2020  Primary Diagnosis: Anemia in Chronic Kidney Disease (N18.5, D63.1)     Secondary Diagnosis:  Chronic Kidney Disease, Stage 5 (N18.5)  Kidney Tx: 2011  Hgb goal range:  9-10  Epo/Darbo: Aranesp  60 mcg  every two weeks for Hgb <10. In clinic  Iron regimen:  Ferrous Sulfate  twice daily, Increased from1 to 2 daily on 2020  Labs : 2021  Recent LAWRENCE use, transfusion, IV iron: NA.  Aranesp was previously ordered, he never received it.   RX/TX plans : 2021  No history of stroke, MI and blood clots or cancers     Contact:            No Consent to communicate on File      Anemia Latest Ref Rng & Units 2020 6/10/2020 2020 2020 2020 2020 8/10/2020   LAWRENCE Dose - 60 mcg 60 mcg - 60 mcg - - 60 mcg   Hemoglobin 13.3 - 17.7 g/dL 8.8(L) 9.2(L) 9.0(L) 9.1(L) 9.8(L) 9.1(L) 9.2(L)   TSAT 15 - 46 % - - 9(L) - - - -   Ferritin 26 - 388 ng/mL - - 310 - - - -     BP Readings from Last 3 Encounters:   08/10/20 109/70   20 128/83   20 (!) 149/102     Wt Readings from Last 2 Encounters:   20 64.4 kg (141 lb 14.4 oz)   20 66.2 kg (146 lb)         ASSESSMENT:  Hgb:At goal - recommend dose  TSat: not at goal of >30% Ferritin: At goal (>100ng/mL)    PLAN:  Dose with aranesp and RTC for hgb then aranesp if needed in 2 week(s)    Orders needed to be renewed (for next follow-up date) in EPIC: None    Iron labs due:  2020. Increased Oral Iron from 1 tab daily to 2 tabs daily on 20.     Plan discussed with:  NO call today.   Plan provided by:  jlw    NEXT FOLLOW-UP DATE:  20    Cate Ellis RN   Anemia Services  [address] [address]   [email or web address]   Office : [phone]  Fax: [phone]

## 2020-08-10 NOTE — TELEPHONE ENCOUNTER
Controlled Substance Refill Request for Hydromorphone  Problem List Complete:  No     PROVIDER TO CONSIDER COMPLETION OF PROBLEM LIST AND OVERVIEW/CONTROLLED SUBSTANCE AGREEMENT    Last Written Prescription Date:  7/23/20  Last Fill Quantity: 48 tablets   # refills: 0    THE MOST RECENT OFFICE VISIT MUST BE WITHIN THE PAST 3 MONTHS. AT LEAST ONE FACE TO FACE VISIT MUST OCCUR EVERY 6 MONTHS. ADDITIONAL VISITS CAN BE VIRTUAL.  (THIS STATEMENT SHOULD BE DELETED.)    Last Office Visit with [facility] primary care provider: 7/9/20 Virtual visit    Future Office visit:   Next 5 appointments (look out 90 days)    Sep 01, 2020  8:00 AM CDT  Pre-Op physical with ISABEL Crouch CNP  [facility] [address] [place] [id number] [phone]          Controlled substance agreement:   Encounter-Level CSA:    There are no encounter-level csa.     Patient-Level CSA:    There are no patient-level csa.         Last Urine Drug Screen: No results found for: CDAUT, No results found for: COMDAT, No results found for: THC13, PCP13, COC13, MAMP13, OPI13, AMP13, BZO13, TCA13, MTD13, BAR13, OXY13, PPX13, BUP13     Processing:  Rx to be electronically transmitted to pharmacy by provider      [email or web address]       checked in past 3 months?  post menopausal        Mila Luna RN, BSN, PHN

## 2020-08-11 RX ORDER — HYDROMORPHONE HYDROCHLORIDE 4 MG/1
4-8 TABLET ORAL EVERY 6 HOURS PRN
Qty: 48 TABLET | Refills: 0 | OUTPATIENT
Start: 2020-08-11

## 2020-08-11 RX ORDER — HYDROMORPHONE HYDROCHLORIDE 4 MG/1
4-8 TABLET ORAL EVERY 6 HOURS PRN
Qty: 60 TABLET | Refills: 0 | Status: SHIPPED | OUTPATIENT
Start: 2020-08-11 | End: 2020-09-02

## 2020-08-11 NOTE — TELEPHONE ENCOUNTER
See MyC messages below between PCP and patient.  Routing encounter to PCP to update.    Marilee Ibarra RN  [facility]

## 2020-08-12 ENCOUNTER — DOCUMENTATION ONLY (OUTPATIENT)
Dept: TRANSPLANT | Facility: CLINIC | Age: 45
End: 2020-08-12

## 2020-08-12 ENCOUNTER — TELEPHONE (OUTPATIENT)
Dept: TRANSPLANT | Facility: CLINIC | Age: 45
End: 2020-08-12

## 2020-08-12 NOTE — TELEPHONE ENCOUNTER
Checking in with Rashad in regards to his prednisone taper.     Dr. Murillo plan:   We are trying to wean off prednisone with:   5 mg daily for the next month (until ~6/15/20)   2.5 mg daily for month 2 (until ~ 7/15/20)   2.5 mg every other day for month 3 (until ~8/15/20, then off)   Then off.      VM message left for Rashad; taper should be complete end of week. Asked to return call with questions.

## 2020-08-12 NOTE — PROGRESS NOTES
Message sent to pre- & post- transplant schedulers for nephrology consult appt regarding dialysis initiation and clearance for surgery on 9/9/20.

## 2020-08-18 ENCOUNTER — TELEPHONE (OUTPATIENT)
Dept: GASTROENTEROLOGY | Facility: OUTPATIENT CENTER | Age: 45
End: 2020-08-18

## 2020-08-18 DIAGNOSIS — Z12.11 ENCOUNTER FOR SCREENING COLONOSCOPY: Primary | ICD-10-CM

## 2020-08-18 NOTE — TELEPHONE ENCOUNTER
Patient taking any blood thinners ? No      Heart disease ? Denies      Lung disease ? Denies      Sleep apnea ? Denies      Diabetic ? Denies      Kidney disease ? Transplant     Electronic implanted medical devices ? Denies     PTSD ? N/a      Prep instructions reviewed with patient ? Patient declined review.  policy, MAC sedation plan reviewed. Instructed patient to have someone stay with him post exam     Yes    Pharmacy : [facility]     Indication for procedure : Screen for colon cancer; Awaiting organ transplant status     Referring provider :   Mariel Garcia MD  Arrival Time : 9:30 AM

## 2020-08-19 DIAGNOSIS — Z94.0 KIDNEY TRANSPLANTED: ICD-10-CM

## 2020-08-19 DIAGNOSIS — Z11.59 ENCOUNTER FOR SCREENING FOR OTHER VIRAL DISEASES: ICD-10-CM

## 2020-08-19 LAB
ERYTHROCYTE [DISTWIDTH] IN BLOOD BY AUTOMATED COUNT: 16 % (ref 10–15)
HCT VFR BLD AUTO: 28.2 % (ref 40–53)
HGB BLD-MCNC: 8.7 G/DL (ref 13.3–17.7)
LABORATORY COMMENT REPORT: NORMAL
MCH RBC QN AUTO: 25.7 PG (ref 26.5–33)
MCHC RBC AUTO-ENTMCNC: 30.9 G/DL (ref 31.5–36.5)
MCV RBC AUTO: 83 FL (ref 78–100)
PLATELET # BLD AUTO: 269 10E9/L (ref 150–450)
RBC # BLD AUTO: 3.38 10E12/L (ref 4.4–5.9)
SARS-COV-2 RNA SPEC QL NAA+PROBE: NEGATIVE
SARS-COV-2 RNA SPEC QL NAA+PROBE: NORMAL
SPECIMEN SOURCE: NORMAL
SPECIMEN SOURCE: NORMAL
WBC # BLD AUTO: 6.5 10E9/L (ref 4–11)

## 2020-08-19 PROCEDURE — U0003 INFECTIOUS AGENT DETECTION BY NUCLEIC ACID (DNA OR RNA); SEVERE ACUTE RESPIRATORY SYNDROME CORONAVIRUS 2 (SARS-COV-2) (CORONAVIRUS DISEASE [COVID-19]), AMPLIFIED PROBE TECHNIQUE, MAKING USE OF HIGH THROUGHPUT TECHNOLOGIES AS DESCRIBED BY CMS-2020-01-R: HCPCS | Performed by: INTERNAL MEDICINE

## 2020-08-19 PROCEDURE — 80048 BASIC METABOLIC PNL TOTAL CA: CPT | Performed by: INTERNAL MEDICINE

## 2020-08-19 PROCEDURE — 80197 ASSAY OF TACROLIMUS: CPT | Performed by: INTERNAL MEDICINE

## 2020-08-19 PROCEDURE — 36415 COLL VENOUS BLD VENIPUNCTURE: CPT | Performed by: INTERNAL MEDICINE

## 2020-08-19 PROCEDURE — 85027 COMPLETE CBC AUTOMATED: CPT | Performed by: INTERNAL MEDICINE

## 2020-08-20 ENCOUNTER — TELEPHONE (OUTPATIENT)
Dept: TRANSPLANT | Facility: CLINIC | Age: 45
End: 2020-08-20

## 2020-08-20 DIAGNOSIS — N18.5 CKD (CHRONIC KIDNEY DISEASE) STAGE 5, GFR LESS THAN 15 ML/MIN (H): ICD-10-CM

## 2020-08-20 DIAGNOSIS — Z94.0 KIDNEY TRANSPLANTED: ICD-10-CM

## 2020-08-20 DIAGNOSIS — N17.9 AKI (ACUTE KIDNEY INJURY) (H): Primary | ICD-10-CM

## 2020-08-20 DIAGNOSIS — Z94.0 KIDNEY TRANSPLANT RECIPIENT: Primary | ICD-10-CM

## 2020-08-20 DIAGNOSIS — N18.5 CHRONIC KIDNEY DISEASE, STAGE V (H): ICD-10-CM

## 2020-08-20 LAB
ANION GAP SERPL CALCULATED.3IONS-SCNC: 9 MMOL/L (ref 3–14)
BUN SERPL-MCNC: 75 MG/DL (ref 7–30)
CALCIUM SERPL-MCNC: 8.6 MG/DL (ref 8.5–10.1)
CHLORIDE SERPL-SCNC: 115 MMOL/L (ref 94–109)
CO2 SERPL-SCNC: 21 MMOL/L (ref 20–32)
CREAT SERPL-MCNC: 7 MG/DL (ref 0.66–1.25)
GFR SERPL CREATININE-BSD FRML MDRD: 9 ML/MIN/{1.73_M2}
GLUCOSE SERPL-MCNC: 91 MG/DL (ref 70–99)
POTASSIUM SERPL-SCNC: 4.5 MMOL/L (ref 3.4–5.3)
SODIUM SERPL-SCNC: 145 MMOL/L (ref 133–144)
TACROLIMUS BLD-MCNC: 4.9 UG/L (ref 5–15)
TME LAST DOSE: ABNORMAL H

## 2020-08-20 NOTE — TELEPHONE ENCOUNTER
"[facility] received notification on Creatinine 7.00 drawn on 8/19/20. Patient has CKD Stage 5 GFR 10. BUN 75. His HD fistula was assessed 6/11/20 and is ready for use; see ultrasound imaging results.     He has been denying uremic symptoms on previous encounters other than fatigue and edema. Plan to reassess how he is feeling:    Tired/Fatigue?   Edema/Swelling?  BP?  GI symptoms? Nausea/Vomiting?  Shortness of breath?   Confusion?    Pruritus/ Itchiness?  Brain Fog/Confusion?  Fever/Chills?    Trying to have hip replacement 9/9/20: needs transplant nephrology clearance.     OUTCOME: Unable to reach the patient x2 or his wife x2 to assess for uremic symptoms. VM message left for patient asking for a return call to discuss.    Spoke with Dr. Barrios to discuss whether patient needed admission. Reviewed Electrolytes and lab values. Connect with nephrology nurse to ensure patient has a dialysis center identified when Rashad is ready to initiate dialysis.     Addendum to note: Wife returned call and states that Rashad will be out all day and she can not reach him but she states the \"sleepiness is noticeable\" and that it is \"becoming difficult to have a long logical conversation with him. Rashad hates the idea of dialysis. Waiting for doctors to flat out tell him; He has put it in his mind that MD will let him know when dialysis is needed.\" Again reiterated if his electrolytes started to be off, the nephrologist would likely advise it was time but how he is feeling plays a large part into when he is to start.    "

## 2020-08-20 NOTE — TELEPHONE ENCOUNTER
DATE:  8/20/2020   TIME OF RECEIPT FROM LAB:  9:00  LAB TEST:  Cr  LAB VALUE:  7.00  RESULTS GIVEN WITH READ-BACK TO (PROVIDER):  Reina Patel  TIME LAB VALUE REPORTED TO PROVIDER:   9:10

## 2020-08-20 NOTE — TELEPHONE ENCOUNTER
"Reviewed Uremic symptoms with Rashad when he called back.   Fairly tired but not uncontrollably; woke up today at 8 then back to bed till 11:15. Went to bed around 11-11:30. Take naps during day? Don't have time for naps; once up, up for the day. Working in afternoon 3-9pm so no time-able to maintain or carry out the day. Just hard to get out of bed. No itchiness, no fever or chills; no SOB. Swelling is consistent; no change. BP last checked was low; 107/80. Numbness in fingers at night in arm with fistula; repositioning relieves. Did not endorse numbness/tingling in lower extremities. He requests an appointment to discuss whether his lab results would affect the total hip surgery his is schedule for 9/9/20. States he has been trying to get an appointment for nephrology approval. Explained to Rashad the schedule unfortunately is booked till November but I would reach out to provider.     Reached out to Dr. Barrios. \"He should be on dialysis prior to his surgery. Monday or Tuesday in person visit early post-transplant clinic 9:30 AM. Will need labs in preparation for dialysis.\"     Rashad can come on Tuesday 9:30 AM; advised he would need to get labs tomorrow morning in prep for KD. Nephrology to add labs needed.   "

## 2020-08-20 NOTE — PROGRESS NOTES
Patient will soon be needing dialysis. Ordering remaining labs required pre-dialysis per protocol.

## 2020-08-21 DIAGNOSIS — Z11.59 ENCOUNTER FOR SCREENING FOR OTHER VIRAL DISEASES: Primary | ICD-10-CM

## 2020-08-24 ENCOUNTER — HOSPITAL ENCOUNTER (OUTPATIENT)
Facility: CLINIC | Age: 45
Setting detail: SPECIMEN
Discharge: HOME | End: 2020-08-24
Admitting: INTERNAL MEDICINE
Payer: COMMERCIAL

## 2020-08-24 ENCOUNTER — INFUSION THERAPY VISIT (OUTPATIENT)
Dept: INFUSION THERAPY | Facility: CLINIC | Age: 45
End: 2020-08-24
Payer: COMMERCIAL

## 2020-08-24 ENCOUNTER — TELEPHONE (OUTPATIENT)
Dept: PHARMACY | Facility: CLINIC | Age: 45
End: 2020-08-24

## 2020-08-24 ENCOUNTER — TELEPHONE (OUTPATIENT)
Dept: TRANSPLANT | Facility: CLINIC | Age: 45
End: 2020-08-24

## 2020-08-24 VITALS — SYSTOLIC BLOOD PRESSURE: 139 MMHG | HEART RATE: 68 BPM | OXYGEN SATURATION: 100 % | DIASTOLIC BLOOD PRESSURE: 92 MMHG

## 2020-08-24 DIAGNOSIS — Z94.0 KIDNEY REPLACED BY TRANSPLANT: ICD-10-CM

## 2020-08-24 DIAGNOSIS — Z94.0 KIDNEY TRANSPLANTED: Primary | ICD-10-CM

## 2020-08-24 DIAGNOSIS — Z94.0 KIDNEY TRANSPLANTED: ICD-10-CM

## 2020-08-24 DIAGNOSIS — N18.5 CKD (CHRONIC KIDNEY DISEASE) STAGE 5, GFR LESS THAN 15 ML/MIN (H): ICD-10-CM

## 2020-08-24 DIAGNOSIS — N18.5 ANEMIA OF CHRONIC RENAL FAILURE, STAGE 5 (H): Primary | ICD-10-CM

## 2020-08-24 DIAGNOSIS — D63.1 ANEMIA OF CHRONIC RENAL FAILURE, STAGE 5 (H): Primary | ICD-10-CM

## 2020-08-24 DIAGNOSIS — N17.9 AKI (ACUTE KIDNEY INJURY) (H): ICD-10-CM

## 2020-08-24 LAB
ERYTHROCYTE [DISTWIDTH] IN BLOOD BY AUTOMATED COUNT: 16.7 % (ref 10–15)
HCT VFR BLD AUTO: 31.3 % (ref 40–53)
HCT VFR BLD AUTO: 31.6 % (ref 40–53)
HGB BLD-MCNC: 9.4 G/DL (ref 13.3–17.7)
HGB BLD-MCNC: 9.5 G/DL (ref 13.3–17.7)
MCH RBC QN AUTO: 25.5 PG (ref 26.5–33)
MCHC RBC AUTO-ENTMCNC: 29.7 G/DL (ref 31.5–36.5)
MCV RBC AUTO: 86 FL (ref 78–100)
PLATELET # BLD AUTO: 269 10E9/L (ref 150–450)
RBC # BLD AUTO: 3.69 10E12/L (ref 4.4–5.9)
WBC # BLD AUTO: 5.6 10E9/L (ref 4–11)

## 2020-08-24 PROCEDURE — 85027 COMPLETE CBC AUTOMATED: CPT | Performed by: INTERNAL MEDICINE

## 2020-08-24 PROCEDURE — 87340 HEPATITIS B SURFACE AG IA: CPT | Performed by: INTERNAL MEDICINE

## 2020-08-24 PROCEDURE — 85014 HEMATOCRIT: CPT | Performed by: INTERNAL MEDICINE

## 2020-08-24 PROCEDURE — 85018 HEMOGLOBIN: CPT | Performed by: INTERNAL MEDICINE

## 2020-08-24 PROCEDURE — 96372 THER/PROPH/DIAG INJ SC/IM: CPT | Performed by: NURSE PRACTITIONER

## 2020-08-24 PROCEDURE — 36415 COLL VENOUS BLD VENIPUNCTURE: CPT | Performed by: INTERNAL MEDICINE

## 2020-08-24 PROCEDURE — 99207 ZZC NO CHARGE NURSE ONLY: CPT

## 2020-08-24 NOTE — PROGRESS NOTES
Infusion Nursing Note:  Rashad Ortiz presents today for   Chief Complaint   Patient presents with     Allied Health Visit     Aranesp     Patient seen by provider today: No   present during visit today: Not Applicable.    Note: Patient assessed by Madelin Boyd RN prior to injection.    Intravenous Access:  No Intravenous access/labs at this visit.    Treatment Conditions:  Lab Results   Component Value Date    HGB 9.5 08/24/2020     Lab Results   Component Value Date    WBC 6.5 08/19/2020      Lab Results   Component Value Date    ANEU 5.1 10/14/2019     Lab Results   Component Value Date     08/19/2020      Results reviewed, labs MET treatment parameters, ok to proceed with treatment.      Post Infusion Assessment:  Patient tolerated injection without incident.  Site patent and intact, free from redness, edema or discomfort.       Discharge Plan:   Patient will return 9/8/20 for next appointment.   Patient discharged in stable condition accompanied by: srinivasan Barrera CMA

## 2020-08-24 NOTE — TELEPHONE ENCOUNTER
Anemia Management Note  SUBJECTIVE/OBJECTIVE:  Referred by Dr. Abran Cunha on 2020  Primary Diagnosis: Anemia in Chronic Kidney Disease (N18.5, D63.1)     Secondary Diagnosis:  Chronic Kidney Disease, Stage 5 (N18.5)  Kidney Tx: 2011  Hgb goal range:  9-10  Epo/Darbo: Aranesp  60 mcg  every two weeks for Hgb <10. In clinic  Iron regimen:  Ferrous Sulfate  twice daily, Increased from1 to 2 daily on 2020  Labs : 2021  Recent LAWRENCE use, transfusion, IV iron: NA.  Aranesp was previously ordered, he never received it.   RX/TX plans : 2021  No history of stroke, MI and blood clots or cancers     Contact:            No Consent to communicate on File    Anemia Latest Ref Rng & Units 2020 2020 2020 2020 8/10/2020 2020 2020   LAWRENCE Dose - - 60 mcg - - 60 mcg - 60 mcg   Hemoglobin 13.3 - 17.7 g/dL 9.0(L) 9.1(L) 9.8(L) 9.1(L) 9.2(L) 8.7(L) 9.5(L)   TSAT 15 - 46 % 9(L) - - - - - -   Ferritin 26 - 388 ng/mL 310 - - - - - -     BP Readings from Last 3 Encounters:   20 (!) 139/92   08/10/20 109/70   20 128/83     Wt Readings from Last 2 Encounters:   20 64.4 kg (141 lb 14.4 oz)   20 66.2 kg (146 lb)           ASSESSMENT:  Hgb:At goal - recommend dose  TSat: not at goal of >30% Ferritin: At goal (>100ng/mL)    PLAN:  Dose with aranesp and RTC for hgb then aranesp if needed in 2 week(s)    Orders needed to be renewed (for next follow-up date) in EPIC: None    Iron labs due:  2020.  Iron tabs increased in July. Have Iron levels improved?    Plan discussed with:  No call today.   Plan provided by:  chio    NEXT FOLLOW-UP DATE:  20  11am appt    Cate Ellis RN   [facility]  [address] [address]   [email or web address]   Office : [phone]  Fax: [phone]

## 2020-08-24 NOTE — TELEPHONE ENCOUNTER
ISSUE: Rashad went for labs this morning but his full set of orders were not drawn.     PLAN: Notify lab, [facility] and see if labs can be added on. Spoke with Simon in lab. He can add the HepB surface antigen and full CBC with platelets but not Quantiferon TB because it requires as kit? Will check with Nephrology nurse to see if she happens to know-otherwise Rashad may need to have drawn at his appointment tomorrow. [employer] message sent to Rashad.

## 2020-08-25 ENCOUNTER — OFFICE VISIT (OUTPATIENT)
Dept: NEPHROLOGY | Facility: CLINIC | Age: 45
End: 2020-08-25
Payer: COMMERCIAL

## 2020-08-25 VITALS
OXYGEN SATURATION: 99 % | DIASTOLIC BLOOD PRESSURE: 73 MMHG | TEMPERATURE: 98.4 F | HEART RATE: 80 BPM | HEIGHT: 68 IN | BODY MASS INDEX: 20.95 KG/M2 | SYSTOLIC BLOOD PRESSURE: 111 MMHG | WEIGHT: 138.2 LBS

## 2020-08-25 DIAGNOSIS — M87.00 AVN (AVASCULAR NECROSIS OF BONE) (H): ICD-10-CM

## 2020-08-25 DIAGNOSIS — Z91.199 MEDICAL NON-COMPLIANCE: ICD-10-CM

## 2020-08-25 DIAGNOSIS — D63.1 ANEMIA OF CHRONIC RENAL FAILURE, STAGE 5 (H): ICD-10-CM

## 2020-08-25 DIAGNOSIS — N18.5 CHRONIC KIDNEY DISEASE, STAGE V (H): ICD-10-CM

## 2020-08-25 DIAGNOSIS — I15.1 HTN, KIDNEY TRANSPLANT RELATED: ICD-10-CM

## 2020-08-25 DIAGNOSIS — N18.5 CKD (CHRONIC KIDNEY DISEASE) STAGE 5, GFR LESS THAN 15 ML/MIN (H): Primary | ICD-10-CM

## 2020-08-25 DIAGNOSIS — M10.372 ACUTE GOUT DUE TO RENAL IMPAIRMENT INVOLVING LEFT FOOT: ICD-10-CM

## 2020-08-25 DIAGNOSIS — N25.81 SECONDARY RENAL HYPERPARATHYROIDISM (H): ICD-10-CM

## 2020-08-25 DIAGNOSIS — N17.9 AKI (ACUTE KIDNEY INJURY) (H): ICD-10-CM

## 2020-08-25 DIAGNOSIS — N18.5 ANEMIA OF CHRONIC RENAL FAILURE, STAGE 5 (H): ICD-10-CM

## 2020-08-25 DIAGNOSIS — Z94.0 KIDNEY REPLACED BY TRANSPLANT: ICD-10-CM

## 2020-08-25 DIAGNOSIS — E55.9 VITAMIN D DEFICIENCY: ICD-10-CM

## 2020-08-25 DIAGNOSIS — N18.5 CKD (CHRONIC KIDNEY DISEASE) STAGE 5, GFR LESS THAN 15 ML/MIN (H): ICD-10-CM

## 2020-08-25 DIAGNOSIS — Z94.0 HTN, KIDNEY TRANSPLANT RELATED: ICD-10-CM

## 2020-08-25 DIAGNOSIS — Z94.0 KIDNEY TRANSPLANTED: ICD-10-CM

## 2020-08-25 DIAGNOSIS — Z48.298 AFTERCARE FOLLOWING ORGAN TRANSPLANT: ICD-10-CM

## 2020-08-25 DIAGNOSIS — D84.9 IMMUNOSUPPRESSION (H): ICD-10-CM

## 2020-08-25 DIAGNOSIS — E87.20 METABOLIC ACIDOSIS: ICD-10-CM

## 2020-08-25 LAB
ANION GAP SERPL CALCULATED.3IONS-SCNC: 9 MMOL/L (ref 3–14)
BUN SERPL-MCNC: 55 MG/DL (ref 7–30)
CALCIUM SERPL-MCNC: 8.4 MG/DL (ref 8.5–10.1)
CHLORIDE SERPL-SCNC: 112 MMOL/L (ref 94–109)
CO2 SERPL-SCNC: 20 MMOL/L (ref 20–32)
CREAT SERPL-MCNC: 6.28 MG/DL (ref 0.66–1.25)
ERYTHROCYTE [DISTWIDTH] IN BLOOD BY AUTOMATED COUNT: 16.5 % (ref 10–15)
GFR SERPL CREATININE-BSD FRML MDRD: 10 ML/MIN/{1.73_M2}
GLUCOSE SERPL-MCNC: 96 MG/DL (ref 70–99)
HBV SURFACE AG SERPL QL IA: NONREACTIVE
HCT VFR BLD AUTO: 29.4 % (ref 40–53)
HGB BLD-MCNC: 8.6 G/DL (ref 13.3–17.7)
MCH RBC QN AUTO: 25.4 PG (ref 26.5–33)
MCHC RBC AUTO-ENTMCNC: 29.3 G/DL (ref 31.5–36.5)
MCV RBC AUTO: 87 FL (ref 78–100)
PLATELET # BLD AUTO: 207 10E9/L (ref 150–450)
POTASSIUM SERPL-SCNC: 4.7 MMOL/L (ref 3.4–5.3)
RBC # BLD AUTO: 3.39 10E12/L (ref 4.4–5.9)
SODIUM SERPL-SCNC: 141 MMOL/L (ref 133–144)
TACROLIMUS BLD-MCNC: 10 UG/L (ref 5–15)
TME LAST DOSE: NORMAL H
WBC # BLD AUTO: 5.3 10E9/L (ref 4–11)

## 2020-08-25 PROCEDURE — 86481 TB AG RESPONSE T-CELL SUSP: CPT

## 2020-08-25 PROCEDURE — G0463 HOSPITAL OUTPT CLINIC VISIT: HCPCS | Mod: ZF

## 2020-08-25 PROCEDURE — 80197 ASSAY OF TACROLIMUS: CPT

## 2020-08-25 PROCEDURE — 80197 ASSAY OF TACROLIMUS: CPT | Performed by: INTERNAL MEDICINE

## 2020-08-25 PROCEDURE — 86481 TB AG RESPONSE T-CELL SUSP: CPT | Performed by: INTERNAL MEDICINE

## 2020-08-25 PROCEDURE — 80048 BASIC METABOLIC PNL TOTAL CA: CPT

## 2020-08-25 PROCEDURE — 85027 COMPLETE CBC AUTOMATED: CPT

## 2020-08-25 ASSESSMENT — PAIN SCALES - GENERAL: PAINLEVEL: SEVERE PAIN (7)

## 2020-08-25 ASSESSMENT — MIFFLIN-ST. JEOR: SCORE: 1491.37

## 2020-08-25 NOTE — LETTER
8/25/2020      RE: Rashad Ortiz  [address] [facility] [id number]       CHRONIC TRANSPLANT NEPHROLOGY VISIT    Assessment & Plan   # LDKT: Elevated but stable, nearing hemodialysis. Has AVF placed and ready to use. Wants [facility] vs [facility]. Daily fatigue but no overt uremic symptoms. He makes ~1.5L urine daily   - Baseline Cr ~ 5-7mg/dL   - Proteinuria: Mild (0.5-1.0g/g)   - Date DSA Last Checked: Sep/2017       Latest DSA: No   - BK Viremia: Not checked recently   - Kidney Tx Biopsy: Yes; moderate chronic tubulointerstitial changes, no diagnostic evidence of acute rejection seen.    We will get him HBsAg and TB Quant +/- CXR to prepare him for [facility] intake. We want him to start at least 1 week prior to his hip surgery (currently scheduled for Sept 9th)    # Immunosuppression: Tacrolimus immediate release (goal 4-6), Mycophenolate mofetil (500mg BID)   - Changes: Yes. Prednisone weaned off over 3 months, now officially off since 8/15/20. Planning for anti-metabolite weaning and will reduce MMF to 250mg BID once he started HD. We can consider a month on MMF 250mg BID and then taper off knowing that he wont get a transplant in the next few years (given his behavioural issues, cPRA), however he is making a large amount of urine and we would want to preserve his residual kidney function while on HD and we will advocate for him at the committee meeting to stay on MMF 250mg BID. Keep CNI low-dose    # Prophylaxis:   - PJP: Sulfa/TMP (Bactrim), would continue MWF while on HD    # Hypertension: well controlled ; Goal BP: < 130/80   - Changes: No    # Mineral Bone Disorder:   - Secondary renal hyperparathyroidism; PTH level: ~456  - Vitamin D; level: ~44  - Calcium; level: Normal                          On supplementation: Yes  - Phosphorus; level: Normal                    On supplementation: No     Will get BMD-CKD labs on HD at [facility]    # Electrolytes:   - Potassium; level: Normal  - Bicarbonate;  level: Normal                    On supplementation: Yes. Stop once he starts HD    # Gout: No recent flares. Takes Febuxostat.   - UA in 2019 was 11. Would recommend rechecking uric acid level      # Lymphedema: Chronic since transplant.    # Osteonecrosis of hip: Planned for surgery September 9th, currently off glucocorticoids. We would like him to get HD stated prior to his surgery.    # Skin Cancer Risk: New lesions: none   - Discussed sun protection and recommend regular follow up with Dermatology.    # Medical Compliance: No.  Evidence of labs less than recommended, missed medications and infrequent transplant follow-up.              - Has behavioral issues and has a compliance contract in place. He must fulfill this contract as part of his re-transplant evaluation  - Discussed importance of checking labs regularly as recommended, taking medications as prescribed and attending scheduled medical appointments.    # Transplant History:  Etiology of Kidney Failure: Hypertension  Tx: LDKT  Transplant: 1/13/2011 (Kidney)  Donor Type: Living Donor Class:   Significant changes in immunosuppression: None  Significant transplant-related complications: Chronic rejection     Transplant Office Phone Number: [phone]    Assessment and plan was discussed with the patient and he voiced his understanding and agreement.    Physician Attestation   I, Reyes Donovan MD, personally examined and evaluated this patient.  I discussed the patient with the resident/fellow and care team, and agree with the assessment and plan of care as documented in the note on 08/25/20 .      I personally reviewed vital signs, medications, labs and imaging.    Key findings: 45yM w/ failing renal allograft from 2011, now off of prednisone but on low dose CNI and MMF 500mg PO BID, cPRA 91%, non compliant with continued EtOH consumption, vague uremic symptoms with plan to start on iHD in center in the coming days. He is agreeable. He will obtain Quant  Gold today and he can be placed in a dialysis unit close to his home. RUE AVF w/ good thrill and bruit. Decrease MMF to 250mg PO BID and leave him on this for at least one month. If he remains compliant and he can be listed we would stop MMF at that point and leave him on low dose CNI.   Reyes Donovan MD  Date of Service (when I saw the patient): 08/25/20          Return visit: No follow-ups on file.     Chief Complaint   Mr. Ortiz is a 45 year old here for routine follow up.    History of Present Illness   Rashad Ortiz is a 44 year old male with a history of ESKD secondary to hypertension status post LDKT completed on 1/13/11. He was last seen in clinic by Dr. Barrios on 2/27/2020; please see that note for further details.     Rashad has no symptoms this morning. He reports no fevers, no chills, and no night sweats. He has fatigue from poor sleep, but wake sup refreshed. He has no loss of taste or abnormal taste. He has no pruritis, and he denies any changes in temperature sensation. He has no nausea or vomiting. He reports no SOB or chest pain. He has no dysuria and reports regular urination habits. He has ~1.5L urine daily. He reports no diarrhea or constipation. He has a working fistula and surgery have deemed it ready to use when needed. He has no new rashes or skin lesions. He has chronic leg swelling    Recent Hospitalizations:  [x] No [] Yes    New Medical Issues: [x] No [] Yes    Decreased energy: [x] No [] Yes    Chest pain or SOB with exertion:  [x] No [] Yes    Appetite change or weight change: [x] No [] Yes    Nausea, vomiting or diarrhea:  [x] No [] Yes    Fever, sweats or chills: [x] No [] Yes    Leg swelling: [x] No [x] Yes Chronic      Home BP: controlled     Review of Systems   A comprehensive review of systems was obtained and negative, except as noted in the HPI or PMH.    Problem List   Patient Active Problem List   Diagnosis     Kidney replaced by transplant     Aftercare following organ  transplant     GERD (gastroesophageal reflux disease)     CARDIOVASCULAR SCREENING; LDL GOAL LESS THAN 160     Gout     Antibody mediated rejection of kidney transplant     Medical non-compliance     Chronic kidney disease, stage 4, severely decreased GFR (H)     Herpes simplex infection of penis     Escherichia coli septicemia (H)     Pyelonephritis of transplanted kidney     HTN, kidney transplant related     Metabolic acidosis     CKD (chronic kidney disease) stage 5, GFR less than 15 ml/min (H)     Immunosuppression (H)     Anemia of chronic renal failure, stage 5 (H)     Secondary renal hyperparathyroidism (H)     AVN (avascular necrosis of bone) (H)     Vitamin D deficiency     BPH (benign prostatic hyperplasia)     Osteonecrosis (H)       Social History   Social History     Tobacco Use     Smoking status: Current Some Day Smoker     Types: Cigarettes     Last attempt to quit: 03/2017     Years since quitting: 3.4     Smokeless tobacco: Never Used     Tobacco comment: Patient states that he is an 'social'  smoker. 3 cigarettes per week per pt   Substance Use Topics     Alcohol use: Yes     Alcohol/week: 4.2 standard drinks     Types: 5 Standard drinks or equivalent per week     Comment: 1-2 drinks/wk     Drug use: No       Allergies   No Known Allergies    Medications   Current Outpatient Medications   Medication Sig     acetaminophen (TYLENOL) 500 MG tablet Take 2 tablets (1,000 mg) by mouth every 8 hours as needed for mild pain     amLODIPine (NORVASC) 5 MG tablet Take 1 tablet (5 mg) by mouth daily     carvedilol (COREG) 25 MG tablet Take 1 tablet (25 mg) by mouth 2 times daily (with meals)     cholecalciferol 25 MCG (1000 UT) TABS Take 2,000 Units by mouth daily     febuxostat (ULORIC) 80 MG TABS tablet Take 1 tablet (80 mg) by mouth daily     ferrous sulfate (FEROSUL) 325 (65 Fe) MG tablet Take 1 tablet (325 mg) by mouth daily (with breakfast)     furosemide (LASIX) 20 MG tablet Take 1 tablet (20 mg) by  "mouth 2 times daily     HYDROmorphone (DILAUDID) 4 MG tablet Take 1-2 tablets (4-8 mg) by mouth every 6 hours as needed for severe pain Related to pain that requires surgery.     hydrOXYzine (ATARAX) 50 MG tablet Take 0.5-1 tablets (25-50 mg) by mouth nightly as needed (sleep)     mycophenolate (GENERIC EQUIVALENT) 250 MG capsule Take 2 capsules (500 mg) by mouth 2 times daily     omeprazole (PRILOSEC) 20 MG capsule Take 1 capsule (20 mg) by mouth daily     order for DME Equipment being ordered: Crutches     sodium bicarbonate 650 MG tablet Take 2 tablets (1,300 mg) by mouth 3 times daily     sulfamethoxazole-trimethoprim (BACTRIM) 400-80 MG tablet Take 1 tablet by mouth every other day     tacrolimus (GENERIC EQUIVALENT) 0.5 MG capsule Take 1 capsule (0.5 mg) by mouth every evening Total dose = 1 mg in the AM and 1.5 mg in the PM     tacrolimus (GENERIC EQUIVALENT) 1 MG capsule Take 1 capsule (1 mg) by mouth 2 times daily . Total dose=1mg in the AM and 1.5mg in the PM     tamsulosin (FLOMAX) 0.4 MG capsule TAKE 1 CAPSULE BY MOUTH DAILY     No current facility-administered medications for this visit.      There are no discontinued medications.    Physical Exam   Vital Signs: /73   Pulse 80   Temp 98.4  F (36.9  C) (Oral)   Ht 1.727 m (5' 8\")   Wt 62.7 kg (138 lb 3.2 oz)   SpO2 99%   BMI 21.01 kg/m      GENERAL APPEARANCE: alert and no distress  HENT: mouth without ulcers or lesions  LYMPHATICS: no cervical or supraclavicular nodes  RESP: lungs clear to auscultation - no rales, rhonchi or wheezes  CV: regular rhythm, normal rate, no rub, no murmur  EDEMA: no LE edema bilaterally  ABDOMEN: soft, nondistended, nontender, bowel sounds normal  MS: extremities normal - no gross deformities noted, no evidence of inflammation in joints, no muscle tenderness  SKIN: no rash  ACCESS: RUE AVF w/ good thrill and no bruit.    Data     Renal Latest Ref Rng & Units 8/25/2020 8/19/2020 7/28/2020   Na 133 - 144 mmol/L 141 " 145(H) 138   K 3.4 - 5.3 mmol/L 4.7 4.5 4.6   Cl 94 - 109 mmol/L 112(H) 115(H) 106   CO2 20 - 32 mmol/L 20 21 24   BUN 7 - 30 mg/dL 55(H) 75(H) 52(H)   Cr 0.66 - 1.25 mg/dL 6.28(H) 7.00(H) 6.74(H)   Glucose 70 - 99 mg/dL 96 91 104(H)   Ca  8.5 - 10.1 mg/dL 8.4(L) 8.6 8.2(L)   Mg 1.6 - 2.3 mg/dL - - -     Bone Health Latest Ref Rng & Units 6/13/2019 3/23/2019 11/1/2018   Phos 2.5 - 4.5 mg/dL 2.7 3.8 3.3   PTHi 18 - 80 pg/mL 456(H) - -   Vit D Def 20 - 75 ug/L 44 - -     Heme Latest Ref Rng & Units 8/25/2020 8/24/2020 8/24/2020   WBC 4.0 - 11.0 10e9/L 5.3 5.6 -   Hgb 13.3 - 17.7 g/dL 8.6(L) 9.4(L) 9.5(L)   Plt 150 - 450 10e9/L 207 269 -   ABSOLUTE NEUTROPHIL 1.6 - 8.3 10e9/L - - -   ABSOLUTE LYMPHOCYTES 0.8 - 5.3 10e9/L - - -   ABSOLUTE MONOCYTES 0.0 - 1.3 10e9/L - - -   ABSOLUTE EOSINOPHILS 0.0 - 0.7 10e9/L - - -   ABSOLUTE BASOPHILS 0.0 - 0.2 10e9/L - - -   ABS IMMATURE GRANULOCYTES 0 - 0.4 10e9/L - - -   ABSOLUTE NUCLEATED RBC - - - -     Liver Latest Ref Rng & Units 10/14/2019 10/14/2019 6/13/2019   AP 40 - 150 U/L 63 55 -   TBili 0.2 - 1.3 mg/dL 0.3 0.3 -   ALT 0 - 70 U/L 34 37 -   AST 0 - 45 U/L 22 28 -   Tot Protein 6.8 - 8.8 g/dL 6.2(L) 6.4(L) -   Albumin 3.4 - 5.0 g/dL 3.1(L) 3.4 3.4     Pancreas Latest Ref Rng & Units 4/10/2017 6/17/2015 6/6/2015   A1C 4.3 - 6.0 % - - 5.4   Amylase 30 - 110 U/L 153(H) - -   Lipase 73 - 393 U/L 387 179 -     Iron studies Latest Ref Rng & Units 6/20/2020 3/18/2020 10/10/2019   Iron 35 - 180 ug/dL 15(L) 54 42   Iron sat 15 - 46 % 9(L) 30 23   Ferritin 26 - 388 ng/mL 310 460(H) 790(H)     [facility] Txp Virology Latest Ref Rng & Units 6/13/2020 9/25/2017 4/24/2017   CMV IgG EU/mL - - -   CMV IgM <0.90 - - -   CMV IgM Interp <0.90 - - -   CVM DNA Quant - - - Plasma   CMV Quant <100 Copies/mL - - -   CMV QT Log <2.0 Log copies/mL - - -   CMV QUANT IU/ML CMVND [IU]/mL - - CMV DNA Not Detected   The AG[employer] AmpliPrep/AGUSTÍN TaqMan CMV Test is an FDA-approved in vitro nucleic   acid  amplification test for the quantitation of cytomegalovirus DNA in human   plasma (EDTA plasma) using the AG[employer] AmpliPrep Instrument for automated viral   nucleic acid extraction and the [employer] TaqMan Analyzer or AGUST[employer]N TaqMan for   automated Real Time amplification and detection of the viral nucleic acid   target.   Titer results are reported in International Units/mL (IU/mL using 1st WHO   International standard for Human Cytomegalovirus for Nucleic Acid Amplification   based assays. The conversion factor between CMV DNA copis/mL (as defined by the   Roche AGUSTÍN TaqMan CMV test) and International Units is the CMV DNA   concentration in IU/mL x 1.1 copies/IU = CMV DNA in copies/mL.   This assay has received FDA approval for the testing of human plasma only. The   Infectious Disease Diagnostic Laboratory at the [facility], [place], has validated the pe  rformance characteristics of the Roche   CMV assay for plasma, bronchial alveolar lavage/wash and urine.     LOG IU/ML OF CMVQNT <2.1 [Log:IU]/mL - - Not Calculated   BK Spec - - Plasma -   BK Res BKNEG:BK Virus DNA Not Detected copies/mL - BK Virus DNA Not Detected -   BK Log <2.7 Log copies/mL - Not Calculated -   EBV IgG - - - -   EBV CAPSID ANTIBODY IGG 0.0 - 0.8 AI >8.0(H) >8.0(H) -   Hep B Core NR:Nonreactive Nonreactive Nonreactive -   Hep B Surf - - - -   HIV 1&2 NEG - - -        Recent Labs   Lab Test 07/08/20  1119 07/28/20  0848 08/19/20  1128   DOSTAC 2230 07.08.20 Not Provided 2200 8.18.2020   TACROL 6.3 5.3 4.9*     Recent Labs   Lab Test 04/24/17  0849 10/27/18  0441 10/14/19  0752   DOSMPA 2200 Not Provided Not Provided   MPACID 2.16 <0.25* 4.55*   MPAG 172.3* 78.3 >200.0*       -Reyes Donovan MD, LONNY  Transplant Nephrology  Pager: [phone]

## 2020-08-25 NOTE — PATIENT INSTRUCTIONS
1. Will decrease MMF to 250mg twice daily once you start dialysis  2. Stop taking sodium bicarbonate once you start dialysis  3. Change bactrim to Monday/wednesday/friday once you start dialysis  4. Get you hepatitis lab work and tb lab work today

## 2020-08-25 NOTE — PROGRESS NOTES
CHRONIC TRANSPLANT NEPHROLOGY VISIT    Assessment & Plan   # LDKT: Elevated but stable, nearing hemodialysis. Has AVF placed and ready to use. Wants [facility] vs [facility]. Daily fatigue but no overt uremic symptoms. He makes ~1.5L urine daily   - Baseline Cr ~ 5-7mg/dL   - Proteinuria: Mild (0.5-1.0g/g)   - Date DSA Last Checked: Sep/2017       Latest DSA: No   - BK Viremia: Not checked recently   - Kidney Tx Biopsy: Yes; moderate chronic tubulointerstitial changes, no diagnostic evidence of acute rejection seen.    We will get him HBsAg and TB Quant +/- CXR to prepare him for [facility] intake. We want him to start at least 1 week prior to his hip surgery (currently scheduled for Sept 9th)    # Immunosuppression: Tacrolimus immediate release (goal 4-6), Mycophenolate mofetil (500mg BID)   - Changes: Yes. Prednisone weaned off over 3 months, now officially off since 8/15/20. Planning for anti-metabolite weaning and will reduce MMF to 250mg BID once he started HD. We can consider a month on MMF 250mg BID and then taper off knowing that he wont get a transplant in the next few years (given his behavioural issues, cPRA), however he is making a large amount of urine and we would want to preserve his residual kidney function while on HD and we will advocate for him at the committee meeting to stay on MMF 250mg BID. Keep CNI low-dose    # Prophylaxis:   - PJP: Sulfa/TMP (Bactrim), would continue MWF while on HD    # Hypertension: well controlled ; Goal BP: < 130/80   - Changes: No    # Mineral Bone Disorder:   - Secondary renal hyperparathyroidism; PTH level: ~456  - Vitamin D; level: ~44  - Calcium; level: Normal                          On supplementation: Yes  - Phosphorus; level: Normal                    On supplementation: No     Will get [facility]CKD labs on HD at [facility]    # Electrolytes:   - Potassium; level: Normal  - Bicarbonate; level: Normal                    On supplementation: Yes. Stop once he  starts HD    # Gout: No recent flares. Takes Febuxostat.   - UA in 2019 was 11. Would recommend rechecking uric acid level      # Lymphedema: Chronic since transplant.    # Osteonecrosis of hip: Planned for surgery September 9th, currently off glucocorticoids. We would like him to get HD stated prior to his surgery.    # Skin Cancer Risk: New lesions: none   - Discussed sun protection and recommend regular follow up with Dermatology.    # Medical Compliance: No.  Evidence of labs less than recommended, missed medications and infrequent transplant follow-up.              - Has behavioral issues and has a compliance contract in place. He must fulfill this contract as part of his re-transplant evaluation  - Discussed importance of checking labs regularly as recommended, taking medications as prescribed and attending scheduled medical appointments.    # Transplant History:  Etiology of Kidney Failure: Hypertension  Tx: LDKT  Transplant: 1/13/2011 (Kidney)  Donor Type: Living Donor Class:   Significant changes in immunosuppression: None  Significant transplant-related complications: Chronic rejection     Transplant Office Phone Number: [phone]    Assessment and plan was discussed with the patient and he voiced his understanding and agreement.    Physician Attestation   I, Reyes Donovan MD, personally examined and evaluated this patient.  I discussed the patient with the resident/fellow and care team, and agree with the assessment and plan of care as documented in the note on 08/25/20 .      I personally reviewed vital signs, medications, labs and imaging.    Key findings: 45yM w/ failing renal allograft from 2011, now off of prednisone but on low dose CNI and MMF 500mg PO BID, cPRA 91%, non compliant with continued EtOH consumption, vague uremic symptoms with plan to start on iHD in center in the coming days. He is agreeable. He will obtain Quant Gold today and he can be placed in a dialysis unit close to his home.  CASSIE AVF w/ good thrill and bruit. Decrease MMF to 250mg PO BID and leave him on this for at least one month. If he remains compliant and he can be listed we would stop MMF at that point and leave him on low dose CNI.   Reyes Donovan MD  Date of Service (when I saw the patient): 08/25/20          Return visit: No follow-ups on file.     Chief Complaint   Mr. Ortiz is a 45 year old here for routine follow up.    History of Present Illness   Rashad Ortiz is a 44 year old male with a history of ESKD secondary to hypertension status post LDKT completed on 1/13/11. He was last seen in clinic by Dr. Barrios on 2/27/2020; please see that note for further details.     Rashad has no symptoms this morning. He reports no fevers, no chills, and no night sweats. He has fatigue from poor sleep, but wake sup refreshed. He has no loss of taste or abnormal taste. He has no pruritis, and he denies any changes in temperature sensation. He has no nausea or vomiting. He reports no SOB or chest pain. He has no dysuria and reports regular urination habits. He has ~1.5L urine daily. He reports no diarrhea or constipation. He has a working fistula and surgery have deemed it ready to use when needed. He has no new rashes or skin lesions. He has chronic leg swelling    Recent Hospitalizations:  [x] No [] Yes    New Medical Issues: [x] No [] Yes    Decreased energy: [x] No [] Yes    Chest pain or SOB with exertion:  [x] No [] Yes    Appetite change or weight change: [x] No [] Yes    Nausea, vomiting or diarrhea:  [x] No [] Yes    Fever, sweats or chills: [x] No [] Yes    Leg swelling: [x] No [x] Yes Chronic      Home BP: controlled     Review of Systems   A comprehensive review of systems was obtained and negative, except as noted in the HPI or PMH.    Problem List   Patient Active Problem List   Diagnosis     Kidney replaced by transplant     Aftercare following organ transplant     GERD (gastroesophageal reflux disease)     CARDIOVASCULAR  SCREENING; LDL GOAL LESS THAN 160     Gout     Antibody mediated rejection of kidney transplant     Medical non-compliance     Chronic kidney disease, stage 4, severely decreased GFR (H)     Herpes simplex infection of penis     Escherichia coli septicemia (H)     Pyelonephritis of transplanted kidney     HTN, kidney transplant related     Metabolic acidosis     CKD (chronic kidney disease) stage 5, GFR less than 15 ml/min (H)     Immunosuppression (H)     Anemia of chronic renal failure, stage 5 (H)     Secondary renal hyperparathyroidism (H)     AVN (avascular necrosis of bone) (H)     Vitamin D deficiency     BPH (benign prostatic hyperplasia)     Osteonecrosis (H)       Social History   Social History     Tobacco Use     Smoking status: Current Some Day Smoker     Types: Cigarettes     Last attempt to quit: 03/2017     Years since quitting: 3.4     Smokeless tobacco: Never Used     Tobacco comment: Patient states that he is an 'social'  smoker. 3 cigarettes per week per pt   Substance Use Topics     Alcohol use: Yes     Alcohol/week: 4.2 standard drinks     Types: 5 Standard drinks or equivalent per week     Comment: 1-2 drinks/wk     Drug use: No       Allergies   No Known Allergies    Medications   Current Outpatient Medications   Medication Sig     acetaminophen (TYLENOL) 500 MG tablet Take 2 tablets (1,000 mg) by mouth every 8 hours as needed for mild pain     amLODIPine (NORVASC) 5 MG tablet Take 1 tablet (5 mg) by mouth daily     carvedilol (COREG) 25 MG tablet Take 1 tablet (25 mg) by mouth 2 times daily (with meals)     cholecalciferol 25 MCG (1000 UT) TABS Take 2,000 Units by mouth daily     febuxostat (ULORIC) 80 MG TABS tablet Take 1 tablet (80 mg) by mouth daily     ferrous sulfate (FEROSUL) 325 (65 Fe) MG tablet Take 1 tablet (325 mg) by mouth daily (with breakfast)     furosemide (LASIX) 20 MG tablet Take 1 tablet (20 mg) by mouth 2 times daily     HYDROmorphone (DILAUDID) 4 MG tablet Take 1-2  "tablets (4-8 mg) by mouth every 6 hours as needed for severe pain Related to pain that requires surgery.     hydrOXYzine (ATARAX) 50 MG tablet Take 0.5-1 tablets (25-50 mg) by mouth nightly as needed (sleep)     mycophenolate (GENERIC EQUIVALENT) 250 MG capsule Take 2 capsules (500 mg) by mouth 2 times daily     omeprazole (PRILOSEC) 20 MG capsule Take 1 capsule (20 mg) by mouth daily     order for DME Equipment being ordered: Crutches     sodium bicarbonate 650 MG tablet Take 2 tablets (1,300 mg) by mouth 3 times daily     sulfamethoxazole-trimethoprim (BACTRIM) 400-80 MG tablet Take 1 tablet by mouth every other day     tacrolimus (GENERIC EQUIVALENT) 0.5 MG capsule Take 1 capsule (0.5 mg) by mouth every evening Total dose = 1 mg in the AM and 1.5 mg in the PM     tacrolimus (GENERIC EQUIVALENT) 1 MG capsule Take 1 capsule (1 mg) by mouth 2 times daily . Total dose=1mg in the AM and 1.5mg in the PM     tamsulosin (FLOMAX) 0.4 MG capsule TAKE 1 CAPSULE BY MOUTH DAILY     No current facility-administered medications for this visit.      There are no discontinued medications.    Physical Exam   Vital Signs: /73   Pulse 80   Temp 98.4  F (36.9  C) (Oral)   Ht 1.727 m (5' 8\")   Wt 62.7 kg (138 lb 3.2 oz)   SpO2 99%   BMI 21.01 kg/m      GENERAL APPEARANCE: alert and no distress  HENT: mouth without ulcers or lesions  LYMPHATICS: no cervical or supraclavicular nodes  RESP: lungs clear to auscultation - no rales, rhonchi or wheezes  CV: regular rhythm, normal rate, no rub, no murmur  EDEMA: no LE edema bilaterally  ABDOMEN: soft, nondistended, nontender, bowel sounds normal  MS: extremities normal - no gross deformities noted, no evidence of inflammation in joints, no muscle tenderness  SKIN: no rash  ACCESS: RUE AVF w/ good thrill and no bruit.    Data     Renal Latest Ref Rng & Units 8/25/2020 8/19/2020 7/28/2020   Na 133 - 144 mmol/L 141 145(H) 138   K 3.4 - 5.3 mmol/L 4.7 4.5 4.6   Cl 94 - 109 mmol/L " 112(H) 115(H) 106   CO2 20 - 32 mmol/L 20 21 24   BUN 7 - 30 mg/dL 55(H) 75(H) 52(H)   Cr 0.66 - 1.25 mg/dL 6.28(H) 7.00(H) 6.74(H)   Glucose 70 - 99 mg/dL 96 91 104(H)   Ca  8.5 - 10.1 mg/dL 8.4(L) 8.6 8.2(L)   Mg 1.6 - 2.3 mg/dL - - -     Bone Health Latest Ref Rng & Units 6/13/2019 3/23/2019 11/1/2018   Phos 2.5 - 4.5 mg/dL 2.7 3.8 3.3   PTHi 18 - 80 pg/mL 456(H) - -   Vit D Def 20 - 75 ug/L 44 - -     Heme Latest Ref Rng & Units 8/25/2020 8/24/2020 8/24/2020   WBC 4.0 - 11.0 10e9/L 5.3 5.6 -   Hgb 13.3 - 17.7 g/dL 8.6(L) 9.4(L) 9.5(L)   Plt 150 - 450 10e9/L 207 269 -   ABSOLUTE NEUTROPHIL 1.6 - 8.3 10e9/L - - -   ABSOLUTE LYMPHOCYTES 0.8 - 5.3 10e9/L - - -   ABSOLUTE MONOCYTES 0.0 - 1.3 10e9/L - - -   ABSOLUTE EOSINOPHILS 0.0 - 0.7 10e9/L - - -   ABSOLUTE BASOPHILS 0.0 - 0.2 10e9/L - - -   ABS IMMATURE GRANULOCYTES 0 - 0.4 10e9/L - - -   ABSOLUTE NUCLEATED RBC - - - -     Liver Latest Ref Rng & Units 10/14/2019 10/14/2019 6/13/2019   AP 40 - 150 U/L 63 55 -   TBili 0.2 - 1.3 mg/dL 0.3 0.3 -   ALT 0 - 70 U/L 34 37 -   AST 0 - 45 U/L 22 28 -   Tot Protein 6.8 - 8.8 g/dL 6.2(L) 6.4(L) -   Albumin 3.4 - 5.0 g/dL 3.1(L) 3.4 3.4     Pancreas Latest Ref Rng & Units 4/10/2017 6/17/2015 6/6/2015   A1C 4.3 - 6.0 % - - 5.4   Amylase 30 - 110 U/L 153(H) - -   Lipase 73 - 393 U/L 387 179 -     Iron studies Latest Ref Rng & Units 6/20/2020 3/18/2020 10/10/2019   Iron 35 - 180 ug/dL 15(L) 54 42   Iron sat 15 - 46 % 9(L) 30 23   Ferritin 26 - 388 ng/mL 310 460(H) 790(H)     [facility] Txp Virology Latest Ref Rng & Units 6/13/2020 9/25/2017 4/24/2017   CMV IgG EU/mL - - -   CMV IgM <0.90 - - -   CMV IgM Interp <0.90 - - -   CVM DNA Quant - - - Plasma   CMV Quant <100 Copies/mL - - -   CMV QT Log <2.0 Log copies/mL - - -   CMV QUANT IU/ML CMVND [IU]/mL - - CMV DNA Not Detected   The AGUSTÍN AmpliPrep/AGUSTÍN TaqMan CMV Test is an FDA-approved in vitro nucleic   acid amplification test for the quantitation of cytomegalovirus DNA in human    plasma (EDTA plasma) using the AGUST[employer]N AmpliPrep Instrument for automated viral   nucleic acid extraction and the AGUST[employer]N TaqMan Analyzer or AGUSTÍN TaqMan for   automated Real Time amplification and detection of the viral nucleic acid   target.   Titer results are reported in International Units/mL (IU/mL using 1st WHO   International standard for Human Cytomegalovirus for Nucleic Acid Amplification   based assays. The conversion factor between CMV DNA copis/mL (as defined by the   Roche AGUSTÍN TaqMan CMV test) and International Units is the CMV DNA   concentration in IU/mL x 1.1 copies/IU = CMV DNA in copies/mL.   This assay has received FDA approval for the testing of human plasma only. The   Infectious Disease Diagnostic Laboratory at the [facility], [place], has validated the pe  rformance characteristics of the Roche   CMV assay for plasma, bronchial alveolar lavage/wash and urine.     LOG IU/ML OF CMVQNT <2.1 [Log:IU]/mL - - Not Calculated   BK Spec - - Plasma -   BK Res BKNEG:BK Virus DNA Not Detected copies/mL - BK Virus DNA Not Detected -   BK Log <2.7 Log copies/mL - Not Calculated -   EBV IgG - - - -   EBV CAPSID ANTIBODY IGG 0.0 - 0.8 AI >8.0(H) >8.0(H) -   Hep B Core NR:Nonreactive Nonreactive Nonreactive -   Hep B Surf - - - -   HIV 1&2 NEG - - -        Recent Labs   Lab Test 07/08/20  1119 07/28/20  0848 08/19/20  1128   DOSTAC 2230 07.08.20 Not Provided 2200 8.18.2020   TACROL 6.3 5.3 4.9*     Recent Labs   Lab Test 04/24/17  0849 10/27/18  0441 10/14/19  0752   DOSMPA 2200 Not Provided Not Provided   MPACID 2.16 <0.25* 4.55*   MPAG 172.3* 78.3 >200.0*

## 2020-08-25 NOTE — NURSING NOTE
"Chief Complaint   Patient presents with     RECHECK     kidney tx     /73   Pulse 80   Temp 98.4  F (36.9  C) (Oral)   Ht 1.727 m (5' 8\")   Wt 62.7 kg (138 lb 3.2 oz)   SpO2 99%   BMI 21.01 kg/m    Armida Soriano [facility]  8/25/2020 9:28 AM    "

## 2020-08-26 ENCOUNTER — TELEPHONE (OUTPATIENT)
Dept: NEPHROLOGY | Facility: CLINIC | Age: 45
End: 2020-08-26

## 2020-08-26 LAB
GAMMA INTERFERON BACKGROUND BLD IA-ACNC: 0.08 IU/ML
M TB IFN-G CD4+ BCKGRND COR BLD-ACNC: 8.19 IU/ML
M TB TUBERC IFN-G BLD QL: NEGATIVE
MITOGEN IGNF BCKGRD COR BLD-ACNC: 0.01 IU/ML
MITOGEN IGNF BCKGRD COR BLD-ACNC: 0.03 IU/ML

## 2020-08-26 NOTE — TELEPHONE ENCOUNTER
Nephrology Note: Nursing Outreach Encounter    REASON FOR CALL:                                                      REASON FOR CALL: Care Coordination                                          SITUATION/BACKROUND:                                                    Patient is being treated for CKD Stage 5 and dialysis initiation.    Rashad met up with transplant provider yesterday. Needing dialysis to initiate week of 8/31/20.      ASSESSMENT:                                                      Spoke with Rashad about his preferences for dialysis again. He would like first or 2nd shift, day does not matter. Prefers [facility] location more than [place] at this time.    Informed him about dialysis provider being his new primary nephrologist, but that he would continue to follow transplant separately for immunosuppression, etc. He is understanding of this.    Will fax over remaining labs and call them with COVID 19 responses (no symptoms, no contact with anyone who has tested positive, no travel outside of the country, and never tested positive).     Uremic Symptoms: Yes,  Fatigue: No;       PLAN:                                                      Follow Up:   Will fax over remaining lab work when able. Called Marco Carreon to go over COVID questionnaire answers and to request [facility] 1st shift.     Patient verbalized understanding and will contact the clinic with any further questions or concerns.     Emmie Echeverria RN

## 2020-08-27 ENCOUNTER — MYC MEDICAL ADVICE (OUTPATIENT)
Dept: NEPHROLOGY | Facility: CLINIC | Age: 45
End: 2020-08-27

## 2020-08-27 NOTE — TELEPHONE ENCOUNTER
[facility] Admissions rep, Shani called and left this writer a voice message that chair time confirmed for Rashad at the [facility] location, not the [facility] location as discussed per patient preference. She is unable to confirm chair time at [facility] without forfeiting (possibly) his chair time at [place]. [place] location would be 0715 chair time and [facility] could be 0930 chair time, but unknown until paperwork is transferred to [facility].    Called Rashad to explain, but reached voice mail. Left him a message to return call. Will also send [employer] message.

## 2020-08-28 NOTE — TELEPHONE ENCOUNTER
Spoke with Rashad. Explained the situation. He was very understanding of their error and is okay with pursuing [facility] Unit at 0715 T, Th, Sa due to not knowing how he'd feel after the 11:15 dialysis runs in [employer] [facility]. 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    Called and left Shani from [facility] admissions a message to return call to confirm [place] location.    Shani returned call. Confirms that Rashad will be followed by Dr. Johann Morton from [facility].      Reason for Call    Call to patient to discuss outpatient hemodialysis arrangements and details.  Patient's admission packet was faxed to [facility] at [phone].    Plan    1. You will be dialyzing at [facility] located at [id number] [place] [address], phone # [phone].  Your schedule will be T, Th, Sa at 0715, but first day on 9/1/2020 will be 0645 arrival.  2. Bring the following to your first treatment:  Insurance card/s, two forms of identification, all current medications/vitamin bottles you are taking  3. Bring to all treatments:  A pillow and blanket, a snack and something to drink, a book, computer/tablet (all clinics have WiFI), or something else to do during your treatments to help pass the time.  4. You will no longer be followed in [facility] for General Nephrology.  Please cancel any future appointments you have made. Transplant will continue to manage your immunosuppressants.  5. Johann Morton MD will be your Nephrologist and will see you at the facility at least once a month.   6. All of your medication refills will be done by the Nephrologist and nursing staff at the dialysis clinic.  Please have your pharmacy forward them to the dialysis clinic.    Patient was given an opportunity to ask questions and have those questions answered to his satisfaction.  Patient verbalized understanding of instructions provided and agreed to plan of  care.    Thank you for choosing [facility].  Please call me at the number below if you have any questions or concerns.  I will be happy to assist you in any way I can.

## 2020-09-02 ENCOUNTER — MYC REFILL (OUTPATIENT)
Dept: FAMILY MEDICINE | Facility: CLINIC | Age: 45
End: 2020-09-02

## 2020-09-02 ENCOUNTER — MYC MEDICAL ADVICE (OUTPATIENT)
Dept: FAMILY MEDICINE | Facility: CLINIC | Age: 45
End: 2020-09-02

## 2020-09-02 ENCOUNTER — OFFICE VISIT (OUTPATIENT)
Dept: FAMILY MEDICINE | Facility: CLINIC | Age: 45
End: 2020-09-02
Payer: COMMERCIAL

## 2020-09-02 VITALS
BODY MASS INDEX: 20.76 KG/M2 | SYSTOLIC BLOOD PRESSURE: 100 MMHG | OXYGEN SATURATION: 99 % | TEMPERATURE: 97.7 F | HEART RATE: 79 BPM | HEIGHT: 68 IN | DIASTOLIC BLOOD PRESSURE: 64 MMHG | WEIGHT: 137 LBS

## 2020-09-02 DIAGNOSIS — Z01.818 PREOP GENERAL PHYSICAL EXAM: Primary | ICD-10-CM

## 2020-09-02 DIAGNOSIS — L98.9 SKIN LESION: ICD-10-CM

## 2020-09-02 DIAGNOSIS — Z94.0 KIDNEY REPLACED BY TRANSPLANT: ICD-10-CM

## 2020-09-02 DIAGNOSIS — N18.5 ANEMIA OF CHRONIC RENAL FAILURE, STAGE 5 (H): ICD-10-CM

## 2020-09-02 DIAGNOSIS — N18.5 CKD (CHRONIC KIDNEY DISEASE) STAGE 5, GFR LESS THAN 15 ML/MIN (H): ICD-10-CM

## 2020-09-02 DIAGNOSIS — D84.9 IMMUNOSUPPRESSION (H): ICD-10-CM

## 2020-09-02 DIAGNOSIS — M87.00 AVN (AVASCULAR NECROSIS OF BONE) (H): ICD-10-CM

## 2020-09-02 DIAGNOSIS — M87.052 AVASCULAR NECROSIS OF BONE OF LEFT HIP (H): ICD-10-CM

## 2020-09-02 DIAGNOSIS — D63.1 ANEMIA OF CHRONIC RENAL FAILURE, STAGE 5 (H): ICD-10-CM

## 2020-09-02 PROCEDURE — 99215 OFFICE O/P EST HI 40 MIN: CPT | Performed by: NURSE PRACTITIONER

## 2020-09-02 PROCEDURE — 93000 ELECTROCARDIOGRAM COMPLETE: CPT | Performed by: NURSE PRACTITIONER

## 2020-09-02 ASSESSMENT — MIFFLIN-ST. JEOR: SCORE: 1485.93

## 2020-09-02 ASSESSMENT — PAIN SCALES - GENERAL: PAINLEVEL: SEVERE PAIN (7)

## 2020-09-02 NOTE — PROGRESS NOTES
[address] [address]  Phone: [phone]  Primary Provider: Mariel Garcia  Pre-op Performing Provider: CLARISSA HINDS    PREOPERATIVE EVALUATION:  Today's date: 9/2/2020    Rashad Ortiz is a 45 year old male who presents for a preoperative evaluation.    Surgical Information:  Surgery Details 9/2/2020   Surgery/Procedure: Left Hip Replacement   Surgery Location: [facility]   Surgeon: Dr. Fernando Morillo   Surgery Date: 9/9/20   Time of Surgery: 9:30am   Where patient plans to recover: At home with family     Fax number for surgical facility: Note does not need to be faxed, will be available electronically in Epic.  Type of Anesthesia Anticipated: to be determined    Subjective     HPI related to upcoming procedure:   Preop Questions 9/2/2020   1. Have you ever had a heart attack or stroke? No   2. Have you ever had surgery on your heart or blood vessels, such as a stent placement, a coronary artery bypass, or surgery on an artery in your head, neck, heart, or legs? No   3. Do you have chest pain with activity? No   4. Do you have a history of  heart failure? No   5. Do you currently have a cold, bronchitis or symptoms of other infection? No   6. Do you have a cough, shortness of breath, or wheezing? No   7. Do you or anyone in your family have previous history of blood clots? No   8. Do you or does anyone in your family have a serious bleeding problem such as prolonged bleeding following surgeries or cuts? No   9. Have you ever had problems with anemia or been told to take iron pills? YES -    10. Have you had any abnormal blood loss such as black, tarry or bloody stools? No   11. Have you ever had a blood transfusion? YES -    11a. Have you ever had a transfusion reaction? No   Are you willing to have a blood transfusion if it is medically needed before, during, or after your surgery? Yes   13. Have you or any of your relatives  ever had problems with anesthesia? No   14. Do you have sleep apnea, excessive snoring or daytime drowsiness? No   15. Do you have any artifical heart valves or other implanted medical devices like a pacemaker, defibrillator, or continuous glucose monitor? No   16. Do you have artificial joints? No   17. Are you allergic to latex? No     Patient does not have a Health Care Directive or Living Will: patient states it is on file    RX [place] ([place] reviewed:  reviewed- no concerns    ANEMIA - Patient has a recent history of moderate-severe anemia, which has been symptomatic. Work up to date has revealed iron deficiency related to chronic kidney disease. Treatment has been iron supplementation.     HYPERTENSION - Patient has longstanding history of HTN , currently denies any symptoms referable to elevated blood pressure. Specifically denies chest pain, palpitations, dyspnea, orthopnea, PND or peripheral edema. Blood pressure readings have been in normal range. Current medication regimen is as listed below. Patient denies any side effects of medication.     RENAL INSUFFICIENCY - Patient has a longstanding history of moderate-severe chronic renal insufficiency. Last Cr 6.28.       Review of Systems EXCEPT as NOTED ABOVE  CONSTITUTIONAL: NEGATIVE for fever, chills, change in weight  INTEGUMENTARY/SKIN: NEGATIVE for worrisome rashes, moles or lesions  EYES: NEGATIVE for vision changes or irritation  ENT/MOUTH: NEGATIVE for ear, mouth and throat problems  RESP: NEGATIVE for significant cough or SOB  BREAST: NEGATIVE for masses, tenderness or discharge  CV: NEGATIVE for chest pain, palpitations or peripheral edema  GI: NEGATIVE for nausea, abdominal pain, heartburn, or change in bowel habits  : NEGATIVE for frequency, dysuria, or hematuria  MUSCULOSKELETAL: NEGATIVE for significant arthralgias or myalgia  NEURO: NEGATIVE for weakness, dizziness or paresthesias  ENDOCRINE: NEGATIVE for temperature  intolerance, skin/hair changes  HEME: NEGATIVE for bleeding problems  PSYCHIATRIC: NEGATIVE for changes in mood or affect    Patient Active Problem List    Diagnosis Date Noted     Osteonecrosis (H) 07/29/2020     Priority: Medium     Added automatically from request for surgery [id number]       Vitamin D deficiency      Priority: Medium     BPH (benign prostatic hyperplasia)      Priority: Medium     AVN (avascular necrosis of bone) (H)      Priority: Medium     left hip       Secondary renal hyperparathyroidism (H) 03/04/2020     Priority: Medium     Anemia of chronic renal failure, stage 5 (H) 06/17/2019     Priority: Medium     HTN, kidney transplant related 11/02/2018     Priority: Medium     Metabolic acidosis 11/02/2018     Priority: Medium     CKD (chronic kidney disease) stage 5, GFR less than 15 ml/min (H) 11/02/2018     Priority: Medium     Immunosuppression (H) 11/02/2018     Priority: Medium     Escherichia coli septicemia (H) 10/26/2018     Priority: Medium     Pyelonephritis of transplanted kidney 10/26/2018     Priority: Medium     Herpes simplex infection of penis 06/19/2018     Priority: Medium     HSV 1 confirmed by PCR of lesion       Chronic kidney disease, stage 4, severely decreased GFR (H)      Priority: Medium     Medical non-compliance      Priority: Medium     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 Provider to review       Kidney replaced by transplant 05/21/2012     Priority: Medium     Aftercare following organ transplant 05/21/2012     Priority: Medium      Past Medical History:   Diagnosis Date     AVN (avascular necrosis of bone) (H)     left hip     BPH (benign prostatic hyperplasia)      Chronic  kidney disease, stage 4, severely decreased GFR (H)      Gastro-oesophageal reflux disease      Gout      History of blood transfusion      Hypertension      Medical non-compliance      Pulmonary nodules      Steroid long-term use      Vitamin D deficiency      Past Surgical History:   Procedure Laterality Date     AV FISTULA OR GRAFT ARTERIAL       CREATE FISTULA ARTERIOVENOUS UPPER EXTREMITY Right 7/31/2019    Procedure: Creation Of Atriovenous Fistula Right Upper Arm;  Surgeon: Julia Irwin MD;  Location: [facility] OR     LIGATE FISTULA ARTERIOVENOUS UPPER EXTREMITY  12/20/2011    Procedure:LIGATE FISTULA ARTERIOVENOUS UPPER EXTREMITY; Excision of Right Forearm Arteriovenous Fistula.; Surgeon:LINDY AMAYA; Location:[facility] OR     PERCUTANEOUS BIOPSY KIDNEY Right 2/28/2017    Procedure: PERCUTANEOUS BIOPSY KIDNEY;  Surgeon: Gee Barrios MD;  Location: [facility] OR     TRANSPLANT  01/13/2011    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     Current Outpatient Medications   Medication Sig Dispense Refill     acetaminophen (TYLENOL) 500 MG tablet Take 2 tablets (1,000 mg) by mouth every 8 hours as needed for mild pain 160 tablet 3     amLODIPine (NORVASC) 5 MG tablet Take 1 tablet (5 mg) by mouth daily 90 tablet 3     carvedilol (COREG) 25 MG tablet Take 1 tablet (25 mg) by mouth 2 times daily (with meals) 180 tablet 3     cholecalciferol 25 MCG (1000 UT) TABS Take 2,000 Units by mouth daily 90 tablet 3     febuxostat (ULORIC) 80 MG TABS tablet Take 1 tablet (80 mg) by mouth daily 90 tablet 3     ferrous sulfate (FEROSUL) 325 (65 Fe) MG tablet Take 1 tablet (325 mg) by mouth daily (with breakfast) 30 tablet 11     furosemide (LASIX) 20 MG tablet Take 1 tablet (20 mg) by mouth 2 times daily 180 tablet 1     hydrOXYzine (ATARAX) 50 MG tablet Take 0.5-1 tablets (25-50 mg) by mouth nightly as needed (sleep) 30 tablet 3     mycophenolate (GENERIC EQUIVALENT) 250 MG capsule Take 2 capsules (500 mg) by mouth 2 times  "daily 120 capsule 11     omeprazole (PRILOSEC) 20 MG capsule Take 1 capsule (20 mg) by mouth daily 90 capsule 1     order for DME Equipment being ordered: Crutches 1 Device 0     sodium bicarbonate 650 MG tablet Take 2 tablets (1,300 mg) by mouth 3 times daily 180 tablet 11     sulfamethoxazole-trimethoprim (BACTRIM) 400-80 MG tablet Take 1 tablet by mouth every other day 45 tablet 3     tacrolimus (GENERIC EQUIVALENT) 0.5 MG capsule Take 1 capsule (0.5 mg) by mouth every evening Total dose = 1 mg in the AM and 1.5 mg in the PM 30 capsule 11     tacrolimus (GENERIC EQUIVALENT) 1 MG capsule Take 1 capsule (1 mg) by mouth 2 times daily . Total dose=1mg in the AM and 1.5mg in the PM 60 capsule 11     tamsulosin (FLOMAX) 0.4 MG capsule TAKE 1 CAPSULE BY MOUTH DAILY 30 capsule 8     HYDROmorphone (DILAUDID) 4 MG tablet Take 1-2 tablets (4-8 mg) by mouth every 6 hours as needed for severe pain Related to pain that requires surgery. 60 tablet 0       No Known Allergies     Social History     Tobacco Use     Smoking status: Current Some Day Smoker     Types: Cigarettes     Last attempt to quit: 03/2017     Years since quitting: 3.5     Smokeless tobacco: Never Used     Tobacco comment: Patient states that he is an 'social'  smoker. 3 cigarettes per week per pt   Substance Use Topics     Alcohol use: Not Currently     Alcohol/week: 4.2 standard drinks     Types: 5 Standard drinks or equivalent per week     Comment: Quit 8/2020     Family History   Problem Relation Age of Onset     Hypertension Mother      Colon Cancer Mother 66     Colon Cancer Brother 51     History   Drug Use Unknown            Objective   /64 (BP Location: Left arm, Patient Position: Chair, Cuff Size: Adult Regular)   Pulse 79   Temp 97.7  F (36.5  C) (Oral)   Ht 1.727 m (5' 8\")   Wt 62.1 kg (137 lb)   SpO2 99%   BMI 20.83 kg/m    Physical Exam    GENERAL APPEARANCE: healthy, alert and no distress     EYES: EOMI,  PERRL     HENT: ear canals " and TM's normal and nose and mouth without ulcers or lesions     NECK: no adenopathy, no asymmetry, masses, or scars and thyroid normal to palpation     RESP: lungs clear to auscultation - no rales, rhonchi or wheezes     CV: regular rates and rhythm, normal S1 S2, no S3 or S4 and no murmur, click or rub     ABDOMEN:  soft, nontender, no HSM or masses and bowel sounds normal     MS: extremities normal- no gross deformities noted, no evidence of inflammation in joints, FROM in all extremities. Hip with tenderness. LLE with 4+ edema (baseline per patient report)     SKIN: no suspicious lesions or rashes     NEURO: Normal strength and tone, sensory exam grossly normal, mentation intact and speech normal     PSYCH: mentation appears normal. and affect normal/bright     LYMPHATICS: No cervical adenopathy    Recent Labs   Lab Test 08/25/20  1018 08/24/20  1008 08/19/20  1128  07/31/19  0708  10/26/18  0545   HGB 8.6* 9.4*  9.5* 8.7*   < > 8.6*   < > 8.0*    269 269   < > 191   < > 78*   INR  --   --   --   --  1.08  --  1.58*     --  145*   < >  --    < > 139   POTASSIUM 4.7  --  4.5   < > 3.8   < > 4.0   CR 6.28*  --  7.00*   < > 5.91*   < > 9.27*    < > = values in this interval not displayed.        PRE-OP Diagnostics:  No labs were ordered during this visit - had labs last week  EKG: sinus rhythm rate 64, , . No ectopy or symptoms.         Assessment & Plan   The proposed surgical procedure is considered INTERMEDIATE risk.    REVISED CARDIAC RISK INDEX  The patient has the following serious cardiovascular risks for perioperative complications:  Serum Creatinine >2.0 mg/dl = 1 point    INTERPRETATION: 1 point: Class II (low risk - 0.9% complication rate)         ICD-10-CM    1. Preop general physical exam  Z01.818 EKG 12-lead complete w/read - Clinics   2. AVN (avascular necrosis of bone) (H)  M87.00    3. Anemia of chronic renal failure, stage 5 (H)  N18.5     D63.1    4. CKD (chronic kidney  disease) stage 5, GFR less than 15 ml/min (H)  N18.5    5. Immunosuppression (H)  D89.9    6. Kidney replaced by transplant  Z94.0    7. Skin lesion  L98.9 DERMATOLOGY ADULT REFERRAL       The patient has the following additional risks and recommendations for perioperative complications:     - Consult Hospitalist / IM to assist with post-op medical management       MEDICATION INSTRUCTIONS:  Patient is to take all scheduled medications on the day of surgery EXCEPT for modifications listed below:    CARDIAC Medications:   - DIURETICS: HOLD on the day of surgery. Lasix    Hold Dilaudid morning of surgery.    RECOMMENDATION:  APPROVAL GIVEN to proceed with proposed procedure, without further diagnostic evaluation.    Return in about 4 weeks (around 9/30/2020), or if symptoms worsen or fail to improve.    Signed Electronically by: ISABEL Myers CNP    Copy of this evaluation report is provided to requesting physician.    [facility]    Revised Cardiac Risk Index

## 2020-09-02 NOTE — PATIENT INSTRUCTIONS
At [facility], we strive to deliver an exceptional experience to you, every time we see you. If you receive a survey, please complete it as we do value your feedback.  If you have MyChart, you can expect to receive results automatically within 24 hours of their completion.  Your provider will send a note interpreting your results as well.   If you do not have MyChart, you should receive your results in about a week by mail.    Your care team:                            Family Medicine Internal Medicine   MD Juanito Lynne MD Shantel Branch-Fleming, MD Srinivasa Vaka, MD Katya Georgiev PA-C Megan Hill, APRN CNP    Srinivas Harrington, MD Pediatrics   Greg Fink, PAJaredC  Mellissa Balderrama, CNP MD Leanna Guzmán APRN CNP   MD Kelly Tipton MD Deborah Mielke, MD Sarah Schmitz, APRN CNP  Reina Perdue PAKARLA Rangel, CNP  MD Anny Salcido MD Angela Wermerskirchen, MD      Clinic hours: Monday - Thursday 7 am-7 pm; Fridays 7 am-5 pm.   Urgent care: Monday - Friday 11 am-9 pm; Saturday and Sunday 9 am-5 pm.    Clinic: [phone]       [facility]: Monday - Thursday 8 am - 7 pm; Friday 8 am - 6 pm  [facility]: [phone]     Use [email or web address] for 24/7 diagnosis and treatment of dozens of conditions.    Preparing for Your Surgery  Getting started  A surgery nurse will call you to review your health history and instructions. They will give you an arrival time based on your scheduled surgery time.  Please be ready to share the following:    Your doctor's clinic name and phone number    Your medical, surgical and anesthesia history    A list of allergies and sensitivities    A list of medicines, including herbal treatments and over-the-counter drugs    Whether the patient has a legal guardian (ask how to send us the papers in advance)  If your child is  "having surgery, please ask for a copy of Preparing for Your Child's Surgery.    Preparing for surgery    Within 30 days of surgery: Have an exam at your family clinic (primary care clinic), or go to a pre-operative clinic. This exam is called a \"History and Physical,\" or H&P.    At your H&P exam, talk to your care team about all medicines you take. If you need to stop any medicines before surgery, ask when to start taking them again.  ? We do this for your safety. Many medicines can make you bleed too much during surgery. Some change how well surgery (anesthesia) drugs work.    Call your insurance company to see what it will and won't pay for. Ask if they need to pre-approve the surgery. (If no insurance, call [phone].)    Call your surgeon's clinic if there's any change in your health. This includes signs of a cold or flu (sore throat, runny nose, cough, rash, fever). It also includes a scrape or scratch near the surgery site.    If you have questions on the day of surgery, call your surgery center.  Eating and drinking guidelines  For your safety: Unless your surgeon tells you otherwise, follow the guidelines below.    Eat and drink as usual until 8 hours before surgery. After that, no food or milk.    Drink clear liquids until 2 hours before surgery. These are liquids you can see through, like water, Gatorade and Propel Water. You may also have black coffee and tea (no cream or milk).    Nothing by mouth within 2 hours of surgery. This includes gum, candy and breath mints.    Stop alcohol the midnight before surgery.    If your family clinic tells you to take medicine on the morning of surgery, it's okay to take it with a sip of water.  Preventing infection    Shower or bathe the night before and morning of your surgery. Follow the instructions your clinic gave you. (If no instructions, use regular soap.)    Don't shave or clip hair near your surgery site. This can lead to skin infection.    Don't smoke the " morning of surgery. Smoking increases the risk of infection. You may chew nicotine gum up to 2 hours before surgery. A nicotine patch is okay.  ? Note: Some surgeries require you to completely quit smoking and nicotine. Check with your surgeon.    Your care team will make every effort to keep you safe from infection. We will:  ? Clean our hands often with soap and water (or an alcohol-based hand rub).  ? Clean the skin at your surgery site with a special soap that kills germs. We'll also remove hair from the site as needed.  ? Wear special hair covers, masks, gowns and gloves during surgery.  ? Give antibiotic medicine, if prescribed. Not all surgeries need antibiotics.  What to bring on the day of surgery    Photo ID and insurance card    Copy of your health care directive, if you have one    Glasses and hearing aides (bring cases)  ? You can't wear contacts during surgery    Inhaler and eye drops, if you use them (tell us about these when you arrive)    CPAP machine or breathing device, if you use them    A few personal items, if spending the night    If you have . . .  ? A pacemaker or ICD (cardiac defibrillator): Bring the ID card.  ? An implanted stimulator: Bring the remote control.  ? A legal guardian: Bring a copy of the certified (court-stamped) guardianship papers.  Please remove any jewelry, including body piercings. Leave jewelry and other valuables at home.  If you're going home the day of surgery  Important: If you don't follow the rules below, we must cancel your surgery.     Arrange for someone to drive you home after surgery. You may not drive, take a taxi or take public transportation by yourself (unless you'll have local anesthesia only).    Arrange for a responsible adult to stay with you overnight. If you don't, we may keep you in the hospital overnight, and you may need to pay the costs yourself.  Questions?   If you have any questions for your care team, list them here:  _________________________________________________________________________________________________________________________________________________________________________________________________________________________________________________________________________________________________________________________  For informational purposes only. Not to replace the advice of your health care provider. Copyright   5865-9047 [facility]. All rights reserved. Clinically reviewed by Donna Vincent MD. SMART[employer] 115623 - REV 07/19.

## 2020-09-03 ENCOUNTER — TELEPHONE (OUTPATIENT)
Dept: PHARMACY | Facility: CLINIC | Age: 45
End: 2020-09-03

## 2020-09-03 NOTE — TELEPHONE ENCOUNTER
Referred by Dr. Abran Cunha on 02/27/2020    Rashad started on dialysis this week, will close Anemia Services.      Anemia and ALWRENCE therapy will be managed by Dialysis.    Anemia Latest Ref Rng & Units 7/28/2020 8/10/2020 8/19/2020 8/24/2020 8/24/2020 8/24/2020 8/25/2020   LAWRENCE Dose - - 60 mcg - - - 60 mcg -   Hemoglobin 13.3 - 17.7 g/dL 9.1(L) 9.2(L) 8.7(L) 9.5(L) 9.4(L) - 8.6(L)   TSAT 15 - 46 % - - - - - - -   Ferritin 26 - 388 ng/mL - - - - - - -       Anemia labs discontinued, therapy plans cancelled, removed from active patient list, and referring provider notified.      Cate Ellis RN   Anemia Services  [address] [address]   [email or web address]   Office : [phone]  Fax: [phone]

## 2020-09-03 NOTE — TELEPHONE ENCOUNTER
Controlled Substance Refill Request for hydromorphone  Problem List Complete:  No     PROVIDER TO CONSIDER COMPLETION OF PROBLEM LIST AND OVERVIEW/CONTROLLED SUBSTANCE AGREEMENT    Last Written Prescription Date:  8/11/2020  Last Fill Quantity: 60,   # refills: 0    THE MOST RECENT OFFICE VISIT MUST BE WITHIN THE PAST 3 MONTHS. AT LEAST ONE FACE TO FACE VISIT MUST OCCUR EVERY 6 MONTHS. ADDITIONAL VISITS CAN BE VIRTUAL.  (THIS STATEMENT SHOULD BE DELETED.)    Last Office Visit with [facility] primary care provider: 9/2/2020    Future Office visit:     Controlled substance agreement:   Encounter-Level CSA:    There are no encounter-level csa.     Patient-Level CSA:    There are no patient-level csa.         Last Urine Drug Screen: No results found for: CDAUT, No results found for: COMDAT, No results found for: THC13, PCP13, COC13, MAMP13, OPI13, AMP13, BZO13, TCA13, MTD13, BAR13, OXY13, PPX13, BUP13     Processing:  Rx to be electronically transmitted to pharmacy by provider      [email or web address]       checked in past 3 months?  Chronic pain syndrome not on problem list. Not checked.    Radha Damon RN, [facility] Triage

## 2020-09-04 RX ORDER — HYDROMORPHONE HYDROCHLORIDE 4 MG/1
4-8 TABLET ORAL EVERY 6 HOURS PRN
Qty: 60 TABLET | Refills: 0 | Status: ON HOLD | OUTPATIENT
Start: 2020-09-04 | End: 2020-09-10

## 2020-09-06 DIAGNOSIS — Z11.59 ENCOUNTER FOR SCREENING FOR OTHER VIRAL DISEASES: ICD-10-CM

## 2020-09-06 PROCEDURE — U0003 INFECTIOUS AGENT DETECTION BY NUCLEIC ACID (DNA OR RNA); SEVERE ACUTE RESPIRATORY SYNDROME CORONAVIRUS 2 (SARS-COV-2) (CORONAVIRUS DISEASE [COVID-19]), AMPLIFIED PROBE TECHNIQUE, MAKING USE OF HIGH THROUGHPUT TECHNOLOGIES AS DESCRIBED BY CMS-2020-01-R: HCPCS | Performed by: ORTHOPAEDIC SURGERY

## 2020-09-07 ENCOUNTER — MYC MEDICAL ADVICE (OUTPATIENT)
Dept: FAMILY MEDICINE | Facility: CLINIC | Age: 45
End: 2020-09-07

## 2020-09-07 DIAGNOSIS — D63.1 ANEMIA DUE TO CHRONIC KIDNEY DISEASE, ON CHRONIC DIALYSIS (H): Primary | ICD-10-CM

## 2020-09-07 DIAGNOSIS — Z99.2 ANEMIA DUE TO CHRONIC KIDNEY DISEASE, ON CHRONIC DIALYSIS (H): Primary | ICD-10-CM

## 2020-09-07 DIAGNOSIS — N18.6 ANEMIA DUE TO CHRONIC KIDNEY DISEASE, ON CHRONIC DIALYSIS (H): Primary | ICD-10-CM

## 2020-09-07 LAB
SARS-COV-2 RNA SPEC QL NAA+PROBE: NOT DETECTED
SPECIMEN SOURCE: NORMAL

## 2020-09-08 ENCOUNTER — ANESTHESIA EVENT (OUTPATIENT)
Dept: SURGERY | Facility: CLINIC | Age: 45
End: 2020-09-08
Payer: COMMERCIAL

## 2020-09-09 ENCOUNTER — ANESTHESIA (OUTPATIENT)
Dept: SURGERY | Facility: CLINIC | Age: 45
End: 2020-09-09
Payer: COMMERCIAL

## 2020-09-09 ENCOUNTER — APPOINTMENT (OUTPATIENT)
Dept: GENERAL RADIOLOGY | Facility: CLINIC | Age: 45
End: 2020-09-09
Attending: PHYSICIAN ASSISTANT
Payer: COMMERCIAL

## 2020-09-09 ENCOUNTER — HOSPITAL ENCOUNTER (OUTPATIENT)
Facility: CLINIC | Age: 45
LOS: 1 days | Discharge: HOME | End: 2020-09-10
Attending: ORTHOPAEDIC SURGERY | Admitting: ORTHOPAEDIC SURGERY
Payer: COMMERCIAL

## 2020-09-09 DIAGNOSIS — Z94.0 KIDNEY REPLACED BY TRANSPLANT: ICD-10-CM

## 2020-09-09 DIAGNOSIS — M87.052 AVASCULAR NECROSIS OF BONE OF LEFT HIP (H): ICD-10-CM

## 2020-09-09 DIAGNOSIS — M87.9 OSTEONECROSIS (H): ICD-10-CM

## 2020-09-09 DIAGNOSIS — Z98.890 STATUS POST HIP SURGERY: Primary | ICD-10-CM

## 2020-09-09 PROBLEM — N18.6 ESRD (END STAGE RENAL DISEASE) ON DIALYSIS (H): Status: ACTIVE | Noted: 2020-09-09

## 2020-09-09 PROBLEM — Z99.2 ESRD (END STAGE RENAL DISEASE) ON DIALYSIS (H): Status: ACTIVE | Noted: 2020-09-09

## 2020-09-09 PROBLEM — I15.1 HTN, KIDNEY TRANSPLANT RELATED: Status: ACTIVE | Noted: 2018-11-02

## 2020-09-09 LAB
ABO + RH BLD: NORMAL
ABO + RH BLD: NORMAL
ALBUMIN UR-MCNC: 30 MG/DL
APPEARANCE UR: CLEAR
BACTERIA #/AREA URNS HPF: ABNORMAL /HPF
BILIRUB UR QL STRIP: NEGATIVE
BLD GP AB SCN SERPL QL: NORMAL
BLD PROD TYP BPU: NORMAL
BLD PROD TYP BPU: NORMAL
BLD UNIT ID BPU: 0
BLOOD BANK CMNT PATIENT-IMP: NORMAL
BLOOD PRODUCT CODE: NORMAL
BPU ID: NORMAL
COLOR UR AUTO: YELLOW
CREAT SERPL-MCNC: 5.96 MG/DL (ref 0.66–1.25)
GFR SERPL CREATININE-BSD FRML MDRD: 11 ML/MIN/{1.73_M2}
GLUCOSE BLDC GLUCOMTR-MCNC: 141 MG/DL (ref 70–99)
GLUCOSE SERPL-MCNC: 97 MG/DL (ref 70–99)
GLUCOSE UR STRIP-MCNC: NEGATIVE MG/DL
HGB BLD-MCNC: 8.6 G/DL (ref 13.3–17.7)
HGB UR QL STRIP: NEGATIVE
HYALINE CASTS #/AREA URNS LPF: 6 /LPF (ref 0–2)
KETONES UR STRIP-MCNC: 5 MG/DL
LEUKOCYTE ESTERASE UR QL STRIP: NEGATIVE
MUCOUS THREADS #/AREA URNS LPF: PRESENT /LPF
NITRATE UR QL: NEGATIVE
NUM BPU REQUESTED: 1
PH UR STRIP: 8 PH (ref 5–7)
POTASSIUM SERPL-SCNC: 5 MMOL/L (ref 3.4–5.3)
RBC #/AREA URNS AUTO: <1 /HPF (ref 0–2)
SOURCE: ABNORMAL
SP GR UR STRIP: 1.01 (ref 1–1.03)
SPECIMEN EXP DATE BLD: NORMAL
TRANSFUSION STATUS PATIENT QL: NORMAL
TRANSFUSION STATUS PATIENT QL: NORMAL
UROBILINOGEN UR STRIP-MCNC: 2 MG/DL (ref 0–2)
WBC #/AREA URNS AUTO: 2 /HPF (ref 0–5)

## 2020-09-09 PROCEDURE — 37000009 ZZH ANESTHESIA TECHNICAL FEE, EACH ADDTL 15 MIN: Performed by: ORTHOPAEDIC SURGERY

## 2020-09-09 PROCEDURE — 71000014 ZZH RECOVERY PHASE 1 LEVEL 2 FIRST HR: Performed by: ORTHOPAEDIC SURGERY

## 2020-09-09 PROCEDURE — C1713 ANCHOR/SCREW BN/BN,TIS/BN: HCPCS | Performed by: ORTHOPAEDIC SURGERY

## 2020-09-09 PROCEDURE — 25000131 ZZH RX MED GY IP 250 OP 636 PS 637: Performed by: INTERNAL MEDICINE

## 2020-09-09 PROCEDURE — 71000015 ZZH RECOVERY PHASE 1 LEVEL 2 EA ADDTL HR: Performed by: ORTHOPAEDIC SURGERY

## 2020-09-09 PROCEDURE — 40000986 XR PELVIS AND HIP PORTABLE LEFT 1 VIEW

## 2020-09-09 PROCEDURE — 25000128 H RX IP 250 OP 636: Performed by: ORTHOPAEDIC SURGERY

## 2020-09-09 PROCEDURE — 25000565 ZZH ISOFLURANE, EA 15 MIN: Performed by: ORTHOPAEDIC SURGERY

## 2020-09-09 PROCEDURE — 25000132 ZZH RX MED GY IP 250 OP 250 PS 637: Performed by: ORTHOPAEDIC SURGERY

## 2020-09-09 PROCEDURE — 81001 URINALYSIS AUTO W/SCOPE: CPT | Performed by: ORTHOPAEDIC SURGERY

## 2020-09-09 PROCEDURE — 36000062 ZZH SURGERY LEVEL 4 1ST 30 MIN - UMMC: Performed by: ORTHOPAEDIC SURGERY

## 2020-09-09 PROCEDURE — 99207 ZZC CONSULT E&M CHANGED TO SUBSEQUENT LEVEL: CPT | Performed by: INTERNAL MEDICINE

## 2020-09-09 PROCEDURE — 40000170 ZZH STATISTIC PRE-PROCEDURE ASSESSMENT II: Performed by: ORTHOPAEDIC SURGERY

## 2020-09-09 PROCEDURE — 82565 ASSAY OF CREATININE: CPT | Performed by: ANESTHESIOLOGY

## 2020-09-09 PROCEDURE — P9041 ALBUMIN (HUMAN),5%, 50ML: HCPCS | Performed by: NURSE ANESTHETIST, CERTIFIED REGISTERED

## 2020-09-09 PROCEDURE — 37000008 ZZH ANESTHESIA TECHNICAL FEE, 1ST 30 MIN: Performed by: ORTHOPAEDIC SURGERY

## 2020-09-09 PROCEDURE — 25800030 ZZH RX IP 258 OP 636: Performed by: STUDENT IN AN ORGANIZED HEALTH CARE EDUCATION/TRAINING PROGRAM

## 2020-09-09 PROCEDURE — 25000125 ZZHC RX 250: Performed by: NURSE ANESTHETIST, CERTIFIED REGISTERED

## 2020-09-09 PROCEDURE — 86901 BLOOD TYPING SEROLOGIC RH(D): CPT | Performed by: ANESTHESIOLOGY

## 2020-09-09 PROCEDURE — 82962 GLUCOSE BLOOD TEST: CPT

## 2020-09-09 PROCEDURE — 25000132 ZZH RX MED GY IP 250 OP 250 PS 637: Performed by: STUDENT IN AN ORGANIZED HEALTH CARE EDUCATION/TRAINING PROGRAM

## 2020-09-09 PROCEDURE — 82947 ASSAY GLUCOSE BLOOD QUANT: CPT | Performed by: ANESTHESIOLOGY

## 2020-09-09 PROCEDURE — 86900 BLOOD TYPING SEROLOGIC ABO: CPT | Performed by: ANESTHESIOLOGY

## 2020-09-09 PROCEDURE — 84132 ASSAY OF SERUM POTASSIUM: CPT | Performed by: ANESTHESIOLOGY

## 2020-09-09 PROCEDURE — 36000064 ZZH SURGERY LEVEL 4 EA 15 ADDTL MIN - UMMC: Performed by: ORTHOPAEDIC SURGERY

## 2020-09-09 PROCEDURE — 85018 HEMOGLOBIN: CPT | Performed by: ANESTHESIOLOGY

## 2020-09-09 PROCEDURE — 36415 COLL VENOUS BLD VENIPUNCTURE: CPT | Performed by: ANESTHESIOLOGY

## 2020-09-09 PROCEDURE — 25800030 ZZH RX IP 258 OP 636: Performed by: ORTHOPAEDIC SURGERY

## 2020-09-09 PROCEDURE — 99214 OFFICE O/P EST MOD 30 MIN: CPT | Performed by: INTERNAL MEDICINE

## 2020-09-09 PROCEDURE — 25000132 ZZH RX MED GY IP 250 OP 250 PS 637: Performed by: PHYSICIAN ASSISTANT

## 2020-09-09 PROCEDURE — 86923 COMPATIBILITY TEST ELECTRIC: CPT | Performed by: ANESTHESIOLOGY

## 2020-09-09 PROCEDURE — 25800030 ZZH RX IP 258 OP 636: Performed by: NURSE ANESTHETIST, CERTIFIED REGISTERED

## 2020-09-09 PROCEDURE — 25800025 ZZH RX 258: Performed by: ORTHOPAEDIC SURGERY

## 2020-09-09 PROCEDURE — 27210794 ZZH OR GENERAL SUPPLY STERILE: Performed by: ORTHOPAEDIC SURGERY

## 2020-09-09 PROCEDURE — 25000128 H RX IP 250 OP 636: Performed by: STUDENT IN AN ORGANIZED HEALTH CARE EDUCATION/TRAINING PROGRAM

## 2020-09-09 PROCEDURE — 25000566 ZZH SEVOFLURANE, EA 15 MIN: Performed by: ORTHOPAEDIC SURGERY

## 2020-09-09 PROCEDURE — 25000128 H RX IP 250 OP 636: Performed by: NURSE ANESTHETIST, CERTIFIED REGISTERED

## 2020-09-09 PROCEDURE — 86850 RBC ANTIBODY SCREEN: CPT | Performed by: ANESTHESIOLOGY

## 2020-09-09 PROCEDURE — C1776 JOINT DEVICE (IMPLANTABLE): HCPCS | Performed by: ORTHOPAEDIC SURGERY

## 2020-09-09 DEVICE — R3 0 DEGREE XLPE ACETABULAR LINER                                    36MM INNER DIAMETER X OUTER DIAMETER 52MM
Type: IMPLANTABLE DEVICE | Site: HIP | Status: FUNCTIONAL
Brand: R3

## 2020-09-09 DEVICE — IMP STEM FEM HIP SNN SYNERGY POROUS SZ 12 HO 71306112: Type: IMPLANTABLE DEVICE | Site: HIP | Status: FUNCTIONAL

## 2020-09-09 DEVICE — REFLECTION SPHERICAL HEAD SCREW 40MM
Type: IMPLANTABLE DEVICE | Site: HIP | Status: FUNCTIONAL
Brand: REFLECTION

## 2020-09-09 DEVICE — R3 3 HOLE ACETABULAR SHELL 52MM
Type: IMPLANTABLE DEVICE | Site: HIP | Status: FUNCTIONAL
Brand: R3 ACETABULAR

## 2020-09-09 DEVICE — REFLECTION SPHERICAL HEAD SCREW 15MM
Type: IMPLANTABLE DEVICE | Site: HIP | Status: FUNCTIONAL
Brand: REFLECTION

## 2020-09-09 DEVICE — IMP HEAD FEMORAL SNN BIPOLAR COBALT 36MM +4 71303604: Type: IMPLANTABLE DEVICE | Site: HIP | Status: FUNCTIONAL

## 2020-09-09 RX ORDER — CARVEDILOL 25 MG/1
25 TABLET ORAL 2 TIMES DAILY WITH MEALS
Status: DISCONTINUED | OUTPATIENT
Start: 2020-09-09 | End: 2020-09-09

## 2020-09-09 RX ORDER — HYDROMORPHONE HYDROCHLORIDE 1 MG/ML
.3-.5 INJECTION, SOLUTION INTRAMUSCULAR; INTRAVENOUS; SUBCUTANEOUS EVERY 10 MIN PRN
Status: DISCONTINUED | OUTPATIENT
Start: 2020-09-09 | End: 2020-09-09 | Stop reason: HOSPADM

## 2020-09-09 RX ORDER — ONDANSETRON 4 MG/1
4 TABLET, ORALLY DISINTEGRATING ORAL EVERY 30 MIN PRN
Status: DISCONTINUED | OUTPATIENT
Start: 2020-09-09 | End: 2020-09-09 | Stop reason: HOSPADM

## 2020-09-09 RX ORDER — BISACODYL 10 MG
10 SUPPOSITORY, RECTAL RECTAL DAILY PRN
Status: DISCONTINUED | OUTPATIENT
Start: 2020-09-09 | End: 2020-09-10 | Stop reason: HOSPADM

## 2020-09-09 RX ORDER — ONDANSETRON 2 MG/ML
4 INJECTION INTRAMUSCULAR; INTRAVENOUS EVERY 30 MIN PRN
Status: DISCONTINUED | OUTPATIENT
Start: 2020-09-09 | End: 2020-09-09 | Stop reason: HOSPADM

## 2020-09-09 RX ORDER — CEFAZOLIN SODIUM 2 G/100ML
2 INJECTION, SOLUTION INTRAVENOUS
Status: COMPLETED | OUTPATIENT
Start: 2020-09-09 | End: 2020-09-09

## 2020-09-09 RX ORDER — SODIUM BICARBONATE 650 MG/1
1300 TABLET ORAL 3 TIMES DAILY
Status: DISCONTINUED | OUTPATIENT
Start: 2020-09-10 | End: 2020-09-10

## 2020-09-09 RX ORDER — ASPIRIN 81 MG/1
81 TABLET ORAL DAILY
Status: DISCONTINUED | OUTPATIENT
Start: 2020-09-10 | End: 2020-09-10

## 2020-09-09 RX ORDER — POLYETHYLENE GLYCOL 3350 17 G/17G
17 POWDER, FOR SOLUTION ORAL DAILY
Status: DISCONTINUED | OUTPATIENT
Start: 2020-09-10 | End: 2020-09-10 | Stop reason: HOSPADM

## 2020-09-09 RX ORDER — NALOXONE HYDROCHLORIDE 0.4 MG/ML
.1-.4 INJECTION, SOLUTION INTRAMUSCULAR; INTRAVENOUS; SUBCUTANEOUS
Status: DISCONTINUED | OUTPATIENT
Start: 2020-09-09 | End: 2020-09-09 | Stop reason: HOSPADM

## 2020-09-09 RX ORDER — SODIUM CHLORIDE, SODIUM LACTATE, POTASSIUM CHLORIDE, CALCIUM CHLORIDE 600; 310; 30; 20 MG/100ML; MG/100ML; MG/100ML; MG/100ML
INJECTION, SOLUTION INTRAVENOUS CONTINUOUS
Status: DISCONTINUED | OUTPATIENT
Start: 2020-09-09 | End: 2020-09-09 | Stop reason: HOSPADM

## 2020-09-09 RX ORDER — FENTANYL CITRATE 50 UG/ML
25-50 INJECTION, SOLUTION INTRAMUSCULAR; INTRAVENOUS
Status: DISCONTINUED | OUTPATIENT
Start: 2020-09-09 | End: 2020-09-09 | Stop reason: HOSPADM

## 2020-09-09 RX ORDER — MEPERIDINE HYDROCHLORIDE 25 MG/ML
12.5 INJECTION INTRAMUSCULAR; INTRAVENOUS; SUBCUTANEOUS
Status: DISCONTINUED | OUTPATIENT
Start: 2020-09-09 | End: 2020-09-09 | Stop reason: HOSPADM

## 2020-09-09 RX ORDER — TAMSULOSIN HYDROCHLORIDE 0.4 MG/1
0.4 CAPSULE ORAL DAILY
Status: DISCONTINUED | OUTPATIENT
Start: 2020-09-10 | End: 2020-09-10 | Stop reason: HOSPADM

## 2020-09-09 RX ORDER — OXYCODONE HYDROCHLORIDE 5 MG/1
5 TABLET ORAL EVERY 4 HOURS PRN
Status: DISCONTINUED | OUTPATIENT
Start: 2020-09-09 | End: 2020-09-09

## 2020-09-09 RX ORDER — GLYCOPYRROLATE 0.2 MG/ML
INJECTION, SOLUTION INTRAMUSCULAR; INTRAVENOUS PRN
Status: DISCONTINUED | OUTPATIENT
Start: 2020-09-09 | End: 2020-09-09

## 2020-09-09 RX ORDER — FENTANYL CITRATE 50 UG/ML
INJECTION, SOLUTION INTRAMUSCULAR; INTRAVENOUS PRN
Status: DISCONTINUED | OUTPATIENT
Start: 2020-09-09 | End: 2020-09-09

## 2020-09-09 RX ORDER — LIDOCAINE HYDROCHLORIDE 20 MG/ML
INJECTION, SOLUTION INFILTRATION; PERINEURAL PRN
Status: DISCONTINUED | OUTPATIENT
Start: 2020-09-09 | End: 2020-09-09

## 2020-09-09 RX ORDER — NALOXONE HYDROCHLORIDE 0.4 MG/ML
.1-.4 INJECTION, SOLUTION INTRAMUSCULAR; INTRAVENOUS; SUBCUTANEOUS
Status: DISCONTINUED | OUTPATIENT
Start: 2020-09-09 | End: 2020-09-10 | Stop reason: HOSPADM

## 2020-09-09 RX ORDER — EPHEDRINE SULFATE 50 MG/ML
INJECTION, SOLUTION INTRAVENOUS PRN
Status: DISCONTINUED | OUTPATIENT
Start: 2020-09-09 | End: 2020-09-09

## 2020-09-09 RX ORDER — ACETAMINOPHEN 325 MG/1
975 TABLET ORAL ONCE
Status: COMPLETED | OUTPATIENT
Start: 2020-09-09 | End: 2020-09-09

## 2020-09-09 RX ORDER — FERROUS SULFATE 325(65) MG
325 TABLET ORAL
Status: DISCONTINUED | OUTPATIENT
Start: 2020-09-10 | End: 2020-09-10

## 2020-09-09 RX ORDER — PROCHLORPERAZINE MALEATE 10 MG
10 TABLET ORAL EVERY 6 HOURS PRN
Status: DISCONTINUED | OUTPATIENT
Start: 2020-09-09 | End: 2020-09-10 | Stop reason: HOSPADM

## 2020-09-09 RX ORDER — AMLODIPINE BESYLATE 5 MG/1
5 TABLET ORAL DAILY
Status: DISCONTINUED | OUTPATIENT
Start: 2020-09-10 | End: 2020-09-09

## 2020-09-09 RX ORDER — AMOXICILLIN 250 MG
1 CAPSULE ORAL 2 TIMES DAILY
Status: DISCONTINUED | OUTPATIENT
Start: 2020-09-09 | End: 2020-09-10 | Stop reason: HOSPADM

## 2020-09-09 RX ORDER — LANOLIN ALCOHOL/MO/W.PET/CERES
3 CREAM (GRAM) TOPICAL
Status: DISCONTINUED | OUTPATIENT
Start: 2020-09-09 | End: 2020-09-10 | Stop reason: HOSPADM

## 2020-09-09 RX ORDER — FEBUXOSTAT 40 MG/1
80 TABLET, FILM COATED ORAL DAILY
Status: DISCONTINUED | OUTPATIENT
Start: 2020-09-10 | End: 2020-09-10 | Stop reason: HOSPADM

## 2020-09-09 RX ORDER — OXYCODONE HYDROCHLORIDE 5 MG/1
5-10 TABLET ORAL
Status: DISCONTINUED | OUTPATIENT
Start: 2020-09-09 | End: 2020-09-10

## 2020-09-09 RX ORDER — CEFAZOLIN SODIUM 1 G/3ML
1 INJECTION, POWDER, FOR SOLUTION INTRAMUSCULAR; INTRAVENOUS EVERY 12 HOURS
Status: COMPLETED | OUTPATIENT
Start: 2020-09-10 | End: 2020-09-10

## 2020-09-09 RX ORDER — AMOXICILLIN 250 MG
2 CAPSULE ORAL 2 TIMES DAILY
Status: DISCONTINUED | OUTPATIENT
Start: 2020-09-09 | End: 2020-09-10 | Stop reason: HOSPADM

## 2020-09-09 RX ORDER — PROPOFOL 10 MG/ML
INJECTION, EMULSION INTRAVENOUS PRN
Status: DISCONTINUED | OUTPATIENT
Start: 2020-09-09 | End: 2020-09-09

## 2020-09-09 RX ORDER — HYDROMORPHONE HCL/0.9% NACL/PF 0.2MG/0.2
.2-.4 SYRINGE (ML) INTRAVENOUS
Status: DISCONTINUED | OUTPATIENT
Start: 2020-09-09 | End: 2020-09-10 | Stop reason: HOSPADM

## 2020-09-09 RX ORDER — LIDOCAINE 40 MG/G
CREAM TOPICAL
Status: DISCONTINUED | OUTPATIENT
Start: 2020-09-09 | End: 2020-09-09 | Stop reason: HOSPADM

## 2020-09-09 RX ORDER — ALBUMIN, HUMAN INJ 5% 5 %
SOLUTION INTRAVENOUS CONTINUOUS PRN
Status: DISCONTINUED | OUTPATIENT
Start: 2020-09-09 | End: 2020-09-09

## 2020-09-09 RX ORDER — TRANEXAMIC ACID 650 MG/1
1950 TABLET ORAL ONCE
Status: DISCONTINUED | OUTPATIENT
Start: 2020-09-09 | End: 2020-09-09

## 2020-09-09 RX ORDER — MYCOPHENOLATE MOFETIL 250 MG/1
500 CAPSULE ORAL 2 TIMES DAILY
Status: DISCONTINUED | OUTPATIENT
Start: 2020-09-10 | End: 2020-09-10 | Stop reason: HOSPADM

## 2020-09-09 RX ORDER — SODIUM CHLORIDE, SODIUM LACTATE, POTASSIUM CHLORIDE, CALCIUM CHLORIDE 600; 310; 30; 20 MG/100ML; MG/100ML; MG/100ML; MG/100ML
INJECTION, SOLUTION INTRAVENOUS CONTINUOUS
Status: DISCONTINUED | OUTPATIENT
Start: 2020-09-09 | End: 2020-09-10

## 2020-09-09 RX ORDER — NEOSTIGMINE METHYLSULFATE 1 MG/ML
VIAL (ML) INJECTION PRN
Status: DISCONTINUED | OUTPATIENT
Start: 2020-09-09 | End: 2020-09-09

## 2020-09-09 RX ORDER — ONDANSETRON 2 MG/ML
INJECTION INTRAMUSCULAR; INTRAVENOUS PRN
Status: DISCONTINUED | OUTPATIENT
Start: 2020-09-09 | End: 2020-09-09

## 2020-09-09 RX ORDER — CEFAZOLIN SODIUM 1 G/3ML
1 INJECTION, POWDER, FOR SOLUTION INTRAMUSCULAR; INTRAVENOUS SEE ADMIN INSTRUCTIONS
Status: DISCONTINUED | OUTPATIENT
Start: 2020-09-09 | End: 2020-09-09 | Stop reason: HOSPADM

## 2020-09-09 RX ORDER — ONDANSETRON 4 MG/1
4 TABLET, ORALLY DISINTEGRATING ORAL EVERY 6 HOURS PRN
Status: DISCONTINUED | OUTPATIENT
Start: 2020-09-09 | End: 2020-09-10 | Stop reason: HOSPADM

## 2020-09-09 RX ORDER — ONDANSETRON 2 MG/ML
4 INJECTION INTRAMUSCULAR; INTRAVENOUS EVERY 6 HOURS PRN
Status: DISCONTINUED | OUTPATIENT
Start: 2020-09-09 | End: 2020-09-10 | Stop reason: HOSPADM

## 2020-09-09 RX ORDER — TACROLIMUS 1 MG/1
1 CAPSULE ORAL
Status: DISCONTINUED | OUTPATIENT
Start: 2020-09-09 | End: 2020-09-10 | Stop reason: HOSPADM

## 2020-09-09 RX ORDER — SODIUM CHLORIDE, SODIUM LACTATE, POTASSIUM CHLORIDE, CALCIUM CHLORIDE 600; 310; 30; 20 MG/100ML; MG/100ML; MG/100ML; MG/100ML
INJECTION, SOLUTION INTRAVENOUS CONTINUOUS PRN
Status: DISCONTINUED | OUTPATIENT
Start: 2020-09-09 | End: 2020-09-09

## 2020-09-09 RX ORDER — TACROLIMUS 0.5 MG/1
0.5 CAPSULE ORAL EVERY EVENING
Status: DISCONTINUED | OUTPATIENT
Start: 2020-09-09 | End: 2020-09-10 | Stop reason: HOSPADM

## 2020-09-09 RX ORDER — ACETAMINOPHEN 325 MG/1
975 TABLET ORAL EVERY 8 HOURS
Status: DISCONTINUED | OUTPATIENT
Start: 2020-09-09 | End: 2020-09-10 | Stop reason: HOSPADM

## 2020-09-09 RX ORDER — LIDOCAINE 40 MG/G
CREAM TOPICAL
Status: DISCONTINUED | OUTPATIENT
Start: 2020-09-09 | End: 2020-09-10 | Stop reason: HOSPADM

## 2020-09-09 RX ORDER — EPHEDRINE SULFATE 50 MG/ML
INJECTION, SOLUTION INTRAMUSCULAR; INTRAVENOUS; SUBCUTANEOUS PRN
Status: DISCONTINUED | OUTPATIENT
Start: 2020-09-09 | End: 2020-09-09

## 2020-09-09 RX ADMIN — PHENYLEPHRINE HYDROCHLORIDE 0.5 MCG/KG/MIN: 10 INJECTION INTRAVENOUS at 16:24

## 2020-09-09 RX ADMIN — ROCURONIUM BROMIDE 30 MG: 10 INJECTION INTRAVENOUS at 16:00

## 2020-09-09 RX ADMIN — PHENYLEPHRINE HYDROCHLORIDE 100 MCG: 10 INJECTION INTRAVENOUS at 17:51

## 2020-09-09 RX ADMIN — PHENYLEPHRINE HYDROCHLORIDE 200 MCG: 10 INJECTION INTRAVENOUS at 17:48

## 2020-09-09 RX ADMIN — HYDROMORPHONE HYDROCHLORIDE 0.5 MG: 1 INJECTION, SOLUTION INTRAMUSCULAR; INTRAVENOUS; SUBCUTANEOUS at 20:04

## 2020-09-09 RX ADMIN — PHENYLEPHRINE HYDROCHLORIDE 100 MCG: 10 INJECTION INTRAVENOUS at 15:47

## 2020-09-09 RX ADMIN — PHENYLEPHRINE HYDROCHLORIDE 100 MCG: 10 INJECTION INTRAVENOUS at 15:42

## 2020-09-09 RX ADMIN — TACROLIMUS 1 MG: 1 CAPSULE ORAL at 23:51

## 2020-09-09 RX ADMIN — PHENYLEPHRINE HYDROCHLORIDE 200 MCG: 10 INJECTION INTRAVENOUS at 15:45

## 2020-09-09 RX ADMIN — FENTANYL CITRATE 1 MCG: 50 INJECTION, SOLUTION INTRAMUSCULAR; INTRAVENOUS at 15:37

## 2020-09-09 RX ADMIN — ACETAMINOPHEN 975 MG: 325 TABLET, FILM COATED ORAL at 14:52

## 2020-09-09 RX ADMIN — PHENYLEPHRINE HYDROCHLORIDE 200 MCG: 10 INJECTION INTRAVENOUS at 15:52

## 2020-09-09 RX ADMIN — EPHEDRINE SULFATE 5 MG: 50 INJECTION, SOLUTION INTRAVENOUS at 15:48

## 2020-09-09 RX ADMIN — FENTANYL CITRATE 50 MCG: 50 INJECTION, SOLUTION INTRAMUSCULAR; INTRAVENOUS at 19:11

## 2020-09-09 RX ADMIN — EPHEDRINE SULFATE 10 MG: 50 INJECTION, SOLUTION INTRAVENOUS at 15:46

## 2020-09-09 RX ADMIN — PHENYLEPHRINE HYDROCHLORIDE 200 MCG: 10 INJECTION INTRAVENOUS at 15:49

## 2020-09-09 RX ADMIN — NEOSTIGMINE METHYLSULFATE 3 MG: 1 INJECTION, SOLUTION INTRAVENOUS at 18:25

## 2020-09-09 RX ADMIN — Medication 10 MG: at 17:51

## 2020-09-09 RX ADMIN — HYDROMORPHONE HYDROCHLORIDE 0.5 MG: 1 INJECTION, SOLUTION INTRAMUSCULAR; INTRAVENOUS; SUBCUTANEOUS at 19:39

## 2020-09-09 RX ADMIN — OXYCODONE HYDROCHLORIDE 5 MG: 5 TABLET ORAL at 21:23

## 2020-09-09 RX ADMIN — ONDANSETRON 4 MG: 2 INJECTION INTRAMUSCULAR; INTRAVENOUS at 18:17

## 2020-09-09 RX ADMIN — ROCURONIUM BROMIDE 20 MG: 10 INJECTION INTRAVENOUS at 17:37

## 2020-09-09 RX ADMIN — HYDROMORPHONE HYDROCHLORIDE 0.25 MG: 1 INJECTION, SOLUTION INTRAMUSCULAR; INTRAVENOUS; SUBCUTANEOUS at 18:16

## 2020-09-09 RX ADMIN — ALBUMIN HUMAN: 0.05 INJECTION, SOLUTION INTRAVENOUS at 16:19

## 2020-09-09 RX ADMIN — SODIUM CHLORIDE, POTASSIUM CHLORIDE, SODIUM LACTATE AND CALCIUM CHLORIDE: 600; 310; 30; 20 INJECTION, SOLUTION INTRAVENOUS at 19:40

## 2020-09-09 RX ADMIN — ACETAMINOPHEN 975 MG: 325 TABLET, FILM COATED ORAL at 21:23

## 2020-09-09 RX ADMIN — CEFAZOLIN 1 G: 1 INJECTION, POWDER, FOR SOLUTION INTRAMUSCULAR; INTRAVENOUS at 17:36

## 2020-09-09 RX ADMIN — ROCURONIUM BROMIDE 50 MG: 10 INJECTION INTRAVENOUS at 15:40

## 2020-09-09 RX ADMIN — FENTANYL CITRATE 50 MCG: 50 INJECTION, SOLUTION INTRAMUSCULAR; INTRAVENOUS at 15:40

## 2020-09-09 RX ADMIN — LIDOCAINE HYDROCHLORIDE 80 MG: 20 INJECTION, SOLUTION INFILTRATION; PERINEURAL at 15:40

## 2020-09-09 RX ADMIN — GLYCOPYRROLATE 0.4 MG: 0.2 INJECTION, SOLUTION INTRAMUSCULAR; INTRAVENOUS at 18:28

## 2020-09-09 RX ADMIN — ALBUMIN HUMAN: 0.05 INJECTION, SOLUTION INTRAVENOUS at 17:52

## 2020-09-09 RX ADMIN — FENTANYL CITRATE 25 MCG: 50 INJECTION, SOLUTION INTRAMUSCULAR; INTRAVENOUS at 19:00

## 2020-09-09 RX ADMIN — OXYCODONE HYDROCHLORIDE 5 MG: 5 TABLET ORAL at 20:21

## 2020-09-09 RX ADMIN — Medication 2 G: at 16:28

## 2020-09-09 RX ADMIN — PHENYLEPHRINE HYDROCHLORIDE 100 MCG: 10 INJECTION INTRAVENOUS at 15:54

## 2020-09-09 RX ADMIN — OXYCODONE HYDROCHLORIDE 5 MG: 5 TABLET ORAL at 23:51

## 2020-09-09 RX ADMIN — PROPOFOL 300 MG: 10 INJECTION, EMULSION INTRAVENOUS at 15:37

## 2020-09-09 RX ADMIN — MIDAZOLAM 1 MG: 1 INJECTION INTRAMUSCULAR; INTRAVENOUS at 15:37

## 2020-09-09 RX ADMIN — PHENYLEPHRINE HYDROCHLORIDE 0.7 MCG/KG/MIN: 10 INJECTION INTRAVENOUS at 15:48

## 2020-09-09 RX ADMIN — Medication 10 MG: at 17:46

## 2020-09-09 RX ADMIN — TACROLIMUS 0.5 MG: 1 CAPSULE ORAL at 23:50

## 2020-09-09 RX ADMIN — HYDROMORPHONE HYDROCHLORIDE 0.25 MG: 1 INJECTION, SOLUTION INTRAMUSCULAR; INTRAVENOUS; SUBCUTANEOUS at 18:02

## 2020-09-09 RX ADMIN — PHENYLEPHRINE HYDROCHLORIDE 100 MCG: 10 INJECTION INTRAVENOUS at 15:48

## 2020-09-09 RX ADMIN — FENTANYL CITRATE 25 MCG: 50 INJECTION, SOLUTION INTRAMUSCULAR; INTRAVENOUS at 19:04

## 2020-09-09 RX ADMIN — SODIUM CHLORIDE, POTASSIUM CHLORIDE, SODIUM LACTATE AND CALCIUM CHLORIDE: 600; 310; 30; 20 INJECTION, SOLUTION INTRAVENOUS at 15:37

## 2020-09-09 RX ADMIN — EPHEDRINE SULFATE 5 MG: 50 INJECTION, SOLUTION INTRAVENOUS at 15:44

## 2020-09-09 ASSESSMENT — MIFFLIN-ST. JEOR: SCORE: 1451.5

## 2020-09-09 NOTE — OP NOTE
OPERATIVE REPORT    DATE OF SERVICE: 9/9/20    SURGEON: Fernando Morillo MD.    ASSISTANT(S):  Yobany VENEGAS    PREOPERATIVE DIAGNOSIS:  Osteoarthritis    POSTOPERATIVE DIAGNOSIS:  Osteoarthritis    OPERATION PERFORMED:  Left total hip arthroplasty    IMPLANTS:    Implant Name Type Inv. Item Serial No.  Lot No. LRB No. Used Action   IMP LINER SNR ACET R3 XLPE 0DEG [id number] Total Joint Component/Insert IMP LINER SNR ACET R3 XLPE 0DEG [id number]  Diamond  [id number] Left 1 Implanted   IMP SHELL SNR ACET R3 3H 52MM [id number] Total Joint Component/Insert IMP SHELL SNR ACET R3 3H 52MM [id number]  Diamond  [id number] Left 1 Implanted   IMP SCR ACET SNN SPHERICAL HEAD 6.5X15MM [id number] [employer]/[place] IMP SCR ACET SNN SPHERICAL HEAD 6.5X15MM [id number]  Diamond  [id number] Left 1 Implanted   IMP SCR ACET SNN SPHERICAL HEAD 6.5X15MM [id number] Met[employer]/[place] IMP SCR ACET SNN SPHERICAL HEAD 6.5X15MM [id number]  Diamond  [id number] Left 1 Implanted   IMP SCR ACET SNN SPHERICAL HEAD 6.5X40MM [id number] Met[employer]/[place] IMP SCR ACET SNN SPHERICAL HEAD 6.5X40MM [id number]  Diamond  [id number] Left 1 Implanted   IMP STEM FEM HIP SNN SYNERGY POROUS SZ 12 HO [id number] Total Joint Component/Insert IMP STEM FEM HIP SNN SYNERGY POROUS SZ 12 HO [id number]  Diamond  [id number] Left 1 Implanted   IMP HEAD FEMORAL SNN BIPOLAR [place] 36MM +4 [id number] Total Joint Component/Insert IMP HEAD FEMORAL SNN BIPOLAR [place] 36MM +4 [id number]  Diamond  [id number] Left 1 Implanted         ANESTHETIC: General     OPERATIVE FINDINGS:  End stage arthrosis of the hip    BLOOD LOSS: about 200 cc    COMPLICATIONS:  None apparent    OPERATIVE INDICATIONS:  The patient has a long history of debilitating pain secondary to ostearthritis of the hip.  Despite comprehensive non-operative management these symptoms continued to interfere with activities of daily living.  After discussion of further treatment options including the risks and  benefits that patient elected to proceed with a total hip.    DESCRIPTION OF THE PROCEDURE:  The patient was identified in the preoperative holding area.  The consent form including the risks and benefits were reviewed with the patient.  The operative limb was identified and marked.  The patient was brought back to the operating room and placed supine on the operating table.  An anesthetic was induced by the anesthesia team.   The patient was placed in the lateral decubitus position and prepped and draped in the normal standard fashion for a hip replacement.  A time-out was called.  Antibiotics were given.  We utilized an approximately 15 cm curvilinear incision, centered on the vastus ridge, and performed a standard posterior approach to the hip.  The tensor fascia was split.  A small portion of gluteus damir was split in line with its fibers.  The sciatic nerve was palpated.  The east-west retractor was placed.  The posterior border of gluteus medius was exposed and retracted.  The tendon of piriformis and that of the obturators was released from their attachments.  A trapdoor posterior capsulotomy was performed.  The hip was dislocated.  The lesser trochanter was exposed.  A ruler was used to measure and electrocautery was used to romina our neck cut as preoperatively templated.  The head was measured with a caliper and found to be XXX.  This measurement was used to choose our first reamer.  The neck cut was re-measured. The femur was elevated.  A Hohmann was placed over the anterior rim of the acetabulum and the femur was subluxed anterior.  A split was made in the inferior capsule.  The transverse acetabular ligament was left intact and used a guide for the anterversion of the acetabular component.  Circumferential retractors were placed.  We began reaming and went up by two until sufficient contact was made with the acetabular rim.  We then went up by one millimeter for a one millimeter press-fit.  We were  "within one size of our preoperative plan.   A trail was placed.  It had an excellent press fit.  We then placed out final component in 40 degrees of inclination and approximately 20 degrees of anteversion, parallel to the transverse ligament.  The press fit was excellent.  Screws were placed for additional initial fixation.  A flat liner was then placed. It locked into place.  Attention was turned to the femur.  Retractors were placed to elevated the proximal femur and to protect the tendon of gluteus medius.  Remaining lateral neck was removed and the piriformis fossa was cleared of soft-tissue.  A box osteotome and canal finder were used to prepare for broaching.  A sharp broach was used to lateralize slightly.  We then broached up sequentially to a size 11 this sat low and was replaced with a 12 to accommodate for increased length with excess offset.  The 12 was rotationally stable and sat up 4 millimeters from the neck-cut.  Preoperatively the patient had templated to a high offset stem.  The high offset stem appropriately tensioned the abductors.  We trialed the following fmoeral heads: 0 and +4.  The +4 most appropriately tensioned the abductors and clinically equalized the leg-lengths.  The stability exam was excellent.  The hip was stable and there was no impingement posteriorly with hyper-extension and maximal external rotation.  With full extension, the knee could be fixed to bring the foot nearly to the buttock.  With the hip in ninety degrees of flexion and neutral rotation there was greater than 60 degrees of internal rotation before subluxation.  There appropriate movement with a \"sally\" test.  Happy with our stability exam, the final implant was placed in approximately twenty degrees of anteversion.  It sat within 1 mm of the broach.  We then trailed with a +4 head.  The stability exam was identical.  We then placed the final head on a clean, dry neck and impacted it into place. The hip was reduced " after directly visualizing the entire acetabulum.  The wound was then irrigated.  The posterior capsule and short external rotators were sutured to the greater trochanter with non-absorbable suture through bone tunnels.The fascia was closed with interrupted Vicryl, the dermis with interrupted Vicryl, and skin with running monocryl, Dermabond and steri-strips.  At the end of the procedure the sponge and needle counts were correct times two.  The patient tolerated the procedure well and returned to the PAR extubated and stable.    POSTOPERATIVE PLAN:  1. Weight bearing as tolerated  2. Standard posterior hip precautions  3. DVT prophylaxis   4. 24 hours of prophylactic antibiotics  5. Follow-up:  Wound clinic in 2 weeks and with Ilia in clinic in 6 weeks for x-rays and a rehabilitation check.

## 2020-09-09 NOTE — ANESTHESIA PREPROCEDURE EVALUATION
Anesthesia Pre-Procedure Evaluation    Patient: Rashad Ortiz   MRN:     [id number] Gender:   male   Age:    44 year old :      1975        Preoperative Diagnosis: Encounter For Vascular Access Surgery   Procedure(s):  Creation Of Atriovenous Fistula Right Upper Arm  Possible Atriovenous Fistula Graft     Past Medical History:   Diagnosis Date     AVN (avascular necrosis of bone) (H)     left hip     BPH (benign prostatic hyperplasia)      Chronic kidney disease, stage 4, severely decreased GFR (H)      Gastro-oesophageal reflux disease      Gout      History of blood transfusion      Hypertension      Medical non-compliance      Pulmonary nodules      Steroid long-term use      Vitamin D deficiency       Past Surgical History:   Procedure Laterality Date     AV FISTULA OR GRAFT ARTERIAL       CREATE FISTULA ARTERIOVENOUS UPPER EXTREMITY Right 2019    Procedure: Creation Of Atriovenous Fistula Right Upper Arm;  Surgeon: Julia Irwin MD;  Location: [facility] OR     LIGATE FISTULA ARTERIOVENOUS UPPER EXTREMITY  2011    Procedure:LIGATE FISTULA ARTERIOVENOUS UPPER EXTREMITY; Excision of Right Forearm Arteriovenous Fistula.; Surgeon:LINDY AMAYA; Location:[facility] OR     PERCUTANEOUS BIOPSY KIDNEY Right 2017    Procedure: PERCUTANEOUS BIOPSY KIDNEY;  Surgeon: Gee Barrios MD;  Location:  OR     TRANSPLANT  2011    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             ROS/MED HX    ENT/Pulmonary:  - neg pulmonary ROS     Neurologic:  - neg neurologic ROS     Cardiovascular:     (+) hypertension----. : . . . :. .       METS/Exercise Tolerance:  >4 METS   Hematologic:     (+) Anemia, -      Musculoskeletal: Comment: Gout        GI/Hepatic:     (+) GERD Asymptomatic on medication,       Renal/Genitourinary:     (+) chronic renal disease, type: ESRD, Pt requires dialysis, type: Hemodialysis, Pt has history of transplant,       Endo:  - neg  endo ROS   (+) Chronic steroid usage for Post Transplant Immunosuppression .      Psychiatric:         Infectious Disease:        (-) Recent Fever   Malignancy:         Other:    (+) No chance of pregnancy C-spine cleared: Yes, H/O Chronic Pain,H/O chronic opiod use , no other significant disability                        PHYSICAL EXAM:   Mental Status/Neuro: A/A/O   Airway: Facies: Feasible  Mallampati: I  Mouth/Opening: Full  TM distance: > 6 cm  Neck ROM: Full   Respiratory: Auscultation: CTAB     Resp. Rate: Normal     Resp. Effort: Normal      CV: Rhythm: Regular  Rate: Age appropriate  Heart: Normal Sounds  Edema: None  Pulses: Normal   Comments:      Dental: Normal Dentition                LABS:  CBC:   Lab Results   Component Value Date    WBC 5.3 08/25/2020    WBC 5.6 08/24/2020    HGB 8.6 (L) 09/09/2020    HGB 8.6 (L) 08/25/2020    HCT 29.4 (L) 08/25/2020    HCT 31.3 (L) 08/24/2020    HCT 31.6 (L) 08/24/2020     08/25/2020     08/24/2020     BMP:   Lab Results   Component Value Date     08/25/2020     (H) 08/19/2020    POTASSIUM 5.0 09/09/2020    POTASSIUM 4.7 08/25/2020    CHLORIDE 112 (H) 08/25/2020    CHLORIDE 115 (H) 08/19/2020    CO2 20 08/25/2020    CO2 21 08/19/2020    BUN 55 (H) 08/25/2020    BUN 75 (H) 08/19/2020    CR 5.96 (H) 09/09/2020    CR 6.28 (H) 08/25/2020    GLC 97 09/09/2020    GLC 96 08/25/2020     COAGS:   Lab Results   Component Value Date    PTT 29 07/31/2019    INR 1.08 07/31/2019    FIBR 250 06/14/2015     POC:   Lab Results   Component Value Date    BGM 87 10/14/2019     OTHER:   Lab Results   Component Value Date    LACT 1.5 10/26/2018    A1C 5.4 06/06/2015    ASHELY 8.4 (L) 08/25/2020    PHOS 2.7 06/13/2019    MAG 2.6 (H) 10/15/2019    ALBUMIN 3.1 (L) 10/14/2019    PROTTOTAL 6.2 (L) 10/14/2019    ALT 34 10/14/2019    AST 22 10/14/2019    GGT 32 01/12/2012    ALKPHOS 63 10/14/2019    BILITOTAL 0.3 10/14/2019    LIPASE 387 04/10/2017    AMYLASE 153 (H)  "04/10/2017    TSH 0.67 04/10/2017    .0 (H) 10/28/2018    SED 32 (H) 09/17/2018        Preop Vitals    BP Readings from Last 3 Encounters:   09/09/20 94/62   09/02/20 100/64   08/25/20 111/73    Pulse Readings from Last 3 Encounters:   09/02/20 79   08/25/20 80   08/24/20 68      Resp Readings from Last 3 Encounters:   09/09/20 14   06/30/20 16   06/10/20 16    SpO2 Readings from Last 3 Encounters:   09/09/20 100%   09/02/20 99%   08/25/20 99%      Temp Readings from Last 1 Encounters:   09/09/20 36.8  C (98.2  F) (Oral)    Ht Readings from Last 1 Encounters:   09/09/20 1.727 m (5' 8\")      Wt Readings from Last 1 Encounters:   09/09/20 58.7 kg (129 lb 6.6 oz)    Estimated body mass index is 19.68 kg/m  as calculated from the following:    Height as of an earlier encounter on 9/9/20: 1.727 m (5' 8\").    Weight as of an earlier encounter on 9/9/20: 58.7 kg (129 lb 6.6 oz).     LDA:        Assessment:   ASA SCORE: 3    H&P: History and physical reviewed and following examination; no interval change.    NPO Status: Will be NPO Appropriate at ... 9/9/2020 3:40 PM     Plan:   Anes. Type:  General      Induction:  IV (Standard)   Airway: ETT   Access/Monitoring: PIV   Maintenance: Balanced     Postop Plan:   Postop Pain: Opioids  Postop Sedation/Airway: Not planned  Disposition: Outpatient     PONV Management:   Adult Risk Factors:, Postop Opioids   Prevention: Ondansetron     CONSENT: Direct conversation   Plan and risks discussed with: Patient   Blood Products: Consented (ALL Blood Products)       Comments for Plan/Consent:  44 year old M with PMHx of ESRD s/p renal transplant 2011, now with recurrent kidney failure who presents for Left hip arthroplasty.                       Jose Guillaume MD  "

## 2020-09-09 NOTE — ANESTHESIA CARE TRANSFER NOTE
Patient: Rashad Ortiz    Procedure(s):  ARTHROPLASTY, HIP, TOTAL LEFT    Diagnosis: Osteonecrosis (H) [M87.9]  Diagnosis Additional Information: No value filed.    Anesthesia Type:   General     Note:  Airway :Nasal Cannula  Patient transferred to:PACU  Handoff Report: Identifed the Patient, Identified the Reponsible Provider, Reviewed the pertinent medical history, Discussed the surgical course, Reviewed Intra-OP anesthesia mangement and issues during anesthesia, Set expectations for post-procedure period and Allowed opportunity for questions and acknowledgement of understanding      Vitals: (Last set prior to Anesthesia Care Transfer)    CRNA VITALS  9/9/2020 1805 - 9/9/2020 1841      9/9/2020             NIBP:  109/78    Pulse:  62    Temp:  35.8  C (96.4  F)    SpO2:  97 %                Electronically Signed By: Micah Barakat  September 9, 2020  6:41 PM

## 2020-09-09 NOTE — ANESTHESIA CARE TRANSFER NOTE
Patient: Rashad Ortiz    Procedure(s):  ARTHROPLASTY, HIP, TOTAL LEFT    Diagnosis: Osteonecrosis (H) [M87.9]  Diagnosis Additional Information: No value filed.    Anesthesia Type:   General     Note:  Airway :Nasal Cannula  Patient transferred to:PACU  Handoff Report: Identifed the Patient, Identified the Reponsible Provider, Reviewed the pertinent medical history, Discussed the surgical course, Reviewed Intra-OP anesthesia mangement and issues during anesthesia, Set expectations for post-procedure period and Allowed opportunity for questions and acknowledgement of understanding      Vitals: (Last set prior to Anesthesia Care Transfer)    CRNA VITALS  9/9/2020 1805 - 9/9/2020 1842      9/9/2020             NIBP:  109/78    Pulse:  62    Temp:  35.8  C (96.4  F)    SpO2:  97 %                Electronically Signed By: ISABEL Darnell CRNA  September 9, 2020  6:42 PM

## 2020-09-10 ENCOUNTER — DOCUMENTATION ONLY (OUTPATIENT)
Dept: MEDSURG UNIT | Facility: CLINIC | Age: 45
End: 2020-09-10

## 2020-09-10 ENCOUNTER — APPOINTMENT (OUTPATIENT)
Dept: PHYSICAL THERAPY | Facility: CLINIC | Age: 45
End: 2020-09-10
Attending: PHYSICIAN ASSISTANT
Payer: COMMERCIAL

## 2020-09-10 ENCOUNTER — APPOINTMENT (OUTPATIENT)
Dept: OCCUPATIONAL THERAPY | Facility: CLINIC | Age: 45
End: 2020-09-10
Attending: PHYSICIAN ASSISTANT
Payer: COMMERCIAL

## 2020-09-10 VITALS
HEART RATE: 95 BPM | HEIGHT: 68 IN | SYSTOLIC BLOOD PRESSURE: 141 MMHG | DIASTOLIC BLOOD PRESSURE: 88 MMHG | BODY MASS INDEX: 20.78 KG/M2 | WEIGHT: 137.13 LBS | RESPIRATION RATE: 18 BRPM | OXYGEN SATURATION: 98 % | TEMPERATURE: 98.5 F

## 2020-09-10 LAB
ANION GAP SERPL CALCULATED.3IONS-SCNC: 11 MMOL/L (ref 3–14)
BUN SERPL-MCNC: 25 MG/DL (ref 7–30)
CALCIUM SERPL-MCNC: 8.7 MG/DL (ref 8.5–10.1)
CHLORIDE SERPL-SCNC: 104 MMOL/L (ref 94–109)
CO2 SERPL-SCNC: 20 MMOL/L (ref 20–32)
CREAT SERPL-MCNC: 6.35 MG/DL (ref 0.66–1.25)
GFR SERPL CREATININE-BSD FRML MDRD: 10 ML/MIN/{1.73_M2}
GLUCOSE SERPL-MCNC: 104 MG/DL (ref 70–99)
HGB BLD-MCNC: 7.2 G/DL (ref 13.3–17.7)
MAGNESIUM SERPL-MCNC: 1.5 MG/DL (ref 1.6–2.3)
POTASSIUM SERPL-SCNC: 5.3 MMOL/L (ref 3.4–5.3)
SODIUM SERPL-SCNC: 135 MMOL/L (ref 133–144)

## 2020-09-10 PROCEDURE — 97535 SELF CARE MNGMENT TRAINING: CPT | Mod: GO

## 2020-09-10 PROCEDURE — 99213 OFFICE O/P EST LOW 20 MIN: CPT | Performed by: INTERNAL MEDICINE

## 2020-09-10 PROCEDURE — 90937 HEMODIALYSIS REPEATED EVAL: CPT

## 2020-09-10 PROCEDURE — 97110 THERAPEUTIC EXERCISES: CPT | Mod: GP

## 2020-09-10 PROCEDURE — 97116 GAIT TRAINING THERAPY: CPT | Mod: GP

## 2020-09-10 PROCEDURE — 25000132 ZZH RX MED GY IP 250 OP 250 PS 637: Performed by: INTERNAL MEDICINE

## 2020-09-10 PROCEDURE — 85018 HEMOGLOBIN: CPT | Performed by: PHYSICIAN ASSISTANT

## 2020-09-10 PROCEDURE — 80048 BASIC METABOLIC PNL TOTAL CA: CPT | Performed by: PHYSICIAN ASSISTANT

## 2020-09-10 PROCEDURE — 25000132 ZZH RX MED GY IP 250 OP 250 PS 637: Performed by: PHYSICIAN ASSISTANT

## 2020-09-10 PROCEDURE — 97530 THERAPEUTIC ACTIVITIES: CPT | Mod: GO

## 2020-09-10 PROCEDURE — 36415 COLL VENOUS BLD VENIPUNCTURE: CPT | Performed by: PHYSICIAN ASSISTANT

## 2020-09-10 PROCEDURE — 25000128 H RX IP 250 OP 636: Performed by: PHYSICIAN ASSISTANT

## 2020-09-10 PROCEDURE — 97530 THERAPEUTIC ACTIVITIES: CPT | Mod: GP

## 2020-09-10 PROCEDURE — 97161 PT EVAL LOW COMPLEX 20 MIN: CPT | Mod: GP

## 2020-09-10 PROCEDURE — 25000132 ZZH RX MED GY IP 250 OP 250 PS 637: Performed by: HOSPITALIST

## 2020-09-10 PROCEDURE — 97165 OT EVAL LOW COMPLEX 30 MIN: CPT | Mod: GO

## 2020-09-10 PROCEDURE — 25000131 ZZH RX MED GY IP 250 OP 636 PS 637: Performed by: INTERNAL MEDICINE

## 2020-09-10 PROCEDURE — 25800030 ZZH RX IP 258 OP 636: Performed by: ORTHOPAEDIC SURGERY

## 2020-09-10 PROCEDURE — 99207 ZZC CDG-CODE CATEGORY CHANGED: CPT | Performed by: INTERNAL MEDICINE

## 2020-09-10 PROCEDURE — 83735 ASSAY OF MAGNESIUM: CPT | Performed by: PHYSICIAN ASSISTANT

## 2020-09-10 RX ORDER — OXYCODONE HYDROCHLORIDE 5 MG/1
5-10 TABLET ORAL EVERY 4 HOURS PRN
Qty: 30 TABLET | Refills: 0 | Status: SHIPPED | OUTPATIENT
Start: 2020-09-10 | End: 2020-09-10

## 2020-09-10 RX ORDER — ASPIRIN 81 MG/1
81 TABLET ORAL 2 TIMES DAILY
Status: DISCONTINUED | OUTPATIENT
Start: 2020-09-10 | End: 2020-09-10 | Stop reason: HOSPADM

## 2020-09-10 RX ORDER — CYCLOBENZAPRINE HCL 5 MG
5 TABLET ORAL 3 TIMES DAILY PRN
COMMUNITY
End: 2020-10-28

## 2020-09-10 RX ORDER — AMOXICILLIN 250 MG
1 CAPSULE ORAL 2 TIMES DAILY
Qty: 30 TABLET | Refills: 0 | Status: SHIPPED | OUTPATIENT
Start: 2020-09-10 | End: 2020-10-28

## 2020-09-10 RX ORDER — HYDROMORPHONE HYDROCHLORIDE 2 MG/1
2-4 TABLET ORAL EVERY 4 HOURS PRN
Qty: 40 TABLET | Refills: 0 | Status: SHIPPED | OUTPATIENT
Start: 2020-09-10 | End: 2020-09-10

## 2020-09-10 RX ORDER — POLYETHYLENE GLYCOL 3350 17 G/17G
17 POWDER, FOR SOLUTION ORAL DAILY
Qty: 7 PACKET | Refills: 0 | Status: SHIPPED | OUTPATIENT
Start: 2020-09-10 | End: 2020-10-07

## 2020-09-10 RX ORDER — HYDROMORPHONE HYDROCHLORIDE 2 MG/1
2-4 TABLET ORAL EVERY 4 HOURS PRN
Qty: 40 TABLET | Refills: 0 | Status: SHIPPED | OUTPATIENT
Start: 2020-09-13 | End: 2020-10-07

## 2020-09-10 RX ORDER — HYDROMORPHONE HYDROCHLORIDE 2 MG/1
2-4 TABLET ORAL
Status: DISCONTINUED | OUTPATIENT
Start: 2020-09-10 | End: 2020-09-10 | Stop reason: HOSPADM

## 2020-09-10 RX ORDER — HYDROMORPHONE HYDROCHLORIDE 4 MG/1
4-8 TABLET ORAL EVERY 4 HOURS PRN
Qty: 40 TABLET | Refills: 0 | Status: SHIPPED | OUTPATIENT
Start: 2020-09-10 | End: 2020-09-10

## 2020-09-10 RX ADMIN — FERROUS SULFATE TAB 325 MG (65 MG ELEMENTAL FE) 325 MG: 325 (65 FE) TAB at 09:28

## 2020-09-10 RX ADMIN — SODIUM CHLORIDE 250 ML: 9 INJECTION, SOLUTION INTRAVENOUS at 13:45

## 2020-09-10 RX ADMIN — Medication 3 MG: at 00:36

## 2020-09-10 RX ADMIN — OXYCODONE HYDROCHLORIDE 5 MG: 5 TABLET ORAL at 00:37

## 2020-09-10 RX ADMIN — ASPIRIN 81 MG: 81 TABLET, COATED ORAL at 09:28

## 2020-09-10 RX ADMIN — SODIUM BICARBONATE 650 MG TABLET 1300 MG: at 09:36

## 2020-09-10 RX ADMIN — TAMSULOSIN HYDROCHLORIDE 0.4 MG: 0.4 CAPSULE ORAL at 09:28

## 2020-09-10 RX ADMIN — Medication: at 14:39

## 2020-09-10 RX ADMIN — OXYCODONE HYDROCHLORIDE 10 MG: 5 TABLET ORAL at 06:52

## 2020-09-10 RX ADMIN — CEFAZOLIN 1 G: 1 INJECTION, POWDER, FOR SOLUTION INTRAMUSCULAR; INTRAVENOUS at 06:52

## 2020-09-10 RX ADMIN — OMEPRAZOLE 20 MG: 20 CAPSULE, DELAYED RELEASE ORAL at 09:27

## 2020-09-10 RX ADMIN — MYCOPHENOLATE MOFETIL 500 MG: 250 CAPSULE ORAL at 09:28

## 2020-09-10 RX ADMIN — TACROLIMUS 1 MG: 1 CAPSULE ORAL at 09:29

## 2020-09-10 RX ADMIN — OXYCODONE HYDROCHLORIDE 10 MG: 5 TABLET ORAL at 03:38

## 2020-09-10 RX ADMIN — SODIUM CHLORIDE 300 ML: 9 INJECTION, SOLUTION INTRAVENOUS at 13:45

## 2020-09-10 RX ADMIN — ACETAMINOPHEN 975 MG: 325 TABLET, FILM COATED ORAL at 06:52

## 2020-09-10 RX ADMIN — Medication: at 17:19

## 2020-09-10 RX ADMIN — OXYCODONE HYDROCHLORIDE 10 MG: 5 TABLET ORAL at 09:58

## 2020-09-10 RX ADMIN — FEBUXOSTAT 80 MG: 40 TABLET ORAL at 09:27

## 2020-09-10 ASSESSMENT — MIFFLIN-ST. JEOR: SCORE: 1486.5

## 2020-09-10 NOTE — PROGRESS NOTES
Prior Authorization **APPROVED**    Authorization Effective Date: 8/11/2020  Authorization Expiration Date: 9/10/2021  Medication: Oxycodone 5mg tabs **APPROVED**  Approved Dose/Quantity: Up to 12 tablets daily (greater than 14 days of opioids per 60 day rolling period)  Reference #: CoverMyMeds Key: [id number] - [place] Case ID: [id number]   Insurance Company: [employer] - Phone [phone] Fax [phone]  Expected CoPay: $1.00     CoPay Card Available: No    Foundation Assistance Needed: n/a  Which Pharmacy is filling the prescription (Not needed for infusion/clinic administered): [facility] [place], [place] - [address]  Pharmacy Notified: Yes  Patient Notified: Yes  Comments:  Plan limits to 8 tablets daily, and cumulative 14 days of opioids per 60 day period.        Mamie Harmon CPhT  [place] [facility]charge Pharmacy Liaison  Pronouns: She/Her/Hers    [facility]  [address]  [address] [address]   [email or web address]  [email or web address]   Phone: [phone]  Pager: [phone]  Fax: [phone]

## 2020-09-10 NOTE — PLAN OF CARE
[facility]      OUTPATIENT OCCUPATIONAL THERAPY  EVALUATION  PLAN OF TREATMENT FOR OUTPATIENT REHABILITATION  (COMPLETE FOR INITIAL CLAIMS ONLY)  Patient's Last Name, First Name, M.I.  YOB: 1975  AngelRashad GARCIAS                          Provider's Name  [facility] Medical Record No.  [id number]                               Onset Date:  09/09/20   Start of Care Date:  09/10/20     Type:     ___PT   _X_OT   ___SLP Medical Diagnosis:  s/p L FRANCA                        OT Diagnosis:  Decreased functional mobility and ADLs   Visits from SOC:  1   _________________________________________________________________________________  Plan of Treatment/Functional Goals    Planned Interventions: ADL retraining, transfer training,       Goals: See Occupational Therapy Goals on Care Plan in [facility] electronic health record.    Therapy Frequency: Other (see comments)(one time eval and treat)  Predicted Duration of Therapy Intervention: 1 day  _________________________________________________________________________________    I CERTIFY THE NEED FOR THESE SERVICES FURNISHED UNDER        THIS PLAN OF TREATMENT AND WHILE UNDER MY CARE     (Physician co-signature of this document indicates review and certification of the therapy plan).              Certification date from: 09/10/20, Certification date to: 09/24/20    Referring Physician: Dr. Morillo            Initial Assessment        See Occupational Therapy evaluation dated 09/10/20 in Epic electronic health record.

## 2020-09-10 NOTE — PROGRESS NOTES
09/10/20 1200   Quick Adds   Quick Adds Certification   Type of Visit Initial Occupational Therapy Evaluation   Living Environment   Lives With spouse;child(latosha), dependent   Living Arrangements house   Home Accessibility stairs to enter home;stairs within home   Number of Stairs, Main Entrance 1   Transportation Anticipated family or friend will provide   Living Environment Comment Standard toilet. Tub/shower.    Self-Care   Usual Activity Tolerance moderate   Current Activity Tolerance fair   Regular Exercise No   Equipment Currently Used at Home crutches   Functional Level   Ambulation 1-->assistive equipment   Transferring 0-->independent   Toileting 0-->independent   Bathing 0-->independent   Dressing 0-->independent   Eating 0-->independent   Communication 0-->understands/communicates without difficulty   Swallowing 0-->swallows foods/liquids without difficulty   Cognition 0 - no cognition issues reported   Fall history within last six months no   General Information   Onset of Illness/Injury or Date of Surgery - Date 09/09/20   Referring Physician Dr. Morillo   Patient/Family Goals Statement Did not endorse   Additional Occupational Profile Info/Pertinent History of Current Problem s/p L FRANCA   Precautions/Limitations fall precautions;left hip precautions   Weight-Bearing Status - LLE weight-bearing as tolerated   Cognitive Status Examination   Cognitive Comment no concerns   Visual Perception   Visual Perception No deficits were identified   Sensory Examination   Sensory Quick Adds No deficits were identified   Pain Assessment   Patient Currently in Pain Yes, see Vital Sign flowsheet   Range of Motion (ROM)   ROM Comment B UE's WFL    Strength   Strength Comments UE strength WFL    Muscle Tone Assessment   Muscle Tone Quick Adds No deficits were identified   Coordination   Upper Extremity Coordination No deficits were identified   Mobility   Bed Mobility Comments Supine<>sit SBA-IND   Transfer Skill: Bed to  Chair/Chair to Bed   Level of [place]: Bed to Chair stand-by assist   Physical Assist/Nonphysical Assist: Bed to Chair set-up required   Weight-Bearing Restrictions weight-bearing as tolerated   Assistive Device - Transfer Skill Bed to Chair Chair to Bed Rehab Eval axillary crutches   Transfer Skill: Sit to Stand   Level of [place]: Sit/Stand stand-by assist   Physical Assist/Nonphysical Assist: Sit/Stand set-up required   Transfer Skill: Sit to Stand weight-bearing as tolerated   Assistive Device for Transfer: Sit/Stand axillary crutches   Transfer Skill: Toilet Transfer   Level of [place]: Toilet stand-by assist   Physical Assist/Nonphysical Assist: Toilet set-up required;verbal cues   Weight-Bearing Restrictions: Toilet weight-bearing as tolerated   Assistive Device grab bars;axillary crutches   Tub/Shower Transfer   Tub/Shower Transfer Comments tx initiated, see daily doc    Upper Body Dressing   Level of [place]: Dress Upper Body independent   Physical Assist/Nonphysical Assist: Dress Upper Body set-up required   Lower Body Dressing   Level of [place]: Dress Lower Body stand-by assist   Physical Assist/Nonphysical Assist: Dress Lower Body set-up required;verbal cues   Assistive Device reacher;sock-aid   Toileting   Level of [place]: Toilet stand-by assist   Physical Assist/Nonphysical Assist: Toilet set-up required;verbal cues   Assistive Device axillary crutches;grab bars   Grooming   Level of [place]: Grooming stand-by assist   Physical Assist/Nonphysical Assist: Grooming set-up required   Eating/Self Feeding   Level of [place]: Eating independent   Activities of Daily Living Analysis   Impairments Contributing to Impaired Activities of Daily Living balance impaired;pain;post surgical precautions;ROM decreased;strength decreased   General Therapy Interventions   Planned Therapy Interventions ADL retraining;transfer training   Clinical Impression   Criteria for Skilled  "Therapeutic Interventions Met yes, treatment indicated   OT Diagnosis Decreased functional mobility and ADLs   Influenced by the following impairments pain, post op precautions   Assessment of Occupational Performance 3-5 Performance Deficits   Identified Performance Deficits dressing, bathing, toileting, transfers, home mgmt   Clinical Decision Making (Complexity) Low complexity   Therapy Frequency Other (see comments)  (one time eval and treat)   Predicted Duration of Therapy Intervention (days/wks) 1 day   Anticipated Equipment Needs at Discharge raised toilet seat;reacher;sock aide   Anticipated Discharge Disposition Home with Assist   Risks and Benefits of Treatment have been explained. Yes   Patient, Family & other staff in agreement with plan of care Yes   Therapy Certification   Start of Care Date 09/10/20   Certification date from 09/10/20   Certification date to 09/24/20   Medical Diagnosis s/p L FRANCA   Certification I certify the need for these services furnished under this plan of treatment and while under my care.  (Physician co-signature of this document indicates review and certification of the therapy plan).   [facility] [employer] TM \"6 Clicks\"   2016, Trustees of [facility], under license to [employer].  All rights reserved.   6 Clicks Short Forms Daily Activity Inpatient Short Form   [facility] AM-PAC  \"6 Clicks\" Daily Activity Inpatient Short Form   1. Putting on and taking off regular lower body clothing? 4 - None   2. Bathing (including washing, rinsing, drying)? 3 - A Little   3. Toileting, which includes using toilet, bedpan or urinal? 4 - None   4. Putting on and taking off regular upper body clothing? 4 - None   5. Taking care of personal grooming such as brushing teeth? 4 - None   6. Eating meals? 4 - None   Daily Activity Raw Score (Score out of 24.Lower scores equate to lower levels of function) 23   Total Evaluation Time   Total Evaluation Time (Minutes) 8     "

## 2020-09-10 NOTE — PROGRESS NOTES
Prior Authorization **INITIATED**    Medication: Oxycodone 5mg tabs - Quantity Limit  Insurance Company: [employer] - Phone [phone] Fax [phone]  Pharmacy Filling the Rx: [place], [place] - [address]  Filling Pharmacy Phone: [phone]  Filling Pharmacy Fax: [phone]  Start Date: 9/9/2020  Reference #: CoverMyMeds Key: [id number] - [place] Case ID: [id number]  Comments:  Plan limits to 8 tablets daily, and cumulative 14 days of opioids per 60 day period.      Mamie Harmon CPhT  [place] Discharge Pharmacy Liaison  Pronouns: She/Her/Hers    [facility]  [address]  [address], [place] [address]   [email or web address]  [email or web address]   Phone: [phone]  Pager: [phone]  Fax: [phone]

## 2020-09-10 NOTE — CONSULTS
[facility], [place]  Consult Note - Hospitalist Service     Date of Admission:  9/9/2020  Consult Requested by: Ortho team  Reason for Consult: Medical co-management     Assessment & Plan   Rashad Ortiz is a 45 year old male admitted on 9/9/2020. POD # 0 s/p Left total hip arthroplasty. He has a PMHx significant for failed kidney transplant, ESRD on HD (T,T,S), Hypertension and chronic anemia.   No apparent complications during surgery,  ml.   I saw the patient after surgery at the medical floor, he was hemodynamically stable, pain was not well controlled.      Principal Problem:      Osteonecrosis (H)    Status post hip surgery  --Ortho following the patient and managing the post op care.   --Ortho managing ABX, pain control and DVT prophylaxis.   --PT eval tomorrow.   --He is HD stable after surgery.   --Pain is not well controlled this evening, discussed with nursing staff. He will receive another dose of Dilaudid. Continue monitoring pain.    --Bowel regimen.  --Follow-up HGB tomorrow AM.     Active Problems:      Antibody mediated rejection of kidney transplant    ESRD (end stage renal disease) on dialysis (H)  --Nephrology consulted.   --He needs to have HD tomorrow.   --Continue monitoring BMP.   --Continue on PTA Tacrolimus, Mycophenolate, Bicarb      HTN, kidney transplant related  --BP is borderline low this evening after surgery and his HR is in the 60's, apparently not symptomatic, hold PTA Amlodipine and Coreg for now and reassess in the morning.   --Continue monitoring BP.       Chronic disease anemia   --Continue monitoring HGB.   --Continue on PTA iron.     The patient's care was discussed with the Patient.    Roscoe Bailey MD  [facility], [place]  Hospitalist Service   Pager: [phone]      ______________________________________________________________________    Chief Complaint   Left hip pain.     History is obtained  from the patient    History of Present Illness   Rashad Ortiz is a 45 year old male who was admitted to the hospital by Ortho and is POD # 0 s/p  Left total hip arthroplasty. He has a PMHx significant for failed kidney transplant, ESRD on HD (T,T,S), Hypertension and chronic anemia.  Patient was evaluated after surgery, no apparent complications during surgery,  ml. Pain was not well controlled at the time of my assessment. He denied chest pain, palpitations, shortness of breath, cough, upper respiratory symptoms, abdominal pain, nausea, vomit, fever, chills, dizziness or lightheadedness.      Review of Systems   The 10 point Review of Systems is negative other than noted in the HPI or here.   Other ROS negative.     Past Medical History    I have reviewed this patient's medical history and updated it with pertinent information if needed.   Past Medical History:   Diagnosis Date     AVN (avascular necrosis of bone) (H)     left hip     BPH (benign prostatic hyperplasia)      Chronic kidney disease, stage 4, severely decreased GFR (H)      Gastro-oesophageal reflux disease      Gout      History of blood transfusion      Hypertension      Medical non-compliance      Pulmonary nodules      Steroid long-term use      Vitamin D deficiency        Past Surgical History   I have reviewed this patient's surgical history and updated it with pertinent information if needed.  Past Surgical History:   Procedure Laterality Date     AV FISTULA OR GRAFT ARTERIAL       CREATE FISTULA ARTERIOVENOUS UPPER EXTREMITY Right 7/31/2019    Procedure: Creation Of Atriovenous Fistula Right Upper Arm;  Surgeon: Julia Irwin MD;  Location: [facility] OR     LIGATE FISTULA ARTERIOVENOUS UPPER EXTREMITY  12/20/2011    Procedure:LIGATE FISTULA ARTERIOVENOUS UPPER EXTREMITY; Excision of Right Forearm Arteriovenous Fistula.; Surgeon:LINDY AMAYA; Location:[facility] OR     PERCUTANEOUS BIOPSY KIDNEY Right 2/28/2017    Procedure: PERCUTANEOUS  BIOPSY KIDNEY;  Surgeon: Gee Barrios MD;  Location: [facility] OR     TRANSPLANT  01/13/2011    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  Social History     Tobacco Use     Smoking status: Current Some Day Smoker     Types: Cigarettes     Last attempt to quit: 03/2017     Years since quitting: 3.5     Smokeless tobacco: Never Used     Tobacco comment: Patient states that he is an 'social'  smoker. 3 cigarettes per week per pt   Substance Use Topics     Alcohol use: Not Currently     Alcohol/week: 4.2 standard drinks     Types: 5 Standard drinks or equivalent per week     Comment: Quit 8/2020     Drug use: Not Currently     Types: Marijuana       Family History   I have reviewed this patient's family history and updated it with pertinent information if needed.   Family History   Problem Relation Age of Onset     Hypertension Mother      Colon Cancer Mother 66     Colon Cancer Brother 51       Medications   I have reviewed this patient's current medications    Allergies   No Known Allergies    Physical Exam   Vital Signs: Temp: 95.4  F (35.2  C) Temp src: Oral BP: 98/61 Pulse: 61   Resp: 15 SpO2: 100 % O2 Device: None (Room air) Oxygen Delivery: 2 LPM  Weight: 129 lbs 6.56 oz    General Appearance: Alert, mild distress, on O 2 NC.   Eyes: Pupils equal and reactive to light.   HEENT: Oral mucosa moist.   Respiratory: Normal respiratory effort, clear lungs, no crackles or wheezing.   Cardiovascular: RRR, no murmur, no peripheral edema.   GI: Soft, + BS, no tenderness to palpation, no rebound or guarding.   Genitourinary: Deferred   Skin: No rash or erythema.   Musculoskeletal: No swollen joints  Neurologic: Alert, oriented x 3, no focal deficits.   Psychiatric: Mood stable.     Data   Results for orders placed or performed during the hospital encounter of 09/09/20 (from the past 24 hour(s))    UA reflex to Microscopic   Result Value Ref Range    Color Urine Yellow     Appearance Urine Clear     Glucose Urine Negative NEG^Negative mg/dL    Bilirubin Urine Negative NEG^Negative    Ketones Urine 5 (A) NEG^Negative mg/dL    Specific Gravity Urine 1.015 1.003 - 1.035    Blood Urine Negative NEG^Negative    pH Urine 8.0 (H) 5.0 - 7.0 pH    Protein Albumin Urine 30 (A) NEG^Negative mg/dL    Urobilinogen mg/dL 2.0 0.0 - 2.0 mg/dL    Nitrite Urine Negative NEG^Negative    Leukocyte Esterase Urine Negative NEG^Negative    Source Midstream Urine     RBC Urine <1 0 - 2 /HPF    WBC Urine 2 0 - 5 /HPF    Bacteria Urine Few (A) NEG^Negative /HPF    Mucous Urine Present (A) NEG^Negative /LPF    Hyaline Casts 6 (H) 0 - 2 /LPF   Hemoglobin   Result Value Ref Range    Hemoglobin 8.6 (L) 13.3 - 17.7 g/dL   Creatinine   Result Value Ref Range    Creatinine 5.96 (H) 0.66 - 1.25 mg/dL    GFR Estimate 11 (L) >60 mL/min/[1.73_m2]    GFR Estimate If Black 12 (L) >60 mL/min/[1.73_m2]   Potassium   Result Value Ref Range    Potassium 5.0 3.4 - 5.3 mmol/L   Glucose   Result Value Ref Range    Glucose 97 70 - 99 mg/dL   ABO/Rh type and screen   Result Value Ref Range    Units Ordered 1     ABO A     RH(D) Pos     Antibody Screen Neg     Test Valid Only At          [facility]    Specimen Expires 09/12/2020     Crossmatch Red Blood Cells    Blood component   Result Value Ref Range    Unit Number [id number]     Blood Component Type Red Blood Cells Leukocyte Reduced     Division Number 00     Status of Unit No longer available 09/09/2020 1848     Blood Product Code [id number]     Unit Status RET    Glucose by meter   Result Value Ref Range    Glucose 141 (H) 70 - 99 mg/dL   XR Pelvis w Hip Port Left 1 View    Narrative    EXAM: XR PELVIS AND HIP PORTABLE LEFT 1 VIEW  LOCATION: [facility]  DATE/TIME: 9/9/2020 7:36 PM    INDICATION: Postop  COMPARISON: 03/18/2020        Impression    IMPRESSION: Postop total left hip arthroplasty. Components in satisfactory position and alignment. No evidence for complications..     *Note: Due to a large number of results and/or encounters for the requested time period, some results have not been displayed. A complete set of results can be found in Results Review.

## 2020-09-10 NOTE — PROGRESS NOTES
Orthopaedic Surgery Progress Note 09/10/2020    S: No acute events overnight.  Reports having persistent pain not well controlled with medications. No numbness in operative extremity. Discussed therapy and dialysis plans.    O:  Temp: 95.4  F (35.2  C) Temp src: Oral BP: 116/72 Pulse: 76   Resp: 18 SpO2: 100 % O2 Device: None (Room air) Oxygen Delivery: 2 LPM    Exam:  Gen: No acute distress, resting comfortably in bed.  Resp: Non-labored breathing  MSK:    LLE:  - Dressings c/d/i  - SILT femoral/tibial/sural/saphenous/DP/SP nerves  - Fires Quad, TA, EHL, FHL, GaSC  - PT/DP pulses 2+, foot wwp    Recent Labs   Lab 09/09/20  1236   HGB 8.6*       Assessment: Rashad Ortiz is a 45 year old male s/p Left FRANCA on 9/9 with Dr. Morillo.    Plan:  Ortho Primary  Activity: Up with assist.  Weight bearing status: WBAT, posterior hip precautions.  Antibiotics: Ancef x24 hours perioperatively.  Diet: Begin with clear fluids and progress diet as tolerated.  DVT prophylaxis: Aspirin 162 mg daily x 4 weeks and mechanical while in the hospital  Bracing/Splinting: Abduction pillow while in bed.   Wound Care: Tegaderm/Alginate until POD7  Pain management: transition from IV to orals as tolerated.   X-rays: PACU  Physical Therapy: ROM, ADL's.  Occupational Therapy: ADL's.  Labs: Trend Hgb on POD #1.  Cultures: Pending, follow culture results closely.  Consults: PT, OT, Medicine, Renal -- appreciate assistance in caring for this patient.  Follow-up: Clinic with Dr. Morillo team in 2 weeks   Disposition: Pending progress with therapies, pain control on orals, and medical stability, anticipate discharge to home on POD #1-2.    Wilner Aradna MD  Orthopaedic Surgery PGY-4  Pager [phone]

## 2020-09-10 NOTE — ANESTHESIA POSTPROCEDURE EVALUATION
Anesthesia POST Procedure Evaluation    Patient: Rashad Ortiz   MRN:     [id number] Gender:   male   Age:    45 year old :      1975        Preoperative Diagnosis: Osteonecrosis (H) [M87.9]   Procedure(s):  ARTHROPLASTY, HIP, TOTAL LEFT   Postop Comments: No value filed.     Anesthesia Type: General       Disposition: Admission   Postop Pain Control: Uneventful            Sign Out: Well controlled pain   PONV: No   Neuro/Psych: Uneventful            Sign Out: Acceptable/Baseline neuro status   Airway/Respiratory: Uneventful            Sign Out: Acceptable/Baseline resp. status   CV/Hemodynamics: Uneventful            Sign Out: Acceptable CV status   Other NRE: NONE   DID A NON-ROUTINE EVENT OCCUR? No         Last Anesthesia Record Vitals:  CRNA VITALS  2020 1805 - 2020 1905      2020             NIBP:  109/78    Pulse:  62    Temp:  35.8  C (96.4  F)    SpO2:  97 %          Last PACU Vitals:  Vitals Value Taken Time   /65 2020  7:45 PM   Temp 36.4  C (97.5  F) 2020  7:30 PM   Pulse 64 2020  7:56 PM   Resp 25 2020  7:56 PM   SpO2 100 % 2020  7:56 PM   Temp src     NIBP 109/78 2020  6:40 PM   Pulse 62 2020  6:40 PM   SpO2 97 % 2020  6:40 PM   Resp     Temp 35.8  C (96.4  F) 2020  6:40 PM   Ht Rate     Temp 2     Vitals shown include unvalidated device data.      Electronically Signed By: Renato Nelson MD, 2020, 7:57 PM

## 2020-09-10 NOTE — PROGRESS NOTES
[facility]    Medicine Progress Note - Hospitalist Service       Date of Admission:  9/9/2020    Assessment & Plan       Rashad Ortiz is a 45 year old male admitted on 9/9/2020. POD # 1 s/p Left total hip arthroplasty. He has a PMHx significant for failed kidney transplant, ESRD on HD (T,T,S); Hypertension and chronic anemia.   No apparent complications during surgery,  ml.   Hemodynamically stable     Principal Problem:       Osteonecrosis     Status post hip surgery  --Ortho following the patient and managing the post op care.   --Ortho managing ABX, pain control and DVT prophylaxis.   --PT consult.   --Pain mx per primary. Chronic pain. On dilaudid pta. Switch oxycodone to dilaudid.    --Bowel regimen.  --Follow-up HGB, transfuse for <7.0          Antibody mediated rejection of kidney transplant    ESRD (end stage renal disease) on dialysis (H)  --Nephrology consult for HD 9/10/2020  --Continue monitoring BMP.   --Continue on PTA Tacrolimus, Mycophenolate, Bicarb       HTN, kidney transplant related  - resume pta med  --Monitor bp       Chronic disease anemia   --follow-up hgb  --Continue PTA iron.     Rest per primary.           The patient's care was discussed with the Bedside Nurse, Care Coordinator/, Patient and Primary team.    Jeferson Nelson MD  Hospitalist Service  [facility]    ______________________________________________________________________    Interval History   States doing well  Some incisional pain  Denies fever or chills.   No cough or cp or sob.   No LH or dizziness.   No NV or pain abdomen.   No new sensory or motor complaint.     No other new or acute medical concern      Data reviewed today: I reviewed all medications, new labs and imaging results over the last 24 hours. I personally reviewed no images or EKG's today.    Physical Exam   Vital Signs: Temp: 97.8  F (36.6  C) Temp src: Oral BP:  137/82 Pulse: 87   Resp: (!) 31 SpO2: 100 % O2 Device: None (Room air) Oxygen Delivery: 2 LPM  Weight: 137 lbs 2.02 oz  General Appearance: Alert. nad  Respiratory: non labored.   Cardiovascular: RRR   GI: soft. ND. NT  Skin: no new rash  Other: Distally wwp.     Data   Recent Labs   Lab 09/10/20  0735 09/09/20  1236   HGB 7.2* 8.6*     --    POTASSIUM 5.3 5.0   CHLORIDE 104  --    CO2 20  --    BUN 25  --    CR 6.35* 5.96*   ANIONGAP 11  --    ASHELY 8.7  --    * 97     Recent Results (from the past 24 hour(s))   XR Pelvis w Hip Port Left 1 View    Narrative    EXAM: XR PELVIS AND HIP PORTABLE LEFT 1 VIEW  LOCATION: [facility]  DATE/TIME: 9/9/2020 7:36 PM    INDICATION: Postop  COMPARISON: 03/18/2020       Impression    IMPRESSION: Postop total left hip arthroplasty. Components in satisfactory position and alignment. No evidence for complications..     Medications     - MEDICATION INSTRUCTIONS -         acetaminophen  975 mg Oral Q8H     aspirin  81 mg Oral BID     febuxostat  80 mg Oral Daily     gelatin absorbable  1 each Topical During Hemodialysis (from stock)     mycophenolate  500 mg Oral BID     omeprazole  20 mg Oral QAM AC     polyethylene glycol  17 g Oral Daily     senna-docusate  1 tablet Oral BID    Or     senna-docusate  2 tablet Oral BID     sodium chloride (PF)  3 mL Intracatheter Q8H     tacrolimus  0.5 mg Oral QPM     tacrolimus  1 mg Oral BID BMT CSA     tamsulosin  0.4 mg Oral Daily

## 2020-09-10 NOTE — PROGRESS NOTES
Care Coordinator Progress Note    Admission Date/Time:  9/9/2020  Attending MD:  Fernando Morillo MD    Data  Chart reviewed, discussed with interdisciplinary team.   Patient was admitted for:    Osteonecrosis (H)  Kidney replaced by transplant  Osteonecrosis (H)  Status post hip surgery.    Concerns with insurance coverage for discharge needs: None.  Current Living Situation: Patient lives with spouse.  Support System: Supportive  Services Involved: Dialysis Services  Transportation at Discharge: Car and Family or friend will provide  Transportation to Medical Appointments:   - Not applicable  Barriers to Discharge: medically stable       Assessment  D: Plan of care discussed with Medical Team at Interdisciplinary Rounds, plan for patient to discharge today vs tomorrow.      I/A: Chart reviewed; spoke with patient via phone and introduced role. Writer confirmed home support, living arrangements and transportation. Patient has OP HD at the [facility] on TTS at 7:15 AM. Writer called clinic and confirmed patient will be at dialysis on Saturday. Discharge orders to be faxed at the time of discharge (P: [phone] F: [phone]).     Patient confirmed he would be able to get a ride from family at the time of discharge.     P: No further RNCC needs at this time. Care Coordinator will remain available for discharge needs that may arise.    Plan  Anticipated Discharge Date:  Today vs tomorrow  Anticipated Discharge Plan:  Home with resumption of OP HD    Myah Dobbs RN, MHA  Care Coordinator  Phone: [phone] Pager: [phone]  [facility]     To contact Weekend RNCC, page [phone]

## 2020-09-10 NOTE — OR NURSING
PACU to Inpatient Nursing Handoff    Patient Rashad Ortiz is a 45 year old male who speaks English.   Procedure Procedure(s):  ARTHROPLASTY, HIP, TOTAL LEFT   Surgeon(s) Primary: Fernando Morillo MD  Assisting: Dexter Holm PA-C     No Known Allergies    Isolation  [unfilled]     Past Medical History   has a past medical history of AVN (avascular necrosis of bone) (H), BPH (benign prostatic hyperplasia), Chronic kidney disease, stage 4, severely decreased GFR (H), Gastro-oesophageal reflux disease, Gout, History of blood transfusion, Hypertension, Medical non-compliance, Pulmonary nodules, Steroid long-term use, and Vitamin D deficiency.    Anesthesia Choice   Dermatome Level     Preop Meds acetaminophen (Tylenol) - time given: 1452   Nerve block Not applicable   Intraop Meds dexamethasone (Decadron)  fentanyl (Sublimaze): 50 mcg total  hydromorphone (Dilaudid): 0.5 mg total  ondansetron (Zofran): last given at 1817  glycopyrrolate, Ramirez, Ephedrine   Local Meds Yes and epinephrine and ropibucaine   Antibiotics cefazolin (Ancef) - last given at 1736     Pain Patient Currently in Pain: yes  Comfort: tolerable with discomfort  Pain Control: partially effective   PACU meds  fentanyl (Sublimaze): 100 mcg (total dose) last given at 1911   hydromorphone (Dilaudid): 1 mg (total dose) last given at 2004   oxycodone (Roxicodone): 5 mg (total dose) last given at 2020    PCA / epidural No   Capnography Respiratory Monitoring (EtCO2): 34 mmHg  Integrated Pulmonary Index (IPI): 10   Telemetry ECG Rhythm: Normal sinus rhythm   Inpatient Telemetry Monitor Ordered? No        Labs Glucose Lab Results   Component Value Date    GLC 97 09/09/2020       Hgb Lab Results   Component Value Date    HGB 8.6 09/09/2020       INR Lab Results   Component Value Date    INR 1.08 07/31/2019      PACU Imaging Completed     Wound/Incision Incision/Surgical Site 07/31/19 Right Arm (Active)   Incision Assessment WDL 07/31/19 1315   Closure  Approximated;Liquid bandage 07/31/19 1315   Incision Drainage Amount None 07/31/19 1315   Dressing Intervention Open to air / No Dressing 07/31/19 1315   Number of days: 406       Incision/Surgical Site 07/31/19 Upper Arm (Active)   Dressing Intervention Clean, dry, intact 07/31/19 1053   Number of days: 406       Incision/Surgical Site 09/09/20 Left Hip (Active)   Incision Assessment UTV 09/09/20 1840   Closure ELO 09/09/20 1840   Incision Drainage Amount None 09/09/20 1840   Dressing Intervention Clean, dry, intact 09/09/20 1840   Number of days: 0      CMS        Equipment ice pack and abductor pillow   Other LDA       IV Access Peripheral IV 09/09/20 Left Hand (Active)   Site Assessment [facility] 09/09/20 1840   Line Status Infusing;Checked every 1 hour 09/09/20 1840   Phlebitis Scale 0-->no symptoms 09/09/20 1840   Infiltration Scale 0 09/09/20 1840   Number of days: 0       Peripheral IV Left Hand (Active)   Site Assessment [facility] 09/09/20 1840   Line Status Infusing;Checked every 1 hour 09/09/20 1840   Phlebitis Scale 0-->no symptoms 09/09/20 1840   Infiltration Scale 0 09/09/20 1840   Number of days:       Blood Products Albumin  mL   Intake/Output Date 09/09/20 0700 - 09/10/20 0659   Shift 4348-0115 0834-8415 8505-8792 24 Hour Total   INTAKE   I.V.  500  500   Colloid  300  300   Shift Total(mL/kg)  800(13.63)  800(13.63)   OUTPUT   Blood  200  200   Shift Total(mL/kg)  200(3.41)  200(3.41)   Weight (kg) 58.7 58.7 58.7 58.7      Drains / Hunter     Time of void PreOp Void Prior to Procedure: 1200 (09/09/20 1252)    PostOp      Diapered? No   Bladder Scan     PO    crackers, water and ice chips     Vitals    B/P: 100/65  T: 97.5  F (36.4  C)    Temp src: Oral  P:  Pulse: 62 (09/09/20 1930)          R: 24  O2:  SpO2: 100 %    O2 Device: Nasal cannula (09/09/20 1930)    Oxygen Delivery: 4 LPM (09/09/20 1930)         Family/support present brother   Patient belongings     Patient transported on bed   DC  meds/scripts (obs/outpt) Not applicable   Inpatient Pain Meds Released? Yes       Special needs/considerations Last Hemodialysis 9/8   Tasks needing completion None       Alma Cruz, RN  [facility] [id number]

## 2020-09-10 NOTE — PLAN OF CARE
[facility]      OUTPATIENT PHYSICAL THERAPY EVALUATION  PLAN OF TREATMENT FOR OUTPATIENT REHABILITATION  (COMPLETE FOR INITIAL CLAIMS ONLY)  Patient's Last Name, First Name, M.I.  YOB: 1975  AngelRashad GARCIAS                        Provider's Name  [facility] Medical Record No.  [id number]                               Onset Date:  09/09/20   Start of Care Date:  09/10/20      Type:     _X_PT   ___OT   ___SLP Medical Diagnosis:  L FRANCA                        PT Diagnosis:  Impaired functional mobility   Visits from SOC:  1   _________________________________________________________________________________  Plan of Treatment/Functional Goals    Planned Interventions: Cryotherapy,    bed mobility training, gait training, strengthening, transfer training, risk factor education, home program guidelines, progressive activity/exercise,       Goals: See Physical Therapy Goals on Care Plan in [facility] electronic health record.    Therapy Frequency: (1x only)  Predicted Duration of Therapy Intervention: 1 day  _________________________________________________________________________________    I CERTIFY THE NEED FOR THESE SERVICES FURNISHED UNDER        THIS PLAN OF TREATMENT AND WHILE UNDER MY CARE     (Physician co-signature of this document indicates review and certification of the therapy plan).                Certification date from: 09/10/20, Certification date to: 12/08/20    Referring Physician: Dexter Holm PA-C             Initial Assessment        See Physical Therapy evaluation dated 09/10/20 in Epic electronic health record.

## 2020-09-10 NOTE — PLAN OF CARE
Discharge Planner PT   Patient plan for discharge: Home with spouse  Current status: Educated on hip precautions and reviewed HEP. SBA supine>sit and transfers. Ambulating 225' SBA with axillary crutches, SBA on stairs.  Barriers to return to prior living situation: No PT barriers  Recommendations for discharge: Home with assist as needed for ADLs  Rationale for recommendations: SBA for transfers and gait. Has support at home. PT needs met for discharge home.       Entered by: Rohit Phillips 09/10/2020 9:35 AM     Physical Therapy Discharge Summary    Reason for therapy discharge:    All goals and outcomes met, no further needs identified.    Progress towards therapy goal(s). See goals on Care Plan in [facility] electronic health record for goal details.  Goals met    Therapy recommendation(s):    Continue home exercise program.

## 2020-09-10 NOTE — PROGRESS NOTES
"   09/10/20 0800   Quick Adds   Quick Adds Certification   Type of Visit Initial PT Evaluation   Living Environment   Lives With spouse;child(latosha), dependent   Living Arrangements house   Home Accessibility stairs to enter home;stairs within home   Number of Stairs, Main Entrance 1   Stair Railings, Main Entrance none   Transportation Anticipated family or friend will provide   Living Environment Comment Pt lives in single level home, one step to enter. Flight to basement, but not needed. 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 ambulation;transferring;toileting;bathing;dressing   Prior Functional Level Comment Crutches since April due to pain   General Information   Onset of Illness/Injury or Date of Surgery - Date 09/09/20   Referring Physician Dexter Holm PA-C    Patient/Family Goals Statement Home, but \"would not mind having a day to rest here\"   Pertinent History of Current Problem (include personal factors and/or comorbidities that impact the POC) POD1 L FRANCA posterior approach. Hx of failed kindney xplant   Precautions/Limitations left hip precautions  (posterior)   Weight-Bearing Status - LLE weight-bearing as tolerated   Cognitive Status Examination   Orientation orientation to person, place and time   Level of Consciousness alert   Follows Commands and Answers Questions 100% of the time   Personal Safety and Judgment intact   Pain Assessment   Patient Currently in Pain Yes, see Vital Sign flowsheet "   Integumentary/Edema   Integumentary/Edema Comments Baseline LLE edema   Posture    Posture Not impaired   Range of Motion (ROM)   ROM Comment WFL throughout within precautions   Strength   Strength Comments NT   Bed Mobility   Bed Mobility Comments SBA supine>sit   Transfer Skills   Transfer Comments SBA sit<>stand   Gait   Gait Comments SBA with axillary crutches   Balance   Balance Comments Requires UE support for dynamic balance due to pain   Sensory Examination   Sensory Perception Comments Some post-op tingling in L toes and ant/lateral hip numbness   Modality Interventions   Planned Modality Interventions Cryotherapy   General Therapy Interventions   Planned Therapy Interventions bed mobility training;gait training;strengthening;transfer training;risk factor education;home program guidelines;progressive activity/exercise   Clinical Impression   Criteria for Skilled Therapeutic Intervention yes, treatment indicated   PT Diagnosis Impaired functional mobility   Influenced by the following impairments pain, FRANCA, hip precautions   Functional limitations due to impairments Decreased IND with bed mobility, transfers, gait   Clinical Presentation Stable/Uncomplicated   Clinical Presentation Rationale Medically stable   Clinical Decision Making (Complexity) Low complexity   Therapy Frequency   (1x only)   Predicted Duration of Therapy Intervention (days/wks) 1 day   Anticipated Discharge Disposition Home with Assist   Risk & Benefits of therapy have been explained Yes   Patient, Family & other staff in agreement with plan of care Yes   Clinical Impression Comments See CP note   Therapy Certification   Start of care date 09/10/20   Certification date from 09/10/20   Certification date to 12/08/20   Medical Diagnosis L FRANCA   Certification I certify the need for these services furnished under this plan of treatment and while under my care.  (Physician co-signature of this document indicates review and certification of  "the therapy plan).    [facility] [employer] \"6 Clicks\"   2016, Trustees of [facility], under license to "[employer].  All rights reserved.   6 Clicks Short Forms Basic Mobility Inpatient Short Form   [facility]  \"6 Clicks\" V.2 Basic Mobility Inpatient Short Form   1. Turning from your back to your side while in a flat bed without using bedrails? 4 - None   2. Moving from lying on your back to sitting on the side of a flat bed without using bedrails? 3 - A Little   3. Moving to and from a bed to a chair (including a wheelchair)? 3 - A Little   4. Standing up from a chair using your arms (e.g., wheelchair, or bedside chair)? 3 - A Little   5. To walk in hospital room? 3 - A Little   6. Climbing 3-5 steps with a railing? 3 - A Little   Basic Mobility Raw Score (Score out of 24.Lower scores equate to lower levels of function) 19   Total Evaluation Time   Total Evaluation Time (Minutes) 8     "

## 2020-09-10 NOTE — PHARMACY-ADMISSION MEDICATION HISTORY
Admission Medication History Completed by Pharmacy    See [facility] Admission Navigator for allergy information, preferred outpatient pharmacy, prior to admission medications and immunization status.     Medication History Sources:     Patient and SureScripts dispense history    Changes made to PTA medication list (reason):    Added:   Cyclobenzaprine 5 mg (per patient and dispense history)    Deleted: None    Changed: None    Additional Information:    Patient was a reliable historian and knew his medication doses and frequencies without being prompted. Patient reports taking cyclobenzaprine 5 mg PRN for muscle spasms (which he uses rarely). This medication was last filled 4/7/20 for a 20 day supply.     Prior to Admission medications    Medication Sig Last Dose Taking? Auth Provider   acetaminophen (TYLENOL) 500 MG tablet Take 2 tablets (1,000 mg) by mouth every 8 hours as needed for mild pain Past Month at Unknown time Yes Mariel Garcia MD   amLODIPine (NORVASC) 5 MG tablet Take 1 tablet (5 mg) by mouth daily 9/8/2020 at 2000 Yes Stef Murillo MD   aspirin (ASA) 81 MG EC tablet Take 1 tablet (81 mg) by mouth daily  Yes Tiffanie Gottlieb APRN CNS   carvedilol (COREG) 25 MG tablet Take 1 tablet (25 mg) by mouth 2 times daily (with meals) 9/9/2020 at 0900 Yes Gee Barrios MD   cholecalciferol 25 MCG (1000 UT) TABS Take 2,000 Units by mouth daily 9/8/2020 at 0800 Yes Stef Murillo MD   cyclobenzaprine (FLEXERIL) 5 MG tablet Take 5 mg by mouth 3 times daily as needed for muscle spasms 9/3/2020 Yes Unknown, Entered By History   febuxostat (ULORIC) 80 MG TABS tablet Take 1 tablet (80 mg) by mouth daily 9/9/2020 at 0900 Yes Stef Murillo MD   ferrous sulfate (FEROSUL) 325 (65 Fe) MG tablet Take 1 tablet (325 mg) by mouth daily (with breakfast) 9/8/2020 at 0800 Yes Gee Barrios MD   furosemide (LASIX) 20 MG tablet Take 1 tablet (20 mg) by mouth 2 times daily 9/8/2020  at 2300 Yes Gee Barrios MD   HYDROmorphone (DILAUDID) 4 MG tablet Take 1-2 tablets (4-8 mg) by mouth every 6 hours as needed for severe pain Related to pain that requires surgery. 9/6/2020 Yes Mariel Garcia MD   hydrOXYzine (ATARAX) 50 MG tablet Take 0.5-1 tablets (25-50 mg) by mouth nightly as needed (sleep) 9/7/2020 Yes Mariel Garcia MD   mycophenolate (GENERIC EQUIVALENT) 250 MG capsule Take 2 capsules (500 mg) by mouth 2 times daily 9/9/2020 at 0900 Yes Gee Barrios MD   order for DME Equipment being ordered: Crutches  Yes Jimmy Kemp,    oxyCODONE (ROXICODONE) 5 MG tablet Take 1-2 tablets (5-10 mg) by mouth every 4 hours as needed  Yes Tiffanie Gottlieb APRN CNS   polyethylene glycol (MIRALAX) 17 g packet Take 17 g by mouth daily  Yes Tiffanie Gottlieb APRN CNS   senna-docusate (SENOKOT-S/PERICOLACE) 8.6-50 MG tablet Take 1 tablet by mouth 2 times daily  Yes Tiffanie Gottlieb APRN CNS   sodium bicarbonate 650 MG tablet Take 2 tablets (1,300 mg) by mouth 3 times daily Past Month at Unknown time Yes Stef Murillo MD   sulfamethoxazole-trimethoprim (BACTRIM) 400-80 MG tablet Take 1 tablet by mouth every other day 9/8/2020 at Unknown time Yes Gee Barrios MD   tacrolimus (GENERIC EQUIVALENT) 0.5 MG capsule Take 1 capsule (0.5 mg) by mouth every evening Total dose = 1 mg in the AM and 1.5 mg in the PM 9/8/2020 at Unknown time Yes Abran Cunha MD   tacrolimus (GENERIC EQUIVALENT) 1 MG capsule Take 1 capsule (1 mg) by mouth 2 times daily . Total dose=1mg in the AM and 1.5mg in the PM 9/9/2020 at 0900 Yes Abran Cunha MD   tamsulosin (FLOMAX) 0.4 MG capsule TAKE 1 CAPSULE BY MOUTH DAILY 9/9/2020 at 0900 Yes Mariel Garcia MD   omeprazole (PRILOSEC) 20 MG capsule Take 1 capsule (20 mg) by mouth daily More than a month at Unknown time  Lani Quick MD       Date completed: 09/10/20    Medication  history completed by: Britany Skinner Pharmacy Student

## 2020-09-10 NOTE — DISCHARGE SUMMARY
ORTHOPAEDIC SURGERY DISCHARGE SUMMARY     Date of Admission: 9/9/2020  Date of Discharge: 9/10/2020  7:16 PM  Disposition: Home  Staff Physician: Fernando Morillo MD  Primary Care Provider: Mariel Garcia    DISCHARGE DIAGNOSIS:  Osteonecrosis (H) [M87.9]    PROCEDURES: Procedure(s):  Left total hip arthroplasty  on 9/9/2020    BRIEF HISTORY:  This is a 45-year-old male with end-stage renal disease and left hip osteonecrosis.  Patient's left hip osteonecrosis was refractory to conservative measures.  Risk, benefits, possible complications of a left total hip arthroplasty discussed in detail preoperatively.  Patient elected to proceed.  He was admitted for routine postoperative care.    HOSPITAL COURSE:    The patient was admitted following the above listed procedures for pain control and rehabilitation. Rashad Ortiz did well post-operatively. Medicine was consulted post operatively to aid in management of medical co-morbidities.  Nephrology was consulted for assistance with hemodialysis and monitoring.  The patient received routine nursing cares and at the time of discharge was medically stable. Vital signs were stable throughout admission. The patient is tolerating a regular diet and is voiding spontaneously. All PT/OT goals have been met for safe mobility. Pain is now controlled on oral medications which will be available on discharge. Stool softeners have been used while taking pain medications to help prevent constipation. Rashad Ortiz is deemed medically safe to discharge.     Antibiotics:  Ancef given periop and 24 hours postop.  DVT prophylaxis:  Aspirin 162 mg daily initiated after surgery and will be continued for 4 weeks. Cleared by Renal team.   PT Progress:  Has met PT/OT goals for safe mobility.    Pain Meds:  Weaned off all IV pain meds by discharge.  Inpatient Events:  No significant events or complications.     PHYSICAL EXAM:    Gen: No acute distress, resting comfortably in  bed.  Resp: Non-labored breathing  MSK:     LLE:  - Dressings c/d/i  - SILT femoral/tibial/sural/saphenous/DP/SP nerves  - Fires Quad, TA, EHL, FHL, GaSC  - PT/DP pulses 2+, foot wwp       FOLLOWUP:    Follow up with Dr. Morillo at 2 weeks postoperatively.    Future Appointments   Date Time Provider Department Center   9/16/2020 11:20 AM Guillaume Mcarthur, PT IAMBLP [facility]   9/25/2020 12:40 PM Fernando Viveros PA-C [facility]   10/19/2020  9:45 AM [email or web address] CXR [facility]   10/19/2020 10:00 AM [email or web address] CVCV [facility]   10/19/2020 11:00 AM Jared Galvin MD [facility]   10/19/2020 12:30 PM UC PFL B PFT [facility]   10/22/2020  2:15 PM Fernando Morillo MD [facility]   11/24/2020  1:05 PM 2, Uc Kidney/Pancreas Recipient MRE [facility]       Orthopaedic Surgery appointments are at the [facility] ([address] [place] [address]). Call [phone] to schedule a follow-up appointment at this location with your provider.     PLANNED DISCHARGE ORDERS:      Discharge Medication List as of 9/10/2020  6:33 PM      START taking these medications    Details   polyethylene glycol (MIRALAX) 17 g packet Take 17 g by mouth daily, Disp-7 packet,R-0, E-Prescribe      senna-docusate (SENOKOT-S/PERICOLACE) 8.6-50 MG tablet Take 1 tablet by mouth 2 times daily, Disp-30 tablet,R-0, E-Prescribe         CONTINUE these medications which have CHANGED    Details   aspirin (ASA) 81 MG EC tablet Take 1 tablet (81 mg) by mouth 2 times daily, Disp-56 tablet,R-0, E-Prescribe      HYDROmorphone (DILAUDID) 2 MG tablet Take 1-2 tablets (2-4 mg) by mouth every 4 hours as needed for moderate to severe pain, Disp-40 tablet,R-0, E-Prescribe         CONTINUE these medications which have NOT CHANGED    Details   acetaminophen (TYLENOL) 500 MG tablet Take 2 tablets (1,000 mg) by mouth every 8 hours as needed for mild pain, Disp-160 tablet,R-3, E-Prescribe      amLODIPine (NORVASC) 5 MG tablet Take 1 tablet (5 mg) by  mouth daily, Disp-90 tablet,R-3, E-Prescribe      carvedilol (COREG) 25 MG tablet Take 1 tablet (25 mg) by mouth 2 times daily (with meals), Disp-180 tablet,R-3, E-Prescribe      cholecalciferol 25 MCG (1000 UT) TABS Take 2,000 Units by mouth daily, Disp-90 tablet,R-3, E-Prescribe      cyclobenzaprine (FLEXERIL) 5 MG tablet Take 5 mg by mouth 3 times daily as needed for muscle spasms, Historical      febuxostat (ULORIC) 80 MG TABS tablet Take 1 tablet (80 mg) by mouth daily, Disp-90 tablet,R-3, E-Prescribe      ferrous sulfate (FEROSUL) 325 (65 Fe) MG tablet Take 1 tablet (325 mg) by mouth daily (with breakfast), Disp-30 tablet,R-11, E-Prescribe      furosemide (LASIX) 20 MG tablet Take 1 tablet (20 mg) by mouth 2 times daily, Disp-180 tablet,R-1, E-Prescribe      hydrOXYzine (ATARAX) 50 MG tablet Take 0.5-1 tablets (25-50 mg) by mouth nightly as needed (sleep), Disp-30 tablet,R-3, E-Prescribe      mycophenolate (GENERIC EQUIVALENT) 250 MG capsule Take 2 capsules (500 mg) by mouth 2 times daily, Disp-120 capsule, R-11, E-PrescribeTXP DT 1/13/2011 (Kidney) TXP Dischg DT 1/16/2011 DXZ94.0 TX [facility]      omeprazole (PRILOSEC) 20 MG capsule Take 1 capsule (20 mg) by mouth daily, Disp-90 capsule, R-1, E-Prescribe      order for DME Equipment being ordered: CrutchesDisp-1 Device,R-0, No Print Out      sodium bicarbonate 650 MG tablet Take 2 tablets (1,300 mg) by mouth 3 times daily, Disp-180 tablet, R-11, E-Prescribe      sulfamethoxazole-trimethoprim (BACTRIM) 400-80 MG tablet Take 1 tablet by mouth every other day, Disp-45 tablet,R-3, E-Prescribe      tacrolimus (GENERIC EQUIVALENT) 0.5 MG capsule Take 1 capsule (0.5 mg) by mouth every evening Total dose = 1 mg in the AM and 1.5 mg in the PM, Disp-30 capsule,R-11, E-Prescribe      tacrolimus (GENERIC EQUIVALENT) 1 MG capsule Take 1 capsule (1 mg) by mouth 2 times daily . Total dose=1mg in the AM and 1.5mg in the PM, Disp-60  capsule,R-11, E-Prescribe      tamsulosin (FLOMAX) 0.4 MG capsule TAKE 1 CAPSULE BY MOUTH DAILY, Disp-30 capsule,R-8, E-Prescribe         STOP taking these medications       oxyCODONE (ROXICODONE) 5 MG tablet Comments:   Reason for Stopping:                 Discharge Procedure Orders   Reason for your hospital stay   Order Comments: You were admitted to the hospital following your total hip arthroplasty.     Adult [facility] Follow-up and recommended labs and tests   Order Comments: You are to return to clinic in 2 weeks for wound check with the clinic nurse. Approximately 6 weeks after your surgery, you will be scheduled for an appointment with Dr. Morillo. At this time, AP pelvis imaging will be obtained.    If you need to schedule your 2 and 6 week postoperative visits or haven't received confirmation regarding these visits, please call one of the following numbers within 7 days of discharge.    For patients that see Dr. Morillo at:   -[facility]: [phone]  -[facility]: [phone]  -[facility]: [phone]     Discharge Instructions   Order Comments: TOTAL HIP ARTHROPLASTY POST OPERATIVE DISCHARGE INSTRUCTIONS    FOLLOW UP APPOINTMENT  You are scheduled for a post operative wound check with Dr. Morillo's clinic approximately two weeks after surgery. At approximately six weeks after surgery, you will see Dr. Morillo in clinic. During this visit, repeat X rays of your operative hip will be performed.      Your follow up appointments will be at the location that you regularly see Dr. Morillo:    [facility]  [address] [place] [address] [phone]    [address] [address] [phone]    [facility]  [address] [place], [place] [address] [phone]    Physical therapy:   A referral for  physical therapy will be provided at the time of discharge.       ACTIVITY  Weight bearing status:   You may bear weight on your operative extremity as tolerated, using assistive devices (walker, cane) as needed. As you begin to feel more comfortable ambulating, you may gradually transition from a walker to a cane. Eventually, you should wean from all assistive devices. Although we would like you to discontinue use of assistive devices as soon as possible, do not transition until you have worked with your physical therapist to achieve safe balance and comfort.     Posterior hip precautions:  You must maintain the following posterior hip precautions for the next 12 weeks with no exceptions:  1) no hip flexion >90 degrees (no bending down at the waist)  2) no internal rotation past neutral (no pivoting)  3) no adduction past midline (no crossing your legs)    Exercises:   Perform the following exercises at least three times per day for the first four weeks after surgery to prevent complications, such as blood clots in your legs:  1) Point and flex your feet  2) Move your ankle around in big circles  3) Wiggle your toes   Also, perform thigh muscle tightening exercises for 10 to 15 minutes at least three times per day for the first four weeks after surgery.    Athletic activities:  Activities such as swimming, bicycling, jogging, running, and stop-and-go sports should be avoided until directed by your provider.    Driving:  Driving is not permitted until directed by your provider. Under no circumstance are you permitted to drive while using narcotic pain medications.    Return to work:  You may return to work when directed by your provider.       COMFORT AND PAIN MANAGEMENT  Icing:  An ice pack will be provided to control swelling and discomfort after surgery. Place a thin towel on your skin and apply the ice pack overtop. You may apply ice for 20 minutes as often as two times per hour.    Pain medications &  tapering:  You will be discharged with acetaminophen (Tylenol) and a narcotic medication for pain management after surgery. Acetaminophen is most effective when it is taken per the schedule outlined by your provider (every four, six, or eight hours as prescribed). You may safely use acetaminophen as prescribed for the first four weeks after surgery provided you do not exceed the maximum daily dose prescribed by your provider (usually 3000 mg - 4000 mg). The narcotic pain medication should only be taken on an as-needed basis when necessary and should be reserved for severe pain that is not controlled with scheduled acetaminophen. In the first three days following surgery, your symptoms may warrant use of the narcotic pain medication every three, four, or six hours as prescribed. After three days, focus your efforts on decreasing (tapering) use of narcotic medications.   The most successful tapering strategy is to first, decrease the dose (number of tablets) and second, decrease the interval (time between doses). For example, if you begin taking two tablets every four hours after surgery, start your taper by decreasing one of these doses to one tablet. Every one to two days, decrease another dose to one tablet until you are eventually taking one tablet every four hours. Once this is achieved, focus on increasing the number of hours between doses, moving from one tablet every four hours to one tablet every six hours. As tolerated, continue to increase the interval to eight and twelve hours. Eventually, taper to one dose every evening and discontinue when no longer needed.      ANTICOAGULATION  Take aspirin 162mg daily for the first four weeks after surgery.       WOUND CARE AND SHOWERING  Wound care:  You have a clean Aquacel dressing on your surgical wound. This dressing should stay in place for seven days to allow the incision to heal. If the Aquacel dressing becomes excessively wet or saturated before removal on day  seven, please contact Dr. Morillo's office. After seven days, remove the dressing and leave the wound open to air (see showering instructions below). If there is any drainage from the incision after removal of the Aquacel, dress the wound with sterile 4x4 gauze, using tape over top to secure the dressing in place over the incision. Please contact Dr. Morillo's office if you notice the following after removal of the Aquacel dressin) significant cloudy, bloody, or malodorous drainage from the incision, 2) excessive warmth and redness around the incision.    Showering:  You may shower immediately after surgery provided the Aquacel dressing is in place. Although you are strictly prohibited from soaking or submerging the surgical wound underwater, you may shower in the usual fashion, allowing water and soap to run over the Aquacel. Once the Aquacel is removed on the seventh day after surgery, you may continue to shower; however, the incision should be covered with saran wrap (or any other non-permeable covering) to allow the incision to remain dry and to prevent you from scrubbing/rubbing the incision. The steri-strips (small white tape that is directly on the incision areas) should be left on until they fall off or are removed at your first office visit. After your incision is examined at your first office visit, you will likely be allowed to return to showering without covering the incision.     Tub bathing:  Tub bathing, swimming, or any other activities that cause your incision to be submerged should be avoided until allowed by your provider. Typically, patients are allowed to return to these activities six weeks after surgery pending clearance by your provider.      CONTACTING YOUR PROVIDER:  You may experience symptoms that require follow-up before your scheduled two week appointment. Please contact Dr. Morillo's office if you experience:  1) Pain that persists or worsens in the first few days after surgery  2)  Excessive redness or drainage of cloudy or bloody material from the wounds (clear red tinted fluid and some mild drainage should be expected) or drainage of any kind five days after surgery  3) A temperature elevation greater than 101.5    4) Pain, swelling or redness in your calf  5) Numbness or weakness in your leg or foot      Regular business hours (Monday - Friday, 8am - 5pm):  [facility]: [phone]  [facility]: [phone]  [facility]: [phone]    After hours and weekends:  [facility] on call Orthopedic resident: [phone]     Activity   Order Comments: Your activity upon discharge will be as tolerated. You must maintain posterior hip precautions to the operative hip (see below) for the next 12 weeks with no exceptions.     POSTERIOR HIP PRECAUTIONS:  1) no hip flexion >90 degrees (no bending down at the waist)  2) no internal rotation past neutral (no pivoting)  3) no adduction past midline (no crossing your legs)     Order Specific Question Answer Comments   Is discharge order? Yes      ABO/Rh Type and Screen   Standing Status: Future Standing Exp. Date: 10/08/20     Miscellaneous DME Order   Order Comments: DME Documentation:   Describe the reason for need to support medical necessity: s/p ARELY SCHULTE I, the undersigned, certify that the above prescribed supplies are medically necessary for this patient and is both reasonable and necessary in reference to accepted standards of medical and necessary in reference to accepted standards of medical practice in the treatment of this patient's condition and is not prescribed as a convenience.     Order Specific Question Answer Comments   DME Provider: [facility]    DME Item Needed: Raised toilet seat w/arms, reacher, sock aide    Length of Need: 12 weeks      Diet   Order Comments: You may return to your regular, pre-surgery diet unless  instructed otherwise by your provider.     Order Specific Question Answer Comments   Is discharge order? Yes        Wilner Aranda MD  PGY-4  Orthopaedic Surgery

## 2020-09-10 NOTE — PLAN OF CARE
Patient A/Ox4, pleasant and receptive. VSS, declined CAPNO after a while. Denies CP, SOB, dizziness/LH. LSCTA. +fl/BS. Due to void, did place urinal next to pt, will scan a little later if no void. CMS intact. Dressing to left hip CDI, ice applied. Tolerating regular diet without NV, pt eating crackers and drinking liquids. Activity level is fair, pt turning in bed wonderfully. IV SL. Pain rated as tolerable throughout shift, managed with 10mg oxycodone around-the-clock, pt trialing this now, might want an increase in oral medications; have been able to avoid IV pain medication thus far. Pt would like to discharge today. Patient has demonstrated ability to call appropriately. Patient is resting with call light within reach. Will continue to monitor.

## 2020-09-10 NOTE — PLAN OF CARE
VS: VSS, pt denies CP or SOB.    O2: Room air sat. > 90%.   Output: Voiding okay using urinal.    Last BM: 09/08/20   Activity: Up walked on the curiel with PT using crutches.   Skin: Intact except surgical incision.    Pain: Manageable with discomfort using ORN medication.    CMS: CMS and neuro intact.    Dressing: CDI.    Diet: Regular tolerating without N/V   LDA: PIV SL.    Equipment: Crutches, IV pole and personal belongings.    Plan: TBD.    Additional Info: Pt left for dialysis to [place] accompanied with transport. Pt is back from dialysis about 06: 30 pm, and pt is discharging home at this time.

## 2020-09-10 NOTE — CONSULTS
Nephrology Initial Consult  September 10, 2020      Rashad Ortiz MRN:[id number] YOB: 1975  Date of Admission:9/9/2020  Primary care provider: Mariel Garcia  Requesting physician: Fernando Morillo MD    ASSESSMENT AND RECOMMENDATIONS:   Rashad Ortiz is a 45 year old male with PMH of ESRD s/p LDKT (initiated HD 9/1/2020), HTN, BPH, admitted s/p L total hip arthroplasty.      ESKD: failed LDKT, initiated HD 9/1/2020, dialyzes TTS at [facility] with Dr. Medina. Run time: 4 hrs. Access: RUE AVF. EDW: 62 kg. Heparin is used with outpt HD. Has behavioral contract prior to being re-listed for transplant.  - Per tx neph (Dr. Rangel), continue on  mg bid, tacrolimus (goal 4-6); pred has been weaned and stopped.  - Dialysis per TTS schedule  - Dialysis consent obtained 9/10/2020    BP: normotensive; PTA anti-hypertensives are not listed on dialysis report. Per review, appears to be amlodipine 5 mg qday, coreg 25 mg bid, lasix 40 mg bid; also on Flomax for BPH  - Home BP meds held    Volume: EDW: 62 kg, weight 61.2 kg post HD 9/8; oxygen 100% on RA  - No UF today     BMD: Ca 8.7, no alb or phos, recent , phos 4's  -On cholecalciferol 2000 units qday    Anemia: recent labs with ferritin 416, iron 17, IS 9, hgb 8's. PTA on 2400 units per HD, venofer 50 mg qTues  - Continue LAWRENCE and venofer  - Stopped PO iron (pt is getting IV iron on dialysis)    Acid/base:  - Stopped PO Na bicarb (pt gets bicarb via dialysate)    S/p L total hip arthroplasty 9/9  - AC, typically heparin, warfarin, or apixaban in ESRD (for further questions, recommend pharmacist)      Recommendations were communicated to primary team via this note       Mamie Taylor, PA-C   [phone]      REASON FOR CONSULT: ESKD and management of dialysis; also question re AC    HISTORY OF PRESENT ILLNESS:  Rashad Ortiz is a 45 year old male with PMH of ESRD s/p LDKT (initiated HD 9/1/2020), HTN, BPH, admitted s/p  L total hip arthroplasty. Per chart review, the patient was initiated on dialysis 9/1 via AVF which was placed last year and has been working well. Outpatient dialysis records were obtained and reviewed. The patient was seen on dialysis, stable run so far with no UF. Having some expected post op pain. Denies n/v, CP, SOB, chills.    PAST MEDICAL HISTORY:  Reviewed with patient on 09/10/2020     Past Medical History:   Diagnosis Date     AVN (avascular necrosis of bone) (H)     left hip     BPH (benign prostatic hyperplasia)      Chronic kidney disease, stage 4, severely decreased GFR (H)      Gastro-oesophageal reflux disease      Gout      History of blood transfusion      Hypertension      Medical non-compliance      Pulmonary nodules      Steroid long-term use      Vitamin D deficiency        Past Surgical History:   Procedure Laterality Date     AV FISTULA OR GRAFT ARTERIAL       CREATE FISTULA ARTERIOVENOUS UPPER EXTREMITY Right 7/31/2019    Procedure: Creation Of Atriovenous Fistula Right Upper Arm;  Surgeon: Julia Irwin MD;  Location: [facility] OR     LIGATE FISTULA ARTERIOVENOUS UPPER EXTREMITY  12/20/2011    Procedure:LIGATE FISTULA ARTERIOVENOUS UPPER EXTREMITY; Excision of Right Forearm Arteriovenous Fistula.; Surgeon:LINDY AMAYA; Location:[facility] OR     PERCUTANEOUS BIOPSY KIDNEY Right 2/28/2017    Procedure: PERCUTANEOUS BIOPSY KIDNEY;  Surgeon: Gee Barrios MD;  Location: [facility] OR     TRANSPLANT  01/13/2011    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 Auth Provider   acetaminophen (TYLENOL) 500 MG tablet Take 2 tablets (1,000 mg) by mouth every 8 hours as needed for mild pain Past Month at Unknown time Yes Mariel Garcia MD   amLODIPine (NORVASC) 5 MG tablet Take 1 tablet (5 mg) by mouth daily 9/8/2020 at 2000 Yes Stef Murillo MD   aspirin (ASA) 81 MG EC tablet Take 1  tablet (81 mg) by mouth daily  Yes Tiffanie Gottlieb APRN CNS   carvedilol (COREG) 25 MG tablet Take 1 tablet (25 mg) by mouth 2 times daily (with meals) 9/9/2020 at 0900 Yes Gee Barrios MD   cholecalciferol 25 MCG (1000 UT) TABS Take 2,000 Units by mouth daily 9/8/2020 at 0800 Yes Stef Murillo MD   cyclobenzaprine (FLEXERIL) 5 MG tablet Take 5 mg by mouth 3 times daily as needed for muscle spasms 9/3/2020 Yes Unknown, Entered By History   febuxostat (ULORIC) 80 MG TABS tablet Take 1 tablet (80 mg) by mouth daily 9/9/2020 at 0900 Yes Stef Murillo MD   ferrous sulfate (FEROSUL) 325 (65 Fe) MG tablet Take 1 tablet (325 mg) by mouth daily (with breakfast) 9/8/2020 at 0800 Yes Gee Barrios MD   furosemide (LASIX) 20 MG tablet Take 1 tablet (20 mg) by mouth 2 times daily 9/8/2020 at 2300 Yes Gee Barrios MD   HYDROmorphone (DILAUDID) 4 MG tablet Take 1-2 tablets (4-8 mg) by mouth every 6 hours as needed for severe pain Related to pain that requires surgery. 9/6/2020 Yes Mariel Garcia MD   hydrOXYzine (ATARAX) 50 MG tablet Take 0.5-1 tablets (25-50 mg) by mouth nightly as needed (sleep) 9/7/2020 Yes Mariel Garcia MD   mycophenolate (GENERIC EQUIVALENT) 250 MG capsule Take 2 capsules (500 mg) by mouth 2 times daily 9/9/2020 at 0900 Yes Gee Barrios MD   order for DME Equipment being ordered: Crutches  Yes Jimmy Kemp,    oxyCODONE (ROXICODONE) 5 MG tablet Take 1-2 tablets (5-10 mg) by mouth every 4 hours as needed  Yes Tiffanie Gottlieb APRN CNS   polyethylene glycol (MIRALAX) 17 g packet Take 17 g by mouth daily  Yes Tiffanie Gottlieb APRN CNS   senna-docusate (SENOKOT-S/PERICOLACE) 8.6-50 MG tablet Take 1 tablet by mouth 2 times daily  Yes Tiffanie Gottlieb APRN CNS   sodium bicarbonate 650 MG tablet Take 2 tablets (1,300 mg) by mouth 3 times daily Past Month at Unknown time Yes Stef Murillo MD    sulfamethoxazole-trimethoprim (BACTRIM) 400-80 MG tablet Take 1 tablet by mouth every other day 9/8/2020 at Unknown time Yes Gee Barrios MD   tacrolimus (GENERIC EQUIVALENT) 0.5 MG capsule Take 1 capsule (0.5 mg) by mouth every evening Total dose = 1 mg in the AM and 1.5 mg in the PM 9/8/2020 at Unknown time Yes Abran Cunha MD   tacrolimus (GENERIC EQUIVALENT) 1 MG capsule Take 1 capsule (1 mg) by mouth 2 times daily . Total dose=1mg in the AM and 1.5mg in the PM 9/9/2020 at 0900 Yes Abran Cunha MD   tamsulosin (FLOMAX) 0.4 MG capsule TAKE 1 CAPSULE BY MOUTH DAILY 9/9/2020 at 0900 Yes Mariel Garcia MD   omeprazole (PRILOSEC) 20 MG capsule Take 1 capsule (20 mg) by mouth daily More than a month at Unknown time  Lani Quick MD      Current Meds    acetaminophen  975 mg Oral Q8H     aspirin  81 mg Oral Daily     febuxostat  80 mg Oral Daily     ferrous sulfate  325 mg Oral Daily with breakfast     mycophenolate  500 mg Oral BID     omeprazole  20 mg Oral QAM AC     polyethylene glycol  17 g Oral Daily     senna-docusate  1 tablet Oral BID    Or     senna-docusate  2 tablet Oral BID     sodium bicarbonate  1,300 mg Oral TID     sodium chloride (PF)  3 mL Intracatheter Q8H     tacrolimus  0.5 mg Oral QPM     tacrolimus  1 mg Oral BID BMT CSA     tamsulosin  0.4 mg Oral Daily     Infusion Meds      ALLERGIES:    No Known Allergies    REVIEW OF SYSTEMS:  A comprehensive of systems was negative except as noted above.    SOCIAL HISTORY:   Social History     Socioeconomic History     Marital status:      Spouse name: Not on file     Number of children: Not on file     Years of education: Not on file     Highest education level: Not on file   Occupational History     Not on file   Social Needs     Financial resource strain: Not on file     Food insecurity     Worry: Not on file     Inability: Not on file     Transportation needs     Medical: Not on file      Non-medical: Not on file   Tobacco Use     Smoking status: Current Some Day Smoker     Types: Cigarettes     Last attempt to quit: 2017     Years since quitting: 3.5     Smokeless tobacco: Never Used     Tobacco comment: Patient states that he is an 'social'  smoker. 3 cigarettes per week per pt   Substance and Sexual Activity     Alcohol use: Not Currently     Alcohol/week: 4.2 standard drinks     Types: 5 Standard drinks or equivalent per week     Comment: Quit 2020     Drug use: Not Currently     Types: Marijuana     Sexual activity: Never     Partners: Female     Birth control/protection: None   Lifestyle     Physical activity     Days per week: Not on file     Minutes per session: Not on file     Stress: Not on file   Relationships     Social connections     Talks on phone: Not on file     Gets together: Not on file     Attends Bahai service: Not on file     Active member of club or organization: Not on file     Attends meetings of clubs or organizations: Not on file     Relationship status: Not on file     Intimate partner violence     Fear of current or ex partner: Not on file     Emotionally abused: Not on file     Physically abused: Not on file     Forced sexual activity: Not on file   Other Topics Concern     Parent/sibling w/ CABG, MI or angioplasty before 65F 55M? Not Asked   Social History Narrative    Rashad lives with his wife and 2 children. There are 2 declawed cats. 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      Reviewed with patient    FAMILY MEDICAL HISTORY:   Family History   Problem Relation Age of Onset     Hypertension Mother      Colon Cancer Mother 66     Colon Cancer Brother 51     Reviewed with patient     PHYSICAL EXAM:   Temp  Av.6  F (36.4  C)  Min: 95.4  F (35.2  C)  Max: 98.8  F (37.1  C)      Pulse  Av.6  Min: 60  Max: 86 Resp  Av.2  Min: 10  Max: 24  SpO2  Av.6 %  Min: 98 %  Max: 100 %       /76 (BP Location: Left arm)   Pulse 86    "Temp 98.8  F (37.1  C) (Oral)   Resp 16   Ht 1.727 m (5' 8\")   Wt 58.7 kg (129 lb 6.6 oz)   SpO2 100%   BMI 19.68 kg/m        Admit Weight: 58.7 kg (129 lb 6.6 oz)     GENERAL APPEARANCE: alert, NAD  EYES: no scleral icterus, pupils equal  Pulmonary: lungs clear to auscultation with equal breath sounds bilaterally   CV: regular rhythm, normal rate    - Edema 2+ LLE, 1+ RLE  GI: soft, nontender, normal bowel sounds  MS: no evidence of inflammation in joints, no muscle tenderness  : no butler  SKIN: no rash, warm, dry, no cyanosis  NEURO: face symmetric, A/O  Access: RUE AVF    LABS:   CMP  Recent Labs   Lab 09/10/20  0735 09/09/20  1236     --    POTASSIUM 5.3 5.0   CHLORIDE 104  --    CO2 20  --    ANIONGAP 11  --    * 97   BUN 25  --    CR 6.35* 5.96*   GFRESTIMATED 10* 11*   GFRESTBLACK 11* 12*   ASHELY 8.7  --    MAG 1.5*  --      CBC  Recent Labs   Lab 09/10/20  0735 09/09/20  1236   HGB 7.2* 8.6*     INRNo lab results found in last 7 days.  ABGNo lab results found in last 7 days.   URINE STUDIES  Recent Labs   Lab Test 09/09/20  1233 06/13/20  1010 12/11/18  1211 10/25/18  1210  01/14/15  2154 10/31/14  1258   COLOR Yellow Yellow Straw Yellow   < > Yellow Yellow   APPEARANCE Clear Clear Clear Cloudy   < > Clear Clear   URINEGLC Negative Negative 50* Negative   < > Negative Negative   URINEBILI Negative Negative Negative Negative   < > Negative Negative   URINEKETONE 5* Negative Negative Negative   < > Negative Negative   SG 1.015 1.010 1.012 1.020   < > 1.020 1.020   UBLD Negative Trace* Small* Large*   < > Trace* Trace*   URINEPH 8.0* 6.5 6.0 6.5   < > 5.5 5.5   PROTEIN 30* 30* 100* >=300*   < > Trace* Trace*   UROBILINOGEN  --  0.2  --  0.2  --  0.2 0.2   NITRITE Negative Negative Negative Negative   < > Negative Negative   LEUKEST Negative Negative Negative Moderate*   < > Negative Negative   RBCU <1 O - 2 1 25-50*   < > O - 2 O - 2   WBCU 2 0 - 5 1 >100*   < > O - 2 O - 2    < > = values in " this interval not displayed.     Recent Labs   Lab Test 03/18/20  0728 07/24/19  1713 03/19/19  1527 11/01/18  1453 03/17/18  1035 08/04/17  1839 02/28/17  0809 09/17/16  1050 09/12/16  1617 06/12/15  0803 05/27/14  1509   UTPG 0.64* 0.26* 2.92* 0.85* 0.82* 0.17 0.29* 0.35* 0.44* 2.09* 0.05     PTH  Recent Labs   Lab Test 06/13/19  1941 01/06/18  0947 02/13/16  0930   PTHI 456* 621* 503*     IRON STUDIES  Recent Labs   Lab Test 06/20/20  1112 03/18/20  0723 10/10/19  1650 07/24/19  1702 06/13/19  1941 01/06/18  0947   IRON 15* 54 42 152 46 62   * 179* 181* 202* 215* 219*   IRONSAT 9* 30 23 75* 21 28   ORALIA 310 460* 790* 406* 436* 256       IMAGING:  Reviewed    Mamie Taylor PA-C

## 2020-09-10 NOTE — PLAN OF CARE
Discharge Planner OT   Patient plan for discharge: home with assist  Current status: Patient educated on post op precautions/impact on ADLs. Completes basic transfers using crutches with SBA, completed LE dressing using AE with SBA. Patient was issued RTS w/arms, reacher, and sock aide for home.   Barriers to return to prior living situation: none from OT standpoint  Recommendations for discharge: home with assist from wife as needed  Rationale for recommendations: patient has met inpatient OT goals for safe discharge home. DME issued. Will have assist as needed.        Entered by: Kerri Araujo 09/10/2020 12:18 PM       Occupational Therapy Discharge Summary    Reason for therapy discharge:    All goals and outcomes met, no further needs identified.    Progress towards therapy goal(s). See goals on Care Plan in [facility] health record for goal details.  Goals met    Therapy recommendation(s):    No further therapy is recommended.

## 2020-09-11 NOTE — PLAN OF CARE
Pt discharged home with family, discharge medication, discharge instruction and dressing supplies given all belongings sent home with pt and pt understand discharge plan.

## 2020-09-11 NOTE — PROGRESS NOTES
HEMODIALYSIS TREATMENT NOTE    Date: 9/10/2020  Time: 7:41 PM    Data:  Pre Wt: 62.2 kg (137 lb 2 oz)   Desired Wt: 62.2 kg   Post Wt: 64 kg (141 lb 1.5 oz)  Weight change: -1.8 kg  Ultrafiltration - Post Run Net Total Removed (mL): 0 mL  Vascular Access Status: JAGDISH  Fistula  patent  Dialyzer Rinse: Streaked, Moderate  Total Blood Volume Processed: 69.27 L   Total Dialysis (Treatment) Time: 3.5 hrs  Dialysate Bath: K 2, Ca 2.5. Na 138, Bicarb :32  Heparin: None    Lab:   No    Assessment:  Positive bruit and thrill on ARIC AVF.  Pre run K/Ca: 5.3/ 8.7, BUN/Cr: 25/ 6.35  HB s Ag and AB:(-) at 08/24/2020 / 0.44 at 6/13/2020   Failed Kidney Transplantation.  Left Total Hip Post Op.     Interventions:  Patient dialyzed for 3 hrs 30 mins via JAGDISH AVF. Initiated HD with 16 G fistula needles. Reached BFR to 350 ml/mins. No fluid off per  PA order. Syatem clotted during HD with High  alarms. Notified PA and restarted HD with new HD system, Pt had Rt pinky area stings and numbness pain. Noticed coldness on Rt hand. Gave warm pack to Rt hand for pain. Set BFR to 300 ml/m .Finished HD with rinse back, cannulated site held for 10 mins.     Plan:    Next run per renal team.

## 2020-09-13 NOTE — TELEPHONE ENCOUNTER
RECORDS RECEIVED FROM:Internal   DATE RECEIVED: 10.19.20   NOTES STATUS DETAILS   OFFICE NOTE from referring provider    Internal 5.13.20 THOMAS Fisher   OFFICE NOTE from other cardiologist    N/A    DISCHARGE SUMMARY from hospital    N/A    DISCHARGE REPORT from the ER   N/A    OPERATIVE REPORT    N/A    MEDICATION LIST   Internal    LABS     BMP   Internal 9.10.20   CBC   internal 8.25.20   CMP   N/A    Lipids   N/A    TSH   N/A    DIAGNOSTIC PROCEDURES     EKG   Internal 9.2.20  10.14.19  10.26.18  10.25.18   Monitor Reports   N/A    IMAGING (DISC & REPORT)      Echo   internal 9.25.17   Stress Tests   N/A    Cath   N/A    MRI/MRA   N/A    CT/CTA   N/A

## 2020-09-16 ENCOUNTER — TELEPHONE (OUTPATIENT)
Dept: TRANSPLANT | Facility: CLINIC | Age: 45
End: 2020-09-16

## 2020-09-16 NOTE — TELEPHONE ENCOUNTER
Transplant Social Work Services Phone Call      Data: Received message pt is wondering how he can get transportation to and from medical appointments. Spoke with pt. Provided [facility] phone number ([phone]). Explained pt will need to call at least 3 business days before scheduled appointment.   Intervention: phone call   Assessment: Pt has MA and should be eligible for medical transportation through insurance.   Education provided by SW: [employer]   Plan: Pt to contact [facility] to schedule rides. This writer also sent pt a [employer] message with [facility] information.    Anya Guo [facility]    Kidney/Pancreas/Auto Islet Transplant Programs

## 2020-09-17 NOTE — TELEPHONE ENCOUNTER
Nephrology Note: Nursing Outreach Encounter    REASON FOR CALL:                                                      REASON FOR CALL: Patient requested phone call.                                          SITUATION/BACKROUND:                                                    Patient is being treated for CKD Stage 5.    On dialysis      ASSESSMENT:                                                      Rashad requests for me to transfer his dialysis care to another [facility] while he is doing PT in [place], [place]. [facility] advised for him to request his current dialysis center to transfer him, as they are his primary nephrology team at this time.  He is understanding of the recommendations and will follow up with them. He prefers [place] [facility] or [facility] temporarily.  [place] Symptoms: N/A       PLAN:                                                      Follow Up:   As recommended above--with his [facility] center.     Patient verbalized understanding and will contact the clinic with any further questions or concerns.     Emmie Echeverria RN

## 2020-09-18 ENCOUNTER — HOSPITAL ENCOUNTER (EMERGENCY)
Facility: CLINIC | Age: 45
Discharge: HOME | End: 2020-09-18
Attending: EMERGENCY MEDICINE | Admitting: EMERGENCY MEDICINE
Payer: COMMERCIAL

## 2020-09-18 VITALS
OXYGEN SATURATION: 100 % | HEART RATE: 72 BPM | WEIGHT: 133.1 LBS | BODY MASS INDEX: 20.24 KG/M2 | TEMPERATURE: 99.1 F | RESPIRATION RATE: 14 BRPM | SYSTOLIC BLOOD PRESSURE: 109 MMHG | DIASTOLIC BLOOD PRESSURE: 74 MMHG

## 2020-09-18 DIAGNOSIS — D64.9 ANEMIA, UNSPECIFIED TYPE: ICD-10-CM

## 2020-09-18 DIAGNOSIS — K59.00 CONSTIPATION: ICD-10-CM

## 2020-09-18 LAB
ABO + RH BLD: NORMAL
ABO + RH BLD: NORMAL
ANION GAP SERPL CALCULATED.3IONS-SCNC: 6 MMOL/L (ref 3–14)
BASOPHILS # BLD AUTO: 0 10E9/L (ref 0–0.2)
BASOPHILS NFR BLD AUTO: 0.2 %
BLD GP AB SCN SERPL QL: NORMAL
BLD PROD TYP BPU: NORMAL
BLD PROD TYP BPU: NORMAL
BLD UNIT ID BPU: 0
BLOOD BANK CMNT PATIENT-IMP: NORMAL
BLOOD PRODUCT CODE: NORMAL
BPU ID: NORMAL
BUN SERPL-MCNC: 16 MG/DL (ref 7–30)
CALCIUM SERPL-MCNC: 9.3 MG/DL (ref 8.5–10.1)
CHLORIDE SERPL-SCNC: 104 MMOL/L (ref 94–109)
CO2 SERPL-SCNC: 27 MMOL/L (ref 20–32)
CREAT SERPL-MCNC: 5.36 MG/DL (ref 0.66–1.25)
DIFFERENTIAL METHOD BLD: ABNORMAL
EOSINOPHIL # BLD AUTO: 0.1 10E9/L (ref 0–0.7)
EOSINOPHIL NFR BLD AUTO: 1.6 %
ERYTHROCYTE [DISTWIDTH] IN BLOOD BY AUTOMATED COUNT: 15.9 % (ref 10–15)
GFR SERPL CREATININE-BSD FRML MDRD: 12 ML/MIN/{1.73_M2}
GLUCOSE SERPL-MCNC: 131 MG/DL (ref 70–99)
HCT VFR BLD AUTO: 22.3 % (ref 40–53)
HGB BLD-MCNC: 6.6 G/DL (ref 13.3–17.7)
IMM GRANULOCYTES # BLD: 0 10E9/L (ref 0–0.4)
IMM GRANULOCYTES NFR BLD: 0.5 %
INR PPP: 1.2 (ref 0.86–1.14)
LYMPHOCYTES # BLD AUTO: 1.1 10E9/L (ref 0.8–5.3)
LYMPHOCYTES NFR BLD AUTO: 18.2 %
MCH RBC QN AUTO: 24 PG (ref 26.5–33)
MCHC RBC AUTO-ENTMCNC: 29.6 G/DL (ref 31.5–36.5)
MCV RBC AUTO: 81 FL (ref 78–100)
MONOCYTES # BLD AUTO: 0.5 10E9/L (ref 0–1.3)
MONOCYTES NFR BLD AUTO: 7.5 %
NEUTROPHILS # BLD AUTO: 4.4 10E9/L (ref 1.6–8.3)
NEUTROPHILS NFR BLD AUTO: 72 %
NRBC # BLD AUTO: 0 10*3/UL
NRBC BLD AUTO-RTO: 0 /100
NUM BPU REQUESTED: 1
PLATELET # BLD AUTO: 450 10E9/L (ref 150–450)
POTASSIUM SERPL-SCNC: 4.4 MMOL/L (ref 3.4–5.3)
RBC # BLD AUTO: 2.75 10E12/L (ref 4.4–5.9)
SODIUM SERPL-SCNC: 137 MMOL/L (ref 133–144)
SPECIMEN EXP DATE BLD: NORMAL
TRANSFUSION STATUS PATIENT QL: NORMAL
TRANSFUSION STATUS PATIENT QL: NORMAL
WBC # BLD AUTO: 6.1 10E9/L (ref 4–11)

## 2020-09-18 PROCEDURE — 99285 EMERGENCY DEPT VISIT HI MDM: CPT | Mod: 25

## 2020-09-18 PROCEDURE — 25800030 ZZH RX IP 258 OP 636: Performed by: EMERGENCY MEDICINE

## 2020-09-18 PROCEDURE — 86850 RBC ANTIBODY SCREEN: CPT | Performed by: EMERGENCY MEDICINE

## 2020-09-18 PROCEDURE — 86923 COMPATIBILITY TEST ELECTRIC: CPT | Performed by: EMERGENCY MEDICINE

## 2020-09-18 PROCEDURE — 99284 EMERGENCY DEPT VISIT MOD MDM: CPT | Mod: Z6 | Performed by: EMERGENCY MEDICINE

## 2020-09-18 PROCEDURE — 86900 BLOOD TYPING SEROLOGIC ABO: CPT | Performed by: EMERGENCY MEDICINE

## 2020-09-18 PROCEDURE — 96360 HYDRATION IV INFUSION INIT: CPT

## 2020-09-18 PROCEDURE — 85025 COMPLETE CBC W/AUTO DIFF WBC: CPT | Performed by: EMERGENCY MEDICINE

## 2020-09-18 PROCEDURE — 85610 PROTHROMBIN TIME: CPT | Performed by: EMERGENCY MEDICINE

## 2020-09-18 PROCEDURE — 86901 BLOOD TYPING SEROLOGIC RH(D): CPT | Performed by: EMERGENCY MEDICINE

## 2020-09-18 PROCEDURE — 36430 TRANSFUSION BLD/BLD COMPNT: CPT

## 2020-09-18 PROCEDURE — 80048 BASIC METABOLIC PNL TOTAL CA: CPT | Performed by: EMERGENCY MEDICINE

## 2020-09-18 PROCEDURE — P9016 RBC LEUKOCYTES REDUCED: HCPCS | Performed by: EMERGENCY MEDICINE

## 2020-09-18 RX ORDER — SODIUM CHLORIDE 9 MG/ML
INJECTION, SOLUTION INTRAVENOUS
Status: DISCONTINUED
Start: 2020-09-18 | End: 2020-09-18 | Stop reason: HOSPADM

## 2020-09-18 RX ADMIN — SODIUM CHLORIDE 100 ML: 9 INJECTION, SOLUTION INTRAVENOUS at 16:20

## 2020-09-18 ASSESSMENT — ENCOUNTER SYMPTOMS
FEVER: 0
CONFUSION: 0
ADENOPATHY: 0
HEADACHES: 0
POLYDIPSIA: 0
VOMITING: 0
COLOR CHANGE: 0
NAUSEA: 0
NECK PAIN: 0
EYE REDNESS: 0
DIFFICULTY URINATING: 0
NECK STIFFNESS: 0
ABDOMINAL PAIN: 0
CHILLS: 0
SHORTNESS OF BREATH: 0
ARTHRALGIAS: 0

## 2020-09-18 NOTE — ED NOTES
Patient tolerated blood transfusion well. Intra and post transfusion patient denies infusion reaction symptoms. Patient has improved since arrival.

## 2020-09-18 NOTE — ED PROVIDER NOTES
ED Provider Note  [facility]      History     Chief Complaint   Patient presents with     Hypotension     Davita told pt hemoglobin was below 7     The history is provided by the patient and medical records.     Rashad Ortiz is a 45 year old male with a history of anemia in end stage renal disease (on HD Tu, Th, Sat) secondary to hypertension s/p LDKT on 1/13/11 and avascular necrosis to the left hip s/p recent left total hip arthroplasty on 9/9/2020 who presents after being told his hemoglobin was below 7 by [facility]. Here in the Emergency Department the patient reports that he is constipated, but is otherwise feeling fine. He states that his last bowel movement was prior to his surgery on 9/9 and that since then he has had minimal bowel movements. He has been prescribed miraLAX. He denies blood in the stool. He is not on any blood thinners. He denies lightheadedness, fever, and vomiting. He states that his blood pressure has been in the double digits since his discharge on 9/10/2020. He reports systolic blood pressures in the 90s and 80s. The patient expresses concerns that he his dialysis runs are taking too much fluid from him, as his last run was 4 hours long, and he previously has only taken 2 hours.      Per review of the patient's chart, the patient had no complications and did well post-operatively. The patient was discharged to home on 9/10/2020 in stable condition.    Past Medical History  Past Medical History:   Diagnosis Date     AVN (avascular necrosis of bone) (H)     left hip     BPH (benign prostatic hyperplasia)      Chronic kidney disease, stage 4, severely decreased GFR (H)      Gastro-oesophageal reflux disease      Gout      History of blood transfusion      Hypertension      Medical non-compliance      Pulmonary nodules      Steroid long-term use      Vitamin D deficiency      Past Surgical History:   Procedure Laterality Date     ARTHROPLASTY HIP Left  9/9/2020    Procedure: Left total hip arthroplasty;  Surgeon: Fernando Morillo MD;  Location: [facility] OR     AV FISTULA OR GRAFT ARTERIAL       CREATE FISTULA ARTERIOVENOUS UPPER EXTREMITY Right 7/31/2019    Procedure: Creation Of Atriovenous Fistula Right Upper Arm;  Surgeon: Julia Irwin MD;  Location: [facility] OR     LIGATE FISTULA ARTERIOVENOUS UPPER EXTREMITY  12/20/2011    Procedure:LIGATE FISTULA ARTERIOVENOUS UPPER EXTREMITY; Excision of Right Forearm Arteriovenous Fistula.; Surgeon:LINDY AMAYA; Location:[facility] OR     PERCUTANEOUS BIOPSY KIDNEY Right 2/28/2017    Procedure: PERCUTANEOUS BIOPSY KIDNEY;  Surgeon: Gee Barrios MD;  Location: [facility] OR     TRANSPLANT  01/13/2011    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     acetaminophen (TYLENOL) 500 MG tablet  amLODIPine (NORVASC) 5 MG tablet  aspirin (ASA) 81 MG EC tablet  carvedilol (COREG) 25 MG tablet  cholecalciferol 25 MCG (1000 UT) TABS  febuxostat (ULORIC) 80 MG TABS tablet  ferrous sulfate (FEROSUL) 325 (65 Fe) MG tablet  furosemide (LASIX) 20 MG tablet  mycophenolate (GENERIC EQUIVALENT) 250 MG capsule  polyethylene glycol (MIRALAX) 17 g packet  sodium bicarbonate 650 MG tablet  sulfamethoxazole-trimethoprim (BACTRIM) 400-80 MG tablet  tacrolimus (GENERIC EQUIVALENT) 0.5 MG capsule  tacrolimus (GENERIC EQUIVALENT) 1 MG capsule  tamsulosin (FLOMAX) 0.4 MG capsule  cyclobenzaprine (FLEXERIL) 5 MG tablet  HYDROmorphone (DILAUDID) 2 MG tablet  hydrOXYzine (ATARAX) 50 MG tablet  omeprazole (PRILOSEC) 20 MG capsule  order for DME  senna-docusate (SENOKOT-S/PERICOLACE) 8.6-50 MG tablet      No Known Allergies  Family History  Family History   Problem Relation Age of Onset     Hypertension Mother      Colon Cancer Mother 66     Colon Cancer Brother 51     Social History   Social History     Tobacco Use     Smoking status: Current Some Day Smoker     Types: Cigarettes     Last attempt to quit: 03/2017     Years since quitting: 3.5     Smokeless  tobacco: Never Used     Tobacco comment: Patient states that he is an 'social'  smoker. 3 cigarettes per week per pt   Substance Use Topics     Alcohol use: Not Currently     Alcohol/week: 4.2 standard drinks     Types: 5 Standard drinks or equivalent per week     Comment: Quit 8/2020     Drug use: Not Currently     Types: Marijuana      Past medical history, past surgical history, medications, allergies, family history, and social history were reviewed with the patient. No additional pertinent items.       Review of Systems   Constitutional: Negative for chills and fever.   HENT: Negative for congestion.    Eyes: Negative for redness.   Respiratory: Negative for shortness of breath.    Cardiovascular: Negative for chest pain.   Gastrointestinal: Negative for abdominal pain, nausea and vomiting.   Endocrine: Negative for polydipsia and polyuria.   Genitourinary: Negative for difficulty urinating.   Musculoskeletal: Negative for arthralgias, neck pain and neck stiffness.   Skin: Negative for color change.   Allergic/Immunologic: Negative for immunocompromised state.   Neurological: Negative for headaches.   Hematological: Negative for adenopathy.   Psychiatric/Behavioral: Negative for confusion.   All other systems reviewed and are negative.    A complete review of systems was performed with pertinent positives and negatives noted in the HPI, and all other systems negative.    Physical Exam   BP: (!) 83/56  Pulse: 78  Temp: 97.1  F (36.2  C)  Resp: 16  Weight: 60.4 kg (133 lb 1.6 oz)  SpO2: 99 %  Physical Exam  Vitals signs and nursing note reviewed.   Constitutional:       General: He is not in acute distress.     Appearance: Normal appearance. He is not diaphoretic.   HENT:      Head: Normocephalic and atraumatic.   Eyes:      General: No scleral icterus.     Extraocular Movements: Extraocular movements intact.      Conjunctiva/sclera: Conjunctivae normal.   Neck:      Musculoskeletal: Normal range of motion and  neck supple.   Cardiovascular:      Rate and Rhythm: Normal rate.      Pulses: Normal pulses.      Heart sounds: Normal heart sounds.   Pulmonary:      Effort: Pulmonary effort is normal. No respiratory distress.      Breath sounds: Normal breath sounds.   Abdominal:      General: There is no distension.      Palpations: Abdomen is soft.      Tenderness: There is no abdominal tenderness. There is no guarding or rebound.   Musculoskeletal: Normal range of motion.         General: No tenderness.   Skin:     General: Skin is warm.      Capillary Refill: Capillary refill takes less than 2 seconds.      Findings: No rash.   Neurological:      General: No focal deficit present.      Mental Status: He is alert and oriented to person, place, and time.      Coordination: Coordination normal.   Psychiatric:         Mood and Affect: Mood normal.         Behavior: Behavior normal.         Thought Content: Thought content normal.         Judgment: Judgment normal.         ED Course       11:42 AM  The patient was seen and examined by Germania Andre MD in Room ED19.   Procedures                         Results for orders placed or performed during the hospital encounter of 09/18/20   CBC with platelets differential     Status: Abnormal   Result Value Ref Range    WBC 6.1 4.0 - 11.0 10e9/L    RBC Count 2.75 (L) 4.4 - 5.9 10e12/L    Hemoglobin 6.6 (LL) 13.3 - 17.7 g/dL    Hematocrit 22.3 (L) 40.0 - 53.0 %    MCV 81 78 - 100 fl    MCH 24.0 (L) 26.5 - 33.0 pg    MCHC 29.6 (L) 31.5 - 36.5 g/dL    RDW 15.9 (H) 10.0 - 15.0 %    Platelet Count 450 150 - 450 10e9/L    Diff Method Automated Method     % Neutrophils 72.0 %    % Lymphocytes 18.2 %    % Monocytes 7.5 %    % Eosinophils 1.6 %    % Basophils 0.2 %    % Immature Granulocytes 0.5 %    Nucleated RBCs 0 0 /100    Absolute Neutrophil 4.4 1.6 - 8.3 10e9/L    Absolute Lymphocytes 1.1 0.8 - 5.3 10e9/L    Absolute Monocytes 0.5 0.0 - 1.3 10e9/L    Absolute Eosinophils 0.1 0.0 - 0.7  10e9/L    Absolute Basophils 0.0 0.0 - 0.2 10e9/L    Abs Immature Granulocytes 0.0 0 - 0.4 10e9/L    Absolute Nucleated RBC 0.0    Basic metabolic panel     Status: Abnormal   Result Value Ref Range    Sodium 137 133 - 144 mmol/L    Potassium 4.4 3.4 - 5.3 mmol/L    Chloride 104 94 - 109 mmol/L    Carbon Dioxide 27 20 - 32 mmol/L    Anion Gap 6 3 - 14 mmol/L    Glucose 131 (H) 70 - 99 mg/dL    Urea Nitrogen 16 7 - 30 mg/dL    Creatinine 5.36 (H) 0.66 - 1.25 mg/dL    GFR Estimate 12 (L) >60 mL/min/[1.73_m2]    GFR Estimate If Black 14 (L) >60 mL/min/[1.73_m2]    Calcium 9.3 8.5 - 10.1 mg/dL   INR     Status: Abnormal   Result Value Ref Range    INR 1.20 (H) 0.86 - 1.14   ABO/Rh type and screen     Status: None   Result Value Ref Range    Units Ordered 1     ABO A     RH(D) Pos     Antibody Screen Neg     Test Valid Only At          [employer]    Specimen Expires 09/21/2020     Crossmatch Red Blood Cells    Blood component     Status: None   Result Value Ref Range    Unit Number [id number]     Blood Component Type Red Blood Cells Leukocyte Reduced     Division Number 00     Status of Unit Released to care unit 09/18/2020 1334     Blood Product Code [id number]     Unit Status ISS      Medications   0.9% sodium chloride BOLUS (0 mLs Intravenous Stopped 9/18/20 1656)   sodium chloride 0.9 % infusion (has no administration in time range)        Assessments & Plan (with Medical Decision Making)   This is a 45 year old male with end stage renal disease on dialysis presenting with abnormal labs. Patient was called this morning and told that his hemoglobin was below 7. I will update laboratory studies for further diagnostic evaluation. Patient's blood pressure is low, 83/56. However, he has no symptoms whatsoever and states that his pressure has been like that since his surgery approximately a week ago.      Laboratory studies returned with no leukocytosis, WBC is normal at 6100.  There is anemia with hemoglobin of 6.6. Patient will be transfused in the Emergency Department. He consented to the transfusion. He will be given 1 unit of PRBCs. Electrolytes returned with elevated creatnine of 5.36 which is not abnormal in end stage renal disease. Patient is on hemodialysis. Potassium is normal at 4.4. INR is slightly elevated at 1.2. I suspect patient's anemia is due to anemia in chronic disease rather than hemorrhage given that he has not had any tarry stools. Patient's blood pressure has improved even prior to the transfusion to 97/61.     Patient was transfused 1 unit of PRBCs. He remained asymptomatic during and after the transfusion. He was observed in the Emergency Department after the transfusion. His pressure has improved to 100/72. At this time he is stable for discharge with outpatient follow-up. He will have his blood drawn again tomorrow at dialysis for a re-check of his hemoglobin. He agrees to return to the Emergency Department if he develops any symptoms.    This part of the medical record was transcribed by J Carlos Salazar Medical Scribe, from a dictation done by Germania Andre MD.     I have reviewed the nursing notes. I have reviewed the findings, diagnosis, plan and need for follow up with the patient.    Discharge Medication List as of 9/18/2020  5:00 PM          Final diagnoses:   Anemia, unspecified type     I, Meenakshi Whitman, am serving as a trained medical scribe to document services personally performed by Germania Andre MD, based on the provider's statements to me.     I, Germania Andre MD, was physically present and have reviewed and verified the accuracy of this note documented by Meenakshi Whitman.      --  Germania Andre MD  [facility], [place], EMERGENCY DEPARTMENT  9/18/2020     Germania Andre MD  09/18/20 2368

## 2020-09-19 ENCOUNTER — OFFICE VISIT (OUTPATIENT)
Dept: URGENT CARE | Facility: URGENT CARE | Age: 45
End: 2020-09-19
Payer: COMMERCIAL

## 2020-09-19 ENCOUNTER — MYC MEDICAL ADVICE (OUTPATIENT)
Dept: FAMILY MEDICINE | Facility: CLINIC | Age: 45
End: 2020-09-19

## 2020-09-19 VITALS
SYSTOLIC BLOOD PRESSURE: 67 MMHG | WEIGHT: 134 LBS | TEMPERATURE: 97.3 F | BODY MASS INDEX: 20.37 KG/M2 | HEART RATE: 82 BPM | DIASTOLIC BLOOD PRESSURE: 45 MMHG | RESPIRATION RATE: 14 BRPM | OXYGEN SATURATION: 100 %

## 2020-09-19 DIAGNOSIS — D64.9 LOW HEMOGLOBIN: Primary | ICD-10-CM

## 2020-09-19 LAB — HGB BLD-MCNC: 7.8 G/DL (ref 13.3–17.7)

## 2020-09-19 PROCEDURE — 36415 COLL VENOUS BLD VENIPUNCTURE: CPT | Performed by: FAMILY MEDICINE

## 2020-09-19 PROCEDURE — 85018 HEMOGLOBIN: CPT | Performed by: FAMILY MEDICINE

## 2020-09-19 PROCEDURE — 99213 OFFICE O/P EST LOW 20 MIN: CPT | Performed by: FAMILY MEDICINE

## 2020-09-19 ASSESSMENT — ENCOUNTER SYMPTOMS
SORE THROAT: 0
DIZZINESS: 0
COUGH: 0
DIARRHEA: 0
FEVER: 0
DIAPHORESIS: 0
RHINORRHEA: 0
SHORTNESS OF BREATH: 0
FATIGUE: 1
NAUSEA: 0
FACIAL ASYMMETRY: 0
SPEECH DIFFICULTY: 0
CHILLS: 0
VOMITING: 0

## 2020-09-19 NOTE — PROGRESS NOTES
SUBJECTIVE:   Rashad Ortiz is a 45 year old male presenting with a chief complaint of   Chief Complaint   Patient presents with     Blood Draw     Check hemoglobin       He is an established patient of [facility].      Fatmata is a 45-year-old male presenting today with with a request for hemoglobin check.  He does attend dialysis Tuesday Thursday Saturday and was advised by his primary physician to have a hemoglobin lab test drawn. Blood transfusion yesterday and just left his dialysis today.       He does feel tired but otherwise this is normal for him is been unchanged over the past few weeks.  Denies fainting spells or syncope headaches chest pain or shortness of breath.      Did have a hip surgery recently,  10 days ago for osteonecrosis of his hip.  He was started back on dialysis last week of August. Or approximately 3 weeks ago.       Kidney transplant 2011 --         Review of Systems   Constitutional: Positive for fatigue. Negative for chills, diaphoresis and fever.   HENT: Negative for congestion, ear pain, rhinorrhea and sore throat.    Respiratory: Negative for cough and shortness of breath.    Gastrointestinal: Negative for diarrhea, nausea and vomiting.   Neurological: Negative for dizziness, syncope, facial asymmetry and speech difficulty.       Past Medical History:   Diagnosis Date     AVN (avascular necrosis of bone) (H)     left hip     BPH (benign prostatic hyperplasia)      Chronic kidney disease, stage 4, severely decreased GFR (H)      Gastro-oesophageal reflux disease      Gout      History of blood transfusion      Hypertension      Medical non-compliance      Pulmonary nodules      Steroid long-term use      Vitamin D deficiency      Family History   Problem Relation Age of Onset     Hypertension Mother      Colon Cancer Mother 66     Colon Cancer Brother 51     Current Outpatient Medications   Medication Sig Dispense Refill     acetaminophen (TYLENOL) 500 MG tablet Take 2 tablets (1,000  mg) by mouth every 8 hours as needed for mild pain 160 tablet 3     amLODIPine (NORVASC) 5 MG tablet Take 1 tablet (5 mg) by mouth daily 90 tablet 3     aspirin (ASA) 81 MG EC tablet Take 1 tablet (81 mg) by mouth 2 times daily 56 tablet 0     carvedilol (COREG) 25 MG tablet Take 1 tablet (25 mg) by mouth 2 times daily (with meals) 180 tablet 3     cholecalciferol 25 MCG (1000 UT) TABS Take 2,000 Units by mouth daily 90 tablet 3     cyclobenzaprine (FLEXERIL) 5 MG tablet Take 5 mg by mouth 3 times daily as needed for muscle spasms       febuxostat (ULORIC) 80 MG TABS tablet Take 1 tablet (80 mg) by mouth daily 90 tablet 3     ferrous sulfate (FEROSUL) 325 (65 Fe) MG tablet Take 1 tablet (325 mg) by mouth daily (with breakfast) 30 tablet 11     furosemide (LASIX) 20 MG tablet Take 1 tablet (20 mg) by mouth 2 times daily 180 tablet 1     HYDROmorphone (DILAUDID) 2 MG tablet Take 1-2 tablets (2-4 mg) by mouth every 4 hours as needed for moderate to severe pain 40 tablet 0     hydrOXYzine (ATARAX) 50 MG tablet Take 0.5-1 tablets (25-50 mg) by mouth nightly as needed (sleep) 30 tablet 3     mycophenolate (GENERIC EQUIVALENT) 250 MG capsule Take 2 capsules (500 mg) by mouth 2 times daily 120 capsule 11     omeprazole (PRILOSEC) 20 MG capsule Take 1 capsule (20 mg) by mouth daily 90 capsule 1     order for DME Equipment being ordered: Crutches 1 Device 0     polyethylene glycol (MIRALAX) 17 g packet Take 17 g by mouth daily 7 packet 0     senna-docusate (SENOKOT-S/PERICOLACE) 8.6-50 MG tablet Take 1 tablet by mouth 2 times daily 30 tablet 0     sodium bicarbonate 650 MG tablet Take 2 tablets (1,300 mg) by mouth 3 times daily 180 tablet 11     sulfamethoxazole-trimethoprim (BACTRIM) 400-80 MG tablet Take 1 tablet by mouth every other day 45 tablet 3     tacrolimus (GENERIC EQUIVALENT) 0.5 MG capsule Take 1 capsule (0.5 mg) by mouth every evening Total dose = 1 mg in the AM and 1.5 mg in the PM 30 capsule 11     tacrolimus  (GENERIC EQUIVALENT) 1 MG capsule Take 1 capsule (1 mg) by mouth 2 times daily . Total dose=1mg in the AM and 1.5mg in the PM 60 capsule 11     tamsulosin (FLOMAX) 0.4 MG capsule TAKE 1 CAPSULE BY MOUTH DAILY 30 capsule 8     Social History     Tobacco Use     Smoking status: Former Smoker     Types: Cigarettes     Last attempt to quit: 03/2017     Years since quitting: 3.5     Smokeless tobacco: Never Used     Tobacco comment: Patient states that he is an 'social'  smoker. 3 cigarettes per week per pt   Substance Use Topics     Alcohol use: Not Currently     Alcohol/week: 4.2 standard drinks     Types: 5 Standard drinks or equivalent per week     Comment: Quit 8/2020       OBJECTIVE  BP (!) 77/49   Pulse 82   Temp 97.3  F (36.3  C) (Tympanic)   Resp 14   Wt 60.8 kg (134 lb)   SpO2 100%   BMI 20.37 kg/m      Physical Exam  Vitals signs reviewed.   Neck:      Musculoskeletal: Normal range of motion.   Cardiovascular:      Rate and Rhythm: Normal rate.   Pulmonary:      Effort: Pulmonary effort is normal.   Abdominal:      General: Abdomen is flat.   Skin:     General: Skin is warm.      Capillary Refill: Capillary refill takes less than 2 seconds.      Comments: Does have mild 1+ to 2+ edema of the left ankle greater than the right ankle although this is standard baseline lower extremity swelling.   Neurological:      Mental Status: He is alert and oriented to person, place, and time.      Coordination: Coordination normal.      Gait: Gait normal.             ASSESSMENT:    ICD-10-CM    1. Low hemoglobin  D64.9 Hemoglobin        PLAN:  Low hemoglobin presumptively due to his kidney disease.  He did receive a transfusion yesterday which is improved his hemoglobin from 6.6-7.8.  He received 1 unit according to the patient.  Likely volume contraction contributing to the elevation in his hemoglobin.  I did offer him transport to the ER consider IV therapy monitoring of his low blood pressure which she declined.   Also encouraged he have someone drive him to his home although he declined apparently living just a couple blocks away.  Is also using crutches for the time being status post hip surgery.    He does have a home blood pressure cuff he indicates that after dialysis he often takes a nap we will check a blood pressure prior if his blood pressure remains below night below 90/70 he will have a friend take him to the ER for further evaluation treatment options.  He will attend his next dialysis session on Tuesday for further recommendations treatment.    Grady Schaeffer MD

## 2020-09-21 ENCOUNTER — MYC MEDICAL ADVICE (OUTPATIENT)
Dept: FAMILY MEDICINE | Facility: CLINIC | Age: 45
End: 2020-09-21

## 2020-09-21 DIAGNOSIS — M87.052 AVASCULAR NECROSIS OF BONE OF LEFT HIP (H): ICD-10-CM

## 2020-09-21 NOTE — TELEPHONE ENCOUNTER
See MyChart message below.  Patient went in to Urgent Care department on 9/19/20, after sending below MyChart message.  Hgb was drawn by  provider and patient was advised on level and follow up plan.    Marilee Ibarra RN  [facility]/ [facility]

## 2020-09-21 NOTE — TELEPHONE ENCOUNTER
Routing to provider to please advise on requested lab orders. Please see [employer] message below:    Hi     I have a favor to ask of you. I was in the ER yesterday due to my hemoglobin being below 7 and received a transfusion. I was told the reason was due to the dialysis place taking too much fluid from me.      When I was discharged I was instructed to have another blood draw today at [facility] to check where my level was at. At my treatment I was told they would not check my level until Tuesday and during the run I believe they again took too much fluid from me.     Could you please order a lab for me to check my hemoglobin?     Please advise,  Thanks.    Erlinda Marmolejo RN  [facility]

## 2020-09-22 ENCOUNTER — RECORDS - HEALTHEAST (OUTPATIENT)
Dept: LAB | Facility: CLINIC | Age: 45
End: 2020-09-22

## 2020-09-22 ENCOUNTER — HOSPITAL ENCOUNTER (EMERGENCY)
Facility: CLINIC | Age: 45
Discharge: HOME | End: 2020-09-22
Attending: FAMILY MEDICINE | Admitting: FAMILY MEDICINE
Payer: COMMERCIAL

## 2020-09-22 VITALS
RESPIRATION RATE: 16 BRPM | DIASTOLIC BLOOD PRESSURE: 62 MMHG | SYSTOLIC BLOOD PRESSURE: 99 MMHG | TEMPERATURE: 99.3 F | OXYGEN SATURATION: 99 % | HEART RATE: 77 BPM

## 2020-09-22 DIAGNOSIS — Z99.2 ESRD (END STAGE RENAL DISEASE) ON DIALYSIS (H): ICD-10-CM

## 2020-09-22 DIAGNOSIS — N18.5 ANEMIA OF CHRONIC RENAL FAILURE, STAGE 5 (H): ICD-10-CM

## 2020-09-22 DIAGNOSIS — D63.1 ANEMIA OF CHRONIC RENAL FAILURE, STAGE 5 (H): ICD-10-CM

## 2020-09-22 DIAGNOSIS — D63.1 ERYTHROPOIETIN DEFICIENCY ANEMIA: ICD-10-CM

## 2020-09-22 DIAGNOSIS — N18.6 ESRD (END STAGE RENAL DISEASE) ON DIALYSIS (H): ICD-10-CM

## 2020-09-22 DIAGNOSIS — Z98.890 STATUS POST HIP SURGERY: ICD-10-CM

## 2020-09-22 LAB
ABO + RH BLD: NORMAL
ABO + RH BLD: NORMAL
ALBUMIN SERPL-MCNC: 2.5 G/DL (ref 3.4–5)
ALP SERPL-CCNC: 106 U/L (ref 40–150)
ALT SERPL W P-5'-P-CCNC: 11 U/L (ref 0–70)
ANION GAP SERPL CALCULATED.3IONS-SCNC: 6 MMOL/L (ref 3–14)
APTT PPP: 35 SEC (ref 22–37)
AST SERPL W P-5'-P-CCNC: 13 U/L (ref 0–45)
BASOPHILS # BLD AUTO: 0 10E9/L (ref 0–0.2)
BASOPHILS NFR BLD AUTO: 0.3 %
BILIRUB SERPL-MCNC: 0.3 MG/DL (ref 0.2–1.3)
BLD GP AB SCN SERPL QL: NORMAL
BLOOD BANK CMNT PATIENT-IMP: NORMAL
BUN SERPL-MCNC: 14 MG/DL (ref 7–30)
CALCIUM SERPL-MCNC: 9 MG/DL (ref 8.5–10.1)
CHLORIDE SERPL-SCNC: 103 MMOL/L (ref 94–109)
CO2 SERPL-SCNC: 25 MMOL/L (ref 20–32)
CREAT SERPL-MCNC: 4.17 MG/DL (ref 0.66–1.25)
DIFFERENTIAL METHOD BLD: ABNORMAL
EOSINOPHIL # BLD AUTO: 0.1 10E9/L (ref 0–0.7)
EOSINOPHIL NFR BLD AUTO: 1.7 %
ERYTHROCYTE [DISTWIDTH] IN BLOOD BY AUTOMATED COUNT: 16.7 % (ref 10–15)
GFR SERPL CREATININE-BSD FRML MDRD: 16 ML/MIN/{1.73_M2}
GLUCOSE SERPL-MCNC: 105 MG/DL (ref 70–99)
HCT VFR BLD AUTO: 24.2 % (ref 40–53)
HGB BLD-MCNC: 7.3 G/DL (ref 13.3–17.7)
IMM GRANULOCYTES # BLD: 0.1 10E9/L (ref 0–0.4)
IMM GRANULOCYTES NFR BLD: 1.3 %
INR PPP: 1.21 (ref 0.86–1.14)
LYMPHOCYTES # BLD AUTO: 0.9 10E9/L (ref 0.8–5.3)
LYMPHOCYTES NFR BLD AUTO: 12.7 %
MCH RBC QN AUTO: 25.5 PG (ref 26.5–33)
MCHC RBC AUTO-ENTMCNC: 30.2 G/DL (ref 31.5–36.5)
MCV RBC AUTO: 85 FL (ref 78–100)
MONOCYTES # BLD AUTO: 0.8 10E9/L (ref 0–1.3)
MONOCYTES NFR BLD AUTO: 11.5 %
NEUTROPHILS # BLD AUTO: 5.2 10E9/L (ref 1.6–8.3)
NEUTROPHILS NFR BLD AUTO: 72.5 %
NRBC # BLD AUTO: 0 10*3/UL
NRBC BLD AUTO-RTO: 0 /100
PLATELET # BLD AUTO: 456 10E9/L (ref 150–450)
POTASSIUM SERPL-SCNC: 4.5 MMOL/L (ref 3.4–5.3)
PROT SERPL-MCNC: 6.6 G/DL (ref 6.8–8.8)
RBC # BLD AUTO: 2.86 10E12/L (ref 4.4–5.9)
SODIUM SERPL-SCNC: 134 MMOL/L (ref 133–144)
SPECIMEN EXP DATE BLD: NORMAL
WBC # BLD AUTO: 7.1 10E9/L (ref 4–11)

## 2020-09-22 PROCEDURE — 80053 COMPREHEN METABOLIC PANEL: CPT | Performed by: FAMILY MEDICINE

## 2020-09-22 PROCEDURE — 93005 ELECTROCARDIOGRAM TRACING: CPT

## 2020-09-22 PROCEDURE — 99284 EMERGENCY DEPT VISIT MOD MDM: CPT | Mod: 25

## 2020-09-22 PROCEDURE — 85730 THROMBOPLASTIN TIME PARTIAL: CPT | Performed by: FAMILY MEDICINE

## 2020-09-22 PROCEDURE — 85025 COMPLETE CBC W/AUTO DIFF WBC: CPT | Performed by: FAMILY MEDICINE

## 2020-09-22 PROCEDURE — 86901 BLOOD TYPING SEROLOGIC RH(D): CPT | Performed by: FAMILY MEDICINE

## 2020-09-22 PROCEDURE — 86850 RBC ANTIBODY SCREEN: CPT | Performed by: FAMILY MEDICINE

## 2020-09-22 PROCEDURE — 99284 EMERGENCY DEPT VISIT MOD MDM: CPT | Mod: 25 | Performed by: FAMILY MEDICINE

## 2020-09-22 PROCEDURE — 85610 PROTHROMBIN TIME: CPT | Performed by: FAMILY MEDICINE

## 2020-09-22 PROCEDURE — 86900 BLOOD TYPING SEROLOGIC ABO: CPT | Performed by: FAMILY MEDICINE

## 2020-09-22 PROCEDURE — 93010 ELECTROCARDIOGRAM REPORT: CPT | Mod: Z6 | Performed by: FAMILY MEDICINE

## 2020-09-22 RX ORDER — LIDOCAINE 40 MG/G
CREAM TOPICAL
Status: DISCONTINUED | OUTPATIENT
Start: 2020-09-22 | End: 2020-09-22 | Stop reason: HOSPADM

## 2020-09-22 ASSESSMENT — ENCOUNTER SYMPTOMS
COUGH: 0
FEVER: 0
DYSPHORIC MOOD: 0
CONFUSION: 0
ABDOMINAL PAIN: 0
DIFFICULTY URINATING: 0
APPETITE CHANGE: 0
EYE REDNESS: 0
SHORTNESS OF BREATH: 0
WOUND: 0
COLOR CHANGE: 0
NAUSEA: 0
NECK STIFFNESS: 0
HEADACHES: 0
BLOOD IN STOOL: 0
DECREASED CONCENTRATION: 0
FATIGUE: 1
VOMITING: 0
ARTHRALGIAS: 0
WEAKNESS: 1
ACTIVITY CHANGE: 1

## 2020-09-22 NOTE — DISCHARGE INSTRUCTIONS
Home.  Hemoglobin noted to be 7.3 today.  No indications for transfusion per renal.  Your blood pressure may be from extra fluid being taken off.   Discuss the low hemoglobin and the blood pressure with MD at the dialysis units.  Encourage fluids and rest.  REturn if any concerns at all or signs of bleeding.    Results for orders placed or performed during the hospital encounter of 09/22/20   CBC with platelets differential     Status: Abnormal   Result Value Ref Range    WBC 7.1 4.0 - 11.0 10e9/L    RBC Count 2.86 (L) 4.4 - 5.9 10e12/L    Hemoglobin 7.3 (L) 13.3 - 17.7 g/dL    Hematocrit 24.2 (L) 40.0 - 53.0 %    MCV 85 78 - 100 fl    MCH 25.5 (L) 26.5 - 33.0 pg    MCHC 30.2 (L) 31.5 - 36.5 g/dL    RDW 16.7 (H) 10.0 - 15.0 %    Platelet Count 456 (H) 150 - 450 10e9/L    Diff Method Automated Method     % Neutrophils 72.5 %    % Lymphocytes 12.7 %    % Monocytes 11.5 %    % Eosinophils 1.7 %    % Basophils 0.3 %    % Immature Granulocytes 1.3 %    Nucleated RBCs 0 0 /100    Absolute Neutrophil 5.2 1.6 - 8.3 10e9/L    Absolute Lymphocytes 0.9 0.8 - 5.3 10e9/L    Absolute Monocytes 0.8 0.0 - 1.3 10e9/L    Absolute Eosinophils 0.1 0.0 - 0.7 10e9/L    Absolute Basophils 0.0 0.0 - 0.2 10e9/L    Abs Immature Granulocytes 0.1 0 - 0.4 10e9/L    Absolute Nucleated RBC 0.0    INR     Status: Abnormal   Result Value Ref Range    INR 1.21 (H) 0.86 - 1.14   Partial thromboplastin time     Status: None   Result Value Ref Range    PTT 35 22 - 37 sec   Comprehensive metabolic panel     Status: Abnormal   Result Value Ref Range    Sodium 134 133 - 144 mmol/L    Potassium 4.5 3.4 - 5.3 mmol/L    Chloride 103 94 - 109 mmol/L    Carbon Dioxide 25 20 - 32 mmol/L    Anion Gap 6 3 - 14 mmol/L    Glucose 105 (H) 70 - 99 mg/dL    Urea Nitrogen 14 7 - 30 mg/dL    Creatinine 4.17 (H) 0.66 - 1.25 mg/dL    GFR Estimate 16 (L) >60 mL/min/[1.73_m2]    GFR Estimate If Black 19 (L) >60 mL/min/[1.73_m2]    Calcium 9.0 8.5 - 10.1 mg/dL    Bilirubin  Total 0.3 0.2 - 1.3 mg/dL    Albumin 2.5 (L) 3.4 - 5.0 g/dL    Protein Total 6.6 (L) 6.8 - 8.8 g/dL    Alkaline Phosphatase 106 40 - 150 U/L    ALT 11 0 - 70 U/L    AST 13 0 - 45 U/L   EKG 12 lead     Status: None (Preliminary result)   Result Value Ref Range    Interpretation ECG Click View Image link to view waveform and result    ABO/Rh type and screen     Status: None (In process)   Result Value Ref Range    ABO PENDING     Antibody Screen PENDING     Test Valid Only At          [facility]    Specimen Expires 09/25/2020

## 2020-09-22 NOTE — ED PROVIDER NOTES
ED Provider Note  [facility]      History     Chief Complaint   Patient presents with     Abnormal Labs     Pt told to come in from dialysis due to Hgb 6.6.     The history is provided by the patient and medical records.     Rashad Ortiz is a 45 year old male with a past medical history significant for anemia in ESRD (on HD Tu, Th, Sat) 2/2 to HTN s/p LDKT (1/13/11), and avascular necrosis to left hip s/p recent left total hip arthroplasty (9/9/20) who presents here to the Emergency Department due to low hemoglobin.      Per chart review, patient was seen here in the ED on 9/18 due to low hemoglobin and hypotension.  He was transfused with 1 unit of PRBCs.  His pressure improved and he was stable for discharge.    Today in the ED, patient reports feeling fatigued.  Patient is coming in from dialysis where he was found to be hypotensive as well as a low hemoglobin of 6.6.  Patient completed his dialysis run today.  Patient states his pressure goes down after dialysis, he thinks they run him too much.  Patient notes his SBP has been around  for the past couple weeks.  He states his hemoglobin was 7.7 on 9/19 after dialysis.  Patient states his hip has been healing well from surgery.  He denies bleeding.  Patient denies blood in his stool or vomit.  He denies abdominal pain. Patient is afebrile. Patient only describes some normal general weakness without syncope or chest pain or cough.    Past Medical History  Past Medical History:   Diagnosis Date     AVN (avascular necrosis of bone) (H)     left hip     BPH (benign prostatic hyperplasia)      Chronic kidney disease, stage 4, severely decreased GFR (H)      Gastro-oesophageal reflux disease      Gout      History of blood transfusion      Hypertension      Medical non-compliance      Pulmonary nodules      Steroid long-term use      Vitamin D deficiency      Past Surgical History:   Procedure Laterality Date     ARTHROPLASTY HIP  Left 9/9/2020    Procedure: Left total hip arthroplasty;  Surgeon: Fernando Morillo MD;  Location: [facility] OR     AV FISTULA OR GRAFT ARTERIAL       CREATE FISTULA ARTERIOVENOUS UPPER EXTREMITY Right 7/31/2019    Procedure: Creation Of Atriovenous Fistula Right Upper Arm;  Surgeon: Julia Irwin MD;  Location: [facility] OR     LIGATE FISTULA ARTERIOVENOUS UPPER EXTREMITY  12/20/2011    Procedure:LIGATE FISTULA ARTERIOVENOUS UPPER EXTREMITY; Excision of Right Forearm Arteriovenous Fistula.; Surgeon:LINDY AMAYA; Location:[facility] OR     PERCUTANEOUS BIOPSY KIDNEY Right 2/28/2017    Procedure: PERCUTANEOUS BIOPSY KIDNEY;  Surgeon: Gee Barrios MD;  Location: [facility] OR     TRANSPLANT  01/13/2011    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     acetaminophen (TYLENOL) 500 MG tablet  amLODIPine (NORVASC) 5 MG tablet  aspirin (ASA) 81 MG EC tablet  carvedilol (COREG) 25 MG tablet  cholecalciferol 25 MCG (1000 UT) TABS  cyclobenzaprine (FLEXERIL) 5 MG tablet  febuxostat (ULORIC) 80 MG TABS tablet  ferrous sulfate (FEROSUL) 325 (65 Fe) MG tablet  furosemide (LASIX) 20 MG tablet  HYDROmorphone (DILAUDID) 2 MG tablet  hydrOXYzine (ATARAX) 50 MG tablet  mycophenolate (GENERIC EQUIVALENT) 250 MG capsule  omeprazole (PRILOSEC) 20 MG capsule  order for DME  polyethylene glycol (MIRALAX) 17 g packet  senna-docusate (SENOKOT-S/PERICOLACE) 8.6-50 MG tablet  sodium bicarbonate 650 MG tablet  sulfamethoxazole-trimethoprim (BACTRIM) 400-80 MG tablet  tacrolimus (GENERIC EQUIVALENT) 0.5 MG capsule  tacrolimus (GENERIC EQUIVALENT) 1 MG capsule  tamsulosin (FLOMAX) 0.4 MG capsule      No Known Allergies  Family History  Family History   Problem Relation Age of Onset     Hypertension Mother      Colon Cancer Mother 66     Colon Cancer Brother 51     Social History   Social History     Tobacco Use     Smoking status: Former Smoker     Types: Cigarettes     Last attempt to quit: 03/2017     Years since quitting: 3.5     Smokeless tobacco:  Never Used     Tobacco comment: Patient states that he is an 'social'  smoker. 3 cigarettes per week per pt   Substance Use Topics     Alcohol use: Not Currently     Alcohol/week: 4.2 standard drinks     Types: 5 Standard drinks or equivalent per week     Comment: Quit 8/2020     Drug use: Not Currently     Types: Marijuana      Past medical history, past surgical history, medications, allergies, family history, and social history were reviewed with the patient. No additional pertinent items.       Review of Systems   Constitutional: Positive for activity change and fatigue. Negative for appetite change and fever.   HENT: Negative for congestion and nosebleeds.    Eyes: Negative for redness and visual disturbance.   Respiratory: Negative for cough and shortness of breath.    Cardiovascular: Negative for chest pain.   Gastrointestinal: Negative for abdominal pain, blood in stool, nausea and vomiting.   Genitourinary: Negative for difficulty urinating.   Musculoskeletal: Negative for arthralgias, gait problem and neck stiffness.   Skin: Negative for color change, rash and wound.   Allergic/Immunologic: Positive for immunocompromised state (hx of failed kidney tx on cellcept).   Neurological: Positive for weakness (mild general). Negative for syncope and headaches.   Psychiatric/Behavioral: Negative for confusion, decreased concentration and dysphoric mood.   All other systems reviewed and are negative.    A complete review of systems was performed with pertinent positives and negatives noted in the HPI, and all other systems negative.           Physical Exam   BP: (!) 85/51  Pulse: 77  Temp: 99.3  F (37.4  C)  Resp: 16  SpO2: 99 %  Physical Exam  Vitals signs and nursing note reviewed.   Constitutional:       General: He is in acute distress.      Appearance: He is well-developed. He is not ill-appearing or diaphoretic.      Comments: Patient alert and oriented. No acute sx nontoxic   HENT:      Head: Normocephalic  and atraumatic.      Mouth/Throat:      Mouth: Mucous membranes are moist.   Eyes:      General: No scleral icterus.     Extraocular Movements: Extraocular movements intact.      Conjunctiva/sclera: Conjunctivae normal.      Pupils: Pupils are equal, round, and reactive to light.   Neck:      Musculoskeletal: Normal range of motion and neck supple.   Cardiovascular:      Rate and Rhythm: Normal rate.   Pulmonary:      Effort: No respiratory distress.   Abdominal:      General: There is no distension.      Palpations: Abdomen is soft.      Tenderness: There is no abdominal tenderness. There is no guarding.   Musculoskeletal:         General: Tenderness present. No swelling.      Right lower leg: No edema.      Left lower leg: No edema.      Comments: Left hip post op without swelling. Distal cms intact.   Skin:     General: Skin is warm and dry.      Capillary Refill: Capillary refill takes less than 2 seconds.      Findings: No rash.   Neurological:      General: No focal deficit present.      Mental Status: He is alert and oriented to person, place, and time. Mental status is at baseline.   Psychiatric:      Comments: Flat but appropriate able to give hx.         ED Course     1:53 PM  The patient was seen and examined by Baudilio Stewart MD in Room ED06.    Procedures           Patient eval.  IV with labs drawn.  Reviewed prev labs etc with hemoglobin 7.8 on Saturday after transfusion on Friday with hgb 6.6.  bp improved without intervention 99/systolic  Patient declines any po fluids.  Repeat labs with hemoglobin 7.3.  Discussed with NP renal.  Patient to continue EPO at dialysis.  No transfusion with hgb over 7  Patient agrees with plan stable and ok home with followup at dialysis.  Other labs stable.         EKG Interpretation:      Interpreted by Baudilio Stewart MD  Time reviewed: 1446  Symptoms at time of EKG: low hemoglobin and weakness   Rhythm: normal sinus   Rate: normal  Axis: normal  Ectopy:  none  Conduction: normal  ST Segments/ T Waves: No ST-T wave changes  Q Waves: none  Comparison to prior: No old EKG available    Clinical Impression: normal EKG                      Results for orders placed or performed during the hospital encounter of 09/22/20   CBC with platelets differential     Status: Abnormal   Result Value Ref Range    WBC 7.1 4.0 - 11.0 10e9/L    RBC Count 2.86 (L) 4.4 - 5.9 10e12/L    Hemoglobin 7.3 (L) 13.3 - 17.7 g/dL    Hematocrit 24.2 (L) 40.0 - 53.0 %    MCV 85 78 - 100 fl    MCH 25.5 (L) 26.5 - 33.0 pg    MCHC 30.2 (L) 31.5 - 36.5 g/dL    RDW 16.7 (H) 10.0 - 15.0 %    Platelet Count 456 (H) 150 - 450 10e9/L    Diff Method Automated Method     % Neutrophils 72.5 %    % Lymphocytes 12.7 %    % Monocytes 11.5 %    % Eosinophils 1.7 %    % Basophils 0.3 %    % Immature Granulocytes 1.3 %    Nucleated RBCs 0 0 /100    Absolute Neutrophil 5.2 1.6 - 8.3 10e9/L    Absolute Lymphocytes 0.9 0.8 - 5.3 10e9/L    Absolute Monocytes 0.8 0.0 - 1.3 10e9/L    Absolute Eosinophils 0.1 0.0 - 0.7 10e9/L    Absolute Basophils 0.0 0.0 - 0.2 10e9/L    Abs Immature Granulocytes 0.1 0 - 0.4 10e9/L    Absolute Nucleated RBC 0.0    INR     Status: Abnormal   Result Value Ref Range    INR 1.21 (H) 0.86 - 1.14   Partial thromboplastin time     Status: None   Result Value Ref Range    PTT 35 22 - 37 sec   Comprehensive metabolic panel     Status: Abnormal   Result Value Ref Range    Sodium 134 133 - 144 mmol/L    Potassium 4.5 3.4 - 5.3 mmol/L    Chloride 103 94 - 109 mmol/L    Carbon Dioxide 25 20 - 32 mmol/L    Anion Gap 6 3 - 14 mmol/L    Glucose 105 (H) 70 - 99 mg/dL    Urea Nitrogen 14 7 - 30 mg/dL    Creatinine 4.17 (H) 0.66 - 1.25 mg/dL    GFR Estimate 16 (L) >60 mL/min/[1.73_m2]    GFR Estimate If Black 19 (L) >60 mL/min/[1.73_m2]    Calcium 9.0 8.5 - 10.1 mg/dL    Bilirubin Total 0.3 0.2 - 1.3 mg/dL    Albumin 2.5 (L) 3.4 - 5.0 g/dL    Protein Total 6.6 (L) 6.8 - 8.8 g/dL    Alkaline Phosphatase 106 40  - 150 U/L    ALT 11 0 - 70 U/L    AST 13 0 - 45 U/L   EKG 12 lead     Status: None (Preliminary result)   Result Value Ref Range    Interpretation ECG Click View Image link to view waveform and result    ABO/Rh type and screen     Status: None   Result Value Ref Range    ABO A     RH(D) Pos     Antibody Screen Neg     Test Valid Only At          [facility]    Specimen Expires 09/25/2020      Medications - No data to display     Assessments & Plan (with Medical Decision Making)  esrd dialysis t-th-sat. Patient had left total hip 2 weeks ago. Doing well. No infection or sign of bleeding. Patient had tx Friday with hgb 6.6 with possible due to possible post op state stress. Patient sent with low bp after dialysis which patient feels is due to over fluid withdrawal. bp improved without intervention. Hemoglobin 7.3. patient ok home with follow up with renal dialysis and continue to increase EPO and discuss with renal regarding amount of fluid drawn off.discussed with NP renal and agree with plan.         I have reviewed the nursing notes. I have reviewed the findings, diagnosis, plan and need for follow up with the patient.    Discharge Medication List as of 9/22/2020  3:09 PM          Final diagnoses:   Anemia of chronic renal failure, stage 5 (H)   ESRD (end stage renal disease) on dialysis (H)   Status post hip surgery   I, Trini Groves, am serving as a trained medical scribe to document services personally performed by Baudilio Stewart MD, based on the provider's statements to me.     I, Baudilio Stewart MD, was physically present and have reviewed and verified the accuracy of this note documented by Trini Groves.     --  Baudilio Stewart MD  [facility], EMERGENCY DEPARTMENT  9/22/2020    This note was created at least in part by the use of dragon voice dictation system. Inadvertent typographical errors may still exist.  Baudilio Stewart MD.    Patient  evaluated in the emergency department during the COVID-19 pandemic period. Careful attention to patients safety was addressed throughout the evaluation. Evaluation and treatment management was initiated with disposition made efficiently and appropriate as possible to minimize any risk of potential exposure to patient during this evaluation.       Baudilio Stewart MD  09/22/20 6735

## 2020-09-22 NOTE — TELEPHONE ENCOUNTER
I will place the hemoglobin order but he should really go to the emergency department if he's feeling symptomatic. Also, he needs to discuss with his nephrologist his concerns before his next dialysis run.

## 2020-09-22 NOTE — ED AVS SNAPSHOT
[facility], [place], Emergency Department  [address]  [facility] [address]  Phone:  [phone]  Fax:  [phone]                                    Rashad Ortiz   MRN: [id number]    Department:  [facility], Emergency Department   Date of Visit:  9/22/2020           After Visit Summary Signature Page    I have received my discharge instructions, and my questions have been answered. I have discussed any challenges I see with this plan with the nurse or doctor.    ..........................................................................................................................................  Patient/Patient Representative Signature      ..........................................................................................................................................  Patient Representative Print Name and Relationship to Patient    ..................................................               ................................................  Date                                   Time    ..........................................................................................................................................  Reviewed by Signature/Title    ...................................................              ..............................................  Date                                               Time          22EPIC Rev 08/18

## 2020-09-23 LAB — INTERPRETATION ECG - MUSE: NORMAL

## 2020-09-23 NOTE — TELEPHONE ENCOUNTER
Controlled Substance Refill Request for Hydromorphone  Problem List Complete:  No     PROVIDER TO CONSIDER COMPLETION OF PROBLEM LIST AND OVERVIEW/CONTROLLED SUBSTANCE AGREEMENT    Last Written Prescription Date:  9/13/20 from a different provider  Last Fill Quantity: 40 tablets   # refills: 0    THE MOST RECENT OFFICE VISIT MUST BE WITHIN THE PAST 3 MONTHS. AT LEAST ONE FACE TO FACE VISIT MUST OCCUR EVERY 6 MONTHS. ADDITIONAL VISITS CAN BE VIRTUAL.  (THIS STATEMENT SHOULD BE DELETED.)    Last Office Visit with [facility] primary care provider: 9/2/20 for Pre-op with Donita Malcolm    Future Office visit: None    Controlled substance agreement:   Encounter-Level CSA:    There are no encounter-level csa.     Patient-Level CSA:    There are no patient-level csa.         Last Urine Drug Screen: No results found for: CDAUT, No results found for: COMDAT, No results found for: THC13, PCP13, COC13, MAMP13, OPI13, AMP13, BZO13, TCA13, MTD13, BAR13, OXY13, PPX13, BUP13     Processing:  Rx to be electronically transmitted to pharmacy by provider      [email or web address]       checked in past 3 months?  No-problem list incomplete so  not checked.         Mila Luna RN, BSN, PHN

## 2020-09-24 ENCOUNTER — RECORDS - HEALTHEAST (OUTPATIENT)
Dept: LAB | Facility: CLINIC | Age: 45
End: 2020-09-24

## 2020-09-24 LAB — HGB BLD-MCNC: 6.2 G/DL (ref 14–18)

## 2020-09-24 RX ORDER — HYDROMORPHONE HYDROCHLORIDE 2 MG/1
2-4 TABLET ORAL EVERY 4 HOURS PRN
Qty: 40 TABLET | Refills: 0 | OUTPATIENT
Start: 2020-09-24

## 2020-09-25 ENCOUNTER — VIRTUAL VISIT (OUTPATIENT)
Dept: ORTHOPEDICS | Facility: CLINIC | Age: 45
End: 2020-09-25
Payer: MEDICAID

## 2020-09-25 DIAGNOSIS — Z98.890 STATUS POST HIP SURGERY: Primary | ICD-10-CM

## 2020-09-25 LAB — HGB BLD-MCNC: 6.4 G/DL (ref 14–18)

## 2020-09-25 NOTE — PROGRESS NOTES
"Reason For Visit:   Chief Complaint   Patient presents with     Surgical Followup     2 week POP // ARTHROPLASTY, HIP, TOTAL // DOS 9/9/2020 (Left), Dr. Morillo       There were no vitals taken for this visit.    Pain Assessment  Patient Currently in Pain: Yes  0-10 Pain Scale: 7  Primary Pain Location: Hip(Left)    Radha Pagano, LPN    Video Visit Technology for this patient: [employer] Video Visit- Patient was left in waiting room     Rashad Ortiz is a 45 year old male who is being evaluated via a billable video visit.      The patient has been notified of following:     \"This video visit will be conducted via a call between you and your physician/provider. We have found that certain health care needs can be provided without the need for an in-person physical exam.  This service lets us provide the care you need with a video conversation.  If a prescription is necessary we can send it directly to your pharmacy.  If lab work is needed we can place an order for that and you can then stop by our lab to have the test done at a later time.    Video visits are billed at different rates depending on your insurance coverage.  Please reach out to your insurance provider with any questions.    If during the course of the call the physician/provider feels a video visit is not appropriate, you will not be charged for this service.\"    Patient has given verbal consent for Video visit? yes  How would you like to obtain your AVS? MyChart  If you are dropped from the video visit, the video invite should be resent to: Text to cell phone: [phone]  Will anyone else be joining your video visit? No  {If patient encounters technical issues they should call [phone] :[id number]}      Video-Visit Details    Type of service:  Video Visit    Video Start Time: {video visit start/end time:152948}  Video End Time: {video visit start/end time:152948}    Originating Location (pt. Location): {video visit patient " "location:767589::\"Home\"}    Distant Location (provider location):  [facility]     Platform used for Video Visit: {Virtual Visit Platforms:888991::\"AmWell\"}    {signature options:066944}      REASON FOR VIST/CC: Virtual follow-up appointment following *** with  *** on ***. The patient was informed of the following:    \"This virtual visit will be conducted via a call between you and your physician/provider. We have found that certain health care needs can be provided without the need for an extensive physical exam.  This service lets us provide the care you need with a short phone conversation.  If a prescription is necessary we can send it directly to your pharmacy.  If lab work is needed we can place an order for that and you can then stop by our lab to have the test done at a later time.     If during the course of the call the physician/provider feels a virtual visit is not appropriate, you will not be charged for this service.\"     HISTORY OF PRESENT ILLNESS:  Rashad Ortiz is a 45 year old male who is approximately *** weeks status post ***.    Weight bearing status/devices: full weight bearing with two crutches  Pain level and management: pain is 7/10, currently using acetaminophen and hydromorphone, awaiting   Physical therapy/ROM: no PT yet, scheduling issues     The patient endorses swelling around the surgical incision but denies surrounding redness. The incision has been dry, without discharge or drainage. Rashad denies recent fevers and chills, as well as any other symptoms concerning for infection.     Rashad is currently taking *** for DVT prophylaxis. Patient denies calf pain or tenderness.      MEDICATIONS:   Current Outpatient Rx   Medication Sig Dispense Refill     acetaminophen (TYLENOL) 500 MG tablet Take 2 tablets (1,000 mg) by mouth every 8 hours as needed for mild pain 160 tablet 3     amLODIPine (NORVASC) 5 MG tablet Take 1 tablet (5 mg) by mouth daily 90 tablet 3     " aspirin (ASA) 81 MG EC tablet Take 1 tablet (81 mg) by mouth 2 times daily 56 tablet 0     carvedilol (COREG) 25 MG tablet Take 1 tablet (25 mg) by mouth 2 times daily (with meals) 180 tablet 3     cholecalciferol 25 MCG (1000 UT) TABS Take 2,000 Units by mouth daily 90 tablet 3     cyclobenzaprine (FLEXERIL) 5 MG tablet Take 5 mg by mouth 3 times daily as needed for muscle spasms       febuxostat (ULORIC) 80 MG TABS tablet Take 1 tablet (80 mg) by mouth daily 90 tablet 3     HYDROmorphone (DILAUDID) 2 MG tablet Take 1-2 tablets (2-4 mg) by mouth every 4 hours as needed for moderate to severe pain 40 tablet 0     hydrOXYzine (ATARAX) 50 MG tablet Take 0.5-1 tablets (25-50 mg) by mouth nightly as needed (sleep) 30 tablet 11     mycophenolate (GENERIC EQUIVALENT) 250 MG capsule Take 2 capsules (500 mg) by mouth 2 times daily 120 capsule 11     omeprazole (PRILOSEC) 20 MG capsule Take 1 capsule (20 mg) by mouth daily 90 capsule 1     order for DME Equipment being ordered: Crutches 1 Device 0     polyethylene glycol (MIRALAX) 17 g packet Take 17 g by mouth daily 7 packet 0     senna-docusate (SENOKOT-S/PERICOLACE) 8.6-50 MG tablet Take 1 tablet by mouth 2 times daily 30 tablet 0     sulfamethoxazole-trimethoprim (BACTRIM) 400-80 MG tablet Take 1 tablet by mouth every other day 45 tablet 3     tacrolimus (GENERIC EQUIVALENT) 0.5 MG capsule Take 1 capsule (0.5 mg) by mouth every evening Total dose = 1 mg in the AM and 1.5 mg in the PM 30 capsule 11     tacrolimus (GENERIC EQUIVALENT) 1 MG capsule Take 1 capsule (1 mg) by mouth 2 times daily . Total dose=1mg in the AM and 1.5mg in the PM 60 capsule 11     tamsulosin (FLOMAX) 0.4 MG capsule TAKE 1 CAPSULE BY MOUTH DAILY 30 capsule 8     ferrous sulfate (FEROSUL) 325 (65 Fe) MG tablet Take 1 tablet (325 mg) by mouth daily (with breakfast) (Patient not taking: Reported on 9/25/2020) 30 tablet 11     furosemide (LASIX) 20 MG tablet Take 1 tablet (20 mg) by mouth 2 times daily  (Patient not taking: Reported on 9/25/2020) 180 tablet 1     sodium bicarbonate 650 MG tablet Take 2 tablets (1,300 mg) by mouth 3 times daily (Patient not taking: Reported on 9/25/2020) 180 tablet 11         ALLERGIES: Patient has no known allergies.       PHYSICAL EXAMINATION:   A full physical exam was not performed during this virtual visit. The incision was inspected via virtual conferencing. There was no erythema or discharge.    ASSESSMENT/PLAN:  Rashad Ortiz is a 45 year old who is status post ***. The patient is progressing well.    Basic wound cares were discussed today. I recommended that all steristrips be removed (if present), and all monocryl suture tails are clipped to the level of the skin.     The patient will see  *** at six to eight weeks post op. Rashad has our clinic number and will call with any questions or concerns.    Fernando Viveros PA-C  Orthopaedic Surgery

## 2020-09-25 NOTE — LETTER
"    9/25/2020         RE: Rashad Ortiz  [address] [place] [id number]        Dear Colleague,    Thank you for referring your patient, Rashad Ortiz, to the [facility]. Please see a copy of my visit note below.    Reason For Visit:   Chief Complaint   Patient presents with     Surgical Followup     2 week POP // ARTHROPLASTY, HIP, TOTAL // DOS 9/9/2020 (Left), Dr. Morillo       There were no vitals taken for this visit.    Pain Assessment  Patient Currently in Pain: Yes  0-10 Pain Scale: 7  Primary Pain Location: Hip(Left)    Radha Hilario LPN    Video Visit Technology for this patient: [employer] Video Visit- Patient was left in waiting room     Rashad Ortiz is a 45 year old male who is being evaluated via a billable video visit.      The patient has been notified of following:     \"This video visit will be conducted via a call between you and your physician/provider. We have found that certain health care needs can be provided without the need for an in-person physical exam.  This service lets us provide the care you need with a video conversation.  If a prescription is necessary we can send it directly to your pharmacy.  If lab work is needed we can place an order for that and you can then stop by our lab to have the test done at a later time.    Video visits are billed at different rates depending on your insurance coverage.  Please reach out to your insurance provider with any questions.    If during the course of the call the physician/provider feels a video visit is not appropriate, you will not be charged for this service.\"    Patient has given verbal consent for Video visit? yes  How would you like to obtain your AVS? MyChart  If you are dropped from the video visit, the video invite should be resent to: Text to cell phone: [phone]  Will anyone else be joining your video visit? No      REASON FOR VIST/CC: Virtual follow-up appointment following right total hip arthroplasty " "with Dr. Morillo on 09/09/2020. The patient was informed of the following:    \"This virtual visit will be conducted via a call between you and your physician/provider. We have found that certain health care needs can be provided without the need for an extensive physical exam.  This service lets us provide the care you need with a short phone conversation.  If a prescription is necessary we can send it directly to your pharmacy.  If lab work is needed we can place an order for that and you can then stop by our lab to have the test done at a later time.     If during the course of the call the physician/provider feels a virtual visit is not appropriate, you will not be charged for this service.\"     HISTORY OF PRESENT ILLNESS:  Rashad Ortiz is a 45 year old male who is approximately two weeks status post right total hip arthroplasty. He presents to his virtual visit today in relatively good spirits. His recovery has been complicated by several trips to the ED for anemia 2/2 stage 5 chronic kidney disease, and he has been in close communication with his nephrologist to address this issue. With regard to his right hip, he reports improvement in pre-surgical groin pain, but is still experiencing 7/10 pain that he describes as dull/aching. He was previously using acetaminophen and hydromorphone but recently ran out of hydromorphone. He has made an attempt (per his preference) to get the prescription filled by his primary care provider.     Rashad is ambulating with two axillary crutches, bearing full weight on his right lower extremity. He has not begun PT but has been performing many of the exercises that he learned in the hospital including strengthening of the hip flexors/abductors, and core.    The patient endorses swelling around the surgical incision but denies surrounding redness. The incision has been dry, without discharge or drainage. Rashad denies recent fevers and chills, as well as any other symptoms " concerning for infection.     Rashad is currently taking aspirin 162mg daily for DVT prophylaxis. Patient denies calf pain or tenderness.      MEDICATIONS:   Current Outpatient Rx   Medication Sig Dispense Refill     acetaminophen (TYLENOL) 500 MG tablet Take 2 tablets (1,000 mg) by mouth every 8 hours as needed for mild pain 160 tablet 3     amLODIPine (NORVASC) 5 MG tablet Take 1 tablet (5 mg) by mouth daily 90 tablet 3     aspirin (ASA) 81 MG EC tablet Take 1 tablet (81 mg) by mouth 2 times daily 56 tablet 0     carvedilol (COREG) 25 MG tablet Take 1 tablet (25 mg) by mouth 2 times daily (with meals) 180 tablet 3     cholecalciferol 25 MCG (1000 UT) TABS Take 2,000 Units by mouth daily 90 tablet 3     cyclobenzaprine (FLEXERIL) 5 MG tablet Take 5 mg by mouth 3 times daily as needed for muscle spasms       febuxostat (ULORIC) 80 MG TABS tablet Take 1 tablet (80 mg) by mouth daily 90 tablet 3     HYDROmorphone (DILAUDID) 2 MG tablet Take 1-2 tablets (2-4 mg) by mouth every 4 hours as needed for moderate to severe pain 40 tablet 0     hydrOXYzine (ATARAX) 50 MG tablet Take 0.5-1 tablets (25-50 mg) by mouth nightly as needed (sleep) 30 tablet 11     mycophenolate (GENERIC EQUIVALENT) 250 MG capsule Take 2 capsules (500 mg) by mouth 2 times daily 120 capsule 11     omeprazole (PRILOSEC) 20 MG capsule Take 1 capsule (20 mg) by mouth daily 90 capsule 1     order for DME Equipment being ordered: Crutches 1 Device 0     polyethylene glycol (MIRALAX) 17 g packet Take 17 g by mouth daily 7 packet 0     senna-docusate (SENOKOT-S/PERICOLACE) 8.6-50 MG tablet Take 1 tablet by mouth 2 times daily 30 tablet 0     sulfamethoxazole-trimethoprim (BACTRIM) 400-80 MG tablet Take 1 tablet by mouth every other day 45 tablet 3     tacrolimus (GENERIC EQUIVALENT) 0.5 MG capsule Take 1 capsule (0.5 mg) by mouth every evening Total dose = 1 mg in the AM and 1.5 mg in the PM 30 capsule 11     tacrolimus (GENERIC EQUIVALENT) 1 MG capsule  Take 1 capsule (1 mg) by mouth 2 times daily . Total dose=1mg in the AM and 1.5mg in the PM 60 capsule 11     tamsulosin (FLOMAX) 0.4 MG capsule TAKE 1 CAPSULE BY MOUTH DAILY 30 capsule 8     ferrous sulfate (FEROSUL) 325 (65 Fe) MG tablet Take 1 tablet (325 mg) by mouth daily (with breakfast) (Patient not taking: Reported on 9/25/2020) 30 tablet 11     furosemide (LASIX) 20 MG tablet Take 1 tablet (20 mg) by mouth 2 times daily (Patient not taking: Reported on 9/25/2020) 180 tablet 1     sodium bicarbonate 650 MG tablet Take 2 tablets (1,300 mg) by mouth 3 times daily (Patient not taking: Reported on 9/25/2020) 180 tablet 11         ALLERGIES: Patient has no known allergies.       PHYSICAL EXAMINATION:   A full physical exam was not performed during this virtual visit. The incision was inspected via virtual conferencing. There was no erythema or discharge.     Photos sent via [employer] were also reviewed. There was no evidence of erythema.    ASSESSMENT/PLAN:  Rashad Ortiz is a 45 year old who is status post right total hip arthroplasty. From an orthopedics standpoint, the patient appears to be progressing well but has not optimized his pain regimen.     Basic wound cares were discussed today. I recommended that all steristrips be removed (if present), and all monocryl suture tails are clipped to the level of the skin.     Patient would also likely benefit from dedicated PT, so I encouraged him to schedule an appointment early next week.     I also discussed his current strategy for opioid refills. He would like to first attempt to connect with his primary care provider. Since our clinic commonly addressed pain management needs in the first six weeks after surgery, I recommended that he contact our office if he is forwarded to us by his primary care provider. The patient understands and agrees with this plan. I will send him a [employer] message next week to follow-up.     The patient will see Dr. Morillo at six to  eight weeks post op. Rashad has our clinic number and will call with any questions or concerns.    Fernando Viveros PA-C  Orthopaedic Surgery

## 2020-09-27 ENCOUNTER — MYC MEDICAL ADVICE (OUTPATIENT)
Dept: FAMILY MEDICINE | Facility: CLINIC | Age: 45
End: 2020-09-27

## 2020-09-28 DIAGNOSIS — Z47.89 ORTHOPEDIC AFTERCARE: Primary | ICD-10-CM

## 2020-09-28 RX ORDER — OXYCODONE HYDROCHLORIDE 5 MG/1
5-10 TABLET ORAL 2 TIMES DAILY PRN
Qty: 20 TABLET | Refills: 0 | Status: SHIPPED | OUTPATIENT
Start: 2020-09-28 | End: 2020-10-19

## 2020-09-28 NOTE — TELEPHONE ENCOUNTER
DOS: 9/9/20 Left total hip arthroplasty    Patient requests a refill of his postop pain medication. Patient states he takes 2 tablets 1 to 2 times daily as needed for severe pain. Patient requests the refill be sent to [facility] and Radha Lockwood. Pended to Dr Morillo for review and signature.

## 2020-09-28 NOTE — TELEPHONE ENCOUNTER
Routing to provider to review and advise, see [employer] message below.  Request for refill on Dilaudid came in on 9/21/20, request was denied by PCP on 9/24/20.

## 2020-09-29 ENCOUNTER — RECORDS - HEALTHEAST (OUTPATIENT)
Dept: LAB | Facility: CLINIC | Age: 45
End: 2020-09-29

## 2020-09-29 LAB — HGB BLD-MCNC: 6.7 G/DL (ref 14–18)

## 2020-09-29 NOTE — PROGRESS NOTES
"Reason For Visit:   Chief Complaint   Patient presents with     Surgical Followup     2 week POP // ARTHROPLASTY, HIP, TOTAL // DOS 9/9/2020 (Left), Dr. Morillo       There were no vitals taken for this visit.    Pain Assessment  Patient Currently in Pain: Yes  0-10 Pain Scale: 7  Primary Pain Location: Hip(Left)    Radha Pagano, LPN    Video Visit Technology for this patient: [facility] Video Visit- Patient was left in waiting room     Rashad Ortiz is a 45 year old male who is being evaluated via a billable video visit.      The patient has been notified of following:     \"This video visit will be conducted via a call between you and your physician/provider. We have found that certain health care needs can be provided without the need for an in-person physical exam.  This service lets us provide the care you need with a video conversation.  If a prescription is necessary we can send it directly to your pharmacy.  If lab work is needed we can place an order for that and you can then stop by our lab to have the test done at a later time.    Video visits are billed at different rates depending on your insurance coverage.  Please reach out to your insurance provider with any questions.    If during the course of the call the physician/provider feels a video visit is not appropriate, you will not be charged for this service.\"    Patient has given verbal consent for Video visit? yes  How would you like to obtain your AVS? MyChart  If you are dropped from the video visit, the video invite should be resent to: Text to cell phone: [phone]  Will anyone else be joining your video visit? No      REASON FOR VIST/CC: Virtual follow-up appointment following right total hip arthroplasty with Dr. Morillo on 09/09/2020. The patient was informed of the following:    \"This virtual visit will be conducted via a call between you and your physician/provider. We have found that certain health care needs can be provided without the " "need for an extensive physical exam.  This service lets us provide the care you need with a short phone conversation.  If a prescription is necessary we can send it directly to your pharmacy.  If lab work is needed we can place an order for that and you can then stop by our lab to have the test done at a later time.     If during the course of the call the physician/provider feels a virtual visit is not appropriate, you will not be charged for this service.\"     HISTORY OF PRESENT ILLNESS:  Rashad Ortiz is a 45 year old male who is approximately two weeks status post right total hip arthroplasty. He presents to his virtual visit today in relatively good spirits. His recovery has been complicated by several trips to the ED for anemia 2/2 stage 5 chronic kidney disease, and he has been in close communication with his nephrologist to address this issue. With regard to his right hip, he reports improvement in pre-surgical groin pain, but is still experiencing 7/10 pain that he describes as dull/aching. He was previously using acetaminophen and hydromorphone but recently ran out of hydromorphone. He has made an attempt (per his preference) to get the prescription filled by his primary care provider.     Rashad is ambulating with two axillary crutches, bearing full weight on his right lower extremity. He has not begun PT but has been performing many of the exercises that he learned in the hospital including strengthening of the hip flexors/abductors, and core.    The patient endorses swelling around the surgical incision but denies surrounding redness. The incision has been dry, without discharge or drainage. Rashad denies recent fevers and chills, as well as any other symptoms concerning for infection.     Rashad is currently taking aspirin 162mg daily for DVT prophylaxis. Patient denies calf pain or tenderness.      MEDICATIONS:   Current Outpatient Rx   Medication Sig Dispense Refill     acetaminophen (TYLENOL) 500 " MG tablet Take 2 tablets (1,000 mg) by mouth every 8 hours as needed for mild pain 160 tablet 3     amLODIPine (NORVASC) 5 MG tablet Take 1 tablet (5 mg) by mouth daily 90 tablet 3     aspirin (ASA) 81 MG EC tablet Take 1 tablet (81 mg) by mouth 2 times daily 56 tablet 0     carvedilol (COREG) 25 MG tablet Take 1 tablet (25 mg) by mouth 2 times daily (with meals) 180 tablet 3     cholecalciferol 25 MCG (1000 UT) TABS Take 2,000 Units by mouth daily 90 tablet 3     cyclobenzaprine (FLEXERIL) 5 MG tablet Take 5 mg by mouth 3 times daily as needed for muscle spasms       febuxostat (ULORIC) 80 MG TABS tablet Take 1 tablet (80 mg) by mouth daily 90 tablet 3     HYDROmorphone (DILAUDID) 2 MG tablet Take 1-2 tablets (2-4 mg) by mouth every 4 hours as needed for moderate to severe pain 40 tablet 0     hydrOXYzine (ATARAX) 50 MG tablet Take 0.5-1 tablets (25-50 mg) by mouth nightly as needed (sleep) 30 tablet 11     mycophenolate (GENERIC EQUIVALENT) 250 MG capsule Take 2 capsules (500 mg) by mouth 2 times daily 120 capsule 11     omeprazole (PRILOSEC) 20 MG capsule Take 1 capsule (20 mg) by mouth daily 90 capsule 1     order for DME Equipment being ordered: Crutches 1 Device 0     polyethylene glycol (MIRALAX) 17 g packet Take 17 g by mouth daily 7 packet 0     senna-docusate (SENOKOT-S/PERICOLACE) 8.6-50 MG tablet Take 1 tablet by mouth 2 times daily 30 tablet 0     sulfamethoxazole-trimethoprim (BACTRIM) 400-80 MG tablet Take 1 tablet by mouth every other day 45 tablet 3     tacrolimus (GENERIC EQUIVALENT) 0.5 MG capsule Take 1 capsule (0.5 mg) by mouth every evening Total dose = 1 mg in the AM and 1.5 mg in the PM 30 capsule 11     tacrolimus (GENERIC EQUIVALENT) 1 MG capsule Take 1 capsule (1 mg) by mouth 2 times daily . Total dose=1mg in the AM and 1.5mg in the PM 60 capsule 11     tamsulosin (FLOMAX) 0.4 MG capsule TAKE 1 CAPSULE BY MOUTH DAILY 30 capsule 8     ferrous sulfate (FEROSUL) 325 (65 Fe) MG tablet Take 1  tablet (325 mg) by mouth daily (with breakfast) (Patient not taking: Reported on 9/25/2020) 30 tablet 11     furosemide (LASIX) 20 MG tablet Take 1 tablet (20 mg) by mouth 2 times daily (Patient not taking: Reported on 9/25/2020) 180 tablet 1     sodium bicarbonate 650 MG tablet Take 2 tablets (1,300 mg) by mouth 3 times daily (Patient not taking: Reported on 9/25/2020) 180 tablet 11         ALLERGIES: Patient has no known allergies.       PHYSICAL EXAMINATION:   A full physical exam was not performed during this virtual visit. The incision was inspected via virtual conferencing. There was no erythema or discharge.     Photos sent via demandmart were also reviewed. There was no evidence of erythema.    ASSESSMENT/PLAN:  Rashad Ortiz is a 45 year old who is status post right total hip arthroplasty. From an orthopedics standpoint, the patient appears to be progressing well but has not optimized his pain regimen.     Basic wound cares were discussed today. I recommended that all steristrips be removed (if present), and all monocryl suture tails are clipped to the level of the skin.     Patient would also likely benefit from dedicated PT, so I encouraged him to schedule an appointment early next week.     I also discussed his current strategy for opioid refills. He would like to first attempt to connect with his primary care provider. Since our clinic commonly addressed pain management needs in the first six weeks after surgery, I recommended that he contact our office if he is forwarded to us by his primary care provider. The patient understands and agrees with this plan. I will send him a [employer] message next week to follow-up.     The patient will see Dr. Morillo at six to eight weeks post op. Rashad has our clinic number and will call with any questions or concerns.    Fernando Viveros PA-C  Orthopaedic Surgery

## 2020-10-06 DIAGNOSIS — N17.9 AKI (ACUTE KIDNEY INJURY) (H): ICD-10-CM

## 2020-10-06 RX ORDER — CARVEDILOL 12.5 MG/1
12.5 TABLET ORAL 2 TIMES DAILY WITH MEALS
Qty: 60 TABLET | Refills: 3 | Status: SHIPPED | OUTPATIENT
Start: 2020-10-06 | End: 2020-12-23

## 2020-10-07 ENCOUNTER — MYC MEDICAL ADVICE (OUTPATIENT)
Dept: FAMILY MEDICINE | Facility: CLINIC | Age: 45
End: 2020-10-07

## 2020-10-07 ENCOUNTER — TELEPHONE (OUTPATIENT)
Dept: INTERVENTIONAL RADIOLOGY/VASCULAR | Facility: CLINIC | Age: 45
End: 2020-10-07

## 2020-10-07 ENCOUNTER — TELEPHONE (OUTPATIENT)
Dept: NEPHROLOGY | Facility: CLINIC | Age: 45
End: 2020-10-07

## 2020-10-07 DIAGNOSIS — N17.9 AKI (ACUTE KIDNEY INJURY) (H): Primary | ICD-10-CM

## 2020-10-07 NOTE — TELEPHONE ENCOUNTER
E-visit/Virtual visit/Telephone visit instructions given to Rashad to further discuss fistula issues.     Mila Luna RN, BSN, PHN

## 2020-10-07 NOTE — TELEPHONE ENCOUNTER
Spoke with dialysis nurse regarding pt's IR procedure on 10/8. Pt was still at dialysis center and RN would give pt all pre procedure instructions. RN states that pt doesn't have a current Covid test, but is asymptomatic. Pt will have IR nurse line if he has any questions. Judith RODRIGUEZ

## 2020-10-08 ENCOUNTER — HOSPITAL ENCOUNTER (OUTPATIENT)
Facility: CLINIC | Age: 45
Discharge: HOME | End: 2020-10-08
Attending: RADIOLOGY | Admitting: RADIOLOGY
Payer: COMMERCIAL

## 2020-10-08 ENCOUNTER — APPOINTMENT (OUTPATIENT)
Dept: MEDSURG UNIT | Facility: CLINIC | Age: 45
End: 2020-10-08
Attending: RADIOLOGY
Payer: COMMERCIAL

## 2020-10-08 VITALS
RESPIRATION RATE: 20 BRPM | HEART RATE: 73 BPM | SYSTOLIC BLOOD PRESSURE: 167 MMHG | DIASTOLIC BLOOD PRESSURE: 108 MMHG | OXYGEN SATURATION: 100 % | TEMPERATURE: 99 F

## 2020-10-08 DIAGNOSIS — Z94.0 KIDNEY TRANSPLANTED: ICD-10-CM

## 2020-10-08 DIAGNOSIS — N17.9 AKI (ACUTE KIDNEY INJURY) (H): ICD-10-CM

## 2020-10-08 LAB
ABO + RH BLD: NORMAL
ABO + RH BLD: NORMAL
ANION GAP SERPL CALCULATED.3IONS-SCNC: 9 MMOL/L (ref 3–14)
BLD GP AB SCN SERPL QL: NORMAL
BLD PROD TYP BPU: NORMAL
BLD UNIT ID BPU: 0
BLD UNIT ID BPU: 0
BLOOD BANK CMNT PATIENT-IMP: NORMAL
BLOOD PRODUCT CODE: NORMAL
BLOOD PRODUCT CODE: NORMAL
BPU ID: NORMAL
BPU ID: NORMAL
BUN SERPL-MCNC: 41 MG/DL (ref 7–30)
CALCIUM SERPL-MCNC: 8.9 MG/DL (ref 8.5–10.1)
CHLORIDE SERPL-SCNC: 106 MMOL/L (ref 94–109)
CO2 SERPL-SCNC: 23 MMOL/L (ref 20–32)
CREAT SERPL-MCNC: 7.53 MG/DL (ref 0.66–1.25)
ERYTHROCYTE [DISTWIDTH] IN BLOOD BY AUTOMATED COUNT: 18.3 % (ref 10–15)
GFR SERPL CREATININE-BSD FRML MDRD: 8 ML/MIN/{1.73_M2}
GLUCOSE SERPL-MCNC: 122 MG/DL (ref 70–99)
HCT VFR BLD AUTO: 21.7 % (ref 40–53)
HGB BLD-MCNC: 6.1 G/DL (ref 13.3–17.7)
INR PPP: 1.24 (ref 0.86–1.14)
MCH RBC QN AUTO: 25 PG (ref 26.5–33)
MCHC RBC AUTO-ENTMCNC: 28.1 G/DL (ref 31.5–36.5)
MCV RBC AUTO: 89 FL (ref 78–100)
NUM BPU REQUESTED: 2
PLATELET # BLD AUTO: 277 10E9/L (ref 150–450)
POTASSIUM SERPL-SCNC: 4.2 MMOL/L (ref 3.4–5.3)
RBC # BLD AUTO: 2.44 10E12/L (ref 4.4–5.9)
SODIUM SERPL-SCNC: 138 MMOL/L (ref 133–144)
SPECIMEN EXP DATE BLD: NORMAL
TRANSFUSION STATUS PATIENT QL: NORMAL
WBC # BLD AUTO: 8.3 10E9/L (ref 4–11)

## 2020-10-08 PROCEDURE — 85027 COMPLETE CBC AUTOMATED: CPT | Performed by: RADIOLOGY

## 2020-10-08 PROCEDURE — 86901 BLOOD TYPING SEROLOGIC RH(D): CPT | Performed by: RADIOLOGY

## 2020-10-08 PROCEDURE — P9016 RBC LEUKOCYTES REDUCED: HCPCS | Performed by: RADIOLOGY

## 2020-10-08 PROCEDURE — C1887 CATHETER, GUIDING: HCPCS

## 2020-10-08 PROCEDURE — 258N000003 HC RX IP 258 OP 636: Performed by: STUDENT IN AN ORGANIZED HEALTH CARE EDUCATION/TRAINING PROGRAM

## 2020-10-08 PROCEDURE — 86923 COMPATIBILITY TEST ELECTRIC: CPT | Performed by: RADIOLOGY

## 2020-10-08 PROCEDURE — 86850 RBC ANTIBODY SCREEN: CPT | Performed by: RADIOLOGY

## 2020-10-08 PROCEDURE — 36430 TRANSFUSION BLD/BLD COMPNT: CPT

## 2020-10-08 PROCEDURE — 80048 BASIC METABOLIC PNL TOTAL CA: CPT | Performed by: RADIOLOGY

## 2020-10-08 PROCEDURE — 85610 PROTHROMBIN TIME: CPT | Performed by: RADIOLOGY

## 2020-10-08 PROCEDURE — 86900 BLOOD TYPING SEROLOGIC ABO: CPT | Performed by: RADIOLOGY

## 2020-10-08 PROCEDURE — 999N000134 HC STATISTIC PP CARE STAGE 3

## 2020-10-08 RX ORDER — FLUMAZENIL 0.1 MG/ML
0.2 INJECTION, SOLUTION INTRAVENOUS
Status: DISCONTINUED | OUTPATIENT
Start: 2020-10-08 | End: 2020-10-08 | Stop reason: HOSPADM

## 2020-10-08 RX ORDER — NALOXONE HYDROCHLORIDE 0.4 MG/ML
.1-.4 INJECTION, SOLUTION INTRAMUSCULAR; INTRAVENOUS; SUBCUTANEOUS
Status: DISCONTINUED | OUTPATIENT
Start: 2020-10-08 | End: 2020-10-08 | Stop reason: HOSPADM

## 2020-10-08 RX ORDER — NICOTINE POLACRILEX 4 MG
15-30 LOZENGE BUCCAL
Status: DISCONTINUED | OUTPATIENT
Start: 2020-10-08 | End: 2020-10-08 | Stop reason: HOSPADM

## 2020-10-08 RX ORDER — SODIUM CHLORIDE 9 MG/ML
INJECTION, SOLUTION INTRAVENOUS CONTINUOUS
Status: DISCONTINUED | OUTPATIENT
Start: 2020-10-08 | End: 2020-10-08 | Stop reason: HOSPADM

## 2020-10-08 RX ORDER — IODIXANOL 320 MG/ML
150 INJECTION, SOLUTION INTRAVASCULAR ONCE
Status: DISCONTINUED | OUTPATIENT
Start: 2020-10-08 | End: 2020-10-08 | Stop reason: HOSPADM

## 2020-10-08 RX ORDER — LIDOCAINE 40 MG/G
CREAM TOPICAL
Status: DISCONTINUED | OUTPATIENT
Start: 2020-10-08 | End: 2020-10-08 | Stop reason: HOSPADM

## 2020-10-08 RX ORDER — FENTANYL CITRATE 50 UG/ML
25-50 INJECTION, SOLUTION INTRAMUSCULAR; INTRAVENOUS EVERY 5 MIN PRN
Status: DISCONTINUED | OUTPATIENT
Start: 2020-10-08 | End: 2020-10-08 | Stop reason: HOSPADM

## 2020-10-08 RX ORDER — DEXTROSE MONOHYDRATE 25 G/50ML
25-50 INJECTION, SOLUTION INTRAVENOUS
Status: DISCONTINUED | OUTPATIENT
Start: 2020-10-08 | End: 2020-10-08 | Stop reason: HOSPADM

## 2020-10-08 RX ORDER — HEPARIN SODIUM 200 [USP'U]/100ML
1 INJECTION, SOLUTION INTRAVENOUS CONTINUOUS PRN
Status: DISCONTINUED | OUTPATIENT
Start: 2020-10-08 | End: 2020-10-08 | Stop reason: HOSPADM

## 2020-10-08 RX ADMIN — SODIUM CHLORIDE: 9 INJECTION, SOLUTION INTRAVENOUS at 12:37

## 2020-10-08 ASSESSMENT — PAIN DESCRIPTION - DESCRIPTORS
DESCRIPTORS: CONSTANT
DESCRIPTORS: CONSTANT

## 2020-10-08 NOTE — IP AVS SNAPSHOT
[facility] [address] [address]                                    After Visit Summary   10/8/2020    Rashad Ortiz    MRN: [id number]           After Visit Summary Signature Page    I have received my discharge instructions, and my questions have been answered. I have discussed any challenges I see with this plan with the nurse or doctor.    ..........................................................................................................................................  Patient/Patient Representative Signature      ..........................................................................................................................................  Patient Representative Print Name and Relationship to Patient    ..................................................               ................................................  Date                                   Time    ..........................................................................................................................................  Reviewed by Signature/Title    ...................................................              ..............................................  Date                                               Time          22EPIC Rev 08/18

## 2020-10-08 NOTE — PROGRESS NOTES
Prep complete for RUE fistulogram with intervention. Patients wife Raúl will be picking up patient post procedure.

## 2020-10-08 NOTE — PROGRESS NOTES
1515 Report received from JENNIFER Davenport. Assumed care of pt. Pt currently receiving 1st unit of PRBCs, pt tolerating.     2nd unit PRBC initiated at 1623 and completed at 1820. Pt tolerated without s/s reaction.     Pts blood pressure increasing to 160s/100s, pt denies headache, vision changes, dizziness/lightheadedness. Pt reports this is a high blood pressure for him. Pt also reports his right arm fistula is getting more painful and more swollen. RUE elevated on pillow and ice pack applied. Dr Nolasco, oncall IR MD, notified. MD ok with pt's blood pressure and RUE swelling and discharging to home. Pt reports he is due to take coreg at [facility]. MD ok with providing prn dose of PO oxycodone, offered to pt, pt declined and states he will take a dose when he gets home. Instructed pt he could go to ED if he is unable to manage pain or if his blood pressure remains elevated(pt has BP machine at home) and he has symptoms (headache, vision changes, dizziness/lightheadness)    Pt ambulated in curiel with steady gait using his cane. Pt urinated without difficulty. Instructions for pt's procedure tomorrow (fistulogram with anesthesia) given, pt verbalizes understanding. PIV d/c'd.     [id number] Pt transported to [facility] via wheelchair by RN.

## 2020-10-08 NOTE — PRE-PROCEDURE
GENERAL PRE-PROCEDURE:   Procedure:  RUE fistulogram with intervention.   Date/Time:  10/8/2020 12:41 PM    Verbal consent obtained?: Yes    Written consent obtained?: Yes    Risks and benefits: Risks, benefits and alternatives were discussed    Consent given by:  Patient  Patient states understanding of procedure being performed: Yes    Patient's understanding of procedure matches consent: Yes    Procedure consent matches procedure scheduled: Yes    Expected level of sedation:  Moderate  Appropriately NPO:  Yes  ASA Class:  Class 2- mild systemic disease, no acute problems, no functional limitations  Mallampati  :  Grade 2- soft palate, base of uvula, tonsillar pillars, and portion of posterior pharyngeal wall visible  Lungs:  Lungs clear with good breath sounds bilaterally  Heart:  Normal heart sounds and rate  History & Physical reviewed:  History and physical reviewed and no updates needed  Statement of review:  I have reviewed the lab findings, diagnostic data, medications, and the plan for sedation

## 2020-10-08 NOTE — IP AVS SNAPSHOT
MRN:[id number]                      After Visit Summary   10/8/2020    Rashad Ortiz    MRN: [id number]           Visit Information        Department      10/8/2020 11:35 AM [facility] [address] [place]          Review of your medicines      UNREVIEWED medicines. Ask your doctor about these medicines       Dose / Directions   acetaminophen 500 MG tablet  Commonly known as: TYLENOL  Used for: Hip pain, left      Dose: 1,000 mg  Take 2 tablets (1,000 mg) by mouth every 8 hours as needed for mild pain  Quantity: 160 tablet  Refills: 3     aspirin 81 MG EC tablet  Commonly known as: ASA  Indication: VTE Prophylaxis  Used for: Status post hip surgery      Dose: 81 mg  Take 1 tablet (81 mg) by mouth 2 times daily  Quantity: 56 tablet  Refills: 0     carvedilol 12.5 MG tablet  Commonly known as: COREG  Used for: DEIDRA (acute kidney injury) (H)      Dose: 12.5 mg  Take 1 tablet (12.5 mg) by mouth 2 times daily (with meals)  Quantity: 60 tablet  Refills: 3     cholecalciferol 25 MCG (1000 UT) Tabs  Used for: Vitamin D deficiency      Dose: 2,000 Units  Take 2,000 Units by mouth daily  Quantity: 90 tablet  Refills: 3     cyclobenzaprine 5 MG tablet  Commonly known as: FLEXERIL      Dose: 5 mg  Take 5 mg by mouth 3 times daily as needed for muscle spasms  Refills: 0     febuxostat 80 MG Tabs tablet  Commonly known as: Uloric  Used for: Gout, unspecified cause, unspecified chronicity, unspecified site      Dose: 80 mg  Take 1 tablet (80 mg) by mouth daily  Quantity: 90 tablet  Refills: 3     hydrOXYzine 50 MG tablet  Commonly known as: ATARAX  Used for: Adjustment insomnia      Dose: 25-50 mg  Take 0.5-1 tablets (25-50 mg) by mouth nightly as needed (sleep)  Quantity: 30 tablet  Refills: 11     mycophenolate 250 MG capsule  Commonly known as: GENERIC EQUIVALENT  Used for: Kidney transplanted, Kidney transplant recipient, Long-term use of immunosuppressant medication      Dose: 500 mg  Take 2  capsules (500 mg) by mouth 2 times daily  Quantity: 120 capsule  Refills: 11     omeprazole 20 MG DR capsule  Commonly known as: priLOSEC  Used for: Kidney replaced by transplant      Dose: 20 mg  Take 1 capsule (20 mg) by mouth daily  Quantity: 90 capsule  Refills: 1     oxyCODONE 5 MG tablet  Commonly known as: ROXICODONE  Used for: Orthopedic aftercare      Dose: 5-10 mg  Take 1-2 tablets (5-10 mg) by mouth 2 times daily as needed for severe pain  Quantity: 20 tablet  Refills: 0     senna-docusate 8.6-50 MG tablet  Commonly known as: SENOKOT-S/PERICOLACE  Used for: Status post hip surgery      Dose: 1 tablet  Take 1 tablet by mouth 2 times daily  Quantity: 30 tablet  Refills: 0     sodium bicarbonate 650 MG tablet  Used for: Encounter for long-term (current) use of high-risk medication, Kidney replaced by transplant, Metabolic acidosis      Dose: 1,300 mg  Take 2 tablets (1,300 mg) by mouth 3 times daily  Quantity: 180 tablet  Refills: 11     sulfamethoxazole-trimethoprim 400-80 MG tablet  Commonly known as: BACTRIM  Used for: Kidney transplanted      Dose: 1 tablet  Take 1 tablet by mouth every other day  Quantity: 45 tablet  Refills: 3     * tacrolimus 0.5 MG capsule  Commonly known as: GENERIC EQUIVALENT  Used for: Kidney transplant recipient, Long-term use of immunosuppressant medication, Kidney transplanted      Dose: 0.5 mg  Take 1 capsule (0.5 mg) by mouth every evening Total dose = 1 mg in the AM and 1.5 mg in the PM  Quantity: 30 capsule  Refills: 11     * tacrolimus 1 MG capsule  Commonly known as: GENERIC EQUIVALENT  Used for: Kidney transplant recipient, Long-term use of immunosuppressant medication, Kidney transplanted      Dose: 1 mg  Take 1 capsule (1 mg) by mouth 2 times daily . Total dose=1mg in the AM and 1.5mg in the PM  Quantity: 60 capsule  Refills: 11     tamsulosin 0.4 MG capsule  Commonly known as: FLOMAX  Used for: Urinary dribbling      TAKE 1 CAPSULE BY MOUTH DAILY  Quantity: 30  capsule  Refills: 8         * This list has 2 medication(s) that are the same as other medications prescribed for you. Read the directions carefully, and ask your doctor or other care provider to review them with you.            CONTINUE these medicines which have NOT CHANGED       Dose / Directions   order for DME  Used for: Pain of left thigh, Hip flexor tendon tightness, left, Osteonecrosis of left hip (H)      Equipment being ordered: Crutches  Quantity: 1 Device  Refills: 0              Protect others around you: Learn how to safely use, store and throw away your medicines at www.disposemymeds.org.       Follow-ups after your visit       Your next 10 appointments already scheduled    Oct 09, 2020  8:00 AM  IR DIALYSIS FISTULOGRAM RIGHT with [email or web address]  [facility] Interventional Radiology ([facility], [facility]) [address] [facility] [address] [phone]   The day before the exam:    You may eat your regular diet.    You are encouraged to drink at least 8 eight ounce glasses of clear liquids.    Drink no alcoholic beverages for 24 hours before or after the exam.    The day of the exam:    Do not eat any solid food or milk products for 6 hours prior to the exam. You may drink clear liquids until 2 hours prior to the exam. Clear liquids include the following: water, Jell-O, clear broth, apple juice or any non-carbonated drink that you can see through (no pop!)    The morning of the exam you may take medications as directed with a sip of water.    You should not have received barium (x-ray contrast) within 48 hours of this exam.    Please wear loose clothing, such as a sweat suit or jogging clothes. Avoid snaps, zippers and other metal. We may ask you to undress and put on a hospital gown.    Please bring any scans or X-rays taken at other hospitals, if similar tests were done. Also bring a list of your medicines, including vitamins,  minerals and over-the-counter drugs. It is safest to leave personal items at home.    Someone will need to drive you to and from the hospital.    Tell your doctor in advance:    If you have allergies to x-ray contrast or iodine.    If you are or may be pregnant.    If you are taking Coumadin (or any other blood thinners) 5 days prior to the exam for any special instructions.    If you are diabetic to determine if your insulin needs have to be adjusted for the exam.    Your doctor will:    Need to do a history and physical within 30 days before this procedure.    Obtain necessary laboratory tests prior to the exam (creatinine, Hgb/Hct, platelet count, and INR).    Following the exam:    The possibility exists that you may need to remain at the hospital for 2-4 hours post procedure.    If you were given sedation, you cannot drive for 24 hours after the procedure, and an adult must be with you until then.    If you have any questions, please call the Imaging Department where you will have your exam. Directions, parking instructions, and other information are available on our website, [email or web address].     Oct 09, 2020  Procedure with GENERIC ANESTHESIA PROVIDER  [facility] Same Day Surgery (--) [address] [place] [address] [phone]   Oct 19, 2020  9:45 AM  (Arrive by 9:30 AM)  XR CHEST 2 VIEWS with [email or web address]  [facility] [place] ([facility]) [address]  1st Floor  [facility] [address] [phone]   How do I prepare for my exam? (Food and drink instructions)  No Food and Drink Restrictions.    How do I prepare for my exam? (Other instructions)  You do not need to do anything special for this exam.    What should I wear: Wear comfortable clothes.    How long does the exam take: Most scans take less than 5 minutes.    What should I bring: Bring a list of your medicines, including vitamins, minerals and over-the-counter drugs.  It is safest to leave personal items at home.    Do I need a :  No  is needed.    What do I need to tell my doctor: Tell your doctor if there s any chance you are pregnant.    What should I do after the exam: No restrictions, You may resume normal activities.    What is this test: An image of a specific body part shown in shades of black and white.    Who should I call with questions: If you have any questions, please call the Imaging Department where you will have your exam. Directions, parking instructions, and other information is available on our website, [email or web address].     Oct 19, 2020 10:00 AM  Echo Complete with [email or web address]  [facility] Heart [facility]) [address] [place] [address]  [address] [facility] [id number] [phone]   1. Please bring or wear a comfortable two-piece outfit.  2. You may eat, drink and take your normal medicines.  3. Please do not apply perfumes or lotions on the day of your exam.   4. For any questions that cannot be answered, please contact the ordering physician     Oct 19, 2020 11:00 AM  (Arrive by 10:45 AM)  New Patient Visit with Jared Galvin MD  [facility] Heart [facility] ([facility]) [address] [place] [id number] [phone]      Oct 19, 2020 12:30 PM  (Arrive by 12:15 PM)  FULL PULMONARY FUNCTION with  PFL B  [facility] Pulmonary Function Testing [facility]) [address] [place] [address]  [address] [facility] [id number] [phone]      Oct 22, 2020  2:15 PM  (Arrive by 2:00 PM)  Post-Op with Fernando Morillo MD  [facility] Orthopedic [facility]) [address] [place] [address]  [address] [facility] [id number] [phone]   Please arrive 15 minutes prior to your scheduled appointment time to fill out any necessary paperwork.  If you have MyChart, you can complete  questionnaires and register insurance information via e-checkin.  Copays cannot be collected via [facility] at this time.  After completing e-checkin, you will still need to stop at the  prior to the appointment but the process will be faster.  Other important items needed for your visit are:    -Insurance card, ID and copay  -Any paperwork (FMLA, WC, sports physical forms, etc.)  -List of all medications you are taking (include prescriptions, over-the-counter, and herbal)     Oct 28, 2020 11:00 AM  Video Visit with Chandni Barney MD  [facility] ([facility]) [address] [place] [address] [phone]   [facility]  Note: this is not an onsite visit; there is no need to come to the facility.  Please have a list of all current medications available for appointment.      Nov 24, 2020  1:05 PM  (Arrive by 12:50 PM)  Video Visit with Uc Kidney/Pancreas Recipient 2  [facility] Neph[facility] ([facility]) [address] [place] [id number] [phone]   [facility]  Note: this is not an onsite visit; there is no need to come to the facility.  Please have a list of all current medications available for appointment.         Care Instructions       Further instructions from your care team       [facility]    Procedure: fistulogram with anesthesia   Nothing to eat or drink starting at midnight  Have someone drop you off/pick you up  Check in with Registration (at the enterance of the hospital) no later than 6am on Friday, October 9th    [phone]....Ask for the Interventional Radiologist on call. Someone is on call 24hrs/day          Additional Information About Your Visit       [facility] Information    [facility] gives you secure access to your electronic health record. If you see a primary care provider, you can also  send messages to your care team and make appointments. If you have questions, please call your primary care clinic.  If you do not have a primary care provider, please call [phone] and they will assist you.       Care EveryWhere ID    This is your Care EveryWhere ID. This could be used by other organizations to access your [facility] medical records  [id number]       Your Vitals Were  Most recent update: 10/8/2020  5:18 PM    Blood Pressure   156/102      Pulse   72    Temperature   99  F (37.2  C) (Oral)    Respirations   16    Pulse Oximetry   100%          Primary Care Provider Office Phone # Fax #    Mariel Delaney Mares [phone]      Equal Access to Services    LUISANA GUTIERREZ : Hadii song fryeo Sojose, waaxda luqadaha, qaybta kaalmada adeegyada, ronaldo spencer . So [facility] [phone].    ATENCIÓN: Si habla español, tiene a ward disposición servicios gratuitos de asistencia lingüística. Llame al [phone].    We comply with applicable federal and state civil rights laws, including the [place] Human Rights Act. We do not discriminate on the basis of race, color, creed, Rastafari, national origin, marital status, age, disability, sex, sexual orientation, or gender identity.       Thank you!    Thank you for choosing [facility] for your care. Our goal is always to provide you with excellent care. Hearing back from our patients is one way we can continue to improve our services. Please take a few minutes to complete the written survey that you may receive in the mail after you visit with us. Thank you!            Medication List      Medications          Morning Afternoon Evening Bedtime As Needed    order for DME  INSTRUCTIONS: Equipment being ordered: Crutches                       ASK your doctor about these medications          Morning Afternoon Evening Bedtime As Needed    acetaminophen 500 MG tablet  Also known as: TYLENOL  INSTRUCTIONS: Take 2  tablets (1,000 mg) by mouth every 8 hours as needed for mild pain                     aspirin 81 MG EC tablet  Also known as: ASA  INSTRUCTIONS: Take 1 tablet (81 mg) by mouth 2 times daily  Reason for med: VTE Prophylaxis                     carvedilol 12.5 MG tablet  Also known as: COREG  INSTRUCTIONS: Take 1 tablet (12.5 mg) by mouth 2 times daily (with meals)                     cholecalciferol 25 MCG (1000 UT) Tabs  INSTRUCTIONS: Take 2,000 Units by mouth daily                     cyclobenzaprine 5 MG tablet  Also known as: FLEXERIL  INSTRUCTIONS: Take 5 mg by mouth 3 times daily as needed for muscle spasms                     febuxostat 80 MG Tabs tablet  Also known as: Uloric  INSTRUCTIONS: Take 1 tablet (80 mg) by mouth daily                     hydrOXYzine 50 MG tablet  Also known as: ATARAX  INSTRUCTIONS: Take 0.5-1 tablets (25-50 mg) by mouth nightly as needed (sleep)                     mycophenolate 250 MG capsule  Also known as: GENERIC EQUIVALENT  INSTRUCTIONS: Take 2 capsules (500 mg) by mouth 2 times daily  Doctor's comments: TXP DT 1/13/2011 (Kidney) TXP Dischg DT 1/16/2011 DXZ94.0 [facility]                     omeprazole 20 MG DR capsule  Also known as: priLOSEC  INSTRUCTIONS: Take 1 capsule (20 mg) by mouth daily                     oxyCODONE 5 MG tablet  Also known as: ROXICODONE  INSTRUCTIONS: Take 1-2 tablets (5-10 mg) by mouth 2 times daily as needed for severe pain                     senna-docusate 8.6-50 MG tablet  Also known as: SENOKOT-S/PERICOLACE  INSTRUCTIONS: Take 1 tablet by mouth 2 times daily                     sodium bicarbonate 650 MG tablet  INSTRUCTIONS: Take 2 tablets (1,300 mg) by mouth 3 times daily                     sulfamethoxazole-trimethoprim 400-80 MG tablet  Also known as: BACTRIM  INSTRUCTIONS: Take 1 tablet by mouth every other day                     * tacrolimus 0.5 MG capsule  Also known as: GENERIC  EQUIVALENT  INSTRUCTIONS: Take 1 capsule (0.5 mg) by mouth every evening Total dose = 1 mg in the AM and 1.5 mg in the PM                     * tacrolimus 1 MG capsule  Also known as: GENERIC EQUIVALENT  INSTRUCTIONS: Take 1 capsule (1 mg) by mouth 2 times daily . Total dose=1mg in the AM and 1.5mg in the PM                     tamsulosin 0.4 MG capsule  Also known as: FLOMAX  INSTRUCTIONS: TAKE 1 CAPSULE BY MOUTH DAILY                        * This list has 2 medication(s) that are the same as other medications prescribed for you. Read the directions carefully, and ask your doctor or other care provider to review them with you.

## 2020-10-09 ENCOUNTER — ANESTHESIA (OUTPATIENT)
Dept: SURGERY | Facility: CLINIC | Age: 45
End: 2020-10-09
Payer: COMMERCIAL

## 2020-10-09 ENCOUNTER — ANESTHESIA EVENT (OUTPATIENT)
Dept: SURGERY | Facility: CLINIC | Age: 45
End: 2020-10-09
Payer: COMMERCIAL

## 2020-10-09 ENCOUNTER — APPOINTMENT (OUTPATIENT)
Dept: INTERVENTIONAL RADIOLOGY/VASCULAR | Facility: CLINIC | Age: 45
End: 2020-10-09
Attending: NURSE PRACTITIONER
Payer: COMMERCIAL

## 2020-10-09 ENCOUNTER — HOSPITAL ENCOUNTER (OUTPATIENT)
Facility: CLINIC | Age: 45
Discharge: HOME | End: 2020-10-09
Attending: RADIOLOGY | Admitting: RADIOLOGY
Payer: COMMERCIAL

## 2020-10-09 ENCOUNTER — APPOINTMENT (OUTPATIENT)
Dept: INTERVENTIONAL RADIOLOGY/VASCULAR | Facility: CLINIC | Age: 45
End: 2020-10-09
Attending: RADIOLOGY
Payer: COMMERCIAL

## 2020-10-09 VITALS
HEIGHT: 68 IN | BODY MASS INDEX: 21.05 KG/M2 | SYSTOLIC BLOOD PRESSURE: 141 MMHG | RESPIRATION RATE: 14 BRPM | TEMPERATURE: 97 F | OXYGEN SATURATION: 97 % | WEIGHT: 138.89 LBS | DIASTOLIC BLOOD PRESSURE: 97 MMHG | HEART RATE: 61 BPM

## 2020-10-09 DIAGNOSIS — N17.9 AKI (ACUTE KIDNEY INJURY) (H): ICD-10-CM

## 2020-10-09 LAB
ANION GAP SERPL CALCULATED.3IONS-SCNC: 6 MMOL/L (ref 3–14)
BUN SERPL-MCNC: 42 MG/DL (ref 7–30)
CALCIUM SERPL-MCNC: 9.4 MG/DL (ref 8.5–10.1)
CHLORIDE SERPL-SCNC: 106 MMOL/L (ref 94–109)
CO2 SERPL-SCNC: 25 MMOL/L (ref 20–32)
CREAT SERPL-MCNC: 7.71 MG/DL (ref 0.66–1.25)
ERYTHROCYTE [DISTWIDTH] IN BLOOD BY AUTOMATED COUNT: 19.5 % (ref 10–15)
GFR SERPL CREATININE-BSD FRML MDRD: 8 ML/MIN/{1.73_M2}
GLUCOSE BLDC GLUCOMTR-MCNC: 75 MG/DL (ref 70–99)
GLUCOSE SERPL-MCNC: 82 MG/DL (ref 70–99)
HCT VFR BLD AUTO: 29.7 % (ref 40–53)
HGB BLD-MCNC: 9.1 G/DL (ref 13.3–17.7)
LABORATORY COMMENT REPORT: NORMAL
MCH RBC QN AUTO: 27.6 PG (ref 26.5–33)
MCHC RBC AUTO-ENTMCNC: 30.6 G/DL (ref 31.5–36.5)
MCV RBC AUTO: 90 FL (ref 78–100)
PLATELET # BLD AUTO: 294 10E9/L (ref 150–450)
POTASSIUM SERPL-SCNC: 5.1 MMOL/L (ref 3.4–5.3)
RBC # BLD AUTO: 3.3 10E12/L (ref 4.4–5.9)
SARS-COV-2 RNA SPEC QL NAA+PROBE: NEGATIVE
SARS-COV-2 RNA SPEC QL NAA+PROBE: NORMAL
SODIUM SERPL-SCNC: 137 MMOL/L (ref 133–144)
SPECIMEN SOURCE: NORMAL
SPECIMEN SOURCE: NORMAL
WBC # BLD AUTO: 9.2 10E9/L (ref 4–11)

## 2020-10-09 PROCEDURE — 36558 INSERT TUNNELED CV CATH: CPT | Mod: RT | Performed by: RADIOLOGY

## 2020-10-09 PROCEDURE — 761N000007 HC RECOVERY PHASE 2 EACH 15 MINS

## 2020-10-09 PROCEDURE — 258N000003 HC RX IP 258 OP 636: Performed by: RADIOLOGY

## 2020-10-09 PROCEDURE — C1769 GUIDE WIRE: HCPCS

## 2020-10-09 PROCEDURE — 250N000011 HC RX IP 250 OP 636: Performed by: RADIOLOGY

## 2020-10-09 PROCEDURE — 370N000002 HC ANESTHESIA TECHNICAL FEE, EACH ADDTL 15 MIN

## 2020-10-09 PROCEDURE — 76937 US GUIDE VASCULAR ACCESS: CPT | Mod: 26 | Performed by: RADIOLOGY

## 2020-10-09 PROCEDURE — 999N001017 HC STATISTIC GLUCOSE BY METER IP

## 2020-10-09 PROCEDURE — 272N000504 HC NEEDLE CR4

## 2020-10-09 PROCEDURE — 272N000147 HC KIT CR7

## 2020-10-09 PROCEDURE — 36905 THRMBC/NFS DIALYSIS CIRCUIT: CPT | Mod: GC | Performed by: RADIOLOGY

## 2020-10-09 PROCEDURE — U0003 INFECTIOUS AGENT DETECTION BY NUCLEIC ACID (DNA OR RNA); SEVERE ACUTE RESPIRATORY SYNDROME CORONAVIRUS 2 (SARS-COV-2) (CORONAVIRUS DISEASE [COVID-19]), AMPLIFIED PROBE TECHNIQUE, MAKING USE OF HIGH THROUGHPUT TECHNOLOGIES AS DESCRIBED BY CMS-2020-01-R: HCPCS | Performed by: STUDENT IN AN ORGANIZED HEALTH CARE EDUCATION/TRAINING PROGRAM

## 2020-10-09 PROCEDURE — 77001 FLUOROGUIDE FOR VEIN DEVICE: CPT | Mod: 26 | Performed by: RADIOLOGY

## 2020-10-09 PROCEDURE — 255N000002 HC RX 255 OP 636: Performed by: RADIOLOGY

## 2020-10-09 PROCEDURE — 85027 COMPLETE CBC AUTOMATED: CPT | Performed by: RADIOLOGY

## 2020-10-09 PROCEDURE — 272N000602 HC WOUND GLUE CR1

## 2020-10-09 PROCEDURE — 250N000011 HC RX IP 250 OP 636: Performed by: NURSE ANESTHETIST, CERTIFIED REGISTERED

## 2020-10-09 PROCEDURE — 250N000009 HC RX 250: Performed by: RADIOLOGY

## 2020-10-09 PROCEDURE — C1887 CATHETER, GUIDING: HCPCS

## 2020-10-09 PROCEDURE — C1757 CATH, THROMBECTOMY/EMBOLECT: HCPCS

## 2020-10-09 PROCEDURE — C1750 CATH, HEMODIALYSIS,LONG-TERM: HCPCS

## 2020-10-09 PROCEDURE — C1725 CATH, TRANSLUMIN NON-LASER: HCPCS

## 2020-10-09 PROCEDURE — 272N000187 HC ACCESSORY CR11

## 2020-10-09 PROCEDURE — 76937 US GUIDE VASCULAR ACCESS: CPT | Mod: XS

## 2020-10-09 PROCEDURE — 36905 THRMBC/NFS DIALYSIS CIRCUIT: CPT

## 2020-10-09 PROCEDURE — 370N000001 HC ANESTHESIA TECHNICAL FEE, 1ST 30 MIN

## 2020-10-09 PROCEDURE — 258N000003 HC RX IP 258 OP 636: Performed by: NURSE ANESTHETIST, CERTIFIED REGISTERED

## 2020-10-09 PROCEDURE — 999N000139 HC STATISTIC PRE-PROCEDURE ASSESSMENT II

## 2020-10-09 PROCEDURE — 272N000199 HC ACCESSORY CR8

## 2020-10-09 PROCEDURE — 272N000143 HC KIT CR3

## 2020-10-09 PROCEDURE — 80048 BASIC METABOLIC PNL TOTAL CA: CPT | Performed by: RADIOLOGY

## 2020-10-09 PROCEDURE — 272N000302 HC DEVICE INFLATION CR5

## 2020-10-09 PROCEDURE — 272N000564 HC SHEATH CR2

## 2020-10-09 RX ORDER — ONDANSETRON 2 MG/ML
INJECTION INTRAMUSCULAR; INTRAVENOUS PRN
Status: DISCONTINUED | OUTPATIENT
Start: 2020-10-09 | End: 2020-10-09

## 2020-10-09 RX ORDER — NALOXONE HYDROCHLORIDE 0.4 MG/ML
.1-.4 INJECTION, SOLUTION INTRAMUSCULAR; INTRAVENOUS; SUBCUTANEOUS
Status: DISCONTINUED | OUTPATIENT
Start: 2020-10-09 | End: 2020-10-09 | Stop reason: HOSPADM

## 2020-10-09 RX ORDER — FUROSEMIDE 20 MG
20 TABLET ORAL 2 TIMES DAILY
Status: ON HOLD | COMMUNITY
Start: 2020-09-11 | End: 2021-04-14

## 2020-10-09 RX ORDER — LIDOCAINE 40 MG/G
CREAM TOPICAL
Status: DISCONTINUED | OUTPATIENT
Start: 2020-10-09 | End: 2020-10-09 | Stop reason: HOSPADM

## 2020-10-09 RX ORDER — HEPARIN SODIUM 200 [USP'U]/100ML
1 INJECTION, SOLUTION INTRAVENOUS CONTINUOUS PRN
Status: DISCONTINUED | OUTPATIENT
Start: 2020-10-09 | End: 2020-10-09 | Stop reason: HOSPADM

## 2020-10-09 RX ORDER — ONDANSETRON 4 MG/1
4 TABLET, ORALLY DISINTEGRATING ORAL EVERY 30 MIN PRN
Status: DISCONTINUED | OUTPATIENT
Start: 2020-10-09 | End: 2020-10-09 | Stop reason: HOSPADM

## 2020-10-09 RX ORDER — NICOTINE POLACRILEX 4 MG
15-30 LOZENGE BUCCAL
Status: DISCONTINUED | OUTPATIENT
Start: 2020-10-09 | End: 2020-10-09 | Stop reason: HOSPADM

## 2020-10-09 RX ORDER — FENTANYL CITRATE 50 UG/ML
25-50 INJECTION, SOLUTION INTRAMUSCULAR; INTRAVENOUS
Status: DISCONTINUED | OUTPATIENT
Start: 2020-10-09 | End: 2020-10-09 | Stop reason: HOSPADM

## 2020-10-09 RX ORDER — ONDANSETRON 2 MG/ML
4 INJECTION INTRAMUSCULAR; INTRAVENOUS EVERY 30 MIN PRN
Status: DISCONTINUED | OUTPATIENT
Start: 2020-10-09 | End: 2020-10-09 | Stop reason: HOSPADM

## 2020-10-09 RX ORDER — DEXTROSE MONOHYDRATE 25 G/50ML
25-50 INJECTION, SOLUTION INTRAVENOUS
Status: DISCONTINUED | OUTPATIENT
Start: 2020-10-09 | End: 2020-10-09 | Stop reason: HOSPADM

## 2020-10-09 RX ORDER — SODIUM CHLORIDE 9 MG/ML
INJECTION, SOLUTION INTRAVENOUS CONTINUOUS
Status: DISCONTINUED | OUTPATIENT
Start: 2020-10-09 | End: 2020-10-09 | Stop reason: HOSPADM

## 2020-10-09 RX ORDER — SODIUM CHLORIDE, SODIUM LACTATE, POTASSIUM CHLORIDE, CALCIUM CHLORIDE 600; 310; 30; 20 MG/100ML; MG/100ML; MG/100ML; MG/100ML
INJECTION, SOLUTION INTRAVENOUS CONTINUOUS
Status: DISCONTINUED | OUTPATIENT
Start: 2020-10-09 | End: 2020-10-09 | Stop reason: HOSPADM

## 2020-10-09 RX ORDER — PROPOFOL 10 MG/ML
INJECTION, EMULSION INTRAVENOUS CONTINUOUS PRN
Status: DISCONTINUED | OUTPATIENT
Start: 2020-10-09 | End: 2020-10-09

## 2020-10-09 RX ORDER — HEPARIN SODIUM 1000 [USP'U]/ML
3 INJECTION, SOLUTION INTRAVENOUS; SUBCUTANEOUS ONCE
Status: COMPLETED | OUTPATIENT
Start: 2020-10-09 | End: 2020-10-09

## 2020-10-09 RX ORDER — FENTANYL CITRATE 50 UG/ML
INJECTION, SOLUTION INTRAMUSCULAR; INTRAVENOUS PRN
Status: DISCONTINUED | OUTPATIENT
Start: 2020-10-09 | End: 2020-10-09

## 2020-10-09 RX ORDER — LIDOCAINE HYDROCHLORIDE 10 MG/ML
1-30 INJECTION, SOLUTION EPIDURAL; INFILTRATION; INTRACAUDAL; PERINEURAL
Status: COMPLETED | OUTPATIENT
Start: 2020-10-09 | End: 2020-10-09

## 2020-10-09 RX ORDER — SODIUM CHLORIDE 9 MG/ML
INJECTION, SOLUTION INTRAVENOUS CONTINUOUS PRN
Status: DISCONTINUED | OUTPATIENT
Start: 2020-10-09 | End: 2020-10-09

## 2020-10-09 RX ORDER — HEPARIN SODIUM 1000 [USP'U]/ML
INJECTION, SOLUTION INTRAVENOUS; SUBCUTANEOUS PRN
Status: DISCONTINUED | OUTPATIENT
Start: 2020-10-09 | End: 2020-10-09

## 2020-10-09 RX ORDER — IODIXANOL 320 MG/ML
150 INJECTION, SOLUTION INTRAVASCULAR ONCE
Status: COMPLETED | OUTPATIENT
Start: 2020-10-09 | End: 2020-10-09

## 2020-10-09 RX ORDER — HEPARIN SODIUM,PORCINE 10 UNIT/ML
5-10 VIAL (ML) INTRAVENOUS
Status: DISCONTINUED | OUTPATIENT
Start: 2020-10-09 | End: 2020-10-09 | Stop reason: HOSPADM

## 2020-10-09 RX ORDER — HEPARIN SODIUM,PORCINE 10 UNIT/ML
5 VIAL (ML) INTRAVENOUS EVERY 24 HOURS
Status: DISCONTINUED | OUTPATIENT
Start: 2020-10-09 | End: 2020-10-09 | Stop reason: HOSPADM

## 2020-10-09 RX ORDER — CEFAZOLIN SODIUM 2 G/100ML
INJECTION, SOLUTION INTRAVENOUS PRN
Status: DISCONTINUED | OUTPATIENT
Start: 2020-10-09 | End: 2020-10-09

## 2020-10-09 RX ADMIN — HEPARIN SODIUM 2 BAG: 200 INJECTION, SOLUTION INTRAVENOUS at 10:11

## 2020-10-09 RX ADMIN — HEPARIN SODIUM 2500 UNITS: 1000 INJECTION INTRAVENOUS; SUBCUTANEOUS at 11:26

## 2020-10-09 RX ADMIN — LIDOCAINE HYDROCHLORIDE 13 ML: 10 INJECTION, SOLUTION EPIDURAL; INFILTRATION; INTRACAUDAL; PERINEURAL at 11:26

## 2020-10-09 RX ADMIN — CEFAZOLIN 2 G: 10 INJECTION, POWDER, FOR SOLUTION INTRAVENOUS at 09:00

## 2020-10-09 RX ADMIN — ALTEPLASE 10 MG/HR: 2.2 INJECTION, POWDER, LYOPHILIZED, FOR SOLUTION INTRAVENOUS at 10:10

## 2020-10-09 RX ADMIN — HEPARIN SODIUM 5000 UNITS: 1000 INJECTION INTRAVENOUS; SUBCUTANEOUS at 09:09

## 2020-10-09 RX ADMIN — PROPOFOL 100 MCG/KG/MIN: 10 INJECTION, EMULSION INTRAVENOUS at 08:40

## 2020-10-09 RX ADMIN — ONDANSETRON 4 MG: 2 INJECTION INTRAMUSCULAR; INTRAVENOUS at 11:17

## 2020-10-09 RX ADMIN — FENTANYL CITRATE 25 MCG: 50 INJECTION, SOLUTION INTRAMUSCULAR; INTRAVENOUS at 08:47

## 2020-10-09 RX ADMIN — FENTANYL CITRATE 50 MCG: 50 INJECTION, SOLUTION INTRAMUSCULAR; INTRAVENOUS at 09:14

## 2020-10-09 RX ADMIN — MIDAZOLAM 1 MG: 1 INJECTION INTRAMUSCULAR; INTRAVENOUS at 08:30

## 2020-10-09 RX ADMIN — SODIUM CHLORIDE: 9 INJECTION, SOLUTION INTRAVENOUS at 08:37

## 2020-10-09 RX ADMIN — IODIXANOL 50 ML: 320 INJECTION, SOLUTION INTRAVASCULAR at 12:00

## 2020-10-09 RX ADMIN — FENTANYL CITRATE 25 MCG: 50 INJECTION, SOLUTION INTRAMUSCULAR; INTRAVENOUS at 09:02

## 2020-10-09 RX ADMIN — MIDAZOLAM 1 MG: 1 INJECTION INTRAMUSCULAR; INTRAVENOUS at 08:37

## 2020-10-09 ASSESSMENT — MIFFLIN-ST. JEOR: SCORE: 1494.5

## 2020-10-09 NOTE — IP AVS SNAPSHOT
[facility] Interventional Radiology  [address] [address]  Phone: [phone]                                    After Visit Summary   10/9/2020    Rashad Ortiz    MRN: [id number]           After Visit Summary Signature Page    I have received my discharge instructions, and my questions have been answered. I have discussed any challenges I see with this plan with the nurse or doctor.    ..........................................................................................................................................  Patient/Patient Representative Signature      ..........................................................................................................................................  Patient Representative Print Name and Relationship to Patient    ..................................................               ................................................  Date                                   Time    ..........................................................................................................................................  Reviewed by Signature/Title    ...................................................              ..............................................  Date                                               Time          22EPIC Rev 08/18

## 2020-10-09 NOTE — ANESTHESIA POSTPROCEDURE EVALUATION
Anesthesia POST Procedure Evaluation    Patient: Rashad Ortiz   MRN:     [id number] Gender:   male   Age:    45 year old :      1975        Preoperative Diagnosis: Acute kidney injury (H) [N17.9]   Procedure(s):  ANESTHESIA OUT OF OR Right Fistulogram IR Room 1 @0800   Postop Comments: No value filed.     Anesthesia Type: MAC       Disposition: Admission   Postop Pain Control: Uneventful            Sign Out: Well controlled pain   PONV: No   Neuro/Psych: Uneventful            Sign Out: Acceptable/Baseline neuro status   Airway/Respiratory: Uneventful            Sign Out: Acceptable/Baseline resp. status   CV/Hemodynamics: Uneventful            Sign Out: Acceptable CV status   Other NRE: NONE   DID A NON-ROUTINE EVENT OCCUR? No         Last Anesthesia Record Vitals:  CRNA VITALS  10/9/2020 1104 - 10/9/2020 1204      10/9/2020             Pulse:  69    Ht Rate:  69    SpO2:  100 %          Last PACU Vitals:  Vitals Value Taken Time   BP     Temp     Pulse     Resp     SpO2     Temp src     NIBP 112/74 10/09/20 1131   Pulse 69 10/09/20 1134   SpO2 100 % 10/09/20 1134   Resp     Temp 30.6  C (87.1  F) 10/09/20 1131   Ht Rate 69 10/09/20 1134   Temp 2           Electronically Signed By: Tadeo Turner MD, 2020, 12:16 PM

## 2020-10-09 NOTE — IP AVS SNAPSHOT
MRN:[id number]                      After Visit Summary   10/9/2020    Rashad Ortiz    MRN: [id number]           Visit Information        Department      10/9/2020  5:48 AM [facility] Interventional Radiology          Review of your medicines      UNREVIEWED medicines. Ask your doctor about these medicines       Dose / Directions   acetaminophen 500 MG tablet  Commonly known as: TYLENOL  Used for: Hip pain, left      Dose: 1,000 mg  Take 2 tablets (1,000 mg) by mouth every 8 hours as needed for mild pain  Quantity: 160 tablet  Refills: 3     aspirin 81 MG EC tablet  Commonly known as: ASA  Indication: VTE Prophylaxis  Used for: Status post hip surgery      Dose: 81 mg  Take 1 tablet (81 mg) by mouth 2 times daily  Quantity: 56 tablet  Refills: 0     carvedilol 12.5 MG tablet  Commonly known as: COREG  Used for: DEIDRA (acute kidney injury) (H)      Dose: 12.5 mg  Take 1 tablet (12.5 mg) by mouth 2 times daily (with meals)  Quantity: 60 tablet  Refills: 3     cholecalciferol 25 MCG (1000 UT) Tabs  Used for: Vitamin D deficiency      Dose: 2,000 Units  Take 2,000 Units by mouth daily  Quantity: 90 tablet  Refills: 3     cyclobenzaprine 5 MG tablet  Commonly known as: FLEXERIL      Dose: 5 mg  Take 5 mg by mouth 3 times daily as needed for muscle spasms  Refills: 0     febuxostat 80 MG Tabs tablet  Commonly known as: Uloric  Used for: Gout, unspecified cause, unspecified chronicity, unspecified site      Dose: 80 mg  Take 1 tablet (80 mg) by mouth daily  Quantity: 90 tablet  Refills: 3     furosemide 20 MG tablet  Commonly known as: LASIX      Refills: 0     hydrOXYzine 50 MG tablet  Commonly known as: ATARAX  Used for: Adjustment insomnia      Dose: 25-50 mg  Take 0.5-1 tablets (25-50 mg) by mouth nightly as needed (sleep)  Quantity: 30 tablet  Refills: 11     mycophenolate 250 MG capsule  Commonly known as: GENERIC EQUIVALENT  Used for: Kidney transplanted, Kidney transplant recipient,  Long-term use of immunosuppressant medication      Dose: 500 mg  Take 2 capsules (500 mg) by mouth 2 times daily  Quantity: 120 capsule  Refills: 11     omeprazole 20 MG DR capsule  Commonly known as: priLOSEC  Used for: Kidney replaced by transplant      Dose: 20 mg  Take 1 capsule (20 mg) by mouth daily  Quantity: 90 capsule  Refills: 1     oxyCODONE 5 MG tablet  Commonly known as: ROXICODONE  Used for: Orthopedic aftercare      Dose: 5-10 mg  Take 1-2 tablets (5-10 mg) by mouth 2 times daily as needed for severe pain  Quantity: 20 tablet  Refills: 0     senna-docusate 8.6-50 MG tablet  Commonly known as: SENOKOT-S/PERICOLACE  Used for: Status post hip surgery      Dose: 1 tablet  Take 1 tablet by mouth 2 times daily  Quantity: 30 tablet  Refills: 0     sodium bicarbonate 650 MG tablet  Used for: Encounter for long-term (current) use of high-risk medication, Kidney replaced by transplant, Metabolic acidosis      Dose: 1,300 mg  Take 2 tablets (1,300 mg) by mouth 3 times daily  Quantity: 180 tablet  Refills: 11     sulfamethoxazole-trimethoprim 400-80 MG tablet  Commonly known as: BACTRIM  Used for: Kidney transplanted      Dose: 1 tablet  Take 1 tablet by mouth every other day  Quantity: 45 tablet  Refills: 3     * tacrolimus 0.5 MG capsule  Commonly known as: GENERIC EQUIVALENT  Used for: Kidney transplant recipient, Long-term use of immunosuppressant medication, Kidney transplanted      Dose: 0.5 mg  Take 1 capsule (0.5 mg) by mouth every evening Total dose = 1 mg in the AM and 1.5 mg in the PM  Quantity: 30 capsule  Refills: 11     * tacrolimus 1 MG capsule  Commonly known as: GENERIC EQUIVALENT  Used for: Kidney transplant recipient, Long-term use of immunosuppressant medication, Kidney transplanted      Dose: 1 mg  Take 1 capsule (1 mg) by mouth 2 times daily . Total dose=1mg in the AM and 1.5mg in the PM  Quantity: 60 capsule  Refills: 11     tamsulosin 0.4 MG capsule  Commonly known as: FLOMAX  Used for:  Urinary dribbling      TAKE 1 CAPSULE BY MOUTH DAILY  Quantity: 30 capsule  Refills: 8         * This list has 2 medication(s) that are the same as other medications prescribed for you. Read the directions carefully, and ask your doctor or other care provider to review them with you.            CONTINUE these medicines which have NOT CHANGED       Dose / Directions   order for DME  Used for: Pain of left thigh, Hip flexor tendon tightness, left, Osteonecrosis of left hip (H)      Equipment being ordered: Crutches  Quantity: 1 Device  Refills: 0              Protect others around you: Learn how to safely use, store and throw away your medicines at www.disposemymeds.org.       Follow-ups after your visit       Your next 10 appointments already scheduled    Oct 19, 2020  9:45 AM  (Arrive by 9:30 AM)  XR CHEST 2 VIEWS with [email or web address]  [facility] [place] ([facility]) [address] [address] [phone]   How do I prepare for my exam? (Food and drink instructions)  No Food and Drink Restrictions.    How do I prepare for my exam? (Other instructions)  You do not need to do anything special for this exam.    What should I wear: Wear comfortable clothes.    How long does the exam take: Most scans take less than 5 minutes.    What should I bring: Bring a list of your medicines, including vitamins, minerals and over-the-counter drugs. It is safest to leave personal items at home.    Do I need a :  No  is needed.    What do I need to tell my doctor: Tell your doctor if there s any chance you are pregnant.    What should I do after the exam: No restrictions, You may resume normal activities.    What is this test: An image of a specific body part shown in shades of black and white.    Who should I call with questions: If you have any questions, please call the Imaging Department where you will have your exam. Directions, parking  instructions, and other information is available on our website, [email or web address].     Oct 19, 2020 10:00 AM  Echo Complete with [email or web address]  [facility] Heart [facility] ([facility]) [address] [place] [id number] [phone]   1. Please bring or wear a comfortable two-piece outfit.  2. You may eat, drink and take your normal medicines.  3. Please do not apply perfumes or lotions on the day of your exam.   4. For any questions that cannot be answered, please contact the ordering physician     Oct 19, 2020 11:00 AM  (Arrive by 10:45 AM)  New Patient Visit with Jared Galvin MD  [facility] ([facility]) [address] [place] [phone] [phone]      Oct 19, 2020 12:30 PM  (Arrive by 12:15 PM)  FULL PULMONARY FUNCTION with  PFL B  [facility] Pulmonary Function Testing [facility]) [address] [place] [id number] [phone]      Oct 22, 2020  2:15 PM  (Arrive by 2:00 PM)  Post-Op with Fernando Morillo MD  [facility] Orthopedic [facility] ([facility]) [address] [place] [phone]   Please arrive 15 minutes prior to your scheduled appointment time to fill out any necessary paperwork.  If you have [employer], you can complete questionnaires and register insurance information via e-checkin.  Copays cannot be collected via [employer] at this time.  After completing e-checkin, you will still need to stop at the  prior to the appointment but the process will be faster.  Other important items needed for your visit are:    -Insurance card, ID and copay  -Any paperwork (FMLA, WC, sports physical forms, etc.)  -List of all medications you are taking (include prescriptions, over-the-counter, and herbal)     Oct 28, 2020 11:00  AM  Video Visit with Chandni Barney MD  [facility] ([facility]) [address] [place] [address] [phone]   [facility]  Note: this is not an onsite visit; there is no need to come to the facility.  Please have a list of all current medications available for appointment.      Nov 24, 2020  1:05 PM  (Arrive by 12:50 PM)  Video Visit with Uc Kidney/Pancreas Recipient 2  [facility] Nephrology [facility] ([facility]) [address] [facility] [address] [phone]   [facility] Nephrology [facility]  Note: this is not an onsite visit; there is no need to come to the facility.  Please have a list of all current medications available for appointment.         Care Instructions       Further instructions from your care team         Activity    No strenuous activity for 1 week    No heavy lifting (greater than 10 pounds) for 3 days    No contact sports for 2 weeks    No swimming, tub bath, or hot tub while Tunneled Central Venous Catheter is in place  Diet    Resume your regular diet  Discomfort    Pain medications as directed  Site Care    Your site(s) has been closed with Dermabond (upper neck site), and dressed with sterile dressing (catheter insertion site).  Keep the catheter insertion site clean, dry, and covered.  Only change the dressing if it becomes wet or dirty.     If there is any oozing or bleeding from the site, apply direct pressure for 5-10 minutes with a gauze pad.  If bleeding continues after 10 minutes, call Pediatric Interventional Radiology.  If bleeding cannot be controlled with direct pressure, call 911.    Cover insertion site dressing with a folded dry washcloth, cover with plastic wrap, and secure by Microfoam tape.  After your shower, remove the covering.  Leave the insertion site dressing intact.  [facility],  [place]  Same-Day Surgery   Adult Discharge Orders & Instructions     For 24 hours after surgery    1. Get plenty of rest.  A responsible adult must stay with you for at least 24 hours after you leave the hospital.   2. Do not drive or use heavy equipment.  If you have weakness or tingling, don't drive or use heavy equipment until this feeling goes away.  3. Do not drink alcohol.  4. Avoid strenuous or risky activities.  Ask for help when climbing stairs.   5. You may feel lightheaded.  IF so, sit for a few minutes before standing.  Have someone help you get up.   6. If you have nausea (feel sick to your stomach): Drink only clear liquids such as apple juice, ginger ale, broth or 7-Up.  Rest may also help.  Be sure to drink enough fluids.  Move to a regular diet as you feel able.  7. You may have a slight fever. Call the doctor if your fever is over 100 F (37.7 C) (taken under the tongue) or lasts longer than 24 hours.  8. You may have a dry mouth, a sore throat, muscle aches or trouble sleeping.  These should go away after 24 hours.  9. Do not make important or legal decisions.   Call your doctor for any of the followin.  Signs of infection (fever, growing tenderness at the surgery site, a large amount of drainage or bleeding, severe pain, foul-smelling drainage, redness, swelling).    2. It has been over 8 to 10 hours since surgery and you are still not able to urinate (pass water).    3.  Headache for over 24 hours.      To contact a doctor, call  Interventional Radiology from 8 am to 5 pm @ [phone] or:    [  ]   [phone] and ask for the resident on call for INTERVENTIONAL RADIOLOGY (answered 24 hours a day)  [  ]   Emergency Department:    [facility]: [phone]       ([facility] for hearing impaired: [phone])                  Additional Information About Your Visit       [facility] Information    [facility] gives you secure access to your electronic health record. If you see a primary care  "provider, you can also send messages to your care team and make appointments. If you have questions, please call your primary care clinic.  If you do not have a primary care provider, please call [phone] and they will assist you.       Care EveryWhere ID    This is your Care EveryWhere ID. This could be used by other organizations to access your [facility] medical records  [id number]       Your Vitals Were  Most recent update: 10/9/2020  1:24 PM    Blood Pressure   129/67          Pulse   66          Temperature   96.8  F (36  C) (Axillary)          Respirations   18          Height   1.727 m (5' 8\")             Weight   63 kg (138 lb 14.2 oz)    Pulse Oximetry   100%    BMI (Body Mass Index)   21.12 kg/m           Primary Care Provider Office Phone # Fax #    Mariel Austinstoney Mares [phone]      Equal Access to Services    [facility] : Hadii aad ku hadasho Soomaali, waaxda luqadaha, qaybta kaalmada adeegyada, waxdavid ambrosio haydeanna spencer . So [facility] [phone].    ATENCIÓN: Si habla español, tiene a ward disposición servicios gratuitos de asistencia lingüística. Joshua al [phone].    We comply with applicable federal and state civil rights laws, including the [place] Human Rights Act. We do not discriminate on the basis of race, color, creed, Sabianist, national origin, marital status, age, disability, sex, sexual orientation, or gender identity.       Thank you!    Thank you for choosing [facility] for your care. Our goal is always to provide you with excellent care. Hearing back from our patients is one way we can continue to improve our services. Please take a few minutes to complete the written survey that you may receive in the mail after you visit with us. Thank you!            Medication List      Medications          Morning Afternoon Evening Bedtime As Needed    order for DME  INSTRUCTIONS: Equipment being ordered: Crutches                       ASK your " doctor about these medications          Morning Afternoon Evening Bedtime As Needed    acetaminophen 500 MG tablet  Also known as: TYLENOL  INSTRUCTIONS: Take 2 tablets (1,000 mg) by mouth every 8 hours as needed for mild pain                     aspirin 81 MG EC tablet  Also known as: ASA  INSTRUCTIONS: Take 1 tablet (81 mg) by mouth 2 times daily  Reason for med: VTE Prophylaxis                     carvedilol 12.5 MG tablet  Also known as: COREG  INSTRUCTIONS: Take 1 tablet (12.5 mg) by mouth 2 times daily (with meals)                     cholecalciferol 25 MCG (1000 UT) Tabs  INSTRUCTIONS: Take 2,000 Units by mouth daily                     cyclobenzaprine 5 MG tablet  Also known as: FLEXERIL  INSTRUCTIONS: Take 5 mg by mouth 3 times daily as needed for muscle spasms                     febuxostat 80 MG Tabs tablet  Also known as: Uloric  INSTRUCTIONS: Take 1 tablet (80 mg) by mouth daily                     furosemide 20 MG tablet  Also known as: LASIX                     hydrOXYzine 50 MG tablet  Also known as: ATARAX  INSTRUCTIONS: Take 0.5-1 tablets (25-50 mg) by mouth nightly as needed (sleep)                     mycophenolate 250 MG capsule  Also known as: GENERIC EQUIVALENT  INSTRUCTIONS: Take 2 capsules (500 mg) by mouth 2 times daily  Doctor's comments: TXP DT 1/13/2011 (Kidney) TXP Dischg DT 1/16/2011 DXZ94.0 [place] [facility]                     omeprazole 20 MG DR capsule  Also known as: priLOSEC  INSTRUCTIONS: Take 1 capsule (20 mg) by mouth daily                     oxyCODONE 5 MG tablet  Also known as: ROXICODONE  INSTRUCTIONS: Take 1-2 tablets (5-10 mg) by mouth 2 times daily as needed for severe pain                     senna-docusate 8.6-50 MG tablet  Also known as: SENOKOT-S/PERICOLACE  INSTRUCTIONS: Take 1 tablet by mouth 2 times daily                     sodium bicarbonate 650 MG tablet  INSTRUCTIONS: Take 2 tablets (1,300 mg) by mouth 3 times daily                      sulfamethoxazole-trimethoprim 400-80 MG tablet  Also known as: BACTRIM  INSTRUCTIONS: Take 1 tablet by mouth every other day                     * tacrolimus 0.5 MG capsule  Also known as: GENERIC EQUIVALENT  INSTRUCTIONS: Take 1 capsule (0.5 mg) by mouth every evening Total dose = 1 mg in the AM and 1.5 mg in the PM                     * tacrolimus 1 MG capsule  Also known as: GENERIC EQUIVALENT  INSTRUCTIONS: Take 1 capsule (1 mg) by mouth 2 times daily . Total dose=1mg in the AM and 1.5mg in the PM                     tamsulosin 0.4 MG capsule  Also known as: FLOMAX  INSTRUCTIONS: TAKE 1 CAPSULE BY MOUTH DAILY                        * This list has 2 medication(s) that are the same as other medications prescribed for you. Read the directions carefully, and ask your doctor or other care provider to review them with you.

## 2020-10-09 NOTE — OR NURSING
Dr. Anish Keita, with interventional radiology, in room with patient to discuss procedure. OK for patient to discharge.

## 2020-10-09 NOTE — ANESTHESIA PREPROCEDURE EVALUATION
Anesthesia Pre-Procedure Evaluation    Patient: Rashad Ortiz   MRN:     [id number] Gender:   male   Age:    45 year old :      1975        Preoperative Diagnosis: Acute kidney injury (H) [N17.9]   Procedure(s):  ANESTHESIA OUT OF OR Right Fistulogram IR Room 1 @0800     LABS:  CBC:   Lab Results   Component Value Date    WBC 9.2 10/09/2020    WBC 8.3 10/08/2020    HGB 9.1 (L) 10/09/2020    HGB 6.1 (LL) 10/08/2020    HCT 29.7 (L) 10/09/2020    HCT 21.7 (L) 10/08/2020     10/09/2020     10/08/2020     BMP:   Lab Results   Component Value Date     10/09/2020     10/08/2020    POTASSIUM 5.1 10/09/2020    POTASSIUM 4.2 10/08/2020    CHLORIDE 106 10/09/2020    CHLORIDE 106 10/08/2020    CO2 25 10/09/2020    CO2 23 10/08/2020    BUN 42 (H) 10/09/2020    BUN 41 (H) 10/08/2020    CR 7.71 (H) 10/09/2020    CR 7.53 (H) 10/08/2020    GLC 82 10/09/2020     (H) 10/08/2020     COAGS:   Lab Results   Component Value Date    PTT 35 2020    INR 1.24 (H) 10/08/2020    FIBR 250 2015     POC:   Lab Results   Component Value Date    BGM 75 10/09/2020     OTHER:   Lab Results   Component Value Date    LACT 1.5 10/26/2018    A1C 5.4 2015    ASHELY 9.4 10/09/2020    PHOS 2.7 2019    MAG 1.5 (L) 09/10/2020    ALBUMIN 2.5 (L) 2020    PROTTOTAL 6.6 (L) 2020    ALT 11 2020    AST 13 2020    GGT 32 2012    ALKPHOS 106 2020    BILITOTAL 0.3 2020    LIPASE 387 04/10/2017    AMYLASE 153 (H) 04/10/2017    TSH 0.67 04/10/2017    .0 (H) 10/28/2018    SED 32 (H) 2018        Preop Vitals    BP Readings from Last 3 Encounters:   10/09/20 (!) 134/100   10/08/20 (!) 167/108   20 99/62    Pulse Readings from Last 3 Encounters:   10/09/20 63   10/08/20 73   20 77      Resp Readings from Last 3 Encounters:   10/09/20 16   10/08/20 20   20 16    SpO2 Readings from Last 3 Encounters:   10/09/20 100%   10/08/20 100%  "  09/22/20 99%      Temp Readings from Last 1 Encounters:   10/09/20 36.6  C (97.8  F) (Oral)    Ht Readings from Last 1 Encounters:   10/09/20 1.727 m (5' 8\")      Wt Readings from Last 1 Encounters:   10/09/20 63 kg (138 lb 14.2 oz)    Estimated body mass index is 21.12 kg/m  as calculated from the following:    Height as of this encounter: 1.727 m (5' 8\").    Weight as of this encounter: 63 kg (138 lb 14.2 oz).     LDA:  Peripheral IV 10/09/20 Left Hand (Active)   Number of days: 0       Hemodialysis Vascular Access Arteriovenous fistula Right Arm (Active)   Site Assessment WDL except;Pink;Painful 10/09/20 0700   Cannulation Needle Size 16 09/10/20 1756   Dressing Intervention New dressing 09/10/20 1756   Dressing Status Clean, dry, intact 09/10/20 1756   Hand Off Report Yes 09/10/20 1756   Number of days: 282        Past Medical History:   Diagnosis Date     AVN (avascular necrosis of bone) (H)     left hip     BPH (benign prostatic hyperplasia)      Chronic kidney disease, stage 4, severely decreased GFR (H)      Gastro-oesophageal reflux disease      Gout      History of blood transfusion      Hypertension      Medical non-compliance      Pulmonary nodules      Steroid long-term use      Vitamin D deficiency       Past Surgical History:   Procedure Laterality Date     ARTHROPLASTY HIP Left 9/9/2020    Procedure: Left total hip arthroplasty;  Surgeon: Fernando Morillo MD;  Location: [facility] OR     AV FISTULA OR GRAFT ARTERIAL       CREATE FISTULA ARTERIOVENOUS UPPER EXTREMITY Right 7/31/2019    Procedure: Creation Of Atriovenous Fistula Right Upper Arm;  Surgeon: Julia Irwin MD;  Location: [facility] OR     LIGATE FISTULA ARTERIOVENOUS UPPER EXTREMITY  12/20/2011    Procedure:LIGATE FISTULA ARTERIOVENOUS UPPER EXTREMITY; Excision of Right Forearm Arteriovenous Fistula.; Surgeon:LINDY AMAYA; Location:[facility] OR     PERCUTANEOUS BIOPSY KIDNEY Right 2/28/2017    Procedure: PERCUTANEOUS BIOPSY KIDNEY;  Surgeon: Sophie" Gee Brizuela MD;  Location:  OR     TRANSPLANT  01/13/2011    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 Pt has had prior anesthetic. Type: General           ROS/MED HX    ENT/Pulmonary:       Neurologic:       Cardiovascular:     (+) hypertension----. : . . . :. .       METS/Exercise Tolerance:     Hematologic:         Musculoskeletal:         GI/Hepatic:     (+) GERD Asymptomatic on medication,       Renal/Genitourinary:     (+) chronic renal disease, type: ESRD, Pt has history of transplant,       Endo:     (+) Chronic steroid usage for Post Transplant Immunosuppression .      Psychiatric:         Infectious Disease:         Malignancy:         Other:                         PHYSICAL EXAM:   Mental Status/Neuro: A/A/O   Airway: Facies: Feasible   Respiratory: Auscultation: CTAB      CV: Rhythm: Regular   Comments:                      Assessment:   ASA SCORE: 3    H&P: History and physical reviewed and following examination; no interval change.   Smoking Status:  Non-Smoker/Unknown   NPO Status: NPO Appropriate     Plan:   Anes. Type:  MAC   Pre-Medication: None   Induction:  N/a   Airway: Native Airway   Access/Monitoring: PIV   Maintenance: N/a     Postop Plan:   Postop Pain: None  Postop Sedation/Airway: Not planned  Disposition: Inpatient/Admit     PONV Management:   Adult Risk Factors:, Non-Smoker   Prevention: Ondansetron     CONSENT: Direct conversation                         Tadeo Turner MD

## 2020-10-09 NOTE — OR NURSING
Discharge instructions given over the phone with patient's daughter, Karo. Daughter verbalizes understanding.

## 2020-10-09 NOTE — OR NURSING
Return phone call from CRISTINA Wilkins NP. She will have a doctor come visit with patient as soon as possible and she will also add activity, diet, resuming medication, and when dialysis can use the new catheter to the AVS.

## 2020-10-09 NOTE — ANESTHESIA CARE TRANSFER NOTE
Patient: Rashad Ortiz    Procedure(s):  ANESTHESIA OUT OF OR Right Fistulogram IR Room 1 @0800    Diagnosis: Acute kidney injury (H) [N17.9]  Diagnosis Additional Information: No value filed.    Anesthesia Type:   MAC     Note:  Airway :Room Air  Patient transferred to:Phase II  Comments: To phase 2 with CRNA. Pt lethargic, easily arousable, VSS on RA. Report and care to phase 2  RNHandoff Report: Identifed the Patient, Identified the Reponsible Provider, Reviewed the pertinent medical history, Discussed the surgical course, Reviewed Intra-OP anesthesia mangement and issues during anesthesia, Set expectations for post-procedure period and Allowed opportunity for questions and acknowledgement of understanding      Vitals: (Last set prior to Anesthesia Care Transfer)    CRNA VITALS  10/9/2020 1104 - 10/9/2020 1148      10/9/2020             Pulse:  69    Ht Rate:  69    SpO2:  100 %                Electronically Signed By: ISABEL Marquez CRNA  October 9, 2020  11:48 AM

## 2020-10-09 NOTE — PROCEDURES
[facility]     Procedure: Right Internal Jugular Tunneled Central Venous Catheter Placement    Date/Time: 10/9/2020 11:38 AM  Performed by: Aleksey Wallace MD  Authorized by: Aleksey Wallace MD   IR Fellow Physician:  Radiology Resident Physician: Dr. Ludwin Tena      UNIVERSAL PROTOCOL   Site Marked: NA  Prior Images Obtained and Reviewed:  Yes  Required items: Required blood products, implants, devices and special equipment available    Patient identity confirmed:  Verbally with patient, arm band, provided demographic data and hospital-assigned identification number  Patient was reevaluated immediately before administering moderate or deep sedation or anesthesia  Confirmation Checklist:  Patient's identity using two indicators, relevant allergies, procedure was appropriate and matched the consent or emergent situation and correct equipment/implants were available  Time out: Immediately prior to the procedure a time out was called    Universal Protocol: the Joint Commission Universal Protocol was followed    Preparation: Patient was prepped and draped in usual sterile fashion           ANESTHESIA    Anesthesia: Local infiltration  Local Anesthetic:  Lidocaine 1% without epinephrine      SEDATION    Patient Sedated: Yes    Sedation Type:  Deep  Sedation:  See MAR for details  Vital signs: Vital signs monitored during sedation    See dictated procedure note for full details.  Findings: Right internal jugular tunneled central venous catheter placement. Secured to the skin with 2-0 suture. Both lumens flush well. No immediate complications.     Specimens: none    Complications: None    Condition: Stable    PROCEDURE   Patient Tolerance:  Patient tolerated the procedure well with no immediate complications    Length of time physician/provider present for 1:1 monitoring during sedation: 150

## 2020-10-09 NOTE — DISCHARGE INSTRUCTIONS
Tunneled CVC placement for dialysis: Discharge Instructions  -Dialysis will manage your CVC and change your dressings  -Line is ready for immediate use  Activity    No strenuous activity for 1 week    No heavy lifting (greater than 10 pounds) for 3 days    No contact sports for 2 weeks    No swimming, tub bath, or hot tub while Tunneled Central Venous Catheter is in place  Diet    Resume your regular diet  Site Care    Your site(s) has been closed with Dermabond (upper neck site), and dressed with sterile dressing (catheter insertion site).  Keep the catheter insertion site clean, dry, and covered.  Dressing will be changed at dialysis.    If there is any oozing or bleeding from the site, apply direct pressure for 5-10 minutes with a gauze pad.  If bleeding continues after 30 minutes, call Interventional Radiology.  If bleeding cannot be controlled with direct pressure, call 911.    Cover insertion site dressing with a folded dry washcloth, cover with plastic wrap, and secure by Microfoam tape.  After your shower, remove the covering.  Leave the insertion site dressing intact.  [facility] [place]  Same-Day Surgery   Adult Discharge Orders & Instructions     For 24 hours after surgery    1. Get plenty of rest.  A responsible adult must stay with you for at least 24 hours after you leave the hospital.   2. Do not drive or use heavy equipment.  If you have weakness or tingling, don't drive or use heavy equipment until this feeling goes away.  3. Do not drink alcohol.  4. Avoid strenuous or risky activities.  Ask for help when climbing stairs.   5. You may feel lightheaded.  IF so, sit for a few minutes before standing.  Have someone help you get up.   6. If you have nausea (feel sick to your stomach): Drink only clear liquids such as apple juice, ginger ale, broth or 7-Up.  Rest may also help.  Be sure to drink enough fluids.  Move to a regular diet as you feel able.  7. You may have a  slight fever. Call the doctor if your fever is over 100 F (37.7 C) (taken under the tongue) or lasts longer than 24 hours.  8. You may have a dry mouth, a sore throat, muscle aches or trouble sleeping.  These should go away after 24 hours.  9. Do not make important or legal decisions.   Call your doctor for any of the followin.  Signs of infection (fever, growing tenderness at the surgery site, a large amount of drainage or bleeding, severe pain, foul-smelling drainage, redness, swelling).    2. It has been over 8 to 10 hours since surgery and you are still not able to urinate (pass water).    3.  Headache for over 24 hours.      To contact a doctor, call  Interventional Radiology from 8 am to 5 pm @ [phone] or:    [  ]   [phone] and ask for the resident on call for INTERVENTIONAL RADIOLOGY (answered 24 hours a day)  [  ]   Emergency Department:    [facility]: [phone]       (TTY for hearing impaired: [phone])

## 2020-10-09 NOTE — PROVIDER NOTIFICATION
"Text page sent to Dr. Ludwin Tena, with interventional radiology, stating:    \"Wondering if you can come see pt Angel in phase two room 5. Pt has some questions about the procedure. Thank you, Kaylie RODRIGUEZ [phone] ext [phone]\"    Awaiting response from doctor.  "

## 2020-10-09 NOTE — IR NOTE
Patient Name: Rashad Ortiz  Medical Record Number: [id number]  Today's Date: 10/9/2020    Procedure: right upper extremity fistulogram plasty, and double lumen central venous catheter placement  Proceduralist: Madison Lin    Procedure Start: 0900, 1110  Procedure end: 1140  Sedation medications administered: MAC provided by CRNA     Report given to: PACU RN by CRNA  : raj    Other Notes: Pt arrived to IR room 5 from . Consent reviewed. Pt denies any questions or concerns regarding procedure. Pt positioned supin and monitored per protocol. Pt tolerated procedure without any noted complications. Pt transferred back to PACU.

## 2020-10-12 ENCOUNTER — ANCILLARY PROCEDURE (OUTPATIENT)
Dept: ULTRASOUND IMAGING | Facility: CLINIC | Age: 45
End: 2020-10-12
Attending: CLINICAL NURSE SPECIALIST
Payer: COMMERCIAL

## 2020-10-12 ENCOUNTER — TELEPHONE (OUTPATIENT)
Dept: NEPHROLOGY | Facility: CLINIC | Age: 45
End: 2020-10-12

## 2020-10-12 ENCOUNTER — OFFICE VISIT (OUTPATIENT)
Dept: TRANSPLANT | Facility: CLINIC | Age: 45
End: 2020-10-12
Attending: SURGERY
Payer: COMMERCIAL

## 2020-10-12 VITALS
HEART RATE: 86 BPM | OXYGEN SATURATION: 98 % | BODY MASS INDEX: 22.32 KG/M2 | WEIGHT: 146.8 LBS | SYSTOLIC BLOOD PRESSURE: 135 MMHG | DIASTOLIC BLOOD PRESSURE: 88 MMHG

## 2020-10-12 DIAGNOSIS — N18.5 CHRONIC KIDNEY DISEASE, STAGE V (H): ICD-10-CM

## 2020-10-12 DIAGNOSIS — T82.9XXA COMPLICATION OF VASCULAR ACCESS FOR DIALYSIS, INITIAL ENCOUNTER: ICD-10-CM

## 2020-10-12 DIAGNOSIS — Z99.2 ESRD ON DIALYSIS (H): ICD-10-CM

## 2020-10-12 DIAGNOSIS — N18.6 ESRD ON DIALYSIS (H): ICD-10-CM

## 2020-10-12 DIAGNOSIS — N18.6 ESRD ON DIALYSIS (H): Primary | ICD-10-CM

## 2020-10-12 DIAGNOSIS — M10.9 GOUT, UNSPECIFIED CAUSE, UNSPECIFIED CHRONICITY, UNSPECIFIED SITE: ICD-10-CM

## 2020-10-12 DIAGNOSIS — T82.49XA CLOTTED DIALYSIS ACCESS, INITIAL ENCOUNTER (H): Primary | ICD-10-CM

## 2020-10-12 DIAGNOSIS — Z99.2 ESRD ON DIALYSIS (H): Primary | ICD-10-CM

## 2020-10-12 PROCEDURE — 99214 OFFICE O/P EST MOD 30 MIN: CPT | Performed by: SURGERY

## 2020-10-12 PROCEDURE — 93990 DOPPLER FLOW TESTING: CPT | Mod: 26

## 2020-10-12 RX ORDER — OXYCODONE HYDROCHLORIDE 5 MG/1
5 TABLET ORAL 2 TIMES DAILY PRN
Qty: 14 TABLET | Refills: 0 | Status: SHIPPED | OUTPATIENT
Start: 2020-10-12 | End: 2020-10-19

## 2020-10-12 NOTE — TELEPHONE ENCOUNTER
Central Prior Authorization Team   Phone: [phone]      PA Initiation    Medication: febuxostat (ULORIC) 80 MG TABS tablet  Insurance Company: [employer] - Phone [phone] Fax [phone]  Pharmacy Filling the Rx: [id number] - [place], [place] - [address] AT [facility] [address]  Filling Pharmacy Phone: [phone]  Filling Pharmacy Fax:    Start Date: 10/12/2020

## 2020-10-12 NOTE — TELEPHONE ENCOUNTER
Prior Authorization Retail Medication Request    Medication/Dose:   febuxostat (ULORIC) 80 MG TABS tablet Take 1 tablet (80 mg) by mouth daily     ICD code (if different than what is on RX):    Previously Tried and Failed:    Rationale:      Insurance Name:  [facility]  Insurance ID:  [id number]      Pharmacy Information (if different than what is on RX)  Name:  [facility]  Phone:

## 2020-10-12 NOTE — LETTER
10/12/2020         RE: Rashad Ortiz  [address] [place] [id number]        Dear Colleague,    Thank you for referring your patient, Rashad Ortiz, to the [facility]. Please see a copy of my visit note below.    Dialysis Access Service  Consult Note    Referred by Dr. Lin for evaluation of permanent dialysis access.    HPI: Mr. Ortiz is being seen today for evaluation/possible intervention versus creation of new AV access for dialysis. He has a history of previous right radiocephalic AVF (excised following successful LDKT in 2011). As his kidney began to fail, he underwent creation of right brachiocephalic AVF in 7/2019. He did not need the access until 9/2020, at which point it was working well. However, during a run last Tuesday he had return of large amounts of clot from the filter into his access, clotting it off and stopping flow entirely. He underwent IR fistulogram with attempted declot and some restoration of flow, but this is limited. On fistulogram, appeared to be narrow caliber, so R tunneled CVL placed for temporary access.    He reports significant swelling and tenderness of his right distal upper arm and AC fossa. He had swelling immediately after the AVF clotted, but had worsening swelling and tenderness following the attempted delcot, with swelling extending into the lateral aspect of his upper arm. He is able to feel some thrill and pulse in the fistula in the upper arm, but also feels pulse more laterally where he didn't feel it before. This is much reduced compared to what he felt prior to the fistula clotting.     Risk factors for vascular access:         Yes No  Hx of CVC    [x]    []   Comment:   Hx of PICC line         []     []  Comment:   Hx of Pacemaker    []     [x]  Comment:   History of failed access:  []         []  Comment:  SVC syndrome   []      [x]  Comment:  Heart Failure    []     [x]  EF:    Periph arterial disease  []     [x]   Comment:  Prior Fracture/Surgery  []     [x]  Location:   DVT    []    [x]   Location:  Diabetes    []        []  Comment:  Neuropathy   []     []  Comment:   Anticoagulation:   []    []  Agent:      Anticoagulation contraindication:[]  []     Details:                   Pediatric    []         []  Age:                  Hx of transplant   [x]    []  Comment:     Current immunosuppression []    []  Comment:            ROS: 10 point ROS neg other than the symptoms noted above in the HPI.        Past Medical History:   Diagnosis Date     AVN (avascular necrosis of bone) (H)     left hip     BPH (benign prostatic hyperplasia)      Chronic kidney disease, stage 4, severely decreased GFR (H)      Gastro-oesophageal reflux disease      Gout      History of blood transfusion      Hypertension      Medical non-compliance      Pulmonary nodules      Steroid long-term use      Vitamin D deficiency        Past Surgical History:   Procedure Laterality Date     ARTHROPLASTY HIP Left 9/9/2020    Procedure: Left total hip arthroplasty;  Surgeon: Fernando Morillo MD;  Location: [facility] OR     AV FISTULA OR GRAFT ARTERIAL       CREATE FISTULA ARTERIOVENOUS UPPER EXTREMITY Right 7/31/2019    Procedure: Creation Of Atriovenous Fistula Right Upper Arm;  Surgeon: Julia Irwin MD;  Location: [facility] OR     IR CVC TUNNEL PLACEMENT > 5 YRS OF AGE  10/9/2020     IR DIALYSIS FISTULOGRAM RIGHT  10/9/2020     IR DIALYSIS MECH THROMB, PTA  10/9/2020     LIGATE FISTULA ARTERIOVENOUS UPPER EXTREMITY  12/20/2011    Procedure:LIGATE FISTULA ARTERIOVENOUS UPPER EXTREMITY; Excision of Right Forearm Arteriovenous Fistula.; Surgeon:LINDY AMAYA; Location:[facility] OR     PERCUTANEOUS BIOPSY KIDNEY Right 2/28/2017    Procedure: PERCUTANEOUS BIOPSY KIDNEY;  Surgeon: Gee Barrios MD;  Location: [facility] OR     TRANSPLANT  01/13/2011    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 3 cigarettes per week per pt   Substance Use Topics     Alcohol use: Not Currently     Alcohol/week: 4.2 standard drinks     Types: 5 Standard drinks or equivalent per week     Comment: Quit 8/2020         Current Outpatient Medications:      oxyCODONE (ROXICODONE) 5 MG tablet, Take 1 tablet (5 mg) by mouth 2 times daily as needed for severe pain, Disp: 14 tablet, Rfl: 0     acetaminophen (TYLENOL) 500 MG tablet, Take 2 tablets (1,000 mg) by mouth every 8 hours as needed for mild pain, Disp: 160 tablet, Rfl: 3     aspirin (ASA) 81 MG EC tablet, Take 1 tablet (81 mg) by mouth 2 times daily, Disp: 56 tablet, Rfl: 0     carvedilol (COREG) 12.5 MG tablet, Take 1 tablet (12.5 mg) by mouth 2 times daily (with meals), Disp: 60 tablet, Rfl: 3     cholecalciferol 25 MCG (1000 UT) TABS, Take 2,000 Units by mouth daily, Disp: 90 tablet, Rfl: 3     cyclobenzaprine (FLEXERIL) 5 MG tablet, Take 5 mg by mouth 3 times daily as needed for muscle spasms, Disp: , Rfl:      febuxostat (ULORIC) 80 MG TABS tablet, Take 1 tablet (80 mg) by mouth daily, Disp: 90 tablet, Rfl: 3     furosemide (LASIX) 20 MG tablet, , Disp: , Rfl:      hydrOXYzine (ATARAX) 50 MG tablet, Take 0.5-1 tablets (25-50 mg) by mouth nightly as needed (sleep), Disp: 30 tablet, Rfl: 11     mycophenolate (GENERIC EQUIVALENT) 250 MG capsule, Take 2 capsules (500 mg) by mouth 2 times daily, Disp: 120 capsule, Rfl: 11     omeprazole (PRILOSEC) 20 MG capsule, Take 1 capsule (20 mg) by mouth daily, Disp: 90 capsule, Rfl: 1     order for DME, Equipment being ordered: Crutches, Disp: 1 Device, Rfl: 0     oxyCODONE (ROXICODONE) 5 MG tablet, Take 1-2 tablets (5-10 mg) by mouth 2  times daily as needed for severe pain, Disp: 20 tablet, Rfl: 0     senna-docusate (SENOKOT-S/PERICOLACE) 8.6-50 MG tablet, Take 1 tablet by mouth 2 times daily, Disp: 30 tablet, Rfl: 0     sodium bicarbonate 650 MG tablet, Take 2 tablets (1,300 mg) by mouth 3 times daily (Patient not taking: Reported on 9/25/2020), Disp: 180 tablet, Rfl: 11     sulfamethoxazole-trimethoprim (BACTRIM) 400-80 MG tablet, Take 1 tablet by mouth every other day, Disp: 45 tablet, Rfl: 3     tacrolimus (GENERIC EQUIVALENT) 0.5 MG capsule, Take 1 capsule (0.5 mg) by mouth every evening Total dose = 1 mg in the AM and 1.5 mg in the PM, Disp: 30 capsule, Rfl: 11     tacrolimus (GENERIC EQUIVALENT) 1 MG capsule, Take 1 capsule (1 mg) by mouth 2 times daily . Total dose=1mg in the AM and 1.5mg in the PM, Disp: 60 capsule, Rfl: 11     tamsulosin (FLOMAX) 0.4 MG capsule, TAKE 1 CAPSULE BY MOUTH DAILY, Disp: 30 capsule, Rfl: 8                        PHYSICAL EXAM:  Pulse:  [86] 86  BP: (135)/(88) 135/88  SpO2:  [98 %] 98 %  Constitutional: healthy, alert and cooperative  HEENT: sclera anicteric, MMM, conjunctiva pink  Chest: HD catheter present on the right side.  CV:  NSR  : No CVA tenderness  Abdomen: healed RLQ kidney tx incision  Skin:  No rashes or jaundice  Neuro: normal gait  Psych: normal mood and affect  EXTREMITY EXAM:   Vein Exam: Obvious suitable veins?    Left arm: YES   []           NO  [x]       Comment:    Right arm: YES   []           NO  [x]       Comment: unable to assess due to swelling  Arterial Exam:   Radial   L: 2+ R: 2+   Ulnar   L: 2+;R: 2+  Patent Palmar arch  L: YES   []  NO   []       R: YES   []   NO   []        Capillary refill:  L: <3sec, R:<3sec    Sensory exam:   Left hand: Normal   [x]       Abnormal   []     Comment:    Right hand: Normal   [x]       Abnormal   []     Comment:     Motor exam normal:   Left hand: Normal   [x]       Abnormal   []     Comment:     Right hand: Normal   [x]       Abnormal   []      Comment:     RUE with significant swelling, mild erythema, and moderate tenderness in distal upper arm/proximal forearm, near AC fossa. IR dressing in place. Able to feel pulsatile flow with some thrill in native fistula course, but also note more swelling and pulse in lateral aspect of upper arm. Mild bruising around fistula access site as well.                          Assessment & Plan: Mr. Ortiz presents for evaluation of his extant RUE AVF following declot attempt.    -Difficult to determine if flow is improving or again diminishing following IR declot attempt. Also uncertain why he has pulse and swelling in lateral distal humerus. Will obtain duplex US to eval flow and rule out extravasation/pseudoaneurysm    -If pseudoaneurysm is present, will monitor and expect to close or potentially refer to IR for management. Unlikely to be large as he does not report actively expanding pulsatile mass in arm.    -If pseudoaneurysm is absent, will attempt trial of anticoagulation to see if able to preserve fistula and if this clot will resorb rather than organize into narrowed lumen. Will plan for two week course with f/u duplex to assess if flow has improved at all.     -Counseled that if fistula flow does not improve with anticoagulation, will likely need new AV access. Have ordered UE vein mapping as well to assist with surgical planning.    -If planning for RIGHT sided access, will need to see in clinic in a few weeks to ensure swelling has resolved. If planning for LEFT sided access, can schedule surgery without another consultation as long as patient OK with this plan.     -Short course analgesia provided for significant arm pain.    Total visit time: 30 min  Total counseling time: 20 min      Chris Nicole MD    Addendum: I reviewed US today and do not see evidence of pseudoaneurysm or active extravasation. There does appear to be a limb of the fistula patent with good flow, which is clinically apparent to  him today as well. We will attempt a course of anticoagulation in hopes of preserving his access. He was given a prescription for 30 days of plavix 75 mg daily. He was given warnings for expanding hematoma, worsening bruising, swelling, or other concerns- to stop the anticoagulation and contact us immediately. We will plan to see him again in two weeks with a repeat duplex to assess the flow.     -Chris Nicole MD      Again, thank you for allowing me to participate in the care of your patient.        Sincerely,        Chris Nicole MD

## 2020-10-12 NOTE — PROGRESS NOTES
Dialysis Access Service  Consult Note    Referred by Dr. Lin for evaluation of permanent dialysis access.    HPI: Mr. Ortiz is being seen today for evaluation/possible intervention versus creation of new AV access for dialysis. He has a history of previous right radiocephalic AVF (excised following successful LDKT in 2011). As his kidney began to fail, he underwent creation of right brachiocephalic AVF in 7/2019. He did not need the access until 9/2020, at which point it was working well. However, during a run last Tuesday he had return of large amounts of clot from the filter into his access, clotting it off and stopping flow entirely. He underwent IR fistulogram with attempted declot and some restoration of flow, but this is limited. On fistulogram, appeared to be narrow caliber, so R tunneled CVL placed for temporary access.    He reports significant swelling and tenderness of his right distal upper arm and AC fossa. He had swelling immediately after the AVF clotted, but had worsening swelling and tenderness following the attempted delcot, with swelling extending into the lateral aspect of his upper arm. He is able to feel some thrill and pulse in the fistula in the upper arm, but also feels pulse more laterally where he didn't feel it before. This is much reduced compared to what he felt prior to the fistula clotting.     Risk factors for vascular access:         Yes No  Hx of CVC    [x]    []   Comment:   Hx of PICC line         []     []  Comment:   Hx of Pacemaker    []     [x]  Comment:   History of failed access:  []         []  Comment:  SVC syndrome   []      [x]  Comment:  Heart Failure    []     [x]  EF:    Periph arterial disease  []     [x]  Comment:  Prior Fracture/Surgery  []     [x]  Location:   DVT    []    [x]   Location:  Diabetes    []        []  Comment:  Neuropathy   []     []  Comment:   Anticoagulation:   []    []  Agent:      Anticoagulation contraindication:[]  []     Details:                    Pediatric    []         []  Age:                  Hx of transplant   [x]    []  Comment:     Current immunosuppression []    []  Comment:            ROS: 10 point ROS neg other than the symptoms noted above in the HPI.        Past Medical History:   Diagnosis Date     AVN (avascular necrosis of bone) (H)     left hip     BPH (benign prostatic hyperplasia)      Chronic kidney disease, stage 4, severely decreased GFR (H)      Gastro-oesophageal reflux disease      Gout      History of blood transfusion      Hypertension      Medical non-compliance      Pulmonary nodules      Steroid long-term use      Vitamin D deficiency        Past Surgical History:   Procedure Laterality Date     ARTHROPLASTY HIP Left 9/9/2020    Procedure: Left total hip arthroplasty;  Surgeon: Fernando Morillo MD;  [facility]: [facility]     AV FISTULA OR GRAFT ARTERIAL       CREATE FISTULA ARTERIOVENOUS UPPER EXTREMITY Right 7/31/2019    Procedure: Creation Of Atriovenous Fistula Right Upper Arm;  Surgeon: Julia Irwin MD;  Location: [facility] OR     IR CVC TUNNEL PLACEMENT > 5 YRS OF AGE  10/9/2020     IR DIALYSIS FISTULOGRAM RIGHT  10/9/2020     IR DIALYSIS MECH THROMB, PTA  10/9/2020     LIGATE FISTULA ARTERIOVENOUS UPPER EXTREMITY  12/20/2011    Procedure:LIGATE FISTULA ARTERIOVENOUS UPPER EXTREMITY; Excision of Right Forearm Arteriovenous Fistula.; Surgeon:LINDY AMAYA; Location:[facility] OR     PERCUTANEOUS BIOPSY KIDNEY Right 2/28/2017    Procedure: PERCUTANEOUS BIOPSY KIDNEY;  Surgeon: Gee Barrios MD;  [facility]     TRANSPLANT  01/13/2011    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 3 cigarettes per week per pt   Substance Use Topics     Alcohol use: Not Currently     Alcohol/week: 4.2 standard drinks     Types: 5 Standard drinks or equivalent per week     Comment: Quit 8/2020         Current Outpatient Medications:      oxyCODONE (ROXICODONE) 5 MG tablet, Take 1 tablet (5 mg) by mouth 2 times daily as needed for severe pain, Disp: 14 tablet, Rfl: 0     acetaminophen (TYLENOL) 500 MG tablet, Take 2 tablets (1,000 mg) by mouth every 8 hours as needed for mild pain, Disp: 160 tablet, Rfl: 3     aspirin (ASA) 81 MG EC tablet, Take 1 tablet (81 mg) by mouth 2 times daily, Disp: 56 tablet, Rfl: 0     carvedilol (COREG) 12.5 MG tablet, Take 1 tablet (12.5 mg) by mouth 2 times daily (with meals), Disp: 60 tablet, Rfl: 3     cholecalciferol 25 MCG (1000 UT) TABS, Take 2,000 Units by mouth daily, Disp: 90 tablet, Rfl: 3     cyclobenzaprine (FLEXERIL) 5 MG tablet, Take 5 mg by mouth 3 times daily as needed for muscle spasms, Disp: , Rfl:      febuxostat (ULORIC) 80 MG TABS tablet, Take 1 tablet (80 mg) by mouth daily, Disp: 90 tablet, Rfl: 3     furosemide (LASIX) 20 MG tablet, , Disp: , Rfl:      hydrOXYzine (ATARAX) 50 MG tablet, Take 0.5-1 tablets (25-50 mg) by mouth nightly as needed (sleep), Disp: 30 tablet, Rfl: 11     mycophenolate (GENERIC EQUIVALENT) 250 MG capsule, Take 2 capsules (500 mg) by mouth 2 times daily, Disp: 120 capsule, Rfl: 11     omeprazole (PRILOSEC) 20 MG capsule, Take 1 capsule (20 mg) by mouth daily, Disp: 90 capsule, Rfl: 1     order for DME, Equipment being ordered: Crutches, Disp: 1 Device, Rfl: 0     oxyCODONE (ROXICODONE) 5 MG tablet, Take 1-2 tablets (5-10 mg) by mouth 2 times daily as needed for severe pain, Disp: 20 tablet, Rfl: 0     senna-docusate (SENOKOT-S/PERICOLACE) 8.6-50 MG tablet, Take 1 tablet by mouth 2 times daily, Disp: 30 tablet, Rfl: 0     sodium bicarbonate 650 MG tablet, Take 2 tablets (1,300 mg) by mouth  3 times daily (Patient not taking: Reported on 9/25/2020), Disp: 180 tablet, Rfl: 11     sulfamethoxazole-trimethoprim (BACTRIM) 400-80 MG tablet, Take 1 tablet by mouth every other day, Disp: 45 tablet, Rfl: 3     tacrolimus (GENERIC EQUIVALENT) 0.5 MG capsule, Take 1 capsule (0.5 mg) by mouth every evening Total dose = 1 mg in the AM and 1.5 mg in the PM, Disp: 30 capsule, Rfl: 11     tacrolimus (GENERIC EQUIVALENT) 1 MG capsule, Take 1 capsule (1 mg) by mouth 2 times daily . Total dose=1mg in the AM and 1.5mg in the PM, Disp: 60 capsule, Rfl: 11     tamsulosin (FLOMAX) 0.4 MG capsule, TAKE 1 CAPSULE BY MOUTH DAILY, Disp: 30 capsule, Rfl: 8                        PHYSICAL EXAM:  Pulse:  [86] 86  BP: (135)/(88) 135/88  SpO2:  [98 %] 98 %  Constitutional: healthy, alert and cooperative  HEENT: sclera anicteric, MMM, conjunctiva pink  Chest: HD catheter present on the right side.  CV:  NSR  : No CVA tenderness  Abdomen: healed RLQ kidney tx incision  Skin:  No rashes or jaundice  Neuro: normal gait  Psych: normal mood and affect  EXTREMITY EXAM:   Vein Exam: Obvious suitable veins?    Left arm: YES   []           NO  [x]       Comment:    Right arm: YES   []           NO  [x]       Comment: unable to assess due to swelling  Arterial Exam:   Radial   L: 2+ R: 2+   Ulnar   L: 2+;R: 2+  Patent Palmar arch  L: YES   []  NO   []       R: YES   []   NO   []        Capillary refill:  L: <3sec, R:<3sec    Sensory exam:   Left hand: Normal   [x]       Abnormal   []     Comment:    Right hand: Normal   [x]       Abnormal   []     Comment:     Motor exam normal:   Left hand: Normal   [x]       Abnormal   []     Comment:     Right hand: Normal   [x]       Abnormal   []     Comment:     RUE with significant swelling, mild erythema, and moderate tenderness in distal upper arm/proximal forearm, near AC fossa. IR dressing in place. Able to feel pulsatile flow with some thrill in native fistula course, but also note more swelling  and pulse in lateral aspect of upper arm. Mild bruising around fistula access site as well.                          Assessment & Plan: Mr. Ortiz presents for evaluation of his extant RUE AVF following declot attempt.    -Difficult to determine if flow is improving or again diminishing following IR declot attempt. Also uncertain why he has pulse and swelling in lateral distal humerus. Will obtain duplex US to eval flow and rule out extravasation/pseudoaneurysm    -If pseudoaneurysm is present, will monitor and expect to close or potentially refer to IR for management. Unlikely to be large as he does not report actively expanding pulsatile mass in arm.    -If pseudoaneurysm is absent, will attempt trial of anticoagulation to see if able to preserve fistula and if this clot will resorb rather than organize into narrowed lumen. Will plan for two week course with f/u duplex to assess if flow has improved at all.     -Counseled that if fistula flow does not improve with anticoagulation, will likely need new AV access. Have ordered UE vein mapping as well to assist with surgical planning.    -If planning for RIGHT sided access, will need to see in clinic in a few weeks to ensure swelling has resolved. If planning for LEFT sided access, can schedule surgery without another consultation as long as patient OK with this plan.     -Short course analgesia provided for significant arm pain.    Total visit time: 30 min  Total counseling time: 20 min      Chris Nicole MD    Addendum: I reviewed US today and do not see evidence of pseudoaneurysm or active extravasation. There does appear to be a limb of the fistula patent with good flow, which is clinically apparent to him today as well. We will attempt a course of anticoagulation in hopes of preserving his access. He was given a prescription for 30 days of plavix 75 mg daily. He was given warnings for expanding hematoma, worsening bruising, swelling, or other concerns- to  stop the anticoagulation and contact us immediately. We will plan to see him again in two weeks with a repeat duplex to assess the flow.     -Chris Nicole MD

## 2020-10-13 RX ORDER — CLOPIDOGREL BISULFATE 75 MG/1
75 TABLET ORAL DAILY
Qty: 30 TABLET | Refills: 0 | Status: SHIPPED | OUTPATIENT
Start: 2020-10-13 | End: 2020-12-21

## 2020-10-13 NOTE — TELEPHONE ENCOUNTER
Prior Authorization Approval    Authorization Effective Date: 9/13/2020  Authorization Expiration Date: 10/13/2020  Medication: febuxostat (ULORIC) 80 MG TABS tablet- APPROVED  Approved Dose/Quantity:   Reference #:     Insurance Company: [employer] - Phone [phone] Fax [phone]  Expected CoPay:       CoPay Card Available:      Foundation Assistance Needed:    Which Pharmacy is filling the prescription (Not needed for infusion/clinic administered): [id number] - [place] - [address] AT [address] Notified: Yes-Left message with approval, process, fill, and notify patient when ready  Patient Notified: No

## 2020-10-14 DIAGNOSIS — Z99.2 ESRD ON DIALYSIS (H): Primary | ICD-10-CM

## 2020-10-14 DIAGNOSIS — N18.6 ESRD ON DIALYSIS (H): Primary | ICD-10-CM

## 2020-10-14 DIAGNOSIS — T82.9XXA COMPLICATION OF VASCULAR ACCESS FOR DIALYSIS, INITIAL ENCOUNTER: ICD-10-CM

## 2020-10-19 ENCOUNTER — MYC REFILL (OUTPATIENT)
Dept: ORTHOPEDICS | Facility: CLINIC | Age: 45
End: 2020-10-19

## 2020-10-19 ENCOUNTER — ANCILLARY PROCEDURE (OUTPATIENT)
Dept: GENERAL RADIOLOGY | Facility: CLINIC | Age: 45
End: 2020-10-19
Attending: PHYSICIAN ASSISTANT
Payer: COMMERCIAL

## 2020-10-19 ENCOUNTER — OFFICE VISIT (OUTPATIENT)
Dept: CARDIOLOGY | Facility: CLINIC | Age: 45
End: 2020-10-19
Attending: INTERNAL MEDICINE
Payer: COMMERCIAL

## 2020-10-19 ENCOUNTER — PRE VISIT (OUTPATIENT)
Dept: CARDIOLOGY | Facility: CLINIC | Age: 45
End: 2020-10-19

## 2020-10-19 ENCOUNTER — ANCILLARY PROCEDURE (OUTPATIENT)
Dept: CARDIOLOGY | Facility: CLINIC | Age: 45
End: 2020-10-19
Attending: PHYSICIAN ASSISTANT
Payer: COMMERCIAL

## 2020-10-19 VITALS
HEART RATE: 78 BPM | HEIGHT: 68 IN | BODY MASS INDEX: 21.07 KG/M2 | DIASTOLIC BLOOD PRESSURE: 80 MMHG | OXYGEN SATURATION: 100 % | WEIGHT: 139 LBS | SYSTOLIC BLOOD PRESSURE: 119 MMHG

## 2020-10-19 DIAGNOSIS — Z01.818 PRE-TRANSPLANT EVALUATION FOR KIDNEY TRANSPLANT: ICD-10-CM

## 2020-10-19 DIAGNOSIS — Z47.89 ORTHOPEDIC AFTERCARE: ICD-10-CM

## 2020-10-19 DIAGNOSIS — N18.9 CHRONIC RENAL FAILURE: ICD-10-CM

## 2020-10-19 DIAGNOSIS — Z87.891 HISTORY OF TOBACCO USE: ICD-10-CM

## 2020-10-19 DIAGNOSIS — N18.6 ESRD (END STAGE RENAL DISEASE) (H): ICD-10-CM

## 2020-10-19 DIAGNOSIS — N12 PYELONEPHRITIS: ICD-10-CM

## 2020-10-19 DIAGNOSIS — N18.4 CHRONIC KIDNEY DISEASE, STAGE 4, SEVERELY DECREASED GFR (H): ICD-10-CM

## 2020-10-19 DIAGNOSIS — T86.11 ANTIBODY MEDIATED REJECTION OF KIDNEY TRANSPLANT: ICD-10-CM

## 2020-10-19 DIAGNOSIS — I10 ESSENTIAL HYPERTENSION: ICD-10-CM

## 2020-10-19 DIAGNOSIS — N18.5 CHRONIC RENAL FAILURE, STAGE 5 (H): ICD-10-CM

## 2020-10-19 DIAGNOSIS — T86.12 KIDNEY TRANSPLANT FAILURE: ICD-10-CM

## 2020-10-19 DIAGNOSIS — I15.1 HTN, KIDNEY TRANSPLANT RELATED: ICD-10-CM

## 2020-10-19 DIAGNOSIS — Z76.82 ORGAN TRANSPLANT CANDIDATE: ICD-10-CM

## 2020-10-19 DIAGNOSIS — Z94.0 KIDNEY REPLACED BY TRANSPLANT: Primary | ICD-10-CM

## 2020-10-19 DIAGNOSIS — Z94.0 HTN, KIDNEY TRANSPLANT RELATED: ICD-10-CM

## 2020-10-19 PROCEDURE — 94726 PLETHYSMOGRAPHY LUNG VOLUMES: CPT | Performed by: INTERNAL MEDICINE

## 2020-10-19 PROCEDURE — 99204 OFFICE O/P NEW MOD 45 MIN: CPT | Mod: 25 | Performed by: INTERNAL MEDICINE

## 2020-10-19 PROCEDURE — G0463 HOSPITAL OUTPT CLINIC VISIT: HCPCS

## 2020-10-19 PROCEDURE — 71046 X-RAY EXAM CHEST 2 VIEWS: CPT | Performed by: RADIOLOGY

## 2020-10-19 PROCEDURE — 94729 DIFFUSING CAPACITY: CPT | Performed by: INTERNAL MEDICINE

## 2020-10-19 PROCEDURE — 93306 TTE W/DOPPLER COMPLETE: CPT | Performed by: INTERNAL MEDICINE

## 2020-10-19 PROCEDURE — 94375 RESPIRATORY FLOW VOLUME LOOP: CPT | Performed by: INTERNAL MEDICINE

## 2020-10-19 ASSESSMENT — PAIN SCALES - GENERAL: PAINLEVEL: NO PAIN (0)

## 2020-10-19 ASSESSMENT — MIFFLIN-ST. JEOR: SCORE: 1495

## 2020-10-19 NOTE — PATIENT INSTRUCTIONS
Patient Instructions:  It was a pleasure to see you in the cardiology clinic today.      If you have any questions, call  Ximena Maldonado RN, at [phone].  Press Option #1 for the [facility], and then press Option #4  We are encouraging the use of Signify[employer] to communicate with your HealthCare Provider    Note the new medications: none  Stop the following medications: none    The results from today include: dobutamine stress ECHO, hold the carvedilol the day before the and the day of the procedure  Please follow up with the transplant team      If you have an urgent need after hours (8:00 am to 4:30 pm) please call [phone] and ask for the cardiology fellow on call.

## 2020-10-19 NOTE — TELEPHONE ENCOUNTER
DOS: 9/9/2020 Left total hip arthroplasty    Patient is requesting a refill of his postop pain medications for 1 tablet twice daily for PT and at bedtime. He did have a refill on 10/12 to 10/19 following a fistulogram from Dr. Nicole (no narcotics were prescribed at that time by Dr. Morillo). Refill will be sent to patient's requested pharmacy per standing orders as he has actively tapered to this point. Sent for signature to Dr. Morillo.

## 2020-10-19 NOTE — PROGRESS NOTES
[facility]  CARDIOVASCULAR MEDICINE CLINIC NOTE    Referring Provider: Fadumo Cannon   Primary Care Provider: Mariel Garcia     Patient Name: Rashad Ortiz   MRN: [id number]     HPI:   Rashad Ortiz is a 45 year old male with PMH HTN c/b ESRD s/p LDKT 2011 now with CKD and reinitiated HD 9/1/2020 here for pre-kidney transplant evaluation.    Patient denies any personal or family cardiac hx. Denies heart failure, MI, or arrhythmias in self or family. He denies every having a cardiac stress test or coronary angiogram.    Patient denies chest pain, sob, dyspnea on exertion, palpitations, dizziness, or syncope.    Patient was on aspirin 81mg daily until recently only for post operative surgical ppx for his hip surgery. He is on plavix 75mg daily for fistula thrombus.    PAST MEDICAL HISTORY:  Past Medical History:   Diagnosis Date     AVN (avascular necrosis of bone) (H)     left hip     BPH (benign prostatic hyperplasia)      Chronic kidney disease, stage 4, severely decreased GFR (H)      Gastro-oesophageal reflux disease      Gout      History of blood transfusion      Hypertension      Medical non-compliance      Pulmonary nodules      Steroid long-term use      Vitamin D deficiency        CURRENT MEDICATIONS:  Current Outpatient Medications   Medication Sig Dispense Refill     acetaminophen (TYLENOL) 500 MG tablet Take 2 tablets (1,000 mg) by mouth every 8 hours as needed for mild pain 160 tablet 3     carvedilol (COREG) 12.5 MG tablet Take 1 tablet (12.5 mg) by mouth 2 times daily (with meals) 60 tablet 3     clopidogrel (PLAVIX) 75 MG tablet Take 1 tablet (75 mg) by mouth daily 30 tablet 0     febuxostat (ULORIC) 80 MG TABS tablet Take 1 tablet (80 mg) by mouth daily 90 tablet 3     furosemide (LASIX) 20 MG tablet        hydrOXYzine (ATARAX) 50 MG tablet Take 0.5-1 tablets (25-50 mg) by mouth nightly as needed (sleep) 30 tablet 11     mycophenolate (GENERIC EQUIVALENT) 250  MG capsule Take 2 capsules (500 mg) by mouth 2 times daily 120 capsule 11     omeprazole (PRILOSEC) 20 MG capsule Take 1 capsule (20 mg) by mouth daily 90 capsule 1     order for DME Equipment being ordered: Crutches 1 Device 0     oxyCODONE (ROXICODONE) 5 MG tablet Take 1-2 tablets (5-10 mg) by mouth 2 times daily as needed for severe pain 20 tablet 0     sulfamethoxazole-trimethoprim (BACTRIM) 400-80 MG tablet Take 1 tablet by mouth every other day 45 tablet 3     tacrolimus (GENERIC EQUIVALENT) 0.5 MG capsule Take 1 capsule (0.5 mg) by mouth every evening Total dose = 1 mg in the AM and 1.5 mg in the PM 30 capsule 11     tacrolimus (GENERIC EQUIVALENT) 1 MG capsule Take 1 capsule (1 mg) by mouth 2 times daily . Total dose=1mg in the AM and 1.5mg in the PM 60 capsule 11     tamsulosin (FLOMAX) 0.4 MG capsule TAKE 1 CAPSULE BY MOUTH DAILY 30 capsule 8     aspirin (ASA) 81 MG EC tablet Take 1 tablet (81 mg) by mouth 2 times daily (Patient not taking: Reported on 10/19/2020) 56 tablet 0     cholecalciferol 25 MCG (1000 UT) TABS Take 2,000 Units by mouth daily (Patient not taking: Reported on 10/19/2020) 90 tablet 3     cyclobenzaprine (FLEXERIL) 5 MG tablet Take 5 mg by mouth 3 times daily as needed for muscle spasms       oxyCODONE (ROXICODONE) 5 MG tablet Take 1 tablet (5 mg) by mouth 2 times daily as needed for severe pain (Patient not taking: Reported on 10/19/2020) 14 tablet 0     senna-docusate (SENOKOT-S/PERICOLACE) 8.6-50 MG tablet Take 1 tablet by mouth 2 times daily (Patient not taking: Reported on 10/19/2020) 30 tablet 0     sodium bicarbonate 650 MG tablet Take 2 tablets (1,300 mg) by mouth 3 times daily (Patient not taking: Reported on 9/25/2020) 180 tablet 11       PAST SURGICAL HISTORY:  Past Surgical History:   Procedure Laterality Date     ARTHROPLASTY HIP Left 9/9/2020    Procedure: Left total hip arthroplasty;  Surgeon: Fernando Morillo MD;  [facility]: [facility]     AV FISTULA OR GRAFT ARTERIAL        CREATE FISTULA ARTERIOVENOUS UPPER EXTREMITY Right 7/31/2019    Procedure: Creation Of Atriovenous Fistula Right Upper Arm;  Surgeon: Jluia Irwin MD;  [facility]: [facility] OR     IR CVC TUNNEL PLACEMENT > 5 YRS OF AGE  10/9/2020     IR DIALYSIS FISTULOGRAM RIGHT  10/9/2020     IR DIALYSIS MECH THROMB, PTA  10/9/2020     LIGATE FISTULA ARTERIOVENOUS UPPER EXTREMITY  12/20/2011    Procedure:LIGATE FISTULA ARTERIOVENOUS UPPER EXTREMITY; Excision of Right Forearm Arteriovenous Fistula.; Surgeon:LINDY AMAYA; [facility]     PERCUTANEOUS BIOPSY KIDNEY Right 2/28/2017    Procedure: PERCUTANEOUS BIOPSY KIDNEY;  Surgeon: Gee Barrios MD;  [facility]     TRANSPLANT  01/13/2011    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 3 cigarettes per week per pt   Substance Use Topics     Alcohol use: Not Currently     Alcohol/week: 4.2 standard drinks     Types: 5 Standard drinks or equivalent per week     Comment: Quit 8/2020     Drug use: Not Currently     Types: Marijuana       ROS:   Constitutional: No fever, chills, or sweats. Weight stable.   ENT: No visual disturbance, ear ache, epistaxis, sore throat.   Cardiovascular: As per HPI.   Respiratory: No cough, hemoptysis.    GI: No nausea, vomiting, hematemesis, melena, or hematochezia.   : No hematuria.   Integument: Negative.   Psychiatric: Negative.   Hematologic:  Easy bruising, no easy bleeding.  Neuro: Negative.   Endocrinology: No significant heat or cold intolerance   Musculoskeletal: No myalgia.    Exam:  /80 (BP Location: Right arm, Patient  "Position: Sitting, Cuff Size: Adult Regular)   Pulse 78   Ht 1.727 m (5' 8\")   Wt 63 kg (139 lb)   SpO2 100%   BMI 21.13 kg/m    GENERAL APPEARANCE: healthy, alert and no distress  HEENT: no icterus, no xanthelasmas, normal pupil size and reaction, normal palate, mucosa moist, no central cyanosis  NECK: no adenopathy, no asymmetry, masses, or scars, thyroid normal to palpation and no bruits, JVP not elevated  RESPIRATORY: lungs clear to auscultation - no rales, rhonchi or wheezes, no use of accessory muscles, no retractions, respirations are unlabored, normal respiratory rate  CARDIOVASCULAR: regular rhythm, normal S1 with physiologic split S2, no S3 or S4 and no murmur, click or rub, precordium quiet with normal PMI. Hum of fistula.  ABDOMEN: soft, non tender, without hepatosplenomegaly, no masses palpable, bowel sounds normal, aorta not enlarged by palpation, no abdominal bruits  EXTREMITIES: peripheral pulses normal, no edema, no bruits  NEURO: alert and oriented to person/place/time, normal speech, gait and affect  VASC: Radial, femoral, dorsalis pedis and posterior tibialis pulses are normal in volumes and symmetric bilaterally. No bruits are heard.  SKIN: no ecchymoses, no rashes    Labs:  CBC RESULTS:   Lab Results   Component Value Date    WBC 9.2 10/09/2020    RBC 3.30 (L) 10/09/2020    HGB 9.1 (L) 10/09/2020    HCT 29.7 (L) 10/09/2020    MCV 90 10/09/2020    MCH 27.6 10/09/2020    MCHC 30.6 (L) 10/09/2020    RDW 19.5 (H) 10/09/2020     10/09/2020       BMP RESULTS:  Lab Results   Component Value Date     10/09/2020    POTASSIUM 5.1 10/09/2020    CHLORIDE 106 10/09/2020    CO2 25 10/09/2020    ANIONGAP 6 10/09/2020    GLC 82 10/09/2020    BUN 42 (H) 10/09/2020    CR 7.71 (H) 10/09/2020    GFRESTIMATED 8 (L) 10/09/2020    GFRESTBLACK 9 (L) 10/09/2020    ASHELY 9.4 10/09/2020        INR RESULTS:  Lab Results   Component Value Date    INR 1.24 (H) 10/08/2020    INR 1.21 (H) 09/22/2020    INR " 1.20 (H) 09/18/2020    INR 1.08 07/31/2019       Procedures:  9/25/2017 TTE  Global and regional left ventricular function is normal with an EF of 60-65%.  Mild concentric wall thickening consistent with left ventricular hypertrophy  is present.  Normal left ventricular filling for age.  No pericardial effusion is present.    10/19/2020  TTE- not formally read but per my read: normal EF with LVH and. Mild MR and TR.    Assessment and Plan:   Rashad Ortiz is a 45 year old male with PMH HTN c/b ESRD s/p LDKT 2011 now with CKD and reinitiated HD 9/1/2020 here for pre-kidney transplant evaluation.    Pre-operative cardiac risk evaluation  Patient is at low risk for significant CAD given lack of sx. However, his long standing renal dysfunction does increase his risk somewhat and should be addressed prior to renal transplant. TTE today showed normal EF. EKG on 9/22 showed NSR without ischemic changes. Given his recent hip surgery, he likely would not be able to achieve target HR on treadmill.  -- Dobutamine stress echo      HTN  ESRD s/p LDKT 2011 now with CKD and reinitiated HD  BP well controlled 119/80 today. Managed by Nephrology.  --coreg and lasix per nephrology      Jared Galvin MD, PhD  Interventional/Critical Care Cardiology  [phone]    October 19, 2020      ADDENDUM:  Dobutamine stress echo performed 11/16/2020  Normal, low-risk dobutamine echocardiogram without evidence of ischemia.    Given low risk stress test and lack of ongoing symptoms, Rashad Ortiz is low risk for life threatening cardiovascular events in the setting of surgery.  The transplant workup and subsequent surgery should continue as determined by his transplant team.    Jared Galvin MD, PhD  Interventional/Critical Care Cardiology  [phone]    November 17, 2020        CC  Patient Care Team:  Mariel Garcia MD as PCP - General (Family Practice)  Stef Murillo MD as Referring Physician  (Nephrology)  Flor Barrett, RN as Nurse Coordinator (Nephrology)  Mariel Garcia MD as Assigned PCP  Ida Carroll LPN as LPN (Nephrology)  Fernando Morillo MD as MD (Orthopedics)  Fernando Morillo MD as MD (Orthopedics)  GASTON BURT

## 2020-10-19 NOTE — NURSING NOTE
Chief Complaint   Patient presents with     New Patient     cardiology evaluation for kidney transplant        Vitals were taken and medications were reconciled.     Rosi Bridges  11:18 AM

## 2020-10-19 NOTE — LETTER
10/19/2020      RE: Rashad Ortiz  [address] [place] [id number]       Dear Colleague,    Thank you for the opportunity to participate in the care of your patient, Rashad Ortiz, at the [facility] [place] at [facility]. Please see a copy of my visit note below.    [facility]  CARDIOVASCULAR MEDICINE CLINIC NOTE    Referring Provider: Fadumo Cannon   Primary Care Provider: Mariel Garcia     Patient Name: Rashad Ortiz   MRN: [id number]     HPI:   Rashad Ortiz is a 45 year old male with PMH HTN c/b ESRD s/p LDKT 2011 now with CKD and reinitiated HD 9/1/2020 here for pre-kidney transplant evaluation.    Patient denies any personal or family cardiac hx. Denies heart failure, MI, or arrhythmias in self or family. He denies every having a cardiac stress test or coronary angiogram.    Patient denies chest pain, sob, dyspnea on exertion, palpitations, dizziness, or syncope.    Patient was on aspirin 81mg daily until recently only for post operative surgical ppx for his hip surgery. He is on plavix 75mg daily for fistula thrombus.    PAST MEDICAL HISTORY:  Past Medical History:   Diagnosis Date     AVN (avascular necrosis of bone) (H)     left hip     BPH (benign prostatic hyperplasia)      Chronic kidney disease, stage 4, severely decreased GFR (H)      Gastro-oesophageal reflux disease      Gout      History of blood transfusion      Hypertension      Medical non-compliance      Pulmonary nodules      Steroid long-term use      Vitamin D deficiency        CURRENT MEDICATIONS:  Current Outpatient Medications   Medication Sig Dispense Refill     acetaminophen (TYLENOL) 500 MG tablet Take 2 tablets (1,000 mg) by mouth every 8 hours as needed for mild pain 160 tablet 3     carvedilol (COREG) 12.5 MG tablet Take 1 tablet (12.5 mg) by mouth 2 times daily (with meals) 60 tablet 3     clopidogrel (PLAVIX) 75 MG tablet Take 1  tablet (75 mg) by mouth daily 30 tablet 0     febuxostat (ULORIC) 80 MG TABS tablet Take 1 tablet (80 mg) by mouth daily 90 tablet 3     furosemide (LASIX) 20 MG tablet        hydrOXYzine (ATARAX) 50 MG tablet Take 0.5-1 tablets (25-50 mg) by mouth nightly as needed (sleep) 30 tablet 11     mycophenolate (GENERIC EQUIVALENT) 250 MG capsule Take 2 capsules (500 mg) by mouth 2 times daily 120 capsule 11     omeprazole (PRILOSEC) 20 MG capsule Take 1 capsule (20 mg) by mouth daily 90 capsule 1     order for DME Equipment being ordered: Crutches 1 Device 0     oxyCODONE (ROXICODONE) 5 MG tablet Take 1-2 tablets (5-10 mg) by mouth 2 times daily as needed for severe pain 20 tablet 0     sulfamethoxazole-trimethoprim (BACTRIM) 400-80 MG tablet Take 1 tablet by mouth every other day 45 tablet 3     tacrolimus (GENERIC EQUIVALENT) 0.5 MG capsule Take 1 capsule (0.5 mg) by mouth every evening Total dose = 1 mg in the AM and 1.5 mg in the PM 30 capsule 11     tacrolimus (GENERIC EQUIVALENT) 1 MG capsule Take 1 capsule (1 mg) by mouth 2 times daily . Total dose=1mg in the AM and 1.5mg in the PM 60 capsule 11     tamsulosin (FLOMAX) 0.4 MG capsule TAKE 1 CAPSULE BY MOUTH DAILY 30 capsule 8     aspirin (ASA) 81 MG EC tablet Take 1 tablet (81 mg) by mouth 2 times daily (Patient not taking: Reported on 10/19/2020) 56 tablet 0     cholecalciferol 25 MCG (1000 UT) TABS Take 2,000 Units by mouth daily (Patient not taking: Reported on 10/19/2020) 90 tablet 3     cyclobenzaprine (FLEXERIL) 5 MG tablet Take 5 mg by mouth 3 times daily as needed for muscle spasms       oxyCODONE (ROXICODONE) 5 MG tablet Take 1 tablet (5 mg) by mouth 2 times daily as needed for severe pain (Patient not taking: Reported on 10/19/2020) 14 tablet 0     senna-docusate (SENOKOT-S/PERICOLACE) 8.6-50 MG tablet Take 1 tablet by mouth 2 times daily (Patient not taking: Reported on 10/19/2020) 30 tablet 0     sodium bicarbonate 650 MG tablet Take 2 tablets (1,300  mg) by mouth 3 times daily (Patient not taking: Reported on 9/25/2020) 180 tablet 11       PAST SURGICAL HISTORY:  Past Surgical History:   Procedure Laterality Date     ARTHROPLASTY HIP Left 9/9/2020    Procedure: Left total hip arthroplasty;  Surgeon: Fernando Morillo MD;  Location: [facility] OR     AV FISTULA OR GRAFT ARTERIAL       CREATE FISTULA ARTERIOVENOUS UPPER EXTREMITY Right 7/31/2019    Procedure: Creation Of Atriovenous Fistula Right Upper Arm;  Surgeon: Julia Irwin MD;  Location: [facility] OR     IR CVC TUNNEL PLACEMENT > 5 YRS OF AGE  10/9/2020     IR DIALYSIS FISTULOGRAM RIGHT  10/9/2020     IR DIALYSIS MECH THROMB, PTA  10/9/2020     LIGATE FISTULA ARTERIOVENOUS UPPER EXTREMITY  12/20/2011    Procedure:LIGATE FISTULA ARTERIOVENOUS UPPER EXTREMITY; Excision of Right Forearm Arteriovenous Fistula.; Surgeon:LINDY AMAYA; Location:[facility] OR     PERCUTANEOUS BIOPSY KIDNEY Right 2/28/2017    Procedure: PERCUTANEOUS BIOPSY KIDNEY;  Surgeon: Gee Barrios MD;  Location: [facility] OR     TRANSPLANT  01/13/2011    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 3 cigarettes per week per pt   Substance Use Topics     Alcohol use: Not Currently     Alcohol/week: 4.2 standard drinks     Types: 5 Standard drinks or equivalent per week     Comment: Quit 8/2020     Drug use: Not Currently     Types: Marijuana       ROS:   Constitutional: No fever, chills, or sweats. Weight stable.   ENT: No visual disturbance, ear ache, epistaxis, sore throat.   Cardiovascular: As per HPI.   Respiratory: No cough, hemoptysis.    GI: No  "nausea, vomiting, hematemesis, melena, or hematochezia.   : No hematuria.   Integument: Negative.   Psychiatric: Negative.   Hematologic:  Easy bruising, no easy bleeding.  Neuro: Negative.   Endocrinology: No significant heat or cold intolerance   Musculoskeletal: No myalgia.    Exam:  /80 (BP Location: Right arm, Patient Position: Sitting, Cuff Size: Adult Regular)   Pulse 78   Ht 1.727 m (5' 8\")   Wt 63 kg (139 lb)   SpO2 100%   BMI 21.13 kg/m    GENERAL APPEARANCE: healthy, alert and no distress  HEENT: no icterus, no xanthelasmas, normal pupil size and reaction, normal palate, mucosa moist, no central cyanosis  NECK: no adenopathy, no asymmetry, masses, or scars, thyroid normal to palpation and no bruits, JVP not elevated  RESPIRATORY: lungs clear to auscultation - no rales, rhonchi or wheezes, no use of accessory muscles, no retractions, respirations are unlabored, normal respiratory rate  CARDIOVASCULAR: regular rhythm, normal S1 with physiologic split S2, no S3 or S4 and no murmur, click or rub, precordium quiet with normal PMI. Hum of fistula.  ABDOMEN: soft, non tender, without hepatosplenomegaly, no masses palpable, bowel sounds normal, aorta not enlarged by palpation, no abdominal bruits  EXTREMITIES: peripheral pulses normal, no edema, no bruits  NEURO: alert and oriented to person/place/time, normal speech, gait and affect  VASC: Radial, femoral, dorsalis pedis and posterior tibialis pulses are normal in volumes and symmetric bilaterally. No bruits are heard.  SKIN: no ecchymoses, no rashes    Labs:  CBC RESULTS:   Lab Results   Component Value Date    WBC 9.2 10/09/2020    RBC 3.30 (L) 10/09/2020    HGB 9.1 (L) 10/09/2020    HCT 29.7 (L) 10/09/2020    MCV 90 10/09/2020    MCH 27.6 10/09/2020    MCHC 30.6 (L) 10/09/2020    RDW 19.5 (H) 10/09/2020     10/09/2020       BMP RESULTS:  Lab Results   Component Value Date     10/09/2020    POTASSIUM 5.1 10/09/2020    CHLORIDE 106 " 10/09/2020    CO2 25 10/09/2020    ANIONGAP 6 10/09/2020    GLC 82 10/09/2020    BUN 42 (H) 10/09/2020    CR 7.71 (H) 10/09/2020    GFRESTIMATED 8 (L) 10/09/2020    GFRESTBLACK 9 (L) 10/09/2020    ASHELY 9.4 10/09/2020        INR RESULTS:  Lab Results   Component Value Date    INR 1.24 (H) 10/08/2020    INR 1.21 (H) 09/22/2020    INR 1.20 (H) 09/18/2020    INR 1.08 07/31/2019       Procedures:  9/25/2017 TTE  Global and regional left ventricular function is normal with an EF of 60-65%.  Mild concentric wall thickening consistent with left ventricular hypertrophy  is present.  Normal left ventricular filling for age.  No pericardial effusion is present.    10/19/2020  TTE- not formally read but per my read: normal EF with LVH and. Mild MR and TR.    Assessment and Plan:   Rashad Ortiz is a 45 year old male with PMH HTN c/b ESRD s/p LDKT 2011 now with CKD and reinitiated HD 9/1/2020 here for pre-kidney transplant evaluation.    Pre-operative cardiac risk evaluation  Patient is at low risk for significant CAD given lack of sx. However, his long standing renal dysfunction does increase his risk somewhat and should be addressed prior to renal transplant. TTE today showed normal EF. EKG on 9/22 showed NSR without ischemic changes. Given his recent hip surgery, he likely would not be able to achieve target HR on treadmill.  -- Dobutamine stress echo      HTN  ESRD s/p LDKT 2011 now with CKD and reinitiated HD  BP well controlled 119/80 today. Managed by Nephrology.  --coreg and lasix per nephrology      Jared Galvin MD, PhD  Interventional/Critical Care Cardiology  [phone]    October 19, 2020        CC  Patient Care Team:  Mariel Garcia MD as PCP - General (Family Practice)  Stef Murillo MD as Referring Physician (Nephrology)  Flor Barrett, JENNIFER as Nurse Coordinator (Nephrology)  Mariel Garcia MD as Assigned PCP  Ida Carroll LPN as LPN (Nephrology)  Fernando Morillo  MD TIFFANY as MD (Orthopedics)  Fernando Morillo MD as MD (Orthopedics)  GASTON BURT      Please do not hesitate to contact me if you have any questions/concerns.     Sincerely,     Jared Galvin MD

## 2020-10-20 DIAGNOSIS — Z47.89 ORTHOPEDIC AFTERCARE: Primary | ICD-10-CM

## 2020-10-20 DIAGNOSIS — Z98.890 STATUS POST HIP SURGERY: ICD-10-CM

## 2020-10-20 LAB
DLCOCOR-%PRED-PRE: 69 %
DLCOCOR-PRE: 19.3 ML/MIN/MMHG
DLCOUNC-%PRED-PRE: 55 %
DLCOUNC-PRE: 15.46 ML/MIN/MMHG
DLCOUNC-PRED: 27.65 ML/MIN/MMHG
ERV-%PRED-PRE: 79 %
ERV-PRE: 1.28 L
ERV-PRED: 1.6 L
EXPTIME-PRE: 6.76 SEC
FEF2575-%PRED-PRE: 77 %
FEF2575-PRE: 2.68 L/SEC
FEF2575-PRED: 3.44 L/SEC
FEFMAX-%PRED-PRE: 90 %
FEFMAX-PRE: 8.5 L/SEC
FEFMAX-PRED: 9.4 L/SEC
FEV1-%PRED-PRE: 87 %
FEV1-PRE: 3.05 L
FEV1FEV6-PRE: 79 %
FEV1FEV6-PRED: 81 %
FEV1FVC-PRE: 78 %
FEV1FVC-PRED: 81 %
FEV1SVC-PRE: 75 %
FEV1SVC-PRED: 72 %
FIFMAX-PRE: 3.21 L/SEC
FRCPLETH-%PRED-PRE: 90 %
FRCPLETH-PRE: 2.96 L
FRCPLETH-PRED: 3.29 L
FVC-%PRED-PRE: 91 %
FVC-PRE: 3.9 L
FVC-PRED: 4.27 L
IC-%PRED-PRE: 86 %
IC-PRE: 2.81 L
IC-PRED: 3.23 L
RVPLETH-%PRED-PRE: 85 %
RVPLETH-PRE: 1.68 L
RVPLETH-PRED: 1.97 L
TLCPLETH-%PRED-PRE: 88 %
TLCPLETH-PRE: 5.77 L
TLCPLETH-PRED: 6.52 L
VA-%PRED-PRE: 81 %
VA-PRE: 4.88 L
VC-%PRED-PRE: 84 %
VC-PRE: 4.09 L
VC-PRED: 4.83 L

## 2020-10-20 RX ORDER — OXYCODONE HYDROCHLORIDE 5 MG/1
5 TABLET ORAL 2 TIMES DAILY PRN
Qty: 14 TABLET | Refills: 0 | Status: SHIPPED | OUTPATIENT
Start: 2020-10-20 | End: 2020-10-28

## 2020-10-22 ENCOUNTER — MYC MEDICAL ADVICE (OUTPATIENT)
Dept: DERMATOLOGY | Facility: CLINIC | Age: 45
End: 2020-10-22

## 2020-10-22 ENCOUNTER — ANCILLARY PROCEDURE (OUTPATIENT)
Dept: GENERAL RADIOLOGY | Facility: CLINIC | Age: 45
End: 2020-10-22
Attending: ORTHOPAEDIC SURGERY
Payer: COMMERCIAL

## 2020-10-22 ENCOUNTER — OFFICE VISIT (OUTPATIENT)
Dept: ORTHOPEDICS | Facility: CLINIC | Age: 45
End: 2020-10-22
Payer: COMMERCIAL

## 2020-10-22 VITALS — HEIGHT: 68 IN | BODY MASS INDEX: 21.07 KG/M2 | WEIGHT: 139 LBS

## 2020-10-22 DIAGNOSIS — Z47.89 ORTHOPEDIC AFTERCARE: Primary | ICD-10-CM

## 2020-10-22 PROCEDURE — 99024 POSTOP FOLLOW-UP VISIT: CPT | Performed by: ORTHOPAEDIC SURGERY

## 2020-10-22 PROCEDURE — 73502 X-RAY EXAM HIP UNI 2-3 VIEWS: CPT | Mod: LT | Performed by: RADIOLOGY

## 2020-10-22 ASSESSMENT — ACTIVITIES OF DAILY LIVING (ADL)
ADL_MEAN: .88
ADL_SUBSCALE_SCORE: 77.94
ADL_SUM: 15

## 2020-10-22 ASSESSMENT — MIFFLIN-ST. JEOR: SCORE: 1495

## 2020-10-22 NOTE — LETTER
"    10/22/2020         RE: Rashad Ortiz  [address] [facility] [address]        Dear Colleague,    Thank you for referring your patient, Rashad Ortiz, to the [facility] [place]. Please see a copy of my visit note below.    Chief Complaint: RECHECK (6 week POP Left FRANCA DOS 9/9/20)       HPI: Rashad Ortiz returns today in follow-up for his left hip. He reports that he is doing \"great.\" He is using no pain medication, no assist device, and is back to most of his usual activities. He would like to return to work ASAP and reports that he feels ready to do so. Happy with how he is doing so far.     Medications and allergies are documented in the EMR and have been reviewed.    Current Outpatient Medications:      acetaminophen (TYLENOL) 500 MG tablet, Take 2 tablets (1,000 mg) by mouth every 8 hours as needed for mild pain, Disp: 160 tablet, Rfl: 3     aspirin (ASA) 81 MG EC tablet, Take 1 tablet (81 mg) by mouth 2 times daily (Patient not taking: Reported on 10/19/2020), Disp: 56 tablet, Rfl: 0     carvedilol (COREG) 12.5 MG tablet, Take 1 tablet (12.5 mg) by mouth 2 times daily (with meals), Disp: 60 tablet, Rfl: 3     cholecalciferol 25 MCG (1000 UT) TABS, Take 2,000 Units by mouth daily (Patient not taking: Reported on 10/19/2020), Disp: 90 tablet, Rfl: 3     clopidogrel (PLAVIX) 75 MG tablet, Take 1 tablet (75 mg) by mouth daily, Disp: 30 tablet, Rfl: 0     cyclobenzaprine (FLEXERIL) 5 MG tablet, Take 5 mg by mouth 3 times daily as needed for muscle spasms, Disp: , Rfl:      febuxostat (ULORIC) 80 MG TABS tablet, Take 1 tablet (80 mg) by mouth daily, Disp: 90 tablet, Rfl: 3     furosemide (LASIX) 20 MG tablet, , Disp: , Rfl:      hydrOXYzine (ATARAX) 50 MG tablet, Take 0.5-1 tablets (25-50 mg) by mouth nightly as needed (sleep), Disp: 30 tablet, Rfl: 11     mycophenolate (GENERIC EQUIVALENT) 250 MG capsule, Take 2 capsules (500 mg) by mouth 2 times daily, Disp: 120 capsule, Rfl: " "11     omeprazole (PRILOSEC) 20 MG capsule, Take 1 capsule (20 mg) by mouth daily, Disp: 90 capsule, Rfl: 1     order for DME, Equipment being ordered: Crutches, Disp: 1 Device, Rfl: 0     oxyCODONE (ROXICODONE) 5 MG tablet, Take 1 tablet (5 mg) by mouth 2 times daily as needed for severe pain, Disp: 14 tablet, Rfl: 0     senna-docusate (SENOKOT-S/PERICOLACE) 8.6-50 MG tablet, Take 1 tablet by mouth 2 times daily (Patient not taking: Reported on 10/19/2020), Disp: 30 tablet, Rfl: 0     sodium bicarbonate 650 MG tablet, Take 2 tablets (1,300 mg) by mouth 3 times daily (Patient not taking: Reported on 9/25/2020), Disp: 180 tablet, Rfl: 11     sulfamethoxazole-trimethoprim (BACTRIM) 400-80 MG tablet, Take 1 tablet by mouth every other day, Disp: 45 tablet, Rfl: 3     tacrolimus (GENERIC EQUIVALENT) 0.5 MG capsule, Take 1 capsule (0.5 mg) by mouth every evening Total dose = 1 mg in the AM and 1.5 mg in the PM, Disp: 30 capsule, Rfl: 11     tacrolimus (GENERIC EQUIVALENT) 1 MG capsule, Take 1 capsule (1 mg) by mouth 2 times daily . Total dose=1mg in the AM and 1.5mg in the PM, Disp: 60 capsule, Rfl: 11     tamsulosin (FLOMAX) 0.4 MG capsule, TAKE 1 CAPSULE BY MOUTH DAILY, Disp: 30 capsule, Rfl: 8  Allergies: Patient has no known allergies.    Physical Exam:  On physical examination the patient appears the stated age, is in no acute distress, affect is appropriate, and breathing is non-labored.  Ht 1.727 m (5' 8\")   Wt 63 kg (139 lb)   BMI 21.13 kg/m    Body mass index is 21.13 kg/m .  Gait: Trendelenberg on the left   Incision:healed   ROM: fluid and painless  Sciatic function tested distally and is normal and symmetric.     X-rays:    I reviewed the x-rays dated today.  Previous films reviewed.    Findings:  Normal progression for a total hip arthroplasty without evidence of loosening or subsidence. ON femoral head on the right     Assessment: doing very well. Trendelenburg gait; discussed physical therapy and he is " going to consider this.   Plan: PT, RTC in 6 weeks, sooner if issues. RTW letter written.     Imtiaz Skinner   Answers for HPI/ROS submitted by the patient on 10/19/2020   General Symptoms: No  Skin Symptoms: No  HENT Symptoms: No  EYE SYMPTOMS: No  HEART SYMPTOMS: No  LUNG SYMPTOMS: No  INTESTINAL SYMPTOMS: No  URINARY SYMPTOMS: No  REPRODUCTIVE SYMPTOMS: No  SKELETAL SYMPTOMS: No  BLOOD SYMPTOMS: No  NERVOUS SYSTEM SYMPTOMS: No  MENTAL HEALTH SYMPTOMS: No

## 2020-10-22 NOTE — NURSING NOTE
"Reason For Visit:   Chief Complaint   Patient presents with     RECHECK     6 week POP Left FRANCA DOS 9/9/20       Ht 1.727 m (5' 8\")   Wt 63 kg (139 lb)   BMI 21.13 kg/m           Charlie Prieto ATC  "

## 2020-10-22 NOTE — PROGRESS NOTES
"Chief Complaint: RECHECK (6 week POP Left FRANCA DOS 9/9/20)       HPI: Rashad Ortiz returns today in follow-up for his left hip. He reports that he is doing \"great.\" He is using no pain medication, no assist device, and is back to most of his usual activities. He would like to return to work ASAP and reports that he feels ready to do so. Happy with how he is doing so far.     Medications and allergies are documented in the EMR and have been reviewed.    Current Outpatient Medications:      acetaminophen (TYLENOL) 500 MG tablet, Take 2 tablets (1,000 mg) by mouth every 8 hours as needed for mild pain, Disp: 160 tablet, Rfl: 3     aspirin (ASA) 81 MG EC tablet, Take 1 tablet (81 mg) by mouth 2 times daily (Patient not taking: Reported on 10/19/2020), Disp: 56 tablet, Rfl: 0     carvedilol (COREG) 12.5 MG tablet, Take 1 tablet (12.5 mg) by mouth 2 times daily (with meals), Disp: 60 tablet, Rfl: 3     cholecalciferol 25 MCG (1000 UT) TABS, Take 2,000 Units by mouth daily (Patient not taking: Reported on 10/19/2020), Disp: 90 tablet, Rfl: 3     clopidogrel (PLAVIX) 75 MG tablet, Take 1 tablet (75 mg) by mouth daily, Disp: 30 tablet, Rfl: 0     cyclobenzaprine (FLEXERIL) 5 MG tablet, Take 5 mg by mouth 3 times daily as needed for muscle spasms, Disp: , Rfl:      febuxostat (ULORIC) 80 MG TABS tablet, Take 1 tablet (80 mg) by mouth daily, Disp: 90 tablet, Rfl: 3     furosemide (LASIX) 20 MG tablet, , Disp: , Rfl:      hydrOXYzine (ATARAX) 50 MG tablet, Take 0.5-1 tablets (25-50 mg) by mouth nightly as needed (sleep), Disp: 30 tablet, Rfl: 11     mycophenolate (GENERIC EQUIVALENT) 250 MG capsule, Take 2 capsules (500 mg) by mouth 2 times daily, Disp: 120 capsule, Rfl: 11     omeprazole (PRILOSEC) 20 MG capsule, Take 1 capsule (20 mg) by mouth daily, Disp: 90 capsule, Rfl: 1     order for DME, Equipment being ordered: Crutches, Disp: 1 Device, Rfl: 0     oxyCODONE (ROXICODONE) 5 MG tablet, Take 1 tablet (5 mg) by mouth 2 " "times daily as needed for severe pain, Disp: 14 tablet, Rfl: 0     senna-docusate (SENOKOT-S/PERICOLACE) 8.6-50 MG tablet, Take 1 tablet by mouth 2 times daily (Patient not taking: Reported on 10/19/2020), Disp: 30 tablet, Rfl: 0     sodium bicarbonate 650 MG tablet, Take 2 tablets (1,300 mg) by mouth 3 times daily (Patient not taking: Reported on 9/25/2020), Disp: 180 tablet, Rfl: 11     sulfamethoxazole-trimethoprim (BACTRIM) 400-80 MG tablet, Take 1 tablet by mouth every other day, Disp: 45 tablet, Rfl: 3     tacrolimus (GENERIC EQUIVALENT) 0.5 MG capsule, Take 1 capsule (0.5 mg) by mouth every evening Total dose = 1 mg in the AM and 1.5 mg in the PM, Disp: 30 capsule, Rfl: 11     tacrolimus (GENERIC EQUIVALENT) 1 MG capsule, Take 1 capsule (1 mg) by mouth 2 times daily . Total dose=1mg in the AM and 1.5mg in the PM, Disp: 60 capsule, Rfl: 11     tamsulosin (FLOMAX) 0.4 MG capsule, TAKE 1 CAPSULE BY MOUTH DAILY, Disp: 30 capsule, Rfl: 8  Allergies: Patient has no known allergies.    Physical Exam:  On physical examination the patient appears the stated age, is in no acute distress, affect is appropriate, and breathing is non-labored.  Ht 1.727 m (5' 8\")   Wt 63 kg (139 lb)   BMI 21.13 kg/m    Body mass index is 21.13 kg/m .  Gait: Trendelenberg on the left   Incision:healed   ROM: fluid and painless  Sciatic function tested distally and is normal and symmetric.     X-rays:    I reviewed the x-rays dated today.  Previous films reviewed.    Findings:  Normal progression for a total hip arthroplasty without evidence of loosening or subsidence. ON femoral head on the right     Assessment: doing very well. Trendelenburg gait; discussed physical therapy and he is going to consider this.   Plan: PT, RTC in 6 weeks, sooner if issues. RTW letter written.     Imtiaz Skinner   Answers for HPI/ROS submitted by the patient on 10/19/2020   General Symptoms: No  Skin Symptoms: No  HENT Symptoms: No  EYE SYMPTOMS: No  HEART " SYMPTOMS: No  LUNG SYMPTOMS: No  INTESTINAL SYMPTOMS: No  URINARY SYMPTOMS: No  REPRODUCTIVE SYMPTOMS: No  SKELETAL SYMPTOMS: No  BLOOD SYMPTOMS: No  NERVOUS SYSTEM SYMPTOMS: No  MENTAL HEALTH SYMPTOMS: No

## 2020-10-26 ENCOUNTER — ANCILLARY PROCEDURE (OUTPATIENT)
Dept: ULTRASOUND IMAGING | Facility: CLINIC | Age: 45
End: 2020-10-26
Attending: CLINICAL NURSE SPECIALIST
Payer: COMMERCIAL

## 2020-10-26 ENCOUNTER — OFFICE VISIT (OUTPATIENT)
Dept: TRANSPLANT | Facility: CLINIC | Age: 45
End: 2020-10-26
Attending: SURGERY
Payer: COMMERCIAL

## 2020-10-26 VITALS — BODY MASS INDEX: 21.06 KG/M2 | HEART RATE: 77 BPM | OXYGEN SATURATION: 100 % | WEIGHT: 138.5 LBS

## 2020-10-26 DIAGNOSIS — T82.9XXA COMPLICATION OF VASCULAR ACCESS FOR DIALYSIS, INITIAL ENCOUNTER: Primary | ICD-10-CM

## 2020-10-26 DIAGNOSIS — Z99.2 ESRD ON DIALYSIS (H): ICD-10-CM

## 2020-10-26 DIAGNOSIS — N18.6 ESRD ON DIALYSIS (H): ICD-10-CM

## 2020-10-26 DIAGNOSIS — T82.9XXA COMPLICATION OF VASCULAR ACCESS FOR DIALYSIS, INITIAL ENCOUNTER: ICD-10-CM

## 2020-10-26 PROCEDURE — 99214 OFFICE O/P EST MOD 30 MIN: CPT | Performed by: SURGERY

## 2020-10-26 PROCEDURE — 93990 DOPPLER FLOW TESTING: CPT | Mod: 26 | Performed by: RADIOLOGY

## 2020-10-26 NOTE — LETTER
10/26/2020         RE: Rashad Ortiz  [address] [place] [id number]        Dear Colleague,    Thank you for referring your patient, Rashad Ortiz, to the [facility]. Please see a copy of my visit note below.    Dialysis Access Service  Consult Note    Referred by Dr. Lin for assessment of permanent dialysis access.    HPI: Mr. Ortiz is being seen today in follow-up for revision of dialysis access. Three weeks ago, he was undergoing hemodialysis through a matured brachiocephalic fistula when he had clot accidentally injected from the machine into the fistula, causing acute thrombosis. He was referred to interventional radiology for thrombectomy which was partially accomplished; however, due to the limited thrombectomy and the narrow caliber of the vessel, he was referred to us for assessment of new access creation, given the high likelihood of thrombosis.     At our initial visit, he had significant pain and swelling in his arm. He did still have a palpable thrill in his arm at the distal upper arm anastomotic site and proximally in the venous inflow. Duplex did not demonstrate any sites of extravasation of pseudoaneurysm. He was advised to elevate his arm and given a course of plavix, which he is still taking.    Today, he feels somewhat better. His arm is still sore, but this is improving. He has significantly reduced swelling. He is able to better feel the thrill in his fistula, though it is still diminished compared to before. He is otherwise in his usual state of health without complaints.      Risk factors for vascular access:         Yes No  Hx of CVC    [x]    []   Comment: current RIJ tunneled  Hx of PICC line         []     [x]  Comment:   Hx of Pacemaker    []     [x]  Comment:   History of failed access:  [x]         []  Comment: failed R radiocephalic, currently partially thrombosed R brachiocephalic  SVC syndrome   []      [x]  Comment:  Heart Failure    []      [x]  EF:    Periph arterial disease  []     [x]  Comment:  Prior Fracture/Surgery  []     [x]  Location:   DVT    []    [x]   Location:  Diabetes    []        [x]  Comment:  Neuropathy   []     [x]  Comment:   Anticoagulation:   [x]    []  Agent: short course plavix      Anticoagulation contraindication:[]  [x]     Details:                   Pediatric    []         [x]  Age:                  Hx of transplant   [x]    []  Comment:  Failed LDKT  Current immunosuppression []    []  Comment:            ROS: 10 point ROS neg other than the symptoms noted above in the HPI.        Past Medical History:   Diagnosis Date     AVN (avascular necrosis of bone) (H)     left hip     BPH (benign prostatic hyperplasia)      Chronic kidney disease, stage 4, severely decreased GFR (H)      Gastro-oesophageal reflux disease      Gout      History of blood transfusion      Hypertension      Medical non-compliance      Pulmonary nodules      Steroid long-term use      Vitamin D deficiency        Past Surgical History:   Procedure Laterality Date     ARTHROPLASTY HIP Left 9/9/2020    Procedure: Left total hip arthroplasty;  Surgeon: Fernando Morillo MD;  [facility]     AV FISTULA OR GRAFT ARTERIAL       CREATE FISTULA ARTERIOVENOUS UPPER EXTREMITY Right 7/31/2019    Procedure: Creation Of Atriovenous Fistula Right Upper Arm;  Surgeon: Julia Irwin MD;  Location: [facility] OR     IR CVC TUNNEL PLACEMENT > 5 YRS OF AGE  10/9/2020     IR DIALYSIS FISTULOGRAM RIGHT  10/9/2020     IR DIALYSIS MECH THROMB, PTA  10/9/2020     LIGATE FISTULA ARTERIOVENOUS UPPER EXTREMITY  12/20/2011    Procedure:LIGATE FISTULA ARTERIOVENOUS UPPER EXTREMITY; Excision of Right Forearm Arteriovenous Fistula.; Surgeon:LINDY AMYAA; Location[facility] OR     PERCUTANEOUS BIOPSY KIDNEY Right 2/28/2017    Procedure: PERCUTANEOUS BIOPSY KIDNEY;  Surgeon: Gee Barrios MD;  [facility]     TRANSPLANT  01/13/2011    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 3 cigarettes per week per pt   Substance Use Topics     Alcohol use: Not Currently     Alcohol/week: 4.2 standard drinks     Types: 5 Standard drinks or equivalent per week     Comment: Quit 8/2020         Current Outpatient Medications:      acetaminophen (TYLENOL) 500 MG tablet, Take 2 tablets (1,000 mg) by mouth every 8 hours as needed for mild pain, Disp: 160 tablet, Rfl: 3     carvedilol (COREG) 12.5 MG tablet, Take 1 tablet (12.5 mg) by mouth 2 times daily (with meals), Disp: 60 tablet, Rfl: 3     clopidogrel (PLAVIX) 75 MG tablet, Take 1 tablet (75 mg) by mouth daily, Disp: 30 tablet, Rfl: 0     cyclobenzaprine (FLEXERIL) 5 MG tablet, Take 5 mg by mouth 3 times daily as needed for muscle spasms, Disp: , Rfl:      febuxostat (ULORIC) 80 MG TABS tablet, Take 1 tablet (80 mg) by mouth daily, Disp: 90 tablet, Rfl: 3     furosemide (LASIX) 20 MG tablet, , Disp: , Rfl:      hydrOXYzine (ATARAX) 50 MG tablet, Take 0.5-1 tablets (25-50 mg) by mouth nightly as needed (sleep), Disp: 30 tablet, Rfl: 11     mycophenolate (GENERIC EQUIVALENT) 250 MG capsule, Take 2 capsules (500 mg) by mouth 2 times daily, Disp: 120 capsule, Rfl: 11     omeprazole (PRILOSEC) 20 MG capsule, Take 1 capsule (20 mg) by mouth daily, Disp: 90 capsule, Rfl: 1     order for DME, Equipment being ordered: Crutches, Disp: 1 Device, Rfl: 0     sulfamethoxazole-trimethoprim (BACTRIM) 400-80 MG tablet, Take 1 tablet by mouth every other day, Disp: 45 tablet, Rfl: 3     tacrolimus (GENERIC EQUIVALENT) 0.5 MG capsule, Take 1 capsule (0.5 mg) by mouth every evening Total  dose = 1 mg in the AM and 1.5 mg in the PM, Disp: 30 capsule, Rfl: 11     tacrolimus (GENERIC EQUIVALENT) 1 MG capsule, Take 1 capsule (1 mg) by mouth 2 times daily . Total dose=1mg in the AM and 1.5mg in the PM, Disp: 60 capsule, Rfl: 11     tamsulosin (FLOMAX) 0.4 MG capsule, TAKE 1 CAPSULE BY MOUTH DAILY, Disp: 30 capsule, Rfl: 8     aspirin (ASA) 81 MG EC tablet, Take 1 tablet (81 mg) by mouth 2 times daily (Patient not taking: Reported on 10/19/2020), Disp: 56 tablet, Rfl: 0     cholecalciferol 25 MCG (1000 UT) TABS, Take 2,000 Units by mouth daily (Patient not taking: Reported on 10/19/2020), Disp: 90 tablet, Rfl: 3     oxyCODONE (ROXICODONE) 5 MG tablet, Take 1 tablet (5 mg) by mouth 2 times daily as needed for severe pain (Patient not taking: Reported on 10/26/2020), Disp: 14 tablet, Rfl: 0     senna-docusate (SENOKOT-S/PERICOLACE) 8.6-50 MG tablet, Take 1 tablet by mouth 2 times daily (Patient not taking: Reported on 10/19/2020), Disp: 30 tablet, Rfl: 0     sodium bicarbonate 650 MG tablet, Take 2 tablets (1,300 mg) by mouth 3 times daily (Patient not taking: Reported on 9/25/2020), Disp: 180 tablet, Rfl: 11                        PHYSICAL EXAM:  Pulse:  [77] 77  SpO2:  [100 %] 100 %  Constitutional: healthy, alert and cooperative  HEENT: sclera anicteric, MMM, conjunctiva pink  Chest: HD catheter present on the right side. Tunneled internal jugular CVL  CV:  NSR  : No CVA tenderness  Abdomen: prior kidney tx incision well-healed   Skin:  No rashes or jaundice  Neuro: normal gait  Psych: normal mood and affect  EXTREMITY EXAM:   Vein Exam: Obvious suitable veins?    Left arm: YES   []           NO  []       Comment:  deferred   Right arm: Palpable pulse in likely thrombosed segment/branch of AVF. Palpable thrill slightly deeper and more medially. Palpable strong thrill at dilated ~4cm segment of vein just arising from anastomosis, and palpable strong thrill at ~4 cm segment of vein in proximal upper arm.  Palpable strong radial and brachial pulses on right side. Swelling significantly reduced.      Sensory exam:   Left hand: Normal   [x]       Abnormal   []     Comment:    Right hand: Normal   [x]       Abnormal   []     Comment:     Motor exam normal:   Left hand: Normal   [x]       Abnormal   []     Comment:     Right hand: Normal   [x]       Abnormal   []     Comment:                       Duplex Reviewed:  Arterial inflow patent, volume ~1800mL/min. Good flow through initial (dilated) venous segment- site of prior access. Appears to branch into two pathways. One more superficial and lateral is thrombosed, while another slightly deeper is patent, with flow ~2L/min. Diameter 10.3mm in patent venous limb. Appears to have some branch draining from deeper vein back into cephalic system with good flow/thrill.     Assessment & Plan:   Mr. Ortiz was seen in follow-up for dialysis access complications. At this point, I think his access may survive and be suitable for use due to continued high velocity venous outflow from this deeper branch. That branch itself may not be suitable for access- though it is large it is not prominent and has likely not matured to the point of tolerating canulation. However, his venous anatomy still yields strong thrill in the initial 4cm segment of vein in his antecubital fossa (site where his fistula was previously accessed) as well as a similar segment in his upper arm.     I counseled him to complete his course of plavix and allow these two weeks for the swelling and pain in his arm to further reduce. After that, he should have his dialysis center attempt fistula access again. Either they can attempt both sticks in the segment closest to the AVF (as before) or arterialized access close to the fistula and venous access in the segment of suitable vein in the upper arm. I would NOT attempt access in the mid-arm as this vein is likely not mature and also somewhat more deep. Given time, this  may mature and be suitable for access eventually as the other side involutes and also given that he is very lean.     If they are able to do several runs successfully in this method, he will schedule an appointment with us to have his CVL removed. If they are unable to successfully dialyze through this access, he will see us to discuss creation of a new graft or fistula.     The patient was counselled to contact our nurse coordinator, ISABEL Branham (Sum), [facility] at [phone] with any questions or concerns.  Thank you for the opportunity to participate in Mr. Ortiz's care.    Total time: 20 minutes  Counseling time: 15 minutes    Chris Nicole MD

## 2020-10-26 NOTE — PROGRESS NOTES
Dialysis Access Service  Consult Note    Referred by Dr. Lin for assessment of permanent dialysis access.    HPI: Mr. Ortiz is being seen today in follow-up for revision of dialysis access. Three weeks ago, he was undergoing hemodialysis through a matured brachiocephalic fistula when he had clot accidentally injected from the machine into the fistula, causing acute thrombosis. He was referred to interventional radiology for thrombectomy which was partially accomplished; however, due to the limited thrombectomy and the narrow caliber of the vessel, he was referred to us for assessment of new access creation, given the high likelihood of thrombosis.     At our initial visit, he had significant pain and swelling in his arm. He did still have a palpable thrill in his arm at the distal upper arm anastomotic site and proximally in the venous inflow. Duplex did not demonstrate any sites of extravasation of pseudoaneurysm. He was advised to elevate his arm and given a course of plavix, which he is still taking.    Today, he feels somewhat better. His arm is still sore, but this is improving. He has significantly reduced swelling. He is able to better feel the thrill in his fistula, though it is still diminished compared to before. He is otherwise in his usual state of health without complaints.      Risk factors for vascular access:         Yes No  Hx of CVC    [x]    []   Comment: current RIJ tunneled  Hx of PICC line         []     [x]  Comment:   Hx of Pacemaker    []     [x]  Comment:   History of failed access:  [x]         []  Comment: failed R radiocephalic, currently partially thrombosed R brachiocephalic  SVC syndrome   []      [x]  Comment:  Heart Failure    []     [x]  EF:    Periph arterial disease  []     [x]  Comment:  Prior Fracture/Surgery  []     [x]  Location:   DVT    []    [x]   Location:  Diabetes    []        [x]  Comment:  Neuropathy   []     [x]  Comment:   Anticoagulation:   [x]    []  Agent:  short course plavix      Anticoagulation contraindication:[]  [x]     Details:                   Pediatric    []         [x]  Age:                  Hx of transplant   [x]    []  Comment:  Failed LDKT  Current immunosuppression []    []  Comment:            ROS: 10 point ROS neg other than the symptoms noted above in the HPI.        Past Medical History:   Diagnosis Date     AVN (avascular necrosis of bone) (H)     left hip     BPH (benign prostatic hyperplasia)      Chronic kidney disease, stage 4, severely decreased GFR (H)      Gastro-oesophageal reflux disease      Gout      History of blood transfusion      Hypertension      Medical non-compliance      Pulmonary nodules      Steroid long-term use      Vitamin D deficiency        Past Surgical History:   Procedure Laterality Date     ARTHROPLASTY HIP Left 9/9/2020    Procedure: Left total hip arthroplasty;  Surgeon: Fernando Morillo MD;  [facility]: [facility] OR     AV FISTULA OR GRAFT ARTERIAL       CREATE FISTULA ARTERIOVENOUS UPPER EXTREMITY Right 7/31/2019    Procedure: Creation Of Atriovenous Fistula Right Upper Arm;  Surgeon: Julia Irwin MD;  Location: [facility] OR     IR CVC TUNNEL PLACEMENT > 5 YRS OF AGE  10/9/2020     IR DIALYSIS FISTULOGRAM RIGHT  10/9/2020     IR DIALYSIS MECH THROMB, PTA  10/9/2020     LIGATE FISTULA ARTERIOVENOUS UPPER EXTREMITY  12/20/2011    Procedure:LIGATE FISTULA ARTERIOVENOUS UPPER EXTREMITY; Excision of Right Forearm Arteriovenous Fistula.; Surgeon:LINDY AMAYA; Location[facility] OR     PERCUTANEOUS BIOPSY KIDNEY Right 2/28/2017    Procedure: PERCUTANEOUS BIOPSY KIDNEY;  Surgeon: Gee Barrios MD;  [facility]     TRANSPLANT  01/13/2011    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       Family History   Problem Relation Age of Onset     Hypertension Mother      Colon Cancer Mother 66     Colon Cancer Brother 51       Social History     Tobacco Use     Smoking status: Former Smoker     Types: Cigarettes     Quit date:  03/2017     Years since quitting: 3.6     Smokeless tobacco: Never Used     Tobacco comment: Patient states that he is an 'social'  smoker. 3 cigarettes per week per pt   Substance Use Topics     Alcohol use: Not Currently     Alcohol/week: 4.2 standard drinks     Types: 5 Standard drinks or equivalent per week     Comment: Quit 8/2020         Current Outpatient Medications:      acetaminophen (TYLENOL) 500 MG tablet, Take 2 tablets (1,000 mg) by mouth every 8 hours as needed for mild pain, Disp: 160 tablet, Rfl: 3     carvedilol (COREG) 12.5 MG tablet, Take 1 tablet (12.5 mg) by mouth 2 times daily (with meals), Disp: 60 tablet, Rfl: 3     clopidogrel (PLAVIX) 75 MG tablet, Take 1 tablet (75 mg) by mouth daily, Disp: 30 tablet, Rfl: 0     cyclobenzaprine (FLEXERIL) 5 MG tablet, Take 5 mg by mouth 3 times daily as needed for muscle spasms, Disp: , Rfl:      febuxostat (ULORIC) 80 MG TABS tablet, Take 1 tablet (80 mg) by mouth daily, Disp: 90 tablet, Rfl: 3     furosemide (LASIX) 20 MG tablet, , Disp: , Rfl:      hydrOXYzine (ATARAX) 50 MG tablet, Take 0.5-1 tablets (25-50 mg) by mouth nightly as needed (sleep), Disp: 30 tablet, Rfl: 11     mycophenolate (GENERIC EQUIVALENT) 250 MG capsule, Take 2 capsules (500 mg) by mouth 2 times daily, Disp: 120 capsule, Rfl: 11     omeprazole (PRILOSEC) 20 MG capsule, Take 1 capsule (20 mg) by mouth daily, Disp: 90 capsule, Rfl: 1     order for DME, Equipment being ordered: Crutches, Disp: 1 Device, Rfl: 0     sulfamethoxazole-trimethoprim (BACTRIM) 400-80 MG tablet, Take 1 tablet by mouth every other day, Disp: 45 tablet, Rfl: 3     tacrolimus (GENERIC EQUIVALENT) 0.5 MG capsule, Take 1 capsule (0.5 mg) by mouth every evening Total dose = 1 mg in the AM and 1.5 mg in the PM, Disp: 30 capsule, Rfl: 11     tacrolimus (GENERIC EQUIVALENT) 1 MG capsule, Take 1 capsule (1 mg) by mouth 2 times daily . Total dose=1mg in the AM and 1.5mg in the PM, Disp: 60 capsule, Rfl: 11      tamsulosin (FLOMAX) 0.4 MG capsule, TAKE 1 CAPSULE BY MOUTH DAILY, Disp: 30 capsule, Rfl: 8     aspirin (ASA) 81 MG EC tablet, Take 1 tablet (81 mg) by mouth 2 times daily (Patient not taking: Reported on 10/19/2020), Disp: 56 tablet, Rfl: 0     cholecalciferol 25 MCG (1000 UT) TABS, Take 2,000 Units by mouth daily (Patient not taking: Reported on 10/19/2020), Disp: 90 tablet, Rfl: 3     oxyCODONE (ROXICODONE) 5 MG tablet, Take 1 tablet (5 mg) by mouth 2 times daily as needed for severe pain (Patient not taking: Reported on 10/26/2020), Disp: 14 tablet, Rfl: 0     senna-docusate (SENOKOT-S/PERICOLACE) 8.6-50 MG tablet, Take 1 tablet by mouth 2 times daily (Patient not taking: Reported on 10/19/2020), Disp: 30 tablet, Rfl: 0     sodium bicarbonate 650 MG tablet, Take 2 tablets (1,300 mg) by mouth 3 times daily (Patient not taking: Reported on 9/25/2020), Disp: 180 tablet, Rfl: 11                        PHYSICAL EXAM:  Pulse:  [77] 77  SpO2:  [100 %] 100 %  Constitutional: healthy, alert and cooperative  HEENT: sclera anicteric, MMM, conjunctiva pink  Chest: HD catheter present on the right side. Tunneled internal jugular CVL  CV:  NSR  : No CVA tenderness  Abdomen: prior kidney tx incision well-healed   Skin:  No rashes or jaundice  Neuro: normal gait  Psych: normal mood and affect  EXTREMITY EXAM:   Vein Exam: Obvious suitable veins?    Left arm: YES   []           NO  []       Comment:  deferred   Right arm: Palpable pulse in likely thrombosed segment/branch of AVF. Palpable thrill slightly deeper and more medially. Palpable strong thrill at dilated ~4cm segment of vein just arising from anastomosis, and palpable strong thrill at ~4 cm segment of vein in proximal upper arm. Palpable strong radial and brachial pulses on right side. Swelling significantly reduced.      Sensory exam:   Left hand: Normal   [x]       Abnormal   []     Comment:    Right hand: Normal   [x]       Abnormal   []     Comment:     Motor exam  normal:   Left hand: Normal   [x]       Abnormal   []     Comment:     Right hand: Normal   [x]       Abnormal   []     Comment:                       Duplex Reviewed:  Arterial inflow patent, volume ~1800mL/min. Good flow through initial (dilated) venous segment- site of prior access. Appears to branch into two pathways. One more superficial and lateral is thrombosed, while another slightly deeper is patent, with flow ~2L/min. Diameter 10.3mm in patent venous limb. Appears to have some branch draining from deeper vein back into cephalic system with good flow/thrill.     Assessment & Plan:   Mr. Ortiz was seen in follow-up for dialysis access complications. At this point, I think his access may survive and be suitable for use due to continued high velocity venous outflow from this deeper branch. That branch itself may not be suitable for access- though it is large it is not prominent and has likely not matured to the point of tolerating canulation. However, his venous anatomy still yields strong thrill in the initial 4cm segment of vein in his antecubital fossa (site where his fistula was previously accessed) as well as a similar segment in his upper arm.     I counseled him to complete his course of plavix and allow these two weeks for the swelling and pain in his arm to further reduce. After that, he should have his dialysis center attempt fistula access again. Either they can attempt both sticks in the segment closest to the AVF (as before) or arterialized access close to the fistula and venous access in the segment of suitable vein in the upper arm. I would NOT attempt access in the mid-arm as this vein is likely not mature and also somewhat more deep. Given time, this may mature and be suitable for access eventually as the other side involutes and also given that he is very lean.     If they are able to do several runs successfully in this method, he will schedule an appointment with us to have his CVL  removed. If they are unable to successfully dialyze through this access, he will see us to discuss creation of a new graft or fistula.     The patient was counselled to contact our nurse coordinator, ISABEL Branham (Sum), [facility] at [phone] with any questions or concerns.  Thank you for the opportunity to participate in Mr. Ortiz's care.    Total time: 20 minutes  Counseling time: 15 minutes    Chris Nicole MD

## 2020-10-28 ENCOUNTER — VIRTUAL VISIT (OUTPATIENT)
Dept: DERMATOLOGY | Facility: CLINIC | Age: 45
End: 2020-10-28
Attending: NURSE PRACTITIONER
Payer: COMMERCIAL

## 2020-10-28 ENCOUNTER — TELEPHONE (OUTPATIENT)
Dept: DERMATOLOGY | Facility: CLINIC | Age: 45
End: 2020-10-28

## 2020-10-28 DIAGNOSIS — D48.5 NEOPLASM OF UNCERTAIN BEHAVIOR OF SKIN: Primary | ICD-10-CM

## 2020-10-28 PROCEDURE — 99202 OFFICE O/P NEW SF 15 MIN: CPT | Mod: TEL | Performed by: DERMATOLOGY

## 2020-10-28 NOTE — LETTER
"    10/28/2020         RE: Rashad Ortiz  [address] [facility] [address]        Dear Colleague,    Thank you for referring your patient, Rashad Ortiz, to the [facility]. Please see a copy of my visit note below.    Teledermatology Nurse Call for NEW Patients (not seen in last 3 years):     The patient was contacted by phone and we reviewed they have a visit in teledermatology upcoming with an MD or PA-C;  Importantly, \"a teledermatology visit may not be as thorough as an in-person visit, and the quality of the photograph and/or video sent may not be of the same quality as that taken by the dermatology clinic.\"     This video visit will be conducted via a call between you and your physician/provider via [employer]. We have found that certain health care needs can be provided without the need for an in-person physical exam.  This service lets us provide the care you need with a video conversation.  If a prescription is necessary we can send it directly to your pharmacy.  If lab work is needed we can place an order for that and you can then stop by our lab to have the test done at a later time.If during the course of the call the physician/provider feels a video visit is not appropriate, you will not be charged for this service.Visits are billed at different rates depending on your insurance coverage. Please reach out to your insurance provider with any questions.    The patient will also send photographs via [employer] for review. Instructions for photography are/were sent to the patient via [employer] messaging.       The patient verified the location of the photo/video visit to be [place].(The physician must be notified by nurse if the patient is not in the state of [place] during the encounter)    The patient denied skin pain, fever, mucosal symptoms(lesions, blisters, sores in the mouth, nose, eyes, or genitals) no IF PATIENT ENDORSES ANY OF THESE STOP AND PAGE ON CALL ATTENDING    Additional " notes:  Patient summary of issue:raised, dry, patchy lesion  Location of problem on body:buttock  How long has area/symptoms been present:over a year, stared as pimple or bump  What makes it better?:Nothing  What makes it worse?:gets caught on underwear, bleeds with scratching, painful when pressure on area  Other symptoms include the following:no  Which medications have been tried, for how long, and did they make it better or worse (ex. Triamcinolone, used twice daily for 2 weeks, not improved):none  The patient has seen a dermatologist.   The patient hasno past medical history of skin cancer  ROS: The patient is generally feeling well.     Mehnaz Lyles LPN      [facility] Dermatology Record:  Store and Forward and Telephone [phone]      Dermatology Problem List:  1. Relevant medical history: kidney transplant in 2011, currently on tacrolimus, MMF for immunosuppression. Currently back on dialysis, and undergoing evaluation for second transplant.  2. NUB, left posterior thigh/buttock, will schedule for biopsy/removal    Encounter Date: Oct 28, 2020    CC:   Chief Complaint   Patient presents with     Derm Problem     raised, dry, patchy lesion       History of Present Illness:  Rashad Ortiz is a 45 year old male who presents for spot of concern.     On the buttock/thigh  At least a year, maybe longer  Hurts when pressure  Bleeds when caught    Kidney transplant in 2011, now on dialysis again and undergoing workup for a possible second transplant. Transplant is not imminent.       ROS: Patient is generally feeling well today     Physical Examination:  General: Well-appearing male, appropriately-developed individual.  Skin: Focused examination including lower extremity/buttock was performed.   -Brown verrucous pedunculated 2cm exophytic nodule on the left posterior thigh/buttock    Labs:  None    Past Medical History:   Patient Active Problem List   Diagnosis     Kidney replaced by transplant     Aftercare  following organ transplant     GERD (gastroesophageal reflux disease)     CARDIOVASCULAR SCREENING; LDL GOAL LESS THAN 160     Gout     Antibody mediated rejection of kidney transplant     Medical non-compliance     Chronic kidney disease, stage 4, severely decreased GFR (H)     Herpes simplex infection of penis     Escherichia coli septicemia (H)     Pyelonephritis of transplanted kidney     HTN, kidney transplant related     Metabolic acidosis     CKD (chronic kidney disease) stage 5, GFR less than 15 ml/min (H)     Immunosuppression (H)     Anemia of chronic renal failure, stage 5 (H)     Secondary renal hyperparathyroidism (H)     AVN (avascular necrosis of bone) (H)     Vitamin D deficiency     BPH (benign prostatic hyperplasia)     Osteonecrosis (H)     Status post hip surgery     ESRD (end stage renal disease) on dialysis (H)     Past Medical History:   Diagnosis Date     AVN (avascular necrosis of bone) (H)     left hip     BPH (benign prostatic hyperplasia)      Chronic kidney disease, stage 4, severely decreased GFR (H)      Gastro-oesophageal reflux disease      Gout      History of blood transfusion      Hypertension      Medical non-compliance      Pulmonary nodules      Steroid long-term use      Vitamin D deficiency      Past Surgical History:   Procedure Laterality Date     ARTHROPLASTY HIP Left 9/9/2020    Procedure: Left total hip arthroplasty;  Surgeon: Fernando Morillo MD;  [facility]     AV FISTULA OR GRAFT ARTERIAL       CREATE FISTULA ARTERIOVENOUS UPPER EXTREMITY Right 7/31/2019    Procedure: Creation Of Atriovenous Fistula Right Upper Arm;  Surgeon: Julia Irwin MD;  [facility] OR     IR CVC TUNNEL PLACEMENT > 5 YRS OF AGE  10/9/2020     IR DIALYSIS FISTULOGRAM RIGHT  10/9/2020     IR DIALYSIS MECH THROMB, PTA  10/9/2020     LIGATE FISTULA ARTERIOVENOUS UPPER EXTREMITY  12/20/2011    Procedure:LIGATE FISTULA ARTERIOVENOUS UPPER EXTREMITY; Excision of Right Forearm Arteriovenous  Fistula.; Surgeon:LINDY AMAYA; [facility]     PERCUTANEOUS BIOPSY KIDNEY Right 2/28/2017    Procedure: PERCUTANEOUS BIOPSY KIDNEY;  Surgeon: Gee Barrios MD;  Location:  OR     TRANSPLANT  01/13/2011    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       Social History:  Patient reports that he quit smoking about 3 years ago. His smoking use included cigarettes. He has never used smokeless tobacco. He reports previous alcohol use of about 4.2 standard drinks of alcohol per week. He reports previous drug use. Drug: Marijuana.    Family History:  Family History   Problem Relation Age of Onset     Hypertension Mother      Colon Cancer Mother 66     Colon Cancer Brother 51       Medications:  Current Outpatient Medications   Medication     acetaminophen (TYLENOL) 500 MG tablet     aspirin (ASA) 81 MG EC tablet     carvedilol (COREG) 12.5 MG tablet     cholecalciferol 25 MCG (1000 UT) TABS     clopidogrel (PLAVIX) 75 MG tablet     cyclobenzaprine (FLEXERIL) 5 MG tablet     febuxostat (ULORIC) 80 MG TABS tablet     furosemide (LASIX) 20 MG tablet     hydrOXYzine (ATARAX) 50 MG tablet     mycophenolate (GENERIC EQUIVALENT) 250 MG capsule     omeprazole (PRILOSEC) 20 MG capsule     order for DME     oxyCODONE (ROXICODONE) 5 MG tablet     senna-docusate (SENOKOT-S/PERICOLACE) 8.6-50 MG tablet     sodium bicarbonate 650 MG tablet     sulfamethoxazole-trimethoprim (BACTRIM) 400-80 MG tablet     tacrolimus (GENERIC EQUIVALENT) 0.5 MG capsule     tacrolimus (GENERIC EQUIVALENT) 1 MG capsule     tamsulosin (FLOMAX) 0.4 MG capsule     No current facility-administered medications for this visit.           No Known Allergies        Impression and Recommendations (Patient Counseled on the Following):  1. NUB, left posterior leg/buttock: Differential includes SK (favored), condyloma, and SCC. Biopsy/removal indicated. Discussed biopsy procedure with patient. Will get him scheduled in person within the month to  complete this.     2. S/p kidney transplant on current immunosuppression: Will discuss yearly skin checks with patient when I see him in person.    Follow-up:   Within the month     Staff only    Chandni Barney MD    Department of Dermatology  [facility]: Phone: [phone], Fax:[phone]  [facility]: Phone: [phone], Fax: [phone]    _____________________________________________________________________________    Teledermatology information:  - Location of patient: [place]  - Patient presented as: self referral  - Location of teledermatologist:  ([facility] )  - Reason teledermatology is appropriate:  of National Emergency Regarding Coronavirus disease (COVID 19) Outbreak  - Image quality and interpretability: acceptable  - Physician has received verbal consent for a Video/Photos Visit from the patient? YES  - In-person dermatology visit recommendation: yes - for biopsy  - Date of images: 10/26/20  - Service start time: 10:56  - Service end time: 11:00  - Date of report: 10/28/2020       Again, thank you for allowing me to participate in the care of your patient.        Sincerely,        Chandni Barney MD

## 2020-10-28 NOTE — PATIENT INSTRUCTIONS
[facility] Dermatology Visit    Thank you for allowing us to participate in your care. Your findings, instructions and follow-up plan are as follows:    Spot on back of leg/buttock: Will remove with a shave biopsy and send to pathology to ensure not cancerous, but most likely a harmless growth. Be on the lookout for a call to schedule.    When should I call my doctor?    If you are worsening or not improving, please, contact us or seek urgent care as noted below.     Who should I call with questions (adults)?    [facility] (adult and pediatric): [phone]     [facility] (adult): [phone]    For urgent needs outside of business hours call the [facility] at [phone] and ask for the dermatology resident on call    If this is a medical emergency and you are unable to reach an ER, Call [phone]      Who should I call with questions (pediatric)?  [facility]- Pediatric Dermatology  Dr. Vee Ramachandran, Dr. Amarilis Felder, Dr. Germania Macdonald, Aury Justice, PA  Dr. Roxana White, Dr. Clementina Garcia & Dr. Tadeo Bear  Non Urgent  Nurse Triage Line; [phone]- Oriana and Mirlande RODRIGUEZ Care Coordinators   [place] (/Complex ) [phone]    If you need a prescription refill, please contact your pharmacy. Refills are approved or denied by our Physicians during normal business hours, Monday through Fridays  Per office policy, refills will not be granted if you have not been seen within the past year (or sooner depending on your child's condition)    Scheduling Information:  Pediatric Appointment Scheduling and Call Center [phone]  Radiology Scheduling- [phone]  Sedation Unit Scheduling- [phone]  [place] Scheduling- General [phone]; Pediatric Dermatology [phone]  Main  Services: [phone]  Maori: [phone]  [place]:  [phone]  Melissa/Ender/German: [phone]  Preadmission Nursing Department Fax Number: [phone] (Fax all pre-operative paperwork to this number)    For urgent matters arising during evenings, weekends, or holidays that cannot wait for normal business hours please call [phone] and ask for the Dermatology Resident On-Call to be paged.

## 2020-10-28 NOTE — PROGRESS NOTES
"Teledermatology Nurse Call for NEW Patients (not seen in last 3 years):     The patient was contacted by phone and we reviewed they have a visit in teledermatology upcoming with an MD or GRANT;  Importantly, \"a teledermatology visit may not be as thorough as an in-person visit, and the quality of the photograph and/or video sent may not be of the same quality as that taken by the dermatology clinic.\"     This video visit will be conducted via a call between you and your physician/provider via [employer]. We have found that certain health care needs can be provided without the need for an in-person physical exam.  This service lets us provide the care you need with a video conversation.  If a prescription is necessary we can send it directly to your pharmacy.  If lab work is needed we can place an order for that and you can then stop by our lab to have the test done at a later time.If during the course of the call the physician/provider feels a video visit is not appropriate, you will not be charged for this service.Visits are billed at different rates depending on your insurance coverage. Please reach out to your insurance provider with any questions.    The patient will also send photographs via [employer] for review. Instructions for photography are/were sent to the patient via [employer] messaging.       The patient verified the location of the photo/video visit to be [place].(The physician must be notified by nurse if the patient is not in the state of [place] during the encounter)    The patient denied skin pain, fever, mucosal symptoms(lesions, blisters, sores in the mouth, nose, eyes, or genitals) no IF PATIENT ENDORSES ANY OF THESE STOP AND PAGE ON CALL ATTENDING    Additional notes:  Patient summary of issue:raised, dry, patchy lesion  Location of problem on body:buttock  How long has area/symptoms been present:over a year, stared as pimple or bump  What makes it better?:Nothing  What makes it worse?:gets caught on " underwear, bleeds with scratching, painful when pressure on area  Other symptoms include the following:no  Which medications have been tried, for how long, and did they make it better or worse (ex. Triamcinolone, used twice daily for 2 weeks, not improved):none  The patient has seen a dermatologist.   The patient hasno past medical history of skin cancer  ROS: The patient is generally feeling well.     Mehnaz Lyles LPN      [facility] Dermatology Record:  Store and Forward and Telephone [phone]      Dermatology Problem List:  1. Relevant medical history: kidney transplant in 2011, currently on tacrolimus, MMF for immunosuppression. Currently back on dialysis, and undergoing evaluation for second transplant.  2. NUB, left posterior thigh/buttock, will schedule for biopsy/removal    Encounter Date: Oct 28, 2020    CC:   Chief Complaint   Patient presents with     Derm Problem     raised, dry, patchy lesion       History of Present Illness:  Rashad Ortiz is a 45 year old male who presents for spot of concern.     On the buttock/thigh  At least a year, maybe longer  Hurts when pressure  Bleeds when caught    Kidney transplant in 2011, now on dialysis again and undergoing workup for a possible second transplant. Transplant is not imminent.       ROS: Patient is generally feeling well today     Physical Examination:  General: Well-appearing male, appropriately-developed individual.  Skin: Focused examination including lower extremity/buttock was performed.   -Brown verrucous pedunculated 2cm exophytic nodule on the left posterior thigh/buttock    Labs:  None    Past Medical History:   Patient Active Problem List   Diagnosis     Kidney replaced by transplant     Aftercare following organ transplant     GERD (gastroesophageal reflux disease)     CARDIOVASCULAR SCREENING; LDL GOAL LESS THAN 160     Gout     Antibody mediated rejection of kidney transplant     Medical non-compliance     Chronic kidney disease,  stage 4, severely decreased GFR (H)     Herpes simplex infection of penis     Escherichia coli septicemia (H)     Pyelonephritis of transplanted kidney     HTN, kidney transplant related     Metabolic acidosis     CKD (chronic kidney disease) stage 5, GFR less than 15 ml/min (H)     Immunosuppression (H)     Anemia of chronic renal failure, stage 5 (H)     Secondary renal hyperparathyroidism (H)     AVN (avascular necrosis of bone) (H)     Vitamin D deficiency     BPH (benign prostatic hyperplasia)     Osteonecrosis (H)     Status post hip surgery     ESRD (end stage renal disease) on dialysis (H)     Past Medical History:   Diagnosis Date     AVN (avascular necrosis of bone) (H)     left hip     BPH (benign prostatic hyperplasia)      Chronic kidney disease, stage 4, severely decreased GFR (H)      Gastro-oesophageal reflux disease      Gout      History of blood transfusion      Hypertension      Medical non-compliance      Pulmonary nodules      Steroid long-term use      Vitamin D deficiency      Past Surgical History:   Procedure Laterality Date     ARTHROPLASTY HIP Left 9/9/2020    Procedure: Left total hip arthroplasty;  Surgeon: Fernando Morillo MD;  [facility] OR     AV FISTULA OR GRAFT ARTERIAL       CREATE FISTULA ARTERIOVENOUS UPPER EXTREMITY Right 7/31/2019    Procedure: Creation Of Atriovenous Fistula Right Upper Arm;  Surgeon: Julia Irwin MD;  [facility]: [facility] OR     IR CVC TUNNEL PLACEMENT > 5 YRS OF AGE  10/9/2020     IR DIALYSIS FISTULOGRAM RIGHT  10/9/2020     IR DIALYSIS MECH THROMB, PTA  10/9/2020     LIGATE FISTULA ARTERIOVENOUS UPPER EXTREMITY  12/20/2011    Procedure:LIGATE FISTULA ARTERIOVENOUS UPPER EXTREMITY; Excision of Right Forearm Arteriovenous Fistula.; Surgeon:LINDY AMAYA; Location[facility] OR     PERCUTANEOUS BIOPSY KIDNEY Right 2/28/2017    Procedure: PERCUTANEOUS BIOPSY KIDNEY;  Surgeon: Gee Barrios MD;  [facility]     TRANSPLANT  01/13/2011    Living  related kidney transplant from sister       Social History:  Patient reports that he quit smoking about 3 years ago. His smoking use included cigarettes. He has never used smokeless tobacco. He reports previous alcohol use of about 4.2 standard drinks of alcohol per week. He reports previous drug use. Drug: Marijuana.    Family History:  Family History   Problem Relation Age of Onset     Hypertension Mother      Colon Cancer Mother 66     Colon Cancer Brother 51       Medications:  Current Outpatient Medications   Medication     acetaminophen (TYLENOL) 500 MG tablet     aspirin (ASA) 81 MG EC tablet     carvedilol (COREG) 12.5 MG tablet     cholecalciferol 25 MCG (1000 UT) TABS     clopidogrel (PLAVIX) 75 MG tablet     cyclobenzaprine (FLEXERIL) 5 MG tablet     febuxostat (ULORIC) 80 MG TABS tablet     furosemide (LASIX) 20 MG tablet     hydrOXYzine (ATARAX) 50 MG tablet     mycophenolate (GENERIC EQUIVALENT) 250 MG capsule     omeprazole (PRILOSEC) 20 MG capsule     order for DME     oxyCODONE (ROXICODONE) 5 MG tablet     senna-docusate (SENOKOT-S/PERICOLACE) 8.6-50 MG tablet     sodium bicarbonate 650 MG tablet     sulfamethoxazole-trimethoprim (BACTRIM) 400-80 MG tablet     tacrolimus (GENERIC EQUIVALENT) 0.5 MG capsule     tacrolimus (GENERIC EQUIVALENT) 1 MG capsule     tamsulosin (FLOMAX) 0.4 MG capsule     No current facility-administered medications for this visit.           No Known Allergies        Impression and Recommendations (Patient Counseled on the Following):  1. NUB, left posterior leg/buttock: Differential includes SK (favored), condyloma, and SCC. Biopsy/removal indicated. Discussed biopsy procedure with patient. Will get him scheduled in person within the month to complete this.     2. S/p kidney transplant on current immunosuppression: Will discuss yearly skin checks with patient when I see him in person.    Follow-up:   Within the month     Staff only    Chandni Barney MD  Assistant  Professor  Department of Dermatology  [facility]: Phone: [phone], Fax:[phone]  [facility]: Phone: [phone], Fax: [phone]    _____________________________________________________________________________    Teledermatology information:  - Location of patient: [place]  - Patient presented as: self referral  - Location of teledermatologist:  [facility] )  - Reason teledermatology is appropriate:  of National Emergency Regarding Coronavirus disease (COVID 19) Outbreak  - Image quality and interpretability: acceptable  - Physician has received verbal consent for a Video/Photos Visit from the patient? YES  - In-person dermatology visit recommendation: yes - for biopsy  - Date of images: 10/26/20  - Service start time: 10:56  - Service end time: 11:00  - Date of report: 10/28/2020

## 2020-10-28 NOTE — MR AVS SNAPSHOT
After Visit Summary   11/3/2018    Rashad Ortiz    MRN: [id number]           Patient Information     Date Of Birth          1975        Visit Information        Provider Department      11/3/2018 10:35 AM Carlos Mosqueda PA-C [facility]        Today's Diagnoses     Thrush    -  1    Immunosuppression (H)        HTN, kidney transplant related           Follow-ups after your visit        Your next 10 appointments already scheduled     Nov 06, 2018  6:00 PM CST   MyChart Sports Medicine New with Jimmy Kemp DO   [facility] [place] ([facility] [place])    [address]  [place] [address]   [phone]            Nov 08, 2018  2:30 PM CST   (Arrive by 2:15 PM)   New Patient Visit with KAILA Kaufman MD   [facility] ([facility])    [address]  [address] [facility] [id number] [phone]            Nov 14, 2018  2:00 PM CST   FULL PULMONARY FUNCTION with  P[facility] Pulmonary Function Testing ([facility])    [address] [id number] [phone]            Nov 14, 2018  3:30 PM CST   (Arrive by 3:15 PM)   New Patient Visit with Gelacio Jimenez MD   [facility] ([facility])    [address]  [address]  [place] [id number] [phone]              Who to contact     If you have questions or need follow up information about today's clinic visit or your schedule please contact [facility] directly at [phone].  Normal or non-critical lab and imaging results will be communicated to you by MyChart, letter or phone within 4 business days after the clinic has received the results. If you do not hear from us within 7 days, please contact the clinic through MyChart or phone. If you have a critical or abnormal lab result,  dog bite lt lower leg  yesterday , seen at [facility] yesterday , no rabies shot given , started on augmentin , sent by PMD , no PMH/PSH , IUTD , NKDA , HR correlates, no redness, no drainage noted we will notify you by phone as soon as possible.  Submit refill requests through [employer] or call your pharmacy and they will forward the refill request to us. Please allow 3 business days for your refill to be completed.          Additional Information About Your Visit        [employer] Information     [employer] gives you secure access to your electronic health record. If you see a primary care provider, you can also send messages to your care team and make appointments. If you have questions, please call your primary care clinic.  If you do not have a primary care provider, please call [phone] and they will assist you.        Care EveryWhere ID     This is your Care EveryWhere ID. This could be used by other organizations to access your [place] medical records  [id number]        Your Vitals Were     Pulse Temperature Respirations Pulse Oximetry BMI (Body Mass Index)       88 98.4  F (36.9  C) (Oral) 18 99% 23.34 kg/m2        Blood Pressure from Last 3 Encounters:   11/03/18 (!) 148/98   11/01/18 129/88   10/28/18 (!) 129/95    Weight from Last 3 Encounters:   11/03/18 178 lb (80.7 kg)   11/01/18 175 lb 9.6 oz (79.7 kg)   10/26/18 181 lb (82.1 kg)              Today, you had the following     No orders found for display         Today's Medication Changes          These changes are accurate as of 11/3/18 11:07 AM.  If you have any questions, ask your nurse or doctor.               Start taking these medicines.        Dose/Directions    nystatin 074482 UNIT/ML suspension   Commonly known as:  MYCOSTATIN   Used for:  Thrush   Started by:  Carlos Mosqueda PA-C        Dose:  [id number] Units   Swish and swallow 2 mLs (200,000 Units) in mouth 4 times daily for 10 days   Quantity:  80 mL   Refills:  0            Where to get your medicines      These medications were sent to [facility] - [place], [place] - [address] AT [facility] [address] [place] [address]      Phone:  [phone]     nystatin [id number] UNIT/ML suspension                Primary Care Provider Office Phone # Fax #    Mariel Austincliff Mares [phone] [address] [place]  [facility] [address]        Equal Access to Services     [facility] MATT : Hadii aad ku hadasho Soomaali, waaxda luqadaha, qaybta kaalmada adeegyada, waxay sulyin haydeanna galindovijayfernando donahue. So [facility] [phone].    ATENCIÓN: Si habla español, tiene a ward disposición servicios gratuitos de asistencia lingüística. [facility] [phone].    We comply with applicable federal civil rights laws and [place] laws. We do not discriminate on the basis of race, color, national origin, age, disability, sex, sexual orientation, or gender identity.            Thank you!     Thank you for choosing [facility]  for your care. Our goal is always to provide you with excellent care. Hearing back from our patients is one way we can continue to improve our services. Please take a few minutes to complete the written survey that you may receive in the mail after your visit with us. Thank you!             Your Updated Medication List - Protect others around you: Learn how to safely use, store and throw away your medicines at www.disposemymeds.org.          This list is accurate as of 11/3/18 11:07 AM.  Always use your most recent med list.                   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, unspecified site       amLODIPine 5 MG tablet    NORVASC    90 tablet    Take 1 tablet (5 mg) by mouth daily    Benign essential hypertension       carvedilol 25 MG tablet    COREG    60 tablet    Take 0.5 tablets (12.5 mg) by mouth 2 times daily    DEIDRA (acute kidney injury) (H)       furosemide 20 MG tablet    LASIX    180 tablet    Take 1 tablet (20 mg) by  mouth 2 times daily    DEIDRA (acute kidney injury) (H)       levofloxacin 500 MG tablet    LEVAQUIN    7 tablet    Take 1 tablet (500 mg) by mouth every other day    Escherichia coli septicemia (H)       mycophenolate 250 MG capsule    GENERIC EQUIVALENT    120 capsule    Take 2 capsules (500 mg) by mouth 2 times daily    Kidney transplanted       nitazoxanide 500 MG tablet    ALINIA    24 tablet    Take 1 tablet (500 mg) by mouth every 12 hours for 12 days    Gastroenteritis due to norovirus       nystatin [id number] UNIT/ML suspension    MYCOSTATIN    80 mL    Swish and swallow 2 mLs (200,000 Units) in mouth 4 times daily for 10 days    Thrush       omeprazole 20 MG CR capsule    priLOSEC    90 capsule    Take 1 capsule (20 mg) by mouth daily    Kidney replaced by transplant       predniSONE 10 MG tablet    DELTASONE    30 tablet    Take 4 tabs (40 mg) by mouth daily x 3 days, 2 tabs (20 mg) daily x 3 days, 1 tab (10 mg) daily x 3 days, then 1/2 tab (5 mg) -- continue.    Kidney replaced by transplant       sodium bicarbonate 650 MG tablet     180 tablet    Take 2 tablets (1,300 mg) by mouth 3 times daily    Encounter for long-term (current) use of high-risk medication, Kidney replaced by transplant, Metabolic acidosis       sulfamethoxazole-trimethoprim 400-80 MG per tablet    BACTRIM/SEPTRA    45 tablet    Take 1 tablet by mouth every other day    Kidney transplanted       tacrolimus 1 MG capsule    GENERIC EQUIVALENT    120 capsule    Take 2 capsules (2 mg) by mouth 2 times daily    Kidney transplant recipient, Long-term use of immunosuppressant medication

## 2020-10-28 NOTE — TELEPHONE ENCOUNTER
Scheduled pt for in person visit tomorrow.    RAMYA Sanabria Lori, MD  [facility] Dermatology Adult [place]             Patient needs in person visit for shave biopsy with cautery on leg/buttock within the next month.     Thanks,   LF

## 2020-10-28 NOTE — Clinical Note
Patient needs in person visit for shave biopsy with cautery on leg/buttock within the next month.     Thanks,  LF

## 2020-10-29 ENCOUNTER — TELEPHONE (OUTPATIENT)
Dept: DERMATOLOGY | Facility: CLINIC | Age: 45
End: 2020-10-29

## 2020-10-29 NOTE — TELEPHONE ENCOUNTER
[facility] Call Center    Phone Message    May a detailed message be left on voicemail: no     Reason for Call: Other: Pt missed his biopsy appt today.  Asking for a call back to reschedule as soon as possible.      Action Taken: Message routed to:  Adult Clinics: Dermatology p [phone]    Travel Screening:

## 2020-11-16 ENCOUNTER — HEALTH MAINTENANCE LETTER (OUTPATIENT)
Age: 45
End: 2020-11-16

## 2020-11-16 ENCOUNTER — HOSPITAL ENCOUNTER (OUTPATIENT)
Dept: CARDIOLOGY | Facility: CLINIC | Age: 45
Discharge: HOME | End: 2020-11-16
Attending: INTERNAL MEDICINE | Admitting: INTERNAL MEDICINE
Payer: COMMERCIAL

## 2020-11-16 DIAGNOSIS — Z76.82 ORGAN TRANSPLANT CANDIDATE: ICD-10-CM

## 2020-11-16 DIAGNOSIS — N18.6 ESRD (END STAGE RENAL DISEASE) (H): ICD-10-CM

## 2020-11-16 PROCEDURE — 93016 CV STRESS TEST SUPVJ ONLY: CPT | Performed by: STUDENT IN AN ORGANIZED HEALTH CARE EDUCATION/TRAINING PROGRAM

## 2020-11-16 PROCEDURE — 250N000011 HC RX IP 250 OP 636: Performed by: STUDENT IN AN ORGANIZED HEALTH CARE EDUCATION/TRAINING PROGRAM

## 2020-11-16 PROCEDURE — 250N000009 HC RX 250: Performed by: STUDENT IN AN ORGANIZED HEALTH CARE EDUCATION/TRAINING PROGRAM

## 2020-11-16 PROCEDURE — 93321 DOPPLER ECHO F-UP/LMTD STD: CPT | Mod: 26 | Performed by: STUDENT IN AN ORGANIZED HEALTH CARE EDUCATION/TRAINING PROGRAM

## 2020-11-16 PROCEDURE — 93018 CV STRESS TEST I&R ONLY: CPT | Performed by: STUDENT IN AN ORGANIZED HEALTH CARE EDUCATION/TRAINING PROGRAM

## 2020-11-16 PROCEDURE — 93350 STRESS TTE ONLY: CPT | Mod: 26 | Performed by: STUDENT IN AN ORGANIZED HEALTH CARE EDUCATION/TRAINING PROGRAM

## 2020-11-16 PROCEDURE — 255N000002 HC RX 255 OP 636: Performed by: STUDENT IN AN ORGANIZED HEALTH CARE EDUCATION/TRAINING PROGRAM

## 2020-11-16 PROCEDURE — 93325 DOPPLER ECHO COLOR FLOW MAPG: CPT | Mod: 26 | Performed by: STUDENT IN AN ORGANIZED HEALTH CARE EDUCATION/TRAINING PROGRAM

## 2020-11-16 PROCEDURE — 93321 DOPPLER ECHO F-UP/LMTD STD: CPT | Mod: TC

## 2020-11-16 RX ORDER — ATROPINE SULFATE 0.4 MG/ML
.2-2 AMPUL (ML) INJECTION
Status: COMPLETED | OUTPATIENT
Start: 2020-11-16 | End: 2020-11-16

## 2020-11-16 RX ORDER — METOPROLOL TARTRATE 1 MG/ML
1-20 INJECTION, SOLUTION INTRAVENOUS
Status: ACTIVE | OUTPATIENT
Start: 2020-11-16 | End: 2020-11-16

## 2020-11-16 RX ORDER — DOBUTAMINE HYDROCHLORIDE 200 MG/100ML
10-50 INJECTION INTRAVENOUS CONTINUOUS
Status: ACTIVE | OUTPATIENT
Start: 2020-11-16 | End: 2020-11-16

## 2020-11-16 RX ADMIN — DOBUTAMINE HYDROCHLORIDE 10 MCG/KG/MIN: 200 INJECTION INTRAVENOUS at 10:49

## 2020-11-16 RX ADMIN — PERFLUTREN 7 ML: 6.52 INJECTION, SUSPENSION INTRAVENOUS at 11:04

## 2020-11-16 RX ADMIN — ATROPINE SULFATE 1 MG: 0.4 INJECTION, SOLUTION INTRAMUSCULAR; INTRAVENOUS; SUBCUTANEOUS at 10:58

## 2020-11-16 RX ADMIN — METOPROLOL TARTRATE 7 MG: 5 INJECTION INTRAVENOUS at 11:08

## 2020-11-16 NOTE — PROGRESS NOTES
Pt here for dobutamine stress test.  Test, meds and side effects reviewed with patient.  Achieved target HR at 40 mcg Dobutamine and a total of 1 mg IV atropine.  Gave a total of 7 mg IV Metoprolol to bring HR back to baseline.  Post monitoring complete and VSS.  Pt escorted out to the gold waiting room.

## 2020-11-17 ENCOUNTER — OFFICE VISIT (OUTPATIENT)
Dept: DERMATOLOGY | Facility: CLINIC | Age: 45
End: 2020-11-17
Payer: COMMERCIAL

## 2020-11-17 DIAGNOSIS — D48.5 NEOPLASM OF UNCERTAIN BEHAVIOR OF SKIN: Primary | ICD-10-CM

## 2020-11-17 PROCEDURE — 11102 TANGNTL BX SKIN SINGLE LES: CPT | Performed by: DERMATOLOGY

## 2020-11-17 PROCEDURE — 88305 TISSUE EXAM BY PATHOLOGIST: CPT | Performed by: DERMATOLOGY

## 2020-11-17 ASSESSMENT — PAIN SCALES - GENERAL: PAINLEVEL: MODERATE PAIN (5)

## 2020-11-17 NOTE — PROGRESS NOTES
[facility] Dermatology Note    Dermatology Problem List:  1. Relevant medical history: kidney transplant in 2011, currently on tacrolimus, MMF for immunosuppression. Currently back on dialysis, and undergoing evaluation for second transplant.  2. NUB, left posterior thigh, s/p biopsy 10/29/20    Encounter Date: Nov 17, 2020    CC:  Chief Complaint   Patient presents with     Derm Problem     Rashad is here today for bx on left thigh       History of Present Illness:  Mr. Rashad Ortiz is a 45 year old male who presents for biopsy of skin lesion.    He was seen 10/28/20 for virtual visit. Plan was to remove skin lesion for diagnosis and treatment purposes. No changes since virtual visit. Still painful.    No other concerns addressed today.    Past Medical History:   Patient Active Problem List   Diagnosis     Kidney replaced by transplant     Aftercare following organ transplant     GERD (gastroesophageal reflux disease)     CARDIOVASCULAR SCREENING; LDL GOAL LESS THAN 160     Gout     Antibody mediated rejection of kidney transplant     Medical non-compliance     Chronic kidney disease, stage 4, severely decreased GFR (H)     Herpes simplex infection of penis     Escherichia coli septicemia (H)     Pyelonephritis of transplanted kidney     HTN, kidney transplant related     Metabolic acidosis     CKD (chronic kidney disease) stage 5, GFR less than 15 ml/min (H)     Immunosuppression (H)     Anemia of chronic renal failure, stage 5 (H)     Secondary renal hyperparathyroidism (H)     AVN (avascular necrosis of bone) (H)     Vitamin D deficiency     BPH (benign prostatic hyperplasia)     Osteonecrosis (H)     Status post hip surgery     ESRD (end stage renal disease) on dialysis (H)     Past Medical History:   Diagnosis Date     AVN (avascular necrosis of bone) (H)     left hip     BPH (benign prostatic hyperplasia)      Chronic kidney disease, stage 4, severely decreased GFR (H)       Gastro-oesophageal reflux disease      Gout      History of blood transfusion      Hypertension      Medical non-compliance      Pulmonary nodules      Steroid long-term use      Vitamin D deficiency      Past Surgical History:   Procedure Laterality Date     ARTHROPLASTY HIP Left 9/9/2020    Procedure: Left total hip arthroplasty;  Surgeon: Fernando Morillo MD;  [facility] OR     AV FISTULA OR GRAFT ARTERIAL       CREATE FISTULA ARTERIOVENOUS UPPER EXTREMITY Right 7/31/2019    Procedure: Creation Of Atriovenous Fistula Right Upper Arm;  Surgeon: Julia Irwin MD;  [facility]: [facility] OR     IR CVC TUNNEL PLACEMENT > 5 YRS OF AGE  10/9/2020     IR DIALYSIS FISTULOGRAM RIGHT  10/9/2020     IR DIALYSIS MECH THROMB, PTA  10/9/2020     LIGATE FISTULA ARTERIOVENOUS UPPER EXTREMITY  12/20/2011    Procedure:LIGATE FISTULA ARTERIOVENOUS UPPER EXTREMITY; Excision of Right Forearm Arteriovenous Fistula.; Surgeon:LINDY AMAYA; [facility]     PERCUTANEOUS BIOPSY KIDNEY Right 2/28/2017    Procedure: PERCUTANEOUS BIOPSY KIDNEY;  Surgeon: Gee Barrios MD;  [facility]     TRANSPLANT  01/13/2011    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       Social History:  Patient reports that he quit smoking about 3 years ago. His smoking use included cigarettes. He has never used smokeless tobacco. He reports previous alcohol use of about 4.2 standard drinks of alcohol per week. He reports previous drug use. Drug: Marijuana.   Reviewed and left in chart for clinician convenience.       Family History:  Family History   Problem Relation Age of Onset     Hypertension Mother      Colon Cancer Mother 66     Colon Cancer Brother 51   Reviewed and left in chart for clinician convenience.       Medications:  Current Outpatient Medications   Medication Sig Dispense Refill     acetaminophen (TYLENOL) 500 MG tablet Take 2 tablets (1,000 mg) by mouth every 8 hours as needed for mild pain 160 tablet 3     carvedilol (COREG)  12.5 MG tablet Take 1 tablet (12.5 mg) by mouth 2 times daily (with meals) 60 tablet 3     cholecalciferol 25 MCG (1000 UT) TABS Take 2,000 Units by mouth daily 90 tablet 3     febuxostat (ULORIC) 80 MG TABS tablet Take 1 tablet (80 mg) by mouth daily 90 tablet 3     furosemide (LASIX) 20 MG tablet        mycophenolate (GENERIC EQUIVALENT) 250 MG capsule Take 2 capsules (500 mg) by mouth 2 times daily 120 capsule 11     order for DME Equipment being ordered: Crutches 1 Device 0     sulfamethoxazole-trimethoprim (BACTRIM) 400-80 MG tablet Take 1 tablet by mouth every other day 45 tablet 3     tacrolimus (GENERIC EQUIVALENT) 0.5 MG capsule Take 1 capsule (0.5 mg) by mouth every evening Total dose = 1 mg in the AM and 1.5 mg in the PM 30 capsule 11     tacrolimus (GENERIC EQUIVALENT) 1 MG capsule Take 1 capsule (1 mg) by mouth 2 times daily . Total dose=1mg in the AM and 1.5mg in the PM 60 capsule 11     clopidogrel (PLAVIX) 75 MG tablet Take 1 tablet (75 mg) by mouth daily 30 tablet 0     sodium bicarbonate 650 MG tablet Take 2 tablets (1,300 mg) by mouth 3 times daily (Patient not taking: Reported on 9/25/2020) 180 tablet 11       No Known Allergies    Review of Systems:  -Constitutional: Patient is otherwise feeling well, in usual state of health.   -Skin: As above in HPI. No additional skin concerns.    Physical exam:  Vitals: There were no vitals taken for this visit.  GEN: This is a well developed, well-nourished male in no acute distress, in a pleasant mood.    SKIN: Focused examination of the left posterior leg/buttock was performed.  - Hensley Type V  - Brown verrucous pedunculated 2cm exophytic nodule on the left posterior thigh/buttock  - No other lesions of concern on areas examined.     Impression/Plan:    1. Neoplasm of uncertain behavior on the left posterior thigh. The differential diagnosis includes condyloma, SK, SCC. I did consider patient's transplant status which puts him at higher risk of skin  cancer even with his skin type.  - Shave biopsy:  After discussion of benefits and risks including but not limited to bleeding/bruising, pain/swelling, infection, scar, incomplete removal, nerve damage/numbness, recurrence, and non-diagnostic biopsy, written consent, verbal consent and photographs were obtained. Time-out was performed. The area was cleaned with isopropyl alcohol. 0.5ml of 1% lidocaine with 1:100,000 epinephrine was injected to obtain adequate anesthesia. A shave biopsy was performed. Hemostasis was achieved with electric cautery. Vaseline and a sterile dressing were applied. The patient tolerated the procedure and no complications were noted. The patient was provided with verbal and written post care instructions.  - Will call or mychart with results.      Follow-up TBD.    Staff Involved:  Staff and scribe    Scribe Disclosure:   RENA, Donna Bills LPN, am serving as a scribe to document services personally performed by Dr. Barney, based on data collection and the provider's statements to me.      Provider Disclosure:   The documentation recorded by the scribe accurately reflects the services I personally performed and the decisions made by me.    Chandni Barney MD    Department of Dermatology  [facility]: Phone: [phone], Fax:[phone]  [facility]: Phone: [phone], Fax: [phone]

## 2020-11-17 NOTE — PATIENT INSTRUCTIONS

## 2020-11-17 NOTE — LETTER
11/17/2020         RE: Rashad Ortiz  [address] [place] [address]        Dear Colleague,    Thank you for referring your patient, Rashad Ortiz, to the [facility]. Please see a copy of my visit note below.    [facility] Dermatology Note    Dermatology Problem List:  1. Relevant medical history: kidney transplant in 2011, currently on tacrolimus, MMF for immunosuppression. Currently back on dialysis, and undergoing evaluation for second transplant.  2. NUB, left posterior thigh, s/p biopsy 10/29/20    Encounter Date: Nov 17, 2020    CC:  Chief Complaint   Patient presents with     Derm Problem     Rashad is here today for bx on left thigh       History of Present Illness:  Mr. Rashad Ortiz is a 45 year old male who presents for biopsy of skin lesion.    He was seen 10/28/20 for virtual visit. Plan was to remove skin lesion for diagnosis and treatment purposes. No changes since virtual visit. Still painful.    No other concerns addressed today.    Past Medical History:   Patient Active Problem List   Diagnosis     Kidney replaced by transplant     Aftercare following organ transplant     GERD (gastroesophageal reflux disease)     CARDIOVASCULAR SCREENING; LDL GOAL LESS THAN 160     Gout     Antibody mediated rejection of kidney transplant     Medical non-compliance     Chronic kidney disease, stage 4, severely decreased GFR (H)     Herpes simplex infection of penis     Escherichia coli septicemia (H)     Pyelonephritis of transplanted kidney     HTN, kidney transplant related     Metabolic acidosis     CKD (chronic kidney disease) stage 5, GFR less than 15 ml/min (H)     Immunosuppression (H)     Anemia of chronic renal failure, stage 5 (H)     Secondary renal hyperparathyroidism (H)     AVN (avascular necrosis of bone) (H)     Vitamin D deficiency     BPH (benign prostatic hyperplasia)     Osteonecrosis (H)     Status post hip surgery     ESRD (end stage  renal disease) on dialysis (H)     Past Medical History:   Diagnosis Date     AVN (avascular necrosis of bone) (H)     left hip     BPH (benign prostatic hyperplasia)      Chronic kidney disease, stage 4, severely decreased GFR (H)      Gastro-oesophageal reflux disease      Gout      History of blood transfusion      Hypertension      Medical non-compliance      Pulmonary nodules      Steroid long-term use      Vitamin D deficiency      Past Surgical History:   Procedure Laterality Date     ARTHROPLASTY HIP Left 9/9/2020    Procedure: Left total hip arthroplasty;  Surgeon: Fernando Morillo MD;  [facility]     AV FISTULA OR GRAFT ARTERIAL       CREATE FISTULA ARTERIOVENOUS UPPER EXTREMITY Right 7/31/2019    Procedure: Creation Of Atriovenous Fistula Right Upper Arm;  Surgeon: Julia Irwin MD;  [facility]: [facility] OR     IR CVC TUNNEL PLACEMENT > 5 YRS OF AGE  10/9/2020     IR DIALYSIS FISTULOGRAM RIGHT  10/9/2020     IR DIALYSIS MECH THROMB, PTA  10/9/2020     LIGATE FISTULA ARTERIOVENOUS UPPER EXTREMITY  12/20/2011    Procedure:LIGATE FISTULA ARTERIOVENOUS UPPER EXTREMITY; Excision of Right Forearm Arteriovenous Fistula.; Surgeon:LINDY AMAYA; [facility] OR     PERCUTANEOUS BIOPSY KIDNEY Right 2/28/2017    Procedure: PERCUTANEOUS BIOPSY KIDNEY;  Surgeon: Gee Barrios MD;  [facility]     TRANSPLANT  01/13/2011    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       Social History:  Patient reports that he quit smoking about 3 years ago. His smoking use included cigarettes. He has never used smokeless tobacco. He reports previous alcohol use of about 4.2 standard drinks of alcohol per week. He reports previous drug use. Drug: Marijuana.   Reviewed and left in chart for clinician convenience.       Family History:  Family History   Problem Relation Age of Onset     Hypertension Mother      Colon Cancer Mother 66     Colon Cancer Brother 51   Reviewed and left in chart for clinician  convenience.       Medications:  Current Outpatient Medications   Medication Sig Dispense Refill     acetaminophen (TYLENOL) 500 MG tablet Take 2 tablets (1,000 mg) by mouth every 8 hours as needed for mild pain 160 tablet 3     carvedilol (COREG) 12.5 MG tablet Take 1 tablet (12.5 mg) by mouth 2 times daily (with meals) 60 tablet 3     cholecalciferol 25 MCG (1000 UT) TABS Take 2,000 Units by mouth daily 90 tablet 3     febuxostat (ULORIC) 80 MG TABS tablet Take 1 tablet (80 mg) by mouth daily 90 tablet 3     furosemide (LASIX) 20 MG tablet        mycophenolate (GENERIC EQUIVALENT) 250 MG capsule Take 2 capsules (500 mg) by mouth 2 times daily 120 capsule 11     order for DME Equipment being ordered: Crutches 1 Device 0     sulfamethoxazole-trimethoprim (BACTRIM) 400-80 MG tablet Take 1 tablet by mouth every other day 45 tablet 3     tacrolimus (GENERIC EQUIVALENT) 0.5 MG capsule Take 1 capsule (0.5 mg) by mouth every evening Total dose = 1 mg in the AM and 1.5 mg in the PM 30 capsule 11     tacrolimus (GENERIC EQUIVALENT) 1 MG capsule Take 1 capsule (1 mg) by mouth 2 times daily . Total dose=1mg in the AM and 1.5mg in the PM 60 capsule 11     clopidogrel (PLAVIX) 75 MG tablet Take 1 tablet (75 mg) by mouth daily 30 tablet 0     sodium bicarbonate 650 MG tablet Take 2 tablets (1,300 mg) by mouth 3 times daily (Patient not taking: Reported on 9/25/2020) 180 tablet 11       No Known Allergies    Review of Systems:  -Constitutional: Patient is otherwise feeling well, in usual state of health.   -Skin: As above in HPI. No additional skin concerns.    Physical exam:  Vitals: There were no vitals taken for this visit.  GEN: This is a well developed, well-nourished male in no acute distress, in a pleasant mood.    SKIN: Focused examination of the left posterior leg/buttock was performed.  - Hensley Type V  - Brown verrucous pedunculated 2cm exophytic nodule on the left posterior thigh/buttock  - No other lesions of  concern on areas examined.     Impression/Plan:    1. Neoplasm of uncertain behavior on the left posterior thigh. The differential diagnosis includes condyloma, SK, SCC. I did consider patient's transplant status which puts him at higher risk of skin cancer even with his skin type.  - Shave biopsy:  After discussion of benefits and risks including but not limited to bleeding/bruising, pain/swelling, infection, scar, incomplete removal, nerve damage/numbness, recurrence, and non-diagnostic biopsy, written consent, verbal consent and photographs were obtained. Time-out was performed. The area was cleaned with isopropyl alcohol. 0.5ml of 1% lidocaine with 1:100,000 epinephrine was injected to obtain adequate anesthesia. A shave biopsy was performed. Hemostasis was achieved with electric cautery. Vaseline and a sterile dressing were applied. The patient tolerated the procedure and no complications were noted. The patient was provided with verbal and written post care instructions.  - Will call or mychart with results.      Follow-up TBD.    Staff Involved:  Staff and scribe    Scribe Disclosure:   I, Donna Bills LPN, am serving as a scribe to document services personally performed by Dr. Barney, based on data collection and the provider's statements to me.      Provider Disclosure:   The documentation recorded by the scribe accurately reflects the services I personally performed and the decisions made by me.    Chandni Barney MD    Department of Dermatology  [facility]: Phone: [phone], Fax:[phone]  [facility]: Phone: [phone], Fax: [phone]                Again, thank you for allowing me to participate in the care of your patient.        Sincerely,        Chandni Barney MD

## 2020-11-17 NOTE — NURSING NOTE
Rashad Ortiz's goals for this visit include:   Chief Complaint   Patient presents with     Derm Problem     Rashad is here today for bx on left thigh       He requests these members of his care team be copied on today's visit information:     PCP: Mariel Garcia    Referring Provider:  No referring provider defined for this encounter.    There were no vitals taken for this visit.    Do you need any medication refills at today's visit? No    Donna Bills LPN

## 2020-11-17 NOTE — NURSING NOTE
The following medication was given:     MEDICATION:  Lidocaine with epinephrine 1% [id number]  ROUTE: SQ  SITE: see procedure note  DOSE: 1.5cc  LOT #: -[id number]  : [place]  EXPIRATION DATE: 02/2021  NDC#: 9842-1827-89   Was there drug waste? 1.5cc  Multi-dose vial: Yes    Donna Bills LPN  November 17, 2020

## 2020-11-18 ENCOUNTER — TELEPHONE (OUTPATIENT)
Dept: TRANSPLANT | Facility: CLINIC | Age: 45
End: 2020-11-18

## 2020-11-21 LAB — COPATH REPORT: NORMAL

## 2020-11-24 ENCOUNTER — TELEPHONE (OUTPATIENT)
Dept: DERMATOLOGY | Facility: CLINIC | Age: 45
End: 2020-11-24

## 2020-11-24 NOTE — TELEPHONE ENCOUNTER
Zena Balbuena LPN   11/24/2020 10:42 AM CST      Patient returned clinics call and informed of diagnosis and MOHS procedure. MOHS procedure and aftercare reviewed with patient. Patient would like prophylactic antibiotic sent to [facility] in [place], patient takes Bactrim EOD. Hibiclens mailed to patient per his request.     Patient scheduled for consult on 11/30/2020 and MOHS procedure 12/9/2020.     Zena Balbuena LPN

## 2020-11-24 NOTE — TELEPHONE ENCOUNTER
[facility] Dermatologic Surgery Pre-Surgery Screening Note     Pre-screening Information:  Hx of Skin Cancer: No  Hx of Mohs Surgery: No  Transplant: Kidney  Year of Kidney Transplant:: 2011  Immunocompromised: Yes  Current Anticoagulant(s): None  Bleeding Disorder(s): No  Stent: No  Pacemaker: No  Defibrillator: No  Brain/Nerve Stimulator: No  Endocarditis/Rheumatic Fever Hx: No  Vascular graft: No  Prophylactic Antibiotic Needed: Yes  Prophylactic Antibotic: Keflex  Congenital heart defect: No  Prosthetic Heart Valve: No  Lesion on Leg/Groin: Yes  Prosthetic Joint : No  Diabetic: No  HIV/AIDS: No  Hepatitis: No  Pregnant: No  Prior Problem with Local Anesthesia: No  Current Tobacco Use: No  Current Alcohol Use: No   needed?: No  Do you have mobility issues?: No  Do you use any assistive devices/DME?: No  Do you have any issues with lying for long periods of time?: No      Medications/Allergies  Medications and allergies review with patient: Yes     Current Outpatient Medications   Medication Sig Dispense Refill     acetaminophen (TYLENOL) 500 MG tablet Take 2 tablets (1,000 mg) by mouth every 8 hours as needed for mild pain 160 tablet 3     carvedilol (COREG) 12.5 MG tablet Take 1 tablet (12.5 mg) by mouth 2 times daily (with meals) 60 tablet 3     cholecalciferol 25 MCG (1000 UT) TABS Take 2,000 Units by mouth daily 90 tablet 3     clopidogrel (PLAVIX) 75 MG tablet Take 1 tablet (75 mg) by mouth daily 30 tablet 0     febuxostat (ULORIC) 80 MG TABS tablet Take 1 tablet (80 mg) by mouth daily 90 tablet 3     furosemide (LASIX) 20 MG tablet        mycophenolate (GENERIC EQUIVALENT) 250 MG capsule Take 2 capsules (500 mg) by mouth 2 times daily 120 capsule 11     order for DME Equipment being ordered: Crutches 1 Device 0     sodium bicarbonate 650 MG tablet Take 2 tablets (1,300 mg) by mouth 3 times daily (Patient not taking: Reported on 9/25/2020) 180 tablet 11      sulfamethoxazole-trimethoprim (BACTRIM) 400-80 MG tablet Take 1 tablet by mouth every other day 45 tablet 3     tacrolimus (GENERIC EQUIVALENT) 0.5 MG capsule Take 1 capsule (0.5 mg) by mouth every evening Total dose = 1 mg in the AM and 1.5 mg in the PM 30 capsule 11     tacrolimus (GENERIC EQUIVALENT) 1 MG capsule Take 1 capsule (1 mg) by mouth 2 times daily . Total dose=1mg in the AM and 1.5mg in the PM 60 capsule 11     No Known Allergies    Pertinent Labs:  Last Creatine:   Creatinine   Date Value Ref Range Status   10/09/2020 7.71 (H) 0.66 - 1.25 mg/dL Final     Last INR:   INR   Date Value Ref Range Status   10/08/2020 1.24 (H) 0.86 - 1.14 Final       Other Questions:    Reminded patient to take any order prophylactic antibiotics 1 hour prior to appointment: Yes    If on a prescribed anticoagulant, advised patient to continue or check with prescribing provider:     If on an OTC anticoagulant/supplement, advised patient to hold these medications 10-14 days prior to surgery and resume 48 hrs after surgery:      Please be aware that this can be an all day procedure. Please bring your daily medications and food/cash. Encourage patient to eat prior to procedure(s). After care instructions were reviewed with patient.    If any positives, send to RN to initiate antibiotic prophylaxis protocol and/or anticoagulation management protocol    Reviewed by:    Zena Balbuena LPN

## 2020-11-24 NOTE — TELEPHONE ENCOUNTER
Dermatological path order and indications  Order: [id number]  Status:  Final result   Visible to patient:  Yes (MyChart) Dx:  Neoplasm of uncertain behavior of skin  Component 7d ago   Copath Report Patient Name: GISELLE GRAY   MR#: [id number]   Specimen #: [id number]   Collected: 11/17/2020   Received: 11/18/2020   Reported: 11/21/2020 15:32   Ordering Phy(s): DARINEL WREN     For improved result formatting, select 'View Enhanced Report Format' under    Linked Documents section.     SPECIMEN(S):   Skin, left posterior thigh, shave     FINAL DIAGNOSIS:   Skin, left posterior thigh, shave:   - Squamous cell carcinoma, at least in situ - (see comment)

## 2020-11-30 ENCOUNTER — VIRTUAL VISIT (OUTPATIENT)
Dept: DERMATOLOGY | Facility: CLINIC | Age: 45
End: 2020-11-30
Payer: COMMERCIAL

## 2020-11-30 ENCOUNTER — OFFICE VISIT (OUTPATIENT)
Dept: TRANSPLANT | Facility: CLINIC | Age: 45
End: 2020-11-30
Attending: SURGERY
Payer: COMMERCIAL

## 2020-11-30 VITALS
SYSTOLIC BLOOD PRESSURE: 131 MMHG | WEIGHT: 143.9 LBS | HEART RATE: 80 BPM | OXYGEN SATURATION: 100 % | TEMPERATURE: 98.9 F | DIASTOLIC BLOOD PRESSURE: 94 MMHG | BODY MASS INDEX: 21.88 KG/M2

## 2020-11-30 DIAGNOSIS — Z79.2 NEED FOR PROPHYLACTIC ANTIBIOTIC: Primary | ICD-10-CM

## 2020-11-30 DIAGNOSIS — T82.9XXA COMPLICATION OF VASCULAR ACCESS FOR DIALYSIS, INITIAL ENCOUNTER: Primary | ICD-10-CM

## 2020-11-30 PROCEDURE — G0463 HOSPITAL OUTPT CLINIC VISIT: HCPCS | Mod: 25

## 2020-11-30 PROCEDURE — 36589 REMOVAL TUNNELED CV CATH: CPT

## 2020-11-30 PROCEDURE — 99212 OFFICE O/P EST SF 10 MIN: CPT | Mod: TEL | Performed by: DERMATOLOGY

## 2020-11-30 PROCEDURE — 99213 OFFICE O/P EST LOW 20 MIN: CPT | Performed by: SURGERY

## 2020-11-30 PROCEDURE — 250N000009 HC RX 250: Performed by: SURGERY

## 2020-11-30 RX ORDER — CEPHALEXIN 500 MG/1
CAPSULE ORAL
Qty: 4 CAPSULE | Refills: 0 | Status: SHIPPED | OUTPATIENT
Start: 2020-11-30 | End: 2020-12-21

## 2020-11-30 RX ORDER — LIDOCAINE/PRILOCAINE 2.5 %-2.5%
CREAM (GRAM) TOPICAL ONCE
Status: COMPLETED | OUTPATIENT
Start: 2020-11-30 | End: 2020-11-30

## 2020-11-30 RX ADMIN — LIDOCAINE AND PRILOCAINE: 25; 25 CREAM TOPICAL at 11:09

## 2020-11-30 ASSESSMENT — PAIN SCALES - GENERAL
PAINLEVEL: NO PAIN (0)
PAINLEVEL: MILD PAIN (2)

## 2020-11-30 NOTE — PROGRESS NOTES
[facility] Dermatology Record:  Store and Forward and Telephone [phone]      Dermatology Problem List:  1. Relevant medical history: kidney transplant in 2011, currently on tacrolimus, MMF for immunosuppression. Currently back on dialysis, and undergoing evaluation for second transplant.  2. SCCIS at least, left posterior thigh, s/p biopsy 10/29/20; schedule Mohs    Encounter Date: Nov 30, 2020    CC:   Chief Complaint   Patient presents with     Consult     Rashad is having a consult due to SCC on posterior thigh     History of Present Illness:  Rashad Ortiz is a 45 year old male who presents for Mohs surgery consultation.  Since the biopsy the wound has healed well.  No significant bumps are present.  He had no trouble with the biopsy site or concerns about infection.       ROS: Patient is generally feeling well today     Physical Examination:  General: Well-appearing, appropriately-developed individual.  Skin: Focused examination including left posterior thigh and buttocks was performed.   -Pale to skin colored verrucous papule 1.5 cm on the left posterior thigh and buttocks    Labs:  Pathology report reviewed; squamous cell carcinoma, at least in situ.  The lesion extends to the lateral and deep margins thus invasion cannot be entirely excluded.    Past Medical History:   Patient Active Problem List   Diagnosis     Kidney replaced by transplant     Aftercare following organ transplant     GERD (gastroesophageal reflux disease)     CARDIOVASCULAR SCREENING; LDL GOAL LESS THAN 160     Gout     Antibody mediated rejection of kidney transplant     Medical non-compliance     Chronic kidney disease, stage 4, severely decreased GFR (H)     Herpes simplex infection of penis     Escherichia coli septicemia (H)     Pyelonephritis of transplanted kidney     HTN, kidney transplant related     Metabolic acidosis     CKD (chronic kidney disease) stage 5, GFR less than 15 ml/min (H)     Immunosuppression (H)     Anemia  of chronic renal failure, stage 5 (H)     Secondary renal hyperparathyroidism (H)     AVN (avascular necrosis of bone) (H)     Vitamin D deficiency     BPH (benign prostatic hyperplasia)     Osteonecrosis (H)     Status post hip surgery     ESRD (end stage renal disease) on dialysis (H)     Past Medical History:   Diagnosis Date     AVN (avascular necrosis of bone) (H)     left hip     BPH (benign prostatic hyperplasia)      Chronic kidney disease, stage 4, severely decreased GFR (H)      Gastro-oesophageal reflux disease      Gout      History of blood transfusion      Hypertension      Medical non-compliance      Pulmonary nodules      Steroid long-term use      Vitamin D deficiency      Past Surgical History:   Procedure Laterality Date     ARTHROPLASTY HIP Left 9/9/2020    Procedure: Left total hip arthroplasty;  Surgeon: Fernando Morillo MD;  [facility]     AV FISTULA OR GRAFT ARTERIAL       CREATE FISTULA ARTERIOVENOUS UPPER EXTREMITY Right 7/31/2019    Procedure: Creation Of Atriovenous Fistula Right Upper Arm;  Surgeon: Julia Irwin MD;  Location: [facility] OR     IR CVC TUNNEL PLACEMENT > 5 YRS OF AGE  10/9/2020     IR DIALYSIS FISTULOGRAM RIGHT  10/9/2020     IR DIALYSIS MECH THROMB, PTA  10/9/2020     LIGATE FISTULA ARTERIOVENOUS UPPER EXTREMITY  12/20/2011    Procedure:LIGATE FISTULA ARTERIOVENOUS UPPER EXTREMITY; Excision of Right Forearm Arteriovenous Fistula.; Surgeon:LINDY AMAYA; Location:[facility] OR     PERCUTANEOUS BIOPSY KIDNEY Right 2/28/2017    Procedure: PERCUTANEOUS BIOPSY KIDNEY;  Surgeon: Gee Barrios MD;  [facility]     TRANSPLANT  01/13/2011    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       Social History:  Patient reports that he quit smoking about 3 years ago. His smoking use included cigarettes. He has never used smokeless tobacco. He reports previous alcohol use of about 4.2 standard drinks of alcohol per week. He reports previous drug use. Drug:  Marijuana.    Family History:  Family History   Problem Relation Age of Onset     Hypertension Mother      Colon Cancer Mother 66     Colon Cancer Brother 51       Medications:  Current Outpatient Medications   Medication     acetaminophen (TYLENOL) 500 MG tablet     carvedilol (COREG) 12.5 MG tablet     cholecalciferol 25 MCG (1000 UT) TABS     febuxostat (ULORIC) 80 MG TABS tablet     furosemide (LASIX) 20 MG tablet     mycophenolate (GENERIC EQUIVALENT) 250 MG capsule     sulfamethoxazole-trimethoprim (BACTRIM) 400-80 MG tablet     tacrolimus (GENERIC EQUIVALENT) 0.5 MG capsule     tacrolimus (GENERIC EQUIVALENT) 1 MG capsule     clopidogrel (PLAVIX) 75 MG tablet     order for DME     sodium bicarbonate 650 MG tablet     No current facility-administered medications for this visit.           No Known Allergies        Impression and Recommendations (Patient Counseled on the Following):  1.  Squamous cell carcinoma in situ at least left posterior thigh  -Treatment options were reviewed with the patient.  Given his transplant recipient status we will treat with Mohs surgery for the highest cure rate.   - The nature, risks, benefits, and alternatives to Mohs surgery were discussed. The patient would like to proceed with Mohs surgery.    Preop antibiotics E scribed to his local pharmacy      Staff only    Reyes Cade DO    Department of Dermatology  [facility]: Phone: [phone], Fax:[phone]  [facility]: Phone: [phone], Fax: [phone]      _____________________________________________________________________________    Teledermatology information:  - Location of patient: [place]  - Patient presented as: return  - Location of teledermatologist:  [facility] )  - Image quality and interpretability: acceptable  - Physician has received verbal consent for  a Video/Photos Visit from the patient? YES  - In-person dermatology visit recommendation: Schedule Mohs  - Date of images: 11/17/2020  - Service start time:  0742   - Service end time:   0753  - Date of report: 11/30/2020

## 2020-11-30 NOTE — PROGRESS NOTES
Dialysis Access Service  Consult Note     Referred by Dr. Lin for assessment of permanent dialysis access.     HPI: Mr. Ortiz is being seen today in follow-up for revision of dialysis access. Two months ago, he was undergoing hemodialysis through a matured brachiocephalic fistula when he had clot accidentally injected from the machine into the fistula, causing acute thrombosis. He was referred to interventional radiology for thrombectomy which was partially accomplished; however, due to the limited thrombectomy and the narrow caliber of the vessel, he was referred to us for assessment of new access creation, given the high likelihood of thrombosis.      At our initial visit, he had significant pain and swelling in his arm. He did still have a palpable thrill in his arm at the distal upper arm anastomotic site and proximally in the venous inflow. Duplex did not demonstrate any sites of extravasation of pseudoaneurysm. He was advised to elevate his arm and given a course of plavix, which he is still taking.    He has continued to improve. He has a readily palpable thrill in his fistula and has been able to use it successfully for access. At this point he would like his central line removed.     He also feels like he is eating well and gaining dry weight. He feels that his dialysis center is overdialyzing him and causing him to be excessively fatigued each day.      Risk factors for vascular access:                                                          Yes       No  Hx of CVC                                          [x]?         []?          Comment: current RIJ tunneled  Hx of PICC line                                  []?         [x]?         Comment:   Hx of Pacemaker                               []?         [x]?         Comment:   History of failed access:              [x]?         []?         Comment: failed R radiocephalic, currently partially thrombosed R brachiocephalic  SVC syndrome                                 []?         [x]?         Comment:  Heart Failure                                []?         [x]?         EF:    Periph arterial disease                       []?         [x]?         Comment:  Prior Fracture/Surgery                       []?         [x]?         Location:   DVT                                                  []?         [x]?         Location:  Diabetes                                                  []?         [x]?         Comment:  Neuropathy                                      []?         [x]?         Comment:   Anticoagulation:                           [x]?         []?         Agent: short course plavix      Anticoagulation contraindication:[]?         [x]?         Details:                   Pediatric                                      []?         [x]?         Age:                  Hx of transplant                               [x]?         []?         Comment:  Failed LDKT  Current immunosuppression             []?         []?         Comment:             ROS: 10 point ROS neg other than the symptoms noted above in the HPI.        Past Medical History        Past Medical History:   Diagnosis Date     AVN (avascular necrosis of bone) (H)       left hip     BPH (benign prostatic hyperplasia)       Chronic kidney disease, stage 4, severely decreased GFR (H)       Gastro-oesophageal reflux disease       Gout       History of blood transfusion       Hypertension       Medical non-compliance       Pulmonary nodules       Steroid long-term use       Vitamin D deficiency              Past Surgical History         Past Surgical History:   Procedure Laterality Date     ARTHROPLASTY HIP Left 9/9/2020     Procedure: Left total hip arthroplasty;  Surgeon: Fernando Morillo MD;  [facility] OR     AV FISTULA OR GRAFT ARTERIAL         CREATE FISTULA ARTERIOVENOUS UPPER EXTREMITY Right 7/31/2019     Procedure: Creation Of Atriovenous Fistula Right Upper Arm;  Surgeon: Julia Irwin,  MD;  Location: [facility] OR     IR CVC TUNNEL PLACEMENT > 5 YRS OF AGE   10/9/2020     IR DIALYSIS FISTULOGRAM RIGHT   10/9/2020     IR DIALYSIS MECH THROMB, PTA   10/9/2020     LIGATE FISTULA ARTERIOVENOUS UPPER EXTREMITY   12/20/2011     Procedure:LIGATE FISTULA ARTERIOVENOUS UPPER EXTREMITY; Excision of Right Forearm Arteriovenous Fistula.; Surgeon:LINDY AMAYA; [facility] OR     PERCUTANEOUS BIOPSY KIDNEY Right 2/28/2017     Procedure: PERCUTANEOUS BIOPSY KIDNEY;  Surgeon: Gee Barrios MD;  [facility]     TRANSPLANT   01/13/2011     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            Family History         Family History   Problem Relation Age of Onset     Hypertension Mother       Colon Cancer Mother 66     Colon Cancer Brother 51            Social History            Tobacco Use     Smoking status: Former Smoker       Types: Cigarettes       Quit date: 03/2017       Years since quitting: 3.6     Smokeless tobacco: Never Used     Tobacco comment: Patient states that he is an 'social'  smoker. 3 cigarettes per week per pt   Substance Use Topics     Alcohol use: Not Currently       Alcohol/week: 4.2 standard drinks       Types: 5 Standard drinks or equivalent per week       Comment: Quit 8/2020            Current Outpatient Medications:      acetaminophen (TYLENOL) 500 MG tablet, Take 2 tablets (1,000 mg) by mouth every 8 hours as needed for mild pain, Disp: 160 tablet, Rfl: 3     carvedilol (COREG) 12.5 MG tablet, Take 1 tablet (12.5 mg) by mouth 2 times daily (with meals), Disp: 60 tablet, Rfl: 3     clopidogrel (PLAVIX) 75 MG tablet, Take 1 tablet (75 mg) by mouth daily, Disp: 30 tablet, Rfl: 0     cyclobenzaprine (FLEXERIL) 5 MG tablet, Take 5 mg by mouth 3 times daily as needed for muscle spasms, Disp: , Rfl:      febuxostat (ULORIC) 80 MG TABS tablet, Take 1 tablet (80 mg) by mouth daily, Disp: 90 tablet, Rfl: 3     furosemide (LASIX) 20 MG tablet, , Disp: , Rfl:      hydrOXYzine (ATARAX)  50 MG tablet, Take 0.5-1 tablets (25-50 mg) by mouth nightly as needed (sleep), Disp: 30 tablet, Rfl: 11     mycophenolate (GENERIC EQUIVALENT) 250 MG capsule, Take 2 capsules (500 mg) by mouth 2 times daily, Disp: 120 capsule, Rfl: 11     omeprazole (PRILOSEC) 20 MG capsule, Take 1 capsule (20 mg) by mouth daily, Disp: 90 capsule, Rfl: 1     order for DME, Equipment being ordered: Crutches, Disp: 1 Device, Rfl: 0     sulfamethoxazole-trimethoprim (BACTRIM) 400-80 MG tablet, Take 1 tablet by mouth every other day, Disp: 45 tablet, Rfl: 3     tacrolimus (GENERIC EQUIVALENT) 0.5 MG capsule, Take 1 capsule (0.5 mg) by mouth every evening Total dose = 1 mg in the AM and 1.5 mg in the PM, Disp: 30 capsule, Rfl: 11     tacrolimus (GENERIC EQUIVALENT) 1 MG capsule, Take 1 capsule (1 mg) by mouth 2 times daily . Total dose=1mg in the AM and 1.5mg in the PM, Disp: 60 capsule, Rfl: 11     tamsulosin (FLOMAX) 0.4 MG capsule, TAKE 1 CAPSULE BY MOUTH DAILY, Disp: 30 capsule, Rfl: 8     aspirin (ASA) 81 MG EC tablet, Take 1 tablet (81 mg) by mouth 2 times daily (Patient not taking: Reported on 10/19/2020), Disp: 56 tablet, Rfl: 0     cholecalciferol 25 MCG (1000 UT) TABS, Take 2,000 Units by mouth daily (Patient not taking: Reported on 10/19/2020), Disp: 90 tablet, Rfl: 3     oxyCODONE (ROXICODONE) 5 MG tablet, Take 1 tablet (5 mg) by mouth 2 times daily as needed for severe pain (Patient not taking: Reported on 10/26/2020), Disp: 14 tablet, Rfl: 0     senna-docusate (SENOKOT-S/PERICOLACE) 8.6-50 MG tablet, Take 1 tablet by mouth 2 times daily (Patient not taking: Reported on 10/19/2020), Disp: 30 tablet, Rfl: 0     sodium bicarbonate 650 MG tablet, Take 2 tablets (1,300 mg) by mouth 3 times daily (Patient not taking: Reported on 9/25/2020), Disp: 180 tablet, Rfl: 11                        PHYSICAL EXAM:  Pulse:  [77] 77  SpO2:  [100 %] 100 %  Constitutional: healthy, alert and cooperative  HEENT: sclera anicteric, MMM,  conjunctiva pink  Chest: HD catheter present on the right side. Tunneled internal jugular CVL  CV:  NSR  : No CVA tenderness  Abdomen: prior kidney tx incision well-healed   Skin:  No rashes or jaundice  Neuro: normal gait  Psych: normal mood and affect  EXTREMITY EXAM:   Vein Exam: Obvious suitable veins?                Left arm:            YES   []?           NO  []?       Comment:  deferred               Right arm:          no swelling, readily palpable thrill throughout length of fistula.         Sensory exam:               Left hand:          Normal   [x]?       Abnormal   []?     Comment:                Right hand:        Normal   [x]?       Abnormal   []?     Comment:      Motor exam normal:               Left hand:          Normal   [x]?       Abnormal   []?     Comment:                 Right hand:        Normal   [x]?       Abnormal   []?     Comment:                             Duplex Reviewed:  Arterial inflow patent, volume ~1800mL/min. Good flow through initial (dilated) venous segment- site of prior access. Appears to branch into two pathways. One more superficial and lateral is thrombosed, while another slightly deeper is patent, with flow ~2L/min. Diameter 10.3mm in patent venous limb. Appears to have some branch draining from deeper vein back into cephalic system with good flow/thrill.      Assessment & Plan:   Mr. Ortiz was seen in follow-up for dialysis access complications. At this point, I think his access has stabilized from its acute issues and is acceptable for use without limitation. If he has any further issues, we can assess for need for surgical revision or creation of other access.    We accordingly removed his tunneled CVL today. I placed EMLA cream at the site to numb the insertion point, cut the stitch, and bluntly  the felt cuff from the subcutaneous tissues at the insertion site. The catheter was removed without issue. Pressure was held until the site was hemostatic,  then dressed with an occlusive bandage.     I will message his nephrologist to ask them to reassess his dry weight.      The patient was counselled to contact our nurse coordinator, ISABEL Branham ([place]), [facility] at [phone] with any questions or concerns.  Thank you for the opportunity to participate in Mr. Ortiz's care. He may contact us with any future issues or concerns; otherwise, he does not need scheduled follow-up.     Total time: 20 minutes  Counseling time: 15 minutes    Chris Nicole MD

## 2020-11-30 NOTE — LETTER
11/30/2020         RE: Rashad Ortiz  [address] [facility] [address]        Dear Colleague,    Thank you for referring your patient, Rashad Ortiz, to the [facility]. Please see a copy of my visit note below.    [facility] Dermatology Record:  Store and Forward and Telephone [phone]      Dermatology Problem List:  1. Relevant medical history: kidney transplant in 2011, currently on tacrolimus, MMF for immunosuppression. Currently back on dialysis, and undergoing evaluation for second transplant.  2. SCCIS at least, left posterior thigh, s/p biopsy 10/29/20; schedule Mohs    Encounter Date: Nov 30, 2020    CC:   Chief Complaint   Patient presents with     Consult     Rashad is having a consult due to SCC on posterior thigh     History of Present Illness:  Rashad Ortiz is a 45 year old male who presents for Mohs surgery consultation.  Since the biopsy the wound has healed well.  No significant bumps are present.  He had no trouble with the biopsy site or concerns about infection.       ROS: Patient is generally feeling well today     Physical Examination:  General: Well-appearing, appropriately-developed individual.  Skin: Focused examination including left posterior thigh and buttocks was performed.   -Pale to skin colored verrucous papule 1.5 cm on the left posterior thigh and buttocks    Labs:  Pathology report reviewed; squamous cell carcinoma, at least in situ.  The lesion extends to the lateral and deep margins thus invasion cannot be entirely excluded.    Past Medical History:   Patient Active Problem List   Diagnosis     Kidney replaced by transplant     Aftercare following organ transplant     GERD (gastroesophageal reflux disease)     CARDIOVASCULAR SCREENING; LDL GOAL LESS THAN 160     Gout     Antibody mediated rejection of kidney transplant     Medical non-compliance     Chronic kidney disease, stage 4, severely decreased GFR (H)     Herpes simplex infection of  penis     Escherichia coli septicemia (H)     Pyelonephritis of transplanted kidney     HTN, kidney transplant related     Metabolic acidosis     CKD (chronic kidney disease) stage 5, GFR less than 15 ml/min (H)     Immunosuppression (H)     Anemia of chronic renal failure, stage 5 (H)     Secondary renal hyperparathyroidism (H)     AVN (avascular necrosis of bone) (H)     Vitamin D deficiency     BPH (benign prostatic hyperplasia)     Osteonecrosis (H)     Status post hip surgery     ESRD (end stage renal disease) on dialysis (H)     Past Medical History:   Diagnosis Date     AVN (avascular necrosis of bone) (H)     left hip     BPH (benign prostatic hyperplasia)      Chronic kidney disease, stage 4, severely decreased GFR (H)      Gastro-oesophageal reflux disease      Gout      History of blood transfusion      Hypertension      Medical non-compliance      Pulmonary nodules      Steroid long-term use      Vitamin D deficiency      Past Surgical History:   Procedure Laterality Date     ARTHROPLASTY HIP Left 9/9/2020    Procedure: Left total hip arthroplasty;  Surgeon: Fernando Morillo MD;  [facility]     AV FISTULA OR GRAFT ARTERIAL       CREATE FISTULA ARTERIOVENOUS UPPER EXTREMITY Right 7/31/2019    Procedure: Creation Of Atriovenous Fistula Right Upper Arm;  Surgeon: Julia Irwin MD;  [facility] OR     IR CVC TUNNEL PLACEMENT > 5 YRS OF AGE  10/9/2020     IR DIALYSIS FISTULOGRAM RIGHT  10/9/2020     IR DIALYSIS MECH THROMB, PTA  10/9/2020     LIGATE FISTULA ARTERIOVENOUS UPPER EXTREMITY  12/20/2011    Procedure:LIGATE FISTULA ARTERIOVENOUS UPPER EXTREMITY; Excision of Right Forearm Arteriovenous Fistula.; Surgeon:LINDY AMAYA; [facility] OR     PERCUTANEOUS BIOPSY KIDNEY Right 2/28/2017    Procedure: PERCUTANEOUS BIOPSY KIDNEY;  Surgeon: Gee Barrios MD;  [facility]     TRANSPLANT  01/13/2011    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       Social History:  Patient  reports that he quit smoking about 3 years ago. His smoking use included cigarettes. He has never used smokeless tobacco. He reports previous alcohol use of about 4.2 standard drinks of alcohol per week. He reports previous drug use. Drug: Marijuana.    Family History:  Family History   Problem Relation Age of Onset     Hypertension Mother      Colon Cancer Mother 66     Colon Cancer Brother 51       Medications:  Current Outpatient Medications   Medication     acetaminophen (TYLENOL) 500 MG tablet     carvedilol (COREG) 12.5 MG tablet     cholecalciferol 25 MCG (1000 UT) TABS     febuxostat (ULORIC) 80 MG TABS tablet     furosemide (LASIX) 20 MG tablet     mycophenolate (GENERIC EQUIVALENT) 250 MG capsule     sulfamethoxazole-trimethoprim (BACTRIM) 400-80 MG tablet     tacrolimus (GENERIC EQUIVALENT) 0.5 MG capsule     tacrolimus (GENERIC EQUIVALENT) 1 MG capsule     clopidogrel (PLAVIX) 75 MG tablet     order for DME     sodium bicarbonate 650 MG tablet     No current facility-administered medications for this visit.           No Known Allergies        Impression and Recommendations (Patient Counseled on the Following):  1.  Squamous cell carcinoma in situ at least left posterior thigh  -Treatment options were reviewed with the patient.  Given his transplant recipient status we will treat with Mohs surgery for the highest cure rate.   - The nature, risks, benefits, and alternatives to Mohs surgery were discussed. The patient would like to proceed with Mohs surgery.    Preop antibiotics E scribed to his local pharmacy      Staff only    Reyes Cade DO    Department of Dermatology  [facility]: Phone: [phone], Fax:[phone]  [facility]: Phone: [phone], Fax: [phone]      _____________________________________________________________________________    Teledermatology  information:  - Location of patient: [place]  - Patient presented as: return  - Location of teledermatologist:  ([facility] )  - Image quality and interpretability: acceptable  - Physician has received verbal consent for a Video/Photos Visit from the patient? YES  - In-person dermatology visit recommendation: Schedule Mohs  - Date of images: 11/17/2020  - Service start time:  0742   - Service end time:   0753  - Date of report: 11/30/2020       Again, thank you for allowing me to participate in the care of your patient.        Sincerely,        Reyes Cade MD

## 2020-11-30 NOTE — NURSING NOTE
Rashad Ortiz's goals for this visit include:   Chief Complaint   Patient presents with     Consult     Rashad is having a consult due to SCC on posterior thigh       He requests these members of his care team be copied on today's visit information:     PCP: Mariel Garcia    Referring Provider:  No referring provider defined for this encounter.    There were no vitals taken for this visit.    Do you need any medication refills at today's visit? No    Donna Bills LPN    .

## 2020-11-30 NOTE — LETTER
11/30/2020         RE: Rashad Ortiz  [address] [facility] [address]      Dialysis Access Service  Consult Note     Referred by Dr. Lin for assessment of permanent dialysis access.     HPI: Mr. Ortiz is being seen today in follow-up for revision of dialysis access. Two months ago, he was undergoing hemodialysis through a matured brachiocephalic fistula when he had clot accidentally injected from the machine into the fistula, causing acute thrombosis. He was referred to interventional radiology for thrombectomy which was partially accomplished; however, due to the limited thrombectomy and the narrow caliber of the vessel, he was referred to us for assessment of new access creation, given the high likelihood of thrombosis.      At our initial visit, he had significant pain and swelling in his arm. He did still have a palpable thrill in his arm at the distal upper arm anastomotic site and proximally in the venous inflow. Duplex did not demonstrate any sites of extravasation of pseudoaneurysm. He was advised to elevate his arm and given a course of plavix, which he is still taking.    He has continued to improve. He has a readily palpable thrill in his fistula and has been able to use it successfully for access. At this point he would like his central line removed.     He also feels like he is eating well and gaining dry weight. He feels that his dialysis center is overdialyzing him and causing him to be excessively fatigued each day.      Risk factors for vascular access:                                                          Yes       No  Hx of CVC                                          [x]?         []?          Comment: current RIJ tunneled  Hx of PICC line                                  []?         [x]?         Comment:   Hx of Pacemaker                               []?         [x]?         Comment:   History of failed access:              [x]?         []?         Comment: failed R  radiocephalic, currently partially thrombosed R brachiocephalic  SVC syndrome                                []?         [x]?         Comment:  Heart Failure                                []?         [x]?         EF:    Periph arterial disease                       []?         [x]?         Comment:  Prior Fracture/Surgery                       []?         [x]?         Location:   DVT                                                  []?         [x]?         Location:  Diabetes                                                  []?         [x]?         Comment:  Neuropathy                                      []?         [x]?         Comment:   Anticoagulation:                           [x]?         []?         Agent: short course plavix      Anticoagulation contraindication:[]?         [x]?         Details:                   Pediatric                                      []?         [x]?         Age:                  Hx of transplant                               [x]?         []?         Comment:  Failed LDKT  Current immunosuppression             []?         []?         Comment:             ROS: 10 point ROS neg other than the symptoms noted above in the HPI.        Past Medical History        Past Medical History:   Diagnosis Date     AVN (avascular necrosis of bone) (H)       left hip     BPH (benign prostatic hyperplasia)       Chronic kidney disease, stage 4, severely decreased GFR (H)       Gastro-oesophageal reflux disease       Gout       History of blood transfusion       Hypertension       Medical non-compliance       Pulmonary nodules       Steroid long-term use       Vitamin D deficiency              Past Surgical History         Past Surgical History:   Procedure Laterality Date     ARTHROPLASTY HIP Left 9/9/2020     Procedure: Left total hip arthroplasty;  Surgeon: Fernando Morillo MD;  [facility] OR     AV FISTULA OR GRAFT ARTERIAL         CREATE FISTULA ARTERIOVENOUS UPPER EXTREMITY Right 7/31/2019      Procedure: Creation Of Atriovenous Fistula Right Upper Arm;  Surgeon: Julia Irwin MD;  [facility] OR     IR CVC TUNNEL PLACEMENT > 5 YRS OF AGE   10/9/2020     IR DIALYSIS FISTULOGRAM RIGHT   10/9/2020     IR DIALYSIS MECH THROMB, PTA   10/9/2020     LIGATE FISTULA ARTERIOVENOUS UPPER EXTREMITY   12/20/2011     Procedure:LIGATE FISTULA ARTERIOVENOUS UPPER EXTREMITY; Excision of Right Forearm Arteriovenous Fistula.; Surgeon:LINDY AMAYA; Location[facility] OR     PERCUTANEOUS BIOPSY KIDNEY Right 2/28/2017     Procedure: PERCUTANEOUS BIOPSY KIDNEY;  Surgeon: Gee Barrios MD;  [facility]     TRANSPLANT   01/13/2011     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            Family History         Family History   Problem Relation Age of Onset     Hypertension Mother       Colon Cancer Mother 66     Colon Cancer Brother 51            Social History            Tobacco Use     Smoking status: Former Smoker       Types: Cigarettes       Quit date: 03/2017       Years since quitting: 3.6     Smokeless tobacco: Never Used     Tobacco comment: Patient states that he is an 'social'  smoker. 3 cigarettes per week per pt   Substance Use Topics     Alcohol use: Not Currently       Alcohol/week: 4.2 standard drinks       Types: 5 Standard drinks or equivalent per week       Comment: Quit 8/2020            Current Outpatient Medications:      acetaminophen (TYLENOL) 500 MG tablet, Take 2 tablets (1,000 mg) by mouth every 8 hours as needed for mild pain, Disp: 160 tablet, Rfl: 3     carvedilol (COREG) 12.5 MG tablet, Take 1 tablet (12.5 mg) by mouth 2 times daily (with meals), Disp: 60 tablet, Rfl: 3     clopidogrel (PLAVIX) 75 MG tablet, Take 1 tablet (75 mg) by mouth daily, Disp: 30 tablet, Rfl: 0     cyclobenzaprine (FLEXERIL) 5 MG tablet, Take 5 mg by mouth 3 times daily as needed for muscle spasms, Disp: , Rfl:      febuxostat (ULORIC) 80 MG TABS tablet, Take 1 tablet (80 mg) by mouth daily, Disp: 90  tablet, Rfl: 3     furosemide (LASIX) 20 MG tablet, , Disp: , Rfl:      hydrOXYzine (ATARAX) 50 MG tablet, Take 0.5-1 tablets (25-50 mg) by mouth nightly as needed (sleep), Disp: 30 tablet, Rfl: 11     mycophenolate (GENERIC EQUIVALENT) 250 MG capsule, Take 2 capsules (500 mg) by mouth 2 times daily, Disp: 120 capsule, Rfl: 11     omeprazole (PRILOSEC) 20 MG capsule, Take 1 capsule (20 mg) by mouth daily, Disp: 90 capsule, Rfl: 1     order for DME, Equipment being ordered: Crutches, Disp: 1 Device, Rfl: 0     sulfamethoxazole-trimethoprim (BACTRIM) 400-80 MG tablet, Take 1 tablet by mouth every other day, Disp: 45 tablet, Rfl: 3     tacrolimus (GENERIC EQUIVALENT) 0.5 MG capsule, Take 1 capsule (0.5 mg) by mouth every evening Total dose = 1 mg in the AM and 1.5 mg in the PM, Disp: 30 capsule, Rfl: 11     tacrolimus (GENERIC EQUIVALENT) 1 MG capsule, Take 1 capsule (1 mg) by mouth 2 times daily . Total dose=1mg in the AM and 1.5mg in the PM, Disp: 60 capsule, Rfl: 11     tamsulosin (FLOMAX) 0.4 MG capsule, TAKE 1 CAPSULE BY MOUTH DAILY, Disp: 30 capsule, Rfl: 8     aspirin (ASA) 81 MG EC tablet, Take 1 tablet (81 mg) by mouth 2 times daily (Patient not taking: Reported on 10/19/2020), Disp: 56 tablet, Rfl: 0     cholecalciferol 25 MCG (1000 UT) TABS, Take 2,000 Units by mouth daily (Patient not taking: Reported on 10/19/2020), Disp: 90 tablet, Rfl: 3     oxyCODONE (ROXICODONE) 5 MG tablet, Take 1 tablet (5 mg) by mouth 2 times daily as needed for severe pain (Patient not taking: Reported on 10/26/2020), Disp: 14 tablet, Rfl: 0     senna-docusate (SENOKOT-S/PERICOLACE) 8.6-50 MG tablet, Take 1 tablet by mouth 2 times daily (Patient not taking: Reported on 10/19/2020), Disp: 30 tablet, Rfl: 0     sodium bicarbonate 650 MG tablet, Take 2 tablets (1,300 mg) by mouth 3 times daily (Patient not taking: Reported on 9/25/2020), Disp: 180 tablet, Rfl: 11                        PHYSICAL EXAM:  Pulse:  [77] 77  SpO2:  [100 %]  100 %  Constitutional: healthy, alert and cooperative  HEENT: sclera anicteric, MMM, conjunctiva pink  Chest: HD catheter present on the right side. Tunneled internal jugular CVL  CV:  NSR  : No CVA tenderness  Abdomen: prior kidney tx incision well-healed   Skin:  No rashes or jaundice  Neuro: normal gait  Psych: normal mood and affect  EXTREMITY EXAM:   Vein Exam: Obvious suitable veins?                Left arm:            YES   []?           NO  []?       Comment:  deferred               Right arm:          no swelling, readily palpable thrill throughout length of fistula.         Sensory exam:               Left hand:          Normal   [x]?       Abnormal   []?     Comment:                Right hand:        Normal   [x]?       Abnormal   []?     Comment:      Motor exam normal:               Left hand:          Normal   [x]?       Abnormal   []?     Comment:                 Right hand:        Normal   [x]?       Abnormal   []?     Comment:                             Duplex Reviewed:  Arterial inflow patent, volume ~1800mL/min. Good flow through initial (dilated) venous segment- site of prior access. Appears to branch into two pathways. One more superficial and lateral is thrombosed, while another slightly deeper is patent, with flow ~2L/min. Diameter 10.3mm in patent venous limb. Appears to have some branch draining from deeper vein back into cephalic system with good flow/thrill.      Assessment & Plan:   Mr. Ortiz was seen in follow-up for dialysis access complications. At this point, I think his access has stabilized from its acute issues and is acceptable for use without limitation. If he has any further issues, we can assess for need for surgical revision or creation of other access.    We accordingly removed his tunneled CVL today. I placed EMLA cream at the site to numb the insertion point, cut the stitch, and bluntly  the felt cuff from the subcutaneous tissues at the insertion site. The  catheter was removed without issue. Pressure was held until the site was hemostatic, then dressed with an occlusive bandage.     I will message his nephrologist to ask them to reassess his dry weight.      The patient was counselled to contact our nurse coordinator, ISABEL Branham (Sum), [facility] at [phone] with any questions or concerns.  Thank you for the opportunity to participate in Mr. Ortiz's care. He may contact us with any future issues or concerns; otherwise, he does not need scheduled follow-up.     Total time: 20 minutes  Counseling time: 15 minutes    Chris Nicole MD

## 2020-11-30 NOTE — NURSING NOTE
Chief Complaint   Patient presents with     RECHECK     Post-op fistula     Blood pressure (!) 131/94, pulse 80, temperature 98.9  F (37.2  C), temperature source Oral, weight 65.3 kg (143 lb 14.4 oz), SpO2 100 %.    Kerri Bell, CMA

## 2020-11-30 NOTE — PATIENT INSTRUCTIONS
[facility] Dermatology Visit    Thank you for allowing us to participate in your care. Your findings, instructions and follow-up plan are as follows:    Schedule Mohs surgery.   Take antibiotics 1 hour prior to surgery appointment.     When should I call my doctor?    If you are worsening or not improving, please, contact us or seek urgent care as noted below.     Who should I call with questions (adults)?    [facility] (adult and pediatric): [phone]     [facility] (adult): [phone]    For urgent needs outside of business hours call the [facility] at [phone] and ask for the dermatology resident on call    If this is a medical emergency and you are unable to reach an ER, Call [phone]      Who should I call with questions (pediatric)?  [facility]- Pediatric Dermatology  Dr. Vee Ramachandran, Dr. Amarilis Felder, Dr. Germania Macdonald, Aury Justice, PA  Dr. Roxana White, Dr. Clementina Garcia & Dr. Tadeo Bear  Non Urgent  Nurse Triage Line; [phone]- Oriana and Mirlande RODRIGUEZ Care Coordinators   [place] (/Complex ) [phone]    If you need a prescription refill, please contact your pharmacy. Refills are approved or denied by our Physicians during normal business hours, Monday through Fridays  Per office policy, refills will not be granted if you have not been seen within the past year (or sooner depending on your child's condition)    Scheduling Information:  Pediatric Appointment Scheduling and Call Center [phone]  Radiology Scheduling- [phone]  Sedation Unit Scheduling- [phone]  [place] Scheduling- General [phone]; Pediatric Dermatology [phone]  Main  Services: [phone]  Welsh: [phone]  Guinean: [phone]  Hmong/Telugu/Nepali: [phone]  Preadmission Nursing Department Fax Number: [phone] (Fax all  pre-operative paperwork to this number)    For urgent matters arising during evenings, weekends, or holidays that cannot wait for normal business hours please call [phone] and ask for the Dermatology Resident On-Call to be paged.

## 2020-12-07 ENCOUNTER — TELEPHONE (OUTPATIENT)
Dept: NEPHROLOGY | Facility: CLINIC | Age: 45
End: 2020-12-07

## 2020-12-09 ENCOUNTER — OFFICE VISIT (OUTPATIENT)
Dept: DERMATOLOGY | Facility: CLINIC | Age: 45
End: 2020-12-09
Payer: COMMERCIAL

## 2020-12-09 VITALS — SYSTOLIC BLOOD PRESSURE: 130 MMHG | DIASTOLIC BLOOD PRESSURE: 84 MMHG

## 2020-12-09 DIAGNOSIS — C44.529 SQUAMOUS CELL CANCER OF SKIN OF BUTTOCK: Primary | ICD-10-CM

## 2020-12-09 PROCEDURE — 17313 MOHS 1 STAGE T/A/L: CPT | Performed by: DERMATOLOGY

## 2020-12-09 PROCEDURE — 12032 INTMD RPR S/A/T/EXT 2.6-7.5: CPT | Performed by: DERMATOLOGY

## 2020-12-09 RX ORDER — HYDROCODONE BITARTRATE AND ACETAMINOPHEN 5; 325 MG/1; MG/1
1 TABLET ORAL EVERY 6 HOURS PRN
Qty: 8 TABLET | Refills: 0 | Status: SHIPPED | OUTPATIENT
Start: 2020-12-09 | End: 2020-12-12

## 2020-12-09 ASSESSMENT — PAIN SCALES - GENERAL: PAINLEVEL: NO PAIN (0)

## 2020-12-09 NOTE — LETTER
12/9/2020         RE: Rashad Ortiz  [address] [place] [id number]        Dear Colleague,    Thank you for referring your patient, Rashad Ortiz, to the [facility]. Please see a copy of my visit note below.    [facility] Mohs Dermatologic Surgery Procedure Note      Date of Service:  Dec 9, 2020  Surgery: Mohs micrographic surgery    Case 1  Repair Type: intermediate  Repair Size: 5.3 cm  Suture Material: monocryl 4-0  Tumor Type: SCCIS - Squamous cell carcinoma in situ present at base of biopsy  Location: Left posterior thigh  Derm-Path Accession #: [id number]  PreOp Size: 1.6x1.5 cm  PostOp Size: 3.3x3.0 cm  Mohs Accession #: [id number]  Level of Defect: fat      Procedure:  We discussed the principles of treatment and most likely complications including scarring, bleeding, infection, swelling, pain, crusting, nerve damage, large wound,  incomplete excision, wound dehiscence,  nerve damage, recurrence, and a second procedure may be recommended to obtain the best cosmetic or functional result.    Informed consent was obtained and the patient underwent the procedure as follows:  The patient was placed right lateral decubitus on the operating table.  The cancer was identified, outlined with a marker, and verified by the patient.  The entire surgical field was prepped with Hibiclens.  The surgical site was anesthetized using Lidocaine 1% with epi 1:100,000.      The area of clinically apparent tumor was debulked. The layer of tissue was then surgically excised using a #15 blade and was then transferred onto a specimen sheet maintaining the orientation of the specimen. Hemostasis was obtained using heat cautery. The wound site was then covered with a dressing while the tissue samples were processed for examination.    The excised tissue was transported to the Mohs histology laboratory maintaining the tissue orientation.  The tissue specimen was relaxed so that the  entire surgical margin was in a a single horizontal plane for sectioning and inked for precise mapping.  A precise reference map was drawn to reflect the sectioning of the specimen, colored inking of the margins, and orientation on the patient. The tissue was processed using horizontal sectioning of the base and continuous peripheral margins.  The histopathologic sections were reviewed in conjunction with the reference map.    Total blocks: 1    Total slides:  3    There were no cancer cells visualized on examination, therefore Mohs surgery was complete.    REPAIR: An intermediate layered linear closure was selected as the procedure which would maximally preserve both function and cosmesis.    After the excision of the tumor, the area was undermined. Hemostasis was obtained with bipolar electrocoagulation.  Closure was oriented so that the wound was in the patient's natural skin tension lines. The subcutaneous and dermal layers were then closed with 4-0 monocryl buried vertical mattress sutures. The epidermis was then carefully approximated along the length of the wound using 5-0 Fast Absorbing Gut simple running sutures.     Estimated blood loss was less than 10 ml for all surgical sites. A sterile pressure dressing was applied and wound care instructions, with a written handout, were given. The patient was discharged from the [facility] alert and ambulatory.    Follow-up virtual visit 2 weeks.     I personally performed the procedures today.    Reyes Cade DO    Department of Dermatology  [facility]: Phone: [phone], Fax:[phone]  [facility]: Phone: [phone], Fax: [phone]    Care and Laboratory Testing Performed at:  [facility]   Dermatology Clinic  [address] [place] [address]        Again, thank you for allowing me to  participate in the care of your patient.        Sincerely,        Reyes Cade MD

## 2020-12-09 NOTE — LETTER
December 9, 2020      Re: Rashad GARCIAS Angel      TO WHOM IT MAY CONCERN:    Rashad Ortiz  was seen in our clinic on 12/9/2020.  Please excuse him from work this day due to surgery.    Sincerely,           Reyes Cade, [facility]

## 2020-12-09 NOTE — NURSING NOTE
Rashad Ortiz's goals for this visit include:   Chief Complaint   Patient presents with     Procedure     MOHS left posterior thigh       He requests these members of his care team be copied on today's visit information:     PCP: Mariel Garcia    Referring Provider:  No referring provider defined for this encounter.    There were no vitals taken for this visit.    Do you need any medication refills at today's visit? Kylah Balbuena LPN

## 2020-12-09 NOTE — NURSING NOTE
The following medication was given:     MEDICATION:  Lidocaine with epinephrine 1% [id number]  ROUTE: SQ  SITE: see procedure note  DOSE: 13.5cc  LOT #: [id number]  : Hospira  EXPIRATION DATE: 02/2021  NDC#: 0385-8993-14   Was there drug waste? 1.5cc  Multi-dose vial: Yes    Donna Bills LPN  December 9, 2020    Vaseline and pressure dressing applied to Mohs site on left posterior thigh.  Wound care instructions reviewed with patient and AVS provided.  Patient verbalized understanding. No further questions or concerns at this time.    Donna Bills LPN

## 2020-12-09 NOTE — PATIENT INSTRUCTIONS
MOHS Wound Care Instructions with Steri strips      I will experience scar, altered skin color, bleeding, swelling, pain, crusting and redness. I may experience altered sensation. Risks are excessive bleeding, infection, muscle weakness, thick (hypertrophic or keloidal) scar, and recurrence. A second procedure may be recommended to obtain the best cosmetic or functional result.    Possible complications of any surgical procedure are bleeding, infection, scarring, alteration in skin color and sensation, muscle weakness in the area, wound dehiscence or seperation, or recurrence of the lesion or disease. On occasion, after healing, a secondary procedure or revision may be recommended in order to obtain the best cosmetic or functional result.       After your surgery, a pressure bandage will be placed over the area that has sutures. This will help prevent bleeding. Please, follow these instructions as they will help you to prevent complications as your wound heals.        For the First 48 hours After Surgery:    1. Leave the pressure bandage on and keep it dry. If it should come loose, you may retape it, but do not take it off.    2. Relax and take it easy. Do not do any vigorous exercise, heavy lifting, or bending forward. This could cause the wound to bleed.    3. Post-operative pain is usually mild. You may take plain or extra strength Tylenol every 4 hours as needed (do not take more than 4,000mg in one day) if you have no liver problems and your doctor says it is okay. Do not take any medicine that contains aspirin, ibuprofen or motrin unless you have been recommended these by a doctor.  Avoid alcohol and vitamin E as these may increase your tendency to bleed.    4. You may put an ice pack around the bandaged area for 20 minutes every 2-3 hours. This may help reduce swelling, bruising, and pain. Make sure the ice pack is waterproof so that the pressure bandage does not get wet.     5. You may see a small amount of  drainage or blood on your pressure bandage. This is normal. However, if drainage or bleeding continues or saturates the bandage, you will need to apply firm pressure over the bandage with a washcloth for 15 minutes. If bleeding continues after applying pressure for 15 minutes then go to the nearest emergency room.        Leave initial bandage on as long as it will stay on. If the bandage falls off or becomes soiled please follow instructions below for wound care daily or more often if becomes soiled:     Daily Wound Care:    1. Once the steri strips fall off wash wound with a mild soap and water.  Use caution when washing the wound, be gentle and do not let the forceful shower stream hit the wound directly. DO NOT WASH WITH HYDROGEN PEROXIDE FOR THIS MIGHT CAUSE THE SUTURES TO DISSOLVE FASTER THAN WHAT WE WANT.     2. PAT DRY.    3. Once steri strips fall off apply Vaseline (from a new container or tube) over the suture line with a Q-tip until it is completely healed. It is very important to keep the wound continuously moist, as wounds heal best in a moist environment.    4. Keep the site covered until site is healed, you can cover it with a Telfa (non-stick) dressing and tape or a band-aid. While your steri strips are on you can use them as your bandage.    5. Once steri strips fall off if you are unable to keep wound covered, you must apply Vaseline every 2 - 3 hours (while awake) to ensure it is being kept moist for optimal healing until completely healed. A dressing overnight is recommended to keep the area moist.        Call Us If:    1. You have pain that is not controlled with Tylenol.    2. You have signs or symptoms of an infection, such as: fever over 100 degrees F, redness, warmth, or foul-smelling or yellow/creamy drainage from the wound.        Who should I call with questions?  [facility]: [phone]   [facility]: [phone]  For  urgent needs outside of business hours call the [facility] at [phone] and ask for the dermatology resident on call

## 2020-12-14 NOTE — DISCHARGE INSTRUCTIONS
Please make an appointment to follow up with Your Primary Care Provider in 3 days for further evaluation and recommendations.  If you develop any lightheadedness, bloody stools, or tarry black stools please come back to the emergency department.     [Appropriately responsive] : appropriately responsive [Alert] : alert [No Acute Distress] : no acute distress [No Lymphadenopathy] : no lymphadenopathy [Regular Rate Rhythm] : regular rate rhythm [No Murmurs] : no murmurs [Clear to Auscultation B/L] : clear to auscultation bilaterally [Soft] : soft [Non-tender] : non-tender [Non-distended] : non-distended [No HSM] : No HSM [No Lesions] : no lesions [No Mass] : no mass [Oriented x3] : oriented x3 [Examination Of The Breasts] : a normal appearance [No Masses] : no breast masses were palpable [Labia Majora] : normal [Labia Minora] : normal [Normal] : normal [Uterine Adnexae] : normal

## 2020-12-15 ENCOUNTER — MYC REFILL (OUTPATIENT)
Dept: URGENT CARE | Facility: URGENT CARE | Age: 45
End: 2020-12-15

## 2020-12-15 DIAGNOSIS — M25.552 HIP PAIN, LEFT: ICD-10-CM

## 2020-12-16 RX ORDER — ACETAMINOPHEN 500 MG
1000 TABLET ORAL EVERY 8 HOURS PRN
Qty: 160 TABLET | Refills: 3 | Status: ON HOLD | OUTPATIENT
Start: 2020-12-16 | End: 2021-04-13

## 2020-12-17 ENCOUNTER — MYC MEDICAL ADVICE (OUTPATIENT)
Dept: DERMATOLOGY | Facility: CLINIC | Age: 45
End: 2020-12-17

## 2020-12-21 ENCOUNTER — MYC MEDICAL ADVICE (OUTPATIENT)
Dept: FAMILY MEDICINE | Facility: CLINIC | Age: 45
End: 2020-12-21

## 2020-12-21 ENCOUNTER — VIRTUAL VISIT (OUTPATIENT)
Dept: FAMILY MEDICINE | Facility: CLINIC | Age: 45
End: 2020-12-21
Payer: COMMERCIAL

## 2020-12-21 DIAGNOSIS — N18.6 ESRD (END STAGE RENAL DISEASE) ON DIALYSIS (H): ICD-10-CM

## 2020-12-21 DIAGNOSIS — R30.0 DYSURIA: ICD-10-CM

## 2020-12-21 DIAGNOSIS — Z99.2 ESRD (END STAGE RENAL DISEASE) ON DIALYSIS (H): ICD-10-CM

## 2020-12-21 DIAGNOSIS — R30.0 DYSURIA: Primary | ICD-10-CM

## 2020-12-21 PROBLEM — M87.9 OSTEONECROSIS (H): Status: RESOLVED | Noted: 2020-07-29 | Resolved: 2020-12-21

## 2020-12-21 LAB
ALBUMIN UR-MCNC: 100 MG/DL
APPEARANCE UR: CLEAR
BACTERIA #/AREA URNS HPF: ABNORMAL /HPF
BILIRUB UR QL STRIP: NEGATIVE
COLOR UR AUTO: YELLOW
GLUCOSE UR STRIP-MCNC: NEGATIVE MG/DL
HGB UR QL STRIP: ABNORMAL
KETONES UR STRIP-MCNC: NEGATIVE MG/DL
LEUKOCYTE ESTERASE UR QL STRIP: ABNORMAL
NITRATE UR QL: NEGATIVE
NON-SQ EPI CELLS #/AREA URNS LPF: ABNORMAL /LPF
PH UR STRIP: 6 PH (ref 5–7)
RBC #/AREA URNS AUTO: ABNORMAL /HPF
SODIUM UR-SCNC: 57 MMOL/L
SOURCE: ABNORMAL
SP GR UR STRIP: 1.01 (ref 1–1.03)
UROBILINOGEN UR STRIP-ACNC: 0.2 EU/DL (ref 0.2–1)
WBC #/AREA URNS AUTO: ABNORMAL /HPF

## 2020-12-21 PROCEDURE — 99213 OFFICE O/P EST LOW 20 MIN: CPT | Mod: GT | Performed by: FAMILY MEDICINE

## 2020-12-21 PROCEDURE — 84300 ASSAY OF URINE SODIUM: CPT | Performed by: FAMILY MEDICINE

## 2020-12-21 PROCEDURE — 81001 URINALYSIS AUTO W/SCOPE: CPT | Performed by: FAMILY MEDICINE

## 2020-12-21 NOTE — PATIENT INSTRUCTIONS
Patient Education     Dysuria with Uncertain Cause (Adult)    The urethra is the tube that allows urine to pass out of the body. In a woman, the urethra is the opening above the vagina. In men, the urethra is the opening on the tip of the penis. Dysuria is the feeling of pain or burning in the urethra when passing urine.   Dysuria can be caused by anything that irritates or inflames the urethra. An infection or chemical irritation can cause this reaction. A bladder infection is the most common cause of dysuria in adults. A urine test can diagnose this. A bladder infection needs antibiotic treatment.   Soaps, lotions, colognes, and feminine hygiene products can cause dysuria. So can birth control jellies, creams, and foams. It will go away 1 to 3 days after using these irritants.   Sexually transmitted infections (STIs) such as chlamydia or gonorrhea can cause dysuria. Your healthcare provider may take a culture sample. Your provider may start you on antibiotic medicine before the culture test returns.   In women who have gone through menopause, dysuria can be from dryness in the lining of the urethra. This can be treated with hormones. Dysuria becomes long-term (chronic) when it lasts for weeks or months. You may need to see a specialist (urologist) to diagnose and treat chronic dysuria.   Home care  These home care tips may help:    Don't use any chemicals or products that you think may be causing your symptoms.    If you were given a prescription medicine, take as directed. Take it until it is all used up.    If a culture was taken, don't have sex until you have been told that it is negative. A negative culture means you don't have an infection. Then follow your healthcare provider's advice to treat your condition.  If a culture was done and it is positive:     Both you and your sexual partner may need to be treated. This is true even if your partner has no symptoms.    Contact your healthcare provider or go to  an urgent care clinic or the public health department to be looked at and treated.    Don't have sex until both you and your partner have finished all antibiotics and your healthcare provider says you are no longer contagious.    Learn about and use safe sex practices. The safest sex is with a partner who has tested negative and only has sex with you. Condoms can prevent STIs from spreading, but they aren't a guarantee.  Follow-up care  Follow up with your healthcare provider, or as advised. If a culture was taken, you may call as directed for the results. If you have an STI, follow up with your provider or the public health department for a complete STI screening, including HIV testing. For more information, contact CDC-INFO at 020-805-2966.   When to get medical advice  Call your healthcare provider right away if any of these occur:    You aren't better after 3 days of treatment    Fever of 100.4 F (38 C) or higher, or as directed by your provider    Back or belly pain that gets worse    You can't urinate because of pain    New discharge from the urethra, vagina, or penis    Painful sores on the penis    Rash or joint pain    Painful lumps (lymph nodes) in the groin    Testicle pain or swelling of the scrotum  [employer] last reviewed this educational content on 10/1/2019    2193-1919 [employer]. [address] [place] [address], [place] [address]. All rights reserved. This information is not intended as a substitute for professional medical care. Always follow your healthcare professional's instructions.

## 2020-12-21 NOTE — PROGRESS NOTES
"Rashad Ortiz is a 45 year old male who is being evaluated via a billable video visit.      The patient has been notified of following:     \"This video visit will be conducted via a call between you and your physician/provider. We have found that certain health care needs can be provided without the need for an in-person physical exam.  This service lets us provide the care you need with a video conversation.  If a prescription is necessary we can send it directly to your pharmacy.  If lab work is needed we can place an order for that and you can then stop by our lab to have the test done at a later time.    Video visits are billed at different rates depending on your insurance coverage.  Please reach out to your insurance provider with any questions.    If during the course of the call the physician/provider feels a video visit is not appropriate, you will not be charged for this service.\"    Patient has given verbal consent for Video visit? Yes  How would you like to obtain your AVS? MyChart  If you are dropped from the video visit, the video invite should be resent to: Text to cell phone: [phone] back up  Will anyone else be joining your video visit? No    Subjective     Rashad Ortiz is a 45 year old male who presents today via video visit for the following health issues:    HPI     Genitourinary - Male  Onset/Duration: x 2 weeks  Description:   Dysuria (painful urination): YES}  Hematuria (blood in urine): no  Frequency: no  Waking at night to urinate: YES  Hesitancy (delay in urine): no  Retention (unable to empty): no  Decrease in urinary flow: YES  Incontinence: no  Progression of Symptoms:  same  Accompanying Signs & Symptoms:  Fever: no  Back/Flank pain: no  Urethral discharge: no  Testicle lumps/masses/pain: YES  Nausea and/or vomiting: no  Abdominal pain: no  History:   History of frequent UTI s: maybe one or two  History of kidney stones: no  History of hernias: no  Personal or Family history " of Prostate problems: no  Sexually active: no  Precipitating or alleviating factors: None  Therapies tried and outcome: none       Video Start Time: 8:40 am      Review of Systems   Constitutional, HEENT, cardiovascular, pulmonary, gi and gu systems are negative, except as otherwise noted.      Objective           Vitals:  No vitals were obtained today due to virtual visit.    Physical Exam     GENERAL: Healthy, alert and no distress  EYES: Eyes grossly normal to inspection.  No discharge or erythema, or obvious scleral/conjunctival abnormalities.  RESP: No audible wheeze, cough, or visible cyanosis.  No visible retractions or increased work of breathing.    SKIN: Visible skin clear. No significant rash, abnormal pigmentation or lesions.  NEURO: Cranial nerves grossly intact.  Mentation and speech appropriate for age.  PSYCH: Mentation appears normal, affect normal/bright, judgement and insight intact, normal speech and appearance well-groomed.      Office Visit on 11/17/2020   Component Date Value Ref Range Status     Copath Report 11/17/2020    Final                    Value:Patient Name: GISELLE GRAY  MR#: [id number]  Specimen #: [id number]  Collected: 11/17/2020  Received: 11/18/2020  Reported: 11/21/2020 15:32  Ordering Phy(s): DARINEL WREN    For improved result formatting, select 'View Enhanced Report Format' under   Linked Documents section.    SPECIMEN(S):  Skin, left posterior thigh, shave    FINAL DIAGNOSIS:  Skin, left posterior thigh, shave:  - Squamous cell carcinoma, at least in situ - (see comment)    COMMENT:  The lesion extends to the lateral and deep margins, thus invasion cannot   be entirely excluded.    I have personally reviewed all specimens and/or slides, including the   listed special stains, and used them  with my medical judgement to determine or confirm the final diagnosis.    Electronically signed out by:    Stef Cassidy M.D., [facility]    CLINICAL HISTORY:  The patient is  "a 45 year old male.    GROSS:  The specimen is received in formalin with proper patient identification,   labeled \"L posterior thigh\".  The  spe                          cimen consists of a 1.5 x 1.3 cm skin shave which displays a 2.5 x 2.0   x 0.7 cm tan-brown, raised,  fungating lesion. The resection margin is inked blue and the specimen is   sectioned and submitted in A1A3.  (Dictated by: THEO Hall 11/18/2020 09:53 AM)    MICROSCOPIC:  The specimen exhibits epidermal hyperplasia with full-thickness   keratinocyte atypia and suprabasal mitoses.    The technical component of this testing was completed at the [facility], with the professional component performed   at the [facility], [address] [place] [phone] ([phone])    CPT Codes:  A: 93984-RO9.P, 29996-US8.T    COLLECTION SITE:  Client: [facility]  Location: [facility] ([facility])                 Assessment & Plan     Dysuria  Possible etiologies discussed. PSA done this summer and normal.  Check urine tests below and treat as indicated.  Condition complicated by ESRD.  - *UA reflex to Microscopic and Culture ([place] and [facility] (except [place] and [place]); Future  - Sodium random urine; Future            Return in about 3 days (around 12/24/2020), or if symptoms worsen or fail to improve.    Mariel Mares MD  [facility]      Video-Visit Details    Type of service:  Video Visit    Video End Time:8:55 am    Originating Location (pt. Location): Home    Distant Location (provider location):  [facility]     Platform used for Video Visit: Rubens          "

## 2020-12-22 DIAGNOSIS — M25.551 RIGHT HIP PAIN: Primary | ICD-10-CM

## 2020-12-22 NOTE — TELEPHONE ENCOUNTER
RECORDS RECEIVED FROM: (L) Hip / No Imaging / Self.Refer / [employer] & [employer]   DATE RECEIVED: Jan 13, 2021     NOTES STATUS DETAILS   OFFICE NOTE from referring provider Internal    OFFICE NOTE from other specialist N/A    DISCHARGE SUMMARY from hospital N/A    DISCHARGE REPORT from the ER N/A    OPERATIVE REPORT N/A    MEDICATION LIST Internal    IMPLANT RECORD/STICKER N/A    LABS     CBC/DIFF N/A    CULTURES N/A    INJECTIONS DONE IN RADIOLOGY N/A    MRI N/A    CT SCAN N/A    XRAYS (IMAGES & REPORTS) N/A    TUMOR     PATHOLOGY  Slides & report N/A      12/22/20   4:12 PM   Complete  Nasrin Baeza, CMA

## 2020-12-22 NOTE — RESULT ENCOUNTER NOTE
Mr. Ortiz,    Your urine sodium test is normal. Stay hydrated and let me know if you continue to have urinary symptoms.    Please contact the clinic if you have additional questions.  Thank you.    Sincerely,    Mariel Mares MD

## 2020-12-22 NOTE — RESULT ENCOUNTER NOTE
Mr. Ortiz,    Your urine does not show an infection.    Please contact the clinic if you have additional questions.  Thank you.    Sincerely,    Mariel Mares MD

## 2020-12-23 ENCOUNTER — VIRTUAL VISIT (OUTPATIENT)
Dept: DERMATOLOGY | Facility: CLINIC | Age: 45
End: 2020-12-23
Payer: COMMERCIAL

## 2020-12-23 ENCOUNTER — ANCILLARY PROCEDURE (OUTPATIENT)
Dept: GENERAL RADIOLOGY | Facility: CLINIC | Age: 45
End: 2020-12-23
Attending: ORTHOPAEDIC SURGERY
Payer: COMMERCIAL

## 2020-12-23 ENCOUNTER — VIRTUAL VISIT (OUTPATIENT)
Dept: FAMILY MEDICINE | Facility: CLINIC | Age: 45
End: 2020-12-23
Payer: COMMERCIAL

## 2020-12-23 DIAGNOSIS — M87.051 AVASCULAR NECROSIS OF BONE OF HIP, RIGHT (H): Primary | ICD-10-CM

## 2020-12-23 DIAGNOSIS — M16.11 PRIMARY OSTEOARTHRITIS OF RIGHT HIP: ICD-10-CM

## 2020-12-23 DIAGNOSIS — C44.529 SQUAMOUS CELL CANCER OF SKIN OF BUTTOCK: Primary | ICD-10-CM

## 2020-12-23 PROCEDURE — 73502 X-RAY EXAM HIP UNI 2-3 VIEWS: CPT | Mod: RT | Performed by: RADIOLOGY

## 2020-12-23 PROCEDURE — 99024 POSTOP FOLLOW-UP VISIT: CPT | Mod: TEL | Performed by: DERMATOLOGY

## 2020-12-23 PROCEDURE — 99214 OFFICE O/P EST MOD 30 MIN: CPT | Mod: GT | Performed by: FAMILY MEDICINE

## 2020-12-23 RX ORDER — HYDROCODONE BITARTRATE AND ACETAMINOPHEN 5; 325 MG/1; MG/1
1 TABLET ORAL EVERY 6 HOURS PRN
Qty: 18 TABLET | Refills: 0 | Status: SHIPPED | OUTPATIENT
Start: 2020-12-23 | End: 2021-01-05

## 2020-12-23 ASSESSMENT — PAIN SCALES - GENERAL: PAINLEVEL: EXTREME PAIN (8)

## 2020-12-23 NOTE — PATIENT INSTRUCTIONS
[facility] Dermatology Visit    Thank you for allowing us to participate in your care. Your findings, instructions and follow-up plan are as follows:         When should I call my doctor?    If you are worsening or not improving, please, contact us or seek urgent care as noted below.     Who should I call with questions (adults)?    [facility] (adult and pediatric): [phone]     [facility] (adult): [phone]    For urgent needs outside of business hours call the [facility] at [phone] and ask for the dermatology resident on call    If this is a medical emergency and you are unable to reach an ER, Call 911      Who should I call with questions (pediatric)?  [facility]- Pediatric Dermatology  Dr. Vee Ramachandran, Dr. Amarilis Felder, Dr. Germania Macdonald, Aury Justice, [place]  Dr. Roxana White, Dr. Clementina Garcia & Dr. Tadeo Bear  Non Urgent  Nurse Triage Line; [phone]- Oriana and Mirlande RN Care Coordinators   [place] (/Complex ) [phone]    If you need a prescription refill, please contact your pharmacy. Refills are approved or denied by our Physicians during normal business hours, Monday through Fridays  Per office policy, refills will not be granted if you have not been seen within the past year (or sooner depending on your child's condition)    Scheduling Information:  Pediatric Appointment Scheduling and Call Center [phone]  Radiology Scheduling- [phone]  Sedation Unit Scheduling- [phone]  [facility] Scheduling- General [phone]; Pediatric Dermatology [phone]  Main  Services: [phone]  Arabic: [phone]  Indonesian: [phone]  Hmong/French/Greenlandic: [phone]  Preadmission Nursing Department Fax Number: [phone] (Fax all pre-operative paperwork to this number)    For urgent matters arising during evenings,  weekends, or holidays that cannot wait for normal business hours please call [phone] and ask for the Dermatology Resident On-Call to be paged.

## 2020-12-23 NOTE — LETTER
12/23/2020         RE: Rashad Ortiz  [address] [facility] [address]        Dear Colleague,    Thank you for referring your patient, Rashad Ortiz, to the [facility]. Please see a copy of my visit note below.    [facility] Dermatology Record:  Store and Forward and Telephone phone [phone]    Dermatology Problem List:  1. Relevant medical history: kidney transplant in 2011, currently on tacrolimus, MMF for immunosuppression. Currently back on dialysis, and undergoing evaluation for second transplant.  2. 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    Patient reports the wound has been healing appropriately and as described during his excision appointment.  After the first few days he reports minimal pain.  He has discontinued dressing changes.  No apparent bleeding or weeping from the wound.       ROS: Patient is generally feeling well today     Physical Examination:  General: Well-appearing, appropriately-developed individual.  Skin:  Exam was performed by photo review and is significant for the following:  -Well-healed surgical scar with minimal swelling.  Skin edges appear intact.     Labs:  Not applicable    Past Medical History:   Patient Active Problem List   Diagnosis     Kidney replaced by transplant     Aftercare following organ transplant     GERD (gastroesophageal reflux disease)     CARDIOVASCULAR SCREENING; LDL GOAL LESS THAN 160     Gout     Antibody mediated rejection of kidney transplant     Medical non-compliance     Chronic kidney disease, stage 4, severely decreased GFR (H)     Herpes simplex infection of penis     Escherichia coli septicemia (H)     Pyelonephritis of transplanted kidney     HTN,  kidney transplant related     Metabolic acidosis     CKD (chronic kidney disease) stage 5, GFR less than 15 ml/min (H)     Immunosuppression (H)     Anemia of chronic renal failure, stage 5 (H)     Secondary renal hyperparathyroidism (H)     Vitamin D deficiency     BPH (benign prostatic hyperplasia)     Status post hip surgery     ESRD (end stage renal disease) on dialysis (H)     Past Medical History:   Diagnosis Date     AVN (avascular necrosis of bone) (H)     left hip     AVN (avascular necrosis of bone) (H)     left hip     BPH (benign prostatic hyperplasia)      Chronic kidney disease, stage 4, severely decreased GFR (H)      Gastro-oesophageal reflux disease      Gout      History of blood transfusion      Hypertension      Medical non-compliance      Osteonecrosis (H) 7/29/2020    Added automatically from request for surgery [id number]     Pulmonary nodules      Steroid long-term use      Vitamin D deficiency      Past Surgical History:   Procedure Laterality Date     ARTHROPLASTY HIP Left 9/9/2020    Procedure: Left total hip arthroplasty;  Surgeon: Fernando Morillo MD;  [facility]     AV FISTULA OR GRAFT ARTERIAL       CREATE FISTULA ARTERIOVENOUS UPPER EXTREMITY Right 7/31/2019    Procedure: Creation Of Atriovenous Fistula Right Upper Arm;  Surgeon: Julia Irwin MD;  [facility] OR     IR CVC TUNNEL PLACEMENT > 5 YRS OF AGE  10/9/2020     IR DIALYSIS FISTULOGRAM RIGHT  10/9/2020     IR DIALYSIS MECH THROMB, PTA  10/9/2020     LIGATE FISTULA ARTERIOVENOUS UPPER EXTREMITY  12/20/2011    Procedure:LIGATE FISTULA ARTERIOVENOUS UPPER EXTREMITY; Excision of Right Forearm Arteriovenous Fistula.; Surgeon:LINDY AMAYA; [facility] OR     PERCUTANEOUS BIOPSY KIDNEY Right 2/28/2017    Procedure: PERCUTANEOUS BIOPSY KIDNEY;  Surgeon: Gee Barrios MD;  [facility]     TRANSPLANT  01/13/2011    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       Social History:  Patient reports that he  quit smoking about 3 years ago. His smoking use included cigarettes. He has never used smokeless tobacco. He reports previous alcohol use of about 4.2 standard drinks of alcohol per week. He reports previous drug use. Drug: Marijuana.    Family History:  Family History   Problem Relation Age of Onset     Hypertension Mother      Colon Cancer Mother 66     Colon Cancer Brother 51       Medications:  Current Outpatient Medications   Medication     cholecalciferol 25 MCG (1000 UT) TABS     febuxostat (ULORIC) 80 MG TABS tablet     furosemide (LASIX) 20 MG tablet     mycophenolate (GENERIC EQUIVALENT) 500 MG tablet     sulfamethoxazole-trimethoprim (BACTRIM) 400-80 MG tablet     tacrolimus (GENERIC EQUIVALENT) 0.5 MG capsule     tacrolimus (GENERIC EQUIVALENT) 1 MG capsule     acetaminophen (TYLENOL) 500 MG tablet     carvedilol (COREG) 12.5 MG tablet     No current facility-administered medications for this visit.         No Known Allergies      Impression and Recommendations (Patient Counseled on the Following):  1. SCCIS, buttocks status post Mohs surgery and linear repair  Wound healing well.   Ok to continue petroleum jelly, but could treat as normal skin.   Scar should continue to soften as sutures dissolve.   Schedule follow up in gen derm in 6 months for skin cancer screening.       Staff only    Reyes Cade DO    Department of Dermatology  [facility]: Phone: [phone], Fax:[phone]  [facility]: Phone: [phone], Fax: [phone]      _____________________________________________________________________________    Teledermatology information:  - Location of teledermatologist:  [facility] )  - Image quality and interpretability: acceptable  - Date of images: 12/21/20  - Service start time: 1200  - Service end time:   1205  - Date of report: 12/23/2020      Teledermatology Nurse Call Patients:     Are you  in the [facility] at the time of the encounter? yes    Today's visit will be billed to you and your insurance.    A teledermatology visit is not as thorough as an in-person visit and the quality of the photograph sent may not be of the same quality as that taken by the dermatology clinic.    Mehnaz Lyles LPN      Again, thank you for allowing me to participate in the care of your patient.        Sincerely,        Reyes Cade MD

## 2020-12-23 NOTE — PROGRESS NOTES
"Rashad Ortiz is a 45 year old male who is being evaluated via a billable video visit.      The patient has been notified of following:     \"This video visit will be conducted via a call between you and your physician/provider. We have found that certain health care needs can be provided without the need for an in-person physical exam.  This service lets us provide the care you need with a video conversation.  If a prescription is necessary we can send it directly to your pharmacy.  If lab work is needed we can place an order for that and you can then stop by our lab to have the test done at a later time.    Video visits are billed at different rates depending on your insurance coverage.  Please reach out to your insurance provider with any questions.    If during the course of the call the physician/provider feels a video visit is not appropriate, you will not be charged for this service.\"    Patient has given verbal consent for Video visit? Yes  How would you like to obtain your AVS? MyChart  If you are dropped from the video visit, the video invite should be resent to: Text to cell phone: [phone]  Will anyone else be joining your video visit? No    Subjective     Rashad Ortiz is a 45 year old male who presents today via video visit for the following health issues:    HPI     Musculoskeletal problem/pain  Onset/Duration: 3 weeks.  Description  Location: leg - right  Joint Swelling: no  Redness: no  Pain: YES  Warmth: no  Intensity:  moderate 8/10  Progression of Symptoms:  waxing and waning  Accompanying signs and symptoms:   Fevers: no  Numbness/tingling/weakness: YES- causing limp  History  Trauma to the area: no  Recent illness:  no  Previous similar problem: YES- on the left side but not the right side  Previous evaluation:  no  Precipitating or alleviating factors:  Aggravating factors include: sitting, standing and walking, any pressure on the leg  Therapies tried and outcome: nothing     Video " Start Time: 1:05 pm        Review of Systems   Constitutional, HEENT, cardiovascular, pulmonary, gi and gu systems are negative, except as otherwise noted.      Objective    Vitals - Patient Reported  Pain Score: Extreme Pain (8)      Vitals:  No vitals were obtained today due to virtual visit.    Physical Exam     GENERAL: Healthy, alert and no distress  EYES: Eyes grossly normal to inspection.  No discharge or erythema, or obvious scleral/conjunctival abnormalities.  RESP: No audible wheeze, cough, or visible cyanosis.  No visible retractions or increased work of breathing.    SKIN: Visible skin clear. No significant rash, abnormal pigmentation or lesions.  NEURO: Cranial nerves grossly intact.  Mentation and speech appropriate for age.  PSYCH: Mentation appears normal, affect normal/bright, judgement and insight intact, normal speech and appearance well-groomed.      Orders Only on 12/21/2020   Component Date Value Ref Range Status     Sodium Urine mmol/L 12/21/2020 57  mmol/L Final     Color Urine 12/21/2020 Yellow   Final     Appearance Urine 12/21/2020 Clear   Final     Glucose Urine 12/21/2020 Negative  NEG^Negative mg/dL Final     Bilirubin Urine 12/21/2020 Negative  NEG^Negative Final     Ketones Urine 12/21/2020 Negative  NEG^Negative mg/dL Final     Specific Gravity Urine 12/21/2020 1.015  1.003 - 1.035 Final     Blood Urine 12/21/2020 Trace* NEG^Negative Final     pH Urine 12/21/2020 6.0  5.0 - 7.0 pH Final     Protein Albumin Urine 12/21/2020 100* NEG^Negative mg/dL Final     Urobilinogen Urine 12/21/2020 0.2  0.2 - 1.0 EU/dL Final     Nitrite Urine 12/21/2020 Negative  NEG^Negative Final     Leukocyte Esterase Urine 12/21/2020 Trace* NEG^Negative Final     Source 12/21/2020 Midstream Urine   Final     WBC Urine 12/21/2020 0 - 5  OTO5^0 - 5 /HPF Final     RBC Urine 12/21/2020 O - 2  OTO2^O - 2 /HPF Final     Squamous Epithelial /LPF Urine 12/21/2020 Few  FEW^Few /LPF Final     Bacteria Urine 12/21/2020  Few* NEG^Negative /HPF Final           Assessment & Plan     Avascular necrosis of bone of hip, right (H)  Xray show avascular necrosis with OA - hydrocodone prn for pain and follow up with ortho for management. Issue with left hip was initially manage with this but medication required escalation when collapse occurred.  - HYDROcodone-acetaminophen (NORCO) 5-325 MG tablet; Take 1 tablet by mouth every 6 hours as needed for pain    Primary osteoarthritis of right hip  As above  - HYDROcodone-acetaminophen (NORCO) 5-325 MG tablet; Take 1 tablet by mouth every 6 hours as needed for pain      The uses and side effects, including black box warnings as appropriate, were discussed in detail.  All patient questions were answered.  The patient was instructed to call immediately if any side effects developed.     Return in about 2 weeks (around 1/6/2021), or if symptoms worsen or fail to improve.    Mariel Mares MD  [facility]      Video-Visit Details    Type of service:  Video Visit    Video End Time:1:14 PM    Originating Location (pt. Location): Home    Distant Location (provider location):  [facility]     Platform used for Video Visit: [employer]

## 2020-12-23 NOTE — PATIENT INSTRUCTIONS
At [facility], we strive to deliver an exceptional experience to you, every time we see you. If you receive a survey, please complete it as we do value your feedback.  If you have MyChart, you can expect to receive results automatically within 24 hours of their completion.  Your provider will send a note interpreting your results as well.   If you do not have MyChart, you should receive your results in about a week by mail.    Your care team:                            Family Medicine Internal Medicine   MD Juanito Lynne MD Shantel Branch-Fleming, MD Srinivasa Vaka, MD Katya Georgiev PA-C Megan Hill, APRN CNP    Srinivas Harrington, MD Pediatrics   Greg Fink, PAJaredC  Mellissa Balderrama, CNP MD Leanna Guzmán APRN CNP   MD Kelly Tipton MD Deborah Mielke, MD Sarah Schmitz, APRN CNP  Reina Perdue, PAJaredC  Cintia Rangel, CNP  MD Anny Salcido MD Angela Wermerskirchen, MD      Clinic hours: Monday - Thursday 7 am-7 pm; Fridays 7 am-5 pm.   Urgent care: Monday - Friday 11 am-9 pm; Saturday and Sunday 9 am-5 pm.    Clinic: [phone]       [facility]: Monday - Thursday 8 am - 7 pm; Friday 8 am - 6 pm  [facility]: [phone]     Use [email or web address] for 24/7 diagnosis and treatment of dozens of conditions.

## 2020-12-23 NOTE — PROGRESS NOTES
[facility] Dermatology Record:  Store and Forward and Telephone phone [phone]    Dermatology Problem List:  1. Relevant medical history: kidney transplant in 2011, currently on tacrolimus, MMF for immunosuppression. Currently back on dialysis, and undergoing evaluation for second transplant.  2. 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    Patient reports the wound has been healing appropriately and as described during his excision appointment.  After the first few days he reports minimal pain.  He has discontinued dressing changes.  No apparent bleeding or weeping from the wound.       ROS: Patient is generally feeling well today     Physical Examination:  General: Well-appearing, appropriately-developed individual.  Skin:  Exam was performed by photo review and is significant for the following:  -Well-healed surgical scar with minimal swelling.  Skin edges appear intact.     Labs:  Not applicable    Past Medical History:   Patient Active Problem List   Diagnosis     Kidney replaced by transplant     Aftercare following organ transplant     GERD (gastroesophageal reflux disease)     CARDIOVASCULAR SCREENING; LDL GOAL LESS THAN 160     Gout     Antibody mediated rejection of kidney transplant     Medical non-compliance     Chronic kidney disease, stage 4, severely decreased GFR (H)     Herpes simplex infection of penis     Escherichia coli septicemia (H)     Pyelonephritis of transplanted kidney     HTN, kidney transplant related     Metabolic acidosis     CKD (chronic kidney disease) stage 5, GFR less than 15 ml/min (H)     Immunosuppression (H)     Anemia of chronic renal failure, stage 5 (H)     Secondary renal hyperparathyroidism (H)     Vitamin D  deficiency     BPH (benign prostatic hyperplasia)     Status post hip surgery     ESRD (end stage renal disease) on dialysis (H)     Past Medical History:   Diagnosis Date     AVN (avascular necrosis of bone) (H)     left hip     AVN (avascular necrosis of bone) (H)     left hip     BPH (benign prostatic hyperplasia)      Chronic kidney disease, stage 4, severely decreased GFR (H)      Gastro-oesophageal reflux disease      Gout      History of blood transfusion      Hypertension      Medical non-compliance      Osteonecrosis (H) 7/29/2020    Added automatically from request for surgery [id number]     Pulmonary nodules      Steroid long-term use      Vitamin D deficiency      Past Surgical History:   Procedure Laterality Date     ARTHROPLASTY HIP Left 9/9/2020    Procedure: Left total hip arthroplasty;  Surgeon: Fernando Morillo MD;  [facility] OR     AV FISTULA OR GRAFT ARTERIAL       CREATE FISTULA ARTERIOVENOUS UPPER EXTREMITY Right 7/31/2019    Procedure: Creation Of Atriovenous Fistula Right Upper Arm;  Surgeon: Julia Irwin MD;  [facility]: [facility] OR     IR CVC TUNNEL PLACEMENT > 5 YRS OF AGE  10/9/2020     IR DIALYSIS FISTULOGRAM RIGHT  10/9/2020     IR DIALYSIS MECH THROMB, PTA  10/9/2020     LIGATE FISTULA ARTERIOVENOUS UPPER EXTREMITY  12/20/2011    Procedure:LIGATE FISTULA ARTERIOVENOUS UPPER EXTREMITY; Excision of Right Forearm Arteriovenous Fistula.; Surgeon:LINDY AMAYA; Location[facility] OR     PERCUTANEOUS BIOPSY KIDNEY Right 2/28/2017    Procedure: PERCUTANEOUS BIOPSY KIDNEY;  Surgeon: Gee Barrios MD;  [facility]     TRANSPLANT  01/13/2011    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       Social History:  Patient reports that he quit smoking about 3 years ago. His smoking use included cigarettes. He has never used smokeless tobacco. He reports previous alcohol use of about 4.2 standard drinks of alcohol per week. He reports previous drug use. Drug: Marijuana.    Family  History:  Family History   Problem Relation Age of Onset     Hypertension Mother      Colon Cancer Mother 66     Colon Cancer Brother 51       Medications:  Current Outpatient Medications   Medication     cholecalciferol 25 MCG (1000 UT) TABS     febuxostat (ULORIC) 80 MG TABS tablet     furosemide (LASIX) 20 MG tablet     mycophenolate (GENERIC EQUIVALENT) 500 MG tablet     sulfamethoxazole-trimethoprim (BACTRIM) 400-80 MG tablet     tacrolimus (GENERIC EQUIVALENT) 0.5 MG capsule     tacrolimus (GENERIC EQUIVALENT) 1 MG capsule     acetaminophen (TYLENOL) 500 MG tablet     carvedilol (COREG) 12.5 MG tablet     No current facility-administered medications for this visit.         No Known Allergies      Impression and Recommendations (Patient Counseled on the Following):  1. SCCIS, buttocks status post Mohs surgery and linear repair  Wound healing well.   Ok to continue petroleum jelly, but could treat as normal skin.   Scar should continue to soften as sutures dissolve.   Schedule follow up in gen derm in 6 months for skin cancer screening.       Staff only    Reyes Cade DO    Department of Dermatology  [facility]: Phone: [phone], Fax:[phone]  [facility]: Phone: [phone], Fax: [phone]      _____________________________________________________________________________    Teledermatology information:  - Location of teledermatologist:  ([facility] )  - Image quality and interpretability: acceptable  - Date of images: 12/21/20  - Service start time: 1200  - Service end time:   1205  - Date of report: 12/23/2020     Teledermatology Nurse Call Patients:     Are you  in the state [facility] at the time of the encounter? yes    Today's visit will be billed to you and your insurance.    A teledermatology visit is not as thorough as an in-person visit and  the quality of the photograph sent may not be of the same quality as that taken by the dermatology clinic.    Mehnaz Lyles LPN

## 2020-12-24 ENCOUNTER — TELEPHONE (OUTPATIENT)
Dept: DERMATOLOGY | Facility: CLINIC | Age: 45
End: 2020-12-24

## 2020-12-24 NOTE — TELEPHONE ENCOUNTER
1st attempt. Left message for patient to return call. Patient is due for a return in person appointment with Dr Cade due around 6/23/21. Number to clinic and Mychart option given, please assist in scheduling once patient returns clinic call.     Call Center OKAY TO SCHEDULE.    Thanks,   Sheila Perez  Surgical Specialties Procedure   "[employer] [place]  12/24/2020 10:29 AM

## 2020-12-24 NOTE — TELEPHONE ENCOUNTER
Patient left VM to schedule 6 month skin check. Please contact patient to schedule.     Zena Balbuena LPN

## 2020-12-31 ENCOUNTER — VIRTUAL VISIT (OUTPATIENT)
Dept: ORTHOPEDICS | Facility: CLINIC | Age: 45
End: 2020-12-31
Payer: COMMERCIAL

## 2020-12-31 ENCOUNTER — TELEPHONE (OUTPATIENT)
Dept: ORTHOPEDICS | Facility: CLINIC | Age: 45
End: 2020-12-31

## 2020-12-31 DIAGNOSIS — M87.051 AVASCULAR NECROSIS OF FEMORAL HEAD, RIGHT (H): Primary | ICD-10-CM

## 2020-12-31 PROCEDURE — 99213 OFFICE O/P EST LOW 20 MIN: CPT | Mod: GT | Performed by: ORTHOPAEDIC SURGERY

## 2020-12-31 NOTE — PROGRESS NOTES
"Rashad Ortiz is a 45 year old male who is being evaluated via a billable video visit.      The patient has been notified of following:     \"This video visit will be conducted via a call between you and your physician/provider. We have found that certain health care needs can be provided without the need for an in-person physical exam.  This service lets us provide the care you need with a video conversation.  If a prescription is necessary we can send it directly to your pharmacy.  If lab work is needed we can place an order for that and you can then stop by our lab to have the test done at a later time.    Video visits are billed at different rates depending on your insurance coverage.  Please reach out to your insurance provider with any questions.    If during the course of the call the physician/provider feels a video visit is not appropriate, you will not be charged for this service.\"    Patient has given verbal consent for Video visit? Yes  How would you like to obtain your AVS? MyChart  If you are dropped from the video visit, the video invite should be resent to: Text to cell phone: [phone]  Will anyone else be joining your video visit? No        "

## 2020-12-31 NOTE — TELEPHONE ENCOUNTER
Patient called into the back line and spoke with care team and rescheduled a virtual visit at 11:00 am.

## 2020-12-31 NOTE — PROGRESS NOTES
Chief Complaint: RECHECK (12 week follow up Left FRANCA DOS 9/9/20. patient stated the left hip is doing well but has concerns about right knee and believe the AVS is now in his right knee )   the above is a typo should read right hip     HPI: Rashad Ortiz returns today in follow-up for his hips. He reports that the left hip is doing well. The right hip, unfortunately has become very painful. He is back on pain medication for this. He would liket o move forward with right hip replacement.     Medications and allergies are documented in the EMR and have been reviewed.    Current Outpatient Medications:      acetaminophen (TYLENOL) 500 MG tablet, Take 2 tablets (1,000 mg) by mouth every 8 hours as needed for mild pain, Disp: 160 tablet, Rfl: 3     cholecalciferol 25 MCG (1000 UT) TABS, Take 2,000 Units by mouth daily, Disp: 90 tablet, Rfl: 3     febuxostat (ULORIC) 80 MG TABS tablet, Take 1 tablet (80 mg) by mouth daily, Disp: 90 tablet, Rfl: 3     furosemide (LASIX) 20 MG tablet, , Disp: , Rfl:      mycophenolate (GENERIC EQUIVALENT) 500 MG tablet, Take 500 mg by mouth 2 times daily, Disp: , Rfl:      sulfamethoxazole-trimethoprim (BACTRIM) 400-80 MG tablet, Take 1 tablet by mouth every other day, Disp: 45 tablet, Rfl: 3     tacrolimus (GENERIC EQUIVALENT) 0.5 MG capsule, Take 1 capsule (0.5 mg) by mouth every evening Total dose = 1 mg in the AM and 1.5 mg in the PM, Disp: 30 capsule, Rfl: 11     tacrolimus (GENERIC EQUIVALENT) 1 MG capsule, Take 1 capsule (1 mg) by mouth 2 times daily . Total dose=1mg in the AM and 1.5mg in the PM, Disp: 60 capsule, Rfl: 11  Allergies: Patient has no known allergies.    X-rays:    I reviewed the x-rays dated today.  Previous films reviewed.    Findings:  Normal progression for a right total hip arthroplasty without evidence of loosening or subsidence. Large AVN lesion right femoral head, subtle evidence of collapse.     Assessment: doing very well on the left, right hip AVN with  collapse, very painful. Timing is appropriate to move forward with total hip  Plan: right total hip arthroplasty    No ref. provider found    Video visit of 20 minutes

## 2020-12-31 NOTE — LETTER
"    12/31/2020         RE: Rashad Ortiz  [address] [facility] [address]        Dear Colleague,    Thank you for referring your patient, Rashad Ortiz, to the [facility] [place]. Please see a copy of my visit note below.    Rashad Ortiz is a 45 year old male who is being evaluated via a billable video visit.      The patient has been notified of following:     \"This video visit will be conducted via a call between you and your physician/provider. We have found that certain health care needs can be provided without the need for an in-person physical exam.  This service lets us provide the care you need with a video conversation.  If a prescription is necessary we can send it directly to your pharmacy.  If lab work is needed we can place an order for that and you can then stop by our lab to have the test done at a later time.    Video visits are billed at different rates depending on your insurance coverage.  Please reach out to your insurance provider with any questions.    If during the course of the call the physician/provider feels a video visit is not appropriate, you will not be charged for this service.\"    Patient has given verbal consent for Video visit? Yes  How would you like to obtain your AVS? MyChart  If you are dropped from the video visit, the video invite should be resent to: Text to cell phone: [phone]  Will anyone else be joining your video visit? No          Chief Complaint: RECHECK (12 week follow up Left FRANCA DOS 9/9/20. patient stated the left hip is doing well but has concerns about right knee and believe the AVS is now in his right knee )   the above is a typo should read right hip     HPI: Rashad Ortiz returns today in follow-up for his hips. He reports that the left hip is doing well. The right hip, unfortunately has become very painful. He is back on pain medication for this. He would liket o move forward with right hip replacement. "     Medications and allergies are documented in the EMR and have been reviewed.    Current Outpatient Medications:      acetaminophen (TYLENOL) 500 MG tablet, Take 2 tablets (1,000 mg) by mouth every 8 hours as needed for mild pain, Disp: 160 tablet, Rfl: 3     cholecalciferol 25 MCG (1000 UT) TABS, Take 2,000 Units by mouth daily, Disp: 90 tablet, Rfl: 3     febuxostat (ULORIC) 80 MG TABS tablet, Take 1 tablet (80 mg) by mouth daily, Disp: 90 tablet, Rfl: 3     furosemide (LASIX) 20 MG tablet, , Disp: , Rfl:      mycophenolate (GENERIC EQUIVALENT) 500 MG tablet, Take 500 mg by mouth 2 times daily, Disp: , Rfl:      sulfamethoxazole-trimethoprim (BACTRIM) 400-80 MG tablet, Take 1 tablet by mouth every other day, Disp: 45 tablet, Rfl: 3     tacrolimus (GENERIC EQUIVALENT) 0.5 MG capsule, Take 1 capsule (0.5 mg) by mouth every evening Total dose = 1 mg in the AM and 1.5 mg in the PM, Disp: 30 capsule, Rfl: 11     tacrolimus (GENERIC EQUIVALENT) 1 MG capsule, Take 1 capsule (1 mg) by mouth 2 times daily . Total dose=1mg in the AM and 1.5mg in the PM, Disp: 60 capsule, Rfl: 11  Allergies: Patient has no known allergies.    X-rays:    I reviewed the x-rays dated today.  Previous films reviewed.    Findings:  Normal progression for a right total hip arthroplasty without evidence of loosening or subsidence. Large AVN lesion right femoral head, subtle evidence of collapse.     Assessment: doing very well on the left, right hip AVN with collapse, very painful. Timing is appropriate to move forward with total hip  Plan: right total hip arthroplasty    No ref. provider found    Video visit of 20 minutes     eFrnando Morillo MD

## 2020-12-31 NOTE — TELEPHONE ENCOUNTER
[facility] Call Center    Phone Message    May a detailed message be left on voicemail: yes     Reason for Call: Other: Pt is scheduled for an in-person visit today at 12:30 but would like to know if it is possible to do video or a telephone visit instead.      Action Taken: Message routed to:  [facility]o    Travel Screening: Not Applicable

## 2020-12-31 NOTE — NURSING NOTE
Reason For Visit:   Chief Complaint   Patient presents with     RECHECK     12 week follow up Left FRANCA DOS 9/9/20. patient stated the left hip is doing well but has concerns about right knee and believe the AVS is now in his right knee        There were no vitals taken for this visit.         Charlie Prieto, ATC

## 2021-01-01 ENCOUNTER — VIRTUAL VISIT (OUTPATIENT)
Dept: FAMILY MEDICINE | Facility: CLINIC | Age: 46
End: 2021-01-01
Payer: MEDICARE

## 2021-01-01 ENCOUNTER — HEALTH MAINTENANCE LETTER (OUTPATIENT)
Age: 46
End: 2021-01-01

## 2021-01-01 ENCOUNTER — OFFICE VISIT (OUTPATIENT)
Dept: DERMATOLOGY | Facility: CLINIC | Age: 46
End: 2021-01-01
Payer: MEDICARE

## 2021-01-01 ENCOUNTER — MYC MEDICAL ADVICE (OUTPATIENT)
Dept: FAMILY MEDICINE | Facility: CLINIC | Age: 46
End: 2021-01-01
Payer: COMMERCIAL

## 2021-01-01 ENCOUNTER — OFFICE VISIT (OUTPATIENT)
Dept: RHEUMATOLOGY | Facility: CLINIC | Age: 46
End: 2021-01-01
Payer: MEDICARE

## 2021-01-01 ENCOUNTER — TELEPHONE (OUTPATIENT)
Dept: RHEUMATOLOGY | Facility: CLINIC | Age: 46
End: 2021-01-01
Payer: COMMERCIAL

## 2021-01-01 ENCOUNTER — MYC REFILL (OUTPATIENT)
Dept: FAMILY MEDICINE | Facility: CLINIC | Age: 46
End: 2021-01-01

## 2021-01-01 ENCOUNTER — OFFICE VISIT (OUTPATIENT)
Dept: PODIATRY | Facility: CLINIC | Age: 46
End: 2021-01-01
Payer: MEDICARE

## 2021-01-01 VITALS
HEIGHT: 68 IN | HEART RATE: 73 BPM | WEIGHT: 149.2 LBS | SYSTOLIC BLOOD PRESSURE: 135 MMHG | BODY MASS INDEX: 22.61 KG/M2 | OXYGEN SATURATION: 100 % | DIASTOLIC BLOOD PRESSURE: 84 MMHG | TEMPERATURE: 98.5 F

## 2021-01-01 VITALS
WEIGHT: 149 LBS | DIASTOLIC BLOOD PRESSURE: 88 MMHG | BODY MASS INDEX: 22.66 KG/M2 | SYSTOLIC BLOOD PRESSURE: 140 MMHG | HEART RATE: 84 BPM

## 2021-01-01 DIAGNOSIS — M25.50 ARTHRALGIA, UNSPECIFIED JOINT: Primary | ICD-10-CM

## 2021-01-01 DIAGNOSIS — M79.671 HEEL PAIN, BILATERAL: ICD-10-CM

## 2021-01-01 DIAGNOSIS — L70.0 OPEN COMEDONE: ICD-10-CM

## 2021-01-01 DIAGNOSIS — M79.672 HEEL PAIN, BILATERAL: ICD-10-CM

## 2021-01-01 DIAGNOSIS — D84.9 IMMUNOSUPPRESSION (H): Primary | ICD-10-CM

## 2021-01-01 DIAGNOSIS — R60.0 EDEMA OF BOTH LOWER LEGS: ICD-10-CM

## 2021-01-01 DIAGNOSIS — I30.9 ACUTE PERICARDITIS, UNSPECIFIED TYPE: Primary | ICD-10-CM

## 2021-01-01 DIAGNOSIS — M72.2 PLANTAR FASCIITIS: Primary | ICD-10-CM

## 2021-01-01 DIAGNOSIS — Z85.828 HISTORY OF NONMELANOMA SKIN CANCER: ICD-10-CM

## 2021-01-01 DIAGNOSIS — Z23 COVID-19 VACCINE SERIES STARTED: ICD-10-CM

## 2021-01-01 DIAGNOSIS — L82.1 SEBORRHEIC KERATOSIS: ICD-10-CM

## 2021-01-01 LAB — RHEUMATOID FACT SER NEPH-ACNC: 12 IU/ML

## 2021-01-01 PROCEDURE — 99214 OFFICE O/P EST MOD 30 MIN: CPT | Performed by: INTERNAL MEDICINE

## 2021-01-01 PROCEDURE — 99203 OFFICE O/P NEW LOW 30 MIN: CPT | Performed by: PODIATRIST

## 2021-01-01 PROCEDURE — 99213 OFFICE O/P EST LOW 20 MIN: CPT | Performed by: DERMATOLOGY

## 2021-01-01 PROCEDURE — 99213 OFFICE O/P EST LOW 20 MIN: CPT | Performed by: FAMILY MEDICINE

## 2021-01-01 RX ORDER — OXYCODONE HYDROCHLORIDE 5 MG/1
5-10 TABLET ORAL EVERY 6 HOURS PRN
Qty: 12 TABLET | Refills: 0 | Status: SHIPPED | OUTPATIENT
Start: 2021-01-01 | End: 2022-01-01

## 2021-01-01 ASSESSMENT — MIFFLIN-ST. JEOR: SCORE: 1536.27

## 2021-01-01 ASSESSMENT — PAIN SCALES - GENERAL
PAINLEVEL: NO PAIN (0)
PAINLEVEL: MILD PAIN (2)

## 2021-01-04 NOTE — PROGRESS NOTES
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    Informed consent was obtained and the patient underwent the procedure as follows:  The patient was placed right lateral decubitus on the operating table.  The cancer was identified, outlined with a marker, and verified by the patient.  The entire surgical field was prepped with Hibiclens.  The surgical site was anesthetized using Lidocaine 1% with epi 1:100,000.      The area of clinically apparent tumor was debulked. The layer of tissue was then surgically excised using a #15 blade and was then transferred onto a specimen sheet maintaining the orientation of the specimen. Hemostasis was obtained using heat cautery. The wound site was then covered with a dressing while the tissue samples were processed for examination.    The excised tissue was transported to the Mohs histology laboratory maintaining the tissue orientation.  The tissue specimen was relaxed so that the entire surgical margin was in a a single horizontal plane for sectioning and inked for precise mapping.  A precise reference map was drawn to reflect the sectioning of the specimen, colored inking of the margins, and orientation on the patient. The  tissue was processed using horizontal sectioning of the base and continuous peripheral margins.  The histopathologic sections were reviewed in conjunction with the reference map.    Total blocks: 1    Total slides:  3    There were no cancer cells visualized on examination, therefore Mohs surgery was complete.    REPAIR: An intermediate layered linear closure was selected as the procedure which would maximally preserve both function and cosmesis.    After the excision of the tumor, the area was undermined. Hemostasis was obtained with bipolar electrocoagulation.  Closure was oriented so that the wound was in the patient's natural skin tension lines. The subcutaneous and dermal layers were then closed with 4-0 monocryl buried vertical mattress sutures. The epidermis was then carefully approximated along the length of the wound using 5-0 Fast Absorbing Gut simple running sutures.     Estimated blood loss was less than 10 ml for all surgical sites. A sterile pressure dressing was applied and wound care instructions, with a written handout, were given. The patient was discharged from the [facility] alert and ambulatory.    Follow-up virtual visit 2 weeks.     I personally performed the procedures today.    Reyes Cade DO    Department of Dermatology  [facility]: Phone: [phone], Fax:[phone]  [facility]: Phone: [phone], Fax: [phone]    Care and Laboratory Testing Performed at:  [facility]   Dermatology Clinic  [address] [place] [address]

## 2021-01-05 ENCOUNTER — VIRTUAL VISIT (OUTPATIENT)
Dept: FAMILY MEDICINE | Facility: CLINIC | Age: 46
End: 2021-01-05
Payer: COMMERCIAL

## 2021-01-05 DIAGNOSIS — M16.11 PRIMARY OSTEOARTHRITIS OF RIGHT HIP: ICD-10-CM

## 2021-01-05 DIAGNOSIS — M87.051 AVASCULAR NECROSIS OF BONE OF HIP, RIGHT (H): Primary | ICD-10-CM

## 2021-01-05 DIAGNOSIS — N18.6 ESRD (END STAGE RENAL DISEASE) ON DIALYSIS (H): ICD-10-CM

## 2021-01-05 DIAGNOSIS — N25.81 SECONDARY RENAL HYPERPARATHYROIDISM (H): ICD-10-CM

## 2021-01-05 DIAGNOSIS — Z99.2 ESRD (END STAGE RENAL DISEASE) ON DIALYSIS (H): ICD-10-CM

## 2021-01-05 DIAGNOSIS — D89.9 DISORDER INVOLVING THE IMMUNE MECHANISM, UNSPECIFIED (H): ICD-10-CM

## 2021-01-05 DIAGNOSIS — M89.8X9 BONE PAIN: ICD-10-CM

## 2021-01-05 PROCEDURE — 99214 OFFICE O/P EST MOD 30 MIN: CPT | Mod: TEL | Performed by: FAMILY MEDICINE

## 2021-01-05 RX ORDER — HYDROCODONE BITARTRATE AND ACETAMINOPHEN 5; 325 MG/1; MG/1
1 TABLET ORAL EVERY 12 HOURS PRN
Qty: 40 TABLET | Refills: 0 | Status: SHIPPED | OUTPATIENT
Start: 2021-01-05 | End: 2021-02-01

## 2021-01-05 NOTE — PATIENT INSTRUCTIONS
At [facility], we strive to deliver an exceptional experience to you, every time we see you. If you receive a survey, please complete it as we do value your feedback.  If you have MyChart, you can expect to receive results automatically within 24 hours of their completion.  Your provider will send a note interpreting your results as well.   If you do not have MyChart, you should receive your results in about a week by mail.    Your care team:                            Family Medicine Internal Medicine   MD Juanito Lynne MD Shantel Branch-Fleming, MD Srinivasa Vaka, MD Katya Georgiev PA-C Megan Hill, APRN CNP    Srinivas Harrington, MD Pediatrics   Greg Fink, PAJaredC  Mellissa Balderrama, CNP MD Leanna Guzmán APRN CNP   MD Kelly Tipton MD Deborah Mielke, MD Sarah Schmitz, APRN CNP  Reina Perdue, PAJaredC  Cintia Rangel, CNP  MD Anny Salcido MD Angela Wermerskirchen, MD      Clinic hours: Monday - Thursday 7 am-7 pm; Fridays 7 am-5 pm.   Urgent care: Monday - Friday 11 am-9 pm; Saturday and Sunday 9 am-5 pm.    Clinic: [phone]       [facility]: Monday - Thursday 8 am - 7 pm; Friday 8 am - 6 pm  [facility]: [phone]     Use [email or web address] for 24/7 diagnosis and treatment of dozens of conditions.

## 2021-01-05 NOTE — PROGRESS NOTES
Rashad Ortiz is a 45 year old male who is being evaluated via a billable telephone visit.      What phone number would you like to be contacted at? mobile  How would you like to obtain your AVS? MyChart  Assessment & Plan     Avascular necrosis of bone of hip, right (H)  Same issue on left fixed surgically and worsening pain on the right.  Surgical intervention has been recommended by Dr. Morillo at 12/31/2020 visit.  Refill norco to manage pain until surgery.  - HYDROcodone-acetaminophen (NORCO) 5-325 MG tablet; Take 1 tablet by mouth every 12 hours as needed for pain    Primary osteoarthritis of right hip  Based on xray and plan as above.  - HYDROcodone-acetaminophen (NORCO) 5-325 MG tablet; Take 1 tablet by mouth every 12 hours as needed for pain    Bone pain  Uncertain etiology - check labs for evaluation of cause.  Suspect erythropoiesis verse magnesium depletion verse other inflammatory process since off prednisone.  - **CBC with platelets FUTURE anytime; Future  - CRP, inflammation; Future  - Erythrocyte sedimentation rate auto; Future    Disorder involving the immune mechanism, unspecified (H)  Related to previous ant-irejection drugs.    ESRD (end stage renal disease) on dialysis (H)  Currently receiving this while awaiting another kidney transplant.    Secondary renal hyperparathyroidism (H)  Related to above - continue to monitor.      Review of external notes as documented above          The uses and side effects, including black box warnings as appropriate, were discussed in detail.  All patient questions were answered.  The patient was instructed to call immediately if any side effects developed.     Return in about 2 weeks (around 1/19/2021).    Mariel Mares MD  [facility]     Rashad Ortiz is a 45 year old who presents to clinic today for the following health issues     HPI     Right hip pain related to avascular necrosis. Started on norco  and using bid.  Seen by Dr. Morillo and surgery hoped for by the end of the month.    Bone pain since October.  Stopped prednisone prior to this.  Had multiple episodes of dehydration and anemia with transfusion related to ESRD.  Spoke with nephrologist and not thought related to ESRD by them. Hoping to get a transplant again.    Review of Systems   Constitutional, HEENT, cardiovascular, pulmonary, gi and gu systems are negative, except as otherwise noted.      Objective           Vitals:  No vitals were obtained today due to virtual visit.    Physical Exam   healthy, alert and no distress  PSYCH: Alert and oriented times 3; coherent speech, normal   rate and volume, able to articulate logical thoughts, able   to abstract reason, no tangential thoughts, no hallucinations   or delusions  His affect is normal  RESP: No cough, no audible wheezing, able to talk in full sentences  Remainder of exam unable to be completed due to telephone visits          Phone call duration: 13 minutes

## 2021-01-06 ENCOUNTER — MYC MEDICAL ADVICE (OUTPATIENT)
Dept: FAMILY MEDICINE | Facility: CLINIC | Age: 46
End: 2021-01-06

## 2021-01-06 ENCOUNTER — TELEPHONE (OUTPATIENT)
Dept: FAMILY MEDICINE | Facility: CLINIC | Age: 46
End: 2021-01-06

## 2021-01-06 NOTE — TELEPHONE ENCOUNTER
Plan does not cover this medication. Please call plan at [phone] to initiate prior authorization or call/fax pharmacy to change medication at  [phone]. Patient ID # is [id number].  Plan limits to 7 days supply.      Karlos Faacasper  [facility]

## 2021-01-06 NOTE — TELEPHONE ENCOUNTER
PA Initiation    Medication: HYDROcodone-acetaminophen (NORCO) 5-325 MG tablet  Insurance Company: [employer] - Phone [phone] Fax [phone]  Pharmacy Filling the Rx: [id number] - [place], [place] - [address] AT [facility] [address]  Filling Pharmacy Phone: [phone]  Filling Pharmacy Fax: [phone]  Start Date: 1/6/2021

## 2021-01-07 ENCOUNTER — TELEPHONE (OUTPATIENT)
Dept: ORTHOPEDICS | Facility: CLINIC | Age: 46
End: 2021-01-07

## 2021-01-07 PROBLEM — M87.051 AVASCULAR NECROSIS OF FEMORAL HEAD, RIGHT (H): Status: ACTIVE | Noted: 2021-01-07

## 2021-01-07 NOTE — PROGRESS NOTES
Outpatient Occupational Therapy Discharge Note     Patient: Rashad Ortiz  : 1975    Beginning/End Dates of Reporting Period:  2019-eval and tx only    Referring Provider: Gee Carlos MD    Therapy Diagnosis: BLE stage ll/lll lymphedema    Client Self Report:   0    Objective Measurements:     Objective Measure: skin assessment       Objective Measure: volume       Objective Measure: LLIS          Goals:   Goal Identifier home program   Goal Description Pt will demonstrate IND with home program including: GCB to BLEs , self MLD and HEP to reduce edema to improve skin integrity, prevent infection and to be fit for compression garments for edema management at discharge.   Target Date 19   Date Met      Progress:     Goal Identifier volume   Goal Description Pt will demonstrate a 500mL reduction in RLE volume and a 900mL reduction in LLE volume to preserve skin integrity, prevent infection and to be fit for compression garments for edema management at discharge.    Target Date 19   Date Met      Progress:     Goal Identifier shoes    Goal Description Pt will self-report improved fit of shoes by tying them tighter and reduce shoe size by 1 measurement.     Target Date 19   Date Met      Progress:     Goal Identifier discharge   Goal Description Pt will be IND with donning compression garment(s) and verbalizing wear/wash/replace schedule for edema management at discharge.    Target Date 19   Date Met      Progress:     Goal Identifier     Goal Description     Target Date     Date Met      Progress:     Goal Identifier     Goal Description     Target Date     Date Met      Progress:     Goal Identifier     Goal Description     Target Date     Date Met      Progress:     Goal Identifier     Goal Description     Target Date     Date Met      Progress:     Progress Toward Goals:   Progress limited due to not returning for treatment.        Plan:  Discharge from  therapy.    Discharge:    Reason for Discharge: Patient has failed to schedule further appointments.    Equipment Issued: 0    Discharge Plan: Pt to obtain future lymphedema referral should lymphedema present.

## 2021-01-07 NOTE — ADDENDUM NOTE
Encounter addended by: Blanca Helm, OT on: 1/7/2021 2:40 PM   Actions taken: Clinical Note Signed, Episode resolved

## 2021-01-07 NOTE — TELEPHONE ENCOUNTER
Patient is scheduled for surgery with Dr. Morillo      Spoke or left message with: Rashad    Date of Surgery: 5/24/21    Location: [place]    Informed patient they will need an adult  Yes    Pre-op with surgeon (if applicable): Complete    H&P: Patient to schedule with PCP    Additional imaging/appointments: Patient will await call from covid scheduling team    Surgery packet: Given in clinic     Additional comments: Patient will await call from pre admission nurses 1-2 days prior to surgery for arrival time

## 2021-01-07 NOTE — TELEPHONE ENCOUNTER
Prior Authorization Approval    Authorization Effective Date: 12/6/2020  Authorization Expiration Date: 2/6/2021  Medication: HYDROcodone-acetaminophen (NORCO) 5-325 MG tablet  Approved Dose/Quantity:   Reference #: [id number]   Insurance Company: [employer] - Phone [phone] Fax [phone]  Expected CoPay:       CoPay Card Available:      Foundation Assistance Needed:    Which Pharmacy is filling the prescription (Not needed for infusion/clinic administered): [id number] - [place], [place] - [address] AT [address]  Pharmacy Notified: Yes  Patient Notified: Yes, **Instructed pharmacy to notify patient when script is ready to /ship.**

## 2021-01-08 DIAGNOSIS — M89.8X9 BONE PAIN: ICD-10-CM

## 2021-01-08 LAB
CRP SERPL-MCNC: 87.2 MG/L (ref 0–8)
ERYTHROCYTE [DISTWIDTH] IN BLOOD BY AUTOMATED COUNT: 18.2 % (ref 10–15)
ERYTHROCYTE [SEDIMENTATION RATE] IN BLOOD BY WESTERGREN METHOD: 101 MM/H (ref 0–15)
HCT VFR BLD AUTO: 32.2 % (ref 40–53)
HGB BLD-MCNC: 9.8 G/DL (ref 13.3–17.7)
MCH RBC QN AUTO: 26.3 PG (ref 26.5–33)
MCHC RBC AUTO-ENTMCNC: 30.4 G/DL (ref 31.5–36.5)
MCV RBC AUTO: 86 FL (ref 78–100)
PLATELET # BLD AUTO: 239 10E9/L (ref 150–450)
RBC # BLD AUTO: 3.73 10E12/L (ref 4.4–5.9)
WBC # BLD AUTO: 6.5 10E9/L (ref 4–11)

## 2021-01-08 PROCEDURE — 85652 RBC SED RATE AUTOMATED: CPT | Performed by: FAMILY MEDICINE

## 2021-01-08 PROCEDURE — 36415 COLL VENOUS BLD VENIPUNCTURE: CPT | Performed by: FAMILY MEDICINE

## 2021-01-08 PROCEDURE — 85027 COMPLETE CBC AUTOMATED: CPT | Performed by: FAMILY MEDICINE

## 2021-01-08 PROCEDURE — 86140 C-REACTIVE PROTEIN: CPT | Performed by: FAMILY MEDICINE

## 2021-01-12 ENCOUNTER — PRE VISIT (OUTPATIENT)
Dept: ONCOLOGY | Facility: CLINIC | Age: 46
End: 2021-01-12

## 2021-01-12 ENCOUNTER — TELEPHONE (OUTPATIENT)
Dept: FAMILY MEDICINE | Facility: CLINIC | Age: 46
End: 2021-01-12

## 2021-01-12 NOTE — TELEPHONE ENCOUNTER
MARLENE Hyde [facility] Case Manger is calling to inform you that she will be following the patient to get a kidney transplant.Roxana Grimes

## 2021-01-13 ENCOUNTER — PRE VISIT (OUTPATIENT)
Dept: ORTHOPEDICS | Facility: CLINIC | Age: 46
End: 2021-01-13

## 2021-01-14 NOTE — RESULT ENCOUNTER NOTE
Mr. Ortiz,    Sorry for the delay in getting a note to you.  I was doing some research on your results.      You do have inflammation in your system per you labs.  This may be related to stopping the steroids.    You are still anemic and it looks to be iron related.  Are you taking a iron supplement?    Please contact the clinic if you have additional questions.  Thank you.    Sincerely,    Mariel Mares MD

## 2021-01-18 RX ORDER — CELECOXIB 200 MG/1
400 CAPSULE ORAL ONCE
Status: CANCELLED | OUTPATIENT
Start: 2021-05-24

## 2021-02-01 ENCOUNTER — MYC REFILL (OUTPATIENT)
Dept: FAMILY MEDICINE | Facility: CLINIC | Age: 46
End: 2021-02-01

## 2021-02-01 DIAGNOSIS — M16.11 PRIMARY OSTEOARTHRITIS OF RIGHT HIP: ICD-10-CM

## 2021-02-01 DIAGNOSIS — M87.051 AVASCULAR NECROSIS OF BONE OF HIP, RIGHT (H): ICD-10-CM

## 2021-02-02 DIAGNOSIS — Z94.0 KIDNEY TRANSPLANTED: Primary | ICD-10-CM

## 2021-02-02 DIAGNOSIS — Z79.60 LONG-TERM USE OF IMMUNOSUPPRESSANT MEDICATION: ICD-10-CM

## 2021-02-02 DIAGNOSIS — Z94.0 KIDNEY TRANSPLANT RECIPIENT: ICD-10-CM

## 2021-02-02 DIAGNOSIS — E55.9 VITAMIN D DEFICIENCY: ICD-10-CM

## 2021-02-02 RX ORDER — HYDROCODONE BITARTRATE AND ACETAMINOPHEN 5; 325 MG/1; MG/1
1 TABLET ORAL EVERY 12 HOURS PRN
Qty: 40 TABLET | Refills: 0 | Status: ON HOLD | OUTPATIENT
Start: 2021-02-02 | End: 2021-04-12

## 2021-02-02 NOTE — TELEPHONE ENCOUNTER
Routing refill request to provider for review/approval because:  Drug not on the [facility] refill protocol     Shi STEVENN, RN

## 2021-02-03 RX ORDER — MYCOPHENOLATE MOFETIL 250 MG/1
500 CAPSULE ORAL 2 TIMES DAILY
Qty: 120 CAPSULE | Refills: 11 | Status: SHIPPED | OUTPATIENT
Start: 2021-02-03 | End: 2021-05-25

## 2021-02-03 NOTE — TELEPHONE ENCOUNTER
Patient on dialysis. Needs to be refilled by dialysis unit provider.  
this is a 22 yo F c/o of right sided chest pain and hematuria x 1 day. Denies sob, chest pain, flank pain, abdominal pain, nausea, vomiting, fever, trauma, fb.

## 2021-02-08 DIAGNOSIS — N18.6 ESRD ON DIALYSIS (H): Primary | ICD-10-CM

## 2021-02-08 DIAGNOSIS — T82.9XXA COMPLICATION OF VASCULAR ACCESS FOR DIALYSIS, INITIAL ENCOUNTER: ICD-10-CM

## 2021-02-08 DIAGNOSIS — Z99.2 ESRD ON DIALYSIS (H): Primary | ICD-10-CM

## 2021-02-09 DIAGNOSIS — Z11.59 ENCOUNTER FOR SCREENING FOR OTHER VIRAL DISEASES: ICD-10-CM

## 2021-02-12 ENCOUNTER — MYC MEDICAL ADVICE (OUTPATIENT)
Dept: FAMILY MEDICINE | Facility: CLINIC | Age: 46
End: 2021-02-12

## 2021-02-12 DIAGNOSIS — N18.5 ANEMIA OF CHRONIC RENAL FAILURE, STAGE 5 (H): ICD-10-CM

## 2021-02-12 DIAGNOSIS — D63.1 ANEMIA OF CHRONIC RENAL FAILURE, STAGE 5 (H): ICD-10-CM

## 2021-02-12 DIAGNOSIS — N18.5 CHRONIC KIDNEY DISEASE, STAGE V (H): ICD-10-CM

## 2021-02-15 DIAGNOSIS — Z11.59 ENCOUNTER FOR SCREENING FOR OTHER VIRAL DISEASES: ICD-10-CM

## 2021-02-15 LAB
SARS-COV-2 RNA RESP QL NAA+PROBE: NORMAL
SPECIMEN SOURCE: NORMAL

## 2021-02-15 PROCEDURE — 87635 SARS-COV-2 COVID-19 AMP PRB: CPT | Performed by: CLINICAL NURSE SPECIALIST

## 2021-02-15 RX ORDER — FERROUS SULFATE 325(65) MG
325 TABLET ORAL
Qty: 30 TABLET | Refills: 11 | Status: ON HOLD | OUTPATIENT
Start: 2021-02-15 | End: 2021-04-12

## 2021-02-16 LAB
LABORATORY COMMENT REPORT: NORMAL
SARS-COV-2 RNA RESP QL NAA+PROBE: NEGATIVE
SPECIMEN SOURCE: NORMAL

## 2021-02-17 ENCOUNTER — TELEPHONE (OUTPATIENT)
Dept: INTERVENTIONAL RADIOLOGY/VASCULAR | Facility: CLINIC | Age: 46
End: 2021-02-17

## 2021-02-19 ENCOUNTER — APPOINTMENT (OUTPATIENT)
Dept: MEDSURG UNIT | Facility: CLINIC | Age: 46
End: 2021-02-19
Attending: RADIOLOGY
Payer: COMMERCIAL

## 2021-02-19 ENCOUNTER — APPOINTMENT (OUTPATIENT)
Dept: INTERVENTIONAL RADIOLOGY/VASCULAR | Facility: CLINIC | Age: 46
End: 2021-02-19
Attending: CLINICAL NURSE SPECIALIST
Payer: COMMERCIAL

## 2021-02-19 ENCOUNTER — HOSPITAL ENCOUNTER (OUTPATIENT)
Facility: CLINIC | Age: 46
Discharge: HOME | End: 2021-02-19
Attending: RADIOLOGY | Admitting: RADIOLOGY
Payer: COMMERCIAL

## 2021-02-19 VITALS
TEMPERATURE: 98.5 F | DIASTOLIC BLOOD PRESSURE: 96 MMHG | HEART RATE: 75 BPM | HEIGHT: 68 IN | WEIGHT: 143.3 LBS | RESPIRATION RATE: 16 BRPM | OXYGEN SATURATION: 100 % | SYSTOLIC BLOOD PRESSURE: 140 MMHG | BODY MASS INDEX: 21.72 KG/M2

## 2021-02-19 DIAGNOSIS — N18.6 ESRD ON DIALYSIS (H): ICD-10-CM

## 2021-02-19 DIAGNOSIS — Z99.2 ESRD ON DIALYSIS (H): ICD-10-CM

## 2021-02-19 DIAGNOSIS — T82.9XXA COMPLICATION OF VASCULAR ACCESS FOR DIALYSIS, INITIAL ENCOUNTER: ICD-10-CM

## 2021-02-19 LAB
ERYTHROCYTE [DISTWIDTH] IN BLOOD BY AUTOMATED COUNT: 19.2 % (ref 10–15)
HCT VFR BLD AUTO: 35.3 % (ref 40–53)
HGB BLD-MCNC: 10.5 G/DL (ref 13.3–17.7)
INR PPP: 1.18 (ref 0.86–1.14)
MCH RBC QN AUTO: 25.2 PG (ref 26.5–33)
MCHC RBC AUTO-ENTMCNC: 29.7 G/DL (ref 31.5–36.5)
MCV RBC AUTO: 85 FL (ref 78–100)
PLATELET # BLD AUTO: 273 10E9/L (ref 150–450)
POTASSIUM SERPL-SCNC: 4.3 MMOL/L (ref 3.4–5.3)
RBC # BLD AUTO: 4.16 10E12/L (ref 4.4–5.9)
WBC # BLD AUTO: 5.3 10E9/L (ref 4–11)

## 2021-02-19 PROCEDURE — 250N000011 HC RX IP 250 OP 636: Performed by: PHYSICIAN ASSISTANT

## 2021-02-19 PROCEDURE — 99152 MOD SED SAME PHYS/QHP 5/>YRS: CPT

## 2021-02-19 PROCEDURE — 36903 INTRO CATH DIALYSIS CIRCUIT: CPT | Mod: GC | Performed by: RADIOLOGY

## 2021-02-19 PROCEDURE — 85027 COMPLETE CBC AUTOMATED: CPT | Performed by: PHYSICIAN ASSISTANT

## 2021-02-19 PROCEDURE — 258N000003 HC RX IP 258 OP 636: Performed by: PHYSICIAN ASSISTANT

## 2021-02-19 PROCEDURE — 255N000002 HC RX 255 OP 636: Performed by: RADIOLOGY

## 2021-02-19 PROCEDURE — C1887 CATHETER, GUIDING: HCPCS

## 2021-02-19 PROCEDURE — C1769 GUIDE WIRE: HCPCS

## 2021-02-19 PROCEDURE — C1725 CATH, TRANSLUMIN NON-LASER: HCPCS

## 2021-02-19 PROCEDURE — 250N000009 HC RX 250: Performed by: STUDENT IN AN ORGANIZED HEALTH CARE EDUCATION/TRAINING PROGRAM

## 2021-02-19 PROCEDURE — 272N000566 HC SHEATH CR3

## 2021-02-19 PROCEDURE — 272N000504 HC NEEDLE CR4

## 2021-02-19 PROCEDURE — 999N000132 HC STATISTIC PP CARE STAGE 1

## 2021-02-19 PROCEDURE — 85610 PROTHROMBIN TIME: CPT | Performed by: PHYSICIAN ASSISTANT

## 2021-02-19 PROCEDURE — 84132 ASSAY OF SERUM POTASSIUM: CPT | Performed by: PHYSICIAN ASSISTANT

## 2021-02-19 PROCEDURE — C1876 STENT, NON-COA/NON-COV W/DEL: HCPCS

## 2021-02-19 PROCEDURE — 272N000564 HC SHEATH CR2

## 2021-02-19 PROCEDURE — 99152 MOD SED SAME PHYS/QHP 5/>YRS: CPT | Mod: GC | Performed by: RADIOLOGY

## 2021-02-19 PROCEDURE — 76937 US GUIDE VASCULAR ACCESS: CPT | Mod: 26 | Performed by: RADIOLOGY

## 2021-02-19 PROCEDURE — 36903 INTRO CATH DIALYSIS CIRCUIT: CPT

## 2021-02-19 PROCEDURE — 99153 MOD SED SAME PHYS/QHP EA: CPT

## 2021-02-19 PROCEDURE — 250N000011 HC RX IP 250 OP 636: Performed by: STUDENT IN AN ORGANIZED HEALTH CARE EDUCATION/TRAINING PROGRAM

## 2021-02-19 RX ORDER — SODIUM CHLORIDE 9 MG/ML
INJECTION, SOLUTION INTRAVENOUS CONTINUOUS
Status: DISCONTINUED | OUTPATIENT
Start: 2021-02-19 | End: 2021-02-19 | Stop reason: HOSPADM

## 2021-02-19 RX ORDER — DEXTROSE MONOHYDRATE 25 G/50ML
25-50 INJECTION, SOLUTION INTRAVENOUS
Status: DISCONTINUED | OUTPATIENT
Start: 2021-02-19 | End: 2021-02-19 | Stop reason: HOSPADM

## 2021-02-19 RX ORDER — NALOXONE HYDROCHLORIDE 0.4 MG/ML
0.4 INJECTION, SOLUTION INTRAMUSCULAR; INTRAVENOUS; SUBCUTANEOUS
Status: DISCONTINUED | OUTPATIENT
Start: 2021-02-19 | End: 2021-02-19 | Stop reason: HOSPADM

## 2021-02-19 RX ORDER — TAMSULOSIN HYDROCHLORIDE 0.4 MG/1
CAPSULE ORAL
COMMUNITY
Start: 2021-02-16 | End: 2021-07-20

## 2021-02-19 RX ORDER — NALOXONE HYDROCHLORIDE 0.4 MG/ML
0.2 INJECTION, SOLUTION INTRAMUSCULAR; INTRAVENOUS; SUBCUTANEOUS
Status: DISCONTINUED | OUTPATIENT
Start: 2021-02-19 | End: 2021-02-19 | Stop reason: HOSPADM

## 2021-02-19 RX ORDER — NICOTINE POLACRILEX 4 MG
15-30 LOZENGE BUCCAL
Status: DISCONTINUED | OUTPATIENT
Start: 2021-02-19 | End: 2021-02-19 | Stop reason: HOSPADM

## 2021-02-19 RX ORDER — IODIXANOL 320 MG/ML
100 INJECTION, SOLUTION INTRAVASCULAR ONCE
Status: COMPLETED | OUTPATIENT
Start: 2021-02-19 | End: 2021-02-19

## 2021-02-19 RX ORDER — HEPARIN SODIUM 200 [USP'U]/100ML
1 INJECTION, SOLUTION INTRAVENOUS CONTINUOUS PRN
Status: DISCONTINUED | OUTPATIENT
Start: 2021-02-19 | End: 2021-02-19 | Stop reason: HOSPADM

## 2021-02-19 RX ORDER — LIDOCAINE 40 MG/G
CREAM TOPICAL
Status: DISCONTINUED | OUTPATIENT
Start: 2021-02-19 | End: 2021-02-19 | Stop reason: HOSPADM

## 2021-02-19 RX ORDER — FLUMAZENIL 0.1 MG/ML
0.2 INJECTION, SOLUTION INTRAVENOUS
Status: DISCONTINUED | OUTPATIENT
Start: 2021-02-19 | End: 2021-02-19 | Stop reason: HOSPADM

## 2021-02-19 RX ORDER — FENTANYL CITRATE 50 UG/ML
25-50 INJECTION, SOLUTION INTRAMUSCULAR; INTRAVENOUS EVERY 5 MIN PRN
Status: DISCONTINUED | OUTPATIENT
Start: 2021-02-19 | End: 2021-02-19 | Stop reason: HOSPADM

## 2021-02-19 RX ADMIN — IODIXANOL 50 ML: 320 INJECTION, SOLUTION INTRAVASCULAR at 11:30

## 2021-02-19 RX ADMIN — FENTANYL CITRATE 50 MCG: 50 INJECTION, SOLUTION INTRAMUSCULAR; INTRAVENOUS at 09:55

## 2021-02-19 RX ADMIN — FENTANYL CITRATE 50 MCG: 50 INJECTION, SOLUTION INTRAMUSCULAR; INTRAVENOUS at 11:01

## 2021-02-19 RX ADMIN — MIDAZOLAM 0.5 MG: 1 INJECTION INTRAMUSCULAR; INTRAVENOUS at 10:56

## 2021-02-19 RX ADMIN — LIDOCAINE HYDROCHLORIDE 5 ML: 10 INJECTION, SOLUTION EPIDURAL; INFILTRATION; INTRACAUDAL; PERINEURAL at 10:06

## 2021-02-19 RX ADMIN — FENTANYL CITRATE 50 MCG: 50 INJECTION, SOLUTION INTRAMUSCULAR; INTRAVENOUS at 10:03

## 2021-02-19 RX ADMIN — MIDAZOLAM 1 MG: 1 INJECTION INTRAMUSCULAR; INTRAVENOUS at 09:54

## 2021-02-19 RX ADMIN — FENTANYL CITRATE 25 MCG: 50 INJECTION, SOLUTION INTRAMUSCULAR; INTRAVENOUS at 10:56

## 2021-02-19 RX ADMIN — FENTANYL CITRATE 25 MCG: 50 INJECTION, SOLUTION INTRAMUSCULAR; INTRAVENOUS at 10:30

## 2021-02-19 RX ADMIN — FENTANYL CITRATE 25 MCG: 50 INJECTION, SOLUTION INTRAMUSCULAR; INTRAVENOUS at 10:42

## 2021-02-19 RX ADMIN — SODIUM CHLORIDE: 9 INJECTION, SOLUTION INTRAVENOUS at 08:57

## 2021-02-19 RX ADMIN — MIDAZOLAM 0.5 MG: 1 INJECTION INTRAMUSCULAR; INTRAVENOUS at 10:30

## 2021-02-19 RX ADMIN — FENTANYL CITRATE 25 MCG: 50 INJECTION, SOLUTION INTRAMUSCULAR; INTRAVENOUS at 10:18

## 2021-02-19 RX ADMIN — MIDAZOLAM 1 MG: 1 INJECTION INTRAMUSCULAR; INTRAVENOUS at 10:03

## 2021-02-19 RX ADMIN — HEPARIN SODIUM 1 BAG: 200 INJECTION, SOLUTION INTRAVENOUS at 10:14

## 2021-02-19 RX ADMIN — MIDAZOLAM 0.5 MG: 1 INJECTION INTRAMUSCULAR; INTRAVENOUS at 10:18

## 2021-02-19 RX ADMIN — FENTANYL CITRATE 50 MCG: 50 INJECTION, SOLUTION INTRAMUSCULAR; INTRAVENOUS at 10:11

## 2021-02-19 RX ADMIN — FENTANYL CITRATE 50 MCG: 50 INJECTION, SOLUTION INTRAMUSCULAR; INTRAVENOUS at 10:37

## 2021-02-19 RX ADMIN — FENTANYL CITRATE 50 MCG: 50 INJECTION, SOLUTION INTRAMUSCULAR; INTRAVENOUS at 11:08

## 2021-02-19 RX ADMIN — MIDAZOLAM 0.5 MG: 1 INJECTION INTRAMUSCULAR; INTRAVENOUS at 10:43

## 2021-02-19 ASSESSMENT — MIFFLIN-ST. JEOR: SCORE: 1514.5

## 2021-02-19 ASSESSMENT — PAIN DESCRIPTION - DESCRIPTORS: DESCRIPTORS: ACHING

## 2021-02-19 NOTE — IP AVS SNAPSHOT
MRN:[id number]                      After Visit Summary   2/19/2021    Rashad Ortiz    MRN: [id number]           Visit Information        Department      2/19/2021  8:07 AM [facility] [address] [place]          Review of your medicines      UNREVIEWED medicines. Ask your doctor about these medicines       Dose / Directions   acetaminophen 500 MG tablet  Commonly known as: TYLENOL  Used for: Hip pain, left      Dose: 1,000 mg  Take 2 tablets (1,000 mg) by mouth every 8 hours as needed for mild pain  Quantity: 160 tablet  Refills: 3     cholecalciferol 25 MCG (1000 UT) Tabs  Used for: Vitamin D deficiency      Dose: 2,000 Units  Take 2,000 Units by mouth daily  Quantity: 90 tablet  Refills: 3     febuxostat 80 MG Tabs tablet  Commonly known as: Uloric  Used for: Gout, unspecified cause, unspecified chronicity, unspecified site      Dose: 80 mg  Take 1 tablet (80 mg) by mouth daily  Quantity: 90 tablet  Refills: 3     ferrous sulfate 325 (65 Fe) MG tablet  Commonly known as: FEROSUL  Used for: Chronic kidney disease, stage V (H), Anemia of chronic renal failure, stage 5 (H)      Dose: 325 mg  Take 1 tablet (325 mg) by mouth daily (with breakfast)  Quantity: 30 tablet  Refills: 11     furosemide 20 MG tablet  Commonly known as: LASIX      Refills: 0     HYDROcodone-acetaminophen 5-325 MG tablet  Commonly known as: NORCO  Used for: Avascular necrosis of bone of hip, right (H), Primary osteoarthritis of right hip      Dose: 1 tablet  Take 1 tablet by mouth every 12 hours as needed for pain  Quantity: 40 tablet  Refills: 0     * mycophenolate 500 MG tablet  Commonly known as: GENERIC EQUIVALENT      Dose: 500 mg  Take 500 mg by mouth 2 times daily  Refills: 0     * mycophenolate 250 MG capsule  Commonly known as: GENERIC EQUIVALENT  Used for: Kidney transplanted, Kidney transplant recipient, Long-term use of immunosuppressant medication      Dose: 500 mg  Take 2 capsules (500 mg) by mouth  2 times daily  Quantity: 120 capsule  Refills: 11     sulfamethoxazole-trimethoprim 400-80 MG tablet  Commonly known as: BACTRIM  Used for: Kidney transplanted      Dose: 1 tablet  Take 1 tablet by mouth every other day  Quantity: 45 tablet  Refills: 3     * tacrolimus 0.5 MG capsule  Commonly known as: GENERIC EQUIVALENT  Used for: Kidney transplant recipient, Long-term use of immunosuppressant medication, Kidney transplanted      Dose: 0.5 mg  Take 1 capsule (0.5 mg) by mouth every evening Total dose = 1 mg in the AM and 1.5 mg in the PM  Quantity: 30 capsule  Refills: 11     * tacrolimus 1 MG capsule  Commonly known as: GENERIC EQUIVALENT  Used for: Kidney transplant recipient, Long-term use of immunosuppressant medication, Kidney transplanted      Dose: 1 mg  Take 1 capsule (1 mg) by mouth 2 times daily . Total dose=1mg in the AM and 1.5mg in the PM  Quantity: 60 capsule  Refills: 11     tamsulosin 0.4 MG capsule  Commonly known as: FLOMAX      Refills: 0         * This list has 4 medication(s) that are the same as other medications prescribed for you. Read the directions carefully, and ask your doctor or other care provider to review them with you.                  Protect others around you: Learn how to safely use, store and throw away your medicines at www.disposemymeds.org.       Follow-ups after your visit       Your next 10 appointments already scheduled    May 24, 2021  Procedure with Fernando Morillo MD  [facility] (--) [facility] [address]  [phone]   Jun 11, 2021 12:00 PM  (Arrive by 11:45 AM)  Post-Op with Fernando Viveros PA-C  [facility] [place] ([facility] ) [address]  4th Floor  [facility] [id number] [phone]   Please arrive 15 minutes prior to your scheduled appointment time to fill out any necessary paperwork.  If you have MyChart, you can complete  questionnaires and register insurance information via e-checkin.  Copays cannot be collected via [employer] at this time.  After completing e-checkin, you will still need to stop at the  prior to the appointment but the process will be faster.  Other important items needed for your visit are:    -Insurance card, ID and copay  -Any paperwork (FMLA, WC, sports physical forms, etc.)  -List of all medications you are taking (include prescriptions, over-the-counter, and herbal)     Jun 23, 2021  1:00 PM  Return Visit with Reyes Cade MD  [facility] ([facility]) [address] [address] [place] [address] [phone]      Jul 08, 2021  3:30 PM  (Arrive by 3:15 PM)  RETURN HIP with Fernando Morillo MD  [facility] [place] ([facility] ) [address]  4th [facility] [address] [phone]   Please arrive 15 minutes prior to your scheduled appointment time to fill out any necessary paperwork.  If you have MyChart, you can complete questionnaires and register insurance information via e-checkin.  Copays cannot be collected via [employer] at this time.  After completing e-checkin, you will still need to stop at the  prior to the appointment but the process will be faster.  Other important items needed for your visit are:    -Insurance card, ID and copay  -Any paperwork (FMLA, WC, sports physical forms, etc.)  -List of all medications you are taking (include prescriptions, over-the-counter, and herbal)        Care Instructions       Further instructions from your care team         [facility]      Interventional Radiology  Discharge Instructions Following Fistulogram      ? You may resume normal activities as tolerated, but avoid any strenuous activity or heavy lifting (>10 lbs.) involving your access arm.    ? Elevate and rest your arm as much as  possible for the first 24 hours after the procedure.  This will promote healing and reduce swelling.     ? If bleeding or oozing should occur, apply fingertip pressure to puncture site to control bleeding, but avoid excessive pressure as this may clot off the fistula.  Hold light pressure for 10 minutes, or until bleeding/oozing is controlled.  If excessive bleeding is noted or if you are having difficulty controlling the bleeding with direct pressure, call 911.     ? If you develop fever, chills, excessive pain/tenderness or drainage at the puncture site, or have questions call your Doctor or Dialysis Center.     ? If you received sedation for your procedure: An adult should stay with you for 24 hours, Do Not drive a motor vehicle, operate machinery, or drink alcoholic beverages for 24 hours.     ? You may resume your normal medications immediately    ? If you notice sudden loss of pulse or thrill (buzzing feeling) in your fistula, contact your Doctor or Dialysis unit immediately.         [facility] INTERVENTIONAL RADIOLOGY DEPARTMENT  Procedure Physician:  Dr. Parker and Dr. Keita                                                         Date of procedure: February 19, 2021  Telephone Numbers: [phone] Monday-Friday 8:00 am to 4:30 pm  [phone] After 4:30 pm Monday-Friday, Weekends & Holidays.   Ask for the Interventional Radiologist on call.  Someone is on call 24 hrs/day  [facility] toll free number: [phone] Monday-Friday 8:00 am to 4:30 pm  [facility] Emergency Dept: [phone]            Additional Information About Your Visit       General [employer] Information    The [employer] gives you secure access to your electronic health record. If you see a primary care provider, you can also send messages to your care team and make appointments. If you have questions, please call your primary care clinic.  If you do not have a primary care provider, please call [phone] and they will assist you.       Care EveryWhere ID    This is  "your Care EveryWhere ID. This could be used by other organizations to access your [facility] medical records  [id number]       Your Vitals Were  Most recent update: 2/19/2021 11:47 AM    Blood Pressure   157/105            Pulse   74          Temperature   98.5  F (36.9  C) (Oral)          Respirations   16          Height   1.727 m (5' 8\")             Weight   65 kg (143 lb 4.8 oz)    Pulse Oximetry   98%    BMI (Body Mass Index)   21.79 kg/m           Primary Care Provider Office Phone # Fax #    Mariel Baltazar Diane Mares [phone]      Equal Access to Services    [facility]: Hadii aad ku hadasho Soomaali, waaxda luqadaha, qaybta kaalmada adeegyada, waxay sulyin haywilbern adeosvaldo spencer . So [facility] [phone].    ATENCIÓN: Si habla español, tiene a ward disposición servicios gratuitos de asistencia lingüística. Llame al [phone].    We comply with applicable federal and state civil rights laws, including the [place] Human Rights Act. We do not discriminate on the basis of race, color, creed, Gnosticist, national origin, marital status, age, disability, sex, sexual orientation, or gender identity.    If you would like an itemization of your charges they will now be available in [employer] 30 days after discharge. To access the itemized statements in [employer] go to billing/billing summary. From there select view account. There will be multiple tabs showing an overview of your account, detail, payments, and communications. From the communications tab you can see your monthly statements, your itemized statements, and any billing letters generated for your account. If you do not have a [employer] account and need help getting access please contact [employer] support at [phone].  If you would prefer to have your itemized statements mailed please contact our automated itemized bill request line at [phone] option  2.       Thank you!    Thank you for choosing [facility] for your care. Our " goal is always to provide you with excellent care. Hearing back from our patients is one way we can continue to improve our services. Please take a few minutes to complete the written survey that you may receive in the mail after you visit with us. Thank you!            Medication List      ASK your doctor about these medications          Morning Afternoon Evening Bedtime As Needed    * mycophenolate 500 MG tablet  Also known as: GENERIC EQUIVALENT  INSTRUCTIONS: Take 500 mg by mouth 2 times daily  This medication is very important: It prevents organ rejection.                     * mycophenolate 250 MG capsule  Also known as: GENERIC EQUIVALENT  INSTRUCTIONS: Take 2 capsules (500 mg) by mouth 2 times daily  This medication is very important: It prevents organ rejection.                     * tacrolimus 0.5 MG capsule  Also known as: GENERIC EQUIVALENT  INSTRUCTIONS: Take 1 capsule (0.5 mg) by mouth every evening Total dose = 1 mg in the AM and 1.5 mg in the PM  This medication is very important: It prevents organ rejection.                     * tacrolimus 1 MG capsule  Also known as: GENERIC EQUIVALENT  INSTRUCTIONS: Take 1 capsule (1 mg) by mouth 2 times daily . Total dose=1mg in the AM and 1.5mg in the PM  This medication is very important: It prevents organ rejection.                     sulfamethoxazole-trimethoprim 400-80 MG tablet  Also known as: BACTRIM  INSTRUCTIONS: Take 1 tablet by mouth every other day  This medication is very important: It prevents dangerous infection.                     acetaminophen 500 MG tablet  Also known as: TYLENOL  INSTRUCTIONS: Take 2 tablets (1,000 mg) by mouth every 8 hours as needed for mild pain                     cholecalciferol 25 MCG (1000 UT) Tabs  INSTRUCTIONS: Take 2,000 Units by mouth daily                     febuxostat 80 MG Tabs tablet  Also known as: Uloric  INSTRUCTIONS: Take 1 tablet (80 mg) by mouth daily                     ferrous sulfate 325 (65 Fe) MG  tablet  Also known as: FEROSUL  INSTRUCTIONS: Take 1 tablet (325 mg) by mouth daily (with breakfast)                     furosemide 20 MG tablet  Also known as: LASIX                     HYDROcodone-acetaminophen 5-325 MG tablet  Also known as: NORCO  INSTRUCTIONS: Take 1 tablet by mouth every 12 hours as needed for pain                     tamsulosin 0.4 MG capsule  Also known as: FLOMAX                        * This list has 4 medication(s) that are the same as other medications prescribed for you. Read the directions carefully, and ask your doctor or other care provider to review them with you.

## 2021-02-19 NOTE — PROGRESS NOTES
Patient returned to 2A post RUE fistulogram.  Vitals unchanged from prior - remains hypertensive.  RUE C/D/I, reporting some soreness/achiness at the site.  Heat applied with some relief.  PO fluids at bedside, declined PO food for now.  Discharge orders for 1300, patient aware.

## 2021-02-19 NOTE — PROCEDURES
[facility]    Procedure: Dialysis fistulogram and interventions    Date/Time: 2/19/2021 11:34 AM  Performed by: Byron Parker MD  Authorized by: Byron Parker MD   IR Fellow Physician: Byron Parker    UNIVERSAL PROTOCOL   Site Marked: NA  Prior Images Obtained and Reviewed:  Yes  Required items: Required blood products, implants, devices and special equipment available    Patient identity confirmed:  Verbally with patient, arm band, provided demographic data and hospital-assigned identification number  Patient was reevaluated immediately before administering moderate or deep sedation or anesthesia  Confirmation Checklist:  Patient's identity using two indicators, relevant allergies, procedure was appropriate and matched the consent or emergent situation and correct equipment/implants were available  Time out: Immediately prior to the procedure a time out was called    Universal Protocol: the Joint Commission Universal Protocol was followed    Preparation: Patient was prepped and draped in usual sterile fashion    ESBL (mL):  2         ANESTHESIA    Anesthesia: Local infiltration  Local Anesthetic:  Lidocaine 1% without epinephrine  Anesthetic Total (mL):  10      SEDATION    Patient Sedated: Yes    Sedation Type:  Moderate (conscious) sedation  Vital signs: Vital signs monitored during sedation    See dictated procedure note for full details.  Findings: Occluded cephalic arch with blood flowing centrally through collateral.     Balloon angioplasty and Stent placement performed across with good result.    Specimens: none    Complications: None    Condition: Stable    Plan: - Bed rest for 1 hr    PROCEDURE   Patient Tolerance:  Patient tolerated the procedure well with no immediate complications    Length of time physician/provider present for 1:1 monitoring during sedation: 45

## 2021-02-19 NOTE — IR NOTE
Patient Name: Rashad Ortiz  Medical Record Number: [id number]  Today's Date: 2/19/2021    Procedure: Right Upper Extremity Fistulogram with Intervations  Proceduralist: Keith Keita    Sedation start time: 0955  Sedation end time: 1125  Sedation medications administered: 4 mg versed, 400 mcg fentanyl  Total sedation time: 90 min    Procedure start time: 0955  Procedure end time: 1125    Report given to: 2A JENNIFER Chamberlain  : n/a    Other Notes: Pt arrived to IR room 4 from . Consent reviewed, pt confirmed. Pt denies any questions or concerns regarding procedure. Pt positioned supine and monitored per protocol. Site cleansed and dressed per protocol. Pt tolerated procedure without any noted complications. Pt transferred back to .

## 2021-02-19 NOTE — PROGRESS NOTES
Patient tolerated recovery stage well. VSS, RUE site clean/dry/intact, tip stop dressing in place, no hematoma, and denies pain. Patient tolerated PO food and fluids. Teaching was done and discharge instructions were given. Patient ambulated, voided, and PIV was removed. Patient discharged from the hospital via wheel chair to home with  @ 1310.

## 2021-02-19 NOTE — PROGRESS NOTES
Patient arrived on 2A for RUE fistulogram.  IV placed, labs sent.  Consent done.  Awaiting lab results and sedation note, otherwise prep complete.

## 2021-02-19 NOTE — IP AVS SNAPSHOT
[facility] [address] [address]                                    After Visit Summary   2/19/2021    Rashad Ortiz    MRN: [id number]           After Visit Summary Signature Page    I have received my discharge instructions, and my questions have been answered. I have discussed any challenges I see with this plan with the nurse or doctor.    ..........................................................................................................................................  Patient/Patient Representative Signature      ..........................................................................................................................................  Patient Representative Print Name and Relationship to Patient    ..................................................               ................................................  Date                                   Time    ..........................................................................................................................................  Reviewed by Signature/Title    ...................................................              ..............................................  Date                                               Time          22EPIC Rev 08/18

## 2021-02-19 NOTE — PRE-PROCEDURE
GENERAL PRE-PROCEDURE:   Procedure:  RUE fistulogram with possible intervention   Date/Time:  2/19/2021 9:04 AM    Verbal consent obtained?: Yes    Written consent obtained?: Yes    Risks and benefits: Risks, benefits and alternatives were discussed    Consent given by:  Patient  Patient states understanding of procedure being performed: Yes    Patient's understanding of procedure matches consent: Yes    Procedure consent matches procedure scheduled: Yes    Expected level of sedation:  Moderate  Appropriately NPO:  Yes  ASA Class:  Class 2- mild systemic disease, no acute problems, no functional limitations  Mallampati  :  Grade 1- soft palate, uvula, tonsillar pillars, and posterior pharyngeal wall visible  Lungs:  Lungs clear with good breath sounds bilaterally  Heart:  Normal heart sounds and rate  History & Physical reviewed:  History and physical reviewed and no updates needed  Statement of review:  I have reviewed the lab findings, diagnostic data, medications, and the plan for sedation

## 2021-02-23 NOTE — TELEPHONE ENCOUNTER
Observation handout given. No questions at this time.   Patient also sent the following [employer] message:  Hi     Do I need to make an appointment with you to have my pain meds refilled?     Please advise  Thanks.          Controlled Substance Refill Request for Percocet  Problem List Complete:  No     PROVIDER TO CONSIDER COMPLETION OF PROBLEM LIST AND OVERVIEW/CONTROLLED SUBSTANCE AGREEMENT    Last Written Prescription Date:  5/4/20  Last Fill Quantity: 40 tablets   # refills: 0    THE MOST RECENT OFFICE VISIT MUST BE WITHIN THE PAST 3 MONTHS. AT LEAST ONE FACE TO FACE VISIT MUST OCCUR EVERY 6 MONTHS. ADDITIONAL VISITS CAN BE VIRTUAL.  (THIS STATEMENT SHOULD BE DELETED.)    Last Office Visit with [facility] primary care provider: 5/11/2020 patient had virtual visit    Future Office visit: None    Controlled substance agreement:   Encounter-Level CSA:    There are no encounter-level csa.     Patient-Level CSA:    There are no patient-level csa.         Last Urine Drug Screen: No results found for: CDAUT, No results found for: COMDAT, No results found for: THC13, PCP13, COC13, MAMP13, OPI13, AMP13, BZO13, TCA13, MTD13, BAR13, OXY13, PPX13, BUP13     Processing:  Rx to be electronically transmitted to pharmacy by provider      [email or web address]       checked in past 3 months?  No-problem list incomplete so  not checked.           Mila Luna RN, BSN, PHN

## 2021-03-01 ENCOUNTER — VIRTUAL VISIT (OUTPATIENT)
Dept: FAMILY MEDICINE | Facility: CLINIC | Age: 46
End: 2021-03-01
Payer: COMMERCIAL

## 2021-03-01 DIAGNOSIS — T86.12 KIDNEY TRANSPLANT FAILURE: ICD-10-CM

## 2021-03-01 DIAGNOSIS — M87.051 AVASCULAR NECROSIS OF FEMORAL HEAD, RIGHT (H): Primary | ICD-10-CM

## 2021-03-01 DIAGNOSIS — Z12.11 SCREEN FOR COLON CANCER: ICD-10-CM

## 2021-03-01 PROCEDURE — 99214 OFFICE O/P EST MOD 30 MIN: CPT | Mod: GT | Performed by: FAMILY MEDICINE

## 2021-03-01 RX ORDER — OXYCODONE HYDROCHLORIDE 5 MG/1
5 TABLET ORAL 2 TIMES DAILY PRN
Qty: 40 TABLET | Refills: 0 | Status: SHIPPED | OUTPATIENT
Start: 2021-03-01 | End: 2021-03-29

## 2021-03-01 NOTE — PATIENT INSTRUCTIONS
At [facility], we strive to deliver an exceptional experience to you, every time we see you. If you receive a survey, please complete it as we do value your feedback.  If you have MyChart, you can expect to receive results automatically within 24 hours of their completion.  Your provider will send a note interpreting your results as well.   If you do not have MyChart, you should receive your results in about a week by mail.    Your care team:                            Family Medicine Internal Medicine   MD Juanito Lynne MD Shantel Branch-Fleming, MD Srinivasa Vaka, MD Katya Belousova, PAKARLA Malcolm, APRN CNP    Srinivas Harrington, MD Pediatrics   Greg Fink, PAKARLA Balderrama, CNP MD Leanna Guzmán APRN CNP   MD Kelly Tipton MD Deborah Mielke, MD Sarah Schmitz, APRN [facility]      Clinic hours: Monday - Thursday 7 am-6 pm; Fridays 7 am-5 pm.   Urgent care: Monday - Friday 11 am-9 pm; Saturday and Sunday 9 am-5 pm.    Clinic: [phone]       [facility]: Monday - Thursday 8 am - 7 pm; Friday 8 am - 6 pm  [facility]: [phone]     Use www.[email or web address].org for 24/7 diagnosis and treatment of dozens of conditions.

## 2021-03-01 NOTE — PROGRESS NOTES
Rashad is a 45 year old who is being evaluated via a billable video visit.      How would you like to obtain your AVS? MyChart  If the video visit is dropped, the invitation should be resent by:   Will anyone else be joining your video visit? No    Video Start Time: 9:26 AM    Assessment & Plan     Avascular necrosis of femoral head, right (H)  Worsening pain - change norco to oxycodone. Discussed this is like taking 1.5 Norco so caution advised.  - oxyCODONE (ROXICODONE) 5 MG tablet; Take 1 tablet (5 mg) by mouth 2 times daily as needed for severe pain    Screen for colon cancer  Screening prior to potential second kidney transplant.  - GASTROENTEROLOGY ADULT REF PROCEDURE ONLY; Future    Kidney transplant failure  As above.  - GASTROENTEROLOGY ADULT REF PROCEDURE ONLY; Future    The uses and side effects, including black box warnings as appropriate, were discussed in detail.  All patient questions were answered.  The patient was instructed to call immediately if any side effects developed.     Return in about 3 weeks (around 3/22/2021) for preop, using a video visit.    Mariel Mares MD  [facility]    Cesar Solorzano is a 45 year old who presents for the following health issues     HPI       Avascular necrosis - surgery has been scheduled for April and pain medication is not work. Got 40 on 2/2 and not working well. Trying to get by with 2 per day but pain is worsening. Need preop scheduled as well.    Need referral for colonoscopy due to potential transplant.      Review of Systems   Constitutional, HEENT, cardiovascular, pulmonary, gi and gu systems are negative, except as otherwise noted.      Objective           Vitals:  No vitals were obtained today due to virtual visit.    Physical Exam   GENERAL: Healthy, alert and no distress  EYES: Eyes grossly normal to inspection.  No discharge or erythema, or obvious scleral/conjunctival abnormalities.  RESP: No audible  wheeze, cough, or visible cyanosis.  No visible retractions or increased work of breathing.    SKIN: Visible skin clear. No significant rash, abnormal pigmentation or lesions.  NEURO: Cranial nerves grossly intact.  Mentation and speech appropriate for age.  PSYCH: Mentation appears normal, affect normal/bright, judgement and insight intact, normal speech and appearance well-groomed.              Video-Visit Details    Type of service:  Video Visit    Video End Time:9:36 AM    Originating Location (pt. Location): Home    Distant Location (provider location):  [facility]     Platform used for Video Visit: [employer]

## 2021-03-03 ENCOUNTER — DOCUMENTATION ONLY (OUTPATIENT)
Dept: TRANSPLANT | Facility: CLINIC | Age: 46
End: 2021-03-03

## 2021-03-03 NOTE — Clinical Note
Good Morning Donna: Mr Ortiz's kidney failed, with dialysis having started 09/01/2020. Thank You, Cate Jordan, [facility]

## 2021-03-03 NOTE — PROGRESS NOTES
Received notification of failed Kidney from [facility] Routine Follow Up  Fail is indicated by Resumption of Dialysis  Date of organ failure was reported as 09/01/2020  Cause of failure is reported as Chronic rejection, bx proven  Biopsy was done at [facility] 02/25/2017  TIS verfication is complete.

## 2021-03-06 DIAGNOSIS — Z11.59 ENCOUNTER FOR SCREENING FOR OTHER VIRAL DISEASES: Primary | ICD-10-CM

## 2021-03-12 ENCOUNTER — TELEPHONE (OUTPATIENT)
Dept: GASTROENTEROLOGY | Facility: CLINIC | Age: 46
End: 2021-03-12

## 2021-03-12 NOTE — TELEPHONE ENCOUNTER
2nd schedule attempt    Procedure: Lower Endoscopy    Lower Endoscopy Type: Colonoscopy    Purpose of Colonoscopy Procedure: Screening    Colonoscopy Sedation: Conscious/Moderate    Preferred Location: [facility]    Which [facility] Provider? Dr. JESUS Haney (colonoscopy only)    Scheduling Instructions: If you have not heard from the scheduling office within 2 business days, please call [phone].           Associated Diagnoses    Screen for colon cancer [Z12.11]       Kidney transplant failure [T86.12]

## 2021-03-17 ENCOUNTER — MYC MEDICAL ADVICE (OUTPATIENT)
Dept: FAMILY MEDICINE | Facility: CLINIC | Age: 46
End: 2021-03-17

## 2021-03-17 DIAGNOSIS — Z11.59 ENCOUNTER FOR SCREENING FOR OTHER VIRAL DISEASES: ICD-10-CM

## 2021-03-18 ENCOUNTER — TELEPHONE (OUTPATIENT)
Dept: GASTROENTEROLOGY | Facility: OUTPATIENT CENTER | Age: 46
End: 2021-03-18

## 2021-03-18 NOTE — TELEPHONE ENCOUNTER
Patient is scheduled for colon with Dr. YEH    Spoke with: PATIENT    Date of Procedure: 3/24/2021    Location: [facility]    Sedation Type: CS    Conscious Sedation- Needs  for 6 hours after the procedure  MAC/General-Needs  for 24 hours after procedure    Pre-op for Unit J MAC and OR: N    (if yes advise patient they will need a pre-op prior to procedure)      Is patient on blood thinners? -: N   (If yes- inform patient to follow up with PCP or provider for follow up instructions)     Informed patient they will need an adult  : Y  Cannot take any type of public or medical transportation alone    Informed Patient of COVID Test Requirement: Y-SCHED    Preferred Pharmacy for Pre Prescription: N/A    Confirmed Nurse will call to complete assessment: Y    Additional comments: N/A

## 2021-03-19 ENCOUNTER — TELEPHONE (OUTPATIENT)
Dept: GASTROENTEROLOGY | Facility: OUTPATIENT CENTER | Age: 46
End: 2021-03-19

## 2021-03-19 DIAGNOSIS — Z12.11 ENCOUNTER FOR SCREENING COLONOSCOPY: Primary | ICD-10-CM

## 2021-03-19 RX ORDER — BISACODYL 5 MG
5 TABLET, DELAYED RELEASE (ENTERIC COATED) ORAL SEE ADMIN INSTRUCTIONS
Qty: 4 TABLET | Refills: 0 | Status: ON HOLD | OUTPATIENT
Start: 2021-03-19 | End: 2021-04-12

## 2021-03-19 NOTE — TELEPHONE ENCOUNTER
Patient taking any blood thinners ? No      Heart disease ? Denies      Lung disease ? Denies      Sleep apnea ? Denies      Diabetic ? Denies      Kidney disease ? History of transplant. On dialysis. Advised patient to consult his provider for guidance for colonoscopy prep     Electronic implanted medical devices ? Denies      PTSD ? N/a      Prep instructions reviewed with patient ? Instructions, policy,MAC sedation plan reviewed. Advised patient to have someone stay with them for 24 hours post exam.     Yes    Pharmacy : [facility]     Indication for procedure : Screen for colon cancer; Awaiting organ transplant status     Referring provider : Mariel Garcia MD      Arrival Time : 9 AM    Covid test 3-22

## 2021-03-22 DIAGNOSIS — Z11.59 ENCOUNTER FOR SCREENING FOR OTHER VIRAL DISEASES: ICD-10-CM

## 2021-03-22 LAB
SARS-COV-2 RNA RESP QL NAA+PROBE: NORMAL
SPECIMEN SOURCE: NORMAL

## 2021-03-22 PROCEDURE — U0003 INFECTIOUS AGENT DETECTION BY NUCLEIC ACID (DNA OR RNA); SEVERE ACUTE RESPIRATORY SYNDROME CORONAVIRUS 2 (SARS-COV-2) (CORONAVIRUS DISEASE [COVID-19]), AMPLIFIED PROBE TECHNIQUE, MAKING USE OF HIGH THROUGHPUT TECHNOLOGIES AS DESCRIBED BY CMS-2020-01-R: HCPCS | Performed by: SURGERY

## 2021-03-22 PROCEDURE — U0005 INFEC AGEN DETEC AMPLI PROBE: HCPCS | Performed by: SURGERY

## 2021-03-24 ENCOUNTER — DOCUMENTATION ONLY (OUTPATIENT)
Dept: GASTROENTEROLOGY | Facility: OUTPATIENT CENTER | Age: 46
End: 2021-03-24
Payer: MEDICAID

## 2021-03-24 DIAGNOSIS — Z11.59 ENCOUNTER FOR SCREENING FOR OTHER VIRAL DISEASES: ICD-10-CM

## 2021-03-29 ENCOUNTER — MYC REFILL (OUTPATIENT)
Dept: FAMILY MEDICINE | Facility: CLINIC | Age: 46
End: 2021-03-29

## 2021-03-29 DIAGNOSIS — M87.051 AVASCULAR NECROSIS OF FEMORAL HEAD, RIGHT (H): ICD-10-CM

## 2021-03-30 ENCOUNTER — MYC MEDICAL ADVICE (OUTPATIENT)
Dept: ORTHOPEDICS | Facility: CLINIC | Age: 46
End: 2021-03-30

## 2021-03-30 ENCOUNTER — TELEPHONE (OUTPATIENT)
Dept: GASTROENTEROLOGY | Facility: CLINIC | Age: 46
End: 2021-03-30

## 2021-03-30 DIAGNOSIS — Z12.11 SPECIAL SCREENING FOR MALIGNANT NEOPLASMS, COLON: Primary | ICD-10-CM

## 2021-03-30 RX ORDER — OXYCODONE HYDROCHLORIDE 5 MG/1
5 TABLET ORAL 2 TIMES DAILY PRN
Qty: 40 TABLET | Refills: 0 | Status: ON HOLD | OUTPATIENT
Start: 2021-03-30 | End: 2021-04-14

## 2021-03-30 NOTE — TELEPHONE ENCOUNTER
Prescription refilled for pain medications. I can't find a Controlled Substance Agreement, or chronic pain overview on the chart. Please have patient sign one before his next refill. Brenda Tai MD

## 2021-03-30 NOTE — TELEPHONE ENCOUNTER
Attempted to contact patient regarding upcoming colonoscopy procedure on 4.7.2021 for pre assessment questions.  No answer.  Left message to return call to [phone] #3    Sent updated Mychart instructions that included a clear liquid diet the day prior to colonoscopy.    Gely Ulrich RN

## 2021-04-02 RX ORDER — BISACODYL 5 MG/1
TABLET, DELAYED RELEASE ORAL
Qty: 4 TABLET | Refills: 0 | Status: ON HOLD | OUTPATIENT
Start: 2021-04-02 | End: 2021-04-12

## 2021-04-02 NOTE — TELEPHONE ENCOUNTER
Pre assessment questions completed.     Golytely prep script sent to [facility] pharmacy.    Patient had no questions or concerns at this time.    Cate Faustin RN

## 2021-04-03 DIAGNOSIS — Z11.59 ENCOUNTER FOR SCREENING FOR OTHER VIRAL DISEASES: ICD-10-CM

## 2021-04-03 LAB
SARS-COV-2 RNA RESP QL NAA+PROBE: NORMAL
SPECIMEN SOURCE: NORMAL

## 2021-04-03 PROCEDURE — U0003 INFECTIOUS AGENT DETECTION BY NUCLEIC ACID (DNA OR RNA); SEVERE ACUTE RESPIRATORY SYNDROME CORONAVIRUS 2 (SARS-COV-2) (CORONAVIRUS DISEASE [COVID-19]), AMPLIFIED PROBE TECHNIQUE, MAKING USE OF HIGH THROUGHPUT TECHNOLOGIES AS DESCRIBED BY CMS-2020-01-R: HCPCS | Performed by: INTERNAL MEDICINE

## 2021-04-03 PROCEDURE — U0005 INFEC AGEN DETEC AMPLI PROBE: HCPCS | Performed by: INTERNAL MEDICINE

## 2021-04-07 ENCOUNTER — HOSPITAL ENCOUNTER (OUTPATIENT)
Facility: CLINIC | Age: 46
Discharge: HOME | End: 2021-04-07
Attending: INTERNAL MEDICINE | Admitting: INTERNAL MEDICINE
Payer: COMMERCIAL

## 2021-04-07 VITALS
RESPIRATION RATE: 18 BRPM | SYSTOLIC BLOOD PRESSURE: 160 MMHG | OXYGEN SATURATION: 100 % | HEART RATE: 73 BPM | TEMPERATURE: 98.9 F | DIASTOLIC BLOOD PRESSURE: 96 MMHG

## 2021-04-07 LAB — COLONOSCOPY: NORMAL

## 2021-04-07 PROCEDURE — 88305 TISSUE EXAM BY PATHOLOGIST: CPT | Mod: TC | Performed by: INTERNAL MEDICINE

## 2021-04-07 PROCEDURE — 88305 TISSUE EXAM BY PATHOLOGIST: CPT | Mod: 26 | Performed by: PATHOLOGY

## 2021-04-07 PROCEDURE — G0500 MOD SEDAT ENDO SERVICE >5YRS: HCPCS | Performed by: INTERNAL MEDICINE

## 2021-04-07 PROCEDURE — 250N000011 HC RX IP 250 OP 636: Performed by: INTERNAL MEDICINE

## 2021-04-07 PROCEDURE — 45380 COLONOSCOPY AND BIOPSY: CPT | Performed by: INTERNAL MEDICINE

## 2021-04-07 PROCEDURE — 45385 COLONOSCOPY W/LESION REMOVAL: CPT | Mod: PT | Performed by: INTERNAL MEDICINE

## 2021-04-07 PROCEDURE — 99153 MOD SED SAME PHYS/QHP EA: CPT | Performed by: INTERNAL MEDICINE

## 2021-04-07 RX ORDER — FENTANYL CITRATE 50 UG/ML
INJECTION, SOLUTION INTRAMUSCULAR; INTRAVENOUS PRN
Status: DISCONTINUED | OUTPATIENT
Start: 2021-04-07 | End: 2021-04-07 | Stop reason: HOSPADM

## 2021-04-07 RX ORDER — ONDANSETRON 2 MG/ML
4 INJECTION INTRAMUSCULAR; INTRAVENOUS
Status: DISCONTINUED | OUTPATIENT
Start: 2021-04-07 | End: 2021-04-07 | Stop reason: HOSPADM

## 2021-04-07 RX ORDER — LIDOCAINE 40 MG/G
CREAM TOPICAL
Status: DISCONTINUED | OUTPATIENT
Start: 2021-04-07 | End: 2021-04-07 | Stop reason: HOSPADM

## 2021-04-07 RX ADMIN — MIDAZOLAM 1 MG: 1 INJECTION INTRAMUSCULAR; INTRAVENOUS at 15:45

## 2021-04-07 RX ADMIN — MIDAZOLAM 2 MG: 1 INJECTION INTRAMUSCULAR; INTRAVENOUS at 15:41

## 2021-04-07 RX ADMIN — FENTANYL CITRATE 25 MCG: 50 INJECTION, SOLUTION INTRAMUSCULAR; INTRAVENOUS at 15:45

## 2021-04-07 RX ADMIN — FENTANYL CITRATE 25 MCG: 50 INJECTION, SOLUTION INTRAMUSCULAR; INTRAVENOUS at 15:41

## 2021-04-07 NOTE — OR NURSING
Pt had colonoscopy with polypectomy by hot snare, under moderate sedation. Pt IV infiltrated at initial sedation dose: IV restarted and infiltrated IV removed. Pt reports improvement in discomfort at infiltrated site. Pt tolerated procedure very well, talking throughout procedure. Pt BP elevated throughout procedure, which is his baseline d/t renal disease. MD aware. Pt stable at time of transfer to  recovery.

## 2021-04-08 ENCOUNTER — TELEPHONE (OUTPATIENT)
Dept: ORTHOPEDICS | Facility: CLINIC | Age: 46
End: 2021-04-08

## 2021-04-08 LAB — COPATH REPORT: NORMAL

## 2021-04-08 NOTE — TELEPHONE ENCOUNTER
Received message from [facility] nurses stated patient had no showed his preop H&P. Attempted to reach patient to discuss if he had rescheduled this or if he intended to cancel his surgery on Monday. Left message asking for a call back TODAY with information.

## 2021-04-08 NOTE — TELEPHONE ENCOUNTER
[facility] Call Center    Phone Message    May a detailed message be left on voicemail: yes     Reason for Call: Other: Patient is returning vm. Patient has pre-op rescheduled for tomorrow. He will not be cancelling his surgery on Monday.     Action Taken: Message routed to:  [facility] ORTHO    Travel Screening: Not Applicable

## 2021-04-09 ENCOUNTER — OFFICE VISIT (OUTPATIENT)
Dept: FAMILY MEDICINE | Facility: CLINIC | Age: 46
End: 2021-04-09
Payer: COMMERCIAL

## 2021-04-09 ENCOUNTER — TELEPHONE (OUTPATIENT)
Dept: FAMILY MEDICINE | Facility: CLINIC | Age: 46
End: 2021-04-09

## 2021-04-09 ENCOUNTER — MYC MEDICAL ADVICE (OUTPATIENT)
Dept: FAMILY MEDICINE | Facility: CLINIC | Age: 46
End: 2021-04-09

## 2021-04-09 ENCOUNTER — ANESTHESIA EVENT (OUTPATIENT)
Dept: SURGERY | Facility: CLINIC | Age: 46
End: 2021-04-09
Payer: COMMERCIAL

## 2021-04-09 VITALS
HEART RATE: 88 BPM | BODY MASS INDEX: 22.02 KG/M2 | WEIGHT: 144.8 LBS | TEMPERATURE: 100.5 F | DIASTOLIC BLOOD PRESSURE: 86 MMHG | SYSTOLIC BLOOD PRESSURE: 130 MMHG

## 2021-04-09 DIAGNOSIS — I15.1 HTN, KIDNEY TRANSPLANT RELATED: ICD-10-CM

## 2021-04-09 DIAGNOSIS — N18.6 ESRD (END STAGE RENAL DISEASE) ON DIALYSIS (H): ICD-10-CM

## 2021-04-09 DIAGNOSIS — M87.051 AVASCULAR NECROSIS OF FEMORAL HEAD, RIGHT (H): ICD-10-CM

## 2021-04-09 DIAGNOSIS — Z99.2 ESRD (END STAGE RENAL DISEASE) ON DIALYSIS (H): ICD-10-CM

## 2021-04-09 DIAGNOSIS — Z01.818 PRE-OP EXAM: Primary | ICD-10-CM

## 2021-04-09 DIAGNOSIS — D63.1 ANEMIA OF CHRONIC RENAL FAILURE, STAGE 5 (H): ICD-10-CM

## 2021-04-09 DIAGNOSIS — Z11.59 ENCOUNTER FOR SCREENING FOR OTHER VIRAL DISEASES: ICD-10-CM

## 2021-04-09 DIAGNOSIS — Z94.0 HTN, KIDNEY TRANSPLANT RELATED: ICD-10-CM

## 2021-04-09 DIAGNOSIS — Z01.818 PREOP GENERAL PHYSICAL EXAM: ICD-10-CM

## 2021-04-09 DIAGNOSIS — N18.5 ANEMIA OF CHRONIC RENAL FAILURE, STAGE 5 (H): ICD-10-CM

## 2021-04-09 LAB
ANION GAP SERPL CALCULATED.3IONS-SCNC: 5 MMOL/L (ref 3–14)
BASOPHILS # BLD AUTO: 0 10E9/L (ref 0–0.2)
BASOPHILS NFR BLD AUTO: 0.1 %
BUN SERPL-MCNC: 22 MG/DL (ref 7–30)
CALCIUM SERPL-MCNC: 8.7 MG/DL (ref 8.5–10.1)
CHLORIDE SERPL-SCNC: 107 MMOL/L (ref 94–109)
CO2 SERPL-SCNC: 27 MMOL/L (ref 20–32)
CREAT SERPL-MCNC: 6.53 MG/DL (ref 0.66–1.25)
DIFFERENTIAL METHOD BLD: ABNORMAL
EOSINOPHIL # BLD AUTO: 0.1 10E9/L (ref 0–0.7)
EOSINOPHIL NFR BLD AUTO: 1.6 %
ERYTHROCYTE [DISTWIDTH] IN BLOOD BY AUTOMATED COUNT: 18.6 % (ref 10–15)
GFR SERPL CREATININE-BSD FRML MDRD: 9 ML/MIN/{1.73_M2}
GLUCOSE SERPL-MCNC: 94 MG/DL (ref 70–99)
HCT VFR BLD AUTO: 35.4 % (ref 40–53)
HGB BLD-MCNC: 10.9 G/DL (ref 13.3–17.7)
LABORATORY COMMENT REPORT: NORMAL
LYMPHOCYTES # BLD AUTO: 1 10E9/L (ref 0.8–5.3)
LYMPHOCYTES NFR BLD AUTO: 14.9 %
MCH RBC QN AUTO: 26.2 PG (ref 26.5–33)
MCHC RBC AUTO-ENTMCNC: 30.8 G/DL (ref 31.5–36.5)
MCV RBC AUTO: 85 FL (ref 78–100)
MONOCYTES # BLD AUTO: 0.8 10E9/L (ref 0–1.3)
MONOCYTES NFR BLD AUTO: 11.5 %
NEUTROPHILS # BLD AUTO: 4.9 10E9/L (ref 1.6–8.3)
NEUTROPHILS NFR BLD AUTO: 71.9 %
PLATELET # BLD AUTO: 195 10E9/L (ref 150–450)
POTASSIUM SERPL-SCNC: 4.2 MMOL/L (ref 3.4–5.3)
RBC # BLD AUTO: 4.16 10E12/L (ref 4.4–5.9)
SARS-COV-2 RNA RESP QL NAA+PROBE: NEGATIVE
SARS-COV-2 RNA RESP QL NAA+PROBE: NORMAL
SODIUM SERPL-SCNC: 139 MMOL/L (ref 133–144)
SPECIMEN SOURCE: NORMAL
SPECIMEN SOURCE: NORMAL
WBC # BLD AUTO: 6.8 10E9/L (ref 4–11)

## 2021-04-09 PROCEDURE — U0003 INFECTIOUS AGENT DETECTION BY NUCLEIC ACID (DNA OR RNA); SEVERE ACUTE RESPIRATORY SYNDROME CORONAVIRUS 2 (SARS-COV-2) (CORONAVIRUS DISEASE [COVID-19]), AMPLIFIED PROBE TECHNIQUE, MAKING USE OF HIGH THROUGHPUT TECHNOLOGIES AS DESCRIBED BY CMS-2020-01-R: HCPCS | Performed by: ORTHOPAEDIC SURGERY

## 2021-04-09 PROCEDURE — 36415 COLL VENOUS BLD VENIPUNCTURE: CPT | Performed by: NURSE PRACTITIONER

## 2021-04-09 PROCEDURE — 93000 ELECTROCARDIOGRAM COMPLETE: CPT | Performed by: NURSE PRACTITIONER

## 2021-04-09 PROCEDURE — 85025 COMPLETE CBC W/AUTO DIFF WBC: CPT | Performed by: NURSE PRACTITIONER

## 2021-04-09 PROCEDURE — 80048 BASIC METABOLIC PNL TOTAL CA: CPT | Performed by: NURSE PRACTITIONER

## 2021-04-09 PROCEDURE — U0005 INFEC AGEN DETEC AMPLI PROBE: HCPCS | Performed by: ORTHOPAEDIC SURGERY

## 2021-04-09 PROCEDURE — 99214 OFFICE O/P EST MOD 30 MIN: CPT | Mod: 25 | Performed by: NURSE PRACTITIONER

## 2021-04-09 NOTE — PATIENT INSTRUCTIONS
Hold Lasix and Tamsulosin per surgeon request on day of surgery.    Preparing for Your Surgery  Getting started  A nurse will call you to review your health history and instructions. They will give you an arrival time based on your scheduled surgery time.  Please be ready to share the following:    Your doctor's clinic name and phone number    Your medical, surgical and anesthesia history    A list of allergies and sensitivities    A list of medicines, including herbal treatments and over-the-counter drugs    Whether the patient has a legal guardian (ask how to send us the papers in advance)  If you have a child who's having surgery, please ask for a copy of Preparing for Your Child's Surgery.    Preparing for surgery    Within 30 days of surgery: Have a pre-op exam (sometimes called an H&P, or History and Physical). This can be done at a clinic or pre-operative center.  ? If you're having a , you may not need this exam. Talk to your care team    At your pre-op exam, talk to your care team about all medicines you take. If you need to stop any medicines before surgery, ask when to start taking them again.  ? We do this for your safety. Many medicines can make you bleed too much during surgery. Some change how well surgery (anesthesia) drugs work.    Call your insurance company to let them know you're having surgery. (If you don't have insurance, call [phone].)    Call your clinic if there's any change in your health. This includes signs of a cold or flu (sore throat, runny nose, cough, rash, fever). It also includes a scrape or scratch near the surgery site.    If you have questions on the day of surgery, call your hospital or surgery center.  Eating and drinking guidelines  For your safety: Unless your surgeon tells you otherwise, follow the guidelines below.    Eat and drink as usual until 8 hours before surgery. After that, no food or milk.    Drink clear liquids until 2 hours before surgery. These  are liquids you can see through, like water, Gatorade and Propel Water. You may also have black coffee and tea (no cream or milk).    Nothing by mouth within 2 hours of surgery. This includes gum, candy and breath mints.    If you drink, stop drinking alcohol the night before surgery.    If your care team tells you to take medicine on the morning of surgery, it's okay to take it with a sip of water.  Preventing infection    Shower or bathe the night before and morning of your surgery. Follow the instructions your clinic gave you. (If no instructions, use regular soap.)    Don't shave or clip hair near your surgery site. We'll remove the hair if needed.    Don't smoke or vape the morning of surgery. You may chew nicotine gum up to 2 hours before surgery. A nicotine patch is okay.  ? Note: Some surgeries require you to completely quit smoking and nicotine. Check with your surgeon.    Your care team will make every effort to keep you safe from infection. We will:  ? Clean our hands often with soap and water (or an alcohol-based hand rub).  ? Clean the skin at your surgery site with a special soap that kills germs.  ? Give you a special gown to keep you warm. (Cold raises the risk of infection.)  ? Wear special hair covers, masks, gowns and gloves during surgery.  ? Give antibiotic medicine, if prescribed. Not all surgeries need antibiotics.  What to bring on the day of surgery    Photo ID and insurance card    Copy of your health care directive, if you have one    Glasses and hearing aides (bring cases)  ? You can't wear contacts during surgery    Inhaler and eye drops, if you use them (tell us about these when you arrive)    CPAP machine or breathing device, if you use them    A few personal items, if spending the night    If you have . . .  ? A pacemaker or ICD (cardiac defibrillator): Bring the ID card.  ? An implanted stimulator: Bring the remote control.  ? A legal guardian: Bring a copy of the certified  (court-stamped) guardianship papers.  Please remove any jewelry, including body piercings. Leave jewelry and other valuables at home.  If you're going home the day of surgery  Important: If you don't follow the rules below, we must cancel your surgery.     Arrange for someone to drive you home after surgery. You may not drive, take a taxi or take public transportation by yourself (unless you'll have local anesthesia only).    Arrange for a responsible adult to stay with you overnight. If you don't, we may keep you in the hospital overnight, and you may need to pay the costs yourself.  Questions?   If you have any questions for your care team, list them here: _________________________________________________________________________________________________________________________________________________________________________________________________________________________________________________________________________________________________________________________  For informational purposes only. Not to replace the advice of your health care provider. Copyright   2003, 2019 [employer]. All rights reserved. Clinically reviewed by Donna Vincent MD. [employer] [id number] - REV 4/20.

## 2021-04-09 NOTE — PROGRESS NOTES
[facility]  [address] [facility] [address]  Phone: [phone]  Primary Provider: Mariel Garcia  Pre-op Performing Provider: NOVA YEPEZ      PREOPERATIVE EVALUATION:  Today's date: 4/9/2021    Rashad Ortiz is a 45 year old male who presents for a preoperative evaluation.    Surgical Information:  Surgery/Procedure: total right hip arthroplasty  Surgery Location: [facility]  Surgeon: Dr Morillo  Surgery Date: 4/12/2021  Time of Surgery: 7:30 am  Where patient plans to recover: At home with family  Fax number for surgical facility: Note does not need to be faxed, will be available electronically in Epic.    Type of Anesthesia Anticipated: Choice    Assessment & Plan     The proposed surgical procedure is considered INTERMEDIATE risk.    1. Pre-op exam  - Basic metabolic panel  (Ca, Cl, CO2, Creat, Gluc, K, Na, BUN)  - CBC with platelets and differential  - EKG 12-lead complete w/read - Clinics    2. Avascular necrosis of femoral head, right (H)    3. ESRD (end stage renal disease) on dialysis (H)  - Dialysis Tuesday, Thursday, Saturday.  Managed by Nephrology  - Basic metabolic panel  (Ca, Cl, CO2, Creat, Gluc, K, Na, BUN)    4. Anemia of chronic renal failure, stage 5 (H)  - chronic, labs pending  - CBC with platelets and differential    5. HTN, kidney transplant related  - stable.            Risks and Recommendations:  The patient has the following additional risks and recommendations for perioperative complications:   - Consult Hospitalist / IM to assist with post-op medical management    Medication Instructions:   - Diuretics: HOLD on the day of surgery.    RECOMMENDATION:  APPROVAL GIVEN to proceed with proposed procedure, without further diagnostic evaluation.      Subjective     HPI related to upcoming procedure:   Rashad will be undergoing total right hip arthroplasty due to avascular necrosis.    Preop Questions 4/9/2021   1. Have you ever had a heart  attack or stroke? No   2. Have you ever had surgery on your heart or blood vessels, such as a stent placement, a coronary artery bypass, or surgery on an artery in your head, neck, heart, or legs? No   3. Do you have chest pain with activity? No   4. Do you have a history of  heart failure? No   5. Do you currently have a cold, bronchitis or symptoms of other infection? No   6. Do you have a cough, shortness of breath, or wheezing? No   7. Do you or anyone in your family have previous history of blood clots? UNKNOWN    8. Do you or does anyone in your family have a serious bleeding problem such as prolonged bleeding following surgeries or cuts? UNKNOWN   9. Have you ever had problems with anemia or been told to take iron pills? YES   10. Have you had any abnormal blood loss such as black, tarry or bloody stools? No   11. Have you ever had a blood transfusion? YES   11a. Have you ever had a transfusion reaction? No   12. Are you willing to have a blood transfusion if it is medically needed before, during, or after your surgery? Yes   13. Have you or any of your relatives ever had problems with anesthesia? No   14. Do you have sleep apnea, excessive snoring or daytime drowsiness? No   15. Do you have any artifical heart valves or other implanted medical devices like a pacemaker, defibrillator, or continuous glucose monitor? No   16. Do you have artificial joints? YES- left hip   17. Are you allergic to latex? No       Health Care Directive:  Patient does not have a Health Care Directive or Living Will: Discussed advance care planning with patient; however, patient declined at this time.    Preoperative Review of :   reviewed - controlled substances reflected in medication list.      Status of Chronic Conditions:  ANEMIA - Patient has a recent history of moderate-severe anemia, which has not been symptomatic. Work up to date has been thought to be due to CKD and dialysis treatment. Treatment has been iron  supplementation.     HYPERTENSION - Patient has longstanding history of HTN , currently denies any symptoms referable to elevated blood pressure. Specifically denies chest pain, palpitations, dyspnea, orthopnea, PND or peripheral edema. Blood pressure readings have been in normal range. Current medication regimen is as listed below. Patient denies any side effects of medication.     RENAL INSUFFICIENCY - Patient has a longstanding history of moderate-severe chronic renal insufficiency. Last Cr 7.71.       Review of Systems  CONSTITUTIONAL: NEGATIVE for fever, chills, change in weight  INTEGUMENTARY/SKIN: NEGATIVE for worrisome rashes, moles or lesions  EYES: NEGATIVE for vision changes or irritation  ENT/MOUTH: NEGATIVE for ear, mouth and throat problems  RESP: NEGATIVE for significant cough or SOB  BREAST: NEGATIVE for masses, tenderness or discharge  CV: NEGATIVE for chest pain, palpitations or peripheral edema  GI: NEGATIVE for nausea, abdominal pain, heartburn, or change in bowel habits  : NEGATIVE for frequency, dysuria, or hematuria  MUSCULOSKELETAL:arthralgias right hip  NEURO: NEGATIVE for weakness, dizziness or paresthesias  ENDOCRINE: NEGATIVE for temperature intolerance, skin/hair changes  HEME: NEGATIVE for bleeding problems  PSYCHIATRIC: NEGATIVE for changes in mood or affect    Patient Active Problem List    Diagnosis Date Noted     Avascular necrosis of femoral head, right (H) 01/07/2021     Priority: Medium     Added automatically from request for surgery [id number]       Primary osteoarthritis of right hip 12/23/2020     Priority: Medium     Status post hip surgery 09/09/2020     Priority: Medium     ESRD (end stage renal disease) on dialysis (H) 09/09/2020     Priority: Medium     Vitamin D deficiency      Priority: Medium     BPH (benign prostatic hyperplasia)      Priority: Medium     Secondary renal hyperparathyroidism (H) 03/04/2020     Priority: Medium     Anemia of chronic renal failure, stage  5 (H) 06/17/2019     Priority: Medium     HTN, kidney transplant related 11/02/2018     Priority: Medium     Metabolic acidosis 11/02/2018     Priority: Medium     CKD (chronic kidney disease) stage 5, GFR less than 15 ml/min (H) 11/02/2018     Priority: Medium     Immunosuppression (H) 11/02/2018     Priority: Medium     Escherichia coli septicemia (H) 10/26/2018     Priority: Medium     Pyelonephritis of transplanted kidney 10/26/2018     Priority: Medium     Herpes simplex infection of penis 06/19/2018     Priority: Medium     HSV 1 confirmed by PCR of lesion       Chronic kidney disease, stage 4, severely decreased GFR (H)      Priority: Medium     Medical non-compliance      Priority: Medium     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 Provider to review       Kidney replaced by transplant 05/21/2012     Priority: Medium     Aftercare following organ transplant 05/21/2012     Priority: Medium      Past Medical History:   Diagnosis Date     AVN (avascular necrosis of bone) (H)     left hip     AVN (avascular necrosis of bone) (H)     left hip     BPH (benign prostatic hyperplasia)      Chronic kidney disease, stage 4, severely decreased GFR (H)      Gastro-oesophageal reflux disease      Gout      History of blood transfusion      Hypertension      Medical non-compliance      Osteonecrosis (H) 7/29/2020    Added automatically from request for surgery [id number]     Pulmonary nodules      Steroid long-term use      Vitamin D deficiency      Past Surgical History:   Procedure Laterality Date     ARTHROPLASTY HIP Left 9/9/2020    Procedure: Left total hip arthroplasty;  Surgeon: Fernando Morillo MD;  Location: [facility] OR     AV  FISTULA OR GRAFT ARTERIAL       COLONOSCOPY N/A 4/7/2021    Procedure: COLONOSCOPY, WITH POLYPECTOMY AND BIOPSY;  Surgeon: Zak Ortega MD;  Location: [facility] GI     CREATE FISTULA ARTERIOVENOUS UPPER EXTREMITY Right 7/31/2019    Procedure: Creation Of Atriovenous Fistula Right Upper Arm;  Surgeon: Julia Irwin MD;  Location: [facility] OR     IR CVC TUNNEL PLACEMENT > 5 YRS OF AGE  10/9/2020     IR DIALYSIS FISTULOGRAM RIGHT  10/9/2020     IR DIALYSIS FISTULOGRAM RIGHT  2/19/2021     IR DIALYSIS MECH THROMB, PTA  10/9/2020     IR DIALYSIS STENT W OR W/OUT PTA  2/19/2021     LIGATE FISTULA ARTERIOVENOUS UPPER EXTREMITY  12/20/2011    Procedure:LIGATE FISTULA ARTERIOVENOUS UPPER EXTREMITY; Excision of Right Forearm Arteriovenous Fistula.; Surgeon:LINDY AMAYA; Location:[facility] OR     PERCUTANEOUS BIOPSY KIDNEY Right 2/28/2017    Procedure: PERCUTANEOUS BIOPSY KIDNEY;  Surgeon: Gee Barrios MD;  Location: [facility] OR     TRANSPLANT  01/13/2011    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     Current Outpatient Medications   Medication Sig Dispense Refill     acetaminophen (TYLENOL) 500 MG tablet Take 2 tablets (1,000 mg) by mouth every 8 hours as needed for mild pain 160 tablet 3     cholecalciferol 25 MCG (1000 UT) TABS Take 2,000 Units by mouth daily 90 tablet 3     febuxostat (ULORIC) 80 MG TABS tablet Take 1 tablet (80 mg) by mouth daily 90 tablet 3     ferrous sulfate (FEROSUL) 325 (65 Fe) MG tablet Take 1 tablet (325 mg) by mouth daily (with breakfast) 30 tablet 11     furosemide (LASIX) 20 MG tablet        mycophenolate (GENERIC EQUIVALENT) 250 MG capsule Take 2 capsules (500 mg) by mouth 2 times daily 120 capsule 11     oxyCODONE (ROXICODONE) 5 MG tablet Take 1 tablet (5 mg) by mouth 2 times daily as needed for severe pain 40 tablet 0     sulfamethoxazole-trimethoprim (BACTRIM) 400-80 MG tablet Take 1 tablet by mouth every other day 45 tablet 3     tacrolimus (GENERIC EQUIVALENT) 0.5 MG capsule Take 1  capsule (0.5 mg) by mouth every evening Total dose = 1 mg in the AM and 1.5 mg in the PM 30 capsule 11     tacrolimus (GENERIC EQUIVALENT) 1 MG capsule Take 1 capsule (1 mg) by mouth 2 times daily . Total dose=1mg in the AM and 1.5mg in the PM 60 capsule 11     tamsulosin (FLOMAX) 0.4 MG capsule        bisacodyl (DULCOLAX) 5 MG EC tablet Take as directed in [employer] patient instructions (Patient not taking: Reported on 2021) 4 tablet 0     bisacodyl (DULCOLAX) 5 MG EC tablet Take 1 tablet (5 mg) by mouth See Admin Instructions Refer to My chart message (Patient not taking: Reported on 2021) 4 tablet 0     carvedilol (COREG) 12.5 MG tablet        HYDROcodone-acetaminophen (NORCO) 5-325 MG tablet Take 1 tablet by mouth every 12 hours as needed for pain (Patient not taking: Reported on 2021) 40 tablet 0     mycophenolate (GENERIC EQUIVALENT) 500 MG tablet Take 500 mg by mouth 2 times daily       polyethylene glycol (GOLYTELY) 236 g suspension Take as directed in [employer] patient instructions (Patient not taking: Reported on 2021) 4000 mL 0     polyethylene glycol (GOLYTELY) 236 g suspension Take 4,000 mLs by mouth See Admin Instructions Refer to my chart message 4000 mL 0       No Known Allergies     Social History     Tobacco Use     Smoking status: Former Smoker     Types: Cigarettes     Quit date: 2017     Years since quittin.1     Smokeless tobacco: Never Used     Tobacco comment: Patient states that he is an 'social'  smoker. 3 cigarettes per week per pt   Substance Use Topics     Alcohol use: Not Currently     Alcohol/week: 4.2 standard drinks     Types: 5 Standard drinks or equivalent per week     Comment: Quit 2020     Family History   Problem Relation Age of Onset     Hypertension Mother      Colon Cancer Mother 66     Colon Cancer Brother 51     History   Drug Use Unknown         Objective     BP (!) 142/91   Pulse 88   Temp 100.5  F (38.1  C) (Oral)   Wt 65.7 kg (144 lb 12.8 oz)    BMI 22.02 kg/m      Physical Exam    GENERAL APPEARANCE: healthy, alert and no distress     EYES: EOMI,  PERRL     HENT: ear canals and TM's normal and nose and mouth without ulcers or lesions     NECK: no adenopathy, no asymmetry, masses, or scars and thyroid normal to palpation     RESP: lungs clear to auscultation - no rales, rhonchi or wheezes     CV: regular rates and rhythm, normal S1 S2, no S3 or S4 and no murmur, click or rub     ABDOMEN:  soft, nontender, no HSM or masses and bowel sounds normal     MS: tenderness right hip     SKIN: no suspicious lesions or rashes     NEURO: Normal strength and tone, sensory exam grossly normal, mentation intact and speech normal     PSYCH: mentation appears normal. and affect normal/bright     LYMPHATICS: No cervical adenopathy    Recent Labs   Lab Test 02/19/21  0845 01/08/21  1305 10/09/20  0714 10/08/20  1215   HGB 10.5* 9.8* 9.1* 6.1*    239 294 277   INR 1.18*  --   --  1.24*   NA  --   --  137 138   POTASSIUM 4.3  --  5.1 4.2   CR  --   --  7.71* 7.53*        Diagnostics:  Recent Results (from the past 168 hour(s))   COLONOSCOPY    Collection Time: 04/07/21  3:12 PM   Result Value Ref Range    COLONOSCOPY       [address], [place] [id number] [phone]     Endoscopy Department  _______________________________________________________________________________  Patient Name: Rashad Ortiz          Procedure Date: 4/7/2021 3:12 PM  MRN: [id number]                       Account Number: [id number]  YOB: 1975              Admit Type: Outpatient  Age: 45                               Room: [facility]  Gender: Male                          Note Status: Finalized  Attending MD: Zak Mccain MD   Total Sedation Time: 32 minutes with 1:1   monitoring  _______________________________________________________________________________     Procedure:           Colonoscopy  Indications:         Screening for colorectal  malignant neoplasm  Providers:           Zak Mccain MD, Haleigh Madrid RN  Referring MD:        Mariel Mares MD  Medicines:           Fentanyl 50 micrograms IV, Midazolam 3 mg IV  Complications:       No immediate compli cations.  _______________________________________________________________________________  Procedure:           Pre-Anesthesia Assessment:                       - Prior to the procedure, a History and Physical was                        performed, and patient medications and allergies were                        reviewed. The patient is competent. The risks and                        benefits of the procedure and the sedation options and                        risks were discussed with the patient. All questions                        were answered and informed consent was obtained. Patient                        identification and proposed procedure were verified by                        the physician in the procedure room. Mental Status                        Examination: alert and oriented. Airway Examination:                        normal oropharyngeal airway and neck mobility.                        Respiratory Examination: clear to auscultation. CV                        Examina tion: normal. Prophylactic Antibiotics: The                        patient does not require prophylactic antibiotics. Prior                        Anticoagulants: The patient has taken no previous                        anticoagulant or antiplatelet agents. ASA Grade                        Assessment: III - A patient with severe systemic                        disease. After reviewing the risks and benefits, the                        patient was deemed in satisfactory condition to undergo                        the procedure. The anesthesia plan was to use moderate                        sedation / analgesia (conscious sedation). Immediately                        prior to administration of  medications, the patient was                        re-assessed for adequacy to receive sedatives. The heart                        rate, respiratory rate, oxygen saturations, blood                        pressure, adequacy of pulmonary ventilation, and                        response to care were  monitored throughout the                        procedure. The physical status of the patient was                        re-assessed after the procedure.                       After obtaining informed consent, the colonoscope was                        passed under direct vision. Throughout the procedure,                        the patient's blood pressure, pulse, and oxygen                        saturations were monitored continuously. The Colonoscope                        was introduced through the anus and advanced to the                        cecum, identified by appendiceal orifice and ileocecal                        valve. The colonoscopy was performed without difficulty.                        The patient tolerated the procedure well. The quality of                        the bowel preparation was excellent. The endoscopy was                        advanced using the water exchange method ( underwater ).                                                                                    Findings:       Two sessile polyps were found in the ascending colon. The polyps were 2        to 4 mm in size. These polyps were removed with a hot snare. Resection        and retrieval were complete.       A few small-mouthed diverticula were found in the entire colon.       The exam was otherwise without abnormality on direct and retroflexion        views.                                                                                   Impression:          - Two 2 to 4 mm polyps in the ascending colon, removed                        with a hot snare. Resected and retrieved.                       - Diverticulosis in the entire  "examined colon.                       - The examination was otherwise normal on direct and                        retroflexion views.  Recommendation:      - Return to referring physician.                       - Repeat colonoscopy in 10 years for surveillance.                                                                                     _____ ________________  Rocky Mccain MD  4/7/2021 4:28:04 PM  I was physically present for the entire viewing portion of the exam.  __________________________  Signature of teaching physician  [id number] Mccain MD  Number of Addenda: 0    Note Initiated On: 4/7/2021 3:12 PM     Surgical pathology exam    Collection Time: 04/07/21  4:34 PM   Result Value Ref Range    Copath Report       Patient Name: GISELLE GRAY  MR#: [id number]  Specimen #: [id number]  Collected: 4/7/2021  Received: 4/7/2021  Reported: 4/8/2021 11:33  Ordering Phy(s): ROCKY MCCAIN    For improved result formatting, select 'View Enhanced Report Format' under   Linked Documents section.    SPECIMEN(S):  Colon polyp, ascending    FINAL DIAGNOSIS:  ASCENDING COLON POLYP, BIOPSY:  - Colonic mucosa with prominent lymphoid aggregate  - Negative for dysplasia    I have personally reviewed all specimens and/or slides, including the   listed special stains, and used them  with my medical judgement to determine or confirm the final diagnosis.    Electronically signed out by:    Tadeo Castillo M.D., Physicians    CLINICAL HISTORY:  Screening colonoscopy    GROSS:  The specimen is received in formalin with proper patient identification,   labeled \"ascending colon polyp\".  The  specimen consists of a 0.5 x 0.2 x 0.1 cm tan-pink piece of tissue,   submitted in toto in cassette A1.  (Dictated by:  4/7/ 2021 04:58 PM)    MICROSCOPIC:  Microscopic examination was performed.    The technical component of this testing was completed at the [facility], with the professional component performed   at the [facility], [address] [place] [address],   [place] [address] ([phone])    CPT Codes:  A: 02605-FM3    COLLECTION SITE:  Client: [facility]  Location: [facility] (B)    Resident  [id number]     Basic metabolic panel  (Ca, Cl, CO2, Creat, Gluc, K, Na, BUN)    Collection Time: 04/09/21 10:44 AM   Result Value Ref Range    Sodium 139 133 - 144 mmol/L    Potassium 4.2 3.4 - 5.3 mmol/L    Chloride 107 94 - 109 mmol/L    Carbon Dioxide 27 20 - 32 mmol/L    Anion Gap 5 3 - 14 mmol/L    Glucose 94 70 - 99 mg/dL    Urea Nitrogen 22 7 - 30 mg/dL    Creatinine 6.53 (H) 0.66 - 1.25 mg/dL    GFR Estimate 9 (L) >60 mL/min/[1.73_m2]    GFR Estimate If Black 11 (L) >60 mL/min/[1.73_m2]    Calcium 8.7 8.5 - 10.1 mg/dL   CBC with platelets and differential    Collection Time: 04/09/21 10:44 AM   Result Value Ref Range    WBC 6.8 4.0 - 11.0 10e9/L    RBC Count 4.16 (L) 4.4 - 5.9 10e12/L    Hemoglobin 10.9 (L) 13.3 - 17.7 g/dL    Hematocrit 35.4 (L) 40.0 - 53.0 %    MCV 85 78 - 100 fl    MCH 26.2 (L) 26.5 - 33.0 pg    MCHC 30.8 (L) 31.5 - 36.5 g/dL    RDW 18.6 (H) 10.0 - 15.0 %    Platelet Count 195 150 - 450 10e9/L    % Neutrophils 71.9 %    % Lymphocytes 14.9 %    % Monocytes 11.5 %    % Eosinophils 1.6 %    % Basophils 0.1 %    Absolute Neutrophil 4.9 1.6 - 8.3 10e9/L    Absolute Lymphocytes 1.0 0.8 - 5.3 10e9/L    Absolute Monocytes 0.8 0.0 - 1.3 10e9/L    Absolute Eosinophils 0.1 0.0 - 0.7 10e9/L    Absolute Basophils 0.0 0.0 - 0.2 10e9/L    Diff Method Automated Method    Asymptomatic COVID-19 Virus (Coronavirus) by PCR    Collection Time: 04/09/21 11:22 AM    Specimen: Nasopharyngeal   Result Value Ref Range    COVID-19 Virus PCR to [facility] - Source Nasopharyngeal     COVID-19 Virus PCR to [facility] - Result       Test received-See reflex to IDDL test SARS CoV2 (COVID-19) Virus RT-PCR    SARS-CoV-2 COVID-19 Virus (Coronavirus) by PCR    Collection Time: 04/09/21 11:22 AM    Specimen: Nasopharyngeal   Result Value Ref Range    SARS-CoV-2 Virus Specimen Source Nasopharyngeal     SARS-CoV-2 PCR Result NEGATIVE     SARS-CoV-2 PCR Comment       Testing was performed using the Xpert Xpress SARS-CoV-2 Assay on the Cepheid Gene-Xpert   Instrument Systems. Additional information about this Emergency Use Authorization (EUA)   assay can be found via the Lab Guide.        EKG: appears normal, NSR, unchanged from previous tracings    Revised Cardiac Risk Index (RCRI):  The patient has the following serious cardiovascular risks for perioperative complications:   - Serum Creatinine >2.0 mg/dl = 1 point     RCRI Interpretation: 1 point: Class II (low risk - 0.9% complication rate)           Signed Electronically by: ISABEL Baker CNP  Copy of this evaluation report is provided to requesting physician.

## 2021-04-10 NOTE — TELEPHONE ENCOUNTER
On call md  Lab called with critical creat 6.53  But reviewed chart, esrd on dialysis and creat stable  Simón Blood MD

## 2021-04-11 NOTE — ANESTHESIA PREPROCEDURE EVALUATION
Anesthesia Pre-Procedure Evaluation    Patient: Rashad Ortiz   MRN:     [id number] Gender:   male   Age:    45 year old :      1975        Preoperative Diagnosis: Avascular necrosis of femoral head, right (H) [M87.051]   Procedure(s):  Right ARTHROPLASTY, HIP, TOTAL     LABS:  CBC:   Lab Results   Component Value Date    WBC 6.8 2021    WBC 5.3 2021    HGB 10.9 (L) 2021    HGB 10.5 (L) 2021    HCT 35.4 (L) 2021    HCT 35.3 (L) 2021     2021     2021     BMP:   Lab Results   Component Value Date     2021     10/09/2020    POTASSIUM 4.2 2021    POTASSIUM 4.3 2021    CHLORIDE 107 2021    CHLORIDE 106 10/09/2020    CO2 27 2021    CO2 25 10/09/2020    BUN 22 2021    BUN 42 (H) 10/09/2020    CR 6.53 (H) 2021    CR 7.71 (H) 10/09/2020    GLC 94 2021    GLC 82 10/09/2020     COAGS:   Lab Results   Component Value Date    PTT 35 2020    INR 1.18 (H) 2021    FIBR 250 2015     POC:   Lab Results   Component Value Date    BGM 75 10/09/2020     OTHER:   Lab Results   Component Value Date    LACT 1.5 10/26/2018    A1C 5.4 2015    ASHELY 8.7 2021    PHOS 2.7 2019    MAG 1.5 (L) 09/10/2020    ALBUMIN 2.5 (L) 2020    PROTTOTAL 6.6 (L) 2020    ALT 11 2020    AST 13 2020    GGT 32 2012    ALKPHOS 106 2020    BILITOTAL 0.3 2020    LIPASE 387 04/10/2017    AMYLASE 153 (H) 04/10/2017    TSH 0.67 04/10/2017    CRP 87.2 (H) 2021     (H) 2021        Preop Vitals    BP Readings from Last 3 Encounters:   21 130/86   21 (!) 160/96   21 (!) 140/96    Pulse Readings from Last 3 Encounters:   21 88   21 73   21 75      Resp Readings from Last 3 Encounters:   21 18   21 16   10/09/20 14    SpO2 Readings from Last 3 Encounters:   21 100%   21 100%   20 100%     "  Temp Readings from Last 1 Encounters:   04/09/21 38.1  C (100.5  F) (Oral)    Ht Readings from Last 1 Encounters:   02/19/21 1.727 m (5' 8\")      Wt Readings from Last 1 Encounters:   04/09/21 65.7 kg (144 lb 12.8 oz)    Estimated body mass index is 22.02 kg/m  as calculated from the following:    Height as of 2/19/21: 1.727 m (5' 8\").    Weight as of 4/9/21: 65.7 kg (144 lb 12.8 oz).     LDA:  Hemodialysis Vascular Access Arteriovenous fistula Right Arm (Active)   Site Assessment WDL 04/07/21 1400   Cannulation Needle Size 16 09/10/20 1756   Dressing Intervention New dressing 09/10/20 1756   Dressing Status Clean, dry, intact 10/09/20 1400   Hand Off Report Yes 09/10/20 1756   Number of days: 466        Past Medical History:   Diagnosis Date     AVN (avascular necrosis of bone) (H)     left hip     AVN (avascular necrosis of bone) (H)     left hip     BPH (benign prostatic hyperplasia)      Chronic kidney disease, stage 4, severely decreased GFR (H)      Gastro-oesophageal reflux disease      Gout      History of blood transfusion      Hypertension      Medical non-compliance      Osteonecrosis (H) 7/29/2020    Added automatically from request for surgery [id number]     Pulmonary nodules      Steroid long-term use      Vitamin D deficiency       Past Surgical History:   Procedure Laterality Date     ARTHROPLASTY HIP Left 9/9/2020    Procedure: Left total hip arthroplasty;  Surgeon: Fernando Morillo MD;  [facility]     AV FISTULA OR GRAFT ARTERIAL       COLONOSCOPY N/A 4/7/2021    Procedure: COLONOSCOPY, WITH POLYPECTOMY AND BIOPSY;  Surgeon: Zak Ortega MD;  Location: [facility]     CREATE FISTULA ARTERIOVENOUS UPPER EXTREMITY Right 7/31/2019    Procedure: Creation Of Atriovenous Fistula Right Upper Arm;  Surgeon: Julia Irwin MD;  Location: [facility]     IR CVC TUNNEL PLACEMENT > 5 YRS OF AGE  10/9/2020     IR DIALYSIS FISTULOGRAM RIGHT  10/9/2020     IR DIALYSIS FISTULOGRAM RIGHT  2/19/2021     IR " DIALYSIS MECH THROMB, PTA  10/9/2020     IR DIALYSIS STENT W OR W/OUT PTA  2/19/2021     LIGATE FISTULA ARTERIOVENOUS UPPER EXTREMITY  12/20/2011    Procedure:LIGATE FISTULA ARTERIOVENOUS UPPER EXTREMITY; Excision of Right Forearm Arteriovenous Fistula.; Surgeon:LINDY AMAYA; Location:[facility]     PERCUTANEOUS BIOPSY KIDNEY Right 2/28/2017    Procedure: PERCUTANEOUS BIOPSY KIDNEY;  Surgeon: Gee Barrios MD;  [facility]     TRANSPLANT  01/13/2011    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 He has ESRD and is on dialysis. He has avascular necrosis of the femoral head.   He was hemodialyzed on Saturday and is due again tomorrow.   He is NPO.  He does take immunosuppression but is off steroids.   He has not taken any of his meds today.   He still makes urine and has his native and transplanted kidneys.    Cardiovascular Findings   (+) hypertension,   Comments: 11/16/20: stress echo:Normal, low-risk dobutamine echocardiogram without evidence of ischemia.  88% of maximal predicted heart rate (MPHR) was achieved.  Normal biventricular size and global systolic function at baseline. Mild LVH  noted on baseline tomograms.  With low-dose dobutamine, LVEF augmented and LV cavity size decreased  appropriately.  With peak dobutamine, LVEF increased further to >70% and LV cavity size  decreased appropriately.  No regional wall motion abnormalities at rest or with dobutamine.  The test was terminated due to the achievement of target heart rate.  No angina was elicited.  Normal heart rate response to dobutamine. There was a hypertensive response to  dobutamine infusion and diastolic hypertension persisted after the  administration of metoprolol 7 mg IV.  No ECG evidence of ischemia.  There were occasional PVCs during stress with two  brief (3-5 beats) runs of ventricular tachycardia.  No significant valvular abnormalities are noted on screening Doppler exam.  The aortic root and visualized ascending aorta are normal.  No prior stress test was available for comparison.    4/9/21: EKG  Sinus  Rhythm   -Left atrial enlargement.    Voltage criteria for LVH  (R(V5) exceeds 2.60 mV)  -Voltage criteria w/o ST/T abnormality may be normal.    -Anterolateral ST-elevation -repolarization variant.    -  Negative precordial T-waves  -May be normal -consider anteroseptal ischemia    Neuro Findings   Comments: Denies history of strokes or seizures.     Pulmonary Findings   (-) asthma and apnea    HENT Findings - negative HENT ROS    Skin Findings   Comments: No acute issues      GI/Hepatic/Renal Findings   (+) GERD and renal disease    GERD is well controlled    Renal: Dialysis    Endocrine/Metabolic Findings - negative ROS      Genetic/Syndrome Findings - negative genetics/syndromes ROS    Hematology/Oncology Findings - negative hematology/oncology ROS    Additional Notes  Allergies:  No Known Allergies       Current Outpatient Medications:  acetaminophen (TYLENOL) 500 MG tablet  bisacodyl (DULCOLAX) 5 MG EC tablet  bisacodyl (DULCOLAX) 5 MG EC tablet  carvedilol (COREG) 12.5 MG tablet  cholecalciferol 25 MCG (1000 UT) TABS  febuxostat (ULORIC) 80 MG TABS tablet  ferrous sulfate (FEROSUL) 325 (65 Fe) MG tablet  furosemide (LASIX) 20 MG tablet  HYDROcodone-acetaminophen (NORCO) 5-325 MG tablet  mycophenolate (GENERIC EQUIVALENT) 250 MG capsule  mycophenolate (GENERIC EQUIVALENT) 500 MG tablet  oxyCODONE (ROXICODONE) 5 MG tablet  polyethylene glycol (GOLYTELY) 236 g suspension  polyethylene glycol (GOLYTELY) 236 g suspension  sulfamethoxazole-trimethoprim (BACTRIM) 400-80 MG tablet  tacrolimus (GENERIC EQUIVALENT) 0.5 MG capsule  tacrolimus (GENERIC EQUIVALENT) 1 MG capsule  tamsulosin (FLOMAX) 0.4 MG  capsule                  PHYSICAL EXAM:   Mental Status/Neuro: A/A/O   Airway: Facies: Feasible  Mallampati: I  Mouth/Opening: Full  TM distance: > 6 cm  Neck ROM: Full   Respiratory: Auscultation: CTAB     Resp. Rate: Normal     Resp. Effort: Normal      CV: Rhythm: Regular  Rate: Age appropriate  Heart: Normal Sounds  Edema: None   Comments: Dentition: no acute issues                     Anesthesia Plan    ASA Status:  3   NPO Status:  NPO Appropriate    Anesthesia Type: General.     - Airway: ETT   Induction: Intravenous, Propofol.   Maintenance: Balanced.   Techniques and Equipment:     - AVOID: No BP/IV in RUE (has hemodialysis fistula right upper extremity near his elbow)         Consents    Anesthesia Plan(s) and associated risks, benefits, and realistic alternatives discussed. Questions answered and patient/representative(s) expressed understanding.     - Discussed with:  Patient      - Extended Intubation/Ventilatory Support Discussed: No.      - Patient is DNR/DNI Status: No    Use of blood products discussed: Yes.     - Discussed with: Patient.     Postoperative Care    Pain management: IV analgesics.   PONV prophylaxis: Ondansetron (or other 5HT-3), Background Propofol Infusion     Comments:    Rashad requests anesthesia. Procedures and risks explained. He understood and consented. Qs answered.          Antonio Petty MD

## 2021-04-12 ENCOUNTER — APPOINTMENT (OUTPATIENT)
Dept: PHYSICAL THERAPY | Facility: CLINIC | Age: 46
End: 2021-04-12
Attending: ORTHOPAEDIC SURGERY
Payer: COMMERCIAL

## 2021-04-12 ENCOUNTER — ANESTHESIA (OUTPATIENT)
Dept: SURGERY | Facility: CLINIC | Age: 46
End: 2021-04-12
Payer: COMMERCIAL

## 2021-04-12 ENCOUNTER — HOSPITAL ENCOUNTER (OUTPATIENT)
Facility: CLINIC | Age: 46
Discharge: HOME | End: 2021-04-14
Attending: ORTHOPAEDIC SURGERY | Admitting: ORTHOPAEDIC SURGERY
Payer: COMMERCIAL

## 2021-04-12 ENCOUNTER — APPOINTMENT (OUTPATIENT)
Dept: GENERAL RADIOLOGY | Facility: CLINIC | Age: 46
End: 2021-04-12
Attending: PHYSICIAN ASSISTANT
Payer: COMMERCIAL

## 2021-04-12 DIAGNOSIS — M87.051 AVASCULAR NECROSIS OF FEMORAL HEAD, RIGHT (H): ICD-10-CM

## 2021-04-12 DIAGNOSIS — Z94.0 HTN, KIDNEY TRANSPLANT RELATED: ICD-10-CM

## 2021-04-12 DIAGNOSIS — Z98.890 STATUS POST HIP SURGERY: Primary | ICD-10-CM

## 2021-04-12 DIAGNOSIS — I15.1 HTN, KIDNEY TRANSPLANT RELATED: ICD-10-CM

## 2021-04-12 LAB
ABO + RH BLD: NORMAL
ABO + RH BLD: NORMAL
BLD GP AB SCN SERPL QL: NORMAL
BLOOD BANK CMNT PATIENT-IMP: NORMAL
SPECIMEN EXP DATE BLD: NORMAL

## 2021-04-12 PROCEDURE — 250N000011 HC RX IP 250 OP 636: Performed by: PHYSICIAN ASSISTANT

## 2021-04-12 PROCEDURE — 99225 PR SUBSEQUENT OBSERVATION CARE,LEVEL II: CPT | Performed by: INTERNAL MEDICINE

## 2021-04-12 PROCEDURE — 250N000011 HC RX IP 250 OP 636: Performed by: ANESTHESIOLOGY

## 2021-04-12 PROCEDURE — 999N000141 HC STATISTIC PRE-PROCEDURE NURSING ASSESSMENT: Performed by: ORTHOPAEDIC SURGERY

## 2021-04-12 PROCEDURE — C1776 JOINT DEVICE (IMPLANTABLE): HCPCS | Performed by: ORTHOPAEDIC SURGERY

## 2021-04-12 PROCEDURE — 272N000001 HC OR GENERAL SUPPLY STERILE: Performed by: ORTHOPAEDIC SURGERY

## 2021-04-12 PROCEDURE — 250N000013 HC RX MED GY IP 250 OP 250 PS 637: Performed by: ORTHOPAEDIC SURGERY

## 2021-04-12 PROCEDURE — 250N000025 HC SEVOFLURANE, PER MIN: Performed by: ORTHOPAEDIC SURGERY

## 2021-04-12 PROCEDURE — 86901 BLOOD TYPING SEROLOGIC RH(D): CPT | Performed by: ORTHOPAEDIC SURGERY

## 2021-04-12 PROCEDURE — 250N000009 HC RX 250: Performed by: NURSE ANESTHETIST, CERTIFIED REGISTERED

## 2021-04-12 PROCEDURE — 97530 THERAPEUTIC ACTIVITIES: CPT | Mod: GP

## 2021-04-12 PROCEDURE — 97161 PT EVAL LOW COMPLEX 20 MIN: CPT | Mod: GP

## 2021-04-12 PROCEDURE — 250N000013 HC RX MED GY IP 250 OP 250 PS 637: Performed by: PHYSICIAN ASSISTANT

## 2021-04-12 PROCEDURE — 250N000012 HC RX MED GY IP 250 OP 636 PS 637: Performed by: PHYSICIAN ASSISTANT

## 2021-04-12 PROCEDURE — 99207 PR APP CREDIT; MD BILLING SHARED VISIT: CPT | Performed by: PHYSICIAN ASSISTANT

## 2021-04-12 PROCEDURE — 250N000011 HC RX IP 250 OP 636: Performed by: NURSE ANESTHETIST, CERTIFIED REGISTERED

## 2021-04-12 PROCEDURE — 250N000011 HC RX IP 250 OP 636: Performed by: ORTHOPAEDIC SURGERY

## 2021-04-12 PROCEDURE — 258N000003 HC RX IP 258 OP 636: Performed by: ORTHOPAEDIC SURGERY

## 2021-04-12 PROCEDURE — 999N000065 XR PELVIS AD HIP PORTABLE RIGHT 1 VIEW

## 2021-04-12 PROCEDURE — 82962 GLUCOSE BLOOD TEST: CPT

## 2021-04-12 PROCEDURE — C1713 ANCHOR/SCREW BN/BN,TIS/BN: HCPCS | Performed by: ORTHOPAEDIC SURGERY

## 2021-04-12 PROCEDURE — 710N000010 HC RECOVERY PHASE 1, LEVEL 2, PER MIN: Performed by: ORTHOPAEDIC SURGERY

## 2021-04-12 PROCEDURE — 360N000077 HC SURGERY LEVEL 4, PER MIN: Performed by: ORTHOPAEDIC SURGERY

## 2021-04-12 PROCEDURE — 73502 X-RAY EXAM HIP UNI 2-3 VIEWS: CPT | Mod: 26 | Performed by: RADIOLOGY

## 2021-04-12 PROCEDURE — 250N000013 HC RX MED GY IP 250 OP 250 PS 637: Performed by: INTERNAL MEDICINE

## 2021-04-12 PROCEDURE — 258N000003 HC RX IP 258 OP 636: Performed by: NURSE ANESTHETIST, CERTIFIED REGISTERED

## 2021-04-12 PROCEDURE — 250N000009 HC RX 250: Performed by: ORTHOPAEDIC SURGERY

## 2021-04-12 PROCEDURE — 370N000017 HC ANESTHESIA TECHNICAL FEE, PER MIN: Performed by: ORTHOPAEDIC SURGERY

## 2021-04-12 PROCEDURE — 86850 RBC ANTIBODY SCREEN: CPT | Performed by: ORTHOPAEDIC SURGERY

## 2021-04-12 PROCEDURE — 86900 BLOOD TYPING SEROLOGIC ABO: CPT | Performed by: ORTHOPAEDIC SURGERY

## 2021-04-12 PROCEDURE — 258N000001 HC RX 258: Performed by: ORTHOPAEDIC SURGERY

## 2021-04-12 PROCEDURE — 99207 PR CONSULT E&M CHANGED TO SUBSEQUENT LEVEL: CPT | Performed by: INTERNAL MEDICINE

## 2021-04-12 DEVICE — IMP LINER SNR ACET R3 XLPE 0DEG 36X52MM 71332752: Type: IMPLANTABLE DEVICE | Site: HIP | Status: FUNCTIONAL

## 2021-04-12 DEVICE — IMP HEAD FEMORAL SNN BIPOLAR COBALT 36MM +4 71303604: Type: IMPLANTABLE DEVICE | Site: HIP | Status: FUNCTIONAL

## 2021-04-12 DEVICE — IMP SHELL SNR ACET R3 3H 52MM 71335552: Type: IMPLANTABLE DEVICE | Site: HIP | Status: FUNCTIONAL

## 2021-04-12 DEVICE — IMP SCR ACET SNN SPHERICAL HEAD 6.5X40MM 71332540: Type: IMPLANTABLE DEVICE | Site: HIP | Status: FUNCTIONAL

## 2021-04-12 DEVICE — IMP STEM FEM HIP SNN SYNERGY POROUS SZ 12 HO 71306112: Type: IMPLANTABLE DEVICE | Site: HIP | Status: FUNCTIONAL

## 2021-04-12 DEVICE — REFLECTION SPHERICAL HEAD SCREW 20MM
Type: IMPLANTABLE DEVICE | Site: HIP | Status: FUNCTIONAL
Brand: REFLECTION

## 2021-04-12 RX ORDER — NALOXONE HYDROCHLORIDE 0.4 MG/ML
0.4 INJECTION, SOLUTION INTRAMUSCULAR; INTRAVENOUS; SUBCUTANEOUS
Status: DISCONTINUED | OUTPATIENT
Start: 2021-04-12 | End: 2021-04-14 | Stop reason: HOSPADM

## 2021-04-12 RX ORDER — LIDOCAINE HYDROCHLORIDE 20 MG/ML
INJECTION, SOLUTION INFILTRATION; PERINEURAL PRN
Status: DISCONTINUED | OUTPATIENT
Start: 2021-04-12 | End: 2021-04-12

## 2021-04-12 RX ORDER — LIDOCAINE 40 MG/G
CREAM TOPICAL
Status: DISCONTINUED | OUTPATIENT
Start: 2021-04-12 | End: 2021-04-12 | Stop reason: HOSPADM

## 2021-04-12 RX ORDER — HYDRALAZINE HYDROCHLORIDE 10 MG/1
10 TABLET, FILM COATED ORAL EVERY 6 HOURS PRN
Status: DISCONTINUED | OUTPATIENT
Start: 2021-04-12 | End: 2021-04-12

## 2021-04-12 RX ORDER — ONDANSETRON 4 MG/1
4 TABLET, ORALLY DISINTEGRATING ORAL EVERY 6 HOURS PRN
Status: DISCONTINUED | OUTPATIENT
Start: 2021-04-12 | End: 2021-04-14 | Stop reason: HOSPADM

## 2021-04-12 RX ORDER — OXYCODONE HYDROCHLORIDE 5 MG/1
10 TABLET ORAL EVERY 4 HOURS PRN
Status: DISCONTINUED | OUTPATIENT
Start: 2021-04-12 | End: 2021-04-12

## 2021-04-12 RX ORDER — ONDANSETRON 2 MG/ML
4 INJECTION INTRAMUSCULAR; INTRAVENOUS EVERY 30 MIN PRN
Status: DISCONTINUED | OUTPATIENT
Start: 2021-04-12 | End: 2021-04-12 | Stop reason: HOSPADM

## 2021-04-12 RX ORDER — HYDROMORPHONE HYDROCHLORIDE 1 MG/ML
0.4 INJECTION, SOLUTION INTRAMUSCULAR; INTRAVENOUS; SUBCUTANEOUS
Status: DISCONTINUED | OUTPATIENT
Start: 2021-04-12 | End: 2021-04-14 | Stop reason: HOSPADM

## 2021-04-12 RX ORDER — MYCOPHENOLATE MOFETIL 500 MG/1
500 TABLET ORAL
Status: DISCONTINUED | OUTPATIENT
Start: 2021-04-12 | End: 2021-04-14 | Stop reason: HOSPADM

## 2021-04-12 RX ORDER — HYDRALAZINE HYDROCHLORIDE 25 MG/1
25 TABLET, FILM COATED ORAL EVERY 6 HOURS PRN
Status: DISCONTINUED | OUTPATIENT
Start: 2021-04-12 | End: 2021-04-14 | Stop reason: HOSPADM

## 2021-04-12 RX ORDER — BISACODYL 10 MG
10 SUPPOSITORY, RECTAL RECTAL DAILY PRN
Status: DISCONTINUED | OUTPATIENT
Start: 2021-04-12 | End: 2021-04-14 | Stop reason: HOSPADM

## 2021-04-12 RX ORDER — CEFAZOLIN SODIUM 1 G/3ML
1 INJECTION, POWDER, FOR SOLUTION INTRAMUSCULAR; INTRAVENOUS ONCE
Status: COMPLETED | OUTPATIENT
Start: 2021-04-13 | End: 2021-04-13

## 2021-04-12 RX ORDER — SODIUM CHLORIDE 9 MG/ML
INJECTION, SOLUTION INTRAVENOUS CONTINUOUS
Status: DISCONTINUED | OUTPATIENT
Start: 2021-04-12 | End: 2021-04-12 | Stop reason: HOSPADM

## 2021-04-12 RX ORDER — FUROSEMIDE 20 MG
20 TABLET ORAL DAILY
Status: DISCONTINUED | OUTPATIENT
Start: 2021-04-13 | End: 2021-04-14 | Stop reason: HOSPADM

## 2021-04-12 RX ORDER — NALOXONE HYDROCHLORIDE 0.4 MG/ML
0.2 INJECTION, SOLUTION INTRAMUSCULAR; INTRAVENOUS; SUBCUTANEOUS
Status: DISCONTINUED | OUTPATIENT
Start: 2021-04-12 | End: 2021-04-12 | Stop reason: HOSPADM

## 2021-04-12 RX ORDER — HYDROMORPHONE HYDROCHLORIDE 1 MG/ML
.3-.5 INJECTION, SOLUTION INTRAMUSCULAR; INTRAVENOUS; SUBCUTANEOUS EVERY 5 MIN PRN
Status: DISCONTINUED | OUTPATIENT
Start: 2021-04-12 | End: 2021-04-12 | Stop reason: HOSPADM

## 2021-04-12 RX ORDER — PROCHLORPERAZINE MALEATE 5 MG
10 TABLET ORAL EVERY 6 HOURS PRN
Status: DISCONTINUED | OUTPATIENT
Start: 2021-04-12 | End: 2021-04-14 | Stop reason: HOSPADM

## 2021-04-12 RX ORDER — PROPOFOL 10 MG/ML
INJECTION, EMULSION INTRAVENOUS PRN
Status: DISCONTINUED | OUTPATIENT
Start: 2021-04-12 | End: 2021-04-12

## 2021-04-12 RX ORDER — SULFAMETHOXAZOLE AND TRIMETHOPRIM 400; 80 MG/1; MG/1
1 TABLET ORAL EVERY OTHER DAY
Status: DISCONTINUED | OUTPATIENT
Start: 2021-04-13 | End: 2021-04-14 | Stop reason: HOSPADM

## 2021-04-12 RX ORDER — FENTANYL CITRATE 50 UG/ML
INJECTION, SOLUTION INTRAMUSCULAR; INTRAVENOUS PRN
Status: DISCONTINUED | OUTPATIENT
Start: 2021-04-12 | End: 2021-04-12

## 2021-04-12 RX ORDER — TACROLIMUS 1 MG/1
1 CAPSULE ORAL EVERY MORNING
Status: DISCONTINUED | OUTPATIENT
Start: 2021-04-13 | End: 2021-04-14 | Stop reason: HOSPADM

## 2021-04-12 RX ORDER — ACETAMINOPHEN 325 MG/1
650 TABLET ORAL EVERY 4 HOURS PRN
Status: DISCONTINUED | OUTPATIENT
Start: 2021-04-15 | End: 2021-04-14 | Stop reason: HOSPADM

## 2021-04-12 RX ORDER — HYDROMORPHONE HYDROCHLORIDE 1 MG/ML
0.2 INJECTION, SOLUTION INTRAMUSCULAR; INTRAVENOUS; SUBCUTANEOUS
Status: DISCONTINUED | OUTPATIENT
Start: 2021-04-12 | End: 2021-04-14 | Stop reason: HOSPADM

## 2021-04-12 RX ORDER — MAGNESIUM HYDROXIDE 1200 MG/15ML
LIQUID ORAL PRN
Status: DISCONTINUED | OUTPATIENT
Start: 2021-04-12 | End: 2021-04-12 | Stop reason: HOSPADM

## 2021-04-12 RX ORDER — ACETAMINOPHEN 325 MG/1
975 TABLET ORAL EVERY 8 HOURS
Status: DISCONTINUED | OUTPATIENT
Start: 2021-04-12 | End: 2021-04-14 | Stop reason: HOSPADM

## 2021-04-12 RX ORDER — LABETALOL HYDROCHLORIDE 5 MG/ML
5 INJECTION, SOLUTION INTRAVENOUS
Status: DISCONTINUED | OUTPATIENT
Start: 2021-04-12 | End: 2021-04-12 | Stop reason: HOSPADM

## 2021-04-12 RX ORDER — SODIUM CHLORIDE, SODIUM LACTATE, POTASSIUM CHLORIDE, CALCIUM CHLORIDE 600; 310; 30; 20 MG/100ML; MG/100ML; MG/100ML; MG/100ML
INJECTION, SOLUTION INTRAVENOUS CONTINUOUS PRN
Status: DISCONTINUED | OUTPATIENT
Start: 2021-04-12 | End: 2021-04-12

## 2021-04-12 RX ORDER — FENTANYL CITRATE 50 UG/ML
25-50 INJECTION, SOLUTION INTRAMUSCULAR; INTRAVENOUS EVERY 5 MIN PRN
Status: DISCONTINUED | OUTPATIENT
Start: 2021-04-12 | End: 2021-04-12 | Stop reason: HOSPADM

## 2021-04-12 RX ORDER — NALOXONE HYDROCHLORIDE 0.4 MG/ML
0.2 INJECTION, SOLUTION INTRAMUSCULAR; INTRAVENOUS; SUBCUTANEOUS
Status: DISCONTINUED | OUTPATIENT
Start: 2021-04-12 | End: 2021-04-14 | Stop reason: HOSPADM

## 2021-04-12 RX ORDER — NALOXONE HYDROCHLORIDE 0.4 MG/ML
0.4 INJECTION, SOLUTION INTRAMUSCULAR; INTRAVENOUS; SUBCUTANEOUS
Status: DISCONTINUED | OUTPATIENT
Start: 2021-04-12 | End: 2021-04-12 | Stop reason: HOSPADM

## 2021-04-12 RX ORDER — CARVEDILOL 6.25 MG/1
12.5 TABLET ORAL EVERY MORNING
Status: DISCONTINUED | OUTPATIENT
Start: 2021-04-12 | End: 2021-04-14 | Stop reason: HOSPADM

## 2021-04-12 RX ORDER — SODIUM CHLORIDE, SODIUM LACTATE, POTASSIUM CHLORIDE, CALCIUM CHLORIDE 600; 310; 30; 20 MG/100ML; MG/100ML; MG/100ML; MG/100ML
INJECTION, SOLUTION INTRAVENOUS CONTINUOUS
Status: DISCONTINUED | OUTPATIENT
Start: 2021-04-12 | End: 2021-04-12

## 2021-04-12 RX ORDER — AMOXICILLIN 250 MG
1 CAPSULE ORAL 2 TIMES DAILY
Status: DISCONTINUED | OUTPATIENT
Start: 2021-04-12 | End: 2021-04-14 | Stop reason: HOSPADM

## 2021-04-12 RX ORDER — POLYETHYLENE GLYCOL 3350 17 G/17G
17 POWDER, FOR SOLUTION ORAL DAILY
Status: DISCONTINUED | OUTPATIENT
Start: 2021-04-13 | End: 2021-04-14 | Stop reason: HOSPADM

## 2021-04-12 RX ORDER — CARVEDILOL 6.25 MG/1
12.5 TABLET ORAL EVERY MORNING
Status: DISCONTINUED | OUTPATIENT
Start: 2021-04-13 | End: 2021-04-12

## 2021-04-12 RX ORDER — DOCUSATE SODIUM 100 MG/1
100 CAPSULE, LIQUID FILLED ORAL 2 TIMES DAILY
Status: DISCONTINUED | OUTPATIENT
Start: 2021-04-12 | End: 2021-04-14 | Stop reason: HOSPADM

## 2021-04-12 RX ORDER — CEFAZOLIN SODIUM 2 G/100ML
2 INJECTION, SOLUTION INTRAVENOUS
Status: COMPLETED | OUTPATIENT
Start: 2021-04-12 | End: 2021-04-12

## 2021-04-12 RX ORDER — OXYCODONE HYDROCHLORIDE 5 MG/1
5 TABLET ORAL EVERY 4 HOURS PRN
Status: DISCONTINUED | OUTPATIENT
Start: 2021-04-12 | End: 2021-04-12

## 2021-04-12 RX ORDER — ASPIRIN 81 MG/1
162 TABLET ORAL DAILY
Status: DISCONTINUED | OUTPATIENT
Start: 2021-04-13 | End: 2021-04-14 | Stop reason: HOSPADM

## 2021-04-12 RX ORDER — HYDROMORPHONE HYDROCHLORIDE 2 MG/1
2-4 TABLET ORAL EVERY 4 HOURS PRN
Status: DISCONTINUED | OUTPATIENT
Start: 2021-04-12 | End: 2021-04-14 | Stop reason: HOSPADM

## 2021-04-12 RX ORDER — SODIUM CHLORIDE, SODIUM LACTATE, POTASSIUM CHLORIDE, CALCIUM CHLORIDE 600; 310; 30; 20 MG/100ML; MG/100ML; MG/100ML; MG/100ML
INJECTION, SOLUTION INTRAVENOUS CONTINUOUS
Status: DISCONTINUED | OUTPATIENT
Start: 2021-04-12 | End: 2021-04-12 | Stop reason: HOSPADM

## 2021-04-12 RX ORDER — LIDOCAINE 40 MG/G
CREAM TOPICAL
Status: DISCONTINUED | OUTPATIENT
Start: 2021-04-12 | End: 2021-04-14 | Stop reason: HOSPADM

## 2021-04-12 RX ORDER — ACETAMINOPHEN 325 MG/1
975 TABLET ORAL ONCE
Status: COMPLETED | OUTPATIENT
Start: 2021-04-12 | End: 2021-04-12

## 2021-04-12 RX ORDER — PROPOFOL 10 MG/ML
INJECTION, EMULSION INTRAVENOUS CONTINUOUS PRN
Status: DISCONTINUED | OUTPATIENT
Start: 2021-04-12 | End: 2021-04-12

## 2021-04-12 RX ORDER — VITAMIN B COMPLEX
2000 TABLET ORAL DAILY
Status: DISCONTINUED | OUTPATIENT
Start: 2021-04-13 | End: 2021-04-14 | Stop reason: HOSPADM

## 2021-04-12 RX ORDER — ONDANSETRON 2 MG/ML
4 INJECTION INTRAMUSCULAR; INTRAVENOUS EVERY 6 HOURS PRN
Status: DISCONTINUED | OUTPATIENT
Start: 2021-04-12 | End: 2021-04-14 | Stop reason: HOSPADM

## 2021-04-12 RX ORDER — CEFAZOLIN SODIUM 1 G/3ML
1 INJECTION, POWDER, FOR SOLUTION INTRAMUSCULAR; INTRAVENOUS SEE ADMIN INSTRUCTIONS
Status: DISCONTINUED | OUTPATIENT
Start: 2021-04-12 | End: 2021-04-12 | Stop reason: HOSPADM

## 2021-04-12 RX ORDER — ONDANSETRON 4 MG/1
4 TABLET, ORALLY DISINTEGRATING ORAL EVERY 30 MIN PRN
Status: DISCONTINUED | OUTPATIENT
Start: 2021-04-12 | End: 2021-04-12 | Stop reason: HOSPADM

## 2021-04-12 RX ORDER — ONDANSETRON 2 MG/ML
INJECTION INTRAMUSCULAR; INTRAVENOUS PRN
Status: DISCONTINUED | OUTPATIENT
Start: 2021-04-12 | End: 2021-04-12

## 2021-04-12 RX ADMIN — TACROLIMUS 1.5 MG: 1 CAPSULE ORAL at 18:46

## 2021-04-12 RX ADMIN — MIDAZOLAM 1 MG: 1 INJECTION INTRAMUSCULAR; INTRAVENOUS at 10:16

## 2021-04-12 RX ADMIN — CARVEDILOL 12.5 MG: 6.25 TABLET, FILM COATED ORAL at 18:46

## 2021-04-12 RX ADMIN — SODIUM CHLORIDE, POTASSIUM CHLORIDE, SODIUM LACTATE AND CALCIUM CHLORIDE: 600; 310; 30; 20 INJECTION, SOLUTION INTRAVENOUS at 10:16

## 2021-04-12 RX ADMIN — LABETALOL HYDROCHLORIDE 5 MG: 5 INJECTION, SOLUTION INTRAVENOUS at 13:03

## 2021-04-12 RX ADMIN — ROCURONIUM BROMIDE 15 MG: 10 INJECTION INTRAVENOUS at 11:17

## 2021-04-12 RX ADMIN — ACETAMINOPHEN 975 MG: 325 TABLET, FILM COATED ORAL at 08:41

## 2021-04-12 RX ADMIN — HYDROMORPHONE HYDROCHLORIDE 0.5 MG: 1 INJECTION, SOLUTION INTRAMUSCULAR; INTRAVENOUS; SUBCUTANEOUS at 14:16

## 2021-04-12 RX ADMIN — PROPOFOL 120 MG: 10 INJECTION, EMULSION INTRAVENOUS at 10:27

## 2021-04-12 RX ADMIN — PROPOFOL 50 MCG/KG/MIN: 10 INJECTION, EMULSION INTRAVENOUS at 10:40

## 2021-04-12 RX ADMIN — FENTANYL CITRATE 50 MCG: 50 INJECTION, SOLUTION INTRAMUSCULAR; INTRAVENOUS at 10:27

## 2021-04-12 RX ADMIN — FENTANYL CITRATE 50 MCG: 50 INJECTION INTRAMUSCULAR; INTRAVENOUS at 13:32

## 2021-04-12 RX ADMIN — SUGAMMADEX 200 MG: 100 INJECTION, SOLUTION INTRAVENOUS at 12:21

## 2021-04-12 RX ADMIN — ONDANSETRON 4 MG: 2 INJECTION INTRAMUSCULAR; INTRAVENOUS at 12:04

## 2021-04-12 RX ADMIN — LIDOCAINE HYDROCHLORIDE 80 MG: 20 INJECTION, SOLUTION INFILTRATION; PERINEURAL at 10:27

## 2021-04-12 RX ADMIN — ROCURONIUM BROMIDE 35 MG: 10 INJECTION INTRAVENOUS at 10:27

## 2021-04-12 RX ADMIN — OXYCODONE HYDROCHLORIDE 5 MG: 5 TABLET ORAL at 15:23

## 2021-04-12 RX ADMIN — FENTANYL CITRATE 25 MCG: 50 INJECTION INTRAMUSCULAR; INTRAVENOUS at 13:08

## 2021-04-12 RX ADMIN — FENTANYL CITRATE 25 MCG: 50 INJECTION INTRAMUSCULAR; INTRAVENOUS at 13:19

## 2021-04-12 RX ADMIN — CEFAZOLIN 2 G: 10 INJECTION, POWDER, FOR SOLUTION INTRAVENOUS at 10:45

## 2021-04-12 RX ADMIN — LABETALOL HYDROCHLORIDE 5 MG: 5 INJECTION, SOLUTION INTRAVENOUS at 13:55

## 2021-04-12 RX ADMIN — HYDROMORPHONE HYDROCHLORIDE 0.5 MG: 1 INJECTION, SOLUTION INTRAMUSCULAR; INTRAVENOUS; SUBCUTANEOUS at 12:01

## 2021-04-12 RX ADMIN — HYDRALAZINE HYDROCHLORIDE 25 MG: 25 TABLET ORAL at 19:12

## 2021-04-12 RX ADMIN — OXYCODONE HYDROCHLORIDE 10 MG: 5 TABLET ORAL at 19:15

## 2021-04-12 RX ADMIN — FENTANYL CITRATE 50 MCG: 50 INJECTION, SOLUTION INTRAMUSCULAR; INTRAVENOUS at 11:02

## 2021-04-12 RX ADMIN — MYCOPHENOLATE MOFETIL 500 MG: 500 TABLET, FILM COATED ORAL at 18:46

## 2021-04-12 RX ADMIN — MIDAZOLAM 1 MG: 1 INJECTION INTRAMUSCULAR; INTRAVENOUS at 10:25

## 2021-04-12 RX ADMIN — ACETAMINOPHEN 975 MG: 325 TABLET, FILM COATED ORAL at 15:23

## 2021-04-12 RX ADMIN — HYDROMORPHONE HYDROCHLORIDE 0.2 MG: 1 INJECTION, SOLUTION INTRAMUSCULAR; INTRAVENOUS; SUBCUTANEOUS at 22:01

## 2021-04-12 ASSESSMENT — ENCOUNTER SYMPTOMS
ROS SKIN COMMENTS: NO ACUTE ISSUES
APNEA: 0

## 2021-04-12 ASSESSMENT — MIFFLIN-ST. JEOR: SCORE: 1521.5

## 2021-04-12 NOTE — BRIEF OP NOTE
Orthopaedic Surgery Brief Op-Note      Patient: Rashad Ortiz  : 1975  Date of Service: 2021 12:44 PM    Pre-operative Diagnosis: Avascular necrosis of femoral head, right (H) [M87.051]  Post-operative Diagnosis: same    Procedure(s) Performed: Procedure(s):  Right ARTHROPLASTY, HIP, TOTAL    Staff: Dr. Morillo  Assistants:   MD Fernando Fitzpatrick PAJaredC    Anesthesia: General  EBL: 200 cc    Implants:   Implant Name Type Inv. Item Serial No.  Lot No. LRB No. Used Action   52MM OD  THREE HOLE HEMISPHERICAL     [employer] [id number] Right 1 Implanted   R3 XLPE 0DEG ACETABULAR LINER     [employer] [id number] Right 1 Implanted   40MM REFLECTION 6.5 MM AIMEE SPHERICAL HEAD CANCELLOUS SCREW    [employer] [id number] Right 1 Implanted   IMP SCR ACET SNN SPHERICAL HEAD 6.5X20MM [id number] Metallic Hardware/[place] IMP SCR ACET SNN SPHERICAL HEAD 6.5X20MM [id number]  [employer]  [id number] Right 1 Implanted   IMP STEM FEM HIP SNN SYNERGY POROUS SZ 12 HO [id number] Total Joint Component/Insert IMP STEM FEM HIP SNN SYNERGY POROUS SZ 12 HO [id number]  [employer]  [id number] Right 1 Implanted   IMP HEAD FEMORAL SNN BIPOLAR [place] 36MM +4 [id number] Total Joint Component/Insert IMP HEAD FEMORAL SNN BIPOLAR [place] 36MM +4 [id number]  [employer]  [id number] Right 1 Implanted     Drains: none  Intra-op Labs/Cxs/Specimens: None  Complications: No apparent complications during procedure  Findings: Please see dictated operative note for details    Disposition: Stable to PACU, then admit to Orthopaedics    POST OPERATIVE PLAN    Assessment/Plan: Rashad Ortiz is a 45 year old male s/p Procedure(s):  Right ARTHROPLASTY, HIP, TOTAL on 2021 with Dr. Morillo.    Activity: Up with assist and assistive devices as needed until independent. Posterior hip precautions to operative side x 3 months:  1) No hip flexion beyond 90 degrees  2) No adduction beyond midline  3) No internal rotation beyond neutral   Weight  bearing status: WBAT  Antibiotics: Cefazolin x 24 hours  Diet: Begin with clear fluids and progress diet as tolerated. Bowel regimen. Anti-emetics PRN.    DVT prophylaxis: Mechanical while in hospital with aspirin 162mg daily x 4 weeks  Elevation: Elevate heels off of bed on pillows   Wound Care: Tegaderm alginate x 7 days.    Drains: none  Pain management: Orals PRN, IV for breakthrough only  X-rays: AP Pelvis and Lateral operative hip in PACU.   Physical Therapy: Mobilization, ROM, ADL's, Posterior hip precautions.    Occupational Therapy: ADL's  Labs: Trend Hgb on POD #1, 2  Consults: PT, OT. Hospitalist, appreciate assistance in caring for this patient throughout the perioperative period; consideration of nephrology consult for dialysis management - will communicate with marilia HALL and hospitalist team    Future Appointments   Date Time Provider Department Center   4/12/2021  4:00 PM Brianna Nelson, PT [facility] [place]   4/13/2021  7:30 AM Aixa Christine, [facility] [place]   4/13/2021  8:45 AM Donita Dumont, PT [facility] [place]   4/13/2021  1:00 PM Donita Dumont, PT [facility] [place]   4/30/2021 12:20 PM Fernando Viveros PA-C [facility]   5/27/2021  2:00 PM Fernando Morillo MD [facility]   6/23/2021  1:00 PM Reyes Cade MD [facility]       Disposition: Pending progress with therapies, pain control on orals, and medical stability, anticipate discharge to Home on POD #2-3.    I assisted with positioning, prepping and draping, and closure.      Fernando Viveros PA-C 4/12/2021 12:44 PM  Orthopaedic Surgery    Please page me at [phone] with any questions/concerns during regular weekday hours before 5pm. If there is no response, if it is a weekend, or if it is during evening hours then please page the orthopaedic surgery resident on call.

## 2021-04-12 NOTE — PLAN OF CARE
VS: VSS, except some high BP noted, scheduled and PRN medication given to help with BP, MD aware, pt denied CP or SOB.   O2: Room air sat > 90%.   Output: Voiding adequate amount using urinal.   Last BM: 04/12/21   Activity: Not out of bed yet, pt declined at this time.   Skin: Intact except surgical incision, full skin assessment not done pt declined at this time, did not went to move.    Pain: Comfortably manageable with PRN medication.    CMS: CMS and neuro intact to baseline.    Dressing: CDI.    Diet: Regular tolerating okay.    LDA: PIV infusing.   Equipment: IV pole, CAPNO, PCD and personal belongings.   Plan: TBD.   Additional Info: Pt arrived on 5 ortho about 03 pm via bed, pt oriented to room and call light, pt makes need known, call light with in reach will continue to monitor.

## 2021-04-12 NOTE — PLAN OF CARE
[facility]      OUTPATIENT PHYSICAL THERAPY EVALUATION  PLAN OF TREATMENT FOR OUTPATIENT REHABILITATION  (COMPLETE FOR INITIAL CLAIMS ONLY)  Patient's Last Name, First Name, M.I.  YOB: 1975  Rashad Ortiz                        Provider's Name  [facility] Medical Record No.  [id number]                               Onset Date:  04/12/21   Start of Care Date:  04/12/21      Type:     _X_PT   ___OT   ___SLP Medical Diagnosis:  R FRANCA                        PT Diagnosis:  impaired functional mobility   Visits from SOC:  1   _________________________________________________________________________________  Plan of Treatment/Functional Goals    Planned Interventions: balance training, bed mobility training, gait training, home exercise program, neuromuscular re-education, patient/family education, stair training, strengthening, transfer training     Goals: See Physical Therapy Goals on Care Plan in [employer] electronic health record.    Therapy Frequency: 2x/day  Predicted Duration of Therapy Intervention: 2-3 days  _________________________________________________________________________________    I CERTIFY THE NEED FOR THESE SERVICES FURNISHED UNDER        THIS PLAN OF TREATMENT AND WHILE UNDER MY CARE     (Physician co-signature of this document indicates review and certification of the therapy plan).              Certification date from: 04/12/21, Certification date to: 04/16/21    Referring Physician: Fernando Viveros PA-C            Initial Assessment        See Physical Therapy evaluation dated 04/12/21 in Epic electronic health record.

## 2021-04-12 NOTE — CONSULTS
[facility]   [facility]  Madina Ayala PA-C    Rashad Ortiz MRN# [id number]   Age: 45 year old YOB: 1975   Date of Admission: 4/12/2021     Reason for consult: Medical co-management       Requesting physician Dr. Morillo      ASSESSMENT:   1. Status post R total hip replacement  2. ESRD on HD (T, Th, S)  3. 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     RECOMMENDATIONS:   - Continue Lasix 20 mg daily for BP, hold coreg for now as patient states he is not taking, however could consider resuming if BP remains elevated  - Hydralazine available prn for SBP >170 and/or DBP >110  - Continue immunosuppression:   Tacrolimus 1 mg qam, 1.5 mg qpm   Mycophenolate 500 mg BID   Bactrim 1 tab every other day  - Nephrology consult to manage HD  - Routine postoperative labs are obtained   - The patient maintained on LR @ 75 mL/hr  - Pain management: Per primary team  - Anticoagulation: Mechanical, ASA 162mg x 4 weeks      Thank you for having me see this patient. We will continue to follow with you for these medical issues.     DISCUSSION:   Rashad Ortiz is a very pleasant 45 year old male who underwent a R total hip replacemen today by Dr. Morillo for treatment of avascular necrosis of femoral head. I have been asked to see him by Dr. Morillo for assistance in medical management.   Please see Jesus's notes and the charting for details regarding the patient's orthopedic history and the indications for surgery. The events of surgery are noted. The patient did receive general anesthesia. Estimated blood loss was 200 mL intraoperatively. Blood pressures stable throughout.  The patient is seen by me shortly after admission to the recovery room. Blood pressure has been mildly elevated in the recovery room.     PAST MEDICAL HISTORY:   Reviewed and is remarkable for:   1. HTN c/b ESRD s/p LDKT (2011) failed and started on HD 9/1/2020  2. HTN  3. Anemia    PAST SURGICAL HISTORY:    Past Surgical History:   Procedure Laterality Date     ARTHROPLASTY HIP Left 9/9/2020    Procedure: Left total hip arthroplasty;  Surgeon: Fernando Morillo MD;  [facility]     AV FISTULA OR GRAFT ARTERIAL       COLONOSCOPY N/A 4/7/2021    Procedure: COLONOSCOPY, WITH POLYPECTOMY AND BIOPSY;  Surgeon: Zak Ortega MD;  [facility]     CREATE FISTULA ARTERIOVENOUS UPPER EXTREMITY Right 7/31/2019    Procedure: Creation Of Atriovenous Fistula Right Upper Arm;  Surgeon: Julia Irwin MD;  [facility]     IR CVC TUNNEL PLACEMENT > 5 YRS OF AGE  10/9/2020     IR DIALYSIS FISTULOGRAM RIGHT  10/9/2020     IR DIALYSIS FISTULOGRAM RIGHT  2/19/2021     IR DIALYSIS MECH THROMB, PTA  10/9/2020     IR DIALYSIS STENT W OR W/OUT PTA  2/19/2021     LIGATE FISTULA ARTERIOVENOUS UPPER EXTREMITY  12/20/2011    Procedure:LIGATE FISTULA ARTERIOVENOUS UPPER EXTREMITY; Excision of Right Forearm Arteriovenous Fistula.; Surgeon:LINDY AMAYA; [facility]     PERCUTANEOUS BIOPSY KIDNEY Right 2/28/2017    Procedure: PERCUTANEOUS BIOPSY KIDNEY;  Surgeon: Gee Barrios MD;  [facility]     TRANSPLANT  01/13/2011    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 Lasix 20 mg daily  2. Mycophenolate 500 mg BID  3. Oxycodone 5 mg BID prn for severe pain  4. Bactrim 1 tablet every other day  5. Tacrolimus 1 mg qam, 1.5 mg qpm    ALLERGIES:    No Known Allergies    SOCIAL HISTORY:   Social History     Socioeconomic History     Marital status:      Spouse name: Not on file     Number of children: Not on file     Years of education: Not on file     Highest education level: Not on file   Occupational History     Not on file   Social Needs     Financial resource strain: Not on file     Food insecurity     Worry: Not on file     Inability: Not on file     Transportation needs     Medical: Not on file     Non-medical: Not on file   Tobacco Use     Smoking status: Former  "Smoker     Types: Cigarettes     Quit date: 2017     Years since quittin.1     Smokeless tobacco: Never Used     Tobacco comment: Patient states that he is an 'social'  smoker. 3 cigarettes per week per pt   Substance and Sexual Activity     Alcohol use: Not Currently     Alcohol/week: 4.2 standard drinks     Types: 5 Standard drinks or equivalent per week     Comment: Quit 2020     Drug use: Not Currently     Types: Marijuana     Sexual activity: Never     Partners: Female     Birth control/protection: None   Lifestyle     Physical activity     Days per week: Not on file     Minutes per session: Not on file     Stress: Not on file   Relationships     Social connections     Talks on phone: Not on file     Gets together: Not on file     Attends Restorationist service: Not on file     Active member of club or organization: Not on file     Attends meetings of clubs or organizations: Not on file     Relationship status: Not on file     Intimate partner violence     Fear of current or ex partner: Not on file     Emotionally abused: Not on file     Physically abused: Not on file     Forced sexual activity: Not on file   Other Topics Concern     Parent/sibling w/ CABG, MI or angioplasty before 65F 55M? Not Asked   Social History Narrative    Rashad lives with his wife and 2 children. There are 2 declawed cats. He works at a computer-based job in customer service.        REVIEW OF SYSTEMS:   Ten point review of systems negative except as stated above in History of Present Illness.    PHYSICAL EXAMINATION:   Vitals were reviewed  Blood pressure (!) 168/98, pulse 73, temperature 96.8  F (36  C), temperature source Oral, resp. rate 28, height 1.727 m (5' 8\"), weight 65.7 kg (144 lb 13.5 oz), SpO2 100 %.  General:alert, NAD  HEENT: NCAT, anicteric sclera, EOMI, moist mucous membranes  Neck: supple, no lymphadenopathy or thyromegaly  Cardiovascular: RRR, S1S2, no murmurs appreciated  Lungs:CTAB, no wheezing or " crackles  Abdomen: soft, nontender, nondistended with normoactive bowel sounds  Extremities: no edema, distal pulses palpable  Neurologic: grossly nonfocal   Orthopedic exam is per Dr. Morillo.     LABORATORY DATA: Preoperative labs are reviewed from 4/9/21.    Madina Ayala PA-C  [facility]  Pager: [phone]

## 2021-04-12 NOTE — PROGRESS NOTES
"   04/12/21 1600   Quick Adds   Type of Visit Initial PT Evaluation   Living Environment   People in home spouse;child(latosha), dependent   Current Living Arrangements apartment   Home Accessibility stairs to enter home   Number of Stairs, Main Entrance 6   Stair Railings, Main Entrance railing on left side (ascending)   Transportation Anticipated family or friend will provide   Living Environment Comments Pt lives in apt w/ spouse 2 young children, 6 GABBI, 1 rail, tub shower.   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 Owns crutches and shower chair from L FRANCA surgery in 9/2020. Spouse will be available some of the time to help, however works part time.   Disability/Function   Hearing Difficulty or Deaf no   Wear Glasses or Blind no   Concentrating, Remembering or Making Decisions Difficulty no   Difficulty Communicating no   Walking or Climbing Stairs Difficulty no   Dressing/Bathing Difficulty no   Toileting issues no   Doing Errands Independently Difficulty (such as shopping) no   Fall history within last six months no   Change in Functional Status Since Onset of Current Illness/Injury yes   General Information   Onset of Illness/Injury or Date of Surgery 04/12/21   Referring Physician Fernando Viveros PA-C   Patient/Family Therapy Goals Statement (PT) \"goals will probably change once this pain improves\"   Pertinent History of Current Problem (include personal factors and/or comorbidities that impact the POC) POD0 R FRANCA posterior approach 2/2 AVN. PMH of ESRD.   Existing Precautions/Restrictions 90 degree hip flexion;no hip ADD past midline;no hip IR   Weight-Bearing Status - RLE weight-bearing as tolerated   General " Observations Activity: up with assist   Cognition   Cognitive Status Comments Pt oriented, appropriately answers questions and follows commands, however remains w/ low arousal post-op   Pain Assessment   Patient Currently in Pain Yes, see Vital Sign flowsheet   Integumentary/Edema   Integumentary/Edema Comments Baseline L LE edema   Posture    Posture Comments not assessed as pt remained supine   Range of Motion (ROM)   ROM Comment not fully assess 2/2 to pain, L LE WFL   Strength   Strength Comments NT, due to pain   Bed Mobility   Comment (Bed Mobility) NT, pt declining 2/2 pain and fatigue   Transfers   Transfer Safety Comments NT, pt declining 2/2 pain and fatigue   Gait/Stairs (Locomotion)   Comment (Gait/Stairs) NT, pt declining 2/2 pain and fatigue   Sensory Examination   Sensory Perception Comments Pt reports no post-op changes, R LE LT intact   Clinical Impression   Criteria for Skilled Therapeutic Intervention yes, treatment indicated   PT Diagnosis (PT) impaired functional mobility   Influenced by the following impairments pain, post R FRANCA precautions   Functional limitations due to impairments bed mobility, transfers, gait, stairs   Clinical Presentation Stable/Uncomplicated   Clinical Presentation Rationale PMH, clinician impression   Clinical Decision Making (Complexity) low complexity   Therapy Frequency (PT) 2x/day   Predicted Duration of Therapy Intervention (days/wks) 2-3 days   Planned Therapy Interventions (PT) balance training;bed mobility training;gait training;home exercise program;neuromuscular re-education;patient/family education;stair training;strengthening;transfer training   Risk & Benefits of therapy have been explained evaluation/treatment results reviewed;care plan/treatment goals reviewed;risks/benefits reviewed;current/potential barriers reviewed;participants voiced agreement with care plan;participants included;patient   Clinical Impression Comments Pt presents POD0 R FRANCA with  significant fatigue and pain limiting current mobility, however based on PLOF and familiarity w/ surgery as pt had L FRANCA 9/2020, pt likely to be safe to dc home w/ assist for higher level activities. Pt may benefit from OP PT for further stregthening pending progress.   PT Discharge Planning    PT Discharge Recommendation (DC Rec) home with assist;home with outpatient physical therapy   PT Rationale for DC Rec Pt presents POD0 R FRANCA with significant fatigue and pain limiting current mobility, however based on PLOF and familiarity w/ surgery as pt had L FRANCA 9/2020, pt likely to be safe to dc home w/ assist for higher level activities. Pt may benefit from OP PT for further stregthening pending progress.   PT Brief overview of current status  POD0 mobility limited to in bed 2/2 pain and fatigue   Therapy Certification   Start of care date 04/12/21   Certification date from 04/12/21   Certification date to 04/16/21   Medical Diagnosis R FRANCA   Total Evaluation Time   Total Evaluation Time (Minutes) 5

## 2021-04-12 NOTE — OP NOTE
OPERATIVE REPORT    DATE OF SERVICE: 4/12/21    SURGEON: Fernando Morillo MD.    ASSISTANT(S):  Fernando VENEGAS and Rai Cortes MD    PREOPERATIVE DIAGNOSIS:  Osteoarthritis    POSTOPERATIVE DIAGNOSIS:  Osteoarthritis    OPERATION PERFORMED:  Right total hip arthroplasty    IMPLANTS:    Implant Name Type Inv. Item Serial No.  Lot No. LRB No. Used Action   52MM OD  THREE HOLE HEMISPHERICAL     [employer] NEPH [id number] Right 1 Implanted   R3 XLPE 0DEG ACETABULAR LINER     [employer]W [id number] Right 1 Implanted   40MM REFLECTION 6.5 MM AIMEE SPHERICAL HEAD CANCELLOUS SCREW    [employer] NEPH [id number] Right 1 Implanted   IMP SCR ACET SNN SPHERICAL HEAD 6.5X20MM [id number] Metallic Hardware/[place] IMP SCR ACET SNN SPHERICAL HEAD 6.5X20MM [id number]  [place]  [id number] Right 1 Implanted   IMP STEM FEM HIP SNN SYNERGY POROUS SZ 12 HO [id number] Total Joint Component/Insert IMP STEM FEM HIP SNN SYNERGY POROUS SZ 12 HO [id number]  [place]  [id number] Right 1 Implanted   IMP HEAD FEMORAL SNN BIPOLAR [place] 36MM +4 [id number] Total Joint Component/Insert IMP HEAD FEMORAL SNN BIPOLAR [place] 36MM +[id number]  [place]  [id number] Right 1 Implanted         ANESTHETIC: General     OPERATIVE FINDINGS:  End stage arthrosis of the hip    BLOOD LOSS: about 200 ml     COMPLICATIONS:  None apparent    OPERATIVE INDICATIONS:  The patient has a long history of debilitating pain secondary to ostearthritis of the hip.  Despite comprehensive non-operative management these symptoms continued to interfere with activities of daily living.  After discussion of further treatment options including the risks and benefits that patient elected to proceed with a total hip.    DESCRIPTION OF THE PROCEDURE:  The patient was identified in the preoperative holding area.  The consent form including the risks and benefits were reviewed with the patient.  The operative limb was identified and marked.  The patient was brought back to the operating room  and placed supine on the operating table.  An anesthetic was induced by the anesthesia team.   The patient was placed in the lateral decubitus position and prepped and draped in the normal standard fashion for a hip replacement.  A time-out was called.  Antibiotics were given.  We utilized an approximately 15 cm curvilinear incision, centered on the vastus ridge, and performed a standard posterior approach to the hip.  The tensor fascia was split.  A small portion of gluteus damir was split in line with its fibers.  The sciatic nerve was palpated.  The east-west retractor was placed.  The posterior border of gluteus medius was exposed and retracted.  The tendon of piriformis and that of the obturators was released from their attachments.  A trapdoor posterior capsulotomy was performed.  The hip was dislocated.  The lesser trochanter was exposed.  A ruler was used to measure and electrocautery was used to romina our neck cut as preoperatively templated.  The head was measured with a caliper and found to be 49.  This measurement was used to choose our first reamer.  The neck cut was re-measured. The femur was elevated.  A Hohmann was placed over the anterior rim of the acetabulum and the femur was subluxed anterior.  A split was made in the inferior capsule.  The transverse acetabular ligament was left intact and used a guide for the anterversion of the acetabular component.  Circumferential retractors were placed.  We began reaming and went up by two until sufficient contact was made with the acetabular rim.  We then went up by one millimeter for a one millimeter press-fit.  We were within one size of our preoperative plan.   A trail was placed.  It had an excellent press fit.  We then placed out final component in 40 degrees of inclination and approximately 20 degrees of anteversion, parallel to the transverse ligament.  The press fit was excellent.  Screws were placed for additional initial fixation.  A flat liner  "was then placed. It locked into place.  Attention was turned to the femur.  Retractors were placed to elevated the proximal femur and to protect the tendon of gluteus medius.  Remaining lateral neck was removed and the piriformis fossa was cleared of soft-tissue.  A box osteotome and canal finder were used to prepare for broaching.  A sharp broach was used to lateralize slightly.  We then broached up sequentially to a size 12.  It was rotationally stable and sat up 1-2 millimeters from the neck-cut.  Preoperatively the patient had templated to a high offset stem.  The high offset stem appropriately tensioned the abductors.  We trialed the following fmoeral heads: 0 and +4.  The +4 most appropriately tensioned the abductors and clinically equalized the leg-lengths.  The stability exam was excellent.  The hip was stable and there was no impingement posteriorly with hyper-extension and maximal external rotation.  With full extension, the knee could be fixed to bring the foot nearly to the buttock.  With the hip in ninety degrees of flexion and neutral rotation there was greater than 60 degrees of internal rotation before subluxation.  There appropriate movement with a \"sally\" test.  Happy with our stability exam, the final implant was placed in approximately twenty degrees of anteversion.  It sat within 1 mm of the broach.  We then trailed with a +4 head.  The stability exam was identical.  We then placed the final head on a clean, dry neck and impacted it into place. The hip was reduced after directly visualizing the entire acetabulum.  The wound was then irrigated.  The posterior capsule and short external rotators were sutured to the greater trochanter with non-absorbable suture through bone tunnels.The fascia was closed with interrupted Vicryl, the dermis with interrupted Vicryl, and skin with running monocryl, Dermabond and steri-strips.  At the end of the procedure the sponge and needle counts were correct times " two.  The patient tolerated the procedure well and returned to the PAR extubated and stable.    POSTOPERATIVE PLAN:  1. Weight bearing as tolerated  2. Standard posterior hip precautions  3. DVT prophylaxis   4. 24 hours of prophylactic antibiotics  5. Follow-up:  Wound clinic in 2 weeks and with Ilia in clinic in 6 weeks for x-rays and a rehabilitation check.

## 2021-04-12 NOTE — OR NURSING
PACU to Inpatient Nursing Handoff    Patient Rashad Ortiz is a 45 year old male who speaks English.   Procedure Procedure(s):  Right ARTHROPLASTY, HIP, TOTAL   Surgeon(s) Primary: Fernando Morillo MD  Assisting: Fernando Viveros PA-C  Fellow - Assisting: Rai Cortes MD     No Known Allergies    Isolation  [unfilled]     Past Medical History   has a past medical history of AVN (avascular necrosis of bone) (H), AVN (avascular necrosis of bone) (H), BPH (benign prostatic hyperplasia), Chronic kidney disease, stage 4, severely decreased GFR (H), Gastro-oesophageal reflux disease, Gout, History of blood transfusion, Hypertension, Medical non-compliance, Osteonecrosis (H) (7/29/2020), Pulmonary nodules, Steroid long-term use, and Vitamin D deficiency.    Anesthesia Choice   Dermatome Level     Preop Meds Tylenol 975 at 841 am    Nerve block Not applicable   Intraop Meds midazolam 1mg/mL (mg) 1 mg Given Intravenous 04/12/21 1016  Sarah Nye APRN CRNA    Total dose as of 04/12/21 1249 1 mg Given Intravenous 1025  Sarah Nye APRN CRNA    2 mg           fentaNYL (SUBLIMAZE) injection (mcg) 50 mcg Given Intravenous 04/12/21 1027  Sarah Nye APRN CRNA    Total dose as of 04/12/21 1249 50 mcg Given Intravenous 1102  Sarah Nye APRN CRNA    100 mcg           lidocaine 2% (mg) 80 mg Given Intravenous 04/12/21 1027  Sarah Nye APRN CRNA    Total dose as of 04/12/21 1249           80 mg           propofol (DIPRIVAN) injection 10 mg/mL vial (mg) 120 mg Given Intravenous 04/12/21 1027  Sarah Nye APRN CRNA    Total dose as of 04/12/21 1249           120 mg           rocuronium 10mg/mL (mg) 35 mg Given Intravenous 04/12/21 1027  Sarah Nye APRN CRNA    Total dose as of 04/12/21 1249 15 mg Given Intravenous 1117  Sarah Nye APRN CRNA    50 mg           ondansetron 2mg/mL (mg) 4 mg Given Intravenous 04/12/21 1204   Sarah Nye APRN CRNA    Total dose as of 04/12/21 1249           4 mg           HYDROmorphone (mg) 0.5 mg Given Intravenous 04/12/21 1201  Sarah Nye APRN CRNA    Total dose as of 04/12/21 1249           0.5 mg           sugammadex (BRIDION) 200mg/2ml (mg) 200 mg Given Intravenous 04/12/21 1221  Sarah Nye APRN CRNA    Total dose as of 04/12/21 1249           200 mg           ceFAZolin (ANCEF) intermittent infusion 2 g in 100 mL dextrose PRE-MIX (g) 2 g Given Intravenous 04/12/21 1045  Sarah Nye APRN CRNA    Dosing weight:  65.7 kg           Total dose as of 04/12/21 1249           2 g           propofol infusion (mcg/kg/min) (mcg/kg/min) 50 mcg/kg/min New Bag Intravenous 04/12/21 1040  Sarah Nye APRN CRNA    Dosing weight:  65.7 kg  Stopped Intravenous 1215           Local Meds Yes   Antibiotics Ancef 2g @1045       Pain Patient Currently in Pain: yes   PACU meds  fentanyl (Sublimaze): 100 mcg (total dose) last given at 1200   hydromorphone (Dilaudid): .5 mg (total dose) last given at 1420    PCA / epidural No   Capnography Respiratory Monitoring (EtCO2): 35 mmHg  Integrated Pulmonary Index (IPI): 10   Telemetry ECG Rhythm: Sinus rhythm   Inpatient Telemetry Monitor Ordered? No        Labs Glucose Lab Results   Component Value Date    GLC 94 04/09/2021       Hgb Lab Results   Component Value Date    HGB 10.9 04/09/2021       INR Lab Results   Component Value Date    INR 1.18 02/19/2021      PACU Imaging Completed     Wound/Incision Incision/Surgical Site 04/12/21 Right;Lateral;Upper Thigh (Active)   Closure Sutures;Liquid bandage;Adhesive strip(s) 04/12/21 1155   Dressing Intervention Clean, dry, intact 04/12/21 1236   Number of days: 0       Incision/Surgical Site 04/12/21 Right Hip (Active)   Number of days: 0      CMS        Equipment ice pack   Other LDA       IV Access Peripheral IV 04/12/21 Anterior;Left Upper forearm (Active)   Site  Assessment [facility] 04/12/21 1236   Line Status Infusing 04/12/21 1236   Phlebitis Scale 0-->no symptoms 04/12/21 1236   Infiltration Scale 0 04/12/21 1236   Number of days: 0       Hemodialysis Vascular Access Arteriovenous fistula Right Arm (Active)   Site Assessment [facility] 04/12/21 0834   Cannulation Needle Size 16 09/10/20 1756   Dressing Intervention New dressing 09/10/20 1756   Dressing Status Clean, dry, intact 10/09/20 1400   Hand Off Report Yes 09/10/20 1756   Number of days: 467      Blood Products Not applicable EBL 50 mL   Intake/Output Date 04/12/21 0700 - 04/13/21 0659   Shift 3844-8615 2173-8673 4523-1849 24 Hour Total   INTAKE   I.V. 500   500   Shift Total(mL/kg) 500(7.61)   500(7.61)   OUTPUT   Shift Total(mL/kg)       Weight (kg) 65.7 65.7 65.7 65.7      Drains / Hunter     Time of void PreOp Void Prior to Procedure: 0800 (04/12/21 0834)    PostOp      Diapered? No   Bladder Scan     PO    ice chips     Vitals    B/P: (!) 174/106  T: 98.2  F (36.8  C)    Temp src: Temporal  P:  Pulse: 71 (04/12/21 1315)          R: 26  O2:  SpO2: 100 %             Family/support present none   Patient belongings     Patient transported on bed   DC meds/scripts (obs/outpt) no   Inpatient Pain Meds Released? Yes       Special needs/considerations None   Tasks needing completion None       Tg Bucio RN  ASCOM [id number]

## 2021-04-12 NOTE — ANESTHESIA POSTPROCEDURE EVALUATION
Patient: Rashad Ortiz    Procedure(s):  Right ARTHROPLASTY, HIP, TOTAL    Diagnosis:Avascular necrosis of femoral head, right (H) [M87.051]  Diagnosis Additional Information: No value filed.    Anesthesia Type:  General    Note:  Disposition: Admission   Postop Pain Control: Uneventful            Sign Out: Well controlled pain   PONV: No   Neuro/Psych: Uneventful            Sign Out: Acceptable/Baseline neuro status   Airway/Respiratory: Uneventful            Sign Out: Acceptable/Baseline resp. status   CV/Hemodynamics: Uneventful            Sign Out: Acceptable CV status   Other NRE: NONE   DID A NON-ROUTINE EVENT OCCUR? No    Event details/Postop Comments:  Awakening satisfactorily; strong; breathing well; oriented; comfortable; getting meds to control his BP; he will soon resume his oral meds; no other complaints or complications;          Last vitals:  Vitals:    04/12/21 1310 04/12/21 1315 04/12/21 1330   BP: (!) 182/109 (!) 174/106    Pulse: 67 71    Resp: (!) 33 26    Temp:   36.8  C (98.2  F)   SpO2: 100% 100%        Last vitals prior to Anesthesia Care Transfer:  CRNA VITALS  4/12/2021 1203 - 4/12/2021 1303      4/12/2021             Resp Rate (observed):  9          Electronically Signed By: Antonio Petty MD  April 12, 2021  2:03 PM

## 2021-04-12 NOTE — PHARMACY-ADMISSION MEDICATION HISTORY
Admission Medication History Completed by Pharmacy    See Psychiatric Admission Navigator for allergy information, preferred outpatient pharmacy, prior to admission medications and immunization status.     Medication History Sources:     Patient, fill history    Changes made to PTA medication list (reason):    Added: frequency to Furosemide order    Deleted: Carvedilol (stopped taking 1 month ago)  ferrous sulfate, Mycophenolate-duplicate order, Golytely, Norco, Bisacodyl,     Changed: None    Additional Information:    None    Prior to Admission medications    Medication Sig Last Dose Taking? Auth Provider   acetaminophen (TYLENOL) 500 MG tablet Take 2 tablets (1,000 mg) by mouth every 8 hours as needed for mild pain Past Week at Unknown time Yes Mariel Garcia MD   cholecalciferol 25 MCG (1000 UT) TABS Take 2,000 Units by mouth daily 4/11/2021 at Unknown time Yes Stef Murillo MD   febuxostat (ULORIC) 80 MG TABS tablet Take 1 tablet (80 mg) by mouth daily 4/11/2021 at Unknown time Yes Stef Murillo MD   furosemide (LASIX) 20 MG tablet 20 mg 2 times daily  4/11/2021 at Unknown time Yes Reported, Patient   mycophenolate (GENERIC EQUIVALENT) 250 MG capsule Take 2 capsules (500 mg) by mouth 2 times daily 4/11/2021 at Unknown time Yes Gee Barrios MD   oxyCODONE (ROXICODONE) 5 MG tablet Take 1 tablet (5 mg) by mouth 2 times daily as needed for severe pain Past Week at Unknown time Yes Brenda Tai MD   sulfamethoxazole-trimethoprim (BACTRIM) 400-80 MG tablet Take 1 tablet by mouth every other day 4/9/2021 at Unknown time Yes Gee Barrios MD   tacrolimus (GENERIC EQUIVALENT) 0.5 MG capsule Take 1 capsule (0.5 mg) by mouth every evening Total dose = 1 mg in the AM and 1.5 mg in the PM 4/11/2021 at Unknown time Yes Abran Cunha MD   tacrolimus (GENERIC EQUIVALENT) 1 MG capsule Take 1 capsule (1 mg) by mouth 2 times daily . Total dose=1mg in the AM and 1.5mg in the  PM  Patient taking differently: Take 1 mg by mouth every morning . Total dose=1mg in the AM and 1.5mg in the PM 4/12/2021 at Unknown time Yes Abran Cunha MD   tamsulosin (FLOMAX) 0.4 MG capsule  4/11/2021 at Unknown time Yes Reported, Patient       Date completed: 04/12/21    Medication history completed by: [facility]

## 2021-04-12 NOTE — ANESTHESIA CARE TRANSFER NOTE
Patient: Rashad Ortiz    Procedure(s):  Right ARTHROPLASTY, HIP, TOTAL    Diagnosis: Avascular necrosis of femoral head, right (H) [M87.051]  Diagnosis Additional Information: No value filed.    Anesthesia Type:   General     Note:    Oropharynx: oropharynx clear of all foreign objects  Level of Consciousness: awake  Oxygen Supplementation: face mask    Independent Airway: airway patency satisfactory and stable  Dentition: dentition unchanged  Vital Signs Stable: post-procedure vital signs reviewed and stable  Report to RN Given: handoff report given  Patient transferred to: PACU    Handoff Report: Identifed the Patient, Identified the Reponsible Provider, Reviewed the pertinent medical history, Discussed the surgical course, Reviewed Intra-OP anesthesia mangement and issues during anesthesia, Set expectations for post-procedure period and Allowed opportunity for questions and acknowledgement of understanding      Vitals: (Last set prior to Anesthesia Care Transfer)  CRNA VITALS  4/12/2021 1203 - 4/12/2021 1249      4/12/2021             Resp Rate (observed):  9        Electronically Signed By: ISABEL Walton CRNA  April 12, 2021  12:49 PM

## 2021-04-12 NOTE — ANESTHESIA PROCEDURE NOTES
Airway       Patient location during procedure: OR       Procedure Start/Stop Times: 4/12/2021 10:40 AM  Staff -        CRNA: Sarah Nye APRN CRNA       Performed By: CRNA  Consent for Airway        Urgency: elective  Indications and Patient Condition       Indications for airway management: huey-procedural       Induction type:intravenous       Mask difficulty assessment: 1 - vent by mask    Final Airway Details       Final airway type: endotracheal airway       Successful airway: ETT - single  Endotracheal Airway Details        ETT size (mm): 7.5       Cuffed: yes       Successful intubation technique: direct laryngoscopy       DL Blade Type: MAC 3       Grade View of Cords: 1       Adjucts: stylet       Position: Right       Measured from: gums/teeth       Secured at (cm): 23       Bite block used: None    Post intubation assessment        Placement verified by: capnometry, equal breath sounds and chest rise        Number of attempts at approach: 1       Secured with: silk tape       Ease of procedure: easy       Dentition: Intact and Unchanged    Medication(s) Administered   Medication Administration Time: 4/12/2021 10:31 AM             S/p tissue valve replacement  Per Primary Team and Cardiology recommendations    6/1/17: s/p tissue valve replacement overall doing well just sore    6/2/17: s/p AVR doing well still with some soreness.         0

## 2021-04-12 NOTE — DISCHARGE SUMMARY
Orthopaedic Surgery Discharge Summary          Name:  Rashad Ortiz  MRN:  [id number]  YOB: 1975      Date of Admission:  4/12/2021  Date of Discharge::  04/14/21  Discharge Physician:  Dr. Morillo               Admission Diagnoses:   Avascular necrosis of femoral head, right (H) [M87.051]          Discharge Diagnosis:     Avascular necrosis of femoral head, right (H) [M87.051]          Procedures:   Surgery: Right Total hip arthroplasty  Date: 4/12/2021          Discharge Medications:     Discharge Medication List as of 4/14/2021  1:46 PM      START taking these medications    Details   aspirin (ASA) 81 MG EC tablet Take 2 tablets (162 mg) by mouth daily, Disp-60 tablet, R-0, E-Prescribe      HYDROmorphone (DILAUDID) 2 MG tablet Take 1-2 tablets (2-4 mg) by mouth every 4 hours as needed for moderate to severe pain, Disp-40 tablet, R-0, E-Prescribe      polyethylene glycol (MIRALAX) 17 g packet Take 17 g by mouth daily, Disp-7 packet, R-0, E-Prescribe      senna-docusate (SENOKOT-S/PERICOLACE) 8.6-50 MG tablet Take 1 tablet by mouth 2 times daily, Disp-30 tablet, R-0, E-Prescribe         CONTINUE these medications which have CHANGED    Details   acetaminophen (TYLENOL) 325 MG tablet Take 2 tablets (650 mg) by mouth every 4 hours as needed for other, Disp-60 tablet, R-0, E-PrescribeDischarge today      carvedilol (COREG) 12.5 MG tablet Take 1 tablet (12.5 mg) by mouth every morning, Disp-30 tablet, R-0, Local Print      furosemide (LASIX) 20 MG tablet Take 1 tablet (20 mg) by mouth daily, Disp-30 tablet, R-0, Local Print         CONTINUE these medications which have NOT CHANGED    Details   cholecalciferol 25 MCG (1000 UT) TABS Take 2,000 Units by mouth daily, Disp-90 tablet,R-3, E-Prescribe      febuxostat (ULORIC) 80 MG TABS tablet Take 1 tablet (80 mg) by mouth daily, Disp-90 tablet,R-3, E-Prescribe      mycophenolate (GENERIC EQUIVALENT) 250 MG capsule Take 2 capsules (500 mg) by mouth 2 times  daily, Disp-120 capsule, R-11, E-Prescribe      sulfamethoxazole-trimethoprim (BACTRIM) 400-80 MG tablet Take 1 tablet by mouth every other day, Disp-45 tablet,R-3, E-Prescribe      tacrolimus (GENERIC EQUIVALENT) 0.5 MG capsule Take 1 capsule (0.5 mg) by mouth every evening Total dose = 1 mg in the AM and 1.5 mg in the PM, Disp-30 capsule,R-11, E-Prescribe      tacrolimus (GENERIC EQUIVALENT) 1 MG capsule Take 1 capsule (1 mg) by mouth 2 times daily . Total dose=1mg in the AM and 1.5mg in the PM, Disp-60 capsule,R-11, E-Prescribe      tamsulosin (FLOMAX) 0.4 MG capsule Historical         STOP taking these medications       mycophenolate (GENERIC EQUIVALENT) 500 MG tablet Comments:   Reason for Stopping:         oxyCODONE (ROXICODONE) 5 MG tablet Comments:   Reason for Stopping:                     Consultations:   Consultation during this admission received from medicine, PT, OT          Brief History of Illness:   45 year old with right hip arthrosis secondary to severe avascular necrosis with head collapse. The patient failed conservative treatment and was indicated for the above procedure.  After discussion of the risks, benefits, and alternatives of surgery, the patient elected to proceed with total hip arthroplasty           Hospital Course:   The patient was admitted to the hospital after the above listed procedure.  Hospital course was uneventful.  Medicine was consulted for medical comanagement. Patient worked with PT and OT beginning POD#1. He underwent hemodialysis on POD1. On POD#2, patient was tolerating a regular diet, voiding on own, had pain well controlled on oral pain medications and was felt to be medically stable for discharge to home.    Exam at time of Discharge:   Gen: No acute distress, resting comfortably in bed.  Resp: Non-labored breathing  MSK:  RLE:  - Dressings c/d/i  - SILT femoral/tibial/sural/saphenous/DP/SP nerves  - Fires TA, EHL, FHL, GaSC, quads  - PT/DP pulses 2+, foot  "wwp    DVT prophylaxis: Aspirin 162 mg daily x 4 weeks  Blood Transfusion: none          Discharge Instructions and Follow-Up:     Discharge Procedure Orders   Physical Therapy Referral   Standing Status: Future   Referral Priority: Routine Referral Type: Rehab Therapy Physical Therapy   Number of Visits Requested: 1     Reason for your hospital stay   Order Comments: You stayed in the hospital overnight following your surgery     Contact Surgeon Team   Order Comments: You may experience symptoms that require follow-up before your scheduled appointment. Refer to the \"[employer]\" for instructions on when to contact Dr. Morillo's office if you are concerned about pain control, blood clots, constipation, or if you are unable to urinate.    Regular business hours (Monday - Friday, 8am - 5pm):  [facility]: [phone]  [facility]: [phone]  [facility]: [phone]    After hours and weekends:  [facility] on call Orthopedic resident: [phone]     Orthopedic Urgent Care   Order Comments: If you are not able to reach Dr. Morillo's office and you need immediate care, go to the Orthopedic Urgent Care at your Surgeon's office.  Do NOT go to the Emergency Room unless you have shortness of breath, chest pain, or other signs of a medical emergency.     Call [phone]   Order Comments: Call 911 immediately if you experience sudden-onset chest pain, arm weakness/numbness, slurred speech, or shortness of breath     Breathing exercises   Order Comments: Perform breathing exercises using your Incentive Spirometer 10 times per hour while awake for 2 weeks.     Fever Management   Order Comments: A low grade fever can be expected after surgery.  Use acetaminophen (TYLENOL) as needed for fever management.  Contact your Surgeon Team if you have a fever greater than 101.5 F, chills, and/or night sweats.     Constipation " management   Order Comments: Constipation (hard, dry bowel movements) is expected after surgery due to the combination of being less active, the anesthetic, and the opioid pain medication.  You can do the following to help reduce constipation:  ~  FLUIDS:  Drink clear liquids (water or Gatorade), or juice (apple/prune).  ~  DIET:  Eat a fiber rich diet.    ~  ACTIVITY:  Get up and move around several times a day.  Increase your activity as you are able.  MEDICATIONS:  Reduce the risk of constipation by starting medications before you are constipated.  You can take Miralax   (1 packet as directed) and/or a stool softener (Senokot 1-2 tablets 1-2 times a day).  If you already have constipation and these medications are not working, you can get magnesium citrate and use as directed.  If you continue to have constipation you can try an over the counter suppository or enema.  Call your Surgeon Team if it has been greater than 3 days since your last bowel movement.     Reduced Urine Output   Order Comments: Changes in the amount of fluids you drank before and after surgery may result in problems urinating.  It is important to stay well-hydrated after surgery and drink plenty of water. If it has been greater than 8 hours since you have urinated despite drinking plenty of water, call your Surgeon Team.     Anticoagulation - aspirin   Order Comments: Take the aspirin as prescribed for a total of four weeks after surgery.  This is given to help minimize your risk of blood clot.     Activity - Exercises to prevent blood clots   Order Comments: Unless otherwise directed by your Surgeon team, perform the following exercises at least three times per day for the first four weeks after surgery to prevent blood clots in your legs: 1) Point and flex your feet (Ankle Pumps), 2) Move your ankle around in big circles, 3) Wiggle your toes, 4) Walk, even for short distances, several times a day, will help decrease the risk of blood clots.  "    Order Specific Question Answer Comments   Is discharge order? Yes      Pain after Surgery   Order Comments: Pain after surgery is normal and expected.  You will have some amount of pain for several weeks after surgery.  Your pain will improve with time.  There are several things you can do to help reduce your pain including: rest, ice, elevation, and using pain medications as needed. Contact your Surgeon Team if you have pain that persists or worsens after surgery despite rest, ice, elevation, and taking your medication(s) as prescribed. Contact your Surgeon Team if you have new numbness, tingling, or weakness in your operative extremity.     Swelling after Surgery   Order Comments: Swelling and/or bruising of the surgical extremity is common and may persist for several months after surgery. In addition to frequent icing and elevation, gentle compressive support with an ACE wrap or tubigrip may help with swelling. Apply compression regularly, removing at least twice daily to perform skin checks. Contact your Surgeon Team if your swelling increases and is NOT associated with an increase in your activity level, or if your swelling increases and is associated with redness and pain.     LOWER Extremity Elevation   Order Comments: Swelling is expected for several months after surgery. This type of swelling is usually associated with gravity and activity, and can be improved with elevation.   The best way to do this is to get your \"toes above your nose\" by laying down and placing several pillows lengthwise under your calf and heel. When elevating your leg keep your knee completely straight. Perform this elevation as often as possible especially for the first two weeks after surgery.     Cold therapy   Order Comments: Ice can be used to control swelling and discomfort after surgery. Place a thin towel over your operative site and apply the ice pack overtop. Leave ice pack in place for 20 minutes, then remove for 20 " minutes. Repeat this 20 minutes on/20 minutes off routine as often as tolerated.     acetaminophen (TYLENOL) Instructions   Order Comments: You were discharged with acetaminophen (TYLENOL) for pain management after surgery. Acetaminophen most effectively manages pain symptoms when it is taken on a schedule without missing doses (every four, six, or eight hours). Your Provider will prescribe a safe daily dose between 3000 - 4000 mg.  Do NOT exceed this daily dose. Most patients use acetaminophen for pain control for the first four weeks after surgery.  You can wean from this medication as your pain decreases.     Opioids - Tapering Instructions   Order Comments: In the first three days following surgery, your symptoms may warrant use of the narcotic pain medication every four to six hours as prescribed. This is normal. As your pain symptoms improve, focus your efforts on decreasing (tapering) use of narcotic medications. The most successful tapering strategy is to first, decrease the number of tablets you take every 4-6 hours to the minimum prescribed. Then, increase the amount of time between doses.  For example:  First, taper to   or 1 tablet every 4-6 hours.  Then, taper to   or 1 tablet every 6-8 hours.  Then, taper to   or 1 tablet every 8-10 hours.  Then, taper to   or 1 tablet every 10-12 hours.  Then, taper to   or 1 tablet at bedtime.  The bedtime dose can help with comfort during sleep and is typically the last dose to be discontinued after surgery.     Follow Up Care   Order Comments: You are scheduled for a post operative wound check with Dr. Morillo's clinic approximately two weeks after surgery. At approximately six weeks after surgery, you will see Dr. Morillo in clinic. During this visit, repeat X rays of your operative hip will be performed.      Your follow up appointments will be at the location that you regularly see Dr. Morillo:    [facility]  [address] [address]  [phone]    [facility]  [address] [place], [place] [address] [phone]    [facility]  [address] [place] [address]  [phone]     Activity - Total Hip Arthroplasty   Order Comments: Refer to the [facility] \"Your Guide to Total Joint Replacement\" for recommendations on activities and Exercises.     Order Specific Question Answer Comments   Is discharge order? Yes      Return to Driving   Order Comments: Return to driving - Driving is NOT permitted until directed by your provider. Under no circumstance are you permitted to drive while using narcotic pain medications.     Order Specific Question Answer Comments   Is discharge order? Yes      Dressing / Wound Care - Wound   Order Comments: You have a clean dressing on your surgical wound. Dressing change instructions as follows: remove your dressing in seven days, and leave incision open to air. Contact your Surgeon Team if you have increased redness, warmth around the surgical wound, and/or drainage from the surgical wound.     Dressing / Wound Care - NO Tub Bathing   Order Comments: Tub bathing, swimming, or any other activities that will cause your incision to be submerged in water should be avoided until allowed by your Surgeon.     Opioid Instructions (Less than 65 years)   Order Comments: You were discharged with an opioid medication (hydromorphone, oxycodone, hydrocodone, or tramadol). This medication should only be taken for breakthrough pain that is not controlled with acetaminophen (TYLENOL). If you rate your pain less than 3 you do not need this medication.  Pain rating 0-3:  You do not need this medication.  Pain rating 4-6:  Take 1 tablet every 4-6 hours as needed  Pain rating 7-10:  Take 2 tablets every 4-6 hours as needed.  Do not exceed 6 tablets per day     No flexion past 90 degrees   Order Comments: No bending " at waist past 90 degrees.     Order Specific Question Answer Comments   Is discharge order? Yes      No internal rotation   Order Comments: No pivoting on your operative leg.     Order Specific Question Answer Comments   Is discharge order? Yes      No adduction past midline   Order Comments: No crossing your operative leg.     Order Specific Question Answer Comments   Is discharge order? Yes      Weight bearing as tolerated   Order Comments: Weight bearing as tolerated on your operative extremity.     Order Specific Question Answer Comments   Is discharge order? Yes      Shower with wound/dressing covered   Order Comments: You must COVER your dressing or incision with saran wrap (or any other non-permeable covering) to allow the incision to remain dry while showering.  You may shower two days after surgery as long as the surgical wound stays dry. Continue to cover your dressing or incision for showering until your first office visit.  You are strictly prohibited from soaking   or submerging the surgical wound underwater.     ABO/Rh Type and Screen     Crutches DME   Order Comments: DME Documentation: Describe the reason for need to support medical necessity: Impaired gait status post hip surgery. I, the undersigned, certify that the above prescribed supplies are medically necessary for this patient and is both reasonable and necessary in reference to accepted standards of medical practice in the treatment of this patient's condition and is not prescribed as a convenience.     Order Specific Question Answer Comments   DME Provider: [facility]    Crutch Type: Standard    Crutches Add On: NA    Length of Need: Lifetime      Cane DME   Order Comments: DME Documentation: Describe the reason for need to support medical necessity: Impaired gait status post hip surgery. I, the undersigned, certify that the above prescribed supplies are medically necessary for this patient and is both reasonable and necessary in  reference to accepted standards of medical practice in the treatment of this patient's condition and is not prescribed as a convenience.     Order Specific Question Answer Comments   DME Provider: [facility]    Cane Type: Single Tip    Reminder: Patient can typically get 1 every 5 years      Walker DME   Order Comments: : DME Documentation: Describe the reason for need to support medical necessity: Impaired gait status post hip surgery. I, the undersigned, certify that the above prescribed supplies are medically necessary for this patient and is both reasonable and necessary in reference to accepted standards of medical practice in the treatment of this patient's condition and is not prescribed as a convenience.     Order Specific Question Answer Comments   DME Provider: [facility]    Walker Type: Standard (2 Wheel)    Accessories: N/A      Walker Order   Order Comments: DME Documentation:   Describe the reason for need to support medical necessity: Gait instability.     I, the undersigned, certify that the above prescribed supplies are medically necessary for this patient and is both reasonable and necessary in reference to accepted standards of medical and necessary in reference to accepted standards of medical practice in the treatment of this patient's condition and is not prescribed as a convenience.     Order Specific Question Answer Comments   DME Provider: [facility]    Walker Type: Standard (2 Wheel)      Regular Diet Adult     Order Specific Question Answer Comments   Is discharge order? Yes               Discharge Disposition:     Discharged to home      Patient was discussed with Dr. Morillo on day of discharge.    Signed,  Tadeo Shah MD  Orthopaedic Surgery PGY4

## 2021-04-13 ENCOUNTER — APPOINTMENT (OUTPATIENT)
Dept: PHYSICAL THERAPY | Facility: CLINIC | Age: 46
End: 2021-04-13
Attending: ORTHOPAEDIC SURGERY
Payer: COMMERCIAL

## 2021-04-13 ENCOUNTER — DOCUMENTATION ONLY (OUTPATIENT)
Dept: MEDSURG UNIT | Facility: CLINIC | Age: 46
End: 2021-04-13

## 2021-04-13 LAB
ANION GAP SERPL CALCULATED.3IONS-SCNC: 8 MMOL/L (ref 3–14)
BUN SERPL-MCNC: 48 MG/DL (ref 7–30)
CALCIUM SERPL-MCNC: 8 MG/DL (ref 8.5–10.1)
CHLORIDE SERPL-SCNC: 107 MMOL/L (ref 94–109)
CO2 SERPL-SCNC: 22 MMOL/L (ref 20–32)
CREAT SERPL-MCNC: 7.93 MG/DL (ref 0.66–1.25)
ERYTHROCYTE [DISTWIDTH] IN BLOOD BY AUTOMATED COUNT: 17.5 % (ref 10–15)
GFR SERPL CREATININE-BSD FRML MDRD: 7 ML/MIN/{1.73_M2}
GLUCOSE SERPL-MCNC: 121 MG/DL (ref 70–99)
HCT VFR BLD AUTO: 31.8 % (ref 40–53)
HGB BLD-MCNC: 10 G/DL (ref 13.3–17.7)
MCH RBC QN AUTO: 26.2 PG (ref 26.5–33)
MCHC RBC AUTO-ENTMCNC: 31.4 G/DL (ref 31.5–36.5)
MCV RBC AUTO: 83 FL (ref 78–100)
PLATELET # BLD AUTO: 194 10E9/L (ref 150–450)
POTASSIUM SERPL-SCNC: 4.6 MMOL/L (ref 3.4–5.3)
RBC # BLD AUTO: 3.82 10E12/L (ref 4.4–5.9)
SODIUM SERPL-SCNC: 137 MMOL/L (ref 133–144)
WBC # BLD AUTO: 8.6 10E9/L (ref 4–11)

## 2021-04-13 PROCEDURE — 250N000013 HC RX MED GY IP 250 OP 250 PS 637: Performed by: PHYSICIAN ASSISTANT

## 2021-04-13 PROCEDURE — 97116 GAIT TRAINING THERAPY: CPT | Mod: GP

## 2021-04-13 PROCEDURE — 36415 COLL VENOUS BLD VENIPUNCTURE: CPT | Performed by: PHYSICIAN ASSISTANT

## 2021-04-13 PROCEDURE — 99207 PR CDG-CODE CATEGORY CHANGED: CPT | Performed by: INTERNAL MEDICINE

## 2021-04-13 PROCEDURE — 250N000013 HC RX MED GY IP 250 OP 250 PS 637: Performed by: INTERNAL MEDICINE

## 2021-04-13 PROCEDURE — 80048 BASIC METABOLIC PNL TOTAL CA: CPT | Performed by: PHYSICIAN ASSISTANT

## 2021-04-13 PROCEDURE — 97530 THERAPEUTIC ACTIVITIES: CPT | Mod: GP

## 2021-04-13 PROCEDURE — 250N000011 HC RX IP 250 OP 636: Performed by: PHYSICIAN ASSISTANT

## 2021-04-13 PROCEDURE — 90937 HEMODIALYSIS REPEATED EVAL: CPT

## 2021-04-13 PROCEDURE — 250N000013 HC RX MED GY IP 250 OP 250 PS 637: Performed by: STUDENT IN AN ORGANIZED HEALTH CARE EDUCATION/TRAINING PROGRAM

## 2021-04-13 PROCEDURE — 85027 COMPLETE CBC AUTOMATED: CPT | Performed by: PHYSICIAN ASSISTANT

## 2021-04-13 PROCEDURE — 99223 1ST HOSP IP/OBS HIGH 75: CPT | Performed by: PHYSICIAN ASSISTANT

## 2021-04-13 PROCEDURE — 99225 PR SUBSEQUENT OBSERVATION CARE,LEVEL II: CPT | Performed by: INTERNAL MEDICINE

## 2021-04-13 PROCEDURE — 258N000003 HC RX IP 258 OP 636: Performed by: ORTHOPAEDIC SURGERY

## 2021-04-13 PROCEDURE — 250N000012 HC RX MED GY IP 250 OP 636 PS 637: Performed by: PHYSICIAN ASSISTANT

## 2021-04-13 RX ORDER — POLYETHYLENE GLYCOL 3350 17 G/17G
17 POWDER, FOR SOLUTION ORAL DAILY
Qty: 7 PACKET | Refills: 0 | Status: SHIPPED | OUTPATIENT
Start: 2021-04-13 | End: 2021-07-20

## 2021-04-13 RX ORDER — HYDROMORPHONE HYDROCHLORIDE 2 MG/1
2-4 TABLET ORAL EVERY 4 HOURS PRN
Qty: 40 TABLET | Refills: 0 | Status: SHIPPED | OUTPATIENT
Start: 2021-04-13 | End: 2021-07-20

## 2021-04-13 RX ORDER — ACETAMINOPHEN 325 MG/1
650 TABLET ORAL EVERY 4 HOURS PRN
Qty: 60 TABLET | Refills: 0 | Status: SHIPPED | OUTPATIENT
Start: 2021-04-15 | End: 2021-07-19

## 2021-04-13 RX ORDER — AMOXICILLIN 250 MG
1 CAPSULE ORAL 2 TIMES DAILY
Qty: 30 TABLET | Refills: 0 | Status: SHIPPED | OUTPATIENT
Start: 2021-04-13 | End: 2021-07-20

## 2021-04-13 RX ADMIN — MYCOPHENOLATE MOFETIL 500 MG: 500 TABLET, FILM COATED ORAL at 10:42

## 2021-04-13 RX ADMIN — SODIUM CHLORIDE 300 ML: 9 INJECTION, SOLUTION INTRAVENOUS at 15:42

## 2021-04-13 RX ADMIN — CARVEDILOL 12.5 MG: 6.25 TABLET, FILM COATED ORAL at 17:01

## 2021-04-13 RX ADMIN — ACETAMINOPHEN 975 MG: 325 TABLET, FILM COATED ORAL at 00:04

## 2021-04-13 RX ADMIN — ACETAMINOPHEN 975 MG: 325 TABLET, FILM COATED ORAL at 10:44

## 2021-04-13 RX ADMIN — FUROSEMIDE 20 MG: 20 TABLET ORAL at 10:43

## 2021-04-13 RX ADMIN — CEFAZOLIN 1 G: 1 INJECTION, POWDER, FOR SOLUTION INTRAMUSCULAR; INTRAVENOUS at 17:01

## 2021-04-13 RX ADMIN — ACETAMINOPHEN 975 MG: 325 TABLET, FILM COATED ORAL at 17:02

## 2021-04-13 RX ADMIN — MYCOPHENOLATE MOFETIL 500 MG: 500 TABLET, FILM COATED ORAL at 18:30

## 2021-04-13 RX ADMIN — POLYETHYLENE GLYCOL 3350 17 G: 17 POWDER, FOR SOLUTION ORAL at 10:50

## 2021-04-13 RX ADMIN — DOCUSATE SODIUM 100 MG: 100 CAPSULE, LIQUID FILLED ORAL at 10:50

## 2021-04-13 RX ADMIN — HYDROMORPHONE HYDROCHLORIDE 4 MG: 2 TABLET ORAL at 17:02

## 2021-04-13 RX ADMIN — SULFAMETHOXAZOLE AND TRIMETHOPRIM 1 TABLET: 400; 80 TABLET ORAL at 17:02

## 2021-04-13 RX ADMIN — HYDROMORPHONE HYDROCHLORIDE 4 MG: 2 TABLET ORAL at 21:34

## 2021-04-13 RX ADMIN — TACROLIMUS 1 MG: 1 CAPSULE ORAL at 10:42

## 2021-04-13 RX ADMIN — HYDROMORPHONE HYDROCHLORIDE 4 MG: 2 TABLET ORAL at 00:04

## 2021-04-13 RX ADMIN — HYDROMORPHONE HYDROCHLORIDE 0.2 MG: 1 INJECTION, SOLUTION INTRAMUSCULAR; INTRAVENOUS; SUBCUTANEOUS at 02:30

## 2021-04-13 RX ADMIN — TACROLIMUS 1.5 MG: 1 CAPSULE ORAL at 18:30

## 2021-04-13 RX ADMIN — SODIUM CHLORIDE 250 ML: 9 INJECTION, SOLUTION INTRAVENOUS at 15:42

## 2021-04-13 RX ADMIN — Medication 2000 UNITS: at 10:43

## 2021-04-13 RX ADMIN — Medication 162 MG: at 10:43

## 2021-04-13 RX ADMIN — DOCUSATE SODIUM 50MG AND SENNOSIDES 8.6MG 1 TABLET: 8.6; 5 TABLET, FILM COATED ORAL at 10:50

## 2021-04-13 RX ADMIN — HYDROMORPHONE HYDROCHLORIDE 4 MG: 2 TABLET ORAL at 10:15

## 2021-04-13 RX ADMIN — HYDROMORPHONE HYDROCHLORIDE 4 MG: 2 TABLET ORAL at 05:26

## 2021-04-13 NOTE — PROGRESS NOTES
Orthopaedic Surgery Progress Note 04/13/2021    S: No acute events overnight.  Pain tolerable. Denies numbness or tingling. Denies chest pain, SOB, nausea/vomiting. Tolerating diet. +flatus. Voiding spontaneously.  Patient hoping to delay hemodialysis today.    O:  Temp: 98.7  F (37.1  C) Temp src: Oral BP: (!) 169/98 Pulse: 78   Resp: 16 SpO2: 100 % O2 Device: None (Room air) Oxygen Delivery: 3 LPM    Exam:  Gen: No acute distress, resting comfortably in bed.  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 Morillo.     Plan:  Ortho Primary  Medicine Comanagement  Activity: Up with assist and assistive devices as needed until independent. Posterior hip precautions to operative side x 3 months:  1) No hip flexion beyond 90 degrees  2) No adduction beyond midline  3) No internal rotation beyond neutral   Weight bearing status: WBAT  Antibiotics: Cefazolin x 24 hours  Diet: Begin with clear fluids and progress diet as tolerated. Bowel regimen. Anti-emetics PRN.    DVT prophylaxis: Mechanical while in hospital with aspirin 162mg daily x 4 weeks  Elevation: Elevate heels off of bed on pillows   Wound Care: Tegaderm alginate x 7 days.    Drains: none  Pain management: Orals PRN, IV for breakthrough only  X-rays: AP Pelvis and Lateral operative hip in PACU.   Physical Therapy: Mobilization, ROM, ADL's, Posterior hip precautions.    Occupational Therapy: ADL's  Labs: Trend Hgb on POD #1, 2  Consults: PT, OT. Hospitalist, appreciate assistance in caring for this patient throughout the perioperative period; consideration of  nephrology consult for dialysis management - will communicate with marilia HALL and hospitalist team  Follow-up: Clinic with Fernando Viveros 4/30/21 and Dr. Morillo on 5/27/21  Disposition: Pending progress with therapies, pain control on orals, and medical stability, anticipate discharge to home on POD #1-2.    Future Appointments   Date Time Provider Department Center   4/13/2021 11:30 AM Donita Dumont, PT [facility]   4/13/2021  3:00 PM Donita Dumont, PT [facility]   4/30/2021 12:20 PM Fernando Viveros PA-C [facility]   5/27/2021  2:00 PM Fernando Morillo MD [facility]   6/23/2021  1:00 PM Reyes Cade MD [facility]       Staff: Ilia Shah MD  Orthopaedic Surgery PGY4  Pager [phone]    Please page me directly with any questions/concerns prior to paging the orthopaedic surgery resident on call. Thanks!

## 2021-04-13 NOTE — PROGRESS NOTES
HEMODIALYSIS TREATMENT NOTE    Date: 4/13/2021  Time: 4:31 PM    Data:  Pre Wt: 65.7 kg (144 lb 13.5 oz)   Desired Wt: 65.2 kg   Post Wt:    Weight change:   kg  Ultrafiltration - Post Run Net Total Removed (mL): 500 mL  Vascular Access Status: Fistula  patent  Dialyzer Rinse: Streaked  Total Blood Volume Processed: 56.7 L   Total Dialysis (Treatment) Time: 3   Dialysate Bath: K 2, Ca 3  Heparin: None    Lab:   No    Interventions:Assessment:  Tx complete. AVF utilized without complication. Thrill and bruit present  0.5 L of fluid obtained this tx. Pt to discharge tomorrow. Pt stated his tx time is only 3 hours. Nephrology informed and order changed. Sites held for 5 minutes. Hand off report given to primary nurse.     Plan:    Per nephrology team.

## 2021-04-13 NOTE — PLAN OF CARE
VS: Elevated BP. Trending down.  Other vitals stable.  Refused Capno   O2: Room air sat > 90%.  Cont pulse ox in place   Output: Voiding adequate amount using urinal.   Last BM: 04/12/21 and pssing gas   Activity: Decline OOB   Skin: Intact except surgical incision,   Pain: C/o pain not controlled with PRN oxycodone.   Requested to change med to Dilaudid PO as this pills help in pain control in the previous surgery. Ortho informed with orders.    CMS: CMS and neuro intact to baseline.    Dressing: CDI.    Diet: Regular    LDA: PIV   Equipment: IV pole, CAPNO, PCD and personal belongings.   Plan: TBD.   Additional Info:

## 2021-04-13 NOTE — PROGRESS NOTES
"[facility], Tobias   Internal Medicine Daily Note           Interval History/Events     Seen and examined. Refusing to go for HD. In lotr of pain at the site pf surgery, just received dilaudid. No chest pain, nausea or vomiting,          Review of Systems        4 point ROS including Respiratory, CV, GI and , other than that noted above is negative      Medications   I have reviewed current medications  in the \"current medication\" section of Epic.  Relevant changes include:     Physical Exam   General:       Vital signs:    Blood pressure (!) 137/93, pulse 65, temperature 98.6  F (37  C), temperature source Oral, resp. rate 18, height 1.727 m (5' 8\"), weight 65.7 kg (144 lb 13.5 oz), SpO2 100 %.  Estimated body mass index is 22.02 kg/m  as calculated from the following:    Height as of this encounter: 1.727 m (5' 8\").    Weight as of this encounter: 65.7 kg (144 lb 13.5 oz).      Intake/Output Summary (Last 24 hours) at 4/13/2021 1527  Last data filed at 4/13/2021 1200  Gross per 24 hour   Intake --   Output 1050 ml   Net -1050 ml        HEENT: NCAT, anicteric sclera, EOMI, moist mucous membranes  Neck: supple, no lymphadenopathy or thyromegaly  Cardiovascular: RRR, S1S2, no murmurs appreciated  Lungs:CTAB, no wheezing or crackles  Abdomen: soft, nontender, nondistended with normoactive bowel sounds  Extremities: no edema, distal pulses palpable  Neurologic: grossly nonfocal      Laboratory and Imaging Studies     I have reviewed  laboratory and imaging studies in the Epic. Pertinent findings are as below:    BMP  Recent Labs   Lab 04/13/21  0653 04/09/21  1044    139   POTASSIUM 4.6 4.2   CHLORIDE 107 107   ASHELY 8.0* 8.7   CO2 22 27   BUN 48* 22   CR 7.93* 6.53*   * 94     CBC  Recent Labs   Lab 04/13/21  0653 04/09/21  1044   WBC 8.6 6.8   RBC 3.82* 4.16*   HGB 10.0* 10.9*   HCT 31.8* 35.4*   MCV 83 85   MCH 26.2* 26.2*   MCHC 31.4* 30.8*   RDW 17.5* 18.6*    195 "     INRNo lab results found in last 7 days.  LFTsNo lab results found in last 7 days.   PANCNo lab results found in last 7 days.        Impression/Plan        45 year old male who underwent a R total hip replacemen today by Dr. Morillo for treatment of avascular necrosis of femoral head.  Medicine consult for comanagement of medical conditions.     H/o avascular necrosis s/p R Total hip replacement post op day 1  No intra-op and post-op complications  Analgesia as per primary  DVT ppx as primary  QD  Wound care as per primary    S/p failed renal transplant on Immunosuppresion; continue MMF, tacrolimus and bactrim  ESRD s/p HS TTS; HD to be done today      Hypertension  Continue coreg and lasix            # FEN: renal diet  # Prophylaxis: ASA  # Code status: full    Disposition Plan   Expected discharge; TBD         Entered: Shara Mohamud 04/13/2021, 3:27 PM            Pt's care was discussed with bedside RN, patient and  during Care Team Rounds.             MD Oneida  Hospitalist ( Internal medicine)  Pager: [phone]

## 2021-04-13 NOTE — PROGRESS NOTES
Prior Authorization **INITIATED**    Medication: Hydromorphone 2mg tabs  Insurance Company: [employer] - Phone [phone] Fax [phone]  Pharmacy Filling the Rx: [place], [place] - [address]  Filling Pharmacy Phone: [phone]  Filling Pharmacy Fax: [phone]  Start Date: 4/13/2021  Reference #: CoverMyMeds Key: [id number] - [place] Case ID: [id number]  Comments:  Plan limits to 8 tablets daily & 14 tablets of opioids per 60 days w/o prior auth. Discharge pharmacy sent patient with supply while auth is pending. Will retroactively bill once determination received.      Mamie Harmon CPhT  [place] Discharge Pharmacy Liaison  Pronouns: She/Her/Hers    [facility]  [address]  [address], [place] [address]   [email or web address]  [email or web address]   Phone: [phone]  Pager: [phone]  Fax: [phone]

## 2021-04-13 NOTE — PLAN OF CARE
PT: Attempts made to see patient this afternoon but patient at dialysis. Will see tomorrow and progress per POC.

## 2021-04-13 NOTE — CONSULTS
Nephrology Initial Consult  April 13, 2021      Rashad Ortiz MRN:[id number] YOB: 1975  Date of Admission:4/12/2021  Primary care provider: Mariel Garcia  Requesting physician: Fernando Morillo MD    ASSESSMENT AND RECOMMENDATIONS:   Rashad Ortiz is a 46 yr old male with PMH of HTN, ESRD s/p failed kidney transplant, L hip arthroplasty, R femoral head avascular necrosis s/p total R hip arthroplasty 4/12/21.    ESKD: s/p failed LDKT; dialyzes TTS at [facility] with Dr. Coleman. Run time: 3 hrs. Access: RUE AVF. EDW: 66 kg. Still on IS tacrolimus, MMF  - Dialysis today per TTS schedule  - Consent for dialysis obtained 4/13/2021    Volume: EDW 66 kg; usual UF 0.5 to 1.5 kg, still with UOP  - Gentle UF today     BP: elevated. Usual pre HD pressures 150-170's, post -150's, lowest pressure on -120's. PTA meds: lasix 20 mg bid  - Would increase lasix to bid (per PTA dosing)       BMD: Ca 8.0; recent PTH 1242, on cholecalciferol 2000 units qday; does not appear to be on phos binders or sensipar  - PTH has just recently trended up, likely correlating with worsening kidney failure; likely will be started on calcitriol and/or sensipar at OP HD unit    Anemia: hgb 10.0 g/dL; recent labs with ferritin 726, iron 101, IS 57, hgb 10-11's; on epogen 6000 units per HD, PO ferrous sulfate 325 mg qday  - Can resume LAWRENCE next week    R femoral head avascular necrosis:  - s/p R hip replacement 4/12        Recommendations were communicated to primary team via this note       PRINCESS SalasC   [phone]      REASON FOR CONSULT: ESKD/dialysis    HISTORY OF PRESENT ILLNESS:  Rashad Ortiz is a 46 yr old male with PMH of HTN, ESRD s/p failed kidney transplant, L hip arthroplasty, and R femoral head avascular necrosis s/p total R hip arthroplasty 4/12/21. Outpatient dialysis records were obtained and reviewed. The patient was seen on dialysis, consent for HD obtained. Stable run with  very gentle UF. Pt is endorsing considerable post op pain. Otherwise doing well and denies n/v, CP, SOB, chills    PAST MEDICAL HISTORY:  Reviewed with patient on 04/13/2021     Past Medical History:   Diagnosis Date     AVN (avascular necrosis of bone) (H)     left hip     AVN (avascular necrosis of bone) (H)     left hip     BPH (benign prostatic hyperplasia)      Chronic kidney disease, stage 4, severely decreased GFR (H)      Gastro-oesophageal reflux disease      Gout      History of blood transfusion      Hypertension      Medical non-compliance      Osteonecrosis (H) 7/29/2020    Added automatically from request for surgery [id number]     Pulmonary nodules      Steroid long-term use      Vitamin D deficiency        Past Surgical History:   Procedure Laterality Date     ARTHROPLASTY HIP Left 9/9/2020    Procedure: Left total hip arthroplasty;  Surgeon: Fernando Morillo MD;  Location: [facility] OR     ARTHROPLASTY HIP Right 4/12/2021    Procedure: Right total hip arthroplasty;  Surgeon: Fernando Morillo MD;  Location: [facility] OR     AV FISTULA OR GRAFT ARTERIAL       COLONOSCOPY N/A 4/7/2021    Procedure: COLONOSCOPY, WITH POLYPECTOMY AND BIOPSY;  Surgeon: Zak Ortega MD;  Location: [facility] GI     CREATE FISTULA ARTERIOVENOUS UPPER EXTREMITY Right 7/31/2019    Procedure: Creation Of Atriovenous Fistula Right Upper Arm;  Surgeon: Julia Irwin MD;  Location: [facility] OR     IR CVC TUNNEL PLACEMENT > 5 YRS OF AGE  10/9/2020     IR DIALYSIS FISTULOGRAM RIGHT  10/9/2020     IR DIALYSIS FISTULOGRAM RIGHT  2/19/2021     IR DIALYSIS MECH THROMB, PTA  10/9/2020     IR DIALYSIS STENT W OR W/OUT PTA  2/19/2021     LIGATE FISTULA ARTERIOVENOUS UPPER EXTREMITY  12/20/2011    Procedure:LIGATE FISTULA ARTERIOVENOUS UPPER EXTREMITY; Excision of Right Forearm Arteriovenous Fistula.; Surgeon:LINDY AMAYA; Location:[facility] OR     PERCUTANEOUS BIOPSY KIDNEY Right 2/28/2017    Procedure: PERCUTANEOUS BIOPSY KIDNEY;  Surgeon: Gee Barrios  MD Gelacio;  [facility] OR     TRANSPLANT  01/13/2011    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 Auth Provider   acetaminophen (TYLENOL) 325 MG tablet Take 2 tablets (650 mg) by mouth every 4 hours as needed for other  Yes Tiffanie Gottlieb APRN CNS   aspirin (ASA) 81 MG EC tablet Take 2 tablets (162 mg) by mouth daily  Yes Tiffanie Gottlieb APRN CNS   cholecalciferol 25 MCG (1000 UT) TABS Take 2,000 Units by mouth daily 4/11/2021 at Unknown time Yes Stef Murillo MD   febuxostat (ULORIC) 80 MG TABS tablet Take 1 tablet (80 mg) by mouth daily 4/11/2021 at Unknown time Yes Stef Murillo MD   furosemide (LASIX) 20 MG tablet 20 mg 2 times daily  4/11/2021 at Unknown time Yes Reported, Patient   HYDROmorphone (DILAUDID) 2 MG tablet Take 1-2 tablets (2-4 mg) by mouth every 4 hours as needed for moderate to severe pain  Yes Tiffanie Gottlieb APRN CNS   mycophenolate (GENERIC EQUIVALENT) 250 MG capsule Take 2 capsules (500 mg) by mouth 2 times daily 4/11/2021 at Unknown time Yes Gee Barrios MD   oxyCODONE (ROXICODONE) 5 MG tablet Take 1 tablet (5 mg) by mouth 2 times daily as needed for severe pain Past Week at Unknown time Yes Brenda Tai MD   polyethylene glycol (MIRALAX) 17 g packet Take 17 g by mouth daily  Yes Tiffanie Gottlieb APRN CNS   senna-docusate (SENOKOT-S/PERICOLACE) 8.6-50 MG tablet Take 1 tablet by mouth 2 times daily  Yes Tiffanie Gottlieb APRN CNS   sulfamethoxazole-trimethoprim (BACTRIM) 400-80 MG tablet Take 1 tablet by mouth every other day 4/9/2021 at Unknown time Yes Gee Barrios MD   tacrolimus (GENERIC EQUIVALENT) 0.5 MG capsule Take 1 capsule (0.5 mg) by mouth every evening Total dose = 1 mg in the AM and 1.5 mg in the PM 4/11/2021 at Unknown time Yes Abran Cunha MD   tacrolimus (GENERIC EQUIVALENT) 1 MG capsule Take 1 capsule (1 mg) by  mouth 2 times daily . Total dose=1mg in the AM and 1.5mg in the PM  Patient taking differently: Take 1 mg by mouth every morning . Total dose=1mg in the AM and 1.5mg in the PM 2021 at Unknown time Yes Abran Cunha MD   tamsulosin (FLOMAX) 0.4 MG capsule  2021 at Unknown time Yes Reported, Patient      Current Meds    sodium chloride 0.9%  250 mL Intravenous Once in dialysis     sodium chloride 0.9%  300 mL Hemodialysis Machine Once     acetaminophen  975 mg Oral Q8H     aspirin  162 mg Oral Daily     carvedilol  12.5 mg Oral QAM     docusate sodium  100 mg Oral BID     furosemide  20 mg Oral Daily     gelatin absorbable  1 each Topical During Hemodialysis (from stock)     mycophenolate  500 mg Oral BID IS     - MEDICATION INSTRUCTIONS -   Does not apply Once     polyethylene glycol  17 g Oral Daily     senna-docusate  1 tablet Oral BID     sodium chloride (PF)  3 mL Intracatheter Q8H     sulfamethoxazole-trimethoprim  1 tablet Oral Every Other Day     tacrolimus  1 mg Oral QAM     tacrolimus  1.5 mg Oral QPM     Vitamin D3  2,000 Units Oral Daily     Infusion Meds    - MEDICATION INSTRUCTIONS -         ALLERGIES:    No Known Allergies    REVIEW OF SYSTEMS:  A comprehensive of systems was negative except as noted above.    SOCIAL HISTORY:   Social History     Socioeconomic History     Marital status:      Spouse name: Not on file     Number of children: Not on file     Years of education: Not on file     Highest education level: Not on file   Occupational History     Not on file   Social Needs     Financial resource strain: Not on file     Food insecurity     Worry: Not on file     Inability: Not on file     Transportation needs     Medical: Not on file     Non-medical: Not on file   Tobacco Use     Smoking status: Former Smoker     Types: Cigarettes     Quit date: 2017     Years since quittin.1     Smokeless tobacco: Never Used     Tobacco comment: Patient states that he is an 'social'   "smoker. 3 cigarettes per week per pt   Substance and Sexual Activity     Alcohol use: Not Currently     Alcohol/week: 4.2 standard drinks     Types: 5 Standard drinks or equivalent per week     Comment: Quit 2020     Drug use: Not Currently     Types: Marijuana     Sexual activity: Never     Partners: Female     Birth control/protection: None   Lifestyle     Physical activity     Days per week: Not on file     Minutes per session: Not on file     Stress: Not on file   Relationships     Social connections     Talks on phone: Not on file     Gets together: Not on file     Attends Temple service: Not on file     Active member of club or organization: Not on file     Attends meetings of clubs or organizations: Not on file     Relationship status: Not on file     Intimate partner violence     Fear of current or ex partner: Not on file     Emotionally abused: Not on file     Physically abused: Not on file     Forced sexual activity: Not on file   Other Topics Concern     Parent/sibling w/ CABG, MI or angioplasty before 65F 55M? Not Asked   Social History Narrative    Rashad lives with his wife and 2 children. There are 2 declawed cats. He works at a computer-based job in customer service.      Reviewed with patient      FAMILY MEDICAL HISTORY:   Family History   Problem Relation Age of Onset     Hypertension Mother      Colon Cancer Mother 66     Colon Cancer Brother 51     Reviewed with patient     PHYSICAL EXAM:   Temp  Av  F (36.7  C)  Min: 95  F (35  C)  Max: 99.6  F (37.6  C)      Pulse  Av.6  Min: 62  Max: 83 Resp  Av.7  Min: 9  Max: 33  SpO2  Av.1 %  Min: 94 %  Max: 100 %       BP (!) 169/98   Pulse 78   Temp 98.7  F (37.1  C) (Oral)   Resp 16   Ht 1.727 m (5' 8\")   Wt 65.7 kg (144 lb 13.5 oz)   SpO2 100%   BMI 22.02 kg/m        Admit Weight: 65.7 kg (144 lb 13.5 oz)     GENERAL APPEARANCE: alert, NAD  EYES: no scleral icterus, pupils equal  Pulmonary: lungs clear to auscultation " with equal breath sounds bilaterally   CV: regular rhythm, normal rate    - Edema none  GI: soft, nontender, normal bowel sounds  MS: no evidence of inflammation in joints, no muscle tenderness  : no butler  SKIN: no rash, warm, dry, no cyanosis  NEURO: face symmetric, a/o3  Access: RUE AVF    LABS:   CMP  Recent Labs   Lab 04/13/21  0653 04/09/21  1044    139   POTASSIUM 4.6 4.2   CHLORIDE 107 107   CO2 22 27   ANIONGAP 8 5   * 94   BUN 48* 22   CR 7.93* 6.53*   GFRESTIMATED 7* 9*   GFRESTBLACK 9* 11*   ASHELY 8.0* 8.7     CBC  Recent Labs   Lab 04/13/21  0653 04/09/21  1044   HGB 10.0* 10.9*   WBC 8.6 6.8   RBC 3.82* 4.16*   HCT 31.8* 35.4*   MCV 83 85   MCH 26.2* 26.2*   MCHC 31.4* 30.8*   RDW 17.5* 18.6*    195     INRNo lab results found in last 7 days.  ABGNo lab results found in last 7 days.   URINE STUDIES  Recent Labs   Lab Test 12/21/20  1545 09/09/20  1233 06/13/20  1010 12/11/18  1211 10/25/18  1210 01/14/15  2154 01/14/15  2154   COLOR Yellow Yellow Yellow Straw Yellow   < > Yellow   APPEARANCE Clear Clear Clear Clear Cloudy   < > Clear   URINEGLC Negative Negative Negative 50* Negative   < > Negative   URINEBILI Negative Negative Negative Negative Negative   < > Negative   URINEKETONE Negative 5* Negative Negative Negative   < > Negative   SG 1.015 1.015 1.010 1.012 1.020   < > 1.020   UBLD Trace* Negative Trace* Small* Large*   < > Trace*   URINEPH 6.0 8.0* 6.5 6.0 6.5   < > 5.5   PROTEIN 100* 30* 30* 100* >=300*   < > Trace*   UROBILINOGEN 0.2  --  0.2  --  0.2  --  0.2   NITRITE Negative Negative Negative Negative Negative   < > Negative   LEUKEST Trace* Negative Negative Negative Moderate*   < > Negative   RBCU O - 2 <1 O - 2 1 25-50*   < > O - 2   WBCU 0 - 5 2 0 - 5 1 >100*   < > O - 2    < > = values in this interval not displayed.     Recent Labs   Lab Test 03/18/20  0728 07/24/19  1713 03/19/19  1527 11/01/18  1453 03/17/18  1035 08/04/17  1839 02/28/17  0809 09/17/16  1050  09/12/16  1617 06/12/15  0803 05/27/14  1509   UTPG 0.64* 0.26* 2.92* 0.85* 0.82* 0.17 0.29* 0.35* 0.44* 2.09* 0.05     PTH  Recent Labs   Lab Test 06/13/19  1941 01/06/18  0947 02/13/16  0930   PTHI 456* 621* 503*     IRON STUDIES  Recent Labs   Lab Test 06/20/20  1112 03/18/20  0723 10/10/19  1650 07/24/19  1702 06/13/19  1941 01/06/18  0947   IRON 15* 54 42 152 46 62   * 179* 181* 202* 215* 219*   IRONSAT 9* 30 23 75* 21 28   ORALIA 310 460* 790* 406* 436* 256       IMAGING:  Reviewed    Mamie Taylor PA-C

## 2021-04-13 NOTE — PLAN OF CARE
OT:  Patient declined any activity; reporting pain is 15/10.  Provided encouragement, notified nursing regarding pain level.

## 2021-04-13 NOTE — PLAN OF CARE
VS: VSS, pt denied CP or SOB.   O2: Room air sat. > 90%.   Output: Voiding adequate amount using urinal.   Last BM: 04/12/21   Activity: Not out of bed today.   Skin: Intact except surgical incision.    Pain: Comfortably manageable with PRN medication.   CMS: CMS and neuro intact.   Dressing: CDI.    Diet: Regular tolerating without N/V.   LDA: PIV SL.   Equipment: Walker, IV pole and personal belongings.    Plan: TBD.   Additional Info: Pt left the unit this morning about 10:45 am to [place] for dialysis and came back about 05 pm, transported by EMS.

## 2021-04-14 ENCOUNTER — APPOINTMENT (OUTPATIENT)
Dept: OCCUPATIONAL THERAPY | Facility: CLINIC | Age: 46
End: 2021-04-14
Attending: PHYSICIAN ASSISTANT
Payer: COMMERCIAL

## 2021-04-14 ENCOUNTER — APPOINTMENT (OUTPATIENT)
Dept: PHYSICAL THERAPY | Facility: CLINIC | Age: 46
End: 2021-04-14
Attending: ORTHOPAEDIC SURGERY
Payer: COMMERCIAL

## 2021-04-14 VITALS
BODY MASS INDEX: 21.95 KG/M2 | TEMPERATURE: 98.8 F | HEART RATE: 81 BPM | DIASTOLIC BLOOD PRESSURE: 89 MMHG | OXYGEN SATURATION: 100 % | RESPIRATION RATE: 16 BRPM | SYSTOLIC BLOOD PRESSURE: 158 MMHG | HEIGHT: 68 IN | WEIGHT: 144.84 LBS

## 2021-04-14 LAB — HGB BLD-MCNC: 9.6 G/DL (ref 13.3–17.7)

## 2021-04-14 PROCEDURE — 250N000013 HC RX MED GY IP 250 OP 250 PS 637: Performed by: INTERNAL MEDICINE

## 2021-04-14 PROCEDURE — 90937 HEMODIALYSIS REPEATED EVAL: CPT

## 2021-04-14 PROCEDURE — 250N000013 HC RX MED GY IP 250 OP 250 PS 637: Performed by: STUDENT IN AN ORGANIZED HEALTH CARE EDUCATION/TRAINING PROGRAM

## 2021-04-14 PROCEDURE — 97116 GAIT TRAINING THERAPY: CPT | Mod: GP | Performed by: PHYSICAL THERAPIST

## 2021-04-14 PROCEDURE — 97535 SELF CARE MNGMENT TRAINING: CPT | Mod: GO | Performed by: OCCUPATIONAL THERAPIST

## 2021-04-14 PROCEDURE — 97530 THERAPEUTIC ACTIVITIES: CPT | Mod: GP | Performed by: PHYSICAL THERAPIST

## 2021-04-14 PROCEDURE — 36415 COLL VENOUS BLD VENIPUNCTURE: CPT | Performed by: PHYSICIAN ASSISTANT

## 2021-04-14 PROCEDURE — 99225 PR SUBSEQUENT OBSERVATION CARE,LEVEL II: CPT | Performed by: INTERNAL MEDICINE

## 2021-04-14 PROCEDURE — 250N000013 HC RX MED GY IP 250 OP 250 PS 637: Performed by: PHYSICIAN ASSISTANT

## 2021-04-14 PROCEDURE — 250N000012 HC RX MED GY IP 250 OP 636 PS 637: Performed by: PHYSICIAN ASSISTANT

## 2021-04-14 PROCEDURE — 97530 THERAPEUTIC ACTIVITIES: CPT | Mod: GO | Performed by: OCCUPATIONAL THERAPIST

## 2021-04-14 PROCEDURE — 85018 HEMOGLOBIN: CPT | Performed by: PHYSICIAN ASSISTANT

## 2021-04-14 PROCEDURE — 99207 PR CDG-CODE CATEGORY CHANGED: CPT | Performed by: INTERNAL MEDICINE

## 2021-04-14 PROCEDURE — 97165 OT EVAL LOW COMPLEX 30 MIN: CPT | Mod: GO | Performed by: OCCUPATIONAL THERAPIST

## 2021-04-14 RX ORDER — FUROSEMIDE 20 MG
20 TABLET ORAL DAILY
Qty: 30 TABLET | Refills: 0 | Status: ON HOLD | OUTPATIENT
Start: 2021-04-15 | End: 2021-07-06

## 2021-04-14 RX ORDER — CARVEDILOL 12.5 MG/1
12.5 TABLET ORAL EVERY MORNING
Qty: 30 TABLET | Refills: 0 | Status: ON HOLD | OUTPATIENT
Start: 2021-04-15 | End: 2021-07-06

## 2021-04-14 RX ADMIN — ACETAMINOPHEN 975 MG: 325 TABLET, FILM COATED ORAL at 08:29

## 2021-04-14 RX ADMIN — CARVEDILOL 12.5 MG: 6.25 TABLET, FILM COATED ORAL at 08:25

## 2021-04-14 RX ADMIN — HYDROMORPHONE HYDROCHLORIDE 4 MG: 2 TABLET ORAL at 10:31

## 2021-04-14 RX ADMIN — HYDROMORPHONE HYDROCHLORIDE 4 MG: 2 TABLET ORAL at 05:59

## 2021-04-14 RX ADMIN — HYDROMORPHONE HYDROCHLORIDE 4 MG: 2 TABLET ORAL at 01:44

## 2021-04-14 RX ADMIN — FUROSEMIDE 20 MG: 20 TABLET ORAL at 08:25

## 2021-04-14 RX ADMIN — DOCUSATE SODIUM 50MG AND SENNOSIDES 8.6MG 1 TABLET: 8.6; 5 TABLET, FILM COATED ORAL at 08:25

## 2021-04-14 RX ADMIN — Medication 162 MG: at 08:26

## 2021-04-14 RX ADMIN — Medication 2000 UNITS: at 08:25

## 2021-04-14 RX ADMIN — DOCUSATE SODIUM 100 MG: 100 CAPSULE, LIQUID FILLED ORAL at 08:25

## 2021-04-14 RX ADMIN — MYCOPHENOLATE MOFETIL 500 MG: 500 TABLET, FILM COATED ORAL at 08:26

## 2021-04-14 RX ADMIN — ACETAMINOPHEN 975 MG: 325 TABLET, FILM COATED ORAL at 01:44

## 2021-04-14 RX ADMIN — TACROLIMUS 1 MG: 1 CAPSULE ORAL at 08:26

## 2021-04-14 RX ADMIN — POLYETHYLENE GLYCOL 3350 17 G: 17 POWDER, FOR SOLUTION ORAL at 08:25

## 2021-04-14 NOTE — PLAN OF CARE
Physical Therapy Discharge Summary    Reason for therapy discharge:    Discharged to home with outpatient therapy.    Progress towards therapy goal(s). See goals on Care Plan in [facility] electronic health record for goal details.  Goals met    Therapy recommendation(s):    Continued therapy is recommended.  Rationale/Recommendations:  to improve gait.

## 2021-04-14 NOTE — PLAN OF CARE
Occupational Therapy Discharge Summary    Reason for therapy discharge:    All goals and outcomes met, no further needs identified.    Progress towards therapy goal(s). See goals on Care Plan in [facility] electronic health record for goal details.  Goals met    Therapy recommendation(s):    No further therapy is recommended.

## 2021-04-14 NOTE — PROGRESS NOTES
Prior Authorization **APPROVED**    Authorization Effective Date: 3/14/2021  Authorization Expiration Date: 10/14/2114  Medication: Hydromorphone 2mg tabs **APPROVED**  Approved Dose/Quantity: Up to   Reference #: CoverMyMeds Key: [id number] - [place] Case ID: [id number]   Insurance Company: [employer] - Phone [phone] Fax [phone]  Expected CoPay: Up to 12 tablets daily     CoPay Card Available: $0.00  Foundation Assistance Needed: n/a  Which Pharmacy is filling the prescription (Not needed for infusion/clinic administered): [facility] [place], [place] - [address]  Pharmacy Notified: Yes  Patient Notified: Yes  Comments:  Plan limits to 8 tablets daily & 14 tablets of opioids per 60 days w/o prior auth. Discharge pharmacy sent patient with supply while auth is pending. *Retroactively Billed*          Mamie Harmon CPhT  [place] Discharge Pharmacy Liaison  Pronouns: She/Her/Hers    [facility]  [address] [facility]  [address] [facility] [address], [place] [address]   [email or web address]  [email or web address]   Phone: [phone]  Pager: [phone]  Fax: [phone]

## 2021-04-14 NOTE — PROGRESS NOTES
04/14/21 1200   Quick Adds   Type of Visit Initial Occupational Therapy Evaluation   Living Environment   People in home child(latosha), adult;spouse   Current Living Arrangements apartment   Living Environment Comments tub/shower chair; RTS; reacher/sock aid   Self-Care   Usual Activity Tolerance good   Current Activity Tolerance fair   Equipment Currently Used at Home none   Activity/Exercise/Self-Care Comment Patient independent in ADLs/mobility   Disability/Function   Hearing Difficulty or Deaf no   Wear Glasses or Blind no   Concentrating, Remembering or Making Decisions Difficulty no   Difficulty Communicating no   Walking or Climbing Stairs Difficulty no   Dressing/Bathing Difficulty no   Toileting issues no   Doing Errands Independently Difficulty (such as shopping) no   Fall history within last six months no   General Information   Onset of Illness/Injury or Date of Surgery 04/12/21   Referring Physician Dr. Morillo   Patient/Family Therapy Goal Statement (OT) return home to baseline   Additional Occupational Profile Info/Pertinent History of Current Problem POD #2 s/p R FRANCA; had L FRANCA in 9/20   Existing Precautions/Restrictions no hip ADD past midline;90 degree hip flexion;no pivoting or twisting   Right Lower Extremity (Weight-bearing Status) weight-bearing as tolerated (WBAT)   Cognitive Status Examination   Cognitive Status Comments No cognitive concerns   Sensory   Sensory Comments No N/T noted   Pain Assessment   Patient Currently in Pain Yes, see Vital Sign flowsheet   Bed Mobility   Bed Mobility supine-sit   Supine-Sit Parker (Bed Mobility) contact guard   Assistive Device (Bed Mobility) leg    Transfers   Transfers bed-chair transfer;sit-stand transfer;toilet transfer   Transfer Skill: Bed to Chair/Chair to Bed   Bed-Chair Parker (Transfers) supervision   Assistive Device (Bed-Chair Transfers) standard walker   Sit-Stand Transfer   Sit-Stand Parker (Transfers) supervision    Assistive Device (Sit-Stand Transfers) walker, standard   Toilet Transfer   Type (Toilet Transfer) stand-sit   Amasa Level (Toilet Transfer) supervision   Assistive Device (Toilet Transfer) walker, standard   Activities of Daily Living   BADL Assessment/Intervention lower body dressing;toileting   Lower Body Dressing Assessment/Training   Amasa Level (Lower Body Dressing) set up   Assistive Devices (Lower Body Dressing) reacher;sock-aid   Toileting   Amasa Level (Toileting) modified independence   Assistive Devices (Toileting) grab bar, toilet   Clinical Impression   Criteria for Skilled Therapeutic Interventions Met (OT) yes   OT Diagnosis impaired self-cares/mobility   OT Problem List-Impairments impacting ADL pain;post-surgical precautions   Assessment of Occupational Performance 1-3 Performance Deficits   Identified Performance Deficits dressing, bathing, toileting   Planned Therapy Interventions (OT) ADL retraining   Clinical Decision Making Complexity (OT) low complexity   Therapy Frequency (OT) Daily   Predicted Duration of Therapy 1 day   Risk & Benefits of therapy have been explained evaluation/treatment results reviewed;care plan/treatment goals reviewed;patient   OT Discharge Planning    OT Discharge Recommendation (DC Rec) Home with assist   OT Rationale for DC Rec No further OT needs identified.   Total Evaluation Time (Minutes)   Total Evaluation Time (Minutes) 8

## 2021-04-14 NOTE — PROGRESS NOTES
"[facility], [place]   Internal Medicine Daily Note           Interval History/Events     Seen and examined. Pain is well controlled. Denies chest pain, sob, fever, chills, abdomen pain., pain at surgical site well controlled          Review of Systems        4 point ROS including Respiratory, CV, GI and , other than that noted above is negative      Medications   I have reviewed current medications  in the \"current medication\" section of Epic.  Relevant changes include:     Physical Exam   General:       Vital signs:    Blood pressure (!) 158/89, pulse 81, temperature 98.8  F (37.1  C), temperature source Oral, resp. rate 16, height 1.727 m (5' 8\"), weight 65.7 kg (144 lb 13.5 oz), SpO2 100 %.  Estimated body mass index is 22.02 kg/m  as calculated from the following:    Height as of this encounter: 1.727 m (5' 8\").    Weight as of this encounter: 65.7 kg (144 lb 13.5 oz).      Intake/Output Summary (Last 24 hours) at 4/13/2021 1527  Last data filed at 4/13/2021 1200  Gross per 24 hour   Intake --   Output 1050 ml   Net -1050 ml        HEENT: NCAT, anicteric sclera, EOMI, moist mucous membranes  Neck: supple, no lymphadenopathy or thyromegaly  Cardiovascular: RRR, S1S2, no murmurs appreciated  Lungs:CTAB, no wheezing or crackles  Abdomen: soft, nontender, nondistended with normoactive bowel sounds  Extremities: no edema, distal pulses palpable  Neurologic: grossly nonfocal      Laboratory and Imaging Studies     I have reviewed  laboratory and imaging studies in the Epic. Pertinent findings are as below:    BMP  Recent Labs   Lab 04/13/21  0653 04/09/21  1044    139   POTASSIUM 4.6 4.2   CHLORIDE 107 107   ASHELY 8.0* 8.7   CO2 22 27   BUN 48* 22   CR 7.93* 6.53*   * 94     CBC  Recent Labs   Lab 04/14/21  0645 04/13/21  0653 04/09/21  1044   WBC  --  8.6 6.8   RBC  --  3.82* 4.16*   HGB 9.6* 10.0* 10.9*   HCT  --  31.8* 35.4*   MCV  --  83 85   MCH  --  26.2* 26.2*   MCHC  " --  31.4* 30.8*   RDW  --  17.5* 18.6*   PLT  --  194 195     INRNo lab results found in last 7 days.  LFTsNo lab results found in last 7 days.   PANCNo lab results found in last 7 days.        Impression/Plan        45 year old male who underwent a R total hip replacemen today by Dr. Morillo for treatment of avascular necrosis of femoral head.  Medicine consult for comanagement of medical conditions.     H/o avascular necrosis s/p R Total hip replacement post op day 1  No intra-op and post-op complications  Analgesia as per primary  DVT ppx as primary  QD  Wound care as per primary    S/p failed renal transplant on Immunosuppresion; continue MMF, tacrolimus and bactrim  ESRD s/p HS TTS; HD to be done today      Hypertension  Continue coreg and lasix  Patient reported that his nephrologist has advised him to not to take coreg because his blood pressure was low during HD, during this hospital his blood pressure has been on higher side probably because of pain, I have advised him to take coreg tonight and hold it tomorrow before HD, if blood pressure remains high during HD tomorrow he should take his dose. He has demonstrated understanding to this          # FEN: renal diet  # Prophylaxis: ASA  # Code status: full    Disposition Plan   Expected discharge; TBD         Entered: Shara Mohamud 04/14/2021, 2:38 PM            Pt's care was discussed with bedside RN, patient and  during Care Team Rounds.             MD One[place]  Hospitalist ( Internal medicine)  Pager: [phone]

## 2021-04-14 NOTE — PLAN OF CARE
VS: VSS, pt denied CP or SOB.   O2: Room air sat. > 90%.   Output: Voiding adequate amount without difficulty.   Last BM: 04/12/21   Activity: Up with walker and SBA.    Skin: Intact except surgical incision.    Pain: Comfortably manageable with PRN medication and ice pack.    CMS: CMS and neuro intact to baseline.    Dressing: CDI.    Diet: Regular tolerating okay.   LDA: None.    Equipment: IV pole, PCD and personal belongings.    Plan: Discharge home today.    Additional Info:

## 2021-04-14 NOTE — PLAN OF CARE
Pt discharged home accompanied with family, discharge medication and discharge instruction given all belongings sent home with pt and pt understand discharge plan.

## 2021-04-14 NOTE — PROGRESS NOTES
Orthopaedic Surgery Progress Note 04/14/2021    S: NAEO. Pain controlled. Denies numbness or tingling. Denies chest pain, SOB, nausea/vomiting. Tolerating diet. +flatus. [place]went [place]. Ambulated with PT in halls yesterday.    O:  Temp: 100.7  F (38.2  C) Temp src: Oral BP: (!) (P) 151/88 Pulse: (P) 88   Resp: 16 SpO2: 100 % O2 Device: None (Room air)      Exam:  Gen: No acute distress, resting comfortably in bed.  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 Morillo.     Plan:  Ortho Primary  Medicine Comanagement  Activity: Up with assist and assistive devices as needed until independent. Posterior hip precautions to operative side x 3 months:  1) No hip flexion beyond 90 degrees  2) No adduction beyond midline  3) No internal rotation beyond neutral   Weight bearing status: WBAT  Antibiotics: Cefazolin x 24 hours  Diet: Begin with clear fluids and progress diet as tolerated. Bowel regimen. Anti-emetics PRN.    DVT prophylaxis: Mechanical while in hospital with aspirin 162mg daily x 4 weeks  Elevation: Elevate heels off of bed on pillows   Wound Care: Tegaderm alginate x 7 days.    Drains: none  Pain management: Orals PRN, IV for breakthrough only  X-rays: AP Pelvis and Lateral operative hip in PACU.   Physical Therapy: Mobilization, ROM, ADL's, Posterior hip precautions.    Occupational Therapy: ADL's  Labs: Trend Hgb on POD #1, 2  Consults: PT, OT. Hospitalist, appreciate assistance in caring for this patient throughout the perioperative period; consideration of nephrology consult for dialysis  management - will communicate with marilia HALL and hospitalist team  Follow-up: Clinic with Fernando Viveros 4/30/21 and Dr. Morillo on 5/27/21  Disposition: Pending progress with therapies, pain control on orals, and medical stability, anticipate discharge to home on POD #2-3.    Future Appointments   Date Time Provider Department Center   4/13/2021 11:30 AM Donita Dumont, PT [facility] [place]   4/13/2021  3:00 PM Donita Dumont, PT [facility] [place]   4/30/2021 12:20 PM Fernando Viveros PA-C [facility]   5/27/2021  2:00 PM Fernando Morillo MD [facility]   6/23/2021  1:00 PM Reyes Cade MD [facility]       Staff: Ilia Shah MD  Orthopaedic Surgery PGY4  Pager [phone]    Please page me directly with any questions/concerns prior to paging the orthopaedic surgery resident on call. Thanks!

## 2021-04-14 NOTE — PLAN OF CARE
VS: VSS   O2: Room air sat. > 90%.   Output: Voiding adequate amount using urinal.   Last BM: 04/12/21   Activity: Not out of bed today.   Skin: Intact except surgical incision.    Pain: Comfortably manageable with PRN medication.   CMS: CMS and neuro intact.   Dressing: CDI.    Diet: Regular tolerating without N/V.   LDA: PIV SL.   Equipment: Walker, IV pole and personal belongings.    Plan: TBD.   Additional Info: Possible discharge 4/14

## 2021-04-15 ENCOUNTER — MYC MEDICAL ADVICE (OUTPATIENT)
Dept: ORTHOPEDICS | Facility: CLINIC | Age: 46
End: 2021-04-15

## 2021-04-15 DIAGNOSIS — Z98.890 STATUS POST HIP SURGERY: Primary | ICD-10-CM

## 2021-04-15 NOTE — DISCHARGE SUMMARY
Dialysis Discharge Summary Brief    [facility]  Division of Nephrology  Nephrology Discharge Dialysis Orders  Ph: [phone]  Fax: [phone]    Rashad Ortiz  MRN: [id number]  YOB: 1975    [facility] Dialysis Unit: [place]  Primary Nephrologist: Dr. Coleman    Date of Admission: 4/12/2021  Date of Discharge: 4/14/2021  Discharge Diagnosis:    ICD-10-CM    1. Status post hip surgery  Z98.890 Crutches DME     Cane DME     Walker DME     CBC with platelets     Basic metabolic panel     Hemoglobin     Walker Order     Physical Therapy Referral     CANCELED: Hemoglobin   2. Avascular necrosis of femoral head, right (H)  M87.051 ABO/Rh Type and Screen     Arthroplasty hip     Arthroplasty hip     acetaminophen (TYLENOL) 325 MG tablet     aspirin (ASA) 81 MG EC tablet     HYDROmorphone (DILAUDID) 2 MG tablet     polyethylene glycol (MIRALAX) 17 g packet     senna-docusate (SENOKOT-S/PERICOLACE) 8.6-50 MG tablet    Added automatically from request for surgery [id number]   3. Avascular necrosis of femoral head, right (H)  M87.051 ABO/Rh Type and Screen     Arthroplasty hip     Arthroplasty hip     acetaminophen (TYLENOL) 325 MG tablet     aspirin (ASA) 81 MG EC tablet     HYDROmorphone (DILAUDID) 2 MG tablet     polyethylene glycol (MIRALAX) 17 g packet     senna-docusate (SENOKOT-S/PERICOLACE) 8.6-50 MG tablet   4. HTN, kidney transplant related  I15.1 carvedilol (COREG) 12.5 MG tablet    Z94.0 furosemide (LASIX) 20 MG tablet       [x] Resume all previous dialysis orders with exception as noted below    New Orders (if not applicable put NA):  Estimated Dry Weight No change   Dialysis Duration    Dialysis Access    Antibiotics (dose per dialysis, end date)            Labs to be drawn at dialysis    Other major changes to dialysis prescription (e.g. Dialysate bath, heparin, blood flow rate, etc)      Medication changes (also fax the unit a copy of the discharge summary)               Name of physician completing this form: Mamie Taylor PA-C

## 2021-04-16 LAB — GLUCOSE BLDC GLUCOMTR-MCNC: 94 MG/DL (ref 70–99)

## 2021-04-19 RX ORDER — OXYCODONE HYDROCHLORIDE 5 MG/1
5-10 TABLET ORAL EVERY 4 HOURS PRN
Qty: 36 TABLET | Refills: 0 | Status: SHIPPED | OUTPATIENT
Start: 2021-04-19 | End: 2021-04-27

## 2021-04-19 NOTE — TELEPHONE ENCOUNTER
BP Readings from Last 3 Encounters:   04/14/21 (!) 158/89   04/09/21 130/86   04/07/21 (!) 160/96     hSi Novak BSN, RN

## 2021-04-19 NOTE — TELEPHONE ENCOUNTER
Medication was stopped in Dec.  Is he still taking it? If so, contact specialist for this medication.    Mariel Vu M.D.

## 2021-04-19 NOTE — TELEPHONE ENCOUNTER
Patient contacted and states he still has it but have not been taking it for the last 2 weeks. Patient states verbal understanding to contact specialist for refill if runs out of medication.    Brenton Chiu CMA

## 2021-04-20 RX ORDER — TAMSULOSIN HYDROCHLORIDE 0.4 MG/1
CAPSULE ORAL
Qty: 30 CAPSULE | OUTPATIENT
Start: 2021-04-20

## 2021-04-27 ENCOUNTER — MYC REFILL (OUTPATIENT)
Dept: ORTHOPEDICS | Facility: CLINIC | Age: 46
End: 2021-04-27

## 2021-04-27 DIAGNOSIS — Z98.890 STATUS POST HIP SURGERY: ICD-10-CM

## 2021-04-27 RX ORDER — OXYCODONE HYDROCHLORIDE 5 MG/1
5-10 TABLET ORAL EVERY 4 HOURS PRN
Qty: 36 TABLET | Refills: 0 | Status: CANCELLED | OUTPATIENT
Start: 2021-04-27

## 2021-04-27 RX ORDER — OXYCODONE HYDROCHLORIDE 5 MG/1
5-10 TABLET ORAL EVERY 4 HOURS PRN
Qty: 40 TABLET | Refills: 0 | Status: SHIPPED | OUTPATIENT
Start: 2021-04-27 | End: 2021-07-20

## 2021-04-27 NOTE — TELEPHONE ENCOUNTER
DOS: 4/12/2021 Right total hip.    Patient requests refill of postop pain medication. Patient was just switched from Dilaudid to Oxycodone last week for better pain management as he had not been managed well with the Dilaudid. Patient states that the oxycodone has provided better pain management. Rx pended to Dr. Morillo for signature.

## 2021-04-30 ENCOUNTER — OFFICE VISIT (OUTPATIENT)
Dept: ORTHOPEDICS | Facility: CLINIC | Age: 46
End: 2021-04-30
Payer: COMMERCIAL

## 2021-04-30 DIAGNOSIS — Z98.890 STATUS POST HIP SURGERY: Primary | ICD-10-CM

## 2021-04-30 PROCEDURE — 99024 POSTOP FOLLOW-UP VISIT: CPT | Performed by: PHYSICIAN ASSISTANT

## 2021-04-30 NOTE — LETTER
4/30/2021         RE: Rashad Ortiz  [address] [address]        Dear Colleague,    Thank you for referring your patient, Rashad Ortiz, to the [facility] [place]. Please see a copy of my visit note below.    ASSESSMENT/PLAN:  Rashad Ortiz is a 46 year old who is status post right total hip arthroplasty with Dr. Morillo on 4/12/2021.    Rashad is progressing as expected.  He presented for total hip arthroplasty due to symptoms of right hip pain and imaging consistent with avascular necrosis of the right femoral head.  He reports that since surgery his pain has been minimal.  He is using only 2 oxycodone daily and reports that this regimen is appropriately managing his symptoms.  His gait is smooth with the assistance of a cane.  He has been using no assistive devices around the home and has been tolerating it well.  He will continue to work with physical therapy on gait training.    With regard to pain management, the patient does report that he is adequately controlling his symptoms with 2 oxycodone daily.  However, as he becomes more active, he relays that his pain increases and is particularly bad at night.  We discussed the importance of using acetaminophen scheduled throughout the day to help further assist with his pain management.  In addition, I recommended that he take 5 mg of oxycodone at night before bed to help with sleep.  He understands and agrees with this plan.    Basic wound cares were performed at today's visit including removal of nylon sutures and steristrips (if present). Monocryl suture tails (if present) were clipped to the level of the skin. We spent time discussing future wound/incision cares. We reviewed recommendations for bathing and hygiene.    The patient will see Dr. Morillo at six to eight weeks post op. Rashad has our clinic number and will call with any questions or concerns.    Fernando Viveros PA-C  Orthopaedic  Surgery    _________________________________    HISTORY OF PRESENT ILLNESS:  Rashad Ortiz is a 46 year old male who is approximately 2 weeks status post the above procedure.    Weight bearing status/devices: Full weightbearing using a cane for long distances  Pain level and management: pain is 3/10, currently using oxycodone twice daily  Physical therapy & ROM: No formal physical therapy program at this time; patient is ambulating and working on gait with a home exercise program    The patient endorses swelling around the surgical incision but denies surrounding redness. The incision has been dry, without discharge or drainage. Rashad denies recent fevers and chills, as well as any other symptoms concerning for infection.     DVT prophylaxis: Aspirin 162 mg daily  Patient denies calf pain or tenderness.      MEDICATIONS:   Current Outpatient Rx   Medication Sig Dispense Refill     acetaminophen (TYLENOL) 325 MG tablet Take 2 tablets (650 mg) by mouth every 4 hours as needed for other 60 tablet 0     aspirin (ASA) 81 MG EC tablet Take 2 tablets (162 mg) by mouth daily 60 tablet 0     carvedilol (COREG) 12.5 MG tablet Take 1 tablet (12.5 mg) by mouth every morning 30 tablet 0     cholecalciferol 25 MCG (1000 UT) TABS Take 2,000 Units by mouth daily 90 tablet 3     febuxostat (ULORIC) 80 MG TABS tablet Take 1 tablet (80 mg) by mouth daily 90 tablet 3     furosemide (LASIX) 20 MG tablet Take 1 tablet (20 mg) by mouth daily 30 tablet 0     HYDROmorphone (DILAUDID) 2 MG tablet Take 1-2 tablets (2-4 mg) by mouth every 4 hours as needed for moderate to severe pain 40 tablet 0     mycophenolate (GENERIC EQUIVALENT) 250 MG capsule Take 2 capsules (500 mg) by mouth 2 times daily 120 capsule 11     oxyCODONE (ROXICODONE) 5 MG tablet Take 1-2 tablets (5-10 mg) by mouth every 4 hours as needed for pain 40 tablet 0     polyethylene glycol (MIRALAX) 17 g packet Take 17 g by mouth daily 7 packet 0     senna-docusate  (SENOKOT-S/PERICOLACE) 8.6-50 MG tablet Take 1 tablet by mouth 2 times daily 30 tablet 0     sulfamethoxazole-trimethoprim (BACTRIM) 400-80 MG tablet Take 1 tablet by mouth every other day 45 tablet 3     tacrolimus (GENERIC EQUIVALENT) 0.5 MG capsule Take 1 capsule (0.5 mg) by mouth every evening Total dose = 1 mg in the AM and 1.5 mg in the PM 30 capsule 11     tacrolimus (GENERIC EQUIVALENT) 1 MG capsule Take 1 capsule (1 mg) by mouth 2 times daily . Total dose=1mg in the AM and 1.5mg in the PM (Patient taking differently: Take 1 mg by mouth every morning . Total dose=1mg in the AM and 1.5mg in the PM) 60 capsule 11     tamsulosin (FLOMAX) 0.4 MG capsule            ALLERGIES: Patient has no known allergies.       PHYSICAL EXAMINATION:   On physical examination the patient is comfortable and is in no acute distress. The affect is appropriate and breathing is non-labored.    Surgical wound: The surgical wound was exposed and found to be clean and dry, without drainage or discharge. There is mild edema around the incision. There is no erythema. The skin was appropriately warm to touch.     Gait: Smooth with the assistance of a cane; Trendelenburg gait without assistive devices  Demonstrates weakness of the right abductors with single leg stand    Motor intact distally throughout the tibial and peroneal nerve distributions, 5/5 strength with tibialis anterior, gastrocnemius and soleus, EHL, FHL firing  Sensation intact to light touch throughout superficial peroneal, deep peroneal, tibial, saphenous, and sural nerves  Dorsalis pedis and posterior tibial pulses palpable, toes warm and well-perfused        Fernando Viveros PA-C

## 2021-05-03 NOTE — PROGRESS NOTES
ASSESSMENT/PLAN:  Rashad Ortiz is a 46 year old who is status post right total hip arthroplasty with Dr. Morillo on 4/12/2021.    Rashad is progressing as expected.  He presented for total hip arthroplasty due to symptoms of right hip pain and imaging consistent with avascular necrosis of the right femoral head.  He reports that since surgery his pain has been minimal.  He is using only 2 oxycodone daily and reports that this regimen is appropriately managing his symptoms.  His gait is smooth with the assistance of a cane.  He has been using no assistive devices around the home and has been tolerating it well.  He will continue to work with physical therapy on gait training.    With regard to pain management, the patient does report that he is adequately controlling his symptoms with 2 oxycodone daily.  However, as he becomes more active, he relays that his pain increases and is particularly bad at night.  We discussed the importance of using acetaminophen scheduled throughout the day to help further assist with his pain management.  In addition, I recommended that he take 5 mg of oxycodone at night before bed to help with sleep.  He understands and agrees with this plan.    Basic wound cares were performed at today's visit including removal of nylon sutures and steristrips (if present). Monocryl suture tails (if present) were clipped to the level of the skin. We spent time discussing future wound/incision cares. We reviewed recommendations for bathing and hygiene.    The patient will see Dr. Morillo at six to eight weeks post op. Rashad has our clinic number and will call with any questions or concerns.    Fernando Viveros PA-C  Orthopaedic Surgery    _________________________________    HISTORY OF PRESENT ILLNESS:  Rashad Ortiz is a 46 year old male who is approximately 2 weeks status post the above procedure.    Weight bearing status/devices: Full weightbearing using a cane for long distances  Pain level  and management: pain is 3/10, currently using oxycodone twice daily  Physical therapy & ROM: No formal physical therapy program at this time; patient is ambulating and working on gait with a home exercise program    The patient endorses swelling around the surgical incision but denies surrounding redness. The incision has been dry, without discharge or drainage. Rashad denies recent fevers and chills, as well as any other symptoms concerning for infection.     DVT prophylaxis: Aspirin 162 mg daily  Patient denies calf pain or tenderness.      MEDICATIONS:   Current Outpatient Rx   Medication Sig Dispense Refill     acetaminophen (TYLENOL) 325 MG tablet Take 2 tablets (650 mg) by mouth every 4 hours as needed for other 60 tablet 0     aspirin (ASA) 81 MG EC tablet Take 2 tablets (162 mg) by mouth daily 60 tablet 0     carvedilol (COREG) 12.5 MG tablet Take 1 tablet (12.5 mg) by mouth every morning 30 tablet 0     cholecalciferol 25 MCG (1000 UT) TABS Take 2,000 Units by mouth daily 90 tablet 3     febuxostat (ULORIC) 80 MG TABS tablet Take 1 tablet (80 mg) by mouth daily 90 tablet 3     furosemide (LASIX) 20 MG tablet Take 1 tablet (20 mg) by mouth daily 30 tablet 0     HYDROmorphone (DILAUDID) 2 MG tablet Take 1-2 tablets (2-4 mg) by mouth every 4 hours as needed for moderate to severe pain 40 tablet 0     mycophenolate (GENERIC EQUIVALENT) 250 MG capsule Take 2 capsules (500 mg) by mouth 2 times daily 120 capsule 11     oxyCODONE (ROXICODONE) 5 MG tablet Take 1-2 tablets (5-10 mg) by mouth every 4 hours as needed for pain 40 tablet 0     polyethylene glycol (MIRALAX) 17 g packet Take 17 g by mouth daily 7 packet 0     senna-docusate (SENOKOT-S/PERICOLACE) 8.6-50 MG tablet Take 1 tablet by mouth 2 times daily 30 tablet 0     sulfamethoxazole-trimethoprim (BACTRIM) 400-80 MG tablet Take 1 tablet by mouth every other day 45 tablet 3     tacrolimus (GENERIC EQUIVALENT) 0.5 MG capsule Take 1 capsule (0.5 mg) by mouth  every evening Total dose = 1 mg in the AM and 1.5 mg in the PM 30 capsule 11     tacrolimus (GENERIC EQUIVALENT) 1 MG capsule Take 1 capsule (1 mg) by mouth 2 times daily . Total dose=1mg in the AM and 1.5mg in the PM (Patient taking differently: Take 1 mg by mouth every morning . Total dose=1mg in the AM and 1.5mg in the PM) 60 capsule 11     tamsulosin (FLOMAX) 0.4 MG capsule            ALLERGIES: Patient has no known allergies.       PHYSICAL EXAMINATION:   On physical examination the patient is comfortable and is in no acute distress. The affect is appropriate and breathing is non-labored.    Surgical wound: The surgical wound was exposed and found to be clean and dry, without drainage or discharge. There is mild edema around the incision. There is no erythema. The skin was appropriately warm to touch.     Gait: Smooth with the assistance of a cane; Trendelenburg gait without assistive devices  Demonstrates weakness of the right abductors with single leg stand    Motor intact distally throughout the tibial and peroneal nerve distributions, 5/5 strength with tibialis anterior, gastrocnemius and soleus, EHL, FHL firing  Sensation intact to light touch throughout superficial peroneal, deep peroneal, tibial, saphenous, and sural nerves  Dorsalis pedis and posterior tibial pulses palpable, toes warm and well-perfused

## 2021-05-17 DIAGNOSIS — M25.551 RIGHT HIP PAIN: Primary | ICD-10-CM

## 2021-05-17 DIAGNOSIS — M87.9 OSTEONECROSIS OF LEFT HIP (H): ICD-10-CM

## 2021-05-18 ENCOUNTER — THERAPY VISIT (OUTPATIENT)
Dept: PHYSICAL THERAPY | Facility: CLINIC | Age: 46
End: 2021-05-18
Payer: COMMERCIAL

## 2021-05-18 DIAGNOSIS — Z47.1 AFTERCARE FOLLOWING HIP JOINT REPLACEMENT SURGERY, UNSPECIFIED LATERALITY: ICD-10-CM

## 2021-05-18 DIAGNOSIS — M25.551 HIP PAIN, RIGHT: ICD-10-CM

## 2021-05-18 DIAGNOSIS — Z98.890 STATUS POST HIP SURGERY: ICD-10-CM

## 2021-05-18 DIAGNOSIS — Z96.649 AFTERCARE FOLLOWING HIP JOINT REPLACEMENT SURGERY, UNSPECIFIED LATERALITY: ICD-10-CM

## 2021-05-18 PROCEDURE — 97161 PT EVAL LOW COMPLEX 20 MIN: CPT

## 2021-05-18 PROCEDURE — 97110 THERAPEUTIC EXERCISES: CPT

## 2021-05-18 ASSESSMENT — ACTIVITIES OF DAILY LIVING (ADL)
WALKING_APPROXIMATELY_10_MINUTES: MODERATE DIFFICULTY
WALKING_INITIALLY: SLIGHT DIFFICULTY
SITTING_FOR_15_MINUTES: NO DIFFICULTY AT ALL
WALKING_UP_STEEP_HILLS: MODERATE DIFFICULTY
GETTING_INTO_AND_OUT_OF_A_BATHTUB: MODERATE DIFFICULTY
LIGHT_TO_MODERATE_WORK: SLIGHT DIFFICULTY
TWISTING/PIVOTING_ON_INVOLVED_LEG: MODERATE DIFFICULTY
GOING_DOWN_1_FLIGHT_OF_STAIRS: SLIGHT DIFFICULTY
STANDING_FOR_15_MINUTES: MODERATE DIFFICULTY
DEEP_SQUATTING: EXTREME DIFFICULTY
RECREATIONAL_ACTIVITIES: MODERATE DIFFICULTY
GETTING_INTO_AND_OUT_OF_AN_AVERAGE_CAR: MODERATE DIFFICULTY
HEAVY_WORK: MODERATE DIFFICULTY
WALKING_15_MINUTES_OR_GREATER: MODERATE DIFFICULTY
HOS_ADL_HIGHEST_POTENTIAL_SCORE: 68
WALKING_DOWN_STEEP_HILLS: SLIGHT DIFFICULTY
HOS_ADL_COUNT: 17
HOS_ADL_SCORE(%): 54.41
HOS_ADL_ITEM_SCORE_TOTAL: 37
STEPPING_UP_AND_DOWN_CURBS: MODERATE DIFFICULTY
PUTTING_ON_SOCKS_AND_SHOES: MODERATE DIFFICULTY
ROLLING_OVER_IN_BED: MODERATE DIFFICULTY
GOING_UP_1_FLIGHT_OF_STAIRS: MODERATE DIFFICULTY

## 2021-05-18 NOTE — PROGRESS NOTES
Physical Therapy Initial Evaluation  Subjective:    Therapist Generated HPI Evaluation  Problem details: S/P R FRANCA on 4/12/2021.  Pain 3/10; Was in hospital for 2-3 days; used walker for 1-2 weeks and then cane until just last week now nothing; Has stairs at home; .         Type of problem:  Right hip.      Condition occurred with:  Other reason.  Where condition occurred: other.  Patient reports pain:  Joint and lateral.  Pain is described as sharp and aching (Lateral incision is sharp; anterior is more radiating ) and is intermittent.  Pain radiates to:  No radiation. Pain is the same all the time.  Since onset symptoms are gradually improving.  Associated symptoms:  Loss of strength and loss of motion/stiffness. Symptoms are exacerbated by certain positions and standing  Relieved by: lying down           Patient Health History         Pain is reported as 3/10 on pain scale.    Pertinent medical history includes: anemia, high blood pressure and kidney disease. Other medical history details: Kidney transplant 2005.            Current medications:  High blood pressure medication.       Primary job tasks include:  Driving.                                    Objective:  System                                           Hip Evaluation  HIP AROM:    Flexion: Left:   Right:  85                    Hip Strength:    Flexion:   Right: 4-/5   Pain:                    Extension:  Right: 4-/5    Pain:    Abduction:  Right: 4-/5    Pain:                    Hip Palpation:  Palpations normal left hip: Painful incision                  General     ROS    Assessment/Plan:    Patient is a 46 year old male with right side hip complaints.    Patient has the following significant findings with corresponding treatment plan.                Diagnosis 1:  R FRANCA  ; PMH includes L FRANCA September 2020; Decreased ROM/flexibility - manual therapy and therapeutic exercise  Decreased strength - therapeutic exercise and therapeutic  activities  Impaired muscle performance - neuro re-education  Decreased function - therapeutic activities    Therapy Evaluation Codes:     Previous and current functional limitations:  (See Goal Flow Sheet for this information)    Short term and Long term goals: (See Goal Flow Sheet for this information)     Communication ability:  Patient appears to be able to clearly communicate and understand verbal and written communication and follow directions correctly.  Treatment Explanation - The following has been discussed with the patient:   RX ordered/plan of care  Anticipated outcomes  Possible risks and side effects  This patient would benefit from PT intervention to resume normal activities.   Rehab potential is good.    Frequency:  2-3 X week, once daily  Duration:  for 4-6 weeks  Discharge Plan:  Achieve all LTG.  Independent in home treatment program.  Reach maximal therapeutic benefit.    Please refer to the daily flowsheet for treatment today, total treatment time and time spent performing 1:1 timed codes.

## 2021-05-24 DIAGNOSIS — Z94.0 KIDNEY TRANSPLANTED: Primary | ICD-10-CM

## 2021-05-24 DIAGNOSIS — Z94.0 KIDNEY TRANSPLANT RECIPIENT: ICD-10-CM

## 2021-05-24 DIAGNOSIS — Z79.60 LONG-TERM USE OF IMMUNOSUPPRESSANT MEDICATION: ICD-10-CM

## 2021-05-24 DIAGNOSIS — Z29.89 NEED FOR PNEUMOCYSTIS PROPHYLAXIS: ICD-10-CM

## 2021-05-25 RX ORDER — MYCOPHENOLATE MOFETIL 250 MG/1
250 CAPSULE ORAL 2 TIMES DAILY
Qty: 60 CAPSULE | Refills: 1 | Status: SHIPPED | OUTPATIENT
Start: 2021-05-25 | End: 2021-09-08

## 2021-05-25 RX ORDER — SULFAMETHOXAZOLE AND TRIMETHOPRIM 400; 80 MG/1; MG/1
TABLET ORAL
Qty: 45 TABLET | Refills: 3 | Status: SHIPPED | OUTPATIENT
Start: 2021-05-25 | End: 2022-01-01

## 2021-05-25 NOTE — TELEPHONE ENCOUNTER
RN[facility] reviewed chart for immunosuppression plan. Patient on dialysis. Per Dr. Donovan/Dr. Rangel, 8/25/20:     1. Will decrease MMF to 250mg twice daily once you start dialysis  2. Stop taking sodium bicarbonate once you start dialysis  3. Change bactrim to Monday/wednesday/friday once you start dialysis  4. Get you hepatitis lab work and tb lab work today    Key findings: 45yM w/ failing renal allograft from 2011, now off of prednisone but on low dose CNI and MMF 500mg PO BID, cPRA 91%, non compliant with continued EtOH consumption, vague uremic symptoms with plan to start on iHD in center in the coming days. He is agreeable. He will obtain Quant Gold today and he can be placed in a dialysis unit close to his home. RUE AVF w/ good thrill and bruit. Decrease MMF to 250mg PO BID and leave him on this for at least one month. If he remains compliant and he can be listed we would stop MMF at that point and leave him on low dose CNI.   Reyes Donovan MD  Date of Service (when I saw the patient): 08/25/20     OUTCOME: [employer] message sent to Rashad with med changes/instructions above. Asked for response.     Rashad has responded to [employer] message sent 5/25/21. Understands - Bactrim dose (1 tab on Mon, Wed, Fri) change from every other day.  - Mycophenolate decreased from 500 mg BID to 250 mg BID as he started dialysis.   - Discontinue sodium bicarbonate if not already done so.

## 2021-06-01 ENCOUNTER — THERAPY VISIT (OUTPATIENT)
Dept: PHYSICAL THERAPY | Facility: CLINIC | Age: 46
End: 2021-06-01
Payer: COMMERCIAL

## 2021-06-01 DIAGNOSIS — Z96.649 AFTERCARE FOLLOWING HIP JOINT REPLACEMENT SURGERY, UNSPECIFIED LATERALITY: ICD-10-CM

## 2021-06-01 DIAGNOSIS — Z47.1 AFTERCARE FOLLOWING HIP JOINT REPLACEMENT SURGERY, UNSPECIFIED LATERALITY: ICD-10-CM

## 2021-06-01 DIAGNOSIS — M25.551 HIP PAIN, RIGHT: ICD-10-CM

## 2021-06-01 PROCEDURE — 97530 THERAPEUTIC ACTIVITIES: CPT | Mod: GP

## 2021-06-01 PROCEDURE — 97110 THERAPEUTIC EXERCISES: CPT | Mod: GP

## 2021-06-23 ENCOUNTER — OFFICE VISIT (OUTPATIENT)
Dept: DERMATOLOGY | Facility: CLINIC | Age: 46
End: 2021-06-23
Payer: COMMERCIAL

## 2021-06-23 DIAGNOSIS — R60.0 EDEMA OF LOWER EXTREMITY: ICD-10-CM

## 2021-06-23 DIAGNOSIS — L82.1 SEBORRHEIC KERATOSIS: ICD-10-CM

## 2021-06-23 DIAGNOSIS — L70.0 OPEN COMEDONE: ICD-10-CM

## 2021-06-23 DIAGNOSIS — D84.9 IMMUNOSUPPRESSION (H): Primary | ICD-10-CM

## 2021-06-23 DIAGNOSIS — L73.8 SENILE SEBACEOUS GLAND HYPERPLASIA: ICD-10-CM

## 2021-06-23 DIAGNOSIS — Z85.828 HISTORY OF NONMELANOMA SKIN CANCER: ICD-10-CM

## 2021-06-23 DIAGNOSIS — D22.9 MULTIPLE BENIGN NEVI: ICD-10-CM

## 2021-06-23 PROCEDURE — 99213 OFFICE O/P EST LOW 20 MIN: CPT | Performed by: DERMATOLOGY

## 2021-06-23 ASSESSMENT — PAIN SCALES - GENERAL: PAINLEVEL: MODERATE PAIN (4)

## 2021-06-23 NOTE — NURSING NOTE
Rashad Ortiz's goals for this visit include:   Chief Complaint   Patient presents with     Derm Problem     Rashad is here today for skin exam, no concerns.        He requests these members of his care team be copied on today's visit information:     PCP: Mariel Garcia    Referring Provider:  No referring provider defined for this encounter.    There were no vitals taken for this visit.    Do you need any medication refills at today's visit? No    Donna Bills LPN

## 2021-06-23 NOTE — PROGRESS NOTES
[facility] Dermatology Note  Encounter Date: Jun 23, 2021  Office Visit     Dermatology Problem List:  1. Relevant medical history: kidney transplant in 2011, currently on tacrolimus, MMF for immunosuppression. Currently back on dialysis, and undergoing evaluation for second transplant.  2. SCCIS at least, left posterior thigh, s/p Mohs and linear repair 12/9/2020     ____________________________________________    Assessment & Plan:    # Immunosuppressed with tacrolimus and MMF for kidney transplant. Discussed increased risk of skin cancers with immunosuppressing medications.   - Recommend sunscreens SPF #30 or greater, protective clothing and avoidance of tanning beds.   - Recommended yearly skin exams.    # History of NMSC. No evidence of recurrence.   - Recommend sunscreens SPF #30 or greater, protective clothing and avoidance of tanning beds.   - Recommended 6 month skin exams.    # Sebaceous hyperplasia.   - No further intervention needed.    # Seborrheic keratosis, non irritated.   - No further intervention needed.    # Open comedones  - No further intervention needed.    # Multiple clinically benign nevi on the left sole x2.  - No further intervention needed.    # Edema of lower extremities  - No further intervention needed.]    Procedures Performed:   None.    Follow-up: 6 month in-person for skin check, or earlier for new or changing lesions    Staff and Scribe:     Scribe Disclosure:   I, Nela Horowitz, am serving as a scribe to document services personally performed by this physician, Dr. Reyes Cade, based on data collection and the provider's statements to me.     Provider Disclosure:   The documentation recorded by the scribe accurately reflects the services I personally performed and the decisions made by me.    Reyes Cade DO    Department of Dermatology  [facility]: Phone: [phone],  Fax:[phone]  [facility]: Phone: [phone], Fax: [phone]    ____________________________________________    CC: Derm Problem (Rashad is here today for skin exam, no concerns. )    HPI:  Mr. Rashad Ortiz is a(n) 46 year old male who presents today as a return patient for skin check.    Last seen 12/23/2020. At that time, the SCC was healing appropriately.    Today, patient notes no areas of concern. Patient notes a little irritation during healing process from SCCIS surgery, but overall it healed well.    Patient is otherwise feeling well, without additional skin concerns.    Labs Reviewed:  N/A    Physical Exam:  Vitals: There were no vitals taken for this visit.  SKIN: Total skin excluding the undergarment areas was performed. The exam included the head/face, neck, both arms, chest, back, abdomen, buttocks, both legs, digits and/or nails.   - There are yellow oily papules with central umbilication located on the face.   - There are waxy stuck on tan to brown papules on the face, back, and chest.   - There are several open comedones on the back.  - Multiple regular brown pigmented macules and papules are identified on the left sole x2.   - Lower extremity edema L>R  - There is a well healed surgical scar on the left posterior thigh. No pigment recurrence.  - No other lesions of concern on areas examined.     Medications:  Current Outpatient Medications   Medication     acetaminophen (TYLENOL) 325 MG tablet     aspirin (ASA) 81 MG EC tablet     carvedilol (COREG) 12.5 MG tablet     cholecalciferol 25 MCG (1000 UT) TABS     febuxostat (ULORIC) 80 MG TABS tablet     furosemide (LASIX) 20 MG tablet     HYDROmorphone (DILAUDID) 2 MG tablet     mycophenolate (GENERIC EQUIVALENT) 250 MG capsule     oxyCODONE (ROXICODONE) 5 MG tablet     polyethylene glycol (MIRALAX) 17 g packet     senna-docusate (SENOKOT-S/PERICOLACE) 8.6-50 MG tablet      sulfamethoxazole-trimethoprim (BACTRIM) 400-80 MG tablet     tacrolimus (GENERIC EQUIVALENT) 0.5 MG capsule     tacrolimus (GENERIC EQUIVALENT) 1 MG capsule     tamsulosin (FLOMAX) 0.4 MG capsule     No current facility-administered medications for this visit.       Past Medical History:   Patient Active Problem List   Diagnosis     Kidney replaced by transplant     Aftercare following organ transplant     GERD (gastroesophageal reflux disease)     CARDIOVASCULAR SCREENING; LDL GOAL LESS THAN 160     Gout     Antibody mediated rejection of kidney transplant     Medical non-compliance     Chronic kidney disease, stage 4, severely decreased GFR (H)     Herpes simplex infection of penis     Escherichia coli septicemia (H)     Pyelonephritis of transplanted kidney     HTN, kidney transplant related     Metabolic acidosis     CKD (chronic kidney disease) stage 5, GFR less than 15 ml/min (H)     Immunosuppression (H)     Anemia of chronic renal failure, stage 5 (H)     Secondary renal hyperparathyroidism (H)     Vitamin D deficiency     BPH (benign prostatic hyperplasia)     Status post hip surgery     ESRD (end stage renal disease) on dialysis (H)     Primary osteoarthritis of right hip     Avascular necrosis of femoral head, right (H)     Aftercare following hip joint replacement surgery, unspecified laterality     Hip pain, right     Past Medical History:   Diagnosis Date     AVN (avascular necrosis of bone) (H)     left hip     AVN (avascular necrosis of bone) (H)     left hip     BPH (benign prostatic hyperplasia)      Chronic kidney disease, stage 4, severely decreased GFR (H)      Gastro-oesophageal reflux disease      Gout      History of blood transfusion      Hypertension      Medical non-compliance      Osteonecrosis (H) 7/29/2020    Added automatically from request for surgery [id number]     Pulmonary nodules      Steroid long-term use      Vitamin D deficiency

## 2021-06-23 NOTE — LETTER
6/23/2021         RE: Rashad Ortiz  [address] [place] [id number]        Dear Colleague,    Thank you for referring your patient, Rashad Ortiz, to the [facility]. Please see a copy of my visit note below.    [facility] Dermatology Note  Encounter Date: Jun 23, 2021  Office Visit     Dermatology Problem List:  1. Relevant medical history: kidney transplant in 2011, currently on tacrolimus, MMF for immunosuppression. Currently back on dialysis, and undergoing evaluation for second transplant.  2. SCCIS at least, left posterior thigh, s/p Mohs and linear repair 12/9/2020     ____________________________________________    Assessment & Plan:    # Immunosuppressed with tacrolimus and MMF for kidney transplant. Discussed increased risk of skin cancers with immunosuppressing medications.   - Recommend sunscreens SPF #30 or greater, protective clothing and avoidance of tanning beds.   - Recommended yearly skin exams.    # History of NMSC. No evidence of recurrence.   - Recommend sunscreens SPF #30 or greater, protective clothing and avoidance of tanning beds.   - Recommended 6 month skin exams.    # Sebaceous hyperplasia.   - No further intervention needed.    # Seborrheic keratosis, non irritated.   - No further intervention needed.    # Open comedones  - No further intervention needed.    # Multiple clinically benign nevi on the left sole x2.  - No further intervention needed.    # Edema of lower extremities  - No further intervention needed.]    Procedures Performed:   None.    Follow-up: 6 month in-person for skin check, or earlier for new or changing lesions    Staff and Scribe:     Scribe Disclosure:   I, Nela Horowitz, am serving as a scribe to document services personally performed by this physician, Dr. Reyes Cade, based on data collection and the provider's statements to me.     Provider Disclosure:   The documentation recorded by the scribe accurately  reflects the services I personally performed and the decisions made by me.    Reyes Cade DO    Department of Dermatology  [facility]: Phone: [phone], Fax:[phone]  [facility]: Phone: [phone], Fax: [phone]    ____________________________________________    CC: Derm Problem (Rashad is here today for skin exam, no concerns. )    HPI:  Mr. Rashad Ortiz is a(n) 46 year old male who presents today as a return patient for skin check.    Last seen 12/23/2020. At that time, the SCC was healing appropriately.    Today, patient notes no areas of concern. Patient notes a little irritation during healing process from SCCIS surgery, but overall it healed well.    Patient is otherwise feeling well, without additional skin concerns.    Labs Reviewed:  N/A    Physical Exam:  Vitals: There were no vitals taken for this visit.  SKIN: Total skin excluding the undergarment areas was performed. The exam included the head/face, neck, both arms, chest, back, abdomen, buttocks, both legs, digits and/or nails.   - There are yellow oily papules with central umbilication located on the face.   - There are waxy stuck on tan to brown papules on the face, back, and chest.   - There are several open comedones on the back.  - Multiple regular brown pigmented macules and papules are identified on the left sole x2.   - Lower extremity edema L>R  - There is a well healed surgical scar on the left posterior thigh. No pigment recurrence.  - No other lesions of concern on areas examined.     Medications:  Current Outpatient Medications   Medication     acetaminophen (TYLENOL) 325 MG tablet     aspirin (ASA) 81 MG EC tablet     carvedilol (COREG) 12.5 MG tablet     cholecalciferol 25 MCG (1000 UT) TABS     febuxostat (ULORIC) 80 MG TABS tablet     furosemide (LASIX) 20 MG tablet     HYDROmorphone (DILAUDID)  2 MG tablet     mycophenolate (GENERIC EQUIVALENT) 250 MG capsule     oxyCODONE (ROXICODONE) 5 MG tablet     polyethylene glycol (MIRALAX) 17 g packet     senna-docusate (SENOKOT-S/PERICOLACE) 8.6-50 MG tablet     sulfamethoxazole-trimethoprim (BACTRIM) 400-80 MG tablet     tacrolimus (GENERIC EQUIVALENT) 0.5 MG capsule     tacrolimus (GENERIC EQUIVALENT) 1 MG capsule     tamsulosin (FLOMAX) 0.4 MG capsule     No current facility-administered medications for this visit.       Past Medical History:   Patient Active Problem List   Diagnosis     Kidney replaced by transplant     Aftercare following organ transplant     GERD (gastroesophageal reflux disease)     CARDIOVASCULAR SCREENING; LDL GOAL LESS THAN 160     Gout     Antibody mediated rejection of kidney transplant     Medical non-compliance     Chronic kidney disease, stage 4, severely decreased GFR (H)     Herpes simplex infection of penis     Escherichia coli septicemia (H)     Pyelonephritis of transplanted kidney     HTN, kidney transplant related     Metabolic acidosis     CKD (chronic kidney disease) stage 5, GFR less than 15 ml/min (H)     Immunosuppression (H)     Anemia of chronic renal failure, stage 5 (H)     Secondary renal hyperparathyroidism (H)     Vitamin D deficiency     BPH (benign prostatic hyperplasia)     Status post hip surgery     ESRD (end stage renal disease) on dialysis (H)     Primary osteoarthritis of right hip     Avascular necrosis of femoral head, right (H)     Aftercare following hip joint replacement surgery, unspecified laterality     Hip pain, right     Past Medical History:   Diagnosis Date     AVN (avascular necrosis of bone) (H)     left hip     AVN (avascular necrosis of bone) (H)     left hip     BPH (benign prostatic hyperplasia)      Chronic kidney disease, stage 4, severely decreased GFR (H)      Gastro-oesophageal reflux disease      Gout      History of blood transfusion      Hypertension      Medical non-compliance       Osteonecrosis (H) 7/29/2020    Added automatically from request for surgery [id number]     Pulmonary nodules      Steroid long-term use      Vitamin D deficiency             Again, thank you for allowing me to participate in the care of your patient.        Sincerely,        Reyes Cade MD

## 2021-07-05 ENCOUNTER — APPOINTMENT (OUTPATIENT)
Dept: GENERAL RADIOLOGY | Facility: CLINIC | Age: 46
DRG: 189 | End: 2021-07-05
Attending: EMERGENCY MEDICINE
Payer: COMMERCIAL

## 2021-07-05 ENCOUNTER — HOSPITAL ENCOUNTER (INPATIENT)
Facility: CLINIC | Age: 46
LOS: 1 days | Discharge: HOME | DRG: 189 | End: 2021-07-06
Attending: EMERGENCY MEDICINE | Admitting: STUDENT IN AN ORGANIZED HEALTH CARE EDUCATION/TRAINING PROGRAM
Payer: COMMERCIAL

## 2021-07-05 DIAGNOSIS — Z94.0 HTN, KIDNEY TRANSPLANT RELATED: ICD-10-CM

## 2021-07-05 DIAGNOSIS — I16.1 HYPERTENSIVE EMERGENCY: ICD-10-CM

## 2021-07-05 DIAGNOSIS — I15.1 HTN, KIDNEY TRANSPLANT RELATED: ICD-10-CM

## 2021-07-05 DIAGNOSIS — Z99.2 DEPENDENCE ON RENAL DIALYSIS (H): ICD-10-CM

## 2021-07-05 DIAGNOSIS — J81.0 ACUTE PULMONARY EDEMA (H): ICD-10-CM

## 2021-07-05 DIAGNOSIS — N18.6 END STAGE RENAL DISEASE (H): ICD-10-CM

## 2021-07-05 DIAGNOSIS — Z20.822 CONTACT WITH AND (SUSPECTED) EXPOSURE TO COVID-19: ICD-10-CM

## 2021-07-05 LAB
ALBUMIN SERPL-MCNC: 2.9 G/DL (ref 3.4–5)
ALP SERPL-CCNC: 158 U/L (ref 40–150)
ALT SERPL W P-5'-P-CCNC: 18 U/L (ref 0–70)
ANION GAP SERPL CALCULATED.3IONS-SCNC: 12 MMOL/L (ref 3–14)
AST SERPL W P-5'-P-CCNC: 14 U/L (ref 0–45)
BASOPHILS # BLD AUTO: 0 10E9/L (ref 0–0.2)
BASOPHILS NFR BLD AUTO: 0.4 %
BILIRUB SERPL-MCNC: 0.4 MG/DL (ref 0.2–1.3)
BUN SERPL-MCNC: 80 MG/DL (ref 7–30)
CA-I BLD-SCNC: 4.4 MG/DL (ref 4.4–5.2)
CALCIUM SERPL-MCNC: 8.2 MG/DL (ref 8.5–10.1)
CHLORIDE SERPL-SCNC: 111 MMOL/L (ref 94–109)
CO2 BLDCOV-SCNC: 18 MMOL/L (ref 21–28)
CO2 SERPL-SCNC: 18 MMOL/L (ref 20–32)
CREAT SERPL-MCNC: 14.3 MG/DL (ref 0.66–1.25)
DIFFERENTIAL METHOD BLD: ABNORMAL
EOSINOPHIL # BLD AUTO: 0.3 10E9/L (ref 0–0.7)
EOSINOPHIL NFR BLD AUTO: 4.8 %
ERYTHROCYTE [DISTWIDTH] IN BLOOD BY AUTOMATED COUNT: 19.3 % (ref 10–15)
GFR SERPL CREATININE-BSD FRML MDRD: 4 ML/MIN/{1.73_M2}
GLUCOSE BLD-MCNC: 88 MG/DL (ref 70–99)
GLUCOSE SERPL-MCNC: 89 MG/DL (ref 70–99)
HBV SURFACE AB SERPL IA-ACNC: 0.46 M[IU]/ML
HBV SURFACE AG SERPL QL IA: NONREACTIVE
HCT VFR BLD AUTO: 26.2 % (ref 40–53)
HCT VFR BLD CALC: 28 %PCV (ref 40–53)
HGB BLD CALC-MCNC: 9.5 G/DL (ref 13.3–17.7)
HGB BLD-MCNC: 8 G/DL (ref 13.3–17.7)
IMM GRANULOCYTES # BLD: 0 10E9/L (ref 0–0.4)
IMM GRANULOCYTES NFR BLD: 0.2 %
INTERPRETATION ECG - MUSE: NORMAL
LABORATORY COMMENT REPORT: NORMAL
LYMPHOCYTES # BLD AUTO: 1.1 10E9/L (ref 0.8–5.3)
LYMPHOCYTES NFR BLD AUTO: 18.8 %
MCH RBC QN AUTO: 25.4 PG (ref 26.5–33)
MCHC RBC AUTO-ENTMCNC: 30.5 G/DL (ref 31.5–36.5)
MCV RBC AUTO: 83 FL (ref 78–100)
MONOCYTES # BLD AUTO: 0.4 10E9/L (ref 0–1.3)
MONOCYTES NFR BLD AUTO: 7.5 %
NEUTROPHILS # BLD AUTO: 3.8 10E9/L (ref 1.6–8.3)
NEUTROPHILS NFR BLD AUTO: 68.3 %
NRBC # BLD AUTO: 0 10*3/UL
NRBC BLD AUTO-RTO: 0 /100
NT-PROBNP SERPL-MCNC: ABNORMAL PG/ML (ref 0–450)
PCO2 BLDV: 31 MM HG (ref 40–50)
PH BLDV: 7.37 PH (ref 7.32–7.43)
PLATELET # BLD AUTO: 159 10E9/L (ref 150–450)
PO2 BLDV: 42 MM HG (ref 25–47)
POTASSIUM BLD-SCNC: 4.6 MMOL/L (ref 3.4–5.3)
POTASSIUM SERPL-SCNC: 4.5 MMOL/L (ref 3.4–5.3)
PROT SERPL-MCNC: 7 G/DL (ref 6.8–8.8)
RBC # BLD AUTO: 3.15 10E12/L (ref 4.4–5.9)
SAO2 % BLDV FROM PO2: 76 %
SARS-COV-2 RNA RESP QL NAA+PROBE: NEGATIVE
SODIUM BLD-SCNC: 143 MMOL/L (ref 133–144)
SODIUM SERPL-SCNC: 141 MMOL/L (ref 133–144)
SPECIMEN SOURCE: NORMAL
TROPONIN I SERPL-MCNC: 0.02 UG/L (ref 0–0.04)
TROPONIN I SERPL-MCNC: 0.02 UG/L (ref 0–0.04)
WBC # BLD AUTO: 5.6 10E9/L (ref 4–11)

## 2021-07-05 PROCEDURE — 258N000003 HC RX IP 258 OP 636: Performed by: INTERNAL MEDICINE

## 2021-07-05 PROCEDURE — 250N000013 HC RX MED GY IP 250 OP 250 PS 637: Performed by: STUDENT IN AN ORGANIZED HEALTH CARE EDUCATION/TRAINING PROGRAM

## 2021-07-05 PROCEDURE — 999N001077 HC STATISTIC POTASSIUM ED POCT

## 2021-07-05 PROCEDURE — 82803 BLOOD GASES ANY COMBINATION: CPT

## 2021-07-05 PROCEDURE — 93005 ELECTROCARDIOGRAM TRACING: CPT | Performed by: EMERGENCY MEDICINE

## 2021-07-05 PROCEDURE — 86706 HEP B SURFACE ANTIBODY: CPT | Performed by: INTERNAL MEDICINE

## 2021-07-05 PROCEDURE — 99223 1ST HOSP IP/OBS HIGH 75: CPT | Mod: AI | Performed by: STUDENT IN AN ORGANIZED HEALTH CARE EDUCATION/TRAINING PROGRAM

## 2021-07-05 PROCEDURE — 999N001076 HC STATISTIC SODIUM ED POCT

## 2021-07-05 PROCEDURE — 250N000013 HC RX MED GY IP 250 OP 250 PS 637: Performed by: EMERGENCY MEDICINE

## 2021-07-05 PROCEDURE — 93010 ELECTROCARDIOGRAM REPORT: CPT | Performed by: EMERGENCY MEDICINE

## 2021-07-05 PROCEDURE — 80053 COMPREHEN METABOLIC PANEL: CPT | Performed by: EMERGENCY MEDICINE

## 2021-07-05 PROCEDURE — 93308 TTE F-UP OR LMTD: CPT | Mod: 26 | Performed by: EMERGENCY MEDICINE

## 2021-07-05 PROCEDURE — 99221 1ST HOSP IP/OBS SF/LOW 40: CPT | Mod: GC | Performed by: INTERNAL MEDICINE

## 2021-07-05 PROCEDURE — 99285 EMERGENCY DEPT VISIT HI MDM: CPT | Mod: 25 | Performed by: EMERGENCY MEDICINE

## 2021-07-05 PROCEDURE — 71045 X-RAY EXAM CHEST 1 VIEW: CPT

## 2021-07-05 PROCEDURE — C9803 HOPD COVID-19 SPEC COLLECT: HCPCS | Performed by: EMERGENCY MEDICINE

## 2021-07-05 PROCEDURE — 87340 HEPATITIS B SURFACE AG IA: CPT | Performed by: INTERNAL MEDICINE

## 2021-07-05 PROCEDURE — 82330 ASSAY OF CALCIUM: CPT

## 2021-07-05 PROCEDURE — 250N000012 HC RX MED GY IP 250 OP 636 PS 637: Performed by: STUDENT IN AN ORGANIZED HEALTH CARE EDUCATION/TRAINING PROGRAM

## 2021-07-05 PROCEDURE — 999N001080 HC STATISTIC GLUCOSE ED POCT

## 2021-07-05 PROCEDURE — 96365 THER/PROPH/DIAG IV INF INIT: CPT | Performed by: EMERGENCY MEDICINE

## 2021-07-05 PROCEDURE — 84484 ASSAY OF TROPONIN QUANT: CPT | Performed by: EMERGENCY MEDICINE

## 2021-07-05 PROCEDURE — 120N000005 HC R&B MS OVERFLOW UMMC

## 2021-07-05 PROCEDURE — 90937 HEMODIALYSIS REPEATED EVAL: CPT

## 2021-07-05 PROCEDURE — U0003 INFECTIOUS AGENT DETECTION BY NUCLEIC ACID (DNA OR RNA); SEVERE ACUTE RESPIRATORY SYNDROME CORONAVIRUS 2 (SARS-COV-2) (CORONAVIRUS DISEASE [COVID-19]), AMPLIFIED PROBE TECHNIQUE, MAKING USE OF HIGH THROUGHPUT TECHNOLOGIES AS DESCRIBED BY CMS-2020-01-R: HCPCS | Performed by: EMERGENCY MEDICINE

## 2021-07-05 PROCEDURE — 250N000011 HC RX IP 250 OP 636: Performed by: STUDENT IN AN ORGANIZED HEALTH CARE EDUCATION/TRAINING PROGRAM

## 2021-07-05 PROCEDURE — U0005 INFEC AGEN DETEC AMPLI PROBE: HCPCS | Performed by: EMERGENCY MEDICINE

## 2021-07-05 PROCEDURE — 85025 COMPLETE CBC W/AUTO DIFF WBC: CPT | Performed by: EMERGENCY MEDICINE

## 2021-07-05 PROCEDURE — 999N001079 HC STATISTIC HEMATOCRIT ED POCT

## 2021-07-05 PROCEDURE — 250N000011 HC RX IP 250 OP 636: Performed by: EMERGENCY MEDICINE

## 2021-07-05 PROCEDURE — 93308 TTE F-UP OR LMTD: CPT | Performed by: EMERGENCY MEDICINE

## 2021-07-05 PROCEDURE — 71045 X-RAY EXAM CHEST 1 VIEW: CPT | Mod: 26 | Performed by: RADIOLOGY

## 2021-07-05 PROCEDURE — 5A1D70Z PERFORMANCE OF URINARY FILTRATION, INTERMITTENT, LESS THAN 6 HOURS PER DAY: ICD-10-PCS | Performed by: INTERNAL MEDICINE

## 2021-07-05 PROCEDURE — 99291 CRITICAL CARE FIRST HOUR: CPT | Mod: 25 | Performed by: EMERGENCY MEDICINE

## 2021-07-05 PROCEDURE — 96375 TX/PRO/DX INJ NEW DRUG ADDON: CPT | Performed by: EMERGENCY MEDICINE

## 2021-07-05 PROCEDURE — 36415 COLL VENOUS BLD VENIPUNCTURE: CPT | Performed by: EMERGENCY MEDICINE

## 2021-07-05 PROCEDURE — 96366 THER/PROPH/DIAG IV INF ADDON: CPT | Performed by: EMERGENCY MEDICINE

## 2021-07-05 PROCEDURE — 83880 ASSAY OF NATRIURETIC PEPTIDE: CPT | Performed by: EMERGENCY MEDICINE

## 2021-07-05 RX ORDER — CARVEDILOL 25 MG/1
25 TABLET ORAL EVERY MORNING
Status: DISCONTINUED | OUTPATIENT
Start: 2021-07-06 | End: 2021-07-06

## 2021-07-05 RX ORDER — POLYETHYLENE GLYCOL 3350 17 G/17G
17 POWDER, FOR SOLUTION ORAL DAILY
Status: CANCELLED | OUTPATIENT
Start: 2021-07-05

## 2021-07-05 RX ORDER — HEPARIN SODIUM 5000 [USP'U]/.5ML
5000 INJECTION, SOLUTION INTRAVENOUS; SUBCUTANEOUS EVERY 12 HOURS
Status: DISCONTINUED | OUTPATIENT
Start: 2021-07-05 | End: 2021-07-05 | Stop reason: DRUGHIGH

## 2021-07-05 RX ORDER — VITAMIN B COMPLEX
2000 TABLET ORAL DAILY
Status: DISCONTINUED | OUTPATIENT
Start: 2021-07-05 | End: 2021-07-07 | Stop reason: HOSPADM

## 2021-07-05 RX ORDER — FEBUXOSTAT 40 MG/1
80 TABLET, FILM COATED ORAL DAILY
Status: DISCONTINUED | OUTPATIENT
Start: 2021-07-05 | End: 2021-07-07 | Stop reason: HOSPADM

## 2021-07-05 RX ORDER — TACROLIMUS 1 MG/1
1 CAPSULE ORAL DAILY
Status: DISCONTINUED | OUTPATIENT
Start: 2021-07-05 | End: 2021-07-07 | Stop reason: HOSPADM

## 2021-07-05 RX ORDER — AMOXICILLIN 250 MG
1 CAPSULE ORAL 2 TIMES DAILY
Status: DISCONTINUED | OUTPATIENT
Start: 2021-07-05 | End: 2021-07-07 | Stop reason: HOSPADM

## 2021-07-05 RX ORDER — MYCOPHENOLATE MOFETIL 250 MG/1
250 CAPSULE ORAL 2 TIMES DAILY
Status: DISCONTINUED | OUTPATIENT
Start: 2021-07-05 | End: 2021-07-07 | Stop reason: HOSPADM

## 2021-07-05 RX ORDER — CARVEDILOL 25 MG/1
25 TABLET ORAL EVERY MORNING
Status: ON HOLD | COMMUNITY
Start: 2021-05-13 | End: 2021-07-06

## 2021-07-05 RX ORDER — HYDRALAZINE HYDROCHLORIDE 20 MG/ML
10 INJECTION INTRAMUSCULAR; INTRAVENOUS EVERY 6 HOURS PRN
Status: DISCONTINUED | OUTPATIENT
Start: 2021-07-05 | End: 2021-07-07 | Stop reason: HOSPADM

## 2021-07-05 RX ORDER — LIDOCAINE 40 MG/G
CREAM TOPICAL
Status: DISCONTINUED | OUTPATIENT
Start: 2021-07-05 | End: 2021-07-07 | Stop reason: HOSPADM

## 2021-07-05 RX ORDER — NITROGLYCERIN 0.4 MG/1
0.4 TABLET SUBLINGUAL EVERY 5 MIN PRN
Status: DISCONTINUED | OUTPATIENT
Start: 2021-07-05 | End: 2021-07-05

## 2021-07-05 RX ORDER — FUROSEMIDE 20 MG
20 TABLET ORAL DAILY
Status: DISCONTINUED | OUTPATIENT
Start: 2021-07-05 | End: 2021-07-07 | Stop reason: HOSPADM

## 2021-07-05 RX ORDER — ACETAMINOPHEN 500 MG
1000 TABLET ORAL EVERY 6 HOURS PRN
Status: DISCONTINUED | OUTPATIENT
Start: 2021-07-05 | End: 2021-07-07 | Stop reason: HOSPADM

## 2021-07-05 RX ORDER — POLYETHYLENE GLYCOL 3350 17 G/17G
17 POWDER, FOR SOLUTION ORAL DAILY
Status: DISCONTINUED | OUTPATIENT
Start: 2021-07-05 | End: 2021-07-07 | Stop reason: HOSPADM

## 2021-07-05 RX ORDER — AMOXICILLIN 250 MG
1 CAPSULE ORAL 2 TIMES DAILY
Status: CANCELLED | OUTPATIENT
Start: 2021-07-05

## 2021-07-05 RX ORDER — NITROGLYCERIN 20 MG/100ML
10-200 INJECTION INTRAVENOUS CONTINUOUS
Status: DISCONTINUED | OUTPATIENT
Start: 2021-07-05 | End: 2021-07-05

## 2021-07-05 RX ORDER — ACETAMINOPHEN 325 MG/1
650 TABLET ORAL EVERY 4 HOURS PRN
Status: DISCONTINUED | OUTPATIENT
Start: 2021-07-05 | End: 2021-07-05

## 2021-07-05 RX ORDER — CARVEDILOL 12.5 MG/1
12.5 TABLET ORAL EVERY MORNING
Status: DISCONTINUED | OUTPATIENT
Start: 2021-07-05 | End: 2021-07-05

## 2021-07-05 RX ORDER — HEPARIN SODIUM 5000 [USP'U]/.5ML
5000 INJECTION, SOLUTION INTRAVENOUS; SUBCUTANEOUS EVERY 12 HOURS
Status: DISCONTINUED | OUTPATIENT
Start: 2021-07-05 | End: 2021-07-07 | Stop reason: HOSPADM

## 2021-07-05 RX ORDER — FUROSEMIDE 10 MG/ML
40 INJECTION INTRAMUSCULAR; INTRAVENOUS ONCE
Status: COMPLETED | OUTPATIENT
Start: 2021-07-05 | End: 2021-07-05

## 2021-07-05 RX ORDER — TACROLIMUS 0.5 MG/1
0.5 CAPSULE ORAL EVERY EVENING
Status: DISCONTINUED | OUTPATIENT
Start: 2021-07-05 | End: 2021-07-05 | Stop reason: DRUGHIGH

## 2021-07-05 RX ORDER — ASPIRIN 81 MG/1
162 TABLET ORAL DAILY
Status: DISCONTINUED | OUTPATIENT
Start: 2021-07-05 | End: 2021-07-07 | Stop reason: HOSPADM

## 2021-07-05 RX ORDER — SULFAMETHOXAZOLE AND TRIMETHOPRIM 400; 80 MG/1; MG/1
1 TABLET ORAL
Status: DISCONTINUED | OUTPATIENT
Start: 2021-07-05 | End: 2021-07-07 | Stop reason: HOSPADM

## 2021-07-05 RX ADMIN — HEPARIN SODIUM 5000 UNITS: 5000 INJECTION, SOLUTION INTRAVENOUS; SUBCUTANEOUS at 20:59

## 2021-07-05 RX ADMIN — CARVEDILOL 12.5 MG: 12.5 TABLET, FILM COATED ORAL at 08:14

## 2021-07-05 RX ADMIN — Medication 2000 UNITS: at 08:16

## 2021-07-05 RX ADMIN — SULFAMETHOXAZOLE AND TRIMETHOPRIM 1 TABLET: 400; 80 TABLET ORAL at 09:15

## 2021-07-05 RX ADMIN — ACETAMINOPHEN 650 MG: 325 TABLET, FILM COATED ORAL at 10:27

## 2021-07-05 RX ADMIN — SODIUM CHLORIDE 300 ML: 9 INJECTION, SOLUTION INTRAVENOUS at 10:28

## 2021-07-05 RX ADMIN — MYCOPHENOLATE MOFETIL 250 MG: 250 CAPSULE ORAL at 08:15

## 2021-07-05 RX ADMIN — NITROGLYCERIN 10 MCG/MIN: 20 INJECTION INTRAVENOUS at 06:55

## 2021-07-05 RX ADMIN — TACROLIMUS 1 MG: 1 CAPSULE ORAL at 08:15

## 2021-07-05 RX ADMIN — FEBUXOSTAT 80 MG: 40 TABLET, FILM COATED ORAL at 08:15

## 2021-07-05 RX ADMIN — ACETAMINOPHEN 650 MG: 325 TABLET, FILM COATED ORAL at 05:24

## 2021-07-05 RX ADMIN — FUROSEMIDE 40 MG: 10 INJECTION, SOLUTION INTRAVENOUS at 02:13

## 2021-07-05 RX ADMIN — NITROGLYCERIN 10 MCG/MIN: 20 INJECTION INTRAVENOUS at 03:54

## 2021-07-05 RX ADMIN — NITROGLYCERIN 10 MCG/MIN: 20 INJECTION INTRAVENOUS at 06:54

## 2021-07-05 RX ADMIN — NITROGLYCERIN 0.4 MG: 0.4 TABLET SUBLINGUAL at 02:14

## 2021-07-05 RX ADMIN — ACETAMINOPHEN 1000 MG: 500 TABLET, FILM COATED ORAL at 16:30

## 2021-07-05 RX ADMIN — Medication: at 10:30

## 2021-07-05 RX ADMIN — FUROSEMIDE 20 MG: 20 TABLET ORAL at 16:30

## 2021-07-05 RX ADMIN — MYCOPHENOLATE MOFETIL 250 MG: 250 CAPSULE ORAL at 20:51

## 2021-07-05 RX ADMIN — TACROLIMUS 1.5 MG: 1 CAPSULE ORAL at 20:51

## 2021-07-05 RX ADMIN — SODIUM CHLORIDE 250 ML: 9 INJECTION, SOLUTION INTRAVENOUS at 10:29

## 2021-07-05 ASSESSMENT — ACTIVITIES OF DAILY LIVING (ADL): ADLS_ACUITY_SCORE: 14

## 2021-07-05 NOTE — H&P
Internal Medicine History and Physical  Rashad Ortiz MRN: [id number]  1975  Date of Admission:7/5/2021  Primary care provider: Mariel Garcia  ___________________________________   Chief Complaint     Shortness of breath      History of Present Illness     Rashad Ortiz is a 46 year old M with PMH significant for HTN, ESRD s/p kidney transplant c/b rejection, on HD (T/Th/Sat) who presented to the ED with 1 day of  shortness of breath, worsened when lying down, for one day after missing his Saturday hemodialysis session. Patient reports morning of July 3rd overslept and missed HD session. In evening that day began having shortness of breath that progressively worsened across Sunday July 4th. States the shortness of breath is worsened when laying down and improves with standing up and walking. It is associated with a non-productive cough. No hemoptysis, chest pain, or palptiations. Patient denies fevers, chills, and sick contacts. No N/V, abdominal pain, dysuria, or rash noted. Does endorse mild diarrhea.     In the ED, patient found to be hypertensive with initial /114. Chest X-ray demonstrated moderate pulmonary edema. Initial labs notable for troponin 0.024, BNP 50,652, and Cr. 14.3. No leukocytosis noted. Patient received IV lasix and sublingual nitroglycerin with mild improvement in symptoms. He was started on a  nitroglycerin drip for hypertensive urgency vs emergency.     Complete review of systems negative unless noted in HPI.     Past Medical History     Past Medical History:   Diagnosis Date    AVN (avascular necrosis of bone) (H)     left hip    AVN (avascular necrosis of bone) (H)     left hip    BPH (benign prostatic hyperplasia)     Chronic kidney disease, stage 4, severely decreased GFR (H)     Gastro-oesophageal reflux disease     Gout     History of blood transfusion     Hypertension     Medical non-compliance     Osteonecrosis (H) 7/29/2020    Added  automatically from request for surgery [id number]    Pulmonary nodules     Steroid long-term use     Vitamin D deficiency        Past Surgical History:  Past Surgical History:   Procedure Laterality Date    ARTHROPLASTY HIP Left 2020    Procedure: Left total hip arthroplasty;  Surgeon: Fernando Morillo MD;  [facility]    ARTHROPLASTY HIP Right 2021    Procedure: Right total hip arthroplasty;  Surgeon: Fernando Morillo MD;  [facility]    AV FISTULA OR GRAFT ARTERIAL      COLONOSCOPY N/A 2021    Procedure: COLONOSCOPY, WITH POLYPECTOMY AND BIOPSY;  Surgeon: Zak Ortega MD;  [facility]: [facility]    CREATE FISTULA ARTERIOVENOUS UPPER EXTREMITY Right 2019    Procedure: Creation Of Atriovenous Fistula Right Upper Arm;  Surgeon: Julia Irwin MD;  [facility]    IR CVC TUNNEL PLACEMENT > 5 YRS OF AGE  10/9/2020    IR DIALYSIS FISTULOGRAM RIGHT  10/9/2020    IR DIALYSIS FISTULOGRAM RIGHT  2021    IR DIALYSIS MECH THROMB, PTA  10/9/2020    IR DIALYSIS STENT W OR W/OUT PTA  2021    LIGATE FISTULA ARTERIOVENOUS UPPER EXTREMITY  2011    Procedure:LIGATE FISTULA ARTERIOVENOUS UPPER EXTREMITY; Excision of Right Forearm Arteriovenous Fistula.; Surgeon:LINDY AMAYA; [facility]    PERCUTANEOUS BIOPSY KIDNEY Right 2017    Procedure: PERCUTANEOUS BIOPSY KIDNEY;  Surgeon: Gee Barrios MD;  [facility]    TRANSPLANT  2011    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       Social History:   Social History     Tobacco Use    Smoking status: Former Smoker     Types: Cigarettes     Quit date: 2017     Years since quittin.3    Smokeless tobacco: Never Used    Tobacco comment: Patient states that he is an 'social'  smoker. 3 cigarettes per week per pt   Substance Use Topics    Alcohol use: Not Currently     Alcohol/week: 4.2 standard drinks     Types: 5 Standard drinks or equivalent per week     Comment: Quit 2020    Drug use: Not Currently      Types: Marijuana         Family History:    Family History   Problem Relation Age of Onset    Hypertension Mother     Colon Cancer Mother 66    Colon Cancer Brother 51       Allergies:  No Known Allergies    Medications:  acetaminophen (TYLENOL) 325 MG tablet  aspirin (ASA) 81 MG EC tablet  carvedilol (COREG) 12.5 MG tablet  cholecalciferol 25 MCG (1000 UT) TABS  febuxostat (ULORIC) 80 MG TABS tablet  furosemide (LASIX) 20 MG tablet  mycophenolate (GENERIC EQUIVALENT) 250 MG capsule  sulfamethoxazole-trimethoprim (BACTRIM) 400-80 MG tablet  tacrolimus (GENERIC EQUIVALENT) 0.5 MG capsule  tacrolimus (GENERIC EQUIVALENT) 1 MG capsule  tamsulosin (FLOMAX) 0.4 MG capsule    Physical Exam     Temp:  [97.6  F (36.4  C)] 97.6  F (36.4  C)  Pulse:  [70-79] 70  Resp:  [30] 30  BP: (162-197)/() 169/101  SpO2:  [93 %-99 %] 97 %    GENERAL APPEARANCE: Alert and cooperative, in no acute distress.  CARDIAC: RRR. Normal S1, S2. No M/R/G.   LUNGS: On RA, intermittent non-productive cough. Faint crackles bilaterally.   EXTREMITIES: +non-pitting edema of left LE. No edema of RLE.  Capillary refill is less than 2 seconds.   ABDOMEN: + BS. Soft, nondistended, nontender. No guarding or rebound. No masses.  NEUROLOGICAL: AxO x3. CN II-XII grossly intact.   SKIN: No rash on brief exam.   PSYCH: Normal Affect.     Diagnostic Studies:  Recent Labs   Lab 07/05/21  0221 07/05/21  0218   WBC  --  5.6   HGB 9.5* 8.0*   MCV  --  83   PLT  --  159    141   POTASSIUM 4.6 4.5   CHLORIDE  --  111*   CO2  --  18*   BUN  --  80*   CR  --  14.30*   ANIONGAP  --  12   ASHELY  --  8.2*   GLC 88 89   ALBUMIN  --  2.9*   PROTTOTAL  --  7.0   BILITOTAL  --  0.4   ALKPHOS  --  158*   ALT  --  18   AST  --  14   TROPI  --  0.024       Recent Results (from the past 24 hour(s))   POC US ECHO LIMITED    Impression    Limited Bedside Cardiac Ultrasound, performed and interpreted by me.   Indication: Shortness of Breath.  Parasternal long axis,  parasternal short axis, subcostal and lungs views were acquired.   Image quality was satisfactory.    Findings:    Small pericardial effusion. Bilateral pulmonary edema    IMPRESSION: No right ventricular overload.  Small pericardial effusion.  Bilateral pulmonary edema.     XR Chest Port 1 View    Narrative    EXAM: XR CHEST PORT 1 VIEW  LOCATION: [facility]  DATE/TIME: 7/5/2021 2:12 AM    INDICATION: Shortness of breath  COMPARISON: 10/19/2020      Impression    IMPRESSION: Interval placement of right subclavian/axillary stent. Heart size is enlarged. Prominence of central pleural vasculature, an increased interstitial and airspace opacities with a lower lobe predominance are compatible with moderate pulmonary   edema. Associated pneumonitis cannot be excluded in the left lung base. No pneumothorax. Probable trace effusions.       EKG/strip results:  -NSR, possible L atrial enlargement      Assessment & Plan      Rashad Ortiz is a 46 year old M with PMH significant for HTN, ESRD s/p failed renal transplant, on HD (T/Th/Sat) who presented to the ED with 1 day of progressive shortness of breath and non-productive cough for one day after missing his Saturday hemodialysis session. In ED, hypertensive with initial /114, CXR w/ moderate pulmonary edema. He is being admitted for management of hypertensive urgency vs emergency.      #Shortness of breath   #Acute pulmonary edema   #Hypertensive Urgency vs Emergency   Missed Sat dialysis session. Cr. 14.3. Now with 1 day of progressive shortness of breath. No fevers, leukocytosis or sick contacts. Initial BP in /114.  CXR with moderate pulmonary edema. Denies chest pain, palpitations. BNP 50,652. Initial troponin 0.024. Received IV lasix and sublingual nitroglycerin in ED with mild improvement on symptoms. Oxygen saturation 94% on RA. Consider symptoms likely 2/2 missed dialysis session and fluid overload. Patient will require dialysis during  admission.   -S/p lasix in ED w/ mild improvement in sx   -S/p SL nitroglycerin in ED   -Start  nitroglycerin drip. Titrate to SBP <165.   -Goal SBP in 24hrs 140.   -ESRD nephrology consulted, appreciate recs. Likely dialysis 7/5/21.   -Repeat troponin in AM for trend.   -PTA carvedilol 12.5 mg daily     #ESRD on dialysis T/Th/Sat  #Hx of renal transplant c/b rejection   # Chronic normocytic anemia in setting of ESRD  -ESRD nephrology consult as above   -Check Tacrolimus level   -Cont PTA tacrolimus: 1mg in the morning, 1.5 mg in evening.   -Cont PTA mycophenolate  -Cont PTA febuxostate  -Cont PTA Bactrim (M/W/F)   -Cont PTA Vitamin D3       Diet: Renal dialysis diet  Fluids: None  Prophylaxis:   DVT: Heparin SubQ    GI: not indicated  Code: Full Code   Disposition: > 2 midnights     Patient to be staffed with primary team.    Nohelia Mendosa MD   Med-Peds PGY1  Pager: [phone]

## 2021-07-05 NOTE — ED NOTES
[facility]   ED Nurse to Floor Handoff     Rashad Ortiz is a 46 year old male who speaks English and lives unknown,  in a home  They arrived in the ED by car from emergency room    ED Chief Complaint: Shortness of Breath    ED Dx;   Final diagnoses:   Acute pulmonary edema (H)   Hypertensive emergency         Needed?: No    Allergies: No Known Allergies.  Past Medical Hx:   Past Medical History:   Diagnosis Date     AVN (avascular necrosis of bone) (H)     left hip     AVN (avascular necrosis of bone) (H)     left hip     BPH (benign prostatic hyperplasia)      Chronic kidney disease, stage 4, severely decreased GFR (H)      Gastro-oesophageal reflux disease      Gout      History of blood transfusion      Hypertension      Medical non-compliance      Osteonecrosis (H) 7/29/2020    Added automatically from request for surgery [id number]     Pulmonary nodules      Steroid long-term use      Vitamin D deficiency       Baseline Mental status: WDL  Current Mental Status changes: at basesline    Infection present or suspected this encounter: no  Sepsis suspected: No  Isolation type: No active isolations  Patient tested for COVID 19 prior to admission: YES     Activity level - Baseline/Home:  Independent  Activity Level - Current:   Stand with Assist    Bariatric equipment needed?: No    In the ED these meds were given:   Medications   nitroGLYcerin (NITROSTAT) sublingual tablet 0.4 mg (0.4 mg Sublingual Given 7/5/21 0214)   nitroGLYcerin 50 mg in D5W 250 mL (adult std) infusion CENTRAL (20 mcg/min Intravenous Rate/Dose Change 7/5/21 0428)   furosemide (LASIX) injection 40 mg (40 mg Intravenous Given 7/5/21 0213)       Drips running?  Yes    Home pump  No    Current LDAs  Peripheral IV 07/05/21 Anterior;Left Upper arm (Active)   Number of days: 0       Hemodialysis Vascular Access Arteriovenous fistula Right Arm (Active)   Number of days: 551        Incision/Surgical Site 04/12/21 Right;Lateral;Upper Thigh (Active)   Number of days: 84       Incision/Surgical Site 04/12/21 Right Hip (Active)   Number of days: 84       Labs results:   Labs Ordered and Resulted from Time of ED Arrival Up to the Time of Departure from the ED   CBC WITH PLATELETS DIFFERENTIAL - Abnormal; Notable for the following components:       Result Value    RBC Count 3.15 (*)     Hemoglobin 8.0 (*)     Hematocrit 26.2 (*)     MCH 25.4 (*)     MCHC 30.5 (*)     RDW 19.3 (*)     All other components within normal limits   COMPREHENSIVE METABOLIC PANEL - Abnormal; Notable for the following components:    Chloride 111 (*)     Carbon Dioxide 18 (*)     Urea Nitrogen 80 (*)     Creatinine 14.30 (*)     GFR Estimate 4 (*)     GFR Estimate If Black 4 (*)     Calcium 8.2 (*)     Albumin 2.9 (*)     Alkaline Phosphatase 158 (*)     All other components within normal limits   NT PROBNP INPATIENT - Abnormal; Notable for the following components:    N-Terminal Pro BNP Inpatient 50,652 (*)     All other components within normal limits   ISTAT GASES ELEC ICA GLUC DEMETRIUS POCT - Abnormal; Notable for the following components:    PCO2 Venous 31 (*)     Bicarbonate Venous 18 (*)     Hemoglobin 9.5 (*)     Hematocrit - POCT 28 (*)     All other components within normal limits   TROPONIN I   SARS-COV-2 (COVID-19) VIRUS RT-PCR   ISTAT CG8 GAS ELEC ICA GLUC DEMETRIUS NURSE POCT       Imaging Studies:   Recent Results (from the past 24 hour(s))   POC US ECHO LIMITED    Impression    Limited Bedside Cardiac Ultrasound, performed and interpreted by me.   Indication: Shortness of Breath.  Parasternal long axis, parasternal short axis, subcostal and lungs views were acquired.   Image quality was satisfactory.    Findings:    Small pericardial effusion. Bilateral pulmonary edema    IMPRESSION: No right ventricular overload.  Small pericardial effusion.  Bilateral pulmonary edema.     XR Chest Port 1 View    Narrative    EXAM:  XR CHEST PORT 1 VIEW  LOCATION: [facility]  DATE/TIME: 7/5/2021 2:12 AM    INDICATION: Shortness of breath  COMPARISON: 10/19/2020      Impression    IMPRESSION: Interval placement of right subclavian/axillary stent. Heart size is enlarged. Prominence of central pleural vasculature, an increased interstitial and airspace opacities with a lower lobe predominance are compatible with moderate pulmonary   edema. Associated pneumonitis cannot be excluded in the left lung base. No pneumothorax. Probable trace effusions.       Recent vital signs:   BP (!) 172/104   Pulse 78   Temp 97.6  F (36.4  C) (Oral)   Resp 30   SpO2 94%     Sammi Coma Scale Score: 15 (07/05/21 0223)       Cardiac Rhythm: Normal Sinus  Pt needs tele? See epic orders  Skin/wound Issues: None    Code Status: Full Code    Pain control: fair    Nausea control: good    Abnormal labs/tests/findings requiring intervention: see epic    Family present during ED course? No   Family Comments/Social Situation comments: n/a    Tasks needing completion: None    Cintia Durant RN  [facility] -- [phone] [place] [facility]  [phone] [facility]

## 2021-07-05 NOTE — ED NOTES
[facility] team notified of nitroglycerin gtt/ only infusing for 2 hours (per pharmacy protocol). MD requested new PIV for next two hours to infuse. PIV placed, nitroglycerin gtt restarted at 10 mcg/min in new IV.

## 2021-07-05 NOTE — PROGRESS NOTES
HEMODIALYSIS TREATMENT NOTE    Date: 7/5/2021  Time: 2:34 PM    Data:  Pre Wt: 69.2 kg (152 lb 8.9 oz)   Desired Wt: 71 kg   Post Wt: 67.2 kg (148 lb 2.4 oz)  Weight change: 2 kg  Ultrafiltration - Post Run Net Total Removed (mL): 2000 mL  Vascular Access Status: Fistula  patent  Dialyzer Rinse: Clear, Streaked  Total Blood Volume Processed: 68.1 L   Total Dialysis (Treatment) Time: 3   Dialysate Bath: K 2, Ca 3  Heparin: None    Lab:   Yes  Hep B  Interventions:Assessment:  Pt completed 3 hrs dialysis tx on K 2, Ca 3 bath. Pt was vitally stable with HTN SBP in 150's. A&Ox4. Pt tolerated 2L fluid removal. RUE AVF was cannulated with 15g needle. Positive thrill and bruit. Max 400 BFR maintained throughout with desired AP and . Hemostasis achieved within 5 min. Hand off report given to JENNIFER Mccormack. Pt transferred to the floor in stable condition.     Plan:    Next HD per renal team

## 2021-07-05 NOTE — ED PROVIDER NOTES
ED Provider Note  [facility]      History     Chief Complaint   Patient presents with     Shortness of Breath     HPI  Rashad Ortiz is a 46 year old male who has a past medical history of end-stage renal disease on dialysis, status post kidney transplant, hypertension presenting with shortness of breath over the past 24 hours.  Worsens when he lies down.  He missed dialysis on Saturday.  Denies chest pain.  He has leg swelling that is not unchanged.  Denies weight gain.  He says he is not on Lasix or any diuretics at this point.  He still makes urine.  No recent fevers, chills or cough.  He is otherwise been feeling well.    Past Medical History  Past Medical History:   Diagnosis Date     AVN (avascular necrosis of bone) (H)     left hip     AVN (avascular necrosis of bone) (H)     left hip     BPH (benign prostatic hyperplasia)      Chronic kidney disease, stage 4, severely decreased GFR (H)      Gastro-oesophageal reflux disease      Gout      History of blood transfusion      Hypertension      Medical non-compliance      Osteonecrosis (H) 7/29/2020    Added automatically from request for surgery [id number]     Pulmonary nodules      Steroid long-term use      Vitamin D deficiency      Past Surgical History:   Procedure Laterality Date     ARTHROPLASTY HIP Left 9/9/2020    Procedure: Left total hip arthroplasty;  Surgeon: Fernando Morillo MD;  [facility]     ARTHROPLASTY HIP Right 4/12/2021    Procedure: Right total hip arthroplasty;  Surgeon: Fernando Morillo MD;  [facility]     AV FISTULA OR GRAFT ARTERIAL       COLONOSCOPY N/A 4/7/2021    Procedure: COLONOSCOPY, WITH POLYPECTOMY AND BIOPSY;  Surgeon: Zak Ortega MD;  [facility]     CREATE FISTULA ARTERIOVENOUS UPPER EXTREMITY Right 7/31/2019    Procedure: Creation Of Atriovenous Fistula Right Upper Arm;  Surgeon: Julia Irwin MD;  [facility]     IR CVC TUNNEL PLACEMENT > 5 YRS OF AGE  10/9/2020      IR DIALYSIS FISTULOGRAM RIGHT  10/9/2020     IR DIALYSIS FISTULOGRAM RIGHT  2021     IR DIALYSIS MECH THROMB, PTA  10/9/2020     IR DIALYSIS STENT W OR W/OUT PTA  2021     LIGATE FISTULA ARTERIOVENOUS UPPER EXTREMITY  2011    Procedure:LIGATE FISTULA ARTERIOVENOUS UPPER EXTREMITY; Excision of Right Forearm Arteriovenous Fistula.; Surgeon:LINDY AMAYA; Location[facility]     PERCUTANEOUS BIOPSY KIDNEY Right 2017    Procedure: PERCUTANEOUS BIOPSY KIDNEY;  Surgeon: Gee Barrios MD;  [facility]     TRANSPLANT  2011    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     acetaminophen (TYLENOL) 325 MG tablet  aspirin (ASA) 81 MG EC tablet  carvedilol (COREG) 12.5 MG tablet  cholecalciferol 25 MCG (1000 UT) TABS  febuxostat (ULORIC) 80 MG TABS tablet  furosemide (LASIX) 20 MG tablet  HYDROmorphone (DILAUDID) 2 MG tablet  mycophenolate (GENERIC EQUIVALENT) 250 MG capsule  oxyCODONE (ROXICODONE) 5 MG tablet  polyethylene glycol (MIRALAX) 17 g packet  senna-docusate (SENOKOT-S/PERICOLACE) 8.6-50 MG tablet  sulfamethoxazole-trimethoprim (BACTRIM) 400-80 MG tablet  tacrolimus (GENERIC EQUIVALENT) 0.5 MG capsule  tacrolimus (GENERIC EQUIVALENT) 1 MG capsule  tamsulosin (FLOMAX) 0.4 MG capsule      No Known Allergies  Family History  Family History   Problem Relation Age of Onset     Hypertension Mother      Colon Cancer Mother 66     Colon Cancer Brother 51     Social History   Social History     Tobacco Use     Smoking status: Former Smoker     Types: Cigarettes     Quit date: 2017     Years since quittin.3     Smokeless tobacco: Never Used     Tobacco comment: Patient states that he is an 'social'  smoker. 3 cigarettes per week per pt   Substance Use Topics     Alcohol use: Not Currently     Alcohol/week: 4.2 standard drinks     Types: 5 Standard drinks or equivalent per week     Comment: Quit 2020     Drug use: Not Currently     Types: Marijuana      Past medical history, past  surgical history, medications, allergies, family history, and social history were reviewed with the patient. No additional pertinent items.       Review of Systems  A complete review of systems was performed with pertinent positives and negatives noted in the HPI, and all other systems negative.    Physical Exam   BP: (!) 197/106  Pulse: 74  Temp: 97.6  F (36.4  C)  Resp: 30  SpO2: 99 %  Physical Exam  Physical Exam   Constitutional: oriented to person, place, and time. appears well-developed and well-nourished.   HENT:   Head: Normocephalic and atraumatic.   Neck: Normal range of motion.   Pulmonary/Chest: Mildly increased respiratory rate.  Bilateral crackles mild.  Cardiac: No murmurs, rubs, gallops. RRR.  Abdominal: Abdomen soft, nontender, nondistended. No rebound tenderness.  MSK: Bilateral lower extremity pitting edema.  Neurological: alert and oriented to person, place, and time.   Skin: Skin is warm and dry.   Psychiatric:  normal mood and affect.  behavior is normal. Thought content normal.     ED Course      Procedures    Critical Care Addendum    My initial assessment, based on my review of prehospital provider report, review of nursing observations, review of vital signs, focused history and physical exam, established that Rashad Ortiz has respiratory insufficiency and severe hypertension, which requires immediate intervention, and therefore he is critically ill.     After the initial assessment, the care team initiated multiple lab tests and initiated medication therapy with nitro infusion, lasix to provide stabilization care. Due to the critical nature of this patient, I reassessed nursing observations, vital signs, physical exam, review of cardiac rhythm monitor and 12 lead ECG analysis multiple times prior to his disposition.     Time also spent performing documentation, reviewing test results, discussion with consultants and coordination of care.     Critical care time (excluding teaching time  and procedures): 40 minutes.          EKG Interpretation:      Interpreted by Jamaal Fink MD  Time reviewed: 0145  Symptoms at time of EKG: SOB   Rhythm: normal sinus   Rate: normal  Axis: normal  Ectopy: none  Conduction: normal  ST Segments/ T Waves: No ST-T wave changes  Q Waves: none  Comparison to prior: Unchanged    Clinical Impression: normal EKG, no peaked T waves    Results for orders placed or performed during the hospital encounter of 07/05/21   POC US ECHO LIMITED     Status: None    Impression    Limited Bedside Cardiac Ultrasound, performed and interpreted by me.   Indication: Shortness of Breath.  Parasternal long axis, parasternal short axis, subcostal and lungs views were acquired.   Image quality was satisfactory.    Findings:    Small pericardial effusion. Bilateral pulmonary edema    IMPRESSION: No right ventricular overload.  Small pericardial effusion.  Bilateral pulmonary edema.     EKG 12-lead, tracing only     Status: None (Preliminary result)   Result Value Ref Range    Interpretation ECG Click View Image link to view waveform and result           Results for orders placed or performed during the hospital encounter of 07/05/21   EKG 12-lead, tracing only     Status: None (Preliminary result)   Result Value Ref Range    Interpretation ECG Click View Image link to view waveform and result      Medications   furosemide (LASIX) injection 40 mg (has no administration in time range)   nitroGLYcerin (NITROSTAT) sublingual tablet 0.4 mg (has no administration in time range)        Assessments & Plan (with Medical Decision Making)   MDM  Patient presenting with pulmonary edema likely due to missed dialysis, possibly hypertensive emergency.  He is good oxygen saturation on room air.  Respiratory rate improved after Lasix and sublingual nitro, nitro infusion started due to persistent hypertension.  I do not feel BiPAP is necessary at this point as he is good oxygen saturation does not appear to  have increased work of breathing after Lasix.  He is otherwise afebrile.  Troponin is within normal limits.  BNP is acutely elevated.  Bedside ultrasound did show small pericardial effusion, this is not seen on ultrasound a year ago.  May be uremic.  No chest pain concerning for dissection.  EKG unchanged, no signs acute ischemia infarction.  Patient be admitted for dialysis and further care.    I have reviewed the nursing notes. I have reviewed the findings, diagnosis, plan and need for follow up with the patient.    New Prescriptions    No medications on file       Final diagnoses:   Acute pulmonary edema (H)   Hypertensive emergency       --  Jamaal Fink  [facility] EMERGENCY DEPARTMENT  7/5/2021     Jamaal Fink MD  07/05/21 0352

## 2021-07-05 NOTE — ED TRIAGE NOTES
Pt reports SOB x2 days that is worsened by laying down. Pt reports chest pain as well that he rates 2/10.

## 2021-07-05 NOTE — CONSULTS
Nephrology Initial Consult  July 5, 2021      Rashad Ortiz MRN:[id number] YOB: 1975  Date of Admission:7/5/2021  Primary care provider: Mariel Garcia  Requesting physician: Kyung Robertson    ASSESSMENT AND RECOMMENDATIONS:   Rashad Ortiz is a 46 yr old male with PMH of HTN, ESRD s/p failed kidney transplant, L hip arthroplasty, R femoral head avascular necrosis s/p total R hip arthroplasty 4/12/21. Admitted on 7/4 for SOB after missing HD session 7/3.     ESKD: s/p failed LDKT; dialyzes TTS at [facility] with Dr. Coleman. Run time: 3 hrs. Access: RUE AVF. EDW: 66 kg. Still on IS tacrolimus, MMF. Pt admitted after missing a dialysis session and being SOB. Undergoing HD today  - Dialysis per TTS schedule, provided pt is inpt  - Consent for dialysis obtained 4/13/2021     Volume: EDW 66 kg; usual UF 2 kg, still with UOP  - Gentle UF today      BP: elevated. Usual pre HD pressures 150-170's, post -150's, lowest pressure on -120's. PTA meds: lasix 20 mg bid  - continue PTA medications     BMD: Ca 8.2; please obtain PTH and phos with AM labs if pt remained inpt     Anemia: hgb ~ 10.0 g/dL; recent labs with ferritin 310, iron 15, iron saturation index is 9, hgb 9-10's; on epogen 6000 units per HD, PO ferrous sulfate 325 mg qday  - Can resume LAWRENCE next week     R femoral head avascular necrosis:  - s/p R hip replacement 4/12    Recommendations were communicated to primary team via this note    Seen and discussed with Dr. Scarlet Shi MD   [phone]      REASON FOR CONSULT: ESRD on HD    HISTORY OF PRESENT ILLNESS:  Admitting provider and nursing notes reviewed  Rashad Ortiz is a 46 year old male with PMH significant for HTN, ESRD s/p failed kidney transplant, L hip arthroplasty, R femoral head avascular necrosis s/p total R hip arthroplasty 4/12/21. Admitted for SOB. Pt denied being sick, having fever's/chills, nausea/vomitng, GI symptoms, chest pain,  SOB. Missed his HD session as he overslept on dialysis day and started to feel SOB and came in for HD.     PAST MEDICAL HISTORY:    Past Medical History:   Diagnosis Date     AVN (avascular necrosis of bone) (H)     left hip     AVN (avascular necrosis of bone) (H)     left hip     BPH (benign prostatic hyperplasia)      Chronic kidney disease, stage 4, severely decreased GFR (H)      Gastro-oesophageal reflux disease      Gout      History of blood transfusion      Hypertension      Medical non-compliance      Osteonecrosis (H) 7/29/2020    Added automatically from request for surgery [id number]     Pulmonary nodules      Steroid long-term use      Vitamin D deficiency        Past Surgical History:   Procedure Laterality Date     ARTHROPLASTY HIP Left 9/9/2020    Procedure: Left total hip arthroplasty;  Surgeon: Fernando Morillo MD;  Location: [facility] OR     ARTHROPLASTY HIP Right 4/12/2021    Procedure: Right total hip arthroplasty;  Surgeon: Fernando Morillo MD;  Location: [facility] OR     AV FISTULA OR GRAFT ARTERIAL       COLONOSCOPY N/A 4/7/2021    Procedure: COLONOSCOPY, WITH POLYPECTOMY AND BIOPSY;  Surgeon: Zak Ortega MD;  Location: [facility] GI     CREATE FISTULA ARTERIOVENOUS UPPER EXTREMITY Right 7/31/2019    Procedure: Creation Of Atriovenous Fistula Right Upper Arm;  Surgeon: Julia Irwin MD;  Location: [facility] OR     IR CVC TUNNEL PLACEMENT > 5 YRS OF AGE  10/9/2020     IR DIALYSIS FISTULOGRAM RIGHT  10/9/2020     IR DIALYSIS FISTULOGRAM RIGHT  2/19/2021     IR DIALYSIS MECH THROMB, PTA  10/9/2020     IR DIALYSIS STENT W OR W/OUT PTA  2/19/2021     LIGATE FISTULA ARTERIOVENOUS UPPER EXTREMITY  12/20/2011    Procedure:LIGATE FISTULA ARTERIOVENOUS UPPER EXTREMITY; Excision of Right Forearm Arteriovenous Fistula.; Surgeon:LINDY AMAYA; Location:[facility] OR     PERCUTANEOUS BIOPSY KIDNEY Right 2/28/2017    Procedure: PERCUTANEOUS BIOPSY KIDNEY;  Surgeon: Gee Barrios MD;  [facility]     TRANSPLANT   01/13/2011    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 Auth Provider   acetaminophen (TYLENOL) 325 MG tablet Take 2 tablets (650 mg) by mouth every 4 hours as needed for other  at prn Yes Tiffanie Gottlieb APRN CNS   carvedilol (COREG) 12.5 MG tablet Take 1 tablet (12.5 mg) by mouth every morning  Patient taking differently: Take 25 mg by mouth every morning  7/5/2021 at am Yes Shara oMhamud MD   carvedilol (COREG) 25 MG tablet Take 25 mg by mouth every morning  at am Yes Reported, Patient   cholecalciferol 25 MCG (1000 UT) TABS Take 2,000 Units by mouth daily 7/5/2021 at am Yes Stef Murillo MD   febuxostat (ULORIC) 80 MG TABS tablet Take 1 tablet (80 mg) by mouth daily 7/5/2021 at am Yes Stef Murillo MD   furosemide (LASIX) 20 MG tablet Take 1 tablet (20 mg) by mouth daily 7/5/2021 at am Yes Shara Mohamud MD   HYDROmorphone (DILAUDID) 2 MG tablet Take 1-2 tablets (2-4 mg) by mouth every 4 hours as needed for moderate to severe pain  at prn Yes Tiffanie Gottlieb APRN CNS   mycophenolate (GENERIC EQUIVALENT) 250 MG capsule Take 1 capsule (250 mg) by mouth 2 times daily 7/5/2021 at am Yes Reyes Donovan MD   oxyCODONE (ROXICODONE) 5 MG tablet Take 1-2 tablets (5-10 mg) by mouth every 4 hours as needed for pain  at prn Yes Fernando Morillo MD   sulfamethoxazole-trimethoprim (BACTRIM) 400-80 MG tablet Take one tablet every Monday, Wednesday, Friday 7/5/2021 at am Yes Reyes Donovan MD   tacrolimus (GENERIC EQUIVALENT) 0.5 MG capsule Take 1 capsule (0.5 mg) by mouth every evening Total dose = 1 mg in the AM and 1.5 mg in the PM 7/5/2021 at am Yes Abran Cunha MD   tacrolimus (GENERIC EQUIVALENT) 1 MG capsule Take 1 capsule (1 mg) by mouth 2 times daily . Total dose=1mg in the AM and 1.5mg in the PM  Patient taking differently: Take 1 mg by mouth every morning . Total dose=1mg in the AM and 1.5mg in the  PM  Yes Abran Cunha MD   aspirin (ASA) 81 MG EC tablet Take 2 tablets (162 mg) by mouth daily  Patient not taking: Reported on 2021 Not Taking at Unknown time  Tiffanie Gottlieb APRN CNS   polyethylene glycol (MIRALAX) 17 g packet Take 17 g by mouth daily  Patient not taking: Reported on 2021 Not Taking at Unknown time  Tiffanie Gottlieb APRN CNS   senna-docusate (SENOKOT-S/PERICOLACE) 8.6-50 MG tablet Take 1 tablet by mouth 2 times daily  Patient not taking: Reported on 2021 Not Taking at Unknown time  Tiffanie Gottlieb APRN CNS   tamsulosin (FLOMAX) 0.4 MG capsule    Reported, Patient      Current Meds    aspirin  162 mg Oral Daily     [START ON 2021] carvedilol  25 mg Oral QAM     febuxostat  80 mg Oral Daily     furosemide  20 mg Oral Daily     heparin ANTICOAGULANT  5,000 Units Subcutaneous Q12H     mycophenolate  250 mg Oral BID     polyethylene glycol  17 g Oral Daily     senna-docusate  1 tablet Oral BID     sodium chloride (PF)  3 mL Intracatheter Q8H     sulfamethoxazole-trimethoprim  1 tablet Oral Once per day on      tacrolimus  1 mg Oral Daily     tacrolimus  1.5 mg Oral QPM     Vitamin D3  2,000 Units Oral Daily     Infusion Meds      ALLERGIES:    No Known Allergies    REVIEW OF SYSTEMS:  A comprehensive of systems was negative except as noted above.    SOCIAL HISTORY:   Social History     Socioeconomic History     Marital status:      Spouse name: Not on file     Number of children: Not on file     Years of education: Not on file     Highest education level: Not on file   Occupational History     Not on file   Social Needs     Financial resource strain: Not on file     Food insecurity     Worry: Not on file     Inability: Not on file     Transportation needs     Medical: Not on file     Non-medical: Not on file   Tobacco Use     Smoking status: Former Smoker     Types: Cigarettes     Quit date: 2017     Years since quittin.3     Smokeless tobacco:  Never Used     Tobacco comment: Patient states that he is an 'social'  smoker. 3 cigarettes per week per pt   Substance and Sexual Activity     Alcohol use: Not Currently     Alcohol/week: 4.2 standard drinks     Types: 5 Standard drinks or equivalent per week     Comment: Quit 2020     Drug use: Not Currently     Types: Marijuana     Sexual activity: Never     Partners: Female     Birth control/protection: None   Lifestyle     Physical activity     Days per week: Not on file     Minutes per session: Not on file     Stress: Not on file   Relationships     Social connections     Talks on phone: Not on file     Gets together: Not on file     Attends Synagogue service: Not on file     Active member of club or organization: Not on file     Attends meetings of clubs or organizations: Not on file     Relationship status: Not on file     Intimate partner violence     Fear of current or ex partner: Not on file     Emotionally abused: Not on file     Physically abused: Not on file     Forced sexual activity: Not on file   Other Topics Concern     Parent/sibling w/ CABG, MI or angioplasty before 65F 55M? Not Asked   Social History Narrative    Rashad lives with his wife and 2 children. There are 2 declawed cats. 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      FAMILY MEDICAL HISTORY:   Family History   Problem Relation Age of Onset     Hypertension Mother      Colon Cancer Mother 66     Colon Cancer Brother 51     PHYSICAL EXAM:   Temp  Av.8  F (36.6  C)  Min: 97.6  F (36.4  C)  Max: 98.1  F (36.7  C)      Pulse  Av.4  Min: 60  Max: 79 Resp  Av  Min: 30  Max: 30  SpO2  Av.5 %  Min: 91 %  Max: 100 %       BP (!) 161/101   Pulse 70   Temp 97.7  F (36.5  C) (Oral)   Resp 30   Wt 67.2 kg (148 lb 2.4 oz)   SpO2 97%   BMI 22.53 kg/m     Date 21 07 - 21 0659   Shift 6556-4565 9041-6827 6738-2824 24 Hour Total   INTAKE   Other 0   0   Shift Total(mL/kg) 0(0)   0(0)   OUTPUT   Urine   50  50   Other 2000 2000   Shift Total(mL/kg) 2000(29.76) 50(0.74)  2050(30.51)   Weight (kg) 67.2 67.2 67.2 67.2      Admit Weight: 74 kg (163 lb 3.2 oz)     GENERAL APPEARANCE: no distress, pt awake  EYES: no scleral icterus, pupils equal  Endo: no goiter, no moon facies  Pulmonary: lungs clear to auscultation with equal breath sounds bilaterally  CV: regular rhythm, normal rate, no rub   - Edema noted  GI: soft, nontender, normal bowel sounds  MS: no evidence of inflammation in joints, no muscle tenderness  : no butler  SKIN: no rash, warm, dry, no cyanosis  NEURO: face symmetric, no asterixis     LABS:   CMP  Recent Labs   Lab 07/05/21 0221 07/05/21 0218    141   POTASSIUM 4.6 4.5   CHLORIDE  --  111*   CO2  --  18*   ANIONGAP  --  12   GLC 88 89   BUN  --  80*   CR  --  14.30*   GFRESTIMATED  --  4*   GFRESTBLACK  --  4*   ASHELY  --  8.2*   PROTTOTAL  --  7.0   ALBUMIN  --  2.9*   BILITOTAL  --  0.4   ALKPHOS  --  158*   AST  --  14   ALT  --  18     CBC  Recent Labs   Lab 07/05/21 0221 07/05/21 0218   HGB 9.5* 8.0*   WBC  --  5.6   RBC  --  3.15*   HCT  --  26.2*   MCV  --  83   MCH  --  25.4*   MCHC  --  30.5*   RDW  --  19.3*   PLT  --  159     URINE STUDIES  Recent Labs   Lab Test 12/21/20  1545 09/09/20  1233 06/13/20  1010 12/11/18  1211 10/25/18  1210 01/14/15  2154 01/14/15  2154   COLOR Yellow Yellow Yellow Straw Yellow   < > Yellow   APPEARANCE Clear Clear Clear Clear Cloudy   < > Clear   URINEGLC Negative Negative Negative 50* Negative   < > Negative   URINEBILI Negative Negative Negative Negative Negative   < > Negative   URINEKETONE Negative 5* Negative Negative Negative   < > Negative   SG 1.015 1.015 1.010 1.012 1.020   < > 1.020   UBLD Trace* Negative Trace* Small* Large*   < > Trace*   URINEPH 6.0 8.0* 6.5 6.0 6.5   < > 5.5   PROTEIN 100* 30* 30* 100* >=300*   < > Trace*   UROBILINOGEN 0.2  --  0.2  --  0.2  --  0.2   NITRITE Negative Negative Negative Negative Negative   < >  Negative   LEUKEST Trace* Negative Negative Negative Moderate*   < > Negative   RBCU O - 2 <1 O - 2 1 25-50*   < > O - 2   WBCU 0 - 5 2 0 - 5 1 >100*   < > O - 2    < > = values in this interval not displayed.     Recent Labs   Lab Test 03/18/20  0728 07/24/19  1713 03/19/19  1527 11/01/18  1453 03/17/18  1035 08/04/17  1839 02/28/17  0809 09/17/16  1050 09/12/16  1617 06/12/15  0803 05/27/14  1509   UTPG 0.64* 0.26* 2.92* 0.85* 0.82* 0.17 0.29* 0.35* 0.44* 2.09* 0.05     PTH  Recent Labs   Lab Test 06/13/19  1941 01/06/18  0947 02/13/16  0930   PTHI 456* 621* 503*     IRON STUDIES  Recent Labs   Lab Test 06/20/20  1112 03/18/20  0723 10/10/19  1650 07/24/19  1702 06/13/19  1941 01/06/18  0947   IRON 15* 54 42 152 46 62   * 179* 181* 202* 215* 219*   IRONSAT 9* 30 23 75* 21 28   ORALIA 310 460* 790* 406* 436* 256       Radha Shi MD

## 2021-07-05 NOTE — PROGRESS NOTES
Notified regarding pt with ESRD on HD TTS, missed Sat run and now presents with volume overload and pulmonary edema. Stable on RA per discussion with primary team. K and pH acceptable.  - Ordered 3 hr run with goal UF 3L off, may need to run again tomorrow  - Please obtain daily weights    Bety Granados MD  Renal Fellow  [phone]

## 2021-07-05 NOTE — PROGRESS NOTES
Medicine Triage Note    47 y/o M with PMH of ESRD on HD Tu/Th/Sa, failed kidney tx in 2011, who presented with dypsnea, orthopnea, chest pain, found to have elevated BNP and some vascular congestion on CXR, and HTN to BP in 180s/110s.  Responded to lasix 40mg IV.  Started on nitroglycerine drip in the ED. Nephrology consult placed in the ED but not called.      [place] [facility] [place]

## 2021-07-05 NOTE — ED NOTES
Nitroglycerin gtt stopped at 0554 per [facility] pharmacy policy. MD Fink notified.

## 2021-07-05 NOTE — ED NOTES
Nitroglycerin gtt initiated at 0354 with RN Mandy SALINAS as witness. Medication can be given peripherally for 2 hours per [facility] pharmacy. Patient understanding of need for med. Started at 10 mcg/min for hypertension. Will assess need to titrate per patient vital signs and tolerance of med.

## 2021-07-06 ENCOUNTER — APPOINTMENT (OUTPATIENT)
Dept: ULTRASOUND IMAGING | Facility: CLINIC | Age: 46
DRG: 189 | End: 2021-07-06
Attending: INTERNAL MEDICINE
Payer: COMMERCIAL

## 2021-07-06 VITALS
TEMPERATURE: 98.7 F | RESPIRATION RATE: 18 BRPM | DIASTOLIC BLOOD PRESSURE: 101 MMHG | SYSTOLIC BLOOD PRESSURE: 154 MMHG | WEIGHT: 143.74 LBS | HEART RATE: 72 BPM | BODY MASS INDEX: 21.86 KG/M2 | OXYGEN SATURATION: 96 %

## 2021-07-06 LAB
ANION GAP SERPL CALCULATED.3IONS-SCNC: 11 MMOL/L (ref 3–14)
BUN SERPL-MCNC: 44 MG/DL (ref 7–30)
CALCIUM SERPL-MCNC: 8 MG/DL (ref 8.5–10.1)
CHLORIDE SERPL-SCNC: 102 MMOL/L (ref 94–109)
CO2 SERPL-SCNC: 22 MMOL/L (ref 20–32)
CREAT SERPL-MCNC: 9.48 MG/DL (ref 0.66–1.25)
GFR SERPL CREATININE-BSD FRML MDRD: 6 ML/MIN/{1.73_M2}
GLUCOSE SERPL-MCNC: 84 MG/DL (ref 70–99)
PHOSPHATE SERPL-MCNC: 4.5 MG/DL (ref 2.5–4.5)
POTASSIUM SERPL-SCNC: 3.5 MMOL/L (ref 3.4–5.3)
SODIUM SERPL-SCNC: 135 MMOL/L (ref 133–144)
TACROLIMUS BLD-MCNC: <3 UG/L (ref 5–15)
TME LAST DOSE: ABNORMAL H

## 2021-07-06 PROCEDURE — 99239 HOSP IP/OBS DSCHRG MGMT >30: CPT | Mod: GC | Performed by: HOSPITALIST

## 2021-07-06 PROCEDURE — 90937 HEMODIALYSIS REPEATED EVAL: CPT

## 2021-07-06 PROCEDURE — 93970 EXTREMITY STUDY: CPT | Mod: 26

## 2021-07-06 PROCEDURE — 250N000012 HC RX MED GY IP 250 OP 636 PS 637: Performed by: STUDENT IN AN ORGANIZED HEALTH CARE EDUCATION/TRAINING PROGRAM

## 2021-07-06 PROCEDURE — 250N000011 HC RX IP 250 OP 636: Performed by: STUDENT IN AN ORGANIZED HEALTH CARE EDUCATION/TRAINING PROGRAM

## 2021-07-06 PROCEDURE — 36415 COLL VENOUS BLD VENIPUNCTURE: CPT | Performed by: FAMILY MEDICINE

## 2021-07-06 PROCEDURE — 99233 SBSQ HOSP IP/OBS HIGH 50: CPT | Mod: GC | Performed by: INTERNAL MEDICINE

## 2021-07-06 PROCEDURE — 250N000013 HC RX MED GY IP 250 OP 250 PS 637: Performed by: STUDENT IN AN ORGANIZED HEALTH CARE EDUCATION/TRAINING PROGRAM

## 2021-07-06 PROCEDURE — 99207 PR CDG-CHARGE REQUIRED MANUAL ENTRY: CPT | Performed by: HOSPITALIST

## 2021-07-06 PROCEDURE — 93970 EXTREMITY STUDY: CPT

## 2021-07-06 PROCEDURE — 84100 ASSAY OF PHOSPHORUS: CPT | Performed by: FAMILY MEDICINE

## 2021-07-06 PROCEDURE — 80048 BASIC METABOLIC PNL TOTAL CA: CPT | Performed by: FAMILY MEDICINE

## 2021-07-06 PROCEDURE — 258N000003 HC RX IP 258 OP 636: Performed by: INTERNAL MEDICINE

## 2021-07-06 PROCEDURE — 80197 ASSAY OF TACROLIMUS: CPT | Performed by: FAMILY MEDICINE

## 2021-07-06 RX ORDER — CARVEDILOL 25 MG/1
25 TABLET ORAL 2 TIMES DAILY WITH MEALS
Status: COMPLETED | OUTPATIENT
Start: 2021-07-06 | End: 2021-07-06

## 2021-07-06 RX ORDER — CARVEDILOL 25 MG/1
25 TABLET ORAL 2 TIMES DAILY WITH MEALS
Status: DISCONTINUED | OUTPATIENT
Start: 2021-07-07 | End: 2021-07-07 | Stop reason: HOSPADM

## 2021-07-06 RX ORDER — CARVEDILOL 12.5 MG/1
25 TABLET ORAL 2 TIMES DAILY WITH MEALS
Start: 2021-07-06 | End: 2022-01-01

## 2021-07-06 RX ORDER — FUROSEMIDE 20 MG
20 TABLET ORAL 2 TIMES DAILY
Start: 2021-07-06 | End: 2021-07-19

## 2021-07-06 RX ADMIN — CARVEDILOL 25 MG: 25 TABLET, FILM COATED ORAL at 20:46

## 2021-07-06 RX ADMIN — HYDRALAZINE HYDROCHLORIDE 10 MG: 20 INJECTION INTRAMUSCULAR; INTRAVENOUS at 03:59

## 2021-07-06 RX ADMIN — HEPARIN SODIUM 5000 UNITS: 5000 INJECTION, SOLUTION INTRAVENOUS; SUBCUTANEOUS at 21:43

## 2021-07-06 RX ADMIN — HEPARIN SODIUM 5000 UNITS: 5000 INJECTION, SOLUTION INTRAVENOUS; SUBCUTANEOUS at 08:12

## 2021-07-06 RX ADMIN — MYCOPHENOLATE MOFETIL 250 MG: 250 CAPSULE ORAL at 20:07

## 2021-07-06 RX ADMIN — FUROSEMIDE 20 MG: 20 TABLET ORAL at 08:12

## 2021-07-06 RX ADMIN — TACROLIMUS 1 MG: 1 CAPSULE ORAL at 08:12

## 2021-07-06 RX ADMIN — CARVEDILOL 25 MG: 25 TABLET, FILM COATED ORAL at 08:12

## 2021-07-06 RX ADMIN — TACROLIMUS 1.5 MG: 1 CAPSULE ORAL at 20:07

## 2021-07-06 RX ADMIN — FEBUXOSTAT 80 MG: 40 TABLET, FILM COATED ORAL at 08:12

## 2021-07-06 RX ADMIN — SODIUM CHLORIDE 300 ML: 9 INJECTION, SOLUTION INTRAVENOUS at 17:00

## 2021-07-06 RX ADMIN — Medication: at 17:00

## 2021-07-06 RX ADMIN — MYCOPHENOLATE MOFETIL 250 MG: 250 CAPSULE ORAL at 08:11

## 2021-07-06 RX ADMIN — Medication 2000 UNITS: at 08:12

## 2021-07-06 RX ADMIN — ACETAMINOPHEN 1000 MG: 500 TABLET, FILM COATED ORAL at 09:19

## 2021-07-06 RX ADMIN — SODIUM CHLORIDE 250 ML: 9 INJECTION, SOLUTION INTRAVENOUS at 17:00

## 2021-07-06 ASSESSMENT — ACTIVITIES OF DAILY LIVING (ADL)
ADLS_ACUITY_SCORE: 12

## 2021-07-06 NOTE — PLAN OF CARE
A: A&Ox4, calm & cooperative. VS unchanged, BP monitored closely, no prns utilized during this shift. SR. Afebrile. Episode of HA, managed with prn tylenol. Denies nausea. Renal diet. Electrolytes WNL. Plan for HD this evening. Up ad gilberto to bathroom. R fistula WDL. Able to make needs known. Plan for pt to discharge after HD.     I/O this shift:  In: 120 [P.O.:120]  Out: -     Temp:  [97.5  F (36.4  C)-98.8  F (37.1  C)] 98.8  F (37.1  C)  Pulse:  [60-88] 83  Resp:  [18-24] 18  BP: (147-171)/() 156/95  SpO2:  [91 %-100 %] 97 %     R: Continue with POC. Notify primary team with changes.

## 2021-07-06 NOTE — DISCHARGE SUMMARY
[facility]  Discharge Summary - Medicine & Pediatrics       Date of Admission:  7/5/2021  Date of Discharge:  7/6/2021 11:00 PM  Discharging Provider: Dr. Edgar Sue  Discharge Service: Javier 5    Discharge Diagnoses   Hypertensive emergency  Acute pulmonary edema  End stage renal disease    Follow-ups Needed After Discharge   Follow up with primary care    Unresulted Labs Ordered in the Past 30 Days of this Admission     Date and Time Order Name Status Description    7/6/2021 0351 Tacrolimus level In process           Discharge Disposition   Discharged to home  Condition at discharge: Stable    Hospital Course   Rashad Ortiz was admitted on 7/5/2021 for hypertensive emergency and fluid overload in the setting of missed dialysis.  The following problems were addressed during his hospitalization:    Hypertensive emergency  Acute pulmonary edema  End stage renal disease  Mr. Ortiz presented with shortness of breat in the setting of missing 1 run of dialysis. He was found to have pulmonary edema on chest xray with a systolic blood pressure > 180. He was given 1 dose of IV lasix with minimal response. He underwent dialysis on 7/5 and 7/6 with improvement in his respiratory symptoms and blood pressure. Prior to discharge, he had another elevated blood pressure reading that resolved with his PM dose of Carvedilol. At the time of discharge, his shortness of breath had resolved. He was discharged with no changes to his home medications.     Left lower extremity edema  Chronic lymphedema since kidney transplant. Nephrology recommended lower extremity venous doppler. No evidence of DVT.    Pneumonitis  In addition to pulmonary edema, his xray showed possible left lung base pneumonitis. Plan for repeat xray in about 2 weeks and follow up with primary care.     Consultations This Hospital Stay   NEPHROLOGY ESRD ADULT IP CONSULT    Code Status   Full Code       The  patient was discussed with MD Javier Haji 5 Service  [facility] [address]  [address] [place] [address]  Phone: [phone]  ______________________________________________________________________    Physical Exam   Vital Signs: Temp: 97.5  F (36.4  C) Temp src: Oral BP: (!) 150/102 Pulse: 83   Resp: 18 SpO2: 98 % O2 Device: None (Room air) Oxygen Delivery: 3 LPM  Weight: 145 lbs 15.11 oz  GENERAL APPEARANCE: Alert and cooperative, in no acute distress.  CARDIAC: RRR. Normal S1, S2. No M/R/G.   LUNGS: On RA, intermittent non-productive cough. Faint crackles bilaterally.   EXTREMITIES: +non-pitting edema of left LE. No edema of RLE.  Capillary refill is less than 2 seconds.   ABDOMEN: + BS. Soft, nondistended, nontender. No guarding or rebound. No masses.  NEUROLOGICAL: AxO x3. CN II-XII grossly intact.   SKIN: No rash on brief exam.   PSYCH: Normal Affect.         Primary Care Physician   Mariel Contreras [place]    Discharge Orders   No discharge procedures on file.    Significant Results and Procedures   Most Recent 3 CBC's:  Recent Labs   Lab Test 07/05/21 0221 07/05/21 0218 04/14/21  0645 04/13/21  0653 04/09/21  1044   WBC  --  5.6  --  8.6 6.8   HGB 9.5* 8.0* 9.6* 10.0* 10.9*   MCV  --  83  --  83 85   PLT  --  159  --  194 195     Most Recent 3 BMP's:  Recent Labs   Lab Test 07/06/21  0450 07/05/21 0221 07/05/21 0218 04/13/21  0653    143 141 137   POTASSIUM 3.5 4.6 4.5 4.6   CHLORIDE 102  --  111* 107   CO2 22  --  18* 22   BUN 44*  --  80* 48*   CR 9.48*  --  14.30* 7.93*   ANIONGAP 11  --  12 8   ASHELY 8.0*  --  8.2* 8.0*   GLC 84 88 89 121*   ,   Results for orders placed or performed during the hospital encounter of 07/05/21   POC US ECHO LIMITED    Impression    Limited Bedside Cardiac Ultrasound, performed and interpreted by me.   Indication: Shortness of Breath.  Parasternal long axis, parasternal short axis, subcostal and lungs  views were acquired.   Image quality was satisfactory.    Findings:    Small pericardial effusion. Bilateral pulmonary edema    IMPRESSION: No right ventricular overload.  Small pericardial effusion.  Bilateral pulmonary edema.     XR Chest Port 1 View    Narrative    EXAM: XR CHEST PORT 1 VIEW  LOCATION: [facility]  DATE/TIME: 7/5/2021 2:12 AM    INDICATION: Shortness of breath  COMPARISON: 10/19/2020      Impression    IMPRESSION: Interval placement of right subclavian/axillary stent. Heart size is enlarged. Prominence of central pleural vasculature, an increased interstitial and airspace opacities with a lower lobe predominance are compatible with moderate pulmonary   edema. Associated pneumonitis cannot be excluded in the left lung base. No pneumothorax. Probable trace effusions.   US Lower Extremity Venous Duplex Bilateral    Narrative    EXAMINATION: DOPPLER VENOUS ULTRASOUND OF BILATERAL LOWER EXTREMITIES,  7/6/2021 9:11 PM     COMPARISON: 9/25/2017    HISTORY: Acute on chronic bilateral lower extremity swelling, concern  for DVT    TECHNIQUE:  Gray-scale evaluation with compression, spectral flow and  color Doppler assessment of the deep venous system of both legs from  groin to knee, and then at the ankles.    FINDINGS:  In both lower extremities, the common femoral, femoral, popliteal and  posterior tibial veins demonstrate normal compressibility and blood  flow. Bilateral soft tissue edema of the lower extremities.      Impression    IMPRESSION:  1. No evidence of deep venous thrombosis in either lower extremity.  2. Bilateral soft tissue edema of the lower extremities.    I have personally reviewed the examination and initial interpretation  and I agree with the findings.    YAW GARCIA MD         SYSTEM ID:  [id number]     *Note: Due to a large number of results and/or encounters for the requested time period, some results have not been displayed. A complete set of results can be found  in Results Review.       Discharge Medications   Current Discharge Medication List      CONTINUE these medications which have NOT CHANGED    Details   acetaminophen (TYLENOL) 325 MG tablet Take 2 tablets (650 mg) by mouth every 4 hours as needed for other  Qty: 60 tablet, Refills: 0    Comments: Discharge today  Associated Diagnoses: Avascular necrosis of femoral head, right (H)      !! carvedilol (COREG) 12.5 MG tablet Take 1 tablet (12.5 mg) by mouth every morning  Qty: 30 tablet, Refills: 0    Associated Diagnoses: HTN, kidney transplant related      !! carvedilol (COREG) 25 MG tablet Take 25 mg by mouth every morning      cholecalciferol 25 MCG (1000 UT) TABS Take 2,000 Units by mouth daily  Qty: 90 tablet, Refills: 3    Associated Diagnoses: Vitamin D deficiency      febuxostat (ULORIC) 80 MG TABS tablet Take 1 tablet (80 mg) by mouth daily  Qty: 90 tablet, Refills: 3    Associated Diagnoses: Gout, unspecified cause, unspecified chronicity, unspecified site      furosemide (LASIX) 20 MG tablet Take 1 tablet (20 mg) by mouth daily  Qty: 30 tablet, Refills: 0    Associated Diagnoses: HTN, kidney transplant related      HYDROmorphone (DILAUDID) 2 MG tablet Take 1-2 tablets (2-4 mg) by mouth every 4 hours as needed for moderate to severe pain  Qty: 40 tablet, Refills: 0    Associated Diagnoses: Avascular necrosis of femoral head, right (H)      mycophenolate (GENERIC EQUIVALENT) 250 MG capsule Take 1 capsule (250 mg) by mouth 2 times daily  Qty: 60 capsule, Refills: 1    Associated Diagnoses: Kidney transplanted; Kidney transplant recipient; Long-term use of immunosuppressant medication      oxyCODONE (ROXICODONE) 5 MG tablet Take 1-2 tablets (5-10 mg) by mouth every 4 hours as needed for pain  Qty: 40 tablet, Refills: 0    Associated Diagnoses: Status post hip surgery      sulfamethoxazole-trimethoprim (BACTRIM) 400-80 MG tablet Take one tablet every Monday, Wednesday, Friday  Qty: 45 tablet, Refills: 3     Associated Diagnoses: Kidney transplanted; Need for pneumocystis prophylaxis      tacrolimus (GENERIC EQUIVALENT) 0.5 MG capsule Take 1 capsule (0.5 mg) by mouth every evening Total dose = 1 mg in the AM and 1.5 mg in the PM  Qty: 30 capsule, Refills: 11    Associated Diagnoses: Kidney transplant recipient; Long-term use of immunosuppressant medication; Kidney transplanted      tacrolimus (GENERIC EQUIVALENT) 1 MG capsule Take 1 capsule (1 mg) by mouth 2 times daily . Total dose=1mg in the AM and 1.5mg in the PM  Qty: 60 capsule, Refills: 11    Associated Diagnoses: Kidney transplant recipient; Long-term use of immunosuppressant medication; Kidney transplanted      aspirin (ASA) 81 MG EC tablet Take 2 tablets (162 mg) by mouth daily  Qty: 60 tablet, Refills: 0    Associated Diagnoses: Avascular necrosis of femoral head, right (H)      polyethylene glycol (MIRALAX) 17 g packet Take 17 g by mouth daily  Qty: 7 packet, Refills: 0    Associated Diagnoses: Avascular necrosis of femoral head, right (H)      senna-docusate (SENOKOT-S/PERICOLACE) 8.6-50 MG tablet Take 1 tablet by mouth 2 times daily  Qty: 30 tablet, Refills: 0    Associated Diagnoses: Avascular necrosis of femoral head, right (H)      tamsulosin (FLOMAX) 0.4 MG capsule        !! - Potential duplicate medications found. Please discuss with provider.        Allergies   No Known Allergies

## 2021-07-06 NOTE — PROGRESS NOTES
Patient arrived to unit at 17:00.   No signs of acute distress.   Patient denies chest pain, fever, or sob.  Afebrile.   A&Ox4, Full use of extremities; moves independently, Follows.  CV: -160's. Provider DC'd nitroglycerin drip at 17:40, expanded SBP < 170, and added PRN intervention. Recheck BP Q2hrs to keep SBP within goal < 170.  LE edema L>R  RA  GI/GN: Oliguria; Ambulates to bathroom and voids on own.   Skin: L PIV x2 SL  RUE AVF    HD today -- 2 liters brought off.    Plan: HD tomorrow

## 2021-07-06 NOTE — PLAN OF CARE
Neuro: Patient is A&Ox4, follows commands, pleasant and cooperative. Denied pain overnight.  CV: Sinus Rhythm, rates 70s-80s. Per MD, to do Bps q2hrs to ensure SBP goal <170, PRN hydralazine administered once at 4am with positive results.  Resp: Room air, crackles in bases, satts >92%.   GI/: Renal diet. Oliguric. Loose BM X1 at bedtime.  Lines/gtts: PIV x2, right upper arm fistula.    Plan: Continue to monitor. Update team with any acute changes in patient condition. Anticipate Dialysis today.    For vital signs and complete assessments, please see documentation flowsheets.

## 2021-07-06 NOTE — UTILIZATION REVIEW
[facility] Utilization Review  Admission Status; Secondary Review Determination     Admission Date: 7/5/2021  1:33 AM      Under the authority of the Utilization Management Committee, the utilization review process indicated a secondary review on the above patient.  The review outcome is based on review of the medical records, discussions with staff, and applying clinical experience noted on the date of the review.        (X)      Inpatient Status Appropriate - This patient's medical care is consistent with medical management for inpatient care and reasonable inpatient medical practice.          RATIONALE FOR DETERMINATION   46-year-old male with history of hypertension, ESRD status post kidney transplant with rejection, on hemodialysis, admitted for hypertensive urgency/emergency after missing dialysis with shortness of breath, and acute pulmonary edema.  Patient started on nitro drip, patient also found to have elevated troponin with BNP greater than 50,000 and creatinine 14.3.  Received IV Lasix, and sublingual nitroglycerin, then started on nitro drip.  Complex immunosuppressed patient who missed dialysis, now admitted with hypertensive urgency and pulmonary edema, patient continues to have elevated blood pressures, had dialysis today, likely needs dialysis tomorrow as well, with close monitoring in the hospital and ongoing interventions, recommend continue inpatient status      The severity of illness, intensity of service provided, expected LOS and risk for adverse outcome make the care complex, high risk and appropriate for hospital admission.The patient requires hospital based medical care which is anticipated to require a stay of 2 or more midnights.        The information on this document is developed by the utilization review team in order for the business office to ensure compliance.  This only denotes the appropriateness of proper admission status and does not reflect the quality of care  rendered.              Sincerely,       Mickey Parsons MD  Physician Advisor  Utilization Review-[place]    Phone: [phone]

## 2021-07-07 ENCOUNTER — TELEPHONE (OUTPATIENT)
Dept: FAMILY MEDICINE | Facility: CLINIC | Age: 46
End: 2021-07-07

## 2021-07-07 ENCOUNTER — PATIENT OUTREACH (OUTPATIENT)
Dept: CARE COORDINATION | Facility: CLINIC | Age: 46
End: 2021-07-07

## 2021-07-07 DIAGNOSIS — Z71.89 OTHER SPECIFIED COUNSELING: ICD-10-CM

## 2021-07-07 NOTE — PROGRESS NOTES
Clinic Care Coordination Contact      Patient has a follow up appointment with a provider within 24-48 hours of hospital discharge. Will cancel/close post-hospital call rom [facility] at this time.    Karey Ray MA  [facility], [facility]

## 2021-07-07 NOTE — TELEPHONE ENCOUNTER
[facility], Transplant Case Management, Organ Transplant, [facility].   Calling an FYI to Mariel Mares MD,   Nhung is covering for his primary , Alessandra.    After his hospital stay, doing okay, is aware needs to schedule an appointment with Mariel Mares MD in 2 weeks and plans to do so.  Transplant team are available to help support plan of care if needed .  Feel free to reach out.    Carey Shirley RN

## 2021-07-07 NOTE — PROGRESS NOTES
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 Admitted on 7/4 for SOB after missing HD session 7/3.     ESKD: s/p failed LDKT; dialyzes TTS at [facility] with Dr. Coleman. Run time: 3 hrs. Access: RUE AVF. EDW: 66 kg. Still on IS tacrolimus, MMF. Pt admitted after missing a dialysis session and being SOB. Underwent HD on  Monday and again today per his TTS schedule.  - Dialysis per TTS schedule, provided pt is inpt  - Consent for dialysis obtained 4/13/2021     Volume: EDW 66 kg and pt at little below his dry weight; still with UOP  - no UF today      BP: elevated. Usual pre HD pressures 150-170's, post -150's, lowest pressure on -120's. PTA meds: lasix 20 mg bid  - continue PTA medications     BMD: Ca 8.0; please obtain PTH and phos with AM labs if pt remained inpt     Anemia: hgb ~ 10.0 g/dL; recent labs with ferritin 310, iron 15, iron saturation index is 9, hgb 9-10's; on epogen 6000 units per HD, PO ferrous sulfate 325 mg qday  - Can resume [facility] next week     R femoral head avascular necrosis:  - s/p R hip replacement 4/12    B/l LE edema : pt at his dry weight but noted to have LE swelling, non pitting, more around his ankles, r> L. ? About right femoral hip replacement caused it or others.  - b/l LE venous duplex ordered.     Recommendations were communicated to primary team via this note     Seen and discussed with Dr. Scarlet Shi MD   [phone]    Interval History :   Nursing and provider notes from last 24 hours reviewed.  In the last 24 hours Rashad Ortiz been stable. No new symptoms this morning.     Physical Exam:   I/O last 3 completed shifts:  In: 240 [P.O.:240]  Out: 450 [Urine:450]   BP (!) 186/103   Pulse 59   Temp 98.9  F (37.2  C) (Oral)   Resp 27   Wt  65.2 kg (143 lb 11.8 oz)   SpO2 97%   BMI 21.86 kg/m       General : Pt awake, not in acute distress   Lungs : anterior lung fields are clear  Cardiac : S1, S2 present  Abdomen : Soft/ND/NT  LE : Edema noted  Dialysis Access : right AVF    Labs:   All labs reviewed by me  Electrolytes/Renal -   Recent Labs   Lab Test 07/06/21  0450 07/05/21 0221 07/05/21 0218 04/13/21  0653 09/10/20  0735 09/10/20  0735 10/15/19  1734 10/15/19  1734 10/15/19  0628 06/13/19  1941 06/13/19  1941 03/23/19  0959 03/23/19  0959    143 141 137   < > 135   < >  --  144   < > 140   < > 143   POTASSIUM 3.5 4.6 4.5 4.6   < > 5.3   < >  --  4.8   < > 4.4   < > 3.5   CHLORIDE 102  --  111* 107   < > 104   < >  --  118*   < > 110*   < > 113*   CO2 22  --  18* 22   < > 20   < >  --  17*   < > 19*   < > 20   BUN 44*  --  80* 48*   < > 25   < >  --  74*   < > 45*   < > 47*   CR 9.48*  --  14.30* 7.93*   < > 6.35*   < >  --  5.58*   < > 5.28*   < > 4.91*   GLC 84 88 89 121*   < > 104*   < >  --  68*   < > 71   < > 87   ASHELY 8.0*  --  8.2* 8.0*   < > 8.7   < >  --  7.6*   < > 8.9   < > 8.2*   MAG  --   --   --   --   --  1.5*  --  2.6* 1.3*  --   --   --   --    PHOS 4.5  --   --   --   --   --   --   --   --   --  2.7  --  3.8    < > = values in this interval not displayed.       CBC -   Recent Labs   Lab Test 07/05/21 0221 07/05/21 0218 04/14/21  0645 04/13/21  0653 04/09/21  1044   WBC  --  5.6  --  8.6 6.8   HGB 9.5* 8.0* 9.6* 10.0* 10.9*   PLT  --  159  --  194 195       LFTs -   Recent Labs   Lab Test 07/05/21  0218 09/22/20  1407 10/14/19  0752   ALKPHOS 158* 106 63   BILITOTAL 0.4 0.3 0.3   ALT 18 11 34   AST 14 13 22   PROTTOTAL 7.0 6.6* 6.2*   ALBUMIN 2.9* 2.5* 3.1*       Iron Panel -   Recent Labs   Lab Test 06/20/20  1112 03/18/20  0723 10/10/19  1650   IRON 15* 54 42   IRONSAT 9* 30 23   ORALIA 310 460* 790*     Current Medications:    aspirin  162 mg Oral Daily     carvedilol  25 mg Oral QAM     febuxostat  80 mg Oral Daily      furosemide  20 mg Oral Daily     gelatin absorbable  1 each Topical During Hemodialysis (from stock)     heparin ANTICOAGULANT  5,000 Units Subcutaneous Q12H     mycophenolate  250 mg Oral BID     polyethylene glycol  17 g Oral Daily     senna-docusate  1 tablet Oral BID     sodium chloride (PF)  3 mL Intracatheter Q8H     sulfamethoxazole-trimethoprim  1 tablet Oral Once per day on Mon Wed Fri     tacrolimus  1 mg Oral Daily     tacrolimus  1.5 mg Oral QPM     Vitamin D3  2,000 Units Oral Daily       - MEDICATION INSTRUCTIONS -       Radha Shi MD

## 2021-07-07 NOTE — TELEPHONE ENCOUNTER
Patient discharged from [facility]  ( inpatient or ER).    Discharge location: [facility]    Discharge date: TUE 06-JUL-2021  Diagnosis: Acute Pulmonary Edema (H)    Please follow up as appropriate. If no follow up required, please close encounter.

## 2021-07-07 NOTE — PROGRESS NOTES
Notified by Zhanna Butt RN patient hypertensive on consective blood pressure checks to 180s/100s following dialysis. Due to concerns for persistent elevated blood pressures did not feel it was safe to discharge patient home at this time. Went to bedside to evaluate patient.     Upon speaking with patient, states his blood pressures have been 160s-170s at home for appx 2 weeks since his dialysis center discontinued his amlodipine. Also states actually takes Coreg 25 mg BID at home. Decision made to give PM dose of Coreg with BP re-evaluation following.     Repeat BP following evening Coreg 154/101. Provider confirmed with patient that has supply of anti-hypertensive medication at home and encouraged close follow up with prescribing provider. Patient discharged to home.     Nohelia Mendosa MD   Internal Medicine-Pediatrics, PGY1   [facility]

## 2021-07-07 NOTE — DISCHARGE SUMMARY
Seen patient with resident and personally spent >30min on discharge. This a brief discharge note. Full discharge summary to follow.     Patient is a 46yr male who was admitted to the hospital with flash pulmonary edema, HTN after missing his HD session because he overslept.  - Patient was kept on nitroglycerin drip, then underwent UF on 7/5 and HD on 7/6 with plan to discharge post HD after 8PM.   -- He reports that he was on lasix 20mg BID that was dc'ed per his OP renal team. He wants to resume this for now and have his OP renal discontinue this when appropriate. This is reasonable.    --He also takes coreg 25mg BID at home. Med rec changed.    On the day of discharge, He otherwise feels well- no dyspnea/ chest pain/ cough, eating well, good bowel function. O/E has some crackles in his lungs on the left lower lobe, heart S1,S2 heard.soft abdomen, his L leg >R swollen- but this is bseline since Tx per pt    Follow-ups:  - CXR on admission showed some pneumonitis vs fluid. Will order rpt CXR 2v as OP. Most likely this is related to fluid overload  - Follow-up w renal team for med adjustment   - Monitor Alk Phos as OP, sight uptrend. Not significant needing intervention at this time <2x ULN

## 2021-07-07 NOTE — PROGRESS NOTES
DISCHARGE                         7/6/2021 11:00 PM  ----------------------------------------------------------------------------  Discharged to: Home  Via: private transportation  Accompanied by: Self  Discharge Instructions: diet, activity, home medications, follow up appointments, when to call the MD, aftercare instructions.  Prescriptions: To be filled by home pharmacy (no new meds this admission); medication list reviewed & sent with pt  Follow Up Appointments: arranged; information given  Belongings: All sent with pt  IV: d/c'd  Telemetry: d/c'd  Pt exhibits understanding of above discharge instructions; all questions answered. Discussed with Pt importance of communicating with his primary care physician ASAP to follow up regarding home meds for blood pressure (coreg, which pt is currently taking, and amlodipine, which he recently stopped), pt stated he will call his primary care provider tomorrow am.       Discharge Paperwork: Signed, copied, and sent home with patient.   Pt was discharged to home in stable condition.

## 2021-07-07 NOTE — PROGRESS NOTES
HEMODIALYSIS TREATMENT NOTE    Date: 7/6/2021  Time: 8:37 PM    Data:  Pre Wt: 65.7 kg (144 lb 13.5 oz)   Desired Wt: 64.2 kg   Post Wt: 65.2 kg (143 lb 11.8 oz)  Weight change: 0.5 kg  Ultrafiltration - Post Run Net Total Removed (mL): 500 mL  Vascular Access Status: Fistula  patent  Dialyzer Rinse: Clear  Total Blood Volume Processed: 51.59 L   Total Dialysis (Treatment) Time: 2   Dialysate Bath: K 3, Ca 3  Heparin: None    Lab:   No    Interventions:  Pt had a stable uncomplicated 2 hours of HD. 0.5L of fluid was pulled per order. Ending BP was 185/104; pt asymptomatic. Pt rinsed back post tx, needles were pulled, new dressing applied for about 10mins to help achieve hemostasis. Hand off report given to JENNIFER Chao.    Assessment:  -Calm & Cooperative  -Pt remain hypertensive but asymptomatic  -A & O X 4     Plan:    Possible discontinue today

## 2021-07-08 NOTE — TELEPHONE ENCOUNTER
Care coordination outreach yesterday from RN initiated.  Appt is scheduled and there is no further action needed.    Cate Partida RN, [facility]

## 2021-07-19 ENCOUNTER — VIRTUAL VISIT (OUTPATIENT)
Dept: FAMILY MEDICINE | Facility: CLINIC | Age: 46
End: 2021-07-19
Payer: COMMERCIAL

## 2021-07-19 DIAGNOSIS — Z94.0 HTN, KIDNEY TRANSPLANT RELATED: Primary | ICD-10-CM

## 2021-07-19 DIAGNOSIS — Z99.2 ESRD (END STAGE RENAL DISEASE) ON DIALYSIS (H): ICD-10-CM

## 2021-07-19 DIAGNOSIS — M25.50 MULTIPLE JOINT PAIN: ICD-10-CM

## 2021-07-19 DIAGNOSIS — Z09 HOSPITAL DISCHARGE FOLLOW-UP: ICD-10-CM

## 2021-07-19 DIAGNOSIS — N18.6 ESRD (END STAGE RENAL DISEASE) ON DIALYSIS (H): ICD-10-CM

## 2021-07-19 DIAGNOSIS — I15.1 HTN, KIDNEY TRANSPLANT RELATED: Primary | ICD-10-CM

## 2021-07-19 PROCEDURE — 99214 OFFICE O/P EST MOD 30 MIN: CPT | Mod: GT | Performed by: FAMILY MEDICINE

## 2021-07-19 RX ORDER — AMLODIPINE BESYLATE 5 MG/1
10 TABLET ORAL DAILY
Status: ON HOLD | COMMUNITY
Start: 2021-07-19 | End: 2022-01-01

## 2021-07-19 RX ORDER — ACETAMINOPHEN 500 MG
1000 TABLET ORAL EVERY 6 HOURS PRN
Qty: 100 TABLET | Refills: 1 | Status: SHIPPED | OUTPATIENT
Start: 2021-07-19

## 2021-07-19 RX ORDER — FUROSEMIDE 20 MG
20 TABLET ORAL DAILY
COMMUNITY
Start: 2021-07-19 | End: 2022-01-01

## 2021-07-19 NOTE — PROGRESS NOTES
Rashad is a 46 year old who is being evaluated via a billable video visit.      How would you like to obtain your AVS? MyChart  If the video visit is dropped, the invitation should be resent by: Text to cell phone: [phone]  Will anyone else be joining your video visit? No    Video Start Time: 11:11 AM    Assessment & Plan     HTN, kidney transplant related  Not controlled, medication adjusted by outpatient nephrology - continue per them  - amLODIPine (NORVASC) 5 MG tablet; Take 1 tablet (5 mg) by mouth daily  - furosemide (LASIX) 20 MG tablet; Take 1 tablet (20 mg) by mouth daily    Multiple joint pain  Continued pain after hip replacements - offered rheumatology referral but refused for now.  - acetaminophen (TYLENOL) 500 MG tablet; Take 2 tablets (1,000 mg) by mouth every 6 hours as needed for mild pain    ESRD (end stage renal disease) on dialysis (H)  Continue per nephrology - no additional resources needed.  Patient states dialysis was missed because he overslept.    Hospital discharge follow-up  Doing well at home - continue per specialty.      Review of the result(s) of each unique test - venous dopler       Return in about 3 months (around 10/19/2021).    Mariel Mares MD  [facility]    Cesar Solorzano is a 46 year old who presents for the following health issues     HPI       Hospital Follow-up Visit:    Hospital/Nursing Home/IP Rehab Facility: [facility]  Date of Admission: 7/5/21  Date of Discharge: 7/6/21  Reason(s) for Admission: pulmonary edema and HTN related to missed dialysis      Was your hospitalization related to COVID-19? No   Problems taking medications regularly:  None  Medication changes since discharge: Amlodipine added and lasix decreased to once daily  Problems adhering to non-medication therapy:  None    Summary of hospitalization:  [facility] discharge summary  reviewed  Diagnostic Tests/Treatments reviewed.  Follow up needed: none  Other Healthcare Providers Involved in Patient s Care:         Specialist appointment - nephrology and dialysis  Update since discharge: improved. Post Discharge Medication Reconciliation: discharge medications reconciled, continue medications without change.  Plan of care communicated with patient                  Review of Systems   Constitutional, HEENT, cardiovascular, pulmonary, gi and gu systems are negative, except as otherwise noted.      Objective           Vitals:  No vitals were obtained today due to virtual visit.    Physical Exam   GENERAL: Healthy, alert and no distress  RESP: No audible wheeze, cough, or visible cyanosis.  No visible retractions or increased work of breathing.    PSYCH: Mentation appears normal, affect normal/bright, judgement and insight intact, normal speech and appearance well-groomed.          Video-Visit Details    Type of service:  Video Visit    Video End Time:11:27 am    Originating Location (pt. Location): Home    Distant Location (provider location):  [facility]     Platform used for Video Visit: Unable to complete video visit Patient was unable to completed through [employer] for [employer] or [employer].

## 2021-07-20 ENCOUNTER — E-VISIT (OUTPATIENT)
Dept: URGENT CARE | Facility: CLINIC | Age: 46
End: 2021-07-20
Payer: COMMERCIAL

## 2021-07-20 DIAGNOSIS — R21 RASH AND NONSPECIFIC SKIN ERUPTION: Primary | ICD-10-CM

## 2021-07-20 PROCEDURE — 99207 PR NON-BILLABLE SERV PER CHARTING: CPT | Performed by: PHYSICIAN ASSISTANT

## 2021-07-20 NOTE — PATIENT INSTRUCTIONS
Dear Rashad Ortiz,    We are sorry you are not feeling well. Based on the responses you provided, it is recommended that you be seen in-person in urgent care so we can better evaluate your symptoms. Please click here to find the nearest urgent care location to you.   You will not be charged for this Visit. Thank you for trusting us with your care.    Taylor Woods PA-C

## 2021-08-05 NOTE — TELEPHONE ENCOUNTER
Patient ID Kash Yung is a 57 year old male    Chief Complaint   Patient presents with   • Follow-up     routine follow up   • Refill Request     meds refill   • Urgent Care Follow Up     Pt seen in urgent care for cough on 7/22/2021       Visit Vitals  /83   Pulse 77   Temp 97.8 °F (36.6 °C)   Resp 18   Ht 5' 8\" (1.727 m)   Wt 108 kg (238 lb 1.6 oz)   BMI 36.20 kg/m²       3 week hx of minimally productive cough and slight wheezing for past  3 weeks. No fever or malaise. Slight nasal congestion but no other associated sx. Has not missed work. Went to immediate care 7/22 and given albuterol inhaler but come today as sx persist. Sx much worse at night. No dyspnea. Pt smokes  0.5 ppd for past 30 yrs but denies previous resp sx. However smokers cough is listed as dx in emtr on 9/20 and chronic bronchitis is listed prior to this.  Pt works in dust air conditioner factory. No recent travel. No exposure to individuals with resp sx. Has 2 doses of moderna covid vaccine    o2 sat today is 94 per cent.       Physical Exam  Vitals and nursing note reviewed.   Constitutional:       Appearance: He is well-developed.   HENT:      Head: Normocephalic and atraumatic.   Eyes:      Conjunctiva/sclera: Conjunctivae normal.      Pupils: Pupils are equal, round, and reactive to light.   Cardiovascular:      Rate and Rhythm: Normal rate and regular rhythm.      Heart sounds: Normal heart sounds. No murmur heard.   No friction rub. No gallop.    Pulmonary:      Effort: Pulmonary effort is normal. No respiratory distress.      Breath sounds: Normal breath sounds. No wheezing or rales.   Chest:      Chest wall: No tenderness.   Abdominal:      General: Bowel sounds are normal. There is no distension.      Palpations: Abdomen is soft. There is no mass.      Tenderness: There is no abdominal tenderness.   Musculoskeletal:         General: No tenderness or deformity. Normal range of motion.      Cervical back: Normal range of motion  Patient was seen in  dept yesterday by provider for gout attack of right foot, patient asking for work excuse for today's date.  Routing to  provider pool to please review and advise, thank you.    Marilee Ibarra RN  [facility] Triage     and neck supple.   Lymphadenopathy:      Cervical: No cervical adenopathy.   Skin:     General: Skin is warm and dry.      Findings: No erythema or rash.   Neurological:      Mental Status: He is alert and oriented to person, place, and time.      Cranial Nerves: No cranial nerve deficit.      Motor: No abnormal muscle tone.      Coordination: Coordination normal.   Psychiatric:         Behavior: Behavior normal.         Thought Content: Thought content normal.         Judgment: Judgment normal.         Assessment and Plan    Cough  Probably lingering sx from viral uri in pt with underlying mild copd; empiric rx with zithromax and otc cough syrup prn; if sx persist another 2-3 weeks; come back for further eval; get baseline pfts when current illness resolves; discussed importance of smoking cessation      Orders Placed This Encounter   • azithromycin (Zithromax) 250 MG tablet     Chad Tran MD

## 2021-08-11 DIAGNOSIS — I15.1 HTN, KIDNEY TRANSPLANT RELATED: ICD-10-CM

## 2021-08-11 DIAGNOSIS — Z94.0 HTN, KIDNEY TRANSPLANT RELATED: ICD-10-CM

## 2021-08-11 RX ORDER — FUROSEMIDE 20 MG
20 TABLET ORAL DAILY
OUTPATIENT
Start: 2021-08-11

## 2021-08-24 DIAGNOSIS — Z94.0 KIDNEY TRANSPLANT RECIPIENT: ICD-10-CM

## 2021-08-24 DIAGNOSIS — Z79.60 LONG-TERM USE OF IMMUNOSUPPRESSANT MEDICATION: ICD-10-CM

## 2021-08-24 DIAGNOSIS — Z94.0 KIDNEY TRANSPLANTED: ICD-10-CM

## 2021-08-24 RX ORDER — TACROLIMUS 1 MG/1
1 CAPSULE ORAL 2 TIMES DAILY
Qty: 60 CAPSULE | Refills: 11 | Status: SHIPPED | OUTPATIENT
Start: 2021-08-24 | End: 2022-01-01

## 2021-08-24 RX ORDER — TACROLIMUS 0.5 MG/1
0.5 CAPSULE ORAL EVERY EVENING
Qty: 30 CAPSULE | Refills: 11 | Status: SHIPPED | OUTPATIENT
Start: 2021-08-24 | End: 2022-01-01

## 2021-08-24 NOTE — TELEPHONE ENCOUNTER
Reviewed plan 8/25/20:    # Immunosuppression: Tacrolimus immediate release (goal 4-6), Mycophenolate mofetil (500mg BID)              - Changes: Yes. Prednisone weaned off over 3 months, now officially off since 8/15/20. Planning for anti-metabolite weaning and will reduce MMF to 250mg BID once he started HD. We can consider a month on MMF 250mg BID and then taper off knowing that he wont get a transplant in the next few years (given his behavioural issues, cPRA), however he is making a large amount of urine and we would want to preserve his residual kidney function while on HD and we will advocate for him at the committee meeting to stay on MMF 250mg BID. Keep CNI low-dose.    OUTCOME: Prescription refill for Tacrolimus sent.     Leonora Dwyer RN, BSN  Solid Organ Transplant, Post Kidney and Pancreas  Transplant Care Coordinator  [phone]

## 2021-09-08 DIAGNOSIS — Z94.0 KIDNEY TRANSPLANT RECIPIENT: ICD-10-CM

## 2021-09-08 DIAGNOSIS — Z79.60 LONG-TERM USE OF IMMUNOSUPPRESSANT MEDICATION: ICD-10-CM

## 2021-09-08 DIAGNOSIS — Z94.0 KIDNEY TRANSPLANTED: Primary | ICD-10-CM

## 2021-09-08 RX ORDER — MYCOPHENOLATE MOFETIL 250 MG/1
250 CAPSULE ORAL 2 TIMES DAILY
Qty: 60 CAPSULE | Refills: 11 | Status: SHIPPED | OUTPATIENT
Start: 2021-09-08 | End: 2022-01-01

## 2021-09-08 NOTE — TELEPHONE ENCOUNTER
Per Transplant nephrology visit notes 8/25/20:    # Immunosuppression: Tacrolimus immediate release (goal 4-6), Mycophenolate mofetil (500mg BID)              - Changes: Yes. Prednisone weaned off over 3 months, now officially off since 8/15/20. Planning for anti-metabolite weaning and will reduce MMF to 250mg BID once he started HD. We can consider a month on MMF 250mg BID and then taper off knowing that he wont get a transplant in the next few years (given his behavioural issues, cPRA), however he is making a large amount of urine and we would want to preserve his residual kidney function while on HD and we will advocate for him at the committee meeting to stay on MMF 250mg BID. Keep CNI low-dose      OUTCOME: Refilled mycophenolate 250 mg BID.    Leonora Dwyer, RN, BSN  Solid Organ Transplant, Post Kidney and Pancreas  Transplant Care Coordinator  [phone]

## 2021-09-16 ENCOUNTER — TRANSCRIBE ORDERS (OUTPATIENT)
Dept: OTHER | Age: 46
End: 2021-09-16

## 2021-09-16 ENCOUNTER — MEDICAL CORRESPONDENCE (OUTPATIENT)
Dept: HEALTH INFORMATION MANAGEMENT | Facility: CLINIC | Age: 46
End: 2021-09-16

## 2021-09-16 ENCOUNTER — TRANSCRIBE ORDERS (OUTPATIENT)
Dept: MULTI SPECIALTY CLINIC | Facility: CLINIC | Age: 46
End: 2021-09-16

## 2021-09-16 DIAGNOSIS — M08.3 CHRONIC POLYARTICULAR JUVENILE RHEUMATOID ARTHRITIS (H): ICD-10-CM

## 2021-09-16 DIAGNOSIS — D64.9 ANEMIA: Primary | ICD-10-CM

## 2021-09-16 DIAGNOSIS — M12.9 ARTHROPATHY: ICD-10-CM

## 2021-09-18 ENCOUNTER — HEALTH MAINTENANCE LETTER (OUTPATIENT)
Age: 46
End: 2021-09-18

## 2021-09-23 NOTE — PROGRESS NOTES
RECORDS STATUS - ALL OTHER DIAGNOSIS      RECORDS RECEIVED FROM: [facility]   DATE RECEIVED: 1/22/2022   NOTES STATUS DETAILS   OFFICE NOTE from referring provider     OFFICE NOTE from medical oncologist N/A    DISCHARGE SUMMARY from hospital N/A    DISCHARGE REPORT from the ER     OPERATIVE REPORT Complete Epic 4/7/2021 Colonoscopy    MEDICATION LIST Complete Epic    CLINICAL TRIAL TREATMENTS TO DATE     LABS     PATHOLOGY REPORTS     ANYTHING RELATED TO DIAGNOSIS Complete Labs last updated on 7/6/2021   GENONOMIC TESTING     TYPE:     IMAGING (NEED IMAGES & REPORT)     CT SCANS     MRI     MAMMO     ULTRASOUND Complete US Lower Extremity 7/6/2021   PET

## 2021-09-27 ENCOUNTER — VIRTUAL VISIT (OUTPATIENT)
Dept: FAMILY MEDICINE | Facility: CLINIC | Age: 46
End: 2021-09-27
Payer: COMMERCIAL

## 2021-09-27 DIAGNOSIS — M79.671 HEEL PAIN, BILATERAL: Primary | ICD-10-CM

## 2021-09-27 DIAGNOSIS — M79.672 HEEL PAIN, BILATERAL: Primary | ICD-10-CM

## 2021-09-27 PROCEDURE — 99214 OFFICE O/P EST MOD 30 MIN: CPT | Mod: TEL | Performed by: FAMILY MEDICINE

## 2021-09-27 RX ORDER — OXYCODONE HYDROCHLORIDE 5 MG/1
5-10 TABLET ORAL EVERY 6 HOURS PRN
Qty: 12 TABLET | Refills: 0 | Status: SHIPPED | OUTPATIENT
Start: 2021-09-27 | End: 2021-10-01

## 2021-09-27 NOTE — PROGRESS NOTES
Rashad is a 46 year old who is being evaluated via a billable telephone visit.      What phone number would you like to be contacted at? Unable to see each other on video so converted to phone  How would you like to obtain your AVS? MyChart    Assessment & Plan     Heel pain, bilateral  Suspect gout - xray and labs for evaluation.  Oxycodone for pain since tylenol not helping  - XR Foot Bilateral G/E 3 Views; Future  - Uric acid; Future  - Comprehensive metabolic panel (BMP + Alb, Alk Phos, ALT, AST, Total. Bili, TP); Future  - CBC with platelets and differential; Future  - Uric acid random urine with Creat Ratio; Future  - oxyCODONE (ROXICODONE) 5 MG tablet; Take 1-2 tablets (5-10 mg) by mouth every 6 hours as needed for pain    The uses and side effects, including black box warnings as appropriate, were discussed in detail.  All patient questions were answered.  The patient was instructed to call immediately if any side effects developed.     Return in about 2 days (around 9/29/2021) for ancillary, lab.    Mariel Mares MD  [facility]    Cesar Solorzano is a 46 year old who presents for the following health issues     HPI     Bilateral heel pain for 1 month.  Sharp pain and worse with walking.  Has history of gout.       Review of Systems   Constitutional, HEENT, cardiovascular, pulmonary, gi and gu systems are negative, except as otherwise noted.      Objective           Vitals:  No vitals were obtained today due to virtual visit.    Physical Exam   healthy, alert and no distress  PSYCH: Alert and oriented times 3; coherent speech, normal   rate and volume, able to articulate logical thoughts, able   to abstract reason, no tangential thoughts, no hallucinations   or delusions  His affect is normal  RESP: No cough, no audible wheezing, able to talk in full sentences  Remainder of exam unable to be completed due to telephone visits          Phone call duration: 12  minutes

## 2021-09-29 ENCOUNTER — TELEPHONE (OUTPATIENT)
Dept: TRANSPLANT | Facility: CLINIC | Age: 46
End: 2021-09-29

## 2021-09-29 ENCOUNTER — LAB (OUTPATIENT)
Dept: LAB | Facility: CLINIC | Age: 46
End: 2021-09-29
Payer: COMMERCIAL

## 2021-09-29 ENCOUNTER — ANCILLARY PROCEDURE (OUTPATIENT)
Dept: GENERAL RADIOLOGY | Facility: CLINIC | Age: 46
End: 2021-09-29
Payer: COMMERCIAL

## 2021-09-29 DIAGNOSIS — M79.672 HEEL PAIN, BILATERAL: ICD-10-CM

## 2021-09-29 DIAGNOSIS — M79.671 HEEL PAIN, BILATERAL: ICD-10-CM

## 2021-09-29 LAB
ALBUMIN SERPL-MCNC: 3.2 G/DL (ref 3.4–5)
ALP SERPL-CCNC: 157 U/L (ref 40–150)
ALT SERPL W P-5'-P-CCNC: 14 U/L (ref 0–70)
ANION GAP SERPL CALCULATED.3IONS-SCNC: 11 MMOL/L (ref 3–14)
AST SERPL W P-5'-P-CCNC: 7 U/L (ref 0–45)
BASOPHILS # BLD AUTO: 0 10E3/UL (ref 0–0.2)
BASOPHILS NFR BLD AUTO: 0 %
BILIRUB SERPL-MCNC: 0.4 MG/DL (ref 0.2–1.3)
BUN SERPL-MCNC: 56 MG/DL (ref 7–30)
CALCIUM SERPL-MCNC: 8.7 MG/DL (ref 8.5–10.1)
CHLORIDE BLD-SCNC: 104 MMOL/L (ref 94–109)
CO2 SERPL-SCNC: 24 MMOL/L (ref 20–32)
CREAT SERPL-MCNC: 11.5 MG/DL (ref 0.66–1.25)
CREAT UR-MCNC: 96 MG/DL
EOSINOPHIL # BLD AUTO: 0.3 10E3/UL (ref 0–0.7)
EOSINOPHIL NFR BLD AUTO: 7 %
ERYTHROCYTE [DISTWIDTH] IN BLOOD BY AUTOMATED COUNT: 20.4 % (ref 10–15)
GFR SERPL CREATININE-BSD FRML MDRD: 5 ML/MIN/1.73M2
GLUCOSE BLD-MCNC: 100 MG/DL (ref 70–99)
HCT VFR BLD AUTO: 23.3 % (ref 40–53)
HGB BLD-MCNC: 7.2 G/DL (ref 13.3–17.7)
IMM GRANULOCYTES # BLD: 0 10E3/UL
IMM GRANULOCYTES NFR BLD: 0 %
LYMPHOCYTES # BLD AUTO: 1 10E3/UL (ref 0.8–5.3)
LYMPHOCYTES NFR BLD AUTO: 20 %
MCH RBC QN AUTO: 25.8 PG (ref 26.5–33)
MCHC RBC AUTO-ENTMCNC: 30.9 G/DL (ref 31.5–36.5)
MCV RBC AUTO: 84 FL (ref 78–100)
MONOCYTES # BLD AUTO: 0.4 10E3/UL (ref 0–1.3)
MONOCYTES NFR BLD AUTO: 8 %
NEUTROPHILS # BLD AUTO: 3.2 10E3/UL (ref 1.6–8.3)
NEUTROPHILS NFR BLD AUTO: 64 %
PLATELET # BLD AUTO: 142 10E3/UL (ref 150–450)
POTASSIUM BLD-SCNC: 4.8 MMOL/L (ref 3.4–5.3)
PROT SERPL-MCNC: 7.7 G/DL (ref 6.8–8.8)
RBC # BLD AUTO: 2.79 10E6/UL (ref 4.4–5.9)
SODIUM SERPL-SCNC: 139 MMOL/L (ref 133–144)
URATE 24H UR-MRATE: 0.14 G/G CR
URATE SERPL-MCNC: 4.7 MG/DL (ref 3.5–7.2)
URATE UR-MCNC: 13.3 MG/DL
WBC # BLD AUTO: 5 10E3/UL (ref 4–11)

## 2021-09-29 PROCEDURE — 80053 COMPREHEN METABOLIC PANEL: CPT

## 2021-09-29 PROCEDURE — 36415 COLL VENOUS BLD VENIPUNCTURE: CPT

## 2021-09-29 PROCEDURE — 85025 COMPLETE CBC W/AUTO DIFF WBC: CPT

## 2021-09-29 PROCEDURE — 73630 X-RAY EXAM OF FOOT: CPT | Mod: 59 | Performed by: RADIOLOGY

## 2021-09-29 PROCEDURE — 84550 ASSAY OF BLOOD/URIC ACID: CPT

## 2021-09-29 PROCEDURE — 84560 ASSAY OF URINE/URIC ACID: CPT

## 2021-09-29 NOTE — RESULT ENCOUNTER NOTE
Please call patient and notify of low hemoglobin.  Can he follow up with his nephrologist for next steps on treatment.  If unable to get in to contact with them or they don't have recommendation, let me know ASAJHON.    Mariel Vu M.D.

## 2021-09-30 NOTE — TELEPHONE ENCOUNTER
Triage call from Ysabel at lab ([phone]). Critical lab of creat 11.5. Upon chart review K 4.8. Pt has history of elevated creat with highest result was 14.3 on 7/5/21. Currently HD Tuesday/Thurday/Saturday. No further action taken.    Electronically filed by Chapis Griffin RN   9/29/2021  8:28 PM

## 2021-09-30 NOTE — RESULT ENCOUNTER NOTE
Mr. Ortiz,    Your blood and urine uric acid level are normal.  That makes your foot pain less likely due to gout but this could be complicated by your very elevated creatinine.      Please follow up with nephrology on your renal status and next step may be seeing a specialist for your foot.     Let me know if you would like to do that.    Please contact the clinic if you have additional questions.  Thank you.    Sincerely,    Mariel Mares MD

## 2021-10-01 ENCOUNTER — MYC MEDICAL ADVICE (OUTPATIENT)
Dept: FAMILY MEDICINE | Facility: CLINIC | Age: 46
End: 2021-10-01

## 2021-10-01 DIAGNOSIS — M79.672 HEEL PAIN, BILATERAL: ICD-10-CM

## 2021-10-01 DIAGNOSIS — M79.671 HEEL PAIN, BILATERAL: ICD-10-CM

## 2021-10-01 RX ORDER — OXYCODONE HYDROCHLORIDE 5 MG/1
5-10 TABLET ORAL EVERY 6 HOURS PRN
Qty: 12 TABLET | Refills: 0 | Status: SHIPPED | OUTPATIENT
Start: 2021-10-01 | End: 2021-01-01

## 2021-10-06 ENCOUNTER — MYC MEDICAL ADVICE (OUTPATIENT)
Dept: FAMILY MEDICINE | Facility: CLINIC | Age: 46
End: 2021-10-06

## 2021-10-06 NOTE — RESULT ENCOUNTER NOTE
Mr. Ortiz,    Your xray showed more arthritis in your foot.    Please contact the clinic if you have additional questions.  Thank you.    Sincerely,    Mariel Mares MD

## 2021-10-12 ENCOUNTER — HOSPITAL ENCOUNTER (INPATIENT)
Facility: CLINIC | Age: 46
LOS: 8 days | Discharge: HOME | DRG: 314 | End: 2021-10-20
Attending: EMERGENCY MEDICINE | Admitting: INTERNAL MEDICINE
Payer: COMMERCIAL

## 2021-10-12 ENCOUNTER — APPOINTMENT (OUTPATIENT)
Dept: GENERAL RADIOLOGY | Facility: CLINIC | Age: 46
DRG: 314 | End: 2021-10-12
Attending: EMERGENCY MEDICINE
Payer: COMMERCIAL

## 2021-10-12 DIAGNOSIS — N18.5 ANEMIA OF CHRONIC RENAL FAILURE, STAGE 5 (H): Primary | ICD-10-CM

## 2021-10-12 DIAGNOSIS — J81.0 ACUTE PULMONARY EDEMA (H): ICD-10-CM

## 2021-10-12 DIAGNOSIS — Z11.52 ENCOUNTER FOR SCREENING LABORATORY TESTING FOR SEVERE ACUTE RESPIRATORY SYNDROME CORONAVIRUS 2 (SARS-COV-2): ICD-10-CM

## 2021-10-12 DIAGNOSIS — I31.39 PERICARDIAL EFFUSION: ICD-10-CM

## 2021-10-12 DIAGNOSIS — N18.6 ESRD (END STAGE RENAL DISEASE) ON DIALYSIS (H): ICD-10-CM

## 2021-10-12 DIAGNOSIS — N18.4 CHRONIC KIDNEY DISEASE, STAGE 4, SEVERELY DECREASED GFR (H): ICD-10-CM

## 2021-10-12 DIAGNOSIS — R07.9 CHEST PAIN, UNSPECIFIED TYPE: ICD-10-CM

## 2021-10-12 DIAGNOSIS — Z99.2 ESRD (END STAGE RENAL DISEASE) ON DIALYSIS (H): ICD-10-CM

## 2021-10-12 DIAGNOSIS — D63.1 ANEMIA OF CHRONIC RENAL FAILURE, STAGE 5 (H): Primary | ICD-10-CM

## 2021-10-12 DIAGNOSIS — R06.02 SHORTNESS OF BREATH: ICD-10-CM

## 2021-10-12 LAB
ABO/RH(D): NORMAL
ALBUMIN SERPL-MCNC: 3.1 G/DL (ref 3.4–5)
ALP SERPL-CCNC: 146 U/L (ref 40–150)
ALT SERPL W P-5'-P-CCNC: 15 U/L (ref 0–70)
ANION GAP SERPL CALCULATED.3IONS-SCNC: 12 MMOL/L (ref 3–14)
ANTIBODY SCREEN: NEGATIVE
AST SERPL W P-5'-P-CCNC: 15 U/L (ref 0–45)
ATRIAL RATE - MUSE: 78 BPM
BASE EXCESS BLDV CALC-SCNC: 4.6 MMOL/L (ref -7.7–1.9)
BASOPHILS # BLD AUTO: 0 10E3/UL (ref 0–0.2)
BASOPHILS NFR BLD AUTO: 0 %
BILIRUB SERPL-MCNC: 0.6 MG/DL (ref 0.2–1.3)
BLD PROD TYP BPU: NORMAL
BLOOD COMPONENT TYPE: NORMAL
BUN SERPL-MCNC: 41 MG/DL (ref 7–30)
CALCIUM SERPL-MCNC: 8.4 MG/DL (ref 8.5–10.1)
CHLORIDE BLD-SCNC: 98 MMOL/L (ref 94–109)
CO2 SERPL-SCNC: 26 MMOL/L (ref 20–32)
CODING SYSTEM: NORMAL
CREAT SERPL-MCNC: 9.64 MG/DL (ref 0.66–1.25)
CROSSMATCH: NORMAL
DIASTOLIC BLOOD PRESSURE - MUSE: NORMAL MMHG
EOSINOPHIL # BLD AUTO: 0.2 10E3/UL (ref 0–0.7)
EOSINOPHIL NFR BLD AUTO: 2 %
ERYTHROCYTE [DISTWIDTH] IN BLOOD BY AUTOMATED COUNT: 20.7 % (ref 10–15)
GFR SERPL CREATININE-BSD FRML MDRD: 6 ML/MIN/1.73M2
GLUCOSE BLD-MCNC: 94 MG/DL (ref 70–99)
HCO3 BLDV-SCNC: 28 MMOL/L (ref 21–28)
HCT VFR BLD AUTO: 21.8 % (ref 40–53)
HGB BLD-MCNC: 6.7 G/DL (ref 13.3–17.7)
IMM GRANULOCYTES # BLD: 0 10E3/UL
IMM GRANULOCYTES NFR BLD: 0 %
INTERPRETATION ECG - MUSE: NORMAL
ISSUE DATE AND TIME: NORMAL
LYMPHOCYTES # BLD AUTO: 0.4 10E3/UL (ref 0.8–5.3)
LYMPHOCYTES NFR BLD AUTO: 6 %
MCH RBC QN AUTO: 25.1 PG (ref 26.5–33)
MCHC RBC AUTO-ENTMCNC: 30.7 G/DL (ref 31.5–36.5)
MCV RBC AUTO: 82 FL (ref 78–100)
MONOCYTES # BLD AUTO: 0.4 10E3/UL (ref 0–1.3)
MONOCYTES NFR BLD AUTO: 5 %
NEUTROPHILS # BLD AUTO: 6.7 10E3/UL (ref 1.6–8.3)
NEUTROPHILS NFR BLD AUTO: 87 %
NRBC # BLD AUTO: 0 10E3/UL
NRBC BLD AUTO-RTO: 0 /100
NT-PROBNP SERPL-MCNC: ABNORMAL PG/ML (ref 0–450)
O2/TOTAL GAS SETTING VFR VENT: 93 %
P AXIS - MUSE: 52 DEGREES
PCO2 BLDV: 38 MM HG (ref 40–50)
PH BLDV: 7.48 [PH] (ref 7.32–7.43)
PLATELET # BLD AUTO: 157 10E3/UL (ref 150–450)
PO2 BLDV: 48 MM HG (ref 25–47)
POTASSIUM BLD-SCNC: 4.4 MMOL/L (ref 3.4–5.3)
PR INTERVAL - MUSE: 146 MS
PROT SERPL-MCNC: 7.7 G/DL (ref 6.8–8.8)
QRS DURATION - MUSE: 80 MS
QT - MUSE: 392 MS
QTC - MUSE: 446 MS
R AXIS - MUSE: 61 DEGREES
RBC # BLD AUTO: 2.67 10E6/UL (ref 4.4–5.9)
SARS-COV-2 RNA RESP QL NAA+PROBE: NEGATIVE
SODIUM SERPL-SCNC: 136 MMOL/L (ref 133–144)
SPECIMEN EXPIRATION DATE: NORMAL
SYSTOLIC BLOOD PRESSURE - MUSE: NORMAL MMHG
T AXIS - MUSE: 68 DEGREES
TROPONIN I SERPL-MCNC: <0.015 UG/L (ref 0–0.04)
TROPONIN T BLD-MCNC: 0.03 UG/L
UNIT ABO/RH: NORMAL
UNIT NUMBER: NORMAL
UNIT STATUS: NORMAL
UNIT TYPE ISBT: 5100
VENTRICULAR RATE- MUSE: 78 BPM
WBC # BLD AUTO: 7.7 10E3/UL (ref 4–11)

## 2021-10-12 PROCEDURE — 999N000127 HC STATISTIC PERIPHERAL IV START W US GUIDANCE

## 2021-10-12 PROCEDURE — C9803 HOPD COVID-19 SPEC COLLECT: HCPCS | Performed by: EMERGENCY MEDICINE

## 2021-10-12 PROCEDURE — 96374 THER/PROPH/DIAG INJ IV PUSH: CPT | Performed by: EMERGENCY MEDICINE

## 2021-10-12 PROCEDURE — 83880 ASSAY OF NATRIURETIC PEPTIDE: CPT | Performed by: EMERGENCY MEDICINE

## 2021-10-12 PROCEDURE — 36415 COLL VENOUS BLD VENIPUNCTURE: CPT | Performed by: PHYSICIAN ASSISTANT

## 2021-10-12 PROCEDURE — 250N000009 HC RX 250: Performed by: PHYSICIAN ASSISTANT

## 2021-10-12 PROCEDURE — 250N000011 HC RX IP 250 OP 636: Performed by: EMERGENCY MEDICINE

## 2021-10-12 PROCEDURE — 99285 EMERGENCY DEPT VISIT HI MDM: CPT | Mod: 25 | Performed by: EMERGENCY MEDICINE

## 2021-10-12 PROCEDURE — 120N000002 HC R&B MED SURG/OB UMMC

## 2021-10-12 PROCEDURE — 99207 PR APP CREDIT; MD BILLING SHARED VISIT: CPT | Performed by: PHYSICIAN ASSISTANT

## 2021-10-12 PROCEDURE — 99207 PR CDG-MDM COMPONENT: MEETS HIGH - UP CODED: CPT | Performed by: INTERNAL MEDICINE

## 2021-10-12 PROCEDURE — 93010 ELECTROCARDIOGRAM REPORT: CPT | Performed by: EMERGENCY MEDICINE

## 2021-10-12 PROCEDURE — 86900 BLOOD TYPING SEROLOGIC ABO: CPT | Performed by: PHYSICIAN ASSISTANT

## 2021-10-12 PROCEDURE — 71045 X-RAY EXAM CHEST 1 VIEW: CPT

## 2021-10-12 PROCEDURE — 93005 ELECTROCARDIOGRAM TRACING: CPT | Performed by: EMERGENCY MEDICINE

## 2021-10-12 PROCEDURE — 85025 COMPLETE CBC W/AUTO DIFF WBC: CPT | Performed by: EMERGENCY MEDICINE

## 2021-10-12 PROCEDURE — 96376 TX/PRO/DX INJ SAME DRUG ADON: CPT | Performed by: EMERGENCY MEDICINE

## 2021-10-12 PROCEDURE — 82040 ASSAY OF SERUM ALBUMIN: CPT | Performed by: EMERGENCY MEDICINE

## 2021-10-12 PROCEDURE — 86923 COMPATIBILITY TEST ELECTRIC: CPT | Performed by: PHYSICIAN ASSISTANT

## 2021-10-12 PROCEDURE — 84484 ASSAY OF TROPONIN QUANT: CPT | Performed by: EMERGENCY MEDICINE

## 2021-10-12 PROCEDURE — 250N000013 HC RX MED GY IP 250 OP 250 PS 637: Performed by: PHYSICIAN ASSISTANT

## 2021-10-12 PROCEDURE — U0005 INFEC AGEN DETEC AMPLI PROBE: HCPCS | Performed by: EMERGENCY MEDICINE

## 2021-10-12 PROCEDURE — 99223 1ST HOSP IP/OBS HIGH 75: CPT | Mod: AI | Performed by: INTERNAL MEDICINE

## 2021-10-12 PROCEDURE — 250N000013 HC RX MED GY IP 250 OP 250 PS 637: Performed by: EMERGENCY MEDICINE

## 2021-10-12 PROCEDURE — 99291 CRITICAL CARE FIRST HOUR: CPT | Mod: 25 | Performed by: EMERGENCY MEDICINE

## 2021-10-12 PROCEDURE — 84484 ASSAY OF TROPONIN QUANT: CPT

## 2021-10-12 PROCEDURE — 96375 TX/PRO/DX INJ NEW DRUG ADDON: CPT | Performed by: EMERGENCY MEDICINE

## 2021-10-12 PROCEDURE — 250N000012 HC RX MED GY IP 250 OP 636 PS 637: Performed by: PHYSICIAN ASSISTANT

## 2021-10-12 PROCEDURE — 250N000011 HC RX IP 250 OP 636: Performed by: PHYSICIAN ASSISTANT

## 2021-10-12 PROCEDURE — 36415 COLL VENOUS BLD VENIPUNCTURE: CPT | Performed by: EMERGENCY MEDICINE

## 2021-10-12 PROCEDURE — 82803 BLOOD GASES ANY COMBINATION: CPT | Performed by: EMERGENCY MEDICINE

## 2021-10-12 PROCEDURE — P9016 RBC LEUKOCYTES REDUCED: HCPCS | Performed by: PHYSICIAN ASSISTANT

## 2021-10-12 RX ORDER — FENTANYL CITRATE 50 UG/ML
50 INJECTION, SOLUTION INTRAMUSCULAR; INTRAVENOUS ONCE
Status: COMPLETED | OUTPATIENT
Start: 2021-10-12 | End: 2021-10-12

## 2021-10-12 RX ORDER — CARVEDILOL 25 MG/1
25 TABLET ORAL 2 TIMES DAILY WITH MEALS
Status: DISCONTINUED | OUTPATIENT
Start: 2021-10-12 | End: 2021-10-20 | Stop reason: HOSPADM

## 2021-10-12 RX ORDER — NALOXONE HYDROCHLORIDE 0.4 MG/ML
0.4 INJECTION, SOLUTION INTRAMUSCULAR; INTRAVENOUS; SUBCUTANEOUS
Status: DISCONTINUED | OUTPATIENT
Start: 2021-10-12 | End: 2021-10-20 | Stop reason: HOSPADM

## 2021-10-12 RX ORDER — ACETAMINOPHEN 325 MG/1
650 TABLET ORAL EVERY 6 HOURS PRN
Status: DISCONTINUED | OUTPATIENT
Start: 2021-10-12 | End: 2021-10-20 | Stop reason: HOSPADM

## 2021-10-12 RX ORDER — AMLODIPINE BESYLATE 10 MG/1
10 TABLET ORAL DAILY
Status: DISCONTINUED | OUTPATIENT
Start: 2021-10-13 | End: 2021-10-20 | Stop reason: HOSPADM

## 2021-10-12 RX ORDER — NALOXONE HYDROCHLORIDE 0.4 MG/ML
0.2 INJECTION, SOLUTION INTRAMUSCULAR; INTRAVENOUS; SUBCUTANEOUS
Status: DISCONTINUED | OUTPATIENT
Start: 2021-10-12 | End: 2021-10-20 | Stop reason: HOSPADM

## 2021-10-12 RX ORDER — HEPARIN SODIUM 5000 [USP'U]/.5ML
5000 INJECTION, SOLUTION INTRAVENOUS; SUBCUTANEOUS EVERY 12 HOURS
Status: DISCONTINUED | OUTPATIENT
Start: 2021-10-12 | End: 2021-10-20 | Stop reason: HOSPADM

## 2021-10-12 RX ORDER — MYCOPHENOLATE MOFETIL 250 MG/1
250 CAPSULE ORAL 2 TIMES DAILY
Status: DISCONTINUED | OUTPATIENT
Start: 2021-10-12 | End: 2021-10-20 | Stop reason: HOSPADM

## 2021-10-12 RX ORDER — LIDOCAINE 40 MG/G
CREAM TOPICAL
Status: DISCONTINUED | OUTPATIENT
Start: 2021-10-12 | End: 2021-10-20 | Stop reason: HOSPADM

## 2021-10-12 RX ORDER — ACETAMINOPHEN 650 MG/1
650 SUPPOSITORY RECTAL EVERY 6 HOURS PRN
Status: DISCONTINUED | OUTPATIENT
Start: 2021-10-12 | End: 2021-10-14

## 2021-10-12 RX ORDER — HYDROMORPHONE HYDROCHLORIDE 1 MG/ML
0.5 INJECTION, SOLUTION INTRAMUSCULAR; INTRAVENOUS; SUBCUTANEOUS
Status: COMPLETED | OUTPATIENT
Start: 2021-10-12 | End: 2021-10-12

## 2021-10-12 RX ORDER — FUROSEMIDE 20 MG
20 TABLET ORAL DAILY
Status: DISCONTINUED | OUTPATIENT
Start: 2021-10-13 | End: 2021-10-20 | Stop reason: HOSPADM

## 2021-10-12 RX ORDER — NITROGLYCERIN 0.4 MG/1
0.4 TABLET SUBLINGUAL EVERY 5 MIN PRN
Status: DISCONTINUED | OUTPATIENT
Start: 2021-10-12 | End: 2021-10-12

## 2021-10-12 RX ORDER — AMOXICILLIN 250 MG
1 CAPSULE ORAL 2 TIMES DAILY PRN
Status: DISCONTINUED | OUTPATIENT
Start: 2021-10-12 | End: 2021-10-20 | Stop reason: HOSPADM

## 2021-10-12 RX ORDER — TACROLIMUS 1 MG/1
1 CAPSULE ORAL EVERY MORNING
Status: DISCONTINUED | OUTPATIENT
Start: 2021-10-13 | End: 2021-10-20 | Stop reason: HOSPADM

## 2021-10-12 RX ORDER — ACETAMINOPHEN 500 MG
1000 TABLET ORAL EVERY 6 HOURS PRN
Status: DISCONTINUED | OUTPATIENT
Start: 2021-10-12 | End: 2021-10-12

## 2021-10-12 RX ORDER — MORPHINE SULFATE 4 MG/ML
4 INJECTION, SOLUTION INTRAMUSCULAR; INTRAVENOUS ONCE
Status: COMPLETED | OUTPATIENT
Start: 2021-10-12 | End: 2021-10-12

## 2021-10-12 RX ORDER — FUROSEMIDE 10 MG/ML
40 INJECTION INTRAMUSCULAR; INTRAVENOUS ONCE
Status: COMPLETED | OUTPATIENT
Start: 2021-10-12 | End: 2021-10-12

## 2021-10-12 RX ORDER — OXYCODONE HYDROCHLORIDE 5 MG/1
5-10 TABLET ORAL EVERY 6 HOURS PRN
Status: DISCONTINUED | OUTPATIENT
Start: 2021-10-12 | End: 2021-10-16

## 2021-10-12 RX ORDER — AMOXICILLIN 250 MG
2 CAPSULE ORAL 2 TIMES DAILY PRN
Status: DISCONTINUED | OUTPATIENT
Start: 2021-10-12 | End: 2021-10-20 | Stop reason: HOSPADM

## 2021-10-12 RX ORDER — HYDROMORPHONE HYDROCHLORIDE 1 MG/ML
0.5 INJECTION, SOLUTION INTRAMUSCULAR; INTRAVENOUS; SUBCUTANEOUS EVERY 4 HOURS PRN
Status: COMPLETED | OUTPATIENT
Start: 2021-10-12 | End: 2021-10-13

## 2021-10-12 RX ORDER — SULFAMETHOXAZOLE AND TRIMETHOPRIM 400; 80 MG/1; MG/1
1 TABLET ORAL
Status: DISCONTINUED | OUTPATIENT
Start: 2021-10-13 | End: 2021-10-20 | Stop reason: HOSPADM

## 2021-10-12 RX ADMIN — FENTANYL CITRATE 50 MCG: 50 INJECTION INTRAMUSCULAR; INTRAVENOUS at 16:43

## 2021-10-12 RX ADMIN — NITROGLYCERIN 0.4 MG: 0.4 TABLET SUBLINGUAL at 14:50

## 2021-10-12 RX ADMIN — NITROGLYCERIN 0.4 MG: 0.4 TABLET SUBLINGUAL at 14:41

## 2021-10-12 RX ADMIN — HEPARIN SODIUM 5000 UNITS: 10000 INJECTION, SOLUTION INTRAVENOUS; SUBCUTANEOUS at 22:07

## 2021-10-12 RX ADMIN — OXYCODONE HYDROCHLORIDE 5 MG: 5 TABLET ORAL at 22:06

## 2021-10-12 RX ADMIN — FUROSEMIDE 40 MG: 10 INJECTION, SOLUTION INTRAMUSCULAR; INTRAVENOUS at 16:48

## 2021-10-12 RX ADMIN — NITROGLYCERIN 0.4 MG: 0.4 TABLET SUBLINGUAL at 14:27

## 2021-10-12 RX ADMIN — FUROSEMIDE 40 MG: 10 INJECTION, SOLUTION INTRAVENOUS at 15:56

## 2021-10-12 RX ADMIN — MYCOPHENOLATE MOFETIL 250 MG: 250 CAPSULE ORAL at 22:06

## 2021-10-12 RX ADMIN — MORPHINE SULFATE 4 MG: 4 INJECTION INTRAVENOUS at 14:20

## 2021-10-12 RX ADMIN — HYDROMORPHONE HYDROCHLORIDE 0.5 MG: 1 INJECTION, SOLUTION INTRAMUSCULAR; INTRAVENOUS; SUBCUTANEOUS at 19:15

## 2021-10-12 RX ADMIN — LIDOCAINE HYDROCHLORIDE 30 ML: 20 SOLUTION ORAL; TOPICAL at 22:08

## 2021-10-12 RX ADMIN — NITROGLYCERIN 0.4 MG: 0.4 TABLET SUBLINGUAL at 14:18

## 2021-10-12 RX ADMIN — TACROLIMUS 1.5 MG: 1 CAPSULE ORAL at 22:13

## 2021-10-12 RX ADMIN — CARVEDILOL 25 MG: 25 TABLET, FILM COATED ORAL at 22:06

## 2021-10-12 ASSESSMENT — ENCOUNTER SYMPTOMS
FEVER: 0
SHORTNESS OF BREATH: 1
VOMITING: 1
ABDOMINAL PAIN: 1

## 2021-10-12 ASSESSMENT — ACTIVITIES OF DAILY LIVING (ADL): ADLS_ACUITY_SCORE: 5

## 2021-10-12 NOTE — ED NOTES
Bed: ED20  Expected date: 10/12/21  Expected time: 12:56 PM  Means of arrival: Ambulance  Comments:  [address] short of breath, hypertensive

## 2021-10-12 NOTE — ED NOTES
Spoke with pt's wife, per verbal consent from pt, informed her that pt is going to be getting admitted. Wife stated that pt did miss his dialysis on Sat.

## 2021-10-12 NOTE — ED TRIAGE NOTES
"Per EMS, patient is from home, lying down after dialysis, receives dialysis Tuesdays, Thursdays, Saturdays, pain in chest, 10/10 throbbing, sob, anxious, sats 96% on RA, \"1 shot\" of nitroglycerin, coughing last night with pink phlegm, 18 gauge in left AC, 12 lead unremarkable  "

## 2021-10-12 NOTE — PROGRESS NOTES
Notified by ED physician Dr. Alvarado of patients presentation with tachypnea and pulmonary edema. I discussed with [facility] dialysis nurse. He had dialysis today and came off at his target weight. Had no complaints during dialysis.  Also notified nephrologist Dr. Coleman.  Dialysis BP and fluid trends paste below.  Likely needs target weight adjusted down (can start with 66 kg and challenge)          Full consult will be completed upon admission to [place].  Will plan on dialysis tomorrow morning.    Chanda Jackson MD  [facility]  Department of Medicine  Division of Renal Disease and Hypertension  [facility]  [employer] Web Console

## 2021-10-12 NOTE — ED NOTES
[facility]   ED Nurse to Floor Handoff     Rashad Ortiz is a 46 year old male who speaks English and lives alone,  in a home  They arrived in the ED by ambulance from home    ED Chief Complaint: Chest Pain (pressure to anterior chest, short of breath, cough last night with pinkish phlegm)    ED Dx;   Final diagnoses:   Shortness of breath   Chest pain, unspecified type   Acute pulmonary edema (H)   ESRD (end stage renal disease) on dialysis (H)         Needed?: No    Allergies: No Known Allergies.  Past Medical Hx:   Past Medical History:   Diagnosis Date     AVN (avascular necrosis of bone) (H)     left hip     AVN (avascular necrosis of bone) (H)     left hip     BPH (benign prostatic hyperplasia)      Chronic kidney disease, stage 4, severely decreased GFR (H)      Gastro-oesophageal reflux disease      Gout      History of blood transfusion      Hypertension      Medical non-compliance      Osteonecrosis (H) 7/29/2020    Added automatically from request for surgery [id number]     Pulmonary nodules      Steroid long-term use      Vitamin D deficiency       Baseline Mental status: WDL  Current Mental Status changes: at basesline    Infection present or suspected this encounter: no  Sepsis suspected: No  Isolation type: No active isolations  Patient tested for COVID 19 prior to admission: YES     Activity level - Baseline/Home:  Independent  Activity Level - Current:   Independent    Bariatric equipment needed?: No    In the ED these meds were given:   Medications   nitroGLYcerin (NITROSTAT) sublingual tablet 0.4 mg (0.4 mg Sublingual Given 10/12/21 1450)   morphine (PF) injection 4 mg (4 mg Intravenous Given 10/12/21 1420)   furosemide (LASIX) injection 40 mg (40 mg Intravenous Given 10/12/21 1556)   fentaNYL (PF) (SUBLIMAZE) injection 50 mcg (50 mcg Intravenous Given 10/12/21 1643)   furosemide (LASIX) injection 40 mg (40 mg Intravenous Given 10/12/21  1648)       Drips running?  No    Home pump  No    Current LDAs  Peripheral IV 10/12/21 Anterior;Left Upper forearm (Active)   Number of days: 0       Hemodialysis Vascular Access Arteriovenous fistula Right Arm (Active)   Number of days: 650       Incision/Surgical Site 04/12/21 Right;Lateral;Upper Thigh (Active)   Number of days: 183       Incision/Surgical Site 04/12/21 Right Hip (Active)   Number of days: 183       Labs results:   Labs Ordered and Resulted from Time of ED Arrival Up to the Time of Departure from the ED   COMPREHENSIVE METABOLIC PANEL - Abnormal; Notable for the following components:       Result Value    Urea Nitrogen 41 (*)     Creatinine 9.64 (*)     Calcium 8.4 (*)     Albumin 3.1 (*)     GFR Estimate 6 (*)     All other components within normal limits   NT PROBNP INPATIENT - Abnormal; Notable for the following components:    N terminal Pro BNP Inpatient 26,759 (*)     All other components within normal limits   BLOOD GAS VENOUS - Abnormal; Notable for the following components:    pH Venous 7.48 (*)     pCO2 Venous 38 (*)     pO2 Venous 48 (*)     Base Excess/Deficit (+/-) 4.6 (*)     All other components within normal limits   CBC WITH PLATELETS AND DIFFERENTIAL - Abnormal; Notable for the following components:    RBC Count 2.67 (*)     Hemoglobin 6.7 (*)     Hematocrit 21.8 (*)     MCH 25.1 (*)     MCHC 30.7 (*)     RDW 20.7 (*)     Absolute Lymphocytes 0.4 (*)     All other components within normal limits   TROPONIN I - Normal   ISTAT TROPONIN POCT - Normal   COVID-19 VIRUS (CORONAVIRUS) BY PCR - Normal    Narrative:     Testing was performed using the alfonso  SARS-CoV-2 & Influenza A/B Assay on the alfonso  [employer]  System.  This test should be ordered for the detection of SARS-COV-2 in individuals who meet SARS-CoV-2 clinical and/or epidemiological criteria. Test performance is unknown in asymptomatic patients.  This test is for in vitro diagnostic use under the FDA EUA for laboratories  certified under CLIA to perform moderate and/or high complexity testing. This test has not been FDA cleared or approved.  A negative test does not rule out the presence of PCR inhibitors in the specimen or target RNA in concentration below the limit of detection for the assay. The possibility of a false negative should be considered if the patient's recent exposure or clinical presentation suggests COVID-19.  [facility] are certified under the Clinical Laboratory Improvement Amendments of 1988 (CLIA-88) as qualified to perform moderate and/or high complexity laboratory testing.   ISTAT TROPONIN NURSING POCT   CBC WITH PLATELETS & DIFFERENTIAL    Narrative:     The following orders were created for panel order CBC with platelets differential.  Procedure                               Abnormality         Status                     ---------                               -----------         ------                     CBC with platelets and d...[[id number]]  Abnormal            Final result                 Please view results for these tests on the individual orders.       Imaging Studies:   Recent Results (from the past 24 hour(s))   XR Chest Port 1 View    Narrative    CHEST ONE VIEW PORTABLE  10/12/2021 3:15 PM     HISTORY: Shortness of breath.    COMPARISON: 7/5/2021.      Impression    IMPRESSION: Increased cardiomegaly and diffuse bilateral hazy  opacities along with vascular congestion consistent with pulmonary  edema and CHF. Pneumonia remains in the imaging differential.    AWILDA KABA MD         SYSTEM ID:  [id number]       Recent vital signs:   BP (!) 164/98   Pulse 79   Temp 98.6  F (37  C) (Oral)   Resp (!) 41   SpO2 100%             Cardiac Rhythm: Normal Sinus  Pt needs tele? Yes  Skin/wound Issues: None    Code Status: Full Code    Pain control: fair    Nausea control: good    Abnormal labs/tests/findings requiring intervention: See EMR    Family present during ED course? No   Family  Comments/Social Situation comments: N/A    Tasks needing completion: None    Janice Reveles, RN    2-0319 [place] [facility]  3-1400 [facility]

## 2021-10-12 NOTE — ED PROVIDER NOTES
[facility] [place], [place] EMERGENCY DEPARTMENT ([facility])    10/12/21      History     Chief Complaint   Patient presents with     Chest Pain     pressure to anterior chest, short of breath, cough last night with pinkish phlegm     The history is provided by the patient and medical records.     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    Patient reports that he began to have mid chest pain this morning (10/12). He states that the chest pain began shortly prior to leaving for dialysis at 0600. He says that on his way to dialysis he began to feel mildly short of breath. He adds that the [facility] dialysis center ran a full cycle and took off 2.4 liters of fluid this morning. He adds that the chest pain increased after he went home from his dialysis session. He says that once he arrived home he had one episode of vomiting. He denies any active nausea here in the ED.  He says that he has abdominal pain in the lower umbilical region upon palpation. Patient notes that his chest pain is significantly worse when taking a deep breath. He reports that being unable to take adequate breaths have made him feel short of breath.  He adds that the chest pain he is experiencing today is similar to when he had required hospitalization for acute pulmonary edema in July of 2021. He also states that his legs have been chronically swollen at baseline and denies any new or worsening leg swelling.     Of note, patient has a fistula in his right arm and has been receiving dialysis care for about one year. Patient notes that he is currently on Tacrolimus for a right kidney transplant that he had performed  in 2011. Patient also adds that he smokes occasionally. Patient denies fever. He denies history of PE or DVT.  Denies any concern for COVID-19, no known contacts with COVID-19. Patient is not fully vaccinated and has only received the first dose of the COVID-19 vaccine, and is awaiting to receive his second dose.     Past Medical History  Past Medical History:   Diagnosis Date     AVN (avascular necrosis of bone) (H)     left hip     AVN (avascular necrosis of bone) (H)     left hip     BPH (benign prostatic hyperplasia)      Chronic kidney disease, stage 4, severely decreased GFR (H)      Gastro-oesophageal reflux disease      Gout      History of blood transfusion      Hypertension      Medical non-compliance      Osteonecrosis (H) 7/29/2020    Added automatically from request for surgery [id number]     Pulmonary nodules      Steroid long-term use      Vitamin D deficiency      Past Surgical History:   Procedure Laterality Date     ARTHROPLASTY HIP Left 9/9/2020    Procedure: Left total hip arthroplasty;  Surgeon: Fernando Morillo MD;  Location: [facility] OR     ARTHROPLASTY HIP Right 4/12/2021    Procedure: Right total hip arthroplasty;  Surgeon: Fernando Morillo MD;  Location: [facility] OR     AV FISTULA OR GRAFT ARTERIAL       COLONOSCOPY N/A 4/7/2021    Procedure: COLONOSCOPY, WITH POLYPECTOMY AND BIOPSY;  Surgeon: Zak Ortega MD;  Location: [facility] GI     CREATE FISTULA ARTERIOVENOUS UPPER EXTREMITY Right 7/31/2019    Procedure: Creation Of Atriovenous Fistula Right Upper Arm;  Surgeon: Julia Irwin MD;  Location: [facility] OR     IR CVC TUNNEL PLACEMENT > 5 YRS OF AGE  10/9/2020     IR DIALYSIS FISTULOGRAM RIGHT  10/9/2020     IR DIALYSIS FISTULOGRAM RIGHT  2/19/2021     IR DIALYSIS MECH THROMB, PTA  10/9/2020     IR DIALYSIS STENT W OR W/OUT PTA  2/19/2021     LIGATE FISTULA ARTERIOVENOUS UPPER EXTREMITY  12/20/2011    Procedure:LIGATE FISTULA ARTERIOVENOUS UPPER EXTREMITY; Excision of Right Forearm Arteriovenous  Fistula.; Surgeon:LINDY AMAYA; Location:[facility] OR     PERCUTANEOUS BIOPSY KIDNEY Right 2017    Procedure: PERCUTANEOUS BIOPSY KIDNEY;  Surgeon: Gee Barrios MD;  [facility]: [facility] OR     TRANSPLANT  2011    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     amLODIPine (NORVASC) 5 MG tablet  carvedilol (COREG) 12.5 MG tablet  furosemide (LASIX) 20 MG tablet  mycophenolate (GENERIC EQUIVALENT) 250 MG capsule  tacrolimus (GENERIC EQUIVALENT) 0.5 MG capsule  tacrolimus (GENERIC EQUIVALENT) 1 MG capsule  acetaminophen (TYLENOL) 500 MG tablet  febuxostat (ULORIC) 80 MG TABS tablet  oxyCODONE (ROXICODONE) 5 MG tablet  sulfamethoxazole-trimethoprim (BACTRIM) 400-80 MG tablet      No Known Allergies  Family History  Family History   Problem Relation Age of Onset     Hypertension Mother      Colon Cancer Mother 66     Colon Cancer Brother 51     Social History   Social History     Tobacco Use     Smoking status: Current Some Day Smoker     Types: Cigarettes     Last attempt to quit: 2017     Years since quittin.6     Smokeless tobacco: Never Used     Tobacco comment: Patient states that he is an 'social'  smoker. 3 cigarettes per week per pt   Substance Use Topics     Alcohol use: Not Currently     Alcohol/week: 4.2 standard drinks     Types: 5 Standard drinks or equivalent per week     Comment: 1 shot yesterday     Drug use: Not Currently     Types: Marijuana      Past medical history, past surgical history, medications, allergies, family history, and social history were reviewed with the patient. No additional pertinent items.       Review of Systems   Constitutional: Negative for fever.   Respiratory: Positive for shortness of breath (mild).    Cardiovascular: Positive for chest pain (mid chest pain) and leg swelling (at baseline).   Gastrointestinal: Positive for abdominal pain (umbilical) and vomiting.   All other systems reviewed and are negative.      Physical Exam   BP: (!) 161/93  Pulse:  77  Temp: 98.3  F (36.8  C)  Resp: 12  SpO2: 94 %  Physical Exam  Vitals and nursing note reviewed.   Constitutional:       General: He is not in acute distress.     Appearance: He is well-developed. He is not ill-appearing, toxic-appearing or diaphoretic.   HENT:      Head: Normocephalic and atraumatic.   Cardiovascular:      Rate and Rhythm: Normal rate and regular rhythm.      Heart sounds: Normal heart sounds.   Pulmonary:      Effort: Pulmonary effort is normal. No respiratory distress.      Breath sounds: Normal breath sounds. No stridor. No wheezing or rhonchi.   Abdominal:      General: There is no distension.      Palpations: Abdomen is soft.      Tenderness: There is no abdominal tenderness. There is no rebound.   Musculoskeletal:      Cervical back: Normal range of motion and neck supple.      Right lower leg: Edema present.      Left lower leg: Edema present.   Skin:     General: Skin is warm.   Neurological:      General: No focal deficit present.      Mental Status: He is alert and oriented to person, place, and time.      Cranial Nerves: No cranial nerve deficit.      Sensory: No sensory deficit.      Motor: No weakness.   Psychiatric:         Mood and Affect: Mood normal.         Behavior: Behavior normal.         Thought Content: Thought content normal.         ED Course       1:53 PM  The patient was seen and examined by Michel Alvarado MD in Room ED20.    Procedures       The medical record was reviewed and interpreted.  Current labs reviewed and interpreted.  Previous labs reviewed and interpreted.       EKG Interpretation:      Interpreted by María Alvarado MD  Time reviewed:1330   Symptoms at time of EKG: none   Rhythm: normal sinus   Rate: normal  Axis: NORMAL  Ectopy: none  Conduction: normal  ST Segments/ T Waves: No ST-T wave changes  Q Waves: none  Comparison to prior: Unchanged    Clinical Impression: normal EKG       Results for orders placed or performed during the hospital  encounter of 10/12/21   XR Chest Port 1 View     Status: None (Preliminary result)    Narrative    CHEST ONE VIEW PORTABLE  10/12/2021 3:15 PM     HISTORY: Shortness of breath.    COMPARISON: 7/5/2021.      Impression    IMPRESSION: Increased cardiomegaly and diffuse bilateral hazy  opacities along with vascular congestion consistent with pulmonary  edema and CHF. Pneumonia remains in the imaging differential.   Comprehensive metabolic panel     Status: Abnormal   Result Value Ref Range    Sodium 136 133 - 144 mmol/L    Potassium 4.4 3.4 - 5.3 mmol/L    Chloride 98 94 - 109 mmol/L    Carbon Dioxide (CO2) 26 20 - 32 mmol/L    Anion Gap 12 3 - 14 mmol/L    Urea Nitrogen 41 (H) 7 - 30 mg/dL    Creatinine 9.64 (H) 0.66 - 1.25 mg/dL    Calcium 8.4 (L) 8.5 - 10.1 mg/dL    Glucose 94 70 - 99 mg/dL    Alkaline Phosphatase 146 40 - 150 U/L    AST 15 0 - 45 U/L    ALT 15 0 - 70 U/L    Protein Total 7.7 6.8 - 8.8 g/dL    Albumin 3.1 (L) 3.4 - 5.0 g/dL    Bilirubin Total 0.6 0.2 - 1.3 mg/dL    GFR Estimate 6 (L) >60 mL/min/1.73m2   Troponin I     Status: Normal   Result Value Ref Range    Troponin I <0.015 0.000 - 0.045 ug/L   Nt probnp inpatient (BNP)     Status: Abnormal   Result Value Ref Range    N terminal Pro BNP Inpatient 26,759 (H) 0 - 450 pg/mL   Blood gas venous     Status: Abnormal   Result Value Ref Range    pH Venous 7.48 (H) 7.32 - 7.43    pCO2 Venous 38 (L) 40 - 50 mm Hg    pO2 Venous 48 (H) 25 - 47 mm Hg    Bicarbonate Venous 28 21 - 28 mmol/L    Base Excess/Deficit (+/-) 4.6 (H) -7.7 - 1.9 mmol/L    FIO2 93    iStat Troponin, POCT     Status: Normal   Result Value Ref Range    TROPPC POCT 0.03 <=0.12 ug/L   EKG 12 lead     Status: None   Result Value Ref Range    Systolic Blood Pressure  mmHg    Diastolic Blood Pressure  mmHg    Ventricular Rate 78 BPM    Atrial Rate 78 BPM    GA Interval 146 ms    QRS Duration 80 ms     ms    QTc 446 ms    P Axis 52 degrees    R AXIS 61 degrees    T Axis 68 degrees     Interpretation ECG       Sinus rhythm  Normal ECG  Unconfirmed report - interpretation of this ECG is computer generated - see medical record for final interpretation  Confirmed by - EMERGENCY ROOM, PHYSICIAN ([id number]),  HAROLDO GU ([id number]) on 10/12/2021 2:55:05 PM     CBC with platelets differential     Status: None (In process)    Narrative    The following orders were created for panel order CBC with platelets differential.  Procedure                               Abnormality         Status                     ---------                               -----------         ------                     CBC with platelets and d...[[id number]]                      In process                   Please view results for these tests on the individual orders.     Medications   nitroGLYcerin (NITROSTAT) sublingual tablet 0.4 mg (0.4 mg Sublingual Given 10/12/21 1450)   furosemide (LASIX) injection 40 mg (has no administration in time range)   morphine (PF) injection 4 mg (4 mg Intravenous Given 10/12/21 1420)        Assessments & Plan (with Medical Decision Making)   Patient is a very nice 46-year-old male who presented to the ER complaining of worsening shortness of breath.  Patient is that he is having pain in the anterior chest wall.  Patient did get similar symptoms in the past when he was fluid overloaded.  Patient here does have some worsening anemia with a hemoglobin of 6.7.  Patient had a history of chronic anemia intermittently in the past.  Patient will be typed and screened for blood but we will not transfuse him at this time.  Patient's chest x-ray shows pulmonary edema which could be contributing to his shortness of breath.  Recommended patient be admitted to the hospital for diuresis and for evaluation of his anemia.  Patient agrees this plan of care.  Patient was given some nitroglycerin with minimal help in his shortness of breath.  Case was discussed with Dr. Wallace who accepted the patient to be admitted  to [facility] for further care.  We also spoke to Dr. Chanda Lees who is the nephrologist on-call.  At this time he does not meet criteria for emergent dialysis we will admit him to the [facility] for further care.    I have reviewed the nursing notes. I have reviewed the findings, diagnosis, plan and need for follow up with the patient.    New Prescriptions    No medications on file       Final diagnoses:   Shortness of breath   Chest pain, unspecified type   Acute pulmonary edema (H)   ESRD (end stage renal disease) on dialysis (H)     I, Em Silverman, am serving as a trained medical scribe to document services personally performed by María Alvarado MD, based on the provider's statements to me.      I, María Alvarado MD, was physically present and have reviewed and verified the accuracy of this note documented by Em Silverman.   --  María Alvarado MD  [facility] EMERGENCY DEPARTMENT  10/12/2021     María Alvarado MD  10/12/21 0969

## 2021-10-13 ENCOUNTER — APPOINTMENT (OUTPATIENT)
Dept: CARDIOLOGY | Facility: CLINIC | Age: 46
DRG: 314 | End: 2021-10-13
Attending: PHYSICIAN ASSISTANT
Payer: COMMERCIAL

## 2021-10-13 ENCOUNTER — APPOINTMENT (OUTPATIENT)
Dept: CT IMAGING | Facility: CLINIC | Age: 46
DRG: 314 | End: 2021-10-13
Attending: PHYSICIAN ASSISTANT
Payer: COMMERCIAL

## 2021-10-13 LAB
ALBUMIN SERPL-MCNC: 2.7 G/DL (ref 3.4–5)
ALP SERPL-CCNC: 134 U/L (ref 40–150)
ALT SERPL W P-5'-P-CCNC: 6 U/L (ref 0–70)
ANION GAP SERPL CALCULATED.3IONS-SCNC: 6 MMOL/L (ref 3–14)
AST SERPL W P-5'-P-CCNC: 10 U/L (ref 0–45)
BILIRUB SERPL-MCNC: 0.4 MG/DL (ref 0.2–1.3)
BUN SERPL-MCNC: 54 MG/DL (ref 7–30)
CALCIUM SERPL-MCNC: 8.3 MG/DL (ref 8.5–10.1)
CHLORIDE BLD-SCNC: 100 MMOL/L (ref 94–109)
CMV DNA SPEC NAA+PROBE-ACNC: NOT DETECTED IU/ML
CO2 SERPL-SCNC: 27 MMOL/L (ref 20–32)
CREAT SERPL-MCNC: 11.4 MG/DL (ref 0.66–1.25)
ERYTHROCYTE [DISTWIDTH] IN BLOOD BY AUTOMATED COUNT: 20 % (ref 10–15)
GFR SERPL CREATININE-BSD FRML MDRD: 5 ML/MIN/1.73M2
GLUCOSE BLD-MCNC: 105 MG/DL (ref 70–99)
HBV SURFACE AB SERPL IA-ACNC: 0.71 M[IU]/ML
HBV SURFACE AG SERPL QL IA: NONREACTIVE
HCT VFR BLD AUTO: 22.3 % (ref 40–53)
HGB BLD-MCNC: 7 G/DL (ref 13.3–17.7)
HGB BLD-MCNC: 7.1 G/DL (ref 13.3–17.7)
HGB BLD-MCNC: 7.4 G/DL (ref 13.3–17.7)
LVEF ECHO: NORMAL
MCH RBC QN AUTO: 26.4 PG (ref 26.5–33)
MCHC RBC AUTO-ENTMCNC: 31.4 G/DL (ref 31.5–36.5)
MCV RBC AUTO: 84 FL (ref 78–100)
NT-PROBNP SERPL-MCNC: ABNORMAL PG/ML (ref 0–450)
PLATELET # BLD AUTO: 120 10E3/UL (ref 150–450)
POTASSIUM BLD-SCNC: 5.4 MMOL/L (ref 3.4–5.3)
PROCALCITONIN SERPL-MCNC: 3.73 NG/ML
PROT SERPL-MCNC: 7.3 G/DL (ref 6.8–8.8)
RBC # BLD AUTO: 2.65 10E6/UL (ref 4.4–5.9)
SODIUM SERPL-SCNC: 133 MMOL/L (ref 133–144)
TROPONIN I SERPL-MCNC: <0.015 UG/L (ref 0–0.04)
TSH SERPL DL<=0.005 MIU/L-ACNC: 1.34 MU/L (ref 0.4–4)
WBC # BLD AUTO: 6.8 10E3/UL (ref 4–11)

## 2021-10-13 PROCEDURE — 87340 HEPATITIS B SURFACE AG IA: CPT | Performed by: INTERNAL MEDICINE

## 2021-10-13 PROCEDURE — 36415 COLL VENOUS BLD VENIPUNCTURE: CPT | Performed by: INTERNAL MEDICINE

## 2021-10-13 PROCEDURE — 86481 TB AG RESPONSE T-CELL SUSP: CPT | Performed by: PHYSICIAN ASSISTANT

## 2021-10-13 PROCEDURE — 87581 M.PNEUMON DNA AMP PROBE: CPT | Performed by: PHYSICIAN ASSISTANT

## 2021-10-13 PROCEDURE — 36415 COLL VENOUS BLD VENIPUNCTURE: CPT | Performed by: PHYSICIAN ASSISTANT

## 2021-10-13 PROCEDURE — 90937 HEMODIALYSIS REPEATED EVAL: CPT

## 2021-10-13 PROCEDURE — 71250 CT THORAX DX C-: CPT | Mod: 26 | Performed by: RADIOLOGY

## 2021-10-13 PROCEDURE — 85018 HEMOGLOBIN: CPT | Performed by: INTERNAL MEDICINE

## 2021-10-13 PROCEDURE — 87899 AGENT NOS ASSAY W/OPTIC: CPT | Performed by: PHYSICIAN ASSISTANT

## 2021-10-13 PROCEDURE — 87799 DETECT AGENT NOS DNA QUANT: CPT | Performed by: PHYSICIAN ASSISTANT

## 2021-10-13 PROCEDURE — 80053 COMPREHEN METABOLIC PANEL: CPT | Performed by: PHYSICIAN ASSISTANT

## 2021-10-13 PROCEDURE — 258N000003 HC RX IP 258 OP 636: Performed by: INTERNAL MEDICINE

## 2021-10-13 PROCEDURE — 99233 SBSQ HOSP IP/OBS HIGH 50: CPT | Performed by: STUDENT IN AN ORGANIZED HEALTH CARE EDUCATION/TRAINING PROGRAM

## 2021-10-13 PROCEDURE — 87205 SMEAR GRAM STAIN: CPT | Performed by: PHYSICIAN ASSISTANT

## 2021-10-13 PROCEDURE — 71275 CT ANGIOGRAPHY CHEST: CPT

## 2021-10-13 PROCEDURE — 120N000002 HC R&B MED SURG/OB UMMC

## 2021-10-13 PROCEDURE — 86706 HEP B SURFACE ANTIBODY: CPT | Performed by: INTERNAL MEDICINE

## 2021-10-13 PROCEDURE — 93306 TTE W/DOPPLER COMPLETE: CPT | Mod: 26 | Performed by: INTERNAL MEDICINE

## 2021-10-13 PROCEDURE — 99223 1ST HOSP IP/OBS HIGH 75: CPT | Mod: 25 | Performed by: INTERNAL MEDICINE

## 2021-10-13 PROCEDURE — 85027 COMPLETE CBC AUTOMATED: CPT | Performed by: PHYSICIAN ASSISTANT

## 2021-10-13 PROCEDURE — 250N000013 HC RX MED GY IP 250 OP 250 PS 637: Performed by: INTERNAL MEDICINE

## 2021-10-13 PROCEDURE — 250N000012 HC RX MED GY IP 250 OP 636 PS 637: Performed by: PHYSICIAN ASSISTANT

## 2021-10-13 PROCEDURE — 87449 NOS EACH ORGANISM AG IA: CPT | Performed by: PHYSICIAN ASSISTANT

## 2021-10-13 PROCEDURE — 84443 ASSAY THYROID STIM HORMONE: CPT | Performed by: PHYSICIAN ASSISTANT

## 2021-10-13 PROCEDURE — 99223 1ST HOSP IP/OBS HIGH 75: CPT | Performed by: INTERNAL MEDICINE

## 2021-10-13 PROCEDURE — 5A1D70Z PERFORMANCE OF URINARY FILTRATION, INTERMITTENT, LESS THAN 6 HOURS PER DAY: ICD-10-PCS | Performed by: PHYSICIAN ASSISTANT

## 2021-10-13 PROCEDURE — 250N000011 HC RX IP 250 OP 636: Performed by: INTERNAL MEDICINE

## 2021-10-13 PROCEDURE — 87040 BLOOD CULTURE FOR BACTERIA: CPT | Performed by: INTERNAL MEDICINE

## 2021-10-13 PROCEDURE — 93306 TTE W/DOPPLER COMPLETE: CPT

## 2021-10-13 PROCEDURE — 87633 RESP VIRUS 12-25 TARGETS: CPT | Performed by: PHYSICIAN ASSISTANT

## 2021-10-13 PROCEDURE — 87529 HSV DNA AMP PROBE: CPT | Performed by: PHYSICIAN ASSISTANT

## 2021-10-13 PROCEDURE — 83880 ASSAY OF NATRIURETIC PEPTIDE: CPT | Performed by: PHYSICIAN ASSISTANT

## 2021-10-13 PROCEDURE — 250N000013 HC RX MED GY IP 250 OP 250 PS 637: Performed by: PHYSICIAN ASSISTANT

## 2021-10-13 PROCEDURE — 85018 HEMOGLOBIN: CPT | Performed by: PHYSICIAN ASSISTANT

## 2021-10-13 PROCEDURE — 71275 CT ANGIOGRAPHY CHEST: CPT | Mod: 26 | Performed by: RADIOLOGY

## 2021-10-13 PROCEDURE — 84484 ASSAY OF TROPONIN QUANT: CPT | Performed by: PHYSICIAN ASSISTANT

## 2021-10-13 PROCEDURE — 84145 PROCALCITONIN (PCT): CPT | Performed by: PHYSICIAN ASSISTANT

## 2021-10-13 PROCEDURE — 86787 VARICELLA-ZOSTER ANTIBODY: CPT | Performed by: PHYSICIAN ASSISTANT

## 2021-10-13 PROCEDURE — 250N000011 HC RX IP 250 OP 636: Performed by: PHYSICIAN ASSISTANT

## 2021-10-13 PROCEDURE — 71250 CT THORAX DX C-: CPT

## 2021-10-13 RX ORDER — AZITHROMYCIN 250 MG/1
500 TABLET, FILM COATED ORAL DAILY
Status: DISCONTINUED | OUTPATIENT
Start: 2021-10-13 | End: 2021-10-14

## 2021-10-13 RX ORDER — IOPAMIDOL 755 MG/ML
125 INJECTION, SOLUTION INTRAVASCULAR ONCE
Status: COMPLETED | OUTPATIENT
Start: 2021-10-13 | End: 2021-10-13

## 2021-10-13 RX ORDER — CEFTRIAXONE 2 G/1
2 INJECTION, POWDER, FOR SOLUTION INTRAMUSCULAR; INTRAVENOUS EVERY 24 HOURS
Status: DISCONTINUED | OUTPATIENT
Start: 2021-10-14 | End: 2021-10-15

## 2021-10-13 RX ORDER — CEFTRIAXONE 1 G/1
1 INJECTION, POWDER, FOR SOLUTION INTRAMUSCULAR; INTRAVENOUS EVERY 24 HOURS
Status: DISCONTINUED | OUTPATIENT
Start: 2021-10-13 | End: 2021-10-13

## 2021-10-13 RX ADMIN — HYDROMORPHONE HYDROCHLORIDE 0.5 MG: 1 INJECTION, SOLUTION INTRAMUSCULAR; INTRAVENOUS; SUBCUTANEOUS at 05:34

## 2021-10-13 RX ADMIN — HEPARIN SODIUM 5000 UNITS: 10000 INJECTION, SOLUTION INTRAVENOUS; SUBCUTANEOUS at 09:04

## 2021-10-13 RX ADMIN — ACETAMINOPHEN 650 MG: 325 TABLET, FILM COATED ORAL at 14:02

## 2021-10-13 RX ADMIN — AMLODIPINE BESYLATE 10 MG: 10 TABLET ORAL at 12:45

## 2021-10-13 RX ADMIN — IOPAMIDOL 125 ML: 755 INJECTION, SOLUTION INTRAVENOUS at 14:30

## 2021-10-13 RX ADMIN — SULFAMETHOXAZOLE AND TRIMETHOPRIM 1 TABLET: 400; 80 TABLET ORAL at 12:44

## 2021-10-13 RX ADMIN — FUROSEMIDE 20 MG: 20 TABLET ORAL at 12:45

## 2021-10-13 RX ADMIN — TACROLIMUS 1 MG: 1 CAPSULE ORAL at 09:04

## 2021-10-13 RX ADMIN — OXYCODONE HYDROCHLORIDE 10 MG: 5 TABLET ORAL at 14:02

## 2021-10-13 RX ADMIN — CARVEDILOL 25 MG: 25 TABLET, FILM COATED ORAL at 12:45

## 2021-10-13 RX ADMIN — CARVEDILOL 25 MG: 25 TABLET, FILM COATED ORAL at 17:53

## 2021-10-13 RX ADMIN — TACROLIMUS 1.5 MG: 1 CAPSULE ORAL at 17:53

## 2021-10-13 RX ADMIN — OXYCODONE HYDROCHLORIDE 5 MG: 5 TABLET ORAL at 00:12

## 2021-10-13 RX ADMIN — HYDROMORPHONE HYDROCHLORIDE 0.5 MG: 1 INJECTION, SOLUTION INTRAMUSCULAR; INTRAVENOUS; SUBCUTANEOUS at 01:32

## 2021-10-13 RX ADMIN — SODIUM CHLORIDE 250 ML: 9 INJECTION, SOLUTION INTRAVENOUS at 09:45

## 2021-10-13 RX ADMIN — SODIUM CHLORIDE 300 ML: 9 INJECTION, SOLUTION INTRAVENOUS at 09:45

## 2021-10-13 RX ADMIN — OXYCODONE HYDROCHLORIDE 10 MG: 5 TABLET ORAL at 04:20

## 2021-10-13 RX ADMIN — AZITHROMYCIN 500 MG: 250 TABLET, FILM COATED ORAL at 12:45

## 2021-10-13 RX ADMIN — MYCOPHENOLATE MOFETIL 250 MG: 250 CAPSULE ORAL at 22:08

## 2021-10-13 RX ADMIN — OXYCODONE HYDROCHLORIDE 10 MG: 5 TABLET ORAL at 22:06

## 2021-10-13 RX ADMIN — MYCOPHENOLATE MOFETIL 250 MG: 250 CAPSULE ORAL at 09:04

## 2021-10-13 RX ADMIN — HEPARIN SODIUM 5000 UNITS: 10000 INJECTION, SOLUTION INTRAVENOUS; SUBCUTANEOUS at 22:24

## 2021-10-13 RX ADMIN — CEFTRIAXONE SODIUM 1 G: 1 INJECTION, POWDER, FOR SOLUTION INTRAMUSCULAR; INTRAVENOUS at 04:29

## 2021-10-13 RX ADMIN — Medication: at 09:45

## 2021-10-13 ASSESSMENT — ACTIVITIES OF DAILY LIVING (ADL)
ADLS_ACUITY_SCORE: 5
DEPENDENT_IADLS:: INDEPENDENT
ADLS_ACUITY_SCORE: 5

## 2021-10-13 NOTE — H&P
[facility]    History and Physical - Hospitalist Service       Date of Admission:  10/12/2021    Assessment & Plan      Rashad Ortiz is a 46 year old male admitted on 10/12/2021. 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 Initial work-up concerning for pulmonary edema. He is admitted to Internal Medicine for further evaluation.    Pleuritic chest pain  Hemoptysis  Pulmonary edema  Noted pleuritic, mid-sternal chest pain after HD today. Also with c/f hemoptysis. CXR with diffuse bialteral hazy opacities as well as vascular congestion c/w pulmonary edema and CHF. BNP elevated 26, 759 however improved from 07/2021. Trop negative. EKG with NSR. Received IV lasix 40 mg x 2. Chest pain minimally responsive to SL nitroglycerin. Exam with ongoing bibasilar crackles. Unclear etiology of hemoptysis and ongoing pleuritic chest pain. Possibly related to pulmonary edema but will evaluate further.  - CT chest w/o contrast   - Echo in AM  - Continuous pulse oximetry  - Incentive spirometry  - APAP 650 mg q6h prn, oxycodone 5-10 mg q6h prn, IV dilaudid 0.5 mg q4h prn x 2 doses for severe pain  - Continue PO lasix in AM and re-evaluate need for further IV doses    ESRD s/p failed LDKT on HD (T-Th-Sat)  Had full run HD prior to presentation. EDW from prior admission 66 kg. PTA on tacrolimus 1 mg q AM and 1.5 mg q evening, mycophenolate 250 mg BID, bactrim M-W-F, lasix 20 mg daily.  - Nephrology consulted for AM  - Continue PTA tacrolimus, mycophenolate, bactrim, lasix  - I/Os, daily weights    Acute on chronic anemia   Hgb 6.7 on presentation. Baseline ~ 9-10. Noting small hemoptysis but no other s/s of bleeding.  - Consented for blood  - Type and  screen  - Transfuse 1u pRBCs    HTN - PTA  amlodipine 10 mg daily, coreg 25 mg BID     Diet: Combination Diet Regular Diet Adult  DVT Prophylaxis: Heparin SQ  Hunter Catheter: Not present  Central Lines: None  Code Status: Full Code    Clinically Significant Risk Factors Present on Admission                   Disposition Plan   Expected discharge:  2-3 days recommended to prior living arrangement once hemoglobin stable and hemoptysis work-up completed and chest pain improved on PO medication.     The patient's care was discussed with the Attending Physician, Dr. Geronimo, Bedside Nurse and Patient.    Swetha Arias PA-C  [facility]  Securely message with the Vocera Web Console (learn more here)  Text page via [facility] Paging/Directory  Please see sign in/sign out for up to date coverage information    ______________________________________________________________________    Chief Complaint   Chest pain    History is obtained from the patient and electronic health record    History of Present Illness   Rashad Ortiz is a 46 year old male who presented to the [facility] with chest pain. He reports that chest pain began this morning prior after dialysis and was mid-chest. Noted some shortness of breath as well. Was able to go to dialysis and reports full run with 2.4L pulled. He did have some slight shortness of breath prior to HD but no chest pain prior. Had 1 episode of vomiting after arriving home. He reports that he coughed up what appeared to be blood last night and then again today. Has no history of prior blood clots. This chest pain is similar to prior presentation with pulmonary edema but feels it is worse. Thinks he is back to his typical dry weight and his swelling in his legs is at its baseline. Continues to have mid-sternal chest pain but it does not radiate. Does not feel like he is having any reflux but does feel he is burping more. Denies shortness of  breath but has worse chest pain if he takes deep breath. Had slight response to lasix earlier but not large volume of urine. Appetite has been fair but has not really eaten today. Having normal BM, last occurring this morning without melena or hematochezia.    Review of Systems    The 10 point Review of Systems is negative other than noted in the HPI or here.     Past Medical History    I have reviewed this patient's medical history and updated it with pertinent information if needed.   Past Medical History:   Diagnosis Date     AVN (avascular necrosis of bone) (H)     left hip     AVN (avascular necrosis of bone) (H)     left hip     BPH (benign prostatic hyperplasia)      Chronic kidney disease, stage 4, severely decreased GFR (H)      Gastro-oesophageal reflux disease      Gout      History of blood transfusion      Hypertension      Medical non-compliance      Osteonecrosis (H) 7/29/2020    Added automatically from request for surgery [id number]     Pulmonary nodules      Steroid long-term use      Vitamin D deficiency        Past Surgical History   I have reviewed this patient's surgical history and updated it with pertinent information if needed.  Past Surgical History:   Procedure Laterality Date     ARTHROPLASTY HIP Left 9/9/2020    Procedure: Left total hip arthroplasty;  Surgeon: Fernando Morillo MD;  Location: [facility] OR     ARTHROPLASTY HIP Right 4/12/2021    Procedure: Right total hip arthroplasty;  Surgeon: Fernando Morillo MD;  Location: [facility] OR     AV FISTULA OR GRAFT ARTERIAL       COLONOSCOPY N/A 4/7/2021    Procedure: COLONOSCOPY, WITH POLYPECTOMY AND BIOPSY;  Surgeon: Zak Ortega MD;  Location: [facility] GI     CREATE FISTULA ARTERIOVENOUS UPPER EXTREMITY Right 7/31/2019    Procedure: Creation Of Atriovenous Fistula Right Upper Arm;  Surgeon: Julia Irwin MD;  Location: [facility] OR     IR CVC TUNNEL PLACEMENT > 5 YRS OF AGE  10/9/2020     IR DIALYSIS FISTULOGRAM RIGHT  10/9/2020     IR DIALYSIS  FISTULOGRAM RIGHT  2021     IR DIALYSIS MECH THROMB, PTA  10/9/2020     IR DIALYSIS STENT W OR W/OUT PTA  2021     LIGATE FISTULA ARTERIOVENOUS UPPER EXTREMITY  2011    Procedure:LIGATE FISTULA ARTERIOVENOUS UPPER EXTREMITY; Excision of Right Forearm Arteriovenous Fistula.; Surgeon:LINDY AMAYA; Location:[facility] OR     PERCUTANEOUS BIOPSY KIDNEY Right 2017    Procedure: PERCUTANEOUS BIOPSY KIDNEY;  Surgeon: Gee Barrios MD;  Location: [facility] OR     TRANSPLANT  2011    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  Social History     Tobacco Use     Smoking status: Current Some Day Smoker     Types: Cigarettes     Last attempt to quit: 2017     Years since quittin.6     Smokeless tobacco: Never Used     Tobacco comment: Patient states that he is an 'social'  smoker. 3 cigarettes per week per pt   Substance Use Topics     Alcohol use: Not Currently     Alcohol/week: 4.2 standard drinks     Types: 5 Standard drinks or equivalent per week     Comment: 1 shot yesterday     Drug use: Not Currently     Types: Marijuana       Family History   I have reviewed this patient's family history and updated it with pertinent information if needed.  Family History   Problem Relation Age of Onset     Hypertension Mother      Colon Cancer Mother 66     Colon Cancer Brother 51       Prior to Admission Medications   Prior to Admission Medications   Prescriptions Last Dose Informant Patient Reported? Taking?   acetaminophen (TYLENOL) 500 MG tablet   No No   Sig: Take 2 tablets (1,000 mg) by mouth every 6 hours as needed for mild pain   amLODIPine (NORVASC) 5 MG tablet 10/12/2021 at Unknown time  Yes Yes   Sig: Take 10 mg by mouth daily    carvedilol (COREG) 12.5 MG tablet 10/12/2021 at am  No Yes   Sig: Take 2 tablets (25 mg) by mouth 2 times daily (with meals)   febuxostat  (ULORIC) 80 MG TABS tablet  Self No No   Sig: Take 1 tablet (80 mg) by mouth daily   furosemide (LASIX) 20 MG tablet 10/12/2021 at Unknown time  Yes Yes   Sig: Take 1 tablet (20 mg) by mouth daily   mycophenolate (GENERIC EQUIVALENT) 250 MG capsule 10/12/2021 at am  No Yes   Sig: Take 1 capsule (250 mg) by mouth 2 times daily   oxyCODONE (ROXICODONE) 5 MG tablet   No No   Sig: Take 1-2 tablets (5-10 mg) by mouth every 6 hours as needed for pain   sulfamethoxazole-trimethoprim (BACTRIM) 400-80 MG tablet  Self No No   Sig: Take one tablet every Monday, Wednesday, Friday   tacrolimus (GENERIC EQUIVALENT) 0.5 MG capsule 10/11/2021 at Unknown time  No Yes   Sig: Take 1 capsule (0.5 mg) by mouth every evening Total dose = 1 mg in the AM and 1.5 mg in the PM   tacrolimus (GENERIC EQUIVALENT) 1 MG capsule 10/12/2021 at Unknown time  No Yes   Sig: Take 1 capsule (1 mg) by mouth 2 times daily . Total dose=1mg in the AM and 1.5mg in the PM      Facility-Administered Medications: None     Allergies   No Known Allergies    Physical Exam   Vital Signs: Temp: 98.7  F (37.1  C) Temp src: Oral BP: (!) 164/96 Pulse: 83   Resp: 20 SpO2: 93 % O2 Device: None (Room air) Oxygen Delivery: 3 LPM  Weight: 148 lbs 2.39 oz    General Appearance: Awake. Alert and oriented x 4. Pleasant, well-developed, well-nourished male in no acute distress.  Eyes: Normal lids. Anicteric sclera. PERRL EOMs intact. No nystagmus.  HEENT: Normocephalic, atraumatic. Nares patent. Oropharynx clear. Mucous membranes moist.  Respiratory: Normal work of breathing on room air. Shallow inspirations. Bibasilar fine crackles present.  Cardiovascular: RRR. S1, S2. No murmurs, rubs or gallops. Pedal pulses 2+. LLE with 1-2+ edema to knee level with baseline swelling of his left ankle. RLE with 1-2+ edema to level of knee.  GI: Abdomen non-distended. Normoactive bowel sounds. Soft, with TTP in mid-epigastric region. No rebounding or guarding.  Lymph/Hematologic: No  abnormal or excessive bruising.  Skin: Warm, dry. No rashes on exposed skin.  Musculoskeletal: Moves all extremities equally.  Neurologic: No focal deficits. CN II-XII grossly intact.    Data   Data reviewed today: I reviewed all medications, new labs and imaging results over the last 24 hours. I personally reviewed no images or EKG's today.    Recent Labs   Lab 10/12/21  1435   WBC 7.7   HGB 6.7*   MCV 82         POTASSIUM 4.4   CHLORIDE 98   CO2 26   BUN 41*   CR 9.64*   ANIONGAP 12   ASHELY 8.4*   GLC 94   ALBUMIN 3.1*   PROTTOTAL 7.7   BILITOTAL 0.6   ALKPHOS 146   ALT 15   AST 15   TROPONIN <0.015     Recent Results (from the past 24 hour(s))   XR Chest Port 1 View    Narrative    CHEST ONE VIEW PORTABLE  10/12/2021 3:15 PM     HISTORY: Shortness of breath.    COMPARISON: 7/5/2021.      Impression    IMPRESSION: Increased cardiomegaly and diffuse bilateral hazy  opacities along with vascular congestion consistent with pulmonary  edema and CHF. Pneumonia remains in the imaging differential.    AWILDA KABA MD         SYSTEM ID:  [id number]

## 2021-10-13 NOTE — CONSULTS
[facility]  Transplant Infectious Disease Consult Note - New Patient     Patient:  Rashad Ortiz, Date of birth 1975, Medical record number [id number]  Date of Visit:  10/13/2021  Consult requested by Dr. Fernando Geronimo for evaluation of pneumonia    Assessment:   45 y/o male with ESRD s/p LDKT (2011) c/b antibody mediated rejection w/ allograft ESRD on HD M,W,F undergoing a transplant evaluation who was admitted 10/12 with increased shortness of breath, pleuritic chest pain and blood tinged sputum. In general his symptoms were acute onset. Based on his sputum description to me there was some blood tinged sputum but not jordan hemoptysis prior to admission.  CT chest demonstrated faint multifocal groundglass opacities in the the lower lobes. No dense consolidations or cavitary lesions. Procalcitonin elevated but this is more difficult to interpret in the setting of renal dysfunction. He does have a moderate size pericardial effusion which could be contributing to his symptoms as well.  Empirically placed on CAP coverage with azithromycin and ceftriaxone. It is reasonable to complete an empiric course for CAP but it does appear that fluid overload is major component as well. Low suspicion for TB based on risk factors and TB quant negative 2020.     Other infectious Diseases Concerns include:  - QTc interval: QTc 446  - Bacterial prophylaxis: on antibiotics  - Pneumocystis prophylaxis: tmp/smx  - Viral serostatus & prophylaxis: CMV+, EBV +, VZV+  - Fungal prophylaxis:none  - Immunization status: covid 19 10/7/21  - Immunoglobulins:?  - Isolation status: airborne  - IS:tac and mycophenolate    Recommendations:  1. Complete 3 days of azithromycin 500mg for empiric CAP coverage.   2. Continue ceftriaxone but increase dose to 2 grams daily. If microbiologic work up is unrevealing can transition to an oral antibiotic (ie amoxicillin, cefpodoxime) to complete an 5-7 day empiric course  for CAP   3. Obtain sputum culture, RVP, urine legionella and Streptococcus pneumonia antigens  4. Consider removing airborne precautions. Low suspicion for TB.     Thank you very much for this consultation. Transplant Infectious Disease will continue to follow with you.    Swetha Goodman DO     Infectious Diseases Staff  Pager [phone]         History of Infectious Disease Illness:   47 y/o male with ESRD s/p LDKT (2011) c/b antibody mediated rejection w/ allograft ESRD on HD M,W,F undergoing a transplant evaluation who was admitted 10/12 with increased shortness of breath and pleuritic chest pain. He endorses that prior to HD on Tuesday that he developed SOB but that it became worse after the HD run. He noticed a cough the day before when he cough up blood tinged sputum. It was not jordan hemoptysis. This occurred again after HD. It has since resolved while in the hospital. He denies subjective fevers, chills, sinus pressure, ear pain, sore throat, abdominal pain, nausea, vomiting, diarrhea.He does have WARNER, PND and lower extremity edema. He also endorses ~ 1 year of diffuse arthritis. Recently underwent bilateral FRANCA.    CT chest demonstrated predominant subpleural cystic changes, small pleural effusions, multifocal peribronchovascular groundglass opacities in the right lung and LLL. CTA chest did not demonstrate PE or bronchial artery aneurysm. TTE showed moderate posterior pericardial effusion. Start on empiric ceftriaxone and azithromycin. Initially on 3L but now on RA.  PCT 3.73. Pro BNP 25K. TB quant, EBV, CMV, VZV, 1,3 B D glucan, HSV pending.     Born in [place]. Has also lived in [place] and [place]. Works as . No exposed to chemicals at work. Lives with his family including a 3 year old daughter. Daughter had pink eye but no other illnesses noted. No history of homeless, prolonged incarceration time. No known TB exposures. No IVDU. Does not garden, hunt or camp.      Transplants:   1/13/2011 (Kidney), Postoperative day:  3926.  Coordinator Donna Patel    Review of Systems: Remaining systems all reviewed and negative.    Past Medical History:   Diagnosis Date     AVN (avascular necrosis of bone) (H)     left hip     AVN (avascular necrosis of bone) (H)     left hip     BPH (benign prostatic hyperplasia)      Chronic kidney disease, stage 4, severely decreased GFR (H)      Gastro-oesophageal reflux disease      Gout      History of blood transfusion      Hypertension      Medical non-compliance      Osteonecrosis (H) 7/29/2020    Added automatically from request for surgery [id number]     Pulmonary nodules      Steroid long-term use      Vitamin D deficiency        Past Surgical History:   Procedure Laterality Date     ARTHROPLASTY HIP Left 9/9/2020    Procedure: Left total hip arthroplasty;  Surgeon: Fernando Morillo MD;  Location: [facility] OR     ARTHROPLASTY HIP Right 4/12/2021    Procedure: Right total hip arthroplasty;  Surgeon: Fernando Morillo MD;  Location: [facility] OR     AV FISTULA OR GRAFT ARTERIAL       COLONOSCOPY N/A 4/7/2021    Procedure: COLONOSCOPY, WITH POLYPECTOMY AND BIOPSY;  Surgeon: Zak Ortega MD;  Location: [facility]     CREATE FISTULA ARTERIOVENOUS UPPER EXTREMITY Right 7/31/2019    Procedure: Creation Of Atriovenous Fistula Right Upper Arm;  Surgeon: Julia Irwin MD;  Location: [facility] OR     IR CVC TUNNEL PLACEMENT > 5 YRS OF AGE  10/9/2020     IR DIALYSIS FISTULOGRAM RIGHT  10/9/2020     IR DIALYSIS FISTULOGRAM RIGHT  2/19/2021     IR DIALYSIS MECH THROMB, PTA  10/9/2020     IR DIALYSIS STENT W OR W/OUT PTA  2/19/2021     LIGATE FISTULA ARTERIOVENOUS UPPER EXTREMITY  12/20/2011    Procedure:LIGATE FISTULA ARTERIOVENOUS UPPER EXTREMITY; Excision of Right Forearm Arteriovenous Fistula.; Surgeon:LINDY AMAYA; Location:[facility] OR     PERCUTANEOUS BIOPSY KIDNEY Right 2/28/2017    Procedure: PERCUTANEOUS BIOPSY KIDNEY;  Surgeon: Gee Barrios MD;  Location: [facility]      TRANSPLANT  2011    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       Family History   Problem Relation Age of Onset     Hypertension Mother      Colon Cancer Mother 66     Colon Cancer Brother 51       Social History     Social History Narrative    Rashad lives with his wife and 2 children. There are 2 declawed cats. He works at a computer-based job in customer service.      Social History     Tobacco Use     Smoking status: Current Some Day Smoker     Types: Cigarettes     Last attempt to quit: 2017     Years since quittin.6     Smokeless tobacco: Never Used     Tobacco comment: Patient states that he is an 'social'  smoker. 3 cigarettes per week per pt   Substance Use Topics     Alcohol use: Not Currently     Alcohol/week: 4.2 standard drinks     Types: 5 Standard drinks or equivalent per week     Comment: 1 shot yesterday     Drug use: Not Currently     Types: Marijuana       Immunization History   Administered Date(s) Administered     COVID-19,PF,Pfizer 10/07/2021     Flu, Unspecified 10/08/2020     Hep B, Adult 2020     Historical DTP/aP 1982     TD (ADULT, 7+) 2006     TDAP Vaccine (Adacel) 2016       Patient Active Problem List   Diagnosis     Kidney replaced by transplant     Aftercare following organ transplant     GERD (gastroesophageal reflux disease)     CARDIOVASCULAR SCREENING; LDL GOAL LESS THAN 160     Gout     Antibody mediated rejection of kidney transplant     Medical non-compliance     Chronic kidney disease, stage 4, severely decreased GFR (H)     Herpes simplex infection of penis     Escherichia coli septicemia (H)     Pyelonephritis of transplanted kidney     HTN, kidney transplant related     Metabolic acidosis     CKD (chronic kidney disease) stage 5, GFR less than 15 ml/min (H)     Immunosuppression (H)     Anemia of chronic renal failure, stage 5 (H)     Secondary renal hyperparathyroidism (H)     Vitamin D deficiency     BPH (benign prostatic  hyperplasia)     Status post hip surgery     ESRD (end stage renal disease) on dialysis (H)     Primary osteoarthritis of right hip     Avascular necrosis of femoral head, right (H)     Aftercare following hip joint replacement surgery, unspecified laterality     Hip pain, right     Acute pulmonary edema (H)     Shortness of breath     Chest pain, unspecified type            Current Medications & Allergies:       amLODIPine  10 mg Oral Daily     azithromycin  500 mg Oral Daily     carvedilol  25 mg Oral BID w/meals     cefTRIAXone  1 g Intravenous Q24H     furosemide  20 mg Oral Daily     heparin ANTICOAGULANT  5,000 Units Subcutaneous Q12H     mycophenolate  250 mg Oral BID     sodium chloride (PF)  3 mL Intracatheter Q8H     sulfamethoxazole-trimethoprim  1 tablet Oral Once per day on Mon Wed Fri     tacrolimus  1 mg Oral QAM     tacrolimus  1.5 mg Oral QPM     No Known Allergies         Physical Exam:     Patient Vitals for the past 24 hrs:   BP Temp Temp src Pulse Resp SpO2 Weight   10/13/21 1700 133/77 -- -- 72 -- 98 % --   10/13/21 1245 (!) 140/88 -- -- 74 -- -- --   10/13/21 1149 (!) 135/92 -- -- 68 29 98 % --   10/13/21 1145 (!) 141/86 98.6  F (37  C) Oral 68 24 99 % 65.5 kg (144 lb 6.4 oz)   10/13/21 1130 132/87 -- -- 67 20 97 % --   10/13/21 1115 (!) 137/94 -- -- 67 14 99 % --   10/13/21 1100 138/88 -- -- 67 20 99 % --   10/13/21 1045 139/83 -- -- 67 22 99 % --   10/13/21 1030 (!) 145/92 -- -- 69 22 97 % --   10/13/21 1015 (!) 141/92 -- -- 70 25 98 % --   10/13/21 1000 (!) 135/90 -- -- 69 25 95 % --   10/13/21 0945 (!) 140/88 -- -- 72 20 93 % --   10/13/21 0943 (!) 142/91 98.8  F (37.1  C) Oral 73 19 94 % --   10/13/21 0626 126/74 99.2  F (37.3  C) Oral 70 22 90 % --   10/13/21 0115 (!) 148/88 99.6  F (37.6  C) -- 79 29 92 % --   10/13/21 0045 (!) 143/89 100  F (37.8  C) Oral 77 30 93 % --   10/12/21 2345 (!) 152/91 99.8  F (37.7  C) Oral 82 30 94 % --   10/12/21 2329 (!) 145/91 99.9  F (37.7  C) -- 83 (!)  32 94 % --   10/12/21 2250 -- 99.8  F (37.7  C) Oral -- -- -- --   10/12/21 2227 -- 99.5  F (37.5  C) Oral -- -- -- --   10/12/21 2215 -- 99.8  F (37.7  C) Oral -- -- -- --   10/12/21 2209 (!) 151/89 99.6  F (37.6  C) Oral 86 18 95 % --   10/12/21 2033 -- -- -- -- -- 93 % --   10/12/21 1949 (!) 164/96 98.7  F (37.1  C) Oral 83 20 98 % 67.2 kg (148 lb 2.4 oz)   10/12/21 1917 -- -- -- 82 27 99 % --   10/12/21 1910 (!) 168/104 -- -- 82 -- 95 % --   10/12/21 1900 (!) 163/103 -- -- 81 (!) 40 100 % --     Ranges for vital signs:  Temp:  [98.6  F (37  C)-100  F (37.8  C)] 98.6  F (37  C)  Pulse:  [67-86] 72  Resp:  [14-40] 29  BP: (126-168)/() 133/77  SpO2:  [90 %-100 %] 98 %  Vitals:    10/12/21 1949 10/13/21 1145   Weight: 67.2 kg (148 lb 2.4 oz) 65.5 kg (144 lb 6.4 oz)       Physical Examination:  GENERAL:  well-appearing, in no acute distress. In bed.  HEAD:  Head is normocephalic, atraumatic   EYES:  Eyes have anicteric sclerae without conjunctival injection. Pupils reactive bilaterally.    ENT:  Oropharynx is moist without exudates or ulcers. Tongue is midline  NECK:  Supple. No cervical lymphadenopathy  LUNGS:  Clear to auscultation bilaterally in the uppers and diminished in the bases. No accessory muscle use. Not on oxygen.   CARDIOVASCULAR:  Regular rate and rhythm with no murmurs  ABDOMEN:  Normal bowel sounds, soft, non-tender, non-distended.   SKIN:  No acute rashes.   EXTREMITIES: no joint swelling or erythema. Noted bilateral non-pitting edema.   NEUROLOGIC:  Alert. Oriented. Grossly nonfocal. Active x4 extremities. Strength equal.  LINES: peripheral in place. No erythema around insertion site and dressing intact.          Laboratory Data:     Metabolic Studies       Recent Labs   Lab Test 10/13/21  0708 10/12/21  1435 10/12/21  1435 09/29/21  1449 07/06/21  0450 09/18/20  1157 09/10/20  0735 10/26/18  1626 10/26/18  0836 10/26/18  0152 10/25/18  2324 10/25/18  2136 10/25/18  1707 06/07/15  0633  06/06/15  0552     --  136   < > 135   < > 135   < >  --    < > 138   < > 134   < > 137   POTASSIUM 5.4*  --  4.4   < > 3.5   < > 5.3   < >  --    < > 4.4   < > 5.1   < > 5.1   CHLORIDE 100  --  98   < > 102   < > 104   < >  --    < > 109   < > 104   < > 110*   CO2 27  --  26   < > 22   < > 20   < >  --    < > 15*   < > 19*   < > 14*   ANIONGAP 6   < > 12   < > 11   < > 11   < >  --    < > 14   < > 11   < > 13   BUN 54*   < > 41*   < > 44*   < > 25   < >  --    < > 61*   < > 56*   < > 53*   CR 11.40*  --  9.64*   < > 9.48*   < > 6.35*   < >  --    < > 8.87*   < > 8.70*   < > 5.41*   GFRESTIMATED 5*   < > 6*   < > 6*   < > 10*   < >  --    < > 7*   < > 7*   < > 12*   *   < > 94   < > 84   < > 104*   < >  --    < > 108*   < > 124*   < > 138*   A1C  --   --   --   --   --   --   --   --   --   --   --   --   --   --  5.4   ASHELY 8.3*  --  8.4*   < > 8.0*   < > 8.7   < >  --    < > 7.3*   < > 8.7   < > 8.8   PHOS  --   --   --   --  4.5  --   --    < >  --   --   --   --  5.2*   < > 4.6*   MAG  --   --   --   --   --   --  1.5*   < >  --   --  1.7   < > 1.0*   < > 2.0   LACT  --   --   --   --   --   --   --   --  1.5  --  1.4   < > 1.5  --   --    PCAL 3.73*  --   --   --   --   --   --    < >  --    < >  --   --  >200.00*  --   --    CKT  --   --   --   --   --   --   --   --   --   --   --   --  58  --   --     < > = values in this interval not displayed.       Hepatic Studies    Recent Labs   Lab Test 10/13/21  0708 10/12/21  1435 09/29/21  1449 09/29/21  1449   BILITOTAL 0.4 0.6  --  0.4   ALKPHOS 134 146  --  157*   PROTTOTAL 7.3 7.7   < > 7.7   ALBUMIN 2.7* 3.1*  --  3.2*   AST 10 15  --  7   ALT 6 15  --  14    < > = values in this interval not displayed.       Hematology Studies      Recent Labs   Lab Test 10/13/21  1320 10/13/21  0708 10/13/21  0708 10/12/21  1435 10/12/21  1435 09/29/21  1449 09/29/21  1449 07/05/21  0221 07/05/21  0218 04/14/21  0645 04/13/21  0653 04/09/21  1044 04/09/21  1044    WBC  --   --  6.8  --  7.7  --  5.0  --  5.6  --  8.6  --  6.8   ANEU  --   --   --   --   --   --   --   --  3.8  --   --   --  4.9   ALYM  --   --   --   --   --   --   --   --  1.1  --   --   --  1.0   TAMIKA  --   --   --   --   --   --   --   --  0.4  --   --   --  0.8   AEOS  --   --   --   --   --   --   --   --  0.3  --   --   --  0.1   HGB 7.4*   < > 7.0*   < > 6.7*   < > 7.2*   < > 8.0*   < > 10.0*   < > 10.9*   HCT  --   --  22.3*   < > 21.8*   < > 23.3*  --  26.2*  --  31.8*   < > 35.4*   PLT  --   --  120*   < > 157   < > 142*  --  159  --  194   < > 195    < > = values in this interval not displayed.       Arterial Blood Gas Testing    Recent Labs   Lab Test 10/12/21  1440 10/26/18  0836 10/26/18  0018 10/25/18  2136   O2PER 93 21.0 0 21        Medication levels    Recent Labs   Lab Test 07/06/21  0450 10/28/19  1622 10/14/19  0752   TACROL <3.0*   < > 5.6   MPACID  --   --  4.55*   MPAG  --   --  >200.0*    < > = values in this interval not displayed.     Inflammatory Markers    Recent Labs   Lab Test 01/08/21  1305 05/08/20  1110 10/28/18  0534 10/25/18  1707 09/17/18  1741 07/01/18  1250 09/25/17  0720   *  --   --   --  32* 52*  --    CRP 87.2*  --  170.0*   < > <2.9  --   --    PSA  --  2.07  --   --   --   --    < >    < > = values in this interval not displayed.     Pancreatitis testing    Recent Labs   Lab Test 07/24/19  1702 09/25/17  0720 04/10/17  1131 06/17/15  2115   AMYLASE  --   --  153*  --    LIPASE  --   --  387 179   TRIG 70   < >  --   --     < > = values in this interval not displayed.       Gout Labs      Recent Labs   Lab Test 09/29/21  1449 04/08/19  1914 11/01/18  1447 09/17/18  1741 07/01/18  1250   URIC 4.7 11.8* 7.4* 11.5* 11.1*       Clotting Studies    Recent Labs   Lab Test 02/19/21  0845 10/08/20  1215 09/22/20  1407 09/18/20  1157 07/31/19  0708   INR 1.18* 1.24* 1.21* 1.20*  --    PTT  --   --  35  --    < >    < > = values in this interval not displayed.        Iron Testing    Recent Labs   Lab Test 10/13/21  0708 07/08/20  1119 06/20/20  1112 04/23/20  1050 03/18/20  0723 10/14/19  0027 10/10/19  1650 10/27/18  0444 10/26/18  1626   IRON  --   --  15*  --  54  --  42   < >  --    FEB  --   --  162*  --  179*  --  181*   < >  --    IRONSAT  --   --  9*  --  30  --  23   < >  --    ORALIA  --   --  310  --  460*  --  790*   < >  --    MCV 84   < > 88   < > 88   < > 87   < > 86   RETP  --   --   --   --   --   --   --   --  0.3*   RETICABSCT  --   --   --   --   --   --   --   --  8.5*    < > = values in this interval not displayed.     Thyroid Studies     Recent Labs   Lab Test 10/13/21  0708 04/10/17  1131   TSH 1.34 0.67     Urine Studies     Recent Labs   Lab Test 12/21/20  1545 09/09/20  1233 06/13/20  1010 12/11/18  1211 10/25/18  1210   URINEPH 6.0 8.0* 6.5 6.0 6.5   NITRITE Negative Negative Negative Negative Negative   LEUKEST Trace* Negative Negative Negative Moderate*   WBCU 0 - 5 2 0 - 5 1 >100*     Microbiology:  Last Culture results with specimen source  Culture Micro   Date Value Ref Range Status   12/11/2018 <10,000 colonies/mL  urogenital shelli    Final   10/28/2018 No growth  Final   10/26/2018 No growth  Final   10/26/2018 No growth  Final   10/25/2018 (A)  Final    Cultured on the 1st day of incubation:  Escherichia coli  Susceptibility testing done on previous specimen     10/25/2018   Final    Critical Value/Significant Value, preliminary result only, called to and read back by  Francie Velasco RN at 0930 on [id number].KD     10/25/2018 (A)  Final    Cultured on the 1st day of incubation:  Escherichia coli     10/25/2018   Final    Critical Value/Significant Value, preliminary result only, called to and read back by  Cisco McdanielsRN at 6B at 0620 on [email or web address]     10/25/2018   Final    (Note)  POSITIVE for E.COLI by [facility] multiplex nucleic acid test. Final  identification and antimicrobial susceptibility testing will be  verified by standard  methods. Verigene test will not distinguish  E.coli from Shigella species including S.dysenteriae, S.flexneri,  S.boydii, and S.sonnei. Specimens containing Shigella species or  E.coli will be reported as Positive for E.coli.    Specimen tested with Verigene multiplex, gram-negative blood culture  nucleic acid test for the following targets: Acinetobacter sp.,  Citrobacter sp., Enterobacter sp., Proteus sp., E. coli, K.  pneumoniae/oxytoca, P. aeruginosa, and the following resistance  markers: CTXM, KPC, NDM, VIM, IMP and OXA.    Critical Value/Significant Value called to and read back by Denisha Payton RN, @ 0832 10.26.2018 BL     10/25/2018 >100,000 colonies/mL  Escherichia coli   (A)  Final   03/01/2017 No growth  Final   06/04/2015 No growth  Final   01/24/2012 No growth  Final   01/22/2012   Final    No Salmonella, Shigella, Campylobacter, E. coli O157, Aeromonas, or Plesiomonas   isolated.   12/28/2011 No growth  Final   01/12/2011 No yeast isolated  Final   01/12/2011   Final    <10,000 colonies/mL Alpha hemolytic Streptococcus No further identification   02/24/2010 No growth  Final    Specimen Description   Date Value Ref Range Status   12/11/2018 Midstream Urine  Final   10/28/2018 Blood Left Arm  Final   10/26/2018 Blood Left Hand  Final   10/26/2018 Blood Right Arm  Final   10/25/2018 Blood Left Arm  Final   10/25/2018 Feces  Final   10/25/2018 Blood Left Arm  Final   10/25/2018 Midstream Urine  Final   04/10/2017 Feces  Final   04/10/2017 Feces  Final   04/10/2017 Feces  Final   03/01/2017 Midstream Urine  Final   06/04/2015 Midstream Urine  Final   10/31/2014 Urine  Final   01/24/2012 Midstream Urine  Final   01/22/2012 Feces  Final   01/22/2012 Feces  Final   12/28/2011 Midstream Urine  Final   01/12/2011 Midstream Urine  Final   01/12/2011 Midstream Urine  Final   01/12/2011 Midstream Urine  Final          Last check of C difficile  C Diff Toxin B PCR   Date Value Ref Range Status   10/25/2018 Negative  NEG^Negative Final     Comment:     Negative: Clostridium difficile target DNA sequences NOT detected, presumed   negative for Clostridium difficile toxin B or the number of bacteria present   may be below the limit of detection for the test.  FDA approved assay performed using [employer] GeneXpert real-time PCR.  A negative result does not exclude actual disease due to Clostridium difficile   and may be due to improper collection, handling and storage of the specimen   or the number of organisms in the specimen is below the detection limit of the   assay.         Infection Studies to assess Diarrhea   Recent Labs   Lab Test 10/24/19  1644 10/25/18  1929 10/25/18  1929 04/24/17  0900 04/24/17  0900 04/10/17  1835 04/10/17  1835   POPRT  --   --   --   --   --   --  Routine parasitology exam negative  Cryptosporidium, Cyclospora, and Microsporidia are not readily detected by this   method. A single negative specimen does not rule out parasitic infection.     EPCAMP Not Detected  --  Not Detected  --  Not Detected   < > Not Detected   EPSALM Not Detected   < > Not Detected   < > Not Detected   < > Not Detected   EPSHGL Not Detected   < > Not Detected   < > Not Detected   < > Not Detected   EPVIB Not Detected   < > Not Detected   < > Not Detected   < > Not Detected   EPROTA Not Detected   < > Not Detected   < > Not Detected   < > Not Detected   EPNORO Detected, Abnormal Result*   < > Detected, Abnormal Result*   < > Detected, Abnormal Result*   < > Not Detected   EPYER Not Detected   < > Not Detected   < > Not Detected   < > Not Detected    < > = values in this interval not displayed.       Virology:  Coronavirus-19 testing    Recent Labs   Lab Test 10/12/21  1600 07/05/21  0221 04/09/21  1122 04/09/21  1122   [id number] Negative NEGATIVE  --  Test received-See reflex to IDDL test SARS CoV2 (COVID-19) Virus RT-PCR  NEGATIVE   [id number]  --  Nasopharyngeal   < > Nasopharyngeal   [id number]  --   --   --  Nasopharyngeal     < > = values in this interval not displayed.       Respiratory virus testing    Recent Labs   Lab Test 07/05/21  0221 08/19/20  1114 11/11/19  1539 10/25/18  2136 10/25/18  1104   RVSPEC  --   --   --  Nasopharyngeal  --    AFLU  --   --  Negative  --    < >   IFLUA  --   --   --  Negative  --    FLUAH1  --   --   --  Negative  --    [id number]  --   --   --  Negative  --    FLUAH3  --   --   --  Negative  --    BFLU  --   --  Negative  --    < >   IFLUB  --   --   --  Negative  --    PIV1  --   --   --  Negative  --    PIV2  --   --   --  Negative  --    PIV3  --   --   --  Negative  --    HRVS  --   --   --  Negative  --    RSVA  --   --   --  Negative  --    RSVB  --   --   --  Negative  --    HMPV  --   --   --  Negative  --    ADVBE  --   --   --  Negative  --    ADVC  --   --   --  Negative  --    [id number] Nasopharyngeal   < >  --   --   --     < > = values in this interval not displayed.         CMV viral loads    Recent Labs   Lab Test 04/24/17  0847 02/28/17  0753 02/28/17  0753   CMVQNT CMV DNA Not Detected   The [employer] AmpliPrep/AGUST[employer]N TaqMan CMV Test is an FDA-approved in vitro nucleic   acid amplification test for the quantitation of cytomegalovirus DNA in human   plasma (EDTA plasma) using the [employer] AmpliPrep Instrument for automated viral   nucleic acid extraction and the [employer]Twang TaqMan Analyzer or dbTwang TaqMan for   automated Real Time amplification and detection of the viral nucleic acid   target.   Titer results are reported in International Units/mL (IU/mL using 1st WHO   International standard for Human Cytomegalovirus for Nucleic Acid Amplification   based assays. The conversion factor between CMV DNA copis/mL (as defined by the   Roche AGUSTÍN TaqMan CMV test) and International Units is the CMV DNA   concentration in IU/mL x 1.1 copies/IU = CMV DNA in copies/mL.   This assay has received FDA approval for the testing of human plasma only. The   [facility], has validated the pe rformance characteristics of the Roche   CMV assay for plasma, bronchial alveolar lavage/wash and urine.    --  CMV DNA Not Detected   The [employer] AmpliPrep/AGUST[employer]N TaqMan CMV Test is an FDA-approved in vitro nucleic   acid amplification test for the quantitation of cytomegalovirus DNA in human   plasma (EDTA plasma) using the [employer] AmpliPrep Instrument for automated viral   nucleic acid extraction and the [employer] TaqMan Analyzer or [employer] TaqMan for   automated Real Time amplification and detection of the viral nucleic acid   target.   Titer results are reported in International Units/mL (IU/mL using 1st WHO   International standard for Human Cytomegalovirus for Nucleic Acid Amplification   based assays. The conversion factor between CMV DNA copis/mL (as defined by the   Roche AGUSTÍN TaqMan CMV test) and International Units is the CMV DNA   concentration in IU/mL x 1.1 copies/IU = CMV DNA in copies/mL.   This assay has received FDA approval for the testing of human plasma only. The   [facility] at the [facility], [place], has validated the pe rformance characteristics of the Roche   CMV assay for plasma, bronchial alveolar lavage/wash and urine.     CSPEC Plasma   < > Plasma, EDTA anticoagulant   CMVLOG Not Calculated  --  Not Calculated    < > = values in this interval not displayed.         BK Virus Result   Date Value Ref Range Status   09/25/2017 BK Virus DNA Not Detected BKNEG^BK Virus DNA Not Detected copies/mL Final   02/28/2017 BK Virus DNA Not Detected BKNEG copies/mL Final   10/22/2013 <1000 <1000 copies/mL Final   05/21/2012 <1000 <1000 copies/mL Final   05/12/2012 <1000 <1000 copies/mL Final   12/05/2011 <1000 <1000 copies/mL Final   07/08/2011 <1000 <1000 copies/mL Final   04/08/2011 <1000 <1000 copies/mL Final   02/14/2011 <1000 <1000 copies/mL Final     Hepatitis B Testing      Recent Labs   Lab Test 10/13/21  1000 07/05/21  1032 08/24/20  1008 06/13/20  1037 09/25/17  0720 09/25/17  0720   AUSAB 0.71 0.46  --  0.44   < > 0.21   HBCAB  --   --   --  Nonreactive  --  Nonreactive   HEPBANG Nonreactive Nonreactive   < > Nonreactive   < > Nonreactive    < > = values in this interval not displayed.        Hepatitis C Antibody   Date Value Ref Range Status   06/13/2020 Nonreactive NR^Nonreactive Final     Comment:     Assay performance characteristics have not been established for newborns,   infants, and children     09/25/2017 Nonreactive NR^Nonreactive Final     Comment:     Assay performance characteristics have not been established for newborns,   infants, and children         CMV Antibody IgG   Date Value Ref Range Status   06/13/2020 >8.0 (H) 0.0 - 0.8 AI Final     Comment:     Positive  Antibody index (AI) values reflect qualitative changes in antibody   concentration that cannot be directly associated with clinical condition or   disease state.     09/25/2017 >8.0 (H) 0.0 - 0.8 AI Final     Comment:     Positive  Antibody index (AI) values reflect qualitative changes in antibody   concentration that cannot be directly associated with clinical condition or   disease state.       CMV IgG Antibody   Date Value Ref Range Status   01/12/2011 >160.0 EU/mL Final     Comment:     Positive for anti-CMV IgG     CMV IgM Antibody   Date Value Ref Range Status   01/12/2011 <0.90 <0.90 Final     Comment:     No detectable antibody.     Herpes Simplex Virus IgM Antibody   Date Value Ref Range Status   06/19/2018 0.48 0.00 - 0.89 Index Value Final     Comment:     No detectable antibody.  A negative test result does not rule out a primary or reactivated infection.       Varicella Zoster Virus Antibody IgG   Date Value Ref Range Status   06/13/2020 >8.0 (H) 0.0 - 0.8 AI Final     Comment:     Positive, suggests prev. exposure and probable immunity  Antibody index (AI) values reflect qualitative  changes in antibody   concentration that cannot be directly associated with clinical condition or   disease state.     09/25/2017 >8.0 (H) 0.0 - 0.8 AI Final     Comment:     Positive, suggests prev. exposure and probable immunity  Antibody index (AI) values reflect qualitative changes in antibody   concentration that cannot be directly associated with clinical condition or   disease state.       EBV Capsid Antibody IgG   Date Value Ref Range Status   06/13/2020 >8.0 (H) 0.0 - 0.8 AI Final     Comment:     Positive, suggests recent or past exposure  Antibody index (AI) values reflect qualitative changes in antibody   concentration that cannot be directly associated with clinical condition or   disease state.     09/25/2017 >8.0 (H) 0.0 - 0.8 AI Final     Comment:     Positive, suggests recent or past exposure  Antibody index (AI) values reflect qualitative changes in antibody   concentration that cannot be directly associated with clinical condition or   disease state.       EBV IgG Antibody Interpretation   Date Value Ref Range Status   01/12/2011 Positive, suggests immunologic exposure.  Final   01/27/2009 Positive, suggests immunologic exposure.  Final     Herpes Simplex Virus Type 1 IgG   Date Value Ref Range Status   06/19/2018 6.6 (H) 0.0 - 0.8 AI Final     Comment:     Positive.  IgG antibody to HSV-1 detected.  Antibody index (AI) values reflect qualitative changes in antibody   concentration that cannot be directly associated with clinical condition or   disease state.       Herpes Simplex Virus Type 2 IgG   Date Value Ref Range Status   06/19/2018 <0.2 0.0 - 0.8 AI Final     Comment:     No HSV-2 IgG antibodies detected.  Antibody index (AI) values reflect qualitative changes in antibody   concentration that cannot be directly associated with clinical condition or   disease state.         Imaging:  Recent Results (from the past 48 hour(s))   XR Chest Port 1 View    Narrative    CHEST ONE VIEW PORTABLE   10/12/2021 3:15 PM     HISTORY: Shortness of breath.    COMPARISON: 7/5/2021.      Impression    IMPRESSION: Increased cardiomegaly and diffuse bilateral hazy  opacities along with vascular congestion consistent with pulmonary  edema and CHF. Pneumonia remains in the imaging differential.    AWILDA KABA MD         SYSTEM ID:  [id number]   CT Chest w/o Contrast    Narrative    EXAMINATION: CT CHEST W/O CONTRAST, 10/13/2021 2:03 AM    TECHNIQUE:  Helical CT images from the thoracic inlet through the lung  bases were obtained without IV contrast.     COMPARISON: Chest x-ray 10/12/2021.    HISTORY: Hemoptysis    FINDINGS:    Chest:     Hypodense thyroid nodules. Heart size is normal. Moderate-sized  pericardial effusion. Thoracic aorta is normal in caliber with  conventional three-vessel branching pattern of the aortic arch. The  main pulmonary artery is enlarged, measuring up to 3.7 cm. Right  subclavian stent.    Prominent but not enlarged mediastinal and axillary lymph nodes.  Esophagus is normal.    Trachea and central airways are clear. Basilar predominant subpleural  cystic changes. Small bilateral pleural effusions with adjacent  compressive atelectasis. Multifocal peribronchovascular groundglass  and consolidative opacities in the right lung and left lower lobe.  Scattered calcified granulomas including a prominent granuloma in the  right lower lobe. Basilar predominant interlobular septal thickening.  Lingular atelectasis.    Abdomen: Examination of the upper abdomen is limited. Diffusely  hypoattenuating liver parenchyma. Spleen size is within normal limits.  No adrenal mass. Bilateral renal atrophy and simple left renal cyst.    Bones: No suspicious osseous lesion. Diffuse osseous sclerosis, likely  renal osteodystrophy.      Soft Tissues: No suspicious mass. Bilateral gynecomastia.      Impression    IMPRESSION:   1.  Multifocal peribronchovascular groundglass and consolidative  opacities in the right lung  and left lower lobe concerning for  infection.  2.  Small bilateral pleural effusions. Some degree of pulmonary edema  is likely present.  3.  Moderate size pericardial effusion.  4.  Lower lobe prominent subpleural cystic changes, increased since  2010 suggestive of paraseptal emphysema or an interstitial lung  abnormality.  5.  Main pulmonary artery is enlarged which may be seen with pulmonary  arterial hypertension.  6.  Hypodense thyroid nodules which may be further evaluated with  ultrasound.  7.  Atrophic kidneys with renal osteodystrophy changes.       I have personally reviewed the examination and initial interpretation  and I agree with the findings.    EMIL PANIAGUA,          SYSTEM ID:  [id number]   Echo Complete   Result Value    LVEF  60-65%    Narrative    [id number]  [id number]  [id number]  [id number]^CLINTON^NEETA^TIFFANY     [facility],[place]  Echocardiography Laboratory  [address] [place] [id number]     Name: GISELLE GRAY  MRN: [id number]  : 1975  Study Date: 10/13/2021 01:29 PM  Age: 46 yrs  Gender: Male  Patient Location: [facility]  Reason For Study: Pericardial Effusion, Chest Pain, Pulmonary HTN  Ordering Physician: NEETA ALONZO  Performed By: Madelin Terry RDCS     BSA: 1.8 m2  Height: 68 in  Weight: 148 lb  HR: 72  BP: 125/74 mmHg  ______________________________________________________________________________  Procedure  Complete Portable Echo Adult.  ______________________________________________________________________________  Interpretation Summary  Moderate-sized, predominantly posterior pericardial effusion measuring up to  2.0 cm posterior to the LV free wall. Aside from elevated RA pressure (dilated  IVC), no findings are present to support a diagnosis of pericardial tamponade.  Due to its posterior location, pericardiocentesis does not appear feasible  from the apical or subxiphoid approaches on the available images.  Global and regional left  ventricular function is normal with an EF of 60-65%.  Right ventricular function, chamber size, wall motion, and thickness are  normal.  Mild pulmonary hypertension is present. Pulmonary artery systolic pressure is  49 mmHg (34 mmHg+RA pressure).  This study was compared with the study from 11/16/20 (stress): Pericardial  effusion is new.  ______________________________________________________________________________  Left Ventricle  Left ventricular size is normal. Global and regional left ventricular function  is normal with an EF of 60-65%. Mild to moderate concentric wall thickening  consistent with left ventricular hypertrophy is present. Left ventricular  diastolic function is indeterminate.     Right Ventricle  Right ventricular function, chamber size, wall motion, and thickness are  normal.     Atria  Severe biatrial enlargement is present.     Mitral Valve  Trace to mild mitral insufficiency is present.     Aortic Valve  Aortic valve is normal in structure and function. The aortic valve is  tricuspid.     Tricuspid Valve  Mild tricuspid insufficiency is present. Mild pulmonary hypertension is  present. Pulmonary artery systolic pressure is 49 mmHg (34 mmHg+RA pressure).     Pulmonic Valve  Mild pulmonic insufficiency is present.     Vessels  The aorta root is normal. The thoracic aorta is normal. The pulmonary artery  cannot be assessed. Dilation of the inferior vena cava is present with  abnormal respiratory variation in diameter. IVC diameter >2.1 cm collapsing  <50% with sniff suggests a high RA pressure estimated at 15 mmHg or greater.     Pericardium  Moderate-sized, predominantly posterior pericardial effusion measuring up to  2.0 cm posterior to the LV free wall. Aside from elevated RA pressure (dilated  IVC), no findings are present to support a diagnosis of pericardial tamponade.  Due to its posterior location, pericardiocentesis does not appear feasible  from the apical or subxiphoid approaches on  the available images.     Compared to Previous Study  This study was compared with the study from 20 (stress) . Pericardial  effusion is new.  ______________________________________________________________________________  MMode/2D Measurements & Calculations     RVDd: 4.9 cm  IVSd: 1.00 cm  LVIDd: 5.4 cm  LVIDs: 3.7 cm  LVPWd: 1.4 cm  FS: 32.6 %  LV mass(C)d: 261.6 grams  LV mass(C)dI: 145.5 grams/m2  Ao root diam: 3.5 cm  asc Aorta Diam: 3.2 cm  LVOT diam: 2.2 cm  LVOT area: 3.8 cm2  LA Volume (BP): 147.0 ml  LA Volume Index (BP): 81.7 ml/m2  RWT: 0.50     Doppler Measurements & Calculations  MV E max rowena: 97.5 cm/sec  MV A max rowena: 65.0 cm/sec  MV E/A: 1.5  MV dec time: 0.20 sec  TR max rowena: 293.2 cm/sec  TR max P.4 mmHg  E/E' av.7  Lateral E/e': 9.4  Medial E/e': 16.0     ______________________________________________________________________________  Report approved by: Steven Renae 10/13/2021 02:12 PM         CTA Chest with Contrast    Narrative    CTA CHEST10/ 2:58 PM    CLINICAL HISTORY: Hemoptysis.     COMPARISONS: CT chest without contrast 10/13/2021    REFERRING PROVIDER: NEETA ALONZO    TECHNIQUE: Following the uneventful administration of intravenous  contrast, PE CTA performed. CTA performed through the chest. Coronal  and sagittal reconstructions were produced.    3D and multiplanar reconstructions were unavailable at the time of  dictation.    CONTRAST: 150  mL Isovue 370    DOSE (DLP): 229 mGy*cm    FINDINGS:     There is adequate opacification of the main and lobar pulmonary  arteries. No filling defect in the lobar and main segmental pulmonary  arteries to suggest pulmonary embolism.    CTA: The thoracic aorta is normal in caliber. Conventional branching  pattern of the great vessels. The great vessel origins are patent  without significant stenosis. Normal caliber of the the right  bronchial artery (series 12 image 186) and the partly visualized left  bronchial  adjacent to the left mainstem bronchus (series 256 image  12). No bronchial artery aneurysm. No pulmonary AVM. No evidence of  active contrast extravasation to explain source of hemoptysis.    Mediastinum: Subcentimeter hypoattenuating left thyroid nodule. The  ascending aorta mammogram pulmonary artery are normal in caliber. The  heart is enlarged. Moderate hypoattenuating pericardial effusion. No  enlarged thoracic lymph nodes.    LUNGS: The central tracheobronchial tree is clear. Small bilateral  pleural effusions. No pneumothorax. Paraseptal emphysematous changes  throughout bilateral lungs. Perihilar centrilobular groundglass  opacities, slightly increased compared to earlier same-day  examination. Lingular and left lower lobe atelectasis.    Upper abdomen: The visualized abdomen is within normal limits.    Bones and soft tissues: No acute or suspicious osseous lesions. Right  subclavian temporary visualized axillary stents, patency is not  evaluated due to contrast bolus timing.      Impression    IMPRESSION:  1. No pulmonary embolism.  2. No bronchial artery aneurysm, pulmonary AVM or evidence of active  contrast extravasation to explain patient's source of hemoptysis.  3. Cardiomegaly with moderate pericardial effusion. Increased  perihilar predominant groundglass opacities and small bilateral  pleural effusions, favored to represent pulmonary edema.    I have personally reviewed the examination and initial interpretation  and I agree with the findings.    JACKIE JHA MD         SYSTEM ID:  [id number]

## 2021-10-13 NOTE — PROVIDER NOTIFICATION
Gold Night Notification    Patient Name: Rashad Ortiz MRN# [id number]   Age: 46 year old YOB: 1975   Date of Admission: 10/12/2021    MD notified: Dr. Fernando Geronimo MD    Reason for notification:   Inquiring whether sputum analysis indicated, given antibiotic regimen.   Recommendation/request given to MD:   Consider order placement for the abovementioned item if indicated or potentially beneficial.   MD response:   Pending      Carlos Gonzalez RN  5B General Medicine

## 2021-10-13 NOTE — CONSULTS
Nephrology Initial Consult  October 13, 2021      Rashda Ortiz MRN:[id number] YOB: 1975  Date of Admission:10/12/2021  Primary care provider: Varun Burgess  Requesting physician: Fernando Geronimo MD    ASSESSMENT AND RECOMMENDATIONS:   #End-stage renal disease-hemodialysis is Tuesday Thursday Saturday at [facility] with Dr. Coleman.  Treatment time is 2.5 hours via an AV fistula.  Target weight is 67 kg.    #Hypervolemia -imaging shows pulmonary edema.  We will try to remove 1.5 kg during an UF run today.  I I discussed with Rashad that though he has cramping that that suggests he may have reached his target weight the imaging suggests that he has fluid on board.  He has pulmonary edema as well as a pericardial effusion.  Will monitor carefully for any signs of cramping during his treatment.  Alternative approaches to ultrafiltration may include extending his treatment time to 3 or 3-1/2 hours.  Sometimes doing UF only for the first 30 minutes of the treatment is associated with less symptomatic cramping during the treatment.  There is some evidence that vitamin D supplementation is associated with improvement in symptoms of cramping in people with end-stage renal disease.  Rashad has not tried that so that could be an option.  I would typically start vitamin E 400 units daily.    #Anemia of end-stage renal disease.  Hemoglobin levels run at approximately 7 on Epogen 8000 units pretreatment.  Will not change his prescription and defer further  treatment to his outpatient dialysis team    #Renal hyperparathyroidism-his PTH Runs approximately 2000, he is on Hectorol 15 mcg 3 times weekly with dialysis.  His phosphorus is often elevated in the 7-8 range.  We will make no changes and and defer further management to his primary nephrology team.    Recommendations were communicated to primary team via note    Chanda Jackson MD  [facility]  Department of  Medicine  Division of Renal Disease and Hypertension  [facility]  [employer]     REASON FOR CONSULT: ESRD management, hypervolemia    HISTORY OF PRESENT ILLNESS:  Admitting provider and nursing notes reviewed  Rashad Ortiz is a 46 year old man with history of end-stage kidney disease on in center hemodialysis Tuesday Thursday Saturday.  Yesterday he had his typical dialysis treatment for 2.5 hours and Completed his treatment at his target weight of 67 kg.  Despite coming off at his target weight he developed worsening shortness of breath along with chest pain and presented to the emergency department.  In the emergency department he had chest x-ray that showed pulmonary edema.  He was tachypneic but not hypoxic.  He was given IV furosemide 40 mg x 2 and did not have significant improvement in his symptoms.  He was admitted for further evaluation and treatment.  Yesterday I spoke with his primary nephrologist, Dr. Coleman as well as the nurses at [facility] in [place].  Rashad typically has not had any trouble with ultrafiltration per their report.  He also did not have any symptoms noted during his dialysis treatment yesterday.    This morning Rashad is seen after initiation of planned 2-hour ultrafiltration only treatment.  He has history of cramping on dialysis with ultrafiltration.  He has cramping of his feet legs hands arms chest and abdomen but sometimes will persist long after his treatment is over.  He notes this happens more often if more fluid is removed.  He has not tried any therapies though he understands that some patients use mustard an effort to prevent cramping.    PAST MEDICAL HISTORY:  Reviewed with patient on 10/13/2021   Past Medical History:   Diagnosis Date     AVN (avascular necrosis of bone) (H)     left hip     AVN (avascular necrosis of bone) (H)     left hip     BPH (benign prostatic hyperplasia)      Chronic kidney disease, stage 4, severely decreased GFR (H)       Gastro-oesophageal reflux disease      Gout      History of blood transfusion      Hypertension      Medical non-compliance      Osteonecrosis (H) 7/29/2020    Added automatically from request for surgery [id number]     Pulmonary nodules      Steroid long-term use      Vitamin D deficiency        Past Surgical History:   Procedure Laterality Date     ARTHROPLASTY HIP Left 9/9/2020    Procedure: Left total hip arthroplasty;  Surgeon: Fernando Morillo MD;  Location: [facility] OR     ARTHROPLASTY HIP Right 4/12/2021    Procedure: Right total hip arthroplasty;  Surgeon: Fernando Morillo MD;  Location: [facility] OR     AV FISTULA OR GRAFT ARTERIAL       COLONOSCOPY N/A 4/7/2021    Procedure: COLONOSCOPY, WITH POLYPECTOMY AND BIOPSY;  Surgeon: Zak Ortega MD;  Location: [facility] GI     CREATE FISTULA ARTERIOVENOUS UPPER EXTREMITY Right 7/31/2019    Procedure: Creation Of Atriovenous Fistula Right Upper Arm;  Surgeon: Julia Irwin MD;  Location: [facility] OR     IR CVC TUNNEL PLACEMENT > 5 YRS OF AGE  10/9/2020     IR DIALYSIS FISTULOGRAM RIGHT  10/9/2020     IR DIALYSIS FISTULOGRAM RIGHT  2/19/2021     IR DIALYSIS MECH THROMB, PTA  10/9/2020     IR DIALYSIS STENT W OR W/OUT PTA  2/19/2021     LIGATE FISTULA ARTERIOVENOUS UPPER EXTREMITY  12/20/2011    Procedure:LIGATE FISTULA ARTERIOVENOUS UPPER EXTREMITY; Excision of Right Forearm Arteriovenous Fistula.; Surgeon:LINDY AMAYA; Location:[facility] OR     PERCUTANEOUS BIOPSY KIDNEY Right 2/28/2017    Procedure: PERCUTANEOUS BIOPSY KIDNEY;  Surgeon: Gee Barrios MD;  [facility] OR     TRANSPLANT  01/13/2011    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 Auth Provider   amLODIPine (NORVASC) 5 MG tablet Take 10 mg by mouth daily  10/12/2021 at Unknown time Yes Mariel Garcia MD   carvedilol (COREG) 12.5 MG tablet Take 2 tablets (25 mg) by mouth 2 times daily (with  meals) 10/12/2021 at am Yes Edgar Sue MD   furosemide (LASIX) 20 MG tablet Take 1 tablet (20 mg) by mouth daily 10/12/2021 at Unknown time Yes Mariel Garcia MD   mycophenolate (GENERIC EQUIVALENT) 250 MG capsule Take 1 capsule (250 mg) by mouth 2 times daily 10/12/2021 at am Yes Reyes Donovan MD   tacrolimus (GENERIC EQUIVALENT) 0.5 MG capsule Take 1 capsule (0.5 mg) by mouth every evening Total dose = 1 mg in the AM and 1.5 mg in the PM 10/11/2021 at Unknown time Yes Abran Cunha MD   tacrolimus (GENERIC EQUIVALENT) 1 MG capsule Take 1 capsule (1 mg) by mouth 2 times daily . Total dose=1mg in the AM and 1.5mg in the PM 10/12/2021 at Unknown time Yes Abran Cunha MD   acetaminophen (TYLENOL) 500 MG tablet Take 2 tablets (1,000 mg) by mouth every 6 hours as needed for mild pain   Mariel Garcia MD   febuxostat (ULORIC) 80 MG TABS tablet Take 1 tablet (80 mg) by mouth daily   Stef Murillo MD   oxyCODONE (ROXICODONE) 5 MG tablet Take 1-2 tablets (5-10 mg) by mouth every 6 hours as needed for pain   Mariel Garcia MD   sulfamethoxazole-trimethoprim (BACTRIM) 400-80 MG tablet Take one tablet every Monday, Wednesday, Friday   Reyes Donovan MD      Current Meds    amLODIPine  10 mg Oral Daily     azithromycin  500 mg Oral Daily     carvedilol  25 mg Oral BID w/meals     cefTRIAXone  1 g Intravenous Q24H     furosemide  20 mg Oral Daily     heparin ANTICOAGULANT  5,000 Units Subcutaneous Q12H     mycophenolate  250 mg Oral BID     sodium chloride (PF)  3 mL Intracatheter Q8H     sulfamethoxazole-trimethoprim  1 tablet Oral Once per day on Mon Wed Fri     tacrolimus  1 mg Oral QAM     tacrolimus  1.5 mg Oral QPM     Infusion Meds    - MEDICATION INSTRUCTIONS -         ALLERGIES:    No Known Allergies    REVIEW OF SYSTEMS:  A comprehensive of systems was negative except as noted above.    SOCIAL HISTORY:   Social History      Socioeconomic History     Marital status:      Spouse name: Not on file     Number of children: Not on file     Years of education: Not on file     Highest education level: Not on file   Occupational History     Not on file   Tobacco Use     Smoking status: Current Some Day Smoker     Types: Cigarettes     Last attempt to quit: 2017     Years since quittin.6     Smokeless tobacco: Never Used     Tobacco comment: Patient states that he is an 'social'  smoker. 3 cigarettes per week per pt   Substance and Sexual Activity     Alcohol use: Not Currently     Alcohol/week: 4.2 standard drinks     Types: 5 Standard drinks or equivalent per week     Comment: 1 shot yesterday     Drug use: Not Currently     Types: Marijuana     Sexual activity: Never     Partners: Female     Birth control/protection: None   Other Topics Concern     Parent/sibling w/ CABG, MI or angioplasty before 65F 55M? Not Asked   Social History Narrative    Rashad lives with his wife and 2 children. There are 2 declawed cats. 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      Social Determinants of Health     Financial Resource Strain:      Difficulty of Paying Living Expenses:    Food Insecurity:      Worried About Running Out of Food in the Last Year:      Ran Out of Food in the Last Year:    Transportation Needs:      Lack of Transportation (Medical):      Lack of Transportation (Non-Medical):    Physical Activity:      Days of Exercise per Week:      Minutes of Exercise per Session:    Stress:      Feeling of Stress :    Social Connections:      Frequency of Communication with Friends and Family:      Frequency of Social Gatherings with Friends and Family:      Attends [employer] Services:      Active Member of Clubs or Organizations:      Attends Club or Organization Meetings:      Marital Status:    Intimate Partner Violence:      Fear of Current or Ex-Partner:      Emotionally Abused:      Physically Abused:      Sexually  Abused:      Reviewed with patient   No one accompanies Rashad Ortiz in hospital room    FAMILY MEDICAL HISTORY:   Family History   Problem Relation Age of Onset     Hypertension Mother      Colon Cancer Mother 66     Colon Cancer Brother 51     Reviewed    PHYSICAL EXAM:   Temp  Av.4  F (37.4  C)  Min: 98.3  F (36.8  C)  Max: 100  F (37.8  C)      Pulse  Av.4  Min: 67  Max: 86 Resp  Av.2  Min: 12  Max: 45  SpO2  Av.4 %  Min: 90 %  Max: 100 %       /88   Pulse 67   Temp 98.8  F (37.1  C) (Oral)   Resp 20   Wt 67.2 kg (148 lb 2.4 oz)   SpO2 99%   BMI 22.53 kg/m        Admit Weight: 67.2 kg (148 lb 2.4 oz)     GENERAL APPEARANCE: no distress,  awake  EYES: no scleral icterus, pupils equal  Endo: no goiter, no moon facies  Lymphatics: no cervical or supraclavicular LAD  Pulmonary: normal work of breathing, no clubbing  CV: + edema  GI: not distended  MS: no evidence of inflammation in joints  : no butler  SKIN: no rash, warm, dry, no cyanosis  NEURO: face symmetric, speech fluent    LABS:   CMP  Recent Labs   Lab 10/13/21  0708 10/12/21  1435    136   POTASSIUM 5.4* 4.4   CHLORIDE 100 98   CO2 27 26   ANIONGAP 6 12   * 94   BUN 54* 41*   CR 11.40* 9.64*   GFRESTIMATED 5* 6*   ASHELY 8.3* 8.4*   PROTTOTAL 7.3 7.7   ALBUMIN 2.7* 3.1*   BILITOTAL 0.4 0.6   ALKPHOS 134 146   AST 10 15   ALT 6 15     CBC  Recent Labs   Lab 10/13/21  0708 10/12/21  1435   HGB 7.0* 6.7*   WBC 6.8 7.7   RBC 2.65* 2.67*   HCT 22.3* 21.8*   MCV 84 82   MCH 26.4* 25.1*   MCHC 31.4* 30.7*   RDW 20.0* 20.7*   * 157     INRNo lab results found in last 7 days.  ABG  Recent Labs   Lab 10/12/21  1440   O2PER 93      URINE STUDIES  Recent Labs   Lab Test 20  1545 20  1233 20  1010 18  1211 10/25/18  1210 10/25/18  1210 06/04/15  2049 01/14/15  2154   COLOR Yellow Yellow Yellow Straw   < > Yellow   < > Yellow   APPEARANCE Clear Clear Clear Clear   < > Cloudy   < > Clear    URINEGLC Negative Negative Negative 50*   < > Negative   < > Negative   URINEBILI Negative Negative Negative Negative   < > Negative   < > Negative   URINEKETONE Negative 5* Negative Negative   < > Negative   < > Negative   SG 1.015 1.015 1.010 1.012   < > 1.020   < > 1.020   UBLD Trace* Negative Trace* Small*   < > Large*   < > Trace*   URINEPH 6.0 8.0* 6.5 6.0   < > 6.5   < > 5.5   PROTEIN 100* 30* 30* 100*   < > >=300*   < > Trace*   UROBILINOGEN 0.2  --  0.2  --   --  0.2  --  0.2   NITRITE Negative Negative Negative Negative   < > Negative   < > Negative   LEUKEST Trace* Negative Negative Negative   < > Moderate*   < > Negative   RBCU O - 2 <1 O - 2 1   < > 25-50*   < > O - 2   WBCU 0 - 5 2 0 - 5 1   < > >100*   < > O - 2    < > = values in this interval not displayed.     Recent Labs   Lab Test 03/18/20  0728 07/24/19  1713 03/19/19  1527 11/01/18  1453 03/17/18  1035 08/04/17  1839 02/28/17  0809 09/17/16  1050 09/12/16  1617 06/12/15  0803 05/27/14  1509   UTPG 0.64* 0.26* 2.92* 0.85* 0.82* 0.17 0.29* 0.35* 0.44* 2.09* 0.05     PTH  Recent Labs   Lab Test 06/13/19  1941 01/06/18  0947 02/13/16  0930   PTHI 456* 621* 503*     IRON STUDIES  Recent Labs   Lab Test 06/20/20  1112 03/18/20  0723 10/10/19  1650 07/24/19  1702 06/13/19  1941 01/06/18  0947   IRON 15* 54 42 152 46 62   * 179* 181* 202* 215* 219*   IRONSAT 9* 30 23 75* 21 28   ORALIA 310 460* 790* 406* 436* 256       IMAGING:  All imaging studies reviewed by me.     Chanda Jackson MD

## 2021-10-13 NOTE — PROVIDER NOTIFICATION
Low 5B J.G 32-2 FYI K+ 5.4, HgB 7.0 and procal 3.73. Trop and BNP lab results pending. Rebecca RODRIGUEZ [id number]

## 2021-10-13 NOTE — CONSULTS
Care Management Initial Consult    General Information  Assessment completed with: Patient, VM-chart review    Type of CM/SW Visit: Initial Assessment    Primary Care Provider verified and updated as needed: Yes   Readmission within the last 30 days: no previous admission in last 30 days   Reason for Consult: discharge planning, other (see comments)  Advance Care Planning: Advance Care Planning Reviewed: verified with patient. Pt reports he has an existing healthcare directive, copy not on file with "[employer]. Encouraged pt to provide a copy when he is able.      Communication Assessment  Patient's communication style: spoken language (English or Bilingual)    Hearing Difficulty or Deaf: no   Wear Glasses or Blind: no    Cognitive  Cognitive/Neuro/Behavioral: WDL                      Living Environment:   People in home: spouse, child(latosha), dependent     Current living Arrangements: house      Able to return to prior arrangements: yes, pending progress this admission      Family/Social Support:  Care provided by: self, spouse/significant other  Provides care for: no one  Marital Status:   Wife (Raúl), Children      Description of Support System: Supportive, Involved    Support Assessment: Adequate family and caregiver support, Adequate social supports    Current Resources:   Patient receiving home care services: No     Community Resources: Dialysis Services - pt dialyzes T-Th-S at [facility] unit   Equipment currently used at home: none  Supplies currently used at home: None    Employment/Financial:  Employment Status: disabled        Financial Concerns: No concerns identified   Referral to Financial Counselor: No       Lifestyle & Psychosocial Needs:  Social Determinants of Health     Tobacco Use: High Risk     Smoking Tobacco Use: Current Some Day Smoker     Smokeless Tobacco Use: Never Used   Alcohol Use:      Frequency of Alcohol Consumption:      Average Number of Drinks:      Frequency of Binge  Drinking:    Financial Resource Strain:      Difficulty of Paying Living Expenses:    Food Insecurity:      Worried About Running Out of Food in the Last Year:      Ran Out of Food in the Last Year:    Transportation Needs:      Lack of Transportation (Medical):      Lack of Transportation (Non-Medical):    Physical Activity:      Days of Exercise per Week:      Minutes of Exercise per Session:    Stress:      Feeling of Stress :    Social Connections:      Frequency of Communication with Friends and Family:      Frequency of Social Gatherings with Friends and Family:      Attends Roman Catholic Services:      Active Member of Clubs or Organizations:      Attends Club or Organization Meetings:      Marital Status:    Intimate Partner Violence:      Fear of Current or Ex-Partner:      Emotionally Abused:      Physically Abused:      Sexually Abused:    Depression: Not at risk     PHQ-2 Score: 1   Housing Stability:      Unable to Pay for Housing in the Last Year:      Number of Places Lived in the Last Year:      Unstable Housing in the Last Year:        Functional Status:  Prior to admission patient needed assistance:   Dependent ADLs:: Independent  Dependent IADLs:: Independent  Assesssment of Functional Status: unable to assess at this time     Mental Health Status:  Mental Health Status: No Current Concerns       Chemical Dependency Status:  Chemical Dependency Status: No Current Concerns             Values/Beliefs:  Spiritual, Cultural Beliefs, Roman Catholic Practices, Values that affect care: no               Additional Information:  Per H&P: Rashad Ortiz is a 46 year old male admitted on 10/12/2021. 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 Initial work-up concerning for  pulmonary edema. He is admitted to Internal Medicine for further evaluation.    Met with pt via phone (pt on airborne precautions) to complete initial assessment. Pt was alert, oriented, and able to participate in conversation with writer. Pt confirmed that he lives in a house with his wife and two children. Pt is independent with daily activities at baseline and does not use any assistive devices or supplies at home. Pt receives outpatient dialysis services at the [facility] unit.     At this time, there are no anticipated discharge needs for pt. SW/RNCC will continue to follow.     BETTY Zuluaga, LICSW  [address]   [facility]  Phone: [phone]  Pager: [phone]

## 2021-10-13 NOTE — UTILIZATION REVIEW
"  Admission Status; Secondary Review Determination     Admission Date: 10/12/2021  1:16 PM      Under the authority of the Utilization Management Committee, the utilization review process indicated a secondary review on the above patient.  The review outcome is based on review of the medical records, discussions with staff, and applying clinical experience noted on the date of the review.        (x)      Inpatient Status Appropriate - This patient's medical care is consistent with medical management for inpatient care and reasonable inpatient medical practice.      () Observation Status Appropriate - This patient does not meet hospital inpatient criteria and is placed in observation status. If this patient's primary payer is Medicare and was admitted as an inpatient, Condition Code 44 should be used and patient status changed to \"observation\".   () Admission Status NOT Appropriate - This patient's medical care is not consistent with medical management for Inpatient or Observation Status.          RATIONALE FOR DETERMINATION   Rashad Ortiz is a 46 year old male admitted on 10/12/2021. 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  He presented to the emergency room with chest pain and hemoptysis.  He was noted to have acute anemia with a hemoglobin of 6.7.  Has required a transfusion of packed red blood cells.  Suspected to have pulmonary edema.  Initially given IV furosemide.  CT scan of the chest now shows infiltrates concerning for pneumonia.  He has been started on azithromycin and IV ceftriaxone.  He is expected to require a greater than 2 midnight stay at the hospital.  Due to this patient's very complex past medical history, anemia requiring blood transfusion, need for IV furosemide, pneumonia requiring IV antibiotics, and expectation of a greater than 2 " midnight stay at the hospital, it is appropriate to have him admitted to the hospital as an inpatient.      The severity of illness, intensity of service provided, expected LOS and risk for adverse outcome make the care complex, high risk and appropriate for hospital admission.        The information on this document is developed by the utilization review team in order for the business office to ensure compliance.  This only denotes the appropriateness of proper admission status and does not reflect the quality of care rendered.         The definitions of Inpatient Status and Observation Status used in making the determination above are those provided in the CMS Coverage Manual, Chapter 1 and Chapter 6, section 70.4.      Sincerely,     Bridger Hutchinson D.O.  Utilization Review/ Case Management  [facility].

## 2021-10-13 NOTE — PROGRESS NOTES
[facility]    Medicine Progress Note - Hospitalist Service, Gold 6       Date of Admission:  10/12/2021     Updates today:  -Appreciate nephrology input, underwent hemodialysis today without complication   - Appreciate pulmonology input and will follow up more formally in a.m.  -Appreciate cardiology curbside, recommend repeat TTE prior to discharge sooner if hemodynamic instability  -CTA today without evidence of extravasation, AVM or other clear pathology to explain hemoptysis also demonstrating cardiomegaly and pericardial effusion, no PE  -Echocardiogram showing normal LVEF and moderate size posterior pericardial effusion  -Hemoglobin improved to 7.4 post transfusion of 1 unit PRBC, plan to repeat hemoglobin at 8 PM  -Infection work-up as per below    Assessment & Plan           Rashad Ortiz is a 46 year old male admitted on 10/12/2021. 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 Initial work-up concerning for pulmonary edema. He is admitted to Internal Medicine for further evaluation.    Moderate-sized pericardial effusion, posterior  Pleuritic chest pain  Pulmonary edema  Interstitial lung abnormality  Noted pleuritic, mid-sternal chest pain after HD today. Also with c/f hemoptysis. CXR with diffuse bialteral hazy opacities as well as vascular congestion c/w pulmonary edema and CHF. BNP elevated 26, 759 however improved from 07/2021. Trop negative. EKG with NSR. Received IV lasix 40 mg x 2. Chest pain minimally responsive to SL nitroglycerin. Exam with ongoing bibasilar crackles. Unclear etiology of hemoptysis and ongoing pleuritic chest pain. Possibly related to pulmonary edema but will evaluate further.  -CTA today without  evidence of extravasation, AVM or other clear pathology to explain hemoptysis also demonstrating cardiomegaly and pericardial effusion, no PE  -Echocardiogram showing normal LVEF and moderate size posterior pericardial effusion  - CT chest w/o without contrast showing multifocal bilateral GGO and lower lobe opacities concerning for infection, small pleural effusions, moderate pericardial effusion, increased cystic changes that are suggestive of interstitial lung abnormality, enlarged pulmonary artery consistent with pulmonary hypertension..  - Appreciate pulmonology input and will follow up more formally in a.m. but were able to review imaging and also do not see an obvious source of the patient's hemoptysis  -Appreciate cardiology curbside, recommend repeat TTE prior to discharge sooner if hemodynamic instability  - Continuous pulse oximetry  - Incentive spirometry  - APAP 650 mg q6h prn, oxycodone 5-10 mg q6h prn, IV dilaudid 0.5 mg q4h prn x 2 doses for severe pain  - Continue PO lasix at this time and consider additional dose pending interval progress  - Procalcitonin is elevated at 3.73  - BNP greater than 25,000, Trop flat  -Infectious work-up   -Negative studies to date: Blood cultures NGTD, Covid, hep B surface Ag, Covid PCR,   -Pending infectious work-up: CMV quant, respiratory virus panel, QuantiFERON gold, HSV PCR, 1 3 beta glucan, varicella antibody, CMV PCR, EBV PCR, blood cultures x2      Hemoptysis, improved  Acute on chronic anemia in the setting of acute blood loss  See also above.  Hgb 6.7 on presentation. Baseline ~ 9-10. Noting small hemoptysis x7-8 episodes PTA but no other s/s of bleeding.  Post 1 unit PRBC hemoglobin increased to 7.4.  Patient noted interval resolution of hemoptysis on 10/13.  - Consented for blood, Type and screen obtained on admission  - Transfused 1u pRBCs on admission, no further transfusions so far  -Hemoglobin improved to 7.4 post transfusion of 1 unit PRBC, plan to  repeat hemoglobin at 8 PM      ESRD s/p failed LDKT on HD (T-Th-Sat)  Had full run HD prior to presentation. EDW from prior admission 66 kg. PTA on tacrolimus 1 mg q AM and 1.5 mg q evening, mycophenolate 250 mg BID, bactrim M-W-F, lasix 20 mg daily.  - Nephrology consult input appreciated  -Underwent HD 10/13 with 1500 mL UF  - Continue PTA tacrolimus, mycophenolate, bactrim, lasix  - I/Os, daily weights      Hypodense thyroid nodule, incidental finding  Hypodense thyroid nodules which may be further evaluated with  Ultrasound.  Normal TSH and did not reflex to free T4  -We will need outpatient follow-up     HTN - PTA  amlodipine 10 mg daily, coreg 25 mg BID       Diet: Combination Diet Regular Diet Adult    DVT Prophylaxis: Heparin SQ  Hunter Catheter: Not present  Central Lines: None  Code Status: Full Code      Disposition Plan   Expected discharge:   3-5 d recommended to prior living arrangement once antibiotic plan established, hemoglobin stable and safe disposition plan/ TCU bed available.     The patient's care was discussed with the Attending Physician, Dr. Brice, Bedside Nurse, Care Coordinator/, Patient, Patient's Family and pulmonary, cardiology, nephrology Consultant.    Luisa Pedroza PA-C  Hospitalist Service, [address]  Securely message with the Vocera Web Console (learn more here)  Text page via [facility] Paging/Directory  Please see sign in/sign out for up to date coverage information    Clinically Significant Risk Factors Present on Admission                   ______________________________________________________________________    Interval History    Rashad is seen today as he is undergoing dialysis and notes that he continues to feel tired.  He notes that he has some retrosternal discomfort that he feels is in his throat and endorses some pain when he was coughing up blood which he notes was 7-8 episodes of a small amount of  clotted blood that has been becoming lighter in color.  He denies noting any other bleeding.  He does not note any chest pain, shortness of breath or palpitations on our exam however in talking with consultants they note he has expressed having chest pain.    Patient notes that his lower extremity edema is stable versus prior.  He continues to make a small amount of urine that has not exhibited abnormal appearance and denies dysuria.  He notes his bowels have been stable.    . 4 point ROS is negative other than those as mentioned per HPI    Data reviewed today: I reviewed all medications, new labs and imaging results over the last 24 hours. I personally reviewed the chest CT image(s) showing Bilateral GGO, no extravasation.    Physical Exam   Vital Signs: Temp: 99.2  F (37.3  C) Temp src: Oral BP: 126/74 Pulse: 70   Resp: 22 SpO2: 90 % O2 Device: None (Room air) Oxygen Delivery: 3 LPM  Weight: 148 lbs 2.39 oz  GENERAL: Alert and oriented x 3. NAD.  Able to sit upright without assistance cooperative.  Seen undergoing HD.  HEENT: Anicteric sclera. Mucous membranes moist. NC. AT.  Pupils equal and round  CV: Tachycardic, RRR. S1, S2. No murmurs appreciated.   RESPIRATORY: Effort normal on RA. Lungs scant bibasilar rales, no wheezing, rhonchi.   GI: Abdomen soft, NT/ND, NABS  NEUROLOGICAL: No focal deficits. Moves all extremities.   EXTREMITIES: 4+ bilateral LE peripheral edema. Intact bilateral pedal pulses.   SKIN: No jaundice.       Data   Recent Labs   Lab 10/13/21  1320 10/13/21  0708 10/12/21  1435   WBC  --  6.8 7.7   HGB 7.4* 7.0* 6.7*   MCV  --  84 82   PLT  --  120* 157   NA  --  133 136   POTASSIUM  --  5.4* 4.4   CHLORIDE  --  100 98   CO2  --  27 26   BUN  --  54* 41*   CR  --  11.40* 9.64*   ANIONGAP  --  6 12   ASHELY  --  8.3* 8.4*   GLC  --  105* 94   ALBUMIN  --  2.7* 3.1*   PROTTOTAL  --  7.3 7.7   BILITOTAL  --  0.4 0.6   ALKPHOS  --  134 146   ALT  --  6 15   AST  --  10 15   TROPONIN  --  <0.015  <0.015

## 2021-10-13 NOTE — PROVIDER NOTIFICATION
Low 5B TAN 32-2 Saw CTA ordered with contrast. Pt is a dialysis pt and already received dialysis today for fluid removal. Wanted to confirm to complete imaging today. Saw TB testing ordered. Isolation needed? Rebecca RODRIGUEZ [id number]    Response: Confirmed with neph ok for contrast with CTA. Airborne isolation ordered for pt.

## 2021-10-13 NOTE — PLAN OF CARE
[id number] Nursing Summary of Care    Patient Name: Rashad Ortiz MRN# [id number]   Age: 46 year old YOB: 1975   Date of Admission: 10/12/2021    Care delivered on October 12, 2021 from 19:45 to 07:30 on October 13, 2021         Left peripheral IV replaced due to pain with infusion, permitting transfusion of one unit of RBCs. CT imaging study of chest conducted. Ceftriaxone and azithromycin orders then received. MD paged inquiring whether sputum analysis indicated with response pending. Highest temperature recorded: 100.0 degrees Fahrenheit.         Please refer to Flowsheets for comprehensive assessment data.      Plan: Continue with current plan of care      Vitals: VSS ex HTN and increased temperature.   o BP (!) 148/88   Pulse 79   Temp 99.6  F (37.6  C)   Resp 29   Wt 67.2 kg (148 lb 2.4 oz)   SpO2 92%   BMI 22.53 kg/m    o /74 (BP Location: Right arm)   Pulse 70   Temp 99.2  F (37.3  C) (Oral)   Resp 22   Wt 67.2 kg (148 lb 2.4 oz)   SpO2 90%   BMI 22.53 kg/m      Neuro: A&Ox4 with no overt neurological deficits  o Pain: Moderate to severe pleuritic chest pain with intensity positively correlated with depth of inspiration. Pain control fair with PRN oxycodone and PRN hydromorphone.   o Mood/Behavior: Calm, cooperative, and pleasant    Activity: Up with standby assistance to promote safety    Cardiovascular: Regular rate and rhythm with grade I murmur most appreciable at cardiac apex noted    Respiratory: Patient respirations tachypneic, shallow, but with regular respiratory effort. Lung sounds diminished. No adventitious lung sounds appreciated. Hemoptysis present upon arrival to , but absent thereafter.     GI & Nutrition: Regular diet. Fair appetite.   o Nausea: Denies  o Bowel Movement: No BM overnight.     : WDL    Skin, Wounds & LDAs: Right arteriovenous fistula WDL. Left peripheral IV saline locked. Skin is clean, dry, and intact.           Lines/Tubes/Drains:    Peripheral IV 10/12/21 Left;Anterior;Lateral Lower forearm (Active)   Site Assessment WDL 10/13/21 0448   Line Status Infusing 10/13/21 0448   Dressing Intervention New dressing  10/12/21 2200   Number of days: 1       Hemodialysis Vascular Access Arteriovenous fistula Right Arm (Active)   Dressing Status Clean, dry, intact 10/13/21 0448   Number of days: 651       RN-Managed Electrolyte Labs  Potassium   Date Value Ref Range Status   10/12/2021 4.4 3.4 - 5.3 mmol/L Final   07/06/2021 3.5 3.4 - 5.3 mmol/L Final     Magnesium   Date Value Ref Range Status   09/10/2020 1.5 (L) 1.6 - 2.3 mg/dL Final     Phosphorus   Date Value Ref Range Status   07/06/2021 4.5 2.5 - 4.5 mg/dL Final       Current Facility-Administered Medications   Medication     0.9% sodium chloride BOLUS     acetaminophen (TYLENOL) tablet 650 mg    Or     acetaminophen (TYLENOL) Suppository 650 mg     amLODIPine (NORVASC) tablet 10 mg     azithromycin (ZITHROMAX) tablet 500 mg     carvedilol (COREG) tablet 25 mg     cefTRIAXone (ROCEPHIN) 1 g vial to attach to  mL bag for ADULTS or NS 50 mL bag for PEDS     furosemide (LASIX) tablet 20 mg     heparin ANTICOAGULANT injection 5,000 Units     HYDROmorphone (PF) (DILAUDID) injection 0.5 mg     lidocaine (LMX4) cream     lidocaine 1 % 0.1-1 mL     mycophenolate (GENERIC EQUIVALENT) capsule 250 mg     naloxone (NARCAN) injection 0.2 mg    Or     naloxone (NARCAN) injection 0.4 mg    Or     naloxone (NARCAN) injection 0.2 mg    Or     naloxone (NARCAN) injection 0.4 mg     oxyCODONE (ROXICODONE) tablet 5-10 mg     senna-docusate (SENOKOT-S/PERICOLACE) 8.6-50 MG per tablet 1 tablet    Or     senna-docusate (SENOKOT-S/PERICOLACE) 8.6-50 MG per tablet 2 tablet     sodium chloride (PF) 0.9% PF flush 3 mL     sodium chloride (PF) 0.9% PF flush 3 mL     sulfamethoxazole-trimethoprim (BACTRIM) 400-80 MG per tablet 1 tablet     tacrolimus (GENERIC EQUIVALENT) capsule 1 mg     tacrolimus (GENERIC EQUIVALENT)  capsule 1.5 mg     Past Medical History:   Diagnosis Date     AVN (avascular necrosis of bone) (H)     left hip     AVN (avascular necrosis of bone) (H)     left hip     BPH (benign prostatic hyperplasia)      Chronic kidney disease, stage 4, severely decreased GFR (H)      Gastro-oesophageal reflux disease      Gout      History of blood transfusion      Hypertension      Medical non-compliance      Osteonecrosis (H) 7/29/2020    Added automatically from request for surgery [id number]     Pulmonary nodules      Steroid long-term use      Vitamin D deficiency      Past Surgical History:   Procedure Laterality Date     ARTHROPLASTY HIP Left 9/9/2020    Procedure: Left total hip arthroplasty;  Surgeon: Fernando Morillo MD;  Location: [facility] OR     ARTHROPLASTY HIP Right 4/12/2021    Procedure: Right total hip arthroplasty;  Surgeon: Fernando Morillo MD;  Location: [facility] OR     AV FISTULA OR GRAFT ARTERIAL       COLONOSCOPY N/A 4/7/2021    Procedure: COLONOSCOPY, WITH POLYPECTOMY AND BIOPSY;  Surgeon: Zak Ortega MD;  Location: [facility] GI     CREATE FISTULA ARTERIOVENOUS UPPER EXTREMITY Right 7/31/2019    Procedure: Creation Of Atriovenous Fistula Right Upper Arm;  Surgeon: Julia Irwin MD;  Location: [facility] OR     IR CVC TUNNEL PLACEMENT > 5 YRS OF AGE  10/9/2020     IR DIALYSIS FISTULOGRAM RIGHT  10/9/2020     IR DIALYSIS FISTULOGRAM RIGHT  2/19/2021     IR DIALYSIS MECH THROMB, PTA  10/9/2020     IR DIALYSIS STENT W OR W/OUT PTA  2/19/2021     LIGATE FISTULA ARTERIOVENOUS UPPER EXTREMITY  12/20/2011    Procedure:LIGATE FISTULA ARTERIOVENOUS UPPER EXTREMITY; Excision of Right Forearm Arteriovenous Fistula.; Surgeon:LINDY AMAYA; Location:[facility] OR     PERCUTANEOUS BIOPSY KIDNEY Right 2/28/2017    Procedure: PERCUTANEOUS BIOPSY KIDNEY;  Surgeon: Gee Barrios MD;  Location: [facility] OR     TRANSPLANT  01/13/2011    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     Medications Prior to Admission   Medication Sig  Dispense Refill Last Dose     amLODIPine (NORVASC) 5 MG tablet Take 10 mg by mouth daily    10/12/2021 at Unknown time     carvedilol (COREG) 12.5 MG tablet Take 2 tablets (25 mg) by mouth 2 times daily (with meals)   10/12/2021 at am     furosemide (LASIX) 20 MG tablet Take 1 tablet (20 mg) by mouth daily   10/12/2021 at Unknown time     mycophenolate (GENERIC EQUIVALENT) 250 MG capsule Take 1 capsule (250 mg) by mouth 2 times daily 60 capsule 11 10/12/2021 at am     tacrolimus (GENERIC EQUIVALENT) 0.5 MG capsule Take 1 capsule (0.5 mg) by mouth every evening Total dose = 1 mg in the AM and 1.5 mg in the PM 30 capsule 11 10/11/2021 at Unknown time     tacrolimus (GENERIC EQUIVALENT) 1 MG capsule Take 1 capsule (1 mg) by mouth 2 times daily . Total dose=1mg in the AM and 1.5mg in the PM 60 capsule 11 10/12/2021 at Unknown time     acetaminophen (TYLENOL) 500 MG tablet Take 2 tablets (1,000 mg) by mouth every 6 hours as needed for mild pain 100 tablet 1      febuxostat (ULORIC) 80 MG TABS tablet Take 1 tablet (80 mg) by mouth daily 90 tablet 3      oxyCODONE (ROXICODONE) 5 MG tablet Take 1-2 tablets (5-10 mg) by mouth every 6 hours as needed for pain 12 tablet 0      sulfamethoxazole-trimethoprim (BACTRIM) 400-80 MG tablet Take one tablet every Monday, Wednesday, Friday 45 tablet 3      Unresulted Labs Ordered in the Past 30 Days of this Admission       Date and Time Order Name Status Description    10/12/2021  8:30 PM Prepare red blood cells (unit) Preliminary             Carlos Gonzalez RN  5B Adult General Medicine

## 2021-10-13 NOTE — PROGRESS NOTES
Wallet, keys, cell phone, glasses, wireless headphones, long black shirt, short sleeve shirt, black jacket

## 2021-10-14 LAB
1,3 BETA GLUCAN SER-MCNC: 34 PG/ML
ANION GAP SERPL CALCULATED.3IONS-SCNC: 9 MMOL/L (ref 3–14)
BUN SERPL-MCNC: 68 MG/DL (ref 7–30)
C PNEUM DNA SPEC QL NAA+PROBE: NOT DETECTED
CALCIUM SERPL-MCNC: 8.4 MG/DL (ref 8.5–10.1)
CHLORIDE BLD-SCNC: 98 MMOL/L (ref 94–109)
CO2 SERPL-SCNC: 25 MMOL/L (ref 20–32)
CREAT SERPL-MCNC: 13.6 MG/DL (ref 0.66–1.25)
ERYTHROCYTE [DISTWIDTH] IN BLOOD BY AUTOMATED COUNT: 20 % (ref 10–15)
FLUAV H1 2009 PAND RNA SPEC QL NAA+PROBE: NOT DETECTED
FLUAV H1 RNA SPEC QL NAA+PROBE: NOT DETECTED
FLUAV H3 RNA SPEC QL NAA+PROBE: NOT DETECTED
FLUAV RNA SPEC QL NAA+PROBE: NOT DETECTED
FLUBV RNA SPEC QL NAA+PROBE: NOT DETECTED
GAMMA INTERFERON BACKGROUND BLD IA-ACNC: 0.03 IU/ML
GFR SERPL CREATININE-BSD FRML MDRD: 4 ML/MIN/1.73M2
GLUCOSE BLD-MCNC: 98 MG/DL (ref 70–99)
HADV DNA SPEC QL NAA+PROBE: NOT DETECTED
HCOV PNL SPEC NAA+PROBE: NOT DETECTED
HCT VFR BLD AUTO: 22.8 % (ref 40–53)
HGB BLD-MCNC: 7 G/DL (ref 13.3–17.7)
HGB BLD-MCNC: 7 G/DL (ref 13.3–17.7)
HMPV RNA SPEC QL NAA+PROBE: NOT DETECTED
HPIV1 RNA SPEC QL NAA+PROBE: NOT DETECTED
HPIV2 RNA SPEC QL NAA+PROBE: NOT DETECTED
HPIV3 RNA SPEC QL NAA+PROBE: NOT DETECTED
HPIV4 RNA SPEC QL NAA+PROBE: NOT DETECTED
L PNEUMO1 AG UR QL IA: NEGATIVE
M PNEUMO DNA SPEC QL NAA+PROBE: NOT DETECTED
M TB IFN-G BLD-IMP: NEGATIVE
M TB IFN-G CD4+ BCKGRND COR BLD-ACNC: 9.97 IU/ML
MCH RBC QN AUTO: 25.9 PG (ref 26.5–33)
MCHC RBC AUTO-ENTMCNC: 30.7 G/DL (ref 31.5–36.5)
MCV RBC AUTO: 84 FL (ref 78–100)
MITOGEN IGNF BCKGRD COR BLD-ACNC: -0.01 IU/ML
MITOGEN IGNF BCKGRD COR BLD-ACNC: -0.01 IU/ML
OBSERVATION IMP: NEGATIVE
PLATELET # BLD AUTO: 138 10E3/UL (ref 150–450)
POTASSIUM BLD-SCNC: 5.7 MMOL/L (ref 3.4–5.3)
QUANTIFERON MITOGEN: 10 IU/ML
QUANTIFERON NIL TUBE: 0.03 IU/ML
QUANTIFERON TB1 TUBE: 0.02 IU/ML
QUANTIFERON TB2 TUBE: 0.02
RBC # BLD AUTO: 2.7 10E6/UL (ref 4.4–5.9)
RSV RNA SPEC QL NAA+PROBE: NOT DETECTED
RSV RNA SPEC QL NAA+PROBE: NOT DETECTED
RV+EV RNA SPEC QL NAA+PROBE: NOT DETECTED
S PNEUM AG SPEC QL: NEGATIVE
SODIUM SERPL-SCNC: 132 MMOL/L (ref 133–144)
TACROLIMUS BLD-MCNC: 3.2 UG/L (ref 5–15)
TME LAST DOSE: ABNORMAL H
TME LAST DOSE: ABNORMAL H
TROPONIN I SERPL-MCNC: <0.015 UG/L (ref 0–0.04)
TROPONIN I SERPL-MCNC: <0.015 UG/L (ref 0–0.04)
VZV IGM SER IA-ACNC: 0 ISR
WBC # BLD AUTO: 6.4 10E3/UL (ref 4–11)

## 2021-10-14 PROCEDURE — 99233 SBSQ HOSP IP/OBS HIGH 50: CPT | Performed by: INTERNAL MEDICINE

## 2021-10-14 PROCEDURE — 93005 ELECTROCARDIOGRAM TRACING: CPT

## 2021-10-14 PROCEDURE — 80197 ASSAY OF TACROLIMUS: CPT | Performed by: PHYSICIAN ASSISTANT

## 2021-10-14 PROCEDURE — 90935 HEMODIALYSIS ONE EVALUATION: CPT | Performed by: INTERNAL MEDICINE

## 2021-10-14 PROCEDURE — 250N000013 HC RX MED GY IP 250 OP 250 PS 637: Performed by: PHYSICIAN ASSISTANT

## 2021-10-14 PROCEDURE — 120N000002 HC R&B MED SURG/OB UMMC

## 2021-10-14 PROCEDURE — 85018 HEMOGLOBIN: CPT | Performed by: PHYSICIAN ASSISTANT

## 2021-10-14 PROCEDURE — 634N000001 HC RX 634: Performed by: INTERNAL MEDICINE

## 2021-10-14 PROCEDURE — 85027 COMPLETE CBC AUTOMATED: CPT | Performed by: PHYSICIAN ASSISTANT

## 2021-10-14 PROCEDURE — 258N000003 HC RX IP 258 OP 636: Performed by: INTERNAL MEDICINE

## 2021-10-14 PROCEDURE — 36415 COLL VENOUS BLD VENIPUNCTURE: CPT | Performed by: PHYSICIAN ASSISTANT

## 2021-10-14 PROCEDURE — 99223 1ST HOSP IP/OBS HIGH 75: CPT | Mod: GC | Performed by: INTERNAL MEDICINE

## 2021-10-14 PROCEDURE — 99233 SBSQ HOSP IP/OBS HIGH 50: CPT | Performed by: STUDENT IN AN ORGANIZED HEALTH CARE EDUCATION/TRAINING PROGRAM

## 2021-10-14 PROCEDURE — 250N000011 HC RX IP 250 OP 636: Performed by: PHYSICIAN ASSISTANT

## 2021-10-14 PROCEDURE — 84484 ASSAY OF TROPONIN QUANT: CPT | Performed by: PHYSICIAN ASSISTANT

## 2021-10-14 PROCEDURE — 80048 BASIC METABOLIC PNL TOTAL CA: CPT | Performed by: PHYSICIAN ASSISTANT

## 2021-10-14 PROCEDURE — 250N000012 HC RX MED GY IP 250 OP 636 PS 637: Performed by: PHYSICIAN ASSISTANT

## 2021-10-14 PROCEDURE — 250N000013 HC RX MED GY IP 250 OP 250 PS 637: Performed by: INTERNAL MEDICINE

## 2021-10-14 RX ORDER — DIPHENHYDRAMINE HCL 25 MG
50 CAPSULE ORAL ONCE
Status: DISCONTINUED | OUTPATIENT
Start: 2021-10-14 | End: 2021-10-14

## 2021-10-14 RX ORDER — METHYLPREDNISOLONE SODIUM SUCCINATE 125 MG/2ML
125 INJECTION, POWDER, LYOPHILIZED, FOR SOLUTION INTRAMUSCULAR; INTRAVENOUS
Status: DISCONTINUED | OUTPATIENT
Start: 2021-10-14 | End: 2021-10-14

## 2021-10-14 RX ORDER — DIPHENHYDRAMINE HYDROCHLORIDE 50 MG/ML
50 INJECTION INTRAMUSCULAR; INTRAVENOUS
Status: DISCONTINUED | OUTPATIENT
Start: 2021-10-14 | End: 2021-10-14

## 2021-10-14 RX ORDER — DIPHENHYDRAMINE HYDROCHLORIDE 50 MG/ML
50 INJECTION INTRAMUSCULAR; INTRAVENOUS ONCE
Status: DISCONTINUED | OUTPATIENT
Start: 2021-10-14 | End: 2021-10-14

## 2021-10-14 RX ORDER — FUROSEMIDE 10 MG/ML
40 INJECTION INTRAMUSCULAR; INTRAVENOUS ONCE
Status: DISCONTINUED | OUTPATIENT
Start: 2021-10-14 | End: 2021-10-14

## 2021-10-14 RX ORDER — ACETAMINOPHEN 325 MG/1
650 TABLET ORAL ONCE
Status: DISCONTINUED | OUTPATIENT
Start: 2021-10-14 | End: 2021-10-14

## 2021-10-14 RX ORDER — DIPHENHYDRAMINE HCL 25 MG
50 CAPSULE ORAL
Status: DISCONTINUED | OUTPATIENT
Start: 2021-10-14 | End: 2021-10-14

## 2021-10-14 RX ORDER — AZITHROMYCIN 250 MG/1
500 TABLET, FILM COATED ORAL DAILY
Status: DISCONTINUED | OUTPATIENT
Start: 2021-10-15 | End: 2021-10-15

## 2021-10-14 RX ORDER — ACETAMINOPHEN 325 MG/1
650 TABLET ORAL
Status: DISCONTINUED | OUTPATIENT
Start: 2021-10-14 | End: 2021-10-14

## 2021-10-14 RX ORDER — DOXERCALCIFEROL 4 UG/2ML
15 INJECTION INTRAVENOUS
Status: DISCONTINUED | OUTPATIENT
Start: 2021-10-14 | End: 2021-10-14

## 2021-10-14 RX ADMIN — EPOETIN ALFA-EPBX 10000 UNITS: 10000 INJECTION, SOLUTION INTRAVENOUS; SUBCUTANEOUS at 12:19

## 2021-10-14 RX ADMIN — OXYCODONE HYDROCHLORIDE 10 MG: 5 TABLET ORAL at 04:43

## 2021-10-14 RX ADMIN — OXYCODONE HYDROCHLORIDE 10 MG: 5 TABLET ORAL at 17:46

## 2021-10-14 RX ADMIN — MYCOPHENOLATE MOFETIL 250 MG: 250 CAPSULE ORAL at 08:24

## 2021-10-14 RX ADMIN — CARVEDILOL 25 MG: 25 TABLET, FILM COATED ORAL at 08:25

## 2021-10-14 RX ADMIN — AZITHROMYCIN 500 MG: 250 TABLET, FILM COATED ORAL at 08:24

## 2021-10-14 RX ADMIN — SODIUM CHLORIDE 250 ML: 9 INJECTION, SOLUTION INTRAVENOUS at 12:18

## 2021-10-14 RX ADMIN — CEFTRIAXONE SODIUM 2 G: 2 INJECTION, POWDER, FOR SOLUTION INTRAMUSCULAR; INTRAVENOUS at 04:49

## 2021-10-14 RX ADMIN — CARVEDILOL 25 MG: 25 TABLET, FILM COATED ORAL at 17:46

## 2021-10-14 RX ADMIN — FUROSEMIDE 20 MG: 20 TABLET ORAL at 08:24

## 2021-10-14 RX ADMIN — SODIUM CHLORIDE 300 ML: 9 INJECTION, SOLUTION INTRAVENOUS at 12:18

## 2021-10-14 RX ADMIN — MYCOPHENOLATE MOFETIL 250 MG: 250 CAPSULE ORAL at 21:52

## 2021-10-14 RX ADMIN — TACROLIMUS 1.5 MG: 1 CAPSULE ORAL at 17:46

## 2021-10-14 RX ADMIN — AMLODIPINE BESYLATE 10 MG: 10 TABLET ORAL at 08:24

## 2021-10-14 RX ADMIN — HEPARIN SODIUM 5000 UNITS: 10000 INJECTION, SOLUTION INTRAVENOUS; SUBCUTANEOUS at 21:52

## 2021-10-14 RX ADMIN — TACROLIMUS 1 MG: 1 CAPSULE ORAL at 08:24

## 2021-10-14 RX ADMIN — HEPARIN SODIUM 5000 UNITS: 10000 INJECTION, SOLUTION INTRAVENOUS; SUBCUTANEOUS at 08:25

## 2021-10-14 ASSESSMENT — MIFFLIN-ST. JEOR: SCORE: 1537.5

## 2021-10-14 ASSESSMENT — ACTIVITIES OF DAILY LIVING (ADL)
ADLS_ACUITY_SCORE: 5

## 2021-10-14 NOTE — PLAN OF CARE
"/73   Pulse 79   Temp 98.2  F (36.8  C) (Oral)   Resp 18   Ht 1.727 m (5' 8\")   Wt 67.8 kg (149 lb 7.6 oz)   SpO2 99%   BMI 22.73 kg/m      Care from: [id number]      VS & Pain: VSS ex hypertensive, tachypneic, on room air, prn Oxy x1 was given for chest pain    Neuro: A&O x4    Respiratory: tachypneic, dyspnea on exertion, infrequent productive cough with cloudy brown secretion, fine crackles noted in BLL    Cardiac: murmur detected    Peripheral neurovascular: 4+ edema in LLE, 3+ edema in RLE    GI/: pt is on HD, no BM this shift    Nutrition: good appetite and oral intake    Skin: WDL    Musculoskeletal: WDL    Lines: L PIV is saline locked, R fistula is CDI    Activity: Up independently    Events this shift: HD was done. Hgb was 7.0 today. Call light within reach.     Plan: Continue to follow poc.    "

## 2021-10-14 NOTE — CONSULTS
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 Extensive infectious w/u negative. No active bleeding on CTA.     Hemoptysis is due to volume overload. Would recommend re-evaluating his dry weight. No indication for bronch.     Please call back if hemoptysis recurs.     Thank you very much for this consultation. Pulmonary will sign off.     Patient staffed with Dr. King.     Francia Reeves  Pulmonary and Critical Care Fellow  [phone]    Attending note:  Patient seen, examined and discussed with Dr. Reeves. All data reviewed. Agree with the assessment and plan as outlined in the above note.  Episodes of hemoptysis at time of admission, no previous episodes.  Likely due to volume overload and pulmonary edema, has resolved with dialysis and volume removal.  Low level of suspicion for infection.  No indication for bronchoscopy at this time.  Advised to contact PCP if has recurrent hemoptysis after discharge.    Amelia King MD  [phone]         History of Present Illness     46 year old male with h/o CKD 2/2 HTN with failed transplant now on HD T/TH/S who was admitted on 10/13 with chest pain. Compares the pain to pain he had after CRP. Improves with sitting forward. We are consulted for hemoptysis.     Four days ago (Monday) pt noted small amount of blood mixed with sputum. The next day chest pain began and he had a few more episodes of small volume hemoptysis. No further episodes of hemoptysis Wednesday or Thursday (today). Since admission there was concern for  hypervolemia and he has undergone dialysis x 2.     He does report some muscle mass loss since this summer. He had surgery for AVN and his mother passed away. After her passing he stopped exercising and had decreased appetite. His dry weight has remained the same.     Review of Systems:  10 point ROS completed and negative except for above.    Past Medical History:   Diagnosis Date     AVN (avascular necrosis of bone) (H)     left hip     AVN (avascular necrosis of bone) (H)     left hip     BPH (benign prostatic hyperplasia)      Chronic kidney disease, stage 4, severely decreased GFR (H)      Gastro-oesophageal reflux disease      Gout      History of blood transfusion      Hypertension      Medical non-compliance      Osteonecrosis (H) 7/29/2020    Added automatically from request for surgery [id number]     Pulmonary nodules      Steroid long-term use      Vitamin D deficiency        Past Surgical History:   Procedure Laterality Date     ARTHROPLASTY HIP Left 9/9/2020    Procedure: Left total hip arthroplasty;  Surgeon: Fernando Morillo MD;  [facility]     ARTHROPLASTY HIP Right 4/12/2021    Procedure: Right total hip arthroplasty;  Surgeon: Fernando Morillo MD;  [facility]     AV FISTULA OR GRAFT ARTERIAL       COLONOSCOPY N/A 4/7/2021    Procedure: COLONOSCOPY, WITH POLYPECTOMY AND BIOPSY;  Surgeon: Zak Ortega MD;  [facility]: [facility]     CREATE FISTULA ARTERIOVENOUS UPPER EXTREMITY Right 7/31/2019    Procedure: Creation Of Atriovenous Fistula Right Upper Arm;  Surgeon: Julia Irwin MD;  [facility]: [facility]     IR CVC TUNNEL PLACEMENT > 5 YRS OF AGE  10/9/2020     IR DIALYSIS FISTULOGRAM RIGHT  10/9/2020     IR DIALYSIS FISTULOGRAM RIGHT  2/19/2021     IR DIALYSIS MECH THROMB, PTA  10/9/2020     IR DIALYSIS STENT W OR W/OUT PTA  2/19/2021     LIGATE FISTULA ARTERIOVENOUS UPPER EXTREMITY  12/20/2011    Procedure:LIGATE FISTULA ARTERIOVENOUS UPPER EXTREMITY; Excision of Right Forearm Arteriovenous  Fistula.; Surgeon:LINDY AMAYA; [facility]     PERCUTANEOUS BIOPSY KIDNEY Right 2017    Procedure: PERCUTANEOUS BIOPSY KIDNEY;  Surgeon: Gee Barrios MD;  Location:  OR     TRANSPLANT  2011    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       Family History   Problem Relation Age of Onset     Hypertension Mother      Colon Cancer Mother 66     Colon Cancer Brother 51       Social History     Social History Narrative    Rashad lives with his wife and 2 children. There are 2 declawed cats. He works at a computer-based job in customer service.      Social History     Tobacco Use     Smoking status: Current Some Day Smoker     Types: Cigarettes     Last attempt to quit: 2017     Years since quittin.6     Smokeless tobacco: Never Used     Tobacco comment: Patient states that he is an 'social'  smoker. 3 cigarettes per week per pt   Substance Use Topics     Alcohol use: Not Currently     Alcohol/week: 4.2 standard drinks     Types: 5 Standard drinks or equivalent per week     Comment: 1 shot yesterday     Drug use: Not Currently     Types: Marijuana       Patient Active Problem List   Diagnosis     Kidney replaced by transplant     Aftercare following organ transplant     GERD (gastroesophageal reflux disease)     CARDIOVASCULAR SCREENING; LDL GOAL LESS THAN 160     Gout     Antibody mediated rejection of kidney transplant     Medical non-compliance     Chronic kidney disease, stage 4, severely decreased GFR (H)     Herpes simplex infection of penis     Escherichia coli septicemia (H)     Pyelonephritis of transplanted kidney     HTN, kidney transplant related     Metabolic acidosis     CKD (chronic kidney disease) stage 5, GFR less than 15 ml/min (H)     Immunosuppression (H)     Anemia of chronic renal failure, stage 5 (H)     Secondary renal hyperparathyroidism (H)     Vitamin D deficiency     BPH (benign prostatic hyperplasia)     Status post hip surgery     ESRD (end stage  renal disease) on dialysis (H)     Primary osteoarthritis of right hip     Avascular necrosis of femoral head, right (H)     Aftercare following hip joint replacement surgery, unspecified laterality     Hip pain, right     Acute pulmonary edema (H)     Shortness of breath     Chest pain, unspecified type            Current Medications & Allergies:       amLODIPine  10 mg Oral Daily     [START ON 10/15/2021] azithromycin  500 mg Oral Daily     carvedilol  25 mg Oral BID w/meals     cefTRIAXone  2 g Intravenous Q24H     furosemide  20 mg Oral Daily     heparin ANTICOAGULANT  5,000 Units Subcutaneous Q12H     mycophenolate  250 mg Oral BID     sodium chloride (PF)  3 mL Intracatheter Q8H     sulfamethoxazole-trimethoprim  1 tablet Oral Once per day on Mon Wed Fri     tacrolimus  1 mg Oral QAM     tacrolimus  1.5 mg Oral QPM       No Known Allergies         Physical Exam:   Ranges for vital signs:  Temp:  [98.2  F (36.8  C)-99.1  F (37.3  C)] 98.2  F (36.8  C)  Pulse:  [66-79] 79  Resp:  [12-41] 18  BP: (112-136)/(67-94) 131/73  SpO2:  [93 %-100 %] 99 %  Vitals:    10/12/21 1949 10/13/21 1145 10/14/21 1215   Weight: 67.2 kg (148 lb 2.4 oz) 65.5 kg (144 lb 6.4 oz) 67.8 kg (149 lb 7.6 oz)       Physical Examination:  GENERAL:  Sitting up in bed, NAD, well appearing  HEAD:  Head is normocephalic, atraumatic   EYES:  Eyes have anicteric sclerae   LUNGS: Bibasilar crackles  CARDIOVASCULAR:  Regular rate and rhythm   ABDOMEN:  Soft, nontender, nondistended  EXT: Bilateral nonpitting edema  NEUROLOGIC:  Awake and alert, answering questions appropriately.  PSYCH: Normal affect.         Laboratory Data:     Microbiology:  Neg work up  Strep pneumo urine - 10/13  Legionella urine - 10/13  RVP 10/13  Sputum mixed shelli  CMV blood 10/13  Fungitell 10/13  Quant gold 101/13  Covid 10/12    Imaging:  CTA on 10/13  1. No pulmonary embolism.  2. No bronchial artery aneurysm, pulmonary AVM or evidence of active  contrast extravasation to  explain patient's source of hemoptysis.  3. Cardiomegaly with moderate pericardial effusion. Increased  perihilar predominant groundglass opacities and small bilateral  pleural effusions, favored to represent pulmonary edema.    ECHO on 10/13  Moderate-sized, predominantly posterior pericardial effusion measuring up to  2.0 cm posterior to the LV free wall. Aside from elevated RA pressure (dilated  IVC), no findings are present to support a diagnosis of pericardial tamponade.  Due to its posterior location, pericardiocentesis does not appear feasible  from the apical or subxiphoid approaches on the available images.  Global and regional left ventricular function is normal with an EF of 60-65%.  Right ventricular function, chamber size, wall motion, and thickness are  normal.  Mild pulmonary hypertension is present. Pulmonary artery systolic pressure is  49 mmHg (34 mmHg+RA pressure).  This study was compared with the study from 11/16/20 (stress): Pericardial  effusion is new.

## 2021-10-14 NOTE — PROGRESS NOTES
HEMODIALYSIS TREATMENT NOTE    Date: 10/14/2021  Time: 1:50 PM    Data:  Pre Wt: 67.8 kg (149 lb 7.6 oz)   Desired Wt: 65.8 kg   Post Wt: 65.8 kg (144 lb 6.4 oz)  Weight change: 2.5 kg  Ultrafiltration - Post Run Net Total Removed (mL): 2500 mL  Vascular Access Status: patent  Dialyzer Rinse: Clear  Total Blood Volume Processed: 58.2 L Liters  Total Dialysis (Treatment) Time: 3 Hours    Lab:   no    Interventions:Assessments  Pt dialyzed today for 2.5hrs and UF first 30 minutes. A 2.5kg total UF was uneventful. VSS throughout. Good flows via RAVF. Epo given on run. Seen by renal team on run. Hemostasis achieved in 5 minutes. Report to PCN post run.      Plan:    Per renal

## 2021-10-14 NOTE — PLAN OF CARE
"[facility] Nursing Summary of Care    Patient Name: Rashad Ortiz MRN# [id number]   Age: 46 year old YOB: 1975   Date of Admission: 10/12/2021    Care delivered on October 13, 2021 from 19:00 to 07:30 on October 14, 2021       No acute events or changes noted overnight.      Please refer to Flowsheets for comprehensive assessment data.      Plan: Continue with current plan of care, managing pain while diagnostic labs are processed      Isolation Precautions: Airborne    Vitals: VSS   o /82 (BP Location: Left arm)   Pulse 75   Temp 98.4  F (36.9  C) (Oral)   Resp 26   Ht 1.727 m (5' 8\")   Wt 65.5 kg (144 lb 6.4 oz)   SpO2 94%   BMI 21.96 kg/m    o /77 (BP Location: Left arm)   Pulse 74   Temp 99.1  F (37.3  C) (Oral)   Resp 29   Ht 1.727 m (5' 8\")   Wt 65.5 kg (144 lb 6.4 oz)   SpO2 93%   BMI 21.96 kg/m      Neuro: WDL  o Pain: Moderate to severe pleuritic chest pain controlled with PRN oxycodone 10 mg PO Q6H.   o Mood/Behavior: Calm, cooperative, pleasant    Activity: Up with standby assistance for safety    Cardiovascular: WDL ex intermittent hypertension and grade I murmur.     Respiratory: Respirations shallow, and tachypneic. Regular work of breathing. Lung sounds clear, but diminished with post-expectoration auscultation.     GI & Nutrition: Regular diet.   o Nausea: Denies  o Bowel Movement: No BM overnight. Last BM 10/13/21    : Oliguria--patient on hemodialysis.     Skin, Wounds & LDAs: Right AV fistula WDL. L PIV SL. Skin is clean, dry, and intact.     Labs: Please see in process labs below      Intake/Output Summary (Last 24 hours) at 10/14/2021 0500  Last data filed at 10/14/2021 0500  Gross per 24 hour   Intake 1109 ml   Output 1500 ml   Net -391 ml       Lines/Tubes/Drains:   Peripheral IV 10/12/21 Left;Anterior;Lateral Lower forearm (Active)   Site Assessment WDL 10/13/21 2231   Line Status Saline locked 10/13/21 2231   Dressing Intervention New dressing  " 10/13/21 2231   Number of days: 2       Hemodialysis Vascular Access Arteriovenous fistula Right Arm (Active)   Site Assessment WDL 10/13/21 2231   Cannulation Needle Size 15 10/13/21 0943   Dressing Intervention New dressing 10/13/21 1145   Dressing Status Not applicable 10/13/21 1145   Verification by x-ray Not applicable 10/13/21 1145   Hand Off Report Yes 10/13/21 1145   Number of days: 652       RN-Managed Electrolyte Labs  Potassium   Date Value Ref Range Status   10/13/2021 5.4 (H) 3.4 - 5.3 mmol/L Final   07/06/2021 3.5 3.4 - 5.3 mmol/L Final     Magnesium   Date Value Ref Range Status   09/10/2020 1.5 (L) 1.6 - 2.3 mg/dL Final     Phosphorus   Date Value Ref Range Status   07/06/2021 4.5 2.5 - 4.5 mg/dL Final       Current Facility-Administered Medications   Medication     0.9% sodium chloride BOLUS     acetaminophen (TYLENOL) tablet 650 mg    Or     acetaminophen (TYLENOL) Suppository 650 mg     amLODIPine (NORVASC) tablet 10 mg     azithromycin (ZITHROMAX) tablet 500 mg     carvedilol (COREG) tablet 25 mg     cefTRIAXone (ROCEPHIN) 2 g vial to attach to  ml bag for ADULTS or NS 50 ml bag for PEDS     furosemide (LASIX) tablet 20 mg     heparin ANTICOAGULANT injection 5,000 Units     lidocaine (LMX4) cream     lidocaine 1 % 0.1-1 mL     mycophenolate (GENERIC EQUIVALENT) capsule 250 mg     naloxone (NARCAN) injection 0.2 mg    Or     naloxone (NARCAN) injection 0.4 mg    Or     naloxone (NARCAN) injection 0.2 mg    Or     naloxone (NARCAN) injection 0.4 mg     oxyCODONE (ROXICODONE) tablet 5-10 mg     senna-docusate (SENOKOT-S/PERICOLACE) 8.6-50 MG per tablet 1 tablet    Or     senna-docusate (SENOKOT-S/PERICOLACE) 8.6-50 MG per tablet 2 tablet     sodium chloride (PF) 0.9% PF flush 3 mL     sodium chloride (PF) 0.9% PF flush 3 mL     sulfamethoxazole-trimethoprim (BACTRIM) 400-80 MG per tablet 1 tablet     tacrolimus (GENERIC EQUIVALENT) capsule 1 mg     tacrolimus (GENERIC EQUIVALENT) capsule 1.5  mg     Past Medical History:   Diagnosis Date     AVN (avascular necrosis of bone) (H)     left hip     AVN (avascular necrosis of bone) (H)     left hip     BPH (benign prostatic hyperplasia)      Chronic kidney disease, stage 4, severely decreased GFR (H)      Gastro-oesophageal reflux disease      Gout      History of blood transfusion      Hypertension      Medical non-compliance      Osteonecrosis (H) 7/29/2020    Added automatically from request for surgery [id number]     Pulmonary nodules      Steroid long-term use      Vitamin D deficiency      Past Surgical History:   Procedure Laterality Date     ARTHROPLASTY HIP Left 9/9/2020    Procedure: Left total hip arthroplasty;  Surgeon: Fernando Morillo MD;  [facility]     ARTHROPLASTY HIP Right 4/12/2021    Procedure: Right total hip arthroplasty;  Surgeon: Fernando Morillo MD;  [facility]     AV FISTULA OR GRAFT ARTERIAL       COLONOSCOPY N/A 4/7/2021    Procedure: COLONOSCOPY, WITH POLYPECTOMY AND BIOPSY;  Surgeon: Zak Ortega MD;  [facility]     CREATE FISTULA ARTERIOVENOUS UPPER EXTREMITY Right 7/31/2019    Procedure: Creation Of Atriovenous Fistula Right Upper Arm;  Surgeon: Julia Irwin MD;  [facility]     IR CVC TUNNEL PLACEMENT > 5 YRS OF AGE  10/9/2020     IR DIALYSIS FISTULOGRAM RIGHT  10/9/2020     IR DIALYSIS FISTULOGRAM RIGHT  2/19/2021     IR DIALYSIS MECH THROMB, PTA  10/9/2020     IR DIALYSIS STENT W OR W/OUT PTA  2/19/2021     LIGATE FISTULA ARTERIOVENOUS UPPER EXTREMITY  12/20/2011    Procedure:LIGATE FISTULA ARTERIOVENOUS UPPER EXTREMITY; Excision of Right Forearm Arteriovenous Fistula.; Surgeon:LINDY AMAYA; [facility]     PERCUTANEOUS BIOPSY KIDNEY Right 2/28/2017    Procedure: PERCUTANEOUS BIOPSY KIDNEY;  Surgeon: Gee Barrios MD;  [facility]     TRANSPLANT  01/13/2011    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     Medications Prior to Admission   Medication Sig Dispense Refill  Last Dose     amLODIPine (NORVASC) 5 MG tablet Take 10 mg by mouth daily    10/12/2021 at Unknown time     carvedilol (COREG) 12.5 MG tablet Take 2 tablets (25 mg) by mouth 2 times daily (with meals)   10/12/2021 at am     furosemide (LASIX) 20 MG tablet Take 1 tablet (20 mg) by mouth daily   10/12/2021 at Unknown time     mycophenolate (GENERIC EQUIVALENT) 250 MG capsule Take 1 capsule (250 mg) by mouth 2 times daily 60 capsule 11 10/12/2021 at am     tacrolimus (GENERIC EQUIVALENT) 0.5 MG capsule Take 1 capsule (0.5 mg) by mouth every evening Total dose = 1 mg in the AM and 1.5 mg in the PM 30 capsule 11 10/11/2021 at Unknown time     tacrolimus (GENERIC EQUIVALENT) 1 MG capsule Take 1 capsule (1 mg) by mouth 2 times daily . Total dose=1mg in the AM and 1.5mg in the PM 60 capsule 11 10/12/2021 at Unknown time     acetaminophen (TYLENOL) 500 MG tablet Take 2 tablets (1,000 mg) by mouth every 6 hours as needed for mild pain 100 tablet 1      febuxostat (ULORIC) 80 MG TABS tablet Take 1 tablet (80 mg) by mouth daily 90 tablet 3      oxyCODONE (ROXICODONE) 5 MG tablet Take 1-2 tablets (5-10 mg) by mouth every 6 hours as needed for pain 12 tablet 0      sulfamethoxazole-trimethoprim (BACTRIM) 400-80 MG tablet Take one tablet every Monday, Wednesday, Friday 45 tablet 3      Unresulted Labs Ordered in the Past 30 Days of this Admission     Date and Time Order Name Status Description    10/13/2021  7:29 PM Legionella pneumophila antigen urine In process     10/13/2021  7:29 PM Strep pneumo Agn Ur greater or equal to 13yrs or CSF any age In process     10/13/2021  7:29 PM Respiratory Aerobic Bacterial Culture with Gram Stain In process     10/13/2021  9:26 AM Quantiferon TB Gold Plus Purple Tube In process     10/13/2021  9:26 AM Quantiferon TB Gold Plus Yellow Tube In process     10/13/2021  9:26 AM Quantiferon TB Gold Plus Green Tube In process     10/13/2021  9:26 AM Quantiferon TB Gold Plus Grey Tube In process      10/13/2021  8:55 AM Herpes Simplex Virus 1&2 by PCR In process     10/13/2021  8:54 AM 1,3 Beta D glucan fungitell In process     10/13/2021  8:54 AM Varicella zoster antibody IgM In process     10/13/2021  8:53 AM Respiratory Panel PCR - NP Swab In process     10/13/2021  8:52 AM EBV DNA PCR Quantitative Whole Blood In process     10/13/2021  8:52 AM Respiratory Aerobic Bacterial Culture with Gram Stain In process     10/13/2021  3:45 AM Blood Culture Peripheral Blood Preliminary     10/13/2021  3:45 AM Blood Culture Arm, Left Preliminary           Carlos Gonzalez, RN  5B Adult General Medicine

## 2021-10-14 NOTE — PLAN OF CARE
[id number] Stable on room air. Increased pain present with deep inspiration. Breaths shallow. Respiratory panel sent. CTA, dialysis and echo completed during shift. Rated pain 8/10 in chest, pain decreased to 4/10 post intervention. A/O x4. Plan of care: test results pending, monitor for hemoptysis, manage pain and monitor respiratory / cardiac systems.

## 2021-10-14 NOTE — PROGRESS NOTES
Nephrology  Progress note- dialysis visit  10/14/2021     This patient was seen and examined while on dialysis.  Professional oversight of the patient's dialysis care, access care and dialysis related co-morbidities were addressed as necessary with the patient and / or staff.   Tolerating 1st kg fluid off, will aim for 2 kg.    Laboratory results and nurses' notes were reviewed.   No changes to management of volume, anemia, BMD, acidosis, or electrolytes.   Diagnosis - ESRD  He is still hypervolemic based on imaging yesterday and above target weight  - likely UF run again tomorrow.      Chanda Jackson MD  [facility]  Department of Medicine  Division of Renal Disease and Hypertension  [facility]  [employer]

## 2021-10-14 NOTE — PHARMACY-ADMISSION MEDICATION HISTORY
Pharmacy Admission Medication History    Admission medication history interview status for the 10/12/2021 admission is complete. See EPIC admission navigator for allergy information, prior to admission medications and immunization status.     Medication history interview source(s): Patient    Medication history resources (including written lists, pill bottles, clinic record): None    Medication history source reliability: Good    Actions taken by pharmacist (provider contacted, medication changes, etc):None     Changes made to medication history:    Removed from medication list: Febuxostat 80 mg daily. Provider discontinued around June.    Additional medication history information: None    Medication reconciliation/reorder completed by provider prior to medication history? Yes    Time spent in this activity: 25 minutes    Prior to Admission medications    Medication Sig Last Dose Taking? Auth Provider   acetaminophen (TYLENOL) 500 MG tablet Take 2 tablets (1,000 mg) by mouth every 6 hours as needed for mild pain More than a month at Unknown time Yes Mariel Garcia MD   amLODIPine (NORVASC) 5 MG tablet Take 10 mg by mouth daily  10/12/2021 at Unknown time Yes Mariel Garcia MD   carvedilol (COREG) 12.5 MG tablet Take 2 tablets (25 mg) by mouth 2 times daily (with meals) 10/12/2021 at am Yes Edgar Sue MD   furosemide (LASIX) 20 MG tablet Take 1 tablet (20 mg) by mouth daily 10/12/2021 at Unknown time Yes Mariel Garcia MD   mycophenolate (GENERIC EQUIVALENT) 250 MG capsule Take 1 capsule (250 mg) by mouth 2 times daily 10/12/2021 at am Yes Reyes Donovan MD   sulfamethoxazole-trimethoprim (BACTRIM) 400-80 MG tablet Take one tablet every Monday, Wednesday, Friday Past Week at Unknown time Yes Reyes Donovan MD   tacrolimus (GENERIC EQUIVALENT) 0.5 MG capsule Take 1 capsule (0.5 mg) by mouth every evening Total dose = 1 mg in the AM and 1.5 mg in the  PM 10/11/2021 at Unknown time Yes Abran Cunha MD   tacrolimus (GENERIC EQUIVALENT) 1 MG capsule Take 1 capsule (1 mg) by mouth 2 times daily . Total dose=1mg in the AM and 1.5mg in the PM 10/12/2021 at Unknown time Yes Abran Cunha MD   oxyCODONE (ROXICODONE) 5 MG tablet Take 1-2 tablets (5-10 mg) by mouth every 6 hours as needed for pain   Mariel Garcia MD

## 2021-10-14 NOTE — PROGRESS NOTES
[facility]  Transplant Infectious Disease Progress Note     Patient:  Rashad Ortiz, Date of birth 1975, Medical record number [id number]  Date of Visit:  10/14/2021         Assessment and Recommendations:   47 y/o male with ESRD s/p LDKT (2011) c/b antibody mediated rejection w/ allograft ESRD on HD M,W,F undergoing a transplant evaluation who was admitted 10/12 with increased shortness of breath, pleuritic chest pain and blood tinged sputum.      1.  SOB and blood tinged sputum 2/2 CAP vs pulmonary edema + pericardial effusion: CT chest with faint GGO bilaterally and small bilateral pleural effusions. Negative for PE, dense consolidations or cavitary lesions. Negative RVP, Step and Legionella antigens negative. Sputum samples with mixed normal shelli. Historically TB quantiferon is negative. He does not have risk factors for TB acquisition. Blood tinged sputum as resolved.  TTE demonstrated a moderate sized posterior pericardial effusion that is not emendable to drainage or sampling. This could be contributing to his pleuritic chest pain especially since CT PE was negative.     2. History of Norovirus (2017, 2019), E coli bacteremia 2018    Other infectious Diseases Concerns include:  - QTc interval: QTc 446  - Bacterial prophylaxis: on antibiotics  - Pneumocystis prophylaxis: tmp/smx  - Viral serostatus & prophylaxis: CMV+, EBV +, VZV+  - Fungal prophylaxis:none  - Immunization status: covid 19 10/7/21  - Immunoglobulins:?  - Isolation status: airborne  - IS:tac and mycophenolate     Recommendations:  1. Complete 3 days of azithromycin 500mg for empiric CAP coverage.   2. Continue ceftriaxone 2 grams IV daily. If microbiologic work up is unrevealing can transition to an oral antibiotic (ie amoxicillin, cefpodoxime) to complete an 5-7 day empiric course for CAP  3. Agree with fluid management per transplant nephrology  4. Low suspicion for TB. Okay to remove airborne  precautions.  5. Transplant ID will sign off. Call with questions or concerns.      Swetha Goodman DO.   Infectious Diseases Staff  Pager [phone]        Interval History:   Has done well overnight. Has not had a cough with blood tinged sputum. Afebrile. No subjective fevers, chills. Continues to have pleuritic chest pain.       Transplants:  1/13/2011 ([facility]), Postoperative day:  3927.  Coordinator Donna Patel    Review of Systems:Remaining systems all reviewed and negative     Antimicrobial History  Ceftriaxone  Aithromycin       Current Medications & Allergies:       amLODIPine  10 mg Oral Daily     azithromycin  500 mg Oral Daily     carvedilol  25 mg Oral BID w/meals     cefTRIAXone  2 g Intravenous Q24H     furosemide  20 mg Oral Daily     heparin ANTICOAGULANT  5,000 Units Subcutaneous Q12H     mycophenolate  250 mg Oral BID     sodium chloride (PF)  3 mL Intracatheter Q8H     sulfamethoxazole-trimethoprim  1 tablet Oral Once per day on Mon Wed Fri     tacrolimus  1 mg Oral QAM     tacrolimus  1.5 mg Oral QPM     No Known Allergies         Physical Exam:   Ranges for vital signs:  Temp:  [98.4  F (36.9  C)-99.1  F (37.3  C)] 99.1  F (37.3  C)  Pulse:  [67-76] 76  Resp:  [14-29] 29  BP: (127-145)/(73-94) 127/73  SpO2:  [93 %-99 %] 93 %  Vitals:    10/12/21 1949 10/13/21 1145   Weight: 67.2 kg (148 lb 2.4 oz) 65.5 kg (144 lb 6.4 oz)     Physical Examination:  GENERAL:  well-appearing. in bed. in no acute distress.  HEAD:  Head is normocephalic, atraumatic   EYES:  Eyes have anicteric sclerae without conjunctival injection. Pupils reactive bilaterally.   ENT:  Oropharynx is moist without exudates or ulcers. Tongue is midline.   LUNGS:  Clear to auscultation bilaterally. No accessory muscle use. Not on oxygen.   CARDIOVASCULAR:  Regular rate and rhythm with no murmurs  SKIN:  No acute rashes.    NEUROLOGIC:  Grossly nonfocal. Active x4 extremities. Alert.oriented.          Laboratory Data:     Metabolic  Studies       Recent Labs   Lab Test 10/14/21  0645 10/13/21  0708 10/13/21  0708 09/29/21  1449 07/06/21  0450 09/18/20  1157 09/10/20  0735 10/26/18  1626 10/26/18  0836 10/26/18  0152 10/25/18  2324 10/25/18  2136 10/25/18  1707 06/07/15  0633 06/06/15  0552   *  --  133   < > 135   < > 135   < >  --    < > 138   < > 134   < > 137   POTASSIUM 5.7*  --  5.4*   < > 3.5   < > 5.3   < >  --    < > 4.4   < > 5.1   < > 5.1   CHLORIDE 98  --  100   < > 102   < > 104   < >  --    < > 109   < > 104   < > 110*   CO2 25  --  27   < > 22   < > 20   < >  --    < > 15*   < > 19*   < > 14*   ANIONGAP 9   < > 6   < > 11   < > 11   < >  --    < > 14   < > 11   < > 13   BUN 68*   < > 54*   < > 44*   < > 25   < >  --    < > 61*   < > 56*   < > 53*   CR 13.60*  --  11.40*   < > 9.48*   < > 6.35*   < >  --    < > 8.87*   < > 8.70*   < > 5.41*   GFRESTIMATED 4*   < > 5*   < > 6*   < > 10*   < >  --    < > 7*   < > 7*   < > 12*   GLC 98   < > 105*   < > 84   < > 104*   < >  --    < > 108*   < > 124*   < > 138*   A1C  --   --   --   --   --   --   --   --   --   --   --   --   --   --  5.4   ASHELY 8.4*  --  8.3*   < > 8.0*   < > 8.7   < >  --    < > 7.3*   < > 8.7   < > 8.8   PHOS  --   --   --   --  4.5  --   --    < >  --   --   --   --  5.2*   < > 4.6*   MAG  --   --   --   --   --   --  1.5*   < >  --   --  1.7   < > 1.0*   < > 2.0   LACT  --   --   --   --   --   --   --   --  1.5  --  1.4   < > 1.5  --   --    PCAL  --   --  3.73*  --   --   --   --    < >  --    < >  --   --  >200.00*  --   --    CKT  --   --   --   --   --   --   --   --   --   --   --   --  58  --   --     < > = values in this interval not displayed.       Hepatic Studies    Recent Labs   Lab Test 10/13/21  0708 10/12/21  1435 09/29/21  1449 09/29/21  1449   BILITOTAL 0.4 0.6  --  0.4   ALKPHOS 134 146  --  157*   PROTTOTAL 7.3 7.7   < > 7.7   ALBUMIN 2.7* 3.1*  --  3.2*   AST 10 15  --  7   ALT 6 15  --  14    < > = values in this interval not displayed.        Hematology Studies      Recent Labs   Lab Test 10/14/21  0645 10/13/21  1320 10/13/21  0708 10/12/21  1435 10/12/21  1435 09/29/21  1449 09/29/21  1449 07/05/21  0221 07/05/21  0218 04/14/21  0645 04/13/21  0653 04/09/21  1044 04/09/21  1044   WBC 6.4  --  6.8  --  7.7  --  5.0  --  5.6  --  8.6   < > 6.8   ANEU  --   --   --   --   --   --   --   --  3.8  --   --   --  4.9   ALYM  --   --   --   --   --   --   --   --  1.1  --   --   --  1.0   TAMIKA  --   --   --   --   --   --   --   --  0.4  --   --   --  0.8   AEOS  --   --   --   --   --   --   --   --  0.3  --   --   --  0.1   HGB 7.0*   < > 7.0*   < > 6.7*   < > 7.2*   < > 8.0*   < > 10.0*   < > 10.9*   HCT 22.8*  --  22.3*   < > 21.8*   < > 23.3*  --  26.2*  --  31.8*   < > 35.4*   *  --  120*   < > 157   < > 142*  --  159  --  194   < > 195    < > = values in this interval not displayed.     Arterial Blood Gas Testing    Recent Labs   Lab Test 10/12/21  1440 10/26/18  0836 10/26/18  0018 10/25/18  2136   O2PER 93 21.0 0 21        Medication levels    Recent Labs   Lab Test 07/06/21  0450 10/28/19  1622 10/14/19  0752   TACROL <3.0*   < > 5.6   MPACID  --   --  4.55*   MPAG  --   --  >200.0*    < > = values in this interval not displayed.       Inflammatory Markers    Recent Labs   Lab Test 01/08/21  1305 10/28/18  0534 10/27/18  0444 10/26/18  0545 10/25/18  1707 09/17/18  1741 07/01/18  1250   *  --   --   --   --  32* 52*   CRP 87.2* 170.0* 360.0* 350.0* 370.0* <2.9  --      Pancreatitis testing    Recent Labs   Lab Test 07/24/19  1702 09/25/17  0720 04/10/17  1131 06/17/15  2115   AMYLASE  --   --  153*  --    LIPASE  --   --  387 179   TRIG 70   < >  --   --     < > = values in this interval not displayed.       Gout Labs      Recent Labs   Lab Test 09/29/21  1449 04/08/19  1914 11/01/18  1447 09/17/18  1741 07/01/18  1250   URIC 4.7 11.8* 7.4* 11.5* 11.1*       Clotting Studies    Recent Labs   Lab Test 02/19/21  0845 10/08/20  1215  09/22/20  1407 09/18/20  1157 07/31/19  0708   INR 1.18* 1.24* 1.21* 1.20*  --    PTT  --   --  35  --    < >    < > = values in this interval not displayed.       Iron Testing    Recent Labs   Lab Test 10/14/21  0645 07/08/20  1119 06/20/20  1112 04/23/20  1050 03/18/20  0723 10/14/19  0027 10/10/19  1650 10/27/18  0444 10/26/18  1626   IRON  --   --  15*  --  54  --  42   < >  --    FEB  --   --  162*  --  179*  --  181*   < >  --    IRONSAT  --   --  9*  --  30  --  23   < >  --    ORALIA  --   --  310  --  460*  --  790*   < >  --    MCV 84   < > 88   < > 88   < > 87   < > 86   RETP  --   --   --   --   --   --   --   --  0.3*   RETICABSCT  --   --   --   --   --   --   --   --  8.5*    < > = values in this interval not displayed.       Thyroid Studies     Recent Labs   Lab Test 10/13/21  0708 04/10/17  1131   TSH 1.34 0.67     Microbiology  Last Culture results with specimen source  Culture   Date Value Ref Range Status   10/13/2021 No growth after 1 day  Preliminary   10/13/2021 No growth after 1 day  Preliminary     Culture Micro   Date Value Ref Range Status   12/11/2018 <10,000 colonies/mL  urogenital shelli    Final   10/28/2018 No growth  Final   10/26/2018 No growth  Final   10/26/2018 No growth  Final   10/25/2018 (A)  Final    Cultured on the 1st day of incubation:  Escherichia coli  Susceptibility testing done on previous specimen     10/25/2018   Final    Critical Value/Significant Value, preliminary result only, called to and read back by  Francie Velasco RN at 0930 on [id number].KD     10/25/2018 (A)  Final    Cultured on the 1st day of incubation:  Escherichia coli     10/25/2018   Final    Critical Value/Significant Value, preliminary result only, called to and read back by  Cisco Mcdaniels RN at 6B at 0620 on [id number]     10/25/2018   Final    (Note)  POSITIVE for E.COLI by [employer]ig[employer] multiplex nucleic acid test. Final  identification and antimicrobial susceptibility testing will be  verified by standard  methods. Verigene test will not distinguish  E.coli from Shigella species including S.dysenteriae, S.flexneri,  S.boydii, and S.sonnei. Specimens containing Shigella species or  E.coli will be reported as Positive for E.coli.    Specimen tested with Verigene multiplex, gram-negative blood culture  nucleic acid test for the following targets: Acinetobacter sp.,  Citrobacter sp., Enterobacter sp., Proteus sp., E. coli, K.  pneumoniae/oxytoca, P. aeruginosa, and the following resistance  markers: CTXM, KPC, NDM, VIM, IMP and OXA.    Critical Value/Significant Value called to and read back by Denisha Payton RN, @ 0832 10.26.2018 BL     10/25/2018 >100,000 colonies/mL  Escherichia coli   (A)  Final   03/01/2017 No growth  Final   06/04/2015 No growth  Final   01/24/2012 No growth  Final   01/22/2012   Final    No Salmonella, Shigella, Campylobacter, E. coli O157, Aeromonas, or Plesiomonas   isolated.   12/28/2011 No growth  Final   01/12/2011 No yeast isolated  Final   01/12/2011   Final    <10,000 colonies/mL Alpha hemolytic Streptococcus No further identification   02/24/2010 No growth  Final    Specimen Description   Date Value Ref Range Status   12/11/2018 Midstream Urine  Final   10/28/2018 Blood Left Arm  Final   10/26/2018 Blood Left Hand  Final   10/26/2018 Blood Right Arm  Final   10/25/2018 Blood Left Arm  Final   10/25/2018 Feces  Final   10/25/2018 Blood Left Arm  Final   10/25/2018 Midstream Urine  Final   04/10/2017 Feces  Final   04/10/2017 Feces  Final   04/10/2017 Feces  Final   03/01/2017 Midstream Urine  Final   06/04/2015 Midstream Urine  Final   10/31/2014 Urine  Final   01/24/2012 Midstream Urine  Final   01/22/2012 Feces  Final   01/22/2012 Feces  Final   12/28/2011 Midstream Urine  Final   01/12/2011 Midstream Urine  Final   01/12/2011 Midstream Urine  Final   01/12/2011 Midstream Urine  Final          Last check of C difficile  C Diff Toxin B PCR   Date Value Ref Range Status   10/25/2018 Negative  NEG^Negative Final     Comment:     Negative: Clostridium difficile target DNA sequences NOT detected, presumed   negative for Clostridium difficile toxin B or the number of bacteria present   may be below the limit of detection for the test.  FDA approved assay performed using [employer] GeneXpert real-time PCR.  A negative result does not exclude actual disease due to Clostridium difficile   and may be due to improper collection, handling and storage of the specimen   or the number of organisms in the specimen is below the detection limit of the   assay.         Infection Studies to assess Diarrhea   Recent Labs   Lab Test 10/24/19  1644 10/25/18  1929 10/25/18  1929 04/24/17  0900 04/24/17  0900 04/10/17  1835 04/10/17  1835   POPRT  --   --   --   --   --   --  Routine parasitology exam negative  Cryptosporidium, Cyclospora, and Microsporidia are not readily detected by this   method. A single negative specimen does not rule out parasitic infection.     EPCAMP Not Detected  --  Not Detected  --  Not Detected   < > Not Detected   EPSALM Not Detected   < > Not Detected   < > Not Detected   < > Not Detected   EPSHGL Not Detected   < > Not Detected   < > Not Detected   < > Not Detected   EPVIB Not Detected   < > Not Detected   < > Not Detected   < > Not Detected   EPROTA Not Detected   < > Not Detected   < > Not Detected   < > Not Detected   EPNORO Detected, Abnormal Result*   < > Detected, Abnormal Result*   < > Detected, Abnormal Result*   < > Not Detected   EPYER Not Detected   < > Not Detected   < > Not Detected   < > Not Detected    < > = values in this interval not displayed.       Virology:  Coronavirus-19 testing    Recent Labs   Lab Test 10/12/21  1600 07/05/21  0221 04/09/21  1122 04/09/21  1122   [id number] Negative NEGATIVE  --  Test received-See reflex to IDDL test SARS CoV2 (COVID-19) Virus RT-PCR  NEGATIVE   [id number]  --  Nasopharyngeal   < > Nasopharyngeal   [id number]  --   --   --  Nasopharyngeal     < > = values in this interval not displayed.       Respiratory virus testing    Recent Labs   Lab Test 10/13/21  1753 07/05/21  0221 08/19/20  1114 11/11/19  1539 10/25/18  2136 10/25/18  1104   RVSPEC  --   --   --   --  Nasopharyngeal  --    AFLU  --   --   --  Negative  --    < >   IFLUA Not Detected  --   --   --  Negative  --    FLUAH1 Not Detected  --   --   --  Negative  --    [id number] Not Detected  --   --   --  Negative  --    FLUAH3 Not Detected  --   --   --  Negative  --    BFLU  --   --   --  Negative  --    < >   IFLUB Not Detected  --   --   --  Negative  --    PIV1 Not Detected  --   --   --  Negative  --    PIV2 Not Detected  --   --   --  Negative  --    PIV3 Not Detected  --   --   --  Negative  --    PIV4 Not Detected  --   --   --   --   --    HRVS  --   --   --   --  Negative  --    RSVA Not Detected  --   --   --  Negative  --    RSVB Not Detected  --   --   --  Negative  --    HMPV Not Detected  --   --   --  Negative  --    ADVBE  --   --   --   --  Negative  --    ADVC  --   --   --   --  Negative  --    ADENOV Not Detected  --   --   --   --   --    CORONA Not Detected  --   --   --   --   --    [id number]  --  Nasopharyngeal   < >  --   --   --     < > = values in this interval not displayed.       CMV viral loads    Recent Labs   Lab Test 10/13/21  0928 04/24/17  0847 02/28/17  0753 02/28/17  0753   CMVQNT Not Detected CMV DNA Not Detected   The [employer] AmpliPrep/AGUSTÍN TaqMan CMV Test is an FDA-approved in vitro nucleic   acid amplification test for the quantitation of cytomegalovirus DNA in human   plasma (EDTA plasma) using the [employer] AmpliPrep Instrument for automated viral   nucleic acid extraction and the [employer] TaqMan Analyzer or [employer] TaqMan for   automated Real Time amplification and detection of the viral nucleic acid   target.   Titer results are reported in International Units/mL (IU/mL using 1st WHO   International standard for Human Cytomegalovirus for Nucleic Acid  Amplification   based assays. The conversion factor between CMV DNA copis/mL (as defined by the   Roche AGUSTÍN TaqMan CMV test) and International Units is the CMV DNA   concentration in IU/mL x 1.1 copies/IU = CMV DNA in copies/mL.   This assay has received FDA approval for the testing of human plasma only. The   Infectious Disease Diagnostic Laboratory at the [facility], [place], has validated the pe rformance characteristics of the Roche   CMV assay for plasma, bronchial alveolar lavage/wash and urine.    --  CMV DNA Not Detected   The AG[employer]N AmpliPrep/AGUSTÍN TaqMan CMV Test is an FDA-approved in vitro nucleic   acid amplification test for the quantitation of cytomegalovirus DNA in human   plasma (EDTA plasma) using the AG[employer]N AmpliPrep Instrument for automated viral   nucleic acid extraction and the [employer] TaqMan Analyzer or [employer] for   automated Real Time amplification and detection of the viral nucleic acid   target.   Titer results are reported in International Units/mL (IU/mL using 1st WHO   International standard for Human Cytomegalovirus for Nucleic Acid Amplification   based assays. The conversion factor between CMV DNA copis/mL (as defined by the   Roche AGUSTÍN TaqMan CMV test) and International Units is the CMV DNA   concentration in IU/mL x 1.1 copies/IU = CMV DNA in copies/mL.   This assay has received FDA approval for the testing of human plasma only. The   Infectious Disease Diagnostic Laboratory at the [facility], [place], has validated the pe rformance characteristics of the Roche   CMV assay for plasma, bronchial alveolar lavage/wash and urine.     CSPEC  --  Plasma   < > Plasma, EDTA anticoagulant   CMVLOG  --  Not Calculated  --  Not Calculated    < > = values in this interval not displayed.       CMV resistance testing  No lab results found.    No results found for: H6RES    No results found for: EBRES    BK Virus Result   Date  Value Ref Range Status   09/25/2017 BK Virus DNA Not Detected BKNEG^BK Virus DNA Not Detected copies/mL Final   02/28/2017 BK Virus DNA Not Detected BKNEG copies/mL Final   10/22/2013 <1000 <1000 copies/mL Final   05/21/2012 <1000 <1000 copies/mL Final   05/12/2012 <1000 <1000 copies/mL Final   12/05/2011 <1000 <1000 copies/mL Final   07/08/2011 <1000 <1000 copies/mL Final   04/08/2011 <1000 <1000 copies/mL Final   02/14/2011 <1000 <1000 copies/mL Final     Urine Studies     Recent Labs   Lab Test 12/21/20  1545 09/09/20  1233 06/13/20  1010 12/11/18  1211 10/25/18  1210   URINEPH 6.0 8.0* 6.5 6.0 6.5   NITRITE Negative Negative Negative Negative Negative   LEUKEST Trace* Negative Negative Negative Moderate*   WBCU 0 - 5 2 0 - 5 1 >100*     Imaging:  CTA Chest with Contrast  Narrative: CTA CHEST10/13/2021 2:58 PM    CLINICAL HISTORY: Hemoptysis.     COMPARISONS: CT chest without contrast 10/13/2021    REFERRING PROVIDER: NEETA ALONZO    TECHNIQUE: Following the uneventful administration of intravenous  contrast, PE CTA performed. CTA performed through the chest. Coronal  and sagittal reconstructions were produced.    3D and multiplanar reconstructions were unavailable at the time of  dictation.    CONTRAST: 150  mL Isovue 370    DOSE (DLP): 229 mGy*cm    FINDINGS:     There is adequate opacification of the main and lobar pulmonary  arteries. No filling defect in the lobar and main segmental pulmonary  arteries to suggest pulmonary embolism.    CTA: The thoracic aorta is normal in caliber. Conventional branching  pattern of the great vessels. The great vessel origins are patent  without significant stenosis. Normal caliber of the the right  bronchial artery (series 12 image 186) and the partly visualized left  bronchial adjacent to the left mainstem bronchus (series 256 image  12). No bronchial artery aneurysm. No pulmonary AVM. No evidence of  active contrast extravasation to explain source of  hemoptysis.    Mediastinum: Subcentimeter hypoattenuating left thyroid nodule. The  ascending aorta mammogram pulmonary artery are normal in caliber. The  heart is enlarged. Moderate hypoattenuating pericardial effusion. No  enlarged thoracic lymph nodes.    LUNGS: The central tracheobronchial tree is clear. Small bilateral  pleural effusions. No pneumothorax. Paraseptal emphysematous changes  throughout bilateral lungs. Perihilar centrilobular groundglass  opacities, slightly increased compared to earlier same-day  examination. Lingular and left lower lobe atelectasis.    Upper abdomen: The visualized abdomen is within normal limits.    Bones and soft tissues: No acute or suspicious osseous lesions. Right  subclavian temporary visualized axillary stents, patency is not  evaluated due to contrast bolus timing.  Impression: IMPRESSION:  1. No pulmonary embolism.  2. No bronchial artery aneurysm, pulmonary AVM or evidence of active  contrast extravasation to explain patient's source of hemoptysis.  3. Cardiomegaly with moderate pericardial effusion. Increased  perihilar predominant groundglass opacities and small bilateral  pleural effusions, favored to represent pulmonary edema.    I have personally reviewed the examination and initial interpretation  and I agree with the findings.    JACKIE JHA MD         SYSTEM ID:  [id number]  Echo Complete  [id number]  [id number]  [id number]  [id number]^CLINTON^NEETA^TIFFANY     [facility],[place]  Echocardiography Laboratory  [address] [place] [id number]     Name: GISELLE GRAY  MRN: [id number]  : 1975  Study Date: 10/13/2021 01:29 PM  Age: 46 yrs  Gender: Male  Patient Location: [facility]  Reason For Study: Pericardial Effusion, Chest Pain, Pulmonary HTN  Ordering Physician: NEETA ALONZO  Performed By: Madelin Terry RDCS     BSA: 1.8 m2  Height: 68 in  Weight: 148 lb  HR: 72  BP: 125/74  mmHg  ______________________________________________________________________________  Procedure  Complete Portable Echo Adult.  ______________________________________________________________________________  Interpretation Summary  Moderate-sized, predominantly posterior pericardial effusion measuring up to  2.0 cm posterior to the LV free wall. Aside from elevated RA pressure (dilated  IVC), no findings are present to support a diagnosis of pericardial tamponade.  Due to its posterior location, pericardiocentesis does not appear feasible  from the apical or subxiphoid approaches on the available images.  Global and regional left ventricular function is normal with an EF of 60-65%.  Right ventricular function, chamber size, wall motion, and thickness are  normal.  Mild pulmonary hypertension is present. Pulmonary artery systolic pressure is  49 mmHg (34 mmHg+RA pressure).  This study was compared with the study from 11/16/20 (stress): Pericardial  effusion is new.  ______________________________________________________________________________  Left Ventricle  Left ventricular size is normal. Global and regional left ventricular function  is normal with an EF of 60-65%. Mild to moderate concentric wall thickening  consistent with left ventricular hypertrophy is present. Left ventricular  diastolic function is indeterminate.     Right Ventricle  Right ventricular function, chamber size, wall motion, and thickness are  normal.     Atria  Severe biatrial enlargement is present.     Mitral Valve  Trace to mild mitral insufficiency is present.     Aortic Valve  Aortic valve is normal in structure and function. The aortic valve is  tricuspid.     Tricuspid Valve  Mild tricuspid insufficiency is present. Mild pulmonary hypertension is  present. Pulmonary artery systolic pressure is 49 mmHg (34 mmHg+RA pressure).     Pulmonic Valve  Mild pulmonic insufficiency is present.     Vessels  The aorta root is normal. The thoracic  aorta is normal. The pulmonary artery  cannot be assessed. Dilation of the inferior vena cava is present with  abnormal respiratory variation in diameter. IVC diameter >2.1 cm collapsing  <50% with sniff suggests a high RA pressure estimated at 15 mmHg or greater.     Pericardium  Moderate-sized, predominantly posterior pericardial effusion measuring up to  2.0 cm posterior to the LV free wall. Aside from elevated RA pressure (dilated  IVC), no findings are present to support a diagnosis of pericardial tamponade.  Due to its posterior location, pericardiocentesis does not appear feasible  from the apical or subxiphoid approaches on the available images.     Compared to Previous Study  This study was compared with the study from 20 (stress) . Pericardial  effusion is new.  ______________________________________________________________________________  MMode/2D Measurements & Calculations     RVDd: 4.9 cm  IVSd: 1.00 cm  LVIDd: 5.4 cm  LVIDs: 3.7 cm  LVPWd: 1.4 cm  FS: 32.6 %  LV mass(C)d: 261.6 grams  LV mass(C)dI: 145.5 grams/m2  Ao root diam: 3.5 cm  asc Aorta Diam: 3.2 cm  LVOT diam: 2.2 cm  LVOT area: 3.8 cm2  LA Volume (BP): 147.0 ml  LA Volume Index (BP): 81.7 ml/m2  RWT: 0.50     Doppler Measurements & Calculations  MV E max rowena: 97.5 cm/sec  MV A max rowena: 65.0 cm/sec  MV E/A: 1.5  MV dec time: 0.20 sec  TR max rowena: 293.2 cm/sec  TR max P.4 mmHg  E/E' av.7  Lateral E/e': 9.4  Medial E/e': 16.0     ______________________________________________________________________________  Report approved by: Steven Renae 10/13/2021 02:12 PM        CT Chest w/o Contrast  Narrative: EXAMINATION: CT CHEST W/O CONTRAST, 10/13/2021 2:03 AM    TECHNIQUE:  Helical CT images from the thoracic inlet through the lung  bases were obtained without IV contrast.     COMPARISON: Chest x-ray 10/12/2021.    HISTORY: Hemoptysis    FINDINGS:    Chest:     Hypodense thyroid nodules. Heart size is normal.  Moderate-sized  pericardial effusion. Thoracic aorta is normal in caliber with  conventional three-vessel branching pattern of the aortic arch. The  main pulmonary artery is enlarged, measuring up to 3.7 cm. Right  subclavian stent.    Prominent but not enlarged mediastinal and axillary lymph nodes.  Esophagus is normal.    Trachea and central airways are clear. Basilar predominant subpleural  cystic changes. Small bilateral pleural effusions with adjacent  compressive atelectasis. Multifocal peribronchovascular groundglass  and consolidative opacities in the right lung and left lower lobe.  Scattered calcified granulomas including a prominent granuloma in the  right lower lobe. Basilar predominant interlobular septal thickening.  Lingular atelectasis.    Abdomen: Examination of the upper abdomen is limited. Diffusely  hypoattenuating liver parenchyma. Spleen size is within normal limits.  No adrenal mass. Bilateral renal atrophy and simple left renal cyst.    Bones: No suspicious osseous lesion. Diffuse osseous sclerosis, likely  renal osteodystrophy.      Soft Tissues: No suspicious mass. Bilateral gynecomastia.  Impression: IMPRESSION:   1.  Multifocal peribronchovascular groundglass and consolidative  opacities in the right lung and left lower lobe concerning for  infection.  2.  Small bilateral pleural effusions. Some degree of pulmonary edema  is likely present.  3.  Moderate size pericardial effusion.  4.  Lower lobe prominent subpleural cystic changes, increased since  1/7/2010 suggestive of paraseptal emphysema or an interstitial lung  abnormality.  5.  Main pulmonary artery is enlarged which may be seen with pulmonary  arterial hypertension.  6.  Hypodense thyroid nodules which may be further evaluated with  ultrasound.  7.  Atrophic kidneys with renal osteodystrophy changes.       I have personally reviewed the examination and initial interpretation  and I agree with the findings.    EMIL PANIAGUA, DO          SYSTEM ID:  [id number]   \

## 2021-10-14 NOTE — PROCEDURES
Called to see this patient for a new piv. Arrived to find a clot in extension tubing and head of current in-place piv catheter.  Upon disconnecting the extension from the catheter, the occlusion came free and was discarded with the old extension set.  US piv catheter is now patent. Connected new extension set.  Was able to pull back and infuse 10cc of saline without difficulty or evidence of infiltration.

## 2021-10-14 NOTE — PROVIDER NOTIFICATION
[facility] 6 Night Shift Hospitalist Notification    Patient Name: Rashad Ortiz MRN# [id number]   Age: 46 year old YOB: 1975   Date of Admission: 10/12/2021      MD notified: Dr. Fernando Geronimo MD      Reason for notification:   o Respiratory panel polymerase chain reaction nasopharyngeal swab collected at 17:53 on October 13, 2021; however, extant duplicate order currently active, timed for 19:30.      Recommendation/request given to MD:   o Please confirm whether re-test/additional test is indicated versus inadvertent duplicate.     MD response:   o Prompt call-back received--additional collect not imminent.       Carlos Gonzalez, RN  5B General Medicine

## 2021-10-15 ENCOUNTER — APPOINTMENT (OUTPATIENT)
Dept: CARDIOLOGY | Facility: CLINIC | Age: 46
DRG: 314 | End: 2021-10-15
Attending: PHYSICIAN ASSISTANT
Payer: COMMERCIAL

## 2021-10-15 LAB
ANION GAP SERPL CALCULATED.3IONS-SCNC: 9 MMOL/L (ref 3–14)
ATRIAL RATE - MUSE: 69 BPM
BLD PROD TYP BPU: NORMAL
BLOOD COMPONENT TYPE: NORMAL
BUN SERPL-MCNC: 38 MG/DL (ref 7–30)
CALCIUM SERPL-MCNC: 8.5 MG/DL (ref 8.5–10.1)
CHLORIDE BLD-SCNC: 101 MMOL/L (ref 94–109)
CO2 SERPL-SCNC: 24 MMOL/L (ref 20–32)
CODING SYSTEM: NORMAL
CREAT SERPL-MCNC: 9.83 MG/DL (ref 0.66–1.25)
CROSSMATCH: NORMAL
DIASTOLIC BLOOD PRESSURE - MUSE: NORMAL MMHG
EBV DNA COPIES/ML, INSTRUMENT: ABNORMAL COPIES/ML
EBV DNA SPEC NAA+PROBE-LOG#: 4.9 {LOG_COPIES}/ML
ERYTHROCYTE [DISTWIDTH] IN BLOOD BY AUTOMATED COUNT: 19.7 % (ref 10–15)
GFR SERPL CREATININE-BSD FRML MDRD: 6 ML/MIN/1.73M2
GLUCOSE BLD-MCNC: 90 MG/DL (ref 70–99)
HCT VFR BLD AUTO: 21.5 % (ref 40–53)
HGB BLD-MCNC: 6.7 G/DL (ref 13.3–17.7)
HGB BLD-MCNC: 8.2 G/DL (ref 13.3–17.7)
HSV1 DNA SPEC QL NAA+PROBE: NEGATIVE
HSV2 DNA SPEC QL NAA+PROBE: NEGATIVE
INTERPRETATION ECG - MUSE: NORMAL
ISSUE DATE AND TIME: NORMAL
LVEF ECHO: NORMAL
MAGNESIUM SERPL-MCNC: 1.8 MG/DL (ref 1.6–2.3)
MCH RBC QN AUTO: 26.4 PG (ref 26.5–33)
MCHC RBC AUTO-ENTMCNC: 31.2 G/DL (ref 31.5–36.5)
MCV RBC AUTO: 85 FL (ref 78–100)
P AXIS - MUSE: 26 DEGREES
PHOSPHATE SERPL-MCNC: 6.4 MG/DL (ref 2.5–4.5)
PLATELET # BLD AUTO: 141 10E3/UL (ref 150–450)
PLATELET # BLD AUTO: 168 10E3/UL (ref 150–450)
POTASSIUM BLD-SCNC: 4.6 MMOL/L (ref 3.4–5.3)
PR INTERVAL - MUSE: 148 MS
QRS DURATION - MUSE: 82 MS
QT - MUSE: 394 MS
QTC - MUSE: 422 MS
R AXIS - MUSE: 47 DEGREES
RBC # BLD AUTO: 2.54 10E6/UL (ref 4.4–5.9)
SODIUM SERPL-SCNC: 134 MMOL/L (ref 133–144)
SYSTOLIC BLOOD PRESSURE - MUSE: NORMAL MMHG
T AXIS - MUSE: 38 DEGREES
UNIT ABO/RH: NORMAL
UNIT NUMBER: NORMAL
UNIT STATUS: NORMAL
UNIT TYPE ISBT: 5100
VENTRICULAR RATE- MUSE: 69 BPM
WBC # BLD AUTO: 5.7 10E3/UL (ref 4–11)

## 2021-10-15 PROCEDURE — 36415 COLL VENOUS BLD VENIPUNCTURE: CPT | Performed by: PHYSICIAN ASSISTANT

## 2021-10-15 PROCEDURE — 85049 AUTOMATED PLATELET COUNT: CPT | Performed by: PHYSICIAN ASSISTANT

## 2021-10-15 PROCEDURE — 93325 DOPPLER ECHO COLOR FLOW MAPG: CPT | Mod: 26 | Performed by: INTERNAL MEDICINE

## 2021-10-15 PROCEDURE — 93325 DOPPLER ECHO COLOR FLOW MAPG: CPT

## 2021-10-15 PROCEDURE — 258N000003 HC RX IP 258 OP 636: Performed by: INTERNAL MEDICINE

## 2021-10-15 PROCEDURE — 86923 COMPATIBILITY TEST ELECTRIC: CPT | Performed by: PHYSICIAN ASSISTANT

## 2021-10-15 PROCEDURE — 83735 ASSAY OF MAGNESIUM: CPT | Performed by: PHYSICIAN ASSISTANT

## 2021-10-15 PROCEDURE — 120N000002 HC R&B MED SURG/OB UMMC

## 2021-10-15 PROCEDURE — 80048 BASIC METABOLIC PNL TOTAL CA: CPT | Performed by: PHYSICIAN ASSISTANT

## 2021-10-15 PROCEDURE — 93321 DOPPLER ECHO F-UP/LMTD STD: CPT | Mod: 26 | Performed by: INTERNAL MEDICINE

## 2021-10-15 PROCEDURE — P9016 RBC LEUKOCYTES REDUCED: HCPCS | Performed by: PHYSICIAN ASSISTANT

## 2021-10-15 PROCEDURE — 250N000013 HC RX MED GY IP 250 OP 250 PS 637: Performed by: PHYSICIAN ASSISTANT

## 2021-10-15 PROCEDURE — 99233 SBSQ HOSP IP/OBS HIGH 50: CPT | Performed by: STUDENT IN AN ORGANIZED HEALTH CARE EDUCATION/TRAINING PROGRAM

## 2021-10-15 PROCEDURE — 85018 HEMOGLOBIN: CPT | Performed by: PHYSICIAN ASSISTANT

## 2021-10-15 PROCEDURE — 999N000128 HC STATISTIC PERIPHERAL IV START W/O US GUIDANCE

## 2021-10-15 PROCEDURE — 93308 TTE F-UP OR LMTD: CPT | Mod: 26 | Performed by: INTERNAL MEDICINE

## 2021-10-15 PROCEDURE — 250N000012 HC RX MED GY IP 250 OP 636 PS 637: Performed by: PHYSICIAN ASSISTANT

## 2021-10-15 PROCEDURE — 99233 SBSQ HOSP IP/OBS HIGH 50: CPT | Performed by: INTERNAL MEDICINE

## 2021-10-15 PROCEDURE — 84100 ASSAY OF PHOSPHORUS: CPT | Performed by: PHYSICIAN ASSISTANT

## 2021-10-15 PROCEDURE — 90937 HEMODIALYSIS REPEATED EVAL: CPT

## 2021-10-15 PROCEDURE — 250N000011 HC RX IP 250 OP 636: Performed by: PHYSICIAN ASSISTANT

## 2021-10-15 RX ORDER — CEFTRIAXONE 1 G/1
1 INJECTION, POWDER, FOR SOLUTION INTRAMUSCULAR; INTRAVENOUS EVERY 24 HOURS
Status: DISCONTINUED | OUTPATIENT
Start: 2021-10-16 | End: 2021-10-15

## 2021-10-15 RX ORDER — CEFTRIAXONE 2 G/1
2 INJECTION, POWDER, FOR SOLUTION INTRAMUSCULAR; INTRAVENOUS EVERY 24 HOURS
Status: DISCONTINUED | OUTPATIENT
Start: 2021-10-16 | End: 2021-10-17

## 2021-10-15 RX ADMIN — SODIUM CHLORIDE 300 ML: 9 INJECTION, SOLUTION INTRAVENOUS at 08:42

## 2021-10-15 RX ADMIN — TACROLIMUS 1.5 MG: 1 CAPSULE ORAL at 18:00

## 2021-10-15 RX ADMIN — OXYCODONE HYDROCHLORIDE 10 MG: 5 TABLET ORAL at 00:12

## 2021-10-15 RX ADMIN — CARVEDILOL 25 MG: 25 TABLET, FILM COATED ORAL at 18:00

## 2021-10-15 RX ADMIN — SODIUM CHLORIDE 250 ML: 9 INJECTION, SOLUTION INTRAVENOUS at 08:42

## 2021-10-15 RX ADMIN — AZITHROMYCIN 500 MG: 250 TABLET, FILM COATED ORAL at 10:48

## 2021-10-15 RX ADMIN — HEPARIN SODIUM 5000 UNITS: 10000 INJECTION, SOLUTION INTRAVENOUS; SUBCUTANEOUS at 08:12

## 2021-10-15 RX ADMIN — TACROLIMUS 1 MG: 1 CAPSULE ORAL at 10:46

## 2021-10-15 RX ADMIN — MYCOPHENOLATE MOFETIL 250 MG: 250 CAPSULE ORAL at 19:34

## 2021-10-15 RX ADMIN — OXYCODONE HYDROCHLORIDE 10 MG: 5 TABLET ORAL at 21:06

## 2021-10-15 RX ADMIN — MYCOPHENOLATE MOFETIL 250 MG: 250 CAPSULE ORAL at 10:49

## 2021-10-15 RX ADMIN — FUROSEMIDE 20 MG: 20 TABLET ORAL at 10:49

## 2021-10-15 RX ADMIN — CEFTRIAXONE SODIUM 2 G: 2 INJECTION, POWDER, FOR SOLUTION INTRAMUSCULAR; INTRAVENOUS at 04:20

## 2021-10-15 RX ADMIN — CARVEDILOL 25 MG: 25 TABLET, FILM COATED ORAL at 10:46

## 2021-10-15 RX ADMIN — SULFAMETHOXAZOLE AND TRIMETHOPRIM 1 TABLET: 400; 80 TABLET ORAL at 10:54

## 2021-10-15 RX ADMIN — ACETAMINOPHEN 650 MG: 325 TABLET, FILM COATED ORAL at 23:53

## 2021-10-15 RX ADMIN — AMLODIPINE BESYLATE 10 MG: 10 TABLET ORAL at 10:48

## 2021-10-15 RX ADMIN — Medication: at 08:43

## 2021-10-15 RX ADMIN — HEPARIN SODIUM 5000 UNITS: 10000 INJECTION, SOLUTION INTRAVENOUS; SUBCUTANEOUS at 19:34

## 2021-10-15 RX ADMIN — OXYCODONE HYDROCHLORIDE 10 MG: 5 TABLET ORAL at 11:11

## 2021-10-15 ASSESSMENT — ACTIVITIES OF DAILY LIVING (ADL)
ADLS_ACUITY_SCORE: 5

## 2021-10-15 ASSESSMENT — MIFFLIN-ST. JEOR
SCORE: 1527.5
SCORE: 1517.22

## 2021-10-15 NOTE — PROGRESS NOTES
HEMODIALYSIS TREATMENT NOTE    Date: 10/15/2021  Time: 10:32 AM    Data:  Pre Wt: 66.8 kg (147 lb 4.3 oz)   Desired Wt: 64.8 kg   Post Wt: 64.8 kg (142 lb 13.7 oz)  Weight change: 2 kg  Ultrafiltration - Post Run Net Total Removed (mL): 2000 mL  Vascular Access Status: Fistula  patent  Dialyzer Rinse: Streaked, Light  Total Blood Volume Processed: 0 L   Total Dialysis (Treatment) Time: 1hr 40 mins   Dialysate Bath: Uf only  Heparin: None    Lab:   No    Interventions:  1 unit RBC given  UF only    Assessment:  Pt vitally stable through out. Pt only comfortable with pulling 2L. Writer set machine for 2.5 but pt was cramping with 20 mins left and 2L pulled. Handoff given to floor RN.     Plan:    Per renal

## 2021-10-15 NOTE — PLAN OF CARE
Pt A&O x4, VSS on RA. C/o chest pain from cough. Given oxycodone x1 with good relief. Infrequent productive cough. HD run today. Good appetite. L PIV SL. R fistula CDI. Up independently in room. HBG 6.7 this AM. 1 unit PRBC given. Recheck at 1330 HBG 8.2. Possible discharge tomorrow after HD run. Denies having bloody sputum today. BM x1. Will continue to monitor.

## 2021-10-15 NOTE — PROGRESS NOTES
0834-4113: Afebrile. VSS on RA. A/Ox4. Up independently. Continues to have midsternal chest pain. Declined PRN tylenol. Crackles noted in lungs sounds, WARNER. Edema in BLEs. LLE 4+. RLE 3+. Denies numbness or tingling. Oliguric. LBM 10/13. Hemoglobin 7.0 today. Plan to re-check tomorrow morning. Calls appropriately and makes needs known. Continue with POC.    Tiffanie Feng RN

## 2021-10-15 NOTE — PLAN OF CARE
"Time: 7324-7351    VS: /72 (BP Location: Left arm)   Pulse 72   Temp 99.1  F (37.3  C) (Oral)   Resp 18   Ht 1.727 m (5' 8\")   Wt 67.8 kg (149 lb 7.6 oz)   SpO2 97%   BMI 22.73 kg/m     Activity: Independent  Neuros:  A&O x4. Denies numbness or tingling.    Cardiac:  WDL.   Respiratory:  WDL on RA. Infrequent cough.   GI/:  Voiding spontaneously. +BS, passing flatus.   Diet: Regular  Skin: CDI, no new issues  Lines: L PIV, SL in between antibiotics  Pain/nausea: Denies nausea. Treated chest pain with PRN oxycodone x 1 this shift.  New changes this shift: Patient resting comfortably between shifts.   "

## 2021-10-15 NOTE — PROGRESS NOTES
Nephrology Progress Note  10/15/2021         Assessment & Recommendations:   #End-stage renal disease-hemodialysis is Tuesday Thursday Saturday at [facility] with Dr. Coleman.  Treatment time is 2.5 hours via an AV fistula.    - [facility] ([place]) phone [phone], Fax [phone]     #Hypervolemia -imaging shows pulmonary edema and had complication of hemoptysis.    - reduced TW to 65 kg. May be able to challenge further  - consider Vitamin E 400 mg daily to help with cramping     #Anemia of end-stage renal disease.  Hemoglobin levels run at approximately 7's on Epogen 8000 units pretreatment.    - received pRBC during hospitalization - had worsening hemoglobin to 6.9 this morning so was given one unit of blood  - I gave him epogen 10,000 units yesterday and will plan the same tomorrow     #Renal hyperparathyroidism-his PTH Runs approximately 2000, he is on Hectorol 15 mcg 3 times weekly with dialysis.  His phosphorus is often elevated in the 7-8 range.  We will make no changes and and defer further management to his primary nephrology team.    # Community acquired pneumonia - receiving azithromycin and ceftriaxone    Discussed with primary team as well as primary nephrologist, Dr. Coleman.    Chanda Jackson MD  [facility]  Department of Medicine  Division of Renal Disease and Hypertension  [facility] Web [facility]     Interval History :   Nursing and provider notes from last 24 hours reviewed.  In the last 24 hours Rashad Ortiz had a 2 hour UF run that was cut short by 20 minutes after he started cramping. Post dialysis weight down to 64.8 kg.  Feeling better and recovered from cramping. Still has mild central chest discomfort. Still has some trouble breathing but improved.  Received 1 unit of blood this morning.    Review of Systems:   I reviewed the following systems:  GI: ok appetite. no nausea or vomiting or diarrhea.   Neuro:  no  "confusion  Constitutional:  no fever or chills  CV: + pedal edema    Physical Exam:   I/O last 3 completed shifts:  In: 678 [P.O.:400]  Out: 2700 [Other:2700]   /62 (BP Location: Left arm)   Pulse 68   Temp 97.3  F (36.3  C) (Oral)   Resp 16   Ht 1.727 m (5' 8\")   Wt 65.8 kg (145 lb)   SpO2 99%   BMI 22.05 kg/m       GENERAL APPEARANCE: no distress  EYES:  no scleral icterus, pupils equal  HENT: mouth without ulcers or lesions  PULM: normal work of breathing  NEURO:  Speech fluent, face symmetric  Access fistula    Labs:   All labs reviewed by me  Electrolytes/Renal - Recent Labs   Lab Test 10/15/21  0720 10/14/21  0645 10/13/21  0708 09/29/21  1449 07/06/21  0450 09/18/20  1157 09/10/20  0735 10/22/19  1605 10/15/19  1734 07/24/19  1702 06/13/19  1941    132* 133   < > 135   < > 135   < >  --    < > 140   POTASSIUM 4.6 5.7* 5.4*   < > 3.5   < > 5.3   < >  --    < > 4.4   CHLORIDE 101 98 100   < > 102   < > 104   < >  --    < > 110*   CO2 24 25 27   < > 22   < > 20   < >  --    < > 19*   BUN 38* 68* 54*   < > 44*   < > 25   < >  --    < > 45*   CR 9.83* 13.60* 11.40*   < > 9.48*   < > 6.35*   < >  --    < > 5.28*   GLC 90 98 105*   < > 84   < > 104*   < >  --    < > 71   ASHELY 8.5 8.4* 8.3*   < > 8.0*   < > 8.7   < >  --    < > 8.9   MAG 1.8  --   --   --   --   --  1.5*  --  2.6*   < >  --    PHOS 6.4*  --   --   --  4.5  --   --   --   --   --  2.7    < > = values in this interval not displayed.       CBC -   Recent Labs   Lab Test 10/15/21  1330 10/15/21  0720 10/15/21  0602 10/14/21  1600 10/14/21  0645 10/14/21  0645 10/13/21  1320 10/13/21  0708   WBC  --  5.7  --   --   --  6.4  --  6.8   HGB 8.2* 6.7*  --  7.0*   < > 7.0*   < > 7.0*   PLT  --  141* 168  --   --  138*  --  120*    < > = values in this interval not displayed.       LFTs -   Recent Labs   Lab Test 10/13/21  0708 10/12/21  1435 09/29/21  1449   ALKPHOS 134 146 157*   BILITOTAL 0.4 0.6 0.4   ALT 6 15 14   AST 10 15 7   PROTTOTAL " 7.3 7.7 7.7   ALBUMIN 2.7* 3.1* 3.2*       Iron Panel -   Recent Labs   Lab Test 06/20/20  1112 03/18/20  0723 03/18/20  0723 10/10/19  1650 10/10/19  1650   IRON 15*  --  54  --  42   IRONSAT 9*  --  30  --  23   ORALIA 310   < > 460*   < > 790*    < > = values in this interval not displayed.         Imaging:  All imaging studies reviewed by me.     Current Medications:    amLODIPine  10 mg Oral Daily     carvedilol  25 mg Oral BID w/meals     [START ON 10/16/2021] cefTRIAXone  2 g Intravenous Q24H     furosemide  20 mg Oral Daily     heparin ANTICOAGULANT  5,000 Units Subcutaneous Q12H     [START ON 10/16/2021] influenza quadrivalent (PF) vacc  0.5 mL Intramuscular Prior to discharge     mycophenolate  250 mg Oral BID     sodium chloride (PF)  3 mL Intracatheter Q8H     sulfamethoxazole-trimethoprim  1 tablet Oral Once per day on Mon Wed Fri     tacrolimus  1 mg Oral QAM     tacrolimus  1.5 mg Oral QPM       Chanda Jackson MD

## 2021-10-15 NOTE — PROGRESS NOTES
[facility]    Medicine Progress Note - Hospitalist Service, Gold 6       Date of Admission:  10/12/2021     Updates today:  - Appreciate nephrology input, will continue daily HD for now for volume, tomorrow is regular HD day  - suspect hgb baseline is actually closer to 7   - HD today with 2000ml UF  - appreciate cardiology input, they reviewed the TTE and interval decreased size of pericardial effusion, repeat TTE in 1-2 weeks  - likely discharge tomorrow post-HD    Assessment & Plan           Rashad Ortiz is a 46 year old male admitted on 10/12/2021. 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 Initial work-up concerning for pulmonary edema. He is admitted to Internal Medicine for further evaluation.    Moderate-sized pericardial effusion, posterior  Pleuritic chest pain  Pulmonary edema  Interstitial lung abnormality  (please see my 10/13 note for further details).   Noted pleuritic, mid-sternal chest pain after HD 10/13. BNP >25,000, trops flat. CTA neg for PE. TTE showing normal LVEF and moderate size posterior pericardial effusion. Procal 3.7.  - Appreciate pulmonology input, suspect needs volume mng, no further intervention at this time   - appreciate cardiology input, they reviewed the TTE and interval decreased size of pericardial effusion, repeat TTE in 1-2 weeks  - Continuous pulse oximetry, IS  - Pain mng: APAP 650 mg q6h prn, oxycodone 5-10 mg q6h prn, IV dilaudid 0.5 mg q4h prn x 2 doses for severe pain (needed 2 doses of oxycodone in 24h)  - lasix 20mg daily po  -Infectious work-up   -Negative studies to date: Blood cultures NGTD, Covid, hep B surface Ag, Covid PCR, S. pneumo and Legionella urine Ag, respiratory viral  panel, Quant-TB gold,   CMV quant, HSV PCR, 1 3 beta glucan, varicella antibody, CMV PCR,  blood cultures x2  - EBV with 84k copies/mL not c/w infection      Hemoptysis, resolved  Acute on chronic anemia in the setting of acute blood loss  See also above.  Hgb 6.7 on presentation. Baseline initially felt to be 9-10 but on further assessment of HD records per nephrology assistance, BL hgb ~7. PTA with small hemoptysis x7-8 episodes PTA but no other s/s of bleeding.  Post 1 unit PRBC hemoglobin increased to 7.4.  Patient noted interval resolution of hemoptysis on 10/13. 1 unit PRBC 10/15 for hgb 6.7.  - Consented for blood, Type and screen obtained on admission  - CBC in a.m.     ESRD s/p failed LDKT on HD (T-Th-Sat)  Had full run HD prior to presentation. EDW from prior admission 66 kg. PTA on tacrolimus 1 mg q AM and 1.5 mg q evening, mycophenolate 250 mg BID, bactrim M-W-F, lasix 20 mg daily.  - tacro level 3.2 at 06:45 10/14, appreciate nephrology input, no changes at this time  - Nephrology consult input appreciated  -Underwent HD 10/13 with 1500 mL UF, HD 10/14 with 2.5 L UF, HD 10/15 with 2000ml UF  - Continue PTA tacrolimus, mycophenolate, bactrim, lasix  - I/Os, daily weights  - suspect hgb baseline is actually closer to 7  - HD today with 2000ml UF    Hypodense thyroid nodule, incidental finding  Hypodense thyroid nodules which may be further evaluated with  Ultrasound.  Normal TSH and did not reflex to free T4  -We will need outpatient follow-up     HTN -   BP has been stable and normal  -PTA  amlodipine 10 mg daily, coreg 25 mg BID       Diet: Combination Diet Regular Diet Adult    DVT Prophylaxis: Heparin SQ  Hunter Catheter: Not present  Central Lines: None  Code Status: Full Code      Disposition Plan   Expected discharge: 10/16/2021 1-2d recommended to prior living arrangement once antibiotic plan established, hemoglobin stable and safe disposition plan/ TCU bed available.     The patient's care was  discussed with the attending physician, Dr. Brice, Bedside RN, Care Coordinator/SW, Patient, Nephrology consultant, Cardiology    Luisa Pedroza PA-C  Hospitalist Service, [address]  Securely message with the Vocera Web Console (learn more here)  Text page via [facility] Paging/Directory  Please see sign in/sign out for up to date coverage information    Clinically Significant Risk Factors Present on Admission                ______________________________________________________________________    Interval History    Rashad is doing better today and denies any further hemoptysis.  He continues to have stable retrosternal chest pain that is nonradiating and improves with pain medications.  He endorses stable bowels. No rashes or lesions or other new complaints.     . 4 point ROS is negative other than those as mentioned per HPI    Data reviewed today: I reviewed all medications, new labs and imaging results over the last 24 hours.     Physical Exam   Vital Signs: Temp: 99.1  F (37.3  C) Temp src: Oral BP: 124/72 Pulse: 72   Resp: 18 SpO2: 97 % O2 Device: None (Room air)    Weight: 147 lbs 4.28 oz  GENERAL: Alert and oriented x 3. NAD.  Able to sit upright without assistance cooperative.    HEENT: Anicteric sclera. Mucous membranes moist. NC. AT.  Pupils equal and round  CV: Tachycardic, RRR. S1, S2. No murmurs appreciated.   RESPIRATORY: Effort normal on RA. Lungs scant bibasilar rales and crackles, no wheezing, rhonchi.   GI: Abdomen soft, NT/ND, NABS  NEUROLOGICAL: No focal deficits. Moves all extremities.   EXTREMITIES:  Intact bilateral pedal pulses.   SKIN: No jaundice.  No rashes.      Data   Recent Labs   Lab 10/15/21  0720 10/15/21  0602 10/14/21  1600 10/14/21  0645 10/13/21  1320 10/13/21  0708 10/12/21  1435 10/12/21  1435   WBC 5.7  --   --  6.4  --  6.8   < > 7.7   HGB 6.7*  --  7.0* 7.0*   < > 7.0*   < > 6.7*   MCV 85  --   --  84  --  84   < > 82   PLT  141* 168  --  138*  --  120*   < > 157     --   --  132*  --  133   < > 136   POTASSIUM 4.6  --   --  5.7*  --  5.4*   < > 4.4   CHLORIDE 101  --   --  98  --  100   < > 98   CO2 24  --   --  25  --  27   < > 26   BUN 38*  --   --  68*  --  54*   < > 41*   CR 9.83*  --   --  13.60*  --  11.40*   < > 9.64*   ANIONGAP 9  --   --  9  --  6   < > 12   ASHELY 8.5  --   --  8.4*  --  8.3*   < > 8.4*   GLC 90  --   --  98  --  105*   < > 94   ALBUMIN  --   --   --   --   --  2.7*  --  3.1*   PROTTOTAL  --   --   --   --   --  7.3  --  7.7   BILITOTAL  --   --   --   --   --  0.4  --  0.6   ALKPHOS  --   --   --   --   --  134  --  146   ALT  --   --   --   --   --  6  --  15   AST  --   --   --   --   --  10  --  15   TROPONIN  --   --  <0.015 <0.015  --  <0.015   < > <0.015    < > = values in this interval not displayed.

## 2021-10-16 ENCOUNTER — APPOINTMENT (OUTPATIENT)
Dept: GENERAL RADIOLOGY | Facility: CLINIC | Age: 46
DRG: 314 | End: 2021-10-16
Attending: PHYSICIAN ASSISTANT
Payer: COMMERCIAL

## 2021-10-16 LAB
BACTERIA SPT CULT: NORMAL
BACTERIA SPT CULT: NORMAL
BASE EXCESS BLDV CALC-SCNC: 4.7 MMOL/L (ref -7.7–1.9)
CRP SERPL-MCNC: 160 MG/L (ref 0–8)
ERYTHROCYTE [DISTWIDTH] IN BLOOD BY AUTOMATED COUNT: 19.3 % (ref 10–15)
ERYTHROCYTE [SEDIMENTATION RATE] IN BLOOD BY WESTERGREN METHOD: 111 MM/HR (ref 0–15)
GRAM STAIN RESULT: NORMAL
HCO3 BLDV-SCNC: 29 MMOL/L (ref 21–28)
HCT VFR BLD AUTO: 27.8 % (ref 40–53)
HGB BLD-MCNC: 8.3 G/DL (ref 13.3–17.7)
HOLD SPECIMEN: NORMAL
MCH RBC QN AUTO: 25.9 PG (ref 26.5–33)
MCHC RBC AUTO-ENTMCNC: 29.9 G/DL (ref 31.5–36.5)
MCV RBC AUTO: 87 FL (ref 78–100)
O2/TOTAL GAS SETTING VFR VENT: 21 %
PCO2 BLDV: 41 MM HG (ref 40–50)
PH BLDV: 7.46 [PH] (ref 7.32–7.43)
PLATELET # BLD AUTO: 221 10E3/UL (ref 150–450)
PO2 BLDV: 35 MM HG (ref 25–47)
RBC # BLD AUTO: 3.21 10E6/UL (ref 4.4–5.9)
TROPONIN I SERPL-MCNC: <0.015 UG/L (ref 0–0.04)
WBC # BLD AUTO: 9.3 10E3/UL (ref 4–11)

## 2021-10-16 PROCEDURE — 258N000003 HC RX IP 258 OP 636: Performed by: INTERNAL MEDICINE

## 2021-10-16 PROCEDURE — 90937 HEMODIALYSIS REPEATED EVAL: CPT

## 2021-10-16 PROCEDURE — 93005 ELECTROCARDIOGRAM TRACING: CPT

## 2021-10-16 PROCEDURE — 86255 FLUORESCENT ANTIBODY SCREEN: CPT | Performed by: PHYSICIAN ASSISTANT

## 2021-10-16 PROCEDURE — 120N000002 HC R&B MED SURG/OB UMMC

## 2021-10-16 PROCEDURE — 86140 C-REACTIVE PROTEIN: CPT | Performed by: PHYSICIAN ASSISTANT

## 2021-10-16 PROCEDURE — 36415 COLL VENOUS BLD VENIPUNCTURE: CPT | Performed by: PHYSICIAN ASSISTANT

## 2021-10-16 PROCEDURE — 86038 ANTINUCLEAR ANTIBODIES: CPT | Performed by: PHYSICIAN ASSISTANT

## 2021-10-16 PROCEDURE — 99233 SBSQ HOSP IP/OBS HIGH 50: CPT | Performed by: STUDENT IN AN ORGANIZED HEALTH CARE EDUCATION/TRAINING PROGRAM

## 2021-10-16 PROCEDURE — 86160 COMPLEMENT ANTIGEN: CPT | Performed by: PHYSICIAN ASSISTANT

## 2021-10-16 PROCEDURE — 250N000013 HC RX MED GY IP 250 OP 250 PS 637: Performed by: PHYSICIAN ASSISTANT

## 2021-10-16 PROCEDURE — 85027 COMPLETE CBC AUTOMATED: CPT | Performed by: PHYSICIAN ASSISTANT

## 2021-10-16 PROCEDURE — 71045 X-RAY EXAM CHEST 1 VIEW: CPT | Mod: 26 | Performed by: STUDENT IN AN ORGANIZED HEALTH CARE EDUCATION/TRAINING PROGRAM

## 2021-10-16 PROCEDURE — 250N000011 HC RX IP 250 OP 636: Performed by: PHYSICIAN ASSISTANT

## 2021-10-16 PROCEDURE — 99207 PR NON-BILLABLE SERV PER CHARTING: CPT | Performed by: INTERNAL MEDICINE

## 2021-10-16 PROCEDURE — 82803 BLOOD GASES ANY COMBINATION: CPT | Performed by: PHYSICIAN ASSISTANT

## 2021-10-16 PROCEDURE — 71045 X-RAY EXAM CHEST 1 VIEW: CPT

## 2021-10-16 PROCEDURE — 85652 RBC SED RATE AUTOMATED: CPT | Performed by: PHYSICIAN ASSISTANT

## 2021-10-16 PROCEDURE — 86431 RHEUMATOID FACTOR QUANT: CPT | Performed by: PHYSICIAN ASSISTANT

## 2021-10-16 PROCEDURE — 90935 HEMODIALYSIS ONE EVALUATION: CPT | Performed by: INTERNAL MEDICINE

## 2021-10-16 PROCEDURE — 84484 ASSAY OF TROPONIN QUANT: CPT | Performed by: PHYSICIAN ASSISTANT

## 2021-10-16 PROCEDURE — 250N000012 HC RX MED GY IP 250 OP 636 PS 637: Performed by: PHYSICIAN ASSISTANT

## 2021-10-16 RX ORDER — MORPHINE SULFATE 2 MG/ML
1 INJECTION, SOLUTION INTRAMUSCULAR; INTRAVENOUS ONCE
Status: COMPLETED | OUTPATIENT
Start: 2021-10-16 | End: 2021-10-16

## 2021-10-16 RX ORDER — COLCHICINE 0.6 MG/1
0.6 TABLET ORAL ONCE
Status: COMPLETED | OUTPATIENT
Start: 2021-10-16 | End: 2021-10-16

## 2021-10-16 RX ORDER — INDOMETHACIN 25 MG/1
25 CAPSULE ORAL ONCE
Status: DISCONTINUED | OUTPATIENT
Start: 2021-10-16 | End: 2021-10-16

## 2021-10-16 RX ADMIN — SODIUM CHLORIDE 300 ML: 9 INJECTION, SOLUTION INTRAVENOUS at 09:20

## 2021-10-16 RX ADMIN — ACETAMINOPHEN 650 MG: 325 TABLET, FILM COATED ORAL at 19:48

## 2021-10-16 RX ADMIN — AMLODIPINE BESYLATE 10 MG: 10 TABLET ORAL at 12:59

## 2021-10-16 RX ADMIN — MYCOPHENOLATE MOFETIL 250 MG: 250 CAPSULE ORAL at 19:48

## 2021-10-16 RX ADMIN — OXYCODONE HYDROCHLORIDE 10 MG: 5 TABLET ORAL at 17:31

## 2021-10-16 RX ADMIN — COLCHICINE 0.6 MG: 0.6 TABLET, FILM COATED ORAL at 13:20

## 2021-10-16 RX ADMIN — HEPARIN SODIUM 5000 UNITS: 10000 INJECTION, SOLUTION INTRAVENOUS; SUBCUTANEOUS at 19:49

## 2021-10-16 RX ADMIN — OXYCODONE HYDROCHLORIDE 5 MG: 5 TABLET ORAL at 19:47

## 2021-10-16 RX ADMIN — CARVEDILOL 25 MG: 25 TABLET, FILM COATED ORAL at 17:32

## 2021-10-16 RX ADMIN — OXYCODONE HYDROCHLORIDE 10 MG: 5 TABLET ORAL at 05:01

## 2021-10-16 RX ADMIN — OXYCODONE HYDROCHLORIDE 15 MG: 5 TABLET ORAL at 23:35

## 2021-10-16 RX ADMIN — HEPARIN SODIUM 5000 UNITS: 10000 INJECTION, SOLUTION INTRAVENOUS; SUBCUTANEOUS at 12:57

## 2021-10-16 RX ADMIN — CEFTRIAXONE SODIUM 2 G: 2 INJECTION, POWDER, FOR SOLUTION INTRAMUSCULAR; INTRAVENOUS at 04:56

## 2021-10-16 RX ADMIN — Medication: at 09:21

## 2021-10-16 RX ADMIN — TACROLIMUS 1 MG: 1 CAPSULE ORAL at 12:57

## 2021-10-16 RX ADMIN — FUROSEMIDE 20 MG: 20 TABLET ORAL at 12:57

## 2021-10-16 RX ADMIN — TACROLIMUS 1.5 MG: 1 CAPSULE ORAL at 17:32

## 2021-10-16 RX ADMIN — SODIUM CHLORIDE 250 ML: 9 INJECTION, SOLUTION INTRAVENOUS at 09:20

## 2021-10-16 RX ADMIN — MYCOPHENOLATE MOFETIL 250 MG: 250 CAPSULE ORAL at 12:57

## 2021-10-16 RX ADMIN — OXYCODONE HYDROCHLORIDE 10 MG: 5 TABLET ORAL at 11:01

## 2021-10-16 RX ADMIN — CARVEDILOL 25 MG: 25 TABLET, FILM COATED ORAL at 12:59

## 2021-10-16 RX ADMIN — MORPHINE SULFATE 1 MG: 2 INJECTION, SOLUTION INTRAMUSCULAR; INTRAVENOUS at 13:48

## 2021-10-16 ASSESSMENT — ACTIVITIES OF DAILY LIVING (ADL)
ADLS_ACUITY_SCORE: 5

## 2021-10-16 ASSESSMENT — MIFFLIN-ST. JEOR
SCORE: 1519.5
SCORE: 1503.5

## 2021-10-16 NOTE — PROGRESS NOTES
HEMODIALYSIS TREATMENT NOTE    Date: 10/16/2021  Time: 12:03 PM    Data:  Pre Wt: 66.8 kg (147 lb 4.3 oz)   Desired Wt: 63.5 kg   Post Wt: 64.4 kg (141 lb 15.6 oz)  Weight change: 2.4 kg  Ultrafiltration - Post Run Net Total Removed (mL): 3000 mL  Vascular Access Status: patent  Dialyzer Rinse: Streaked, Light  Total Blood Volume Processed: 58.02 L Liters  Total Dialysis (Treatment) Time: 2.54 Hours         Interventions:  2.5 hours of HD with  3000 mls pulled with no complications. Pain med x1     Assessment:  ESRD patient in for his regular HD run VSS A+O     Plan:    Per renal

## 2021-10-16 NOTE — PLAN OF CARE
Neuro: A&Ox4.   Cardiac:  VSS.   Respiratory: Sating WNL on RA.  GI/: No BM. No urine output. Pt to get HD today.  Diet Regular diet ordered.  Activity:  Up independently.  Pain: Pt states sharp chest pain which is worse with deep breaths. Oxycodone given x1.  Skin: No new deficits noted.  LDA's: L arm PIV for antibiotics. R AV fistula.    Plan: Continue with POC. Notify primary team with changes.

## 2021-10-16 NOTE — PLAN OF CARE
A:  patient having pain in lower right abdomen and oxycodone given with relief.  States coughing up clear sputum. Up to bathroom independently.    R:  continue to monitor and treat per plan of care. Possible discharge to home after dialysis tomorrow.

## 2021-10-16 NOTE — PROGRESS NOTES
Pt back from dialysis. Appearing to  gasp for breath. 02 98% on RA, requesting morphine. Pain still 10/10. Paged [phone]

## 2021-10-16 NOTE — PROGRESS NOTES
Nephrology  Progress note- dialysis visit  10/16/2021     This patient was seen and examined while on dialysis.  Professional oversight of the patient's dialysis care, access care and dialysis related co-morbidities were addressed as necessary with the patient and / or staff.   Tolerating UF.  More short of breath today with chest pain    Laboratory results and nurses' notes were reviewed.   No changes to management of volume, anemia, BMD, acidosis, or electrolytes.   Diagnosis - ESRD  - increased UF goal on HD  - providing epogen 10K units today for anemia of ESRD    Called back a second time to address question about medication to manage chest pain. Discussing with primary team.  - plan for indomethacin and colchicine for pericarditis.      Chanda Jackson MD  [facility]  Department of Medicine  Division of Renal Disease and Hypertension  [facility]  [employer] Web Console

## 2021-10-16 NOTE — PROGRESS NOTES
[facility]    Medicine Progress Note - Hospitalist Service, Gold 6       Date of Admission:  10/12/2021     Updates today:  - Appreciate nephrology input, will continue daily HD for now for volume, tomorrow is regular HD day  - sent rheum workup as below  - trop and EKG remain stable  - HD today with 3000ml UF, weight stable  - appreciate cardiology input, they reviewed the TTE and interval decreased size of pericardial effusion, repeat TTE in 1-2 weeks    Assessment & Plan           Rashad Ortiz is a 46 year old male admitted on 10/12/2021. 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 Initial work-up concerning for pulmonary edema. He is admitted to Internal Medicine for further evaluation.    Moderate-sized pericardial effusion, posterior  Pleuritic chest pain  Pulmonary edema  Interstitial lung abnormality  (please see my 10/13 note for further details).   Noted pleuritic, mid-sternal chest pain after HD 10/13. BNP >25,000, trops flat. CTA neg for PE. TTE showing normal LVEF and moderate size posterior pericardial effusion. Procal 3.7.  - appreciate cardiology input, they reviewed the TTE and interval decreased size of pericardial effusion, repeat TTE in 1-2 weeks  - Pain mng: APAP 650 mg q6h prn, oxycodone 5-10 mg q6h prn,  - lasix 20mg daily po  -Infectious work-up   -Negative studies to date: Blood cultures NGTD, Covid, hep B surface Ag, Covid PCR, S. pneumo and Legionella urine Ag, respiratory viral panel, Quant-TB gold,   CMV quant, HSV PCR, 1 3 beta glucan, varicella antibody, CMV PCR,  blood cultures x2  - EBV with 84k copies/mL not c/w infection  - rheum workup:    - ,    - follow up: C3, C4, ANCA, KATHERINE,  RF      Hemoptysis, resolved  Acute on chronic anemia in the setting of acute blood loss  See also above.  Hgb 6.7 on presentation. Baseline initially felt to be 9-10 but on further assessment of HD records per nephrology assistance, BL hgb ~7. PTA with small hemoptysis x7-8 episodes PTA but no other s/s of bleeding.  Post 1 unit PRBC hemoglobin increased to 7.4.  Patient noted interval resolution of hemoptysis on 10/13. 1 unit PRBC 10/15 for hgb 6.7. hgb 8.3 post transfusion  - Consented for blood, Type and screen obtained on admission  - CBC in a.m.     ESRD s/p failed LDKT on HD (T-Th-Sat)  Had full run HD prior to presentation. EDW from prior admission 66 kg. PTA on tacrolimus 1 mg q AM and 1.5 mg q evening, mycophenolate 250 mg BID, bactrim M-W-F, lasix 20 mg daily.  - tacro level 3.2 at 06:45 10/14, appreciate nephrology input, no changes at this time  - Nephrology consult input appreciated  -Underwent HD 10/13 with 1500 mL UF, HD 10/14 with 2.5 L UF, HD 10/15 with 2000ml UF, HD 10/16 with 3000ml UF  - weight remains stable despite high UFs   - Continue PTA tacrolimus, mycophenolate, bactrim, lasix  - I/Os, daily weights  - suspect hgb baseline is actually closer to 7    Hypodense thyroid nodule, incidental finding  Hypodense thyroid nodules which may be further evaluated with  Ultrasound.  Normal TSH and did not reflex to free T4  -We will need outpatient follow-up     HTN -   BP has been stable and intermittently hypertensive in the setting of above  -PTA  amlodipine 10 mg daily, coreg 25 mg BID       Diet: Combination Diet Regular Diet Adult    DVT Prophylaxis: Heparin SQ  Hunter Catheter: Not present  Central Lines: None  Code Status: Full Code      Disposition Plan   Expected discharge: 10/16/2021 1-2d recommended to prior living arrangement once antibiotic plan established, hemoglobin stable and safe disposition plan/ TCU bed available.     The patient's care was discussed with the attending physician,  Dr. Brice, Bedside RN, Care Coordinator/SW, Patient, Nephrology consultant, Cardiology    Luisa Pedroza PA-C  Hospitalist Service, [address]  Securely message with the Vocera Web Console (learn more here)  Text page via [facility] Paging/Directory  Please see sign in/sign out for up to date coverage information    Clinically Significant Risk Factors Present on Admission                 ______________________________________________________________________    Interval History    Rashad is having severe chest pain that started prior to 7am this morning and woke him from sleep. Pain medication and sitting forward help and he feels it is difficult to take a deep breath and had some time today during which he felt he could not catch his breath. Oxygen sats have remained stable. He has been coughing up non-purulent sputum and endorses mild sore throat     He did not have any change in chest pain with HD and continued to have pain after dialysis.  Continues with no urine output. No diarrhea. No further hemoptysis.     Denies N/V, does not endorse F/C.     . 4 point ROS is negative other than those as mentioned per HPI    Data reviewed today: I reviewed all medications, new labs and imaging results over the last 24 hours.     Physical Exam   Vital Signs: Temp: 98.3  F (36.8  C) Temp src: Oral BP: 133/80 Pulse: 78   Resp: 18 SpO2: 98 % O2 Device: None (Room air) Oxygen Delivery: 1 LPM  Weight: 141 lbs 15.62 oz  GENERAL: Alert and oriented. NAD.  Able to sit upright without assistance cooperative.    HEENT: Anicteric sclera. Mucous membranes moist. NC. AT.  Pupils equal and round  CV: Tachycardic, RRR. S1, S2. No murmurs appreciated.   CHEST: pain not increased with palpation, no masses/lesions  RESPIRATORY: Effort normal on RA. Lungs scant bibasilar rales and crackles, no wheezing, rhonchi.   GI: Abdomen soft, NT/ND, NABS  NEUROLOGICAL: No focal deficits. Moves all  extremities.   EXTREMITIES:  + 3+ bilateral LE edema. Intact bilateral pedal pulses.   SKIN: No jaundice.  No rashes.      Data   Recent Labs   Lab 10/16/21  0853 10/15/21  1330 10/15/21  0720 10/15/21  0602 10/14/21  1600 10/14/21  0645 10/14/21  0645 10/13/21  1320 10/13/21  0708 10/12/21  1435 10/12/21  1435   WBC  --   --  5.7  --   --   --  6.4  --  6.8   < > 7.7   HGB  --  8.2* 6.7*  --  7.0*   < > 7.0*   < > 7.0*   < > 6.7*   MCV  --   --  85  --   --   --  84  --  84   < > 82   PLT  --   --  141* 168  --   --  138*  --  120*   < > 157   NA  --   --  134  --   --   --  132*  --  133   < > 136   POTASSIUM  --   --  4.6  --   --   --  5.7*  --  5.4*   < > 4.4   CHLORIDE  --   --  101  --   --   --  98  --  100   < > 98   CO2  --   --  24  --   --   --  25  --  27   < > 26   BUN  --   --  38*  --   --   --  68*  --  54*   < > 41*   CR  --   --  9.83*  --   --   --  13.60*  --  11.40*   < > 9.64*   ANIONGAP  --   --  9  --   --   --  9  --  6   < > 12   ASHELY  --   --  8.5  --   --   --  8.4*  --  8.3*   < > 8.4*   GLC  --   --  90  --   --   --  98  --  105*   < > 94   ALBUMIN  --   --   --   --   --   --   --   --  2.7*  --  3.1*   PROTTOTAL  --   --   --   --   --   --   --   --  7.3  --  7.7   BILITOTAL  --   --   --   --   --   --   --   --  0.4  --  0.6   ALKPHOS  --   --   --   --   --   --   --   --  134  --  146   ALT  --   --   --   --   --   --   --   --  6  --  15   AST  --   --   --   --   --   --   --   --  10  --  15   TROPONIN <0.015  --   --   --  <0.015  --  <0.015  --  <0.015   < > <0.015    < > = values in this interval not displayed.

## 2021-10-17 LAB
ANION GAP SERPL CALCULATED.3IONS-SCNC: 10 MMOL/L (ref 3–14)
BUN SERPL-MCNC: 36 MG/DL (ref 7–30)
CALCIUM SERPL-MCNC: 8.9 MG/DL (ref 8.5–10.1)
CHLORIDE BLD-SCNC: 98 MMOL/L (ref 94–109)
CO2 SERPL-SCNC: 25 MMOL/L (ref 20–32)
CREAT SERPL-MCNC: 9.58 MG/DL (ref 0.66–1.25)
ERYTHROCYTE [DISTWIDTH] IN BLOOD BY AUTOMATED COUNT: 19 % (ref 10–15)
GFR SERPL CREATININE-BSD FRML MDRD: 6 ML/MIN/1.73M2
GLUCOSE BLD-MCNC: 111 MG/DL (ref 70–99)
HCT VFR BLD AUTO: 25.5 % (ref 40–53)
HGB BLD-MCNC: 7.6 G/DL (ref 13.3–17.7)
MAGNESIUM SERPL-MCNC: 1.8 MG/DL (ref 1.6–2.3)
MCH RBC QN AUTO: 25.9 PG (ref 26.5–33)
MCHC RBC AUTO-ENTMCNC: 29.8 G/DL (ref 31.5–36.5)
MCV RBC AUTO: 87 FL (ref 78–100)
PHOSPHATE SERPL-MCNC: 6.9 MG/DL (ref 2.5–4.5)
PLATELET # BLD AUTO: 222 10E3/UL (ref 150–450)
POTASSIUM BLD-SCNC: 4.3 MMOL/L (ref 3.4–5.3)
RBC # BLD AUTO: 2.93 10E6/UL (ref 4.4–5.9)
SODIUM SERPL-SCNC: 133 MMOL/L (ref 133–144)
WBC # BLD AUTO: 6.5 10E3/UL (ref 4–11)

## 2021-10-17 PROCEDURE — 250N000013 HC RX MED GY IP 250 OP 250 PS 637: Performed by: PHYSICIAN ASSISTANT

## 2021-10-17 PROCEDURE — 87389 HIV-1 AG W/HIV-1&-2 AB AG IA: CPT | Performed by: INTERNAL MEDICINE

## 2021-10-17 PROCEDURE — 83516 IMMUNOASSAY NONANTIBODY: CPT | Performed by: PHYSICIAN ASSISTANT

## 2021-10-17 PROCEDURE — 83735 ASSAY OF MAGNESIUM: CPT | Performed by: PHYSICIAN ASSISTANT

## 2021-10-17 PROCEDURE — 250N000011 HC RX IP 250 OP 636: Performed by: PHYSICIAN ASSISTANT

## 2021-10-17 PROCEDURE — 99233 SBSQ HOSP IP/OBS HIGH 50: CPT | Performed by: STUDENT IN AN ORGANIZED HEALTH CARE EDUCATION/TRAINING PROGRAM

## 2021-10-17 PROCEDURE — 84100 ASSAY OF PHOSPHORUS: CPT | Performed by: PHYSICIAN ASSISTANT

## 2021-10-17 PROCEDURE — 85027 COMPLETE CBC AUTOMATED: CPT | Performed by: PHYSICIAN ASSISTANT

## 2021-10-17 PROCEDURE — 250N000012 HC RX MED GY IP 250 OP 636 PS 637: Performed by: PHYSICIAN ASSISTANT

## 2021-10-17 PROCEDURE — 93005 ELECTROCARDIOGRAM TRACING: CPT

## 2021-10-17 PROCEDURE — 120N000002 HC R&B MED SURG/OB UMMC

## 2021-10-17 PROCEDURE — 86140 C-REACTIVE PROTEIN: CPT | Performed by: PHYSICIAN ASSISTANT

## 2021-10-17 PROCEDURE — 36415 COLL VENOUS BLD VENIPUNCTURE: CPT | Performed by: PHYSICIAN ASSISTANT

## 2021-10-17 PROCEDURE — 99233 SBSQ HOSP IP/OBS HIGH 50: CPT | Performed by: INTERNAL MEDICINE

## 2021-10-17 PROCEDURE — 93010 ELECTROCARDIOGRAM REPORT: CPT | Performed by: INTERNAL MEDICINE

## 2021-10-17 PROCEDURE — 80048 BASIC METABOLIC PNL TOTAL CA: CPT | Performed by: PHYSICIAN ASSISTANT

## 2021-10-17 RX ORDER — CEFTRIAXONE 2 G/1
2 INJECTION, POWDER, FOR SOLUTION INTRAMUSCULAR; INTRAVENOUS EVERY 24 HOURS
Status: COMPLETED | OUTPATIENT
Start: 2021-10-17 | End: 2021-10-18

## 2021-10-17 RX ORDER — LIDOCAINE 4 G/G
1 PATCH TOPICAL
Status: DISCONTINUED | OUTPATIENT
Start: 2021-10-17 | End: 2021-10-20 | Stop reason: HOSPADM

## 2021-10-17 RX ADMIN — TACROLIMUS 1 MG: 1 CAPSULE ORAL at 08:04

## 2021-10-17 RX ADMIN — CARVEDILOL 25 MG: 25 TABLET, FILM COATED ORAL at 08:08

## 2021-10-17 RX ADMIN — ACETAMINOPHEN 650 MG: 325 TABLET, FILM COATED ORAL at 05:25

## 2021-10-17 RX ADMIN — CARVEDILOL 25 MG: 25 TABLET, FILM COATED ORAL at 18:22

## 2021-10-17 RX ADMIN — HEPARIN SODIUM 5000 UNITS: 10000 INJECTION, SOLUTION INTRAVENOUS; SUBCUTANEOUS at 19:55

## 2021-10-17 RX ADMIN — ACETAMINOPHEN 650 MG: 325 TABLET, FILM COATED ORAL at 23:29

## 2021-10-17 RX ADMIN — ACETAMINOPHEN 650 MG: 325 TABLET, FILM COATED ORAL at 14:20

## 2021-10-17 RX ADMIN — OXYCODONE HYDROCHLORIDE 15 MG: 5 TABLET ORAL at 14:20

## 2021-10-17 RX ADMIN — HEPARIN SODIUM 5000 UNITS: 10000 INJECTION, SOLUTION INTRAVENOUS; SUBCUTANEOUS at 08:07

## 2021-10-17 RX ADMIN — FUROSEMIDE 20 MG: 20 TABLET ORAL at 08:07

## 2021-10-17 RX ADMIN — CEFTRIAXONE SODIUM 2 G: 2 INJECTION, POWDER, FOR SOLUTION INTRAMUSCULAR; INTRAVENOUS at 10:32

## 2021-10-17 RX ADMIN — OXYCODONE HYDROCHLORIDE 15 MG: 5 TABLET ORAL at 23:29

## 2021-10-17 RX ADMIN — MYCOPHENOLATE MOFETIL 250 MG: 250 CAPSULE ORAL at 19:54

## 2021-10-17 RX ADMIN — OXYCODONE HYDROCHLORIDE 15 MG: 5 TABLET ORAL at 05:25

## 2021-10-17 RX ADMIN — TACROLIMUS 1.5 MG: 1 CAPSULE ORAL at 18:21

## 2021-10-17 RX ADMIN — AMLODIPINE BESYLATE 10 MG: 10 TABLET ORAL at 08:07

## 2021-10-17 RX ADMIN — MYCOPHENOLATE MOFETIL 250 MG: 250 CAPSULE ORAL at 08:04

## 2021-10-17 RX ADMIN — LIDOCAINE 1 PATCH: 246 PATCH TOPICAL at 19:55

## 2021-10-17 ASSESSMENT — ACTIVITIES OF DAILY LIVING (ADL)
ADLS_ACUITY_SCORE: 5

## 2021-10-17 ASSESSMENT — MIFFLIN-ST. JEOR: SCORE: 1513.5

## 2021-10-17 NOTE — PROGRESS NOTES
[facility]    Medicine Progress Note - Hospitalist Service, Gold 6       Date of Admission:  10/12/2021     Updates today:  - Appreciate nephrology input: next HD tomorrow, less likely [place]  - sent rheum workup as below  - discussed with patient if concerns regarding ceftriaxone we can change Abx, but condition is not c/w reaction from Abx or allergic reaction  - continues with severe pleuritic chest pain, cannot rule out underlying inflammatory or rheumatologic cause  - hgb stable and no further hemoptysis  - EKG remains stable       Assessment & Plan         Rashad Ortiz is a 46 year old male admitted on 10/12/2021. 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 Initial work-up concerning for pulmonary edema. He is admitted to Internal Medicine for further evaluation.    Moderate-sized pericardial effusion, posterior  Pleuritic chest pain  Pulmonary edema  Interstitial lung abnormality  (please see my 10/13 note for further details).   Noted pleuritic, mid-sternal chest pain after HD 10/13. BNP >25,000, trops flat. CTA neg for PE. TTE showing normal LVEF and moderate size posterior pericardial effusion. Procal 3.7.  - appreciate cardiology input, they reviewed the TTE and interval decreased size of pericardial effusion, repeat TTE in 1-2 weeks  - Pain mng: APAP 650 mg q6h prn, escalated oxycodoneto 15mg q4h prn, needed morphine x 1 on 10/16  - lasix 20mg daily po  -Infectious work-up   -Negative studies to date: Blood cultures NGTD, Covid, hep B surface Ag, Covid PCR, S. pneumo and Legionella urine Ag, respiratory viral panel, Quant-TB gold,   CMV quant, HSV PCR, 1 3 beta glucan, varicella antibody, CMV PCR,   - EBV with 84k  copies/mL not c/w infection  - rheum workup:    - ,    - follow up: C3, C4, ANCA, KATHERINE, RF, anti-GBM      Hemoptysis, resolved  Acute on chronic anemia in the setting of acute blood loss  See also above.  Hgb 6.7 on presentation. Baseline initially felt to be 9-10 but on further assessment of HD records per nephrology assistance, BL hgb ~7. PTA with small hemoptysis x7-8 episodes PTA but no other s/s of bleeding.  Post 1 unit PRBC hemoglobin increased to 7.4.  Patient noted interval resolution of hemoptysis on 10/13. 1 unit PRBC 10/15 for hgb 6.7. hgb 8.3 post transfusion  - Consented for blood, Type and screen obtained on admission  - CBC in a.m.     ESRD s/p failed LDKT on HD (T-Th-Sat)  Had full run HD prior to presentation. EDW from prior admission 66 kg. PTA on tacrolimus 1 mg q AM and 1.5 mg q evening, mycophenolate 250 mg BID, bactrim M-W-F, lasix 20 mg daily.  - tacro level 3.2 at 06:45 10/14, appreciate nephrology input, no changes at this time  - Nephrology consult input appreciated  -Underwent HD 10/13 with 1500 mL UF, HD 10/14 with 2.5 L UF, HD 10/15 with 2000ml UF, HD 10/16 with 3000ml UF  - next HD 10/18  - weight remains stable despite high UFs   - Continue PTA tacrolimus, mycophenolate, bactrim, lasix  - I/Os, daily weights    Hypodense thyroid nodule, incidental finding  Hypodense thyroid nodules which may be further evaluated with  Ultrasound.  Normal TSH and did not reflex to free T4  -We will need outpatient follow-up     HTN -   BP has been stable and intermittently hypertensive in the setting of above  -PTA  amlodipine 10 mg daily, coreg 25 mg BID       Diet: Combination Diet Regular Diet Adult    DVT Prophylaxis: Heparin SQ  Hunter Catheter: Not present  Central Lines: None  Code Status: Full Code      Disposition Plan   Expected discharge:  1-2d recommended to prior living arrangement once antibiotic plan established, hemoglobin stable and safe disposition plan/ TCU bed  available.     The patient's care was discussed with the attending physician, Dr. Brice, Bedside RN, Care Coordinator/SW, Patient, Nephrology consultant    Luisa Pedroza PA-C  Hospitalist Service, [address]  Securely message with the Vocera Web Console (learn more here)  Text page via [facility] Paging/Directory  Please see sign in/sign out for up to date coverage information    Clinically Significant Risk Factors Present on Admission                 ______________________________________________________________________    Interval History    Rashad is still having the same retrosternal chest pain and intermittently feels like he cannot catch his breath, and notes it was worse yesterday.  He still feels some improvement with leaning forward and cannot tolerate laying flat.  He has pain with taking a deep breath.       Denies N/V, does not endorse F/C. No palpitations.  No bleeding. No bowel complaints. No bleeding.     . 4 point ROS is negative other than those as mentioned per HPI    Data reviewed today: I reviewed all medications, new labs and imaging results over the last 24 hours.     Physical Exam   Vital Signs: Temp: 98.5  F (36.9  C) Temp src: Oral BP: 123/71 Pulse: 72   Resp: 18 SpO2: 99 % O2 Device: None (Room air) Oxygen Delivery: 1 LPM  Weight: 141 lbs 15.62 oz  GENERAL: Alert and oriented. NAD.  Able to sit upright without assistance cooperative.    HEENT: Anicteric sclera. Mucous membranes moist. NC. AT.  Pupils equal and round  CV: Tachycardic, RRR. S1, S2. No murmurs appreciated.   RESPIRATORY: Effort normal on RA. Lungs slight bibasilar and mid-lung crackles, no wheezing, rhonchi.   GI: Abdomen soft, NT/ND, NABS  NEUROLOGICAL: No focal deficits. Moves all extremities.   EXTREMITIES:  + 3+ bilateral LE edema. Intact bilateral pedal pulses.   SKIN: No jaundice.  No rashes.      Data   Recent Labs   Lab 10/17/21  0453 10/16/21  1610 10/16/21  0853  10/15/21  1330 10/15/21  0720 10/15/21  0720 10/15/21  0602 10/14/21  1600 10/14/21  0645 10/14/21  0645 10/13/21  1320 10/13/21  0708 10/12/21  1435 10/12/21  1435   WBC 6.5 9.3  --   --   --  5.7  --   --    < > 6.4  --  6.8   < > 7.7   HGB 7.6* 8.3*  --  8.2*   < > 6.7*  --  7.0*   < > 7.0*   < > 7.0*   < > 6.7*   MCV 87 87  --   --   --  85  --   --    < > 84  --  84   < > 82    221  --   --   --  141*   < >  --   --  138*  --  120*   < > 157     --   --   --   --  134  --   --   --  132*  --  133   < > 136   POTASSIUM 4.3  --   --   --   --  4.6  --   --   --  5.7*  --  5.4*   < > 4.4   CHLORIDE 98  --   --   --   --  101  --   --   --  98  --  100   < > 98   CO2 25  --   --   --   --  24  --   --   --  25  --  27   < > 26   BUN 36*  --   --   --   --  38*  --   --   --  68*  --  54*   < > 41*   CR 9.58*  --   --   --   --  9.83*  --   --   --  13.60*  --  11.40*   < > 9.64*   ANIONGAP 10  --   --   --   --  9  --   --   --  9  --  6   < > 12   ASHELY 8.9  --   --   --   --  8.5  --   --   --  8.4*  --  8.3*   < > 8.4*   *  --   --   --   --  90  --   --   --  98  --  105*   < > 94   ALBUMIN  --   --   --   --   --   --   --   --   --   --   --  2.7*  --  3.1*   PROTTOTAL  --   --   --   --   --   --   --   --   --   --   --  7.3  --  7.7   BILITOTAL  --   --   --   --   --   --   --   --   --   --   --  0.4  --  0.6   ALKPHOS  --   --   --   --   --   --   --   --   --   --   --  134  --  146   ALT  --   --   --   --   --   --   --   --   --   --   --  6  --  15   AST  --   --   --   --   --   --   --   --   --   --   --  10  --  15   TROPONIN  --   --  <0.015  --   --   --   --  <0.015  --  <0.015  --  <0.015   < > <0.015    < > = values in this interval not displayed.

## 2021-10-17 NOTE — PLAN OF CARE
VSS on RA except patient reports WARNER and some midsternal chest pain PRN oxygen for comfort.  Sats remain >95% on RA and ?> 98% on 2L.  BLEs edematous.   A/ox4.Patient gets sob with talking on phone. Patient consumed hamburger for dinner,. Up with assist to GRETCHEN. TENISHA urinal. LUE PIV s/l.  Occasional cough, but does not want anything for cough. Patient responded to the increase in oxycodone PRN to 15 mg. Given prn oxycodone and tylenol x2 for pain.  Patient declined   IV  Ceftriaxone antibiotics  stating he thought that his chest pain and s/s arose after receiving the antibiotic yesterday morning. Patient expressed fear with his WARNER and purse lipped breathing yesterday as he reports having witnessed his mother as she struggled to breath during her final hours on hospice in August.  Continue to monitor and follow POC. Notify provider of changes.

## 2021-10-17 NOTE — PLAN OF CARE
Pt A&O x4, VSS on RA. Independent. Chest pain and related SOB better managed today. Given oxycodone and tylenol x1. Lidocaine patch ordered to try tonight. Tolerating regular diet. Dialysis run tomorrow. No adverse symptoms noted after ceftriaxone run. Will continue to monitor.

## 2021-10-17 NOTE — PLAN OF CARE
"Pt A&O x4, VSS on RA. Pt stating having \"pain with breathing in\" and \"cant catch my breath\". 10/10 pain.  Seem by Gold team. EKG. Troponin, and chest xray taken. Pt went to dialysis. When pt came back breathing appeared to be gasping with mouth open. Tony paged again and VBGs with labs taken. Pt given 1x dose of IV morphine given with some relief and and improved breathing. Pt stated that breathing improved but pain with breathing never got below 8/10. Gold paged back and oxycodone dose increased. Will continue to monitor.    "

## 2021-10-17 NOTE — PROGRESS NOTES
Nephrology Progress Note  10/17/2021         Assessment & Recommendations:   #End-stage renal disease-hemodialysis is Tuesday Thursday Saturday at [facility] with Dr. Coleman.  Treatment time is 2.5 hours via an AV fistula.    - [facility] ([place]) phone [phone], Fax [phone]     #Hypervolemia -imaging shows pulmonary edema and had complication of hemoptysis.    - reduced TW to 65 kg. May be able to challenge further  - consider Vitamin E 400 mg daily to help with cramping  - UF run 10/18 for 2 kg off     # pericarditis - pain better after colchicine. Given presence of hemoptysis, primary team sent C3/C4, ANCA, antiGBM and KATHERINE which are pending. He is on  mg bid and FK 1 and 1.5 mg for transplant so is immunosuppressed and typically ANCA is more quiet in people on dialysis. No known history of rheumatologic disease.  - follow up serologic results    #Anemia of end-stage renal disease.  Hemoglobin levels run at approximately 7's on Epogen 8000 units pretreatment.    - received pRBC during hospitalization -   - will give epogen 10,000 units three times weekly on T/Th/S dialysis runs. No epo tomorrow as it is a UF run     #Renal hyperparathyroidism-his PTH Runs approximately 2000, he is on Hectorol 15 mcg 3 times weekly with dialysis.  His phosphorus is often elevated in the 7-8 range.  We will make no changes and and defer further management to his primary nephrology team.    # Community acquired pneumonia - receiving azithromycin and ceftriaxone    Discussed with primary team    Chanda Jackson MD  [facility]  Department of Medicine  Division of Renal Disease and Hypertension  [facility]  dang  PrePlay Web Console     Interval History :   Nursing and provider notes from last 24 hours reviewed.  He had dialysis yesterday, came off at 64.4 kg. Received one dose colchicine yesterday. Chest pain is better today. Has ongoing bilateral foot edema. Breathing  "OK.    Review of Systems:   I reviewed the following systems:  GI: ok appetite. no nausea or vomiting or diarrhea.   Neuro:  no confusion  Constitutional:  no fever or chills  CV: + pedal edema    Physical Exam:   I/O last 3 completed shifts:  In: 250 [P.O.:250]  Out: 3000 [Other:3000]   /75 (BP Location: Left arm)   Pulse 75   Temp 97.3  F (36.3  C) (Oral)   Resp 17   Ht 1.727 m (5' 8\")   Wt 64.4 kg (141 lb 15.6 oz)   SpO2 97%   BMI 21.59 kg/m       GENERAL APPEARANCE: no distress  EYES:  no scleral icterus, pupils equal  HENT: mouth without ulcers or lesions  PULM: normal work of breathing  CV: 2+ pedal edema  NEURO:  Speech fluent, face symmetric  Access fistula    Labs:   All labs reviewed by me  Electrolytes/Renal -   Recent Labs   Lab Test 10/17/21  0453 10/15/21  0720 10/14/21  0645 09/29/21  1449 07/06/21  0450 09/18/20  1157 09/10/20  0735    134 132*   < > 135   < > 135   POTASSIUM 4.3 4.6 5.7*   < > 3.5   < > 5.3   CHLORIDE 98 101 98   < > 102   < > 104   CO2 25 24 25   < > 22   < > 20   BUN 36* 38* 68*   < > 44*   < > 25   CR 9.58* 9.83* 13.60*   < > 9.48*   < > 6.35*   * 90 98   < > 84   < > 104*   ASHELY 8.9 8.5 8.4*   < > 8.0*   < > 8.7   MAG 1.8 1.8  --   --   --   --  1.5*   PHOS 6.9* 6.4*  --   --  4.5  --   --     < > = values in this interval not displayed.       CBC -   Recent Labs   Lab Test 10/17/21  0453 10/16/21  1610 10/15/21  1330 10/15/21  0720 10/15/21  0720   WBC 6.5 9.3  --   --  5.7   HGB 7.6* 8.3* 8.2*   < > 6.7*    221  --   --  141*    < > = values in this interval not displayed.       LFTs -   Recent Labs   Lab Test 10/13/21  0708 10/12/21  1435 09/29/21  1449   ALKPHOS 134 146 157*   BILITOTAL 0.4 0.6 0.4   ALT 6 15 14   AST 10 15 7   PROTTOTAL 7.3 7.7 7.7   ALBUMIN 2.7* 3.1* 3.2*       Iron Panel -   Recent Labs   Lab Test 06/20/20  1112 03/18/20  0723 03/18/20  0723 10/10/19  1650 10/10/19  1650   IRON 15*  --  54  --  42   IRONSAT 9*  --  30  --  " 23   ORALIA 310   < > 460*   < > 790*    < > = values in this interval not displayed.         Imaging:  All imaging studies reviewed by me.     Current Medications:    amLODIPine  10 mg Oral Daily     carvedilol  25 mg Oral BID w/meals     cefTRIAXone  2 g Intravenous Q24H     cefTRIAXone  2 g Intravenous Q24H     furosemide  20 mg Oral Daily     heparin ANTICOAGULANT  5,000 Units Subcutaneous Q12H     influenza quadrivalent (PF) vacc  0.5 mL Intramuscular Prior to discharge     mycophenolate  250 mg Oral BID     sodium chloride (PF)  3 mL Intracatheter Q8H     sulfamethoxazole-trimethoprim  1 tablet Oral Once per day on Mon Wed Fri     tacrolimus  1 mg Oral QAM     tacrolimus  1.5 mg Oral QPM       Chanda Jackson MD

## 2021-10-18 LAB
ANA SER QL IF: NEGATIVE
ANION GAP SERPL CALCULATED.3IONS-SCNC: 9 MMOL/L (ref 3–14)
ATRIAL RATE - MUSE: 73 BPM
ATRIAL RATE - MUSE: 75 BPM
BACTERIA BLD CULT: NO GROWTH
BACTERIA BLD CULT: NO GROWTH
BUN SERPL-MCNC: 48 MG/DL (ref 7–30)
C3 SERPL-MCNC: 150 MG/DL (ref 81–157)
C4 SERPL-MCNC: 44 MG/DL (ref 13–39)
CALCIUM SERPL-MCNC: 9.1 MG/DL (ref 8.5–10.1)
CHLORIDE BLD-SCNC: 96 MMOL/L (ref 94–109)
CO2 SERPL-SCNC: 24 MMOL/L (ref 20–32)
CREAT SERPL-MCNC: 12 MG/DL (ref 0.66–1.25)
CRP SERPL-MCNC: 200 MG/L (ref 0–8)
CRP SERPL-MCNC: 250 MG/L (ref 0–8)
DIASTOLIC BLOOD PRESSURE - MUSE: NORMAL MMHG
DIASTOLIC BLOOD PRESSURE - MUSE: NORMAL MMHG
ERYTHROCYTE [DISTWIDTH] IN BLOOD BY AUTOMATED COUNT: 18.7 % (ref 10–15)
GFR SERPL CREATININE-BSD FRML MDRD: 4 ML/MIN/1.73M2
GLUCOSE BLD-MCNC: 118 MG/DL (ref 70–99)
HCT VFR BLD AUTO: 25.1 % (ref 40–53)
HGB BLD-MCNC: 7.9 G/DL (ref 13.3–17.7)
INTERPRETATION ECG - MUSE: NORMAL
INTERPRETATION ECG - MUSE: NORMAL
MAGNESIUM SERPL-MCNC: 1.8 MG/DL (ref 1.6–2.3)
MCH RBC QN AUTO: 26 PG (ref 26.5–33)
MCHC RBC AUTO-ENTMCNC: 31.5 G/DL (ref 31.5–36.5)
MCV RBC AUTO: 83 FL (ref 78–100)
P AXIS - MUSE: 26 DEGREES
P AXIS - MUSE: 47 DEGREES
PHOSPHATE SERPL-MCNC: 8.4 MG/DL (ref 2.5–4.5)
PLATELET # BLD AUTO: 221 10E3/UL (ref 150–450)
POTASSIUM BLD-SCNC: 5.3 MMOL/L (ref 3.4–5.3)
PR INTERVAL - MUSE: 148 MS
PR INTERVAL - MUSE: 152 MS
QRS DURATION - MUSE: 80 MS
QRS DURATION - MUSE: 82 MS
QT - MUSE: 380 MS
QT - MUSE: 386 MS
QTC - MUSE: 424 MS
QTC - MUSE: 425 MS
R AXIS - MUSE: 29 DEGREES
R AXIS - MUSE: 80 DEGREES
RBC # BLD AUTO: 3.04 10E6/UL (ref 4.4–5.9)
SODIUM SERPL-SCNC: 129 MMOL/L (ref 133–144)
SYSTOLIC BLOOD PRESSURE - MUSE: NORMAL MMHG
SYSTOLIC BLOOD PRESSURE - MUSE: NORMAL MMHG
T AXIS - MUSE: 36 DEGREES
T AXIS - MUSE: 74 DEGREES
VENTRICULAR RATE- MUSE: 73 BPM
VENTRICULAR RATE- MUSE: 75 BPM
WBC # BLD AUTO: 7.7 10E3/UL (ref 4–11)

## 2021-10-18 PROCEDURE — 250N000013 HC RX MED GY IP 250 OP 250 PS 637: Performed by: PHYSICIAN ASSISTANT

## 2021-10-18 PROCEDURE — 120N000002 HC R&B MED SURG/OB UMMC

## 2021-10-18 PROCEDURE — 250N000011 HC RX IP 250 OP 636: Performed by: PHYSICIAN ASSISTANT

## 2021-10-18 PROCEDURE — 84100 ASSAY OF PHOSPHORUS: CPT | Performed by: PHYSICIAN ASSISTANT

## 2021-10-18 PROCEDURE — 86140 C-REACTIVE PROTEIN: CPT | Performed by: PHYSICIAN ASSISTANT

## 2021-10-18 PROCEDURE — 90935 HEMODIALYSIS ONE EVALUATION: CPT | Performed by: INTERNAL MEDICINE

## 2021-10-18 PROCEDURE — 82374 ASSAY BLOOD CARBON DIOXIDE: CPT | Performed by: PHYSICIAN ASSISTANT

## 2021-10-18 PROCEDURE — 83735 ASSAY OF MAGNESIUM: CPT | Performed by: PHYSICIAN ASSISTANT

## 2021-10-18 PROCEDURE — 258N000003 HC RX IP 258 OP 636: Performed by: INTERNAL MEDICINE

## 2021-10-18 PROCEDURE — 99207 PR APP CREDIT; MD BILLING SHARED VISIT: CPT | Performed by: PHYSICIAN ASSISTANT

## 2021-10-18 PROCEDURE — 85014 HEMATOCRIT: CPT | Performed by: PHYSICIAN ASSISTANT

## 2021-10-18 PROCEDURE — 250N000012 HC RX MED GY IP 250 OP 636 PS 637: Performed by: PHYSICIAN ASSISTANT

## 2021-10-18 PROCEDURE — 90937 HEMODIALYSIS REPEATED EVAL: CPT

## 2021-10-18 PROCEDURE — 99233 SBSQ HOSP IP/OBS HIGH 50: CPT | Performed by: STUDENT IN AN ORGANIZED HEALTH CARE EDUCATION/TRAINING PROGRAM

## 2021-10-18 PROCEDURE — 36415 COLL VENOUS BLD VENIPUNCTURE: CPT | Performed by: PHYSICIAN ASSISTANT

## 2021-10-18 RX ORDER — VITAMIN E 268 MG
400 CAPSULE ORAL DAILY
Status: DISCONTINUED | OUTPATIENT
Start: 2021-10-18 | End: 2021-10-20 | Stop reason: HOSPADM

## 2021-10-18 RX ADMIN — HEPARIN SODIUM 5000 UNITS: 10000 INJECTION, SOLUTION INTRAVENOUS; SUBCUTANEOUS at 20:35

## 2021-10-18 RX ADMIN — Medication: at 08:43

## 2021-10-18 RX ADMIN — MYCOPHENOLATE MOFETIL 250 MG: 250 CAPSULE ORAL at 12:06

## 2021-10-18 RX ADMIN — MYCOPHENOLATE MOFETIL 250 MG: 250 CAPSULE ORAL at 20:35

## 2021-10-18 RX ADMIN — OXYCODONE HYDROCHLORIDE 15 MG: 5 TABLET ORAL at 12:03

## 2021-10-18 RX ADMIN — SODIUM CHLORIDE 300 ML: 9 INJECTION, SOLUTION INTRAVENOUS at 08:43

## 2021-10-18 RX ADMIN — CARVEDILOL 25 MG: 25 TABLET, FILM COATED ORAL at 17:41

## 2021-10-18 RX ADMIN — ACETAMINOPHEN 650 MG: 325 TABLET, FILM COATED ORAL at 20:35

## 2021-10-18 RX ADMIN — ACETAMINOPHEN 650 MG: 325 TABLET, FILM COATED ORAL at 05:04

## 2021-10-18 RX ADMIN — SODIUM CHLORIDE 250 ML: 9 INJECTION, SOLUTION INTRAVENOUS at 08:43

## 2021-10-18 RX ADMIN — Medication 400 UNITS: at 17:41

## 2021-10-18 RX ADMIN — OXYCODONE HYDROCHLORIDE 15 MG: 5 TABLET ORAL at 20:35

## 2021-10-18 RX ADMIN — AMLODIPINE BESYLATE 10 MG: 10 TABLET ORAL at 12:03

## 2021-10-18 RX ADMIN — CARVEDILOL 25 MG: 25 TABLET, FILM COATED ORAL at 12:07

## 2021-10-18 RX ADMIN — CEFTRIAXONE SODIUM 2 G: 2 INJECTION, POWDER, FOR SOLUTION INTRAMUSCULAR; INTRAVENOUS at 12:38

## 2021-10-18 RX ADMIN — OXYCODONE HYDROCHLORIDE 15 MG: 5 TABLET ORAL at 05:05

## 2021-10-18 RX ADMIN — SULFAMETHOXAZOLE AND TRIMETHOPRIM 1 TABLET: 400; 80 TABLET ORAL at 12:10

## 2021-10-18 RX ADMIN — TACROLIMUS 1 MG: 1 CAPSULE ORAL at 12:06

## 2021-10-18 RX ADMIN — TACROLIMUS 1.5 MG: 1 CAPSULE ORAL at 17:41

## 2021-10-18 RX ADMIN — HEPARIN SODIUM 5000 UNITS: 10000 INJECTION, SOLUTION INTRAVENOUS; SUBCUTANEOUS at 12:07

## 2021-10-18 RX ADMIN — FUROSEMIDE 20 MG: 20 TABLET ORAL at 12:06

## 2021-10-18 ASSESSMENT — ACTIVITIES OF DAILY LIVING (ADL)
ADLS_ACUITY_SCORE: 5

## 2021-10-18 ASSESSMENT — MIFFLIN-ST. JEOR
SCORE: 1529.5
SCORE: 1509.5
SCORE: 1529.5
SCORE: 1521.5

## 2021-10-18 NOTE — PLAN OF CARE
A:  patient still having pain in chest with deep breathing.  Lidocaine patch applied and teaching completed on patch.  States understanding.  No extra pain med requested 7-11.  Patient up in room independently.  Plan for dialysis in morning.  R:  continue to monitor and treat per plan of care.

## 2021-10-18 NOTE — PLAN OF CARE
"/67   Pulse 72   Temp 98.2  F (36.8  C) (Oral)   Resp 18   Ht 1.727 m (5' 8\")   Wt 65 kg (143 lb 4.8 oz)   SpO2 99%   BMI 21.79 kg/m      Care from: [id number]      VS & Pain: VSS, on room air, prn Oxy x1 was given for chest pain- MD aware    Neuro: A&O x4    Respiratory: dyspnea on exertion, infrequent productive cough    Cardiac: murmur detected    Peripheral neurovascular: 2+ dependent and BLE edema    GI/: pt is on HD, no BM this shift    Nutrition: good appetite and oral intake    Skin: WDL    Musculoskeletal: WDL    Lines: L PIV is saline locked, R fistula is CDI    Activity: Up independently    Events this shift: HD was done. Call light within reach.     Plan: Continue to follow poc. Plan to discontinue IV abx tomorrow, last dose was today.  "

## 2021-10-18 NOTE — PLAN OF CARE
VSS on RA.  Patient requested PRN pain medication x2 and received oxycodone 15 mg and tylenol 650 mg. Patient  reports his pain continues to be pleuritic chest pain as well as radiating to left shoulder.  Paged provider. DAYNE PIV patent/saline locked.  Appetite fair, patient reports less than it was the day before.  Patient concerned about rheumatology consult. Encouraged him to clarify with team.  Plan: Continue to monitor and follow POC. Notify provider of changes. Anticipate dialysis today.

## 2021-10-18 NOTE — PROVIDER NOTIFICATION
Paged Tony 6 night cross cover re:  Unresolved pain.  Patient requesting break through pain medication. Oxycodone/tylenol not available until 0520. Patient has had effective pain control with 1x dose of morphine on 10-16 for similar pain.

## 2021-10-18 NOTE — PROGRESS NOTES
[facility]    Medicine Progress Note - Hospitalist Service, Gold 6       Date of Admission:  10/12/2021    Assessment & Plan         Rashad Ortiz is a 46 year old male with a history of ESRD s/p failed LDKT (2011) 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     Moderate posterior pericardial effusion  Pleuritic chest pain  Pulmonary edema  Hemoptysis, resolved  Possible CAP  Presented with new onset pleuritic mid-sternal chest pain after HD run, 7-8 small hemoptysis episodes. BNP >25k,  and now increased to 250, . Initial concern for CAP though he has not improved on antibiotics and infectious work-up has been negative. His constellation of effusion, elevated inflammatory markers refractory to antibiotics, hemoptysis, polyarthralgias, and history of renal disease are concerning for underlying rheumatologic process. He had plans for outpatient Rheumatology consult due to 3 month history of polyarthralgias. Other ddx includes hypervolemia, pericarditis.    - Complete 7-day course of IV ceftriaxone today, already finished course of azithromycin   - Discussed with Rheumatology fellow today, no need for formal inpatient consult at this time   - Labs pending: ANCA, GBM IgG   - Negative infectious work-up to date: Blood cultures NGTD, Covid, hep B surface Ag, Covid PCR, S. pneumo and Legionella urine Ag, respiratory viral panel, Quant-TB gold, EBV   - Pain: Tylenol, oxycodone    Acute on chronic anemia: Presented with hgb 6.7, baseline ~7-8. Acute drop likely related to hemoptysis. Received 1 unit PRBCs and hgb since stable.   - Monitor CBC    ESRD s/p failed LDKT on HD: Runs TThSat via AVF. EDW ~66 kg. Prior renal disease felt to be related to  hypertension.    - Nephrology following   - HD run today   - Continue PTA tacrolimus 1 / 1.5,  mg BID   - Bactrim prophylaxis   - Lasix 20 mg daily   - I/Os   - Daily weights   - BMP daily    Other Stable Medical Issues:  Renal hyperparathyroidism: On Hectorol three times weekly with HD.   Chronic normocytic anemia: Of ESRD. Receives Epogen with HD.   Hypertension: Blood pressure stable. Continue PTA amlodipine, carvedilol.   Hypodense thyroid nodule: Incidental finding. TSH normal. Will need follow-up ultrasound as an outpatient    Diet: Combination Diet Regular Diet Adult    DVT Prophylaxis: Heparin SQ  Hunter Catheter: Not present  Central Lines: None  Code Status: Full Code      Disposition Plan   Expected discharge: 10/19/2021   recommended to prior living arrangement pending Rheumatologic work-up.      The patient's care was discussed with the Attending Physician, Dr. Brice and Patient.    Jessy Marquez PA-C  Hospitalist Service, [address]  Securely message with the Vocera Web Console (learn more here)  Text page via [facility] Paging/Directory  Please see sign in/sign out for up to date coverage information    Clinically Significant Risk Factors Present on Admission                ______________________________________________________________________    Interval History   Having ongoing pleuritic chest pain. Notes a 3-month history of joint pain / stiffness in his hands, wrists, knees, occasionally his shoulders as well. He has some associated weakness with this. No fevers. Eating and drinking well.     Data reviewed today: I reviewed all medications, new labs and imaging results over the last 24 hours.    Physical Exam   Vital Signs: Temp: 98.2  F (36.8  C) Temp src: Oral BP: (!) 140/82 Pulse: 75   Resp: 18 SpO2: 99 % O2 Device: None (Room air) Oxygen Delivery: 1.5 LPM  Weight: 143 lbs 4.78 oz  Constitutional: Awake and alert, in no apparent  distress. Sitting up comfortably in bed.   Eyes: Sclera clear, anicteric   Respiratory: Breathing non-labored. CTAB. Good air entry.   Cardiovascular:  RRR, normal S1/S2. No rubs or murmurs. Intact bilateral pedal pulses. No peripheral edema.   GI: Soft, non-tender, non-distended.  Normoactive bowel sounds.   MSK: No joint pain, redness, or swelling.   Skin:  Good color. No jaundice. No visible rashes, lesions, or bruising of concern.   Neurologic: Alert and fully oriented.       Data   ROUTINE IP LABS (Last four results)  Recent Labs   Lab 10/18/21  0739 10/17/21  0453 10/15/21  0720 10/14/21  0645 10/13/21  0708 10/13/21  0708 10/12/21  1435 10/12/21  1435   * 133 134 132*   < > 133   < > 136   POTASSIUM 5.3 4.3 4.6 5.7*   < > 5.4*   < > 4.4   CHLORIDE 96 98 101 98   < > 100   < > 98   CO2 24 25 24 25   < > 27   < > 26   ANIONGAP 9 10 9 9   < > 6   < > 12   * 111* 90 98   < > 105*   < > 94   BUN 48* 36* 38* 68*   < > 54*   < > 41*   CR 12.00* 9.58* 9.83* 13.60*   < > 11.40*   < > 9.64*   ASHELY 9.1 8.9 8.5 8.4*   < > 8.3*   < > 8.4*   MAG 1.8 1.8 1.8  --   --   --   --   --    PHOS 8.4* 6.9* 6.4*  --   --   --   --   --    PROTTOTAL  --   --   --   --   --  7.3  --  7.7   ALBUMIN  --   --   --   --   --  2.7*  --  3.1*   BILITOTAL  --   --   --   --   --  0.4  --  0.6   ALKPHOS  --   --   --   --   --  134  --  146   AST  --   --   --   --   --  10  --  15   ALT  --   --   --   --   --  6  --  15    < > = values in this interval not displayed.     Recent Labs   Lab 10/18/21  0739 10/17/21  0453 10/16/21  1610 10/15/21  1330 10/15/21  0720 10/15/21  0720   WBC 7.7 6.5 9.3  --   --  5.7   RBC 3.04* 2.93* 3.21*  --   --  2.54*   HGB 7.9* 7.6* 8.3* 8.2*   < > 6.7*   HCT 25.1* 25.5* 27.8*  --   --  21.5*   MCV 83 87 87  --   --  85   MCH 26.0* 25.9* 25.9*  --   --  26.4*   MCHC 31.5 29.8* 29.9*  --   --  31.2*   RDW 18.7* 19.0* 19.3*  --   --  19.7*    222 221  --   --  141*    < > = values in this  interval not displayed.     No lab results found in last 7 days.

## 2021-10-18 NOTE — PROGRESS NOTES
HEMODIALYSIS TREATMENT NOTE    Date: 10/18/2021  Time: 10:44 AM    Data:  Pre Wt: 67 kg (147 lb 11.3 oz)   Desired Wt: 65 kg   Post Wt: 65 kg (143 lb 4.8 oz)  Weight change: 2 kg  Ultrafiltration - Post Run Net Total Removed (mL): 2000 mL  Vascular Access Status: Fistula  patent  Dialyzer Rinse: Streaked, Light  Total Blood Volume Processed: 0 L   Total Dialysis (Treatment) Time: 2   Dialysate Bath: Uf only  Heparin: None    Lab:   No    Interventions/Assessment: pt vitally stable through out for a 2hr UF only treatment. Pt denies pain/SOB. Pt does have edema in lower extremities. Treatment unremarkable. Handoff given to floor rn.       Plan:    Per renal team

## 2021-10-19 ENCOUNTER — APPOINTMENT (OUTPATIENT)
Dept: CARDIOLOGY | Facility: CLINIC | Age: 46
DRG: 314 | End: 2021-10-19
Attending: PHYSICIAN ASSISTANT
Payer: COMMERCIAL

## 2021-10-19 LAB
ANCA AB PATTERN SER IF-IMP: ABNORMAL
ANION GAP SERPL CALCULATED.3IONS-SCNC: 12 MMOL/L (ref 3–14)
BUN SERPL-MCNC: 58 MG/DL (ref 7–30)
C-ANCA TITR SER IF: ABNORMAL {TITER}
CALCIUM SERPL-MCNC: 9.5 MG/DL (ref 8.5–10.1)
CHLORIDE BLD-SCNC: 94 MMOL/L (ref 94–109)
CO2 SERPL-SCNC: 24 MMOL/L (ref 20–32)
CREAT SERPL-MCNC: 13.5 MG/DL (ref 0.66–1.25)
CRP SERPL-MCNC: 260 MG/L (ref 0–8)
ERYTHROCYTE [DISTWIDTH] IN BLOOD BY AUTOMATED COUNT: 18.6 % (ref 10–15)
GFR SERPL CREATININE-BSD FRML MDRD: 4 ML/MIN/1.73M2
GLUCOSE BLD-MCNC: 104 MG/DL (ref 70–99)
HCT VFR BLD AUTO: 24.1 % (ref 40–53)
HGB BLD-MCNC: 7.5 G/DL (ref 13.3–17.7)
HIV 1+2 AB+HIV1 P24 AG SERPL QL IA: NONREACTIVE
LVEF ECHO: NORMAL
MCH RBC QN AUTO: 26.1 PG (ref 26.5–33)
MCHC RBC AUTO-ENTMCNC: 31.1 G/DL (ref 31.5–36.5)
MCV RBC AUTO: 84 FL (ref 78–100)
PHOSPHATE SERPL-MCNC: 8.9 MG/DL (ref 2.5–4.5)
PLATELET # BLD AUTO: 244 10E3/UL (ref 150–450)
POTASSIUM BLD-SCNC: 5.5 MMOL/L (ref 3.4–5.3)
RBC # BLD AUTO: 2.87 10E6/UL (ref 4.4–5.9)
SARS-COV-2 RNA RESP QL NAA+PROBE: NEGATIVE
SODIUM SERPL-SCNC: 130 MMOL/L (ref 133–144)
WBC # BLD AUTO: 7.4 10E3/UL (ref 4–11)

## 2021-10-19 PROCEDURE — 90937 HEMODIALYSIS REPEATED EVAL: CPT

## 2021-10-19 PROCEDURE — 93325 DOPPLER ECHO COLOR FLOW MAPG: CPT

## 2021-10-19 PROCEDURE — 36415 COLL VENOUS BLD VENIPUNCTURE: CPT | Performed by: INTERNAL MEDICINE

## 2021-10-19 PROCEDURE — 86803 HEPATITIS C AB TEST: CPT | Performed by: INTERNAL MEDICINE

## 2021-10-19 PROCEDURE — 80048 BASIC METABOLIC PNL TOTAL CA: CPT | Performed by: PHYSICIAN ASSISTANT

## 2021-10-19 PROCEDURE — 99233 SBSQ HOSP IP/OBS HIGH 50: CPT | Performed by: PHYSICIAN ASSISTANT

## 2021-10-19 PROCEDURE — 250N000011 HC RX IP 250 OP 636: Performed by: PHYSICIAN ASSISTANT

## 2021-10-19 PROCEDURE — 258N000003 HC RX IP 258 OP 636: Performed by: PEDIATRICS

## 2021-10-19 PROCEDURE — 93325 DOPPLER ECHO COLOR FLOW MAPG: CPT | Mod: 26 | Performed by: INTERNAL MEDICINE

## 2021-10-19 PROCEDURE — 85027 COMPLETE CBC AUTOMATED: CPT | Performed by: PHYSICIAN ASSISTANT

## 2021-10-19 PROCEDURE — 93321 DOPPLER ECHO F-UP/LMTD STD: CPT | Mod: 26 | Performed by: INTERNAL MEDICINE

## 2021-10-19 PROCEDURE — 36415 COLL VENOUS BLD VENIPUNCTURE: CPT | Performed by: PHYSICIAN ASSISTANT

## 2021-10-19 PROCEDURE — 250N000012 HC RX MED GY IP 250 OP 636 PS 637: Performed by: PHYSICIAN ASSISTANT

## 2021-10-19 PROCEDURE — 120N000002 HC R&B MED SURG/OB UMMC

## 2021-10-19 PROCEDURE — 99233 SBSQ HOSP IP/OBS HIGH 50: CPT | Performed by: PEDIATRICS

## 2021-10-19 PROCEDURE — 99222 1ST HOSP IP/OBS MODERATE 55: CPT | Mod: GC | Performed by: INTERNAL MEDICINE

## 2021-10-19 PROCEDURE — 250N000013 HC RX MED GY IP 250 OP 250 PS 637: Performed by: PHYSICIAN ASSISTANT

## 2021-10-19 PROCEDURE — 83516 IMMUNOASSAY NONANTIBODY: CPT | Performed by: INTERNAL MEDICINE

## 2021-10-19 PROCEDURE — 99207 PR APP CREDIT; MD BILLING SHARED VISIT: CPT | Performed by: PHYSICIAN ASSISTANT

## 2021-10-19 PROCEDURE — 86140 C-REACTIVE PROTEIN: CPT | Performed by: PHYSICIAN ASSISTANT

## 2021-10-19 PROCEDURE — 634N000001 HC RX 634: Performed by: PEDIATRICS

## 2021-10-19 PROCEDURE — 250N000011 HC RX IP 250 OP 636: Performed by: PEDIATRICS

## 2021-10-19 PROCEDURE — U0003 INFECTIOUS AGENT DETECTION BY NUCLEIC ACID (DNA OR RNA); SEVERE ACUTE RESPIRATORY SYNDROME CORONAVIRUS 2 (SARS-COV-2) (CORONAVIRUS DISEASE [COVID-19]), AMPLIFIED PROBE TECHNIQUE, MAKING USE OF HIGH THROUGHPUT TECHNOLOGIES AS DESCRIBED BY CMS-2020-01-R: HCPCS | Performed by: PHYSICIAN ASSISTANT

## 2021-10-19 PROCEDURE — 84100 ASSAY OF PHOSPHORUS: CPT | Performed by: PHYSICIAN ASSISTANT

## 2021-10-19 PROCEDURE — 93308 TTE F-UP OR LMTD: CPT | Mod: 26 | Performed by: INTERNAL MEDICINE

## 2021-10-19 RX ORDER — DOXERCALCIFEROL 4 UG/2ML
15 INJECTION INTRAVENOUS
Status: COMPLETED | OUTPATIENT
Start: 2021-10-19 | End: 2021-10-19

## 2021-10-19 RX ORDER — PREDNISONE 20 MG/1
40 TABLET ORAL ONCE
Status: COMPLETED | OUTPATIENT
Start: 2021-10-19 | End: 2021-10-19

## 2021-10-19 RX ADMIN — ACETAMINOPHEN 650 MG: 325 TABLET, FILM COATED ORAL at 12:07

## 2021-10-19 RX ADMIN — EPOETIN ALFA-EPBX 10000 UNITS: 10000 INJECTION, SOLUTION INTRAVENOUS; SUBCUTANEOUS at 13:34

## 2021-10-19 RX ADMIN — TACROLIMUS 1 MG: 1 CAPSULE ORAL at 09:21

## 2021-10-19 RX ADMIN — OXYCODONE HYDROCHLORIDE 15 MG: 5 TABLET ORAL at 02:33

## 2021-10-19 RX ADMIN — SODIUM CHLORIDE 250 ML: 9 INJECTION, SOLUTION INTRAVENOUS at 13:34

## 2021-10-19 RX ADMIN — AMLODIPINE BESYLATE 10 MG: 10 TABLET ORAL at 09:21

## 2021-10-19 RX ADMIN — HEPARIN SODIUM 5000 UNITS: 10000 INJECTION, SOLUTION INTRAVENOUS; SUBCUTANEOUS at 19:52

## 2021-10-19 RX ADMIN — OXYCODONE HYDROCHLORIDE 15 MG: 5 TABLET ORAL at 12:07

## 2021-10-19 RX ADMIN — OXYCODONE HYDROCHLORIDE 15 MG: 5 TABLET ORAL at 19:52

## 2021-10-19 RX ADMIN — Medication 400 UNITS: at 09:20

## 2021-10-19 RX ADMIN — HEPARIN SODIUM 5000 UNITS: 10000 INJECTION, SOLUTION INTRAVENOUS; SUBCUTANEOUS at 09:21

## 2021-10-19 RX ADMIN — TACROLIMUS 1.5 MG: 1 CAPSULE ORAL at 18:05

## 2021-10-19 RX ADMIN — CARVEDILOL 25 MG: 25 TABLET, FILM COATED ORAL at 18:05

## 2021-10-19 RX ADMIN — SODIUM CHLORIDE 300 ML: 9 INJECTION, SOLUTION INTRAVENOUS at 13:34

## 2021-10-19 RX ADMIN — MYCOPHENOLATE MOFETIL 250 MG: 250 CAPSULE ORAL at 09:19

## 2021-10-19 RX ADMIN — ACETAMINOPHEN 650 MG: 325 TABLET, FILM COATED ORAL at 02:33

## 2021-10-19 RX ADMIN — PREDNISONE 40 MG: 20 TABLET ORAL at 18:05

## 2021-10-19 RX ADMIN — DOXERCALCIFEROL 15 MCG: 2 INJECTION, SOLUTION INTRAVENOUS at 13:34

## 2021-10-19 RX ADMIN — MYCOPHENOLATE MOFETIL 250 MG: 250 CAPSULE ORAL at 19:52

## 2021-10-19 RX ADMIN — CARVEDILOL 25 MG: 25 TABLET, FILM COATED ORAL at 09:21

## 2021-10-19 RX ADMIN — ACETAMINOPHEN 650 MG: 325 TABLET, FILM COATED ORAL at 19:52

## 2021-10-19 RX ADMIN — FUROSEMIDE 20 MG: 20 TABLET ORAL at 09:20

## 2021-10-19 ASSESSMENT — ACTIVITIES OF DAILY LIVING (ADL)
ADLS_ACUITY_SCORE: 5

## 2021-10-19 ASSESSMENT — MIFFLIN-ST. JEOR: SCORE: 1522.21

## 2021-10-19 NOTE — PROGRESS NOTES
Nephrology Progress Note  10/19/2021         Assessment & Recommendations:   #End-stage renal disease-hemodialysis is  at [facility] with Dr. Coleman.  Treatment time is 2.5 hours via an AV fistula.    - [facility] phone [phone], Fax [phone]  - Dialysis per TTS schedule with extra UF runs as needed     #Hypervolemia -imaging shows pulmonary edema and had complication of hemoptysis. Pt hasn't been eating much since his mom  in August so likely his EDW just needed lowering. Minimal UOP. Chronic lymphedema in L lower ext ever since kidney tx (likely lymphedema)  - reduced TW to 64 kg. May be able to challenge further  - consider Vitamin E 400 mg daily to help with cramping        # pericarditis - pain improving (though still present) after colchicine. Given presence of hemoptysis, primary team sent C3/C4, ANCA, antiGBM and KATHERINE which are pending. He is on  mg bid and FK 1 and 1.5 mg for transplant so is immunosuppressed and typically ANCA is more quiet in people on dialysis. No known history of rheumatologic disease.  - rheum is following  - potentially uremic pericarditis given recently shortened run times, though kt/V was ok     #Anemia of end-stage renal disease.  Hemoglobin levels run at approximately 7's on Epogen 8000 units pretreatment.    - received pRBC during hospitalization -   - will give epogen 10,000 units three times weekly on //S dialysis runs.       #Renal hyperparathyroidism-his PTH Runs approximately 2000, he is on Hectorol 15 mcg 3 times weekly with dialysis.  His phosphorus is often elevated in the 7-8 range.  We will make no changes and and defer further management to his primary nephrology team.    # Community acquired pneumonia - received azithromycin and ceftriaxone    Recommendations communicated via this note    Mamie Taylor PA-C  P [phone]    Interval History :   Seen on dialysis, continuing to attempt UF. Talked with  "patient about potentially going back to longer run times to better enable UF. Pt has been depressed and not eating much since his mom  in August so likely EDW needed to be reduced.     Review of Systems:   I reviewed the following systems:  GI: ok appetite. no nausea or vomiting or diarrhea.   Neuro:  no confusion  Constitutional:  no fever or chills  CV: + pedal edema    Physical Exam:   I/O last 3 completed shifts:  In: 200 [P.O.:200]  Out:  [Other:]   /79   Pulse 73   Temp 98  F (36.7  C) (Oral)   Resp 16   Ht 1.727 m (5' 8\")   Wt 66.3 kg (146 lb 1.6 oz)   SpO2 95%   BMI 22.21 kg/m       GENERAL APPEARANCE: no distress  EYES:  no scleral icterus, pupils equal  HENT: mouth without ulcers or lesions  PULM: normal work of breathing  CV: 1-2+ pedal edema  NEURO:  Speech fluent, face symmetric  Access fistula    Labs:   All labs reviewed by me  Electrolytes/Renal -   Recent Labs   Lab Test 10/19/21  0445 10/18/21  0739 10/17/21  0453 10/15/21  0720 10/15/21  0720   * 129* 133   < > 134   POTASSIUM 5.5* 5.3 4.3   < > 4.6   CHLORIDE 94 96 98   < > 101   CO2 24 24 25   < > 24   BUN 58* 48* 36*   < > 38*   CR 13.50* 12.00* 9.58*   < > 9.83*   * 118* 111*   < > 90   ASHELY 9.5 9.1 8.9   < > 8.5   MAG  --  1.8 1.8  --  1.8   PHOS 8.9* 8.4* 6.9*   < > 6.4*    < > = values in this interval not displayed.       CBC -   Recent Labs   Lab Test 10/19/21  0445 10/18/21  0739 10/17/21  0453   WBC 7.4 7.7 6.5   HGB 7.5* 7.9* 7.6*    221 222       LFTs -   Recent Labs   Lab Test 10/13/21  0708 10/12/21  1435 21  1449   ALKPHOS 134 146 157*   BILITOTAL 0.4 0.6 0.4   ALT 6 15 14   AST 10 15 7   PROTTOTAL 7.3 7.7 7.7   ALBUMIN 2.7* 3.1* 3.2*       Iron Panel -   Recent Labs   Lab Test 20  1112 20  0723 20  0723 10/10/19  1650 10/10/19  1650   IRON 15*  --  54  --  42   IRONSAT 9*  --  30  --  23   ORALIA 310   < > 460*   < > 790*    < > = values in this interval not " displayed.         Imaging:  All imaging studies reviewed by me.     Current Medications:    sodium chloride 0.9%  250 mL Intravenous Once in dialysis/CRRT     sodium chloride 0.9%  300 mL Hemodialysis Machine Once     amLODIPine  10 mg Oral Daily     carvedilol  25 mg Oral BID w/meals     doxercalciferol  15 mcg Intravenous Once in dialysis/CRRT     epoetin cynthia-epbx (RETACRIT) inj ESRD  10,000 Units Intravenous Once in dialysis/CRRT     furosemide  20 mg Oral Daily     heparin ANTICOAGULANT  5,000 Units Subcutaneous Q12H     influenza quadrivalent (PF) vacc  0.5 mL Intramuscular Prior to discharge     lidocaine  1 patch Transdermal Q24h    And     lidocaine   Transdermal Q8H     mycophenolate  250 mg Oral BID     - MEDICATION INSTRUCTIONS -   Does not apply Once     sodium chloride (PF)  3 mL Intracatheter Q8H     sulfamethoxazole-trimethoprim  1 tablet Oral Once per day on Mon Wed Fri     tacrolimus  1 mg Oral QAM     tacrolimus  1.5 mg Oral QPM     vitamin E  400 Units Oral Daily       - MEDICATION INSTRUCTIONS -       THEO Salas

## 2021-10-19 NOTE — PROGRESS NOTES
[facility]    Medicine Progress Note - Hospitalist Service, Gold 6       Date of Admission:  10/12/2021    Assessment & Plan         Rashad Ortiz is a 46 year old male with a history of ESRD s/p failed LDKT (2011) 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     Moderate posterior pericardial effusion  Pleuritic chest pain  Pulmonary edema  Hemoptysis, resolved  Possible CAP  Presented with new onset pleuritic mid-sternal chest pain after HD run, 7-8 small hemoptysis episodes, 3 months of polyarthralgias. BNP >25k,  and now increased to 260, . Initial concern for CAP though he has not improved on antibiotics, infectious work-up has been negative. His constellation of pericardial effusion, elevated inflammatory markers refractory to antibiotics, hemoptysis, polyarthralgias, and history of renal disease are concerning for underlying rheumatologic process. However, his hemoptysis can also be attributed to pulmonary edema and his pericardial effusion may be post-viral. Repeat TTE today with small-mod effusion.    - Rheumatology consult   - Negative infectious work-up to date: Blood cultures NGTD, Covid, hep B surface Ag, Covid PCR, S. pneumo and Legionella urine Ag, respiratory viral panel, Quant-TB gold, EBV   - Pain: Tylenol, oxycodone    Acute on chronic anemia: Presented with hgb 6.7, baseline ~7-8. Acute drop likely related to hemoptysis. Received 1 unit PRBCs and hgb since stable.   - Monitor CBC    ESRD s/p failed LDKT on HD: Runs TThSat via AVF. EDW ~66 kg. Prior renal disease felt to be related to hypertension.    - Nephrology following   - HD qTTS   - Continue PTA tacrolimus 1 / 1.5,  mg BID   - Bactrim prophylaxis   - Lasix 20 mg  daily   - I/Os   - Daily weights   - BMP daily     Other Stable Medical Issues:  Renal hyperparathyroidism: On Hectorol three times weekly with HD.   Chronic normocytic anemia: Of ESRD. Receives Epogen with HD.   Hypertension: Blood pressure stable. Continue PTA amlodipine, carvedilol.   Hypodense thyroid nodule: Incidental finding. TSH normal. Will need follow-up ultrasound as an outpatient       Diet: Combination Diet Regular Diet Adult    DVT Prophylaxis: Heparin SQ  Hunter Catheter: Not present  Central Lines: None  Code Status: Full Code      Disposition Plan   Expected discharge: 10/19/2021   recommended to prior living arrangement once pending further work-up, Rheumatology recommendations.     The patient's care was discussed with the Attending Physician, Dr. Sung, Bedside Nurse and Patient.    Jessy Marquez PA-C  Hospitalist Service, [address]  Securely message with the Vocera Web Console (learn more here)  Text page via [facility] Paging/Directory  Please see sign in/sign out for up to date coverage information    Clinically Significant Risk Factors Present on Admission                ______________________________________________________________________    Interval History   Has ongoing pleuritic chest pain, unchanged compared to yesterday. Chest pain also occurs with movement. No shortness of breath, palpitations. Ankle swelling at baseline. No fevers.       Data reviewed today: I reviewed all medications, new labs and imaging results over the last 24 hours.     Physical Exam   Vital Signs: Temp: 97.6  F (36.4  C) Temp src: Oral BP: 120/71 Pulse: 71   Resp: 18 SpO2: 98 % O2 Device: None (Room air)    Weight: 146 lbs 1.6 oz  Constitutional: Awake and alert, in no apparent distress. Sitting up comfortably in bed.   Eyes: Sclera clear, anicteric    Respiratory: Breathing non-labored. CTAB.   Cardiovascular: Systolic murmur, pericardial rub. Intact  bilateral pedal pulses. 2+ pitting edema of bilateral ankles.   GI: Soft, non-tender, non-distended. Normoactive bowel sounds.   Skin:  Good color. No jaundice. No visible rashes, lesions, or bruising of concern.   Neurologic: Alert and fully oriented.     Data   ROUTINE IP LABS (Last four results)  Recent Labs   Lab 10/19/21  0445 10/18/21  0739 10/17/21  0453 10/15/21  0720 10/14/21  0645 10/13/21  0708 10/12/21  1435 10/12/21  1435   * 129* 133 134   < > 133   < > 136   POTASSIUM 5.5* 5.3 4.3 4.6   < > 5.4*   < > 4.4   CHLORIDE 94 96 98 101   < > 100   < > 98   CO2 24 24 25 24   < > 27   < > 26   ANIONGAP 12 9 10 9   < > 6   < > 12   * 118* 111* 90   < > 105*   < > 94   BUN 58* 48* 36* 38*   < > 54*   < > 41*   CR 13.50* 12.00* 9.58* 9.83*   < > 11.40*   < > 9.64*   ASHELY 9.5 9.1 8.9 8.5   < > 8.3*   < > 8.4*   MAG  --  1.8 1.8 1.8  --   --   --   --    PHOS 8.9* 8.4* 6.9* 6.4*  --   --   --   --    PROTTOTAL  --   --   --   --   --  7.3  --  7.7   ALBUMIN  --   --   --   --   --  2.7*  --  3.1*   BILITOTAL  --   --   --   --   --  0.4  --  0.6   ALKPHOS  --   --   --   --   --  134  --  146   AST  --   --   --   --   --  10  --  15   ALT  --   --   --   --   --  6  --  15    < > = values in this interval not displayed.     Recent Labs   Lab 10/19/21  0445 10/18/21  0739 10/17/21  0453 10/16/21  1610   WBC 7.4 7.7 6.5 9.3   RBC 2.87* 3.04* 2.93* 3.21*   HGB 7.5* 7.9* 7.6* 8.3*   HCT 24.1* 25.1* 25.5* 27.8*   MCV 84 83 87 87   MCH 26.1* 26.0* 25.9* 25.9*   MCHC 31.1* 31.5 29.8* 29.9*   RDW 18.6* 18.7* 19.0* 19.3*    221 222 221     No lab results found in last 7 days.

## 2021-10-19 NOTE — PLAN OF CARE
RN assumed cares: [id number]     Vitals: VSS on RA   Neuro: AOX4. Intermittent BLE tingling in toes per pt  Pain: Ongoing pain in chest/ L shoulder; given PRN oxy & tylenol x1 w/ relief   Activity: Up ad gilberto   Cardiac: chest pain radiating to L shoulder; unchanged per pt & providers aware. Murmur/ pericardial rub auscultated. Baseline BLE edema  Respiratory: Denies SOB, has mild WARNER. Denies cough. Lungs clear in bilateral upper lobes, crackles noted in lower lobes.  GI/: Tolerating regular diet, denies nausea. LBM 10/18. Oliguric; on HD  Skin: Baseline BLE edema L>R. No other acute deficits   LDAs: R arm fistual; Bruit & thrill present. L PIV SL  Labs: Creat 13.50. Na 130, K 5.5, Phos 8.9. Hgb 7.5      Events & Plan: Pt off unit to dialysis around 1300. Echo done in AM. Continues to have chest pain. Rheumatology consult placed this shift. Possible discharge in AM by 11. Call light within reach, able to make needs known. Will continue to monitor & follow POC

## 2021-10-19 NOTE — PLAN OF CARE
Time:  9014-3669    Reason for admission:  Pulm edema, pericardial effusion  Activity:  IND  Pain:  C/o chest pain, unchanged. Oxy and tylenol given x1.   Neuro:  WDL, all nueros intact.  AOx4  Cardiac:  WDL, VSS  Respiratory:  Dyspnea on exertion noted. Denies SOB at rest. LS clear. Stable on RA  GI/:  WDL voiding adequately. LBM 10/16  Diet:  Reg diet, tolerating   Lines:  L arm PIV, WDL. SL. R arm fistula WDL.   Skin:  Unchanged this shift.   Labs/Imaging: No new labs this shift    New changes this shift:  No acute changes this shift. Tylenol and Oxy given x1.     Plan:  Ruem consult place. Will continue to monitor and follow POC.

## 2021-10-19 NOTE — PROGRESS NOTES
HEMODIALYSIS TREATMENT NOTE    Date: 10/19/2021  Time: 4:11 PM    Data:  Pre Wt: 66.3 kg (146 lb 2.6 oz)   Desired Wt: 64.3 kg   Post Wt: 64.3 kg (141 lb 12.1 oz)  Weight change: 2 kg  Ultrafiltration - Post Run Net Total Removed (mL): 2000 mL  Vascular Access Status: patent  Dialyzer Rinse: Streaked  Total Blood Volume Processed: 52.08 L Liters  Total Dialysis (Treatment) Time: 2.5 Hours    Lab:   no    Interventions/Assessment:  HD via avf, 15 g needles used, 2k 2.5 ca bath, 2.5 hour run, net 2kg removed successfully. 400bfr/600dfr. Patient run unremarkable, hemostasis achieved with 8 minutes of holding sites. PCN received hand off report.      Plan:    As per renal

## 2021-10-19 NOTE — CONSULTS
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    #Acute pericarditis  #ESRD on HD  #Polyarthralgias  #Immunosuppressed on MMF & tacrolimus  #Small-volume hemoptysis, resolved  Patient reporting polyarthralgias over the past year and a week-long history of pleuritic chest pain proceeded by 2-3 episodes of hemoptysis, found to have moderate pericardial effusion on TTE and CT chest with faint GGO bilaterally and small bilateral pleural effusions. He has no signs of inflammatory arthritis on exam and laboratory workup thus far including KATHERINE, C3, C4 and RF negative. Given low positive C-ANCA titer will order MPO and PR3 before pursing any treatment for vasculitis process. CT of chest without typical findings of GPA vasculitis such as multiple pulmonary nodules. No skin findings to suggest to indicate a leukocytoclastic vasculitis. No mononeuritis multiplex, and unclear on renal involvement, but less likely given he has been ESRD on HD.     His significantly elevated CRP, elevated procal and underlying immunosuppression (on MMF and tacrolimus) raise concern for opportunistic infection although he has been afebrile and hemodynamically stable throughout admission. Patient has completed a course of antibiotics and pericarditis symptoms have not improved. Other differential diagnoses include malignancy, infection, uremia, dialysis-related pericarditis, and collagen vascular disease. Most common rheumatologic disorders causing  a pericarditis being SLE and RA. His negative KATHERINE makes SLE very unlikely. Lack of symptoms suggesting symmetric inflammatory arthritis of small joints and also his negative RF make rheumatoid arthritis unlikely.     Given his ongoing pleuritic CP, rub on cardiac exam, and presence of effusion on serial TTE's, we would recommend a trial of glucocorticoids for anti-inflammatory effect. Will avoid NSAIDS and colchicine with his ESRD.    To further evaluate diagnostically the etiology of ongoing pericardial effusion, we would recommend involving cardiology to assess if pericardiocentesis would be feasible. It could potentially be therapeutic as well. Sampling of pericardial fluid could evaluate     Recommendations:  - Prednisone 40mg PO one time now. Will reevaluate tomorrow and assess response.  - Recommend cardiology consult for acute pericarditis and if pericardiocentesis would be possible.  - Labs: MPO/GA-3, cryoglobulins, Hep C, HIV (ordered for you)      I discussed the findings and recommendations with the patient.  I communicated the assessment and plan to the consulting team.    Case seen and discussed with Dr. Childress who agrees with above assessment and plan.     Thank you Jamaal Sung MD for for this interesting consult. Please contact us if there are any questions.     Fermin Molina  Medical Student - MS4    Resident/Fellow Attestation   I, Milotn Clemente, was present with the medical/MARIA ESTHER student who participated in the service and in the documentation of the note.  I have verified the history and personally performed the physical exam and medical decision making.  I agree with the assessment and plan of care as documented in the note.        Milton Clemente, DO  PGY5  Date of Service (when I saw the patient): 10/19/21    Rheumatology Attending:    This patient was interviewed and examined in the presence of the medical fellow and medical student who acted as a scribe, and this note  "personally edited by me reflects our mutual impression. 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 Question opportunistic infection vs. Non-infectious inflammatory etiology vs. Idiopathic. I agree with the plan for cardiology investigation with possible pericardiocentesis. Treat with corticosteroids to provide pain relief during the investigation.    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    Patient reports that on the morning of admission he developed mid-sternal pleuritic chest pain after his dialysis run. He was experiencing mild chest discomfort prior to his dialysis apt which is not unusual for him, although this typically resolves after he is dialyzed. Pain has now been constant for the past week; which is worse with deep breathing and lying flat and improves when sitting upright. He also endorses mild SOB. On the evening prior to admission he had 2-3 episodes of hemoptysis, which he believes had been \"draining down from his nose/sinuses\". He does not have frequent nosebleeds or sinus infections.        Patient states he has never experienced similar symptoms of pleuritic chest pain in the past. He has been having pain in multiple joints since October 2020, which he had been attributing to dialysis which he started around August 2020. He endorses some AM stiffness in the ankles, knees, elbows, wrists, and fingers but no swelling, redness or warmth. The joint pain improves mildly throughout the day but is nearly always present and unchanged with activity.     He denies any fevers, rash, oral ulcers, alopecia, abdominal pain, " diarrhea or blood in stool. He has no history of inflammatory eye disease and no personal or family history of autoimmune disease.     Has a history of gout in the R great toe but has not flared in the past year and is not currently taking any urate lowering medications.     His primary nephrologist is Dr. Coleman at [facility] [place]. Patient believes his EDW is 67 kg, although his new dry weight may be lower as he experienced some weight loss from decreased appetite after the loss of his mother in August of this year. He is still making urine ~3x per day.     Patient denies any fevers, chills, vision changes, headaches, focal weakness or numbness. He has noted intermittent fullness in the R ear but no hearing changes.        Serology  Positive:   - ,   - c-ANCA 1:80    Negative/Normal:   - KATHERINE, RF, C3, C4  - infectious w/u: blood cultures NGTD, Covid, hep B surface Ag, Covid PCR, S. pneumo and Legionella urine Ag, respiratory viral panel, Quant-TB gold  - Troponin     Pending:   - MPO  -anti-GBM       Other labs  HepBcore Ab: NR 6/13/2020  HepBsurfaceAg: NR 10/13/2021  HepC: NR 6/13/2020  HIV: NR 6/13/2020  Tb: negative 10/13/2021    HISTORY REVIEW:  Past Medical History:   Diagnosis Date     AVN (avascular necrosis of bone) (H)     left hip     AVN (avascular necrosis of bone) (H)     left hip     BPH (benign prostatic hyperplasia)      Chronic kidney disease, stage 4, severely decreased GFR (H)      Gastro-oesophageal reflux disease      Gout      History of blood transfusion      Hypertension      Medical non-compliance      Osteonecrosis (H) 7/29/2020    Added automatically from request for surgery [id number]     Pulmonary nodules      Steroid long-term use      Vitamin D deficiency      Past Surgical History:   Procedure Laterality Date     ARTHROPLASTY HIP Left 9/9/2020    Procedure: Left total hip arthroplasty;  Surgeon: Fernando Morillo MD;  Location: [facility]     ARTHROPLASTY HIP Right 4/12/2021    Procedure:  Right total hip arthroplasty;  Surgeon: Fernando Morillo MD;  Location: [facility] OR     AV FISTULA OR GRAFT ARTERIAL       COLONOSCOPY N/A 2021    Procedure: COLONOSCOPY, WITH POLYPECTOMY AND BIOPSY;  Surgeon: Zak Ortega MD;  Location: [facility]     CREATE FISTULA ARTERIOVENOUS UPPER EXTREMITY Right 2019    Procedure: Creation Of Atriovenous Fistula Right Upper Arm;  Surgeon: Julia Irwin MD;  Location: [facility]     IR CVC TUNNEL PLACEMENT > 5 YRS OF AGE  10/9/2020     IR DIALYSIS FISTULOGRAM RIGHT  10/9/2020     IR DIALYSIS FISTULOGRAM RIGHT  2021     IR DIALYSIS MECH THROMB, PTA  10/9/2020     IR DIALYSIS STENT W OR W/OUT PTA  2021     LIGATE FISTULA ARTERIOVENOUS UPPER EXTREMITY  2011    Procedure:LIGATE FISTULA ARTERIOVENOUS UPPER EXTREMITY; Excision of Right Forearm Arteriovenous Fistula.; Surgeon:LINDY AMAYA; Location:[facility] OR     PERCUTANEOUS BIOPSY KIDNEY Right 2017    Procedure: PERCUTANEOUS BIOPSY KIDNEY;  Surgeon: Gee Barrios MD;  Location: [facility] OR     TRANSPLANT  2011    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     Family History   Problem Relation Age of Onset     Hypertension Mother      Colon Cancer Mother 66     Colon Cancer Brother 51     Social History     Socioeconomic History     Marital status:      Spouse name: Not on file     Number of children: Not on file     Years of education: Not on file     Highest education level: Not on file   Occupational History     Not on file   Tobacco Use     Smoking status: Current Some Day Smoker     Types: Cigarettes     Last attempt to quit: 2017     Years since quittin.6     Smokeless tobacco: Never Used     Tobacco comment: Patient states that he is an 'social'  smoker. 3 cigarettes per week per pt   Substance and Sexual Activity     Alcohol use: Not Currently     Alcohol/week: 4.2 standard drinks     Types: 5 Standard drinks or equivalent per week     Comment: 1 shot yesterday     Drug use:  Not Currently     Types: Marijuana     Sexual activity: Never     Partners: Female     Birth control/protection: None   Other Topics Concern     Parent/sibling w/ CABG, MI or angioplasty before 65F 55M? Not Asked   Social History Narrative    Rashad lives with his wife and 2 children. There are 2 declawed cats. He works at a computer-based job in customer service.      Social Determinants of Health     Financial Resource Strain:      Difficulty of Paying Living Expenses:    Food Insecurity:      Worried About Running Out of Food in the Last Year:      Ran Out of Food in the Last Year:    Transportation Needs:      Lack of Transportation (Medical):      Lack of Transportation (Non-Medical):    Physical Activity:      Days of Exercise per Week:      Minutes of Exercise per Session:    Stress:      Feeling of Stress :    Social Connections:      Frequency of Communication with Friends and Family:      Frequency of Social Gatherings with Friends and Family:      Attends [employer] Services:      Active Member of Clubs or Organizations:      Attends Club or Organization Meetings:      Marital Status:    Intimate Partner Violence:      Fear of Current or Ex-Partner:      Emotionally Abused:      Physically Abused:      Sexually Abused:      Patient Active Problem List   Diagnosis     Kidney replaced by transplant     Aftercare following organ transplant     GERD (gastroesophageal reflux disease)     CARDIOVASCULAR SCREENING; LDL GOAL LESS THAN 160     Gout     Antibody mediated rejection of kidney transplant     Medical non-compliance     Chronic kidney disease, stage 4, severely decreased GFR (H)     Herpes simplex infection of penis     Escherichia coli septicemia (H)     Pyelonephritis of transplanted kidney     HTN, kidney transplant related     Metabolic acidosis     CKD (chronic kidney disease) stage 5, GFR less than 15 ml/min (H)     Immunosuppression (H)     Anemia of chronic renal failure, stage 5 (H)      "Secondary renal hyperparathyroidism (H)     Vitamin D deficiency     BPH (benign prostatic hyperplasia)     Status post hip surgery     ESRD (end stage renal disease) on dialysis (H)     Primary osteoarthritis of right hip     Avascular necrosis of femoral head, right (H)     Aftercare following hip joint replacement surgery, unspecified laterality     Hip pain, right     Acute pulmonary edema (H)     Shortness of breath     Chest pain, unspecified type     No Known Allergies      ROS     A 10 point ROS was performed with pertinent findings listed above.      Objective     PHYSICAL EXAM  /71 (BP Location: Left arm)   Pulse 71   Temp 97.6  F (36.4  C) (Oral)   Resp 18   Ht 1.727 m (5' 8\")   Wt 66.3 kg (146 lb 1.6 oz)   SpO2 98%   BMI 22.21 kg/m    Wt Readings from Last 4 Encounters:   10/19/21 66.3 kg (146 lb 1.6 oz)   07/06/21 65.2 kg (143 lb 11.8 oz)   04/12/21 65.7 kg (144 lb 13.5 oz)   04/09/21 65.7 kg (144 lb 12.8 oz)     Constitutional: WD-WN-WG cooperative  Eyes: nl EOM vision, conjunctiva, sclera  ENT: nl external ears, nose, hearing, lips, teeth, gums, throat  No mucous membrane lesions, normal saliva pool  Neck: no mass or thyroid enlargement  Resp: bibasilar crackles (L>R)  CV: RRR , +pericardial friction rub, bilateral LE edema  GI: no ABD mass or tenderness, no HSM  : not tested  Lymph: no cervical, supraclavicular, inguinal or epitrochlear nodes  MS: All TMJ, neck, shoulder, elbow, wrist, MCP/PIP/DIP, spine, hip, knee, ankle, and foot MTP/IP joints were examined and  found normal. No active synovitis or deformity. Full ROM.  Normal  strength  Skin: no nail pitting, alopecia, rash, nodules or lesions  Neuro: nl cranial nerves, strength, sensation, DTRs.   Psych: nl judgement, orientation, memory, affect.    DATA:  Outside Records: YES  Outside Xrays: Not Applicable  CBC:  Recent Labs   Lab Test 10/19/21  0445 10/18/21  0739 10/17/21  0453   WBC 7.4 7.7 6.5   RBC 2.87* 3.04* 2.93*   HGB " 7.5* 7.9* 7.6*   HCT 24.1* 25.1* 25.5*   MCV 84 83 87   MCH 26.1* 26.0* 25.9*   MCHC 31.1* 31.5 29.8*   RDW 18.6* 18.7* 19.0*    221 222       BMP:  Recent Labs   Lab Test 10/19/21  0445 10/18/21  0739 10/17/21  0453   * 129* 133   POTASSIUM 5.5* 5.3 4.3   CHLORIDE 94 96 98   CO2 24 24 25   ANIONGAP 12 9 10   * 118* 111*   BUN 58* 48* 36*   CR 13.50* 12.00* 9.58*   GFRESTIMATED 4* 4* 6*   ASHELY 9.5 9.1 8.9       LFT:  Recent Labs   Lab Test 10/13/21  0708 10/12/21  1435 09/29/21  1449   PROTTOTAL 7.3 7.7 7.7   ALBUMIN 2.7* 3.1* 3.2*   BILITOTAL 0.4 0.6 0.4   ALKPHOS 134 146 157*   AST 10 15 7   ALT 6 15 14       No results found for: CKTOTAL  TSH   Date Value Ref Range Status   10/13/2021 1.34 0.40 - 4.00 mU/L Final   04/10/2017 0.67 0.40 - 4.00 mU/L Final     Lab Results   Component Value Date    URIC 4.7 09/29/2021    URIC 11.8 04/08/2019    URIC 7.4 11/01/2018    URIC 11.5 09/17/2018       Inflammatory markers  Lab Results   Component Value Date    .0 10/19/2021    .0 10/18/2021    .0 10/17/2021    CRP 87.2 01/08/2021    .0 10/28/2018    .0 10/27/2018     Lab Results   Component Value Date     10/16/2021     01/08/2021    SED 32 09/17/2018    SED 52 07/01/2018     Ferritin   Date Value Ref Range Status   06/20/2020 310 26 - 388 ng/mL Final   03/18/2020 460 (H) 26 - 388 ng/mL Final   10/10/2019 790 (H) 26 - 388 ng/mL Final       UA RESULTS:  Recent Labs   Lab Test 12/21/20  1545 09/09/20  1233 06/13/20  1010 12/11/18  1211 10/25/18  1210   COLOR Yellow Yellow Yellow   < > Yellow   APPEARANCE Clear Clear Clear   < > Cloudy   URINEGLC Negative Negative Negative   < > Negative   URINEBILI Negative Negative Negative   < > Negative   URINEKETONE Negative 5* Negative   < > Negative   SG 1.015 1.015 1.010   < > 1.020   UBLD Trace* Negative Trace*   < > Large*   URINEPH 6.0 8.0* 6.5   < > 6.5   PROTEIN 100* 30* 30*   < > >=300*   UROBILINOGEN 0.2  --  0.2   --  0.2   NITRITE Negative Negative Negative   < > Negative   LEUKEST Trace* Negative Negative   < > Moderate*   RBCU O - 2 <1 O - 2   < > 25-50*   WBCU 0 - 5 2 0 - 5   < > >100*    < > = values in this interval not displayed.         Autoimmunity labs:     Lab Results   Component Value Date    RHF 12 10/16/2021     No results found for: CCPIGG  No results found for: ANCA  Lab Results   Component Value Date    F6BQZYS 150 10/16/2021     Lab Results   Component Value Date    U1EKQDQ 44 (H) 10/16/2021       IMAGING:  10/13/21 CTA chest:  IMPRESSION:  1. No pulmonary embolism.  2. No bronchial artery aneurysm, pulmonary AVM or evidence of active  contrast extravasation to explain patient's source of hemoptysis.  3. Cardiomegaly with moderate pericardial effusion. Increased  perihilar predominant groundglass opacities and small bilateral  pleural effusions, favored to represent pulmonary edema.    TTE 10/13/21:  Interpretation Summary  Moderate-sized, predominantly posterior pericardial effusion measuring up to  2.0 cm posterior to the LV free wall. Aside from elevated RA pressure (dilated  IVC), no findings are present to support a diagnosis of pericardial tamponade.  Due to its posterior location, pericardiocentesis does not appear feasible  from the apical or subxiphoid approaches on the available images.  Global and regional left ventricular function is normal with an EF of 60-65%.  Right ventricular function, chamber size, wall motion, and thickness are  normal.  Mild pulmonary hypertension is present. Pulmonary artery systolic pressure is  49 mmHg (34 mmHg+RA pressure).  This study was compared with the study from 11/16/20 (stress): Pericardial  effusion is new.    TTE 10/19/21:  Small to moderate pericardial effusion is present.Chamber compression is not  present.  Dilation of the inferior vena cava is present with abnormal respiratory  variation in diameter.  A left pleural effusion is present.

## 2021-10-20 VITALS
BODY MASS INDEX: 20.92 KG/M2 | WEIGHT: 138.01 LBS | HEIGHT: 68 IN | SYSTOLIC BLOOD PRESSURE: 112 MMHG | RESPIRATION RATE: 17 BRPM | TEMPERATURE: 98.3 F | DIASTOLIC BLOOD PRESSURE: 65 MMHG | OXYGEN SATURATION: 98 % | HEART RATE: 77 BPM

## 2021-10-20 LAB
ANION GAP SERPL CALCULATED.3IONS-SCNC: 11 MMOL/L (ref 3–14)
BUN SERPL-MCNC: 48 MG/DL (ref 7–30)
CALCIUM SERPL-MCNC: 9.3 MG/DL (ref 8.5–10.1)
CHLORIDE BLD-SCNC: 93 MMOL/L (ref 94–109)
CO2 SERPL-SCNC: 24 MMOL/L (ref 20–32)
CREAT SERPL-MCNC: 10.4 MG/DL (ref 0.66–1.25)
ERYTHROCYTE [DISTWIDTH] IN BLOOD BY AUTOMATED COUNT: 18.4 % (ref 10–15)
GBM IGG SER IA-ACNC: 4 U/ML
GBM IGG SER IA-ACNC: NEGATIVE
GFR SERPL CREATININE-BSD FRML MDRD: 5 ML/MIN/1.73M2
GLUCOSE BLD-MCNC: 173 MG/DL (ref 70–99)
HCT VFR BLD AUTO: 24 % (ref 40–53)
HCV AB SERPL QL IA: NONREACTIVE
HGB BLD-MCNC: 7.3 G/DL (ref 13.3–17.7)
HOLD SPECIMEN: NORMAL
MCH RBC QN AUTO: 25.3 PG (ref 26.5–33)
MCHC RBC AUTO-ENTMCNC: 30.4 G/DL (ref 31.5–36.5)
MCV RBC AUTO: 83 FL (ref 78–100)
MYELOPEROXIDASE AB SER IA-ACNC: 0.4 U/ML
MYELOPEROXIDASE AB SER IA-ACNC: NEGATIVE
PHOSPHATE SERPL-MCNC: 7 MG/DL (ref 2.5–4.5)
PLATELET # BLD AUTO: 212 10E3/UL (ref 150–450)
POTASSIUM BLD-SCNC: 5.8 MMOL/L (ref 3.4–5.3)
PROTEINASE3 AB SER IA-ACNC: <1 U/ML
PROTEINASE3 AB SER IA-ACNC: NEGATIVE
RBC # BLD AUTO: 2.89 10E6/UL (ref 4.4–5.9)
SODIUM SERPL-SCNC: 128 MMOL/L (ref 133–144)
WBC # BLD AUTO: 6.1 10E3/UL (ref 4–11)

## 2021-10-20 PROCEDURE — 84100 ASSAY OF PHOSPHORUS: CPT | Performed by: PHYSICIAN ASSISTANT

## 2021-10-20 PROCEDURE — 90935 HEMODIALYSIS ONE EVALUATION: CPT | Performed by: INTERNAL MEDICINE

## 2021-10-20 PROCEDURE — 99207 PR APP CREDIT; MD BILLING SHARED VISIT: CPT | Performed by: PHYSICIAN ASSISTANT

## 2021-10-20 PROCEDURE — 250N000013 HC RX MED GY IP 250 OP 250 PS 637: Performed by: PHYSICIAN ASSISTANT

## 2021-10-20 PROCEDURE — 99239 HOSP IP/OBS DSCHRG MGMT >30: CPT | Performed by: PEDIATRICS

## 2021-10-20 PROCEDURE — 85027 COMPLETE CBC AUTOMATED: CPT | Performed by: PHYSICIAN ASSISTANT

## 2021-10-20 PROCEDURE — 250N000013 HC RX MED GY IP 250 OP 250 PS 637: Performed by: PEDIATRICS

## 2021-10-20 PROCEDURE — 36415 COLL VENOUS BLD VENIPUNCTURE: CPT | Performed by: INTERNAL MEDICINE

## 2021-10-20 PROCEDURE — 99232 SBSQ HOSP IP/OBS MODERATE 35: CPT | Performed by: INTERNAL MEDICINE

## 2021-10-20 PROCEDURE — 80048 BASIC METABOLIC PNL TOTAL CA: CPT | Performed by: PHYSICIAN ASSISTANT

## 2021-10-20 PROCEDURE — 250N000011 HC RX IP 250 OP 636: Performed by: PHYSICIAN ASSISTANT

## 2021-10-20 PROCEDURE — 250N000012 HC RX MED GY IP 250 OP 636 PS 637: Performed by: PHYSICIAN ASSISTANT

## 2021-10-20 PROCEDURE — 90937 HEMODIALYSIS REPEATED EVAL: CPT

## 2021-10-20 PROCEDURE — 82595 ASSAY OF CRYOGLOBULIN: CPT | Performed by: FAMILY MEDICINE

## 2021-10-20 PROCEDURE — 86334 IMMUNOFIX E-PHORESIS SERUM: CPT | Mod: TC | Performed by: INTERNAL MEDICINE

## 2021-10-20 PROCEDURE — 258N000003 HC RX IP 258 OP 636: Performed by: PEDIATRICS

## 2021-10-20 PROCEDURE — 36415 COLL VENOUS BLD VENIPUNCTURE: CPT | Performed by: PHYSICIAN ASSISTANT

## 2021-10-20 RX ORDER — SEVELAMER CARBONATE 800 MG/1
800 TABLET, FILM COATED ORAL
Status: DISCONTINUED | OUTPATIENT
Start: 2021-10-20 | End: 2021-10-20 | Stop reason: HOSPADM

## 2021-10-20 RX ORDER — PREDNISONE 20 MG/1
40 TABLET ORAL ONCE
Status: COMPLETED | OUTPATIENT
Start: 2021-10-20 | End: 2021-10-20

## 2021-10-20 RX ORDER — PREDNISONE 10 MG/1
TABLET ORAL
Qty: 20 TABLET | Refills: 0 | Status: SHIPPED | OUTPATIENT
Start: 2021-10-20 | End: 2022-01-01

## 2021-10-20 RX ORDER — VITAMIN E 268 MG
400 CAPSULE ORAL DAILY
Qty: 30 CAPSULE | Refills: 0 | Status: SHIPPED | OUTPATIENT
Start: 2021-10-20 | End: 2022-01-01

## 2021-10-20 RX ORDER — VITAMIN E 268 MG
400 CAPSULE ORAL DAILY
Qty: 30 CAPSULE | Refills: 0 | Status: SHIPPED | OUTPATIENT
Start: 2021-10-21 | End: 2021-10-20

## 2021-10-20 RX ORDER — SEVELAMER CARBONATE 800 MG/1
800 TABLET, FILM COATED ORAL
Qty: 90 TABLET | Refills: 0 | Status: SHIPPED | OUTPATIENT
Start: 2021-10-20

## 2021-10-20 RX ORDER — SEVELAMER CARBONATE 800 MG/1
800 TABLET, FILM COATED ORAL
Qty: 90 TABLET | Refills: 0 | Status: SHIPPED | OUTPATIENT
Start: 2021-10-20 | End: 2021-10-20

## 2021-10-20 RX ORDER — OXYCODONE HYDROCHLORIDE 15 MG/1
15 TABLET ORAL EVERY 6 HOURS PRN
Qty: 10 TABLET | Refills: 0 | Status: SHIPPED | OUTPATIENT
Start: 2021-10-20 | End: 2022-01-01

## 2021-10-20 RX ADMIN — SODIUM CHLORIDE 250 ML: 9 INJECTION, SOLUTION INTRAVENOUS at 07:58

## 2021-10-20 RX ADMIN — CARVEDILOL 25 MG: 25 TABLET, FILM COATED ORAL at 10:46

## 2021-10-20 RX ADMIN — OXYCODONE HYDROCHLORIDE 15 MG: 5 TABLET ORAL at 03:42

## 2021-10-20 RX ADMIN — PREDNISONE 40 MG: 20 TABLET ORAL at 15:45

## 2021-10-20 RX ADMIN — ACETAMINOPHEN 650 MG: 325 TABLET, FILM COATED ORAL at 03:42

## 2021-10-20 RX ADMIN — MYCOPHENOLATE MOFETIL 250 MG: 250 CAPSULE ORAL at 10:46

## 2021-10-20 RX ADMIN — SEVELAMER CARBONATE 800 MG: 800 TABLET, FILM COATED ORAL at 10:46

## 2021-10-20 RX ADMIN — OXYCODONE HYDROCHLORIDE 15 MG: 5 TABLET ORAL at 11:00

## 2021-10-20 RX ADMIN — ACETAMINOPHEN 650 MG: 325 TABLET, FILM COATED ORAL at 11:00

## 2021-10-20 RX ADMIN — SODIUM CHLORIDE 300 ML: 9 INJECTION, SOLUTION INTRAVENOUS at 07:58

## 2021-10-20 RX ADMIN — Medication 400 UNITS: at 10:46

## 2021-10-20 RX ADMIN — TACROLIMUS 1 MG: 1 CAPSULE ORAL at 10:46

## 2021-10-20 RX ADMIN — FUROSEMIDE 20 MG: 20 TABLET ORAL at 10:46

## 2021-10-20 RX ADMIN — SULFAMETHOXAZOLE AND TRIMETHOPRIM 1 TABLET: 400; 80 TABLET ORAL at 10:46

## 2021-10-20 RX ADMIN — HEPARIN SODIUM 5000 UNITS: 10000 INJECTION, SOLUTION INTRAVENOUS; SUBCUTANEOUS at 10:46

## 2021-10-20 RX ADMIN — AMLODIPINE BESYLATE 10 MG: 10 TABLET ORAL at 10:46

## 2021-10-20 ASSESSMENT — ACTIVITIES OF DAILY LIVING (ADL)
ADLS_ACUITY_SCORE: 5

## 2021-10-20 ASSESSMENT — MIFFLIN-ST. JEOR
SCORE: 1517.5
SCORE: 1478.5
SCORE: 1485.5

## 2021-10-20 NOTE — PLAN OF CARE
Vital signs stable on RA. Up self in room. Dialysis this shift, plan for dialysis tomorrow. Good appetite. Pt reports pain to chest, unchanged. Asymptomatic COVID swab sent. Returned negative. Possible discharge tomorrow. Will continue to monitor.

## 2021-10-20 NOTE — PLAN OF CARE
"/65 (BP Location: Left arm)   Pulse 77   Temp 98.3  F (36.8  C) (Oral)   Resp 17   Ht 1.727 m (5' 8\")   Wt 62.6 kg (138 lb 0.1 oz)   SpO2 98%   BMI 20.98 kg/m      Care from: [id number]      VS & Pain: VSS, on room air, prn Oxy x1 and prn Tylenol x1 were given for chest pain- MD aware    Neuro: A&O x4    Respiratory: dyspnea on exertion, infrequent productive cough    Cardiac: murmur detected    Peripheral neurovascular: 2+ dependent and RLE edema, 3+ LLE edema    GI/: pt is on HD, no BM this shift    Nutrition: good appetite and oral intake    Skin: WDL    Musculoskeletal: WDL    Lines: L PIV was removed, R fistula is CDI    Activity: Up independently    Events this shift: HD was done. Call light within reach.     Discharged to: home  Transportation: via car by family  Time: 1630  Prescriptions: pt will  medications [facility] [place]   Belongings: pt brought all belongings home with him  PIV/Access: writer removed PIV  Care Plan and Education discontinued: done  Paperwork: writer reviewed and signed paper work with pt    "

## 2021-10-20 NOTE — DISCHARGE INSTRUCTIONS
STEROID TAPER INSTRUCTIONS:   - You are being discharged on a prednisone taper   - You will complete a total of 12 days of prednisone, last day of steroids on 10/30/21   - I recommend taking your prednisone in the morning to minimize side effects of insomnia   - 10/21: take 4 tablets (40 mg)   - 10/22 - 10/24: take 3 tablets (30 mg)   - 10/25 - 10/27: take 2 tablets (20 mg)   - 10/28 - 10/30: take 1 tablet (10 mg)    FOLLOW-UP PLAN:   - Please follow-up with your primary care provider within 7 days   - You will need to have a repeat echocardiogram in 4-6 weeks (late November to early December), this will need to be ordered by your primary care provider   - Rheumatology is arranging clinic follow-up in 2-3 weeks, their  will contact you

## 2021-10-20 NOTE — DISCHARGE SUMMARY
[facility]  Hospitalist Discharge Summary      Date of Admission:  10/12/2021  Date of Discharge:  10/20/2021  Discharging Provider: Jessy Marquez PA-C; Dr. Sung  Discharge Team: Hospitalist Service, Gold 6    Discharge Diagnoses   Pericardial effusion  Pericarditis  Pleuritic chest pain  Pulmonary edema  Hemoptysis  Possible CAP  Acute on chronic anemia  ESRD s/p failed LDKT on HD  Hypodense thyroid nodule  Hypertension  Renal hyperparathyroidism    Follow-ups Needed After Discharge    - Follow-up with primary care provider within 7 days   - Repeat echocardiogram in 4-6 weeks with primary care provider   - Nephrology follow-up as previously planned   - Rheumatology follow-up in 2-3 weeks    Unresulted Labs Ordered in the Past 30 Days of this Admission     Date and Time Order Name Status Description    10/20/2021 12:01 AM Cryoglobulin identification In process       These results will be followed up by Rheumatology    Discharge Disposition   Discharged to home  Condition at discharge: Stable    Hospital Course   Rashad Ortiz is a 46 year old male with a history of ESRD s/p failed LDKT (2011) 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     Moderate posterior pericardial effusion  Pericarditis  Pulmonary edema  Hemoptysis, resolved  Possible CAP  Presented with new onset pleuritic mid-sternal chest pain after HD run, small volume hemoptysis x7-8, 3 months of polyarthralgias. BNP >25k,  --> 260, . TTE on admission with moderate posterior pericardial effusion, no e/o tamponade, not amenable to drainage per Cardiology. Initial concern for CAP though he did not improve after course of ceftriaxone and azithromycin. Transplant ID consulted.  Infectious work-up was negative. Pericarditis and pericardial effusion most likely secondary to post-viral syndrome vs COVID vaccination (received first dose on 10/7/21), hypervolemia may have contributed to the effusion as well. While the timing of his symptom onset is suspicious for COVID mRNA vaccine-related pericarditis, we are unable to definitively rule this in or out. There was also concern for underlying rheumatologic process. Rheumatology consulted, work-up notable for low positive C-ANCA (1:80) but otherwise negative. He was started on prednisone with good improvement in his pain. Repeat TTE with stable to slightly improved effusion.    - Completed VAERS submission for pericarditis occurring <30 days out from COVID vaccination   - Discussed COVID vaccination with ID, there are no contraindications to patient receiving the second dose.    - Recommend he delay receiving his second dose until he has completed his course of prednisone (after 10/30/21). He would require a medical exemption to delay his second dose for his employer, he should pursue this with PCP.    - Per ID, he could consider delaying the dose until repeat TTE demonstrates improvement, though he is at high risk of severe disease with COVID due to immunocompromised status and should have ongoing risk/benefit discussions with his PCP.   - According to the CDC, pericarditis and myocarditis have been exclusively linked to mRNA vaccinations. The patient could alternatively consider receiving a J&J vaccine.    - Prednisone taper: 40 mg x3 days, 30 mg x3 days, 20 mg x3 days, 10 mg x3 days   - Repeat TTE in 4-6 weeks with PCP, follow-up with Cardiology if effusion not improving   - Follow-up with Rheumatology in 2-3 weeks    ESRD s/p failed LDKT on HD: Runs TThSat via AVF. EDW ~66 kg. Prior renal disease felt to be related to hypertension. Nephrology consulted, he was continued on HD qTTS. Nephrology encouraged longer run times in setting of  hypervolemia, patient declined at this time, will need ongoing discussion with outpatient Nephrology team. Discharged on PTA regimen of Bactrim, Lasix, tacrolimus, MMF.     Hyponatremia: Secondary to ESRD.    Renal hyperparathyroidism: On Hectorol three times weekly with HD.     Acute on chronic anemia: Presented with hgb 6.7, baseline ~7-8. Acute drop likely related to hemoptysis. Received 1 unit PRBCs and hgb since stable.    Hypertension: Blood pressure stable. Continue PTA amlodipine, carvedilol.     Hypodense thyroid nodule: Incidental finding. TSH normal. Will need follow-up ultrasound as an outpatient    Consultations This Hospital Stay   NEPHROLOGY ESRD ADULT IP CONSULT  VASCULAR ACCESS CARE ADULT IP CONSULT  CARE MANAGEMENT / SOCIAL WORK IP CONSULT  INFECTIOUS DISEASE TRANSPLANT SOT ADULT IP CONSULT  PULMONARY GENERAL ADULT IP CONSULT  VASCULAR ACCESS CARE ADULT IP CONSULT  VASCULAR ACCESS CARE ADULT IP CONSULT  VASCULAR ACCESS CARE ADULT IP CONSULT  RHEUMATOLOGY IP CONSULT    Code Status   Full Code    Time Spent on this Encounter   IJessy PA-C, personally saw the patient today and spent greater than 30 minutes discharging this patient.       Jessy Marquez PA-C  [facility] [address] [address]  Phone: [phone]  ______________________________________________________________________    Physical Exam   Vital Signs: Temp: 98  F (36.7  C) Temp src: Oral BP: 127/74 Pulse: 91   Resp: 18 SpO2: 96 % O2 Device: None (Room air)    Weight: 138 lbs .13 oz  Constitutional: Awake and alert, in no apparent distress. Lying comfortably in bed dialyzing.   Eyes: Sclera clear, anicteric   Respiratory: Breathing non-labored. Bibasilar crackles.   Cardiovascular: Systolic murmur, faint pericardial rub. RRR. 1+ pitting edema of bilateral ankles.   GI: Soft, non-tender, non-distended.  Normoactive bowel sounds.   Skin:  Good color. No jaundice. No visible rashes,  lesions, or bruising of concern.   Neurologic: Alert and fully oriented. No focal deficits.        Primary Care Physician   Varun Burgess    Discharge Orders      Activity    Your activity upon discharge: activity as tolerated     Reason for your hospital stay    Dear Rashad Ortiz,    You were hospitalized at [facility] with chest pain, shortness of breath. You were found to have fluid around your lungs (pleural effusion) and also fluid around your heart (pericardial effusion). These fluid collections are most likely related to your kidney failure and should improve with dialysis, we recommend increasing the length of your dialysis runs as tolerated. Your symptoms and inflammatory markers were also concerning for a possible rheumatologic disease and the Rheumatology team was involved, they are awaiting further lab testing but started you on a course of prednisone to help with the inflammation. You were treated with 7 days of antibiotics for a possible pneumonia. Today you are ready to be discharged home.  If you continue on the prednisone and with dialysis you should continue to improve but if you develop fever, shortness of breath, light headedness, chest pain or otherwise worsen please seek medical attention.      Please set up an appointment with:   - Please follow-up with your primary care provider within 7 days   - You will need to have a repeat echocardiogram in 4-6 weeks (late November to early December), this will need to be ordered by your primary care provider   - Rheumatology is arranging clinic follow-up in 2-3 weeks, their  will contact you       It was a pleasure meeting with you. Thank you for allowing me and my team the privilege of caring for you. You are the reason we are here, and I truly hope we provided you with the excellent service you deserve. Please let us know if there is anything else we can do for you so that we can be sure you are leaving  completely satisfied with your care experience.    Your hospital unit at the time of discharge is 5B so if you have any questions please call the hospital at [phone] and ask to talk to a nurse on 5B.    Take care!    Jessy Marquez PA-C   Hospitalist Service     Adult [facility] Follow-up and recommended labs and tests    Follow up with primary care provider, Varun Burgess, within 7 days for hospital follow- up.      STEROID TAPER INSTRUCTIONS:   - You are being discharged on a prednisone taper   - You will complete a total of 12 days of prednisone, last day of steroids on 10/30/21   - I recommend taking your prednisone in the morning to minimize side effects of insomnia   - 10/21: take 4 tablets (40 mg)   - 10/22 - 10/24: take 3 tablets (30 mg)   - 10/25 - 10/27: take 2 tablets (20 mg)   - 10/28 - 10/30: take 1 tablet (10 mg)     - You will need to have a repeat echocardiogram in 4-6 weeks (late November to early December), this will need to be ordered by your primary care provider     - Rheumatology is arranging clinic follow-up in 2-3 weeks, their  will contact you       Appointments on [place] and/or [facility] (with [facility] or [facility] provider or service). Call [phone] if you haven't heard regarding these appointments within 7 days of discharge.     Full Code     Diet    Follow this diet upon discharge: renal diet       Significant Results and Procedures   ROUTINE IP LABS (Last four results)  Recent Labs   Lab 10/20/21  0559 10/19/21  0445 10/18/21  0739 10/17/21  0453 10/15/21  0720 10/15/21  0720   * 130* 129* 133   < > 134   POTASSIUM 5.8* 5.5* 5.3 4.3   < > 4.6   CHLORIDE 93* 94 96 98   < > 101   CO2 24 24 24 25   < > 24   ANIONGAP 11 12 9 10   < > 9   * 104* 118* 111*   < > 90   BUN 48* 58* 48* 36*   < > 38*   CR 10.40* 13.50* 12.00* 9.58*   < > 9.83*   ASHELY 9.3 9.5 9.1 8.9   < > 8.5   MAG  --   --  1.8 1.8  --  1.8   PHOS 7.0* 8.9* 8.4* 6.9*   < > 6.4*    < > = values  in this interval not displayed.     Recent Labs   Lab 10/20/21  0559 10/19/21  0445 10/18/21  0739 10/17/21  0453   WBC 6.1 7.4 7.7 6.5   RBC 2.89* 2.87* 3.04* 2.93*   HGB 7.3* 7.5* 7.9* 7.6*   HCT 24.0* 24.1* 25.1* 25.5*   MCV 83 84 83 87   MCH 25.3* 26.1* 26.0* 25.9*   MCHC 30.4* 31.1* 31.5 29.8*   RDW 18.4* 18.6* 18.7* 19.0*    244 221 222     No lab results found in last 7 days.           Discharge Medications   Current Discharge Medication List      START taking these medications    Details   predniSONE (DELTASONE) 10 MG tablet Take 4 tabs (40 mg) once daily on 10/21, then take 3 tabs (30 mg) daily x3 days, then 2 tabs (20 mg) daily x3 days, then 1 tab (10 mg) x3 days, then discontinue. Last day of steroids on 10/30.  Qty: 20 tablet, Refills: 0    Associated Diagnoses: Pericardial effusion      sevelamer carbonate (RENVELA) 800 MG tablet Take 1 tablet (800 mg) by mouth 3 times daily (with meals)  Qty: 90 tablet, Refills: 0    Associated Diagnoses: Anemia of chronic renal failure, stage 5 (H)      vitamin E (TOCOPHEROL) 400 units (180 mg) capsule Take 1 capsule (400 Units) by mouth daily  Qty: 30 capsule, Refills: 0    Associated Diagnoses: Anemia of chronic renal failure, stage 5 (H)         CONTINUE these medications which have NOT CHANGED    Details   acetaminophen (TYLENOL) 500 MG tablet Take 2 tablets (1,000 mg) by mouth every 6 hours as needed for mild pain  Qty: 100 tablet, Refills: 1    Associated Diagnoses: Multiple joint pain      amLODIPine (NORVASC) 5 MG tablet Take 10 mg by mouth daily     Associated Diagnoses: HTN, kidney transplant related      carvedilol (COREG) 12.5 MG tablet Take 2 tablets (25 mg) by mouth 2 times daily (with meals)    Associated Diagnoses: HTN, kidney transplant related      furosemide (LASIX) 20 MG tablet Take 1 tablet (20 mg) by mouth daily    Associated Diagnoses: HTN, kidney transplant related      mycophenolate (GENERIC EQUIVALENT) 250 MG capsule Take 1 capsule  (250 mg) by mouth 2 times daily  Qty: 60 capsule, Refills: 11    Associated Diagnoses: Kidney transplanted; Long-term use of immunosuppressant medication      sulfamethoxazole-trimethoprim (BACTRIM) 400-80 MG tablet Take one tablet every Monday, Wednesday, Friday  Qty: 45 tablet, Refills: 3    Associated Diagnoses: Kidney transplanted; Need for pneumocystis prophylaxis      tacrolimus (GENERIC EQUIVALENT) 0.5 MG capsule Take 1 capsule (0.5 mg) by mouth every evening Total dose = 1 mg in the AM and 1.5 mg in the PM  Qty: 30 capsule, Refills: 11    Associated Diagnoses: Long-term use of immunosuppressant medication; Kidney transplanted      tacrolimus (GENERIC EQUIVALENT) 1 MG capsule Take 1 capsule (1 mg) by mouth 2 times daily . Total dose=1mg in the AM and 1.5mg in the PM  Qty: 60 capsule, Refills: 11    Associated Diagnoses: Long-term use of immunosuppressant medication; Kidney transplanted      oxyCODONE (ROXICODONE) 5 MG tablet Take 1-2 tablets (5-10 mg) by mouth every 6 hours as needed for pain  Qty: 12 tablet, Refills: 0    Associated Diagnoses: Heel pain, bilateral           Allergies   No Known Allergies     Physician Attestation   I, Jamaal Sung MD, personally saw and evaluated Rashad Ortiz as part of a shared visit.  I have reviewed and discussed with the advanced practice provider their discharge plan.    My key history or physical exam findings from the day of discharge: Patient with improvement in chest pain since beginning prednisone. On exam sitting up in bed, NAD. CTAB. RRR, rub present at apex.     Key management decisions made by me:   Acute pericarditis following first dose of COVID-19 vaccine, while may be another etiology, high suspicion for vacciine related adverse event and pericarditis has been reported in patients in this age group with either dose. Risk/benefit of second dose should be considered, but the frequency of this adverse reaction is not high. Would discuss further with  PCP, but would be reasonable to consider not having an additional dose. If the patient does receive an additional dose it should be after steroids are completed.    Jamaal Sung  Date of Service (when I saw the patient): 10/20/21

## 2021-10-20 NOTE — PROGRESS NOTES
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    #Acute pericarditis  #ESRD on HD  #Polyarthralgias  #Immunosuppressed on MMF & tacrolimus  #Small-volume hemoptysis, resolved  Patient reporting polyarthralgias over the past year and a week-long history of pleuritic chest pain proceeded by 2-3 episodes of hemoptysis, found to have moderate pericardial effusion on TTE and CT chest with faint GGO bilaterally and small bilateral pleural effusions. He has no signs of inflammatory arthritis on exam and laboratory workup thus far including KATHERINE, C3, C4 and RF negative. Given low positive C-ANCA titer will order MPO and PR3 before pursing any treatment for vasculitis process. CT of chest without typical findings of GPA vasculitis such as multiple pulmonary nodules. No skin findings to suggest to indicate a leukocytoclastic vasculitis. No mononeuritis multiplex, and unclear on renal involvement, but less likely given he has been ESRD on HD.     His significantly elevated CRP, elevated procal and underlying immunosuppression (on MMF and tacrolimus) raise concern for opportunistic infection although he has been afebrile and hemodynamically stable throughout admission. Patient has completed a course of antibiotics and pericarditis symptoms have not improved. Other differential diagnoses include malignancy, infection, uremia, dialysis-related pericarditis, and collagen vascular disease. Most common rheumatologic disorders causing  a pericarditis being SLE and RA. His negative KATHERINE makes SLE very unlikely. Also given his negative MPO, P3, GBM vasculitis less likely. Lack of symptoms suggesting symmetric inflammatory arthritis of small joints and also his negative RF make rheumatoid arthritis unlikely. He had improvement in his chest pain after dose of prednisone and more stringent HD runs. Nephrology adjusted his EDW at 62kg with plan for longer HD runs per Nephrology. Nephrology also more comfortable with possible uremic pericarditis at this time. However, given his improvement with the prednisone will continue with a steroid taper over the next 12 days with rheumatology outpatient follow up for further evaluation.       Recommendations:  - Prednisone 40mg PO for the next two days, followed by 10mg taper every 3 days for a total of 12 days of predisone  - Follow up in clinic with Dr. Dominguez in 3-4 weeks       I discussed the findings and recommendations with the patient.  I communicated the assessment and plan to the consulting team.    Case seen and discussed with Dr. Dominguez who agrees with above assessment and plan.     Thank you Jamaal Snug MD for for this interesting consult. Please contact us if there are any questions.       Gabrielle Ingram MS4    I was present with the medical student who participated in the service and in the documentation of the note. I have verified the history and personally performed the physical exam and medical decision making. I agree with the assessment and plan of care as documented in the note.    Renato Dominguez M.D.  Staff Rheumatologist, [facility]  Pager [phone]        Interval History    No acute events overnight. Vitals stable. Feeling better today. Thinks the prednisone really did help.     Serology  Positive:   - ,   - c-ANCA 1:80    Negative/Normal:   - KATHERINE, RF, C3, C4, MPO, anti-GBM  - infectious w/u: blood cultures NGTD, Covid, hep B surface Ag, Covid PCR, S. pneumo and  Legionella urine Ag, respiratory viral panel, Quant-TB gold  - Troponin     Other labs  HepBcore Ab: NR 6/13/2020  HepBsurfaceAg: NR 10/13/2021  HepC: NR 6/13/2020  HIV: NR 6/13/2020  Tb: negative 10/13/2021    HISTORY REVIEW:  Past Medical History:   Diagnosis Date     AVN (avascular necrosis of bone) (H)     left hip     AVN (avascular necrosis of bone) (H)     left hip     BPH (benign prostatic hyperplasia)      Chronic kidney disease, stage 4, severely decreased GFR (H)      Gastro-oesophageal reflux disease      Gout      History of blood transfusion      Hypertension      Medical non-compliance      Osteonecrosis (H) 7/29/2020    Added automatically from request for surgery [id number]     Pulmonary nodules      Steroid long-term use      Vitamin D deficiency      Past Surgical History:   Procedure Laterality Date     ARTHROPLASTY HIP Left 9/9/2020    Procedure: Left total hip arthroplasty;  Surgeon: Fernando Morillo MD;  Location: [facility] OR     ARTHROPLASTY HIP Right 4/12/2021    Procedure: Right total hip arthroplasty;  Surgeon: Fernando Morillo MD;  Location: [facility] OR     AV FISTULA OR GRAFT ARTERIAL       COLONOSCOPY N/A 4/7/2021    Procedure: COLONOSCOPY, WITH POLYPECTOMY AND BIOPSY;  Surgeon: Zak Ortega MD;  Location: [facility]     CREATE FISTULA ARTERIOVENOUS UPPER EXTREMITY Right 7/31/2019    Procedure: Creation Of Atriovenous Fistula Right Upper Arm;  Surgeon: Julia Irwin MD;  Location: [facility] OR     IR CVC TUNNEL PLACEMENT > 5 YRS OF AGE  10/9/2020     IR DIALYSIS FISTULOGRAM RIGHT  10/9/2020     IR DIALYSIS FISTULOGRAM RIGHT  2/19/2021     IR DIALYSIS MECH THROMB, PTA  10/9/2020     IR DIALYSIS STENT W OR W/OUT PTA  2/19/2021     LIGATE FISTULA ARTERIOVENOUS UPPER EXTREMITY  12/20/2011    Procedure:LIGATE FISTULA ARTERIOVENOUS UPPER EXTREMITY; Excision of Right Forearm Arteriovenous Fistula.; Surgeon:LINDY AMAYA; Location:[facility] OR     PERCUTANEOUS BIOPSY KIDNEY Right 2/28/2017    Procedure:  PERCUTANEOUS BIOPSY KIDNEY;  Surgeon: Gee Barrios MD;  Location: [facility] OR     TRANSPLANT  2011    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     Family History   Problem Relation Age of Onset     Hypertension Mother      Colon Cancer Mother 66     Colon Cancer Brother 51     Social History     Socioeconomic History     Marital status:      Spouse name: Not on file     Number of children: Not on file     Years of education: Not on file     Highest education level: Not on file   Occupational History     Not on file   Tobacco Use     Smoking status: Current Some Day Smoker     Types: Cigarettes     Last attempt to quit: 2017     Years since quittin.6     Smokeless tobacco: Never Used     Tobacco comment: Patient states that he is an 'social'  smoker. 3 cigarettes per week per pt   Substance and Sexual Activity     Alcohol use: Not Currently     Alcohol/week: 4.2 standard drinks     Types: 5 Standard drinks or equivalent per week     Comment: 1 shot yesterday     Drug use: Not Currently     Types: Marijuana     Sexual activity: Never     Partners: Female     Birth control/protection: None   Other Topics Concern     Parent/sibling w/ CABG, MI or angioplasty before 65F 55M? Not Asked   Social History Narrative    Rashad lives with his wife and 2 children. There are 2 declawed cats. 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      Social Determinants of Health     Financial Resource Strain:      Difficulty of Paying Living Expenses:    Food Insecurity:      Worried About Running Out of Food in the Last Year:      Ran Out of Food in the Last Year:    Transportation Needs:      Lack of Transportation (Medical):      Lack of Transportation (Non-Medical):    Physical Activity:      Days of Exercise per Week:      Minutes of Exercise per Session:    Stress:      Feeling of Stress :    Social Connections:      Frequency of Communication with Friends and Family:      Frequency of Social  "Gatherings with Friends and Family:      Attends Spiritism Services:      Active Member of Clubs or Organizations:      Attends Club or Organization Meetings:      Marital Status:    Intimate Partner Violence:      Fear of Current or Ex-Partner:      Emotionally Abused:      Physically Abused:      Sexually Abused:      Patient Active Problem List   Diagnosis     Kidney replaced by transplant     Aftercare following organ transplant     GERD (gastroesophageal reflux disease)     CARDIOVASCULAR SCREENING; LDL GOAL LESS THAN 160     Gout     Antibody mediated rejection of kidney transplant     Medical non-compliance     Chronic kidney disease, stage 4, severely decreased GFR (H)     Herpes simplex infection of penis     Escherichia coli septicemia (H)     Pyelonephritis of transplanted kidney     HTN, kidney transplant related     Metabolic acidosis     CKD (chronic kidney disease) stage 5, GFR less than 15 ml/min (H)     Immunosuppression (H)     Anemia of chronic renal failure, stage 5 (H)     Secondary renal hyperparathyroidism (H)     Vitamin D deficiency     BPH (benign prostatic hyperplasia)     Status post hip surgery     ESRD (end stage renal disease) on dialysis (H)     Primary osteoarthritis of right hip     Avascular necrosis of femoral head, right (H)     Aftercare following hip joint replacement surgery, unspecified laterality     Hip pain, right     Acute pulmonary edema (H)     Shortness of breath     Chest pain, unspecified type     No Known Allergies      ROS     A 10 point ROS was performed with pertinent findings listed above.      Objective     PHYSICAL EXAM  /65 (BP Location: Left arm)   Pulse 77   Temp 98.3  F (36.8  C) (Oral)   Resp 17   Ht 1.727 m (5' 8\")   Wt 62.6 kg (138 lb 0.1 oz)   SpO2 98%   BMI 20.98 kg/m    Wt Readings from Last 4 Encounters:   10/20/21 62.6 kg (138 lb 0.1 oz)   07/06/21 65.2 kg (143 lb 11.8 oz)   04/12/21 65.7 kg (144 lb 13.5 oz)   04/09/21 65.7 kg (144 lb " 12.8 oz)     Constitutional: WD-WN-WG cooperative  Eyes: nl EOM vision, conjunctiva, sclera  ENT: nl external ears, nose, hearing, lips, teeth, gums, throat  No mucous membrane lesions, normal saliva pool  Neck: no mass or thyroid enlargement  Resp: bibasilar crackles (L>R)  CV: RRR , +pericardial friction rub respiratory dependent, bilateral LE edema  GI: no ABD mass or tenderness, no HSM  : not tested  Lymph: no cervical, supraclavicular, inguinal or epitrochlear nodes  MS: All TMJ, neck, shoulder, elbow, wrist, MCP/PIP/DIP, spine, hip, knee, ankle, and foot MTP/IP joints were examined and  found normal. No active synovitis or deformity. Full ROM.  Normal  strength  Skin: no nail pitting, alopecia, rash, nodules or lesions  Neuro: nl cranial nerves, strength, sensation, DTRs.   Psych: nl judgement, orientation, memory, affect.    DATA:  Outside Records: YES  Outside Xrays: Not Applicable  CBC:  Recent Labs   Lab Test 10/19/21  0445 10/18/21  0739 10/17/21  0453   WBC 7.4 7.7 6.5   RBC 2.87* 3.04* 2.93*   HGB 7.5* 7.9* 7.6*   HCT 24.1* 25.1* 25.5*   MCV 84 83 87   MCH 26.1* 26.0* 25.9*   MCHC 31.1* 31.5 29.8*   RDW 18.6* 18.7* 19.0*    221 222       BMP:  Recent Labs   Lab Test 10/19/21  0445 10/18/21  0739 10/17/21  0453   * 129* 133   POTASSIUM 5.5* 5.3 4.3   CHLORIDE 94 96 98   CO2 24 24 25   ANIONGAP 12 9 10   * 118* 111*   BUN 58* 48* 36*   CR 13.50* 12.00* 9.58*   GFRESTIMATED 4* 4* 6*   ASHELY 9.5 9.1 8.9       LFT:  Recent Labs   Lab Test 10/13/21  0708 10/12/21  1435 09/29/21  1449   PROTTOTAL 7.3 7.7 7.7   ALBUMIN 2.7* 3.1* 3.2*   BILITOTAL 0.4 0.6 0.4   ALKPHOS 134 146 157*   AST 10 15 7   ALT 6 15 14       No results found for: CKTOTAL  TSH   Date Value Ref Range Status   10/13/2021 1.34 0.40 - 4.00 mU/L Final   04/10/2017 0.67 0.40 - 4.00 mU/L Final     Lab Results   Component Value Date    URIC 4.7 09/29/2021    URIC 11.8 04/08/2019    URIC 7.4 11/01/2018    URIC 11.5 09/17/2018        Inflammatory markers  Lab Results   Component Value Date    .0 10/19/2021    .0 10/18/2021    .0 10/17/2021    CRP 87.2 01/08/2021    .0 10/28/2018    .0 10/27/2018     Lab Results   Component Value Date     10/16/2021     01/08/2021    SED 32 09/17/2018    SED 52 07/01/2018     Ferritin   Date Value Ref Range Status   06/20/2020 310 26 - 388 ng/mL Final   03/18/2020 460 (H) 26 - 388 ng/mL Final   10/10/2019 790 (H) 26 - 388 ng/mL Final       UA RESULTS:  Recent Labs   Lab Test 12/21/20  1545 09/09/20  1233 06/13/20  1010 12/11/18  1211 10/25/18  1210   COLOR Yellow Yellow Yellow   < > Yellow   APPEARANCE Clear Clear Clear   < > Cloudy   URINEGLC Negative Negative Negative   < > Negative   URINEBILI Negative Negative Negative   < > Negative   URINEKETONE Negative 5* Negative   < > Negative   SG 1.015 1.015 1.010   < > 1.020   UBLD Trace* Negative Trace*   < > Large*   URINEPH 6.0 8.0* 6.5   < > 6.5   PROTEIN 100* 30* 30*   < > >=300*   UROBILINOGEN 0.2  --  0.2  --  0.2   NITRITE Negative Negative Negative   < > Negative   LEUKEST Trace* Negative Negative   < > Moderate*   RBCU O - 2 <1 O - 2   < > 25-50*   WBCU 0 - 5 2 0 - 5   < > >100*    < > = values in this interval not displayed.         Autoimmunity labs:     Lab Results   Component Value Date    RHF 12 10/16/2021     No results found for: CCPIGG  No results found for: ANCA  Lab Results   Component Value Date    U4NGCHI 150 10/16/2021     Lab Results   Component Value Date    Q7WWEYN 44 (H) 10/16/2021       IMAGING:  10/13/21 CTA chest:  IMPRESSION:  1. No pulmonary embolism.  2. No bronchial artery aneurysm, pulmonary AVM or evidence of active  contrast extravasation to explain patient's source of hemoptysis.  3. Cardiomegaly with moderate pericardial effusion. Increased  perihilar predominant groundglass opacities and small bilateral  pleural effusions, favored to represent pulmonary edema.    TTE  10/13/21:  Interpretation Summary  Moderate-sized, predominantly posterior pericardial effusion measuring up to  2.0 cm posterior to the LV free wall. Aside from elevated RA pressure (dilated  IVC), no findings are present to support a diagnosis of pericardial tamponade.  Due to its posterior location, pericardiocentesis does not appear feasible  from the apical or subxiphoid approaches on the available images.  Global and regional left ventricular function is normal with an EF of 60-65%.  Right ventricular function, chamber size, wall motion, and thickness are  normal.  Mild pulmonary hypertension is present. Pulmonary artery systolic pressure is  49 mmHg (34 mmHg+RA pressure).  This study was compared with the study from 11/16/20 (stress): Pericardial  effusion is new.    TTE 10/19/21:  Small to moderate pericardial effusion is present.Chamber compression is not  present.  Dilation of the inferior vena cava is present with abnormal respiratory  variation in diameter.  A left pleural effusion is present.

## 2021-10-20 NOTE — PROGRESS NOTES
HEMODIALYSIS TREATMENT NOTE    Date: 10/20/2021  Time: 9:57 AM    Data:  Pre Wt: 65.8 kg (145 lb 1 oz)   Desired Wt: 62.8 kg   Post Wt: 62.8 kg (138 lb 7.2 oz)  Weight change: 3 kg  Ultrafiltration - Post Run Net Total Removed (mL): 3000 mL  Vascular Access Status: patent  Dialyzer Rinse: Streaked  Total Blood Volume Processed: 36.92 L Liters  Total Dialysis (Treatment) Time: 2 Hours    Lab:   no    Interventions/Assessment:  Hd via AVF, 15 G NEEDLES USED, 2 k 2.5ca bath,  400BFR/600DFR, 2 HOUR RUN, net 3 kg removed. Run was unremarkable. Hemostasis achieved in 10 minutes of holding, hand off report given to pcn.      Plan:    As per renal

## 2021-10-20 NOTE — PLAN OF CARE
"/85   Pulse 79   Temp 98  F (36.7  C) (Oral)   Resp 18   Ht 1.727 m (5' 8\")   Wt 65.8 kg (145 lb 1 oz)   SpO2 98%   BMI 22.06 kg/m    VSS on RA.  Patient reports increased appetite from previous day. Has never taken remeron for appetite.  Independent in room. One BM in the last 24 hours.  Oliguric.  Patient epxlored his dialysis run logs and created list of his pre weights, ideal weights, and post weight to track when he is back home.  Pleuritic chest pain that radiates to left shoulder r/t pericardial effusion managed by PRN oxycodone 15 mg plus tylenol 650 mg both x2 overnight. Patient up a few times over night.  LUE PIV/patent and saline locked.  Awaiting rheumatology consult. Plan:  Anticipate dialysis today and discharge when medically stable.    "

## 2021-10-20 NOTE — DISCHARGE SUMMARY
Dialysis Discharge Summary Brief    [facility]  Division of Nephrology  Nephrology Discharge Dialysis Orders  Ph: [phone]  Fax: [phone]    Rashad Ortiz  MRN: [id number]  YOB: 1975    [facility] Dialysis Unit: [place]  Primary Nephrologist: Dr. Coleman    Date of Admission: 10/12/2021  Date of Discharge: 10/20/2021    Please page me at  with any questions. Thanks much. Mamie Taylor PA-C    #End-stage renal disease-hemodialysis is  at [facility] with Dr. Coleman.  Treatment time is 2.5 hours via an AV fistula. Previous EDW 67 kg, now reduced  - [facility] ([place]) phone [phone], Fax [phone]       #Hypervolemia -EDW 67 kg; imaging shows pulmonary edema and small pericardial effusion and had complication of hemoptysis. Pt hasn't been eating much since his mom  in August so likely his EDW needed lowering. Minimal UOP. Chronic lymphedema in L lower ext ever since kidney tx (likely lymphedema)  - EDW challenged, down to 62 kg, recommend continuing to challenge as outpatient  - Pt is now agreeable to more UF and has tolerated it well and will allow this to continue at OP HD (pt has been tolerating 3 kg UF in 2.5 hrs)        # pericarditis - pain still present after colchicine. Given presence of hemoptysis, primary team sent C3/C4, ANCA, antiGBM and KATHERINE which are pending. He is on  mg bid and FK 1 and 1.5 mg for transplant so is immunosuppressed and typically ANCA is more quiet in people on dialysis. No known history of rheumatologic disease.  - Started on short course of prednisone   - rheum will f/u OP (KATHERINE, C3, C4 and RF negative. Given low positive C-ANCA titer rheum ordered MPO and PR3 which are both negative)  - highly likely this is uremic pericarditis  - Talked with patient about longer run times; he is not agreeable to this yet but will discuss with his primary nephrologist     #Anemia of  end-stage renal disease.  Hemoglobin levels run at approximately 7's on Epogen 8000 units pretreatment.    - received pRBC during hospitalization -   - increased epogen to 10,000 units three times weekly       #Renal hyperparathyroidism-his PTH Runs approximately 2000, he is on Hectorol 16 mcg 3 times weekly with dialysis. His phosphorus is often elevated in the 7-8 range.     - Recommend starting pt on sevelamer    # Community acquired pneumonia - received azithromycin and ceftriaxone        Discharge Diagnosis:    ICD-10-CM    1. Shortness of breath  R06.02    2. Chest pain, unspecified type  R07.9    3. Acute pulmonary edema (H)  J81.0    4. ESRD (end stage renal disease) on dialysis (H)  N18.6     Z99.2    5. Encounter for screening laboratory testing for severe acute respiratory syndrome coronavirus 2 (SARS-CoV-2)  Z20.822           [x] Resume all previous dialysis orders with exception as noted below    New Orders (if not applicable put NA):  Estimated Dry Weight 62 kg and would continue to challenge   Dialysis Duration Recommend longer runs, pt will discuss with Dr. Coleman   Dialysis Access    Antibiotics (dose per dialysis, end date)            Labs to be drawn at dialysis    Other major changes to dialysis prescription (e.g. Dialysate bath, heparin, blood flow rate, etc)      Medication changes (also fax the unit a copy of the discharge summary)         We increased epogen to 10K  Recommend starting sevelamer     Name of physician completing this form: THEO Salas

## 2021-10-20 NOTE — PROGRESS NOTES
Nephrology Progress Note  10/20/2021         Assessment & Recommendations:   #End-stage renal disease-hemodialysis is  at [facility] with Dr. Coleman.  Treatment time is 2.5 hours via an AV fistula. Previous EDW 67 kg, now reduced  [facility] [phone], Fax [phone]       #Hypervolemia -EDW 67 kg; imaging shows pulmonary edema and small pericardial effusion and had complication of hemoptysis. Pt hasn't been eating much since his mom  in August so likely his EDW needed lowering. Minimal UOP. Chronic lymphedema in L lower ext ever since kidney tx (likely lymphedema)  - EDW challenged, down to 62 kg, recommend continuing to challenge as outpatient  - Pt is now agreeable to more UF and has tolerated it well and will allow this to continue at OP HD        # pericarditis - pain still present after colchicine. Given presence of hemoptysis, primary team sent C3/C4, ANCA, antiGBM and KATHERINE which are pending. He is on  mg bid and FK 1 and 1.5 mg for transplant so is immunosuppressed and typically ANCA is more quiet in people on dialysis. No known history of rheumatologic disease.  - Started on short course of prednisone   - rheum will f/u OP (KATHERINE, C3, C4 and RF negative. 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  Hemoglobin levels run at approximately 7's on Epogen 8000 units pretreatment.    - received pRBC during hospitalization -   - increased epogen to 10,000 units three times weekly       #Renal hyperparathyroidism-his PTH Runs approximately 2000, he is on Hectorol 16 mcg 3 times weekly with dialysis. His phosphorus is often elevated in the 7-8 range.     - Recommend starting pt on sevelamer    #  "Community acquired pneumonia - received azithromycin and ceftriaxone    Recommendations communicated via this note    Mamie Taylor PA-C  P [phone]    Interval History :   Seen on dialysis, stable run, achieved 62 kg. He is now agreeable to more UF at OP HD but not yet to longer run times, he will talk to primary nephrologist about this. We explained our concern that his pericarditis is uremic. He is now on prednisone with OP f/u with rheum (rheum w/u thus far neg)    Review of Systems:   I reviewed the following systems:  GI: ok appetite. no nausea or vomiting or diarrhea.   Neuro:  no confusion  Constitutional:  no fever or chills  CV: + pedal edema    Physical Exam:   I/O last 3 completed shifts:  In: 350 [P.O.:350]  Out: 2000 [Other:2000]   /74   Pulse 91   Temp 98  F (36.7  C) (Oral)   Resp 18   Ht 1.727 m (5' 8\")   Wt 62.6 kg (138 lb 0.1 oz)   SpO2 96%   BMI 20.98 kg/m       GENERAL APPEARANCE: no distress  EYES:  no scleral icterus, pupils equal  HENT: mouth without ulcers or lesions  PULM: normal work of breathing  CV: chronic lymphedema of L foot  NEURO:  Speech fluent, face symmetric  Access fistula    Labs:   All labs reviewed by me  Electrolytes/Renal -   Recent Labs   Lab Test 10/20/21  0559 10/19/21  0445 10/18/21  0739 10/17/21  0453 10/17/21  0453 10/15/21  0720 10/15/21  0720   * 130* 129*   < > 133   < > 134   POTASSIUM 5.8* 5.5* 5.3   < > 4.3   < > 4.6   CHLORIDE 93* 94 96   < > 98   < > 101   CO2 24 24 24   < > 25   < > 24   BUN 48* 58* 48*   < > 36*   < > 38*   CR 10.40* 13.50* 12.00*   < > 9.58*   < > 9.83*   * 104* 118*   < > 111*   < > 90   ASHELY 9.3 9.5 9.1   < > 8.9   < > 8.5   MAG  --   --  1.8  --  1.8  --  1.8   PHOS 7.0* 8.9* 8.4*   < > 6.9*   < > 6.4*    < > = values in this interval not displayed.       CBC -   Recent Labs   Lab Test 10/20/21  0559 10/19/21  0445 10/18/21  0739   WBC 6.1 7.4 7.7   HGB 7.3* 7.5* 7.9*    244 221       LFTs -   Recent " Labs   Lab Test 10/13/21  0708 10/12/21  1435 09/29/21  1449   ALKPHOS 134 146 157*   BILITOTAL 0.4 0.6 0.4   ALT 6 15 14   AST 10 15 7   PROTTOTAL 7.3 7.7 7.7   ALBUMIN 2.7* 3.1* 3.2*       Iron Panel -   Recent Labs   Lab Test 06/20/20  1112 03/18/20  0723 03/18/20  0723 10/10/19  1650 10/10/19  1650   IRON 15*  --  54  --  42   IRONSAT 9*  --  30  --  23   ORALIA 310   < > 460*   < > 790*    < > = values in this interval not displayed.         Imaging:  All imaging studies reviewed by me.     Current Medications:    amLODIPine  10 mg Oral Daily     carvedilol  25 mg Oral BID w/meals     furosemide  20 mg Oral Daily     heparin ANTICOAGULANT  5,000 Units Subcutaneous Q12H     influenza quadrivalent (PF) vacc  0.5 mL Intramuscular Prior to discharge     lidocaine  1 patch Transdermal Q24h    And     lidocaine   Transdermal Q8H     mycophenolate  250 mg Oral BID     sevelamer carbonate  800 mg Oral TID w/meals     sodium chloride (PF)  3 mL Intracatheter Q8H     sulfamethoxazole-trimethoprim  1 tablet Oral Once per day on Mon Wed Fri     tacrolimus  1 mg Oral QAM     tacrolimus  1.5 mg Oral QPM     vitamin E  400 Units Oral Daily       THEO Salas

## 2021-10-22 ENCOUNTER — MYC MEDICAL ADVICE (OUTPATIENT)
Dept: FAMILY MEDICINE | Facility: CLINIC | Age: 46
End: 2021-10-22

## 2021-10-22 ENCOUNTER — TELEPHONE (OUTPATIENT)
Dept: RHEUMATOLOGY | Facility: CLINIC | Age: 46
End: 2021-10-22
Payer: COMMERCIAL

## 2021-10-22 NOTE — TELEPHONE ENCOUNTER
----- Message from Renato Dominguez MD sent at 10/21/2021  7:50 AM CDT -----  Good morning.  I saw this patient in the hospital yesterday with a diagnosis of chest pain due to serositis/pericarditis.  He was discharged on a tapering course of prednisone.  I request that you coordinate to help me find a follow-up visit for this patient in 3 to 4 weeks in clinic using a ODILON slot or cancellation.  Thanks for the help.

## 2021-10-25 LAB — CRYOGLOB SER QL: ABNORMAL

## 2021-10-26 LAB
ALBUMIN SERPL ELPH-MCNC: NEGATIVE G/DL
CRYOGLOB IGA & IGG & IGM SER-IMP: ABNORMAL
CRYOGLOB TYP SER IFE: ABNORMAL
CRYOGLOB TYP SER IFE: NEGATIVE

## 2021-10-26 PROCEDURE — 86334 IMMUNOFIX E-PHORESIS SERUM: CPT | Mod: 26 | Performed by: PATHOLOGY

## 2021-10-27 ENCOUNTER — TELEPHONE (OUTPATIENT)
Dept: FAMILY MEDICINE | Facility: CLINIC | Age: 46
End: 2021-10-27

## 2021-10-27 NOTE — TELEPHONE ENCOUNTER
Alessandra, with [facility] calling to report the patient has completed a post discharge call. He has a follow up appt. On Monday. She says his dialysis has shortened run time. He is blaming his shortness of breath on his pneumonia  And not understanding it is partially due to his fluid load.  Chanda Schmitt [place]

## 2021-10-28 ENCOUNTER — TELEPHONE (OUTPATIENT)
Dept: FAMILY MEDICINE | Facility: CLINIC | Age: 46
End: 2021-10-28

## 2021-11-01 NOTE — PROGRESS NOTES
Rashad is a 46 year old who is being evaluated via a billable telephone visit.      What phone number would you like to be contacted at? See demo  How would you like to obtain your AVS? MyChart    Assessment & Plan     Acute pericarditis, unspecified type  Improved - repeat Echo due in 2 - 4 weeks.  - Echocardiogram Complete; Future    COVID-19 vaccine series started  Discussed CDC guidance and I believe he should be safe to complete series.  If concerns, could consider getting [place] rather than finishing Pfizer series. Declined to provide exemption letter given he is high risk and reaction does not fit current vaccine reactions.       Tobacco Cessation:   reports that he has been smoking cigarettes. He has never used smokeless tobacco.  Tobacco Cessation Action Plan: Information offered: Patient not interested at this time        Return in about 2 weeks (around 11/15/2021) for Echo.    Mariel Mares MD  [facility]    Cesar Solorzano is a 46 year old who presents for the following health issues     HPI         Hospital Follow-up Visit:    Hospital/Nursing Home/ Rehab Facility: [facility]  Date of Admission: 10/12/21  Date of Discharge: 10/20/21  Reason(s) for Admission: Pericarditis      Was your hospitalization related to COVID-19? No   Problems taking medications regularly:  None  Medication changes since discharge: None  Problems adhering to non-medication therapy:  None    Summary of hospitalization:  [facility] discharge summary reviewed  Diagnostic Tests/Treatments reviewed.  Follow up needed: rheumatology and Echo  Other Healthcare Providers Involved in Patient s Care:         None  Update since discharge: improved. Post Discharge Medication Reconciliation: discharge medications reconciled, continue medications without change.  Plan of care communicated with patient          Patient requesting  exemption letter for COVID #2.      Review of Systems   Constitutional, HEENT, cardiovascular, pulmonary, gi and gu systems are negative, except as otherwise noted.      Objective           Vitals:  No vitals were obtained today due to virtual visit.    Physical Exam   healthy, alert and no distress  PSYCH: Alert and oriented times 3; coherent speech, normal   rate and volume, able to articulate logical thoughts, able   to abstract reason, no tangential thoughts, no hallucinations   or delusions  His affect is normal  RESP: No cough, no audible wheezing, able to talk in full sentences  Remainder of exam unable to be completed due to telephone visits            Phone call duration: 20 minutes

## 2021-11-08 NOTE — NURSING NOTE
Rashad Ortiz's goals for this visit include:   Chief Complaint   Patient presents with     Skin Check     no areas of concern hx NMSC and immunosuppressed        He requests these members of his care team be copied on today's visit information:     PCP: Varun Burgess    Referring Provider:  No referring provider defined for this encounter.    There were no vitals taken for this visit.    Do you need any medication refills at today's visit? Kylah Balbuena LPN

## 2021-11-08 NOTE — LETTER
11/8/2021         RE: Rashad Ortiz  [address] [place] [id number]        Dear Colleague,    Thank you for referring your patient, Rashad Ortiz, to the [facility]. Please see a copy of my visit note below.    [facility] Dermatology Note  Encounter Date: Nov 8, 2021  Office Visit     Dermatology Problem List:  Last skin check 11/8/21, recommended next in 6 months.  1. Relevant medical history: kidney transplant in 2011, currently on tacrolimus, MMF for immunosuppression. Currently back on dialysis, and undergoing evaluation for second transplant.  2. History of NMSC  - SCCIS at least, left posterior thigh, s/p Mohs and linear repair 12/9/2020   ____________________________________________     Assessment & Plan:     # Immunosuppressed with tacrolimus and MMF for kidney transplant. Discussed increased risk of skin cancers with immunosuppressing medications.   - Recommend sunscreens SPF #30 or greater, protective clothing and avoidance of tanning beds.      # History of NMSC. No evidence of recurrence.   - Recommend sunscreens SPF #30 or greater, protective clothing and avoidance of tanning beds.   - Recommended next skin exam in 6 months.    # Seborrheic keratosis, non irritated. - back.  - Benign nature reviewed.   - No further intervention needed.    # Open comedones. - back.  - consider OTC acne wash in the shower.   - No further intervention needed.    # Edema of the lower extremities, L>R.  - Chronic, unchanged.   - Continue to monitor.    # Finger tip size subcutaneous fullness with minimal texture change with cough, left lower quadrant of abdomen.  - No lymphadenopathy noted on exam today.  - Discussed this is most consistent with a small cyst or inflamed hair follicle. Patient reassured of benign nature.  - If it is growing or painful, consider abdominal hernia.   - Encouraged patient to follow up with PCP if persists or becomes  "symptomatic.    Procedures Performed:   None.    Follow-up: 6 months in-person for skin check, or earlier for new or changing lesions.    Staff and Scribe:     Scribe Disclosure:   I, Nela Horowitz, am serving as a scribe to document services personally performed by this physician, Dr. Reyes Cade, based on data collection and the provider's statements to me.     Provider Disclosure:   The documentation recorded by the scribe accurately reflects the services I personally performed and the decisions made by me.    Reyes Cade DO    Department of Dermatology  [facility]: Phone: [phone], Fax:[phone]  [facility]: Phone: [phone], Fax: [phone]    ____________________________________________    CC: Skin Check (no areas of concern hx NMSC and immunosuppressed )    HPI:  Mr. Rashad Ortiz is a(n) 46 year old male who presents today as a return patient for skin check.    Last seen 6/23/21. At that time, no concerning lesions were noted.    Today, patient notes there was a bump on the left posterior thigh (at the site of scar) in August. Expelled blood and pus then healed after 1-2 weeks. Believes it is gone now. He also notes a \"bump\" on the abdomen. Not painful.    Patient is otherwise feeling well, without additional skin concerns.    Labs Reviewed:  N/A    Physical Exam:  Vitals: There were no vitals taken for this visit.  SKIN: Total skin excluding the undergarment areas was performed. The exam included the head/face, neck, both arms, chest, back, abdomen, buttocks, both legs, digits and/or nails.  - Well healed scar on the left posterior thigh. No pigment recurrence.  - There are waxy stuck on tan to brown papules on the back.  - Few open comedones on the back  - Edema of the lower legs, L>R.  - Left lower quadrant of abdomen: finger tip size subcutaneous fullness with " minimal texture change with cough.  - No other lesions of concern on areas examined.   - There is no cervical, supraclavicular, axillary, or inguinal lymphadenopathy.    Medications:  Current Outpatient Medications   Medication     acetaminophen (TYLENOL) 500 MG tablet     amLODIPine (NORVASC) 5 MG tablet     carvedilol (COREG) 12.5 MG tablet     furosemide (LASIX) 20 MG tablet     mycophenolate (GENERIC EQUIVALENT) 250 MG capsule     oxyCODONE (ROXICODONE) 15 MG tablet     oxyCODONE (ROXICODONE) 5 MG tablet     predniSONE (DELTASONE) 10 MG tablet     sevelamer carbonate (RENVELA) 800 MG tablet     sulfamethoxazole-trimethoprim (BACTRIM) 400-80 MG tablet     tacrolimus (GENERIC EQUIVALENT) 0.5 MG capsule     tacrolimus (GENERIC EQUIVALENT) 1 MG capsule     vitamin E (TOCOPHEROL) 400 units (180 mg) capsule     No current facility-administered medications for this visit.      Past Medical History:   Patient Active Problem List   Diagnosis     Kidney replaced by transplant     Aftercare following organ transplant     GERD (gastroesophageal reflux disease)     CARDIOVASCULAR SCREENING; LDL GOAL LESS THAN 160     Gout     Antibody mediated rejection of kidney transplant     Medical non-compliance     Chronic kidney disease, stage 4, severely decreased GFR (H)     Herpes simplex infection of penis     Escherichia coli septicemia (H)     Pyelonephritis of transplanted kidney     HTN, kidney transplant related     Metabolic acidosis     CKD (chronic kidney disease) stage 5, GFR less than 15 ml/min (H)     Immunosuppression (H)     Anemia of chronic renal failure, stage 5 (H)     Secondary renal hyperparathyroidism (H)     Vitamin D deficiency     BPH (benign prostatic hyperplasia)     Status post hip surgery     ESRD (end stage renal disease) on dialysis (H)     Primary osteoarthritis of right hip     Avascular necrosis of femoral head, right (H)     Aftercare following hip joint replacement surgery, unspecified laterality      Hip pain, right     Acute pulmonary edema (H)     Shortness of breath     Chest pain, unspecified type     Past Medical History:   Diagnosis Date     AVN (avascular necrosis of bone) (H)     left hip     AVN (avascular necrosis of bone) (H)     left hip     BPH (benign prostatic hyperplasia)      Chronic kidney disease, stage 4, severely decreased GFR (H)      Gastro-oesophageal reflux disease      Gout      History of blood transfusion      Hypertension      Medical non-compliance      Osteonecrosis (H) 7/29/2020    Added automatically from request for surgery [id number]     Pulmonary nodules      Steroid long-term use      Vitamin D deficiency               Again, thank you for allowing me to participate in the care of your patient.        Sincerely,        Reyes Cade MD

## 2021-11-09 NOTE — PROGRESS NOTES
Rheumatology Clinic Visit  [facility]  Renato Dominguez M.D.     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  Pain has been slowly progressive over the past year, but he noted dramatic improvement with moderate dose corticosteroids used to treat inflammatory component of pericarditis.  Physical exam shows no synovitis or altered range of motion in peripheral joints.    Latest laboratory results in the [facility] system on October 20, 2021 included basic metabolic panel revealing sodium of 128; phosphorus elevated at 7.0, and CBC showing hemoglobin of 7.3 and platelets of 212,000.  Cryoglobulin IgM was positive at trace levels.  Echocardiogram on October 19 showed small to moderate pericardial effusion and left pleural effusion with no hemodynamic compromise.  No change in size of effusion noted since October 15.    Impression: Chest and breastbone pain much better controlled than in mid October.  Preponderance of clinical evidence points toward uremia and inadequate dialysis as the driving factor behind pleuropericardial involvement.  This conclusion is supported by the patient's report of continued improvement of chest symptoms, despite discontinuation of steroids, and in the context of continued compliance with higher stringent C dialysis.    Features of joint pain suggest a contribution from an systemic inflammatory condition.  Differential diagnosis includes inflammatory arthropathy versus an articular manifestation of a wider autoimmune or  rheumatologic syndrome such as granulomatous polyangiitis, which in turn could be a reflection of the positive neutrophil cytoplasmic antibody noted in October..  I recommend checking inflammatory markers and markers for severe rheumatoid arthritis, and a trial of higher dose acetaminophen.  If joint pain escalates as time from discontinuation of prednisone increases, will screen again for potential contribution from vasculitis.  Consider use of disease modifying anti-inflammatory medication such as hydroxychloroquine    Plan:  1.  Check sedimentation rate, CRP, and cyclic citrullinated peptide antibodies in 3 weeks  2.  Increase acetaminophen to 1000 mg up to 3 times daily as needed for hand wrist elbow knee pain.  3.  Alert rheumatology if chest pain or joint pain worsen despite these measures.  A trial of hydroxychloroquine may make sense if inflammatory joint pain continues.    RTC 2 mos    Renato Dominguez MD  [facility]         Active Problem List:     Patient Active Problem List    Diagnosis Date Noted     Shortness of breath 10/12/2021     Priority: Medium     Chest pain, unspecified type 10/12/2021     Priority: Medium     Acute pulmonary edema (H) 07/05/2021     Priority: Medium     Aftercare following hip joint replacement surgery, unspecified laterality 05/18/2021     Priority: Medium     Hip pain, right 05/18/2021     Priority: Medium     Avascular necrosis of femoral head, right (H) 01/07/2021     Priority: Medium     Added automatically from request for surgery [id number]       Primary osteoarthritis of right hip 12/23/2020     Priority: Medium     Status post hip surgery 09/09/2020     Priority: Medium     ESRD (end stage renal disease) on dialysis (H) 09/09/2020     Priority: Medium     Vitamin D deficiency      Priority: Medium     BPH (benign prostatic hyperplasia)      Priority: Medium     Secondary renal hyperparathyroidism (H) 03/04/2020     Priority: Medium     Anemia of  chronic renal failure, stage 5 (H) 06/17/2019     Priority: Medium     HTN, kidney transplant related 11/02/2018     Priority: Medium     Metabolic acidosis 11/02/2018     Priority: Medium     CKD (chronic kidney disease) stage 5, GFR less than 15 ml/min (H) 11/02/2018     Priority: Medium     Immunosuppression (H) 11/02/2018     Priority: Medium     Escherichia coli septicemia (H) 10/26/2018     Priority: Medium     Pyelonephritis of transplanted kidney 10/26/2018     Priority: Medium     Herpes simplex infection of penis 06/19/2018     Priority: Medium     HSV 1 confirmed by PCR of lesion       Chronic kidney disease, stage 4, severely decreased GFR (H)      Priority: Medium     Medical non-compliance      Priority: Medium     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 Provider to review       Kidney replaced by transplant 05/21/2012     Priority: Medium     Aftercare following organ transplant 05/21/2012     Priority: Medium            History of Present Illness:   Rashad Ortiz presents for [facility] of pericarditis.    Hx 11-:    He was hospitalized from October 12 through October 20, 2021 for chest pain and end-stage renal disease.  Patient was admitted after a week-long history of pleuritic chest pain, preceded by 2-3 episodes of hemoptysis. He was found to have moderate pericardial effusion on TTE and CT chest with faint GGO bilaterally and small bilateral pleural effusions. Lab workup included KATHERINE, C3, C4 and RF negative, low+c-ANCA, increased CRP and pro-Adalid.  Patient was given a course of antibiotics for presumed infection, but chest pain did not improve.  He remained afebrile and hemodynamically  stable.  Given findings of volume overload on top of his diagnosis of end-stage renal disease, nephrology treated him with more frequent and stringent dialysis runs.  He had improvement with this treatment, in concert with starting moderate dose steroids.  Although uremic pericarditis remained at the head of the differential diagnosis, steroid induced improvement also raises the possibility that an inflammatory process could be present.  He was discharged with corticosteroid taper starting at 40 mg prednisone daily.    Patient was seen by primary provider Diane Mares on November 1, 2021 in follow-up of hospitalization.  Assessment was of acute pericarditis, unclear etiology, but improved.  Plan was to undergo follow-up echocardiogram in several weeks.  Smoking cessation was recommended.    Today, he notes improvement in chest pain.  He has limited exercise tolerance, with ability to walk only a couple of blocks before feeling short of breath, but has only intermittent dry cough, no further hemoptysis, purulent nasal drainage, or sinus pressure.    The left shoulder has some pain that shoots across the chest, intermittent.    He has noted come chronic pain in small joints of his hands, knees, elbows, wrists. Sx started , and has persisted but not worsened since then.  Symptoms are distinct from joint pain he is experienced in association with a diagnosis of gout, which affected exclusively his great toes ankles and feet in the past.    He finished prednsione taper 10 days ago.  He has not experienced increase in severity of chest symptoms.    He continues on higher stringency dialysis 3 days weekly.         Review of Systems:       Constitutional: negative  Skin: negative  Eyes: negative  Ears/Nose/Throat: negative  Respiratory: HPI  Cardiovascular: negative  Gastrointestinal: negative  Genitourinary: negative  Musculoskeletal: HPI  Neurologic: negative  Psychiatric:  negative  Hematologic/Lymphatic/Immunologic: negative  Endocrine: negative          Past Medical History:     Past Medical History:   Diagnosis Date     AVN (avascular necrosis of bone) (H)     left hip     AVN (avascular necrosis of bone) (H)     left hip     BPH (benign prostatic hyperplasia)      Chronic kidney disease, stage 4, severely decreased GFR (H)      Gastro-oesophageal reflux disease      Gout      History of blood transfusion      Hypertension      Medical non-compliance      Osteonecrosis (H) 7/29/2020    Added automatically from request for surgery [id number]     Pulmonary nodules      Steroid long-term use      Vitamin D deficiency      Past Surgical History:   Procedure Laterality Date     ARTHROPLASTY HIP Left 9/9/2020    Procedure: Left total hip arthroplasty;  Surgeon: Fernando Morillo MD;  Location: [facility] OR     ARTHROPLASTY HIP Right 4/12/2021    Procedure: Right total hip arthroplasty;  Surgeon: Fernando Morillo MD;  Location: [facility] OR     AV FISTULA OR GRAFT ARTERIAL       COLONOSCOPY N/A 4/7/2021    Procedure: COLONOSCOPY, WITH POLYPECTOMY AND BIOPSY;  Surgeon: Zak Ortega MD;  Location: [facility] GI     CREATE FISTULA ARTERIOVENOUS UPPER EXTREMITY Right 7/31/2019    Procedure: Creation Of Atriovenous Fistula Right Upper Arm;  Surgeon: Julia Irwin MD;  Location: [facility] OR     IR CVC TUNNEL PLACEMENT > 5 YRS OF AGE  10/9/2020     IR DIALYSIS FISTULOGRAM RIGHT  10/9/2020     IR DIALYSIS FISTULOGRAM RIGHT  2/19/2021     IR DIALYSIS MECH THROMB, PTA  10/9/2020     IR DIALYSIS STENT W OR W/OUT PTA  2/19/2021     LIGATE FISTULA ARTERIOVENOUS UPPER EXTREMITY  12/20/2011    Procedure:LIGATE FISTULA ARTERIOVENOUS UPPER EXTREMITY; Excision of Right Forearm Arteriovenous Fistula.; Surgeon:LINDY AMAYA; Location:[facility] OR     PERCUTANEOUS BIOPSY KIDNEY Right 2/28/2017    Procedure: PERCUTANEOUS BIOPSY KIDNEY;  Surgeon: Gee Barrios MD;  Location: [facility] OR     TRANSPLANT  01/13/2011    Living  related kidney transplant from sister            Social History:     Social History     Occupational History     Not on file   Tobacco Use     Smoking status: Former Smoker     Types: Cigarettes     Quit date: 2017     Years since quittin.7     Smokeless tobacco: Never Used   Substance and Sexual Activity     Alcohol use: Not Currently     Alcohol/week: 4.2 standard drinks     Types: 5 Standard drinks or equivalent per week     Comment: 1 shot yesterday     Drug use: Not Currently     Types: Marijuana     Sexual activity: Never     Partners: Female     Birth control/protection: None            Family History:     Family History   Problem Relation Age of Onset     Hypertension Mother      Colon Cancer Mother 66     Colon Cancer Brother 51            Allergies:   No Known Allergies         Medications:     Current Outpatient Medications   Medication Sig Dispense Refill     acetaminophen (TYLENOL) 500 MG tablet Take 2 tablets (1,000 mg) by mouth every 6 hours as needed for mild pain 100 tablet 1     amLODIPine (NORVASC) 5 MG tablet Take 10 mg by mouth daily        carvedilol (COREG) 12.5 MG tablet Take 2 tablets (25 mg) by mouth 2 times daily (with meals)       furosemide (LASIX) 20 MG tablet Take 1 tablet (20 mg) by mouth daily       mycophenolate (GENERIC EQUIVALENT) 250 MG capsule Take 1 capsule (250 mg) by mouth 2 times daily 60 capsule 11     oxyCODONE (ROXICODONE) 15 MG tablet Take 1 tablet (15 mg) by mouth every 6 hours as needed for moderate to severe pain 10 tablet 0     oxyCODONE (ROXICODONE) 5 MG tablet Take 1-2 tablets (5-10 mg) by mouth every 6 hours as needed for pain 12 tablet 0     predniSONE (DELTASONE) 10 MG tablet Take 4 tabs (40 mg) once daily on 10/21, then take 3 tabs (30 mg) daily x3 days, then 2 tabs (20 mg) daily x3 days, then 1 tab (10 mg) x3 days, then discontinue. Last day of steroids on 10/30. 20 tablet 0     sevelamer carbonate (RENVELA) 800 MG tablet Take 1 tablet (800 mg) by mouth  "3 times daily (with meals) 90 tablet 0     sulfamethoxazole-trimethoprim (BACTRIM) 400-80 MG tablet Take one tablet every Monday, Wednesday, Friday 45 tablet 3     tacrolimus (GENERIC EQUIVALENT) 0.5 MG capsule Take 1 capsule (0.5 mg) by mouth every evening Total dose = 1 mg in the AM and 1.5 mg in the PM 30 capsule 11     tacrolimus (GENERIC EQUIVALENT) 1 MG capsule Take 1 capsule (1 mg) by mouth 2 times daily . Total dose=1mg in the AM and 1.5mg in the PM 60 capsule 11     vitamin E (TOCOPHEROL) 400 units (180 mg) capsule Take 1 capsule (400 Units) by mouth daily 30 capsule 0            Physical Exam:   Blood pressure 135/84, pulse 73, temperature 98.5  F (36.9  C), temperature source Oral, height 1.727 m (5' 8\"), weight 67.7 kg (149 lb 3.2 oz), SpO2 100 %.  Wt Readings from Last 6 Encounters:   11/11/21 67.7 kg (149 lb 3.2 oz)   10/20/21 62.6 kg (138 lb 0.1 oz)   07/06/21 65.2 kg (143 lb 11.8 oz)   04/12/21 65.7 kg (144 lb 13.5 oz)   04/09/21 65.7 kg (144 lb 12.8 oz)   02/19/21 65 kg (143 lb 4.8 oz)       Constitutional: well-developed, appearing stated age; cooperative  Eyes: nl EOM, PERRLA, vision, conjunctiva, sclera  ENT: nl external ears, nose, hearing, lips, teeth, gums, throat  No mucous membrane lesions, normal saliva pool  Neck: no mass or thyroid enlargement  Resp: lungs clear to auscultation, nl to palpation  CV: RRR, diastolic murmur at RUSB; no pitting  Lymph: no cervical, supraclavicular, inguinal or epitrochlear nodes  MS: The TMJ, neck, shoulder, elbow, wrist, MCP/PIP/DIP, spine, hip, knee, ankle, and foot MTP/IP joints were examined and found normal. No active synovitis or altered joint anatomy. Full joint ROM. Normal  strength. No dactylitis,  tenosynovitis, enthesopathy.  Skin: no nail pitting, alopecia, rash, nodules or lesions  Neuro: nl cranial nerves, strength, sensation, DTRs.   Psych: nl judgement, orientation, memory, affect.         Data:     @  RHEUM RESULTS Latest Ref Rng & Units " 4/25/2006 12/5/2008 1/27/2009   ALBUMIN 3.4 - 5.0 g/dL - 4.3 4.7   ALT 0 - 70 U/L - <3 16   AST 0 - 45 U/L - 13 21   KATHERINE INTERPRETATION Negative - - -   COMPLEMENT C3 81 - 157 mg/dL - - -   COMPLEMENT C4 13 - 39 mg/dL - - -   CK TOTAL 30 - 300 U/L - - -   CREATININE 0.66 - 1.25 mg/dL 2.35(H) 6.73(H) 6.40(H)   CRP 0.0 - 8.0 mg/L - - -   GFR ESTIMATE, IF BLACK >60 mL/min/[1.73:m2] - 12(L) 12(L)   GFR ESTIMATE >60 mL/min/1.73m2 - 10(L) 10(L)   HEMATOCRIT 40.0 - 53.0 % - 37.2(L) 38.3(L)   HEMOGLOBIN 13.3 - 17.7 g/dL - 12.3(L) 12.2(L)   HEPBANG Nonreactive Negative - Negative   HCVAB Nonreactive Negative - Negative   WBC 4.0 - 11.0 10e3/uL - 5.1 5.0   RBC 4.40 - 5.90 10e6/uL - 4.53 4.62   RDW 10.0 - 15.0 % - 14.6 14.9   MCHC 31.5 - 36.5 g/dL - 33.1 31.9   MCV 78 - 100 fL - 82 83   PLATELET COUNT 150 - 450 10e3/uL - 189 177   RHEUMATOID FACTOR <20 IU/mL - - -   ESR 0 - 15 mm/hr - - -   QUANTIFERON-TB GOLD PLUS RESULT Negative - - -       Rheumatoid Factor   Date Value Ref Range Status   10/16/2021 12 <20 IU/mL Final   ,  ,  ,  ,  ,  ,   KATHERINE interpretation   Date Value Ref Range Status   10/16/2021 Negative Negative Final     Comment:       Negative:              <1:40  Borderline Positive:   1:40 - 1:80  Positive:              >1:80   ,  ,  ,  ,  ,   Cardiolipin Antibody IgG   Date Value Ref Range Status   09/25/2017 <1.6 0.0 - 19.9 GPL-U/mL Final     Comment:     Negative     Cardiolipin Antibody IgM   Date Value Ref Range Status   09/25/2017 4.1 0.0 - 19.9 MPL-U/mL Final     Comment:     Negative   ,  ,  ,   Hepatitis B Core Kalee   Date Value Ref Range Status   06/13/2020 Nonreactive NR^Nonreactive Final   ,   Hep B Surface Agn   Date Value Ref Range Status   07/05/2021 Nonreactive NR^Nonreactive Final     Hepatitis B Surface Antigen   Date Value Ref Range Status   10/13/2021 Nonreactive Nonreactive Final   ,  ,  ,  ,   Quantiferon-TB Gold Plus Result   Date Value Ref Range Status   07/08/2020 Negative NEG^Negative Final      Comment:     No interferon gamma response to M.tuberculosis antigens was detected.   Infection with M.tuberculosis is unlikely, however a single negative result   does not exclude infection. In patients at high risk for infection, a second   test should be considered  in accordance with the 2017 ATS/IDSA/CDC Clinical Practice Guidelines for   Diagnosis of Tuberculosis in Adults and Children [Pavithra FLORENTINO et   al.Clin.Infect.Dis. 2017 64(2):111-115].       Quantiferon-TB Gold Plus   Date Value Ref Range Status   10/13/2021 Negative Negative Final     Comment:     No interferon gamma response to M.tuberculosis antigens was detected. Infection with M.tuberculosis is unlikely, however a single negative result does not exclude infection. In patients at high risk for infection, a second test should be considered in accordance with the 2017 ATS/IDSA/CDC Clinical Pract  ice Guidelines for Diagnosis of Tuberculosis in Adults and Children      TB1 Ag minus Nil Value   Date Value Ref Range Status   10/13/2021 -0.01 IU/mL Final   07/08/2020 0.02 IU/mL Final     TB2 Ag minus Nil Value   Date Value Ref Range Status   10/13/2021 -0.01 IU/mL Final   07/08/2020 0.01 IU/mL Final     Mitogen minus Nil Result   Date Value Ref Range Status   10/13/2021 9.97 IU/mL Final   07/08/2020 2.05 IU/mL Final     Nil Result   Date Value Ref Range Status   10/13/2021 0.03 IU/mL Final   07/08/2020 0.03 IU/mL Final   ,  ,  ,  ,  ,  ,  ,  ,   Proteinase 3 Antibody IgG   Date Value Ref Range Status   10/19/2021 Negative Negative Final   ,   Myeloperoxidase Antibody IgG   Date Value Ref Range Status   10/19/2021 Negative Negative Final   ,   Neutrophil Cytoplasmic Antibody   Date Value Ref Range Status   10/16/2021 1:80 (H) <1:10 Final   ,   Neutrophil Cytoplasmic Antibody Pattern   Date Value Ref Range Status   10/16/2021   Final    Cytoplasmic ANCA (c-ANCA) staining pattern observed and confirmed on formalin-fixed neutrophils.   ,  ,  ,  ,  ,  ,   ,  ,  ,  ,  ,  ,  ,  ,       Reviewed Rheumatology lab flowsheet

## 2021-11-11 NOTE — PATIENT INSTRUCTIONS
Diagnosis:  1.  Chest and breastbone pain: Pain is much better controlled than in mid October.  All of the evidence points towards the cause of this pain being related to fluid accumulation around the heart and the base of the lung, associated in turn with the inadequate dialysis.  2.  Chronic joint pain for the past year, worse in the morning, better with use, and better with prednisone: Features of the pain suggest a possible contribution of inflammation.  I recommend a trial of higher dose acetaminophen, and checking inflammatory markers and marker for severe rheumatoid arthritis.    Plan:  1.  Check sedimentation rate, CRP, and cyclic citrullinated peptide antibodies after Thanksgiving.  2.  Increase acetaminophen to 1000 mg up to 3 times daily as needed for hand wrist elbow knee pain.  3.  Alert rheumatology if chest pain or joint pain worsen despite these measures.  A trial of hydroxychloroquine may make sense if inflammatory joint pain continues.

## 2021-11-11 NOTE — NURSING NOTE
"Chief Complaint   [facility]        Vitals:    11/11/21 1034   BP: 135/84   BP Location: Left arm   Patient Position: Sitting   Cuff Size: Adult Regular   Pulse: 73   Temp: 98.5  F (36.9  C)   TempSrc: Oral   SpO2: 100%   Weight: 67.7 kg (149 lb 3.2 oz)   Height: 1.727 m (5' 8\")       Body mass index is 22.69 kg/m .      Madina Whitney MA  "

## 2021-11-17 NOTE — PROGRESS NOTES
Subjective:    Patient is seen today as a new pt self referral with a 2 month(s) hx of bilateral heel pain.  Points to the plantarmedial calcaneal tubercle.  Most painful upon rising in a.m. or after prolonged sitting.  Aggravated by activity and relieved by rest.  Has tried changing shoewear, OTC inserts, without much success.  Denies erythema, edema, ecchymosis, numbness, loss of strength.  Has history of gout.  Has history of kidney transplant.  Standing 75 % of time at work.  Wears slippers around the house.    ROS: See above     No Known Allergies    Current Outpatient Medications   Medication Sig Dispense Refill     acetaminophen (TYLENOL) 500 MG tablet Take 2 tablets (1,000 mg) by mouth every 6 hours as needed for mild pain 100 tablet 1     amLODIPine (NORVASC) 5 MG tablet Take 10 mg by mouth daily        carvedilol (COREG) 12.5 MG tablet Take 2 tablets (25 mg) by mouth 2 times daily (with meals)       furosemide (LASIX) 20 MG tablet Take 1 tablet (20 mg) by mouth daily       mycophenolate (GENERIC EQUIVALENT) 250 MG capsule Take 1 capsule (250 mg) by mouth 2 times daily 60 capsule 11     oxyCODONE (ROXICODONE) 15 MG tablet Take 1 tablet (15 mg) by mouth every 6 hours as needed for moderate to severe pain 10 tablet 0     oxyCODONE (ROXICODONE) 5 MG tablet Take 1-2 tablets (5-10 mg) by mouth every 6 hours as needed for pain 12 tablet 0     predniSONE (DELTASONE) 10 MG tablet Take 4 tabs (40 mg) once daily on 10/21, then take 3 tabs (30 mg) daily x3 days, then 2 tabs (20 mg) daily x3 days, then 1 tab (10 mg) x3 days, then discontinue. Last day of steroids on 10/30. 20 tablet 0     sevelamer carbonate (RENVELA) 800 MG tablet Take 1 tablet (800 mg) by mouth 3 times daily (with meals) 90 tablet 0     sulfamethoxazole-trimethoprim (BACTRIM) 400-80 MG tablet Take one tablet every Monday, Wednesday, Friday 45 tablet 3     tacrolimus (GENERIC EQUIVALENT) 0.5 MG capsule Take 1 capsule (0.5 mg) by mouth every evening  Total dose = 1 mg in the AM and 1.5 mg in the PM 30 capsule 11     tacrolimus (GENERIC EQUIVALENT) 1 MG capsule Take 1 capsule (1 mg) by mouth 2 times daily . Total dose=1mg in the AM and 1.5mg in the PM 60 capsule 11     vitamin E (TOCOPHEROL) 400 units (180 mg) capsule Take 1 capsule (400 Units) by mouth daily 30 capsule 0       Patient Active Problem List   Diagnosis     Kidney replaced by transplant     Aftercare following organ transplant     GERD (gastroesophageal reflux disease)     CARDIOVASCULAR SCREENING; LDL GOAL LESS THAN 160     Gout     Antibody mediated rejection of kidney transplant     Medical non-compliance     Chronic kidney disease, stage 4, severely decreased GFR (H)     Herpes simplex infection of penis     Escherichia coli septicemia (H)     Pyelonephritis of transplanted kidney     HTN, kidney transplant related     Metabolic acidosis     CKD (chronic kidney disease) stage 5, GFR less than 15 ml/min (H)     Immunosuppression (H)     Anemia of chronic renal failure, stage 5 (H)     Secondary renal hyperparathyroidism (H)     Vitamin D deficiency     BPH (benign prostatic hyperplasia)     Status post hip surgery     ESRD (end stage renal disease) on dialysis (H)     Primary osteoarthritis of right hip     Avascular necrosis of femoral head, right (H)     Aftercare following hip joint replacement surgery, unspecified laterality     Hip pain, right     Acute pulmonary edema (H)     Shortness of breath     Chest pain, unspecified type       Past Medical History:   Diagnosis Date     AVN (avascular necrosis of bone) (H)     left hip     AVN (avascular necrosis of bone) (H)     left hip     BPH (benign prostatic hyperplasia)      Chronic kidney disease, stage 4, severely decreased GFR (H)      Gastro-oesophageal reflux disease      Gout      History of blood transfusion      Hypertension      Medical non-compliance      Osteonecrosis (H) 7/29/2020    Added automatically from request for surgery  [id number]     Pulmonary nodules      Steroid long-term use      Vitamin D deficiency        Past Surgical History:   Procedure Laterality Date     ARTHROPLASTY HIP Left 2020    Procedure: Left total hip arthroplasty;  Surgeon: Fernando Morillo MD;  [facility]     ARTHROPLASTY HIP Right 2021    Procedure: Right total hip arthroplasty;  Surgeon: Fernando Morillo MD;  [facility]     AV FISTULA OR GRAFT ARTERIAL       COLONOSCOPY N/A 2021    Procedure: COLONOSCOPY, WITH POLYPECTOMY AND BIOPSY;  Surgeon: Zak Ortega MD;  [facility]     CREATE FISTULA ARTERIOVENOUS UPPER EXTREMITY Right 2019    Procedure: Creation Of Atriovenous Fistula Right Upper Arm;  Surgeon: Julia Irwin MD;  [facility]     IR CVC TUNNEL PLACEMENT > 5 YRS OF AGE  10/9/2020     IR DIALYSIS FISTULOGRAM RIGHT  10/9/2020     IR DIALYSIS FISTULOGRAM RIGHT  2021     IR DIALYSIS MECH THROMB, PTA  10/9/2020     IR DIALYSIS STENT W OR W/OUT PTA  2021     LIGATE FISTULA ARTERIOVENOUS UPPER EXTREMITY  2011    Procedure:LIGATE FISTULA ARTERIOVENOUS UPPER EXTREMITY; Excision of Right Forearm Arteriovenous Fistula.; Surgeon:LINDY AMAYA; [facility]     PERCUTANEOUS BIOPSY KIDNEY Right 2017    Procedure: PERCUTANEOUS BIOPSY KIDNEY;  Surgeon: Gee Barrios MD;  [facility]     TRANSPLANT  2011    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       Family History   Problem Relation Age of Onset     Hypertension Mother      Colon Cancer Mother 66     Colon Cancer Brother 51       Social History     Tobacco Use     Smoking status: Former Smoker     Types: Cigarettes     Quit date: 2017     Years since quittin.7     Smokeless tobacco: Never Used   Substance Use Topics     Alcohol use: Not Currently     Alcohol/week: 4.2 standard drinks     Types: 5 Standard drinks or equivalent per week     Comment: 1 shot yesterday         Objective:    Vitals: BP (!) 140/88    Pulse 84   Wt 67.6 kg (149 lb)   BMI 22.66 kg/m    BMI: Body mass index is 22.66 kg/m .  Height: Data Unavailable    Constitutional/ general:  Pt is in no apparent distress, appears well-nourished.  Cooperative with history and physical exam.     Psych:  The patient answered questions appropriately.  Normal affect.  Seems to have reasonable expectations, in terms of treatment.     Lungs:  Non labored breathing, non labored speech. No cough.  No audible wheezing. Even, quiet breathing.       Vascular:  Pedal pulses are palpable bilaterally for both the DP and PT arteries.  CFT < 3 sec. bilateral ankle edema pedal hair growth noted.     Neuro:  Alert and oriented x 3. Coordinated gait.  Light touch sensation is intact to the L4, L5, S1 distributions. No obvious deficits.  No evidence of neurological-based weakness, spasticity, or contracture in the lower extremities.     Derm: Normal texture and turgor.  No erythema, ecchymosis, or cyanosis.  No open lesions.     Musculoskeletal:    Lower extremity muscle strength is normal.  Patient is ambulatory without an assistive device or brace.  No gross deformities.     Pronated arch with weightbearing.   ROM is within normal limits.  Negative tinel's sign.  No side to side compression pain of the calcaneus.  Pain upon palpation to the bilateral plantarmedial  of the calcaneus.  No erythema, edema, ecchymosis, or subcutaneous masses noted.  No pain on palpation or stressing any tendons.  Negative Tinel's sign      Radiographic Exam:  X-Ray Findings:  I personally reviewed the films.  Normal    Assessment:  Plantar Fasciitis right and left foot     Plan:  X-rays from past personally reviewed.  Discussed etiology and treatment options with the patient.  The potential causes and nature of plantar fasciitis were discussed with the patient.  We reviewed the natural history/prognosis of the condition and risks if left untreated.  These include chronic pain, other sites of pain due  to gait changes, and potential plantar fascial rupture.      We discussed possible causes of the condition as it relates to the patients specific situation.      Conservative treatment options were reviewed:  appropriate shoes, avoidance of barefoot walking, inserts/orthoses, stretching, ice, massage, immobilization and NSAIDs.     We also reviewed the options of injection therapy and surgery.  However, it was made clear that surgery is only considered when conservative therapy fails.  The risks and benefits of injection therapy, and surgery were discussed.  Dispensed handout.       After thorough discussion and answering all questions, the patient elected to modifying activities, supportive shoes, ice, stretching, and not going barefoot.  Good house shoes at all times and I made recommendations.  Patient wearing inexpensive tennis shoes today.  He will get better tennis shoes.  Since pronated on his feet we wrote prescription for orthotics.  RTC in 4 weeks if not better otherwise prn.     Jc Denis DPM, FACFAS

## 2021-11-17 NOTE — LETTER
11/17/2021         RE: Rashad Ortiz  [address] [place] [id number]        Dear Colleague,    Thank you for referring your patient, Rashad Ortiz, to the [facility]. Please see a copy of my visit note below.    Subjective:    Patient is seen today as a new pt self referral with a 2 month(s) hx of bilateral heel pain.  Points to the plantarmedial calcaneal tubercle.  Most painful upon rising in a.m. or after prolonged sitting.  Aggravated by activity and relieved by rest.  Has tried changing shoewear, OTC inserts, without much success.  Denies erythema, edema, ecchymosis, numbness, loss of strength.  Has history of gout.  Has history of kidney transplant.  Standing 75 % of time at work.  Wears slippers around the house.    ROS: See above     No Known Allergies    Current Outpatient Medications   Medication Sig Dispense Refill     acetaminophen (TYLENOL) 500 MG tablet Take 2 tablets (1,000 mg) by mouth every 6 hours as needed for mild pain 100 tablet 1     amLODIPine (NORVASC) 5 MG tablet Take 10 mg by mouth daily        carvedilol (COREG) 12.5 MG tablet Take 2 tablets (25 mg) by mouth 2 times daily (with meals)       furosemide (LASIX) 20 MG tablet Take 1 tablet (20 mg) by mouth daily       mycophenolate (GENERIC EQUIVALENT) 250 MG capsule Take 1 capsule (250 mg) by mouth 2 times daily 60 capsule 11     oxyCODONE (ROXICODONE) 15 MG tablet Take 1 tablet (15 mg) by mouth every 6 hours as needed for moderate to severe pain 10 tablet 0     oxyCODONE (ROXICODONE) 5 MG tablet Take 1-2 tablets (5-10 mg) by mouth every 6 hours as needed for pain 12 tablet 0     predniSONE (DELTASONE) 10 MG tablet Take 4 tabs (40 mg) once daily on 10/21, then take 3 tabs (30 mg) daily x3 days, then 2 tabs (20 mg) daily x3 days, then 1 tab (10 mg) x3 days, then discontinue. Last day of steroids on 10/30. 20 tablet 0     sevelamer carbonate (RENVELA) 800 MG tablet Take 1 tablet (800 mg) by mouth 3 times  daily (with meals) 90 tablet 0     sulfamethoxazole-trimethoprim (BACTRIM) 400-80 MG tablet Take one tablet every Monday, Wednesday, Friday 45 tablet 3     tacrolimus (GENERIC EQUIVALENT) 0.5 MG capsule Take 1 capsule (0.5 mg) by mouth every evening Total dose = 1 mg in the AM and 1.5 mg in the PM 30 capsule 11     tacrolimus (GENERIC EQUIVALENT) 1 MG capsule Take 1 capsule (1 mg) by mouth 2 times daily . Total dose=1mg in the AM and 1.5mg in the PM 60 capsule 11     vitamin E (TOCOPHEROL) 400 units (180 mg) capsule Take 1 capsule (400 Units) by mouth daily 30 capsule 0       Patient Active Problem List   Diagnosis     Kidney replaced by transplant     Aftercare following organ transplant     GERD (gastroesophageal reflux disease)     CARDIOVASCULAR SCREENING; LDL GOAL LESS THAN 160     Gout     Antibody mediated rejection of kidney transplant     Medical non-compliance     Chronic kidney disease, stage 4, severely decreased GFR (H)     Herpes simplex infection of penis     Escherichia coli septicemia (H)     Pyelonephritis of transplanted kidney     HTN, kidney transplant related     Metabolic acidosis     CKD (chronic kidney disease) stage 5, GFR less than 15 ml/min (H)     Immunosuppression (H)     Anemia of chronic renal failure, stage 5 (H)     Secondary renal hyperparathyroidism (H)     Vitamin D deficiency     BPH (benign prostatic hyperplasia)     Status post hip surgery     ESRD (end stage renal disease) on dialysis (H)     Primary osteoarthritis of right hip     Avascular necrosis of femoral head, right (H)     Aftercare following hip joint replacement surgery, unspecified laterality     Hip pain, right     Acute pulmonary edema (H)     Shortness of breath     Chest pain, unspecified type       Past Medical History:   Diagnosis Date     AVN (avascular necrosis of bone) (H)     left hip     AVN (avascular necrosis of bone) (H)     left hip     BPH (benign prostatic hyperplasia)      Chronic kidney disease,  stage 4, severely decreased GFR (H)      Gastro-oesophageal reflux disease      Gout      History of blood transfusion      Hypertension      Medical non-compliance      Osteonecrosis (H) 2020    Added automatically from request for surgery [id number]     Pulmonary nodules      Steroid long-term use      Vitamin D deficiency        Past Surgical History:   Procedure Laterality Date     ARTHROPLASTY HIP Left 2020    Procedure: Left total hip arthroplasty;  Surgeon: Fernando Morillo MD;  [facility]     ARTHROPLASTY HIP Right 2021    Procedure: Right total hip arthroplasty;  Surgeon: Fernando Morillo MD;  [facility]     AV FISTULA OR GRAFT ARTERIAL       COLONOSCOPY N/A 2021    Procedure: COLONOSCOPY, WITH POLYPECTOMY AND BIOPSY;  Surgeon: Zak Ortega MD;  [facility]     CREATE FISTULA ARTERIOVENOUS UPPER EXTREMITY Right 2019    Procedure: Creation Of Atriovenous Fistula Right Upper Arm;  Surgeon: Julia Irwin MD;  [facility]     IR CVC TUNNEL PLACEMENT > 5 YRS OF AGE  10/9/2020     IR DIALYSIS FISTULOGRAM RIGHT  10/9/2020     IR DIALYSIS FISTULOGRAM RIGHT  2021     IR DIALYSIS MECH THROMB, PTA  10/9/2020     IR DIALYSIS STENT W OR W/OUT PTA  2021     LIGATE FISTULA ARTERIOVENOUS UPPER EXTREMITY  2011    Procedure:LIGATE FISTULA ARTERIOVENOUS UPPER EXTREMITY; Excision of Right Forearm Arteriovenous Fistula.; Surgeon:LINDY AMAYA; [facility]     PERCUTANEOUS BIOPSY KIDNEY Right 2017    Procedure: PERCUTANEOUS BIOPSY KIDNEY;  Surgeon: Gee Barrios MD;  [facility]     TRANSPLANT  2011    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       Family History   Problem Relation Age of Onset     Hypertension Mother      Colon Cancer Mother 66     Colon Cancer Brother 51       Social History     Tobacco Use     Smoking status: Former Smoker     Types: Cigarettes     Quit date: 2017     Years since quittin.7      Smokeless tobacco: Never Used   Substance Use Topics     Alcohol use: Not Currently     Alcohol/week: 4.2 standard drinks     Types: 5 Standard drinks or equivalent per week     Comment: 1 shot yesterday         Objective:    Vitals: BP (!) 140/88   Pulse 84   Wt 67.6 kg (149 lb)   BMI 22.66 kg/m    BMI: Body mass index is 22.66 kg/m .  Height: Data Unavailable    Constitutional/ general:  Pt is in no apparent distress, appears well-nourished.  Cooperative with history and physical exam.     Psych:  The patient answered questions appropriately.  Normal affect.  Seems to have reasonable expectations, in terms of treatment.     Lungs:  Non labored breathing, non labored speech. No cough.  No audible wheezing. Even, quiet breathing.       Vascular:  Pedal pulses are palpable bilaterally for both the DP and PT arteries.  CFT < 3 sec. bilateral ankle edema pedal hair growth noted.     Neuro:  Alert and oriented x 3. Coordinated gait.  Light touch sensation is intact to the L4, L5, S1 distributions. No obvious deficits.  No evidence of neurological-based weakness, spasticity, or contracture in the lower extremities.     Derm: Normal texture and turgor.  No erythema, ecchymosis, or cyanosis.  No open lesions.     Musculoskeletal:    Lower extremity muscle strength is normal.  Patient is ambulatory without an assistive device or brace.  No gross deformities.     Pronated arch with weightbearing.   ROM is within normal limits.  Negative tinel's sign.  No side to side compression pain of the calcaneus.  Pain upon palpation to the bilateral plantarmedial  of the calcaneus.  No erythema, edema, ecchymosis, or subcutaneous masses noted.  No pain on palpation or stressing any tendons.  Negative Tinel's sign      Radiographic Exam:  X-Ray Findings:  I personally reviewed the films.  Normal    Assessment:  Plantar Fasciitis right and left foot     Plan:  X-rays from past personally reviewed.  Discussed etiology and treatment  options with the patient.  The potential causes and nature of plantar fasciitis were discussed with the patient.  We reviewed the natural history/prognosis of the condition and risks if left untreated.  These include chronic pain, other sites of pain due to gait changes, and potential plantar fascial rupture.      We discussed possible causes of the condition as it relates to the patients specific situation.      Conservative treatment options were reviewed:  appropriate shoes, avoidance of barefoot walking, inserts/orthoses, stretching, ice, massage, immobilization and NSAIDs.     We also reviewed the options of injection therapy and surgery.  However, it was made clear that surgery is only considered when conservative therapy fails.  The risks and benefits of injection therapy, and surgery were discussed.  Dispensed handout.       After thorough discussion and answering all questions, the patient elected to modifying activities, supportive shoes, ice, stretching, and not going barefoot.  Good house shoes at all times and I made recommendations.  Patient wearing inexpensive tennis shoes today.  He will get better tennis shoes.  Since pronated on his feet we wrote prescription for orthotics.  RTC in 4 weeks if not better otherwise prn.     Jc Denis DPM, FACFAS          Again, thank you for allowing me to participate in the care of your patient.        Sincerely,        Jc Denis DPM

## 2021-11-17 NOTE — PATIENT INSTRUCTIONS
We wish you continued good healing. If you have any questions or concerns, please do not hesitate to contact us at  [phone]    [employer] (secure e-mail communication and access to your chart) to send a message or to make an appointment.    Please remember to call and schedule a follow up appointment if one was recommended at your earliest convenience.     PODIATRY CLINIC HOURS  TELEPHONE NUMBER    Dr. Jc CUEVAPSARAY [facility]        Clinics:  Ranjit Harmon CMA   Tuesday 1PM-6PM  [place]  Wednesday 745AM-330PM  [place]  Thursday/Friday 745AM-230PM  [place] SM[facility] APPOINTMENTS  [phone]    [place] APPOINTMENTS  [phone]          If you need a medication refill, please contact us you may need lab work and/or a follow up visit prior to your refill (i.e. Antifungal medications).    If MRI needed please call Imaging at [phone] or [phone]    HOW DO I GET MY KNEE SCOOTER? Knee scooters can be picked up at ANY Medical Supply stores with your knee scooter Prescription.  OR    Bring your signed prescription to an [facility] showroom.

## 2021-11-18 NOTE — TELEPHONE ENCOUNTER
----- Message from Kira Melgar MD sent at 11/17/2021 10:24 AM CST -----  Patient is already established with Dr Dominguez and should continue follow-up. I have not received any messages regarding transfer of care. Thanks   ----- Message -----  From: Mercedes Chiu CMA  Sent: 11/16/2021  11:04 AM CST  To: Kira Melgar MD    Hi Dr. Melgar,    It looks like this patient seen Dr. Dominguez, would you like for us to contact this patient to see if they still need this virtual visit?      Mercedes DOBSON [facility]

## 2021-11-18 NOTE — TELEPHONE ENCOUNTER
Please help patient schedule follow-up visit with Dr. Dominguez. Patient has established care with him already. Please cancel 11/24/21 virtual visit with Dr. Melgar. Thank you.      DELGADO Haro [facility]

## 2021-11-22 NOTE — TELEPHONE ENCOUNTER
Patient scheduled a new appt with Dr. Melgar first, then established care with Dr. Dominguez during hospital visit. Patient is now scheduled for follow-up with Dr. Dominguez.

## 2021-12-17 NOTE — PROGRESS NOTES
RECORDS STATUS - ALL OTHER DIAGNOSIS      RECORDS RECEIVED FROM: [facility]   DATE RECEIVED: 1/12/2022   NOTES STATUS DETAILS   OFFICE NOTE from referring provider     OFFICE NOTE from medical oncologist N/A    DISCHARGE SUMMARY from hospital Complete 10/12/2021 Anemia of chronic renal failure    DISCHARGE REPORT from the ER     OPERATIVE REPORT Complete 4/7/2021 Colonoscopy    MEDICATION LIST Complete  [facility]   CLINICAL TRIAL TREATMENTS TO DATE     LABS     PATHOLOGY REPORTS     ANYTHING RELATED TO DIAGNOSIS Complete Labs last updated on 11/11/2021   GENONOMIC TESTING     TYPE:     IMAGING (NEED IMAGES & REPORT)     CT SCANS Complete CTA Chest 10/13/2021   MRI     Xray Chest Complete 10/16/2021, 10/12/2021   MAMMO     ULTRASOUND Complete US lower Extremity    PET       Action    Action Taken 1/13/2022 1:50pM TAVO Burgess needs the following records:   all records from Nephrologist and dialysis   Especially what has been done for anemia, like IV iron records   Any EPO given or not and when and how much?    Action:   I called pt Rasahd - his additional outside labs are at [facility] in [place]. [facility] in Care[facility] but maybe we are missing some notes.     I called [facility] to find out more information and to request labs/records.  Phone: [phone] - they recommended I call the clinic directly at Ph: [phone]. I left a message requesting a call back.     1/14/2022 11:50AM KEJOSE   [facility].. someone answered the phone and transferred me to a nurse. The phone was silent for a while, I called back again.. I was able to reach someone- they have been swamped lately but were planning to send out the fax. Records should be sent over to us soon!     1/18/2022 9:07AM KEJOSE   I called [facility] again.. still haven't received a fax.. I was able to speak to someone live and they transferred the call... I spoke to Brianna in Medical Records- she pulled up pt Rashad's chart.     Rashad has been to  "two different [facility] locations (records aren't centralized). Rashad had been going to the [facility] location in [place] since 2021 - Jan 11th. They will email me records soon.     He switched to the [facility] location on Jan 12th.   I called the [facility] Clinic PH: [phone] (Alessandra) - she will fax over recent records and labs!     1/18/2022 10:21AM KEB   I faxed records from the [facility] to HIM    2pm KEB   The [facility] office emailed me the following message and didn't send any records:     \"I am sorry I was not able to pull the full records needed as the patient is inactive in my clinic at this time, as well any records sent must be for dialysis related needs or we would need a signed release authorization.  However I believe our medical records department may be able to help you. They can be reached at [email or web address]     I tried calling the [facility] Office back- phone went to .     I called the [facility] Office again            "

## 2022-01-01 ENCOUNTER — LAB (OUTPATIENT)
Dept: LAB | Facility: CLINIC | Age: 47
End: 2022-01-01
Payer: COMMERCIAL

## 2022-01-01 ENCOUNTER — OFFICE VISIT (OUTPATIENT)
Dept: FAMILY MEDICINE | Facility: CLINIC | Age: 47
End: 2022-01-01
Payer: COMMERCIAL

## 2022-01-01 ENCOUNTER — OFFICE VISIT (OUTPATIENT)
Dept: DERMATOLOGY | Facility: CLINIC | Age: 47
End: 2022-01-01
Payer: COMMERCIAL

## 2022-01-01 ENCOUNTER — TELEPHONE (OUTPATIENT)
Dept: TRANSPLANT | Facility: CLINIC | Age: 47
End: 2022-01-01

## 2022-01-01 ENCOUNTER — APPOINTMENT (OUTPATIENT)
Dept: CT IMAGING | Facility: CLINIC | Age: 47
End: 2022-01-01
Attending: EMERGENCY MEDICINE
Payer: COMMERCIAL

## 2022-01-01 ENCOUNTER — DOCUMENTATION ONLY (OUTPATIENT)
Dept: INTERVENTIONAL RADIOLOGY/VASCULAR | Facility: CLINIC | Age: 47
End: 2022-01-01

## 2022-01-01 ENCOUNTER — DOCUMENTATION ONLY (OUTPATIENT)
Dept: ONCOLOGY | Facility: CLINIC | Age: 47
End: 2022-01-01

## 2022-01-01 ENCOUNTER — APPOINTMENT (OUTPATIENT)
Dept: CARDIOLOGY | Facility: CLINIC | Age: 47
End: 2022-01-01
Attending: PHYSICIAN ASSISTANT
Payer: COMMERCIAL

## 2022-01-01 ENCOUNTER — LAB (OUTPATIENT)
Dept: LAB | Facility: CLINIC | Age: 47
End: 2022-01-01
Payer: MEDICARE

## 2022-01-01 ENCOUNTER — OFFICE VISIT (OUTPATIENT)
Dept: RHEUMATOLOGY | Facility: CLINIC | Age: 47
End: 2022-01-01
Payer: COMMERCIAL

## 2022-01-01 ENCOUNTER — TELEPHONE (OUTPATIENT)
Dept: FAMILY MEDICINE | Facility: CLINIC | Age: 47
End: 2022-01-01
Payer: COMMERCIAL

## 2022-01-01 ENCOUNTER — PRE VISIT (OUTPATIENT)
Dept: ONCOLOGY | Facility: CLINIC | Age: 47
End: 2022-01-01

## 2022-01-01 ENCOUNTER — ANCILLARY PROCEDURE (OUTPATIENT)
Dept: GENERAL RADIOLOGY | Facility: CLINIC | Age: 47
End: 2022-01-01
Attending: PHYSICIAN ASSISTANT
Payer: COMMERCIAL

## 2022-01-01 ENCOUNTER — OFFICE VISIT (OUTPATIENT)
Dept: NEPHROLOGY | Facility: CLINIC | Age: 47
End: 2022-01-01
Attending: INTERNAL MEDICINE
Payer: COMMERCIAL

## 2022-01-01 ENCOUNTER — TELEPHONE (OUTPATIENT)
Dept: NEPHROLOGY | Facility: CLINIC | Age: 47
End: 2022-01-01

## 2022-01-01 ENCOUNTER — VIRTUAL VISIT (OUTPATIENT)
Dept: ONCOLOGY | Facility: CLINIC | Age: 47
End: 2022-01-01
Payer: MEDICARE

## 2022-01-01 ENCOUNTER — HOSPITAL ENCOUNTER (OUTPATIENT)
Dept: CARDIOLOGY | Facility: CLINIC | Age: 47
Discharge: HOME | End: 2022-05-19
Attending: FAMILY MEDICINE
Payer: COMMERCIAL

## 2022-01-01 ENCOUNTER — TRANSFERRED RECORDS (OUTPATIENT)
Dept: HEALTH INFORMATION MANAGEMENT | Facility: CLINIC | Age: 47
End: 2022-01-01

## 2022-01-01 ENCOUNTER — MYC MEDICAL ADVICE (OUTPATIENT)
Dept: FAMILY MEDICINE | Facility: CLINIC | Age: 47
End: 2022-01-01
Payer: COMMERCIAL

## 2022-01-01 ENCOUNTER — PATIENT OUTREACH (OUTPATIENT)
Dept: NEPHROLOGY | Facility: CLINIC | Age: 47
End: 2022-01-01

## 2022-01-01 ENCOUNTER — PRE VISIT (OUTPATIENT)
Dept: PULMONOLOGY | Facility: CLINIC | Age: 47
End: 2022-01-01

## 2022-01-01 ENCOUNTER — DOCUMENTATION ONLY (OUTPATIENT)
Dept: PHARMACY | Facility: CLINIC | Age: 47
End: 2022-01-01

## 2022-01-01 ENCOUNTER — HOSPITAL ENCOUNTER (OUTPATIENT)
Facility: CLINIC | Age: 47
Setting detail: OBSERVATION
Discharge: HOME | End: 2022-05-11
Attending: EMERGENCY MEDICINE | Admitting: INTERNAL MEDICINE
Payer: COMMERCIAL

## 2022-01-01 ENCOUNTER — HOSPITAL ENCOUNTER (OUTPATIENT)
Facility: CLINIC | Age: 47
End: 2022-01-01
Admitting: INTERNAL MEDICINE
Payer: MEDICARE

## 2022-01-01 ENCOUNTER — TRANSFERRED RECORDS (OUTPATIENT)
Dept: HEALTH INFORMATION MANAGEMENT | Facility: CLINIC | Age: 47
End: 2022-01-01
Payer: COMMERCIAL

## 2022-01-01 ENCOUNTER — TELEPHONE (OUTPATIENT)
Facility: CLINIC | Age: 47
End: 2022-01-01

## 2022-01-01 ENCOUNTER — ANCILLARY PROCEDURE (OUTPATIENT)
Dept: ULTRASOUND IMAGING | Facility: CLINIC | Age: 47
End: 2022-01-01
Attending: SURGERY
Payer: MEDICARE

## 2022-01-01 VITALS
HEIGHT: 68 IN | TEMPERATURE: 97.7 F | OXYGEN SATURATION: 99 % | DIASTOLIC BLOOD PRESSURE: 83 MMHG | WEIGHT: 150 LBS | BODY MASS INDEX: 22.73 KG/M2 | SYSTOLIC BLOOD PRESSURE: 136 MMHG | HEART RATE: 69 BPM

## 2022-01-01 VITALS
DIASTOLIC BLOOD PRESSURE: 71 MMHG | HEIGHT: 68 IN | BODY MASS INDEX: 22.82 KG/M2 | HEART RATE: 69 BPM | WEIGHT: 150.6 LBS | SYSTOLIC BLOOD PRESSURE: 108 MMHG | OXYGEN SATURATION: 99 %

## 2022-01-01 VITALS
HEIGHT: 68 IN | BODY MASS INDEX: 21.08 KG/M2 | OXYGEN SATURATION: 100 % | DIASTOLIC BLOOD PRESSURE: 82 MMHG | WEIGHT: 139.1 LBS | HEART RATE: 79 BPM | SYSTOLIC BLOOD PRESSURE: 120 MMHG

## 2022-01-01 VITALS
BODY MASS INDEX: 22.91 KG/M2 | HEART RATE: 67 BPM | TEMPERATURE: 96.3 F | OXYGEN SATURATION: 100 % | HEIGHT: 68 IN | RESPIRATION RATE: 16 BRPM | WEIGHT: 151.2 LBS | DIASTOLIC BLOOD PRESSURE: 72 MMHG | SYSTOLIC BLOOD PRESSURE: 120 MMHG

## 2022-01-01 DIAGNOSIS — Z99.2 ESRD (END STAGE RENAL DISEASE) ON DIALYSIS (H): Primary | ICD-10-CM

## 2022-01-01 DIAGNOSIS — L70.0 OPEN COMEDONE: ICD-10-CM

## 2022-01-01 DIAGNOSIS — Z79.899 ENCOUNTER FOR LONG-TERM CURRENT USE OF MEDICATION: ICD-10-CM

## 2022-01-01 DIAGNOSIS — D84.9 IMMUNOSUPPRESSION (H): Primary | ICD-10-CM

## 2022-01-01 DIAGNOSIS — Z29.89 NEED FOR PNEUMOCYSTIS PROPHYLAXIS: Primary | ICD-10-CM

## 2022-01-01 DIAGNOSIS — Z48.298 AFTERCARE FOLLOWING ORGAN TRANSPLANT: ICD-10-CM

## 2022-01-01 DIAGNOSIS — Z01.818 PRE-TRANSPLANT EVALUATION FOR KIDNEY TRANSPLANT: ICD-10-CM

## 2022-01-01 DIAGNOSIS — Z85.828 HISTORY OF NONMELANOMA SKIN CANCER: ICD-10-CM

## 2022-01-01 DIAGNOSIS — R30.0 DYSURIA: ICD-10-CM

## 2022-01-01 DIAGNOSIS — D64.9 ANEMIA, UNSPECIFIED TYPE: Primary | ICD-10-CM

## 2022-01-01 DIAGNOSIS — Z29.89 NEED FOR PNEUMOCYSTIS PROPHYLAXIS: ICD-10-CM

## 2022-01-01 DIAGNOSIS — Z02.89 ENCOUNTER FOR EXAMINATION REQUIRED BY DEPARTMENT OF TRANSPORTATION (DOT): ICD-10-CM

## 2022-01-01 DIAGNOSIS — I30.0 IDIOPATHIC PERICARDITIS, UNSPECIFIED CHRONICITY: ICD-10-CM

## 2022-01-01 DIAGNOSIS — N25.81 SECONDARY RENAL HYPERPARATHYROIDISM (H): ICD-10-CM

## 2022-01-01 DIAGNOSIS — Z11.52 ENCOUNTER FOR SCREENING LABORATORY TESTING FOR SEVERE ACUTE RESPIRATORY SYNDROME CORONAVIRUS 2 (SARS-COV-2): ICD-10-CM

## 2022-01-01 DIAGNOSIS — R22.2 CHEST WALL MASS: Primary | ICD-10-CM

## 2022-01-01 DIAGNOSIS — Z76.82 AWAITING ORGAN TRANSPLANT: ICD-10-CM

## 2022-01-01 DIAGNOSIS — Z79.60 LONG-TERM USE OF IMMUNOSUPPRESSANT MEDICATION: ICD-10-CM

## 2022-01-01 DIAGNOSIS — R06.00 DYSPNEA, UNSPECIFIED: ICD-10-CM

## 2022-01-01 DIAGNOSIS — N18.6 ESRD (END STAGE RENAL DISEASE) ON DIALYSIS (H): ICD-10-CM

## 2022-01-01 DIAGNOSIS — Z76.82 AWAITING ORGAN TRANSPLANT: Primary | ICD-10-CM

## 2022-01-01 DIAGNOSIS — T86.12 KIDNEY TRANSPLANT FAILURE: ICD-10-CM

## 2022-01-01 DIAGNOSIS — Z94.0 KIDNEY TRANSPLANTED: ICD-10-CM

## 2022-01-01 DIAGNOSIS — T86.91 TRANSPLANT FAILURE DUE TO REJECTION: ICD-10-CM

## 2022-01-01 DIAGNOSIS — D64.9 ANEMIA, UNSPECIFIED TYPE: ICD-10-CM

## 2022-01-01 DIAGNOSIS — I15.1 HTN, KIDNEY TRANSPLANT RELATED: ICD-10-CM

## 2022-01-01 DIAGNOSIS — Z94.0 KIDNEY REPLACED BY TRANSPLANT: ICD-10-CM

## 2022-01-01 DIAGNOSIS — R06.02 SOB (SHORTNESS OF BREATH): ICD-10-CM

## 2022-01-01 DIAGNOSIS — I27.20 PULMONARY HYPERTENSION (H): ICD-10-CM

## 2022-01-01 DIAGNOSIS — M25.50 ARTHRALGIA, UNSPECIFIED JOINT: ICD-10-CM

## 2022-01-01 DIAGNOSIS — N18.6 ESRD (END STAGE RENAL DISEASE) ON DIALYSIS (H): Primary | ICD-10-CM

## 2022-01-01 DIAGNOSIS — Z94.0 KIDNEY TRANSPLANTED: Primary | ICD-10-CM

## 2022-01-01 DIAGNOSIS — Z99.2 ESRD (END STAGE RENAL DISEASE) ON DIALYSIS (H): ICD-10-CM

## 2022-01-01 DIAGNOSIS — I31.9 PERICARDITIS, UNSPECIFIED CHRONICITY, UNSPECIFIED TYPE: ICD-10-CM

## 2022-01-01 DIAGNOSIS — N18.6 END STAGE RENAL DISEASE (H): ICD-10-CM

## 2022-01-01 DIAGNOSIS — T82.898A PROBLEM WITH DIALYSIS ACCESS, INITIAL ENCOUNTER (H): ICD-10-CM

## 2022-01-01 DIAGNOSIS — R30.0 DYSURIA: Primary | ICD-10-CM

## 2022-01-01 DIAGNOSIS — N02.B9 IGA NEPHROPATHY: ICD-10-CM

## 2022-01-01 DIAGNOSIS — Z11.59 ENCOUNTER FOR SCREENING FOR OTHER VIRAL DISEASES: ICD-10-CM

## 2022-01-01 DIAGNOSIS — I51.89 OTHER ILL-DEFINED HEART DISEASES: Primary | ICD-10-CM

## 2022-01-01 DIAGNOSIS — I25.10 CARDIOVASCULAR DISEASE: ICD-10-CM

## 2022-01-01 DIAGNOSIS — R22.2 CHEST WALL MASS: ICD-10-CM

## 2022-01-01 DIAGNOSIS — I10 ESSENTIAL HYPERTENSION: ICD-10-CM

## 2022-01-01 DIAGNOSIS — L81.9 POST-INFLAMMATORY PIGMENTARY CHANGES: ICD-10-CM

## 2022-01-01 DIAGNOSIS — L82.1 SK (SEBORRHEIC KERATOSIS): ICD-10-CM

## 2022-01-01 DIAGNOSIS — Z91.199 PERSONAL HISTORY OF NONCOMPLIANCE WITH MEDICAL TREATMENT, PRESENTING HAZARDS TO HEALTH: ICD-10-CM

## 2022-01-01 DIAGNOSIS — Z76.82 ORGAN TRANSPLANT CANDIDATE: ICD-10-CM

## 2022-01-01 DIAGNOSIS — Z94.0 KIDNEY REPLACED BY TRANSPLANT: Primary | ICD-10-CM

## 2022-01-01 DIAGNOSIS — Z94.0 HTN, KIDNEY TRANSPLANT RELATED: ICD-10-CM

## 2022-01-01 DIAGNOSIS — T82.898A PROBLEM WITH DIALYSIS ACCESS, INITIAL ENCOUNTER (H): Primary | ICD-10-CM

## 2022-01-01 LAB
ALBUMIN SERPL-MCNC: 3.6 G/DL (ref 3.4–5)
ALP SERPL-CCNC: 95 U/L (ref 40–150)
ALT SERPL W P-5'-P-CCNC: 12 U/L (ref 0–70)
ANA SER QL IF: NEGATIVE
ANCA AB PATTERN SER IF-IMP: ABNORMAL
ANION GAP SERPL CALCULATED.3IONS-SCNC: 11 MMOL/L (ref 3–14)
AST SERPL W P-5'-P-CCNC: 7 U/L (ref 0–45)
ATRIAL RATE - MUSE: 66 BPM
BASOPHILS # BLD AUTO: 0 10E3/UL (ref 0–0.2)
BASOPHILS # BLD AUTO: 0 10E3/UL (ref 0–0.2)
BASOPHILS NFR BLD AUTO: 0 %
BASOPHILS NFR BLD AUTO: 0 %
BILIRUB SERPL-MCNC: 0.8 MG/DL (ref 0.2–1.3)
BUN SERPL-MCNC: 48 MG/DL (ref 7–30)
BUN SERPL-MCNC: 53 MG/DL (ref 7–30)
BUN SERPL-MCNC: 80 MG/DL (ref 7–30)
C-ANCA TITR SER IF: ABNORMAL {TITER}
C3 SERPL-MCNC: 108 MG/DL (ref 81–157)
C4 SERPL-MCNC: 41 MG/DL (ref 13–39)
CALCIUM SERPL-MCNC: 8.4 MG/DL (ref 8.5–10.1)
CALCIUM SERPL-MCNC: 8.5 MG/DL (ref 8.5–10.1)
CALCIUM SERPL-MCNC: 9.4 MG/DL (ref 8.5–10.1)
CCP AB SER IA-ACNC: 4.4 U/ML
CCP AB SER IA-ACNC: 7 U/ML
CD3 CELLS # BLD: 1069 CELLS/UL (ref 603–2990)
CD3 CELLS NFR BLD: 69 % (ref 49–84)
CD3+CD4+ CELLS # BLD: 604 CELLS/UL (ref 441–2156)
CD3+CD4+ CELLS NFR BLD: 39 % (ref 28–63)
CD3+CD4+ CELLS/CD3+CD8+ CLL BLD: 1.4 % (ref 1.4–2.6)
CD3+CD8+ CELLS # BLD: 430 CELLS/UL (ref 125–1312)
CD3+CD8+ CELLS NFR BLD: 28 % (ref 10–40)
CHLORIDE BLD-SCNC: 100 MMOL/L (ref 94–109)
CHLORIDE BLD-SCNC: 105 MMOL/L (ref 94–109)
CHLORIDE BLD-SCNC: 99 MMOL/L (ref 94–109)
CK SERPL-CCNC: 87 U/L (ref 30–300)
CO2 SERPL-SCNC: 24 MMOL/L (ref 20–32)
CO2 SERPL-SCNC: 24 MMOL/L (ref 20–32)
CO2 SERPL-SCNC: 25 MMOL/L (ref 20–32)
CREAT SERPL-MCNC: 11.3 MG/DL (ref 0.66–1.25)
CREAT SERPL-MCNC: 16 MG/DL (ref 0.66–1.25)
CREAT SERPL-MCNC: 9.35 MG/DL (ref 0.66–1.25)
CRP SERPL-MCNC: 17.8 MG/L (ref 0–8)
CRP SERPL-MCNC: 25 MG/L (ref 0–8)
CRP SERPL-MCNC: 30.7 MG/L (ref 0–8)
DIASTOLIC BLOOD PRESSURE - MUSE: NORMAL MMHG
EOSINOPHIL # BLD AUTO: 0.1 10E3/UL (ref 0–0.7)
EOSINOPHIL # BLD AUTO: 0.2 10E3/UL (ref 0–0.7)
EOSINOPHIL NFR BLD AUTO: 2 %
EOSINOPHIL NFR BLD AUTO: 3 %
ERYTHROCYTE [DISTWIDTH] IN BLOOD BY AUTOMATED COUNT: 18.7 % (ref 10–15)
ERYTHROCYTE [DISTWIDTH] IN BLOOD BY AUTOMATED COUNT: 18.9 % (ref 10–15)
ERYTHROCYTE [DISTWIDTH] IN BLOOD BY AUTOMATED COUNT: 19.3 % (ref 10–15)
ERYTHROCYTE [DISTWIDTH] IN BLOOD BY AUTOMATED COUNT: 20.4 % (ref 10–15)
ERYTHROCYTE [SEDIMENTATION RATE] IN BLOOD BY WESTERGREN METHOD: 100 MM/HR (ref 0–15)
ERYTHROCYTE [SEDIMENTATION RATE] IN BLOOD BY WESTERGREN METHOD: 80 MM/HR (ref 0–15)
FERRITIN SERPL-MCNC: 1054 NG/ML (ref 26–388)
FOLATE SERPL-MCNC: 8.6 NG/ML
GFR SERPL CREATININE-BSD FRML MDRD: 3 ML/MIN/1.73M2
GFR SERPL CREATININE-BSD FRML MDRD: 5 ML/MIN/1.73M2
GFR SERPL CREATININE-BSD FRML MDRD: 6 ML/MIN/1.73M2
GLUCOSE BLD-MCNC: 110 MG/DL (ref 70–99)
GLUCOSE BLD-MCNC: 112 MG/DL (ref 70–99)
GLUCOSE BLD-MCNC: 93 MG/DL (ref 70–99)
HBV SURFACE AB SERPL IA-ACNC: 0.06 M[IU]/ML
HBV SURFACE AG SERPL QL IA: NONREACTIVE
HCT VFR BLD AUTO: 29.4 % (ref 40–53)
HCT VFR BLD AUTO: 33 % (ref 40–53)
HCT VFR BLD AUTO: 35.1 % (ref 40–53)
HCT VFR BLD AUTO: 35.6 % (ref 40–53)
HGB BLD-MCNC: 10.2 G/DL (ref 13.3–17.7)
HGB BLD-MCNC: 10.7 G/DL (ref 13.3–17.7)
HGB BLD-MCNC: 10.8 G/DL (ref 13.3–17.7)
HGB BLD-MCNC: 8.8 G/DL (ref 13.3–17.7)
HOLD SPECIMEN: NORMAL
HOLD SPECIMEN: NORMAL
IMM GRANULOCYTES # BLD: 0 10E3/UL
IMM GRANULOCYTES # BLD: 0 10E3/UL
IMM GRANULOCYTES NFR BLD: 0 %
IMM GRANULOCYTES NFR BLD: 0 %
INTERPRETATION ECG - MUSE: NORMAL
IRON SATN MFR SERPL: 29 % (ref 15–46)
IRON SERPL-MCNC: 46 UG/DL (ref 35–180)
LVEF ECHO: NORMAL
LYMPHOCYTES # BLD AUTO: 1 10E3/UL (ref 0.8–5.3)
LYMPHOCYTES # BLD AUTO: 1 10E3/UL (ref 0.8–5.3)
LYMPHOCYTES NFR BLD AUTO: 16 %
LYMPHOCYTES NFR BLD AUTO: 28 %
MCH RBC QN AUTO: 25.6 PG (ref 26.5–33)
MCH RBC QN AUTO: 26.1 PG (ref 26.5–33)
MCH RBC QN AUTO: 26.2 PG (ref 26.5–33)
MCH RBC QN AUTO: 26.4 PG (ref 26.5–33)
MCHC RBC AUTO-ENTMCNC: 29.9 G/DL (ref 31.5–36.5)
MCHC RBC AUTO-ENTMCNC: 30.1 G/DL (ref 31.5–36.5)
MCHC RBC AUTO-ENTMCNC: 30.8 G/DL (ref 31.5–36.5)
MCHC RBC AUTO-ENTMCNC: 30.9 G/DL (ref 31.5–36.5)
MCV RBC AUTO: 85 FL (ref 78–100)
MCV RBC AUTO: 86 FL (ref 78–100)
MCV RBC AUTO: 86 FL (ref 78–100)
MCV RBC AUTO: 87 FL (ref 78–100)
MONOCYTES # BLD AUTO: 0.3 10E3/UL (ref 0–1.3)
MONOCYTES # BLD AUTO: 0.4 10E3/UL (ref 0–1.3)
MONOCYTES NFR BLD AUTO: 7 %
MONOCYTES NFR BLD AUTO: 7 %
MYELOPEROXIDASE AB SER IA-ACNC: 0.3 U/ML
MYELOPEROXIDASE AB SER IA-ACNC: NEGATIVE
NEUTROPHILS # BLD AUTO: 2.4 10E3/UL (ref 1.6–8.3)
NEUTROPHILS # BLD AUTO: 4.4 10E3/UL (ref 1.6–8.3)
NEUTROPHILS NFR BLD AUTO: 63 %
NEUTROPHILS NFR BLD AUTO: 74 %
NRBC # BLD AUTO: 0 10E3/UL
NRBC BLD AUTO-RTO: 0 /100
P AXIS - MUSE: 59 DEGREES
PATH REPORT.COMMENTS IMP SPEC: NORMAL
PATH REPORT.COMMENTS IMP SPEC: NORMAL
PATH REPORT.FINAL DX SPEC: NORMAL
PATH REPORT.MICROSCOPIC SPEC OTHER STN: NORMAL
PATH REPORT.MICROSCOPIC SPEC OTHER STN: NORMAL
PATH REPORT.RELEVANT HX SPEC: NORMAL
PHOSPHATE SERPL-MCNC: 3 MG/DL (ref 2.5–4.5)
PLATELET # BLD AUTO: 150 10E3/UL (ref 150–450)
PLATELET # BLD AUTO: 166 10E3/UL (ref 150–450)
PLATELET # BLD AUTO: 181 10E3/UL (ref 150–450)
PLATELET # BLD AUTO: 203 10E3/UL (ref 150–450)
POTASSIUM BLD-SCNC: 3.8 MMOL/L (ref 3.4–5.3)
POTASSIUM BLD-SCNC: 4.5 MMOL/L (ref 3.4–5.3)
POTASSIUM BLD-SCNC: 4.7 MMOL/L (ref 3.4–5.3)
PR INTERVAL - MUSE: 150 MS
PROT SERPL-MCNC: 7.6 G/DL (ref 6.8–8.8)
PROTEINASE3 AB SER IA-ACNC: <1 U/ML
PROTEINASE3 AB SER IA-ACNC: NEGATIVE
QRS DURATION - MUSE: 92 MS
QT - MUSE: 404 MS
QTC - MUSE: 423 MS
R AXIS - MUSE: 61 DEGREES
RBC # BLD AUTO: 3.44 10E6/UL (ref 4.4–5.9)
RBC # BLD AUTO: 3.86 10E6/UL (ref 4.4–5.9)
RBC # BLD AUTO: 4.1 10E6/UL (ref 4.4–5.9)
RBC # BLD AUTO: 4.13 10E6/UL (ref 4.4–5.9)
RETICS # AUTO: 0.07 10E6/UL (ref 0.03–0.1)
RETICS/RBC NFR AUTO: 2 % (ref 0.5–2)
SARS-COV-2 RNA RESP QL NAA+PROBE: NEGATIVE
SODIUM SERPL-SCNC: 135 MMOL/L (ref 133–144)
SODIUM SERPL-SCNC: 135 MMOL/L (ref 133–144)
SODIUM SERPL-SCNC: 140 MMOL/L (ref 133–144)
SYSTOLIC BLOOD PRESSURE - MUSE: NORMAL MMHG
T AXIS - MUSE: 36 DEGREES
T CELL COMMENT: NORMAL
TIBC SERPL-MCNC: 156 UG/DL (ref 240–430)
TROPONIN I SERPL HS-MCNC: 22 NG/L
TROPONIN I SERPL HS-MCNC: 25 NG/L
URATE SERPL-MCNC: 5.8 MG/DL (ref 3.5–7.2)
VENTRICULAR RATE- MUSE: 66 BPM
VIT B12 SERPL-MCNC: 387 PG/ML (ref 193–986)
WBC # BLD AUTO: 3.8 10E3/UL (ref 4–11)
WBC # BLD AUTO: 4.3 10E3/UL (ref 4–11)
WBC # BLD AUTO: 4.5 10E3/UL (ref 4–11)
WBC # BLD AUTO: 6 10E3/UL (ref 4–11)

## 2022-01-01 PROCEDURE — 36415 COLL VENOUS BLD VENIPUNCTURE: CPT | Performed by: EMERGENCY MEDICINE

## 2022-01-01 PROCEDURE — 71275 CT ANGIOGRAPHY CHEST: CPT | Mod: ME

## 2022-01-01 PROCEDURE — 250N000013 HC RX MED GY IP 250 OP 250 PS 637: Performed by: EMERGENCY MEDICINE

## 2022-01-01 PROCEDURE — 90937 HEMODIALYSIS REPEATED EVAL: CPT

## 2022-01-01 PROCEDURE — 99207 PR APP CREDIT; MD BILLING SHARED VISIT: CPT | Performed by: PHYSICIAN ASSISTANT

## 2022-01-01 PROCEDURE — 250N000013 HC RX MED GY IP 250 OP 250 PS 637: Performed by: PHYSICIAN ASSISTANT

## 2022-01-01 PROCEDURE — 80048 BASIC METABOLIC PNL TOTAL CA: CPT | Performed by: PHYSICIAN ASSISTANT

## 2022-01-01 PROCEDURE — 99213 OFFICE O/P EST LOW 20 MIN: CPT | Performed by: DERMATOLOGY

## 2022-01-01 PROCEDURE — 85652 RBC SED RATE AUTOMATED: CPT

## 2022-01-01 PROCEDURE — G1004 CDSM NDSC: HCPCS

## 2022-01-01 PROCEDURE — 99443 PR PHYSICIAN TELEPHONE EVALUATION 21-30 MIN: CPT | Performed by: INTERNAL MEDICINE

## 2022-01-01 PROCEDURE — 250N000011 HC RX IP 250 OP 636: Performed by: PHYSICIAN ASSISTANT

## 2022-01-01 PROCEDURE — 99214 OFFICE O/P EST MOD 30 MIN: CPT | Performed by: PHYSICIAN ASSISTANT

## 2022-01-01 PROCEDURE — 99223 1ST HOSP IP/OBS HIGH 75: CPT | Mod: GC | Performed by: INTERNAL MEDICINE

## 2022-01-01 PROCEDURE — 999N000208 ECHO STRESS ECHOCARDIOGRAM

## 2022-01-01 PROCEDURE — 85060 BLOOD SMEAR INTERPRETATION: CPT | Performed by: PATHOLOGY

## 2022-01-01 PROCEDURE — 258N000003 HC RX IP 258 OP 636: Performed by: INTERNAL MEDICINE

## 2022-01-01 PROCEDURE — 82746 ASSAY OF FOLIC ACID SERUM: CPT

## 2022-01-01 PROCEDURE — 93306 TTE W/DOPPLER COMPLETE: CPT

## 2022-01-01 PROCEDURE — G1004 CDSM NDSC: HCPCS | Mod: GC | Performed by: RADIOLOGY

## 2022-01-01 PROCEDURE — 84100 ASSAY OF PHOSPHORUS: CPT | Performed by: INTERNAL MEDICINE

## 2022-01-01 PROCEDURE — 85014 HEMATOCRIT: CPT | Performed by: EMERGENCY MEDICINE

## 2022-01-01 PROCEDURE — 93350 STRESS TTE ONLY: CPT | Mod: 26 | Performed by: INTERNAL MEDICINE

## 2022-01-01 PROCEDURE — 86038 ANTINUCLEAR ANTIBODIES: CPT

## 2022-01-01 PROCEDURE — 93306 TTE W/DOPPLER COMPLETE: CPT | Mod: 26 | Performed by: STUDENT IN AN ORGANIZED HEALTH CARE EDUCATION/TRAINING PROGRAM

## 2022-01-01 PROCEDURE — 93005 ELECTROCARDIOGRAM TRACING: CPT

## 2022-01-01 PROCEDURE — G0378 HOSPITAL OBSERVATION PER HR: HCPCS

## 2022-01-01 PROCEDURE — 82550 ASSAY OF CK (CPK): CPT | Performed by: STUDENT IN AN ORGANIZED HEALTH CARE EDUCATION/TRAINING PROGRAM

## 2022-01-01 PROCEDURE — 84484 ASSAY OF TROPONIN QUANT: CPT | Performed by: EMERGENCY MEDICINE

## 2022-01-01 PROCEDURE — 86160 COMPLEMENT ANTIGEN: CPT | Performed by: STUDENT IN AN ORGANIZED HEALTH CARE EDUCATION/TRAINING PROGRAM

## 2022-01-01 PROCEDURE — 85652 RBC SED RATE AUTOMATED: CPT | Performed by: STUDENT IN AN ORGANIZED HEALTH CARE EDUCATION/TRAINING PROGRAM

## 2022-01-01 PROCEDURE — 90935 HEMODIALYSIS ONE EVALUATION: CPT | Performed by: INTERNAL MEDICINE

## 2022-01-01 PROCEDURE — 80053 COMPREHEN METABOLIC PANEL: CPT | Performed by: EMERGENCY MEDICINE

## 2022-01-01 PROCEDURE — 86359 T CELLS TOTAL COUNT: CPT

## 2022-01-01 PROCEDURE — 85027 COMPLETE CBC AUTOMATED: CPT | Performed by: PHYSICIAN ASSISTANT

## 2022-01-01 PROCEDURE — 82607 VITAMIN B-12: CPT

## 2022-01-01 PROCEDURE — 84550 ASSAY OF BLOOD/URIC ACID: CPT | Performed by: STUDENT IN AN ORGANIZED HEALTH CARE EDUCATION/TRAINING PROGRAM

## 2022-01-01 PROCEDURE — 93321 DOPPLER ECHO F-UP/LMTD STD: CPT | Mod: 26 | Performed by: INTERNAL MEDICINE

## 2022-01-01 PROCEDURE — 99217 PR OBSERVATION CARE DISCHARGE: CPT | Performed by: INTERNAL MEDICINE

## 2022-01-01 PROCEDURE — 71275 CT ANGIOGRAPHY CHEST: CPT | Mod: 26 | Performed by: RADIOLOGY

## 2022-01-01 PROCEDURE — G0257 UNSCHED DIALYSIS ESRD PT HOS: HCPCS

## 2022-01-01 PROCEDURE — 71046 X-RAY EXAM CHEST 2 VIEWS: CPT | Performed by: RADIOLOGY

## 2022-01-01 PROCEDURE — 83876 ASSAY MYELOPEROXIDASE: CPT | Performed by: STUDENT IN AN ORGANIZED HEALTH CARE EDUCATION/TRAINING PROGRAM

## 2022-01-01 PROCEDURE — 93990 DOPPLER FLOW TESTING: CPT | Performed by: RADIOLOGY

## 2022-01-01 PROCEDURE — 250N000011 HC RX IP 250 OP 636: Performed by: INTERNAL MEDICINE

## 2022-01-01 PROCEDURE — 86036 ANCA SCREEN EACH ANTIBODY: CPT | Performed by: STUDENT IN AN ORGANIZED HEALTH CARE EDUCATION/TRAINING PROGRAM

## 2022-01-01 PROCEDURE — 36415 COLL VENOUS BLD VENIPUNCTURE: CPT | Performed by: INTERNAL MEDICINE

## 2022-01-01 PROCEDURE — 93325 DOPPLER ECHO COLOR FLOW MAPG: CPT | Mod: 26 | Performed by: INTERNAL MEDICINE

## 2022-01-01 PROCEDURE — 99285 EMERGENCY DEPT VISIT HI MDM: CPT | Mod: 25

## 2022-01-01 PROCEDURE — 99223 1ST HOSP IP/OBS HIGH 75: CPT | Mod: AI | Performed by: INTERNAL MEDICINE

## 2022-01-01 PROCEDURE — 36415 COLL VENOUS BLD VENIPUNCTURE: CPT | Performed by: PHYSICIAN ASSISTANT

## 2022-01-01 PROCEDURE — 93010 ELECTROCARDIOGRAM REPORT: CPT | Performed by: EMERGENCY MEDICINE

## 2022-01-01 PROCEDURE — 250N000012 HC RX MED GY IP 250 OP 636 PS 637: Performed by: PHYSICIAN ASSISTANT

## 2022-01-01 PROCEDURE — 85045 AUTOMATED RETICULOCYTE COUNT: CPT

## 2022-01-01 PROCEDURE — 96372 THER/PROPH/DIAG INJ SC/IM: CPT | Performed by: PHYSICIAN ASSISTANT

## 2022-01-01 PROCEDURE — C9803 HOPD COVID-19 SPEC COLLECT: HCPCS

## 2022-01-01 PROCEDURE — U0005 INFEC AGEN DETEC AMPLI PROBE: HCPCS | Performed by: EMERGENCY MEDICINE

## 2022-01-01 PROCEDURE — 250N000012 HC RX MED GY IP 250 OP 636 PS 637: Performed by: EMERGENCY MEDICINE

## 2022-01-01 PROCEDURE — 86706 HEP B SURFACE ANTIBODY: CPT | Performed by: INTERNAL MEDICINE

## 2022-01-01 PROCEDURE — 86140 C-REACTIVE PROTEIN: CPT | Performed by: PHYSICIAN ASSISTANT

## 2022-01-01 PROCEDURE — 36415 COLL VENOUS BLD VENIPUNCTURE: CPT

## 2022-01-01 PROCEDURE — 86200 CCP ANTIBODY: CPT

## 2022-01-01 PROCEDURE — 99214 OFFICE O/P EST MOD 30 MIN: CPT | Performed by: NURSE PRACTITIONER

## 2022-01-01 PROCEDURE — 86200 CCP ANTIBODY: CPT | Performed by: STUDENT IN AN ORGANIZED HEALTH CARE EDUCATION/TRAINING PROGRAM

## 2022-01-01 PROCEDURE — 250N000009 HC RX 250: Performed by: INTERNAL MEDICINE

## 2022-01-01 PROCEDURE — 99213 OFFICE O/P EST LOW 20 MIN: CPT | Performed by: PHYSICIAN ASSISTANT

## 2022-01-01 PROCEDURE — 250N000013 HC RX MED GY IP 250 OP 250 PS 637: Performed by: INTERNAL MEDICINE

## 2022-01-01 PROCEDURE — 99225 PR SUBSEQUENT OBSERVATION CARE,LEVEL II: CPT | Performed by: INTERNAL MEDICINE

## 2022-01-01 PROCEDURE — 87340 HEPATITIS B SURFACE AG IA: CPT | Performed by: INTERNAL MEDICINE

## 2022-01-01 PROCEDURE — 85025 COMPLETE CBC W/AUTO DIFF WBC: CPT

## 2022-01-01 PROCEDURE — 255N000002 HC RX 255 OP 636: Performed by: INTERNAL MEDICINE

## 2022-01-01 PROCEDURE — 93016 CV STRESS TEST SUPVJ ONLY: CPT | Performed by: INTERNAL MEDICINE

## 2022-01-01 PROCEDURE — 86360 T CELL ABSOLUTE COUNT/RATIO: CPT

## 2022-01-01 PROCEDURE — 120N000002 HC R&B MED SURG/OB UMMC

## 2022-01-01 PROCEDURE — 86140 C-REACTIVE PROTEIN: CPT

## 2022-01-01 PROCEDURE — 82728 ASSAY OF FERRITIN: CPT

## 2022-01-01 PROCEDURE — 83550 IRON BINDING TEST: CPT

## 2022-01-01 PROCEDURE — 99214 OFFICE O/P EST MOD 30 MIN: CPT | Performed by: INTERNAL MEDICINE

## 2022-01-01 PROCEDURE — 250N000011 HC RX IP 250 OP 636: Performed by: EMERGENCY MEDICINE

## 2022-01-01 PROCEDURE — 36415 COLL VENOUS BLD VENIPUNCTURE: CPT | Performed by: STUDENT IN AN ORGANIZED HEALTH CARE EDUCATION/TRAINING PROGRAM

## 2022-01-01 PROCEDURE — 99224 PR SUBSEQUENT OBSERVATION CARE,LEVEL I: CPT | Mod: GC | Performed by: INTERNAL MEDICINE

## 2022-01-01 PROCEDURE — 82310 ASSAY OF CALCIUM: CPT | Performed by: INTERNAL MEDICINE

## 2022-01-01 PROCEDURE — 85027 COMPLETE CBC AUTOMATED: CPT | Performed by: INTERNAL MEDICINE

## 2022-01-01 PROCEDURE — 99285 EMERGENCY DEPT VISIT HI MDM: CPT | Mod: 25 | Performed by: EMERGENCY MEDICINE

## 2022-01-01 PROCEDURE — 99218 PR INITIAL OBSERVATION CARE,LEVEL I: CPT | Mod: GC | Performed by: INTERNAL MEDICINE

## 2022-01-01 PROCEDURE — G0463 HOSPITAL OUTPT CLINIC VISIT: HCPCS | Mod: 25

## 2022-01-01 PROCEDURE — 96372 THER/PROPH/DIAG INJ SC/IM: CPT | Mod: XS | Performed by: PHYSICIAN ASSISTANT

## 2022-01-01 PROCEDURE — 93018 CV STRESS TEST I&R ONLY: CPT | Performed by: INTERNAL MEDICINE

## 2022-01-01 RX ORDER — DOXERCALCIFEROL 4 UG/2ML
2 INJECTION INTRAVENOUS
Status: COMPLETED | OUTPATIENT
Start: 2022-01-01 | End: 2022-01-01

## 2022-01-01 RX ORDER — MYCOPHENOLATE MOFETIL 250 MG/1
250 CAPSULE ORAL 2 TIMES DAILY
Status: DISCONTINUED | OUTPATIENT
Start: 2022-01-01 | End: 2022-01-01 | Stop reason: HOSPADM

## 2022-01-01 RX ORDER — CINACALCET 30 MG/1
30 TABLET, FILM COATED ORAL
Status: DISCONTINUED | OUTPATIENT
Start: 2022-01-01 | End: 2022-01-01 | Stop reason: HOSPADM

## 2022-01-01 RX ORDER — CARVEDILOL 12.5 MG/1
25 TABLET ORAL 2 TIMES DAILY WITH MEALS
Qty: 120 TABLET | Refills: 0 | Status: SHIPPED | OUTPATIENT
Start: 2022-01-01

## 2022-01-01 RX ORDER — ONDANSETRON 4 MG/1
4 TABLET, ORALLY DISINTEGRATING ORAL EVERY 6 HOURS PRN
Status: DISCONTINUED | OUTPATIENT
Start: 2022-01-01 | End: 2022-01-01 | Stop reason: HOSPADM

## 2022-01-01 RX ORDER — TACROLIMUS 0.5 MG/1
0.5 CAPSULE ORAL EVERY EVENING
Qty: 30 CAPSULE | Refills: 0 | Status: CANCELLED | OUTPATIENT
Start: 2022-01-01

## 2022-01-01 RX ORDER — PREDNISONE 20 MG/1
40 TABLET ORAL DAILY
Status: DISCONTINUED | OUTPATIENT
Start: 2022-01-01 | End: 2022-01-01

## 2022-01-01 RX ORDER — SULFAMETHOXAZOLE AND TRIMETHOPRIM 400; 80 MG/1; MG/1
TABLET ORAL
Qty: 15 TABLET | Refills: 0 | Status: SHIPPED | OUTPATIENT
Start: 2022-01-01 | End: 2022-01-01

## 2022-01-01 RX ORDER — TACROLIMUS 1 MG/1
1 CAPSULE ORAL 2 TIMES DAILY
Qty: 60 CAPSULE | Refills: 0 | Status: CANCELLED | OUTPATIENT
Start: 2022-01-01

## 2022-01-01 RX ORDER — CARVEDILOL 12.5 MG/1
25 TABLET ORAL 2 TIMES DAILY WITH MEALS
Qty: 120 TABLET | Refills: 0 | Status: CANCELLED | OUTPATIENT
Start: 2022-01-01

## 2022-01-01 RX ORDER — ONDANSETRON 2 MG/ML
4 INJECTION INTRAMUSCULAR; INTRAVENOUS EVERY 6 HOURS PRN
Status: DISCONTINUED | OUTPATIENT
Start: 2022-01-01 | End: 2022-01-01 | Stop reason: HOSPADM

## 2022-01-01 RX ORDER — AMLODIPINE BESYLATE 10 MG/1
10 TABLET ORAL DAILY
COMMUNITY
Start: 2022-01-01

## 2022-01-01 RX ORDER — TACROLIMUS 0.5 MG/1
0.5 CAPSULE ORAL EVERY MORNING
Qty: 30 CAPSULE | Refills: 0 | Status: SHIPPED | OUTPATIENT
Start: 2022-01-01 | End: 2022-11-16

## 2022-01-01 RX ORDER — TACROLIMUS 1 MG/1
1 CAPSULE ORAL EVERY EVENING
Qty: 30 CAPSULE | Refills: 0 | Status: SHIPPED | OUTPATIENT
Start: 2022-01-01

## 2022-01-01 RX ORDER — LIDOCAINE 40 MG/G
CREAM TOPICAL
Status: DISCONTINUED | OUTPATIENT
Start: 2022-01-01 | End: 2022-01-01 | Stop reason: HOSPADM

## 2022-01-01 RX ORDER — SULFAMETHOXAZOLE AND TRIMETHOPRIM 400; 80 MG/1; MG/1
1 TABLET ORAL
Status: DISCONTINUED | OUTPATIENT
Start: 2022-01-01 | End: 2022-01-01 | Stop reason: HOSPADM

## 2022-01-01 RX ORDER — MYCOPHENOLATE MOFETIL 250 MG/1
250 CAPSULE ORAL 2 TIMES DAILY
Qty: 60 CAPSULE | Refills: 0 | Status: CANCELLED | OUTPATIENT
Start: 2022-01-01

## 2022-01-01 RX ORDER — AMLODIPINE BESYLATE 10 MG/1
10 TABLET ORAL DAILY
Status: DISCONTINUED | OUTPATIENT
Start: 2022-01-01 | End: 2022-01-01 | Stop reason: HOSPADM

## 2022-01-01 RX ORDER — TACROLIMUS 1 MG/1
1 CAPSULE ORAL 2 TIMES DAILY
Status: DISCONTINUED | OUTPATIENT
Start: 2022-01-01 | End: 2022-01-01 | Stop reason: HOSPADM

## 2022-01-01 RX ORDER — OXYCODONE HYDROCHLORIDE 5 MG/1
5 TABLET ORAL ONCE
Status: COMPLETED | OUTPATIENT
Start: 2022-01-01 | End: 2022-01-01

## 2022-01-01 RX ORDER — TACROLIMUS 0.5 MG/1
0.5 CAPSULE ORAL EVERY EVENING
Status: DISCONTINUED | OUTPATIENT
Start: 2022-01-01 | End: 2022-01-01 | Stop reason: HOSPADM

## 2022-01-01 RX ORDER — SODIUM CHLORIDE 9 MG/ML
INJECTION, SOLUTION INTRAVENOUS CONTINUOUS
Status: ACTIVE | OUTPATIENT
Start: 2022-01-01 | End: 2022-01-01

## 2022-01-01 RX ORDER — OXYCODONE HYDROCHLORIDE 10 MG/1
10 TABLET ORAL EVERY 4 HOURS PRN
Status: DISCONTINUED | OUTPATIENT
Start: 2022-01-01 | End: 2022-01-01 | Stop reason: HOSPADM

## 2022-01-01 RX ORDER — CINACALCET 30 MG/1
30 TABLET, FILM COATED ORAL
Qty: 30 TABLET | Refills: 3 | Status: SHIPPED | OUTPATIENT
Start: 2022-01-01

## 2022-01-01 RX ORDER — DOBUTAMINE HYDROCHLORIDE 200 MG/100ML
10-50 INJECTION INTRAVENOUS CONTINUOUS
Status: ACTIVE | OUTPATIENT
Start: 2022-01-01 | End: 2022-01-01

## 2022-01-01 RX ORDER — ACETAMINOPHEN 500 MG
1000 TABLET ORAL EVERY 6 HOURS PRN
Status: DISCONTINUED | OUTPATIENT
Start: 2022-01-01 | End: 2022-01-01 | Stop reason: HOSPADM

## 2022-01-01 RX ORDER — ATROPINE SULFATE 0.4 MG/ML
.2-2 AMPUL (ML) INJECTION
Status: COMPLETED | OUTPATIENT
Start: 2022-01-01 | End: 2022-01-01

## 2022-01-01 RX ORDER — METOPROLOL TARTRATE 1 MG/ML
1-20 INJECTION, SOLUTION INTRAVENOUS
Status: ACTIVE | OUTPATIENT
Start: 2022-01-01 | End: 2022-01-01

## 2022-01-01 RX ORDER — SEVELAMER CARBONATE 800 MG/1
800 TABLET, FILM COATED ORAL
Status: DISCONTINUED | OUTPATIENT
Start: 2022-01-01 | End: 2022-01-01 | Stop reason: HOSPADM

## 2022-01-01 RX ORDER — CARVEDILOL 25 MG/1
25 TABLET ORAL 2 TIMES DAILY WITH MEALS
Status: DISCONTINUED | OUTPATIENT
Start: 2022-01-01 | End: 2022-01-01 | Stop reason: HOSPADM

## 2022-01-01 RX ORDER — IOPAMIDOL 755 MG/ML
70 INJECTION, SOLUTION INTRAVASCULAR ONCE
Status: COMPLETED | OUTPATIENT
Start: 2022-01-01 | End: 2022-01-01

## 2022-01-01 RX ORDER — PREDNISONE 20 MG/1
40 TABLET ORAL ONCE
Status: COMPLETED | OUTPATIENT
Start: 2022-01-01 | End: 2022-01-01

## 2022-01-01 RX ORDER — HEPARIN SODIUM 5000 [USP'U]/.5ML
5000 INJECTION, SOLUTION INTRAVENOUS; SUBCUTANEOUS EVERY 12 HOURS
Status: DISCONTINUED | OUTPATIENT
Start: 2022-01-01 | End: 2022-01-01 | Stop reason: HOSPADM

## 2022-01-01 RX ADMIN — AMLODIPINE BESYLATE 10 MG: 10 TABLET ORAL at 12:12

## 2022-01-01 RX ADMIN — TACROLIMUS 1 MG: 1 CAPSULE ORAL at 17:00

## 2022-01-01 RX ADMIN — SODIUM CHLORIDE 250 ML: 9 INJECTION, SOLUTION INTRAVENOUS at 12:34

## 2022-01-01 RX ADMIN — Medication: at 08:22

## 2022-01-01 RX ADMIN — SODIUM CHLORIDE 250 ML: 9 INJECTION, SOLUTION INTRAVENOUS at 08:22

## 2022-01-01 RX ADMIN — HEPARIN SODIUM 5000 UNITS: 5000 INJECTION, SOLUTION INTRAVENOUS; SUBCUTANEOUS at 19:46

## 2022-01-01 RX ADMIN — HEPARIN SODIUM 5000 UNITS: 5000 INJECTION, SOLUTION INTRAVENOUS; SUBCUTANEOUS at 11:55

## 2022-01-01 RX ADMIN — OXYCODONE HYDROCHLORIDE 5 MG: 5 TABLET ORAL at 07:24

## 2022-01-01 RX ADMIN — MYCOPHENOLATE MOFETIL 250 MG: 250 CAPSULE ORAL at 12:01

## 2022-01-01 RX ADMIN — HEPARIN SODIUM 5000 UNITS: 5000 INJECTION, SOLUTION INTRAVENOUS; SUBCUTANEOUS at 21:01

## 2022-01-01 RX ADMIN — OXYCODONE HYDROCHLORIDE 10 MG: 10 TABLET ORAL at 21:01

## 2022-01-01 RX ADMIN — MYCOPHENOLATE MOFETIL 250 MG: 250 CAPSULE ORAL at 21:01

## 2022-01-01 RX ADMIN — IRON SUCROSE 50 MG: 20 INJECTION, SOLUTION INTRAVENOUS at 14:51

## 2022-01-01 RX ADMIN — OXYCODONE HYDROCHLORIDE 10 MG: 10 TABLET ORAL at 08:39

## 2022-01-01 RX ADMIN — ATROPINE SULFATE 0.6 MG: 0.4 INJECTION, SOLUTION INTRAMUSCULAR; INTRAVENOUS; SUBCUTANEOUS at 11:30

## 2022-01-01 RX ADMIN — PREDNISONE 40 MG: 20 TABLET ORAL at 07:24

## 2022-01-01 RX ADMIN — SODIUM CHLORIDE 300 ML: 9 INJECTION, SOLUTION INTRAVENOUS at 08:22

## 2022-01-01 RX ADMIN — SULFAMETHOXAZOLE AND TRIMETHOPRIM 1 TABLET: 400; 80 TABLET ORAL at 12:01

## 2022-01-01 RX ADMIN — TACROLIMUS 0.5 MG: 0.5 CAPSULE ORAL at 17:00

## 2022-01-01 RX ADMIN — CARVEDILOL 25 MG: 25 TABLET, FILM COATED ORAL at 12:12

## 2022-01-01 RX ADMIN — CARVEDILOL 25 MG: 25 TABLET, FILM COATED ORAL at 17:00

## 2022-01-01 RX ADMIN — PERFLUTREN 7 ML: 6.52 INJECTION, SUSPENSION INTRAVENOUS at 11:45

## 2022-01-01 RX ADMIN — DOBUTAMINE 10 MCG/KG/MIN: 12.5 INJECTION, SOLUTION, CONCENTRATE INTRAVENOUS at 11:20

## 2022-01-01 RX ADMIN — DOXERCALCIFEROL 2 MCG: 2 INJECTION, SOLUTION INTRAVENOUS at 14:51

## 2022-01-01 RX ADMIN — CINACALCET HYDROCHLORIDE 30 MG: 30 TABLET, FILM COATED ORAL at 16:34

## 2022-01-01 RX ADMIN — SODIUM CHLORIDE 250 ML: 9 INJECTION, SOLUTION INTRAVENOUS at 09:01

## 2022-01-01 RX ADMIN — ACETAMINOPHEN 1000 MG: 500 TABLET ORAL at 21:01

## 2022-01-01 RX ADMIN — SEVELAMER CARBONATE 800 MG: 800 TABLET, FILM COATED ORAL at 12:26

## 2022-01-01 RX ADMIN — Medication: at 12:35

## 2022-01-01 RX ADMIN — TACROLIMUS 1 MG: 1 CAPSULE ORAL at 12:26

## 2022-01-01 RX ADMIN — TACROLIMUS 1 MG: 1 CAPSULE ORAL at 17:20

## 2022-01-01 RX ADMIN — IOPAMIDOL 70 ML: 755 INJECTION, SOLUTION INTRAVENOUS at 07:01

## 2022-01-01 RX ADMIN — OXYCODONE HYDROCHLORIDE 10 MG: 10 TABLET ORAL at 19:51

## 2022-01-01 RX ADMIN — Medication: at 09:01

## 2022-01-01 RX ADMIN — OXYCODONE HYDROCHLORIDE 10 MG: 10 TABLET ORAL at 12:30

## 2022-01-01 RX ADMIN — TACROLIMUS 0.5 MG: 0.5 CAPSULE ORAL at 17:20

## 2022-01-01 RX ADMIN — AMLODIPINE BESYLATE 10 MG: 10 TABLET ORAL at 09:59

## 2022-01-01 RX ADMIN — ACETAMINOPHEN 1000 MG: 500 TABLET ORAL at 10:07

## 2022-01-01 RX ADMIN — TACROLIMUS 1 MG: 1 CAPSULE ORAL at 11:55

## 2022-01-01 RX ADMIN — SULFAMETHOXAZOLE AND TRIMETHOPRIM 1 TABLET: 400; 80 TABLET ORAL at 16:53

## 2022-01-01 RX ADMIN — CARVEDILOL 25 MG: 25 TABLET, FILM COATED ORAL at 11:55

## 2022-01-01 RX ADMIN — CINACALCET HYDROCHLORIDE 30 MG: 30 TABLET, FILM COATED ORAL at 16:53

## 2022-01-01 RX ADMIN — SODIUM CHLORIDE 300 ML: 9 INJECTION, SOLUTION INTRAVENOUS at 09:01

## 2022-01-01 RX ADMIN — TACROLIMUS 1 MG: 1 CAPSULE ORAL at 09:58

## 2022-01-01 RX ADMIN — MYCOPHENOLATE MOFETIL 250 MG: 250 CAPSULE ORAL at 09:58

## 2022-01-01 RX ADMIN — SEVELAMER CARBONATE 800 MG: 800 TABLET, FILM COATED ORAL at 17:20

## 2022-01-01 RX ADMIN — HEPARIN SODIUM 5000 UNITS: 5000 INJECTION, SOLUTION INTRAVENOUS; SUBCUTANEOUS at 08:19

## 2022-01-01 RX ADMIN — ACETAMINOPHEN 1000 MG: 500 TABLET ORAL at 19:56

## 2022-01-01 RX ADMIN — ACETAMINOPHEN 1000 MG: 500 TABLET ORAL at 12:30

## 2022-01-01 RX ADMIN — SEVELAMER CARBONATE 800 MG: 800 TABLET, FILM COATED ORAL at 11:54

## 2022-01-01 RX ADMIN — AMLODIPINE BESYLATE 10 MG: 10 TABLET ORAL at 11:55

## 2022-01-01 RX ADMIN — PREDNISONE 40 MG: 20 TABLET ORAL at 12:26

## 2022-01-01 RX ADMIN — SEVELAMER CARBONATE 800 MG: 800 TABLET, FILM COATED ORAL at 17:00

## 2022-01-01 RX ADMIN — MYCOPHENOLATE MOFETIL 250 MG: 250 CAPSULE ORAL at 12:26

## 2022-01-01 RX ADMIN — SODIUM CHLORIDE 300 ML: 9 INJECTION, SOLUTION INTRAVENOUS at 12:34

## 2022-01-01 RX ADMIN — HEPARIN SODIUM 5000 UNITS: 5000 INJECTION, SOLUTION INTRAVENOUS; SUBCUTANEOUS at 09:59

## 2022-01-01 RX ADMIN — CARVEDILOL 25 MG: 25 TABLET, FILM COATED ORAL at 17:20

## 2022-01-01 RX ADMIN — MYCOPHENOLATE MOFETIL 250 MG: 250 CAPSULE ORAL at 19:46

## 2022-01-01 RX ADMIN — CARVEDILOL 25 MG: 25 TABLET, FILM COATED ORAL at 09:59

## 2022-01-01 RX ADMIN — ANAKINRA 100 MG: 100 INJECTION, SOLUTION SUBCUTANEOUS at 17:22

## 2022-01-01 ASSESSMENT — ACTIVITIES OF DAILY LIVING (ADL)
ADLS_ACUITY_SCORE: 22
ADLS_ACUITY_SCORE: 22
ADLS_ACUITY_SCORE: 14
ADLS_ACUITY_SCORE: 22
DIFFICULTY_EATING/SWALLOWING: NO
ADLS_ACUITY_SCORE: 22
WALKING_OR_CLIMBING_STAIRS_DIFFICULTY: NO
VISION_MANAGEMENT: GLASSES
ADLS_ACUITY_SCORE: 22
ADLS_ACUITY_SCORE: 14
TOILETING_ISSUES: NO
ADLS_ACUITY_SCORE: 22
ADLS_ACUITY_SCORE: 14
ADLS_ACUITY_SCORE: 22
WEAR_GLASSES_OR_BLIND: YES
ADLS_ACUITY_SCORE: 22
DRESSING/BATHING_DIFFICULTY: NO
ADLS_ACUITY_SCORE: 22
ADLS_ACUITY_SCORE: 14
DOING_ERRANDS_INDEPENDENTLY_DIFFICULTY: NO
CHANGE_IN_FUNCTIONAL_STATUS_SINCE_ONSET_OF_CURRENT_ILLNESS/INJURY: NO
ADLS_ACUITY_SCORE: 22
ADLS_ACUITY_SCORE: 14
ADLS_ACUITY_SCORE: 22
ADLS_ACUITY_SCORE: 22
FALL_HISTORY_WITHIN_LAST_SIX_MONTHS: NO
ADLS_ACUITY_SCORE: 22
CONCENTRATING,_REMEMBERING_OR_MAKING_DECISIONS_DIFFICULTY: NO

## 2022-01-01 ASSESSMENT — PAIN SCALES - GENERAL
PAINLEVEL: NO PAIN (0)

## 2022-01-12 NOTE — PROGRESS NOTES
Rashad is a 46 year old who is being evaluated via a billable video visit.  Currently the State of [place].     How would you like to obtain your AVS? MyChart  If the video visit is dropped, the invitation should be resent by: Text to cell phone: [phone]  Will anyone else be joining your video visit? Kylah Murray    Hematology initial visit:  Date on this visit: 1/12/2022    Rashad Ortiz  is referred by No ref. provider found for a hematology consultation. He requires evaluation for anemia    Primary Physician: Varun Burgess     History Of Present Illness:  Mr. Ortiz is a 46 year old male who presents with anemia,.    This is a telephone visit because he was unable to connect to the video link.    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  He was admitted to the hospital from 10/12/2021 - 10/20/2021 and I reviewed the discharge summary when he was admitted for pulmonary edema and pericardial effusion/pericarditis and possible community-acquired pneumonia.    He feels fine. Has decent energy. No SOB. No abnormal bleeding. No GI issues. No new swellings. No neuropathy. Weight has been stable.   He thinks he gets IV iron at the dialysis. Not sure when was the last time he received it. He is not sure about LAWRENCE. His Nephrologist is Dr Cate Coleman    ROS:  A comprehensive ROS was otherwise neg      Past Medical/Surgical History:  Past Medical History:   Diagnosis Date     AVN (avascular necrosis of bone) (H)     left hip     AVN (avascular necrosis of bone) (H)     left hip     BPH (benign prostatic hyperplasia)      Chronic kidney disease, stage 4, severely decreased GFR (H)      Gastro-oesophageal reflux disease      Gout      History of blood transfusion       Hypertension      Medical non-compliance      Osteonecrosis (H) 7/29/2020    Added automatically from request for surgery [id number]     Pulmonary nodules      Steroid long-term use      Vitamin D deficiency      Past Surgical History:   Procedure Laterality Date     ARTHROPLASTY HIP Left 9/9/2020    Procedure: Left total hip arthroplasty;  Surgeon: Fernando Morillo MD;  Location: [facility]     ARTHROPLASTY HIP Right 4/12/2021    Procedure: Right total hip arthroplasty;  Surgeon: Fernando Morillo MD;  Location: [facility]     AV FISTULA OR GRAFT ARTERIAL       COLONOSCOPY N/A 4/7/2021    Procedure: COLONOSCOPY, WITH POLYPECTOMY AND BIOPSY;  Surgeon: Zak Ortega MD;  Location: [facility]     CREATE FISTULA ARTERIOVENOUS UPPER EXTREMITY Right 7/31/2019    Procedure: Creation Of Atriovenous Fistula Right Upper Arm;  Surgeon: Julia Irwin MD;  Location: [facility]     IR CVC TUNNEL PLACEMENT > 5 YRS OF AGE  10/9/2020     IR DIALYSIS FISTULOGRAM RIGHT  10/9/2020     IR DIALYSIS FISTULOGRAM RIGHT  2/19/2021     IR DIALYSIS MECH THROMB, PTA  10/9/2020     IR DIALYSIS STENT W OR W/OUT PTA  2/19/2021     LIGATE FISTULA ARTERIOVENOUS UPPER EXTREMITY  12/20/2011    Procedure:LIGATE FISTULA ARTERIOVENOUS UPPER EXTREMITY; Excision of Right Forearm Arteriovenous Fistula.; Surgeon:LINDY AMAYA; Location:[facility]     PERCUTANEOUS BIOPSY KIDNEY Right 2/28/2017    Procedure: PERCUTANEOUS BIOPSY KIDNEY;  Surgeon: Gee Barrios MD;  [facility]     TRANSPLANT  01/13/2011    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     Allergies:  Allergies as of 01/12/2022     (No Known Allergies)     Current Medications:  Current Outpatient Medications   Medication Sig Dispense Refill     acetaminophen (TYLENOL) 500 MG tablet Take 2 tablets (1,000 mg) by mouth every 6 hours as needed for mild pain 100 tablet 1     amLODIPine (NORVASC) 5 MG tablet Take 10 mg by mouth daily        carvedilol (COREG) 12.5 MG tablet Take 2 tablets (25 mg) by  mouth 2 times daily (with meals)       furosemide (LASIX) 20 MG tablet Take 1 tablet (20 mg) by mouth daily       mycophenolate (GENERIC EQUIVALENT) 250 MG capsule Take 1 capsule (250 mg) by mouth 2 times daily 60 capsule 11     oxyCODONE (ROXICODONE) 15 MG tablet Take 1 tablet (15 mg) by mouth every 6 hours as needed for moderate to severe pain 10 tablet 0     oxyCODONE (ROXICODONE) 5 MG tablet Take 1-2 tablets (5-10 mg) by mouth every 6 hours as needed for pain 12 tablet 0     predniSONE (DELTASONE) 10 MG tablet Take 4 tabs (40 mg) once daily on 10/21, then take 3 tabs (30 mg) daily x3 days, then 2 tabs (20 mg) daily x3 days, then 1 tab (10 mg) x3 days, then discontinue. Last day of steroids on 10/30. 20 tablet 0     sevelamer carbonate (RENVELA) 800 MG tablet Take 1 tablet (800 mg) by mouth 3 times daily (with meals) 90 tablet 0     sulfamethoxazole-trimethoprim (BACTRIM) 400-80 MG tablet Take one tablet every Monday, Wednesday, Friday 45 tablet 3     tacrolimus (GENERIC EQUIVALENT) 0.5 MG capsule Take 1 capsule (0.5 mg) by mouth every evening Total dose = 1 mg in the AM and 1.5 mg in the PM 30 capsule 11     tacrolimus (GENERIC EQUIVALENT) 1 MG capsule Take 1 capsule (1 mg) by mouth 2 times daily . Total dose=1mg in the AM and 1.5mg in the PM 60 capsule 11     vitamin E (TOCOPHEROL) 400 units (180 mg) capsule Take 1 capsule (400 Units) by mouth daily 30 capsule 0      Family History:  Family History   Problem Relation Age of Onset     Hypertension Mother      Colon Cancer Mother 66     Colon Cancer Brother 51     No family history of bleeding or clotting disorder.  Social History:  Social History     Socioeconomic History     Marital status:      Spouse name: Not on file     Number of children: Not on file     Years of education: Not on file     Highest education level: Not on file   Occupational History     Not on file   Tobacco Use     Smoking status: Former Smoker     Types: Cigarettes     Quit date:  2017     Years since quittin.8     Smokeless tobacco: Never Used   Substance and Sexual Activity     Alcohol use: Not Currently     Alcohol/week: 4.2 standard drinks     Types: 5 Standard drinks or equivalent per week     Comment: 1 shot yesterday     Drug use: Not Currently     Types: Marijuana     Sexual activity: Never     Partners: Female     Birth control/protection: None   Other Topics Concern     Parent/sibling w/ CABG, MI or angioplasty before 65F 55M? Not Asked   Social History Narrative    Rashad lives with his wife and 2 children. There are 2 declawed cats. He works at a computer-based job in customer service.      Social Determinants of Health     Financial Resource Strain: Not on file   Food Insecurity: Not on file   Transportation Needs: Not on file   Physical Activity: Not on file   Stress: Not on file   Social Connections: Not on file   Intimate Partner Violence: Not on file   Housing Stability: Not on file     Physical Exam:  There were no vitals taken for this visit.   Wt Readings from Last 4 Encounters:   21 67.6 kg (149 lb)   21 67.7 kg (149 lb 3.2 oz)   10/20/21 62.6 kg (138 lb 0.1 oz)   21 65.2 kg (143 lb 11.8 oz)         Constitutional.  Does not seem to be in any acute distress.  Respiratory.  Speaking in full sentences.  No coughing noted.  Neurological.  Alert and oriented x3.  Psychiatric.  Mood, mentation and affect are normal.  Decision making capacity is intact.          Laboratory/Imaging Studies      Reviewed    10/20/2021.  CBC showed WBC 6.1.  Hemoglobin 7.3.  Platelets 212.  MCV 83.    Chemistry shows sodium 128.  Potassium 5.8.  BUN 48.  Creatinine 10.4.  Calcium 9.3.  Phosphorus 7.    10/20/2021.  Cryoglobulin showed trace polyclonal IgM immunoglobulin seen.    2020.  Ferritin 310, iron 15.  Iron binding capacity 162.  Iron saturation index 9%.    Erythropoietin was 11 on 10/15/2019 10/19/2021.    Hepatitis C antibody negative.  HIV  negative.      ASSESSMENT/PLAN:      Anemia in a patient with end-stage renal disease who failed kidney transplant and remains on immunosuppressives.    I would recommend checking peripheral blood smear, iron panel, B12 folate levels.  Ideally we should try to keep iron saturation above 30% by giving him IV iron if needed. He can potentially benefit from LAWRENCE therapy as well, but we will get records from his Nephrologist and then re-assess.    We will determine the follow up accordingly    All questions answered to his satisfaction and he is agreeable and comfortable with we plan    Varun Burgess MD    Telephone call time. 13 minutes    I spent 38 minutes on this visit on the date of service, including the telephone time, reviewing records and labs and imaging and placing new orders as well as coordination of care and documentation.

## 2022-01-12 NOTE — PROGRESS NOTES
Action January 12, 2022 4:15PM ABT   Action Taken Called and spoke to patient, patient states that he sees Dr. Cate Coleman at [facility]. Gave verbal auth to pull [facility] records.     Action January 13, 2022 8:15 AM ABT   Action Taken No records pulling in [facility], sent records request for [facility] for Dr. Cate Coleman

## 2022-01-12 NOTE — PATIENT INSTRUCTIONS
We will get records from your Nephrologist    Labs in the next few days    We will determine the follow up accordingly

## 2022-03-01 NOTE — TELEPHONE ENCOUNTER
Contacted patient about pain in fistula site.  According to patient it is not pain in his fistula site however it is pain in his CVC line site and he would like to have it removed.  He is scheduled to see his surgeon in a few weeks for follow-up.    At this time the removal of the CVC line will be discussed.   Functional quadriplegia

## 2022-03-31 NOTE — TELEPHONE ENCOUNTER
See [employer] message below.     Cate Zabala RN  [facility]

## 2022-04-06 NOTE — TELEPHONE ENCOUNTER
To provider to review and advise, patient last had virtual visit in november.  Pastora Leblanc RN  [facility]

## 2022-04-07 NOTE — TELEPHONE ENCOUNTER
See mychart message below.     Patient providing letter regarding request for stress test.     Routing to provider to review and advise.    Cate Zabala RN  [facility]

## 2022-04-12 NOTE — TELEPHONE ENCOUNTER
Routing to provider to review and advise. Please see [employer] message below.     Would you like patient to schedule a visit?    Clementina Orozco RN, BSN  [facility]

## 2022-04-12 NOTE — TELEPHONE ENCOUNTER
See [employer] message below.     Routing to provider to review and advise.    Cate Zabala RN  [facility]

## 2022-04-21 NOTE — NURSING NOTE
"Chief Complaint   Patient presents with     Mass     Possible mass on middle of chest       Initial /83   Pulse 69   Temp 97.7  F (36.5  C) (Tympanic)   Ht 1.727 m (5' 8\")   Wt 68 kg (150 lb)   SpO2 99%   BMI 22.81 kg/m   Estimated body mass index is 22.81 kg/m  as calculated from the following:    Height as of this encounter: 1.727 m (5' 8\").    Weight as of this encounter: 68 kg (150 lb).  Medication Reconciliation: complete    TIERA Zeng MA    "

## 2022-04-21 NOTE — PROGRESS NOTES
"  Assessment & Plan     Chest wall mass  Reassurance given   He has lost weight.   He is noticing the proximal aspect of the clavicle, it is more prominent as he lifts his arm up. No abnormality seen on Xray.   Continue regular follow up with primary provider.   - XR Chest 2 Views; Future                 Return in about 1 month (around 5/21/2022) for with primary provider.    Kristen M. Kehr, PA-C [facility]   Rashad is a 47 year old who presents for the following health issues     Rashad has noticed there is a hard mass in the left side of his chest. There is no pain.   He has not noticed a change in size.   No skin changes associated.       History of Present Illness       Reason for visit:  Growth  Symptom onset:  3-4 weeks ago  Symptoms include:  None  Symptom intensity:  Mild  Symptom progression:  Worsening  Had these symptoms before:  No  What makes it worse:  No  What makes it better:  No    He eats 0-1 servings of fruits and vegetables daily.He consumes 1 sweetened beverage(s) daily.He exercises with enough effort to increase his heart rate 10 to 19 minutes per day.  He exercises with enough effort to increase his heart rate 4 days per week.   He is taking medications regularly.             Review of Systems   Constitutional, HEENT, cardiovascular, pulmonary, GI, , musculoskeletal, neuro, skin, endocrine and psych systems are negative, except as otherwise noted.      Objective    /83   Pulse 69   Temp 97.7  F (36.5  C) (Tympanic)   Ht 1.727 m (5' 8\")   Wt 68 kg (150 lb)   SpO2 99%   BMI 22.81 kg/m    Body mass index is 22.81 kg/m .  Physical Exam   GENERAL: healthy, alert and no distress  NECK: no palpable adenopathy  CHEST: there is a bony protrusion at the sternoclavicular joint on the left, this is not present on the right side. No tenderness. This is more noticeable when he raises his arm over his head.   MS: no gross musculoskeletal defects noted, no " edema  SKIN: no suspicious lesions or rashes  PSYCH: mentation appears normal, affect normal/bright    CXR reviewed in the clinic. No mass seen

## 2022-05-09 PROBLEM — I31.9 PERICARDITIS, UNSPECIFIED CHRONICITY, UNSPECIFIED TYPE: Status: ACTIVE | Noted: 2022-01-01

## 2022-05-09 NOTE — CONSULTS
"  Inpatient Rheumatology Consultation    Rashad Ortiz MRN# [id number]   Age: 47 year old YOB: 1975     Date of Admission:5/9/2022  Reason for consult: pericarditis eval    Assessment and Plan:   Mr. Rashad Ortiz is a 47 year old male with a pertinent past medical history of ESRD on HD s/p failed LDKT, pericarditis (+c-ANCA; - PR3 and MPO), polyarthralgias, and HTN who was admitted for chest pain. Rheumatology was consulted for evaluation of recurrent pericarditis.     # Pericarditis, recurrent  Based off H&P, presenting condition does appear consistent with recurrent pericarditis. No clear trigger like previous occurrence (denies any COVID exposures, vaccinations, or URI viral symptoms). Has previously seen Dr. Dominguez in outpatient setting and, per review of 11/2021 office visit note), will plan to screen for potential contribution from vasculitis. Unable to do cardiac MRI due to ESRD  - Recommend checking uric acid, C3/C4, anti-CCP, ANCA, and PR3/MPO to r/o any involvement (done for you)  - Discussed anakinra with patient    - States that he will think about the medication   - Would like to see if HD improves condition   - If agreeable, would recommend anakinra 100mg subcutaneous daily x7d  - Appreciate cardiology's input     Patient was seen and staffed with attending physician, Dr. Ahmadi. We will continue to follow    Milton Pascual MD  Med/Derm PGY-4  P: [phone]    Attending Note: I saw and evaluated the patient with Dr. Pascual. I agree with the assessment and plan.    Hien Ahmadi MD      Total 120 minutes spent reviewing chart, consulting and coordination of care on the day of visit.         Reason for Consultation   \"patient with recurrent pericarditis and +KATHERINE -?Anakinra\"         History of Present Illness   Mr. Rashad Ortiz is a 47 year old male with a pertinent past medical history of ESRD on HD s/p failed LDKT, pericarditis (+c-ANCA; - PR3 and MPO), polyarthralgias, " and HTN who was admitted for chest pain. Rheumatology was consulted for evaluation of possible recurrent pericarditis.   - Sees Dr. Renato Dominguez outpatient    Reports:  - chest pain about 4 days ago  - noted on lower chest pain (most pronounced over left lower pectoralis region)  - no radiation of pain  - better with leaning forward  - worse with leaning back, coughing, and deep breathing  - tylenol not helping  - denies any recent viral symptoms  - denies any recent vaccinations  - still endorses some intermittent joint pain    Of note, notes a new anterior chest wall mass over the medial left side. Was told that it's his bone. TTE negative for any pericardial effusion.            Past Medical History   Reviewed          Past Surgical History   Reviewed         Social History   Reviewed          Family History   Reviewed          Immunizations   Reviewed          Allergies   Reviewed          Medications     Medications Prior to Admission   Medication Sig Dispense Refill Last Dose     acetaminophen (TYLENOL) 500 MG tablet Take 2 tablets (1,000 mg) by mouth every 6 hours as needed for mild pain 100 tablet 1      amLODIPine (NORVASC) 10 MG tablet Take 10 mg by mouth daily        amLODIPine (NORVASC) 5 MG tablet Take 10 mg by mouth daily         carvedilol (COREG) 12.5 MG tablet Take 2 tablets (25 mg) by mouth 2 times daily (with meals)        mycophenolate (GENERIC EQUIVALENT) 250 MG capsule Take 1 capsule (250 mg) by mouth 2 times daily 60 capsule 11      sevelamer carbonate (RENVELA) 800 MG tablet Take 1 tablet (800 mg) by mouth 3 times daily (with meals) 90 tablet 0      sulfamethoxazole-trimethoprim (BACTRIM) 400-80 MG tablet Take one tablet every Monday, Wednesday, Friday 45 tablet 3      tacrolimus (GENERIC EQUIVALENT) 0.5 MG capsule Take 1 capsule (0.5 mg) by mouth every evening Total dose = 1 mg in the AM and 1.5 mg in the PM 30 capsule 11      tacrolimus (GENERIC EQUIVALENT) 1 MG capsule Take 1 capsule (1  "mg) by mouth 2 times daily . Total dose=1mg in the AM and 1.5mg in the PM 60 capsule 11        Current Facility-Administered Medications   Medication     0.9% sodium chloride BOLUS     acetaminophen (TYLENOL) tablet 1,000 mg     amLODIPine (NORVASC) tablet 10 mg     carvedilol (COREG) tablet 25 mg     cinacalcet (SENSIPAR) tablet 30 mg     doxercalciferol (HECTOROL) injection 2 mcg     heparin ANTICOAGULANT injection 5,000 Units     iron sucrose (VENOFER) injection 50 mg     lidocaine (LMX4) cream     lidocaine 1 % 0.1-1 mL     lidocaine 1 % 0.5 mL     lidocaine 1 % 0.5 mL     melatonin tablet 1 mg     mycophenolate (GENERIC EQUIVALENT) capsule 250 mg     ondansetron (ZOFRAN ODT) ODT tab 4 mg    Or     ondansetron (ZOFRAN) injection 4 mg     oxyCODONE IR (ROXICODONE) tablet 10 mg     [START ON 5/10/2022] predniSONE (DELTASONE) tablet 40 mg     sevelamer carbonate (RENVELA) tablet 800 mg     sodium chloride (PF) 0.9% PF flush 3 mL     sodium chloride (PF) 0.9% PF flush 3 mL     Stop Heparin 60 minutes before end of treatment     sulfamethoxazole-trimethoprim (BACTRIM) 400-80 MG per tablet 1 tablet     tacrolimus (GENERIC EQUIVALENT) capsule 0.5 mg     tacrolimus (GENERIC EQUIVALENT) capsule 1 mg            Review of Systems   Complete 12 point ROS completed and negative unless mentioned in HPI.          Physical Exam   BP (!) 149/93   Pulse 63   Temp (!) 96.4  F (35.8  C) (Oral)   Resp 18   Ht 1.727 m (5' 8\")   Wt 70.4 kg (155 lb 4.8 oz)   SpO2 99%   BMI 23.61 kg/m      General: NAD; some pain with deep breathes  HEENT: EOMI  CV: S1/S2 present; RRR; flow murmur near RUSB (suspect due to HD occurring during exam); no pain with palpation of chest  Lung: CTAB  Abdomen: soft, non-tender, non-distended  Neuro: AxOx3  Skin/Hair: no concerning rashes  Ext: no edema  Msk: no synovitis affecting hands/feet          Data   Labs and imaging reviewed. Significant as below:    KATHERINE negative (1/2022)  CRP 25  Uric acid " [facility]    CT-PE (5/9/22)  IMPRESSION:   1. No central filling defect consistent with a pulmonary embolism.   2. No focal consolidation.    TTE (5/9/22)  Interpretation Summary  Global and regional left ventricular function is normal with an EF of 55-60%.  Global right ventricular function is normal. The right ventricle is normal  size.  No significant valvular abnormalities.  The estimated PA systolic pressure is 54 mmHg.  IVC diameter >2.1 cm collapsing <50% with sniff suggests a high RA pressure  estimated at 15 mmHg or greater.  No pericardial effusion is present.  This study was compared with the study from 10/19/2021. No pericardial  effusion or pleural effusion is noted on this examination.

## 2022-05-09 NOTE — H&P
[facility]    History and Physical - Hospitalist Service, GOLD TEAM 12       Date of Admission:  5/9/2022    Assessment & Plan      Rashad Ortiz is a 47 year old male with a past medical history of ESRD on HD, pericarditis, pulmonary edema, and HTN who presents with chest pain c/f uremic pericarditis.     Chest Pain  Hx of Pericarditis  Hx of Moderate Posterior Pericardial Effusion - Patient presents with a 3 day history of anterior and left sided chest pain. Patient states pain is similar to pericarditis episode in October 2021. Pericarditis and pericardial effusion at that time attributed to post-viral syndrome vs COVID vaccine. He received prednisone during that admission and discharged on a steroid taper. Echo 10/19/21 with improving pericardial effusion. Troponin negative. EKG w/o ischemia.  Chest CTA negative for acute PE. Bedside US with possible trace pericardial effusion. Concerns for possible uremic pericarditis as BUN 80.    -Nephrology consulted as below  -Repeat Echocardiogram  -s/p prednisone 40mg   -Add oxycodone 10mg Q4H PRN  -Holding off on NSAIDs/Colchicine for now per nephrology   -Patient scheduled for outpatient echocardiogram with stress test on Wednesday 5/11    ESRD s/p Failed LDKT on HD - Runs MWF via AVF. EDW ~66 kg. Prior renal disease felt to be related to hypertension. Last dialyzed on Friday 5/6.   -Nephrology consult  -Plans for dialysis today  -Continue PTA tacrolimus 1mg every morning, 1.5mg every evening  -PPX: Bactrim 400-80mg MWF    Renal Hyperparathyroidism - Continue PTA Renvela 800mg TID     HTN - Continue PTA amlodipine 10mg daily and carvedilol 25mg BID     Anemia of CKD - Baseline Hgb ~8-9, currently 10.7.    -CTM       Diet:  Renal Diet  DVT Prophylaxis: Heparin SQ  Hunter Catheter: Not present  Central Lines: None  Cardiac Monitoring: None  Code Status:  Full Code     Clinically Significant Risk Factors Present on Admission                       Disposition Plan   Expected Discharge:   Anticipated discharge location:  Awaiting care coordination huddle  Delays:            The patient's care was discussed with the Attending Physician, Dr. Jc Carrillo.    Linnea Francois PA-C  Hospitalist Service, GOLD TEAM [address] [place] [address]  Securely message with the Vocera Web Console (learn more here)  Text page via [facility] Paging/Directory   Please see signed in provider for up to date coverage information      ______________________________________________________________________    Chief Complaint   Chest Pain    History is obtained from the patient and patient's chart    History of Present Illness   Rashad Ortiz is a 47 year old male with a PMH as listed above who presents with a several day history of chest pain. Patient states he was resting at home when his chest pain started.  He states it's located across his anterior chest, more severe in his left substernal area.  He states laying down makes the pain worse.  He has not tried anything at home for his pain.  He states his symptoms feels similar to his past pericarditis and pericardial effusion.   He denies fevers, chills, cough, nausea, vomiting, abdominal pain, change in bowel habits.     Review of Systems    The 10 point Review of Systems is negative other than noted in the HPI or here.     Past Medical History    I have reviewed this patient's medical history and updated it with pertinent information if needed.   Past Medical History:   Diagnosis Date     AVN (avascular necrosis of bone) (H)     left hip     AVN (avascular necrosis of bone) (H)     left hip     BPH (benign prostatic hyperplasia)      Chronic kidney disease, stage 4, severely decreased GFR (H)      Gastro-oesophageal reflux disease      Gout      History of blood transfusion      Hypertension      Medical non-compliance      Osteonecrosis (H) 7/29/2020    Added automatically from  request for surgery [id number]     Pulmonary nodules      Steroid long-term use      Vitamin D deficiency        Past Surgical History   I have reviewed this patient's surgical history and updated it with pertinent information if needed.  Past Surgical History:   Procedure Laterality Date     ARTHROPLASTY HIP Left 2020    Procedure: Left total hip arthroplasty;  Surgeon: Fernando Morillo MD;  [facility]     ARTHROPLASTY HIP Right 2021    Procedure: Right total hip arthroplasty;  Surgeon: Fernando Morillo MD;  [facility]     AV FISTULA OR GRAFT ARTERIAL       COLONOSCOPY N/A 2021    Procedure: COLONOSCOPY, WITH POLYPECTOMY AND BIOPSY;  Surgeon: Zak Ortega MD;  [facility]: [facility] GI     CREATE FISTULA ARTERIOVENOUS UPPER EXTREMITY Right 2019    Procedure: Creation Of Atriovenous Fistula Right Upper Arm;  Surgeon: Julia Irwin MD;  [facility]     IR CVC TUNNEL PLACEMENT > 5 YRS OF AGE  10/9/2020     IR DIALYSIS FISTULOGRAM RIGHT  10/9/2020     IR DIALYSIS FISTULOGRAM RIGHT  2021     IR DIALYSIS MECH THROMB, PTA  10/9/2020     IR DIALYSIS STENT W OR W/OUT PTA  2021     LIGATE FISTULA ARTERIOVENOUS UPPER EXTREMITY  2011    Procedure:LIGATE FISTULA ARTERIOVENOUS UPPER EXTREMITY; Excision of Right Forearm Arteriovenous Fistula.; Surgeon:LINDY AMAYA; [facility]     PERCUTANEOUS BIOPSY KIDNEY Right 2017    Procedure: PERCUTANEOUS BIOPSY KIDNEY;  Surgeon: Gee Barrios MD;  [facility]     TRANSPLANT  2011    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  Social History     Tobacco Use     Smoking status: Former Smoker     Types: Cigarettes     Quit date: 2017     Years since quittin.1     Smokeless tobacco: Never Used   Substance Use Topics     Alcohol use: Not Currently     Alcohol/week: 4.2 standard drinks      Types: 5 Standard drinks or equivalent per week     Comment: 1 shot yesterday     Drug use: Not Currently     Types: Marijuana       Family History   I have reviewed this patient's family history and updated it with pertinent information if needed.  Family History   Problem Relation Age of Onset     Hypertension Mother      Colon Cancer Mother 66     Colon Cancer Brother 51       Prior to Admission Medications   Prior to Admission Medications   Prescriptions Last Dose Informant Patient Reported? Taking?   acetaminophen (TYLENOL) 500 MG tablet   No No   Sig: Take 2 tablets (1,000 mg) by mouth every 6 hours as needed for mild pain   amLODIPine (NORVASC) 5 MG tablet   Yes No   Sig: Take 10 mg by mouth daily    carvedilol (COREG) 12.5 MG tablet   No No   Sig: Take 2 tablets (25 mg) by mouth 2 times daily (with meals)   mycophenolate (GENERIC EQUIVALENT) 250 MG capsule   No No   Sig: Take 1 capsule (250 mg) by mouth 2 times daily   sevelamer carbonate (RENVELA) 800 MG tablet   No No   Sig: Take 1 tablet (800 mg) by mouth 3 times daily (with meals)   sulfamethoxazole-trimethoprim (BACTRIM) 400-80 MG tablet  Self No No   Sig: Take one tablet every Monday, Wednesday, Friday   tacrolimus (GENERIC EQUIVALENT) 0.5 MG capsule   No No   Sig: Take 1 capsule (0.5 mg) by mouth every evening Total dose = 1 mg in the AM and 1.5 mg in the PM   tacrolimus (GENERIC EQUIVALENT) 1 MG capsule   No No   Sig: Take 1 capsule (1 mg) by mouth 2 times daily . Total dose=1mg in the AM and 1.5mg in the PM      Facility-Administered Medications: None     Allergies   No Known Allergies    Physical Exam   Vital Signs: Temp: 98  F (36.7  C)   BP: (!) 155/88 Pulse: 70   Resp: 14 SpO2: 97 % O2 Device: None (Room air)    Weight: 150 lbs 0 oz    GENERAL: Alert and oriented x 3. NAD. Ambulatory. Cooperative.   HEENT: Anicteric sclera. Mucous membranes moist. NC. AT.   CV: RRR. S1, S2. 2+ systolic murmur  RESPIRATORY: Effort normal on RA. Lungs CTAB  with no wheezing, rales, rhonchi.   GI: Abdomen soft and non distended with normoactive bowel sounds present in all quadrants. No tenderness, rebound, guarding. No lesions.   NEUROLOGICAL: No focal deficits. Moves all extremities.  CN 2-12 grossly intact.  EXTREMITIES: No peripheral edema. Intact bilateral pedal pulses.   SKIN: No jaundice. No rashes.        Data   Data reviewed today: I reviewed all medications, new labs and imaging results over the last 24 hours.   Latest Reference Range & Units 05/09/22 05:32   Sodium 133 - 144 mmol/L 140   Potassium 3.4 - 5.3 mmol/L 4.5   Chloride 94 - 109 mmol/L 105   Carbon Dioxide 20 - 32 mmol/L 24   Urea Nitrogen 7 - 30 mg/dL 80 (H)   Creatinine 0.66 - 1.25 mg/dL 16.00 (H)   GFR Estimate >60 mL/min/1.73m2 3 (L) [1]   Calcium 8.5 - 10.1 mg/dL 9.4   Anion Gap 3 - 14 mmol/L 11   Albumin 3.4 - 5.0 g/dL 3.6   Protein Total 6.8 - 8.8 g/dL 7.6   Bilirubin Total 0.2 - 1.3 mg/dL 0.8   Alkaline Phosphatase 40 - 150 U/L 95   ALT 0 - 70 U/L 12   AST 0 - 45 U/L 7   Troponin I High Sensitivity <79 ng/L 25 [2]   Glucose 70 - 99 mg/dL 110 (H)   WBC 4.0 - 11.0 10e3/uL 6.0   Hemoglobin 13.3 - 17.7 g/dL 10.7 (L)   Hematocrit 40.0 - 53.0 % 35.6 (L)   Platelet Count 150 - 450 10e3/uL 203   RBC Count 4.40 - 5.90 10e6/uL 4.10 (L)   MCV 78 - 100 fL 87   MCH 26.5 - 33.0 pg 26.1 (L)   MCHC 31.5 - 36.5 g/dL 30.1 (L)   RDW 10.0 - 15.0 % 19.3 (H)   % Neutrophils % 74   % Lymphocytes % 16   % Monocytes % 7   % Eosinophils % 3   % Basophils % 0   Absolute Basophils 0.0 - 0.2 10e3/uL 0.0   Absolute Eosinophils 0.0 - 0.7 10e3/uL 0.2   Absolute Immature Granulocytes <=0.4 10e3/uL 0.0   Absolute Lymphocytes 0.8 - 5.3 10e3/uL 1.0   Absolute Monocytes 0.0 - 1.3 10e3/uL 0.4   % Immature Granulocytes % 0   Absolute Neutrophils 1.6 - 8.3 10e3/uL 4.4   Absolute NRBCs 10e3/uL 0.0   NRBCs per 100 WBC <1 /100 0

## 2022-05-09 NOTE — PROGRESS NOTES
Reason for admission: Chest pain with c/f pericarditis vs pericardial effusion   Admitted from: [facility] ED   Report received from: MALCOM Diego RN   2 RN skin assessment completed by: Emmie MCKAY   Bed Algorithm reevaluated: Yes   Was Pulsate ordered?: No   Care plan (primary problem) and Education initiated: Done   Detailed Belongings: See previous nursing station technician note     RN Decompensation Assessment (Repeat at 12 hours)    Mentation:  Exam is notable for normal (baseline) mental status    Respiratory mechanics and oxygenation:  Exam is notable for normal/unchanged breathing pattern and stable oxygen requirements    Cardiovascular/perfusion:   Exam is notable for normal/unchanged cardiovascular/perfusion assessment    Overall estimation of the patient s condition/stability (1 = stable patient, 7 = appears very sick)? 1 - Patient is stable without signs of deterioration

## 2022-05-09 NOTE — ED TRIAGE NOTES
Per pt. CP 2 days PTA. Comes in today as its not improving. Left/mid sternal pain. Denies any other sx in triage currently.        Triage Assessment     Row Name 05/09/22 0457       Triage Assessment (Adult)    Airway WDL WDL       Respiratory WDL    Respiratory WDL WDL       Skin Circulation/Temperature WDL    Skin Circulation/Temperature WDL WDL       Cardiac WDL    Cardiac WDL WDL       Peripheral/Neurovascular WDL    Peripheral Neurovascular WDL WDL       Cognitive/Neuro/Behavioral WDL    Cognitive/Neuro/Behavioral WDL WDL

## 2022-05-09 NOTE — PROGRESS NOTES
HEMODIALYSIS TREATMENT NOTE    Date: 5/9/2022  Time: 4:13 PM    Data:  Pre Wt:   70.4 kg  Desired Wt: 67.4 kg   Post Wt:  66.9 kg  Weight change:  3.5 kg  Ultrafiltration - Post Run Net Total Removed (mL): 3500 mL  Vascular Access Status: patent  Dialyzer Rinse: Clear  Total Blood Volume Processed: 69.43 L Liters  Total Dialysis (Treatment) Time: 3 Hours    Lab:   No    Interventions:  hectorol  venofer    Assessment:  Pt completed a 3 hr HD tx with a 2K 2.5Ca bath at a 400BFR and 600 DFR. 3.5L net fluid removed, pt tolerated well. Pt remained alert and oriented x4 throughout tx. Access patent with no s/s infection or bleeding noted, mild pressure dressings in place.       Plan:    Per renal team

## 2022-05-09 NOTE — PROGRESS NOTES
Pt arrived on 5B A&O. Took vitals and wt. Belonging includes: cell phone/airpods, black jacket, personal clothes, shoes, wallet w/crd card, ID and medical card. No cash. Done.

## 2022-05-09 NOTE — ED PROVIDER NOTES
ED Provider Note  [facility]      History     Chief Complaint   Patient presents with     Chest Pain     HPI  Rashad Ortiz is a 47 year old male who has a past medical history of end-stage renal disease on dialysis, pericarditis, pulmonary edema, hypertension presenting with chest pain.  Chest pain started 3 days ago.  It is sharp on the left side of his chest.  It is pleuritic, hurts to breathe but he denies shortness of breath, cough or fevers.  No recent COVID diagnosis.  Denies any falls or injuries.  No diaphoresis.  He has chronic left leg swelling that is not new.  No history of blood clots.  Denies hemoptysis.  States this is similar to prior pericarditis episode.  He does have increased shortness of breath with breathing and his pain significantly improves when leaning forward.  States he is on prednisone for prior episode of pericarditis.    Past Medical History  Past Medical History:   Diagnosis Date     AVN (avascular necrosis of bone) (H)     left hip     AVN (avascular necrosis of bone) (H)     left hip     BPH (benign prostatic hyperplasia)      Chronic kidney disease, stage 4, severely decreased GFR (H)      Gastro-oesophageal reflux disease      Gout      History of blood transfusion      Hypertension      Medical non-compliance      Osteonecrosis (H) 7/29/2020    Added automatically from request for surgery [id number]     Pulmonary nodules      Steroid long-term use      Vitamin D deficiency      Past Surgical History:   Procedure Laterality Date     ARTHROPLASTY HIP Left 9/9/2020    Procedure: Left total hip arthroplasty;  Surgeon: Fernando Morillo MD;  [facility]     ARTHROPLASTY HIP Right 4/12/2021    Procedure: Right total hip arthroplasty;  Surgeon: Fernando Morillo MD;  [facility]     AV FISTULA OR GRAFT ARTERIAL       COLONOSCOPY N/A 4/7/2021    Procedure: COLONOSCOPY, WITH POLYPECTOMY AND BIOPSY;  Surgeon: Zak Ortega MD;  Location: [facility] GI      CREATE FISTULA ARTERIOVENOUS UPPER EXTREMITY Right 2019    Procedure: Creation Of Atriovenous Fistula Right Upper Arm;  Surgeon: Julia Irwin MD;  [facility] OR     IR CVC TUNNEL PLACEMENT > 5 YRS OF AGE  10/9/2020     IR DIALYSIS FISTULOGRAM RIGHT  10/9/2020     IR DIALYSIS FISTULOGRAM RIGHT  2021     IR DIALYSIS MECH THROMB, PTA  10/9/2020     IR DIALYSIS STENT W OR W/OUT PTA  2021     LIGATE FISTULA ARTERIOVENOUS UPPER EXTREMITY  2011    Procedure:LIGATE FISTULA ARTERIOVENOUS UPPER EXTREMITY; Excision of Right Forearm Arteriovenous Fistula.; Surgeon:LINDY AMAYA; [facility]     PERCUTANEOUS BIOPSY KIDNEY Right 2017    Procedure: PERCUTANEOUS BIOPSY KIDNEY;  Surgeon: Gee Barrios MD;  [facility]     TRANSPLANT  2011    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     acetaminophen (TYLENOL) 500 MG tablet  amLODIPine (NORVASC) 5 MG tablet  carvedilol (COREG) 12.5 MG tablet  mycophenolate (GENERIC EQUIVALENT) 250 MG capsule  sevelamer carbonate (RENVELA) 800 MG tablet  sulfamethoxazole-trimethoprim (BACTRIM) 400-80 MG tablet  tacrolimus (GENERIC EQUIVALENT) 0.5 MG capsule  tacrolimus (GENERIC EQUIVALENT) 1 MG capsule      No Known Allergies  Family History  Family History   Problem Relation Age of Onset     Hypertension Mother      Colon Cancer Mother 66     Colon Cancer Brother 51     Social History   Social History     Tobacco Use     Smoking status: Former Smoker     Types: Cigarettes     Quit date: 2017     Years since quittin.1     Smokeless tobacco: Never Used   Substance Use Topics     Alcohol use: Not Currently     Alcohol/week: 4.2 standard drinks     Types: 5 Standard drinks or equivalent per week     Comment: 1 shot yesterday     Drug use: Not Currently     Types: Marijuana      Past medical history, past surgical history, medications, allergies, family history, and social history were reviewed with the patient. No additional pertinent  "items.       Review of Systems  A complete review of systems was performed with pertinent positives and negatives noted in the HPI, and all other systems negative.    Physical Exam   BP: (!) 152/91  Pulse: 67  Temp: 98  F (36.7  C)  Resp: 18  Height: 172.7 cm (5' 8\")  Weight: 68 kg (150 lb)  SpO2: 99 %  Physical Exam  Physical Exam   Constitutional: oriented to person, place, and time. appears well-developed and well-nourished.   HENT:   Head: Normocephalic and atraumatic.   Neck: Normal range of motion.   Pulmonary/Chest: Effort normal. No respiratory distress.   Cardiac: No murmurs, rubs, gallops. RRR.  No tenderness palpation of the chest wall.  Abdominal: Abdomen soft, nontender, nondistended. No rebound tenderness.  MSK: Long bones without deformity or evidence of trauma.  Edema of the left lower extremity compared to the right.  Neurological: alert and oriented to person, place, and time.   Skin: Skin is warm and dry.   Psychiatric:  normal mood and affect.  behavior is normal. Thought content normal.     ED Course      Procedures         EKG Interpretation:      Interpreted by Jamaal Fink MD  Time reviewed: 0500  Symptoms at time of EKG: chest pain   Rhythm: normal sinus   Rate: normal  Axis: normal  Ectopy: none  Conduction: normal  ST Segments/ T Waves: TWI in III  Q Waves: none  Comparison to prior: new TWI, otherwise unchanged    Clinical Impression: normal EKG                    Results for orders placed or performed during the hospital encounter of 05/09/22   EKG 12-lead, tracing only     Status: None (Preliminary result)   Result Value Ref Range    Systolic Blood Pressure  mmHg    Diastolic Blood Pressure  mmHg    Ventricular Rate 66 BPM    Atrial Rate 66 BPM    WA Interval 150 ms    QRS Duration 92 ms     ms    QTc 423 ms    P Axis 59 degrees    R AXIS 61 degrees    T Axis 36 degrees    Interpretation ECG       Sinus rhythm  Possible Left atrial enlargement  Left ventricular " hypertrophy  Abnormal ECG       Medications - No data to display     Assessments & Plan (with Medical Decision Making)   MDM  Patient presenting with chest pain, symptoms concerning for uremic pericarditis.  He is otherwise stable with a possible trace pericardial effusion but no obvious tamponade or tamponade physiology.  Initial troponin is negative, reassuring due to 2 days of symptoms and overall reassuring EKG without significant ischemia or infarction.  Prednisone given.  Discussed with nephrology would like to hold off on colchicine or NSAIDs.  Will scan for pulmonary embolism due to significant pleuritic component with left leg swelling.  Patient be admitted to medicine for further care.  Potassium within normal limits. Patient OK with CT, discussed possible harm to renal function.    I have reviewed the nursing notes. I have reviewed the findings, diagnosis, plan and need for follow up with the patient.    New Prescriptions    No medications on file       Final diagnoses:   Pericarditis, unspecified chronicity, unspecified type       --  Jamaal Fink  [facility] EMERGENCY DEPARTMENT  5/9/2022     Jamaal Fink MD  05/09/22 0646       Jamaal Fink MD  05/09/22 0653

## 2022-05-09 NOTE — CONSULTS
[facility]    Cardiology Consult Note      Date of Admission:  5/9/2022  Consult Requested by: Wilmar Carrillo MD  Reason for Consult: Recurrent pericarditis    Assessment & Plan: SL   Rashad Ortiz is a 47 year old male with PMH significant for prior kidney transplant, ESRD now on HD, HTN, pulmonary edema, hx of pericarditis October 2021 with pericardial effusion which responded well to steroids. He was admitted on 5/9/22 with concern for 3 days of chest pain along his anterior left chest / left substernal area which is worse when laying down and better when leaning forward. His EKG is negative for signs of ischemia, TTE shows normal EF 55-60% with normal function, pulmonary HTN, no effusions, CTPE neg for PE, bedside cardiac US showing trace effusion. Clinical exam, laboratory findings and imaging are most consistent with recurrent pericarditis.     # Recurrent pericarditis  # Pleuritic chest pain  # HTN  Patient was previously admitted in October 2021 with very similar presentation of pleuritic chest pain which is worse when laying flat and better when leaning forward. He was then found to have a pericardial effusion and was thought to have uremic pericarditis vs pericarditis / pericardial effusion secondary to COVID vaccine since he had recently gotten the vaccine prior to his symptoms. Rheumatology was consulted last admission and work up was notable for low positive C-ANCA (1:80) but otherwise negative. He progressed well with prednisone taper and was discharged 8 days after admission. He was admitted today 5/9/22 with concerns for chest pain at rest for about 3 days and reports it feels very similar to his last admission. Work up as above has been negative for ACS, PE. He is likely hypervolemic as indicated by his high right atrial pressure on echo. He is getting HD today. His symptoms are most consistent with recurrent pericarditis.    Recommendations  - Ok for prednisone, will likely require prolonged taper given recurrent pericarditis  - Agree with holding colchicine and NSAIDs per nephrology  - Patient may be a candidate for other immunosuppressive agents such as anakinra or rilonacept, appreciate rheumatology and transplant nephrology assistance.   - Continue antihypertensives: Coreg 25 mg BID, Norvasc 10 mg / day         The patient's care was discussed with the cardiology fellow Dr. Goodman and attending Dr. Bansal.    Paul Monson DDS  OMFS PGY-1 on Cardiology Consult Service  [facility]      ______________________________________________________________________    Chief Complaint   Chest pain present for 3 days prior to admission. Feels very similar to previous pericarditis.     History is obtained from the patient    History of Present Illness   Rashad Ortiz is a 47 year old male who presents to the hospital with a 3 day history of pleuritic chest pain which started at rest and gets worse with laying down, better with leaning forward. Reports that it is across his left anterior chest and substernal region. Patient reports that the pain is very similar to the pain he experienced on his last admission in October 2021. He denies fever, dyspnea, orthopnea, nausea, vomiting and all other constitutional symptoms.     Review of Systems   The 10 point Review of Systems is negative other than noted in the HPI or here.     Past Medical History    I have reviewed this patient's medical history and updated it with pertinent information if needed.   Past Medical History:   Diagnosis Date     AVN (avascular necrosis of bone) (H)     left hip     AVN (avascular necrosis of bone) (H)     left hip     BPH (benign prostatic hyperplasia)      Chronic kidney disease, stage 4, severely decreased GFR (H)      Gastro-oesophageal reflux disease      Gout      History of blood transfusion      Hypertension       Medical non-compliance      Osteonecrosis (H) 2020    Added automatically from request for surgery [id number]     Pulmonary nodules      Steroid long-term use      Vitamin D deficiency        Past Surgical History   I have reviewed this patient's surgical history and updated it with pertinent information if needed.  Past Surgical History:   Procedure Laterality Date     ARTHROPLASTY HIP Left 2020    Procedure: Left total hip arthroplasty;  Surgeon: Fernando Morillo MD;  Location: [facility] OR     ARTHROPLASTY HIP Right 2021    Procedure: Right total hip arthroplasty;  Surgeon: Fernando Morillo MD;  Location: [facility] OR     AV FISTULA OR GRAFT ARTERIAL       COLONOSCOPY N/A 2021    Procedure: COLONOSCOPY, WITH POLYPECTOMY AND BIOPSY;  Surgeon: Zak Ortega MD;  Location: [facility]     CREATE FISTULA ARTERIOVENOUS UPPER EXTREMITY Right 2019    Procedure: Creation Of Atriovenous Fistula Right Upper Arm;  Surgeon: Julia Irwin MD;  Location: [facility] OR     IR CVC TUNNEL PLACEMENT > 5 YRS OF AGE  10/9/2020     IR DIALYSIS FISTULOGRAM RIGHT  10/9/2020     IR DIALYSIS FISTULOGRAM RIGHT  2021     IR DIALYSIS MECH THROMB, PTA  10/9/2020     IR DIALYSIS STENT W OR W/OUT PTA  2021     LIGATE FISTULA ARTERIOVENOUS UPPER EXTREMITY  2011    Procedure:LIGATE FISTULA ARTERIOVENOUS UPPER EXTREMITY; Excision of Right Forearm Arteriovenous Fistula.; Surgeon:LINDY AMAYA; Location:[facility] OR     PERCUTANEOUS BIOPSY KIDNEY Right 2017    Procedure: PERCUTANEOUS BIOPSY KIDNEY;  Surgeon: Gee Barrios MD;  [facility]: [facility] OR     TRANSPLANT  2011    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  Social History     Tobacco Use     Smoking status: Former Smoker     Types: Cigarettes     Quit date: 2017     Years since quittin.1     Smokeless tobacco: Never Used    Substance Use Topics     Alcohol use: Not Currently     Alcohol/week: 4.2 standard drinks     Types: 5 Standard drinks or equivalent per week     Comment: 1 shot yesterday     Drug use: Not Currently     Types: Marijuana     Family History   I have reviewed this patient's family history and updated it with pertinent information if needed.   I have reviewed this patient's family history and updated it with pertinent information if needed.  Family History   Problem Relation Age of Onset     Hypertension Mother      Colon Cancer Mother 66     Colon Cancer Brother 51       Medications   I have reviewed this patient's current medications    Allergies   No Known Allergies    Physical Exam   Vital Signs: Temp: (!) 96.2  F (35.7  C) Temp src: Oral BP: (!) 152/89 Pulse: 63   Resp: 14 SpO2: 99 % O2 Device: None (Room air)    Weight: 155 lbs 4.8 oz    Constitutional: awake, alert, cooperative, no apparent distress, and appears stated age  ENT: No JVD noted  Respiratory: No increased work of breathing, good air exchange, clear to auscultation bilaterally, no crackles or wheezing  Cardiovascular: regular rate and rhythm  Musculoskeletal: No lower extremity edema  Neurologic: Awake, alert, oriented to name, place and time.  Cranial nerves II-XII are grossly intact.    Data   Results for orders placed or performed during the hospital encounter of 05/09/22 (from the past 24 hour(s))   EKG 12-lead, tracing only   Result Value Ref Range    Systolic Blood Pressure  mmHg    Diastolic Blood Pressure  mmHg    Ventricular Rate 66 BPM    Atrial Rate 66 BPM    NC Interval 150 ms    QRS Duration 92 ms     ms    QTc 423 ms    P Axis 59 degrees    R AXIS 61 degrees    T Axis 36 degrees    Interpretation ECG       Sinus rhythm  Possible Left atrial enlargement  Left ventricular hypertrophy  Abnormal ECG  Unconfirmed report - interpretation of this ECG is computer generated - see medical record for final interpretation  Confirmed by -  EMERGENCY ROOM, PHYSICIAN ([id number]),  RONALD MCCLURE ([id number]) on 5/9/2022 6:37:17 AM     POC US ECHO LIMITED    Impression    Limited Bedside Cardiac Ultrasound, performed and interpreted by me.   Indication: Chest Pain.  Parasternal long axis, parasternal short axis, apical 4 chamber and subcostal views were acquired.   Image quality was satisfactory.    Findings:    Global left ventricular function appears intact.  Chambers do not appear dilated.  There is no evidence of large free fluid collection within the pericardium.    IMPRESSION: Grossly normal left ventricular function and chamber size. Trace pericardial free fluid..     Comprehensive metabolic panel   Result Value Ref Range    Sodium 140 133 - 144 mmol/L    Potassium 4.5 3.4 - 5.3 mmol/L    Chloride 105 94 - 109 mmol/L    Carbon Dioxide (CO2) 24 20 - 32 mmol/L    Anion Gap 11 3 - 14 mmol/L    Urea Nitrogen 80 (H) 7 - 30 mg/dL    Creatinine 16.00 (H) 0.66 - 1.25 mg/dL    Calcium 9.4 8.5 - 10.1 mg/dL    Glucose 110 (H) 70 - 99 mg/dL    Alkaline Phosphatase 95 40 - 150 U/L    AST 7 0 - 45 U/L    ALT 12 0 - 70 U/L    Protein Total 7.6 6.8 - 8.8 g/dL    Albumin 3.6 3.4 - 5.0 g/dL    Bilirubin Total 0.8 0.2 - 1.3 mg/dL    GFR Estimate 3 (L) >60 mL/min/1.73m2   Troponin I   Result Value Ref Range    Troponin I High Sensitivity 25 <79 ng/L   CBC with platelets differential    Narrative    The following orders were created for panel order CBC with platelets differential.  Procedure                               Abnormality         Status                     ---------                               -----------         ------                     CBC with platelets and d...[[id number]]  Abnormal            Final result                 Please view results for these tests on the individual orders.   CBC with platelets and differential   Result Value Ref Range    WBC Count 6.0 4.0 - 11.0 10e3/uL    RBC Count 4.10 (L) 4.40 - 5.90 10e6/uL    Hemoglobin 10.7 (L) 13.3 -  17.7 g/dL    Hematocrit 35.6 (L) 40.0 - 53.0 %    MCV 87 78 - 100 fL    MCH 26.1 (L) 26.5 - 33.0 pg    MCHC 30.1 (L) 31.5 - 36.5 g/dL    RDW 19.3 (H) 10.0 - 15.0 %    Platelet Count 203 150 - 450 10e3/uL    % Neutrophils 74 %    % Lymphocytes 16 %    % Monocytes 7 %    % Eosinophils 3 %    % Basophils 0 %    % Immature Granulocytes 0 %    NRBCs per 100 WBC 0 <1 /100    Absolute Neutrophils 4.4 1.6 - 8.3 10e3/uL    Absolute Lymphocytes 1.0 0.8 - 5.3 10e3/uL    Absolute Monocytes 0.4 0.0 - 1.3 10e3/uL    Absolute Eosinophils 0.2 0.0 - 0.7 10e3/uL    Absolute Basophils 0.0 0.0 - 0.2 10e3/uL    Absolute Immature Granulocytes 0.0 <=0.4 10e3/uL    Absolute NRBCs 0.0 10e3/uL   [place] Draw    Narrative    The following orders were created for panel order [place] Draw.  Procedure                               Abnormality         Status                     ---------                               -----------         ------                     Extra Blue Top Tube[[id number]]                              Final result                 Please view results for these tests on the individual orders.   Extra Blue Top Tube   Result Value Ref Range    Hold Specimen [facility]    Asymptomatic COVID-19 Virus (Coronavirus) by PCR Nasopharyngeal    Specimen: Nasopharyngeal; Swab   Result Value Ref Range    SARS CoV2 PCR Negative Negative, Testing sent to reference lab. Results will be returned via unsolicited result    Narrative    Testing was performed using the Xpert Xpress SARS-CoV-2 Assay on the  Cepheid Gene-Xpert Instrument Systems. Additional information about  this Emergency Use Authorization (EUA) assay can be found via the Lab  Guide. This test should be ordered for the detection of SARS-CoV-2 in  individuals who meet SARS-CoV-2 clinical and/or epidemiological  criteria. Test performance is unknown in asymptomatic patients. This  test is for in vitro diagnostic use under the FDA EUA for  laboratories certified under CLIA to perform high  complexity testing.  This test has not been FDA cleared or approved. A negative result  does not rule out the presence of PCR inhibitors in the specimen or  target RNA in concentration below the limit of detection for the  assay. The possibility of a false negative should be considered if  the patient's recent exposure or clinical presentation suggests  COVID-19. This test was validated by the [facility]. This laboratory is certified under  the Clinical Laboratory Improvement Amendments of 1988 (CLIA-88) as  qualified to perform high complexity laboratory testing.     Troponin I   Result Value Ref Range    Troponin I High Sensitivity 22 <79 ng/L   Extra Tube    Narrative    The following orders were created for panel order Extra Tube.  Procedure                               Abnormality         Status                     ---------                               -----------         ------                     Extra Purple Top Tube[[id number]]                            Final result                 Please view results for these tests on the individual orders.   Extra Purple Top Tube   Result Value Ref Range    Hold Specimen [facility]    CRP inflammation   Result Value Ref Range    CRP Inflammation 25.0 (H) 0.0 - 8.0 mg/L   CT Chest Pulmonary Embolism w Contrast    Narrative    EXAMINATION: CT CHEST PULMONARY EMBOLISM W CONTRAST, 5/9/2022 7:31 AM    CLINICAL HISTORY: PE suspected, high prob    COMPARISON: CTA chest 10/13/2021.    TECHNIQUE: CT imaging obtained through the chest with contrast.  Coronal and axial MIP reformatted images obtained. Three-dimensional  (3D) post-processed angiographic images were reconstructed, archived  to PACS and used in interpretation of this study.     CONTRAST: 70 ml isovue 370 IV    FINDINGS:    Lines and tubes: None.    Pulmonary vasculature: Opacification of the pulmonary arteries is  adequate. No central filling defect consistent with a  pulmonary  embolism.  The main pulmonary artery (3.2 cm) is upper normal in  caliber.     Heart: Normal in size. Trace pericardial effusion. No paradoxical  septal bowing. No reflux of contrast into the IVC.    Lungs: No focal consolidation. No pleural effusion or pneumothorax.  Calcified granuloma in right lower lobe.    Thyroid: No suspicious nodules.    Mediastinum:  Central tracheobronchial tree is patent.  The thoracic  esophagus is within normal limits. No thoracic lymphadenopathy.  Nonenlarged axillary lymph nodes are present bilaterally.    Bones and soft tissues: No suspicious osseous lesion.    Upper abdomen: Limited. Atrophic changes of bilateral kidneys. Stable  left renal cyst. No acute findings in the upper abdomen.      Impression    IMPRESSION:   1. No central filling defect consistent with a pulmonary embolism.   2. No focal consolidation.       I have personally reviewed the examination and initial interpretation  and I agree with the findings.    REBECA MADRIGAL MD         SYSTEM ID:  [id number]   Echo Complete   Result Value Ref Range    LVEF  55-60%     Narrative    [id number]  [id number]  [id number]  [id number]^KIARA^TAMIA^ARELY     [facility]  Echocardiography Laboratory  [address] [place], [place] [address]     Name: GISELLE GRAY  MRN: [id number]  : 1975  Study Date: 2022 09:20 AM  Age: 47 yrs  Gender: Male  Patient Location: [facility]  Reason For Study: Pericarditis, Pericardial Effusion  Ordering Physician: TAMIA CRAIG  Performed By: Bryson Villasenor RDCS     BSA: 1.8 m2  Height: 68 in  Weight: 150 lb  BP: 155/88 mmHg  ______________________________________________________________________________  Procedure  Echocardiogram with two-dimensional, color and spectral Doppler performed.  Good quality two-dimensional was performed and interpreted.  ______________________________________________________________________________  Interpretation Summary  Global  and regional left ventricular function is normal with an EF of 55-60%.  Global right ventricular function is normal. The right ventricle is normal  size.  No significant valvular abnormalities.  The estimated PA systolic pressure is 54 mmHg.  IVC diameter >2.1 cm collapsing <50% with sniff suggests a high RA pressure  estimated at 15 mmHg or greater.  No pericardial effusion is present.  This study was compared with the study from 10/19/2021. No pericardial  effusion or pleural effusion is noted on this examination.  ______________________________________________________________________________  Left Ventricle  Global and regional left ventricular function is normal with an EF of 55-60%.  Left ventricular size is normal. Mild to moderate concentric wall thickening  consistent with left ventricular hypertrophy is present. Left ventricular  diastolic function is normal.     Right Ventricle  Global right ventricular function is normal. The right ventricle is normal  size.     Atria  The right atria appears normal. Severe left atrial enlargement is present.     Mitral Valve  Mild mitral insufficiency is present.     Aortic Valve  The aortic valve is tricuspid. On Doppler interrogation, there is no  significant stenosis or regurgitation.     Tricuspid Valve  Mild tricuspid insufficiency is present. Right ventricular systolic pressure  is 39mmHg above the right atrial pressure.     Pulmonic Valve  Mild pulmonic insufficiency is present.     Vessels  Ascending aorta 3.4 cm. IVC diameter >2.1 cm collapsing <50% with sniff  suggests a high RA pressure estimated at 15 mmHg or greater.     Pericardium  No pericardial effusion is present.     Compared to Previous Study  This study was compared with the study from 10/19/2021 . No pericardial  effusion or pleural effusion is noted on this examination.  ______________________________________________________________________________  MMode/2D Measurements & Calculations  IVSd: 1.1  cm  LVIDd: 5.5 cm  LVIDs: 3.4 cm  LVPWd: 1.3 cm  FS: 37.4 %  LV mass(C)d: 269.2 grams  LV mass(C)dI: 148.9 grams/m2  LA dimension: 5.4 cm  asc Aorta Diam: 3.4 cm  LVOT diam: 2.0 cm  LVOT area: 3.1 cm2  LA Volume (BP): 103.0 ml  LA Volume Index (BP): 56.9 ml/m2     RWT: 0.47     Doppler Measurements & Calculations  MV E max terry: 99.4 cm/sec  MV A max terry: 86.4 cm/sec  MV E/A: 1.2  LV IVRT: 0.09 sec  MV dec slope: 513.0 cm/sec2  Ao V2 max: 200.0 cm/sec  Ao max P.0 mmHg  Ao V2 mean: 131.0 cm/sec  Ao mean P.0 mmHg  Ao V2 VTI: 43.2 cm  GABY(I,D): 2.4 cm2  GABY(V,D): 2.0 cm2  LV V1 max P.3 mmHg  LV V1 max: 125.0 cm/sec  LV V1 VTI: 32.6 cm  SV(LVOT): 102.4 ml  SI(LVOT): 56.6 ml/m2  PA acc time: 0.11 sec  PI end-d terry: 151.0 cm/sec  TR max terry: 294.3 cm/sec  TR max P.7 mmHg  AV Terry Ratio (DI): 0.63  GABY Index (cm2/m2): 1.3  E/E' av.1  Lateral E/e': 9.9  Medial E/e': 14.3     ______________________________________________________________________________  Report approved by: Steven Jimenez 2022 10:44 AM           *Note: Due to a large number of results and/or encounters for the requested time period, some results have not been displayed. A complete set of results can be found in Results Review.       I very much appreciated the opportunity to see and assess Rashad Ortiz in the hospital with CV resident Paul Monson  Patient presents with CP recurrence and probable pericardial inflammation based on physical findings and echo.  Renal preferenace to avoid NSAIDS/cochicine is noted.  I agree with. the note above which summarizes my findings and current recommendations.  Please do not hesitate to contact my office if you have any questions or concerns.      Tadeo Bansal MD  Cardiac Arrhythmia Service  [facility]  [phone]

## 2022-05-09 NOTE — CONSULTS
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     ESKD: dialyzes MWF at [facility] under the care of Dr. Kraus. Run time: 2.5 hrs. Access: RUE AVF. EDW: 66.5 kg. Does use heparin with OP dialysis.  - Dialysis today per MWF schedule (plan on 3 hr run today instead of his prescribed 2.5 hr run); plan for extra HD run tomorrow as well for volume and clearance (if pericarditis is uremic, pt would benefit from intensification of HD)  - Defer immunosuppression to transplant ID, appears to be on MMF and tacro; it appears pt is in the evaluation process for another transplant and in the past has had some difficulties related to a compliance contract).     CP: trop wnl, echo with EF 55-60% with no WMA  Concern for pericarditis:   - w/u per primary team  -  c/f uremic etiology; per review of [facility] records, pt's kt/V (adequacy measurement for dialysis) has been adequate this month ( goal > 1.2) with 1.32 and 1.30; in March adequacy numbers were lower (1.02 and 1.25); BUN is consistently in the 70-90 range  - If this is uremic pericarditis, the symptoms should improve fairly quickly with intensification of dialysis  - echo with no e/o pericardial or pleural effusion    Volume: EDW 66.5 kg, UF usually 2-3 kg  - UF goal 3 kg today given HTN and e/o volume overload on echo (IVC diameter > 2.1 cm collapsing <50% with sniff suggests a high RA pressure)  - More UF tomorrow as well      BP: at baseline runs in 130-160's, lowest pressure on  dialysis generally ~ 115; PTA meds amlodipine 10 mg qday, Coreg 25 mg bid    BMD: recent PTH 2288 (has been consistently high since at least Feb), hx of avascular necrosis and b/l hip replacements; phos 6's; on hectorol 3.5 mcg per HD, sevelamer 2 tabs tid WM  - Recommend starting Sensipar 30 mg qday (ordered)  - Continue hectorol via HD  - Continue sevelamer    Anemia: hgb 10.7, on epogen 8000 units per HD, venofer 50 mg qMonday  -Hold LAWRENCE, hgb > 10  - Continue venofer via HD      Recommendations were communicated to primary team via this note and in person       Mamie Taylor PA-C   P [id number]      REASON FOR CONSULT: ESKD/dialysis    HISTORY OF PRESENT ILLNESS:  Rashad Ortiz is a 47 year old male with PMH of HTN, ESRD s/p failed kidney transplant, L hip arthroplasty, R femoral head avascular necrosis s/p total R hip arthroplasty, and pericarditis, admitted with chest pain and 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 Per review of [facility] records, pt's dialysis adequacy recently has been ok (kt/V ~ 1.3), last month kt/V was lower. Patient is volume overloaded and with significantly elevated PTH. In general, it appears this patient could benefit from more dialysis and this may be the cause of his pericarditis. If it is, the symptoms should improve fairly quickly with intensification of HD. Recommend starting sensipar given significantly elevated PTH since at least February. Patient is seen on dialysis, continues to have chest pain bilaterally, improved when patient leans forward. Is being seen by rheumatology as well. He states the pain started on Friday (3 days ago), it does not radiate and is fairly constant. He is seen on dialysis, stable run so far with 3 kg UF goal. The patient is agreeable to an extra HD run tomorrow for volume and clearance. He denies n/v, SOB, chills    PAST MEDICAL HISTORY:  Reviewed with patient on 05/09/2022     Past Medical History:   Diagnosis Date     AVN (avascular necrosis of bone)  (H)     left hip     AVN (avascular necrosis of bone) (H)     left hip     BPH (benign prostatic hyperplasia)      Chronic kidney disease, stage 4, severely decreased GFR (H)      Gastro-oesophageal reflux disease      Gout      History of blood transfusion      Hypertension      Medical non-compliance      Osteonecrosis (H) 7/29/2020    Added automatically from request for surgery [id number]     Pulmonary nodules      Steroid long-term use      Vitamin D deficiency        Past Surgical History:   Procedure Laterality Date     ARTHROPLASTY HIP Left 9/9/2020    Procedure: Left total hip arthroplasty;  Surgeon: Fernando Morillo MD;  Location: [facility] OR     ARTHROPLASTY HIP Right 4/12/2021    Procedure: Right total hip arthroplasty;  Surgeon: Fernando Morillo MD;  Location: [facility] OR     AV FISTULA OR GRAFT ARTERIAL       COLONOSCOPY N/A 4/7/2021    Procedure: COLONOSCOPY, WITH POLYPECTOMY AND BIOPSY;  Surgeon: Zak Ortega MD;  Location: [facility] GI     CREATE FISTULA ARTERIOVENOUS UPPER EXTREMITY Right 7/31/2019    Procedure: Creation Of Atriovenous Fistula Right Upper Arm;  Surgeon: Julia Irwin MD;  Location: [facility] OR     IR CVC TUNNEL PLACEMENT > 5 YRS OF AGE  10/9/2020     IR DIALYSIS FISTULOGRAM RIGHT  10/9/2020     IR DIALYSIS FISTULOGRAM RIGHT  2/19/2021     IR DIALYSIS MECH THROMB, PTA  10/9/2020     IR DIALYSIS STENT W OR W/OUT PTA  2/19/2021     LIGATE FISTULA ARTERIOVENOUS UPPER EXTREMITY  12/20/2011    Procedure:LIGATE FISTULA ARTERIOVENOUS UPPER EXTREMITY; Excision of Right Forearm Arteriovenous Fistula.; Surgeon:LINDY AMAYA; Location:[facility] OR     PERCUTANEOUS BIOPSY KIDNEY Right 2/28/2017    Procedure: PERCUTANEOUS BIOPSY KIDNEY;  Surgeon: Gee Barrios MD;  [facility] OR     TRANSPLANT  01/13/2011    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 Auth Provider   acetaminophen (TYLENOL) 500 MG tablet  Take 2 tablets (1,000 mg) by mouth every 6 hours as needed for mild pain   Mariel Garcia MD   amLODIPine (NORVASC) 10 MG tablet Take 10 mg by mouth daily   Reported, Patient   amLODIPine (NORVASC) 5 MG tablet Take 10 mg by mouth daily    Mariel Garcia MD   carvedilol (COREG) 12.5 MG tablet Take 2 tablets (25 mg) by mouth 2 times daily (with meals)   Edgar Sue MD   mycophenolate (GENERIC EQUIVALENT) 250 MG capsule Take 1 capsule (250 mg) by mouth 2 times daily   Reyes Donovan MD   sevelamer carbonate (RENVELA) 800 MG tablet Take 1 tablet (800 mg) by mouth 3 times daily (with meals)   Jessy Marquez PA-C   sulfamethoxazole-trimethoprim (BACTRIM) 400-80 MG tablet Take one tablet every Monday, Wednesday, Friday   Reyes Donovan MD   tacrolimus (GENERIC EQUIVALENT) 0.5 MG capsule Take 1 capsule (0.5 mg) by mouth every evening Total dose = 1 mg in the AM and 1.5 mg in the PM   Abran Cunha MD   tacrolimus (GENERIC EQUIVALENT) 1 MG capsule Take 1 capsule (1 mg) by mouth 2 times daily . Total dose=1mg in the AM and 1.5mg in the PM   Abran Cunha MD      Current Meds    sodium chloride 0.9%  250 mL Intravenous Once in dialysis/CRRT     sodium chloride 0.9%  300 mL Hemodialysis Machine Once     amLODIPine  10 mg Oral Daily     carvedilol  25 mg Oral BID w/meals     doxercalciferol  2 mcg Intravenous Once in dialysis/CRRT     heparin ANTICOAGULANT  5,000 Units Subcutaneous Q12H     iron sucrose  50 mg Intravenous Once in dialysis/CRRT     mycophenolate  250 mg Oral BID     - MEDICATION INSTRUCTIONS -   Does not apply Once     [START ON 5/10/2022] predniSONE  40 mg Oral Daily     sevelamer carbonate  800 mg Oral TID w/meals     sodium chloride (PF)  3 mL Intracatheter Q8H     sulfamethoxazole-trimethoprim  1 tablet Oral Once per day on Mon Wed Fri     tacrolimus  0.5 mg Oral QPM     tacrolimus  1 mg Oral BID     Infusion Meds    - MEDICATION INSTRUCTIONS -          ALLERGIES:    No Known Allergies    REVIEW OF SYSTEMS:  A comprehensive of systems was negative except as noted above.    SOCIAL HISTORY:   Social History     Socioeconomic History     Marital status:      Spouse name: Not on file     Number of children: Not on file     Years of education: Not on file     Highest education level: Not on file   Occupational History     Not on file   Tobacco Use     Smoking status: Former Smoker     Types: Cigarettes     Quit date: 2017     Years since quittin.1     Smokeless tobacco: Never Used   Substance and Sexual Activity     Alcohol use: Not Currently     Alcohol/week: 4.2 standard drinks     Types: 5 Standard drinks or equivalent per week     Comment: 1 shot yesterday     Drug use: Not Currently     Types: Marijuana     Sexual activity: Never     Partners: Female     Birth control/protection: None   Other Topics Concern     Parent/sibling w/ CABG, MI or angioplasty before 65F 55M? Not Asked   Social History Narrative    Rashad lives with his wife and 2 children. There are 2 declawed cats. 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      Social Determinants of Health     Financial Resource Strain: Not on file   Food Insecurity: Not on file   Transportation Needs: Not on file   Physical Activity: Not on file   Stress: Not on file   Social Connections: Not on file   Intimate Partner Violence: Not on file   Housing Stability: Not on file     Reviewed with patient   FAMILY MEDICAL HISTORY:   Family History   Problem Relation Age of Onset     Hypertension Mother      Colon Cancer Mother 66     Colon Cancer Brother 51     No known family history of kidney disease  Reviewed with patient     PHYSICAL EXAM:   Temp  Av  F (36.7  C)  Min: 98  F (36.7  C)  Max: 98  F (36.7  C)      Pulse  Av.4  Min: 63  Max: 70 Resp  Av.5  Min: 12  Max: 25  SpO2  Av.6 %  Min: 97 %  Max: 100 %       BP (!) 158/101 (BP Location: Left arm)   Pulse 69   Temp  "98  F (36.7  C)   Resp 18   Ht 1.727 m (5' 8\")   Wt 68 kg (150 lb)   SpO2 97%   BMI 22.81 kg/m        Admit Weight: 68 kg (150 lb)     GENERAL APPEARANCE: alert, NAD  EYES: no scleral icterus, pupils equal  Pulmonary: lungs clear to auscultation with equal breath sounds bilaterally   CV: regular rhythm, normal rate, no rub   - Edema LLE > RLE at baseline (since transplant)  GI: soft, nontender, normal bowel sounds  : no butler  SKIN: no rash, warm, dry, no cyanosis  NEURO: face symmetric, a/o3  Access: RUE AVF    LABS:   I have reviewed the following labs:  CMP  Recent Labs   Lab 05/09/22  0532      POTASSIUM 4.5   CHLORIDE 105   CO2 24   ANIONGAP 11   *   BUN 80*   CR 16.00*   GFRESTIMATED 3*   ASHELY 9.4   PROTTOTAL 7.6   ALBUMIN 3.6   BILITOTAL 0.8   ALKPHOS 95   AST 7   ALT 12     CBC  Recent Labs   Lab 05/09/22  0532   HGB 10.7*   WBC 6.0   RBC 4.10*   HCT 35.6*   MCV 87   MCH 26.1*   MCHC 30.1*   RDW 19.3*        INRNo lab results found in last 7 days.  ABGNo lab results found in last 7 days.   URINE STUDIES  Recent Labs   Lab Test 12/21/20  1545 09/09/20  1233 06/13/20  1010 12/11/18  1211 10/25/18  1210 06/04/15  2049 01/14/15  2154   COLOR Yellow Yellow Yellow Straw Yellow   < > Yellow   APPEARANCE Clear Clear Clear Clear Cloudy   < > Clear   URINEGLC Negative Negative Negative 50* Negative   < > Negative   URINEBILI Negative Negative Negative Negative Negative   < > Negative   URINEKETONE Negative 5* Negative Negative Negative   < > Negative   SG 1.015 1.015 1.010 1.012 1.020   < > 1.020   UBLD Trace* Negative Trace* Small* Large*   < > Trace*   URINEPH 6.0 8.0* 6.5 6.0 6.5   < > 5.5   PROTEIN 100* 30* 30* 100* >=300*   < > Trace*   UROBILINOGEN 0.2  --  0.2  --  0.2  --  0.2   NITRITE Negative Negative Negative Negative Negative   < > Negative   LEUKEST Trace* Negative Negative Negative Moderate*   < > Negative   RBCU O - 2 <1 O - 2 1 25-50*   < > O - 2   WBCU 0 - 5 2 0 - 5 1 >100*  "  < > O - 2    < > = values in this interval not displayed.     Recent Labs   Lab Test 03/18/20  0728 07/24/19  1713 03/19/19  1527 11/01/18  1453 03/17/18  1035 08/04/17  1839 02/28/17  0809 09/17/16  1050 09/12/16  1617 06/12/15  0803 05/27/14  1509   UTPG 0.64* 0.26* 2.92* 0.85* 0.82* 0.17 0.29* 0.35* 0.44* 2.09* 0.05     PTH  Recent Labs   Lab Test 06/13/19  1941 01/06/18  0947 02/13/16  0930   PTHI 456* 621* 503*     IRON STUDIES  Recent Labs   Lab Test 01/13/22  1041 06/20/20  1112 03/18/20  0723 10/10/19  1650 07/24/19  1702 06/13/19  1941 01/06/18  0947   IRON 46 15* 54 42 152 46 62   * 162* 179* 181* 202* 215* 219*   IRONSAT 29 9* 30 23 75* 21 28   ORALIA 1,054* 310 460* 790* 406* 436* 256       IMAGING:  Reviewed    THEO Salas

## 2022-05-10 PROBLEM — Z11.52 ENCOUNTER FOR SCREENING LABORATORY TESTING FOR SEVERE ACUTE RESPIRATORY SYNDROME CORONAVIRUS 2 (SARS-COV-2): Status: ACTIVE | Noted: 2022-01-01

## 2022-05-10 NOTE — PLAN OF CARE
Shift:  [id number]    Situation:    47 year old male with pericarditis.  PMH includes:  ESRD (dialysis MWF), polyarthralgias, HTN, s/p failed LDKT, pericarditis (+c-ANCA, -PR3 and MPO)    Vitals:  VSS on RA except intermittent HTN    Neuro:  Alert, oriented x4    Peripheral vascular:  Chronic left leg edema.     Pain:  Current regimen effective; reports less pain while leaning forward,     Cardiac:  Reports chest pain not noticeable just sitting  in bed    Respiratory: SOB with activity or talking    Peripheral neurovascular: BLE edema with Left > right at baseline. \    Activity:  Independent in room    GI/Nutrition: Anuric, tolerating renal diet.     :  WNL , ESRD, on hemodialysis,No bm this shift    Lines/drains/Tubes: RUE AVF, LUE PIV    Events of shift: No acute events this shift, patient slept between cares. Offered prn pain medications; however, patient declined    Plan: Patient to have dialysis today. Scheduled for outpatient echocardiogram with stress test on 5-11-22.       Goal Outcome Evaluation:  - Plan of Care Reviewed With: patient   - Overall Patient Progress: no change

## 2022-05-10 NOTE — UTILIZATION REVIEW
"Inpatient to Observation note:    Admission Status; Secondary Review Determination         Under the authority of the Utilization Management Committee, the utilization review process indicated a secondary review on the above patient.  The review outcome is based on review of the medical records, discussions with staff, and applying clinical experience noted on the date of the review.          (x) Observation Status Appropriate - This patient does not meet hospital inpatient criteria and is placed in observation status. If this patient's primary payer is Medicare and was admitted as an inpatient, Condition Code 44 should be used and patient status changed to \"observation\".     RATIONALE FOR DETERMINATION   47-year-old male with history of prior kidney transplant, now on hemodialysis was admitted to [facility] on 5/9/2022 with concerns for recurrent pericarditis.  Echocardiogram does not reveal any evidence of pericardial effusion at this time.  Patient has been evaluated by cardiology and rheumatology and has been started on anakinra for recurrent pericarditis.  Pain appears to be well controlled on oral regimen.  Patient does not meet inpatient criteria at this time.    The severity of illness, intensity of service provided, expected LOS and risk for adverse outcome make the care appropriate for further observation; however, doesn't meet criteria for hospital inpatient admission. Dr Carrillo notified of this determination.    This document was produced using voice recognition software.      The information on this document is developed by the utilization review team in order for the business office to ensure compliance.  This only denotes the appropriateness of proper admission status and does not reflect the quality of care rendered.         The definitions of Inpatient Status and Observation Status used in making the determination above are those provided in the CMS Coverage Manual, Chapter 1 and Chapter 6, section 70.4. "      Sincerely,  Pankaj Masters MD    Utilization Review  Physician Advisor  [facility].

## 2022-05-10 NOTE — PROGRESS NOTES
HEMODIALYSIS TREATMENT NOTE    Date: 5/10/2022  Time: 12:25 PM    Data:  Pre Wt:   68 kg  Desired Wt: 65 kg   Post Wt:  65 kg  Weight change: 3  kg  Ultrafiltration - Post Run Net Total Removed (mL): 3000 mL  Vascular Access Status: patent  Dialyzer Rinse: Clear  Total Blood Volume Processed: 69.08 L Liters  Total Dialysis (Treatment) Time: 3 Hours    Lab:   No    Interventions:  n/a    Assessment:  3 hour HD tx completed as ordered using a 3K 3Ca bath with a 400BFR and 600DFR. 3L net fluid removed, pt tolerated well. Pt remained alert and oriented throughout tx. ARIC AVF access patent with no s/s infection or bleeding, mild pressure dressings in place.       Plan:    Per renal team

## 2022-05-10 NOTE — PROGRESS NOTES
[facility]    Medicine Progress Note - Hospitalist Service, GOLD TEAM 3    Date of Admission:  5/9/2022    Assessment & Plan        Rashad Ortiz is a 47 year old male with a past medical history of ESRD on HD, pericarditis, pulmonary edema, and HTN who presents with chest pain duet to pericarditis.      Chest Pain  Hx of Pericarditis   Patient presents with a 3 day history of anterior and left sided pleuritic chest pain. Pain is similar to pericarditis episode in October 2021. Pericarditis and pericardial effusion at that time attributed to post-viral syndrome vs COVID vaccine. He received prednisone during that admission and discharged on a steroid taper. Echo 10/19/21 with improving pericardial effusion. Troponin negative. EKG w/o ischemia.  Chest CTA negative for acute PE. Echo this admission with normal EF and no effusion. Concerns for possible uremic pericarditis as BUN 80.    -Nephrology will dialyze to try and improve uremia  -s/p prednisone 40mg yesterday and today.  Per rheumatology Anakinra is a good option given his +cANCA in past.  They recommend further labs.(uric acid, C3/C4, anti-CCP, ANCA, and PR3/MPO) wo eval further.  Anakinra will allow us to stop steroids and avoid prolonged taper.    -Continue oxycodone 10mg Q4H PRN  -Avoid NSAIDs/Colchicine for now per nephrology   -Had schedule for outpatient echocardiogram with stress test on Wednesday 5/1,  Will need to reschedule.      ESRD s/p Failed LDKT on HD - Runs MWF via AVF. EDW ~66 kg. Prior renal disease felt to be related to hypertension. Last dialyzed on Friday 5/6.   -Nephrology following and increasing HD while here to improve uremia.  -Did well with hemodialysis today  -Continue PTA tacrolimus 1mg every morning, 1.5mg every evening  -PPX: Bactrim 400-80mg MWF     Renal Hyperparathyroidism - Continue PTA Renvela 800mg TID      HTN - Continue PTA amlodipine 10mg daily and carvedilol 25mg BID       Anemia of CKD - Baseline Hgb ~8-9, currently 10.7.    -CTM       Diet: Regular Diet Adult    DVT Prophylaxis: Heparin SQ  Hunter Catheter: Not present  Central Lines: None  Cardiac Monitoring: None  Code Status: Full Code      Disposition Plan   Expected Discharge: 05/13/2022     Anticipated discharge location: home with family    Delays:     Dialysis - arrange outpatient            The patient's care was discussed with the Bedside Nurse, Patient and Rheumatology Consultant.    Wilmar Carrillo MD  Hospitalist Service, GOLD TEAM [address] [place] [address]  Securely message with the Vocera Web Console (learn more here)  Text page via [facility] Paging/Directory   Please see signed in provider for up to date coverage information        ______________________________________________________________________    Interval History   Chest pain improved today.  No shortness of breath.  Able to breath easier.  Still needing to take some oxycodone.  Initially reluctant to try anakinra, but has decided he would like to give it a try.       ROS: 10 point ROS neg other than the symptoms noted above in the HPI.  Data reviewed today: I reviewed all medications, new labs and imaging results over the last 24 hours. I personally reviewed no images or EKG's today.    Physical Exam   Vital Signs: Temp: 98.2  F (36.8  C) Temp src: Oral BP: 131/86 Pulse: 98   Resp: 18 SpO2: 92 % O2 Device: None (Room air)    Weight: 149 lbs 14.6 oz  Constitutional: awake, alert, cooperative, no apparent distress, and appears stated age  Respiratory: No increased work of breathing, good air exchange, clear to auscultation bilaterally, no crackles or wheezing  Cardiovascular: Normal apical impulse, regular rate and rhythm, normal S1 and S2, no S3 or S4, and no murmur noted.  No rub.  No chest wall tenderness  GI: No scars, normal bowel sounds, soft, non-distended, non-tender, no masses palpated, no  hepatosplenomegally  Skin: no rashes  Neurologic: Awake, alert, oriented to name, place and time.      Data   Recent Labs   Lab 05/10/22  0515 05/09/22  0532   WBC 4.5 6.0   HGB 10.2* 10.7*   MCV 86 87    203    140   POTASSIUM 3.8 4.5   CHLORIDE 100 105   CO2 24 24   BUN 53* 80*   CR 11.30* 16.00*   ANIONGAP 11 11   ASHELY 8.4* 9.4   GLC 93 110*   ALBUMIN  --  3.6   PROTTOTAL  --  7.6   BILITOTAL  --  0.8   ALKPHOS  --  95   ALT  --  12   AST  --  7     No results found for this or any previous visit (from the past 24 hour(s)).  Medications       amLODIPine  10 mg Oral Daily     anakinra  100 mg Subcutaneous Every Other Day     carvedilol  25 mg Oral BID w/meals     cinacalcet  30 mg Oral Daily with supper     heparin ANTICOAGULANT  5,000 Units Subcutaneous Q12H     mycophenolate  250 mg Oral BID     sevelamer carbonate  800 mg Oral TID w/meals     sodium chloride (PF)  3 mL Intracatheter Q8H     sulfamethoxazole-trimethoprim  1 tablet Oral Once per day on Mon Wed Fri     tacrolimus  0.5 mg Oral QPM     tacrolimus  1 mg Oral BID

## 2022-05-10 NOTE — PROGRESS NOTES
Cardiology Consult Progress Note  5/10/2022      ASSESSMENT/PLAN:  Rashad Ortiz is a 47 year old male with PMH significant for prior kidney transplant, ESRD now on HD, HTN, pulmonary edema, hx of pericarditis October 2021 with pericardial effusion which responded well to steroids. He was admitted on 5/9/22 with concern for 3 days of chest pain along his anterior left chest / left substernal area which is worse when laying down and better when leaning forward. His EKG is negative for signs of ischemia, TTE shows normal EF 55-60% with normal function, pulmonary HTN, no effusions, CTPE neg for PE, bedside cardiac US showing trace effusion. Clinical exam, laboratory findings and imaging are most consistent with recurrent pericarditis. TTE without effusion.      He was started on prednisone and was aggressively dialyzed. His symptoms have improved today. Unclear what caused the improvement. Rheumatology consulted who agreed anakinra would be appropriate.     # Recurrent pericarditis  # Pleuritic chest pain    Recommendations:  - Would agree with a trial of Anakinra if the patient is agreeable, especially in an effort to avoid another prolonged prednisone taper  - If anakinra is started, he can come off prednisone per Rheumatology  - If he does not want to try anakinra, likely will need prolonged prednisone taper over the course of months to prevent recurrent symptoms  - Appreciate Rheumatology assistance in managing these immunosuppressive medications.     The general cardiology consult service will sign off. Please reach out with any new questions or concerns.     Plan d/w Dr. Rolf MYERS, who is in agreement. Please, see staff addendum for changes/additions to the plan.    Jamaal Goodman MD  Cardiology Fellow  [phone]      ===================================================================    SUBJECTIVE: JOSEFINA o/n. Feels better this AM.     Nursing notes reviewed.    OBJECTIVE:  BP (!) 165/117   Pulse 61    "Temp 98  F (36.7  C) (Oral)   Resp 16   Ht 1.727 m (5' 8\")   Wt 68 kg (149 lb 14.6 oz)   SpO2 100%   BMI 22.79 kg/m    Temp (24hrs), Av.5  F (36.4  C), Min:96.9  F (36.1  C), Max:98  F (36.7  C)      I/O:    Intake/Output Summary (Last 24 hours) at 5/10/2022 1248  Last data filed at 5/10/2022 1202  Gross per 24 hour   Intake 480 ml   Output 6500 ml   Net -6020 ml       Wt:   Wt Readings from Last 5 Encounters:   05/10/22 68 kg (149 lb 14.6 oz)   22 68 kg (150 lb)   21 67.6 kg (149 lb)   21 67.7 kg (149 lb 3.2 oz)   10/20/21 62.6 kg (138 lb 0.1 oz)       Constitutional: awake, alert, cooperative, no apparent distress, and appears stated age  ENT: No JVD noted  Respiratory: No increased work of breathing, good air exchange, clear to auscultation bilaterally, no crackles or wheezing  Cardiovascular: regular rate and rhythm. No rubs  Musculoskeletal: No lower extremity edema  Neurologic: Awake, alert, oriented to name, place and time.  Cranial nerves II-XII are grossly intact.    Labs: reviewed in EMR  CBC  Recent Labs   Lab 05/10/22  0515 22  0532   WBC 4.5 6.0   RBC 3.86* 4.10*   HGB 10.2* 10.7*   HCT 33.0* 35.6*   MCV 86 87   MCH 26.4* 26.1*   MCHC 30.9* 30.1*   RDW 18.9* 19.3*    203     BMP  Recent Labs   Lab 05/10/22  0515 22  0532    140   POTASSIUM 3.8 4.5   CHLORIDE 100 105   CO2 24 24   ANIONGAP 11 11   GLC 93 110*   BUN 53* 80*   CR 11.30* 16.00*   GFRESTIMATED 5* 3*   ASHELY 8.4* 9.4     Troponins:     Lab Results   Component Value Date    TROPI 0.023 2021    TROPI 0.024 2021    TROPONIN <0.015 10/16/2021    TROPONIN <0.015 10/14/2021    TROPONIN <0.015 10/14/2021    TROPONIN <0.015 10/13/2021    TROPONIN <0.015 10/12/2021     INRNo lab results found in last 7 days.    Imaging: reviewed in EMR.    I very much appreciated the opportunity to see and assess Rashad Ortiz in the hospital with CV Fellow Dr Goodman.  Agreement has been to change to " anakinra and potentially eliminate steroids.  I agree with the note above which summarizes my findings and current recommendations.  Please do not hesitate to contact my office if you have any questions or concerns.      Tadeo Bansal MD  Cardiac Arrhythmia Service  [facility]  [phone]

## 2022-05-10 NOTE — PLAN OF CARE
RN assumed cares: [id number]     Vitals: VSS on RA ex HTN   Neuro: AOx4   Pain: L chest pain improving, requested for PRN oxy & tylenol x1  Activity: Up ad gilberto   Cardiac: Baseline BLE edema L>R. Chest pain present but improving, providers aware. HTN, scheduled BP meds given   Respiratory: Denies cough or SOB. LSCTA   GI/: Tolerating renal diet. No BM this shift. Oliguric; on HD  Skin: No acute deficits   LDAs: R arm fistual; bruit & thrill present, dressing in place CDI. L PIV SL  Labs: Creat 11.30, Hgb 10.2, Plt 181     Events & Plan: Off unit this AM for HD. Pt decided he wants to try anakinra, rheum updated, will receive first dose this afternoon, prednisone discontinued. Possible discharge home tomorrow. Call light within reach, able to make needs known. Will continue to monitor & follow POC    Goal Outcome Evaluation:  - Plan of Care Reviewed With: patient   - Overall Patient Progress: improving  - Outcome Evaluation: Chest pain improving, requested for PRN pain meds x1 this shift. Likely starting anakinra today per rheum rec. Possible discharge home tomorrow

## 2022-05-10 NOTE — PLAN OF CARE
Patient back from dialysis unit at 1615 via bed in stable condition. He is alert and oriented x4, ambulatory calm and cooperative with cares. Ate early supper and consumed 100% of meal with good appetite. Complained of chest pain ( not cardiac in nature) and was given Oxycodone and tylenol which offered good relief thereafter. Took bedtime medications early. Resting in bed as of this time.  P: Continue to monitor for chest pain. For hemodialysis tomorrow.      Goal Outcome Evaluation:    Plan of Care Reviewed With: patient     Overall Patient Progress: improving

## 2022-05-10 NOTE — PROGRESS NOTES
"Nephrology HD note     Pt with known ESRD on HD, dialyzes on MWF schedule.     /86 (BP Location: Left arm)   Pulse 98   Temp 98.2  F (36.8  C) (Oral)   Resp 18   Ht 1.727 m (5' 8\")   Wt 68 kg (149 lb 14.6 oz)   SpO2 92%   BMI 22.79 kg/m       General : Pt awake, not in acute distress   Lungs : anterior lung fields are clear  Cardiac : S1, S2 present  Abdomen : Soft/ND/NT  LE : Edema noted  Dialysis Access : right AVF    Plan : extra session of HD for volume and clearance. Will plan for HD again AM per his schedule. No change in anemia and BMD management.    Pt was seen and examined during dialysis session, pt was tolerating procedure well.  "

## 2022-05-11 NOTE — PROGRESS NOTES
Prior Authorization Denial    Kineret 100mg/0.67ml syr  Date Initiated: 5/11/2022  Date Completed: 5/11/2022  Prior Auth Type: Clinical                Status: Denied    Denial Date: 05/11/2022   Denial Reasoning: Diagnosis not FDA Approved   Appeal Info: Call [phone]    Insurance: [employer] Part D - Phone [phone] Fax [phone]  ID: [id number]  Case Number: [id number] / [id number]   Submitted Via: Covermymeds     Note: This plan, [employer] ([facility]), is patient's secondary insurance. Patient's [employer] plan does cover kineret with a $481 copay.      Chanda Clinton  [facility] Pharmacy Liaison  Ph: [phone] Pager: [phone]

## 2022-05-11 NOTE — PROGRESS NOTES
[facility] [address] [address]  [phone]  [phone]      5/11/2022    Re: Rashad Ortiz      TO WHOM IT MAY CONCERN:    Rashad Ortiz  was seen at our hospital from May 9, 2022 to May 11, 2022.  Please excuse him  until May 13, 2022 due to illness.    Cordially      Wilmar Carrillo MD

## 2022-05-11 NOTE — PROGRESS NOTES
HEMODIALYSIS TREATMENT NOTE    Date: 5/11/2022  Time: 12:06 PM    Data:  Pre Wt:     Desired Wt: 64.9 kg   Post Wt:    Weight change:   kg  Ultrafiltration - Post Run Net Total Removed (mL): 2500 mL  Vascular Access Status: patent  Dialyzer Rinse: Streaked, Light  Total Blood Volume Processed: 68.73 L Liters  Total Dialysis (Treatment) Time: 3 Hours    Lab:        Interventions:  3 hours of HD with 2500 mls pulled with no complications    Assessment:  ESRD patient in for his regular HD run VSS A+O     Plan:    Per renal team

## 2022-05-11 NOTE — DISCHARGE SUMMARY
[facility]  Hospitalist Discharge Summary      Date of Admission:  5/9/2022  Date of Discharge:  5/11/2022  Discharging Provider: Wilmar Carrillo MD  Discharge Service: Hospitalist Service, GOLD TEAM 3    Discharge Diagnoses   Pericarditis, recurrent    Follow-ups Needed After Discharge   Follow-up Appointments     Adult [facility] Follow-up and recommended labs and tests      Follow up with Rheumatology , at [facility], within 1 week    Appointments on [place] and/or [facility] (with [facility] or [facility]   provider or service). Call [phone] if you haven't heard regarding   these appointments within 7 days of discharge.             Unresulted Labs Ordered in the Past 30 Days of this Admission     No orders found from 4/9/2022 to 5/10/2022.          Discharge Disposition   Discharged to home  Condition at discharge: Stable      Hospital Course          Rashad Ortiz is a 47 year old male with a past medical history of ESRD on HD, pericarditis, pulmonary edema, and HTN who presented with chest pain due to pericarditis.      Chest Pain due to Pericarditis  Patient presented with a 3 day history of anterior and left sided pleuritic chest pain similar to his pericarditis episode in October 2021. As he responded well to prednisone during previous episode he was initially started on prednisone 40 mg daily.   Echo this admission with normal EF and no effusion.  Troponin negative. EKG w/o ischemia.  Chest CTA negative for acute PE. Echo this admission with normal EF and no effusion. Thought the pericarditis may related to his BUN of 80 which was higher than normal for him. Given that this was a recurrence we did consult rheumatology and cardiology for their input.   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   They recommend further labs.(uric acid, C3/C4, anti-CCP, ANCA, and PR3/MPO) eval further.  Anakinra will allow us to stop steroids and avoid prolonged taper.  He will go home on every other day Anakinra (due to his ESRD) to complete a 7 day course.  -He was initially on oxycodone 10mg Q4H PRN for his CP, but on 5/11 his CP was resolved and he was discharged to home to complete his course of anakinra.      -Had previously scheduled for outpatient echocardiogram with stress test on Wednesday 5/1,  Will need to reschedule.      ESRD s/p Failed LDKT on HD - Runs MWF via AVF. EDW ~66 kg. Prior renal disease felt to be related to hypertension. Last dialyzed on Friday 5/6.   -Nephrology following and increasing HD while here to improve uremia.  -Did well with hemodialysis while here  -Continue PTA tacrolimus 1mg every morning, 1.5mg every evening  -PPX: Bactrim 400-80mg MWF  - Nephrology also started Sensipar 30mg daily given his elevated PTH.   - Continue PTA Renvela 800mg TID      HTN - Continue PTA amlodipine 10mg daily and carvedilol 25mg BID      Anemia of CKD - Baseline Hgb ~8-9      Consultations This Hospital Stay   NEPHROLOGY ESRD ADULT IP CONSULT  NEPHROLOGY KIDNEY/PANCREAS TRANSPLANT ADULT IP CONSULT  NEPHROLOGY ESRD ADULT IP CONSULT  CARDIOLOGY GENERAL ADULT IP CONSULT  RHEUMATOLOGY IP CONSULT    Code Status   Full Code    Time Spent on this Encounter   I, Wilmar Carrillo MD, personally saw the patient today and spent greater than 30 minutes discharging this patient.       Wilmar Carrillo MD  [facility] [address] [address]  Phone: [phone]  ______________________________________________________________________    Physical Exam   Vital Signs: Temp: (!) 96.3  F (35.7  C) Temp src: Oral BP: 120/72 Pulse: 67   Resp: 16 SpO2: 100 % O2 Device: None (Room air)    Weight: 151 lbs 3.2 oz  Constitutional: awake, alert, cooperative, no apparent distress, and appears  stated age  Respiratory: No increased work of breathing, good air exchange, clear to auscultation bilaterally, no crackles or wheezing  Cardiovascular: Normal apical impulse, regular rate and rhythm, normal S1 and S2, no S3 or S4, and no murmur noted  GI: normal bowel sounds, soft, non-distended, non-tender, no masses palpated, no hepatosplenomegally  Skin: no rashes and Fistula noted R arm  Neurologic: Awake, alert, oriented to name, place and time.         Primary Care Physician   Varun Burgess    Discharge Orders      Activity    Your activity upon discharge: activity as tolerated     Reason for your hospital stay    You were admitted with chest pain and we determined this was a recurrence of your pericarditis.  Tests were sent for rheumatologic causes and these were not yet back at the time of your discharge and you should schedule a rheumatology appointment to go over these results.  We started the medication Anakinra to treat this and your chest pain improved.  The Anakinra allows us to not need to treat this with steroids.  It is also possible the pericarditis related to your BUN being high so we will also manage this with hemodialysis.     Discharge Instructions    Continue on your current MWF hemodialysis     Adult [facility] Follow-up and recommended labs and tests    Follow up with Rheumatology , at [facility], within 1 week    Appointments on [place] and/or [facility] (with [facility] or [facility] provider or service). Call [phone] if you haven't heard regarding these appointments within 7 days of discharge.     Diet    Follow this diet upon discharge: Orders Placed This Encounter        Dialysis diet       Significant Results and Procedures   Most Recent 3 CBC's:Recent Labs   Lab Test 05/11/22  0536 05/10/22  0515 05/09/22  0532   WBC 4.3 4.5 6.0   HGB 10.8* 10.2* 10.7*   MCV 85 86 87    181 203     Most Recent 3 BMP's:Recent Labs   Lab Test 05/11/22  0536 05/10/22  0515 05/09/22  0532   NA  135 135 140   POTASSIUM 4.7 3.8 4.5   CHLORIDE 99 100 105   CO2 25 24 24   BUN 48* 53* 80*   CR 9.35* 11.30* 16.00*   ANIONGAP 11 11 11   ASHELY 8.5 8.4* 9.4   * 93 110*   ,   Results for orders placed or performed during the hospital encounter of 05/09/22   POC US ECHO LIMITED    Impression    Limited Bedside Cardiac Ultrasound, performed and interpreted by me.   Indication: Chest Pain.  Parasternal long axis, parasternal short axis, apical 4 chamber and subcostal views were acquired.   Image quality was satisfactory.    Findings:    Global left ventricular function appears intact.  Chambers do not appear dilated.  There is no evidence of large free fluid collection within the pericardium.    IMPRESSION: Grossly normal left ventricular function and chamber size. Trace pericardial free fluid..     CT Chest Pulmonary Embolism w Contrast    Narrative    EXAMINATION: CT CHEST PULMONARY EMBOLISM W CONTRAST, 5/9/2022 7:31 AM    CLINICAL HISTORY: PE suspected, high prob    COMPARISON: CTA chest 10/13/2021.    TECHNIQUE: CT imaging obtained through the chest with contrast.  Coronal and axial MIP reformatted images obtained. Three-dimensional  (3D) post-processed angiographic images were reconstructed, archived  to PACS and used in interpretation of this study.     CONTRAST: 70 ml isovue 370 IV    FINDINGS:    Lines and tubes: None.    Pulmonary vasculature: Opacification of the pulmonary arteries is  adequate. No central filling defect consistent with a pulmonary  embolism.  The main pulmonary artery (3.2 cm) is upper normal in  caliber.     Heart: Normal in size. Trace pericardial effusion. No paradoxical  septal bowing. No reflux of contrast into the IVC.    Lungs: No focal consolidation. No pleural effusion or pneumothorax.  Calcified granuloma in right lower lobe.    Thyroid: No suspicious nodules.    Mediastinum:  Central tracheobronchial tree is patent.  The thoracic  esophagus is within normal limits. No  thoracic lymphadenopathy.  Nonenlarged axillary lymph nodes are present bilaterally.    Bones and soft tissues: No suspicious osseous lesion.    Upper abdomen: Limited. Atrophic changes of bilateral kidneys. Stable  left renal cyst. No acute findings in the upper abdomen.      Impression    IMPRESSION:   1. No central filling defect consistent with a pulmonary embolism.   2. No focal consolidation.       I have personally reviewed the examination and initial interpretation  and I agree with the findings.    REBECA MADRIGAL MD         SYSTEM ID:  [id number]   Echo Complete     Value    LVEF  55-60%    [facility]    [id number]  [id number]  [id number]  [id number]^KIARA^TAMIA^ARELY     [facility]  Echocardiography Laboratory  [address] [place] [address]     Name: GISELLE GRAY  MRN: [id number]  : 1975  Study Date: 2022 09:20 AM  Age: 47 yrs  Gender: Male  Patient Location: [facility]  Reason For Study: Pericarditis, Pericardial Effusion  Ordering Physician: TAMIA CRAIG  Performed By: Bryson Villasenor RDCS     BSA: 1.8 m2  Height: 68 in  Weight: 150 lb  BP: 155/88 mmHg  ______________________________________________________________________________  Procedure  Echocardiogram with two-dimensional, color and spectral Doppler performed.  Good quality two-dimensional was performed and interpreted.  ______________________________________________________________________________  Interpretation Summary  Global and regional left ventricular function is normal with an EF of 55-60%.  Global right ventricular function is normal. The right ventricle is normal  size.  No significant valvular abnormalities.  The estimated PA systolic pressure is 54 mmHg.  IVC diameter >2.1 cm collapsing <50% with sniff suggests a high RA pressure  estimated at 15 mmHg or greater.  No pericardial effusion is present.  This study was compared with the study from 10/19/2021. No pericardial  effusion or pleural  effusion is noted on this examination.  ______________________________________________________________________________  Left Ventricle  Global and regional left ventricular function is normal with an EF of 55-60%.  Left ventricular size is normal. Mild to moderate concentric wall thickening  consistent with left ventricular hypertrophy is present. Left ventricular  diastolic function is normal.     Right Ventricle  Global right ventricular function is normal. The right ventricle is normal  size.     Atria  The right atria appears normal. Severe left atrial enlargement is present.     Mitral Valve  Mild mitral insufficiency is present.     Aortic Valve  The aortic valve is tricuspid. On Doppler interrogation, there is no  significant stenosis or regurgitation.     Tricuspid Valve  Mild tricuspid insufficiency is present. Right ventricular systolic pressure  is 39mmHg above the right atrial pressure.     Pulmonic Valve  Mild pulmonic insufficiency is present.     Vessels  Ascending aorta 3.4 cm. IVC diameter >2.1 cm collapsing <50% with sniff  suggests a high RA pressure estimated at 15 mmHg or greater.     Pericardium  No pericardial effusion is present.     Compared to Previous Study  This study was compared with the study from 10/19/2021 . No pericardial  effusion or pleural effusion is noted on this examination.  ______________________________________________________________________________  MMode/2D Measurements & Calculations  IVSd: 1.1 cm  LVIDd: 5.5 cm  LVIDs: 3.4 cm  LVPWd: 1.3 cm  FS: 37.4 %  LV mass(C)d: 269.2 grams  LV mass(C)dI: 148.9 grams/m2  LA dimension: 5.4 cm  asc Aorta Diam: 3.4 cm  LVOT diam: 2.0 cm  LVOT area: 3.1 cm2  LA Volume (BP): 103.0 ml  LA Volume Index (BP): 56.9 ml/m2     RWT: 0.47     Doppler Measurements & Calculations  MV E max rowena: 99.4 cm/sec  MV A max rowena: 86.4 cm/sec  MV E/A: 1.2  LV IVRT: 0.09 sec  MV dec slope: 513.0 cm/sec2  Ao V2 max: 200.0 cm/sec  Ao max P.0 mmHg  Ao V2  mean: 131.0 cm/sec  Ao mean P.0 mmHg  Ao V2 VTI: 43.2 cm  GABY(I,D): 2.4 cm2  GABY(V,D): 2.0 cm2  LV V1 max P.3 mmHg  LV V1 max: 125.0 cm/sec  LV V1 VTI: 32.6 cm  SV(LVOT): 102.4 ml  SI(LVOT): 56.6 ml/m2  PA acc time: 0.11 sec  PI end-d terry: 151.0 cm/sec  TR max terry: 294.3 cm/sec  TR max P.7 mmHg  AV Terry Ratio (DI): 0.63  GABY Index (cm2/m2): 1.3  E/E' av.1  Lateral E/e': 9.9  Medial E/e': 14.3     ______________________________________________________________________________  Report approved by: Steven Jimenez 2022 10:44 AM           *Note: Due to a large number of results and/or encounters for the requested time period, some results have not been displayed. A complete set of results can be found in Results Review.       Discharge Medications   Current Discharge Medication List      START taking these medications    Details   anakinra (KINERET) 100 MG/0.67ML SOSY injection Inject 0.67 mLs (100 mg) Subcutaneous every other day for 7 doses  Qty: 4.69 mL, Refills: 0    Associated Diagnoses: Pericarditis, unspecified chronicity, unspecified type; Kidney replaced by transplant      cinacalcet (SENSIPAR) 30 MG tablet Take 1 tablet (30 mg) by mouth daily (with dinner)  Qty: 30 tablet, Refills: 3    Associated Diagnoses: Secondary renal hyperparathyroidism (H)         CONTINUE these medications which have NOT CHANGED    Details   acetaminophen (TYLENOL) 500 MG tablet Take 2 tablets (1,000 mg) by mouth every 6 hours as needed for mild pain  Qty: 100 tablet, Refills: 1    Associated Diagnoses: Multiple joint pain      amLODIPine (NORVASC) 10 MG tablet Take 10 mg by mouth daily      carvedilol (COREG) 12.5 MG tablet Take 2 tablets (25 mg) by mouth 2 times daily (with meals)    Associated Diagnoses: HTN, kidney transplant related      mycophenolate (GENERIC EQUIVALENT) 250 MG capsule Take 1 capsule (250 mg) by mouth 2 times daily  Qty: 60 capsule, Refills: 11    Associated Diagnoses: Kidney  transplanted; Long-term use of immunosuppressant medication      sevelamer carbonate (RENVELA) 800 MG tablet Take 1 tablet (800 mg) by mouth 3 times daily (with meals)  Qty: 90 tablet, Refills: 0    Associated Diagnoses: Anemia of chronic renal failure, stage 5 (H)      sulfamethoxazole-trimethoprim (BACTRIM) 400-80 MG tablet Take one tablet every Monday, Wednesday, Friday  Qty: 45 tablet, Refills: 3    Associated Diagnoses: Kidney transplanted; Need for pneumocystis prophylaxis      tacrolimus (GENERIC EQUIVALENT) 0.5 MG capsule Take 1 capsule (0.5 mg) by mouth every evening Total dose = 1 mg in the AM and 1.5 mg in the PM  Qty: 30 capsule, Refills: 11    Associated Diagnoses: Long-term use of immunosuppressant medication; Kidney transplanted      tacrolimus (GENERIC EQUIVALENT) 1 MG capsule Take 1 capsule (1 mg) by mouth 2 times daily . Total dose=1mg in the AM and 1.5mg in the PM  Qty: 60 capsule, Refills: 11    Associated Diagnoses: Long-term use of immunosuppressant medication; Kidney transplanted           Allergies   No Known Allergies

## 2022-05-11 NOTE — PROGRESS NOTES
Brief Dermatology Progress Note    Date of Service: 05/11/22    47 year old male admitted for recurrent pericarditis. Rheumatology consulted for assistance with management. Started on anakinra with good result. CCP negative. PR3/MPO negative. c-ANCA stable at 1:80.     Recs:  - Will plan for 1 week of anakinra 100mg every other day  - Will plan for hospital follow up in 4 weeks with Dr. Dominguez    Above information was discussed with primary team    Plan was formulated with attending physician, Dr. Dominguez    We will sign off. This is a non-billable note that was made to help coordinate patient care.     Milton Pascual MD  Med/Derm Resident PGY-4  P:[phone]

## 2022-05-11 NOTE — UTILIZATION REVIEW
Brief Social Work Note:    SW met with pt at bedside to provide SMITH paperwork. SW read through the entirety of the paperwork with pt and answered any questions. Pt signed, dated, and timed paperwork. SW faxed paperwork to HIM, documented this in the documents tabs in Epic, and placed a copy in pt's chart.     will continue to follow for discharge planning, psychosocial support, resources, and to advocate on behalf of patient.    BETTY Baeza, [facility]  [address]   [email or web address]  Office: [phone]   Pager: [phone]

## 2022-05-11 NOTE — PROGRESS NOTES
Patient Assistance Enrollment    Received patient's verbal consent to use copay card with his [employer] insurance for kineret. Patient is eligible to receive kineret with $0 copay. Minimum of 7 syringes must be dispensed per coupon.    Chanda Clinton  [facility] Pharmacy Liaison  Ph: [phone] Page: [phone]

## 2022-05-11 NOTE — PLAN OF CARE
"    Shift:  [id number]    Situation:    47 year old male with pericarditis.  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     Pain:  Current regimen effective; denies chest pain overnight, no sob, reports less pain while leaning forward,     Cardiac:  Reports chest pain not noticeable just sitting  in bed    Respiratory: SOB with activity or talking    Peripheral neurovascular: BLE edema with Left > right at baseline. \    Activity:  Independent in room    GI/Nutrition: Anuric, tolerating renal diet.     :  WNL , ESRD, on hemodialysis,No bm this shift    Lines/drains/Tubes: RUE AVF, LUE PIV    Events of shift: No acute events this shift, patient slept between cares. Offered prn pain medications; however, patient declined    Plan: Patient to have dialysis today.    Goal Outcome Evaluation:  - Plan of Care Reviewed With: patient   - Overall Patient Progress: no change                   "

## 2022-05-11 NOTE — PHARMACY-RX INSURANCE COVERAGE
Discharge Pharmacy Test Claim    Patient has prescription benefits through [employer] [place]. Kineret is covered through his commercial [employer] with a copay of $481.20 for 28 syringes. [employer] Medicare Part D requires prior authorization for kineret. Pharmacy liaison submitted a prior authorization request for [facility].    Test Claim [employer] Copay [employer] Medicare Part D   kineret 481.20 prior authorization denied     Addendum 12:52 pm: [facility] prior authorization denied due to off-label use. See documentation encounter for more details.     Chanda Clinton  [facility] Pharmacy Liaison  Ph: [phone] Pager: [phone]

## 2022-05-11 NOTE — PLAN OF CARE
Pt in observation status for pericarditis. Now rates pain 2/10 and declined any intervention. A&Ox4. Had hemodialysis today took of 2.5 L. Pt medically ready for discharge. Writer and pt went over AVS, questions were answered and discharge papers were signed pt left at 1645.

## 2022-05-11 NOTE — PROGRESS NOTES
Nephrology Progress Note  05/11/2022         Assessment & Recommendations:   Rashad Ortiz is a 47 year old male with PMH of HTN, ESRD s/p failed kidney transplant, L hip arthroplasty, R femoral head avascular necrosis (longterm prednisone use) s/p total R hip arthroplasty, and pericarditis, admitted with chest pain and 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      ESKD: dialyzes MWF at [facility] under the care of Dr. Kraus. Run time: 2.5 hrs. Access: RUE AVF. EDW: 66.5 kg. Does use heparin with OP dialysis.  - Dialyzed Monday, Tues, and Wed with improvement in volume and chest pain  - Defer immunosuppression to transplant ID; is being evaluated for another tx     CP: trop wnl, echo with EF 55-60% with no WMA  Concern for pericarditis:   - w/u per primary team  -  c/f uremic etiology; per review of [facility] records, pt's kt/V (adequacy measurement for dialysis) has been adequate this month ( goal > 1.2) with 1.32 and 1.30; in March adequacy numbers were lower (1.02 and 1.25); BUN is consistently in the 70-90 range  - Would likely benefit from longer HD runs, however pt would like to continue as is for now  - echo with no e/o pericardial or pleural effusion  - s/p anakinra injection 5/11     Volume: EDW 66.5 kg, UF usually 2-3 kg  - attempted to challenge EDW given HTN and e/o volume overload on echo; however did not achieve much under current EDW; recommend continuing to challenge as outpatient.          BP: at baseline runs in 130-160's, lowest pressure on dialysis generally ~ 115; PTA meds amlodipine 10 mg qday, Coreg 25 mg bid     BMD: recent PTH 2288 (has been consistently high since at least Feb), hx of avascular necrosis and b/l hip replacements; phos 6's; on hectorol 3.5 mcg per HD, sevelamer 2 tabs tid WM  - Started Sensipar 30 mg qday with supper  - Continue hectorol via HD  - Continue sevelamer     Anemia: hgb 10's, on epogen 8000 units per HD, venofer 50 mg qMonday  -Hold LAWRENCE, hgb > 10  -  "Continue venofer via HD    Recommendations were communicated to primary team via this note        Mamie Tayolr PA-C   [phone]    Interval History :   Seen on dialysis, 2.5 kg UF. Chest pain fully resolved. Had anakinra injection yesterday. Denies n/v, CP, SOB, chills    Review of Systems:   4 point ROS neg other than as noted above    Physical Exam:   I/O last 3 completed shifts:  In: 0   Out: 2500 [Other:2500]   /72 (BP Location: Right arm)   Pulse 67   Temp (!) 96.3  F (35.7  C) (Oral)   Resp 16   Ht 1.727 m (5' 8\")   Wt 68.6 kg (151 lb 3.2 oz)   SpO2 100%   BMI 22.99 kg/m       GENERAL APPEARANCE: alert, NAD  EYES:  no scleral icterus, pupils equal  PULM: CTA  CV: regular rhythm, normal rate     -edema LLE > RLE at baseline  GI: soft  INTEGUMENT: no cyanosis, no rash  NEURO: a/o3  Access RUE AVF    Labs:   All labs reviewed by me  Electrolytes/Renal - Recent Labs   Lab Test 05/11/22  0536 05/10/22  1655 05/10/22  0515 05/09/22  0532 10/20/21  0559 10/19/21  0445 10/18/21  0739 10/17/21  0453 10/15/21  0720     --  135 140 128* 130* 129* 133 134   POTASSIUM 4.7  --  3.8 4.5 5.8* 5.5* 5.3 4.3 4.6   CHLORIDE 99  --  100 105 93* 94 96 98 101   CO2 25  --  24 24 24 24 24 25 24   BUN 48*  --  53* 80* 48* 58* 48* 36* 38*   CR 9.35*  --  11.30* 16.00* 10.40* 13.50* 12.00* 9.58* 9.83*   *  --  93 110* 173* 104* 118* 111* 90   ASHELY 8.5  --  8.4* 9.4 9.3 9.5 9.1 8.9 8.5   MAG  --   --   --   --   --   --  1.8 1.8 1.8   PHOS  --  3.0  --   --  7.0* 8.9* 8.4* 6.9* 6.4*       CBC -   Recent Labs   Lab Test 05/11/22  0536 05/10/22  0515 05/09/22  0532   WBC 4.3 4.5 6.0   HGB 10.8* 10.2* 10.7*    181 203       LFTs -   Recent Labs   Lab Test 05/09/22  0532 10/13/21  0708 10/12/21  1435   ALKPHOS 95 134 146   BILITOTAL 0.8 0.4 0.6   ALT 12 6 15   AST 7 10 15   PROTTOTAL 7.6 7.3 7.7   ALBUMIN 3.6 2.7* 3.1*       Iron Panel -   Recent Labs   Lab Test 01/13/22  1041 06/20/20  1112 " 03/18/20  0723   IRON 46 15* 54   IRONSAT 29 9* 30   ORALIA 1,054* 310 460*         Imaging:  Reviewed    Current Medications:    amLODIPine  10 mg Oral Daily     anakinra  100 mg Subcutaneous Every Other Day     carvedilol  25 mg Oral BID w/meals     cinacalcet  30 mg Oral Daily with supper     heparin ANTICOAGULANT  5,000 Units Subcutaneous Q12H     mycophenolate  250 mg Oral BID     sevelamer carbonate  800 mg Oral TID w/meals     sodium chloride (PF)  3 mL Intracatheter Q8H     sulfamethoxazole-trimethoprim  1 tablet Oral Once per day on Mon Wed Fri     tacrolimus  0.5 mg Oral QPM     tacrolimus  1 mg Oral BID       Mamie Taylor PA-C

## 2022-05-11 NOTE — PLAN OF CARE
"/80   Pulse 70   Temp 98.2  F (36.8  C) (Oral)   Resp 18   Ht 1.727 m (5' 8\")   Wt 68 kg (149 lb 14.6 oz)   SpO2 92%   BMI 22.79 kg/m      Assumed care 1500 to 1930  Pt rounded on hourly  Jah: alert and oriented   Pain: pt has pain in chest 2/10  GI/: Denies nausea. Bowel sounds present. Good urine output clear yellow urine.   Cardiac: WDL  Respiratory: denies SOB lung sounds clear bilaterally  Skin: clean dry and intact  Lines: PIV saline locked. R arm fistual; bruit & thrill present, dressing in place CDI  Assist level: I  Will continue to monitor and follow plan of care.   Plan: dialysis tomorrow then possible discharge to home   Pt started on anakinra this eveing per rheumatology   Call light in reach, Pt able to make needs known, Will continue to monitor and follow plan of care.     Goal Outcome Evaluation:     Plan of Care Reviewed With: pt     Overall Patient Progress: progressing     Outcome Evaluation: chest pain evaluation       "

## 2022-05-12 NOTE — DISCHARGE SUMMARY
Dialysis Discharge Summary Brief    [facility]  Division of Nephrology  Nephrology Discharge Dialysis Orders  Ph: [phone]  Fax: [phone]    Rashad Ortiz  MRN: [id number]  YOB: 1975    [facility] Dialysis Unit: [facility]  Primary Nephrologist: Dr. Kraus    Date of Admission: 5/9/2022  Date of Discharge: 5/11/2022    Please page me at  with any questions. Thanks much. Mamie Taylor PA-C    Rashad Ortiz is a 47 year old male with PMH of HTN, ESRD s/p failed kidney transplant, L hip arthroplasty, R femoral head avascular necrosis (longterm prednisone use) s/p total R hip arthroplasty, and pericarditis, admitted with chest pain and concern for recurrent pericarditis.      ESKD: dialyzes MWF at [facility] under the care of Dr. Kraus. Run time: 2.5 hrs. Access: RUE AVF. EDW: 66.5 kg. Does use heparin with OP dialysis.  - Dialyzed Monday, Tues, and Wed       Recurrent pericarditis: CP: trop wnl, echo with EF 55-60% with no WMA  -  c/f uremic etiology; per review of [facility] records, pt's kt/V (adequacy measurement for dialysis) has been adequate this month ( goal > 1.2) with 1.32 and 1.30; in March adequacy numbers were lower (1.02 and 1.25); BUN is consistently in the 70-90 range  - Would likely benefit from longer HD runs, however pt would like to continue as is for now  - echo with no e/o pericardial or pleural effusion  - s/p anakinra injection 5/11     Volume: EDW 66.5 kg, UF usually 2-3 kg  - attempted to challenge EDW given HTN and e/o volume overload on echo; however did not achieve much under current EDW; recommend continuing to challenge as outpatient.        BP: at baseline runs in 130-160's, lowest pressure on dialysis generally ~ 115; PTA meds amlodipine 10 mg qday, Coreg 25 mg bid     BMD: recent PTH 2288 (has been consistently high since at least Feb), hx of avascular necrosis and b/l hip replacements; phos 6's; on  hectorol 3.5 mcg per HD, sevelamer 2 tabs tid WM  - Started Sensipar 30 mg qday with supper             Discharge Diagnosis:    ICD-10-CM    1. Kidney replaced by transplant  Z94.0 anakinra (KINERET) 100 MG/0.67ML SOSY injection   2. Pericarditis, unspecified chronicity, unspecified type  I31.9 anakinra (KINERET) 100 MG/0.67ML SOSY injection   3. Encounter for screening laboratory testing for severe acute respiratory syndrome coronavirus 2 (SARS-CoV-2)  Z20.822    4. Secondary renal hyperparathyroidism (H)  N25.81 cinacalcet (SENSIPAR) 30 MG tablet

## 2022-05-13 NOTE — TELEPHONE ENCOUNTER
Alessandra,  with [facility] called to clinic to send an update and FYI ONLY to PCP.  Alessandra just completed a post hospital discharge follow up call with patient.  Was in [facility] from 5/9-5/11 for pericarditis  Patient was instructed to schedule with rheumatology, patient agreed and will set up this appointment.  Pain is largely resolved  Patient on 7 day course of Kineret (current med list already updated)  Started on Sensipar (current med list already updated)  Everything else is back to baseline, otherwise      Routing to PCP to update.      Marilee Ibarra RN  [facility]

## 2022-05-16 NOTE — NURSING NOTE
Rashad Ortiz's goals for this visit include:   Chief Complaint   Patient presents with     Skin Check     No areas of concern hx NMSC and immunosuppression        He requests these members of his care team be copied on today's visit information:     PCP: Varun Burgess    Referring Provider:  No referring provider defined for this encounter.    There were no vitals taken for this visit.    Do you need any medication refills at today's visit? william Balbuena LPN

## 2022-05-16 NOTE — PROGRESS NOTES
[facility] Dermatology Note  Encounter Date: May 16, 2022  Office Visit     Dermatology Problem List:  Last skin check 11/8/21, recommended next in 6 months.  1. Relevant medical history: kidney transplant in 2011, currently on tacrolimus, MMF for immunosuppression. Currently back on dialysis, and undergoing evaluation for second transplant.  2. 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 Discussed increased risk of skin cancers with immunosuppressing medications.   - Recommend sunscreens SPF #30 or greater, protective clothing and avoidance of tanning beds.      # History of NMSC. No evidence of recurrence.   - Recommend sunscreens SPF #30 or greater, protective clothing and avoidance of tanning beds.   - Recommended next skin exam in 6 months    # Seborrheic keratosis, non irritated. - back.  - Benign nature reviewed.   - No further intervention needed.    # Open comedones. - back.  - No further intervention needed.    # Edema of the lower extremities, L>R.  - Chronic, unchanged.   - Continue to monitor.         Procedures Performed:   NA    Follow-up: 1 year(s) in-person skin check, or earlier for new or changing lesions    Staff and Scribe:     Scribe Disclosure:   I, Zena Balbuena, am serving as a scribe to document services personally performed by Dr. Cade, based on data collection and the provider's statements to me.       Provider Disclosure:   The documentation recorded by the scribe accurately reflects the services I personally performed and the decisions made by me.    Reyes Cade DO    Department of Dermatology  [facility]: Phone: [phone], Fax:[phone]  [facility]  [facility]: Phone: [phone], Fax: [phone]    ____________________________________________    CC: No chief complaint on file.    HPI:  Mr. Rashad Ortiz is a(n) 47 year old male who presents today as a return patient for skin check.    Last seen 11/8/21 for a skin check. At that time, edema of the lower extremities was noted as unchanged. Planned to continue to monitor.    Today, patient reports no new areas of concern.     Patient is otherwise feeling well, without additional skin concerns.    Labs Reviewed:  N/A    Physical Exam:  Vitals: There were no vitals taken for this visit.  SKIN: Total skin excluding the undergarment areas was performed. The exam included the head/face, neck, both arms, chest, back, abdomen, both legs, digits and/or nails.   - Well healed scar on the left posterior thigh. No pigment recurrence.  - There are waxy stuck on tan to brown papules on the back.  - Edema of the lower legs, L>R.  - open comedones on back   - No other lesions of concern on areas examined.     Medications:  Current Outpatient Medications   Medication     acetaminophen (TYLENOL) 500 MG tablet     amLODIPine (NORVASC) 10 MG tablet     anakinra (KINERET) 100 MG/0.67ML SOSY injection     carvedilol (COREG) 12.5 MG tablet     cinacalcet (SENSIPAR) 30 MG tablet     mycophenolate (GENERIC EQUIVALENT) 250 MG capsule     sevelamer carbonate (RENVELA) 800 MG tablet     sulfamethoxazole-trimethoprim (BACTRIM) 400-80 MG tablet     tacrolimus (GENERIC EQUIVALENT) 0.5 MG capsule     tacrolimus (GENERIC EQUIVALENT) 1 MG capsule     No current facility-administered medications for this visit.      Past Medical History:   Patient Active Problem List   Diagnosis     Kidney replaced by transplant     Aftercare following organ transplant     GERD (gastroesophageal reflux disease)     CARDIOVASCULAR SCREENING; LDL GOAL LESS THAN 160     Gout     Antibody mediated rejection of kidney transplant     Medical non-compliance      Chronic kidney disease, stage 4, severely decreased GFR (H)     Herpes simplex infection of penis     Escherichia coli septicemia (H)     Pyelonephritis of transplanted kidney     HTN, kidney transplant related     Metabolic acidosis     CKD (chronic kidney disease) stage 5, GFR less than 15 ml/min (H)     Immunosuppression (H)     Anemia of chronic renal failure, stage 5 (H)     Secondary renal hyperparathyroidism (H)     Vitamin D deficiency     BPH (benign prostatic hyperplasia)     Status post hip surgery     ESRD (end stage renal disease) on dialysis (H)     Primary osteoarthritis of right hip     Avascular necrosis of femoral head, right (H)     Aftercare following hip joint replacement surgery, unspecified laterality     Hip pain, right     Acute pulmonary edema (H)     Shortness of breath     Chest pain, unspecified type     Pericarditis, unspecified chronicity, unspecified type     Encounter for screening laboratory testing for severe acute respiratory syndrome coronavirus 2 (SARS-CoV-2)     Past Medical History:   Diagnosis Date     AVN (avascular necrosis of bone) (H)     left hip     AVN (avascular necrosis of bone) (H)     left hip     BPH (benign prostatic hyperplasia)      Chronic kidney disease, stage 4, severely decreased GFR (H)      Gastro-oesophageal reflux disease      Gout      History of blood transfusion      Hypertension      Medical non-compliance      Osteonecrosis (H) 7/29/2020    Added automatically from request for surgery [id number]     Pulmonary nodules      Steroid long-term use      Vitamin D deficiency         CC No referring provider defined for this encounter. on close of this encounter.

## 2022-05-16 NOTE — LETTER
5/16/2022         RE: Rashad Ortiz  [address] [place] [id number]        Dear Colleague,    Thank you for referring your patient, Rashad Ortiz, to the [facility]. Please see a copy of my visit note below.    [facility] Dermatology Note  Encounter Date: May 16, 2022  Office Visit     Dermatology Problem List:  Last skin check 11/8/21, recommended next in 6 months.  1. Relevant medical history: kidney transplant in 2011, currently on tacrolimus, MMF for immunosuppression. Currently back on dialysis, and undergoing evaluation for second transplant.  2. 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 Discussed increased risk of skin cancers with immunosuppressing medications.   - Recommend sunscreens SPF #30 or greater, protective clothing and avoidance of tanning beds.      # History of NMSC. No evidence of recurrence.   - Recommend sunscreens SPF #30 or greater, protective clothing and avoidance of tanning beds.   - Recommended next skin exam in 6 months    # Seborrheic keratosis, non irritated. - back.  - Benign nature reviewed.   - No further intervention needed.    # Open comedones. - back.  - No further intervention needed.    # Edema of the lower extremities, L>R.  - Chronic, unchanged.   - Continue to monitor.         Procedures Performed:   NA    Follow-up: 1 year(s) in-person skin check, or earlier for new or changing lesions    Staff and Scribe:     Scribe Disclosure:   I, Zena Balbuena, am serving as a scribe to document services personally performed by Dr. Cade, based on data collection and the provider's statements to me.       Provider Disclosure:   The documentation recorded by the scribe accurately reflects the services I personally performed and the  decisions made by me.    Reyes Cade DO    Department of Dermatology  [facility]: Phone: [phone], Fax:[phone]  [facility]: Phone: [phone], Fax: [phone]    ____________________________________________    CC: No chief complaint on file.    HPI:  Mr. Rashad Ortiz is a(n) 47 year old male who presents today as a return patient for skin check.    Last seen 11/8/21 for a skin check. At that time, edema of the lower extremities was noted as unchanged. Planned to continue to monitor.    Today, patient reports no new areas of concern.     Patient is otherwise feeling well, without additional skin concerns.    Labs Reviewed:  N/A    Physical Exam:  Vitals: There were no vitals taken for this visit.  SKIN: Total skin excluding the undergarment areas was performed. The exam included the head/face, neck, both arms, chest, back, abdomen, both legs, digits and/or nails.   - Well healed scar on the left posterior thigh. No pigment recurrence.  - There are waxy stuck on tan to brown papules on the back.  - Edema of the lower legs, L>R.  - open comedones on back   - No other lesions of concern on areas examined.     Medications:  Current Outpatient Medications   Medication     acetaminophen (TYLENOL) 500 MG tablet     amLODIPine (NORVASC) 10 MG tablet     anakinra (KINERET) 100 MG/0.67ML SOSY injection     carvedilol (COREG) 12.5 MG tablet     cinacalcet (SENSIPAR) 30 MG tablet     mycophenolate (GENERIC EQUIVALENT) 250 MG capsule     sevelamer carbonate (RENVELA) 800 MG tablet     sulfamethoxazole-trimethoprim (BACTRIM) 400-80 MG tablet     tacrolimus (GENERIC EQUIVALENT) 0.5 MG capsule     tacrolimus (GENERIC EQUIVALENT) 1 MG capsule     No current facility-administered medications for this visit.      Past Medical History:   Patient Active Problem List   Diagnosis     Kidney  replaced by transplant     Aftercare following organ transplant     GERD (gastroesophageal reflux disease)     CARDIOVASCULAR SCREENING; LDL GOAL LESS THAN 160     Gout     Antibody mediated rejection of kidney transplant     Medical non-compliance     Chronic kidney disease, stage 4, severely decreased GFR (H)     Herpes simplex infection of penis     Escherichia coli septicemia (H)     Pyelonephritis of transplanted kidney     HTN, kidney transplant related     Metabolic acidosis     CKD (chronic kidney disease) stage 5, GFR less than 15 ml/min (H)     Immunosuppression (H)     Anemia of chronic renal failure, stage 5 (H)     Secondary renal hyperparathyroidism (H)     Vitamin D deficiency     BPH (benign prostatic hyperplasia)     Status post hip surgery     ESRD (end stage renal disease) on dialysis (H)     Primary osteoarthritis of right hip     Avascular necrosis of femoral head, right (H)     Aftercare following hip joint replacement surgery, unspecified laterality     Hip pain, right     Acute pulmonary edema (H)     Shortness of breath     Chest pain, unspecified type     Pericarditis, unspecified chronicity, unspecified type     Encounter for screening laboratory testing for severe acute respiratory syndrome coronavirus 2 (SARS-CoV-2)     Past Medical History:   Diagnosis Date     AVN (avascular necrosis of bone) (H)     left hip     AVN (avascular necrosis of bone) (H)     left hip     BPH (benign prostatic hyperplasia)      Chronic kidney disease, stage 4, severely decreased GFR (H)      Gastro-oesophageal reflux disease      Gout      History of blood transfusion      Hypertension      Medical non-compliance      Osteonecrosis (H) 7/29/2020    Added automatically from request for surgery [id number]     Pulmonary nodules      Steroid long-term use      Vitamin D deficiency         CC No referring provider defined for this encounter. on close of this encounter.      Again, thank you for allowing me to  participate in the care of your patient.        Sincerely,        Reyes Cade MD

## 2022-05-19 PROBLEM — J81.0 ACUTE PULMONARY EDEMA (H): Status: RESOLVED | Noted: 2021-07-05 | Resolved: 2022-01-01

## 2022-05-19 PROBLEM — A60.01 HERPES SIMPLEX INFECTION OF PENIS: Status: RESOLVED | Noted: 2018-06-19 | Resolved: 2022-01-01

## 2022-05-19 PROBLEM — T86.19 PYELONEPHRITIS OF TRANSPLANTED KIDNEY: Status: RESOLVED | Noted: 2018-10-26 | Resolved: 2022-01-01

## 2022-05-19 PROBLEM — R07.9 CHEST PAIN, UNSPECIFIED TYPE: Status: RESOLVED | Noted: 2021-10-12 | Resolved: 2022-01-01

## 2022-05-19 PROBLEM — N12 PYELONEPHRITIS OF TRANSPLANTED KIDNEY: Status: RESOLVED | Noted: 2018-10-26 | Resolved: 2022-01-01

## 2022-05-19 PROBLEM — I27.20 PULMONARY HYPERTENSION (H): Status: ACTIVE | Noted: 2022-01-01

## 2022-05-19 PROBLEM — R06.02 SHORTNESS OF BREATH: Status: RESOLVED | Noted: 2021-10-12 | Resolved: 2022-01-01

## 2022-05-19 PROBLEM — M25.551 HIP PAIN, RIGHT: Status: RESOLVED | Noted: 2021-05-18 | Resolved: 2022-01-01

## 2022-05-19 PROBLEM — A41.51: Status: RESOLVED | Noted: 2018-10-26 | Resolved: 2022-01-01

## 2022-05-19 NOTE — PROGRESS NOTES
TRANSPLANT NEPHROLOGY WAITLIST VISIT    Assessment and Plan:  # Kidney Transplant Wait List Evaluation: Patient is a good candidate overall. Patient is still in evaluation phase.     # ESKD from unknown etiology (no kidney biopsy) s/p failed LDKT (): h/o of rejection in  d/t noncompliance, on HD since 2020 and would likely benefit from another kidney transplant.  Continues to take tacrolimus and MMF for immunosuppression.  ABO-A, cPRA 91%.     # Cardiac Risk: H/o of pericarditis (likely uremic) in 2021.  Last stress test was neg stress in 2020.  2022 echo with normal LVEF, no significant valvular abnormalities but moderate pulmonary hypertension with a PASP of 54 mmHg.  Will need cardiac risk assessment with updated stress testing and evaluation by pulmonary hypertension clinic.    # H/o pericarditis/pericardial effusion, positive c-ANCA (negative PR3, negative MPO), Polyarthralgia: rheumatology visit pending for .     # Noncompliance: has good support from his dialysis unit that he is both compliant with attendance and medications (>85%).     # Health Maintenance: Dental: Up to date     Discussed the risks and benefits of a transplant, including the risk of surgery and immunosuppression medications.    KDPI: We discussed approximate remaining wait time and how that is influenced by issues such as blood type and sensitization (PRA) and access to a living donor. I contrasted potential waiting time for living vs  donor kidneys from  normal (0-85%) or higher (%) kidney donor profile index (KDPI) donors and their associated outcomes. I would not recommend Mr. Ortiz to consider kidneys from high KDPI donors. The reason for this decision is best summarized as: improved long term graft survival.    Patient presently appears to be enough of an acceptable kidney transplant recipient candidate to have any potential kidney donors start the evaluation process.  Patient s overall  re-evaluation may require further discussion in the Transplant Program s multidisciplinary selection committee for a final recommendation on the patient s suitability for transplant.     Reason for Visit:  Rashad Ortiz is a 47 year old male with ESKD from unknown etiology (no kidney biopsy), who presents for kidney transplant wait list evaluation.     Date of Initial Transplant Evaluation:  9/2017        Current Transplant Phase: Evaluation: Active  Official UNOS Listing Date:   Blood Type: A        cPRA: 91%       Date of cPRA: 6/2020  Transplant Coordinator: Donna Patel Transplant Office phone number [phone]     Previous Medical Issues:  # Noncompliance: improved.   #Gout: UA down to 5.8 on last check.      History of Present Illness:   Rashad Ortiz is a 47-year-old gentleman who presents for wait list visit with history of ESKD secondary to unclear etiology with no previous biopsy status post failed LDKT (2011), on hemodialysis since 9/2020, noncompliance, gout, hypertension, pulmonary hypertension and AVN s/p L total hip arthroplasty (9/2020).              Interim Events:   9/2020: started hemodialysis.    9/2020: s/p L total hip arthroplasty.    7/2021: [facility] admit x 1 day with hypertensive emergency, fluid overload after missed dialysis     10/2021: [facility] admit x 8 days for pericarditis (likely uremic) with serologies that included negative KATHERINE, C3, C4 and RF, however he had a low positive C-ANCA. No showed for rheumatology in January 2022.     5/2022: [facility] admit x 3 days with symptoms of recurrent pericarditis.  Trop wnl, echo with EF 55-60% with no WMA. Kt/V adequate per dialysis records. He was given anakinra injection.              Kidney Disease:        Kidney Disease Dx: Unknown etiology (no kidney biopsy) s/p failed LDKT, still making some urine and taking Tacrolimus and MMF for IS. States BPs average 130s/80s at dialysis, but has been a bit lower lately.        On Dialysis: Yes, Date  initiated: 9/2020 and Dialysis Type: [facility] HD;       Primary Nephrologist: Dr. 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  Not participating in any regular exercise.  He does some cleaning, but his older children do the outdoor maintenance work at his house. He is able to go up and down a flight of stairs okay without chest pains or shortness of breath.      # COVID Vaccination Up To Date: Yes    Other Pertinent Transplant Surgical Issues:  Recent Blood Transfusion: Yes; 2 units of PRBCs in 10/2021  Previous Abdominal Transplant: Yes; LDKT (2011)  Bladder Dysfunction: No  Chronic/Recurrent Infections: No  Chronic Anticoagulation: No  Jehovah s Witness: No       Active Problem List:   Patient Active Problem List   Diagnosis     Kidney replaced by transplant     Aftercare following organ transplant     GERD (gastroesophageal reflux disease)     CARDIOVASCULAR SCREENING; LDL GOAL LESS THAN 160     Gout     Antibody mediated rejection of kidney transplant     Medical non-compliance     Chronic kidney disease, stage 4, severely decreased GFR (H)     Herpes simplex infection of penis     Escherichia coli septicemia (H)     Pyelonephritis of transplanted kidney     HTN, kidney transplant related     Metabolic acidosis     CKD (chronic kidney disease) stage 5, GFR less than 15 ml/min (H)     Immunosuppression (H)     Anemia of chronic renal failure, stage 5 (H)     Secondary renal hyperparathyroidism (H)     Vitamin D deficiency     BPH (benign prostatic hyperplasia)     Status post hip surgery     ESRD (end stage renal disease) on dialysis (H)     Primary osteoarthritis of  "right hip     Avascular necrosis of femoral head, right (H)     Aftercare following hip joint replacement surgery, unspecified laterality     Hip pain, right     Acute pulmonary edema (H)     Shortness of breath     Chest pain, unspecified type     Pericarditis, unspecified chronicity, unspecified type     Encounter for screening laboratory testing for severe acute respiratory syndrome coronavirus 2 (SARS-CoV-2)       Personal History:  Lives with wife and 2 daughter in [place].      Allergies:  No Known Allergies     Medications:  Current Outpatient Medications   Medication Sig     acetaminophen (TYLENOL) 500 MG tablet Take 2 tablets (1,000 mg) by mouth every 6 hours as needed for mild pain     amLODIPine (NORVASC) 10 MG tablet Take 10 mg by mouth daily     anakinra (KINERET) 100 MG/0.67ML SOSY injection Inject 0.67 mLs (100 mg) Subcutaneous every other day for 7 doses     carvedilol (COREG) 12.5 MG tablet Take 2 tablets (25 mg) by mouth 2 times daily (with meals)     cinacalcet (SENSIPAR) 30 MG tablet Take 1 tablet (30 mg) by mouth daily (with dinner)     mycophenolate (GENERIC EQUIVALENT) 250 MG capsule Take 1 capsule (250 mg) by mouth 2 times daily     sevelamer carbonate (RENVELA) 800 MG tablet Take 1 tablet (800 mg) by mouth 3 times daily (with meals)     sulfamethoxazole-trimethoprim (BACTRIM) 400-80 MG tablet Take one tablet every Monday, Wednesday, Friday     tacrolimus (GENERIC EQUIVALENT) 0.5 MG capsule Take 1 capsule (0.5 mg) by mouth every evening Total dose = 1 mg in the AM and 1.5 mg in the PM     tacrolimus (GENERIC EQUIVALENT) 1 MG capsule Take 1 capsule (1 mg) by mouth 2 times daily . Total dose=1mg in the AM and 1.5mg in the PM     No current facility-administered medications for this visit.     Facility-Administered Medications Ordered in Other Visits   Medication     sodium chloride (PF) 0.9% PF flush 5-10 mL        Vitals:  /71   Pulse 69   Ht 1.727 m (5' 8\")   Wt 68.3 kg (150 lb 9.6 oz) "   SpO2 99%   BMI 22.90 kg/m       Exam:  GENERAL APPEARANCE: alert and no distress  HENT: mouth without ulcers or lesions  LYMPHATICS: no cervical or supraclavicular nodes  RESP: lungs clear to auscultation - no rales, rhonchi or wheezes  CV: regular rhythm, normal rate, no rub, no murmur  FEMORAL PULSES: normal  EDEMA: no LE edema bilaterally  ABDOMEN: soft, nondistended, nontender, bowel sounds normal  MS: extremities normal - no gross deformities noted, no evidence of inflammation in joints, no muscle tenderness  SKIN: no rash

## 2022-05-19 NOTE — NURSING NOTE
"Chief Complaint   Patient presents with     RECHECK     Follow up, wait list     Blood pressure 108/71, pulse 69, height 1.727 m (5' 8\"), weight 68.3 kg (150 lb 9.6 oz), SpO2 99 %.    Alise Simental MA  "

## 2022-05-19 NOTE — RESULT ENCOUNTER NOTE
Mr. Ortiz,    Your stress test could not be completed due to your blood pressure.    Please contact the clinic if you have additional questions.  Thank you.    Sincerely,    Mariel Mares MD

## 2022-05-19 NOTE — PROGRESS NOTES
Pt here for dobutamine stress test.  Test, meds and side effects reviewed with patient.  Test stopped due to HTN with a headache at 239/120 and max HR of 96.  30 mcg Dobutamine and a total of 0.6mg IV atropine.  Gave a total of 8 mg IV Metoprolol to bring HR back to baseline.  Post monitoring complete and VSS.  Pt escorted out to the gold waiting room.     Headache improved with BP back to baseline.   Pt. Took his carvedilol this AM.   Per chart review has been HTN 2 years ago with this test.   Writer will update our cardiologist.

## 2022-05-19 NOTE — LETTER
2022       RE: Rashad Ortiz  [address] [facility] [address]     Dear Colleague,    Thank you for referring your patient, Rashad Ortiz, to the [facility] [place] at [facility]. Please see a copy of my visit note below.    TRANSPLANT NEPHROLOGY WAITLIST VISIT    Assessment and Plan:  # Kidney Transplant Wait List Evaluation: Patient is a good candidate overall. Patient is still in evaluation phase.     # ESKD from unknown etiology (no kidney biopsy) s/p failed LDKT (): h/o of rejection in  d/t noncompliance, on HD since 2020 and would likely benefit from another kidney transplant.  Continues to take tacrolimus and MMF for immunosuppression.  ABO-A, cPRA 91%.     # Cardiac Risk: H/o of pericarditis (likely uremic) in 2021.  Last stress test was neg stress in 2020.  2022 echo with normal LVEF, no significant valvular abnormalities but moderate pulmonary hypertension with a PASP of 54 mmHg.  Will need cardiac risk assessment with updated stress testing and evaluation by pulmonary hypertension clinic.    # H/o pericarditis/pericardial effusion, positive c-ANCA (negative PR3, negative MPO), Polyarthralgia: rheumatology visit pending for .     # Noncompliance: has good support from his dialysis unit that he is both compliant with attendance and medications (>85%).     # Health Maintenance: Dental: Up to date     Discussed the risks and benefits of a transplant, including the risk of surgery and immunosuppression medications.    KDPI: We discussed approximate remaining wait time and how that is influenced by issues such as blood type and sensitization (PRA) and access to a living donor. I contrasted potential waiting time for living vs  donor kidneys from  normal (0-85%) or higher (%) kidney donor profile index (KDPI) donors and their associated outcomes. I would not recommend Mr. Ortiz to consider  kidneys from high KDPI donors. The reason for this decision is best summarized as: improved long term graft survival.    Patient presently appears to be enough of an acceptable kidney transplant recipient candidate to have any potential kidney donors start the evaluation process.  Patient s overall re-evaluation may require further discussion in the Transplant Program s multidisciplinary selection committee for a final recommendation on the patient s suitability for transplant.     Reason for Visit:  Rashad Ortiz is a 47 year old male with ESKD from unknown etiology (no kidney biopsy), who presents for kidney transplant wait list evaluation.     Date of Initial Transplant Evaluation:  9/2017        Current Transplant Phase: Evaluation: Active  Official UNOS Listing Date:   Blood Type: A        cPRA: 91%       Date of cPRA: 6/2020  Transplant Coordinator: Donna Patel Transplant Office phone number [phone]     Previous Medical Issues:  # Noncompliance: improved.   #Gout: UA down to 5.8 on last check.      History of Present Illness:   Rashad Ortiz is a 47-year-old gentleman who presents for wait list visit with history of ESKD secondary to unclear etiology with no previous biopsy status post failed LDKT (2011), on hemodialysis since 9/2020, noncompliance, gout, hypertension, pulmonary hypertension and AVN s/p L total hip arthroplasty (9/2020).              Interim Events:   9/2020: started hemodialysis.    9/2020: s/p L total hip arthroplasty.    7/2021: [facility] admit x 1 day with hypertensive emergency, fluid overload after missed dialysis     10/2021: [facility] admit x 8 days for pericarditis (likely uremic) with serologies that included negative KATHERNIE, C3, C4 and RF, however he had a low positive C-ANCA. No showed for rheumatology in January 2022.     5/2022: [facility] admit x 3 days with symptoms of recurrent pericarditis.  Trop wnl, echo with EF 55-60% with no WMA. Kt/V adequate per dialysis records. He was  given anakinra injection.              Kidney Disease:        Kidney Disease Dx: Unknown etiology (no kidney biopsy) s/p failed LDKT, still making some urine and taking Tacrolimus and MMF for IS. States BPs average 130s/80s at dialysis, but has been a bit lower lately.        On Dialysis: Yes, Date initiated: 9/2020 and Dialysis Type: [facility] HD;       Primary Nephrologist: Dr. 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  Not participating in any regular exercise.  He does some cleaning, but his older children do the outdoor maintenance work at his house. He is able to go up and down a flight of stairs okay without chest pains or shortness of breath.      # COVID Vaccination Up To Date: Yes    Other Pertinent Transplant Surgical Issues:  Recent Blood Transfusion: Yes; 2 units of PRBCs in 10/2021  Previous Abdominal Transplant: Yes; LDKT (2011)  Bladder Dysfunction: No  Chronic/Recurrent Infections: No  Chronic Anticoagulation: No  Jehovah s Witness: No       Active Problem List:   Patient Active Problem List   Diagnosis     Kidney replaced by transplant     Aftercare following organ transplant     GERD (gastroesophageal reflux disease)     CARDIOVASCULAR SCREENING; LDL GOAL LESS THAN 160     Gout     Antibody mediated rejection of kidney transplant     Medical non-compliance     Chronic kidney disease, stage 4, severely decreased GFR (H)     Herpes simplex infection of penis     Escherichia coli septicemia (H)     Pyelonephritis of transplanted kidney     HTN, kidney transplant related     Metabolic acidosis     CKD (chronic kidney disease) stage 5, GFR less than 15 ml/min  (H)     Immunosuppression (H)     Anemia of chronic renal failure, stage 5 (H)     Secondary renal hyperparathyroidism (H)     Vitamin D deficiency     BPH (benign prostatic hyperplasia)     Status post hip surgery     ESRD (end stage renal disease) on dialysis (H)     Primary osteoarthritis of right hip     Avascular necrosis of femoral head, right (H)     Aftercare following hip joint replacement surgery, unspecified laterality     Hip pain, right     Acute pulmonary edema (H)     Shortness of breath     Chest pain, unspecified type     Pericarditis, unspecified chronicity, unspecified type     Encounter for screening laboratory testing for severe acute respiratory syndrome coronavirus 2 (SARS-CoV-2)       Personal History:  Lives with wife and 2 daughter in [place].      Allergies:  No Known Allergies     Medications:  Current Outpatient Medications   Medication Sig     acetaminophen (TYLENOL) 500 MG tablet Take 2 tablets (1,000 mg) by mouth every 6 hours as needed for mild pain     amLODIPine (NORVASC) 10 MG tablet Take 10 mg by mouth daily     anakinra (KINERET) 100 MG/0.67ML SOSY injection Inject 0.67 mLs (100 mg) Subcutaneous every other day for 7 doses     carvedilol (COREG) 12.5 MG tablet Take 2 tablets (25 mg) by mouth 2 times daily (with meals)     cinacalcet (SENSIPAR) 30 MG tablet Take 1 tablet (30 mg) by mouth daily (with dinner)     mycophenolate (GENERIC EQUIVALENT) 250 MG capsule Take 1 capsule (250 mg) by mouth 2 times daily     sevelamer carbonate (RENVELA) 800 MG tablet Take 1 tablet (800 mg) by mouth 3 times daily (with meals)     sulfamethoxazole-trimethoprim (BACTRIM) 400-80 MG tablet Take one tablet every Monday, Wednesday, Friday     tacrolimus (GENERIC EQUIVALENT) 0.5 MG capsule Take 1 capsule (0.5 mg) by mouth every evening Total dose = 1 mg in the AM and 1.5 mg in the PM     tacrolimus (GENERIC EQUIVALENT) 1 MG capsule Take 1 capsule (1 mg) by mouth 2 times daily . Total dose=1mg in the AM  "and 1.5mg in the PM     No current facility-administered medications for this visit.     Facility-Administered Medications Ordered in Other Visits   Medication     sodium chloride (PF) 0.9% PF flush 5-10 mL        Vitals:  /71   Pulse 69   Ht 1.727 m (5' 8\")   Wt 68.3 kg (150 lb 9.6 oz)   SpO2 99%   BMI 22.90 kg/m       Exam:  GENERAL APPEARANCE: alert and no distress  HENT: mouth without ulcers or lesions  LYMPHATICS: no cervical or supraclavicular nodes  RESP: lungs clear to auscultation - no rales, rhonchi or wheezes  CV: regular rhythm, normal rate, no rub, no murmur  FEMORAL PULSES: normal  EDEMA: no LE edema bilaterally  ABDOMEN: soft, nondistended, nontender, bowel sounds normal  MS: extremities normal - no gross deformities noted, no evidence of inflammation in joints, no muscle tenderness  SKIN: no rash          Again, thank you for allowing me to participate in the care of your patient.      Sincerely,    ISABEL Ayala CNP      "

## 2022-06-01 NOTE — PROGRESS NOTES
Rheumatology Clinic Visit  [facility]  Renato Dominguez M.D.     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  He states that chest pain disappeared entirely within a day of receiving corticosteroids while hospitalized.  He has not used prednisone since leaving the hospital on May 11.  Physical exam shows blowing diastolic murmur, but no chest wall tenderness.    On May 11, 2022, creatinine was 9.35 with a BUN of 48; calcium was 8.5; CBC remarkable for hemoglobin 10.8, stable.  Sedimentation rate was 80.  Neutrophil cytoplasmic antibody was positive at 1-80 with a cytoplasmic pattern.  Echo showed LVEF of 660 to 65%, no valvular abnormalities evidence of ischemia, or pericardial fluid.  Dobutamine stress echo was uninformative due to inability to reach target heart rate.    Impression: Recurrent pericarditis, managed promptly with moderate dose corticosteroids and anakinra.  Unlike with the episode that caused hospitalization in October 2021, the 2022 episode of pericarditis did not feature pericardial effusion or pleural effusions, and there was no obvious trigger in the form of poorly controlled anemia or volume overload.  I find no evidence of systemic inflammatory disease by symptoms or by objective measures today.  Former diffuse, prednisone sensitive arthralgia has disappeared spontaneously since fall 2021, and there are no correlates of the low positive ANCA test observed in October 2021.    We discussed that recurrent chest pain  cause remains uncertain, but that there is no evidence of ongoing active inflammation or poorly controllled uremia now. I recommend a stance of watchful waiting, and plan to use brief course of prednisone abortively should serosal inflammation presumably related to pericarditis recur.    Plan:  1.  Check CRP with next dialysis  2.  Alert rheumatology if chest pain worsens. Repeat prednisone will likely be recommended. A trial of hydroxychloroquine may make sense if inflammatory joint pain or chest pain continues.  3. Urinalysis to assess bladder pressure symptoms.    RTC 6 mos    Renato Dominguez MD  Staff Rheumatologist, [facility]         Active Problem List:     Patient Active Problem List    Diagnosis Date Noted     Pulmonary hypertension (H) 05/19/2022     Priority: Medium     Encounter for screening laboratory testing for severe acute respiratory syndrome coronavirus 2 (SARS-CoV-2) 05/10/2022     Priority: Medium     Pericarditis, unspecified chronicity, unspecified type 05/09/2022     Priority: Medium     Aftercare following hip joint replacement surgery, unspecified laterality 05/18/2021     Priority: Medium     Avascular necrosis of femoral head, right (H) 01/07/2021     Priority: Medium     Added automatically from request for surgery [id number]       Primary osteoarthritis of right hip 12/23/2020     Priority: Medium     Status post hip surgery 09/09/2020     Priority: Medium     ESRD (end stage renal disease) on dialysis (H) 09/09/2020     Priority: Medium     Vitamin D deficiency      Priority: Medium     BPH (benign prostatic hyperplasia)      Priority: Medium     Secondary renal hyperparathyroidism (H) 03/04/2020     Priority: Medium     Anemia of chronic renal failure, stage 5 (H) 06/17/2019     Priority: Medium     HTN, kidney transplant related 11/02/2018     Priority: Medium     Metabolic acidosis 11/02/2018     Priority: Medium     Immunosuppression (H) 11/02/2018     Priority: Medium     Medical  non-compliance      Priority: Medium     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 Provider to review       Kidney replaced by transplant 05/21/2012     Priority: Medium     Aftercare following organ transplant 05/21/2012     Priority: Medium            History of Present Illness:   [place]    Interval history June 2, 2022    Patient was admitted to the hospital on May 9 for recurrent chest pain.  He was discharged on May 11 with a diagnosis of recurrent pericarditis with troponins normal, echocardiogram with EF of 55 to 60%, no pericardial or pleural effusion.  Uremic etiology was considered, however patient's hemodialysis numbers were good, although patient would likely benefit from longer HD runs.  Improvement was noted with anakinra injection started on May 11.  Patient was discharged with anakinra injections.    Markedly elevated PTH was noted during hospitalization, in the context of avascular necrosis history and bilateral hip replacements.  Treatment with Hectorol, sevelamer, and Sensipar was initiated for renal hyperparathyroidism.    Today he reports that he had greater than 90% improvement of recurrent substernal chest pain after single dose of intravenous Solu-Medrol while hospitalized.  He did not take steroids after discharge from the hospital on May 11, although he did use anakinra 100 mg injected every other day for 1 week.  Since then, he has not used any anti-inflammatories, and he is continued with chronic medications related to end-stage renal disease.    He denies dyspnea on exertion, although he reports minor nonproductive cough; he denies hemoptysis,  "upper respiratory congestion or epistaxis;, rash, persistent numbness weakness or tingling, abdominal pain, or fevers chills or sweats.  Joint pain formerly experienced for several months in the fall 2021 has receded dramatically since the last visit.    He describes a sensation of \"pressure\" in the lower abdomen while urinating, for the last several weeks.  No dysuria or blood in the urine.     11-:    He was hospitalized from October 12 through October 20, 2021 for chest pain and end-stage renal disease.  Patient was admitted after a week-long history of pleuritic chest pain, preceded by 2-3 episodes of hemoptysis. He was found to have moderate pericardial effusion on TTE and CT chest with faint GGO bilaterally and small bilateral pleural effusions. Lab workup included KATHERINE, C3, C4 and RF negative, low+c-ANCA, increased CRP and pro-Adalid.  Patient was given a course of antibiotics for presumed infection, but chest pain did not improve.  He remained afebrile and hemodynamically stable.  Given findings of volume overload on top of his diagnosis of end-stage renal disease, nephrology treated him with more frequent and stringent dialysis runs.  He had improvement with this treatment, in concert with starting moderate dose steroids.  Although uremic pericarditis remained at the head of the differential diagnosis, steroid induced improvement also raises the possibility that an inflammatory process could be present.  He was discharged with corticosteroid taper starting at 40 mg prednisone daily.    Patient was seen by primary provider Diane Mares on November 1, 2021 in follow-up of hospitalization.  Assessment was of acute pericarditis, unclear etiology, but improved.  Plan was to undergo follow-up echocardiogram in several weeks.  Smoking cessation was recommended.    Today, he notes improvement in chest pain.  He has limited exercise tolerance, with ability to walk only a couple of blocks before feeling short " "of breath, but has only intermittent dry cough, no further hemoptysis, purulent nasal drainage, or sinus pressure.    The left shoulder has some pain that shoots across the chest, intermittent.    He has noted come chronic pain in small joints of his hands, knees, elbows, wrists. Sx started , and has persisted but not worsened since then.  Symptoms are distinct from joint pain he is experienced in association with a diagnosis of gout, which affected exclusively his great toes ankles and feet in the past.    He finished prednsione taper 10 days ago.  He has not experienced increase in severity of chest symptoms.    He continues on higher stringency dialysis 3 days weekly.         Review of Systems:       Constitutional: negative  Skin: negative  Eyes: negative  Ears/Nose/Throat: negative  Respiratory: HPI  Cardiovascular: negative  Gastrointestinal: negative  Genitourinary: \"pressure\" in the bladder when urinating  Musculoskeletal: HPI  Neurologic: negative  Psychiatric: negative  Hematologic/Lymphatic/Immunologic: negative  Endocrine: negative          Past Medical History:     Past Medical History:   Diagnosis Date     AVN (avascular necrosis of bone) (H)     left hip     BPH (benign prostatic hyperplasia)      Chronic kidney disease, stage 4, severely decreased GFR (H)      End stage renal disease (H)      Gastro-oesophageal reflux disease      Gout      History of blood transfusion      Hypertension      Medical non-compliance      Osteonecrosis (H) 07/29/2020    Added automatically from request for surgery [id number]     Pulmonary hypertension (H)      Pulmonary nodules      Steroid long-term use      Vitamin D deficiency      Past Surgical History:   Procedure Laterality Date     ARTHROPLASTY HIP Left 9/9/2020    Procedure: Left total hip arthroplasty;  Surgeon: Fernando Morillo MD;  Location: [facility]     ARTHROPLASTY HIP Right 4/12/2021    Procedure: Right total hip arthroplasty;  Surgeon: Fernando Morillo " MD TIFFANY;  Location: [facility] OR     AV FISTULA OR GRAFT ARTERIAL       COLONOSCOPY N/A 2021    Procedure: COLONOSCOPY, WITH POLYPECTOMY AND BIOPSY;  Surgeon: Zak Ortega MD;  Location: [facility]     CREATE FISTULA ARTERIOVENOUS UPPER EXTREMITY Right 2019    Procedure: Creation Of Atriovenous Fistula Right Upper Arm;  Surgeon: Julia Irwin MD;  Location: [facility]     IR CVC TUNNEL PLACEMENT > 5 YRS OF AGE  10/9/2020     IR DIALYSIS FISTULOGRAM RIGHT  10/9/2020     IR DIALYSIS FISTULOGRAM RIGHT  2021     IR DIALYSIS MECH THROMB, PTA  10/9/2020     IR DIALYSIS STENT W OR W/OUT PTA  2021     LIGATE FISTULA ARTERIOVENOUS UPPER EXTREMITY  2011    Procedure:LIGATE FISTULA ARTERIOVENOUS UPPER EXTREMITY; Excision of Right Forearm Arteriovenous Fistula.; Surgeon:LINDY AMAYA; Location:[facility] OR     PERCUTANEOUS BIOPSY KIDNEY Right 2017    Procedure: PERCUTANEOUS BIOPSY KIDNEY;  Surgeon: Gee Barrios MD;  Location: [facility] OR     TRANSPLANT  2011    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            Social History:     Social History     Occupational History     Not on file   Tobacco Use     Smoking status: Former Smoker     Types: Cigarettes     Quit date: 2017     Years since quittin.2     Smokeless tobacco: Never Used   Substance and Sexual Activity     Alcohol use: Not Currently     Alcohol/week: 4.2 standard drinks     Types: 5 Standard drinks or equivalent per week     Comment: 1 shot yesterday     Drug use: Not Currently     Types: Marijuana     Sexual activity: Never     Partners: Female     Birth control/protection: None            Family History:     Family History   Problem Relation Age of Onset     Hypertension Mother      Colon Cancer Mother 66     Colon Cancer Brother 51            Allergies:   No Known Allergies         Medications:     Current Outpatient Medications   Medication Sig Dispense Refill     acetaminophen (TYLENOL) 500 MG tablet Take 2 tablets (1,000  "mg) by mouth every 6 hours as needed for mild pain 100 tablet 1     amLODIPine (NORVASC) 10 MG tablet Take 10 mg by mouth daily       carvedilol (COREG) 12.5 MG tablet Take 2 tablets (25 mg) by mouth 2 times daily (with meals)       cinacalcet (SENSIPAR) 30 MG tablet Take 1 tablet (30 mg) by mouth daily (with dinner) 30 tablet 3     mycophenolate (GENERIC EQUIVALENT) 250 MG capsule Take 1 capsule (250 mg) by mouth 2 times daily 60 capsule 11     sevelamer carbonate (RENVELA) 800 MG tablet Take 1 tablet (800 mg) by mouth 3 times daily (with meals) 90 tablet 0     sulfamethoxazole-trimethoprim (BACTRIM) 400-80 MG tablet Take one tablet every Monday, Wednesday, Friday 45 tablet 3     tacrolimus (GENERIC EQUIVALENT) 0.5 MG capsule Take 1 capsule (0.5 mg) by mouth every evening Total dose = 1 mg in the AM and 1.5 mg in the PM 30 capsule 11     tacrolimus (GENERIC EQUIVALENT) 1 MG capsule Take 1 capsule (1 mg) by mouth 2 times daily . Total dose=1mg in the AM and 1.5mg in the PM 60 capsule 11     anakinra (KINERET) 100 MG/0.67ML SOSY injection Inject 0.67 mLs (100 mg) Subcutaneous every other day for 7 doses 4.69 mL 0            Physical Exam:   Blood pressure 120/82, pulse 79, height 1.727 m (5' 8\"), weight 63.1 kg (139 lb 1.6 oz), SpO2 100 %.  Wt Readings from Last 6 Encounters:   06/02/22 63.1 kg (139 lb 1.6 oz)   05/19/22 68.3 kg (150 lb 9.6 oz)   05/11/22 68.6 kg (151 lb 3.2 oz)   04/21/22 68 kg (150 lb)   11/17/21 67.6 kg (149 lb)   11/11/21 67.7 kg (149 lb 3.2 oz)       Constitutional: well-developed, appearing stated age; cooperative  Eyes: nl EOM, PERRLA, vision, conjunctiva, sclera  ENT: nl external ears, nose, hearing, lips, teeth, gums, throat  No mucous membrane lesions, normal saliva pool  Neck: no mass or thyroid enlargement  Resp: lungs clear to auscultation, nl to palpation  CV: RRR, diastolic murmur at RUSB; no pitting edema  Lymph: no cervical, supraclavicular, inguinal or epitrochlear nodes  MS: The " TMJ, neck, shoulder, elbow, wrist, MCP/PIP/DIP, spine, hip, knee, ankle, and foot MTP/IP joints were examined and found normal. No active synovitis or altered joint anatomy. Full joint ROM. Normal  strength. No dactylitis,  tenosynovitis, enthesopathy.  Skin: no nail pitting, alopecia, rash, nodules or lesions  Neuro: nl cranial nerves, strength, sensation, DTRs.   Psych: nl judgement, orientation, memory, affect.         Data:     @  RHEUM RESULTS Latest Ref Rng & Units 4/25/2006 12/5/2008 1/27/2009   ALBUMIN 3.4 - 5.0 g/dL - 4.3 4.7   ALT 0 - 70 U/L - <3 16   AST 0 - 45 U/L - 13 21   KATHERINE INTERPRETATION Negative - - -   COMPLEMENT C3 81 - 157 mg/dL - - -   COMPLEMENT C4 13 - 39 mg/dL - - -   CK TOTAL 30 - 300 U/L - - -   CREATININE 0.66 - 1.25 mg/dL 2.35(H) 6.73(H) 6.40(H)   CRP 0.0 - 8.0 mg/L - - -   GFR ESTIMATE, IF BLACK >60 mL/min/[1.73:m2] - 12(L) 12(L)   GFR ESTIMATE >60 mL/min/1.73m2 - 10(L) 10(L)   HEMATOCRIT 40.0 - 53.0 % - 37.2(L) 38.3(L)   HEMOGLOBIN 13.3 - 17.7 g/dL - 12.3(L) 12.2(L)   HEPBANG Nonreactive Negative - Negative   HCVAB Nonreactive Negative - Negative   WBC 4.0 - 11.0 10e3/uL - 5.1 5.0   RBC 4.40 - 5.90 10e6/uL - 4.53 4.62   RDW 10.0 - 15.0 % - 14.6 14.9   MCHC 31.5 - 36.5 g/dL - 33.1 31.9   MCV 78 - 100 fL - 82 83   PLATELET COUNT 150 - 450 10e3/uL - 189 177   RHEUMATOID FACTOR <12 IU/mL - - -   ESR 0 - 15 mm/hr - - -   QUANTIFERON-TB GOLD PLUS RESULT Negative - - -       Rheumatoid Factor   Date Value Ref Range Status   10/16/2021 12 <20 IU/mL Final   ,  ,  ,  ,  ,  ,   KATHERINE interpretation   Date Value Ref Range Status   10/16/2021 Negative Negative Final     Comment:       Negative:              <1:40  Borderline Positive:   1:40 - 1:80  Positive:              >1:80   ,  ,  ,  ,  ,   Cardiolipin Antibody IgG   Date Value Ref Range Status   09/25/2017 <1.6 0.0 - 19.9 GPL-U/mL Final     Comment:     Negative     Cardiolipin Antibody IgM   Date Value Ref Range Status   09/25/2017 4.1 0.0  - 19.9 MPL-U/mL Final     Comment:     Negative   ,  ,  ,   Hepatitis B Core Kalee   Date Value Ref Range Status   06/13/2020 Nonreactive NR^Nonreactive Final   ,   Hep B Surface Agn   Date Value Ref Range Status   07/05/2021 Nonreactive NR^Nonreactive Final     Hepatitis B Surface Antigen   Date Value Ref Range Status   10/13/2021 Nonreactive Nonreactive Final   ,  ,  ,  ,   Quantiferon-TB Gold Plus Result   Date Value Ref Range Status   07/08/2020 Negative NEG^Negative Final     Comment:     No interferon gamma response to M.tuberculosis antigens was detected.   Infection with M.tuberculosis is unlikely, however a single negative result   does not exclude infection. In patients at high risk for infection, a second   test should be considered  in accordance with the 2017 ATS/IDSA/CDC Clinical Practice Guidelines for   Diagnosis of Tuberculosis in Adults and Children [Pavithra FLORENTINO et   al.Clin.Infect.Dis. 2017 64(2):111-115].       Quantiferon-TB Gold Plus   Date Value Ref Range Status   10/13/2021 Negative Negative Final     Comment:     No interferon gamma response to M.tuberculosis antigens was detected. Infection with M.tuberculosis is unlikely, however a single negative result does not exclude infection. In patients at high risk for infection, a second test should be considered in accordance with the 2017 ATS/IDSA/CDC Clinical Pract  ice Guidelines for Diagnosis of Tuberculosis in Adults and Children      TB1 Ag minus Nil Value   Date Value Ref Range Status   10/13/2021 -0.01 IU/mL Final   07/08/2020 0.02 IU/mL Final     TB2 Ag minus Nil Value   Date Value Ref Range Status   10/13/2021 -0.01 IU/mL Final   07/08/2020 0.01 IU/mL Final     Mitogen minus Nil Result   Date Value Ref Range Status   10/13/2021 9.97 IU/mL Final   07/08/2020 2.05 IU/mL Final     Nil Result   Date Value Ref Range Status   10/13/2021 0.03 IU/mL Final   07/08/2020 0.03 IU/mL Final   ,  ,  ,  ,  ,  ,  ,  ,   Proteinase 3 Antibody IgG   Date  Value Ref Range Status   10/19/2021 Negative Negative Final   ,   Myeloperoxidase Antibody IgG   Date Value Ref Range Status   10/19/2021 Negative Negative Final   ,   Neutrophil Cytoplasmic Antibody   Date Value Ref Range Status   10/16/2021 1:80 (H) <1:10 Final   ,   Neutrophil Cytoplasmic Antibody Pattern   Date Value Ref Range Status   10/16/2021   Final    Cytoplasmic ANCA (c-ANCA) staining pattern observed and confirmed on formalin-fixed neutrophils.   ,  ,  ,  ,  ,  ,  ,  ,  ,  ,  ,  ,  ,  ,       Reviewed Rheumatology lab flowsheet

## 2022-06-02 NOTE — PATIENT INSTRUCTIONS
Diagnosis:  1.  Chest pain, recurrent: cause is uncertain, but there is no evidence of ongoing active inflammation now. Plan to watch/wait, and use brief course of prednisone to knock out recurrent inflammation presumably related to pericarditis.    Plan:  1.  Check CRP with next dialysis  2.  Alert rheumatology if chest pain worsens. Repeat prednisone will likely be recommended. A trial of hydroxychloroquine may make sense if inflammatory joint pain or chest pain continues.  3. Urinalysis to assess bladder pressure symptoms.

## 2022-06-02 NOTE — NURSING NOTE
"Chief Complaint   Patient presents with     RECHECK     Arthralgia, unspecified joint         Vitals:    06/02/22 1542   BP: 120/82   BP Location: Left arm   Patient Position: Sitting   Cuff Size: Adult Regular   Pulse: 79   SpO2: 100%   Weight: 63.1 kg (139 lb 1.6 oz)   Height: 1.727 m (5' 8\")       Body mass index is 21.15 kg/m .    Madina Whitney, JACIELF  "

## 2022-06-03 NOTE — TELEPHONE ENCOUNTER
RE: Bactrim refill  Received: Today  Reyes Donovan MD Blaisdell, Christin Rebecca, RN  Caller: Unspecified (Today, 10:17 AM)  Let's get a T cell subset as he is taking tacrolimus before stopping.             Previous Messages       ----- Message -----   From: Leonora Dwyer, RN   Sent: 6/3/2022  11:43 AM CDT   To: Reyes Donovan MD   Subject: Bactrim refill                                   Dr. 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 Appears he is still taking Bactrim. Do you want to continue or discontinue this medication?     Thanks,     Leonora Dwyer, RN, BSN   Solid Organ Transplant, [facility]   Transplant Care Coordinator   [phone]     OUTCOME: 30 day supply of Bactrim refilled; quantity 15 tabs. Orders placed in Epic for T cell subset. [employer] message to Rashad.

## 2022-07-11 NOTE — TELEPHONE ENCOUNTER
Left message for patient and sent mychart message regarding:  CD count 604.        Reyes Solis MD Blaisdell, Christin Rebecca, RN  Ok to come off bactrim

## 2022-07-11 NOTE — TELEPHONE ENCOUNTER
ISSUE  Still on Bactrim M-W-F  CD count 604.      PLAN  Reyes Donovan MD Blaisdell, Leonora Painter, RN  Ok to come off bactrim           LPN task:  Please call with above plan and update med list.  Thanks!

## 2022-07-12 NOTE — TELEPHONE ENCOUNTER
Patient is requesting a refill for:    SMZ/-80mg   1Q Monday, Wednesday and Friday    Didn't see this medication on the active med list. Please verify and send if ok'd. Thank you    [facility]   [phone]

## 2022-07-18 NOTE — TELEPHONE ENCOUNTER
Spoke to surya today about his wait list follow up appointments. He needs cardiology update and pulmonary HTN clinic. He completed his dental in May and believes his immunizations are up to date but not reflected on the snapshot or MARTA. Will ask dialysis for immunization record

## 2022-08-02 NOTE — TELEPHONE ENCOUNTER
Voicemail  Date/Time: 08/02/22 8:37 am  Reason for call: Rashad states he called to talk about his evaluation

## 2022-08-08 NOTE — TELEPHONE ENCOUNTER
Returned call to Rashad and he was asking about his remaining appointment to get cardiology clearance. Orders are in and will ask scheduling to contact Rashad.

## 2022-09-14 NOTE — TELEPHONE ENCOUNTER
RNCC received request for refills on mycophenolate dose 250 mg BID, Tacrolimus 1 mg AM/1.5 mg PM and carvedilol 25 mg BID. Carvedilol prescription should go to PCP who appears to be Mariel Garcia MD.    Reviewed last transplant nephrology appointment 8/25/20 with Dr. Donovan in regards to immunosuppression management since initiating dialysis ~9/1/2020    # Immunosuppression: Tacrolimus immediate release (goal 4-6), Mycophenolate mofetil (500mg BID)              - Changes: Yes. Prednisone weaned off over 3 months, now officially off since 8/15/20. Planning for anti-metabolite weaning and will reduce MMF to 250mg BID once he started HD. We can consider a month on MMF 250mg BID and then taper off knowing that he wont get a transplant in the next few years (given his behavioural issues, cPRA), however he is making a large amount of urine and we would want to preserve his residual kidney function while on HD and we will advocate for him at the committee meeting to stay on MMF 250mg BID. Keep CNI low-dose    OUTCOME:   RNCC spoke with Rashad who was currently in dialysis at time of call. He was asked about his urine output. Notes he is making very little, about a tablespoon in amount. He does not have any potential living donors at this time. Confirms the current dose of tacrolimus he is taking is 1 mg AM/1.5 mg PM and  mg BID. Will discuss weaning MMF with provider. Rashad was instructed to contact his PCP for carvedilol refill.    Staff message to Dr. Barrios with following taper plan:  1) Reduce Mycophenolate dose from 250 mg twice daily to 250 mg once daily x1 month then stop.  2) Reduce Tacrolimus from 1 mg AM/1.5 mg PM down to 0.5 mg AM/1 mg PM x 1 month. Then 0.5 mg BID x1 month. Followed by 0.5 mg daily x 1 month. Stop.    Gee Barrios MD  You; Emmie Syed RN 1 hour ago (1:58 PM)     RS    Mostly completely agree, my only change would be to just stop mycophenolate outright now.   Do the tacrolimus taper as you described.     Gabe RODRIGUEZCC discontinued mycophenolate 250 mg BID per Dr. Barrios and removed from medication list. Spoke with Rashad regarding this change he can make tonight. Tomorrow he may reduce his tacrolimus dose per the taper plan above. Rashad confirms he has access to [employer] so that he will review tacrolimus taper instructions.     Leonora Dwyer RN, BSN  Solid Organ Transplant, [facility]  Transplant Care Coordinator  [phone]

## 2022-09-20 NOTE — TELEPHONE ENCOUNTER
New Pulmonary Hypertension Patient Form   Patient Name: Rashad Ortiz Age: 47 year old, male, 1975 MRN: [id number]   Referring Provider:  Kidney Transplant team Appt:  10/11       PH Medications:  NA      Patient History: Pt has a history of Cardiovascular disease, kidney transplant failure, HTN, ESRD, Recurrent pericarditis, Positive c-ANCA,     Referred for: Kidney tranplant    Followed by:   Rheumatology: Renato Dominguez  Nephrology: Dr. Torres  Transplant team: Leonora Acosta          Recent Testing:       Date Test Results    5/9/22 Echo RVSP:  54 LVEF:  55-60         RV:  normal    scheduled 6MWT   Meters/lowest SaO2 O2:       Max HR:              scheduled PFT FEV1/FVC:   FEV1:       FVC:   DLCO:      NA RHC RA:   PA:   PVR:       RV:   PAW:   COI:      NA Sleep Study      5/9/22 EKG  Sinus rhythm   Possible Left atrial enlargement   Left ventricular hypertrophy    5/9/22 Chest CT  1. No central filling defect consistent with a pulmonary embolism.   2. No focal consolidation    NA VQ Scan      NA Liver US      NA Angiogram/   Stress Test

## 2022-09-22 NOTE — TELEPHONE ENCOUNTER
Patients immunos have been denied under part D stating that the medication should be covered under medicare part B  Patient had medicare at the time of transplant   Medicare part A effective: 1/1/2011- 1/31/2014 then reactive 9/1/2020  Medicare Part B Effective: 8/1/2021    I did reach out to patient he has medicare however medicare is listing as [employer] as primary patient no longer has [employer]    [employer] ended 8/31/2022  Patient will call if he has any questions

## 2022-10-06 NOTE — PROGRESS NOTES
Orders placed for pre-visit testing. Called and patient was scheduled for PFT, 6MWT, labs. Pt verbalized understanding

## 2022-10-18 NOTE — TELEPHONE ENCOUNTER
Medication Appeal Initiation    We have initiated an appeal for the requested medication:  Medication: Cinacalcet - Appeal pending  Appeal Start Date:  10/18/2022  Insurance Company: [employer] ([facility]) - Phone [phone] Fax [phone]  Comments:       Appeal was initiated unsure how or who did it   I received additional questions set back and sent to insurance

## 2022-10-20 NOTE — PROGRESS NOTES
Received message from pt txp coordinator, Emmie, that pt is requesting a fistulogram d/t pain at fistula site.    Dialysis History      Start End Type Center Comments    9/1/2020  Hemo-In [facility] (ESRD)     1/14/2010 1/12/2011 Hemo-In Center [facility] (ESRD) Dialysis Days per week -- T TH SA 2nd shift              Neph Tracking Flowsheet Last Filled Values     Final Modality Hemodialysis    Patient's Referral Dates Auto Populate Patient's Referral Dates    Anemia Services Referral 2/27/20    Dietician Referral 3/31/20    Diaylsis Access Type AV Fistula    Dialysis Access Site [facility]IC  had R forearm AVF ligated and excised on 12/20/11    Dialysis Access Surgery 07/31/19    Dialysis Access Surgeon Dc    Dialysis Access Maturation --  mature and using for HD    Transplant Status  Referred        Txp hx: 1/13/2011 (Kidney)    Dialysis access hx:  7/31/19 RUE AVF creation- Dr. oKehler  12/20/11 ligate and excise RUE forearm AVF -Dr. Araujo    Per chart review, pt goes to [facility].  Called to get update on access issues.  Clinic is only open MWF, unable to speak with staff.    Called pt.  LVM requesting call back to direct line.  Sent [employer] message as well.    Asked pt to answer the following questions either via [employer] or by calling writer:  1. Are you still dialyzing at [facility]?  MWF?  Who is your nephrologist?  2. Are you still using your right upper arm fistula for dialysis?  3. Are you having frequent alarms with dialysis?  4. Are you bleeding longer than normal after dialysis?  5. Is the dialysis unit having difficulty sticking your access?  6. Can you describe the pain?  Is it constant?  During dialysis?  Comes and goes?  Does anything make it better or worse?  7. Do you have any numbness, tingling, or coolness in your right hand or fingers?    Will continue to follow.    SHERYL NAQVI RN on 10/20/2022 at 10:11 AM  Dialysis Access Care Coordinator  Phone:  [phone]  [phone]  Please use P_Dialysis_Access_Nurse pool for Epic InBasket communications to ensure timely response.

## 2022-10-21 NOTE — TELEPHONE ENCOUNTER
Ultrasound ordered per IR recommendations. To be completed prior to fistulogram. Request sent to scheduling.     Gabrielle Hernandez RN on 10/21/2022 at 3:16 PM

## 2022-10-21 NOTE — PROGRESS NOTES
Pt Chiquishart response:       Are you still dialyzing at [facility]?  MWF?  Who is your nephrologist? I don't remember my nephrologist's name but i am currently at [facility], [place], Sat.     Are you still using your right upper arm fistula for dialysis? Yes.    Are you having frequent alarms with dialysis? No    Are you bleeding longer than normal after dialysis? No    Is the dialysis unit having difficulty sticking your access? No    Can you describe the pain?  Searing pain above the fistula by my right shoulder.     Is it constant?  it isn't has bad as is was last week.    During dialysis?  Dialysis goes fine, the pan is above where they insert the needles.     Comes and goes?  No, constant.     Does anything make it better or worse? Ice pack soothes it a bit.    Do you have any numbness, tingling, or coolness in your right hand or fingers? No    Called [facility].  Confirmed pt is their pt, TTS and is followed by Dr. Kraus at [facility].    Per HD RN, high venous pressures and DVPs.  Dr. Kraus ordered a fistulogram at [facility], but pt would like to continue access care at [facility].  Discussed with SOT fellow, Dr. Ayala who ordered fistulogram.    Updated pt chart.    Neph Tracking Flowsheet Last Filled Values     Final Modality Hemodialysis    Patient's Referral Dates Auto Populate Patient's Referral Dates    Anemia Services Referral 2/27/20    Dietician Referral 3/31/20    Diaylsis Access Type AV Fistula    Dialysis Access Site [facility]  had R forearm AVF ligated and excised on 12/20/11    Dialysis Access Surgery 07/31/19    Dialysis Access Surgeon Dc    Dialysis Access Maturation --  mature and using for HD    Transplant Status  Referred        Dialysis History      Start End Type Center Comments      [facility]EMO (ESRD) TTS  Dr. Kraus ([facility])              Will continue to follow.    SHERYL NAQVI, RN on 10/21/2022 at 12:10 PM  Dialysis Access Care  Coordinator  Phone: [phone]  Please use P_Dialysis_Access_Nurse pool for Epic InBasket communications to ensure timely response.

## 2022-10-21 NOTE — PROGRESS NOTES
Outpatient IR Referral  10/21/22     Referring Provider: Stef Salmeron MD and Magalis Buchanan RN   Procedure Requested:  RUE dialysis fistulogram   Procedure Approved:  RUE dialysis fistulogram with possible intervention.  Will need RUE duplex first then okay to schedule procedure after.      Case and imaging was reviewed with Dr. Wallace from IR     Patient is a 47 year old with history of ESRF on HD (T, Th, Sat at [facility]) via right brachial cephalic fistula (Dr. Irwin; 7/31/2019) now with high venous pressures, high DVPs, pain proximal to fistula and into right shoulder    IR intervention:  2/19/2021:  Right BC fistulogram with complete occlusion of main cephalic vein with unsuccessful recanalization and 10 mm x 80 mm Bard Covera stent placed across cephalic arch.   10/9/2020: Thrombosed right brachiocephalic fistula, venoplasty (to 8mm) of right cephalic vein and mechanical thrombectomy (angiojet and anthony thrombus maceration) and placement of RIJ tunneled dialysis catheter due to concern that fistula would rethrombose    Pertinent Medications Reviewed:  Patient is not on anticoagulation    Procedure order placed.    Primary team Stef Salmeron MD and Magalis Buchanan RN made aware of IR recommendations.    Nabila Louis PA-C  Interventional Radiology   IR on-call pager: [phone]

## 2022-11-15 ENCOUNTER — MYC MEDICAL ADVICE (OUTPATIENT)
Dept: CARDIOLOGY | Facility: CLINIC | Age: 47
End: 2022-11-15

## 2022-11-16 DIAGNOSIS — Z48.298 AFTERCARE FOLLOWING ORGAN TRANSPLANT: ICD-10-CM

## 2022-11-16 DIAGNOSIS — Z79.60 LONG-TERM USE OF IMMUNOSUPPRESSANT MEDICATION: ICD-10-CM

## 2022-11-16 DIAGNOSIS — Z94.0 KIDNEY TRANSPLANTED: Primary | ICD-10-CM

## 2022-11-16 DIAGNOSIS — Z94.0 KIDNEY REPLACED BY TRANSPLANT: ICD-10-CM

## 2022-11-16 DIAGNOSIS — Z79.899 ENCOUNTER FOR LONG-TERM CURRENT USE OF MEDICATION: ICD-10-CM

## 2022-11-16 RX ORDER — TACROLIMUS 0.5 MG/1
0.5 CAPSULE ORAL EVERY MORNING
Qty: 30 CAPSULE | Refills: 3 | Status: SHIPPED | OUTPATIENT
Start: 2022-11-16

## 2022-11-17 NOTE — TELEPHONE ENCOUNTER
Called patient to reach out to reschedule testing and follow-up in the pulmonary hypertension clinic. Requested a call back and number left for scheduling.     Dee Ferreira RN on 11/17/2022 at 11:32 AM

## 2022-11-18 ENCOUNTER — DOCUMENTATION ONLY (OUTPATIENT)
Dept: TRANSPLANT | Facility: CLINIC | Age: 47
End: 2022-11-18

## 2022-11-18 ENCOUNTER — MYC MEDICAL ADVICE (OUTPATIENT)
Dept: FAMILY MEDICINE | Facility: CLINIC | Age: 47
End: 2022-11-18
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
## 2022-11-18 NOTE — TELEPHONE ENCOUNTER
[facility] of a  [facility]--NOTIFICATIONS ON 22 @1:09PM

## 2022-11-20 ENCOUNTER — HEALTH MAINTENANCE LETTER (OUTPATIENT)
Age: 47
End: 2022-11-20

## 2022-11-21 ENCOUNTER — DOCUMENTATION ONLY (OUTPATIENT)
Dept: TRANSPLANT | Facility: CLINIC | Age: 47
End: 2022-11-21

## 2023-04-22 NOTE — PROGRESS NOTES
[facility], [place]   Hospitalist Daily Progress Note                                                 Date of Admission:10/25/2018  ___________________________________  INTERVAL HISTORY (24 Hrs)/SUBJECTIVE:   Last 24 hr events, care team notes reviewed.     Overall continues to feel better.   Pain RLQ  - better.   Ongoing loose stools  Good UOP  Fever improving.   Headache: none.   No cough or sore throat or cp or sob  No new rash.   Gen weakness      ROS: 4 point ROS neg other than the symptoms noted above in the interval history.    OBJECTIVE :   VITALS:    Temp:  [98.5  F (36.9  C)-99.4  F (37.4  C)] 98.9  F (37.2  C)  Heart Rate:  [] 99  Resp:  [16-20] 18  BP: (127-141)/(88-98) 141/98  SpO2:  [92 %-100 %] 99 %    Temp (24hrs), Av.9  F (37.2  C), Min:98.5  F (36.9  C), Max:99.4  F (37.4  C)      Wt Readings from Last 5 Encounters:   10/26/18 82.1 kg (181 lb)   10/25/18 79.7 kg (175 lb 9.6 oz)   10/22/18 84.4 kg (186 lb)   18 87.1 kg (192 lb)   18 82.6 kg (182 lb)        Intake/Output Summary (Last 24 hours) at 10/27/18 1429  Last data filed at 10/27/18 1200   Gross per 24 hour   Intake             3580 ml   Output             3300 ml   Net              280 ml       PHYSICAL EXAM:  General: alert, interactive, NAD  HEENT: AT/NC, anicteric, PERRL, Moist MM  Neck: Supple,  Lymphadenopathy  Respi/Chest: Non labored. Clear BL  CVS/Heart: S1S2 regular,   Gi/Abd: Soft, NT, non distended, BS +  MSK/Ext: Distal pulses 2+. L Leg more swollen > R (chronic)  Neuro: AO x 4, Grossly intact.     Medications:   I have reviewed this patient's current medications.      Data:   All laboratory and imaging data in the past 24 hours reviewed:    LABS:  CMP    Recent Labs  Lab 10/27/18  0444 10/26/18  1626 10/26/18  0545 10/25/18  2324 10/25/18  2225 10/25/18  1707 10/25/18  1155    141 139 138  --  134 136   POTASSIUM 3.6 4.2 4.0 4.4  --  5.1 4.6   CHLORIDE 112* 114* 110* 109   --  104 104   CO2 15* 14* 14* 15*  --  19* 22   ANIONGAP 14 14 15* 14  --  11 10   * 150* 108* 108*  --  124* 105*   BUN 61* 58* 58* 61*  --  56* 54*   CR 8.12* 8.64* 9.27* 8.87*  --  8.70* 7.47*   GFRESTIMATED 7* 7* 6* 7*  --  7* 8*   GFRESTBLACK 9* 8* 8* 8*  --  8* 10*   ASHELY 7.7* 7.4* 7.5* 7.3*  --  8.7 8.9   MAG  --   --   --  1.7 0.9* 1.0*  --    PHOS  --   --   --   --   --  5.2*  --    PROTTOTAL  --   --  5.5*  --   --  6.8 6.8   ALBUMIN  --   --  2.0*  --   --  2.7* 2.8*   BILITOTAL  --   --  0.4  --   --  0.4 0.4   ALKPHOS  --   --  63  --   --  64 63   AST  --   --  11  --   --  13 12   ALT  --   --  10  --   --  14 16     CBC    Recent Labs  Lab 10/27/18  0444 10/26/18  1626 10/26/18  0545 10/26/18  0018   WBC 13.1* 15.9* 17.7* 8.4   RBC 3.14* 3.04* 2.99* 2.79*   HGB 8.5* 8.2* 8.0* 7.5*   HCT 26.0* 26.0* 25.8* 23.7*   MCV 83 86 86 85   MCH 27.1 27.0 26.8 26.9   MCHC 32.7 31.5 31.0* 31.6   RDW 16.1* 16.4* 16.5* 16.3*   PLT 77* 77* 78* 76*     INR    Recent Labs  Lab 10/26/18  0545 10/25/18  2324   INR 1.58* 1.57*     Unresulted Labs Ordered in the Past 30 Days of this Admission     Date and Time Order Name Status Description    10/26/2018 2200 Mycophenolic acid In process     10/26/2018 1610 Blood Morphology Pathologist Review In process     10/26/2018 0806 Blood culture Preliminary     10/26/2018 0806 Blood culture Preliminary     10/25/2018 2138 Blood culture Preliminary     10/25/2018 1701 Blood Culture ONE site Preliminary              Abd/pelvis CT no contrast - Stone Protocol    Narrative    EXAMINATION: CT ABDOMEN PELVIS W/O CONTRAST, 10/25/2018 6:36 PM      IMPRESSION:  1. Minimal nonspecific stranding around the right lower quadrant  transplant is unchanged from prior comparisons. No hydronephrosis. No  peritransplant fluid collection.  2. Thickening of the bladder wall, concerning for cystitis.  3. Atrophic native kidneys. Nonobstructing right renal calculi.     US Renal Transplant     Narrative    EXAMINATION: US RENAL TRANSPLANT,  10/25/2018 8:46 PM       IMPRESSION:   1. Elevated mildly elevated resistive indices of the mid arteries.  Nonspecific in the setting of infection.  2. Elevated anastomotic velocities (330 cm/sec) , with normal  appearing spectral waveforms, likely hyperdynamic concerning for  infection. Follow-up exam following resolution of acute symptoms be  considered to evaluate for stenosis.  3. No anatomic changes to indicate a renal abscess.         Venous Blood Gas    Recent Labs  Lab 10/26/18  0836 10/26/18  0018 10/25/18  2136   PHV 7.35 7.35 7.35   PCO2V 25* 28* 36*   PO2V 54* 59* 31   HCO3V 14* 15* 20*   O2PER 21.0 0 21     Stool Studies: C.diff neg. Norovirus+    Lactic acid: 1.5.   Crp: 350 -> 360  Procal: >200    BC: E. Coli , susceptibility pending. 10.25.   RVP neg.     ASSESSMENT & PLAN :    Rashad Ortiz is a 43 year old male admitted on 10/25/2018. He has PMH of HTN, ESRD s/p LDKT in 2011 c/b antibody mediated rejection, not on HD,  Secondary hyperparathyroidism, Gout, and genital herpes who presented to ED with fever, chills, fatigue, malaise, diarrhea, gen weakness. Found to have GNR sepsis.              #  Sepsis with GNR bacteremia 2/2 likely from transplant kidney pyelonephritis:   Presented with ~ 24 hour hx of fever, chills, fatigue, non-blood diarrhea, and poor PO intake. WBC 24.5 w/ left shift, , procal>200,Tachycardic to 130, Tmax 103.0. BP stable. LA 1.5, HGB 9.7, platelets 128, UTI+. DEIDRA: Cr 7.47 --> 8.70, BUN 56, Bicarb 19. LFT's wnl. Received IVF bolus and maintenance, Zosyn in ED.   BC, prelim: E. Coli.   Stool: Norovirus+    Hemodynamics remains stable. Alert. mentating well. On RA, sating fine. Urinating frequently. Lactic acid: wnl.     - Continue empiric iv Cefepime, iv Levaquin (renal dosing). Given dose of iv Vancomycin 10.26 - discontinue. Further for Transplant ID.   - Nitazoxanide for Norovirus.   - IVF switched to 1/2nss plus  bicarb  - follow-up BC  - [facility], Tele. Monitor vitals, I/o. Pulse ox.   - follow-up cbc w diff.   - Enteric precautions   - O2 PRN  - trend procal  - Tylenol PRN for fever, headache  - iv hydromorphone prn for pain       # DEIDRA on CKD: ESRD s/p LDKT 2011. BL Cr 3.70-4.0. Follows with [facility] w/ clinic visit 2/2018 plan for BP control and continuance of current immunosuppression: Tacrolimus, MMF, and prednisone. Tac level 5.3 (6/2018). Prophylaxis: Bactrim Renal US: Mildly elevated resistive indices of rhe mid-arteries- nonspecific in setting of infection; elevated anastomotic velocities, with normal appearing spectral waveforms, likely hyperdynamic concerning for infection; f/u exam following resolution of acute sx to evaluate for stenosis; no renal abscess. CT A/P w/o contrast w/o significant perinephric fluid; mild non-specific stranding about the RLQ kidney- unchanged from prvious; no significant hydronephrosis; Thickening of the bladder wall- concerning for cystitis; non-obstructing right renal calculi.     Non oliguric at current.   - Transplant Nephrology consult. D/w Dr Cunha.10.26   - Further IS management per Tx Nephrology.   - Strict I/o, follow-up bmp bid, renal dosing, avoid nephrotoxics.   - follow-up lytes.  - iv diuretics prn for e/o vol overload  - hold bactrim.     * No indication for HD at current.   Call Nephrology prn for any e/o worsening renal function. Vol overload.        # HTN: BP stable on admission. PTA lasix, losartan, Norvasc, coreg.  - Holding in setting of sepsis and DEIDRA  - 10/27/2018: bp better. Start coreg at lower dose. Titrate as yin.      # Gout: No current sx. PTA allopurinol  - resume.      Diet:    Active Diet Order      Advance Diet as Tolerated: Regular Diet Adult     Fluids: IVF  Hunter Catheter: not present  DVT Prophylaxis: Pneumatic Compression Devices. Ambulatory.   Code Status: FULL         Dispo: Disposition Plan   Expected discharge in 3-5 days to prior living  arrangement once medically stable.     Entered: Jeferson Nelson 10/27/2018, 2:30 PM         Plan discussed with patient, bedside RN and HUEY CC during Care Team Rounds.      Jeferson Nelson MD   Hospitalist (Internal Medicine)  Pager: [phone].            PROVIDER:[TOKEN:[[id number]],FOLLOWUP:[1-3 Days]],PROVIDER:[TOKEN:[[id number]],FOLLOWUP:[Routine]]
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
## 2023-07-31 NOTE — TELEPHONE ENCOUNTER
Contacted Sum to notify her of the message. She will contact the dialysis unit on file.    
Rashad left voice message on this writer's phone. States that he has a blood clot in his fistula and his dialysis nurses had a hard time with flushing it yesterday. Also reports this AM that he is no longer able to hear the bruit of his fistula, and he has pain around his fistula.    Will send to [facility] for further evaluation and treatment.    
This writer sees that Alexia has ordered fistulogram with IR, but not currently scheduled yet.    Contacted Rashad to see how he is doing. He states he think his fistula has completely clotted off, no bruit or thrill at this time. He has pain in his arm, as well. Notified him that orders are in place here for fistulogram in IR. Contacted ordering provider, who states this will be scheduled for tomorrow after patient goes to his dialysis unit at [facility] first. They need to assess his fistula to verify not usable then will be scheduled for IR fistulogram.    Patient also reports he is wanting to change dialysis units, is not happy with care he receives at [facility]. Explained to Rashad how it works and states he will need to inform his nursing staff or provider at the dialysis unit for a transfer. Discovered that he would like to go to [facility] in [place], so he will address with them tomorrow. Gave him nephrology contact in case he doesn't hear from anyone, and stressed the urgency of him contacting us should he not have a clear path to addressing his fistula tomorrow so as not to miss any dialysis runs. Rashad v/u and took down the phone number.  
Plan: Zeasorb powder for maintenance
Detail Level: Zone
Discontinue Regimen: Mix 50/50 Hydrocortisone and Ketoconazole cream BIDx1 week
Render In Strict Bullet Format?: No
Initiate Treatment: Econazole cream BID

## 2024-06-10 NOTE — PROGRESS NOTES
Nephrology Progress Note  10/18/2021         Assessment & Recommendations:   #End-stage renal disease-hemodialysis is Tuesday Thursday Saturday at [facility] with Dr. Coleman.  Treatment time is 2.5 hours via an AV fistula.    - [facility] ([place]) phone [phone], Fax [phone]  - Dialysis per TTS schedule with extra UF runs as needed     #Hypervolemia -imaging shows pulmonary edema and had complication of hemoptysis.    - reduced TW to 65 kg. May be able to challenge further  - consider Vitamin E 400 mg daily to help with cramping  - UF run today 10/18 for 2 kg off     # pericarditis - pain improving (though still present) after colchicine. Given presence of hemoptysis, primary team sent C3/C4, ANCA, antiGBM and KATHERINE which are pending. He is on  mg bid and FK 1 and 1.5 mg for transplant so is immunosuppressed and typically ANCA is more quiet in people on dialysis. No known history of rheumatologic disease.  - follow up serologic results    #Anemia of end-stage renal disease.  Hemoglobin levels run at approximately 7's on Epogen 8000 units pretreatment.    - received pRBC during hospitalization -   - will give epogen 10,000 units three times weekly on T/Th/S dialysis runs.       #Renal hyperparathyroidism-his PTH Runs approximately 2000, he is on Hectorol 15 mcg 3 times weekly with dialysis.  His phosphorus is often elevated in the 7-8 range.  We will make no changes and and defer further management to his primary nephrology team.    # Community acquired pneumonia - receiving azithromycin and ceftriaxone    Recommendations communicated via this note    Mamie Taylor PA-C  P [phone]    Interval History :   Nursing and provider notes from last 24 hours reviewed.  Seen on dialysis, 2 kg UF and stable run. Still with CP but improving. No n/v, chills. Breathing is ok    Review of Systems:   I reviewed the following systems:  GI: ok appetite. no nausea or vomiting or diarrhea.   Neuro:  no  Pt notified.   "confusion  Constitutional:  no fever or chills  CV: + pedal edema    Physical Exam:   I/O last 3 completed shifts:  In: 250 [P.O.:250]  Out: -    /70   Pulse 67   Temp 98.5  F (36.9  C) (Oral)   Resp 18   Ht 1.727 m (5' 8\")   Wt 65 kg (143 lb 4.8 oz)   SpO2 98%   BMI 21.79 kg/m       GENERAL APPEARANCE: no distress  EYES:  no scleral icterus, pupils equal  HENT: mouth without ulcers or lesions  PULM: normal work of breathing  CV: 1-2+ pedal edema  NEURO:  Speech fluent, face symmetric  Access fistula    Labs:   All labs reviewed by me  Electrolytes/Renal -   Recent Labs   Lab Test 10/18/21  0739 10/17/21  0453 10/15/21  0720   * 133 134   POTASSIUM 5.3 4.3 4.6   CHLORIDE 96 98 101   CO2 24 25 24   BUN 48* 36* 38*   CR 12.00* 9.58* 9.83*   * 111* 90   ASHELY 9.1 8.9 8.5   MAG 1.8 1.8 1.8   PHOS 8.4* 6.9* 6.4*       CBC -   Recent Labs   Lab Test 10/18/21  0739 10/17/21  0453 10/16/21  1610   WBC 7.7 6.5 9.3   HGB 7.9* 7.6* 8.3*    222 221       LFTs -   Recent Labs   Lab Test 10/13/21  0708 10/12/21  1435 09/29/21  1449   ALKPHOS 134 146 157*   BILITOTAL 0.4 0.6 0.4   ALT 6 15 14   AST 10 15 7   PROTTOTAL 7.3 7.7 7.7   ALBUMIN 2.7* 3.1* 3.2*       Iron Panel -   Recent Labs   Lab Test 06/20/20  1112 03/18/20  0723 03/18/20  0723 10/10/19  1650 10/10/19  1650   IRON 15*  --  54  --  42   IRONSAT 9*  --  30  --  23   ORALIA 310   < > 460*   < > 790*    < > = values in this interval not displayed.         Imaging:  All imaging studies reviewed by me.     Current Medications:    amLODIPine  10 mg Oral Daily     carvedilol  25 mg Oral BID w/meals     furosemide  20 mg Oral Daily     heparin ANTICOAGULANT  5,000 Units Subcutaneous Q12H     influenza quadrivalent (PF) vacc  0.5 mL Intramuscular Prior to discharge     lidocaine  1 patch Transdermal Q24h    And     lidocaine   Transdermal Q8H     mycophenolate  250 mg Oral BID     sodium chloride (PF)  3 mL Intracatheter Q8H     " sulfamethoxazole-trimethoprim  1 tablet Oral Once per day on Mon Wed Fri     tacrolimus  1 mg Oral QAM     tacrolimus  1.5 mg Oral QPM       THEO Salas

## 2024-11-22 NOTE — PROGRESS NOTES
[facility]    Medicine Progress Note - Hospitalist Service, Gold 6       Date of Admission:  10/12/2021     Updates today:  -Appreciate nephrology input and need for additional dialysis today for volume and hyperkalemia (K 5.7)   - Appreciate pulmonology input who agree with volume management   -Hemoglobin 7.0 this a.m., repeat 16:00 after HD  - appreciate ID input, antimicrobials as per below  -Infection work-up as per below  - pending workup may be able to discharge in 1-2 days  -Repeat troponin today is flat EKG without abnormal ST or T wave changes    Assessment & Plan           Rashad Ortiz is a 46 year old male admitted on 10/12/2021. 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 Initial work-up concerning for pulmonary edema. He is admitted to Internal Medicine for further evaluation.    Moderate-sized pericardial effusion, posterior  Pleuritic chest pain  Pulmonary edema  Interstitial lung abnormality  (please see my 10/13 note for further details).   Noted pleuritic, mid-sternal chest pain after HD 10/13. BNP >25,000, trops flat. CTA neg for PE. TTE showing normal LVEF and moderate size posterior pericardial effusion. Procal 3.7.  - Appreciate pulmonology input  -Appreciate cardiology curbside, recommend repeat TTE prior to discharge sooner if hemodynamic instability  - Continuous pulse oximetry, IS  - Pain mng: APAP 650 mg q6h prn, oxycodone 5-10 mg q6h prn, IV dilaudid 0.5 mg q4h prn x 2 doses for severe pain (needed 2 doses of oxycodone in 24h)  - lasix 20mg daily po  -Infectious work-up   -Negative studies to date: Blood cultures NGTD, Covid, hep B surface Ag, Covid PCR, S. pneumo and Legionella urine Ag, respiratory viral  panel, Quant-TB gold,    -Pending infectious work-up: CMV quant, HSV PCR, 1 3 beta glucan, varicella antibody, CMV PCR, EBV PCR, blood cultures x2      Hemoptysis, improved  Acute on chronic anemia in the setting of acute blood loss  See also above.  Hgb 6.7 on presentation. Baseline ~ 9-10. Noting small hemoptysis x7-8 episodes PTA but no other s/s of bleeding.  Post 1 unit PRBC hemoglobin increased to 7.4.  Patient noted interval resolution of hemoptysis on 10/13.  - Consented for blood, Type and screen obtained on admission  - Transfused 1u pRBCs on admission, no further transfusions so far  -Hemoglobin improved to 7.4 post transfusion of 1 unit PRBC, hemoglobin stable at 7      ESRD s/p failed LDKT on HD (T-Th-Sat)  Had full run HD prior to presentation. EDW from prior admission 66 kg. PTA on tacrolimus 1 mg q AM and 1.5 mg q evening, mycophenolate 250 mg BID, bactrim M-W-F, lasix 20 mg daily.  - tacro level 3.2 at 06:45 10/14, appreciate nephrology input, no changes at this time  - Nephrology consult input appreciated  -Underwent HD 10/13 with 1500 mL UF, HD today with 2.5 L UF  - Continue PTA tacrolimus, mycophenolate, bactrim, lasix  - I/Os, daily weights      Hypodense thyroid nodule, incidental finding  Hypodense thyroid nodules which may be further evaluated with  Ultrasound.  Normal TSH and did not reflex to free T4  -We will need outpatient follow-up     HTN -   BP has been stable and normal  -PTA  amlodipine 10 mg daily, coreg 25 mg BID       Diet: Combination Diet Regular Diet Adult    DVT Prophylaxis: Heparin SQ  Hunter Catheter: Not present  Central Lines: None  Code Status: Full Code      Disposition Plan   Expected discharge: 10/16/2021 1-2d recommended to prior living arrangement once antibiotic plan established, hemoglobin stable and safe disposition plan/ TCU bed available.     The patient's care was discussed with the Attending Physician, Dr. Brice, Bedside Nurse, Care Coordinator/,  Patient, Patient's Family and pulmonary, cardiology, nephrology Consultant.    Luisa Pedroza PA-C  Hospitalist Service, [address]  Securely message with the [employer] Web Console (learn more here)  Text page via [facility] Paging/Directory  Please see sign in/sign out for up to date coverage information    Clinically Significant Risk Factors Present on Admission                ______________________________________________________________________    Interval History    Rashad is feeling better and notes he is no longer having any episodes of blood in the sputum or other bleeding.  He notes he continues to have a retrosternal chest pain without palpitations or shortness of breath.  He has not recently made any urine.  He denies any changes to his bowels.  He denies other complaints.    . 4 point ROS is negative other than those as mentioned per HPI    Data reviewed today: I reviewed all medications, new labs and imaging results over the last 24 hours. I personally reviewed the chest CT image(s) showing Bilateral GGO, no extravasation.    Physical Exam   Vital Signs: Temp: 98.6  F (37  C) Temp src: Oral BP: 133/77 Pulse: 72   Resp: 29 SpO2: 98 % O2 Device: None (Room air)    Weight: 144 lbs 6.42 oz  GENERAL: Alert and oriented x 3. NAD.  Able to sit upright without assistance cooperative.    HEENT: Anicteric sclera. Mucous membranes moist. NC. AT.  Pupils equal and round  CV: Tachycardic, RRR. S1, S2. No murmurs appreciated.   RESPIRATORY: Effort normal on RA. Lungs scant bibasilar rales and crackles, no wheezing, rhonchi.   GI: Abdomen soft, NT/ND, NABS  NEUROLOGICAL: No focal deficits. Moves all extremities.   EXTREMITIES:  Intact bilateral pedal pulses.   SKIN: No jaundice.  No rashes.      Data   Recent Labs   Lab 10/13/21  2010 10/13/21  1320 10/13/21  0708 10/12/21  1435 10/12/21  1435   WBC  --   --  6.8  --  7.7   HGB 7.1* 7.4* 7.0*   < > 6.7*   MCV  --   --   84  --  82   PLT  --   --  120*  --  157   NA  --   --  133  --  136   POTASSIUM  --   --  5.4*  --  4.4   CHLORIDE  --   --  100  --  98   CO2  --   --  27  --  26   BUN  --   --  54*  --  41*   CR  --   --  11.40*  --  9.64*   ANIONGAP  --   --  6  --  12   ASHELY  --   --  8.3*  --  8.4*   GLC  --   --  105*  --  94   ALBUMIN  --   --  2.7*  --  3.1*   PROTTOTAL  --   --  7.3  --  7.7   BILITOTAL  --   --  0.4  --  0.6   ALKPHOS  --   --  134  --  146   ALT  --   --  6  --  15   AST  --   --  10  --  15   TROPONIN  --   --  <0.015  --  <0.015    < > = values in this interval not displayed.      Yes - the patient is able to be screened

## 2025-02-26 NOTE — TELEPHONE ENCOUNTER
Received signed form. Bringing to the  by 1:00 pm today, 5/29/2020. Copy to TC and abstracting. Called and spoke to the patient and explained form , patient understands.  Amanda [facility]  2nd Floor  Primary Care     36.4

## 2025-04-23 NOTE — ED AVS SNAPSHOT
[facility], [place], Emergency Department  [address]  [facility] [address]  Phone:  [phone]  Fax:  [phone]                                    Rashad Ortiz   MRN: [id number]    Department:  [facility], Emergency Department   Date of Visit:  9/18/2020           After Visit Summary Signature Page    I have received my discharge instructions, and my questions have been answered. I have discussed any challenges I see with this plan with the nurse or doctor.    ..........................................................................................................................................  Patient/Patient Representative Signature      ..........................................................................................................................................  Patient Representative Print Name and Relationship to Patient    ..................................................               ................................................  Date                                   Time    ..........................................................................................................................................  Reviewed by Signature/Title    ...................................................              ..............................................  Date                                               Time          22EPIC Rev 08/18       
Medical clearance  pre op instructions and best wishes offered

## 2025-07-28 NOTE — DISCHARGE INSTRUCTIONS
Copied from CRM #[id number]. Topic: General Inquiry - Patient Advice  >> Jul 28, 2025  8:53 AM Mk wrote:  Type:  Needs Medical Advice    Who Called:  pt     Symptoms (please be specific): Referral from Primary Dr Robertson     How long has patient had these symptoms:  ongoing problem    Would the patient rather a call back or a response via [facility]?  Call back    Best Call Back Number: [phone]    Additional Information: Been waiting on this call ''for two weeks to schedule with Urology''   Can someone assist him??  Thank you.   [facility]    Procedure: fistulogram with anesthesia   Nothing to eat or drink starting at midnight  Have someone drop you off/pick you up  Check in with Registration (at the enterance of the hospital) no later than 6am on Friday, October 9th    [phone]....Ask for the Interventional Radiologist on call. Someone is on call 24hrs/day

## (undated) DEVICE — SOL WATER IRRIG 1000ML BOTTLE 2F7114

## (undated) DEVICE — SU SILK 4-0 TIE 12X30" A303H

## (undated) DEVICE — GOWN XLG DISP 9545

## (undated) DEVICE — SUTURE BOOTS 051003PBX

## (undated) DEVICE — Device

## (undated) DEVICE — LINEN ORTHO PACK 5446

## (undated) DEVICE — SUCTION MANIFOLD DORNOCH ULTRA CART UL-CL500

## (undated) DEVICE — DRAPE IOBAN INCISE 23X17" 6650EZ

## (undated) DEVICE — LINEN MAYO STAND COVER OVERSIZE PACK 5458

## (undated) DEVICE — PACK TOTAL HIP W/POUCH RIVERSIDE LATEX FREE

## (undated) DEVICE — DRAPE STERI TOWEL LG 1010

## (undated) DEVICE — BONE CLEANING TIP INTERPULSE  0210-010-000

## (undated) DEVICE — GLOVE PROTEXIS MICRO 7.5  2D73PM75

## (undated) DEVICE — DRSG TEGADERM 4X4 3/4" 1626W

## (undated) DEVICE — SU MONOCRYL 3-0 PS-1 27" Y936H

## (undated) DEVICE — PACK AV FISTULA

## (undated) DEVICE — LINEN TOWEL PACK X6 WHITE 5487

## (undated) DEVICE — TEGADERM ALGINATE AG

## (undated) DEVICE — SYR 50ML LL W/O NDL 309653

## (undated) DEVICE — GLOVE PROTEXIS BLUE W/NEU-THERA 8.0  2D73EB80

## (undated) DEVICE — SU VICRYL 0 CT 36" J358H

## (undated) DEVICE — CLIP HORIZON SM RED WIDE SLOT 001201

## (undated) DEVICE — PREP CHLORAPREP 26ML TINTED ORANGE  260815

## (undated) DEVICE — IMPLANTABLE DEVICE: Type: IMPLANTABLE DEVICE | Site: HIP | Status: NON-FUNCTIONAL

## (undated) DEVICE — SU VICRYL 2-0 CT-1 27" UND J259H

## (undated) DEVICE — GLOVE PROTEXIS POWDER FREE 7.5 ORTHOPEDIC 2D73ET75

## (undated) DEVICE — DRSG STERI STRIP 1/2X4" R1547

## (undated) DEVICE — MITT SURGICAL PREP HAIR REMOVER LATEX FREE 617142

## (undated) DEVICE — SOL NACL 0.9% IRRIG 1000ML BOTTLE 2F7124

## (undated) DEVICE — SU VICRYL 3-0 SH 27" J316H

## (undated) DEVICE — DRAPE SLEEVE 599

## (undated) DEVICE — SU SILK 2-0 TIE 12X30" A305H

## (undated) DEVICE — BLADE KNIFE SURG 20 371120

## (undated) DEVICE — GEL ULTRASOUND AQUASONIC 20GM 01-01

## (undated) DEVICE — POSITIONER ABDUCTION PILLOW FOAM MED FP-ABDUCTM

## (undated) DEVICE — STRAP KNEE/BODY 31143004

## (undated) DEVICE — BLADE SAW RECIP STRK 70X6X0.025MM 0277-096-250S5

## (undated) DEVICE — LINEN GOWN XLG 5407

## (undated) DEVICE — STRAP STIRRUP W/SLIP 30187-030

## (undated) DEVICE — SPONGE RAY-TEC 4X8" 7318

## (undated) DEVICE — DEVICE RETRIEVER HEWSON 71111579

## (undated) DEVICE — INSERT FOGARTY 33MM TRACTION HYDRAJAW HYDRA33

## (undated) DEVICE — ADH SKIN CLOSURE PREMIERPRO EXOFIN 1.0ML 3470

## (undated) DEVICE — SUCTION IRR SYSTEM W/O TIP INTERPULSE HANDPIECE 0210-100-000

## (undated) DEVICE — SU ETHIBOND 5 V-37 4X30" MB66G

## (undated) DEVICE — DRAIN PENROSE 5/8X18" LATEX 0918040

## (undated) DEVICE — SYR BULB IRRIG 50ML LATEX FREE 0035280

## (undated) DEVICE — ESU GROUND PAD ADULT W/CORD E7507

## (undated) DEVICE — LINEN TOWEL PACK X5 5464

## (undated) DEVICE — EYE SPONGE SPEAR WECK CEL 0008685

## (undated) DEVICE — DRAPE STOCKINETTE 4" 8544

## (undated) DEVICE — DRSG KERLIX 4 1/2"X4YDS ROLL 6730

## (undated) DEVICE — DRSG TEGADERM 4X10" 1627

## (undated) DEVICE — PREP CHLORAPREP 26ML TINTED HI-LITE ORANGE 930815

## (undated) DEVICE — BASIN SET SINGLE STERILE 13752-624

## (undated) DEVICE — SOL NACL 0.9% INJ 1000ML BAG 2B1324X

## (undated) DEVICE — GOWN IMPERVIOUS SPECIALTY XLG/XLONG 32474

## (undated) DEVICE — DRAPE SHEET REV FOLD 3/4 9349

## (undated) DEVICE — SU PROLENE 7-0 BV-1DA 4X24" M8702

## (undated) DEVICE — SUCTION MANIFOLD NEPTUNE 2 SYS 4 PORT 0702-020-000

## (undated) DEVICE — PITCHER STERILE 1000ML  SSK9004A

## (undated) DEVICE — BLADE CLIPPER SGL USE 9680

## (undated) DEVICE — SU SILK 5-0 TIE 12X30" A302H

## (undated) DEVICE — BNDG ELASTIC 4"X5YDS STERILE 6611-4S

## (undated) DEVICE — SU MONOCRYL 4-0 PS-2 18" UND Y496G

## (undated) DEVICE — VESSEL LOOPS DEV-O-LOOP WHITE MINI 31145728

## (undated) DEVICE — PAD CHUX UNDERPAD 23X24" 7136

## (undated) DEVICE — SYR 10ML SLIP TIP W/O NDL 303134

## (undated) DEVICE — DRSG GAUZE 4X4" TRAY 6939

## (undated) DEVICE — SYR 01ML 27GA 0.5" NDL TBC 309623

## (undated) DEVICE — DRSG KERLIX 4 1/2"X4YDS ROLL 6715

## (undated) DEVICE — DRILL BIT FLEXIBLE REFLECTION 35MM 71362935

## (undated) DEVICE — DRAPE IOBAN INCISE 36X23" 6651EZ

## (undated) DEVICE — HOOD FLYTE W/PEELAWAY 408-800-100

## (undated) DEVICE — SPONGE LAP 18X18" X8435

## (undated) DEVICE — LINEN TOWEL PACK X30 5481

## (undated) DEVICE — DRAPE EXTREMITY UPPER 120X76" 29414

## (undated) DEVICE — SYR 10ML FINGER CONTROL W/O NDL 309695

## (undated) DEVICE — GLOVE PROTEXIS W/NEU-THERA 8.0  2D73TE80

## (undated) DEVICE — DRAPE SHEET MED 44X70" 9355

## (undated) DEVICE — DECANTER VIAL 2006S

## (undated) DEVICE — SU VICRYL 0 CT-1 3X27" J430T

## (undated) DEVICE — CLIP HORIZON MED BLUE 002200

## (undated) DEVICE — DRSG TEGADERM ALGINATE AG 4X5" 90303

## (undated) DEVICE — SYR 70ML TOOMEY 041170

## (undated) DEVICE — SU SILK 3-0 TIE 12X30" A304H

## (undated) DEVICE — BLADE KNIFE SURG 11 371111

## (undated) RX ORDER — FENTANYL CITRATE 50 UG/ML
INJECTION, SOLUTION INTRAMUSCULAR; INTRAVENOUS
Status: DISPENSED
Start: 2021-04-12

## (undated) RX ORDER — NEOSTIGMINE METHYLSULFATE 1 MG/ML
VIAL (ML) INJECTION
Status: DISPENSED
Start: 2020-09-09

## (undated) RX ORDER — FENTANYL CITRATE-0.9 % NACL/PF 10 MCG/ML
PLASTIC BAG, INJECTION (ML) INTRAVENOUS
Status: DISPENSED
Start: 2021-04-12

## (undated) RX ORDER — HYDROMORPHONE HYDROCHLORIDE 1 MG/ML
INJECTION, SOLUTION INTRAMUSCULAR; INTRAVENOUS; SUBCUTANEOUS
Status: DISPENSED
Start: 2020-09-09

## (undated) RX ORDER — LIDOCAINE HYDROCHLORIDE 20 MG/ML
INJECTION, SOLUTION EPIDURAL; INFILTRATION; INTRACAUDAL; PERINEURAL
Status: DISPENSED
Start: 2021-04-12

## (undated) RX ORDER — LIDOCAINE HYDROCHLORIDE 20 MG/ML
INJECTION, SOLUTION EPIDURAL; INFILTRATION; INTRACAUDAL; PERINEURAL
Status: DISPENSED
Start: 2020-09-09

## (undated) RX ORDER — DOBUTAMINE HYDROCHLORIDE 200 MG/100ML
INJECTION INTRAVENOUS
Status: DISPENSED
Start: 2020-11-16

## (undated) RX ORDER — FENTANYL CITRATE 50 UG/ML
INJECTION, SOLUTION INTRAMUSCULAR; INTRAVENOUS
Status: DISPENSED
Start: 2021-02-19

## (undated) RX ORDER — ONDANSETRON 2 MG/ML
INJECTION INTRAMUSCULAR; INTRAVENOUS
Status: DISPENSED
Start: 2020-10-09

## (undated) RX ORDER — ACETAMINOPHEN 325 MG/1
TABLET ORAL
Status: DISPENSED
Start: 2020-09-09

## (undated) RX ORDER — PROPOFOL 10 MG/ML
INJECTION, EMULSION INTRAVENOUS
Status: DISPENSED
Start: 2021-04-12

## (undated) RX ORDER — FENTANYL CITRATE 50 UG/ML
INJECTION, SOLUTION INTRAMUSCULAR; INTRAVENOUS
Status: DISPENSED
Start: 2020-10-08

## (undated) RX ORDER — DOBUTAMINE HYDROCHLORIDE 200 MG/100ML
INJECTION INTRAVENOUS
Status: DISPENSED
Start: 2022-01-01

## (undated) RX ORDER — EPHEDRINE SULFATE 50 MG/ML
INJECTION, SOLUTION INTRAMUSCULAR; INTRAVENOUS; SUBCUTANEOUS
Status: DISPENSED
Start: 2020-09-09

## (undated) RX ORDER — ATROPINE SULFATE 0.4 MG/ML
AMPUL (ML) INJECTION
Status: DISPENSED
Start: 2022-01-01

## (undated) RX ORDER — PROPOFOL 10 MG/ML
INJECTION, EMULSION INTRAVENOUS
Status: DISPENSED
Start: 2019-07-31

## (undated) RX ORDER — PROPOFOL 10 MG/ML
INJECTION, EMULSION INTRAVENOUS
Status: DISPENSED
Start: 2020-10-09

## (undated) RX ORDER — ACETAMINOPHEN 325 MG/1
TABLET ORAL
Status: DISPENSED
Start: 2021-04-12

## (undated) RX ORDER — ONDANSETRON 2 MG/ML
INJECTION INTRAMUSCULAR; INTRAVENOUS
Status: DISPENSED
Start: 2019-07-31

## (undated) RX ORDER — CEFAZOLIN SODIUM 2 G/100ML
INJECTION, SOLUTION INTRAVENOUS
Status: DISPENSED
Start: 2020-09-09

## (undated) RX ORDER — SODIUM CHLORIDE 9 MG/ML
INJECTION, SOLUTION INTRAVENOUS
Status: DISPENSED
Start: 2021-02-19

## (undated) RX ORDER — METOPROLOL TARTRATE 1 MG/ML
INJECTION, SOLUTION INTRAVENOUS
Status: DISPENSED
Start: 2022-01-01

## (undated) RX ORDER — HEPARIN SODIUM 1000 [USP'U]/ML
INJECTION, SOLUTION INTRAVENOUS; SUBCUTANEOUS
Status: DISPENSED
Start: 2020-10-08

## (undated) RX ORDER — HYDROMORPHONE HYDROCHLORIDE 1 MG/ML
INJECTION, SOLUTION INTRAMUSCULAR; INTRAVENOUS; SUBCUTANEOUS
Status: DISPENSED
Start: 2021-04-12

## (undated) RX ORDER — FENTANYL CITRATE 50 UG/ML
INJECTION, SOLUTION INTRAMUSCULAR; INTRAVENOUS
Status: DISPENSED
Start: 2020-09-09

## (undated) RX ORDER — HEPARIN SODIUM 200 [USP'U]/100ML
INJECTION, SOLUTION INTRAVENOUS
Status: DISPENSED
Start: 2020-10-08

## (undated) RX ORDER — CEFAZOLIN SODIUM 1 G/3ML
INJECTION, POWDER, FOR SOLUTION INTRAMUSCULAR; INTRAVENOUS
Status: DISPENSED
Start: 2020-09-09

## (undated) RX ORDER — CELECOXIB 200 MG/1
CAPSULE ORAL
Status: DISPENSED
Start: 2021-04-12

## (undated) RX ORDER — BUPIVACAINE HYDROCHLORIDE 2.5 MG/ML
INJECTION, SOLUTION EPIDURAL; INFILTRATION; INTRACAUDAL
Status: DISPENSED
Start: 2019-07-31

## (undated) RX ORDER — LIDOCAINE HYDROCHLORIDE 10 MG/ML
INJECTION, SOLUTION EPIDURAL; INFILTRATION; INTRACAUDAL; PERINEURAL
Status: DISPENSED
Start: 2019-07-31

## (undated) RX ORDER — PROPOFOL 10 MG/ML
INJECTION, EMULSION INTRAVENOUS
Status: DISPENSED
Start: 2020-09-09

## (undated) RX ORDER — FENTANYL CITRATE-0.9 % NACL/PF 10 MCG/ML
PLASTIC BAG, INJECTION (ML) INTRAVENOUS
Status: DISPENSED
Start: 2020-09-09

## (undated) RX ORDER — LIDOCAINE HYDROCHLORIDE 10 MG/ML
INJECTION, SOLUTION EPIDURAL; INFILTRATION; INTRACAUDAL; PERINEURAL
Status: DISPENSED
Start: 2020-10-09

## (undated) RX ORDER — METOPROLOL TARTRATE 1 MG/ML
INJECTION, SOLUTION INTRAVENOUS
Status: DISPENSED
Start: 2020-11-16

## (undated) RX ORDER — DEXAMETHASONE SODIUM PHOSPHATE 4 MG/ML
INJECTION, SOLUTION INTRA-ARTICULAR; INTRALESIONAL; INTRAMUSCULAR; INTRAVENOUS; SOFT TISSUE
Status: DISPENSED
Start: 2019-07-31

## (undated) RX ORDER — PAPAVERINE HYDROCHLORIDE 30 MG/ML
INJECTION INTRAMUSCULAR; INTRAVENOUS
Status: DISPENSED
Start: 2019-07-31

## (undated) RX ORDER — FENTANYL CITRATE 50 UG/ML
INJECTION, SOLUTION INTRAMUSCULAR; INTRAVENOUS
Status: DISPENSED
Start: 2019-07-31

## (undated) RX ORDER — LIDOCAINE HYDROCHLORIDE 10 MG/ML
INJECTION, SOLUTION EPIDURAL; INFILTRATION; INTRACAUDAL; PERINEURAL
Status: DISPENSED
Start: 2018-11-15

## (undated) RX ORDER — CEFAZOLIN SODIUM 2 G/100ML
INJECTION, SOLUTION INTRAVENOUS
Status: DISPENSED
Start: 2021-04-12

## (undated) RX ORDER — CEFAZOLIN SODIUM 2 G/100ML
INJECTION, SOLUTION INTRAVENOUS
Status: DISPENSED
Start: 2019-07-31

## (undated) RX ORDER — ATROPINE SULFATE 0.4 MG/ML
AMPUL (ML) INJECTION
Status: DISPENSED
Start: 2020-11-16

## (undated) RX ORDER — ONDANSETRON 2 MG/ML
INJECTION INTRAMUSCULAR; INTRAVENOUS
Status: DISPENSED
Start: 2021-04-12

## (undated) RX ORDER — CEFAZOLIN SODIUM 1 G/3ML
INJECTION, POWDER, FOR SOLUTION INTRAMUSCULAR; INTRAVENOUS
Status: DISPENSED
Start: 2020-10-09

## (undated) RX ORDER — ONDANSETRON 2 MG/ML
INJECTION INTRAMUSCULAR; INTRAVENOUS
Status: DISPENSED
Start: 2020-09-09

## (undated) RX ORDER — LIDOCAINE HYDROCHLORIDE 10 MG/ML
INJECTION, SOLUTION EPIDURAL; INFILTRATION; INTRACAUDAL; PERINEURAL
Status: DISPENSED
Start: 2020-10-08

## (undated) RX ORDER — LIDOCAINE HYDROCHLORIDE 10 MG/ML
INJECTION, SOLUTION EPIDURAL; INFILTRATION; INTRACAUDAL; PERINEURAL
Status: DISPENSED
Start: 2021-02-19

## (undated) RX ORDER — SODIUM CHLORIDE 9 MG/ML
INJECTION, SOLUTION INTRAVENOUS
Status: DISPENSED
Start: 2020-10-08

## (undated) RX ORDER — TRIAMCINOLONE ACETONIDE 40 MG/ML
INJECTION, SUSPENSION INTRA-ARTICULAR; INTRAMUSCULAR
Status: DISPENSED
Start: 2018-11-15

## (undated) RX ORDER — LIDOCAINE HYDROCHLORIDE 20 MG/ML
INJECTION, SOLUTION EPIDURAL; INFILTRATION; INTRACAUDAL; PERINEURAL
Status: DISPENSED
Start: 2019-07-31

## (undated) RX ORDER — FENTANYL CITRATE 50 UG/ML
INJECTION, SOLUTION INTRAMUSCULAR; INTRAVENOUS
Status: DISPENSED
Start: 2020-10-09

## (undated) RX ORDER — HEPARIN SODIUM 1000 [USP'U]/ML
INJECTION, SOLUTION INTRAVENOUS; SUBCUTANEOUS
Status: DISPENSED
Start: 2019-07-31

## (undated) RX ORDER — HEPARIN SODIUM 1000 [USP'U]/ML
INJECTION, SOLUTION INTRAVENOUS; SUBCUTANEOUS
Status: DISPENSED
Start: 2020-10-09

## (undated) RX ORDER — OXYCODONE HYDROCHLORIDE 5 MG/1
TABLET ORAL
Status: DISPENSED
Start: 2020-09-09

## (undated) RX ORDER — LABETALOL HYDROCHLORIDE 5 MG/ML
INJECTION, SOLUTION INTRAVENOUS
Status: DISPENSED
Start: 2021-04-12

## (undated) RX ORDER — GLYCOPYRROLATE 0.2 MG/ML
INJECTION INTRAMUSCULAR; INTRAVENOUS
Status: DISPENSED
Start: 2020-09-09